# Patient Record
Sex: FEMALE | Race: BLACK OR AFRICAN AMERICAN | NOT HISPANIC OR LATINO | Employment: FULL TIME
[De-identification: names, ages, dates, MRNs, and addresses within clinical notes are randomized per-mention and may not be internally consistent; named-entity substitution may affect disease eponyms.]

---

## 2017-06-24 ENCOUNTER — CHARTING TRANS (OUTPATIENT)
Dept: OTHER | Age: 27
End: 2017-06-24

## 2017-06-24 ASSESSMENT — PAIN SCALES - GENERAL: PAINLEVEL_OUTOF10: 0

## 2017-07-11 ENCOUNTER — LAB SERVICES (OUTPATIENT)
Dept: OTHER | Age: 27
End: 2017-07-11

## 2017-07-11 ENCOUNTER — CHARTING TRANS (OUTPATIENT)
Dept: OTHER | Age: 27
End: 2017-07-11

## 2017-07-11 LAB
CLUE CELLS: NORMAL
M TB RRNA SPEC QL PROBE: NORMAL
TRICHOMONAS W: NORMAL
YEAST WM: NORMAL

## 2017-07-11 ASSESSMENT — PAIN SCALES - GENERAL: PAINLEVEL_OUTOF10: 0

## 2017-07-13 ENCOUNTER — CHARTING TRANS (OUTPATIENT)
Dept: OTHER | Age: 27
End: 2017-07-13

## 2017-07-13 LAB
HIV 1+2 AB+HIV1 P24 AG SERPL QL IA: NONREACTIVE
RPR SER QL: NONREACTIVE

## 2017-07-14 ENCOUNTER — CHARTING TRANS (OUTPATIENT)
Dept: OTHER | Age: 27
End: 2017-07-14

## 2017-07-14 LAB
C TRACH RRNA SPEC QL NAA+PROBE: NEGATIVE
N GONORRHOEA RRNA SPEC QL NAA+PROBE: NEGATIVE
SPECIMEN SOURCE: NORMAL

## 2017-07-19 ENCOUNTER — CHARTING TRANS (OUTPATIENT)
Dept: OTHER | Age: 27
End: 2017-07-19

## 2017-07-19 LAB — HPV I/H RISK 4 DNA CVX QL NAA+PROBE: NORMAL

## 2017-07-20 ENCOUNTER — CHARTING TRANS (OUTPATIENT)
Dept: OTHER | Age: 27
End: 2017-07-20

## 2017-10-29 ENCOUNTER — HOSPITAL ENCOUNTER (EMERGENCY)
Facility: OTHER | Age: 27
Discharge: HOME | End: 2017-10-29
Attending: EMERGENCY MEDICINE

## 2017-10-29 VITALS
DIASTOLIC BLOOD PRESSURE: 58 MMHG | SYSTOLIC BLOOD PRESSURE: 92 MMHG | WEIGHT: 108 LBS | OXYGEN SATURATION: 100 % | RESPIRATION RATE: 16 BRPM | BODY MASS INDEX: 19.14 KG/M2 | TEMPERATURE: 98 F | HEIGHT: 63 IN | HEART RATE: 106 BPM

## 2017-10-29 DIAGNOSIS — I95.1 ORTHOSTATIC HYPOTENSION: Primary | ICD-10-CM

## 2017-10-29 DIAGNOSIS — R11.2 NON-INTRACTABLE VOMITING WITH NAUSEA, UNSPECIFIED VOMITING TYPE: ICD-10-CM

## 2017-10-29 LAB
ALBUMIN SERPL-MCNC: 3 G/DL (ref 3.3–5.5)
ALBUMIN SERPL-MCNC: 3.2 G/DL (ref 3.3–5.5)
ALP SERPL-CCNC: 70 U/L (ref 42–141)
ALP SERPL-CCNC: 76 U/L (ref 42–141)
BILIRUB SERPL-MCNC: 0.3 MG/DL (ref 0.2–1.6)
BILIRUB SERPL-MCNC: 0.4 MG/DL (ref 0.2–1.6)
BUN SERPL-MCNC: 6 MG/DL (ref 7–22)
CALCIUM SERPL-MCNC: 9.2 MG/DL (ref 8–10.3)
CHLORIDE SERPL-SCNC: 100 MMOL/L (ref 98–108)
CREAT SERPL-MCNC: 0.7 MG/DL (ref 0.6–1.2)
CTP QC/QA: YES
FLUAV AG NPH QL: NEGATIVE
FLUBV AG NPH QL: NEGATIVE
GLUCOSE SERPL-MCNC: 78 MG/DL (ref 73–118)
POC ALT (SGPT): 12 U/L (ref 10–47)
POC ALT (SGPT): 9 U/L (ref 10–47)
POC AMYLASE: 62 U/L (ref 14–97)
POC AST (SGOT): 21 U/L (ref 11–38)
POC AST (SGOT): 22 U/L (ref 11–38)
POC BETA-HCG (QUANT): <5 IU/L
POC GGT: 7 U/L (ref 5–65)
POC TCO2: 27 MMOL/L (ref 18–33)
POTASSIUM BLD-SCNC: 3.7 MMOL/L (ref 3.6–5.1)
PROTEIN, POC: 8.5 G/DL (ref 6.4–8.1)
PROTEIN, POC: 8.6 G/DL (ref 6.4–8.1)
SAMPLE: NORMAL
SODIUM BLD-SCNC: 140 MMOL/L (ref 128–145)

## 2017-10-29 PROCEDURE — 85025 COMPLETE CBC W/AUTO DIFF WBC: CPT

## 2017-10-29 PROCEDURE — 96374 THER/PROPH/DIAG INJ IV PUSH: CPT

## 2017-10-29 PROCEDURE — 84702 CHORIONIC GONADOTROPIN TEST: CPT

## 2017-10-29 PROCEDURE — 99284 EMERGENCY DEPT VISIT MOD MDM: CPT | Mod: 25

## 2017-10-29 PROCEDURE — 63600175 PHARM REV CODE 636 W HCPCS: Performed by: EMERGENCY MEDICINE

## 2017-10-29 PROCEDURE — 87804 INFLUENZA ASSAY W/OPTIC: CPT

## 2017-10-29 PROCEDURE — 96361 HYDRATE IV INFUSION ADD-ON: CPT

## 2017-10-29 PROCEDURE — 83605 ASSAY OF LACTIC ACID: CPT

## 2017-10-29 PROCEDURE — 25000003 PHARM REV CODE 250: Performed by: EMERGENCY MEDICINE

## 2017-10-29 PROCEDURE — 80053 COMPREHEN METABOLIC PANEL: CPT

## 2017-10-29 RX ORDER — ONDANSETRON 4 MG/1
4 TABLET, FILM COATED ORAL EVERY 6 HOURS PRN
Qty: 12 TABLET | Refills: 0 | Status: SHIPPED | OUTPATIENT
Start: 2017-10-29 | End: 2019-04-04

## 2017-10-29 RX ORDER — ONDANSETRON 2 MG/ML
4 INJECTION INTRAMUSCULAR; INTRAVENOUS
Status: COMPLETED | OUTPATIENT
Start: 2017-10-29 | End: 2017-10-29

## 2017-10-29 RX ORDER — SODIUM CHLORIDE 9 MG/ML
1000 INJECTION, SOLUTION INTRAVENOUS
Status: COMPLETED | OUTPATIENT
Start: 2017-10-29 | End: 2017-10-29

## 2017-10-29 RX ADMIN — ONDANSETRON 4 MG: 2 INJECTION INTRAMUSCULAR; INTRAVENOUS at 04:10

## 2017-10-29 RX ADMIN — SODIUM CHLORIDE 1000 ML: 0.9 INJECTION, SOLUTION INTRAVENOUS at 04:10

## 2017-10-29 NOTE — ED TRIAGE NOTES
Patient states she has headache and abdominal pain with n/v.  She states she had 2 episodes of vomiting hours ago.      LOC: The patient is awake and alert; oriented x 3 and speaking appropriately.  APPEARANCE: Patient resting comfortably, patient is clean and well groomed  SKIN: warm and dry, normal skin turgor & moist mucus membranes, skin intact, no breakdown noted.  MUSCULOSKELETAL: Patient moving all extremities well, no obvious swelling or deformities noted  RESPIRATORY: Airway is open and patent, breath sounds clear throughout all lung fields; respirations are spontaneous, normal effort and rate  CARDIAC: Patient has a normal rate, no peripheral edema noted, capillary refill < 3 seconds; No complaints of chest pain   ABDOMEN: Soft and non tender to palpation, no distention noted. Bowel sounds present x 4

## 2017-10-29 NOTE — ED PROVIDER NOTES
Encounter Date: 10/29/2017       History     Chief Complaint   Patient presents with    Headache     Pt presents to ER with c/o headache and general body aches accompanied by nausea and vomitng. Pt also running fever at home last took tylenol about 3 hours ago.      27 y.o. Female with PMH asthma, tobacco/marijuana abuse, IBS, anemia presents to ED c/o acute NV x 2 and subjective fever since yesterday. She states emesis was food contents and denies hematemesis.  She further reports chronic intermittent, generalized abdominal pain that has been present for weeks since she stopped taking nexium.     + sick contacts - exposed to someone with flu-like symptoms and vomiting this week.      The history is provided by the patient.     Review of patient's allergies indicates:   Allergen Reactions    Shellfish containing products Anaphylaxis     Allergic to all seafood.    Horse/equine containing products Itching     Past Medical History:   Diagnosis Date    Allergy     Anemia     Asthma     Depression      History reviewed. No pertinent surgical history.  History reviewed. No pertinent family history.  Social History   Substance Use Topics    Smoking status: Current Every Day Smoker    Smokeless tobacco: Never Used    Alcohol use 0.0 oz/week      Comment: Socially     Review of Systems   Constitutional: Positive for chills, fatigue and fever (subjective).   HENT: Negative.    Eyes: Negative.    Respiratory: Negative for cough and shortness of breath.    Cardiovascular: Negative for palpitations and leg swelling.   Gastrointestinal: Positive for abdominal pain (chronic intermittent pain that is usually alleviated by nexium which she is no longer taking), nausea and vomiting.   Genitourinary: Negative for dysuria and frequency.   Musculoskeletal: Negative for arthralgias and myalgias.   Skin: Positive for rash.   Psychiatric/Behavioral:        Stressed out over her dying grandfather's condition.   All other systems  reviewed and are negative.      Physical Exam     Initial Vitals [10/29/17 0315]   BP Pulse Resp Temp SpO2   112/71 (!) 117 18 98.1 °F (36.7 °C) 100 %      MAP       84.67         Physical Exam    Nursing note and vitals reviewed.  Constitutional: She appears well-developed and well-nourished. She is not diaphoretic. No distress.   HENT:   Head: Normocephalic and atraumatic.   Mouth/Throat: Uvula is midline. Mucous membranes are dry.   Eyes: Conjunctivae are normal. Pupils are equal, round, and reactive to light.   Neck: Normal range of motion. Neck supple.   Cardiovascular: Normal rate and intact distal pulses.   Pulmonary/Chest: No accessory muscle usage. No tachypnea. No respiratory distress.   Abdominal: She exhibits no distension. There is no tenderness.   Musculoskeletal: Normal range of motion. She exhibits no tenderness.   Neurological: She is alert and oriented to person, place, and time.   Skin: Skin is warm. Capillary refill takes less than 2 seconds.   Psychiatric: She has a normal mood and affect.         ED Course   Procedures  Labs Reviewed   POCT CMP - Abnormal; Notable for the following:        Result Value    Albumin, POC 3.0 (*)     POC BUN 6 (*)     Protein 8.5 (*)     All other components within normal limits   POCT LIVER PANEL - Abnormal; Notable for the following:     Albumin, POC 3.2 (*)     ALT (SGPT), POC 9 (*)     Protein 8.6 (*)     All other components within normal limits   POCT INFLUENZA A/B   POCT CBC   POCT URINE PREGNANCY   POCT CMP   POCT LIVER PANEL   POCT B-HCG (QUANT)   POCT B-HCG (QUANT)     EKG Readings: (Independently Interpreted)   Initial Reading: No STEMI. Rhythm: Sinus Tachycardia. Heart Rate: 105. Ectopy: No Ectopy. Conduction: Normal. ST Segments: Normal ST Segments. Axis: Normal. Q Waves: V1 and V2.          Medical Decision Making:   ED Management:  Patient's HR and BP consistent with previous outpatient vitals.  Discharged after IVF's.                    ED Course as   Oct 29 0638   Sun Oct 29, 2017   0550 No emesis during ED course thus far. Patient states she feels much better.   [DL]      ED Course User Index  [DL] Stefany Song MD     Labs Reviewed  Admission on 10/29/2017, Discharged on 10/29/2017   Component Date Value Ref Range Status    Rapid Influenza A Ag 10/29/2017 Negative  Negative Final    Rapid Influenza B Ag 10/29/2017 Negative  Negative Final     Acceptable 10/29/2017 Yes   Final    Albumin, POC 10/29/2017 3.0* 3.3 - 5.5 g/dL Final    Alkaline Phosphatase, POC 10/29/2017 76  42 - 141 U/L Final    ALT (SGPT), POC 10/29/2017 12  10 - 47 U/L Final    AST (SGOT), POC 10/29/2017 22  11 - 38 U/L Final    POC BUN 10/29/2017 6* 7 - 22 mg/dL Final    Calcium, POC 10/29/2017 9.2  8.0 - 10.3 mg/dL Final    POC Chloride 10/29/2017 100  98 - 108 mmol/L Final    POC Creatinine 10/29/2017 0.7  0.6 - 1.2 mg/dL Final    POC Glucose 10/29/2017 78  73 - 118 mg/dL Final    POC Potassium 10/29/2017 3.7  3.6 - 5.1 mmol/L Final    POC Sodium 10/29/2017 140  128 - 145 mmol/L Final    Bilirubin 10/29/2017 0.3  0.2 - 1.6 mg/dL Final    POC TCO2 10/29/2017 27  18 - 33 mmol/L Final    Protein 10/29/2017 8.5* 6.4 - 8.1 g/dL Final    Albumin, POC 10/29/2017 3.2* 3.3 - 5.5 g/dL Final    Alkaline Phosphatase, POC 10/29/2017 70  42 - 141 U/L Final    ALT (SGPT), POC 10/29/2017 9* 10 - 47 U/L Final    Amylase, POC 10/29/2017 62  14 - 97 U/L Final    AST (SGOT), POC 10/29/2017 21  11 - 38 U/L Final    POC GGT 10/29/2017 7  5 - 65 U/L Final    Bilirubin 10/29/2017 0.4  0.2 - 1.6 mg/dL Final    Protein 10/29/2017 8.6* 6.4 - 8.1 g/dL Final    POC Beta-HCG (Quant) 10/29/2017 <5.0  IU/L Final    Sample 10/29/2017 BRENDA   Final           Medications given in ED    Medications   0.9%  NaCl infusion (0 mLs Intravenous Stopped 10/29/17 0500)   ondansetron injection 4 mg (4 mg Intravenous Given 10/29/17 9063)       Discharge Medications     Discharge Medication  List as of 10/29/2017  5:55 AM      START taking these medications    Details   ondansetron (ZOFRAN) 4 MG tablet Take 1 tablet (4 mg total) by mouth every 6 (six) hours as needed for Nausea., Starting Sun 10/29/2017, Print                   Patient discharged to home in stable condition with instructions to:   1. Please take all meds as prescribed.  2. Follow-up with your primary care doctor   3. Return precautions discussed and patient and/or family/caretaker understands to return to the emergency room for any concerns including worsening of your current symptoms, fever, chills, night sweats, worsening pain, chest pain, shortness of breath, nausea, vomiting, diarrhea, bleeding, headache, difficulty talking, visual disturbances, weakness, numbness or any other acute concerns    Note was created using voice recognition software. Note may have occasional typographical errors that may not have been identified and edited despite good fantasma initial review prior to signing.    Clinical Impression:   The primary encounter diagnosis was Orthostatic hypotension. A diagnosis of Non-intractable vomiting with nausea, unspecified vomiting type was also pertinent to this visit.                           Stefany Song MD  10/29/17 0645

## 2018-03-04 ENCOUNTER — HOSPITAL ENCOUNTER (EMERGENCY)
Facility: OTHER | Age: 28
Discharge: HOME | End: 2018-03-04
Attending: INTERNAL MEDICINE

## 2018-03-04 VITALS
DIASTOLIC BLOOD PRESSURE: 64 MMHG | OXYGEN SATURATION: 100 % | HEIGHT: 63 IN | RESPIRATION RATE: 18 BRPM | TEMPERATURE: 99 F | WEIGHT: 115 LBS | HEART RATE: 100 BPM | SYSTOLIC BLOOD PRESSURE: 107 MMHG | BODY MASS INDEX: 20.38 KG/M2

## 2018-03-04 DIAGNOSIS — L03.315 CELLULITIS OF PERINEUM: Primary | ICD-10-CM

## 2018-03-04 LAB
B-HCG UR QL: NEGATIVE
BILIRUBIN, POC UA: NEGATIVE
BLOOD, POC UA: NEGATIVE
CLARITY, POC UA: CLEAR
COLOR, POC UA: YELLOW
CTP QC/QA: YES
GLUCOSE, POC UA: NEGATIVE
KETONES, POC UA: NEGATIVE
LEUKOCYTE EST, POC UA: NEGATIVE
NITRITE, POC UA: NEGATIVE
PH UR STRIP: 8 [PH]
PROTEIN, POC UA: ABNORMAL
SPECIFIC GRAVITY, POC UA: 1.02
UROBILINOGEN, POC UA: 1 E.U./DL

## 2018-03-04 PROCEDURE — 99283 EMERGENCY DEPT VISIT LOW MDM: CPT

## 2018-03-04 PROCEDURE — 81025 URINE PREGNANCY TEST: CPT | Performed by: EMERGENCY MEDICINE

## 2018-03-04 PROCEDURE — 81003 URINALYSIS AUTO W/O SCOPE: CPT

## 2018-03-04 RX ORDER — DOXYCYCLINE 100 MG/1
100 CAPSULE ORAL 2 TIMES DAILY
Qty: 20 CAPSULE | Refills: 0 | Status: SHIPPED | OUTPATIENT
Start: 2018-03-04 | End: 2018-03-14

## 2018-03-04 RX ORDER — DOXYCYCLINE 100 MG/1
100 CAPSULE ORAL 2 TIMES DAILY
Qty: 20 CAPSULE | Refills: 0 | Status: SHIPPED | OUTPATIENT
Start: 2018-03-04 | End: 2018-03-04

## 2018-03-05 NOTE — ED NOTES
"Pt states her main complaint is a "boil" between her vagina and rectum that began weeks ago but became painful over the last few days. Pt denies fever, chills other symptoms at this time. Denies drainage from the area, +redness and swelling noted upon exam.  "

## 2018-03-05 NOTE — ED PROVIDER NOTES
"Encounter Date: 3/4/2018       History     Chief Complaint   Patient presents with    Abdominal Pain    Fatigue     generalized and arms    Acne    Recurrent Skin Infections     "near vagina and butt"     27-year-old female presents to the emergency department complaining of infection to her right perineal region ×2 days.  Patient states area has increased in size and tenderness.  She also had complaints of abdominal pain and fatigue that have been intermittent over the past several weeks, but denies abdominal pain and fatigue at this time.      The history is provided by the patient. No  was used.   Illness    The current episode started yesterday. The problem occurs continuously. The problem has been gradually worsening. The pain is at a severity of 3/10. Nothing relieves the symptoms. Nothing aggravates the symptoms. Associated symptoms include rash. Pertinent negatives include no fever.     Review of patient's allergies indicates:   Allergen Reactions    Shellfish containing products Anaphylaxis     Allergic to all seafood.    Horse/equine containing products Itching     Past Medical History:   Diagnosis Date    Allergy     Anemia     Asthma     Depression      History reviewed. No pertinent surgical history.  History reviewed. No pertinent family history.  Social History   Substance Use Topics    Smoking status: Current Every Day Smoker    Smokeless tobacco: Never Used    Alcohol use 0.0 oz/week      Comment: Socially     Review of Systems   Constitutional: Negative for fever.   Skin: Positive for color change and rash.   All other systems reviewed and are negative.      Physical Exam     Initial Vitals [03/04/18 1719]   BP Pulse Resp Temp SpO2   91/74 101 18 98.5 °F (36.9 °C) 100 %      MAP       79.67         Physical Exam    Nursing note and vitals reviewed.  Constitutional: She appears well-developed and well-nourished. No distress.   HENT:   Head: Normocephalic and " atraumatic.   Eyes: EOM are normal.   Neck: Normal range of motion.   Cardiovascular: Normal rate and regular rhythm.   Pulmonary/Chest: Breath sounds normal. No respiratory distress.   Abdominal: Soft. She exhibits no distension.   Musculoskeletal: Normal range of motion.   Neurological: She is alert.   Skin: Skin is warm and dry.   Erythema to right perineal region with slight induration and no fluctuance   Psychiatric: She has a normal mood and affect.         ED Course   Procedures  Labs Reviewed   POCT URINALYSIS W/O SCOPE - Abnormal; Notable for the following:        Result Value    Glucose, UA Negative (*)     Bilirubin, UA Negative (*)     Ketones, UA Negative (*)     Blood, UA Negative (*)     Protein, UA 2+ (*)     Nitrite, UA Negative (*)     Leukocytes, UA Negative (*)     All other components within normal limits   POCT URINE PREGNANCY   POCT URINALYSIS W/O SCOPE             Medical Decision Making:   Initial Assessment:   27-year-old female presents to the emergency department complaining of infection to her right perineal region ×2 days.  Patient states area has increased in size and tenderness.    ED Management:  Patient was given instructions for cellulitis and a prescription for doxycycline.  She was advised to follow-up with her primary care physician within the next 2 days for reevaluation.                      Clinical Impression:   The encounter diagnosis was Cellulitis of perineum.    Disposition:   Disposition: Discharged  Condition: Stable                        Mckay Small MD  03/14/18 0553

## 2018-09-28 ENCOUNTER — CHARTING TRANS (OUTPATIENT)
Dept: OTHER | Age: 28
End: 2018-09-28

## 2018-10-21 ENCOUNTER — HOSPITAL ENCOUNTER (EMERGENCY)
Facility: HOSPITAL | Age: 28
Discharge: HOME | End: 2018-10-21
Attending: EMERGENCY MEDICINE
Payer: COMMERCIAL

## 2018-10-21 VITALS
HEART RATE: 95 BPM | RESPIRATION RATE: 16 BRPM | OXYGEN SATURATION: 98 % | DIASTOLIC BLOOD PRESSURE: 77 MMHG | SYSTOLIC BLOOD PRESSURE: 119 MMHG | WEIGHT: 100 LBS | TEMPERATURE: 98 F | BODY MASS INDEX: 17.71 KG/M2

## 2018-10-21 DIAGNOSIS — L40.9 PSORIASIS OF SCALP: Primary | ICD-10-CM

## 2018-10-21 DIAGNOSIS — N39.0 ACUTE UTI: ICD-10-CM

## 2018-10-21 DIAGNOSIS — L03.811 CELLULITIS OF SCALP: ICD-10-CM

## 2018-10-21 DIAGNOSIS — L40.1 PUSTULAR PSORIASIS: ICD-10-CM

## 2018-10-21 LAB
B-HCG UR QL: NEGATIVE
BILIRUBIN, POC UA: NEGATIVE
BLOOD, POC UA: ABNORMAL
CLARITY, POC UA: ABNORMAL
COLOR, POC UA: YELLOW
CTP QC/QA: YES
GLUCOSE, POC UA: NEGATIVE
KETONES, POC UA: NEGATIVE
LEUKOCYTE EST, POC UA: NEGATIVE
NITRITE, POC UA: POSITIVE
PH UR STRIP: 5.5 [PH]
PROTEIN, POC UA: ABNORMAL
SPECIFIC GRAVITY, POC UA: >=1.03
UROBILINOGEN, POC UA: 0.2 E.U./DL

## 2018-10-21 PROCEDURE — 81025 URINE PREGNANCY TEST: CPT | Performed by: EMERGENCY MEDICINE

## 2018-10-21 PROCEDURE — 87077 CULTURE AEROBIC IDENTIFY: CPT

## 2018-10-21 PROCEDURE — 81003 URINALYSIS AUTO W/O SCOPE: CPT

## 2018-10-21 PROCEDURE — 99284 EMERGENCY DEPT VISIT MOD MDM: CPT

## 2018-10-21 PROCEDURE — 87086 URINE CULTURE/COLONY COUNT: CPT

## 2018-10-21 PROCEDURE — 87088 URINE BACTERIA CULTURE: CPT

## 2018-10-21 PROCEDURE — 87186 SC STD MICRODIL/AGAR DIL: CPT

## 2018-10-21 RX ORDER — FLUOCINONIDE 0.5 MG/G
OINTMENT TOPICAL 2 TIMES DAILY
Qty: 60 G | Refills: 0 | Status: SHIPPED | OUTPATIENT
Start: 2018-10-21 | End: 2019-04-04

## 2018-10-21 RX ORDER — LORATADINE 10 MG/1
10 TABLET ORAL DAILY
Qty: 60 TABLET | Refills: 0 | Status: SHIPPED | OUTPATIENT
Start: 2018-10-21 | End: 2019-04-04

## 2018-10-21 RX ORDER — AMOXICILLIN AND CLAVULANATE POTASSIUM 875; 125 MG/1; MG/1
1 TABLET, FILM COATED ORAL 2 TIMES DAILY
Qty: 20 TABLET | Refills: 0 | Status: SHIPPED | OUTPATIENT
Start: 2018-10-21 | End: 2018-10-31

## 2018-10-21 RX ORDER — FLUOCINOLONE ACETONIDE 0.11 MG/ML
OIL TOPICAL NIGHTLY
Qty: 118.28 ML | Refills: 0 | Status: SHIPPED | OUTPATIENT
Start: 2018-10-21 | End: 2019-04-04

## 2018-10-21 RX ORDER — DIPHENHYDRAMINE HCL 25 MG
25 CAPSULE ORAL EVERY 6 HOURS PRN
Qty: 20 CAPSULE | Refills: 0 | Status: SHIPPED | OUTPATIENT
Start: 2018-10-21 | End: 2019-04-04

## 2018-10-21 RX ORDER — CLOBETASOL PROPIONATE 0.46 MG/ML
SOLUTION TOPICAL 2 TIMES DAILY
Qty: 50 ML | Refills: 0 | Status: SHIPPED | OUTPATIENT
Start: 2018-10-21 | End: 2019-04-04

## 2018-10-21 NOTE — ED PROVIDER NOTES
"Encounter Date: 10/21/2018    SCRIBE #1 NOTE: I, Ama Monsalve, am scribing for, and in the presence of,  Dr. West. I have scribed the following portions of the note - Other sections scribed: HPI, ROS, PE.       History     Chief Complaint   Patient presents with    Rash     Pt states," I have a rash on my head for months and sometimes it smells."     This is a 28 year old female who presents to the ED complaining of "skin issues" to her entire body for years and her scalp for the past few months. She states she has had these issues all her life. She does not see a dermatologist for these issues because she cannot afford it without insurance. She states the rash to her scalp has been draining yellow pus recently. She notes the rash is worse to the back of her scalp. Family member at bedside states the rash to her scalp has a malodorous odor. Patient reports she gets chills. Denies fever. Patient does not wear weave, and states her hair is natural. Patient states she used to see a PCP, Dr. Jay, at [facility], but has not seen him in a long time. She denies using any medications for her skin.      The history is provided by the patient and a relative.     Review of patient's allergies indicates:   Allergen Reactions    Shellfish containing products Anaphylaxis     Allergic to all seafood.    Horse/equine containing products Itching     Past Medical History:   Diagnosis Date    Allergy     Anemia     Asthma     Depression      History reviewed. No pertinent surgical history.  History reviewed. No pertinent family history.  Social History     Tobacco Use    Smoking status: Current Every Day Smoker    Smokeless tobacco: Never Used   Substance Use Topics    Alcohol use: Yes     Alcohol/week: 0.0 oz     Comment: Socially    Drug use: Yes     Frequency: 3.0 times per week     Types: Marijuana     Review of Systems   Constitutional: Negative for chills and fever.   HENT: Negative for sore throat.  "   Respiratory: Negative for shortness of breath.    Cardiovascular: Negative for chest pain.   Gastrointestinal: Negative for nausea.   Genitourinary: Negative for dysuria.   Musculoskeletal: Negative for back pain.   Skin: Positive for rash.   Neurological: Negative for weakness.   Hematological: Does not bruise/bleed easily.   All other systems reviewed and are negative.      Physical Exam     Initial Vitals [10/21/18 1257]   BP Pulse Resp Temp SpO2   119/77 95 16 98 °F (36.7 °C) 98 %      MAP       --           Patient gave consent to have physical exam performed.    Physical Exam    Nursing note and vitals reviewed.  Constitutional: She appears well-developed and well-nourished. She appears cachectic.   HENT:   Head: Normocephalic and atraumatic.   Right Ear: External ear normal.   Left Ear: External ear normal.   Nose: Nose normal.   Eyes: Conjunctivae are normal.   Neck: Normal range of motion. Neck supple.   Cardiovascular: Normal rate, regular rhythm, normal heart sounds and intact distal pulses. Exam reveals no gallop and no friction rub.    No murmur heard.  Pulmonary/Chest: Breath sounds normal. No stridor. No respiratory distress. She has no wheezes. She has no rhonchi. She has no rales.   Abdominal: Soft. Bowel sounds are normal. She exhibits no distension and no mass. There is no tenderness. There is no rigidity, no rebound and no guarding.   Musculoskeletal: Normal range of motion.   Neurological: She is alert and oriented to person, place, and time.   Skin: Skin is warm and dry. Rash noted.   Dry patches of silver plaque to bilateral extensor elbows and diffusely to lower extremities. Silver scales appreciated to arms, scalp, and lower extremities. Thick plaque to scalp, yellow in nature, that encompasses entire scalp wit diffusely matted hair. Yellow discharge coming form right posterior scalp.   Psychiatric: She has a normal mood and affect. Her behavior is normal.         ED Course    Procedures  Labs Reviewed   POCT URINALYSIS W/O SCOPE - Abnormal; Notable for the following components:       Result Value    Glucose, UA Negative (*)     Bilirubin, UA Negative (*)     Ketones, UA Negative (*)     Spec Grav UA >=1.030 (*)     Blood, UA Trace-lysed (*)     Protein, UA 1+ (*)     Nitrite, UA Positive (*)     Leukocytes, UA Negative (*)     All other components within normal limits   CULTURE, URINE   POCT URINE PREGNANCY   POCT URINALYSIS, DIPSTICK OR TABLET REAGENT, AUTOMATED, WITH MICROSCOP               Medical Decision Making:   Clinical Tests:   Lab Tests: Ordered and Reviewed       <> Summary of Lab: UPT is negative.  Fill and take prescriptions as directed.  Return to the ED if symptoms worsen or do not resolve.   Answered questions and discussed discharge plan.    Patient feels much better and is ready for discharge.  Follow up with PCP in 1 days.            Scribe Attestation:   Scribe #1: I performed the above scribed service and the documentation accurately describes the services I performed. I attest to the accuracy of the note.     I, Dr. Rebekah West, personally performed the services described in this documentation. This document was produced by a scribe under my direction and in my presence. All medical record entries made by the scribe were at my direction and in my presence.  I have reviewed the chart and agree that the record reflects my personal performance and is accurate and complete. Rebekah West DO.     10/21/2018 3:35 PM             Clinical Impression:     1. Psoriasis of scalp    2. Cellulitis of scalp    3. Pustular psoriasis    4. Acute UTI                                   Rebekah West DO  10/21/18 1537

## 2018-10-22 ENCOUNTER — LAB SERVICES (OUTPATIENT)
Dept: OTHER | Age: 28
End: 2018-10-22

## 2018-10-22 ENCOUNTER — CHARTING TRANS (OUTPATIENT)
Dept: OTHER | Age: 28
End: 2018-10-22

## 2018-10-22 LAB
CLUE CELLS: NORMAL
M TB RRNA SPEC QL PROBE: NORMAL
TRICHOMONAS W: NORMAL
YEAST WM: NORMAL

## 2018-10-22 ASSESSMENT — PAIN SCALES - GENERAL: PAINLEVEL_OUTOF10: 0

## 2018-10-23 LAB — BACTERIA UR CULT: NORMAL

## 2018-10-24 ENCOUNTER — CHARTING TRANS (OUTPATIENT)
Dept: OTHER | Age: 28
End: 2018-10-24

## 2018-10-24 LAB
C TRACH RRNA SPEC QL NAA+PROBE: NEGATIVE
CANDIDA RRNA VAG QL PROBE: NEGATIVE
G VAGINALIS RRNA GENITAL QL PROBE: NEGATIVE
N GONORRHOEA RRNA SPEC QL NAA+PROBE: NEGATIVE
SPECIMEN SOURCE: NORMAL
T VAGINALIS RRNA GENITAL QL PROBE: NEGATIVE

## 2018-11-03 VITALS
DIASTOLIC BLOOD PRESSURE: 90 MMHG | BODY MASS INDEX: 44.41 KG/M2 | SYSTOLIC BLOOD PRESSURE: 140 MMHG | HEIGHT: 68 IN | HEART RATE: 92 BPM | TEMPERATURE: 97.8 F | WEIGHT: 293 LBS | RESPIRATION RATE: 20 BRPM | OXYGEN SATURATION: 98 %

## 2018-11-03 VITALS
OXYGEN SATURATION: 98 % | BODY MASS INDEX: 44.41 KG/M2 | WEIGHT: 293 LBS | HEART RATE: 106 BPM | RESPIRATION RATE: 20 BRPM | TEMPERATURE: 99 F | HEIGHT: 68 IN

## 2018-11-03 VITALS
DIASTOLIC BLOOD PRESSURE: 106 MMHG | HEART RATE: 84 BPM | WEIGHT: 293 LBS | HEIGHT: 68 IN | BODY MASS INDEX: 44.41 KG/M2 | RESPIRATION RATE: 18 BRPM | SYSTOLIC BLOOD PRESSURE: 155 MMHG

## 2018-11-27 VITALS
TEMPERATURE: 98.4 F | WEIGHT: 293 LBS | HEIGHT: 68 IN | DIASTOLIC BLOOD PRESSURE: 104 MMHG | OXYGEN SATURATION: 96 % | SYSTOLIC BLOOD PRESSURE: 146 MMHG | BODY MASS INDEX: 44.41 KG/M2 | HEART RATE: 91 BPM | RESPIRATION RATE: 20 BRPM

## 2019-01-04 ENCOUNTER — TELEPHONE (OUTPATIENT)
Dept: SCHEDULING | Age: 29
End: 2019-01-04

## 2019-01-04 ENCOUNTER — OFFICE VISIT (OUTPATIENT)
Dept: FAMILY MEDICINE | Age: 29
End: 2019-01-04

## 2019-01-04 VITALS
HEART RATE: 88 BPM | TEMPERATURE: 98.2 F | WEIGHT: 293 LBS | HEIGHT: 69 IN | BODY MASS INDEX: 43.4 KG/M2 | DIASTOLIC BLOOD PRESSURE: 98 MMHG | RESPIRATION RATE: 18 BRPM | OXYGEN SATURATION: 97 % | SYSTOLIC BLOOD PRESSURE: 153 MMHG

## 2019-01-04 DIAGNOSIS — H60.391 INFECTION OF SKIN OF RIGHT EAR LOBE: ICD-10-CM

## 2019-01-04 DIAGNOSIS — Z41.3 ENCOUNTER FOR PIERCING OF EXTERNAL EAR: Primary | ICD-10-CM

## 2019-01-04 PROBLEM — H60.399 INFECTION OF SKIN OF EAR LOBE: Status: ACTIVE | Noted: 2019-01-04

## 2019-01-04 PROCEDURE — 99202 OFFICE O/P NEW SF 15 MIN: CPT | Performed by: FAMILY MEDICINE

## 2019-01-04 RX ORDER — AMOXICILLIN AND CLAVULANATE POTASSIUM 875; 125 MG/1; MG/1
1 TABLET, FILM COATED ORAL EVERY 12 HOURS
Qty: 20 TABLET | Refills: 0 | Status: SHIPPED | OUTPATIENT
Start: 2019-01-04 | End: 2019-04-01 | Stop reason: ALTCHOICE

## 2019-01-04 ASSESSMENT — ENCOUNTER SYMPTOMS
DIARRHEA: 0
LIGHT-HEADEDNESS: 0
SHORTNESS OF BREATH: 0
VOMITING: 0
AGITATION: 0
ABDOMINAL PAIN: 0
ADENOPATHY: 0
WHEEZING: 0
CONSTIPATION: 0
ACTIVITY CHANGE: 0
UNEXPECTED WEIGHT CHANGE: 0
HEADACHES: 0
COUGH: 0
SLEEP DISTURBANCE: 0
EYE DISCHARGE: 0
POLYDIPSIA: 0
COLOR CHANGE: 0
NERVOUS/ANXIOUS: 0
APPETITE CHANGE: 0
NAUSEA: 0
RHINORRHEA: 0
DIZZINESS: 0

## 2019-01-08 ENCOUNTER — TELEPHONE (OUTPATIENT)
Dept: SCHEDULING | Age: 29
End: 2019-01-08

## 2019-01-09 ENCOUNTER — OFFICE VISIT (OUTPATIENT)
Dept: OBGYN | Age: 29
End: 2019-01-09

## 2019-01-09 VITALS
SYSTOLIC BLOOD PRESSURE: 141 MMHG | WEIGHT: 293 LBS | HEIGHT: 69 IN | HEART RATE: 84 BPM | TEMPERATURE: 98 F | DIASTOLIC BLOOD PRESSURE: 96 MMHG | BODY MASS INDEX: 43.4 KG/M2 | RESPIRATION RATE: 17 BRPM

## 2019-01-09 DIAGNOSIS — L02.224 FURUNCLE OF GROIN: Primary | ICD-10-CM

## 2019-01-09 PROBLEM — A60.04 HERPES, VULVOVAGINITIS: Status: ACTIVE | Noted: 2018-10-22

## 2019-01-09 PROBLEM — L68.0 FEMALE HIRSUTISM: Status: ACTIVE | Noted: 2017-07-11

## 2019-01-09 PROBLEM — H92.09 EARACHE: Status: ACTIVE | Noted: 2017-07-20

## 2019-01-09 PROBLEM — I10 HYPERTENSION, UNCONTROLLED: Status: ACTIVE | Noted: 2017-07-11

## 2019-01-09 PROCEDURE — 87070 CULTURE OTHR SPECIMN AEROBIC: CPT | Performed by: NURSE PRACTITIONER

## 2019-01-09 PROCEDURE — 87205 SMEAR GRAM STAIN: CPT | Performed by: NURSE PRACTITIONER

## 2019-01-09 PROCEDURE — 3080F DIAST BP >= 90 MM HG: CPT | Performed by: NURSE PRACTITIONER

## 2019-01-09 PROCEDURE — 99214 OFFICE O/P EST MOD 30 MIN: CPT | Performed by: NURSE PRACTITIONER

## 2019-01-09 PROCEDURE — 3077F SYST BP >= 140 MM HG: CPT | Performed by: NURSE PRACTITIONER

## 2019-01-09 RX ORDER — CEPHALEXIN 500 MG/1
500 CAPSULE ORAL 4 TIMES DAILY
Qty: 28 CAPSULE | Refills: 0 | Status: SHIPPED | OUTPATIENT
Start: 2019-01-09 | End: 2019-01-16

## 2019-01-09 RX ORDER — FLUCONAZOLE 150 MG/1
150 TABLET ORAL ONCE
Qty: 1 TABLET | Refills: 1 | Status: SHIPPED | OUTPATIENT
Start: 2019-01-09 | End: 2020-07-06 | Stop reason: ALTCHOICE

## 2019-01-09 RX ORDER — ACYCLOVIR 800 MG/1
TABLET ORAL
COMMUNITY
Start: 2018-10-25 | End: 2020-07-06 | Stop reason: ALTCHOICE

## 2019-01-09 RX ORDER — AMLODIPINE BESYLATE 5 MG/1
TABLET ORAL
COMMUNITY
Start: 2018-09-28 | End: 2020-07-06 | Stop reason: SDUPTHER

## 2019-01-09 RX ORDER — LEVOTHYROXINE SODIUM 0.1 MG/1
TABLET ORAL
COMMUNITY
Start: 2018-09-28 | End: 2020-07-06 | Stop reason: SDUPTHER

## 2019-01-09 ASSESSMENT — ENCOUNTER SYMPTOMS
RESPIRATORY NEGATIVE: 1
NEUROLOGICAL NEGATIVE: 1
ALLERGIC/IMMUNOLOGIC NEGATIVE: 1
HEMATOLOGIC/LYMPHATIC NEGATIVE: 1
WOUND: 1
CONSTITUTIONAL NEGATIVE: 1
PSYCHIATRIC NEGATIVE: 1
GASTROINTESTINAL NEGATIVE: 1
EYES NEGATIVE: 1
ENDOCRINE NEGATIVE: 1

## 2019-01-10 ENCOUNTER — TELEPHONE (OUTPATIENT)
Dept: SCHEDULING | Age: 29
End: 2019-01-10

## 2019-01-11 LAB
BACTERIA SPEC AEROBE CULT: ABNORMAL
BACTERIA SPEC AEROBE CULT: ABNORMAL
GRAM STN SPEC: ABNORMAL
REPORT STATUS (RPT): ABNORMAL
SPECIMEN SOURCE: ABNORMAL

## 2019-02-18 ENCOUNTER — HOSPITAL ENCOUNTER (INPATIENT)
Facility: HOSPITAL | Age: 29
LOS: 8 days | Discharge: HOME | DRG: 386 | End: 2019-02-26
Attending: EMERGENCY MEDICINE | Admitting: COLON & RECTAL SURGERY
Payer: COMMERCIAL

## 2019-02-18 ENCOUNTER — ANESTHESIA EVENT (OUTPATIENT)
Dept: ENDOSCOPY | Facility: HOSPITAL | Age: 29
DRG: 386 | End: 2019-02-18
Payer: COMMERCIAL

## 2019-02-18 DIAGNOSIS — K52.9 ENTEROCOLITIS: ICD-10-CM

## 2019-02-18 DIAGNOSIS — N30.90 CYSTITIS: ICD-10-CM

## 2019-02-18 DIAGNOSIS — K56.600 PARTIAL SMALL BOWEL OBSTRUCTION: ICD-10-CM

## 2019-02-18 DIAGNOSIS — K50.813 FISTULA OF INTESTINE DUE TO CROHN'S DISEASE OF BOTH SMALL AND LARGE INTESTINES: ICD-10-CM

## 2019-02-18 DIAGNOSIS — I48.91 ATRIAL FIBRILLATION: ICD-10-CM

## 2019-02-18 DIAGNOSIS — I49.9 ARRHYTHMIA: ICD-10-CM

## 2019-02-18 DIAGNOSIS — E44.0 MALNUTRITION OF MODERATE DEGREE: ICD-10-CM

## 2019-02-18 DIAGNOSIS — K50.00 CROHN'S DISEASE INVOLVING TERMINAL ILEUM: Primary | ICD-10-CM

## 2019-02-18 DIAGNOSIS — R10.31 RIGHT LOWER QUADRANT ABDOMINAL PAIN: ICD-10-CM

## 2019-02-18 PROBLEM — K63.2 COLITIS WITH FISTULA: Status: ACTIVE | Noted: 2019-02-18

## 2019-02-18 LAB
ALBUMIN SERPL BCP-MCNC: 1.9 G/DL
ALBUMIN SERPL-MCNC: 2.6 G/DL (ref 3.3–5.5)
ALBUMIN SERPL-MCNC: 2.6 G/DL (ref 3.3–5.5)
ALP SERPL-CCNC: 67 U/L
ALP SERPL-CCNC: 69 U/L (ref 42–141)
ALP SERPL-CCNC: 71 U/L (ref 42–141)
ALT SERPL W/O P-5'-P-CCNC: 10 U/L
AMPHET+METHAMPHET UR QL: NEGATIVE
ANION GAP SERPL CALC-SCNC: 6 MMOL/L
AST SERPL-CCNC: 11 U/L
B-HCG UR QL: NEGATIVE
BARBITURATES UR QL SCN>200 NG/ML: NEGATIVE
BASOPHILS # BLD AUTO: 0.01 K/UL
BASOPHILS NFR BLD: 0.2 %
BENZODIAZ UR QL SCN>200 NG/ML: NEGATIVE
BILIRUB SERPL-MCNC: 0.2 MG/DL
BILIRUB SERPL-MCNC: 0.5 MG/DL (ref 0.2–1.6)
BILIRUB SERPL-MCNC: 0.6 MG/DL (ref 0.2–1.6)
BILIRUBIN, POC UA: ABNORMAL
BLOOD, POC UA: ABNORMAL
BUN SERPL-MCNC: 6 MG/DL
BUN SERPL-MCNC: 9 MG/DL (ref 7–22)
BZE UR QL SCN: NEGATIVE
CALCIUM SERPL-MCNC: 8.1 MG/DL
CALCIUM SERPL-MCNC: 8.6 MG/DL (ref 8–10.3)
CANNABINOIDS UR QL SCN: NORMAL
CHLORIDE SERPL-SCNC: 108 MMOL/L
CHLORIDE SERPL-SCNC: 99 MMOL/L (ref 98–108)
CLARITY, POC UA: ABNORMAL
CO2 SERPL-SCNC: 22 MMOL/L
COLOR, POC UA: ABNORMAL
CREAT SERPL-MCNC: 0.5 MG/DL
CREAT SERPL-MCNC: 0.6 MG/DL
CREAT SERPL-MCNC: 0.6 MG/DL (ref 0.6–1.2)
CREAT UR-MCNC: 213.4 MG/DL
CRP SERPL-MCNC: 113.8 MG/L
CTP QC/QA: YES
DIFFERENTIAL METHOD: ABNORMAL
EOSINOPHIL # BLD AUTO: 0.1 K/UL
EOSINOPHIL NFR BLD: 1.2 %
ERYTHROCYTE [DISTWIDTH] IN BLOOD BY AUTOMATED COUNT: 20 %
EST. GFR  (AFRICAN AMERICAN): >60 ML/MIN/1.73 M^2
EST. GFR  (AFRICAN AMERICAN): >60 ML/MIN/1.73 M^2
EST. GFR  (NON AFRICAN AMERICAN): >60 ML/MIN/1.73 M^2
EST. GFR  (NON AFRICAN AMERICAN): >60 ML/MIN/1.73 M^2
GLUCOSE SERPL-MCNC: 73 MG/DL
GLUCOSE SERPL-MCNC: 84 MG/DL (ref 73–118)
GLUCOSE, POC UA: NEGATIVE
HCO3 UR-SCNC: 24.7 MMOL/L (ref 24–28)
HCT VFR BLD AUTO: 24.3 %
HGB BLD-MCNC: 6.8 G/DL
IMM GRANULOCYTES # BLD AUTO: 0.01 K/UL
IMM GRANULOCYTES NFR BLD AUTO: 0.2 %
KETONES, POC UA: ABNORMAL
LDH SERPL L TO P-CCNC: 1.25 MMOL/L (ref 0.5–2.2)
LEUKOCYTE EST, POC UA: NEGATIVE
LYMPHOCYTES # BLD AUTO: 1 K/UL
LYMPHOCYTES NFR BLD: 18.9 %
MCH RBC QN AUTO: 20.4 PG
MCHC RBC AUTO-ENTMCNC: 28 G/DL
MCV RBC AUTO: 73 FL
METHADONE UR QL SCN>300 NG/ML: NEGATIVE
MONOCYTES # BLD AUTO: 0.4 K/UL
MONOCYTES NFR BLD: 6.8 %
NEUTROPHILS # BLD AUTO: 3.7 K/UL
NEUTROPHILS NFR BLD: 72.7 %
NITRITE, POC UA: POSITIVE
NRBC BLD-RTO: 0 /100 WBC
OPIATES UR QL SCN: NEGATIVE
PCO2 BLDA: 37.9 MMHG (ref 35–45)
PCP UR QL SCN>25 NG/ML: NEGATIVE
PH SMN: 7.42 [PH] (ref 7.35–7.45)
PH UR STRIP: 6 [PH]
PLATELET # BLD AUTO: 370 K/UL
PMV BLD AUTO: 9 FL
PO2 BLDA: 26 MMHG (ref 40–60)
POC ALT (SGPT): 18 U/L (ref 10–47)
POC ALT (SGPT): 19 U/L (ref 10–47)
POC AMYLASE: 41 U/L (ref 14–97)
POC AST (SGOT): 21 U/L (ref 11–38)
POC AST (SGOT): 21 U/L (ref 11–38)
POC BE: 0 MMOL/L
POC GGT: 16 U/L (ref 5–65)
POC SATURATED O2: 50 % (ref 95–100)
POC TCO2: 26 MMOL/L (ref 18–33)
POC TCO2: 26 MMOL/L (ref 24–29)
POTASSIUM BLD-SCNC: 3.2 MMOL/L (ref 3.6–5.1)
POTASSIUM SERPL-SCNC: 3.5 MMOL/L
PREALB SERPL-MCNC: 6 MG/DL
PROT SERPL-MCNC: 6.2 G/DL
PROTEIN, POC UA: ABNORMAL
PROTEIN, POC: 7.2 G/DL (ref 6.4–8.1)
PROTEIN, POC: 7.4 G/DL (ref 6.4–8.1)
RBC # BLD AUTO: 3.33 M/UL
SAMPLE: ABNORMAL
SODIUM BLD-SCNC: 141 MMOL/L (ref 128–145)
SODIUM SERPL-SCNC: 136 MMOL/L
SPECIFIC GRAVITY, POC UA: >=1.03
TOXICOLOGY INFORMATION: NORMAL
UROBILINOGEN, POC UA: 1 E.U./DL
WBC # BLD AUTO: 5.13 K/UL

## 2019-02-18 PROCEDURE — 11000001 HC ACUTE MED/SURG PRIVATE ROOM

## 2019-02-18 PROCEDURE — 86140 C-REACTIVE PROTEIN: CPT

## 2019-02-18 PROCEDURE — 87077 CULTURE AEROBIC IDENTIFY: CPT

## 2019-02-18 PROCEDURE — 82803 BLOOD GASES ANY COMBINATION: CPT | Mod: ER

## 2019-02-18 PROCEDURE — 99285 EMERGENCY DEPT VISIT HI MDM: CPT | Mod: 25,ER

## 2019-02-18 PROCEDURE — 81003 URINALYSIS AUTO W/O SCOPE: CPT | Mod: ER

## 2019-02-18 PROCEDURE — 25000003 PHARM REV CODE 250: Performed by: NURSE PRACTITIONER

## 2019-02-18 PROCEDURE — 63600175 PHARM REV CODE 636 W HCPCS: Mod: ER | Performed by: EMERGENCY MEDICINE

## 2019-02-18 PROCEDURE — 96361 HYDRATE IV INFUSION ADD-ON: CPT | Mod: ER

## 2019-02-18 PROCEDURE — 81025 URINE PREGNANCY TEST: CPT | Mod: ER | Performed by: EMERGENCY MEDICINE

## 2019-02-18 PROCEDURE — 99253 IP/OBS CNSLTJ NEW/EST LOW 45: CPT | Mod: ,,, | Performed by: INTERNAL MEDICINE

## 2019-02-18 PROCEDURE — 25000003 PHARM REV CODE 250: Mod: ER | Performed by: EMERGENCY MEDICINE

## 2019-02-18 PROCEDURE — 87186 SC STD MICRODIL/AGAR DIL: CPT

## 2019-02-18 PROCEDURE — 80053 COMPREHEN METABOLIC PANEL: CPT

## 2019-02-18 PROCEDURE — 80053 COMPREHEN METABOLIC PANEL: CPT | Mod: ER

## 2019-02-18 PROCEDURE — 87086 URINE CULTURE/COLONY COUNT: CPT

## 2019-02-18 PROCEDURE — 99222 PR INITIAL HOSPITAL CARE,LEVL II: ICD-10-PCS | Mod: ,,, | Performed by: NURSE PRACTITIONER

## 2019-02-18 PROCEDURE — 80307 DRUG TEST PRSMV CHEM ANLYZR: CPT

## 2019-02-18 PROCEDURE — 99253 PR INITIAL INPATIENT CONSULT,LEVL III: ICD-10-PCS | Mod: ,,, | Performed by: INTERNAL MEDICINE

## 2019-02-18 PROCEDURE — 84134 ASSAY OF PREALBUMIN: CPT

## 2019-02-18 PROCEDURE — 82565 ASSAY OF CREATININE: CPT

## 2019-02-18 PROCEDURE — 25500020 PHARM REV CODE 255: Mod: ER

## 2019-02-18 PROCEDURE — 99222 1ST HOSP IP/OBS MODERATE 55: CPT | Mod: ,,, | Performed by: NURSE PRACTITIONER

## 2019-02-18 PROCEDURE — 36415 COLL VENOUS BLD VENIPUNCTURE: CPT

## 2019-02-18 PROCEDURE — 87040 BLOOD CULTURE FOR BACTERIA: CPT

## 2019-02-18 PROCEDURE — 63600175 PHARM REV CODE 636 W HCPCS: Performed by: NURSE PRACTITIONER

## 2019-02-18 PROCEDURE — 96374 THER/PROPH/DIAG INJ IV PUSH: CPT | Mod: ER

## 2019-02-18 PROCEDURE — 85025 COMPLETE CBC W/AUTO DIFF WBC: CPT

## 2019-02-18 PROCEDURE — 85025 COMPLETE CBC W/AUTO DIFF WBC: CPT | Mod: ER

## 2019-02-18 PROCEDURE — 87088 URINE BACTERIA CULTURE: CPT

## 2019-02-18 RX ORDER — SODIUM CHLORIDE 9 MG/ML
1000 INJECTION, SOLUTION INTRAVENOUS
Status: COMPLETED | OUTPATIENT
Start: 2019-02-18 | End: 2019-02-18

## 2019-02-18 RX ORDER — CEFTRIAXONE 1 G/1
1 INJECTION, POWDER, FOR SOLUTION INTRAMUSCULAR; INTRAVENOUS
Status: COMPLETED | OUTPATIENT
Start: 2019-02-18 | End: 2019-02-18

## 2019-02-18 RX ORDER — POLYETHYLENE GLYCOL 3350, SODIUM SULFATE ANHYDROUS, SODIUM BICARBONATE, SODIUM CHLORIDE, POTASSIUM CHLORIDE 236; 22.74; 6.74; 5.86; 2.97 G/4L; G/4L; G/4L; G/4L; G/4L
2000 POWDER, FOR SOLUTION ORAL ONCE
Status: DISCONTINUED | OUTPATIENT
Start: 2019-02-19 | End: 2019-02-18

## 2019-02-18 RX ORDER — POLYETHYLENE GLYCOL 3350, SODIUM SULFATE ANHYDROUS, SODIUM BICARBONATE, SODIUM CHLORIDE, POTASSIUM CHLORIDE 236; 22.74; 6.74; 5.86; 2.97 G/4L; G/4L; G/4L; G/4L; G/4L
2000 POWDER, FOR SOLUTION ORAL ONCE
Status: COMPLETED | OUTPATIENT
Start: 2019-02-20 | End: 2019-02-20

## 2019-02-18 RX ORDER — POLYETHYLENE GLYCOL 3350, SODIUM SULFATE ANHYDROUS, SODIUM BICARBONATE, SODIUM CHLORIDE, POTASSIUM CHLORIDE 236; 22.74; 6.74; 5.86; 2.97 G/4L; G/4L; G/4L; G/4L; G/4L
2000 POWDER, FOR SOLUTION ORAL ONCE
Status: COMPLETED | OUTPATIENT
Start: 2019-02-19 | End: 2019-02-19

## 2019-02-18 RX ORDER — SODIUM CHLORIDE 9 MG/ML
INJECTION, SOLUTION INTRAVENOUS CONTINUOUS
Status: DISCONTINUED | OUTPATIENT
Start: 2019-02-18 | End: 2019-02-19

## 2019-02-18 RX ORDER — ENOXAPARIN SODIUM 100 MG/ML
40 INJECTION SUBCUTANEOUS EVERY 24 HOURS
Status: DISCONTINUED | OUTPATIENT
Start: 2019-02-18 | End: 2019-02-21

## 2019-02-18 RX ADMIN — PIPERACILLIN AND TAZOBACTAM 4.5 G: 4; .5 INJECTION, POWDER, LYOPHILIZED, FOR SOLUTION INTRAVENOUS; PARENTERAL at 05:02

## 2019-02-18 RX ADMIN — SODIUM CHLORIDE: 0.9 INJECTION, SOLUTION INTRAVENOUS at 09:02

## 2019-02-18 RX ADMIN — PIPERACILLIN AND TAZOBACTAM 4.5 G: 4; .5 INJECTION, POWDER, LYOPHILIZED, FOR SOLUTION INTRAVENOUS; PARENTERAL at 10:02

## 2019-02-18 RX ADMIN — IOHEXOL 75 ML: 350 INJECTION, SOLUTION INTRAVENOUS at 03:02

## 2019-02-18 RX ADMIN — SODIUM CHLORIDE 1000 ML: 0.9 INJECTION, SOLUTION INTRAVENOUS at 10:02

## 2019-02-18 RX ADMIN — CEFTRIAXONE SODIUM 1 G: 1 INJECTION, POWDER, FOR SOLUTION INTRAMUSCULAR; INTRAVENOUS at 05:02

## 2019-02-18 RX ADMIN — ENOXAPARIN SODIUM 40 MG: 100 INJECTION SUBCUTANEOUS at 05:02

## 2019-02-18 RX ADMIN — SODIUM CHLORIDE 1000 ML: 0.9 INJECTION, SOLUTION INTRAVENOUS at 04:02

## 2019-02-18 RX ADMIN — SODIUM CHLORIDE 1000 ML: 0.9 INJECTION, SOLUTION INTRAVENOUS at 03:02

## 2019-02-18 NOTE — ASSESSMENT & PLAN NOTE
28 year old female with colitis of the right and terminal ileum with associated parital small bowel obs and fistuilization accepted as transfer for dhruv level of care    -begin zosyn  -IVF  -monitor labs  -monitor VS  -keep NPO for now  -consult GI

## 2019-02-18 NOTE — H&P
[facility]  Colorectal Surgery  History & Physical    Patient Name: Lora Scott  MRN: [id number]  Admission Date: 2/18/2019  Attending Physician: Dr. Ruiz  Primary Care Provider: Margarita Jay MD    Subjective:     Chief Complaint/Reason for Admission: RUQ abd pain    History of Present Illness:  Pt accepted as transfer from [facility], 28 year old black female with IBS, anemia, asthma and GERDS presented to [facility] with complaints of worsening  Of chronic  right upper abd pain, no fever, no n/v, denies diarrhea at this time.   Denies dysuria, vaginal discharge, hematuria, flank pain or food intolerance, pain increases with bending over and lying on right side.  Pt complained of wanting to sleep during interview     Denies ever being treated for abd pain before, denies being told she has IBD, has been told in the past she had IBS with stools alternating between diarrhea and hard stools    Never had colonoscopy     No hx of IBD in family    Denies any abd surgery    Lives with father and works as  at [employer]    While in ER CT without oral contrast done:       Abnormal wall thickening involving the ascending colon, cecum and terminal ileum with intense mucosal enhancement and luminal narrowing resulting in distention of proximal small bowel.  There is ilealileal and ileocolic fistulization, fat hypertrophy and increased mesenteric vascularity.  Reactive mesenteric adenitis in the right lower quadrant.  No abscess collection.  The appendix is not seen.    Transferred to [facility] for higher level of care for poss Crohns dx    PTA Medications   Medication Sig    clobetasol (TEMOVATE) 0.05 % external solution Apply topically 2 (two) times daily. for 10 days    diphenhydrAMINE (BENADRYL) 25 mg capsule Take 1 each (25 mg total) by mouth every 6 (six) hours as needed for Itching.    fluocinolone (DERMA-SMOOTHE) 0.01 % external oil Apply topically every evening. Apply  well to scalp at bedtime cover with a shower cap for 10 days    fluocinonide (LIDEX) 0.05 % ointment Apply topically 2 (two) times daily. for 10 days    loratadine (CLARITIN) 10 mg tablet Take 1 tablet (10 mg total) by mouth once daily.    ondansetron (ZOFRAN) 4 MG tablet Take 1 tablet (4 mg total) by mouth every 6 (six) hours as needed for Nausea.       Review of patient's allergies indicates:   Allergen Reactions    Shellfish containing products Anaphylaxis     Allergic to all seafood.    Horse/equine containing products Itching       Past Medical History:   Diagnosis Date    Allergy     Anemia     Asthma     Depression     IBS (irritable bowel syndrome)      History reviewed. No pertinent surgical history.  Family History     None        Tobacco Use    Smoking status: Current Every Day Smoker     Types: Cigarettes    Smokeless tobacco: Never Used    Tobacco comment: 2 CIGS PER DAY   Substance and Sexual Activity    Alcohol use: Yes     Alcohol/week: 0.0 oz     Comment: Socially    Drug use: Yes     Frequency: 3.0 times per week     Types: Marijuana    Sexual activity: Not Currently     Partners: Male     Birth control/protection: Injection     Review of Systems   Constitutional: Positive for appetite change. Negative for chills, fatigue, fever and unexpected weight change.        20-30 pound weight loss over last 6 months   Respiratory: Negative for cough, chest tightness, shortness of breath and wheezing.    Cardiovascular: Negative for chest pain and leg swelling.   Gastrointestinal: Positive for abdominal distention and abdominal pain. Negative for anal bleeding, blood in stool, constipation, diarrhea, nausea, rectal pain and vomiting.   Genitourinary: Negative for difficulty urinating, dysuria, flank pain, frequency and hematuria.   Musculoskeletal: Negative for back pain, gait problem and joint swelling.   Neurological: Negative for syncope, weakness and numbness.   Hematological: Does not  bruise/bleed easily.   Psychiatric/Behavioral: Negative for agitation, confusion, decreased concentration and hallucinations. The patient is not nervous/anxious and is not hyperactive.      Objective:     Vital Signs (Most Recent):  Temp: 98.5 °F (36.9 °C) (02/18/19 0857)  Pulse: 92 (02/18/19 0857)  Resp: 17 (02/18/19 0857)  BP: (!) 82/50 (02/18/19 0857)  SpO2: 100 % (02/18/19 0857) Vital Signs (24h Range):  Temp:  [98.5 °F (36.9 °C)] 98.5 °F (36.9 °C)  Pulse:  [] 92  Resp:  [17-18] 17  SpO2:  [100 %] 100 %  BP: ()/(50-65) 82/50     Weight: 47.6 kg (105 lb)  Body mass index is 19.2 kg/m².    Physical Exam   Constitutional: She is oriented to person, place, and time. She appears well-developed and well-nourished.   HENT:   Head: Normocephalic.   Eyes: Pupils are equal, round, and reactive to light.   Cardiovascular: Normal rate, regular rhythm and normal heart sounds.   Pulmonary/Chest: Effort normal and breath sounds normal. No respiratory distress. She has no wheezes. She has no rales.   Abdominal: Soft. She exhibits distension. She exhibits no mass. There is no rebound and no guarding.   Tender RUQ with palpation    Neurological: She is alert and oriented to person, place, and time.   Skin: Skin is warm and dry.   Psychiatric: She has a normal mood and affect. Her behavior is normal. Judgment and thought content normal.   Nursing note and vitals reviewed.        Significant Labs:  BMP (Last 3 Results):   Recent Labs   Lab 02/18/19 0912 02/18/19 0941   GLU 73  --      --    K 3.5  --      --    CO2 22*  --    BUN 6  --    CREATININE 0.6 0.5   CALCIUM 8.1*  --      CBC (Last 3 Results):   Recent Labs   Lab 02/18/19 0912   WBC 5.13   RBC 3.33*   HGB 6.8*   HCT 24.3*   *   MCV 73*   MCH 20.4*   MCHC 28.0*       Significant Diagnostics:  CT: I have reviewed all pertinent results/findings within the past 24 hours:  CT reviewed by Dr. Mckenna    Assessment/Plan:     * Crohn's disease  involving terminal ileum    28 year old female with colitis of the right and terminal ileum with associated parital small bowel obs and fistuilization accepted as transfer for dhruv level of care    -begin zosyn  -IVF  -monitor labs  -monitor VS  -keep NPO for now  -consult GI             Letty Stringer, KHARI  Colorectal Surgery  [facility]

## 2019-02-18 NOTE — ED NOTES
[facility] called to transport pt to [facility] Rm 529, within the hour, will notifiy RRC and primary nurse

## 2019-02-18 NOTE — HPI
"This is a 29 yo F with hx of GERD, anemia, asthma, and reported diagnosis of IBS who was transferred from [facility] for evaluation for Crohn's disease. Patient reports for the past six months she has been suffering from abdominal pain. Pain is worse in the RLQ and is severe. Pain is constant and dull in nature, worse w/ bending and lying on R side, and not alleviated by anything. Pain was never this bad before. Pt endorses of history of BM that oscillates between diarrhea that is sometimes bloody-magaly blood-and constipation. Pt was told she has IBS because of BM pattern and abdominal pain improving with BM. Pt denies nausea, vomiting, or hematemesis. No melena or bloody stools with this episode. Pt started taking Nexium 1-2 months ago hoping it would help with her symptoms, but stopped a few days ago since she didn't get any relief. Pt denies any NSAID or blood thinner use. No previous scopes and no family history of note. Pt is a smoker- 2 cigarettes per day, drinks 4-5 drinks per week, and uses marijuana 3 times per week. She works as a . Patient does not have any surgical history. In the ED, she underwent CT AP which showed "abnormal wall thickening involving the ascending colon, cecum and terminal ileum with intense mucosal enhancement and luminal narrowing resulting in distention of proximal small bowel.There is ilealileal and ileocolic fistulization, fat hypertrophy and increased mesenteric vascularity. Reactive mesenteric adenitis in the right lower quadrant. No abscess collection. The appendix is not seen."  "

## 2019-02-18 NOTE — CONSULTS
"[facility]  Gastroenterology  Consult Note    Patient Name: Lora Scott  MRN: [id number]  Admission Date: 2/18/2019  Hospital Length of Stay: 0 days  Code Status: No Order   Attending Provider: Srinivas Ruiz MD   Consulting Provider: Tomas Magallon MD  Primary Care Physician: Margarita Jay MD  Principal Problem:Crohn's disease involving terminal ileum    Inpatient consult to Gastroenterology  Consult performed by: Tomas Magallon MD  Consult ordered by: Letty Stringer NP        Subjective:     HPI:  This is a 29 yo F with hx of GERD, anemia, asthma, and reported diagnosis of IBS who was transferred from [facility] for evaluation for Crohn's disease. Patient reports for the past six months she has been suffering from abdominal pain. Pain is worse in the RLQ and is severe. Pain is constant and dull in nature, worse w/ bending and lying on R side, and not alleviated by anything. Pain was never this bad before. Pt endorses of history of BM that oscillates between diarrhea that is sometimes bloody-magaly blood-and constipation. Pt was told she has IBS because of BM pattern and abdominal pain improving with BM. Pt denies nausea, vomiting, or hematemesis. No melena or bloody stools with this episode. Pt started taking Nexium 1-2 months ago hoping it would help with her symptoms, but stopped a few days ago since she didn't get any relief. Pt denies any NSAID or blood thinner use. No previous scopes and no family history of note. Pt is a smoker- 2 cigarettes per day, drinks 4-5 drinks per week, and uses marijuana 3 times per week. She works as a . Patient does not have any surgical history. In the ED, she underwent CT AP which showed "abnormal wall thickening involving the ascending colon, cecum and terminal ileum with intense mucosal enhancement and luminal narrowing resulting in distention of proximal small bowel.There is ilealileal and ileocolic fistulization, fat hypertrophy and " "increased mesenteric vascularity. Reactive mesenteric adenitis in the right lower quadrant. No abscess collection. The appendix is not seen."    Past Medical History:   Diagnosis Date    Allergy     Anemia     Asthma     Depression     IBS (irritable bowel syndrome)        History reviewed. No pertinent surgical history.    Review of patient's allergies indicates:   Allergen Reactions    Shellfish containing products Anaphylaxis     Allergic to all seafood.    Horse/equine containing products Itching     Family History     None        Tobacco Use    Smoking status: Current Every Day Smoker     Types: Cigarettes    Smokeless tobacco: Never Used    Tobacco comment: 2 CIGS PER DAY   Substance and Sexual Activity    Alcohol use: Yes     Alcohol/week: 0.0 oz     Comment: Socially    Drug use: Yes     Frequency: 3.0 times per week     Types: Marijuana    Sexual activity: Not Currently     Partners: Male     Birth control/protection: Injection     Review of Systems   Constitutional: Positive for activity change and unexpected weight change. Negative for chills and fever.   HENT: Negative for sore throat and trouble swallowing.    Respiratory: Negative for cough and shortness of breath.    Cardiovascular: Negative for chest pain and leg swelling.   Gastrointestinal: Positive for abdominal pain, blood in stool, constipation and diarrhea. Negative for abdominal distention, nausea and vomiting.   Genitourinary: Negative for decreased urine volume and difficulty urinating.   Musculoskeletal: Negative for arthralgias and back pain.   Skin: Positive for rash. Negative for color change.   Neurological: Negative for dizziness and light-headedness.   Psychiatric/Behavioral: Negative for agitation and confusion.     Objective:     Vital Signs (Most Recent):  Temp: 97.9 °F (36.6 °C) (02/18/19 1257)  Pulse: 84 (02/18/19 1526)  Resp: 18 (02/18/19 1257)  BP: 93/60 (02/18/19 1257)  SpO2: (!) 93 % (02/18/19 1257) Vital Signs " (24h Range):  Temp:  [97.9 °F (36.6 °C)-98.5 °F (36.9 °C)] 97.9 °F (36.6 °C)  Pulse:  [] 84  Resp:  [17-18] 18  SpO2:  [93 %-100 %] 93 %  BP: ()/(50-65) 93/60     Weight: 47.6 kg (105 lb) (02/18/19 0232)  Body mass index is 19.2 kg/m².    No intake or output data in the 24 hours ending 02/18/19 1550    Lines/Drains/Airways     Peripheral Intravenous Line                 Peripheral IV - Single Lumen 02/18/19 0300 Left Antecubital less than 1 day                Physical Exam   Constitutional: She is oriented to person, place, and time. No distress.   Thin female   HENT:   Mouth/Throat: Oropharynx is clear and moist.   Eyes: No scleral icterus.   Cardiovascular: Normal rate and regular rhythm.   Pulmonary/Chest: Effort normal and breath sounds normal.   Abdominal: Soft. Bowel sounds are normal. She exhibits no distension. There is guarding.   +RLQ tenderness  +LLQ tenderness   Musculoskeletal: She exhibits no edema.   Lymphadenopathy:     She has no cervical adenopathy.   Neurological: She is alert and oriented to person, place, and time.   Skin: There is pallor.   Psychiatric: She has a normal mood and affect.   Vitals reviewed.      Significant Labs:  CBC:   Recent Labs   Lab 02/18/19  0912   WBC 5.13   HGB 6.8*   HCT 24.3*   *     CMP:   Recent Labs   Lab 02/18/19  0912 02/18/19  0941   GLU 73  --    CALCIUM 8.1*  --    ALBUMIN 1.9*  --    PROT 6.2  --      --    K 3.5  --    CO2 22*  --      --    BUN 6  --    CREATININE 0.6 0.5   ALKPHOS 67  --    ALT 10  --    AST 11  --    BILITOT 0.2  --      Coagulation: No results for input(s): PT, INR, APTT in the last 48 hours.        Assessment/Plan:     Colitis with fistula    27 yo F with asthma, IBS, and GERD transferred from [facility] for abdominal pain found to have CT findings consistent with colitis and fistulizing disease. Concerned for Crohn's disease. We will need to evaluate with an EGD and colonoscopy, however given her nausea now we  will plan for procedures on Wedsnesday.    Recommendations:  - Check labs tomorrow morning in anticipation for future biologic (hepatitis panel, HIV, quant gold, TPMT)  - Full liquid diet today  - Clear liquid diet tomorrow  - Bowel prep tomorrow evening  - Plan for EGD and colonoscopy on Weds, 2/20 with biopsies         Thank you for your consult. I will follow-up with patient. Please contact us if you have any additional questions.    Tomas Magallon MD  Gastroenterology  [facility]    I was present with the fellow during the above evaluation, including history and exam.  I discussed the case with the fellow and agree with the findings and plan as documented in the fellow's note.

## 2019-02-18 NOTE — SUBJECTIVE & OBJECTIVE
Past Medical History:   Diagnosis Date    Allergy     Anemia     Asthma     Depression     IBS (irritable bowel syndrome)        History reviewed. No pertinent surgical history.    Review of patient's allergies indicates:   Allergen Reactions    Shellfish containing products Anaphylaxis     Allergic to all seafood.    Horse/equine containing products Itching     Family History     None        Tobacco Use    Smoking status: Current Every Day Smoker     Types: Cigarettes    Smokeless tobacco: Never Used    Tobacco comment: 2 CIGS PER DAY   Substance and Sexual Activity    Alcohol use: Yes     Alcohol/week: 0.0 oz     Comment: Socially    Drug use: Yes     Frequency: 3.0 times per week     Types: Marijuana    Sexual activity: Not Currently     Partners: Male     Birth control/protection: Injection     Review of Systems   Constitutional: Positive for activity change and unexpected weight change. Negative for chills and fever.   HENT: Negative for sore throat and trouble swallowing.    Respiratory: Negative for cough and shortness of breath.    Cardiovascular: Negative for chest pain and leg swelling.   Gastrointestinal: Positive for abdominal pain, blood in stool, constipation and diarrhea. Negative for abdominal distention, nausea and vomiting.   Genitourinary: Negative for decreased urine volume and difficulty urinating.   Musculoskeletal: Negative for arthralgias and back pain.   Skin: Positive for rash. Negative for color change.   Neurological: Negative for dizziness and light-headedness.   Psychiatric/Behavioral: Negative for agitation and confusion.     Objective:     Vital Signs (Most Recent):  Temp: 97.9 °F (36.6 °C) (02/18/19 1257)  Pulse: 84 (02/18/19 1526)  Resp: 18 (02/18/19 1257)  BP: 93/60 (02/18/19 1257)  SpO2: (!) 93 % (02/18/19 1257) Vital Signs (24h Range):  Temp:  [97.9 °F (36.6 °C)-98.5 °F (36.9 °C)] 97.9 °F (36.6 °C)  Pulse:  [] 84  Resp:  [17-18] 18  SpO2:  [93 %-100 %] 93  %  BP: ()/(50-65) 93/60     Weight: 47.6 kg (105 lb) (02/18/19 0232)  Body mass index is 19.2 kg/m².    No intake or output data in the 24 hours ending 02/18/19 1550    Lines/Drains/Airways     Peripheral Intravenous Line                 Peripheral IV - Single Lumen 02/18/19 0300 Left Antecubital less than 1 day                Physical Exam   Constitutional: She is oriented to person, place, and time. No distress.   Thin female   HENT:   Mouth/Throat: Oropharynx is clear and moist.   Eyes: No scleral icterus.   Cardiovascular: Normal rate and regular rhythm.   Pulmonary/Chest: Effort normal and breath sounds normal.   Abdominal: Soft. Bowel sounds are normal. She exhibits no distension. There is guarding.   +RLQ tenderness  +LLQ tenderness   Musculoskeletal: She exhibits no edema.   Lymphadenopathy:     She has no cervical adenopathy.   Neurological: She is alert and oriented to person, place, and time.   Skin: There is pallor.   Psychiatric: She has a normal mood and affect.   Vitals reviewed.      Significant Labs:  CBC:   Recent Labs   Lab 02/18/19 0912   WBC 5.13   HGB 6.8*   HCT 24.3*   *     CMP:   Recent Labs   Lab 02/18/19 0912 02/18/19  0941   GLU 73  --    CALCIUM 8.1*  --    ALBUMIN 1.9*  --    PROT 6.2  --      --    K 3.5  --    CO2 22*  --      --    BUN 6  --    CREATININE 0.6 0.5   ALKPHOS 67  --    ALT 10  --    AST 11  --    BILITOT 0.2  --      Coagulation: No results for input(s): PT, INR, APTT in the last 48 hours.

## 2019-02-18 NOTE — ED PROVIDER NOTES
Encounter Date: 2/18/2019       History     Chief Complaint   Patient presents with    Abdominal Pain     pt c/o intermitt abd pain to rlq x1 month and worsened last couple hours, roverto n/v, reports x1 episode of diarrhea, reports hx of IBS and no relieve with nexium, pain 10 of 10     28 y.o. Female with IBS, anemia, asthma GERD and others presents to the emergency department complaining of acute on chronic right lower quadrant abdominal pain that has been present for over 6 months.  Patient states the pain is constant but worse with bending and lying on the right side and is alleviated by nothing.  She states the pain worsened in the last few hours.  She denies nausea, vomiting, dysuria, vaginal discharge, hematuria, flank pain, fever or food intolerance.  She denies being evaluated for this problem in the past.  She reports starting Nexium 3 months ago which she presumed would alleviate th problem without improvement.    She reports chronic intermittent constipation alternating with diarrhea consistent with her diagnosis of IBS.          Review of patient's allergies indicates:   Allergen Reactions    Shellfish containing products Anaphylaxis     Allergic to all seafood.    Horse/equine containing products Itching     Past Medical History:   Diagnosis Date    Allergy     Anemia     Asthma     Depression     IBS (irritable bowel syndrome)      History reviewed. No pertinent surgical history.  History reviewed. No pertinent family history.  Social History     Tobacco Use    Smoking status: Current Every Day Smoker     Types: Cigarettes    Smokeless tobacco: Never Used    Tobacco comment: 2 CIGS PER DAY   Substance Use Topics    Alcohol use: Yes     Alcohol/week: 0.0 oz     Comment: Socially    Drug use: Yes     Frequency: 3.0 times per week     Types: Marijuana     Review of Systems   Constitutional: Positive for unexpected weight change (10 lb weight loss in 8 months). Negative for appetite change and  fever.   Respiratory: Negative for shortness of breath.    Cardiovascular: Negative for chest pain.   Gastrointestinal: Positive for abdominal pain, blood in stool (intermittent), constipation and diarrhea. Negative for nausea and vomiting.   Genitourinary: Positive for frequency. Negative for dysuria, hematuria, pelvic pain and vaginal discharge.   Neurological: Negative for weakness and light-headedness.   All other systems reviewed and are negative.      Physical Exam     Initial Vitals [02/18/19 0232]   BP Pulse Resp Temp SpO2   105/63 (!) 112 18 98.5 °F (36.9 °C) 100 %      MAP       --         Physical Exam    Nursing note and vitals reviewed.  Constitutional: She is not diaphoretic.  Non-toxic appearance. She has a sickly appearance (appears chroniccaly ill). She does not appear ill. No distress.   HENT:   Head: Normocephalic and atraumatic.   Eyes: Conjunctivae are normal. Pupils are equal, round, and reactive to light.   Neck: Normal range of motion. Neck supple.   Cardiovascular: Normal rate and intact distal pulses.   Pulmonary/Chest: No accessory muscle usage or stridor. No tachypnea. No respiratory distress.   Abdominal: She exhibits distension. There is tenderness in the right lower quadrant. There is rigidity (RLQ), guarding (RLQ) and tenderness at McBurney's point. There is no rebound, no CVA tenderness and negative Antonio's sign.   Musculoskeletal: Normal range of motion. She exhibits no tenderness.   Neurological: She is alert and oriented to person, place, and time. She has normal strength. Gait normal. GCS eye subscore is 4. GCS verbal subscore is 5. GCS motor subscore is 6.   Skin: Skin is warm. Capillary refill takes less than 2 seconds.   Psychiatric: She has a normal mood and affect.         ED Course   Procedures  Labs Reviewed   POCT URINALYSIS W/O SCOPE - Abnormal; Notable for the following components:       Result Value    Glucose, UA Negative (*)     Bilirubin, UA 1+ (*)     Ketones, UA  Trace (*)     Spec Grav UA >=1.030 (*)     Blood, UA Trace-intact (*)     Protein, UA 2+ (*)     Nitrite, UA Positive (*)     Leukocytes, UA Negative (*)     All other components within normal limits   ISTAT PROCEDURE - Abnormal; Notable for the following components:    POC PO2 26 (*)     POC SATURATED O2 50 (*)     All other components within normal limits   POCT LIVER PANEL - Abnormal; Notable for the following components:    Albumin, POC 2.6 (*)     All other components within normal limits   POCT CMP - Abnormal; Notable for the following components:    Albumin, POC 2.6 (*)     POC Creatinine 0.6 (*)     POC Potassium 3.2 (*)     All other components within normal limits   CULTURE, URINE   DRUG SCREEN PANEL, URINE EMERGENCY   POCT CBC   POCT URINE PREGNANCY   POCT URINALYSIS DIPSTICK (CULTURE IF INDICATED)   POCT CMP   POCT LIVER PANEL          Imaging Results          CT Abdomen Pelvis With Contrast (Final result)     Abnormal  Result time 02/18/19 04:01:27    Final result by Dong Lara MD (02/18/19 04:01:27)                 Impression:      Abnormal findings, as above, strongly suggestive of Crohn's enterocolitis with active involvement of the right colon and terminal ileum.  There is associated partial small bowel obstruction and fistulization.  No abscess formation identified.  Recommend gastroenterology consultation.    This report was flagged in Epic as abnormal.      Electronically signed by: Dong Lara MD  Date:    02/18/2019  Time:    04:01             Narrative:    EXAMINATION:  CT ABDOMEN PELVIS WITH CONTRAST    CLINICAL HISTORY:  RLQ pain, appendicitis suspected;    TECHNIQUE:  Low dose axial images, sagittal and coronal reformations were obtained from the lung bases to the pubic symphysis following the IV administration of 75 mL of Omnipaque 350 .  Oral contrast was not administered.    COMPARISON:  None.    FINDINGS:  Abdomen:    - Lower thorax:Unremarkable.    - Lung bases: No  "infiltrates and no nodules.    - Liver: No focal mass.    - Gallbladder: Contracted.    - Bile Ducts: No evidence of intra or extra hepatic biliary ductal dilation.    - Spleen: Negative.    - Kidneys: No mass or hydronephrosis.    - Adrenals: Unremarkable.    - Pancreas: No mass or peripancreatic fat stranding.    - Retroperitoneum:  No significant adenopathy.    - Vascular: No abdominal aortic aneurysm.    - Abdominal wall:  Unremarkable.    Pelvis:    No pelvic mass, adenopathy, or free fluid.    Bowel/Mesentery:    Abnormal wall thickening involving the ascending colon, cecum and terminal ileum with intense mucosal enhancement and luminal narrowing resulting in distention of proximal small bowel.  There is ilealileal and ileocolic fistulization, fat hypertrophy and increased mesenteric vascularity.  Reactive mesenteric adenitis in the right lower quadrant.  No abscess collection.  The appendix is not seen.    Bones:  No acute osseous abnormality and no suspicious lytic or blastic lesion.                                 Medical Decision Making:   Clinical Tests:   Lab Tests: Ordered and Reviewed       <> Summary of Lab: White count 6.3 H&H 8 and 25.1 platelets 452 MCV 65.8 RDW 17 1  Radiological Study: Ordered and Reviewed               Results for DAVID FARIAS (MRN [id number]) as of 2/18/2019 03:13   Ref. Range 8/19/2015 11:15   Hemoglobin Latest Ref Range: 12.0 - 16.0 g/dL 9.0 (L)   Hematocrit Latest Ref Range: 37.0 - 48.5 % 30.8 (L)     Vitals:    02/18/19 0232 02/18/19 0454 02/18/19 0501   BP: 105/63 (!) 89/55 100/60   BP Location: Left arm     Patient Position: Sitting     Pulse: (!) 112 98 93   Resp: 18     Temp: 98.5 °F (36.9 °C)     TempSrc: Oral     SpO2: 100% 100% 100%   Weight: 47.6 kg (105 lb)     Height: 5' 2" (1.575 m)       Labs Reviewed  Admission on 02/18/2019   Component Date Value Ref Range Status    POC Preg Test, Ur 02/18/2019 Negative  Negative Final     Acceptable 02/18/2019 " Yes   Final    Glucose, UA 02/18/2019 Negative*  Final    Bilirubin, UA 02/18/2019 1+*  Final    Ketones, UA 02/18/2019 Trace*  Final    Spec Grav UA 02/18/2019 >=1.030*  Final    Blood, UA 02/18/2019 Trace-intact*  Final    PH, UA 02/18/2019 6.0   Final    Protein, UA 02/18/2019 2+*  Final    Urobilinogen, UA 02/18/2019 1.0  E.U./dL Final    Nitrite, UA 02/18/2019 Positive*  Final    Leukocytes, UA 02/18/2019 Negative*  Final    Color, UA 02/18/2019 Janette   Final    Clarity, UA 02/18/2019 Slightly Cloudy   Final    POC PH 02/18/2019 7.423  7.35 - 7.45 Final    POC PCO2 02/18/2019 37.9  35 - 45 mmHg Final    POC PO2 02/18/2019 26* 40 - 60 mmHg Final    POC HCO3 02/18/2019 24.7  24 - 28 mmol/L Final    POC BE 02/18/2019 0  -2 to 2 mmol/L Final    POC SATURATED O2 02/18/2019 50* 95 - 100 % Final    POC Lactate 02/18/2019 1.25  0.5 - 2.2 mmol/L Final    POC TCO2 02/18/2019 26  24 - 29 mmol/L Final    Sample 02/18/2019 VENOUS   Final    Albumin, POC 02/18/2019 2.6* 3.3 - 5.5 g/dL Final    Alkaline Phosphatase, POC 02/18/2019 71  42 - 141 U/L Final    ALT (SGPT), POC 02/18/2019 18  10 - 47 U/L Final    Amylase, POC 02/18/2019 41  14 - 97 U/L Final    AST (SGOT), POC 02/18/2019 21  11 - 38 U/L Final    POC GGT 02/18/2019 16  5 - 65 U/L Final    Bilirubin 02/18/2019 0.5  0.2 - 1.6 mg/dL Final    Protein 02/18/2019 7.2  6.4 - 8.1 g/dL Final    Albumin, POC 02/18/2019 2.6* 3.3 - 5.5 g/dL Final    Alkaline Phosphatase, POC 02/18/2019 69  42 - 141 U/L Final    ALT (SGPT), POC 02/18/2019 19  10 - 47 U/L Final    AST (SGOT), POC 02/18/2019 21  11 - 38 U/L Final    POC BUN 02/18/2019 9  7 - 22 mg/dL Final    Calcium, POC 02/18/2019 8.6  8.0 - 10.3 mg/dL Final    POC Chloride 02/18/2019 99  98 - 108 mmol/L Final    POC Creatinine 02/18/2019 0.6* 0.6 - 1.2 mg/dL Final    POC Glucose 02/18/2019 84  73 - 118 mg/dL Final    POC Potassium 02/18/2019 3.2* 3.6 - 5.1 mmol/L Final    POC Sodium  02/18/2019 141  128 - 145 mmol/L Final    Bilirubin 02/18/2019 0.6  0.2 - 1.6 mg/dL Final    POC TCO2 02/18/2019 26  18 - 33 mmol/L Final    Protein 02/18/2019 7.4  6.4 - 8.1 g/dL Final        Imaging Reviewed    Imaging Results          CT Abdomen Pelvis With Contrast (Final result)     Abnormal  Result time 02/18/19 04:01:27    Final result by Dong Lara MD (02/18/19 04:01:27)                 Impression:      Abnormal findings, as above, strongly suggestive of Crohn's enterocolitis with active involvement of the right colon and terminal ileum.  There is associated partial small bowel obstruction and fistulization.  No abscess formation identified.  Recommend gastroenterology consultation.    This report was flagged in Epic as abnormal.      Electronically signed by: Dong Lara MD  Date:    02/18/2019  Time:    04:01             Narrative:    EXAMINATION:  CT ABDOMEN PELVIS WITH CONTRAST    CLINICAL HISTORY:  RLQ pain, appendicitis suspected;    TECHNIQUE:  Low dose axial images, sagittal and coronal reformations were obtained from the lung bases to the pubic symphysis following the IV administration of 75 mL of Omnipaque 350 .  Oral contrast was not administered.    COMPARISON:  None.    FINDINGS:  Abdomen:    - Lower thorax:Unremarkable.    - Lung bases: No infiltrates and no nodules.    - Liver: No focal mass.    - Gallbladder: Contracted.    - Bile Ducts: No evidence of intra or extra hepatic biliary ductal dilation.    - Spleen: Negative.    - Kidneys: No mass or hydronephrosis.    - Adrenals: Unremarkable.    - Pancreas: No mass or peripancreatic fat stranding.    - Retroperitoneum:  No significant adenopathy.    - Vascular: No abdominal aortic aneurysm.    - Abdominal wall:  Unremarkable.    Pelvis:    No pelvic mass, adenopathy, or free fluid.    Bowel/Mesentery:    Abnormal wall thickening involving the ascending colon, cecum and terminal ileum with intense mucosal enhancement and luminal  narrowing resulting in distention of proximal small bowel.  There is ilealileal and ileocolic fistulization, fat hypertrophy and increased mesenteric vascularity.  Reactive mesenteric adenitis in the right lower quadrant.  No abscess collection.  The appendix is not seen.    Bones:  No acute osseous abnormality and no suspicious lytic or blastic lesion.                                Medications given in ED    Medications   sodium chloride 0.9% bolus 1,000 mL (0 mLs Intravenous Stopped 2/18/19 0402)   omnipaque 350 iohexol 350 mg iodine/mL (75 mLs Intravenous Given 2/18/19 0330)   cefTRIAXone injection 1 g (1 g Intravenous Given 2/18/19 0509)   0.9%  NaCl infusion (1,000 mLs Intravenous New Bag 2/18/19 8569)       Note was created using voice recognition software. Note may have occasional typographical errors that may not have been identified and edited despite good fantasma initial review prior to signing.            Clinical Impression:   The primary encounter diagnosis was Right lower quadrant abdominal pain. A diagnosis of Cystitis was also pertinent to this visit.      Disposition:   Disposition: Admitted  Condition: Stable  Reason for referral: Crohn's enterocolitis with fistulization  [facility] - accepted by Colorectal Surgery - Dr. Srinivas Song MD  02/18/19 0523

## 2019-02-18 NOTE — ASSESSMENT & PLAN NOTE
27 yo F with asthma, IBS, and GERD transferred from OSH for abdominal pain found to have CT findings consistent with colitis and fistulizing disease. Concerned for Crohn's disease. We will need to evaluate with an EGD and colonoscopy, however given her nausea now we will plan for procedures on Wedsnesday.    Recommendations:  - Check labs tomorrow morning in anticipation for future biologic (hepatitis panel, HIV, quant gold, TPMT)  - Full liquid diet today  - Clear liquid diet tomorrow  - Bowel prep tomorrow evening  - Plan for EGD and colonoscopy on Weds, 2/20 with biopsies

## 2019-02-18 NOTE — PLAN OF CARE
Problem: Adult Inpatient Plan of Care  Goal: Plan of Care Review  Outcome: Ongoing (interventions implemented as appropriate)  Patient resting in bed comfortably. IV intact and antbx infusing free of infection and irration,  fall precautions maintained no falls noted. Call light in reach bed locked and in lowest position. Non skid socks on while out of bed. Patient instructed to call for assistance. Skin integrity maintained as patient is independent with frequent positioning, no C/o pain, No other complaints or concerns.Will continue to monitor and follow careplan of care.

## 2019-02-18 NOTE — NURSING
Pt on floor via stretcher, aaox4, pt states she is in pain and really tired, assist x1 to bathroom, NS infusing,pt is hypotensive reports no s/s paged on call to Dr. Ruiz [phone] to inform about pt pain and hypotensive state

## 2019-02-18 NOTE — ANESTHESIA PREPROCEDURE EVALUATION
[facility]  Anesthesia Pre-Operative Evaluation         Patient Name: Lora Scott  YOB: 1990  MRN: [id number]    SUBJECTIVE:     Pre-operative evaluation for Procedure(s) (LRB):  COLONOSCOPY (N/A)  EGD (ESOPHAGOGASTRODUODENOSCOPY) (N/A)     02/18/2019    Lora Scott is a 28 y.o. female w/ a significant PMHx of IBS, anemia, asthma, and GERD presented to [facility] with complaints of worsening abdominal pain. Transferred to [facility] for further evaluation of possible IBD.    Patient now presents for the above procedure(s).      LDA:       20G Peripheral IV - Single Lumen 02/18/19 0300 Left Antecubital (Active)   Number of days: 0       Prev airway: None documented.    Drips:   sodium chloride 0.9% 100 mL/hr at 02/18/19 0945       Patient Active Problem List   Diagnosis    Iron deficiency anemia due to chronic blood loss    Cellulitis of perineum    Crohn's disease involving terminal ileum       Review of patient's allergies indicates:   Allergen Reactions    Shellfish containing products Anaphylaxis     Allergic to all seafood.    Horse/equine containing products Itching       Current Inpatient Medications:   piperacillin-tazobactam (ZOSYN) IVPB  4.5 g Intravenous Q8H    [START ON 2/19/2019] polyethylene glycol  2,000 mL Oral Once    [START ON 2/20/2019] polyethylene glycol  2,000 mL Oral Once    sodium chloride 0.9%  1,000 mL Intravenous ED 1 Time       No current facility-administered medications on file prior to encounter.      Current Outpatient Medications on File Prior to Encounter   Medication Sig Dispense Refill    clobetasol (TEMOVATE) 0.05 % external solution Apply topically 2 (two) times daily. for 10 days 50 mL 0    diphenhydrAMINE (BENADRYL) 25 mg capsule Take 1 each (25 mg total) by mouth every 6 (six) hours as needed for Itching. 20 capsule 0    fluocinolone (DERMA-SMOOTHE) 0.01 % external oil Apply topically every evening. Apply well to scalp at bedtime cover  with a shower cap for 10 days 118.28 mL 0    fluocinonide (LIDEX) 0.05 % ointment Apply topically 2 (two) times daily. for 10 days 60 g 0    loratadine (CLARITIN) 10 mg tablet Take 1 tablet (10 mg total) by mouth once daily. 60 tablet 0    ondansetron (ZOFRAN) 4 MG tablet Take 1 tablet (4 mg total) by mouth every 6 (six) hours as needed for Nausea. 12 tablet 0       History reviewed. No pertinent surgical history.    Social History     Socioeconomic History    Marital status: Single     Spouse name: Not on file    Number of children: Not on file    Years of education: Not on file    Highest education level: Not on file   Social Needs    Financial resource strain: Not on file    Food insecurity - worry: Not on file    Food insecurity - inability: Not on file    Transportation needs - medical: Not on file    Transportation needs - non-medical: Not on file   Occupational History    Not on file   Tobacco Use    Smoking status: Current Every Day Smoker     Types: Cigarettes    Smokeless tobacco: Never Used    Tobacco comment: 2 CIGS PER DAY   Substance and Sexual Activity    Alcohol use: Yes     Alcohol/week: 0.0 oz     Comment: Socially    Drug use: Yes     Frequency: 3.0 times per week     Types: Marijuana    Sexual activity: Not Currently     Partners: Male     Birth control/protection: Injection   Other Topics Concern    Not on file   Social History Narrative    Not on file       OBJECTIVE:     Vital Signs Range (Last 24H):  Temp:  [36.6 °C (97.9 °F)-36.9 °C (98.5 °F)]   Pulse:  []   Resp:  [17-18]   BP: ()/(50-65)   SpO2:  [93 %-100 %]       Significant Labs:  Lab Results   Component Value Date    WBC 5.13 02/18/2019    HGB 6.8 (L) 02/18/2019    HCT 24.3 (L) 02/18/2019     (H) 02/18/2019    ALT 10 02/18/2019    AST 11 02/18/2019     02/18/2019    K 3.5 02/18/2019     02/18/2019    CREATININE 0.5 02/18/2019    BUN 6 02/18/2019    CO2 22 (L) 02/18/2019    TSH 0.713  08/19/2015       Diagnostic Studies: No relevant studies.    EKG: No recent studies available.    2D ECHO:  No results found for this or any previous visit.      ASSESSMENT/PLAN:         Anesthesia Evaluation    I have reviewed the Patient Summary Reports.    I have reviewed the Nursing Notes.   I have reviewed the Medications.     Review of Systems  Anesthesia Hx:  No problems with previous Anesthesia  Neg history of prior surgery.  Denies Personal Hx of Anesthesia complications.   Social:  Smoker    Hematology/Oncology:         -- Anemia:   Cardiovascular:   Denies Hypertension.  Denies CAD.       Pulmonary:   Denies COPD. Asthma  Denies Recent URI.    Renal/:   Denies Chronic Renal Disease.     Hepatic/GI:   Denies GERD.    Neurological:   Denies TIA. Denies CVA.    Endocrine:   Denies Diabetes.        Physical Exam  General:  Well nourished    Airway/Jaw/Neck:  Airway Findings: Mouth Opening: Normal Tongue: Normal  General Airway Assessment: Adult  Mallampati: I  Improves to I with phonation.  TM Distance: Normal, at least 6 cm  Jaw/Neck Findings:  Neck ROM: Normal ROM      Dental:  Dental Findings: In tact   Chest/Lungs:  Chest/Lungs Findings: Clear to auscultation, Normal Respiratory Rate     Heart/Vascular:  Heart Findings: Rate: Normal  Rhythm: Regular Rhythm  Sounds: Normal     Abdomen:  Abdomen Findings: Normal    Musculoskeletal:  Musculoskeletal Findings: Normal   Skin:  Skin Findings: Normal    Mental Status:  Mental Status Findings:  Cooperative, Anxious         Anesthesia Plan  Type of Anesthesia, risks & benefits discussed:  Anesthesia Type:  MAC, general  Patient's Preference:   Intra-op Monitoring Plan: standard ASA monitors  Intra-op Monitoring Plan Comments:   Post Op Pain Control Plan: per primary service following discharge from PACU, IV/PO Opioids PRN and multimodal analgesia  Post Op Pain Control Plan Comments:   Induction:   IV  Beta Blocker:  Patient is not currently on a Beta-Blocker (No  further documentation required).       Informed Consent: Patient understands risks and agrees with Anesthesia plan.  Questions answered. Anesthesia consent signed with patient.  ASA Score: 2     Day of Surgery Review of History & Physical:    H&P update referred to the provider.         Ready For Surgery From Anesthesia Perspective.

## 2019-02-18 NOTE — HPI
Pt accepted as transfer from [facility], 28 year old black female with IBS, anemia, asthma and GERDS [place] [facility] [place] of [place]  Of chronic  right upper abd pain, no fever, no n/v, denies diarrhea at this time.   Denies dysuria, vaginal discharge, hematuria, flank pain or food intolerance, pain increases with bending over and lying on right side.  Pt complained of wanting to sleep during interview     Denies ever being treated for abd pain before, denies being told she has IBD, has been told in the past she had IBS with stools alternating between diarrhea and hard stools    Never had colonoscopy     No hx of IBD in family    Denies any abd surgery    Lives with father and works as  at the [employer]    While in ER CT without oral contrast done:       Abnormal wall thickening involving the ascending colon, cecum and terminal ileum with intense mucosal enhancement and luminal narrowing resulting in distention of proximal small bowel.  There is ilealileal and ileocolic fistulization, fat hypertrophy and increased mesenteric vascularity.  Reactive mesenteric adenitis in the right lower quadrant.  No abscess collection.  The appendix is not seen.    Transferred to [facility] for higher level of care for poss Crohns dx

## 2019-02-18 NOTE — PLAN OF CARE
SW following for d/c needs. Post acute needs to be determined.          Jemma Singh, MSW, \[facility]\""W  [facility]  [id number]

## 2019-02-18 NOTE — SUBJECTIVE & OBJECTIVE
PTA Medications   Medication Sig    clobetasol (TEMOVATE) 0.05 % external solution Apply topically 2 (two) times daily. for 10 days    diphenhydrAMINE (BENADRYL) 25 mg capsule Take 1 each (25 mg total) by mouth every 6 (six) hours as needed for Itching.    fluocinolone (DERMA-SMOOTHE) 0.01 % external oil Apply topically every evening. Apply well to scalp at bedtime cover with a shower cap for 10 days    fluocinonide (LIDEX) 0.05 % ointment Apply topically 2 (two) times daily. for 10 days    loratadine (CLARITIN) 10 mg tablet Take 1 tablet (10 mg total) by mouth once daily.    ondansetron (ZOFRAN) 4 MG tablet Take 1 tablet (4 mg total) by mouth every 6 (six) hours as needed for Nausea.       Review of patient's allergies indicates:   Allergen Reactions    Shellfish containing products Anaphylaxis     Allergic to all seafood.    Horse/equine containing products Itching       Past Medical History:   Diagnosis Date    Allergy     Anemia     Asthma     Depression     IBS (irritable bowel syndrome)      History reviewed. No pertinent surgical history.  Family History     None        Tobacco Use    Smoking status: Current Every Day Smoker     Types: Cigarettes    Smokeless tobacco: Never Used    Tobacco comment: 2 CIGS PER DAY   Substance and Sexual Activity    Alcohol use: Yes     Alcohol/week: 0.0 oz     Comment: Socially    Drug use: Yes     Frequency: 3.0 times per week     Types: Marijuana    Sexual activity: Not Currently     Partners: Male     Birth control/protection: Injection     Review of Systems   Constitutional: Positive for appetite change. Negative for chills, fatigue, fever and unexpected weight change.        20-30 pound weight loss over last 6 months   Respiratory: Negative for cough, chest tightness, shortness of breath and wheezing.    Cardiovascular: Negative for chest pain and leg swelling.   Gastrointestinal: Positive for abdominal distention and abdominal pain. Negative for anal  bleeding, blood in stool, constipation, diarrhea, nausea, rectal pain and vomiting.   Genitourinary: Negative for difficulty urinating, dysuria, flank pain, frequency and hematuria.   Musculoskeletal: Negative for back pain, gait problem and joint swelling.   Neurological: Negative for syncope, weakness and numbness.   Hematological: Does not bruise/bleed easily.   Psychiatric/Behavioral: Negative for agitation, confusion, decreased concentration and hallucinations. The patient is not nervous/anxious and is not hyperactive.      Objective:     Vital Signs (Most Recent):  Temp: 98.5 °F (36.9 °C) (02/18/19 0857)  Pulse: 92 (02/18/19 0857)  Resp: 17 (02/18/19 0857)  BP: (!) 82/50 (02/18/19 0857)  SpO2: 100 % (02/18/19 0857) Vital Signs (24h Range):  Temp:  [98.5 °F (36.9 °C)] 98.5 °F (36.9 °C)  Pulse:  [] 92  Resp:  [17-18] 17  SpO2:  [100 %] 100 %  BP: ()/(50-65) 82/50     Weight: 47.6 kg (105 lb)  Body mass index is 19.2 kg/m².    Physical Exam   Constitutional: She is oriented to person, place, and time. She appears well-developed and well-nourished.   HENT:   Head: Normocephalic.   Eyes: Pupils are equal, round, and reactive to light.   Cardiovascular: Normal rate, regular rhythm and normal heart sounds.   Pulmonary/Chest: Effort normal and breath sounds normal. No respiratory distress. She has no wheezes. She has no rales.   Abdominal: Soft. She exhibits distension. She exhibits no mass. There is no rebound and no guarding.   Tender RUQ with palpation    Neurological: She is alert and oriented to person, place, and time.   Skin: Skin is warm and dry.   Psychiatric: She has a normal mood and affect. Her behavior is normal. Judgment and thought content normal.   Nursing note and vitals reviewed.        Significant Labs:  [facility] (Last 3 Results):   Recent Labs   Lab 02/18/19  0912 02/18/19  0941   GLU 73  --      --    K 3.5  --      --    CO2 22*  --    BUN 6  --    CREATININE 0.6 0.5   CALCIUM  8.1*  --      CBC (Last 3 Results):   Recent Labs   Lab 02/18/19  0912   WBC 5.13   RBC 3.33*   HGB 6.8*   HCT 24.3*   *   MCV 73*   MCH 20.4*   MCHC 28.0*       Significant Diagnostics:  CT: I have reviewed all pertinent results/findings within the past 24 hours:  CT reviewed by Dr. Mckenna

## 2019-02-19 ENCOUNTER — ANESTHESIA (OUTPATIENT)
Dept: ENDOSCOPY | Facility: HOSPITAL | Age: 29
DRG: 386 | End: 2019-02-19
Payer: COMMERCIAL

## 2019-02-19 PROBLEM — E44.0 MALNUTRITION OF MODERATE DEGREE: Status: ACTIVE | Noted: 2019-02-19

## 2019-02-19 LAB
ALBUMIN SERPL BCP-MCNC: 1.8 G/DL
ALP SERPL-CCNC: 66 U/L
ALT SERPL W/O P-5'-P-CCNC: 10 U/L
ANION GAP SERPL CALC-SCNC: 6 MMOL/L
AST SERPL-CCNC: 12 U/L
BASOPHILS # BLD AUTO: 0 K/UL
BASOPHILS NFR BLD: 0 %
BILIRUB SERPL-MCNC: 0.4 MG/DL
BUN SERPL-MCNC: 5 MG/DL
CALCIUM SERPL-MCNC: 8 MG/DL
CHLORIDE SERPL-SCNC: 109 MMOL/L
CO2 SERPL-SCNC: 23 MMOL/L
CREAT SERPL-MCNC: 0.6 MG/DL
DIFFERENTIAL METHOD: ABNORMAL
EOSINOPHIL # BLD AUTO: 0.1 K/UL
EOSINOPHIL NFR BLD: 1.5 %
ERYTHROCYTE [DISTWIDTH] IN BLOOD BY AUTOMATED COUNT: 19.8 %
EST. GFR  (AFRICAN AMERICAN): >60 ML/MIN/1.73 M^2
EST. GFR  (NON AFRICAN AMERICAN): >60 ML/MIN/1.73 M^2
GLUCOSE SERPL-MCNC: 68 MG/DL
HBV CORE AB SERPL QL IA: NEGATIVE
HBV SURFACE AB SER-ACNC: ABNORMAL M[IU]/ML
HBV SURFACE AG SERPL QL IA: NEGATIVE
HCT VFR BLD AUTO: 23.7 %
HCV AB SERPL QL IA: NEGATIVE
HEPATITIS A ANTIBODY, IGG: POSITIVE
HGB BLD-MCNC: 6.4 G/DL
HIV 1+2 AB+HIV1 P24 AG SERPL QL IA: NEGATIVE
IMM GRANULOCYTES # BLD AUTO: 0.02 K/UL
IMM GRANULOCYTES NFR BLD AUTO: 0.5 %
LYMPHOCYTES # BLD AUTO: 0.8 K/UL
LYMPHOCYTES NFR BLD: 19 %
MAGNESIUM SERPL-MCNC: 1.4 MG/DL
MCH RBC QN AUTO: 20.2 PG
MCHC RBC AUTO-ENTMCNC: 27 G/DL
MCV RBC AUTO: 75 FL
MONOCYTES # BLD AUTO: 0.2 K/UL
MONOCYTES NFR BLD: 5.8 %
NEUTROPHILS # BLD AUTO: 2.9 K/UL
NEUTROPHILS NFR BLD: 73.2 %
NRBC BLD-RTO: 0 /100 WBC
PHOSPHATE SERPL-MCNC: 3.2 MG/DL
PLATELET # BLD AUTO: 331 K/UL
PMV BLD AUTO: 9.1 FL
POTASSIUM SERPL-SCNC: 3.2 MMOL/L
PROT SERPL-MCNC: 5.8 G/DL
RBC # BLD AUTO: 3.17 M/UL
SODIUM SERPL-SCNC: 138 MMOL/L
WBC # BLD AUTO: 3.94 K/UL

## 2019-02-19 PROCEDURE — 83735 ASSAY OF MAGNESIUM: CPT

## 2019-02-19 PROCEDURE — 25000003 PHARM REV CODE 250: Performed by: STUDENT IN AN ORGANIZED HEALTH CARE EDUCATION/TRAINING PROGRAM

## 2019-02-19 PROCEDURE — 87340 HEPATITIS B SURFACE AG IA: CPT

## 2019-02-19 PROCEDURE — 36415 COLL VENOUS BLD VENIPUNCTURE: CPT

## 2019-02-19 PROCEDURE — 20600001 HC STEP DOWN PRIVATE ROOM

## 2019-02-19 PROCEDURE — 86803 HEPATITIS C AB TEST: CPT

## 2019-02-19 PROCEDURE — 86704 HEP B CORE ANTIBODY TOTAL: CPT

## 2019-02-19 PROCEDURE — 82657 ENZYME CELL ACTIVITY: CPT

## 2019-02-19 PROCEDURE — 84100 ASSAY OF PHOSPHORUS: CPT

## 2019-02-19 PROCEDURE — 86703 HIV-1/HIV-2 1 RESULT ANTBDY: CPT

## 2019-02-19 PROCEDURE — 86790 VIRUS ANTIBODY NOS: CPT

## 2019-02-19 PROCEDURE — 80053 COMPREHEN METABOLIC PANEL: CPT

## 2019-02-19 PROCEDURE — 99232 SBSQ HOSP IP/OBS MODERATE 35: CPT | Mod: ,,, | Performed by: NURSE PRACTITIONER

## 2019-02-19 PROCEDURE — 63600175 PHARM REV CODE 636 W HCPCS: Performed by: NURSE PRACTITIONER

## 2019-02-19 PROCEDURE — 86480 TB TEST CELL IMMUN MEASURE: CPT

## 2019-02-19 PROCEDURE — 86706 HEP B SURFACE ANTIBODY: CPT

## 2019-02-19 PROCEDURE — 25000003 PHARM REV CODE 250: Performed by: NURSE PRACTITIONER

## 2019-02-19 PROCEDURE — 85025 COMPLETE CBC W/AUTO DIFF WBC: CPT

## 2019-02-19 PROCEDURE — 99232 PR SUBSEQUENT HOSPITAL CARE,LEVL II: ICD-10-PCS | Mod: ,,, | Performed by: NURSE PRACTITIONER

## 2019-02-19 RX ORDER — INSULIN ASPART 100 [IU]/ML
1-10 INJECTION, SOLUTION INTRAVENOUS; SUBCUTANEOUS EVERY 4 HOURS PRN
Status: DISCONTINUED | OUTPATIENT
Start: 2019-02-19 | End: 2019-02-19

## 2019-02-19 RX ORDER — GLUCAGON 1 MG
1 KIT INJECTION
Status: DISCONTINUED | OUTPATIENT
Start: 2019-02-19 | End: 2019-02-19

## 2019-02-19 RX ORDER — ONDANSETRON 2 MG/ML
4 INJECTION INTRAMUSCULAR; INTRAVENOUS EVERY 4 HOURS PRN
Status: ACTIVE | OUTPATIENT
Start: 2019-02-19 | End: 2019-02-20

## 2019-02-19 RX ORDER — DEXTROSE MONOHYDRATE, SODIUM CHLORIDE, AND POTASSIUM CHLORIDE 50; 1.49; 4.5 G/1000ML; G/1000ML; G/1000ML
INJECTION, SOLUTION INTRAVENOUS CONTINUOUS
Status: DISPENSED | OUTPATIENT
Start: 2019-02-19 | End: 2019-02-21

## 2019-02-19 RX ADMIN — POTASSIUM CHLORIDE, DEXTROSE MONOHYDRATE AND SODIUM CHLORIDE: 150; 5; 450 INJECTION, SOLUTION INTRAVENOUS at 01:02

## 2019-02-19 RX ADMIN — SODIUM CHLORIDE 125 MG: 9 INJECTION, SOLUTION INTRAVENOUS at 05:02

## 2019-02-19 RX ADMIN — PIPERACILLIN AND TAZOBACTAM 4.5 G: 4; .5 INJECTION, POWDER, LYOPHILIZED, FOR SOLUTION INTRAVENOUS; PARENTERAL at 05:02

## 2019-02-19 RX ADMIN — SODIUM CHLORIDE: 0.9 INJECTION, SOLUTION INTRAVENOUS at 12:02

## 2019-02-19 RX ADMIN — PIPERACILLIN AND TAZOBACTAM 4.5 G: 4; .5 INJECTION, POWDER, LYOPHILIZED, FOR SOLUTION INTRAVENOUS; PARENTERAL at 02:02

## 2019-02-19 RX ADMIN — PIPERACILLIN AND TAZOBACTAM 4.5 G: 4; .5 INJECTION, POWDER, LYOPHILIZED, FOR SOLUTION INTRAVENOUS; PARENTERAL at 10:02

## 2019-02-19 RX ADMIN — POLYETHYLENE GLYCOL 3350, SODIUM SULFATE ANHYDROUS, SODIUM BICARBONATE, SODIUM CHLORIDE, POTASSIUM CHLORIDE 2000 ML: 236; 22.74; 6.74; 5.86; 2.97 POWDER, FOR SOLUTION ORAL at 05:02

## 2019-02-19 RX ADMIN — ENOXAPARIN SODIUM 40 MG: 100 INJECTION SUBCUTANEOUS at 05:02

## 2019-02-19 NOTE — PROGRESS NOTES
[facility]  Colorectal Surgery  Progress Note    Patient Name: Lora Scott  MRN: [id number]  Admission Date: 2/18/2019  Hospital Length of Stay: 1 days  Attending Physician: Srinivas Ruiz MD    Subjective:     Interval History: no acute events overnite, no n/v, abd pain improved    Post-Op Info:  Procedure(s) (LRB):  COLONOSCOPY (N/A)  EGD (ESOPHAGOGASTRODUODENOSCOPY) (N/A)          Medications:  Continuous Infusions:   sodium chloride 0.9% 125 mL/hr at 02/19/19 0025     Scheduled Meds:   enoxaparin  40 mg Subcutaneous Daily    ferric gluconate (FERRLECIT) IVPB  125 mg Intravenous Daily    piperacillin-tazobactam (ZOSYN) IVPB  4.5 g Intravenous Q8H    polyethylene glycol  2,000 mL Oral Once    [START ON 2/20/2019] polyethylene glycol  2,000 mL Oral Once     PRN Meds:     Objective:     Vital Signs (Most Recent):  Temp: 98 °F (36.7 °C) (02/19/19 0830)  Pulse: 67 (02/19/19 1100)  Resp: 18 (02/19/19 0830)  BP: (!) 86/60 (02/19/19 0830)  SpO2: 98 % (02/19/19 0830) Vital Signs (24h Range):  Temp:  [96.7 °F (35.9 °C)-98.8 °F (37.1 °C)] 98 °F (36.7 °C)  Pulse:  [67-90] 67  Resp:  [16-18] 18  SpO2:  [93 %-100 %] 98 %  BP: (84-93)/(54-60) 86/60     Intake/Output - Last 3 Shifts       02/17 0700 - 02/18 0659 02/18 0700 - 02/19 0659 02/19 0700 - 02/20 0659    P.O.  1000     I.V. (mL/kg)  1100 (23.1)     IV Piggyback  200     Total Intake(mL/kg)  2300 (48.3)     Net  +2300            Urine Occurrence  6 x     Stool Occurrence  0 x     Emesis Occurrence  0 x           Physical Exam   Constitutional: She is oriented to person, place, and time. She appears well-developed.   HENT:   Head: Normocephalic.   Eyes: Pupils are equal, round, and reactive to light.   Cardiovascular: Normal rate, regular rhythm and normal heart sounds.   Pulmonary/Chest: Effort normal and breath sounds normal. No respiratory distress. She has no wheezes. She has no rales.   Abdominal: Soft. She exhibits distension. She  exhibits no mass. There is tenderness. There is no rebound and no guarding.   Neurological: She is alert and oriented to person, place, and time.   Skin: Skin is warm and dry.   Psychiatric: She has a normal mood and affect. Her behavior is normal. Judgment and thought content normal.         Significant Labs:  BMP (Last 3 Results):   Recent Labs   Lab 02/18/19  0912 02/18/19  0941 02/19/19  0653   GLU 73  --  68*     --  138   K 3.5  --  3.2*     --  109   CO2 22*  --  23   BUN 6  --  5*   CREATININE 0.6 0.5 0.6   CALCIUM 8.1*  --  8.0*   MG  --   --  1.4*     CBC (Last 3 Results):   Recent Labs   Lab 02/18/19  0912 02/19/19  0653   WBC 5.13 3.94   RBC 3.33* 3.17*   HGB 6.8* 6.4*   HCT 24.3* 23.7*   * 331   MCV 73* 75*   MCH 20.4* 20.2*   MCHC 28.0* 27.0*       Significant Diagnostics:  None    Assessment/Plan:     * Crohn's disease involving terminal ileum    28 year old female with colitis of the right and terminal ileum with associated parital small bowel obs and fistuilization accepted as transfer for dhruv level of care    -begin zosyn  -IVF  -monitor labs  -monitor VS  -keep cld  -appreciate  GI  Assistance  -to have EGD and colonoscopy Wed per GI     Malnutrition of moderate degree    PICC line for TPN  Monitor nutritional labs  Consult dietary  Weekly weights           Letty Stringer NP  Colorectal Surgery  [facility]

## 2019-02-19 NOTE — ASSESSMENT & PLAN NOTE
28 year old female with colitis of the right and terminal ileum with associated parital small bowel obs and fistuilization accepted as transfer for dhruv level of care    -begin zosyn  -IVF  -monitor labs  -monitor VS  -keep cld  -appreciate  GI  Assistance  -to have EGD and colonoscopy Wed per GI

## 2019-02-19 NOTE — NURSING
Spoke with Dr. Solorio of surgery, notified her of pt's b/p.  Order to increase IVF and recheck b/p in 30 minutes received.  No s/s of distress, will continue to monitor.

## 2019-02-19 NOTE — NURSING
Dr. Solorio notified of pt's b/p again.  Order to do manual b/p's only received.  Also, Dr. Solorio placed new lab orders.  No s/s of distress at this time, will continue to monitor.  Pt stable to transfer to room 1059.

## 2019-02-19 NOTE — PLAN OF CARE
Problem: Adult Inpatient Plan of Care  Goal: Plan of Care Review  Outcome: Ongoing (interventions implemented as appropriate)  VS stable. Patient was transferred from 5th floor at approx 0200. Telemetry= NSR 70-80s. NS at 125ml/hr. C/o abdominal pain, refuses pain meds. Will continue to monitor.

## 2019-02-19 NOTE — SUBJECTIVE & OBJECTIVE
Subjective:     Interval History: no acute events overnite, no n/v, abd pain improved    Post-Op Info:  Procedure(s) (LRB):  COLONOSCOPY (N/A)  EGD (ESOPHAGOGASTRODUODENOSCOPY) (N/A)          Medications:  Continuous Infusions:   sodium chloride 0.9% 125 mL/hr at 02/19/19 0025     Scheduled Meds:   enoxaparin  40 mg Subcutaneous Daily    ferric gluconate (FERRLECIT) IVPB  125 mg Intravenous Daily    piperacillin-tazobactam (ZOSYN) IVPB  4.5 g Intravenous Q8H    polyethylene glycol  2,000 mL Oral Once    [START ON 2/20/2019] polyethylene glycol  2,000 mL Oral Once     PRN Meds:     Objective:     Vital Signs (Most Recent):  Temp: 98 °F (36.7 °C) (02/19/19 0830)  Pulse: 67 (02/19/19 1100)  Resp: 18 (02/19/19 0830)  BP: (!) 86/60 (02/19/19 0830)  SpO2: 98 % (02/19/19 0830) Vital Signs (24h Range):  Temp:  [96.7 °F (35.9 °C)-98.8 °F (37.1 °C)] 98 °F (36.7 °C)  Pulse:  [67-90] 67  Resp:  [16-18] 18  SpO2:  [93 %-100 %] 98 %  BP: (84-93)/(54-60) 86/60     Intake/Output - Last 3 Shifts       02/17 0700 - 02/18 0659 02/18 0700 - 02/19 0659 02/19 0700 - 02/20 0659    P.O.  1000     I.V. (mL/kg)  1100 (23.1)     IV Piggyback  200     Total Intake(mL/kg)  2300 (48.3)     Net  +2300            Urine Occurrence  6 x     Stool Occurrence  0 x     Emesis Occurrence  0 x           Physical Exam   Constitutional: She is oriented to person, place, and time. She appears well-developed.   HENT:   Head: Normocephalic.   Eyes: Pupils are equal, round, and reactive to light.   Cardiovascular: Normal rate, regular rhythm and normal heart sounds.   Pulmonary/Chest: Effort normal and breath sounds normal. No respiratory distress. She has no wheezes. She has no rales.   Abdominal: Soft. She exhibits distension. She exhibits no mass. There is tenderness. There is no rebound and no guarding.   Neurological: She is alert and oriented to person, place, and time.   Skin: Skin is warm and dry.   Psychiatric: She has a normal mood and  affect. Her behavior is normal. Judgment and thought content normal.         Significant Labs:  BMP (Last 3 Results):   Recent Labs   Lab 02/18/19  0912 02/18/19  0941 02/19/19  0653   GLU 73  --  68*     --  138   K 3.5  --  3.2*     --  109   CO2 22*  --  23   BUN 6  --  5*   CREATININE 0.6 0.5 0.6   CALCIUM 8.1*  --  8.0*   MG  --   --  1.4*     CBC (Last 3 Results):   Recent Labs   Lab 02/18/19  0912 02/19/19  0653   WBC 5.13 3.94   RBC 3.33* 3.17*   HGB 6.8* 6.4*   HCT 24.3* 23.7*   * 331   MCV 73* 75*   MCH 20.4* 20.2*   MCHC 28.0* 27.0*       Significant Diagnostics:  None

## 2019-02-20 LAB
BACTERIA UR CULT: NORMAL
BASOPHILS # BLD AUTO: 0.01 K/UL
BASOPHILS NFR BLD: 0.3 %
DIFFERENTIAL METHOD: ABNORMAL
EOSINOPHIL # BLD AUTO: 0.1 K/UL
EOSINOPHIL NFR BLD: 2.7 %
ERYTHROCYTE [DISTWIDTH] IN BLOOD BY AUTOMATED COUNT: 19.9 %
HCT VFR BLD AUTO: 21.4 %
HGB BLD-MCNC: 6.1 G/DL
IMM GRANULOCYTES # BLD AUTO: 0.01 K/UL
IMM GRANULOCYTES NFR BLD AUTO: 0.3 %
LYMPHOCYTES # BLD AUTO: 0.8 K/UL
LYMPHOCYTES NFR BLD: 27.2 %
M TB IFN-G CD4+ BCKGRND COR BLD-ACNC: 0.01 IU/ML
MCH RBC QN AUTO: 20.5 PG
MCHC RBC AUTO-ENTMCNC: 28.5 G/DL
MCV RBC AUTO: 72 FL
MITOGEN IGNF BCKGRD COR BLD-ACNC: 0.71 IU/ML
MITOGEN IGNF BCKGRD COR BLD-ACNC: NEGATIVE [IU]/ML
MONOCYTES # BLD AUTO: 0.3 K/UL
MONOCYTES NFR BLD: 11 %
NEUTROPHILS # BLD AUTO: 1.8 K/UL
NEUTROPHILS NFR BLD: 58.5 %
NIL: 0.04 IU/ML
NRBC BLD-RTO: 0 /100 WBC
PLATELET # BLD AUTO: 328 K/UL
PMV BLD AUTO: 9.4 FL
RBC # BLD AUTO: 2.98 M/UL
TB2 - NIL: 0 IU/ML
WBC # BLD AUTO: 3.01 K/UL

## 2019-02-20 PROCEDURE — 85025 COMPLETE CBC W/AUTO DIFF WBC: CPT

## 2019-02-20 PROCEDURE — 76937 US GUIDE VASCULAR ACCESS: CPT

## 2019-02-20 PROCEDURE — 25000003 PHARM REV CODE 250: Performed by: NURSE ANESTHETIST, CERTIFIED REGISTERED

## 2019-02-20 PROCEDURE — 36415 COLL VENOUS BLD VENIPUNCTURE: CPT

## 2019-02-20 PROCEDURE — 88305 TISSUE EXAM BY PATHOLOGIST: CPT | Mod: 26,,, | Performed by: PATHOLOGY

## 2019-02-20 PROCEDURE — 63600175 PHARM REV CODE 636 W HCPCS: Performed by: NURSE ANESTHETIST, CERTIFIED REGISTERED

## 2019-02-20 PROCEDURE — D9220A PRA ANESTHESIA: Mod: ANES,,, | Performed by: ANESTHESIOLOGY

## 2019-02-20 PROCEDURE — 88342 TISSUE SPECIMEN TO PATHOLOGY - SURGERY: ICD-10-PCS | Mod: 26,,, | Performed by: PATHOLOGY

## 2019-02-20 PROCEDURE — 25000003 PHARM REV CODE 250: Performed by: STUDENT IN AN ORGANIZED HEALTH CARE EDUCATION/TRAINING PROGRAM

## 2019-02-20 PROCEDURE — 88341 IMHCHEM/IMCYTCHM EA ADD ANTB: CPT | Mod: 26,,, | Performed by: PATHOLOGY

## 2019-02-20 PROCEDURE — 63600175 PHARM REV CODE 636 W HCPCS: Performed by: COLON & RECTAL SURGERY

## 2019-02-20 PROCEDURE — 87556 M.TUBERCULO DNA AMP PROBE: CPT

## 2019-02-20 PROCEDURE — 93010 ELECTROCARDIOGRAM REPORT: CPT | Mod: ,,, | Performed by: INTERNAL MEDICINE

## 2019-02-20 PROCEDURE — 88342 IMHCHEM/IMCYTCHM 1ST ANTB: CPT | Performed by: PATHOLOGY

## 2019-02-20 PROCEDURE — 20600001 HC STEP DOWN PRIVATE ROOM

## 2019-02-20 PROCEDURE — D9220A PRA ANESTHESIA: Mod: CRNA,,, | Performed by: NURSE ANESTHETIST, CERTIFIED REGISTERED

## 2019-02-20 PROCEDURE — 88341 PR IHC OR ICC EACH ADD'L SINGLE ANTIBODY  STAINPR: ICD-10-PCS | Mod: 26,,, | Performed by: PATHOLOGY

## 2019-02-20 PROCEDURE — 45380 COLONOSCOPY AND BIOPSY: CPT | Mod: ,,, | Performed by: INTERNAL MEDICINE

## 2019-02-20 PROCEDURE — 43239 EGD BIOPSY SINGLE/MULTIPLE: CPT | Mod: 51,,, | Performed by: INTERNAL MEDICINE

## 2019-02-20 PROCEDURE — 45380 COLONOSCOPY AND BIOPSY: CPT | Performed by: INTERNAL MEDICINE

## 2019-02-20 PROCEDURE — 27201012 HC FORCEPS, HOT/COLD, DISP: Performed by: INTERNAL MEDICINE

## 2019-02-20 PROCEDURE — 88342 IMHCHEM/IMCYTCHM 1ST ANTB: CPT | Mod: 26,,, | Performed by: PATHOLOGY

## 2019-02-20 PROCEDURE — 45385 COLONOSCOPY W/LESION REMOVAL: CPT | Performed by: INTERNAL MEDICINE

## 2019-02-20 PROCEDURE — 63600175 PHARM REV CODE 636 W HCPCS: Performed by: NURSE PRACTITIONER

## 2019-02-20 PROCEDURE — 88305 TISSUE EXAM BY PATHOLOGIST: CPT | Performed by: PATHOLOGY

## 2019-02-20 PROCEDURE — D9220A PRA ANESTHESIA: ICD-10-PCS | Mod: CRNA,,, | Performed by: NURSE ANESTHETIST, CERTIFIED REGISTERED

## 2019-02-20 PROCEDURE — 45380 PR COLONOSCOPY,BIOPSY: ICD-10-PCS | Mod: ,,, | Performed by: INTERNAL MEDICINE

## 2019-02-20 PROCEDURE — 37000009 HC ANESTHESIA EA ADD 15 MINS: Performed by: INTERNAL MEDICINE

## 2019-02-20 PROCEDURE — 36569 INSJ PICC 5 YR+ W/O IMAGING: CPT

## 2019-02-20 PROCEDURE — 27201089 HC SNARE, DISP (ANY): Performed by: INTERNAL MEDICINE

## 2019-02-20 PROCEDURE — 88312 SPECIAL STAINS GROUP 1: CPT | Mod: 26,,, | Performed by: PATHOLOGY

## 2019-02-20 PROCEDURE — D9220A PRA ANESTHESIA: ICD-10-PCS | Mod: ANES,,, | Performed by: ANESTHESIOLOGY

## 2019-02-20 PROCEDURE — 37000008 HC ANESTHESIA 1ST 15 MINUTES: Performed by: INTERNAL MEDICINE

## 2019-02-20 PROCEDURE — 43239 PR EGD, FLEX, W/BIOPSY, SGL/MULTI: ICD-10-PCS | Mod: 51,,, | Performed by: INTERNAL MEDICINE

## 2019-02-20 PROCEDURE — 93005 ELECTROCARDIOGRAM TRACING: CPT

## 2019-02-20 PROCEDURE — 25000003 PHARM REV CODE 250: Performed by: NURSE PRACTITIONER

## 2019-02-20 PROCEDURE — 88305 TISSUE SPECIMEN TO PATHOLOGY - SURGERY: ICD-10-PCS | Mod: 26,,, | Performed by: PATHOLOGY

## 2019-02-20 PROCEDURE — 93010 EKG 12-LEAD: ICD-10-PCS | Mod: ,,, | Performed by: INTERNAL MEDICINE

## 2019-02-20 PROCEDURE — C1751 CATH, INF, PER/CENT/MIDLINE: HCPCS

## 2019-02-20 PROCEDURE — 88312 TISSUE SPECIMEN TO PATHOLOGY - SURGERY: ICD-10-PCS | Mod: 26,,, | Performed by: PATHOLOGY

## 2019-02-20 RX ORDER — PROPOFOL 10 MG/ML
VIAL (ML) INTRAVENOUS CONTINUOUS PRN
Status: DISCONTINUED | OUTPATIENT
Start: 2019-02-20 | End: 2019-02-20

## 2019-02-20 RX ORDER — POTASSIUM CHLORIDE 7.45 MG/ML
10 INJECTION INTRAVENOUS
Status: DISPENSED | OUTPATIENT
Start: 2019-02-20 | End: 2019-02-20

## 2019-02-20 RX ORDER — GLYCOPYRROLATE 0.2 MG/ML
INJECTION INTRAMUSCULAR; INTRAVENOUS
Status: DISCONTINUED | OUTPATIENT
Start: 2019-02-20 | End: 2019-02-20

## 2019-02-20 RX ORDER — LIDOCAINE HCL/PF 100 MG/5ML
SYRINGE (ML) INTRAVENOUS
Status: DISCONTINUED | OUTPATIENT
Start: 2019-02-20 | End: 2019-02-20

## 2019-02-20 RX ORDER — SODIUM CHLORIDE 9 MG/ML
INJECTION, SOLUTION INTRAVENOUS CONTINUOUS PRN
Status: DISCONTINUED | OUTPATIENT
Start: 2019-02-20 | End: 2019-02-20

## 2019-02-20 RX ORDER — POTASSIUM CHLORIDE 7.46 G/1000ML
10 INJECTION, SOLUTION INTRAVENOUS
Status: DISCONTINUED | OUTPATIENT
Start: 2019-02-20 | End: 2019-02-20 | Stop reason: CLARIF

## 2019-02-20 RX ORDER — POTASSIUM CHLORIDE 20 MEQ/1
20 TABLET, EXTENDED RELEASE ORAL ONCE
Status: COMPLETED | OUTPATIENT
Start: 2019-02-20 | End: 2019-02-20

## 2019-02-20 RX ORDER — PROPOFOL 10 MG/ML
VIAL (ML) INTRAVENOUS
Status: DISCONTINUED | OUTPATIENT
Start: 2019-02-20 | End: 2019-02-20

## 2019-02-20 RX ORDER — KETAMINE HCL IN 0.9 % NACL 50 MG/5 ML
SYRINGE (ML) INTRAVENOUS
Status: DISCONTINUED | OUTPATIENT
Start: 2019-02-20 | End: 2019-02-20

## 2019-02-20 RX ORDER — FENTANYL CITRATE 50 UG/ML
25 INJECTION, SOLUTION INTRAMUSCULAR; INTRAVENOUS EVERY 5 MIN PRN
Status: DISCONTINUED | OUTPATIENT
Start: 2019-02-20 | End: 2019-02-20 | Stop reason: HOSPADM

## 2019-02-20 RX ORDER — OXYCODONE HYDROCHLORIDE 5 MG/1
5 TABLET ORAL
Status: DISCONTINUED | OUTPATIENT
Start: 2019-02-20 | End: 2019-02-20 | Stop reason: HOSPADM

## 2019-02-20 RX ORDER — PHENYLEPHRINE HYDROCHLORIDE 10 MG/ML
INJECTION INTRAVENOUS
Status: DISCONTINUED | OUTPATIENT
Start: 2019-02-20 | End: 2019-02-20

## 2019-02-20 RX ADMIN — LIDOCAINE HYDROCHLORIDE 50 MG: 20 INJECTION, SOLUTION INTRAVENOUS at 10:02

## 2019-02-20 RX ADMIN — Medication 20 MG: at 10:02

## 2019-02-20 RX ADMIN — POTASSIUM CHLORIDE 20 MEQ: 1500 TABLET, EXTENDED RELEASE ORAL at 08:02

## 2019-02-20 RX ADMIN — GLYCOPYRROLATE 0.2 MG: 0.2 INJECTION, SOLUTION INTRAMUSCULAR; INTRAVENOUS at 10:02

## 2019-02-20 RX ADMIN — SODIUM CHLORIDE 125 MG: 9 INJECTION, SOLUTION INTRAVENOUS at 04:02

## 2019-02-20 RX ADMIN — PHENYLEPHRINE HYDROCHLORIDE 100 MCG: 10 INJECTION INTRAVENOUS at 11:02

## 2019-02-20 RX ADMIN — PROPOFOL 60 MG: 10 INJECTION, EMULSION INTRAVENOUS at 10:02

## 2019-02-20 RX ADMIN — Medication 10 MG: at 11:02

## 2019-02-20 RX ADMIN — POLYETHYLENE GLYCOL 3350, SODIUM SULFATE ANHYDROUS, SODIUM BICARBONATE, SODIUM CHLORIDE, POTASSIUM CHLORIDE 2000 ML: 236; 22.74; 6.74; 5.86; 2.97 POWDER, FOR SOLUTION ORAL at 04:02

## 2019-02-20 RX ADMIN — SODIUM CHLORIDE: 9 INJECTION, SOLUTION INTRAVENOUS at 10:02

## 2019-02-20 RX ADMIN — PROPOFOL 200 MCG/KG/MIN: 10 INJECTION, EMULSION INTRAVENOUS at 10:02

## 2019-02-20 RX ADMIN — PIPERACILLIN AND TAZOBACTAM 4.5 G: 4; .5 INJECTION, POWDER, LYOPHILIZED, FOR SOLUTION INTRAVENOUS; PARENTERAL at 06:02

## 2019-02-20 RX ADMIN — POTASSIUM CHLORIDE 10 MEQ: 7.46 INJECTION, SOLUTION INTRAVENOUS at 09:02

## 2019-02-20 RX ADMIN — POTASSIUM CHLORIDE, DEXTROSE MONOHYDRATE AND SODIUM CHLORIDE: 150; 5; 450 INJECTION, SOLUTION INTRAVENOUS at 08:02

## 2019-02-20 RX ADMIN — ENOXAPARIN SODIUM 40 MG: 100 INJECTION SUBCUTANEOUS at 06:02

## 2019-02-20 RX ADMIN — PIPERACILLIN AND TAZOBACTAM 4.5 G: 4; .5 INJECTION, POWDER, LYOPHILIZED, FOR SOLUTION INTRAVENOUS; PARENTERAL at 02:02

## 2019-02-20 NOTE — NURSING TRANSFER
Nursing Transfer Note      2/20/2019     Transfer To: [facility]    Transfer via stretcher    Transfer with cardiac monitoring    Transported by PCT    Chart send with patient: Yes

## 2019-02-20 NOTE — PROGRESS NOTES
During morning assessment, noted patients HR rhythm on tele was reading controlled a fib (HR 60's). Called tele asking them if this was new, tele stated no and patient had been in a fib. I got report from night shift nurse, pt was NSR. After doing further research on tele, patient looked like she converted to afib around 0700. Called Letty Stringer NP and got a stat EKG ordered. EKG read NSR with PAC. Dr. Ruiz aware on EKG and BP and rhythm change. Called Endo and spoke to nurse and reported this rhythm change to her. Letty Stringer NP aware of BP readings. Joseph and Letty Stringer NP aware of lab results prior to going down to endo (H/H and potassium levels). Both MDs stated to continue with procedure. Endo nurse aware of all the above and is expecting patient.    Patient transported via stretcher with transporter and mother.

## 2019-02-20 NOTE — PLAN OF CARE
Plan of care reviewed with pt: AAOx4, on RA, VS stable with systolic BP ran between 80-90. No complained of pain or nausea. Golytely given to the pt to prepare for the EGD tomorrow. After drinking 1000cc of Golytely, pt threw up and stated that the Golytely made her feel nauseated. No complains of pain. Voided with no difficulty but had no bowel movement. Ambulated independently. Call light in reach. WCTM.

## 2019-02-20 NOTE — H&P
Short Stay Endoscopy History and Physical    PCP - Margarita Jay MD    Procedure - EGD/Colonoscopy  ASA - per anesthesia  Mallampati - per anesthesia  History of Anesthesia problems - no  Family history Anesthesia problems -  no   Plan of anesthesia - MAC    HPI:  28 year old female with a history of Asthma, IBS, and GERD who presents for EGD/colon for further evaluation of colitis with fistulating disease.    ROS:  Constitutional: No fevers, chills, No weight loss  CV: No chest pain  Pulm: No cough, No shortness of breath  Ophtho: No vision changes  GI: see HPI  Derm: No rash    Medical History:  has a past medical history of Allergy, Anemia, Asthma, Depression, and IBS (irritable bowel syndrome).    Surgical History:  has no past surgical history on file.    Family History: family history is not on file.. Otherwise no colon cancer, inflammatory bowel disease, or GI malignancies.    Social History:  reports that she has been smoking cigarettes.  she has never used smokeless tobacco. She reports that she drinks alcohol. She reports that she uses drugs. Drug: Marijuana. Frequency: 3.00 times per week.    Review of patient's allergies indicates:   Allergen Reactions    Shellfish containing products Anaphylaxis     Allergic to all seafood.    Horse/equine containing products Itching       Medications:   Medications Prior to Admission   Medication Sig Dispense Refill Last Dose    clobetasol (TEMOVATE) 0.05 % external solution Apply topically 2 (two) times daily. for 10 days 50 mL 0     diphenhydrAMINE (BENADRYL) 25 mg capsule Take 1 each (25 mg total) by mouth every 6 (six) hours as needed for Itching. 20 capsule 0     fluocinolone (DERMA-SMOOTHE) 0.01 % external oil Apply topically every evening. Apply well to scalp at bedtime cover with a shower cap for 10 days 118.28 mL 0     fluocinonide (LIDEX) 0.05 % ointment Apply topically 2 (two) times daily. for 10 days 60 g 0     loratadine (CLARITIN) 10 mg tablet  Take 1 tablet (10 mg total) by mouth once daily. 60 tablet 0     ondansetron (ZOFRAN) 4 MG tablet Take 1 tablet (4 mg total) by mouth every 6 (six) hours as needed for Nausea. 12 tablet 0 More than a month       Physical Exam:    Vital Signs:   Vitals:    02/20/19 0938   BP: (!) 90/59   Pulse: 67   Resp:    Temp:        General Appearance: Well appearing in no acute distress  Eyes:    No scleral icterus  ENT: Neck supple, Lips, mucosa, and tongue normal; teeth and gums normal  Lungs: CTA anteriorly  Heart:  Regular rate, S1, S2 normal, no murmurs heard.  Abdomen: Soft, non tender, non distended with normal bowel sounds. No hepatosplenomegaly, ascites, or mass.  Extremities: No edema  Skin: No rash    Labs:  Lab Results   Component Value Date    WBC 3.01 (L) 02/20/2019    HGB 6.1 (L) 02/20/2019    HCT 21.4 (L) 02/20/2019     02/20/2019    ALT 10 02/19/2019    AST 12 02/19/2019     02/19/2019    K 3.2 (L) 02/19/2019     02/19/2019    CREATININE 0.6 02/19/2019    BUN 5 (L) 02/19/2019    CO2 23 02/19/2019    TSH 0.713 08/19/2015       I have explained the risks and benefits of endoscopy procedures to the patient including but not limited to bleeding, perforation, infection, and death.      Arabella Gonzalez M.D.  Gastroenterology Fellow, PGY-V  Pager: [phone]  [facility]

## 2019-02-20 NOTE — [employer] PATIENT INSTRUCTIONS
Discharge Summary/Instructions after an Endoscopic Procedure  Patient Name: Lora Scott  Patient MRN: [id number]  Patient YOB: 1990  Wednesday, February 20, 2019  Fawad Perla MD  RESTRICTIONS:  During your procedure today, you received medications for sedation.  These   medications may affect your judgment, balance and coordination.  Therefore,   for 24 hours, you have the following restrictions:   - DO NOT drive a car, operate machinery, make legal/financial decisions,   sign important papers or drink alcohol.    ACTIVITY:  Today: no heavy lifting, straining or running due to procedural   sedation/anesthesia.  The following day: return to full activity including work.  DIET:  Eat and drink normally unless instructed otherwise.     TREATMENT FOR COMMON SIDE EFFECTS:  - Mild abdominal pain, nausea, belching, bloating or excessive gas:  rest,   eat lightly and use a heating pad.  - Sore Throat: treat with throat lozenges and/or gargle with warm salt   water.  - Because air was used during the procedure, expelling large amounts of air   from your rectum or belching is normal.  - If a bowel prep was taken, you may not have a bowel movement for 1-3 days.    This is normal.  SYMPTOMS TO WATCH FOR AND REPORT TO YOUR PHYSICIAN:  1. Abdominal pain or bloating, other than gas cramps.  2. Chest pain.  3. Back pain.  4. Signs of infection such as: chills or fever occurring within 24 hours   after the procedure.  5. Rectal bleeding, which would show as bright red, maroon, or black stools.   (A tablespoon of blood from the rectum is not serious, especially if   hemorrhoids are present.)  6. Vomiting.  7. Weakness or dizziness.  GO DIRECTLY TO THE NEAREST EMERGENCY ROOM IF YOU HAVE ANY OF THE FOLLOWING:      Difficulty breathing              Chills and/or fever over 101 F   Persistent vomiting and/or vomiting blood   Severe abdominal pain   Severe chest pain   Black, tarry stools   Bleeding- more than one  tablespoon   Any other symptom or condition that you feel may need urgent attention  Your doctor recommends these additional instructions:  If any biopsies were taken, your doctors clinic will contact you in 1 to 2   weeks with any results.  - Return patient to hospital [facility] for ongoing care.   - Await pathology results.   - Telephone endoscopist for pathology results in 2 weeks.   - Perform a colonoscopy today.   - The findings and recommendations were discussed with the patient.   - The findings and recommendations were discussed with the referring   physician.  For questions, problems or results please call your physician - Fawad Perla MD at Work:  [phone].  [facility], EMERGENCY ROOM PHONE NUMBER: [phone]  IF A COMPLICATION OR EMERGENCY SITUATION ARISES AND YOU ARE UNABLE TO REACH   YOUR PHYSICIAN - GO DIRECTLY TO THE EMERGENCY ROOM.  Fawad Perla MD  2/20/2019 11:10:03 AM  This report has been verified and signed electronically.  [employer]

## 2019-02-20 NOTE — CONSULTS
NIAS at bedside to attempt flushing picc to central location.  CXR ordered.  If picc tip not central then please leave picc alone and do not use.  Consult IR to see if can get tip central under fluoroscopy.  PICC line cleaned and redressed after repositioning.

## 2019-02-20 NOTE — ANESTHESIA POSTPROCEDURE EVALUATION
"Anesthesia Post Evaluation    Patient: Lora Scott    Procedure(s) Performed: Procedure(s) (LRB):  COLONOSCOPY (N/A)  EGD (ESOPHAGOGASTRODUODENOSCOPY) (N/A)    Final Anesthesia Type: general  Patient location during evaluation: PACU  Patient participation: Yes- Able to Participate  Level of consciousness: awake and alert  Post-procedure vital signs: reviewed and stable  Pain management: adequate  Airway patency: patent  PONV status at discharge: No PONV  Anesthetic complications: no      Cardiovascular status: blood pressure returned to baseline  Respiratory status: unassisted  Hydration status: euvolemic  Follow-up not needed.        Visit Vitals  BP (!) 92/59 (BP Location: Left arm, Patient Position: Lying)   Pulse 87   Temp 36.6 °C (97.9 °F) (Temporal)   Resp 17   Ht 5' 2" (1.575 m)   Wt 47.6 kg (105 lb)   SpO2 100%   Breastfeeding? No   BMI 19.20 kg/m²       Pain/Oral Score: No Data Recorded      "

## 2019-02-20 NOTE — CONSULTS
Double lumen PICC placed to (L) BRACHIALvein.   34 cm in length, 0 cm exposed, and  21 cm arm circumference.  Lot # [id number]

## 2019-02-20 NOTE — PROCEDURES
"Lora Scott is a 28 y.o. female patient.    Temp: 97.9 °F (36.6 °C) (02/20/19 1350)  Pulse: 75 (02/20/19 1400)  Resp: 18 (02/20/19 1350)  BP: (!) 90/58 (02/20/19 1350)  SpO2: 99 % (02/20/19 1350)  Weight: 47.6 kg (105 lb) (02/18/19 0232)  Height: 5' 2" (157.5 cm) (02/18/19 0232)    PICC  Date/Time: 2/20/2019 3:43 PM  Performed by: Abida Kurtz RN  Consent Done: Yes  Time out: Immediately prior to procedure a time out was called to verify the correct patient, procedure, equipment, support staff and site/side marked as required  Indications: med administration and vascular access  Anesthesia: local infiltration  Local anesthetic: lidocaine 1% without epinephrine  Anesthetic Total (mL): 2  Preparation: skin prepped with ChloraPrep  Skin prep agent dried: skin prep agent completely dried prior to procedure  Sterile barriers: all five maximum sterile barriers used - cap, mask, sterile gown, sterile gloves, and large sterile sheet  Hand hygiene: hand hygiene performed prior to central venous catheter insertion  Location details: left brachial  Catheter type: double lumen  Catheter size: 5 Fr  Catheter Length: 34cm    Ultrasound guidance: yes  Vessel Caliber: medium and patent, compressibility normal  Vascular Doppler: not done  Needle advanced into vessel with real time Ultrasound guidance.  Guidewire confirmed in vessel.  Image recorded and saved.  Sterile sheath used.  no esophageal manometryNumber of attempts: 1  Post-procedure: blood return through all ports, chlorhexidine patch and sterile dressing applied  Specimens: No  Implants: No  Assessment: placement verified by x-ray  Complications: none          Velma Cardenas  2/20/2019  "

## 2019-02-20 NOTE — TRANSFER OF CARE
"Anesthesia Transfer of Care Note    Patient: Lora Scott    Procedure(s) Performed: Procedure(s) (LRB):  COLONOSCOPY (N/A)  EGD (ESOPHAGOGASTRODUODENOSCOPY) (N/A)    Patient location: PACU    Anesthesia Type: general    Transport from OR: Transported from OR on room air with adequate spontaneous ventilation. Transported from OR on 6-10 L/min O2 by face mask with adequate spontaneous ventilation    Post pain: adequate analgesia    Post assessment: no apparent anesthetic complications and tolerated procedure well    Post vital signs: stable    Level of consciousness: awake and alert    Nausea/Vomiting: no nausea/vomiting    Complications: none    Transfer of care protocol was followed      Last vitals:   Visit Vitals  BP (!) 92/59 (BP Location: Left arm, Patient Position: Lying)   Pulse 66   Temp 36.6 °C (97.9 °F) (Temporal)   Resp 17   Ht 5' 2" (1.575 m)   Wt 47.6 kg (105 lb)   SpO2 100%   Breastfeeding? No   BMI 19.20 kg/m²     "

## 2019-02-20 NOTE — PROVATION PATIENT INSTRUCTIONS
Discharge Summary/Instructions after an Endoscopic Procedure  Patient Name: Lora Scott  Patient MRN: [id number]  Patient YOB: 1990  Wednesday, February 20, 2019  Fawad Perla MD  RESTRICTIONS:  During your procedure today, you received medications for sedation.  These   medications may affect your judgment, balance and coordination.  Therefore,   for 24 hours, you have the following restrictions:   - DO NOT drive a car, operate machinery, make legal/financial decisions,   sign important papers or drink alcohol.    ACTIVITY:  Today: no heavy lifting, straining or running due to procedural   sedation/anesthesia.  The following day: return to full activity including work.  DIET:  Eat and drink normally unless instructed otherwise.     TREATMENT FOR COMMON SIDE EFFECTS:  - Mild abdominal pain, nausea, belching, bloating or excessive gas:  rest,   eat lightly and use a heating pad.  - Sore Throat: treat with throat lozenges and/or gargle with warm salt   water.  - Because air was used during the procedure, expelling large amounts of air   from your rectum or belching is normal.  - If a bowel prep was taken, you may not have a bowel movement for 1-3 days.    This is normal.  SYMPTOMS TO WATCH FOR AND REPORT TO YOUR PHYSICIAN:  1. Abdominal pain or bloating, other than gas cramps.  2. Chest pain.  3. Back pain.  4. Signs of infection such as: chills or fever occurring within 24 hours   after the procedure.  5. Rectal bleeding, which would show as bright red, maroon, or black stools.   (A tablespoon of blood from the rectum is not serious, especially if   hemorrhoids are present.)  6. Vomiting.  7. Weakness or dizziness.  GO DIRECTLY TO THE NEAREST EMERGENCY ROOM IF YOU HAVE ANY OF THE FOLLOWING:      Difficulty breathing              Chills and/or fever over 101 F   Persistent vomiting and/or vomiting blood   Severe abdominal pain   Severe chest pain   Black, tarry stools   Bleeding- more than one  tablespoon   Any other symptom or condition that you feel may need urgent attention  Your doctor recommends these additional instructions:  If any biopsies were taken, your doctors clinic will contact you in 1 to 2   weeks with any results.  - Return patient to [facility] for ongoing care.   - Await pathology results.   - Telephone endoscopist for pathology results in 2 weeks.   - Telephone referring physician for study results in 1 week.   - The findings and recommendations were discussed with the patient.   - The findings and recommendations were discussed with the referring   physician.   - Consider avoiding all non-steroidal anti-inflammatory drugs (aspirin,   ibuprofen, naproxen, etc.), unless needed for cardiovascular protection.    Recommend you discuss with your prescribing doctor (of your aspirin) to see   if cardiovascular benefits of your aspirin outweigh the risks of GI   bleeding.  - Repeat colonoscopy (date not yet determined).  For questions, problems or results please call your physician - Fawad Perla MD at Work:  [phone].  [facility], EMERGENCY ROOM PHONE NUMBER: [phone]  IF A COMPLICATION OR EMERGENCY SITUATION ARISES AND YOU ARE UNABLE TO REACH   YOUR PHYSICIAN - GO DIRECTLY TO THE EMERGENCY ROOM.  Fawad Perla MD  2/20/2019 11:51:00 AM  This report has been verified and signed electronically.  PROVATION

## 2019-02-20 NOTE — PLAN OF CARE
Problem: Adult Inpatient Plan of Care  Goal: Plan of Care Review  Outcome: Ongoing (interventions implemented as appropriate)  POC reviewed and understood by patient. AAOx4. IV intact and patent. Patient ambulates to the bathRM throughout shift. NPO at 0000 with GoLYTELY. NSR on tele. No acute events at this time. Call bell within reach. Bed in lowest position. WCTM.

## 2019-02-21 LAB
ANION GAP SERPL CALC-SCNC: 6 MMOL/L
ANION GAP SERPL CALC-SCNC: 8 MMOL/L
BASOPHILS # BLD AUTO: 0.01 K/UL
BASOPHILS NFR BLD: 0.2 %
BUN SERPL-MCNC: 2 MG/DL
BUN SERPL-MCNC: 2 MG/DL
CALCIUM SERPL-MCNC: 7.8 MG/DL
CALCIUM SERPL-MCNC: 8 MG/DL
CHLORIDE SERPL-SCNC: 110 MMOL/L
CHLORIDE SERPL-SCNC: 110 MMOL/L
CO2 SERPL-SCNC: 22 MMOL/L
CO2 SERPL-SCNC: 24 MMOL/L
CREAT SERPL-MCNC: 0.6 MG/DL
CREAT SERPL-MCNC: 0.6 MG/DL
CRP SERPL-MCNC: 62.5 MG/L
DIFFERENTIAL METHOD: ABNORMAL
EOSINOPHIL # BLD AUTO: 0.1 K/UL
EOSINOPHIL NFR BLD: 2 %
ERYTHROCYTE [DISTWIDTH] IN BLOOD BY AUTOMATED COUNT: 20 %
EST. GFR  (AFRICAN AMERICAN): >60 ML/MIN/1.73 M^2
EST. GFR  (AFRICAN AMERICAN): >60 ML/MIN/1.73 M^2
EST. GFR  (NON AFRICAN AMERICAN): >60 ML/MIN/1.73 M^2
EST. GFR  (NON AFRICAN AMERICAN): >60 ML/MIN/1.73 M^2
GLUCOSE SERPL-MCNC: 69 MG/DL
GLUCOSE SERPL-MCNC: 89 MG/DL
HCT VFR BLD AUTO: 25.1 %
HGB BLD-MCNC: 6.8 G/DL
IMM GRANULOCYTES # BLD AUTO: 0.06 K/UL
IMM GRANULOCYTES NFR BLD AUTO: 1.3 %
LYMPHOCYTES # BLD AUTO: 0.8 K/UL
LYMPHOCYTES NFR BLD: 16.6 %
MAGNESIUM SERPL-MCNC: 1.4 MG/DL
MAGNESIUM SERPL-MCNC: 1.4 MG/DL
MCH RBC QN AUTO: 20.2 PG
MCHC RBC AUTO-ENTMCNC: 27.1 G/DL
MCV RBC AUTO: 75 FL
MONOCYTES # BLD AUTO: 0.3 K/UL
MONOCYTES NFR BLD: 6 %
NEUTROPHILS # BLD AUTO: 3.3 K/UL
NEUTROPHILS NFR BLD: 73.9 %
NRBC BLD-RTO: 0 /100 WBC
PHOSPHATE SERPL-MCNC: 2.7 MG/DL
PHOSPHATE SERPL-MCNC: 3 MG/DL
PLATELET # BLD AUTO: 377 K/UL
PMV BLD AUTO: 9.3 FL
POTASSIUM SERPL-SCNC: 3.5 MMOL/L
POTASSIUM SERPL-SCNC: 3.6 MMOL/L
RBC # BLD AUTO: 3.37 M/UL
SODIUM SERPL-SCNC: 140 MMOL/L
SODIUM SERPL-SCNC: 140 MMOL/L
TRIGL SERPL-MCNC: 55 MG/DL
WBC # BLD AUTO: 4.51 K/UL

## 2019-02-21 PROCEDURE — 83735 ASSAY OF MAGNESIUM: CPT | Mod: 91

## 2019-02-21 PROCEDURE — 84100 ASSAY OF PHOSPHORUS: CPT | Mod: 91

## 2019-02-21 PROCEDURE — 25000003 PHARM REV CODE 250: Performed by: STUDENT IN AN ORGANIZED HEALTH CARE EDUCATION/TRAINING PROGRAM

## 2019-02-21 PROCEDURE — 85025 COMPLETE CBC W/AUTO DIFF WBC: CPT

## 2019-02-21 PROCEDURE — 80048 BASIC METABOLIC PNL TOTAL CA: CPT

## 2019-02-21 PROCEDURE — 84100 ASSAY OF PHOSPHORUS: CPT

## 2019-02-21 PROCEDURE — 84478 ASSAY OF TRIGLYCERIDES: CPT

## 2019-02-21 PROCEDURE — 20600001 HC STEP DOWN PRIVATE ROOM

## 2019-02-21 PROCEDURE — 86140 C-REACTIVE PROTEIN: CPT

## 2019-02-21 PROCEDURE — 25000003 PHARM REV CODE 250: Performed by: NURSE PRACTITIONER

## 2019-02-21 PROCEDURE — B4185 PARENTERAL SOL 10 GM LIPIDS: HCPCS | Performed by: NURSE PRACTITIONER

## 2019-02-21 PROCEDURE — 93010 EKG 12-LEAD: ICD-10-PCS | Mod: ,,, | Performed by: INTERNAL MEDICINE

## 2019-02-21 PROCEDURE — A4216 STERILE WATER/SALINE, 10 ML: HCPCS | Performed by: NURSE PRACTITIONER

## 2019-02-21 PROCEDURE — 93010 ELECTROCARDIOGRAM REPORT: CPT | Mod: ,,, | Performed by: INTERNAL MEDICINE

## 2019-02-21 PROCEDURE — 80048 BASIC METABOLIC PNL TOTAL CA: CPT | Mod: 91

## 2019-02-21 PROCEDURE — 63600175 PHARM REV CODE 636 W HCPCS: Performed by: NURSE PRACTITIONER

## 2019-02-21 PROCEDURE — 36415 COLL VENOUS BLD VENIPUNCTURE: CPT

## 2019-02-21 PROCEDURE — 93005 ELECTROCARDIOGRAM TRACING: CPT

## 2019-02-21 PROCEDURE — 83735 ASSAY OF MAGNESIUM: CPT

## 2019-02-21 RX ORDER — LANOLIN ALCOHOL/MO/W.PET/CERES
400 CREAM (GRAM) TOPICAL ONCE
Status: COMPLETED | OUTPATIENT
Start: 2019-02-21 | End: 2019-02-21

## 2019-02-21 RX ORDER — FLUOCINONIDE 0.5 MG/G
OINTMENT TOPICAL 2 TIMES DAILY
Status: DISCONTINUED | OUTPATIENT
Start: 2019-02-21 | End: 2019-02-21 | Stop reason: ALTCHOICE

## 2019-02-21 RX ORDER — CLOBETASOL PROPIONATE 0.5 MG/G
OINTMENT TOPICAL 2 TIMES DAILY
Status: DISCONTINUED | OUTPATIENT
Start: 2019-02-21 | End: 2019-02-26 | Stop reason: HOSPADM

## 2019-02-21 RX ORDER — ENOXAPARIN SODIUM 100 MG/ML
40 INJECTION SUBCUTANEOUS EVERY 24 HOURS
Status: DISCONTINUED | OUTPATIENT
Start: 2019-02-22 | End: 2019-02-26 | Stop reason: HOSPADM

## 2019-02-21 RX ORDER — CLOBETASOL PROPIONATE 0.46 MG/ML
SOLUTION TOPICAL 2 TIMES DAILY
Status: DISCONTINUED | OUTPATIENT
Start: 2019-02-21 | End: 2019-02-21 | Stop reason: RX

## 2019-02-21 RX ORDER — FLUOCINONIDE 0.5 MG/G
CREAM TOPICAL 2 TIMES DAILY
Status: DISCONTINUED | OUTPATIENT
Start: 2019-02-21 | End: 2019-02-26 | Stop reason: HOSPADM

## 2019-02-21 RX ORDER — POTASSIUM CHLORIDE 20 MEQ/15ML
40 SOLUTION ORAL ONCE
Status: COMPLETED | OUTPATIENT
Start: 2019-02-21 | End: 2019-02-21

## 2019-02-21 RX ADMIN — PIPERACILLIN AND TAZOBACTAM 4.5 G: 4; .5 INJECTION, POWDER, LYOPHILIZED, FOR SOLUTION INTRAVENOUS; PARENTERAL at 07:02

## 2019-02-21 RX ADMIN — CLOBETASOL PROPIONATE: 0.5 OINTMENT TOPICAL at 09:02

## 2019-02-21 RX ADMIN — SODIUM CHLORIDE 125 MG: 9 INJECTION, SOLUTION INTRAVENOUS at 12:02

## 2019-02-21 RX ADMIN — FLUOCINONIDE: 0.5 CREAM TOPICAL at 12:02

## 2019-02-21 RX ADMIN — SOYBEAN OIL 250 ML: 20 INJECTION, SOLUTION INTRAVENOUS at 09:02

## 2019-02-21 RX ADMIN — POTASSIUM CHLORIDE: 2 INJECTION, SOLUTION, CONCENTRATE INTRAVENOUS at 09:02

## 2019-02-21 RX ADMIN — MAGNESIUM OXIDE TAB 400 MG (241.3 MG ELEMENTAL MG) 400 MG: 400 (241.3 MG) TAB at 07:02

## 2019-02-21 RX ADMIN — PIPERACILLIN AND TAZOBACTAM 4.5 G: 4; .5 INJECTION, POWDER, LYOPHILIZED, FOR SOLUTION INTRAVENOUS; PARENTERAL at 02:02

## 2019-02-21 RX ADMIN — FLUOCINONIDE: 0.5 CREAM TOPICAL at 09:02

## 2019-02-21 RX ADMIN — POTASSIUM CHLORIDE, DEXTROSE MONOHYDRATE AND SODIUM CHLORIDE: 150; 5; 450 INJECTION, SOLUTION INTRAVENOUS at 09:02

## 2019-02-21 RX ADMIN — CLOBETASOL PROPIONATE: 0.5 OINTMENT TOPICAL at 12:02

## 2019-02-21 RX ADMIN — PIPERACILLIN AND TAZOBACTAM 4.5 G: 4; .5 INJECTION, POWDER, LYOPHILIZED, FOR SOLUTION INTRAVENOUS; PARENTERAL at 12:02

## 2019-02-21 RX ADMIN — POTASSIUM CHLORIDE 40 MEQ: 20 SOLUTION ORAL at 07:02

## 2019-02-21 NOTE — PROGRESS NOTES
"[facility]  Adult Nutrition  Progress Note    SUMMARY       Recommendations    Recommendation/Intervention:     Pt with moderate malnutrition.     1. Continue Low Fiber/Residue diet + Boost Plus. Encourage po intake.     2. Recommend Custom TPN 45 gm AA / 270 gm Dex to provide 1098 kcal (66% EEN) and 45 gm protein (73% EPN).    - No IV lipids 2/2 po intake.   - Wean/dicontinue TPN as po intake increases.     3. RD following.     Goals: meet % EEN/EPN  Nutrition Goal Status: new  Communication of RD Recs: (POC)    Reason for Assessment    Reason For Assessment: new TPN  Diagnosis: gastrointestinal disease(Crohn's disease involving terminal ileum )  Relevant Medical History: depression, IBS, anemia  Interdisciplinary Rounds: attended  General Information Comments: POD1 EGD, colonoscopy. PICC placed in IR today. Diet advanced to Low Fiber/Residue. Pt reports good appetite at this time. States okay appetite PTA, but gets full quickly and mostly picks at food all day. UBW 100lbs the past year. C/o diarrhea 2/2 meds for procedure. Denies N/V/C. Dicussed and provided handout "IBD: Crohn's and UC". NFPE complete. Pt meets moderate malnutrition.   Nutrition Discharge Planning: adequate po intake     Nutrition Risk Screen    Nutrition Risk Screen: no indicators present    Nutrition/Diet History    Spiritual, Cultural Beliefs, Bahai Practices, Values that Affect Care: yes  Factors Affecting Nutritional Intake: early satiety, altered gastrointestinal function    Anthropometrics    Temp: 96.5 °F (35.8 °C)  Height Method: Stated  Height: 5' 2" (157.5 cm)  Height (inches): 62 in  Weight Method: Stated  Weight: 47.6 kg (105 lb)  Weight (lb): 105 lb  Ideal Body Weight (IBW), Female: 110 lb  % Ideal Body Weight, Female (lb): 95.45 lb  BMI (Calculated): 19.2  BMI Grade: 18.5-24.9 - normal    Lab/Procedures/Meds    Pertinent Labs Reviewed: reviewed  Pertinent Labs Comments: K 3.2, BUN 5, glucose 68, Mg " 1.4  Pertinent Medications Reviewed: reviewed  Pertinent Medications Comments: IVF, K Cl    Estimated/Assessed Needs    Weight Used For Calorie Calculations: 47.6 kg (104 lb 15 oz)  Energy Calorie Requirements (kcal): 1836-8925 kcal/day  Energy Need Method: Kcal/kg(30-35)  Protein Requirements: 48-62 gm/day(1.0-1.3 gm/kg)  Weight Used For Protein Calculations: 47.6 kg (104 lb 15 oz)  Fluid Requirements (mL): 1 mL/kcal or per Md     RDA Method (mL): 1428     Nutrition Prescription Ordered    Current Diet Order: Low Fiber/Residue   Current Nutrition Support Formula Ordered: (Custom TPN 90 gm AA / 270 gm Dex + IV Lipids ordered)  Current Nutrition Support Rate Ordered: 75 (ml)  Current Nutrition Support Frequency Ordered: mL/hr  Oral Nutrition Supplement: Boost Plus    Evaluation of Received Nutrient/Fluid Intake    Parenteral Calories (kcal): 1278  Parenteral Protein (gm): 90  Parenteral Fluid (mL): 1800  Lipid Calories (kcals): 500 kcals  GIR (Glucose Infusion Rate) (mg/kg/min): 3.94 mg/kg/min  Total Calories (kcal): 1778  % Kcal Needs: 106  % Protein Needs: 145  I/O: +2.7L x 24 hrs, +5L since admit  Energy Calories Required: meeting needs  Protein Required: exceeds needs  Fluid Required: other (see comments)(per MD)  Comments: LBM 2/20  Tolerance: tolerating  % Intake of Estimated Energy Needs: Other: 106% EEN, 145% EPN  % Meal Intake: 75% of lunch per chart review     Nutrition Risk    Level of Risk/Frequency of Follow-up: (f/u 2 x wk)     Assessment and Plan    Malnutrition of moderate degree    Malnutrition in the context of Chronic Illness/Injury    Related to (etiology):  Crohn's disease     Signs and Symptoms (as evidenced by):  Energy Intake: <75% of estimated energy requirement for > 3 months  Body Fat Depletion: moderate depletion of triceps   Muscle Mass Depletion: moderate depletion of temples, clavicle region and lower extremities   Weight Loss: no wt loss within 1 year per pt       Interventions/Recommendations (treatment strategy):  Collaboration and Referral of Nutrition Care    Nutrition Diagnosis Status:  New            Monitor and Evaluation    Food and Nutrient Intake: energy intake, food and beverage intake, parenteral nutrition intake  Food and Nutrient Adminstration: diet order, enteral and parenteral nutrition administration  Physical Activity and Function: nutrition-related ADLs and IADLs  Anthropometric Measurements: weight, weight change, body mass index  Biochemical Data, Medical Tests and Procedures: electrolyte and renal panel, gastrointestinal profile, lipid profile, inflammatory profile, glucose/endocrine profile  Nutrition-Focused Physical Findings: overall appearance     Malnutrition Assessment  Malnutrition Type: chronic illness          Weight Loss (Malnutrition): (no wt loss within 1 year per pt )  Energy Intake (Malnutrition): less than 75% for greater than or equal to 3 months   Upper Arm Region (Subcutaneous Fat Loss): moderate depletion   Scientology Region (Muscle Loss): moderate depletion  Clavicle Bone Region (Muscle Loss): moderate depletion  Posterior Calf Region (Muscle Loss): moderate depletion                 Nutrition Follow-Up    RD Follow-up?: Yes

## 2019-02-21 NOTE — PLAN OF CARE
Problem: Adult Inpatient Plan of Care  Goal: Plan of Care Review  Outcome: Revised  POC reviewed with patient who verbalized understanding. VSS on room air with low BP (MDs aware). Per nursing communication, call MD if SBP < 84. AAOX4. Remains free of falls and injury. Patient up independently to toilet. Voiding adequately.      LT UA PICC intact and patent - placed in IR today (see previous note). Eczema noted - home meds ordered.       IVF and abx infusing per MAR. Tolerating low fiber diet, denies nausea, good appetite. Denies pain. Telemetry being monitored NSR. Patient denies chest pain & SOB. TEDS and SCDS in place. No acute events. No distress noted. Bed in lowest position, call light within reach, frequent rounds made for safety.     Order for IVF decreased to 25 ml/hr when TPN started - will report to night shift nurse.     WCTM.

## 2019-02-21 NOTE — PLAN OF CARE
Problem: Adult Inpatient Plan of Care  Goal: Plan of Care Review  Outcome: Revised  POC reviewed with patient who verbalized understanding. VSS on room air with low BP (MDs aware). AAOX4. Remains free of falls and injury. Patient up independently to toilet.     Patient went down for EGD and colonoscopy today - tolerated well. Patient has eczema on legs. Patient had BM X 2.    Patient got PICC line placed today. First CXR showed PICC was not correctly placed. Called PICC team and a member attempted again. Second CXR done - told Dr. Garcia. PICC team stated if second CXR is not in place, to leave PICC line in and NOT to flush or use. PICC team stated patient will have to go to IR tomorrow to get PICC correctly placed. Reported this to night shift nurse. Patient complaining of potassium burning too bad via peripheral - Dr Garcia said he would look at the CXR and would let me know so I can infuse potassium via PICC line. Still awaiting for OK to use PICC-  Reported this to night shift nurse. Potassium bags still need to be infused due to lack of IV access.     IVF infusing per MAR. Tolerating low fiber diet, denies nausea. Denies pain. Telemetry being monitored - patient goes from NSR to occasional controlled afib (MDs aware, see previous note). Patient denies chest pain & SOB. TEDS and SCDS in place. No acute events. No distress noted. Bed in lowest position, call light within reach, frequent rounds made for safety.

## 2019-02-21 NOTE — H&P
Inpatient Radiology Pre-procedure Note    History of Present Illness:  Lora Scott is a 28 y.o. female who presents to IR to reposition PICC line versus exchange it.    Admission H&P reviewed.    Past Medical History:   Diagnosis Date    Allergy     Anemia     Asthma     Depression     IBS (irritable bowel syndrome)      History reviewed. No pertinent surgical history.    Review of Systems:   As documented in primary team H&P    Home Meds:   Prior to Admission medications    Medication Sig Start Date End Date Taking? Authorizing Provider   clobetasol (TEMOVATE) 0.05 % external solution Apply topically 2 (two) times daily. for 10 days 10/21/18 10/31/18  Rebekah West, DO   diphenhydrAMINE (BENADRYL) 25 mg capsule Take 1 each (25 mg total) by mouth every 6 (six) hours as needed for Itching. 10/21/18   Rebekah West, DO   fluocinolone (DERMA-SMOOTHE) 0.01 % external oil Apply topically every evening. Apply well to scalp at bedtime cover with a shower cap for 10 days 10/21/18 10/31/18  Rebekah West, DO   fluocinonide (LIDEX) 0.05 % ointment Apply topically 2 (two) times daily. for 10 days 10/21/18 10/31/18  Rebekah West, DO   loratadine (CLARITIN) 10 mg tablet Take 1 tablet (10 mg total) by mouth once daily. 10/21/18 10/21/19  Rebekah West, DO   ondansetron (ZOFRAN) 4 MG tablet Take 1 tablet (4 mg total) by mouth every 6 (six) hours as needed for Nausea. 10/29/17   Stefany Song MD     Scheduled Meds:    clobetasol 0.05%   Topical (Top) BID    fat emulsion 20%  250 mL Intravenous Daily    ferric gluconate (FERRLECIT) IVPB  125 mg Intravenous Daily    fluocinonide 0.05%   Topical (Top) BID    piperacillin-tazobactam (ZOSYN) IVPB  4.5 g Intravenous Q8H     Continuous Infusions:    dextrose 5 % and 0.45 % NaCl with KCl 20 mEq 75 mL/hr at 02/21/19 0923    Standard Custom Day One ADULT TPN for patient with NO electrolyte abnormality and NO renal dysfunction (CENTRAL)       PRN Meds:  Anticoagulants/Antiplatelets: no  anticoagulation    Allergies:   Review of patient's allergies indicates:   Allergen Reactions    Shellfish containing products Anaphylaxis     Allergic to all seafood.    Horse/equine containing products Itching     Sedation Hx: have not been any systemic reactions    Labs:  No results for input(s): INR in the last 168 hours.    Invalid input(s):  PT,  PTT    Recent Labs   Lab 02/20/19  0755   WBC 3.01*   HGB 6.1*   HCT 21.4*   MCV 72*         Recent Labs   Lab 02/19/19  0653   GLU 68*      K 3.2*      CO2 23   BUN 5*   CREATININE 0.6   CALCIUM 8.0*   MG 1.4*   ALT 10   AST 12   ALBUMIN 1.8*   BILITOT 0.4         Vitals:  Temp: 97.9 °F (36.6 °C) (02/21/19 0742)  Pulse: 86 (02/21/19 0742)  Resp: 18 (02/21/19 0742)  BP: 97/67 (02/21/19 0742)  SpO2: 100 % (02/21/19 0742)     Physical Exam:    General: no acute distress  Mental Status: alert and oriented to person, place and time  HEENT: normocephalic, atraumatic  Chest: unlabored breathing  Heart: regular heart rate  Abdomen: nondistended  Extremity: moves all extremities      Plan:    Patient will undergo PICC exchange.      Lesly Villanueva MD   Department of Radiology  PGY II Resident  Pager: [phone]

## 2019-02-21 NOTE — PLAN OF CARE
SW following for d/c needs. Pt may d/c with home TPN.  SW will continue to follow        Jemma Singh, MSW, LCSW  [facility]  [id number]

## 2019-02-21 NOTE — ASSESSMENT & PLAN NOTE
28 year old female with colitis of the right and terminal ileum with associated parital small bowel obs and fistuilization accepted as transfer for higher level of care    -begin zosyn  -IVF  -TPN  -monitor labs  -monitor VS  -lfd  -appreciate  GI  Assistance  -EGD normal, colonoscopy with ileal stricuture, bx pending

## 2019-02-21 NOTE — PROGRESS NOTES
Picc line exchange complete. Left arm site CDI.. Picc line patent and can be used per MD. VSS.  Pt transferred to [facility].Report called to KATARZYNA Canales 7210.Transport requested.

## 2019-02-21 NOTE — PLAN OF CARE
PCP: Margarita Jay MD    Pharmacy:   [facility] [address] [place] [address] EXPY AT [facility] [address] [address]  Phone: [phone] Fax: [phone]    Payor: GENERIC COMMERCIAL / Plan: GENERIC COMMERCIAL / Product Type: Commercial /     CM met with patient to complete discharge assessment and planning. Patient currently lives with her father. Patient voice concern regarding ability to care for self with PICC line and TPN. CM assured patient the interdisciplinary team will provide education prior to ensure she is comfortable with discharge plan. Patient does not have a [facility]/ IV infusion provider preference. CM and VICTORIA will continue to follow for discharge needs.      02/21/19 4185   Discharge Assessment   Assessment Type Discharge Planning Assessment   Confirmed/corrected address and phone number on facesheet? Yes   Assessment information obtained from? Patient   Communicated expected length of stay with patient/caregiver yes   Prior to hospitilization cognitive status: Alert/Oriented   Prior to hospitalization functional status: Independent   Current cognitive status: Alert/Oriented   Current Functional Status: Independent   Lives With parent(s)   Able to Return to Prior Arrangements yes   Is patient able to care for self after discharge? Yes   Readmission Within the Last 30 Days no previous admission in last 30 days   Patient currently being followed by outpatient case management? No   Patient currently receives any other outside agency services? No   Equipment Currently Used at Home none   Do you have any problems affording any of your prescribed medications? TBD   Does the patient have transportation home? Yes   Transportation Anticipated family or friend will provide   Dialysis Name and Scheduled days n/a   Does the patient receive services at the [facility]mad[facility] Clinic? No   Discharge Plan A Home;Home with family;Home Health  (IV infusion)    Discharge Plan B Long-term acute care facility (LTAC)   DME Needed Upon Discharge  none   Patient/Family in Agreement with Plan yes

## 2019-02-21 NOTE — ASSESSMENT & PLAN NOTE
PICC line for TPN  IR to place picc today  Monitor nutritional labs  Consult dietary  Weekly weights

## 2019-02-21 NOTE — PLAN OF CARE
Problem: Adult Inpatient Plan of Care  Goal: Plan of Care Review      Recommendations    Recommendation/Intervention:     Pt with moderate malnutrition.     1. Continue Low Fiber/Residue diet + Boost Plus. Encourage po intake.     2. Recommend Custom TPN 45 gm AA / 270 gm Dex to provide 1098 kcal (66% EEN) and 45 gm protein (73% EPN).    - No IV lipids 2/2 po intake.   - Wean/dicontinue TPN as po intake increases.     3. RD following.     Goals: meet % EEN/EPN  Nutrition Goal Status: new

## 2019-02-21 NOTE — PLAN OF CARE
Problem: Adult Inpatient Plan of Care  Goal: Plan of Care Review  Outcome: Outcome(s) achieved Date Met: 02/21/19  POC reviewed and understood by patient. AAOx4. IV intact and patent. PICC line placed yesterday 2/20- not accessed per MD Garcia's order. MD stated the PICC is not in the correct location. Patient ambulates to the bathRM independently. NSR on tele. No acute events at this time. Call bell within reach. Bed in lowest position. WCTM.

## 2019-02-21 NOTE — SUBJECTIVE & OBJECTIVE
Subjective:     Interval History: no acute events overnite, picc team unable to advance picc line last night, han lfd      Post-Op Info:  Procedure(s) (LRB):  COLONOSCOPY (N/A)  EGD (ESOPHAGOGASTRODUODENOSCOPY) (N/A)   1 Day Post-Op      Medications:  Continuous Infusions:   dextrose 5 % and 0.45 % NaCl with KCl 20 mEq 75 mL/hr at 02/21/19 0923    Standard Custom Day One ADULT TPN for patient with NO electrolyte abnormality and NO renal dysfunction (CENTRAL)       Scheduled Meds:   clobetasol 0.05%   Topical (Top) BID    [START ON 2/22/2019] enoxaparin  40 mg Subcutaneous Daily    fat emulsion 20%  250 mL Intravenous Daily    ferric gluconate (FERRLECIT) IVPB  125 mg Intravenous Daily    fluocinonide 0.05%   Topical (Top) BID    piperacillin-tazobactam (ZOSYN) IVPB  4.5 g Intravenous Q8H     PRN Meds:     Objective:     Vital Signs (Most Recent):  Temp: 96.7 °F (35.9 °C) (02/21/19 1205)  Pulse: 73 (02/21/19 1205)  Resp: 16 (02/21/19 1205)  BP: 91/63 (02/21/19 1205)  SpO2: 100 % (02/21/19 1205) Vital Signs (24h Range):  Temp:  [96.7 °F (35.9 °C)-98.4 °F (36.9 °C)] 96.7 °F (35.9 °C)  Pulse:  [59-87] 73  Resp:  [16-18] 16  SpO2:  [98 %-100 %] 100 %  BP: ()/(44-82) 91/63     Intake/Output - Last 3 Shifts       02/19 0700 - 02/20 0659 02/20 0700 - 02/21 0659 02/21 0700 - 02/22 0659    P.O.  150     I.V. (mL/kg)  2415 (50.7)     IV Piggyback  200     Total Intake(mL/kg)  2765 (58.1)     Urine (mL/kg/hr)   900 (3.6)    Stool   0    Total Output   900    Net  +2765 -900           Urine Occurrence 3 x 2 x     Stool Occurrence 0 x 2 x 0 x          Physical Exam   Constitutional: She is oriented to person, place, and time. She appears well-developed.   HENT:   Head: Normocephalic.   Eyes: Pupils are equal, round, and reactive to light.   Cardiovascular: Normal rate, regular rhythm and normal heart sounds.   Pulmonary/Chest: Effort normal and breath sounds normal. No respiratory distress. She has no wheezes. She  has no rales.   Abdominal: Soft. She exhibits no mass. There is no rebound and no guarding.   Neurological: She is alert and oriented to person, place, and time.   Skin: Skin is warm and dry.   Psychiatric: She has a normal mood and affect. Her behavior is normal. Judgment and thought content normal.       Significant Labs:  BMP (Last 3 Results):   Recent Labs   Lab 02/18/19  0912 02/18/19  0941 02/19/19  0653 02/21/19  0910   GLU 73  --  68* 69*     --  138 140   K 3.5  --  3.2* 3.6     --  109 110   CO2 22*  --  23 22*   BUN 6  --  5* 2*   CREATININE 0.6 0.5 0.6 0.6   CALCIUM 8.1*  --  8.0* 8.0*   MG  --   --  1.4* 1.4*     CBC (Last 3 Results):   Recent Labs   Lab 02/19/19  0653 02/20/19  0755 02/21/19  0910   WBC 3.94 3.01* 4.51   RBC 3.17* 2.98* 3.37*   HGB 6.4* 6.1* 6.8*   HCT 23.7* 21.4* 25.1*    328 377*   MCV 75* 72* 75*   MCH 20.2* 20.5* 20.2*   MCHC 27.0* 28.5* 27.1*       Significant Diagnostics:  None

## 2019-02-21 NOTE — PROGRESS NOTES
Notified MD Garcia of patient's overdue potassium. MD stated the PICC is not in the correct place- the patient will go to IR tomorrow to fix it. MD stated to cancel TPN and lipids for tonight. MD stated he will order po potassium. WCTM.

## 2019-02-21 NOTE — PROCEDURES
[facility]  Interventional Radiology  Procedure - Inpatient    Date: 02/21/2019 Time: 11:10 AM    Pre-Op Diagnosis: Crohn's Disease, malpositioned PICC    Post-Op Diagnosis: Same    Procedure Performed by: Solange    Assistant:     Procedure: PICC replacement    Specimen/Tissue Removed: old malpositioned PICC    Estimated Blood Loss: minimal    Procedure Note/Findings: Initial PICC apparently could not be positioned in SVC.  Old PICC removed using guidewire/sheath combination.  New 5F DL PICC placed under fluoro guidance.  PICC tip at cavoatrial junction.  Ready to use.      Please refer to dictated report for additional details.

## 2019-02-21 NOTE — PROGRESS NOTES
Patient arrived from IR with new LT UA PICC in place. Confirmed placement with Randall Duke, RN and Letty Stringer NP about placement. Called IR one more time and another member stated okay to use PICC as well. Nursing communication written for me. Flushed both lumens, met strong resistance on both lumens. Called IR and they stated to lift arm and turn head to RT side and cough - less resistance noted but still moderate resistance. Charge nurse aware as well. IR nurse had another member check location and gave another confirmation about placement. Both lumens currently infusing, TPN and Lipids ordered for tonight.      WCEVELYN.

## 2019-02-21 NOTE — PROGRESS NOTES
[facility]  Colorectal Surgery  Progress Note    Patient Name: Lora Scott  MRN: [id number]  Admission Date: 2/18/2019  Hospital Length of Stay: 3 days  Attending Physician: Srinivas Ruiz MD    Subjective:     Interval History: no acute events overnite, picc team unable to advance picc line last night, han lfd      Post-Op Info:  Procedure(s) (LRB):  COLONOSCOPY (N/A)  EGD (ESOPHAGOGASTRODUODENOSCOPY) (N/A)   1 Day Post-Op      Medications:  Continuous Infusions:   dextrose 5 % and 0.45 % NaCl with KCl 20 mEq 75 mL/hr at 02/21/19 0923    Standard Custom Day One ADULT TPN for patient with NO electrolyte abnormality and NO renal dysfunction (CENTRAL)       Scheduled Meds:   clobetasol 0.05%   Topical (Top) BID    [START ON 2/22/2019] enoxaparin  40 mg Subcutaneous Daily    fat emulsion 20%  250 mL Intravenous Daily    ferric gluconate (FERRLECIT) IVPB  125 mg Intravenous Daily    fluocinonide 0.05%   Topical (Top) BID    piperacillin-tazobactam (ZOSYN) IVPB  4.5 g Intravenous Q8H     PRN Meds:     Objective:     Vital Signs (Most Recent):  Temp: 96.7 °F (35.9 °C) (02/21/19 1205)  Pulse: 73 (02/21/19 1205)  Resp: 16 (02/21/19 1205)  BP: 91/63 (02/21/19 1205)  SpO2: 100 % (02/21/19 1205) Vital Signs (24h Range):  Temp:  [96.7 °F (35.9 °C)-98.4 °F (36.9 °C)] 96.7 °F (35.9 °C)  Pulse:  [59-87] 73  Resp:  [16-18] 16  SpO2:  [98 %-100 %] 100 %  BP: ()/(44-82) 91/63     Intake/Output - Last 3 Shifts       02/19 0700 - 02/20 0659 02/20 0700 - 02/21 0659 02/21 0700 - 02/22 0659    P.O.  150     I.V. (mL/kg)  2415 (50.7)     IV Piggyback  200     Total Intake(mL/kg)  2765 (58.1)     Urine (mL/kg/hr)   900 (3.6)    Stool   0    Total Output   900    Net  +2765 -900           Urine Occurrence 3 x 2 x     Stool Occurrence 0 x 2 x 0 x          Physical Exam   Constitutional: She is oriented to person, place, and time. She appears well-developed.   HENT:   Head: Normocephalic.   Eyes: Pupils  are equal, round, and reactive to light.   Cardiovascular: Normal rate, regular rhythm and normal heart sounds.   Pulmonary/Chest: Effort normal and breath sounds normal. No respiratory distress. She has no wheezes. She has no rales.   Abdominal: Soft. She exhibits no mass. There is no rebound and no guarding.   Neurological: She is alert and oriented to person, place, and time.   Skin: Skin is warm and dry.   Psychiatric: She has a normal mood and affect. Her behavior is normal. Judgment and thought content normal.       Significant Labs:  BMP (Last 3 Results):   Recent Labs   Lab 02/18/19  0912 02/18/19  0941 02/19/19  0653 02/21/19  0910   GLU 73  --  68* 69*     --  138 140   K 3.5  --  3.2* 3.6     --  109 110   CO2 22*  --  23 22*   BUN 6  --  5* 2*   CREATININE 0.6 0.5 0.6 0.6   CALCIUM 8.1*  --  8.0* 8.0*   MG  --   --  1.4* 1.4*     CBC (Last 3 Results):   Recent Labs   Lab 02/19/19  0653 02/20/19  0755 02/21/19  0910   WBC 3.94 3.01* 4.51   RBC 3.17* 2.98* 3.37*   HGB 6.4* 6.1* 6.8*   HCT 23.7* 21.4* 25.1*    328 377*   MCV 75* 72* 75*   MCH 20.2* 20.5* 20.2*   MCHC 27.0* 28.5* 27.1*       Significant Diagnostics:  None    Assessment/Plan:     * Crohn's disease involving terminal ileum    28 year old female with colitis of the right and terminal ileum with associated parital small bowel obs and fistuilization accepted as transfer for higher level of care    -begin zosyn  -IVF  -TPN  -monitor labs  -monitor VS  -lfd  -appreciate  GI  Assistance  -EGD normal, colonoscopy with ileal stricuture, bx pending      Malnutrition of moderate degree    PICC line for TPN  IR to place picc today  Monitor nutritional labs  Consult dietary  Weekly weights           Letty Stringer NP  Colorectal Surgery  [facility]

## 2019-02-21 NOTE — ASSESSMENT & PLAN NOTE
Malnutrition in the context of Chronic Illness/Injury    Related to (etiology):  Crohn's disease     Signs and Symptoms (as evidenced by):  Energy Intake: <75% of estimated energy requirement for > 3 months  Body Fat Depletion: moderate depletion of triceps   Muscle Mass Depletion: moderate depletion of temples, clavicle region and lower extremities   Weight Loss: no wt loss within 1 year per pt      Interventions/Recommendations (treatment strategy):  Collaboration and Referral of Nutrition Care    Nutrition Diagnosis Status:  New

## 2019-02-22 ENCOUNTER — TELEPHONE (OUTPATIENT)
Dept: SURGERY | Facility: CLINIC | Age: 29
End: 2019-02-22

## 2019-02-22 LAB
6-METHYLMERCAPTOPURINE RIBOSIDE: 4.61 NMOL/ML/H (ref 5.04–9.57)
6-METHYLMERCAPTOPURINE: 2.73 NMOL/ML/H (ref 3–6.66)
6-METHYLTHIOGUANINE RIBOSIDE: 3.17 NMOL/ML/H (ref 2.7–5.84)
ANION GAP SERPL CALC-SCNC: 5 MMOL/L
BASOPHILS # BLD AUTO: 0.01 K/UL
BASOPHILS NFR BLD: 0.2 %
BUN SERPL-MCNC: 2 MG/DL
CALCIUM SERPL-MCNC: 8.2 MG/DL
CHLORIDE SERPL-SCNC: 108 MMOL/L
CO2 SERPL-SCNC: 24 MMOL/L
CREAT SERPL-MCNC: 0.6 MG/DL
CRP SERPL-MCNC: 44.7 MG/L
DIFFERENTIAL METHOD: ABNORMAL
EOSINOPHIL # BLD AUTO: 0.1 K/UL
EOSINOPHIL NFR BLD: 1.7 %
ERYTHROCYTE [DISTWIDTH] IN BLOOD BY AUTOMATED COUNT: 20.2 %
EST. GFR  (AFRICAN AMERICAN): >60 ML/MIN/1.73 M^2
EST. GFR  (NON AFRICAN AMERICAN): >60 ML/MIN/1.73 M^2
GLUCOSE SERPL-MCNC: 94 MG/DL
HCT VFR BLD AUTO: 22.2 %
HGB BLD-MCNC: 6.2 G/DL
IMM GRANULOCYTES # BLD AUTO: 0.05 K/UL
IMM GRANULOCYTES NFR BLD AUTO: 0.9 %
LYMPHOCYTES # BLD AUTO: 0.8 K/UL
LYMPHOCYTES NFR BLD: 15.6 %
MAGNESIUM SERPL-MCNC: 1.8 MG/DL
MCH RBC QN AUTO: 20.9 PG
MCHC RBC AUTO-ENTMCNC: 27.9 G/DL
MCV RBC AUTO: 75 FL
MONOCYTES # BLD AUTO: 0.4 K/UL
MONOCYTES NFR BLD: 6.7 %
NEUTROPHILS # BLD AUTO: 4 K/UL
NEUTROPHILS NFR BLD: 74.9 %
NRBC BLD-RTO: 0 /100 WBC
PHOSPHATE SERPL-MCNC: 3.1 MG/DL
PLATELET # BLD AUTO: 340 K/UL
PMV BLD AUTO: 9.4 FL
POTASSIUM SERPL-SCNC: 4.3 MMOL/L
RBC # BLD AUTO: 2.97 M/UL
SODIUM SERPL-SCNC: 137 MMOL/L
TPMT INTERPRETATION: ABNORMAL
TPMT REVIEWED BY: ABNORMAL
WBC # BLD AUTO: 5.38 K/UL

## 2019-02-22 PROCEDURE — 80048 BASIC METABOLIC PNL TOTAL CA: CPT

## 2019-02-22 PROCEDURE — 83735 ASSAY OF MAGNESIUM: CPT

## 2019-02-22 PROCEDURE — 63600175 PHARM REV CODE 636 W HCPCS: Performed by: NURSE PRACTITIONER

## 2019-02-22 PROCEDURE — 84100 ASSAY OF PHOSPHORUS: CPT

## 2019-02-22 PROCEDURE — 25000003 PHARM REV CODE 250: Performed by: NURSE PRACTITIONER

## 2019-02-22 PROCEDURE — 86140 C-REACTIVE PROTEIN: CPT

## 2019-02-22 PROCEDURE — A4217 STERILE WATER/SALINE, 500 ML: HCPCS | Performed by: NURSE PRACTITIONER

## 2019-02-22 PROCEDURE — 85025 COMPLETE CBC W/AUTO DIFF WBC: CPT

## 2019-02-22 PROCEDURE — 20600001 HC STEP DOWN PRIVATE ROOM

## 2019-02-22 RX ADMIN — FLUOCINONIDE: 0.5 CREAM TOPICAL at 10:02

## 2019-02-22 RX ADMIN — SODIUM CHLORIDE, SODIUM LACTATE, POTASSIUM CHLORIDE, AND CALCIUM CHLORIDE 500 ML: .6; .31; .03; .02 INJECTION, SOLUTION INTRAVENOUS at 09:02

## 2019-02-22 RX ADMIN — POTASSIUM CHLORIDE: 2 INJECTION, SOLUTION, CONCENTRATE INTRAVENOUS at 10:02

## 2019-02-22 RX ADMIN — CLOBETASOL PROPIONATE: 0.5 OINTMENT TOPICAL at 09:02

## 2019-02-22 RX ADMIN — PIPERACILLIN AND TAZOBACTAM 4.5 G: 4; .5 INJECTION, POWDER, LYOPHILIZED, FOR SOLUTION INTRAVENOUS; PARENTERAL at 03:02

## 2019-02-22 RX ADMIN — ENOXAPARIN SODIUM 40 MG: 100 INJECTION SUBCUTANEOUS at 05:02

## 2019-02-22 RX ADMIN — CLOBETASOL PROPIONATE: 0.5 OINTMENT TOPICAL at 10:02

## 2019-02-22 RX ADMIN — SODIUM CHLORIDE 125 MG: 9 INJECTION, SOLUTION INTRAVENOUS at 09:02

## 2019-02-22 RX ADMIN — FLUOCINONIDE: 0.5 CREAM TOPICAL at 09:02

## 2019-02-22 NOTE — PROGRESS NOTES
[facility]  Colorectal Surgery  Progress Note    Patient Name: Lora Scott  MRN: [id number]  Admission Date: 2/18/2019  Hospital Length of Stay: 4 days  Attending Physician: Srinivas Ruiz MD    Subjective:     Interval History: PICC placed in IR yesterday. Asymptomatic hypotension and anemia continue      Post-Op Info:  Procedure(s) (LRB):  COLONOSCOPY (N/A)  EGD (ESOPHAGOGASTRODUODENOSCOPY) (N/A)   2 Days Post-Op      Medications:  Continuous Infusions:   Standard Custom Day One ADULT TPN for patient with NO electrolyte abnormality and NO renal dysfunction (CENTRAL) 75 mL/hr at 02/21/19 2101     Scheduled Meds:   clobetasol 0.05%   Topical (Top) BID    enoxaparin  40 mg Subcutaneous Daily    ferric gluconate (FERRLECIT) IVPB  125 mg Intravenous Daily    fluocinonide 0.05%   Topical (Top) BID     PRN Meds:     Objective:     Vital Signs (Most Recent):  Temp: 97.8 °F (36.6 °C) (02/22/19 0747)  Pulse: 77 (02/22/19 0747)  Resp: 18 (02/22/19 0747)  BP: (!) 82/56 (02/22/19 0749)  SpO2: 100 % (02/22/19 0747) Vital Signs (24h Range):  Temp:  [96.5 °F (35.8 °C)-98.6 °F (37 °C)] 97.8 °F (36.6 °C)  Pulse:  [66-91] 77  Resp:  [14-18] 18  SpO2:  [99 %-100 %] 100 %  BP: ()/(51-63) 82/56     Intake/Output - Last 3 Shifts       02/20 0700 - 02/21 0659 02/21 0700 - 02/22 0659 02/22 0700 - 02/23 0659    P.O. 150 250     I.V. (mL/kg) 2415 (50.7) 1656.3 (34.8)     IV Piggyback 200 500     TPN  669.4     Total Intake(mL/kg) 2765 (58.1) 3075.6 (64.6)     Urine (mL/kg/hr)  2400 (2.1) 1400 (9.2)    Stool  0     Total Output  2400 1400    Net +2765 +675.6 -1400           Urine Occurrence 2 x      Stool Occurrence 2 x 1 x           Physical Exam   Constitutional: She is oriented to person, place, and time. She appears well-developed.   HENT:   Head: Normocephalic.   Eyes: Pupils are equal, round, and reactive to light.   Cardiovascular: Normal rate, regular rhythm and normal heart sounds.    Pulmonary/Chest: Effort normal and breath sounds normal. No respiratory distress. She has no wheezes. She has no rales.   Abdominal: Soft. She exhibits no mass. There is no rebound and no guarding.   Neurological: She is alert and oriented to person, place, and time.   Skin: Skin is warm and dry.   Psychiatric: She has a normal mood and affect. Her behavior is normal. Judgment and thought content normal.       Significant Labs:  BMP (Last 3 Results):   Recent Labs   Lab 02/21/19  0910 02/21/19  1835 02/22/19  0430   GLU 69* 89 94    140 137   K 3.6 3.5 4.3    110 108   CO2 22* 24 24   BUN 2* 2* 2*   CREATININE 0.6 0.6 0.6   CALCIUM 8.0* 7.8* 8.2*   MG 1.4* 1.4* 1.8     CBC (Last 3 Results):   Recent Labs   Lab 02/20/19  0755 02/21/19  0910 02/22/19  0430   WBC 3.01* 4.51 5.38   RBC 2.98* 3.37* 2.97*   HGB 6.1* 6.8* 6.2*   HCT 21.4* 25.1* 22.2*    377* 340   MCV 72* 75* 75*   MCH 20.5* 20.2* 20.9*   MCHC 28.5* 27.1* 27.9*       Significant Diagnostics:  None    Assessment/Plan:     * Crohn's disease involving terminal ileum    28 year old female with colitis of the right and terminal ileum with associated parital small bowel obs and fistuilization accepted as transfer for higher level of care    -IVF  -TPN  -monitor labs  -monitor VS  -lfd  -appreciate  GI  Assistance  -EGD normal, colonoscopy with ileal stricuture  - Dispo: Home with TPN, likely Monday      Malnutrition of moderate degree    Continue TPN  11p- 11a           Christel Jones NP  Colorectal Surgery  [facility]

## 2019-02-22 NOTE — PROGRESS NOTES
Noticed patients tele rhythm had 2 R on T wave. Called Tele to have them monitor rhythm more closely and call nurse if they continue.    Dr. Scott aware. Awaiting further instruction

## 2019-02-22 NOTE — SUBJECTIVE & OBJECTIVE
Subjective:     Interval History: PICC placed in IR yesterday. Asymptomatic hypotension and anemia continue      Post-Op Info:  Procedure(s) (LRB):  COLONOSCOPY (N/A)  EGD (ESOPHAGOGASTRODUODENOSCOPY) (N/A)   2 Days Post-Op      Medications:  Continuous Infusions:   Standard Custom Day One ADULT TPN for patient with NO electrolyte abnormality and NO renal dysfunction (CENTRAL) 75 mL/hr at 02/21/19 2101     Scheduled Meds:   clobetasol 0.05%   Topical (Top) BID    enoxaparin  40 mg Subcutaneous Daily    ferric gluconate (FERRLECIT) IVPB  125 mg Intravenous Daily    fluocinonide 0.05%   Topical (Top) BID     PRN Meds:     Objective:     Vital Signs (Most Recent):  Temp: 97.8 °F (36.6 °C) (02/22/19 0747)  Pulse: 77 (02/22/19 0747)  Resp: 18 (02/22/19 0747)  BP: (!) 82/56 (02/22/19 0749)  SpO2: 100 % (02/22/19 0747) Vital Signs (24h Range):  Temp:  [96.5 °F (35.8 °C)-98.6 °F (37 °C)] 97.8 °F (36.6 °C)  Pulse:  [66-91] 77  Resp:  [14-18] 18  SpO2:  [99 %-100 %] 100 %  BP: ()/(51-63) 82/56     Intake/Output - Last 3 Shifts       02/20 0700 - 02/21 0659 02/21 0700 - 02/22 0659 02/22 0700 - 02/23 0659    P.O. 150 250     I.V. (mL/kg) 2415 (50.7) 1656.3 (34.8)     IV Piggyback 200 500     TPN  669.4     Total Intake(mL/kg) 2765 (58.1) 3075.6 (64.6)     Urine (mL/kg/hr)  2400 (2.1) 1400 (9.2)    Stool  0     Total Output  2400 1400    Net +2765 +675.6 -1400           Urine Occurrence 2 x      Stool Occurrence 2 x 1 x           Physical Exam   Constitutional: She is oriented to person, place, and time. She appears well-developed.   HENT:   Head: Normocephalic.   Eyes: Pupils are equal, round, and reactive to light.   Cardiovascular: Normal rate, regular rhythm and normal heart sounds.   Pulmonary/Chest: Effort normal and breath sounds normal. No respiratory distress. She has no wheezes. She has no rales.   Abdominal: Soft. She exhibits no mass. There is no rebound and no guarding.   Neurological: She is alert and  oriented to person, place, and time.   Skin: Skin is warm and dry.   Psychiatric: She has a normal mood and affect. Her behavior is normal. Judgment and thought content normal.       Significant Labs:  BMP (Last 3 Results):   Recent Labs   Lab 02/21/19  0910 02/21/19  1835 02/22/19  0430   GLU 69* 89 94    140 137   K 3.6 3.5 4.3    110 108   CO2 22* 24 24   BUN 2* 2* 2*   CREATININE 0.6 0.6 0.6   CALCIUM 8.0* 7.8* 8.2*   MG 1.4* 1.4* 1.8     CBC (Last 3 Results):   Recent Labs   Lab 02/20/19  0755 02/21/19  0910 02/22/19  0430   WBC 3.01* 4.51 5.38   RBC 2.98* 3.37* 2.97*   HGB 6.1* 6.8* 6.2*   HCT 21.4* 25.1* 22.2*    377* 340   MCV 72* 75* 75*   MCH 20.5* 20.2* 20.9*   MCHC 28.5* 27.1* 27.9*       Significant Diagnostics:  None

## 2019-02-22 NOTE — ASSESSMENT & PLAN NOTE
28 year old female with colitis of the right and terminal ileum with associated parital small bowel obs and fistuilization accepted as transfer for higher level of care    -IVF  -TPN  -monitor labs  -monitor VS  -lfd  -appreciate  GI  Assistance  -EGD normal, colonoscopy with ileal stricuture  - Dispo: Home with TPN, likely Monday

## 2019-02-22 NOTE — TELEPHONE ENCOUNTER
----- Message from Evan Dubois sent at 2/22/2019  9:36 AM CST -----  Contact: Jordon with Case Management   .Patient Requesting Sooner Appointment.     Reason for sooner appt.:2 wk post op   When is the first available appointment?03/18/19  Communication Preference:Call Pt:[phone]  Additional Information:

## 2019-02-22 NOTE — PLAN OF CARE
[facility]    HOME HEALTH ORDERS  FACE TO FACE ENCOUNTER    Patient Name: Lora Scott  YOB: 1990    PCP: Margarita Jay MD   PCP Address: [address] / [place]  PCP Phone Number: [phone]  PCP Fax: [phone]    Encounter Date: 02/22/2019    Admit to Home Health    Diagnoses:  Active Hospital Problems    Diagnosis  POA    *Crohn's disease involving terminal ileum [K50.00]  Yes    Malnutrition of moderate degree [E44.0]  Yes    Colitis with fistula [K52.9, K63.2]  Yes      Resolved Hospital Problems   No resolved problems to display.       No future appointments.        I have seen and examined this patient face to face today. My clinical findings that support the need for the home health skilled services and home bound status are the following:  Weakness/numbness causing balance and gait disturbance due to Weakness/Debility making it taxing to leave home.    Allergies:  Review of patient's allergies indicates:   Allergen Reactions    Shellfish containing products Anaphylaxis     Allergic to all seafood.    Horse/equine containing products Itching       Diet: regular diet    Activities: activity as tolerated    Nursing:   SN to complete comprehensive assessment including routine vital signs. Instruct on disease process and s/s of complications to report to MD. Review/verify medication list sent home with the patient at time of discharge  and instruct patient/caregiver as needed. Frequency may be adjusted depending on start of care date.    Notify MD if SBP > 160 or < 90; DBP > 90 or < 50; HR > 120 or < 50; Temp > 101; Other:         MISCELLANEOUS CARE:  Home Infusion Therapy:   SN to perform Infusion Therapy/Central Line Care.  Review Central Line Care & Central Line Flush with patient.    Administer (drug and dose): TPN    Last dose given: 2/ 25/19                        Home dose due: 2/26/19  Amino Acids   Components     Component Order Dose Admin Dose    parenteral amino acid 10% No.2 10 % Solp 5 % 90 g   dextrose 70% Solp 15 % 269.997 g   sterile water Solp 429.29 mLs 429.29 mL   sodium acetate 2 mEq/mL Soln 60 mEq 60 mEq   sodium phosphate 3 mmol/mL Soln 15 mmol 15 mmol   potassium chloride 2 mEq/mL Soln 50 mEq 50 mEq   magnesium sulfate 4 mEq/mL (50 %) Soln 2 g 2 g   calcium gluconate 100 mg/mL (10%) Soln 1 g 1 g   mvi, adult no.1 3,300 unit- 150 mcg/10 mL Soln 10 mLs 10 mL   trace elements [id number] -1000 mcg/mL Soln 1 mL 1 mL     75cc/hr X 12 hours (11p-11a)    Scrub the Hub: Prior to accessing the line, always perform a 30 second alcohol scrub  Each lumen of the central line is to be flushed at least daily with 10 mL Normal Saline and 3 mL Heparin flush (10 units/mL)  Skilled Nurse (SN) may draw blood from IV access  Blood Draw Procedure:   - Aspirate at least 5 mL of blood   - Discard   - Obtain specimen   - Change injection cap   - Flush with 20 mL Normal Saline followed by a                 3-5 mL Heparin flush (10 units/mL)  Central :   - Sterile dressing changes are done weekly and as needed.   - Use chlor-hexadine scrub to cleanse site, apply Biopatch to insertion site,       apply securement device dressing   - Injection caps are changed weekly and after EVERY lab draw.   - If sterile gauze is under dressing to control oozing,                 dressing change must be performed every 24 hours until gauze is not needed.    WOUND CARE ORDERS  n/a      Medications: Review discharge medications with patient and family and provide education.      Current Discharge Medication List      CONTINUE these medications which have NOT CHANGED    Details   clobetasol (TEMOVATE) 0.05 % external solution Apply topically 2 (two) times daily. for 10 days  Qty: 50 mL, Refills: 0      diphenhydrAMINE (BENADRYL) 25 mg capsule Take 1 each (25 mg total) by mouth every 6 (six) hours as needed for Itching.  Qty: 20 capsule, Refills: 0       fluocinolone (DERMA-SMOOTHE) 0.01 % external oil Apply topically every evening. Apply well to scalp at bedtime cover with a shower cap for 10 days  Qty: 118.28 mL, Refills: 0      fluocinonide (LIDEX) 0.05 % ointment Apply topically 2 (two) times daily. for 10 days  Qty: 60 g, Refills: 0      loratadine (CLARITIN) 10 mg tablet Take 1 tablet (10 mg total) by mouth once daily.  Qty: 60 tablet, Refills: 0      ondansetron (ZOFRAN) 4 MG tablet Take 1 tablet (4 mg total) by mouth every 6 (six) hours as needed for Nausea.  Qty: 12 tablet, Refills: 0             I certify that this patient is confined to her home and needs intermittent skilled nursing care.

## 2019-02-23 LAB
ANION GAP SERPL CALC-SCNC: 6 MMOL/L
BACTERIA BLD CULT: NORMAL
BACTERIA BLD CULT: NORMAL
BASOPHILS # BLD AUTO: 0.01 K/UL
BASOPHILS NFR BLD: 0.2 %
BUN SERPL-MCNC: 8 MG/DL
CALCIUM SERPL-MCNC: 8.6 MG/DL
CHLORIDE SERPL-SCNC: 106 MMOL/L
CO2 SERPL-SCNC: 28 MMOL/L
CREAT SERPL-MCNC: 0.5 MG/DL
CRP SERPL-MCNC: 30 MG/L
DIFFERENTIAL METHOD: ABNORMAL
EOSINOPHIL # BLD AUTO: 0.1 K/UL
EOSINOPHIL NFR BLD: 2 %
ERYTHROCYTE [DISTWIDTH] IN BLOOD BY AUTOMATED COUNT: 21.4 %
EST. GFR  (AFRICAN AMERICAN): >60 ML/MIN/1.73 M^2
EST. GFR  (NON AFRICAN AMERICAN): >60 ML/MIN/1.73 M^2
GLUCOSE SERPL-MCNC: 82 MG/DL
HCT VFR BLD AUTO: 24 %
HGB BLD-MCNC: 6.7 G/DL
IMM GRANULOCYTES # BLD AUTO: 0.03 K/UL
IMM GRANULOCYTES NFR BLD AUTO: 0.7 %
LYMPHOCYTES # BLD AUTO: 1 K/UL
LYMPHOCYTES NFR BLD: 22.2 %
M TB CMPLX DNA SPEC QL NAA+PROBE: NEGATIVE
MAGNESIUM SERPL-MCNC: 1.8 MG/DL
MCH RBC QN AUTO: 20.5 PG
MCHC RBC AUTO-ENTMCNC: 27.9 G/DL
MCV RBC AUTO: 73 FL
MONOCYTES # BLD AUTO: 0.4 K/UL
MONOCYTES NFR BLD: 9.2 %
NEUTROPHILS # BLD AUTO: 2.9 K/UL
NEUTROPHILS NFR BLD: 65.7 %
NRBC BLD-RTO: 0 /100 WBC
PHOSPHATE SERPL-MCNC: 4.6 MG/DL
PLATELET # BLD AUTO: 343 K/UL
PMV BLD AUTO: 9 FL
POTASSIUM SERPL-SCNC: 4.5 MMOL/L
RBC # BLD AUTO: 3.27 M/UL
SODIUM SERPL-SCNC: 140 MMOL/L
SPECIMEN SOURCE: NORMAL
WBC # BLD AUTO: 4.45 K/UL

## 2019-02-23 PROCEDURE — 25000003 PHARM REV CODE 250: Performed by: STUDENT IN AN ORGANIZED HEALTH CARE EDUCATION/TRAINING PROGRAM

## 2019-02-23 PROCEDURE — 25000003 PHARM REV CODE 250: Performed by: NURSE PRACTITIONER

## 2019-02-23 PROCEDURE — B4185 PARENTERAL SOL 10 GM LIPIDS: HCPCS | Performed by: STUDENT IN AN ORGANIZED HEALTH CARE EDUCATION/TRAINING PROGRAM

## 2019-02-23 PROCEDURE — 84100 ASSAY OF PHOSPHORUS: CPT

## 2019-02-23 PROCEDURE — 63600175 PHARM REV CODE 636 W HCPCS: Performed by: NURSE PRACTITIONER

## 2019-02-23 PROCEDURE — 85025 COMPLETE CBC W/AUTO DIFF WBC: CPT

## 2019-02-23 PROCEDURE — 63600175 PHARM REV CODE 636 W HCPCS: Performed by: STUDENT IN AN ORGANIZED HEALTH CARE EDUCATION/TRAINING PROGRAM

## 2019-02-23 PROCEDURE — 83735 ASSAY OF MAGNESIUM: CPT

## 2019-02-23 PROCEDURE — A4217 STERILE WATER/SALINE, 500 ML: HCPCS | Performed by: STUDENT IN AN ORGANIZED HEALTH CARE EDUCATION/TRAINING PROGRAM

## 2019-02-23 PROCEDURE — 20600001 HC STEP DOWN PRIVATE ROOM

## 2019-02-23 PROCEDURE — 80048 BASIC METABOLIC PNL TOTAL CA: CPT

## 2019-02-23 PROCEDURE — 86140 C-REACTIVE PROTEIN: CPT

## 2019-02-23 RX ADMIN — POTASSIUM CHLORIDE: 2 INJECTION, SOLUTION, CONCENTRATE INTRAVENOUS at 10:02

## 2019-02-23 RX ADMIN — SOYBEAN OIL 250 ML: 20 INJECTION, SOLUTION INTRAVENOUS at 10:02

## 2019-02-23 RX ADMIN — FLUOCINONIDE: 0.5 CREAM TOPICAL at 10:02

## 2019-02-23 RX ADMIN — CLOBETASOL PROPIONATE: 0.5 OINTMENT TOPICAL at 09:02

## 2019-02-23 RX ADMIN — CLOBETASOL PROPIONATE: 0.5 OINTMENT TOPICAL at 10:02

## 2019-02-23 RX ADMIN — ENOXAPARIN SODIUM 40 MG: 100 INJECTION SUBCUTANEOUS at 06:02

## 2019-02-23 RX ADMIN — SODIUM CHLORIDE 125 MG: 9 INJECTION, SOLUTION INTRAVENOUS at 09:02

## 2019-02-23 RX ADMIN — FLUOCINONIDE: 0.5 CREAM TOPICAL at 09:02

## 2019-02-23 NOTE — PLAN OF CARE
Plan of care reviewed with pt: AAOx4, on RA, VS stable. No complains of pain or nausea. Tolerated her low fiber diet. Ambulated in room independently. TPN at 75ml/hr. Call light in reach. WCTM.

## 2019-02-23 NOTE — PLAN OF CARE
Plan of care reviewed with pt: AAOx4, on RA, VS stable. At the  beginning of shift, pt BP was 82/60,  sys BP then went up to 90s after 500cc of LR given per order. TPN at 75ml/hr. No complains of pain or nausea. Severe eczema noticed all over her body especially on legs and back. Put out large amount of clear yellow urine during shift. One small bowel movement this morning. Call light in reach. WCTM

## 2019-02-23 NOTE — PLAN OF CARE
Problem: Adult Inpatient Plan of Care  Goal: Plan of Care Review  Outcome: Ongoing (interventions implemented as appropriate)  VS stable. No c/o pain.Telemetry= NSR 80-90s. TPN at 75ml/hr. ZW=162-23i/50s, patient asymptomatic. No distress noted, will continue to monitor.

## 2019-02-23 NOTE — ASSESSMENT & PLAN NOTE
28 year old female with colitis of the right and terminal ileum with associated parital small bowel obs and fistuilization accepted as transfer for higher level of care.  Setting up with TPN to go home at the moment.    -IVF  -TPN  -monitor labs  -monitor VS  -lfd  -appreciate  GI  Assistance  -EGD normal, colonoscopy with ileal stricuture    - Dispo: Home with TPN, likely Monday

## 2019-02-23 NOTE — PROGRESS NOTES
[facility]  Colorectal Surgery  Progress Note    Patient Name: Lora Scott  MRN: [id number]  Admission Date: 2/18/2019  Hospital Length of Stay: 5 days  Attending Physician: Srinivas Ruiz MD    Subjective:     Interval History: Pt did well overnight.  Still with abdominal pain.  No clinical change.    Post-Op Info:  Procedure(s) (LRB):  COLONOSCOPY (N/A)  EGD (ESOPHAGOGASTRODUODENOSCOPY) (N/A)   3 Days Post-Op      Medications:  Continuous Infusions:   TPN ADULT CENTRAL LINE CUSTOM 75 mL/hr at 02/22/19 2219     Scheduled Meds:   clobetasol 0.05%   Topical (Top) BID    enoxaparin  40 mg Subcutaneous Daily    ferric gluconate (FERRLECIT) IVPB  125 mg Intravenous Daily    fluocinonide 0.05%   Topical (Top) BID     PRN Meds:     Objective:     Vital Signs (Most Recent):  Temp: 97.8 °F (36.6 °C) (02/23/19 0744)  Pulse: 80 (02/23/19 0744)  Resp: 18 (02/23/19 0744)  BP: 92/63 (02/23/19 0744)  SpO2: 100 % (02/23/19 0744) Vital Signs (24h Range):  Temp:  [96.8 °F (36 °C)-98.5 °F (36.9 °C)] 97.8 °F (36.6 °C)  Pulse:  [61-95] 80  Resp:  [16-18] 18  SpO2:  [98 %-100 %] 100 %  BP: ()/(55-67) 92/63     Intake/Output - Last 3 Shifts       02/21 0700 - 02/22 0659 02/22 0700 - 02/23 0659 02/23 0700 - 02/24 0659    P.O. 250 600     I.V. (mL/kg) 1656.3 (34.8)      IV Piggyback 500 100     .4 1226.3     Total Intake(mL/kg) 3075.6 (64.6) 1926.3 (40.5)     Urine (mL/kg/hr) 2400 (2.1) 5700 (5)     Stool 0 0     Total Output 2400 5700     Net +675.6 -3773.8            Stool Occurrence 1 x 2 x            Physical Exam   Constitutional: She is oriented to person, place, and time. She appears well-developed.   HENT:   Head: Normocephalic.   Eyes: Pupils are equal, round, and reactive to light.   Cardiovascular: Normal rate, regular rhythm and normal heart sounds.   Pulmonary/Chest: Effort normal and breath sounds normal. No respiratory distress. She has no wheezes. She has no rales.   Abdominal:  Soft. She exhibits no mass. There is no rebound and no guarding.   Neurological: She is alert and oriented to person, place, and time.   Skin: Skin is warm and dry.   Psychiatric: She has a normal mood and affect. Her behavior is normal. Judgment and thought content normal.         Significant Labs:  BMP (Last 3 Results):   Recent Labs   Lab 02/21/19  1835 02/22/19  0430 02/23/19  0430   GLU 89 94 82    137 140   K 3.5 4.3 4.5    108 106   CO2 24 24 28   BUN 2* 2* 8   CREATININE 0.6 0.6 0.5   CALCIUM 7.8* 8.2* 8.6*   MG 1.4* 1.8 1.8     CBC (Last 3 Results):   Recent Labs   Lab 02/21/19  0910 02/22/19  0430 02/23/19 0430   WBC 4.51 5.38 4.45   RBC 3.37* 2.97* 3.27*   HGB 6.8* 6.2* 6.7*   HCT 25.1* 22.2* 24.0*   * 340 343   MCV 75* 75* 73*   MCH 20.2* 20.9* 20.5*   MCHC 27.1* 27.9* 27.9*       Significant Diagnostics:  I have reviewed all pertinent imaging results/findings within the past 24 hours.    Assessment/Plan:     * Crohn's disease involving terminal ileum    28 year old female with colitis of the right and terminal ileum with associated parital small bowel obs and fistuilization accepted as transfer for higher level of care.  Setting up with TPN to go home at the moment.    -IVF  -TPN  -monitor labs  -monitor VS  -lfd  -appreciate  GI  Assistance  -EGD normal, colonoscopy with ileal stricuture    - Dispo: Home with TPN, likely Monday      Malnutrition of moderate degree    Continue TPN  11p- 11a           Mitchell Ramirez MD  Colorectal Surgery  [facility]

## 2019-02-23 NOTE — SUBJECTIVE & OBJECTIVE
Subjective:     Interval History: Pt did well overnight.  Still with abdominal pain.  No clinical change.    Post-Op Info:  Procedure(s) (LRB):  COLONOSCOPY (N/A)  EGD (ESOPHAGOGASTRODUODENOSCOPY) (N/A)   3 Days Post-Op      Medications:  Continuous Infusions:   TPN ADULT CENTRAL LINE CUSTOM 75 mL/hr at 02/22/19 2219     Scheduled Meds:   clobetasol 0.05%   Topical (Top) BID    enoxaparin  40 mg Subcutaneous Daily    ferric gluconate (FERRLECIT) IVPB  125 mg Intravenous Daily    fluocinonide 0.05%   Topical (Top) BID     PRN Meds:     Objective:     Vital Signs (Most Recent):  Temp: 97.8 °F (36.6 °C) (02/23/19 0744)  Pulse: 80 (02/23/19 0744)  Resp: 18 (02/23/19 0744)  BP: 92/63 (02/23/19 0744)  SpO2: 100 % (02/23/19 0744) Vital Signs (24h Range):  Temp:  [96.8 °F (36 °C)-98.5 °F (36.9 °C)] 97.8 °F (36.6 °C)  Pulse:  [61-95] 80  Resp:  [16-18] 18  SpO2:  [98 %-100 %] 100 %  BP: ()/(55-67) 92/63     Intake/Output - Last 3 Shifts       02/21 0700 - 02/22 0659 02/22 0700 - 02/23 0659 02/23 0700 - 02/24 0659    P.O. 250 600     I.V. (mL/kg) 1656.3 (34.8)      IV Piggyback 500 100     .4 1226.3     Total Intake(mL/kg) 3075.6 (64.6) 1926.3 (40.5)     Urine (mL/kg/hr) 2400 (2.1) 5700 (5)     Stool 0 0     Total Output 2400 5700     Net +675.6 -3773.8            Stool Occurrence 1 x 2 x            Physical Exam   Constitutional: She is oriented to person, place, and time. She appears well-developed.   HENT:   Head: Normocephalic.   Eyes: Pupils are equal, round, and reactive to light.   Cardiovascular: Normal rate, regular rhythm and normal heart sounds.   Pulmonary/Chest: Effort normal and breath sounds normal. No respiratory distress. She has no wheezes. She has no rales.   Abdominal: Soft. She exhibits no mass. There is no rebound and no guarding.   Neurological: She is alert and oriented to person, place, and time.   Skin: Skin is warm and dry.   Psychiatric: She has a normal mood and affect. Her  behavior is normal. Judgment and thought content normal.         Significant Labs:  BMP (Last 3 Results):   Recent Labs   Lab 02/21/19  1835 02/22/19  0430 02/23/19  0430   GLU 89 94 82    137 140   K 3.5 4.3 4.5    108 106   CO2 24 24 28   BUN 2* 2* 8   CREATININE 0.6 0.6 0.5   CALCIUM 7.8* 8.2* 8.6*   MG 1.4* 1.8 1.8     CBC (Last 3 Results):   Recent Labs   Lab 02/21/19  0910 02/22/19  0430 02/23/19  0430   WBC 4.51 5.38 4.45   RBC 3.37* 2.97* 3.27*   HGB 6.8* 6.2* 6.7*   HCT 25.1* 22.2* 24.0*   * 340 343   MCV 75* 75* 73*   MCH 20.2* 20.9* 20.5*   MCHC 27.1* 27.9* 27.9*       Significant Diagnostics:  I have reviewed all pertinent imaging results/findings within the past 24 hours.

## 2019-02-24 LAB
ANION GAP SERPL CALC-SCNC: 7 MMOL/L
BASOPHILS # BLD AUTO: 0.02 K/UL
BASOPHILS NFR BLD: 0.4 %
BUN SERPL-MCNC: 13 MG/DL
CALCIUM SERPL-MCNC: 8.7 MG/DL
CHLORIDE SERPL-SCNC: 107 MMOL/L
CO2 SERPL-SCNC: 25 MMOL/L
CREAT SERPL-MCNC: 0.6 MG/DL
CRP SERPL-MCNC: 16.9 MG/L
DIFFERENTIAL METHOD: ABNORMAL
EOSINOPHIL # BLD AUTO: 0.1 K/UL
EOSINOPHIL NFR BLD: 1.7 %
ERYTHROCYTE [DISTWIDTH] IN BLOOD BY AUTOMATED COUNT: 22.9 %
EST. GFR  (AFRICAN AMERICAN): >60 ML/MIN/1.73 M^2
EST. GFR  (NON AFRICAN AMERICAN): >60 ML/MIN/1.73 M^2
GLUCOSE SERPL-MCNC: 86 MG/DL
HCT VFR BLD AUTO: 25 %
HGB BLD-MCNC: 6.9 G/DL
IMM GRANULOCYTES # BLD AUTO: 0.05 K/UL
IMM GRANULOCYTES NFR BLD AUTO: 0.9 %
LYMPHOCYTES # BLD AUTO: 1.2 K/UL
LYMPHOCYTES NFR BLD: 22.6 %
MAGNESIUM SERPL-MCNC: 1.9 MG/DL
MCH RBC QN AUTO: 20.8 PG
MCHC RBC AUTO-ENTMCNC: 27.6 G/DL
MCV RBC AUTO: 76 FL
MONOCYTES # BLD AUTO: 0.5 K/UL
MONOCYTES NFR BLD: 8.7 %
NEUTROPHILS # BLD AUTO: 3.5 K/UL
NEUTROPHILS NFR BLD: 65.7 %
NRBC BLD-RTO: 0 /100 WBC
PHOSPHATE SERPL-MCNC: 4.3 MG/DL
PLATELET # BLD AUTO: 337 K/UL
PMV BLD AUTO: 9 FL
POTASSIUM SERPL-SCNC: 4.3 MMOL/L
RBC # BLD AUTO: 3.31 M/UL
SODIUM SERPL-SCNC: 139 MMOL/L
WBC # BLD AUTO: 5.3 K/UL

## 2019-02-24 PROCEDURE — 83735 ASSAY OF MAGNESIUM: CPT

## 2019-02-24 PROCEDURE — 25000003 PHARM REV CODE 250: Performed by: COLON & RECTAL SURGERY

## 2019-02-24 PROCEDURE — 25000003 PHARM REV CODE 250: Performed by: NURSE PRACTITIONER

## 2019-02-24 PROCEDURE — 63600175 PHARM REV CODE 636 W HCPCS: Performed by: COLON & RECTAL SURGERY

## 2019-02-24 PROCEDURE — 85025 COMPLETE CBC W/AUTO DIFF WBC: CPT

## 2019-02-24 PROCEDURE — A4217 STERILE WATER/SALINE, 500 ML: HCPCS | Performed by: COLON & RECTAL SURGERY

## 2019-02-24 PROCEDURE — 80048 BASIC METABOLIC PNL TOTAL CA: CPT

## 2019-02-24 PROCEDURE — 20600001 HC STEP DOWN PRIVATE ROOM

## 2019-02-24 PROCEDURE — 84100 ASSAY OF PHOSPHORUS: CPT

## 2019-02-24 PROCEDURE — 63600175 PHARM REV CODE 636 W HCPCS: Performed by: NURSE PRACTITIONER

## 2019-02-24 PROCEDURE — 25000003 PHARM REV CODE 250: Performed by: STUDENT IN AN ORGANIZED HEALTH CARE EDUCATION/TRAINING PROGRAM

## 2019-02-24 PROCEDURE — B4185 PARENTERAL SOL 10 GM LIPIDS: HCPCS | Performed by: STUDENT IN AN ORGANIZED HEALTH CARE EDUCATION/TRAINING PROGRAM

## 2019-02-24 PROCEDURE — 86140 C-REACTIVE PROTEIN: CPT

## 2019-02-24 RX ADMIN — POTASSIUM CHLORIDE: 2 INJECTION, SOLUTION, CONCENTRATE INTRAVENOUS at 09:02

## 2019-02-24 RX ADMIN — SOYBEAN OIL 250 ML: 20 INJECTION, SOLUTION INTRAVENOUS at 09:02

## 2019-02-24 RX ADMIN — SODIUM CHLORIDE 125 MG: 9 INJECTION, SOLUTION INTRAVENOUS at 08:02

## 2019-02-24 RX ADMIN — ENOXAPARIN SODIUM 40 MG: 100 INJECTION SUBCUTANEOUS at 06:02

## 2019-02-24 RX ADMIN — FLUOCINONIDE: 0.5 CREAM TOPICAL at 09:02

## 2019-02-24 RX ADMIN — CLOBETASOL PROPIONATE: 0.5 OINTMENT TOPICAL at 09:02

## 2019-02-24 NOTE — PLAN OF CARE
Plan of care reviewed with pt: AAOx4, on RA, VS stable. TPN at 75ml/hr. Had 1 bowel movement during shift with loose brown stool. Tolerated her diet with no complains of nausea. Voided with no difficulty. Call light in reach. WCTM.

## 2019-02-24 NOTE — PROGRESS NOTES
[facility]  Colorectal Surgery  Progress Note    Patient Name: Lora Scott  MRN: [id number]  Admission Date: 2/18/2019  Hospital Length of Stay: 6 days  Attending Physician: Srinivas Ruiz MD    Subjective:     Interval History: Pt doing well this AM.  States that she had two loose bowel movements yesterday.    Post-Op Info:  Procedure(s) (LRB):  COLONOSCOPY (N/A)  EGD (ESOPHAGOGASTRODUODENOSCOPY) (N/A)   4 Days Post-Op      Medications:  Continuous Infusions:   TPN ADULT CENTRAL LINE CUSTOM 75 mL/hr at 02/23/19 2225     Scheduled Meds:   clobetasol 0.05%   Topical (Top) BID    enoxaparin  40 mg Subcutaneous Daily    fat emulsion 20%  250 mL Intravenous Daily    ferric gluconate (FERRLECIT) IVPB  125 mg Intravenous Daily    fluocinonide 0.05%   Topical (Top) BID     PRN Meds:     Objective:     Vital Signs (Most Recent):  Temp: 98.1 °F (36.7 °C) (02/24/19 0811)  Pulse: 84 (02/24/19 0811)  Resp: 18 (02/24/19 0811)  BP: (!) 96/59 (02/24/19 0811)  SpO2: 100 % (02/24/19 0811) Vital Signs (24h Range):  Temp:  [96.9 °F (36.1 °C)-98.4 °F (36.9 °C)] 98.1 °F (36.7 °C)  Pulse:  [66-96] 84  Resp:  [18] 18  SpO2:  [99 %-100 %] 100 %  BP: ()/(55-70) 96/59     Intake/Output - Last 3 Shifts       02/22 0700 - 02/23 0659 02/23 0700 - 02/24 0659 02/24 0700 - 02/25 0659    P.O. 600 700     I.V. (mL/kg)       IV Piggyback 100 100     TPN 1226.3 1876.5     Total Intake(mL/kg) 1926.3 (40.5) 2676.5 (56.2)     Urine (mL/kg/hr) 5700 (5) 3000 (2.6)     Stool 0 0     Total Output 5700 3000     Net -3773.8 -323.5            Stool Occurrence 2 x 1 x           Physical Exam   Constitutional: She is oriented to person, place, and time. She appears well-developed.   HENT:   Head: Normocephalic.   Eyes: Pupils are equal, round, and reactive to light.   Cardiovascular: Normal rate, regular rhythm and normal heart sounds.   Pulmonary/Chest: Effort normal and breath sounds normal. No respiratory distress. She has  no wheezes. She has no rales.   Abdominal: Soft. She exhibits no mass. There is no rebound and no guarding.   Neurological: She is alert and oriented to person, place, and time.   Skin: Skin is warm and dry.   Psychiatric: She has a normal mood and affect. Her behavior is normal. Judgment and thought content normal.       Significant Labs:  BMP (Last 3 Results):   Recent Labs   Lab 02/22/19 0430 02/23/19 0430 02/24/19  0428   GLU 94 82 86    140 139   K 4.3 4.5 4.3    106 107   CO2 24 28 25   BUN 2* 8 13   CREATININE 0.6 0.5 0.6   CALCIUM 8.2* 8.6* 8.7   MG 1.8 1.8 1.9     CBC (Last 3 Results):   Recent Labs   Lab 02/22/19 0430 02/23/19 0430 02/24/19  0428   WBC 5.38 4.45 5.30   RBC 2.97* 3.27* 3.31*   HGB 6.2* 6.7* 6.9*   HCT 22.2* 24.0* 25.0*    343 337   MCV 75* 73* 76*   MCH 20.9* 20.5* 20.8*   MCHC 27.9* 27.9* 27.6*       Significant Diagnostics:  I have reviewed all pertinent imaging results/findings within the past 24 hours.    Assessment/Plan:     * Crohn's disease involving terminal ileum    28 year old female with colitis of the right and terminal ileum with associated parital small bowel obs and fistuilization accepted as transfer for higher level of care.  Setting up with TPN to go home at the moment.    -IVF  -TPN  -will stop drawing labs  -monitor VS  -lfd  -appreciate  GI  Assistance  -EGD normal, colonoscopy with ileal stricuture    - Dispo: Home with TPN, likely Monday.  Will talk to social work today to make sure everything is set up.  Home health orders already in.     Malnutrition of moderate degree    Continue TPN  11p- 11a           Mitchell Ramirez MD  Colorectal Surgery  [facility]

## 2019-02-24 NOTE — PLAN OF CARE
CM put in call to [facility], to confirm with them that patient will be going home on TPN, not IV antibiotics.  CARA spoke with Noreen, and she confirmed this.  Pt to discharge home tomorrow, and they awaiting authorization for the TPN.

## 2019-02-24 NOTE — SUBJECTIVE & OBJECTIVE
Subjective:     Interval History: Pt doing well this AM.  States that she had two loose bowel movements yesterday.    Post-Op Info:  Procedure(s) (LRB):  COLONOSCOPY (N/A)  EGD (ESOPHAGOGASTRODUODENOSCOPY) (N/A)   4 Days Post-Op      Medications:  Continuous Infusions:   TPN ADULT CENTRAL LINE CUSTOM 75 mL/hr at 02/23/19 2225     Scheduled Meds:   clobetasol 0.05%   Topical (Top) BID    enoxaparin  40 mg Subcutaneous Daily    fat emulsion 20%  250 mL Intravenous Daily    ferric gluconate (FERRLECIT) IVPB  125 mg Intravenous Daily    fluocinonide 0.05%   Topical (Top) BID     PRN Meds:     Objective:     Vital Signs (Most Recent):  Temp: 98.1 °F (36.7 °C) (02/24/19 0811)  Pulse: 84 (02/24/19 0811)  Resp: 18 (02/24/19 0811)  BP: (!) 96/59 (02/24/19 0811)  SpO2: 100 % (02/24/19 0811) Vital Signs (24h Range):  Temp:  [96.9 °F (36.1 °C)-98.4 °F (36.9 °C)] 98.1 °F (36.7 °C)  Pulse:  [66-96] 84  Resp:  [18] 18  SpO2:  [99 %-100 %] 100 %  BP: ()/(55-70) 96/59     Intake/Output - Last 3 Shifts       02/22 0700 - 02/23 0659 02/23 0700 - 02/24 0659 02/24 0700 - 02/25 0659    P.O. 600 700     I.V. (mL/kg)       IV Piggyback 100 100     TPN 1226.3 1876.5     Total Intake(mL/kg) 1926.3 (40.5) 2676.5 (56.2)     Urine (mL/kg/hr) 5700 (5) 3000 (2.6)     Stool 0 0     Total Output 5700 3000     Net -3773.8 -323.5            Stool Occurrence 2 x 1 x           Physical Exam   Constitutional: She is oriented to person, place, and time. She appears well-developed.   HENT:   Head: Normocephalic.   Eyes: Pupils are equal, round, and reactive to light.   Cardiovascular: Normal rate, regular rhythm and normal heart sounds.   Pulmonary/Chest: Effort normal and breath sounds normal. No respiratory distress. She has no wheezes. She has no rales.   Abdominal: Soft. She exhibits no mass. There is no rebound and no guarding.   Neurological: She is alert and oriented to person, place, and time.   Skin: Skin is warm and dry.    Psychiatric: She has a normal mood and affect. Her behavior is normal. Judgment and thought content normal.       Significant Labs:  BMP (Last 3 Results):   Recent Labs   Lab 02/22/19 0430 02/23/19 0430 02/24/19  0428   GLU 94 82 86    140 139   K 4.3 4.5 4.3    106 107   CO2 24 28 25   BUN 2* 8 13   CREATININE 0.6 0.5 0.6   CALCIUM 8.2* 8.6* 8.7   MG 1.8 1.8 1.9     CBC (Last 3 Results):   Recent Labs   Lab 02/22/19 0430 02/23/19 0430 02/24/19  0428   WBC 5.38 4.45 5.30   RBC 2.97* 3.27* 3.31*   HGB 6.2* 6.7* 6.9*   HCT 22.2* 24.0* 25.0*    343 337   MCV 75* 73* 76*   MCH 20.9* 20.5* 20.8*   MCHC 27.9* 27.9* 27.6*       Significant Diagnostics:  I have reviewed all pertinent imaging results/findings within the past 24 hours.

## 2019-02-24 NOTE — PLAN OF CARE
Problem: Adult Inpatient Plan of Care  Goal: Plan of Care Review  Outcome: Ongoing (interventions implemented as appropriate)  VS stable. No c/o pain. Telemetry= NSR 90s. Patient voiding independently and adequately. TPN at 75ml/hr with Lipids overnight. No distress noted. Applied scheduled ointments to bilateral arms and legs for rash/psoriasis. Will continue to monitor.

## 2019-02-24 NOTE — ASSESSMENT & PLAN NOTE
28 year old female with colitis of the right and terminal ileum with associated parital small bowel obs and fistuilization accepted as transfer for higher level of care.  Setting up with TPN to go home at the moment.    -IVF  -TPN  -will stop drawing labs  -monitor VS  -lfd  -appreciate  GI  Assistance  -EGD normal, colonoscopy with ileal stricuture    - Dispo: Home with TPN, likely Monday.  Will talk to social work today to make sure everything is set up.  Home health orders already in.

## 2019-02-25 ENCOUNTER — TELEPHONE (OUTPATIENT)
Dept: GASTROENTEROLOGY | Facility: CLINIC | Age: 29
End: 2019-02-25

## 2019-02-25 ENCOUNTER — TELEPHONE (OUTPATIENT)
Dept: SURGERY | Facility: CLINIC | Age: 29
End: 2019-02-25

## 2019-02-25 DIAGNOSIS — K56.699 COLON STRICTURE: Primary | ICD-10-CM

## 2019-02-25 LAB
POCT GLUCOSE: 70 MG/DL (ref 70–110)
POCT GLUCOSE: 95 MG/DL (ref 70–110)

## 2019-02-25 PROCEDURE — 63600175 PHARM REV CODE 636 W HCPCS: Performed by: NURSE PRACTITIONER

## 2019-02-25 PROCEDURE — 25000003 PHARM REV CODE 250: Performed by: NURSE PRACTITIONER

## 2019-02-25 PROCEDURE — 20600001 HC STEP DOWN PRIVATE ROOM

## 2019-02-25 PROCEDURE — B4185 PARENTERAL SOL 10 GM LIPIDS: HCPCS | Performed by: NURSE PRACTITIONER

## 2019-02-25 PROCEDURE — A4217 STERILE WATER/SALINE, 500 ML: HCPCS | Performed by: NURSE PRACTITIONER

## 2019-02-25 RX ADMIN — CLOBETASOL PROPIONATE: 0.5 OINTMENT TOPICAL at 05:02

## 2019-02-25 RX ADMIN — POTASSIUM CHLORIDE: 2 INJECTION, SOLUTION, CONCENTRATE INTRAVENOUS at 09:02

## 2019-02-25 RX ADMIN — FLUOCINONIDE: 0.5 CREAM TOPICAL at 05:02

## 2019-02-25 RX ADMIN — SOYBEAN OIL 250 ML: 20 INJECTION, SOLUTION INTRAVENOUS at 09:02

## 2019-02-25 RX ADMIN — ENOXAPARIN SODIUM 40 MG: 100 INJECTION SUBCUTANEOUS at 05:02

## 2019-02-25 RX ADMIN — FLUOCINONIDE: 0.5 CREAM TOPICAL at 09:02

## 2019-02-25 RX ADMIN — CLOBETASOL PROPIONATE: 0.5 OINTMENT TOPICAL at 09:02

## 2019-02-25 NOTE — PLAN OF CARE
[facility]    HOME HEALTH ORDERS  FACE TO FACE ENCOUNTER    Patient Name: Lora Scott  YOB: 1990    PCP: Margarita Jay MD   PCP Address: [address] / [place]  PCP Phone Number: [phone]  PCP Fax: [phone]    Encounter Date: 02/25/2019    Admit to Home Health    Diagnoses:  Active Hospital Problems    Diagnosis  POA    *Crohn's disease involving terminal ileum [K50.00]  Yes    Malnutrition of moderate degree [E44.0]  Yes    Colitis with fistula [K52.9, K63.2]  Yes      Resolved Hospital Problems   No resolved problems to display.       No future appointments.        I have seen and examined this patient face to face today. My clinical findings that support the need for the home health skilled services and home bound status are the following:  Weakness/numbness causing balance and gait disturbance due to Dehydration making it taxing to leave home.    Allergies:  Review of patient's allergies indicates:   Allergen Reactions    Shellfish containing products Anaphylaxis     Allergic to all seafood.    Horse/equine containing products Itching       Diet: regular diet    Activities: activity as tolerated    Nursing:   SN to complete comprehensive assessment including routine vital signs. Instruct on disease process and s/s of complications to report to MD. Review/verify medication list sent home with the patient at time of discharge  and instruct patient/caregiver as needed. Frequency may be adjusted depending on start of care date.    Notify MD if SBP > 160 or < 90; DBP > 90 or < 50; HR > 120 or < 50; Temp > 101; Other:         CONSULTS:     to evaluate for community resources/long-range planning.    MISCELLANEOUS CARE:  Home Infusion Therapy:   SN to perform Infusion Therapy/Central Line Care.  Review Central Line Care & Central Line Flush with patient.    parenteral amino acid 10% No.2 10 % Solp 5 % 90 g   dextrose 70% Solp 15 % 269.997 g    sterile water Solp 429.29 mLs 429.29 mL   sodium acetate 2 mEq/mL Soln 60 mEq 60 mEq   sodium phosphate 3 mmol/mL Soln 15 mmol 15 mmol   potassium chloride 2 mEq/mL Soln 50 mEq 50 mEq   magnesium sulfate 4 mEq/mL (50 %) Soln 2 g 2 g   calcium gluconate 100 mg/mL (10%) Soln 1 g 1 g   mvi, adult no.1 3,300 unit- 150 mcg/10 mL Soln 10 mLs 10 mL   trace elements [id number] -1000 mcg/mL Soln 1 mL 1 mL      125 cc/hr X 12 hours (11p-11a)              Scrub the Hub: Prior to accessing the line, always perform a 30 second alcohol scrub  Each lumen of the central line is to be flushed at least daily with 10 mL Normal Saline and 3 mL Heparin flush (10 units/mL)  Skilled Nurse (SN) may draw blood from IV access  Blood Draw Procedure:   - Aspirate at least 5 mL of blood   - Discard   - Obtain specimen   - Change injection cap   - Flush with 20 mL Normal Saline followed by a                 3-5 mL Heparin flush (10 units/mL)  Central :   - Sterile dressing changes are done weekly and as needed.   - Use chlor-hexadine scrub to cleanse site, apply Biopatch to insertion site,       apply securement device dressing   - Injection caps are changed weekly and after EVERY lab draw.   - If sterile gauze is under dressing to control oozing,                 dressing change must be performed every 24 hours until gauze is not needed.    Labs:  Cbc with diff, cmp,  Prealb, trig, crp weekly, please fax results to Dr. Ruiz [phone]      Medications: Review discharge medications with patient and family and provide education.      Current Discharge Medication List      CONTINUE these medications which have NOT CHANGED    Details   clobetasol (TEMOVATE) 0.05 % external solution Apply topically 2 (two) times daily. for 10 days  Qty: 50 mL, Refills: 0      diphenhydrAMINE (BENADRYL) 25 mg capsule Take 1 each (25 mg total) by mouth every 6 (six) hours as needed for Itching.  Qty: 20 capsule, Refills: 0       fluocinolone (DERMA-SMOOTHE) 0.01 % external oil Apply topically every evening. Apply well to scalp at bedtime cover with a shower cap for 10 days  Qty: 118.28 mL, Refills: 0      fluocinonide (LIDEX) 0.05 % ointment Apply topically 2 (two) times daily. for 10 days  Qty: 60 g, Refills: 0      loratadine (CLARITIN) 10 mg tablet Take 1 tablet (10 mg total) by mouth once daily.  Qty: 60 tablet, Refills: 0      ondansetron (ZOFRAN) 4 MG tablet Take 1 tablet (4 mg total) by mouth every 6 (six) hours as needed for Nausea.  Qty: 12 tablet, Refills: 0             I certify that this patient is confined to her home and needs intermittent skilled nursing care.

## 2019-02-25 NOTE — TELEPHONE ENCOUNTER
----- Message from Letty Stringer NP sent at 2/25/2019  8:50 AM CST -----  Discharged today 2/24, needs 2 week fu with sanjeev and GI, also needs to have MRE done prior to appts, orders in epic

## 2019-02-25 NOTE — ASSESSMENT & PLAN NOTE
28 year old female with colitis of the right and terminal ileum with associated parital small bowel obs and fistuilization accepted as transfer for higher level of care.  Setting up with TPN to go home at the moment.    -Cycled PM TPN  -monitor VS  -lfd  -appreciate  GI  Assistance  -EGD normal, colonoscopy with ileal stricuture    - Dispo: Home today as long as TPN can be delivered

## 2019-02-25 NOTE — PLAN OF CARE
CM placed call to [facility] to schedule pt's 2 week f/u appt with Dr Ruiz.  Future Appointments   Date Time Provider Department Center   3/14/2019  9:15 AM Srinivas Ruiz MD [facility]

## 2019-02-25 NOTE — SUBJECTIVE & OBJECTIVE
Subjective:     Interval History: no acute events. Pain improved. Having bowel movements. Awaiting home care.    Post-Op Info:  Procedure(s) (LRB):  COLONOSCOPY (N/A)  EGD (ESOPHAGOGASTRODUODENOSCOPY) (N/A)   5 Days Post-Op      Medications:  Continuous Infusions:   TPN ADULT CENTRAL LINE CUSTOM 75 mL/hr at 02/24/19 2159    TPN ADULT CENTRAL LINE CUSTOM       Scheduled Meds:   clobetasol 0.05%   Topical (Top) BID    enoxaparin  40 mg Subcutaneous Daily    fat emulsion 20%  250 mL Intravenous Daily    fluocinonide 0.05%   Topical (Top) BID     PRN Meds:     Objective:     Vital Signs (Most Recent):  Temp: 98.2 °F (36.8 °C) (02/25/19 0821)  Pulse: 93 (02/25/19 1125)  Resp: 12 (02/25/19 0821)  BP: (!) 90/56 (02/25/19 0821)  SpO2: (!) 93 % (02/25/19 0821) Vital Signs (24h Range):  Temp:  [98 °F (36.7 °C)-98.9 °F (37.2 °C)] 98.2 °F (36.8 °C)  Pulse:  [] 93  Resp:  [12-18] 12  SpO2:  [93 %-100 %] 93 %  BP: ()/(55-84) 90/56     Intake/Output - Last 3 Shifts       02/23 0700 - 02/24 0659 02/24 0700 - 02/25 0659 02/25 0700 - 02/26 0659    P.O. 700 370     IV Piggyback 100      TPN 1876.5 1967.1     Total Intake(mL/kg) 2676.5 (56.2) 2337.1 (49.1)     Urine (mL/kg/hr) 3000 (2.6) 2850 (2.5)     Stool 0 0     Total Output 3000 2850     Net -323.5 -512.9            Stool Occurrence 1 x 1 x 0 x          Physical Exam   Constitutional: She is oriented to person, place, and time. She appears well-developed.   HENT:   Head: Normocephalic.   Eyes: Pupils are equal, round, and reactive to light.   Cardiovascular: Normal rate, regular rhythm and normal heart sounds.   Pulmonary/Chest: Effort normal and breath sounds normal. No respiratory distress. She has no wheezes. She has no rales.   Abdominal: Soft. She exhibits no mass. There is no rebound and no guarding.   Neurological: She is alert and oriented to person, place, and time.   Skin: Skin is warm and dry.   Psychiatric: She has a normal mood and affect. Her  behavior is normal. Judgment and thought content normal.     Significant Labs:  BMP (Last 3 Results):   Recent Labs   Lab 02/22/19  0430 02/23/19 0430 02/24/19  0428   GLU 94 82 86    140 139   K 4.3 4.5 4.3    106 107   CO2 24 28 25   BUN 2* 8 13   CREATININE 0.6 0.5 0.6   CALCIUM 8.2* 8.6* 8.7   MG 1.8 1.8 1.9     CBC (Last 3 Results):   Recent Labs   Lab 02/22/19 0430 02/23/19 0430 02/24/19  0428   WBC 5.38 4.45 5.30   RBC 2.97* 3.27* 3.31*   HGB 6.2* 6.7* 6.9*   HCT 22.2* 24.0* 25.0*    343 337   MCV 75* 73* 76*   MCH 20.9* 20.5* 20.8*   MCHC 27.9* 27.9* 27.6*       Significant Diagnostics:  I have reviewed all pertinent imaging results/findings within the past 24 hours.

## 2019-02-25 NOTE — PLAN OF CARE
Problem: Adult Inpatient Plan of Care  Goal: Plan of Care Review  Outcome: Ongoing (interventions implemented as appropriate)  VS stable. No complaints overnight. Telemetry=NSR 90s. TPN at 75ml/hr, tolerating diet. Will continue to monitor.

## 2019-02-25 NOTE — PLAN OF CARE
SW following for d/c needs. Pt to d/c home with infusion services.      SW spoke with pt about additional post acute needs.  Pt informed SW that she plans to return to work and would cycle TPN overnight.  Pt informed SW that she spoke with CM about short term disability.  Pt stated that she would wait until her surgery to request short term disability services.     SW informed CM about this.      SW contacted [facility] to advise of pt d/c.  [facility] will provide infusion education and services to the pt.    VICTORIA spoke with Uday with [facility] about pt TPN auth.  [facility] is still awaiting auth for pt TPN.         Jemma Singh, MSW, LCSW  [facility]  [id number]

## 2019-02-25 NOTE — PLAN OF CARE
CM met with patient to discuss today's discharge to home with IV infusion provider. Patient in agreement with discharge plan. Patient's family will provide transportation home. CM informed patient of follow up appt with Dr. uRiz, pt verbalized understanding.  Future Appointments   Date Time Provider Department Center   3/13/2019  7:15 AM [facility]   3/14/2019  9:15 AM Srinivas Ruiz MD [facility] COLON [place]   3/18/2019  2:00 PM Abraham Mesa MD [facility] GASTRO [place]        02/25/19 1040   Final Note   Assessment Type Final Discharge Note   Anticipated Discharge Disposition IV Therapy   What phone number can be called within the next 1-3 days to see how you are doing after discharge? [phone]   Hospital Follow Up  Appt(s) scheduled? Yes   Discharge plans and expectations educations in teach back method with documentation complete? Yes

## 2019-02-25 NOTE — TELEPHONE ENCOUNTER
----- Message from Kim Cadet RN sent at 2/25/2019 12:09 PM CST -----  Good Afternoon,    Please reach out to patient and schedule appointment with a GI provider.  Thank you.  Kim Dooley

## 2019-02-25 NOTE — PROGRESS NOTES
[facility]  Colorectal Surgery  Progress Note    Patient Name: Lora Scott  MRN: [id number]  Admission Date: 2/18/2019  Hospital Length of Stay: 7 days  Attending Physician: Srinivas Ruiz MD    Subjective:     Interval History: no acute events. Pain improved. Having bowel movements. Awaiting home care.    Post-Op Info:  Procedure(s) (LRB):  COLONOSCOPY (N/A)  EGD (ESOPHAGOGASTRODUODENOSCOPY) (N/A)   5 Days Post-Op      Medications:  Continuous Infusions:   TPN ADULT CENTRAL LINE CUSTOM 75 mL/hr at 02/24/19 2159    TPN ADULT CENTRAL LINE CUSTOM       Scheduled Meds:   clobetasol 0.05%   Topical (Top) BID    enoxaparin  40 mg Subcutaneous Daily    fat emulsion 20%  250 mL Intravenous Daily    fluocinonide 0.05%   Topical (Top) BID     PRN Meds:     Objective:     Vital Signs (Most Recent):  Temp: 98.2 °F (36.8 °C) (02/25/19 0821)  Pulse: 93 (02/25/19 1125)  Resp: 12 (02/25/19 0821)  BP: (!) 90/56 (02/25/19 0821)  SpO2: (!) 93 % (02/25/19 0821) Vital Signs (24h Range):  Temp:  [98 °F (36.7 °C)-98.9 °F (37.2 °C)] 98.2 °F (36.8 °C)  Pulse:  [] 93  Resp:  [12-18] 12  SpO2:  [93 %-100 %] 93 %  BP: ()/(55-84) 90/56     Intake/Output - Last 3 Shifts       02/23 0700 - 02/24 0659 02/24 0700 - 02/25 0659 02/25 0700 - 02/26 0659    P.O. 700 370     IV Piggyback 100      TPN 1876.5 1967.1     Total Intake(mL/kg) 2676.5 (56.2) 2337.1 (49.1)     Urine (mL/kg/hr) 3000 (2.6) 2850 (2.5)     Stool 0 0     Total Output 3000 2850     Net -323.5 -512.9            Stool Occurrence 1 x 1 x 0 x          Physical Exam   Constitutional: She is oriented to person, place, and time. She appears well-developed.   HENT:   Head: Normocephalic.   Eyes: Pupils are equal, round, and reactive to light.   Cardiovascular: Normal rate, regular rhythm and normal heart sounds.   Pulmonary/Chest: Effort normal and breath sounds normal. No respiratory distress. She has no wheezes. She has no rales.   Abdominal: Soft.  She exhibits no mass. There is no rebound and no guarding.   Neurological: She is alert and oriented to person, place, and time.   Skin: Skin is warm and dry.   Psychiatric: She has a normal mood and affect. Her behavior is normal. Judgment and thought content normal.     Significant Labs:  BMP (Last 3 Results):   Recent Labs   Lab 02/22/19 0430 02/23/19 0430 02/24/19  0428   GLU 94 82 86    140 139   K 4.3 4.5 4.3    106 107   CO2 24 28 25   BUN 2* 8 13   CREATININE 0.6 0.5 0.6   CALCIUM 8.2* 8.6* 8.7   MG 1.8 1.8 1.9     CBC (Last 3 Results):   Recent Labs   Lab 02/22/19 0430 02/23/19 0430 02/24/19  0428   WBC 5.38 4.45 5.30   RBC 2.97* 3.27* 3.31*   HGB 6.2* 6.7* 6.9*   HCT 22.2* 24.0* 25.0*    343 337   MCV 75* 73* 76*   MCH 20.9* 20.5* 20.8*   MCHC 27.9* 27.9* 27.6*       Significant Diagnostics:  I have reviewed all pertinent imaging results/findings within the past 24 hours.    Assessment/Plan:     * Crohn's disease involving terminal ileum    28 year old female with colitis of the right and terminal ileum with associated parital small bowel obs and fistuilization accepted as transfer for higher level of care.  Setting up with TPN to go home at the moment.    -Cycled PM TPN  -monitor VS  -lfd  -appreciate  GI  Assistance  -EGD normal, colonoscopy with ileal stricuture    - Dispo: Home today as long as TPN can be delivered     Malnutrition of moderate degree    Continue TPN  11p- 11a           Harry Mckenna MD  Colorectal Surgery  [facility]

## 2019-02-25 NOTE — TELEPHONE ENCOUNTER
Spoke with patient and scheduled MRE on 3/13.  Message sent to GI for appointment to be scheduled by them.

## 2019-02-26 VITALS
SYSTOLIC BLOOD PRESSURE: 97 MMHG | RESPIRATION RATE: 18 BRPM | HEART RATE: 101 BPM | OXYGEN SATURATION: 99 % | BODY MASS INDEX: 19.32 KG/M2 | TEMPERATURE: 98 F | DIASTOLIC BLOOD PRESSURE: 62 MMHG | HEIGHT: 62 IN | WEIGHT: 105 LBS

## 2019-02-26 PROCEDURE — 99238 PR HOSPITAL DISCHARGE DAY,<30 MIN: ICD-10-PCS | Mod: ,,, | Performed by: NURSE PRACTITIONER

## 2019-02-26 PROCEDURE — 63600175 PHARM REV CODE 636 W HCPCS: Performed by: NURSE PRACTITIONER

## 2019-02-26 PROCEDURE — 99238 HOSP IP/OBS DSCHRG MGMT 30/<: CPT | Mod: ,,, | Performed by: NURSE PRACTITIONER

## 2019-02-26 RX ADMIN — ENOXAPARIN SODIUM 40 MG: 100 INJECTION SUBCUTANEOUS at 05:02

## 2019-02-26 NOTE — PROGRESS NOTES
"[facility]  Adult Nutrition  Progress Note    SUMMARY       Recommendations    Recommendation/Intervention:     1. Continue Regular diet + Boost Plus. Pt reports eating % of all meals.     2. Recommend Custom TPN 30 gm AA / 200 gm Dex to provide 800 kcal (48% EEN), 30 gm protein (48% EPN), and GIR of 2.9.    - No lipids 2/2 po intake.     3. RD following.     Goals: meet % EEN/EPN  Nutrition Goal Status: goal met  Communication of RD Recs: (POC)    Reason for Assessment    Reason For Assessment: RD follow-up  Diagnosis: gastrointestinal disease(Crohn's disease involving terminal ileum )  Relevant Medical History: depression, IBS, anemia  Interdisciplinary Rounds: attended  General Information Comments: S/p EGD, colonoscopy. TPN + IV lipids infusing. Pt reports appetite is alright and eats % of all meals. Has not tried Boost yet 2/2 not being cold. Denies N/V/C. C/o loose stools. NFPE completed 2/21. Pt remains with muslce/fat wasting. Pt with moderate malnutrition. Waiting for TPN to be approved for D/C. Noted home TPN to be 11p-11a.   Nutrition Discharge Planning: adequate po intake + TPN    Nutrition Risk Screen    Nutrition Risk Screen: no indicators present    Nutrition/Diet History    Spiritual, Cultural Beliefs, [employer]ism Practices, Values that Affect Care: yes  Factors Affecting Nutritional Intake: altered gastrointestinal function    Anthropometrics    Temp: 98.3 °F (36.8 °C)  Height Method: Stated  Height: 5' 2" (157.5 cm)  Height (inches): 62 in  Weight Method: Stated  Weight: 47.6 kg (105 lb)  Weight (lb): 105 lb  Ideal Body Weight (IBW), Female: 110 lb  % Ideal Body Weight, Female (lb): 95.45 lb  BMI (Calculated): 19.2  BMI Grade: 18.5-24.9 - normal     Lab/Procedures/Meds    Pertinent Labs Reviewed: reviewed  Pertinent Labs Comments: noted  Pertinent Medications Reviewed: reviewed  Pertinent Medications Comments: noted    Estimated/Assessed Needs    Weight Used For " Calorie Calculations: 47.6 kg (104 lb 15 oz)  Energy Calorie Requirements (kcal): 0169-5861 kcal/day  Energy Need Method: Kcal/kg(30-35)  Protein Requirements: 48-62 gm/day(1.0-1.3 gm/kg)  Weight Used For Protein Calculations: 47.6 kg (104 lb 15 oz)  Fluid Requirements (mL): 1 mL/kcal or per Md     RDA Method (mL): 1428     Nutrition Prescription Ordered    Current Diet Order: Low Fiber/Residue   Current Nutrition Support Formula Ordered: (Custom TPN 90 gm AA / 270 gm Dex + IV Lipids ordered)  Current Nutrition Support Rate Ordered: 75 (ml)  Current Nutrition Support Frequency Ordered: mL/hr  Oral Nutrition Supplement: Boost Plus    Evaluation of Received Nutrient/Fluid Intake    Parenteral Calories (kcal): 1278  Parenteral Protein (gm): 90  Parenteral Fluid (mL): 1800  Lipid Calories (kcals): 500 kcals  GIR (Glucose Infusion Rate) (mg/kg/min): 3.94 mg/kg/min  Total Calories (kcal): 1778  % Kcal Needs: 106  % Protein Needs: 145  Energy Calories Required: meeting needs  Protein Required: exceeds needs  Fluid Required: other (see comments)(per MD)  Comments: LBM 2/24  Tolerance: tolerating  % Intake of Estimated Energy Needs: 75 - 100 %  % Meal Intake: 75 - 100 %    Nutrition Risk    Level of Risk/Frequency of Follow-up: (f/u 2 x wk)     Assessment and Plan    Malnutrition of moderate degree    Malnutrition in the context of Chronic Illness/Injury    Related to (etiology):  Crohn's disease     Signs and Symptoms (as evidenced by):  Energy Intake: <75% of estimated energy requirement for > 3 months  Body Fat Depletion: moderate depletion of triceps   Muscle Mass Depletion: moderate depletion of temples, clavicle region and lower extremities   Weight Loss: no wt loss within 1 year per pt      Interventions/Recommendations (treatment strategy):  Collaboration and Referral of Nutrition Care    Nutrition Diagnosis Status:  Continues            Monitor and Evaluation    Food and Nutrient Intake: energy intake, food and  beverage intake, parenteral nutrition intake  Food and Nutrient Adminstration: diet order, enteral and parenteral nutrition administration  Physical Activity and Function: nutrition-related ADLs and IADLs  Anthropometric Measurements: weight, weight change, body mass index  Biochemical Data, Medical Tests and Procedures: electrolyte and renal panel, gastrointestinal profile, lipid profile, inflammatory profile, glucose/endocrine profile  Nutrition-Focused Physical Findings: overall appearance     Malnutrition Assessment  Malnutrition Type: chronic illness          Weight Loss (Malnutrition): (no wt loss within 1 year per pt )  Energy Intake (Malnutrition): less than 75% for greater than or equal to 3 months   Upper Arm Region (Subcutaneous Fat Loss): moderate depletion   Advent Region (Muscle Loss): moderate depletion  Clavicle Bone Region (Muscle Loss): moderate depletion  Posterior Calf Region (Muscle Loss): moderate depletion                 Nutrition Follow-Up    RD Follow-up?: Yes

## 2019-02-26 NOTE — PROGRESS NOTES
[facility]  Colorectal Surgery  Progress Note    Patient Name: Lora Scott  MRN: [id number]  Admission Date: 2/18/2019  Hospital Length of Stay: 8 days  Attending Physician: Srinivas Ruiz MD    Subjective:     Interval History: no acute events overnight. Was supposed to be DC'd but apparent issues with TPN.    Post-Op Info:  Procedure(s) (LRB):  COLONOSCOPY (N/A)  EGD (ESOPHAGOGASTRODUODENOSCOPY) (N/A)   6 Days Post-Op      Medications:  Continuous Infusions:   TPN ADULT CENTRAL LINE CUSTOM 75 mL/hr at 02/25/19 2154     Scheduled Meds:   clobetasol 0.05%   Topical (Top) BID    enoxaparin  40 mg Subcutaneous Daily    fluocinonide 0.05%   Topical (Top) BID     PRN Meds:     Objective:     Vital Signs (Most Recent):  Temp: 98.3 °F (36.8 °C) (02/26/19 0503)  Pulse: 87 (02/26/19 0720)  Resp: 18 (02/26/19 0503)  BP: 97/62 (02/26/19 0503)  SpO2: 99 % (02/26/19 0503) Vital Signs (24h Range):  Temp:  [97.6 °F (36.4 °C)-98.8 °F (37.1 °C)] 98.3 °F (36.8 °C)  Pulse:  [] 87  Resp:  [12-18] 18  SpO2:  [99 %-100 %] 99 %  BP: ()/(55-70) 97/62     Intake/Output - Last 3 Shifts       02/24 0700 - 02/25 0659 02/25 0700 - 02/26 0659 02/26 0700 - 02/27 0659    P.O. 370      IV Piggyback       TPN 1967.1      Total Intake(mL/kg) 2337.1 (49.1)      Urine (mL/kg/hr) 2850 (2.5) 2000 (1.8)     Stool 0 0     Total Output 2850 2000     Net -512.9 -2000            Stool Occurrence 1 x 0 x           Physical Exam   Constitutional: She is oriented to person, place, and time. She appears well-developed.   HENT:   Head: Normocephalic.   Eyes: Pupils are equal, round, and reactive to light.   Cardiovascular: Normal rate, regular rhythm and normal heart sounds.   Pulmonary/Chest: Effort normal and breath sounds normal. No respiratory distress. She has no wheezes. She has no rales.   Abdominal: Soft. She exhibits no mass. There is no rebound and no guarding.   Neurological: She is alert and oriented to person,  place, and time.   Skin: Skin is warm and dry.   Psychiatric: She has a normal mood and affect. Her behavior is normal. Judgment and thought content normal.     Significant Labs:  None    Significant Diagnostics:  None    Assessment/Plan:     * Crohn's disease involving terminal ileum    28 year old female with colitis of the right and terminal ileum with associated parital small bowel obs and fistuilization accepted as transfer for higher level of care.  Setting up with TPN to go home at the moment.    -Cycled PM TPN  -monitor VS  -lfd  -appreciate  GI  Assistance  -EGD normal, colonoscopy with ileal stricuture    - Dispo: Home today as long as TPN can be delivered     Malnutrition of moderate degree    Continue TPN  11p- 11a           Harry Mckenna MD  Colorectal Surgery  [facility]

## 2019-02-26 NOTE — NURSING
To whom it may concern:    Please excuse from work from 2/18/19 with return to work 3/6/19 due to illness

## 2019-02-26 NOTE — PLAN OF CARE
CARA placed call to Donnell with [employer] to inquire of TPN authorization from insurance. Cameron reports  he will call M back at 12 Noon to inform of authorization status.

## 2019-02-26 NOTE — PLAN OF CARE
CM met with patient to review discharge plan to home with [facility] infusion provider for TPN. Pt in agreement with discharge plan. Ptt's TPN supplies delivered to bedside by [facility]. CM informed pt of follow up appt with Dr Ruiz, pt verbalized understanding. Pt's father will provide transportation home. CM informed KATARZYNA Mcgill staff nurse of pt's discharge plan.     Future Appointments   Date Time Provider Department Center   3/13/2019  7:15 AM [facility]   3/14/2019  9:15 AM Srinivas Ruiz MD [facility] COLON [facility]   3/18/2019  2:00 PM Abraham Mesa MD [facility] GASTRO [facility]        02/26/19 7598   Final Note   Assessment Type Final Discharge Note   Anticipated Discharge Disposition IV Therapy  ([facility])   What phone number can be called within the next 1-3 days to see how you are doing after discharge? [phone]   Hospital Follow Up  Appt(s) scheduled? Yes   Discharge plans and expectations educations in teach back method with documentation complete? Yes

## 2019-02-26 NOTE — PLAN OF CARE
Problem: Adult Inpatient Plan of Care  Goal: Plan of Care Review      Recommendations    Recommendation/Intervention:     1. Continue Regular diet + Boost Plus. Pt reports eating % of all meals.     2. Recommend Custom TPN 30 gm AA / 200 gm Dex to provide 800 kcal (48% EEN), 30 gm protein (48% EPN), and GIR of 2.9.    - No lipids 2/2 po intake.     3. RD following.     Goals: meet % EEN/EPN  Nutrition Goal Status: goal met

## 2019-02-26 NOTE — PLAN OF CARE
POC reviewed w/ pt, verbalized understanding. Pt AAOx4. VSS on RA. Patient denies pain overnight. Patient on telemetry, NSR. Pt ambulates to bathroom independently, with adequate UOP overnight. Pt tolerating low fiber/residue diet with no c/o nausea. Pt slept between care. Frequent rounds made for pt safety. Call light in reach and bed in lowest position. WCTM

## 2019-02-26 NOTE — SUBJECTIVE & OBJECTIVE
Subjective:     Interval History: no acute events overnight. Was supposed to be DC'd but apparent issues with TPN.    Post-Op Info:  Procedure(s) (LRB):  COLONOSCOPY (N/A)  EGD (ESOPHAGOGASTRODUODENOSCOPY) (N/A)   6 Days Post-Op      Medications:  Continuous Infusions:   TPN ADULT CENTRAL LINE CUSTOM 75 mL/hr at 02/25/19 2154     Scheduled Meds:   clobetasol 0.05%   Topical (Top) BID    enoxaparin  40 mg Subcutaneous Daily    fluocinonide 0.05%   Topical (Top) BID     PRN Meds:     Objective:     Vital Signs (Most Recent):  Temp: 98.3 °F (36.8 °C) (02/26/19 0503)  Pulse: 87 (02/26/19 0720)  Resp: 18 (02/26/19 0503)  BP: 97/62 (02/26/19 0503)  SpO2: 99 % (02/26/19 0503) Vital Signs (24h Range):  Temp:  [97.6 °F (36.4 °C)-98.8 °F (37.1 °C)] 98.3 °F (36.8 °C)  Pulse:  [] 87  Resp:  [12-18] 18  SpO2:  [99 %-100 %] 99 %  BP: ()/(55-70) 97/62     Intake/Output - Last 3 Shifts       02/24 0700 - 02/25 0659 02/25 0700 - 02/26 0659 02/26 0700 - 02/27 0659    P.O. 370      IV Piggyback       TPN 1967.1      Total Intake(mL/kg) 2337.1 (49.1)      Urine (mL/kg/hr) 2850 (2.5) 2000 (1.8)     Stool 0 0     Total Output 2850 2000     Net -512.9 -2000            Stool Occurrence 1 x 0 x           Physical Exam   Constitutional: She is oriented to person, place, and time. She appears well-developed.   HENT:   Head: Normocephalic.   Eyes: Pupils are equal, round, and reactive to light.   Cardiovascular: Normal rate, regular rhythm and normal heart sounds.   Pulmonary/Chest: Effort normal and breath sounds normal. No respiratory distress. She has no wheezes. She has no rales.   Abdominal: Soft. She exhibits no mass. There is no rebound and no guarding.   Neurological: She is alert and oriented to person, place, and time.   Skin: Skin is warm and dry.   Psychiatric: She has a normal mood and affect. Her behavior is normal. Judgment and thought content normal.     Significant Labs:  None    Significant  Diagnostics:  None

## 2019-02-26 NOTE — ASSESSMENT & PLAN NOTE
Malnutrition in the context of Chronic Illness/Injury    Related to (etiology):  Crohn's disease     Signs and Symptoms (as evidenced by):  Energy Intake: <75% of estimated energy requirement for > 3 months  Body Fat Depletion: moderate depletion of triceps   Muscle Mass Depletion: moderate depletion of temples, clavicle region and lower extremities   Weight Loss: no wt loss within 1 year per pt      Interventions/Recommendations (treatment strategy):  Collaboration and Referral of Nutrition Care    Nutrition Diagnosis Status:  Continues

## 2019-02-26 NOTE — NURSING
Discharge instructions and follow-up appointments complete with no further questions at this time. Printed work/school excuse handed to patient. No acute distress or needs at this time. Pt request for wheelchair and cart for personal items. Pt discharge to home with supplies at bedside.

## 2019-02-26 NOTE — PLAN OF CARE
CM at bedside with patient and [facility] provider to discuss pt's discharge to home. Patient in agreement with discharge to home with TPN. Cameron provided TPN and PICC line education, pt verbalized and performed return demonstration. Patient request to have a return to work form to provide to employer.    Cameron request to have labs added to Home Health orders; CBC with diff., CMP and triglycerides.     CM placed call to NP to inform of the above request.

## 2019-02-27 ENCOUNTER — TELEPHONE (OUTPATIENT)
Dept: GASTROENTEROLOGY | Facility: CLINIC | Age: 29
End: 2019-02-27

## 2019-02-27 NOTE — TELEPHONE ENCOUNTER
----- Message from Suzi Bacon MD sent at 2/27/2019  1:01 PM CST -----  Please call and ask when path will be available.  We had called yesterday and they had said yesterday but still not back    Thanks  SS

## 2019-02-27 NOTE — TELEPHONE ENCOUNTER
Spoke to Nika, She will inform Dr. Mark that Dr. Bacon's office is calling for path results. No ETA on path results available at this time.     Nika did mention that additional tests had been added onto path order.

## 2019-02-28 ENCOUNTER — PATIENT OUTREACH (OUTPATIENT)
Dept: ADMINISTRATIVE | Facility: CLINIC | Age: 29
End: 2019-02-28

## 2019-02-28 NOTE — HOSPITAL COURSE
Pt was admtted, keep NPO, consult GI who p erformed EGD and colonoscopy 2/20,:  EGD normal scope:   A diffuse area of the terminal ileum was congested and ICV. Biopsies        were taken with a cold forceps for histology. Estimated blood loss        was minimal.       A diffuse area of mucosa in the terminal ileum was granular.        Biopsies were taken with a cold forceps for histology.       The ileum, 5 cm from the ileocecal valve contained a severe stenosis        measuring 5 mm (inner diameter) that was non-traversed. Biopsies        were taken with a cold forceps for histology. Estimated blood loss        was minimal.       The retroflexed view of the distal rectum and anal verge was normal        and showed no anal or rectal abnormalities.       Non-bleeding internal hemorrhoids were found during retroflexion.   PICC line placed and begun due to moderate malnutrtion, prealb 6 as pt will need surgical intervention once nutrition improved.  Social service began process for obtaining TPN at home on 2.21.19 but TPN did not approved until 2/26.  Pt will be discharged home with cyclic tpn and to return to see GI and Dr. Albert in 2-3 weeks, pt aware that she would need surgery in near future.

## 2019-02-28 NOTE — PHYSICIAN QUERY
"PT Name: Lora Scott  MR #: [id number]    Physician Query Form - Consultant Diagnosis Clarification     Kim Stubbs RN, CCDS  Desk # [phone]; vance # [phone] [email or web address]    This form is a permanent document in the medical record.     Query Date: February 28, 2019    By submitting this query, we are merely seeking further clarification of documentation.  Please utilize your independent clinical judgment when addressing the question(s) below.    The Medical record contains the following:   Diagnosis Supporting Clinical Information Location in Medical Record   Iron Deficiency Anemia due to chronic blood loss    Inpt meds:   ferric gluconate (FERRLECIT) 125 mg in sodium  chloride 0.9% 100 mL IVPB  ended: 02/24/19    ---Per anesthesia chart     2/18/2019 09:12 2/19/2019 06:53 2/20/2019 07:55 2/21/2019 09:10 2/22/2019 04:30 2/23/2019 04:30 2/24/2019 04:28   Hgb 6.8 (L) 6.4 (L) 6.1 (L) 6.8 (L) 6.2 (L) 6.7 (L) 6.9 (L)   Hct 24.3 (L) 23.7 (L) 21.4 (L) 25.1 (L) 22.2 (L) 24.0 (L) 25.0 (L)     Asymptomatic hypotension and anemia continue  Monitor labs    Crohn's disease involving terminal ileum  Colitis with fistula      Labs              Crs pn 2/22      DC Summ     Do you agree with the Consultants diagnosis of "Iron Deficiency Anemia due to chronic blood loss".     [x  ] Yes   [  ] Yes, but it resolved prior to my assessment of the patient   [  ] No   [  ] Other/Clarification of findings: ________________   [  ] Clinically undetermined                      "

## 2019-02-28 NOTE — PATIENT INSTRUCTIONS
Discharge Instructions for Crohns Disease  You have been diagnosed with Crohns disease. Your digestive tract is swollen and inflamed. All layers of your digestive tract may be affected. Although there is no cure for Crohns disease, you can receive treatment for the symptoms. Help manage your symptoms by following your healthcare providers advice and watching what you eat.  Home care  Recommendations for taking care of yourself at home include the following:  · Work closely with your healthcare provider to determine the types of treatment that are best for you.  · Take your medicines exactly as directed.  ¨ Let your healthcare provider know if you are having uncomfortable side effects.  ¨ Dont stop taking your medicines without talking with your healthcare provider first.  · It may be helpful to avoid certain foods for a little while. Depending on your condition, these may include caffeine (coffee, tea, and cola), spicy foods, milk products, and raw fruits and vegetables. For certain people, these can be hard to digest and can worsen symptoms in a flare-up. Your healthcare provider may have you work with a nutritionist to come up with the best food choices for you.  · Try eating several small meals a day instead of 3 large ones.  · Don't smoke. Tobacco smoking makes the disease worse.   · Keep appointments for regular checkups even if you are not having symptoms.  · Talk to your healthcare provider about surgery for Crohns disease. Surgery wont cure Crohns disease, but it may help control the symptoms. Only you and your healthcare provider can decide if this choice is right for you.  · Learn more about your condition. Contact the Crohns and Col[employer] toll-free at [phone] or www.ccfa.org  Managing nutrition  You may be able to eat most foods until you have a flare-up. But like anyone else, you need to make healthy eating choices. Some of the healthiest foods can make symptoms worse, though.  "Keeping track of your "problem foods" may be helpful. Ask your healthcare provider any questions you have about healthy eating.     When to call your healthcare provider  Call your healthcare provider right away if you have any of the following:  · Severe pain or bloating in your belly after meals  · Sores in your mouth  · Sores in your anal area (around your rectum)  · Fever of 100.4°F (38°C) or higher, or as directed by your healthcare provider  · Poor appetite or weight loss  · Bloody diarrhea  · Nausea or vomiting  · Skin rashes or skin that weeps (or drains)  · Changes in your vision   Date Last Reviewed: 8/1/2016  © 7973-4114 [employer]. [address] [place] [address], [place] [address]. All rights reserved. This information is not intended as a substitute for professional medical care. Always follow your healthcare professional's instructions.        "

## 2019-02-28 NOTE — DISCHARGE SUMMARY
[facility]  Colorectal Surgery  Discharge Summary      Patient Name: Lora Scott  MRN: [id number]  Admission Date: 2/18/2019  Hospital Length of Stay: 8 days  Discharge Date and Time: 2/26/2019  5:26 PM  Attending Physician: No att. providers found   Discharging Provider: Letty Stringer NP  Primary Care Provider: Margarita Jay MD     HPI:  Pt accepted as transfer from [facility], 28 year old black female with IBS, anemia, asthma and GERDS presented to [facility] with complaints of worsening  Of chronic  right upper abd pain, no fever, no n/v, denies diarrhea at this time.   Denies dysuria, vaginal discharge, hematuria, flank pain or food intolerance, pain increases with bending over and lying on right side.  Pt complained of wanting to sleep during interview     Denies ever being treated for abd pain before, denies being told she has IBD, has been told in the past she had IBS with stools alternating between diarrhea and hard stools    Never had colonoscopy     No hx of IBD in family    Denies any abd surgery    Lives with father and works as  at the [email or web address]    While in ER CT without oral contrast done:       Abnormal wall thickening involving the ascending colon, cecum and terminal ileum with intense mucosal enhancement and luminal narrowing resulting in distention of proximal small bowel.  There is ilealileal and ileocolic fistulization, fat hypertrophy and increased mesenteric vascularity.  Reactive mesenteric adenitis in the right lower quadrant.  No abscess collection.  The appendix is not seen.    Transferred to [facility] for higher level of care for poss Crohns dx    Procedure(s) (LRB):  COLONOSCOPY (N/A)  EGD (ESOPHAGOGASTRODUODENOSCOPY) (N/A)     Hospital Course:  Pt was admtted, keep NPO, consult GI who p erformed EGD and colonoscopy 2/20,:  EGD normal scope:   A diffuse area of the terminal ileum was congested and ICV. Biopsies        were taken with a cold  forceps for histology. Estimated blood loss        was minimal.       A diffuse area of mucosa in the terminal ileum was granular.        Biopsies were taken with a cold forceps for histology.       The ileum, 5 cm from the ileocecal valve contained a severe stenosis        measuring 5 mm (inner diameter) that was non-traversed. Biopsies        were taken with a cold forceps for histology. Estimated blood loss        was minimal.       The retroflexed view of the distal rectum and anal verge was normal        and showed no anal or rectal abnormalities.       Non-bleeding internal hemorrhoids were found during retroflexion.   PICC line placed and begun due to moderate malnutrtion, prealb 6 as pt will need surgical intervention once nutrition improved.  Social service began process for obtaining TPN at home on 2.21.19 but TPN did not approved until 2/26.  Pt will be discharged home with cyclic tpn and to return to see GI and Dr. Albert in 2-3 weeks, pt aware that she would need surgery in near future.        Consults (From admission, onward)        Status Ordering Provider     Inpatient consult to Gastroenterology  Once     Provider:  (Not yet assigned)    Completed SOBEIDA ABREU     Inpatient consult to PICC team ([facility])  Once     Provider:  (Not yet assigned)    Completed SOBEIDA ABREU          Significant Diagnostic Studies: Labs: BMP: No results for input(s): GLU, NA, K, CL, CO2, BUN, CREATININE, CALCIUM, MG in the last 48 hours. and CBC No results for input(s): WBC, HGB, HCT, PLT in the last 48 hours.    Pending Diagnostic Studies:     None        Final Active Diagnoses:    Diagnosis Date Noted POA    PRINCIPAL PROBLEM:  Crohn's disease involving terminal ileum [K50.00] 02/18/2019 Yes    Malnutrition of moderate degree [E44.0] 02/19/2019 Yes    Colitis with fistula [K52.9, K63.2] 02/18/2019 Yes      Problems Resolved During this Admission:      Discharged Condition: good    Disposition: Home or Self  Care    Follow Up:  Follow-up Information     Srinivas Ruiz MD In 2 weeks.    Specialty:  Colon and Rectal Surgery  Contact information:  [address]  [facility] [id number]  [phone]                 Patient Instructions:      Diet Adult Regular   Scheduling Instructions: Low fiber, no fresh fruits or vegetables, no nuts, grapes, popcorn or raisins     Notify your health care provider if you experience any of the following:  temperature >100.4     Notify your health care provider if you experience any of the following:  persistent nausea and vomiting or diarrhea     Notify your health care provider if you experience any of the following:  severe uncontrolled pain     Notify your health care provider if you experience any of the following:  redness, tenderness, or signs of infection (pain, swelling, redness, odor or green/yellow discharge around incision site)     Notify your health care provider if you experience any of the following:  difficulty breathing or increased cough     Notify your health care provider if you experience any of the following:  severe persistent headache     Notify your health care provider if you experience any of the following:  worsening rash     Notify your health care provider if you experience any of the following:  persistent dizziness, light-headedness, or visual disturbances     Notify your health care provider if you experience any of the following:  increased confusion or weakness     Lifting restrictions   Order Comments: No lifting anything greater than 5 pounds     Call MD for:  persistent nausea and vomiting or diarrhea     Call MD for:  severe uncontrolled pain     Call MD for:  redness, tenderness, or signs of infection (pain, swelling, redness, odor or green/yellow discharge around incision site)     Call MD for:  difficulty breathing or increased cough     Call MD for:  severe persistent headache     Call MD for:  worsening rash     Call MD for:  persistent  dizziness, light-headedness, or visual disturbances     Call MD for:  increased confusion or weakness     Call MD for:   Order Comments: Call for temp above 101     Activity as tolerated     Medications:  Reconciled Home Medications:      Medication List      CONTINUE taking these medications    clobetasol 0.05 % external solution  Commonly known as:  TEMOVATE  Apply topically 2 (two) times daily. for 10 days     diphenhydrAMINE 25 mg capsule  Commonly known as:  BENADRYL  Take 1 each (25 mg total) by mouth every 6 (six) hours as needed for Itching.     fluocinolone 0.01 % external oil  Commonly known as:  DERMA-SMOOTHE  Apply topically every evening. Apply well to scalp at bedtime cover with a shower cap for 10 days     fluocinonide 0.05 % ointment  Commonly known as:  LIDEX  Apply topically 2 (two) times daily. for 10 days     loratadine 10 mg tablet  Commonly known as:  CLARITIN  Take 1 tablet (10 mg total) by mouth once daily.     ondansetron 4 MG tablet  Commonly known as:  ZOFRAN  Take 1 tablet (4 mg total) by mouth every 6 (six) hours as needed for Nausea.            Letty Stringer NP  Colorectal Surgery  [facility]

## 2019-03-01 ENCOUNTER — TELEPHONE (OUTPATIENT)
Dept: GASTROENTEROLOGY | Facility: CLINIC | Age: 29
End: 2019-03-01

## 2019-03-01 DIAGNOSIS — R85.7 ABNORMAL SMALL BOWEL BIOPSY: Primary | ICD-10-CM

## 2019-03-01 NOTE — TELEPHONE ENCOUNTER
----- Message from Fawad Perla MD sent at 2/28/2019  7:00 AM CST -----  Maude - please tell Lora that Her EGD and colonoscopy pathology was benign and no dysplasia but some very non-specific findings in her duodenum (proximal small bowel - found on the upper endoscopy) and pathology recommend celiac sprue serology to be order (blood work) - orders placed.    Lora you colonoscopy pathology was benign and no dysplasia but Terminal ileum has moderate chronic ileitis    SPECIMEN  1) Small bowel; rule out IBD, rule out celiac.  2) Stomach; rule out IBD, rule out H. pylori.  3) Terminal ileum; rule out IBD, rule out dysplasia, rule out TB, rule out CMV, rule out HSV.  4) Right colon; rule out IBD, rule out dysplasia, rule out CMV, rule out HSV.  5) Left colon (distal transverse, descending, sigmoid); rule out IBD, rule out dysplasia, rule out CMV,  rule out HSV.  6) Rectum; rule out IBD, rule out dysplasia, rule out CMV, rule out HSV.  FINAL PATHOLOGIC DIAGNOSIS  1. SMALL BOWEL (BIOPSY):  Mild villous blunting and mild increased intraepithelial lymphocytes  No granulomas, no dysplasia  Celiac serology may be helpful if clinically indicated  2. STOMACH (BIOPSY):  Oxyntic mucosa with minimal chronic inflammation, nonspecific  No active inflammation  No Helicobacter organisms  No granulomas, no dysplasia  3. TERMINAL ILEUM (BIOPSY):  Chronic ileitis with moderate activity  No viral inclusions (CMV, HSV 1 & 2 immunostains negative)  No acid fast organisms (AFB stain)  No granulomas, no dysplasia  4. COLON, RIGHT (BIOPSY):  Focal moderate active colitis, one fragment  ASR,ASR,ASR  Report continued on next page Page 1 of 3  Patient Name LORA FARIAS Accession # [id number]  (28Y F)  Location  Date of Birth  [id number]  1990  Billing #  Collection Date  Received  Reported  Medical Record #  02/20/2019 02/20/2019 02/27/2019  [facility] 2nd Floor  [id number]  Other fragments with no significant histopathologic  changes  No definite evidence of chronic injury  No viral inclusions (CMV and HSV 1 & 2 immunostains negative)  No granulomas, no dysplasia  5. COLON, LEFT (BIOPSY):  Colonic mucosa with no significant histopathologic changes  6. RECTUM (BIOPSY):  Rectal mucosa with no significant histopathologic changes  Diagnosed by: Lady Mark M.D.

## 2019-03-04 ENCOUNTER — LAB VISIT (OUTPATIENT)
Dept: LAB | Facility: HOSPITAL | Age: 29
End: 2019-03-04
Attending: COLON & RECTAL SURGERY
Payer: COMMERCIAL

## 2019-03-04 ENCOUNTER — TELEPHONE (OUTPATIENT)
Dept: GASTROENTEROLOGY | Facility: CLINIC | Age: 29
End: 2019-03-04

## 2019-03-04 DIAGNOSIS — K50.00 REGIONAL ENTERITIS OF SMALL INTESTINE: Primary | ICD-10-CM

## 2019-03-04 LAB
ALBUMIN SERPL BCP-MCNC: 2.4 G/DL
ALP SERPL-CCNC: 199 U/L
ALT SERPL W/O P-5'-P-CCNC: 339 U/L
ANION GAP SERPL CALC-SCNC: 6 MMOL/L
AST SERPL-CCNC: 92 U/L
BASOPHILS # BLD AUTO: 0.01 K/UL
BASOPHILS NFR BLD: 0.2 %
BILIRUB SERPL-MCNC: 0.3 MG/DL
BUN SERPL-MCNC: 18 MG/DL
CALCIUM SERPL-MCNC: 8.6 MG/DL
CHLORIDE SERPL-SCNC: 103 MMOL/L
CO2 SERPL-SCNC: 27 MMOL/L
CREAT SERPL-MCNC: 0.5 MG/DL
DIFFERENTIAL METHOD: ABNORMAL
EOSINOPHIL # BLD AUTO: 0 K/UL
EOSINOPHIL NFR BLD: 0.6 %
ERYTHROCYTE [DISTWIDTH] IN BLOOD BY AUTOMATED COUNT: 27.7 %
EST. GFR  (AFRICAN AMERICAN): >60 ML/MIN/1.73 M^2
EST. GFR  (NON AFRICAN AMERICAN): >60 ML/MIN/1.73 M^2
GLUCOSE SERPL-MCNC: 75 MG/DL
HCT VFR BLD AUTO: 26.8 %
HGB BLD-MCNC: 7.4 G/DL
IMM GRANULOCYTES # BLD AUTO: 0.02 K/UL
IMM GRANULOCYTES NFR BLD AUTO: 0.3 %
LYMPHOCYTES # BLD AUTO: 0.8 K/UL
LYMPHOCYTES NFR BLD: 12.6 %
MAGNESIUM SERPL-MCNC: 2.2 MG/DL
MCH RBC QN AUTO: 22.8 PG
MCHC RBC AUTO-ENTMCNC: 27.6 G/DL
MCV RBC AUTO: 83 FL
MONOCYTES # BLD AUTO: 0.6 K/UL
MONOCYTES NFR BLD: 9.7 %
NEUTROPHILS # BLD AUTO: 4.7 K/UL
NEUTROPHILS NFR BLD: 76.6 %
NRBC BLD-RTO: 0 /100 WBC
PHOSPHATE SERPL-MCNC: 4.2 MG/DL
PLATELET # BLD AUTO: 402 K/UL
PMV BLD AUTO: 10.3 FL
POTASSIUM SERPL-SCNC: 5.1 MMOL/L
PREALB SERPL-MCNC: 12 MG/DL
PROT SERPL-MCNC: 7.1 G/DL
RBC # BLD AUTO: 3.24 M/UL
SODIUM SERPL-SCNC: 136 MMOL/L
TRIGL SERPL-MCNC: 33 MG/DL
WBC # BLD AUTO: 6.18 K/UL

## 2019-03-04 PROCEDURE — 84478 ASSAY OF TRIGLYCERIDES: CPT

## 2019-03-04 PROCEDURE — 84100 ASSAY OF PHOSPHORUS: CPT

## 2019-03-04 PROCEDURE — 85025 COMPLETE CBC W/AUTO DIFF WBC: CPT

## 2019-03-04 PROCEDURE — 83735 ASSAY OF MAGNESIUM: CPT

## 2019-03-04 PROCEDURE — 80053 COMPREHEN METABOLIC PANEL: CPT

## 2019-03-04 PROCEDURE — 84134 ASSAY OF PREALBUMIN: CPT

## 2019-03-04 NOTE — TELEPHONE ENCOUNTER
Pt scheduled for IBD consultation w/ Dr. Bacon on 3/11/2019.    E-mail sent to pt w/ appt reminder, new patient welcome letter as well as a campus map.

## 2019-03-06 ENCOUNTER — PATIENT MESSAGE (OUTPATIENT)
Dept: ADMINISTRATIVE | Facility: OTHER | Age: 29
End: 2019-03-06

## 2019-03-07 NOTE — PROGRESS NOTES
"                    [facility]          Inflammatory Bowel Disease New Patient Consultation Note         TODAY'S VISIT DATE:  3/7/2019    Reason for Consult:    Crohn's disease    PCP: Margarita Jay      Referring MD:   Dr. Srinivas Ruiz    History of Present Illness:    Dear. Dr. Srinivas Ruiz    Thank you for requesting a consultation on your patient Lora Scott who is a 29 y.o. female seen today at the [facility] on 03/07/2019 for inflammatory bowel disease- Crohn's disease.  Pertinent past medical history includes GERD, anemia, asthma, smoker.     As you know, Lora Scott was doing well until August 2018 when she began with abdominal pain and then progressed to severe RLQ constant, dull achy pain. She also reported alternating diarrhea and occasional magaly blood or even constipation (has been going on for a few years). She was told she had IBS as a teenager and nexium helped. Also during that time she had weight loss that improved with nexium so asked MD in 12/2018 to prescribe nexium again due to her symptoms of abdominal pain, weight loss and alternating bowel movements. She went to the ER on 2/18/19 CT abd/pelvis showed "abnormal wall thickening involving the ascending colon, cecum and terminal ileum with intense mucosal enhancement and luminal narrowing resulting in distention of proximal small bowel. There is ileal-ileal and ileocolic fistulization, fat hypertrophy and increased mesenteric vascularity. Reactive mesenteric adenitis in the right lower quadrant. No abscess collection. The appendix is not seen."  She was then transferred from OSH for these findings.  GI inpatient team (Dr. Perla and GI fellow Dr. Gonzalez) consulted on the patient on 2/18/19 with a plan for EGD, colonoscopy, labs.  Xray of the abdomen showed no dilation. PICC line was placed on 2/20/19. EGD  on 2/20/19 showed minimal chronic H pylori neg inflammation, normal " esophagus. Duodenum was endoscopically normal though biopsies showed mild villous blunting and mild increased IELs. Colonoscopy on 19 showed congested mucosa in the terminal ileum and ICV with granularity in the terminal ileum, TI stricture 5 cm from ICV (ileal biopsies- chronic ileitis), normal colon endoscopically with biopsies of the right colon showing focal moderate active colitis though left colon and rectum normal, sigmoid diverticulosis, internal hemorrhoids.     It was felt that patient would need surgical intervention after optimizing her nutrition and she was sent home with bowel rest, TPN (on it currently for 12 hours at night and PICC line in left arm) and plan for f/u with surgeon. Dr. Ruiz and CRS fellow had messaged me to see patient in our IBD clinic so we scheduled her for follow up to see me today.  She had MRE scheduled on 3/13/18 and follow up with Dr. Ruiz on 3/14/19. Currently patient is on no treatment for her new onset of IBD. She is having 2 soft BMs/day and reports 6 pound weight loss. She has pressure non-radiating, non-exertional chest pain off and on about twice a day but this resolves after a few minutes on its own. She reports RLQ abdominal pain with cramping. She has some intermittent joint pains.  She reports having a rash all over her body that is pruritic and was told in the past that the eczema has turned into psoriasis. She has joint pains and some back pain.     Prior Pertinent Surgeries:   None    Pertinent Endoscopy/Imagin2019 CT abd/pelvis: abnormal wall thickening involving the ascending colon, cecum, and terminal ileum with intense mucosal enhancement and luminal narrowing resulting in distention of proximal small bowel; ileal-ileal and ileocolic fistulization, fat hypertropy, and increased mesenteric vascularity; reactive mesenteric adenitis in the RLQ; no abscess; appendix not seen; associated partial SBO  2019 Xray abdomen: gas-filled large and  small bowel loops without significant dilation; stool projects over the rectum; no calcifications to suggest nephrolithiasis; lower lung zones are grossly clear; no acute osseous abnormality   2/20/2019 CXR: abnormal portion of left-sided PICC line with the tip coiled within the left brachiocephalic vein; repositioning suggested   2/20/2019 EGD: normal examined duodenum (path: mild villous blunting and mild increased intraepithelial lymphocytes); normal stomach (path: minimal chronic H. pylori negative inflammation); normal esophagus  2/20/2019 Colonoscopy: congested mucosa in the terminal ileum and ICV; granularity in the terminal ileum; stricture in the terminal ileum 5 cm from the ICV (path: chronic ileitis); entire examined colon (path-right colon: focal moderate active colitis) (path-left colon, rectum: normal); diverticulosis in the sigmoid colon; non-bleeding internal hemorrhoids     Pertinent Labs:  Lab Results   Component Value Date    CRP 16.9 (H) 02/24/2019     No results found for: TTGIGA, IGA  Lab Results   Component Value Date    TSH 0.713 08/19/2015     No results found for: BTRFZYWC25OL, CFRECDWT82  Lab Results   Component Value Date    HEPBSAG Negative 02/19/2019    HEPBCAB Negative 02/19/2019    HEPCAB Negative 02/19/2019     Lab Results   Component Value Date    WLW41XQWG Negative 02/19/2019     Lab Results   Component Value Date    NIL 0.040 02/19/2019    MITOGENNIL 0.710 02/19/2019     Lab Results   Component Value Date    TPTMINTERP SEE BELOW 02/19/2019     Lab Results   Component Value Date    STOOLCULTURE  08/19/2015     No Salmonella,Shigella,Vibrio,Campylobacter,Yersinia isolated.    DZLZWVXXAB8N Negative 08/19/2015    ISTHXYZJYO5G Negative 08/19/2015     No results found for: CALPROTECTIN    Therapeutic Drug Monitoring Labs:  NA    Prior IBD Therapies:  None    Current IBD Therapies:  None    Vaccinations:  Lab Results   Component Value Date    HEPBSAB Grayzone (A) 02/19/2019     Lab  Results   Component Value Date    HEPAIGG Positive (A) 02/19/2019     No results found for: VARICELLAZOS, VARICELLAINT    Influenza (inactive): recommended yearly  Pneumococcal PCV 13: recommended  Pneumococcal PCV 23: recommended 2-12 mos later   Tetanus (TdaP): 07/24/2006  HPV (males and females ages 19-27 yo): N/A  Meningococcal: 07/24/2006 (MCV4P)  Hepatitis B: 07/24/2006, grayzone  Hepatitis A: immune  MMR (live vaccine): 01/13/1994, 07/24/2006  Chickenpox status/Varicella (live vaccine): had chickenpox as a child   Zoster (live vaccine):  Contraindicated in anticipation of future immunosuppression  Shingrix: recommended once available    Review of Systems   Constitutional: Positive for weight loss. Negative for chills and fever.   HENT:        No oral ulcers, dysphagia, oral thrush   Eyes: Negative for blurred vision, pain and redness.   Respiratory: Negative for cough and shortness of breath.    Cardiovascular: Positive for chest pain.   Gastrointestinal: Positive for abdominal pain. Negative for heartburn, nausea and vomiting.   Genitourinary: Negative for dysuria and hematuria.   Musculoskeletal: Positive for back pain and joint pain.   Skin: Negative for rash.   Psychiatric/Behavioral: Negative for depression. The patient is not nervous/anxious and does not have insomnia.      Medical/Surgical History:    has a past medical history of Allergy, Anemia, Asthma, Depression, GERD (gastroesophageal reflux disease), IBS (irritable bowel syndrome), and Smoker.    Family History:   family history includes Hypertension in her mother; Irritable bowel syndrome in her paternal grandmother. otherwise no IBD (including Crohn's disease and ulcerative colitis), celiac disease, autoimmune diseases (lupus, psoriasis, multiple sclerosis), liver disease, gastrointestinal cancer, colon polyps.    Social History:    reports that she quit smoking about 3 weeks ago. Her smoking use included cigarettes. she has never used  "smokeless tobacco. She reports that she drinks alcohol. She reports that she does not use drugs.    Review of patient's allergies indicates:   Allergen Reactions    Shellfish containing products Anaphylaxis     Allergic to all seafood.    Horse/equine containing products Itching     Current Medications:   TPN only    Vital Signs:  /68 (BP Location: Right arm, Patient Position: Sitting)   Pulse 109 Comment: ox:99  Temp 98.4 °F (36.9 °C)   Resp 16   Ht 5' 2" (1.575 m)   Wt 45.1 kg (99 lb 6.8 oz)   BMI 18.19 kg/m²     Physical Exam   Constitutional: She is oriented to person, place, and time. She appears well-developed.   HENT:   Mouth/Throat: Oropharynx is clear and moist. No oral lesions.   No oral ulcers or thrush   Eyes: Conjunctivae are normal. Pupils are equal, round, and reactive to light.   Cardiovascular: Normal rate and regular rhythm.   Pulmonary/Chest: Effort normal and breath sounds normal.   Abdominal: Soft. There is tenderness (RLQ).   Musculoskeletal:        Right lower leg: She exhibits no swelling.        Left lower leg: She exhibits no swelling.   LUE with PICC line   Neurological: She is alert and oriented to person, place, and time.   Skin: Rash (large scaly plaques on her LE and neck and some dry hyperpigmented lesions on right ear and entire trunk) noted.   Psychiatric: She has a normal mood and affect.   Nursing note and vitals reviewed.    Labs: reviewed and pertinent noted above    Assessment/Plan:  Lora Scott is a 29 y.o. female with Crohn's disease (focal area of microscopic inflammation in ascending colon on biopsies, ileal with stricture).  She has a past medical history of GERD, anemia, asthma, smoker.  Patient reports being diagnosed with IBS with alternating diarrhea and constipation and weight loss as a teenager that was treated with Nexium which helped.  In 8/2018 she again had abd pain, weight loss, and alternating BMs so she contacted her PCP for Nexium which did " "not help.  By 2/2019, her symptoms progressed prompting an ER visit and on 2/18/2019 she had a CT abd/pelvis which showed an "abnormal wall thickening involving the ascending colon, cecum and terminal ileum with intense mucosal enhancement and luminal narrowing resulting in distention of proximal small bowel.  There was also an ileal-ileal and ileocolic fistulization, fat hypertrophy, increased mesenteric vascularitiy, and reactive mesenteric adenitis in the RLQ with no abscess. "  Patient had an inpatient EGD on 2/20/2019 which showed minimal chronic H. Pylori negative inflammation, normal esophagus, and duodenal biopsies showed mild villous blunting and mild increased IELs.  Colonoscopy on the same day showed congested mucosa in the TI and ICV with granularity in the TI, and TI stricture 5 cm from the ICV with ileal biopsies confirmed ileitis and biopsies of the right colon confirmed focal moderate active colitis with normal left colon and rectum, sigmoid diverticulitis, and internal hemorrhoids.  It was felt that patient would eventually need surgical intervention but needed nutritional optimization initially so she was discharged home with 12 hours of TPN daily via a PICC and bowel rest with plans for an MRE, surgical f/u, and a referral to the IBD clinic to discuss medical options.  Currently she is on no medications for new onset Crohn's disease and has an MRE scheduled for 3/13/2019 with a f/u with Dr. Ruiz on 3/14/2019 and contiues on TPN from 11 pm - 11 am daily.       Patient was recently hospitalized for severe RLQ abdominal pain and has had altered bowel habits for several years.  Her workup includes CT imaging, colonoscopy and EGD with ileal stricture with upstream dilation and chronic ileitis on biopsies, moderate active microscopic colitis in the ascending colon.  MTB PCR of tissue and TB quant if negative.  The findings are consistent with Crohn's disease. Patient has MRE scheduled 3/13 followed " by appt with Dr. Ruiz on 3/14.  I would like to get the MRE first and discuss her case with Dr. Cope to determine if we can consider medical therapy or if surgical therapy is best followed by meds to prevent disease recurrence. We spent a lot of time discussing the diagnosis today and she will continue on TPN due to malnutrition at this time. I have referred her to dermatology to evaluate her skin lesions which may be psoriasis.     # Crohn's disease (focal area of microscopic inflammation in ascending colon on biopsies, ileal with stricture)  - I had a long discussion with patient regarding epidemiology, potential etiologies, associations and triggers (avoiding NSAIDS, using antibiotics with caution,stress and smoking effects on disease state), diagnosis, management goals and treatment options   - MRE scheduled for 3/13/19  - proceed with appt with Dr. Ruiz on 3/14   - after MRE reviewed and care coordinated with Dr. Ruiz will decide if medical therapy is an option or if surgery followed by medical therapy will be best  - basic labs: CBC, CMP, ESR, CRP- ordered today, vitamin B12 (ordered today), HCV Ab (negative 2/2019), HIV (negative 2/2019), thyroid testing (ordered today)  - drug monitoring labs: TPMT, TB quantiferon (negative 2/2019), Hep B testing (HBsAg, HBtotalcoreAb negative 2/2019)    # Psoriasis:  - this is not a definite diagnosis but overall this seems likely diagnosis rather than eczema  - referral to dermatology to confirm or refute diagnosis to allow us to decide on CD treatment given not all treatments help with psoriasis    # Elevated LFTs  (3/4/19-, AST 92, )  - repeat LFTs today- workup depending on results  - pt on TPN  - PSC remains a possibility    # Malnutrition  - significant weight loss  - on TPN   - labs today including prealbumin    # Abnormal duodenal biopsies (2/2019-mild villous blunting and mild increased IELs)  - Check TTG IgA and total serum IgA today to  assess for celiac disease     # Ex-Smoker- quit 3 weeks ago  - discussed in detail that Crohn's disease can worsen with smoking and may make patient more resistant to treatment and that smokers are at higher risk of developing CD than nonsmokers    # IBD specific health maintenance:  Colorectal cancer risk: average   Risks factors: none  - Distribution of colonic disease:  >1/3 of colon involved (?ascending colon-will confirm later)  - Year of symptom onset: 2018  - colonoscopy: future surveillance to be determined after distribution of disease is confirmed    Pap smear recommended per age/guidelines-defer to PCP, if immunosuppression started which is anticipated then she needs pap smears yearly  Opthamologic exam recommended yearly   Dermatologic exam recommended yearly due to risk of skin cancer with IBD/meds    Bone health:  Risk factors for osteopenia/osteoporosis:  malnutrition  Vitamin D: vitamin D level ordered today    Vaccine counseling:  - No LIVE VACCINES   - labs today to check immunity to hepatitis A, B and varicella    - referral to Infectious Disease clinic to get up to date on vaccinations    Follow up: with Dr. Bacon to be determined after MRE and f/u with Dr. Ruiz    Thank you again for sending Lora Tyler to see Dr. Suzi Bacon today at the [facility]. Please don't hesitate to contact Dr. Bacon if there are any questions regarding this evaluation, or if you have any other patients with inflammatory bowel disease for whom you would like a consultation. You can reach Dr. Bacon at [phone] or by email at [email or web address]    History, physical exam, assessment and plan were performed by me personally. NP, Vanesa Jacob was present during entire visit     Suzi Bacon MD  Department of Gastroenterology  Medical Director, Inflammatory Bowel Disease

## 2019-03-11 ENCOUNTER — OFFICE VISIT (OUTPATIENT)
Dept: GASTROENTEROLOGY | Facility: CLINIC | Age: 29
End: 2019-03-11
Payer: COMMERCIAL

## 2019-03-11 ENCOUNTER — LAB VISIT (OUTPATIENT)
Dept: LAB | Facility: HOSPITAL | Age: 29
End: 2019-03-11
Attending: INTERNAL MEDICINE
Payer: COMMERCIAL

## 2019-03-11 ENCOUNTER — TELEPHONE (OUTPATIENT)
Dept: GASTROENTEROLOGY | Facility: CLINIC | Age: 29
End: 2019-03-11

## 2019-03-11 VITALS
HEIGHT: 62 IN | SYSTOLIC BLOOD PRESSURE: 101 MMHG | DIASTOLIC BLOOD PRESSURE: 68 MMHG | HEART RATE: 109 BPM | RESPIRATION RATE: 16 BRPM | TEMPERATURE: 98 F | BODY MASS INDEX: 18.3 KG/M2 | WEIGHT: 99.44 LBS

## 2019-03-11 DIAGNOSIS — R21 SKIN RASH: ICD-10-CM

## 2019-03-11 DIAGNOSIS — D50.0 IRON DEFICIENCY ANEMIA DUE TO CHRONIC BLOOD LOSS: ICD-10-CM

## 2019-03-11 DIAGNOSIS — K56.699 SMALL BOWEL STRICTURE: ICD-10-CM

## 2019-03-11 DIAGNOSIS — K50.00 CROHN'S DISEASE INVOLVING TERMINAL ILEUM: Primary | ICD-10-CM

## 2019-03-11 DIAGNOSIS — K50.00 CROHN'S DISEASE INVOLVING TERMINAL ILEUM: ICD-10-CM

## 2019-03-11 DIAGNOSIS — R21 RASH: ICD-10-CM

## 2019-03-11 DIAGNOSIS — R79.89 ELEVATED LIVER FUNCTION TESTS: ICD-10-CM

## 2019-03-11 DIAGNOSIS — K50.013 CROHN'S DISEASE OF ILEUM WITH FISTULA: ICD-10-CM

## 2019-03-11 DIAGNOSIS — E44.0 MALNUTRITION OF MODERATE DEGREE: ICD-10-CM

## 2019-03-11 LAB
25(OH)D3+25(OH)D2 SERPL-MCNC: 14 NG/ML
ALBUMIN SERPL BCP-MCNC: 2.5 G/DL
ALP SERPL-CCNC: 154 U/L
ALT SERPL W/O P-5'-P-CCNC: 82 U/L
ANION GAP SERPL CALC-SCNC: 9 MMOL/L
AST SERPL-CCNC: 39 U/L
BASOPHILS # BLD AUTO: 0.01 K/UL
BASOPHILS NFR BLD: 0.2 %
BILIRUB SERPL-MCNC: 0.4 MG/DL
BUN SERPL-MCNC: 22 MG/DL
CALCIUM SERPL-MCNC: 9.3 MG/DL
CHLORIDE SERPL-SCNC: 100 MMOL/L
CO2 SERPL-SCNC: 27 MMOL/L
CREAT SERPL-MCNC: 0.6 MG/DL
CRP SERPL-MCNC: 151.3 MG/L
DIFFERENTIAL METHOD: ABNORMAL
EOSINOPHIL # BLD AUTO: 0.1 K/UL
EOSINOPHIL NFR BLD: 1.4 %
ERYTHROCYTE [DISTWIDTH] IN BLOOD BY AUTOMATED COUNT: 26.2 %
ERYTHROCYTE [SEDIMENTATION RATE] IN BLOOD BY WESTERGREN METHOD: 60 MM/HR
EST. GFR  (AFRICAN AMERICAN): >60 ML/MIN/1.73 M^2
EST. GFR  (NON AFRICAN AMERICAN): >60 ML/MIN/1.73 M^2
FERRITIN SERPL-MCNC: 269 NG/ML
GLUCOSE SERPL-MCNC: 86 MG/DL
HCT VFR BLD AUTO: 32 %
HGB BLD-MCNC: 8.9 G/DL
IGA SERPL-MCNC: 670 MG/DL
IMM GRANULOCYTES # BLD AUTO: 0.02 K/UL
IMM GRANULOCYTES NFR BLD AUTO: 0.3 %
IRON SERPL-MCNC: 11 UG/DL
LYMPHOCYTES # BLD AUTO: 0.8 K/UL
LYMPHOCYTES NFR BLD: 12.7 %
MCH RBC QN AUTO: 22.3 PG
MCHC RBC AUTO-ENTMCNC: 27.8 G/DL
MCV RBC AUTO: 80 FL
MONOCYTES # BLD AUTO: 0.4 K/UL
MONOCYTES NFR BLD: 5.9 %
NEUTROPHILS # BLD AUTO: 5.3 K/UL
NEUTROPHILS NFR BLD: 79.5 %
NRBC BLD-RTO: 0 /100 WBC
PLATELET # BLD AUTO: 432 K/UL
PMV BLD AUTO: 9.8 FL
POTASSIUM SERPL-SCNC: 4.7 MMOL/L
PREALB SERPL-MCNC: 11 MG/DL
PROT SERPL-MCNC: 8.4 G/DL
RBC # BLD AUTO: 3.99 M/UL
SATURATED IRON: 4 %
SODIUM SERPL-SCNC: 136 MMOL/L
TOTAL IRON BINDING CAPACITY: 266 UG/DL
TRANSFERRIN SERPL-MCNC: 180 MG/DL
VIT B12 SERPL-MCNC: 545 PG/ML
WBC # BLD AUTO: 6.64 K/UL

## 2019-03-11 PROCEDURE — 99999 PR PBB SHADOW E&M-EST. PATIENT-LVL V: ICD-10-PCS | Mod: PBBFAC,,, | Performed by: INTERNAL MEDICINE

## 2019-03-11 PROCEDURE — 83516 IMMUNOASSAY NONANTIBODY: CPT

## 2019-03-11 PROCEDURE — 82306 VITAMIN D 25 HYDROXY: CPT

## 2019-03-11 PROCEDURE — 80053 COMPREHEN METABOLIC PANEL: CPT

## 2019-03-11 PROCEDURE — 83540 ASSAY OF IRON: CPT

## 2019-03-11 PROCEDURE — 86140 C-REACTIVE PROTEIN: CPT

## 2019-03-11 PROCEDURE — 36415 COLL VENOUS BLD VENIPUNCTURE: CPT

## 2019-03-11 PROCEDURE — 99215 PR OFFICE/OUTPT VISIT, EST, LEVL V, 40-54 MIN: ICD-10-PCS | Mod: S$GLB,,, | Performed by: INTERNAL MEDICINE

## 2019-03-11 PROCEDURE — 85025 COMPLETE CBC W/AUTO DIFF WBC: CPT

## 2019-03-11 PROCEDURE — 84134 ASSAY OF PREALBUMIN: CPT

## 2019-03-11 PROCEDURE — 82728 ASSAY OF FERRITIN: CPT

## 2019-03-11 PROCEDURE — 85652 RBC SED RATE AUTOMATED: CPT

## 2019-03-11 PROCEDURE — 82607 VITAMIN B-12: CPT

## 2019-03-11 PROCEDURE — 99215 OFFICE O/P EST HI 40 MIN: CPT | Mod: S$GLB,,, | Performed by: INTERNAL MEDICINE

## 2019-03-11 PROCEDURE — 86787 VARICELLA-ZOSTER ANTIBODY: CPT

## 2019-03-11 PROCEDURE — 99999 PR PBB SHADOW E&M-EST. PATIENT-LVL V: CPT | Mod: PBBFAC,,, | Performed by: INTERNAL MEDICINE

## 2019-03-11 PROCEDURE — 82784 ASSAY IGA/IGD/IGG/IGM EACH: CPT

## 2019-03-11 NOTE — PATIENT INSTRUCTIONS
Instructions:  - labs today  - proceed with MRE as scheduled  - continue to proceed with Dr. Ruiz's visit on 3/14  - referral to ID to get UTD with vaccines  - chew food well, follow a low fiber diet  - referral to Dermatology  - Avoid all NSAIDs (Advil, Ibuprofen, Motrin, Aspirin, Naprosyn, Aleve)  - Yearly Pap Smears recommended   - Yearly Eye exam  - Yearly Skin exam--wear sun block and hats  - Use antibiotics with caution  - For Dental Procedures, use antibiotics only if absolutely necessary  - If you have to take antibiotics for any infection, please take a 2 week course of OTC Florastor 1 capsule twice daily probiotic after completion of the antibiotic course  - Follow up with Dr. Bacon to be determined

## 2019-03-11 NOTE — LETTER
March 12, 2019      Srinivas Ruiz MD  [address] [facility] [address]           [facility]  [address]  [place] [address]  Phone: [phone]  Fax: [phone]          Patient: Lora Scott   MR Number: [id number]   YOB: 1990   Date of Visit: 3/11/2019       Dear Dr. Srinivas Ruiz:    Thank you for referring Lora Scott to me for evaluation. Attached you will find relevant portions of my assessment and plan of care.    If you have questions, please do not hesitate to call me. I look forward to following Lora Scott along with you.    Sincerely,    Suzi Bacon MD    Enclosure  CC:  No Recipients    If you would like to receive this communication electronically, please contact [email or web address] or [phone] to request more information on [employer] access.    For providers and/or their staff who would like to refer a patient to [facility], please contact us through our one-stop-shop provider referral line, [facility], at [phone].    If you feel you have received this communication in error or would no longer like to receive these types of communications, please e-mail [email or web address]

## 2019-03-11 NOTE — TELEPHONE ENCOUNTER
----- Message from Patricia Sumeet sent at 3/11/2019 11:53 AM CDT -----  Contact: self [phone]  Needs Advice    Reason for call: Pt called stating Avani sent her to the lab dept to get blood work done, and she has a picc line in her arm. She would like to know if the nurse in gastro can retrieve blood         Communication Preference: self [phone]    Additional Information:

## 2019-03-11 NOTE — TELEPHONE ENCOUNTER
Called and spoke with pt  I explained that we are unable to draw in our department   If there is no one in the lab that can do it than unfortunately it will have to be drawn regularly  She stated that for future reference from the lab if a patient asks about being draw from the picc line the answer is no

## 2019-03-12 PROBLEM — R21 SKIN RASH: Status: ACTIVE | Noted: 2019-03-12

## 2019-03-12 PROBLEM — K50.013 CROHN'S DISEASE OF ILEUM WITH FISTULA: Status: ACTIVE | Noted: 2019-02-18

## 2019-03-12 PROBLEM — R79.89 ELEVATED LIVER FUNCTION TESTS: Status: ACTIVE | Noted: 2019-03-12

## 2019-03-12 PROBLEM — K56.699 SMALL BOWEL STRICTURE: Status: ACTIVE | Noted: 2019-03-12

## 2019-03-12 PROBLEM — L03.315 CELLULITIS OF PERINEUM: Status: RESOLVED | Noted: 2018-03-04 | Resolved: 2019-03-12

## 2019-03-12 LAB — TTG IGA SER-ACNC: 7 UNITS

## 2019-03-13 ENCOUNTER — HOSPITAL ENCOUNTER (OUTPATIENT)
Dept: RADIOLOGY | Facility: HOSPITAL | Age: 29
Discharge: HOME | End: 2019-03-13
Attending: NURSE PRACTITIONER
Payer: COMMERCIAL

## 2019-03-13 DIAGNOSIS — K56.699 COLON STRICTURE: ICD-10-CM

## 2019-03-13 PROCEDURE — 74183 MRI ENTEROGRAPHY: ICD-10-PCS | Mod: 26,,, | Performed by: RADIOLOGY

## 2019-03-13 PROCEDURE — 72197 MRI PELVIS W/O & W/DYE: CPT | Mod: 26,,, | Performed by: RADIOLOGY

## 2019-03-13 PROCEDURE — A9585 GADOBUTROL INJECTION: HCPCS | Performed by: NURSE PRACTITIONER

## 2019-03-13 PROCEDURE — 74183 MRI ABD W/O CNTR FLWD CNTR: CPT | Mod: 26,,, | Performed by: RADIOLOGY

## 2019-03-13 PROCEDURE — 25500020 PHARM REV CODE 255: Performed by: NURSE PRACTITIONER

## 2019-03-13 PROCEDURE — 72197 MRI PELVIS W/O & W/DYE: CPT | Mod: TC

## 2019-03-13 PROCEDURE — 72197 MRI ENTEROGRAPHY: ICD-10-PCS | Mod: 26,,, | Performed by: RADIOLOGY

## 2019-03-13 RX ORDER — GADOBUTROL 604.72 MG/ML
10 INJECTION INTRAVENOUS
Status: COMPLETED | OUTPATIENT
Start: 2019-03-13 | End: 2019-03-13

## 2019-03-13 RX ADMIN — GADOBUTROL 10 ML: 604.72 INJECTION INTRAVENOUS at 08:03

## 2019-03-13 NOTE — NURSING
Pt stopped TPN infusion for MRI, cbg 69, spoke with Dr. Lujan regarding blood sugar and TPN pause for approx 30 min for MRI, instructed to proceed with MRI and speak intermittently with pt throughout in order to monitor for symptoms of hypoglycemia, pt verbalized understanding of symptoms of hypoglycemia of weakness, shakiness, or confusion; also verbalized understanding of use of call bell in MRI scanner to report any symptoms.

## 2019-03-14 ENCOUNTER — TELEPHONE (OUTPATIENT)
Dept: SURGERY | Facility: CLINIC | Age: 29
End: 2019-03-14

## 2019-03-14 LAB
VARICELLA INTERPRETATION: POSITIVE
VARICELLA ZOSTER IGG: 2.58 ISR

## 2019-03-14 NOTE — TELEPHONE ENCOUNTER
----- Message from Charlotte Vitale sent at 3/14/2019  9:27 AM CDT -----  Contact: self  Pt is calling in regards to rescheduling her post op appt for today. She would like a call back to do so.    She can be reached at [phone].    Thank you

## 2019-03-18 ENCOUNTER — LAB VISIT (OUTPATIENT)
Dept: LAB | Facility: HOSPITAL | Age: 29
End: 2019-03-18
Attending: COLON & RECTAL SURGERY
Payer: COMMERCIAL

## 2019-03-18 DIAGNOSIS — K50.00 REGIONAL ENTERITIS OF SMALL INTESTINE: Primary | ICD-10-CM

## 2019-03-18 LAB
ALBUMIN SERPL BCP-MCNC: 2.2 G/DL
ALP SERPL-CCNC: 112 U/L
ALT SERPL W/O P-5'-P-CCNC: 39 U/L
ANION GAP SERPL CALC-SCNC: 8 MMOL/L
AST SERPL-CCNC: 20 U/L
BASOPHILS # BLD AUTO: 0.01 K/UL
BASOPHILS NFR BLD: 0.2 %
BILIRUB SERPL-MCNC: 0.2 MG/DL
BUN SERPL-MCNC: 21 MG/DL
CALCIUM SERPL-MCNC: 8.3 MG/DL
CHLORIDE SERPL-SCNC: 101 MMOL/L
CO2 SERPL-SCNC: 27 MMOL/L
CREAT SERPL-MCNC: 0.5 MG/DL
DIFFERENTIAL METHOD: ABNORMAL
EOSINOPHIL # BLD AUTO: 0.1 K/UL
EOSINOPHIL NFR BLD: 1 %
ERYTHROCYTE [DISTWIDTH] IN BLOOD BY AUTOMATED COUNT: 24.9 %
EST. GFR  (AFRICAN AMERICAN): >60 ML/MIN/1.73 M^2
EST. GFR  (NON AFRICAN AMERICAN): >60 ML/MIN/1.73 M^2
GLUCOSE SERPL-MCNC: 86 MG/DL
HCT VFR BLD AUTO: 29.3 %
HGB BLD-MCNC: 8.1 G/DL
IMM GRANULOCYTES # BLD AUTO: 0.02 K/UL
IMM GRANULOCYTES NFR BLD AUTO: 0.3 %
LYMPHOCYTES # BLD AUTO: 0.8 K/UL
LYMPHOCYTES NFR BLD: 12.8 %
MAGNESIUM SERPL-MCNC: 2.2 MG/DL
MCH RBC QN AUTO: 22.3 PG
MCHC RBC AUTO-ENTMCNC: 27.6 G/DL
MCV RBC AUTO: 81 FL
MONOCYTES # BLD AUTO: 0.4 K/UL
MONOCYTES NFR BLD: 6.2 %
NEUTROPHILS # BLD AUTO: 4.9 K/UL
NEUTROPHILS NFR BLD: 79.5 %
NRBC BLD-RTO: 0 /100 WBC
PHOSPHATE SERPL-MCNC: 4.3 MG/DL
PLATELET # BLD AUTO: 443 K/UL
PMV BLD AUTO: 10 FL
POTASSIUM SERPL-SCNC: 4.7 MMOL/L
PREALB SERPL-MCNC: 9 MG/DL
PROT SERPL-MCNC: 7.2 G/DL
RBC # BLD AUTO: 3.64 M/UL
SODIUM SERPL-SCNC: 136 MMOL/L
TRIGL SERPL-MCNC: 43 MG/DL
WBC # BLD AUTO: 6.16 K/UL

## 2019-03-18 PROCEDURE — 84100 ASSAY OF PHOSPHORUS: CPT

## 2019-03-18 PROCEDURE — 36415 COLL VENOUS BLD VENIPUNCTURE: CPT

## 2019-03-18 PROCEDURE — 85025 COMPLETE CBC W/AUTO DIFF WBC: CPT

## 2019-03-18 PROCEDURE — 83735 ASSAY OF MAGNESIUM: CPT

## 2019-03-18 PROCEDURE — 80053 COMPREHEN METABOLIC PANEL: CPT

## 2019-03-18 PROCEDURE — 84134 ASSAY OF PREALBUMIN: CPT

## 2019-03-18 PROCEDURE — 84478 ASSAY OF TRIGLYCERIDES: CPT

## 2019-03-25 ENCOUNTER — LAB VISIT (OUTPATIENT)
Dept: LAB | Facility: HOSPITAL | Age: 29
End: 2019-03-25
Attending: COLON & RECTAL SURGERY
Payer: COMMERCIAL

## 2019-03-25 DIAGNOSIS — K50.00 REGIONAL ENTERITIS OF SMALL INTESTINE: Primary | ICD-10-CM

## 2019-03-25 LAB
ALBUMIN SERPL BCP-MCNC: 2.5 G/DL (ref 3.5–5.2)
ALP SERPL-CCNC: 116 U/L (ref 55–135)
ALT SERPL W/O P-5'-P-CCNC: 30 U/L (ref 10–44)
ANION GAP SERPL CALC-SCNC: 9 MMOL/L (ref 8–16)
AST SERPL-CCNC: 24 U/L (ref 10–40)
BASOPHILS # BLD AUTO: 0.01 K/UL (ref 0–0.2)
BASOPHILS NFR BLD: 0.2 % (ref 0–1.9)
BILIRUB SERPL-MCNC: 0.3 MG/DL (ref 0.1–1)
BUN SERPL-MCNC: 21 MG/DL (ref 6–20)
CALCIUM SERPL-MCNC: 8.8 MG/DL (ref 8.7–10.5)
CHLORIDE SERPL-SCNC: 102 MMOL/L (ref 95–110)
CO2 SERPL-SCNC: 28 MMOL/L (ref 23–29)
CREAT SERPL-MCNC: 0.6 MG/DL (ref 0.5–1.4)
DIFFERENTIAL METHOD: ABNORMAL
EOSINOPHIL # BLD AUTO: 0.1 K/UL (ref 0–0.5)
EOSINOPHIL NFR BLD: 2.4 % (ref 0–8)
ERYTHROCYTE [DISTWIDTH] IN BLOOD BY AUTOMATED COUNT: 24.7 % (ref 11.5–14.5)
EST. GFR  (AFRICAN AMERICAN): >60 ML/MIN/1.73 M^2
EST. GFR  (NON AFRICAN AMERICAN): >60 ML/MIN/1.73 M^2
GLUCOSE SERPL-MCNC: 96 MG/DL (ref 70–110)
HCT VFR BLD AUTO: 35.1 % (ref 37–48.5)
HGB BLD-MCNC: 9.7 G/DL (ref 12–16)
IMM GRANULOCYTES # BLD AUTO: 0.03 K/UL (ref 0–0.04)
IMM GRANULOCYTES NFR BLD AUTO: 0.6 % (ref 0–0.5)
LYMPHOCYTES # BLD AUTO: 0.8 K/UL (ref 1–4.8)
LYMPHOCYTES NFR BLD: 14.4 % (ref 18–48)
MAGNESIUM SERPL-MCNC: 2 MG/DL (ref 1.6–2.6)
MCH RBC QN AUTO: 22.1 PG (ref 27–31)
MCHC RBC AUTO-ENTMCNC: 27.6 G/DL (ref 32–36)
MCV RBC AUTO: 80 FL (ref 82–98)
MONOCYTES # BLD AUTO: 0.3 K/UL (ref 0.3–1)
MONOCYTES NFR BLD: 5.5 % (ref 4–15)
NEUTROPHILS # BLD AUTO: 4.2 K/UL (ref 1.8–7.7)
NEUTROPHILS NFR BLD: 76.9 % (ref 38–73)
NRBC BLD-RTO: 0 /100 WBC
PHOSPHATE SERPL-MCNC: 4.3 MG/DL (ref 2.7–4.5)
PLATELET # BLD AUTO: 435 K/UL (ref 150–350)
PMV BLD AUTO: 10.5 FL (ref 9.2–12.9)
POTASSIUM SERPL-SCNC: 4.4 MMOL/L (ref 3.5–5.1)
PREALB SERPL-MCNC: 10 MG/DL (ref 20–43)
PROT SERPL-MCNC: 8.5 G/DL (ref 6–8.4)
RBC # BLD AUTO: 4.39 M/UL (ref 4–5.4)
SODIUM SERPL-SCNC: 139 MMOL/L (ref 136–145)
TRIGL SERPL-MCNC: 44 MG/DL (ref 30–150)
WBC # BLD AUTO: 5.43 K/UL (ref 3.9–12.7)

## 2019-03-25 PROCEDURE — 84134 ASSAY OF PREALBUMIN: CPT

## 2019-03-25 PROCEDURE — 80053 COMPREHEN METABOLIC PANEL: CPT

## 2019-03-25 PROCEDURE — 84100 ASSAY OF PHOSPHORUS: CPT

## 2019-03-25 PROCEDURE — 85025 COMPLETE CBC W/AUTO DIFF WBC: CPT

## 2019-03-25 PROCEDURE — 83735 ASSAY OF MAGNESIUM: CPT

## 2019-03-25 PROCEDURE — 84478 ASSAY OF TRIGLYCERIDES: CPT

## 2019-03-25 PROCEDURE — 36415 COLL VENOUS BLD VENIPUNCTURE: CPT

## 2019-03-26 ENCOUNTER — TELEPHONE (OUTPATIENT)
Dept: DERMATOLOGY | Facility: CLINIC | Age: 29
End: 2019-03-26

## 2019-03-26 NOTE — TELEPHONE ENCOUNTER
"Scheduled pt an appointment to see  on 04/22/2019 @9am per ELMER       ----- Message from Nicolette Pena PA-C sent at 3/26/2019 11:02 AM CDT -----  Regarding: FW: inappropriate pt  Please try to schedule this pt with a physician. If their appts are too far out for pt, I can see her, but she really needs to be followed by one of them due to her complicated medical history. Please let me know. Thanks     ----- Message -----  From: Betty Wong RN  Sent: 3/26/2019  10:43 AM  To: Nicolette Pena PA-C  Subject: RE: inappropriate pt                             Whatever you think is best. I will contact the supervisor of GI clinic to notify her staff who scheduled the appointment. Please ask Linda or Abdiel to reschedule.     Thanks,   K  ----- Message -----  From: Nicolette Pena PA-C  Sent: 3/26/2019  10:07 AM  To: Betty Wong RN  Subject: inappropriate pt                                 I think this pt needs to be scheduled with a physician. Hx of Crohn's dz and a "rash all over her body" - they're thinking possible psoriasis. If she is put on a biologic, would need to be following with physician anyway. Thoughts?         "

## 2019-03-30 ENCOUNTER — TELEPHONE (OUTPATIENT)
Dept: SCHEDULING | Age: 29
End: 2019-03-30

## 2019-04-01 ENCOUNTER — TELEPHONE (OUTPATIENT)
Dept: SCHEDULING | Age: 29
End: 2019-04-01

## 2019-04-01 ENCOUNTER — OFFICE VISIT (OUTPATIENT)
Dept: OBGYN | Age: 29
End: 2019-04-01

## 2019-04-01 ENCOUNTER — LAB VISIT (OUTPATIENT)
Dept: LAB | Facility: HOSPITAL | Age: 29
End: 2019-04-01
Attending: COLON & RECTAL SURGERY
Payer: COMMERCIAL

## 2019-04-01 VITALS
HEART RATE: 82 BPM | RESPIRATION RATE: 17 BRPM | BODY MASS INDEX: 43.4 KG/M2 | TEMPERATURE: 98.3 F | SYSTOLIC BLOOD PRESSURE: 147 MMHG | WEIGHT: 293 LBS | HEIGHT: 69 IN | DIASTOLIC BLOOD PRESSURE: 88 MMHG

## 2019-04-01 DIAGNOSIS — K50.00 REGIONAL ENTERITIS OF SMALL INTESTINE: Primary | ICD-10-CM

## 2019-04-01 DIAGNOSIS — L73.9 FOLLICULITIS: Primary | ICD-10-CM

## 2019-04-01 DIAGNOSIS — Z11.3 SCREEN FOR STD (SEXUALLY TRANSMITTED DISEASE): ICD-10-CM

## 2019-04-01 DIAGNOSIS — Z31.69 PRE-CONCEPTION COUNSELING: ICD-10-CM

## 2019-04-01 LAB
ALBUMIN SERPL BCP-MCNC: 2.1 G/DL (ref 3.5–5.2)
ALP SERPL-CCNC: 98 U/L (ref 55–135)
ALT SERPL W/O P-5'-P-CCNC: 22 U/L (ref 10–44)
ANION GAP SERPL CALC-SCNC: 6 MMOL/L (ref 8–16)
AST SERPL-CCNC: 19 U/L (ref 10–40)
BASOPHILS # BLD AUTO: 0 K/UL (ref 0–0.2)
BASOPHILS NFR BLD: 0 % (ref 0–1.9)
BILIRUB SERPL-MCNC: 0.3 MG/DL (ref 0.1–1)
BUN SERPL-MCNC: 19 MG/DL (ref 6–20)
CALCIUM SERPL-MCNC: 8.2 MG/DL (ref 8.7–10.5)
CHLORIDE SERPL-SCNC: 102 MMOL/L (ref 95–110)
CO2 SERPL-SCNC: 30 MMOL/L (ref 23–29)
CREAT SERPL-MCNC: 0.6 MG/DL (ref 0.5–1.4)
DIFFERENTIAL METHOD: ABNORMAL
EOSINOPHIL # BLD AUTO: 0.1 K/UL (ref 0–0.5)
EOSINOPHIL NFR BLD: 2.3 % (ref 0–8)
ERYTHROCYTE [DISTWIDTH] IN BLOOD BY AUTOMATED COUNT: 23.4 % (ref 11.5–14.5)
EST. GFR  (AFRICAN AMERICAN): >60 ML/MIN/1.73 M^2
EST. GFR  (NON AFRICAN AMERICAN): >60 ML/MIN/1.73 M^2
GLUCOSE SERPL-MCNC: 86 MG/DL (ref 70–110)
HCT VFR BLD AUTO: 26 % (ref 37–48.5)
HGB BLD-MCNC: 7.4 G/DL (ref 12–16)
IMM GRANULOCYTES # BLD AUTO: 0.02 K/UL (ref 0–0.04)
IMM GRANULOCYTES NFR BLD AUTO: 0.4 % (ref 0–0.5)
LYMPHOCYTES # BLD AUTO: 0.7 K/UL (ref 1–4.8)
LYMPHOCYTES NFR BLD: 12.9 % (ref 18–48)
MAGNESIUM SERPL-MCNC: 1.8 MG/DL (ref 1.6–2.6)
MCH RBC QN AUTO: 22.4 PG (ref 27–31)
MCHC RBC AUTO-ENTMCNC: 28.5 G/DL (ref 32–36)
MCV RBC AUTO: 79 FL (ref 82–98)
MONOCYTES # BLD AUTO: 0.3 K/UL (ref 0.3–1)
MONOCYTES NFR BLD: 5.5 % (ref 4–15)
NEUTROPHILS # BLD AUTO: 4.5 K/UL (ref 1.8–7.7)
NEUTROPHILS NFR BLD: 78.9 % (ref 38–73)
NRBC BLD-RTO: 0 /100 WBC
PHOSPHATE SERPL-MCNC: 4.8 MG/DL (ref 2.7–4.5)
PLATELET # BLD AUTO: 367 K/UL (ref 150–350)
PMV BLD AUTO: 10.4 FL (ref 9.2–12.9)
POTASSIUM SERPL-SCNC: 4.4 MMOL/L (ref 3.5–5.1)
PREALB SERPL-MCNC: 7 MG/DL (ref 20–43)
PROT SERPL-MCNC: 7.2 G/DL (ref 6–8.4)
RBC # BLD AUTO: 3.31 M/UL (ref 4–5.4)
SODIUM SERPL-SCNC: 138 MMOL/L (ref 136–145)
TRIGL SERPL-MCNC: 39 MG/DL (ref 30–150)
WBC # BLD AUTO: 5.64 K/UL (ref 3.9–12.7)

## 2019-04-01 PROCEDURE — 87491 CHLMYD TRACH DNA AMP PROBE: CPT | Performed by: NURSE PRACTITIONER

## 2019-04-01 PROCEDURE — 84134 ASSAY OF PREALBUMIN: CPT

## 2019-04-01 PROCEDURE — 99214 OFFICE O/P EST MOD 30 MIN: CPT | Performed by: NURSE PRACTITIONER

## 2019-04-01 PROCEDURE — 85025 COMPLETE CBC W/AUTO DIFF WBC: CPT

## 2019-04-01 PROCEDURE — 87591 N.GONORRHOEAE DNA AMP PROB: CPT | Performed by: NURSE PRACTITIONER

## 2019-04-01 PROCEDURE — 80053 COMPREHEN METABOLIC PANEL: CPT

## 2019-04-01 PROCEDURE — 36415 COLL VENOUS BLD VENIPUNCTURE: CPT

## 2019-04-01 PROCEDURE — 84100 ASSAY OF PHOSPHORUS: CPT

## 2019-04-01 PROCEDURE — 84478 ASSAY OF TRIGLYCERIDES: CPT

## 2019-04-01 PROCEDURE — 83735 ASSAY OF MAGNESIUM: CPT

## 2019-04-01 RX ORDER — CEPHALEXIN 500 MG/1
500 CAPSULE ORAL 4 TIMES DAILY
Qty: 40 CAPSULE | Refills: 0 | Status: SHIPPED | OUTPATIENT
Start: 2019-04-01 | End: 2020-07-06 | Stop reason: ALTCHOICE

## 2019-04-01 RX ORDER — CEPHALEXIN 500 MG/1
500 CAPSULE ORAL 4 TIMES DAILY
Qty: 28 CAPSULE | Refills: 0 | Status: SHIPPED | OUTPATIENT
Start: 2019-04-01 | End: 2019-04-01 | Stop reason: CLARIF

## 2019-04-01 ASSESSMENT — ENCOUNTER SYMPTOMS
ALLERGIC/IMMUNOLOGIC NEGATIVE: 1
ENDOCRINE NEGATIVE: 1
GASTROINTESTINAL NEGATIVE: 1
HEMATOLOGIC/LYMPHATIC NEGATIVE: 1
EYES NEGATIVE: 1
NEUROLOGICAL NEGATIVE: 1
RESPIRATORY NEGATIVE: 1
PSYCHIATRIC NEGATIVE: 1
CONSTITUTIONAL NEGATIVE: 1

## 2019-04-04 ENCOUNTER — OFFICE VISIT (OUTPATIENT)
Dept: SURGERY | Facility: CLINIC | Age: 29
End: 2019-04-04
Payer: COMMERCIAL

## 2019-04-04 VITALS
HEART RATE: 100 BPM | HEIGHT: 62 IN | SYSTOLIC BLOOD PRESSURE: 87 MMHG | WEIGHT: 101.44 LBS | BODY MASS INDEX: 18.67 KG/M2 | DIASTOLIC BLOOD PRESSURE: 65 MMHG

## 2019-04-04 DIAGNOSIS — K56.699 COLON STRICTURE: Primary | ICD-10-CM

## 2019-04-04 DIAGNOSIS — K50.013 CROHN'S DISEASE OF ILEUM WITH FISTULA: ICD-10-CM

## 2019-04-04 PROCEDURE — 99999 PR PBB SHADOW E&M-EST. PATIENT-LVL III: ICD-10-PCS | Mod: PBBFAC,,, | Performed by: COLON & RECTAL SURGERY

## 2019-04-04 PROCEDURE — 99214 OFFICE O/P EST MOD 30 MIN: CPT | Mod: S$GLB,,, | Performed by: COLON & RECTAL SURGERY

## 2019-04-04 PROCEDURE — 99999 PR PBB SHADOW E&M-EST. PATIENT-LVL III: CPT | Mod: PBBFAC,,, | Performed by: COLON & RECTAL SURGERY

## 2019-04-04 PROCEDURE — 99214 PR OFFICE/OUTPT VISIT, EST, LEVL IV, 30-39 MIN: ICD-10-PCS | Mod: S$GLB,,, | Performed by: COLON & RECTAL SURGERY

## 2019-04-04 NOTE — PROGRESS NOTES
Subjective:       Patient ID: Lora Scott is a 29 y.o. female.    Chief Complaint: Post-op Evaluation    29yoF w/ h/o CDs s/p recent admission in Feb 2019 for Crohn's flare. Pt not on any maintenance medications. Saw Dr. Quintanilla in March with discussion of med vs surgical management. Pt has been on TPN and reg diet. Reports good appetite but once she starts eating gets RLQ cramping/abd pain. Reports daily bowel function but more firm stools. Denies any N/V. Pt would like to pursue surgical management at this time.     Last colonoscopy - 2/20/19  Denies  family hx of CRC or IBD.      Review of patient's allergies indicates:   Allergen Reactions    Shellfish containing products Anaphylaxis     Allergic to all seafood.    Horse/equine containing products Itching    Iodine      Swelling (throat)^       Past Medical History:   Diagnosis Date    Allergy     Anemia     Asthma     Crohn's disease 2/18/19    Crohn's disease involving terminal ileum     Depression     Elevated liver function tests     Ex-smoker for less than 1 year     Skin rash        Past Surgical History:   Procedure Laterality Date    COLONOSCOPY N/A 2/20/2019    Performed by [facility] at [facility] (2ND F[facility])    EGD (ESOPHAGOGASTRODUODENOSCOPY) N/A 2/20/2019    Performed by F[facility]la [facility] at [facility] (2ND FLR)       No current outpatient medications on file.     No current facility-administered medications for this visit.        Family History   Problem Relation Age of Onset    Hypertension Mother     Irritable bowel syndrome Paternal Grandmother     Celiac disease Neg Hx     Cirrhosis Neg Hx     Colon cancer Neg Hx     Colon polyps Neg Hx     Crohn's disease Neg Hx     Cystic fibrosis Neg Hx     Esophageal cancer Neg Hx     Hemochromatosis Neg Hx     Inflammatory bowel disease Neg Hx     Liver cancer Neg Hx     Liver disease Neg Hx     Rectal cancer Neg Hx     Stomach cancer Neg Hx     Ulcerative colitis Neg  Hx     Jerardo's disease Neg Hx     Lymphoma Neg Hx     Tuberculosis Neg Hx     Scleroderma Neg Hx     Rheum arthritis Neg Hx     Multiple sclerosis Neg Hx     Psoriasis Neg Hx     Lupus Neg Hx     Melanoma Neg Hx     Skin cancer Neg Hx        Social History     Socioeconomic History    Marital status: Single     Spouse name: Not on file    Number of children: Not on file    Years of education: Not on file    Highest education level: Not on file   Occupational History    Not on file   Social Needs    Financial resource strain: Not on file    Food insecurity:     Worry: Not on file     Inability: Not on file    Transportation needs:     Medical: Not on file     Non-medical: Not on file   Tobacco Use    Smoking status: Former Smoker     Types: Cigarettes     Last attempt to quit: 2019     Years since quittin.1    Smokeless tobacco: Never Used    Tobacco comment: 2 CIGS PER DAY   Substance and Sexual Activity    Alcohol use: Yes     Alcohol/week: 0.0 oz     Comment: Socially    Drug use: No     Frequency: 3.0 times per week    Sexual activity: Not Currently     Partners: Male     Birth control/protection: None   Lifestyle    Physical activity:     Days per week: Not on file     Minutes per session: Not on file    Stress: Not on file   Relationships    Social connections:     Talks on phone: Not on file     Gets together: Not on file     Attends [employer] service: Not on file     Active member of club or organization: Not on file     Attends meetings of clubs or organizations: Not on file     Relationship status: Not on file   Other Topics Concern    Not on file   Social History Narrative    Not on file       Review of Systems   Constitutional: Negative for fever.   HENT: Negative for congestion.    Eyes: Negative for pain.   Respiratory: Negative for cough and shortness of breath.    Cardiovascular: Negative for chest pain.   Gastrointestinal: Positive for abdominal pain. Negative  for abdominal distention, blood in stool, diarrhea and nausea.   Endocrine: Negative for polyuria.   Genitourinary: Negative for dysuria.   Musculoskeletal: Negative for back pain.   Skin: Negative for rash.   Neurological: Negative for seizures.   Hematological: Negative for adenopathy.   Psychiatric/Behavioral: Negative for agitation.       Objective:      Physical Exam   Constitutional: She is oriented to person, place, and time. No distress.   Eyes: EOM are normal.   Neck: Neck supple.   Cardiovascular: Normal rate and regular rhythm.   Pulmonary/Chest: Effort normal. No respiratory distress.   Abdominal: Soft. She exhibits no distension. There is no tenderness.   Musculoskeletal: Normal range of motion.   Neurological: She is alert and oriented to person, place, and time.   Skin: Skin is warm and dry.         Lab Results   Component Value Date    WBC 5.64 04/01/2019    HGB 7.4 (L) 04/01/2019    HCT 26.0 (L) 04/01/2019    MCV 79 (L) 04/01/2019     (H) 04/01/2019     BMP  Lab Results   Component Value Date     04/01/2019    K 4.4 04/01/2019     04/01/2019    CO2 30 (H) 04/01/2019    BUN 19 04/01/2019    CREATININE 0.6 04/01/2019    CALCIUM 8.2 (L) 04/01/2019    ANIONGAP 6 (L) 04/01/2019    ESTGFRAFRICA >60.0 04/01/2019    EGFRNONAA >60.0 04/01/2019     CMP  Sodium   Date Value Ref Range Status   04/01/2019 138 136 - 145 mmol/L Final     Potassium   Date Value Ref Range Status   04/01/2019 4.4 3.5 - 5.1 mmol/L Final     Chloride   Date Value Ref Range Status   04/01/2019 102 95 - 110 mmol/L Final     CO2   Date Value Ref Range Status   04/01/2019 30 (H) 23 - 29 mmol/L Final     Glucose   Date Value Ref Range Status   04/01/2019 86 70 - 110 mg/dL Final     BUN, Bld   Date Value Ref Range Status   04/01/2019 19 6 - 20 mg/dL Final     Creatinine   Date Value Ref Range Status   04/01/2019 0.6 0.5 - 1.4 mg/dL Final     Calcium   Date Value Ref Range Status   04/01/2019 8.2 (L) 8.7 - 10.5 mg/dL Final      Total Protein   Date Value Ref Range Status   04/01/2019 7.2 6.0 - 8.4 g/dL Final     Albumin   Date Value Ref Range Status   04/01/2019 2.1 (L) 3.5 - 5.2 g/dL Final     Total Bilirubin   Date Value Ref Range Status   04/01/2019 0.3 0.1 - 1.0 mg/dL Final     Comment:     For infants and newborns, interpretation of results should be based  on gestational age, weight and in agreement with clinical  observations.  Premature Infant recommended reference ranges:  Up to 24 hours.............<8.0 mg/dL  Up to 48 hours............<12.0 mg/dL  3-5 days..................<15.0 mg/dL  6-29 days.................<15.0 mg/dL       Alkaline Phosphatase   Date Value Ref Range Status   04/01/2019 98 55 - 135 U/L Final     AST   Date Value Ref Range Status   04/01/2019 19 10 - 40 U/L Final     ALT   Date Value Ref Range Status   04/01/2019 22 10 - 44 U/L Final     Anion Gap   Date Value Ref Range Status   04/01/2019 6 (L) 8 - 16 mmol/L Final     eGFR if    Date Value Ref Range Status   04/01/2019 >60.0 >60 mL/min/1.73 m^2 Final     eGFR if non    Date Value Ref Range Status   04/01/2019 >60.0 >60 mL/min/1.73 m^2 Final     Comment:     Calculation used to obtain the estimated glomerular filtration  rate (eGFR) is the CKD-EPI equation.        No results found for: CEA      Assessment:       1. Ileal crohn's disease    Plan:     Pt has active TI disease with short segment strictures. Saw patient with Dr. Bacon and plan going forward will be to schedule her for surgery for ileocecectomy with DLI. Also will discuss prehab with Ramon Fay. Will call with date for surgery and definitive plan going forward.     I have personally taken the history and examined this patient and agree with the resident's note as stated above.      .

## 2019-04-08 ENCOUNTER — LAB VISIT (OUTPATIENT)
Dept: LAB | Facility: HOSPITAL | Age: 29
End: 2019-04-08
Attending: COLON & RECTAL SURGERY
Payer: COMMERCIAL

## 2019-04-08 DIAGNOSIS — K50.00 REGIONAL ENTERITIS OF SMALL INTESTINE: Primary | ICD-10-CM

## 2019-04-08 LAB
ALBUMIN SERPL BCP-MCNC: 2.1 G/DL (ref 3.5–5.2)
ALP SERPL-CCNC: 90 U/L (ref 55–135)
ALT SERPL W/O P-5'-P-CCNC: 21 U/L (ref 10–44)
ANION GAP SERPL CALC-SCNC: 7 MMOL/L (ref 8–16)
AST SERPL-CCNC: 18 U/L (ref 10–40)
BASOPHILS # BLD AUTO: 0.01 K/UL (ref 0–0.2)
BASOPHILS NFR BLD: 0.2 % (ref 0–1.9)
BILIRUB SERPL-MCNC: 0.3 MG/DL (ref 0.1–1)
BUN SERPL-MCNC: 10 MG/DL (ref 6–20)
CALCIUM SERPL-MCNC: 8.6 MG/DL (ref 8.7–10.5)
CHLORIDE SERPL-SCNC: 105 MMOL/L (ref 95–110)
CO2 SERPL-SCNC: 26 MMOL/L (ref 23–29)
CREAT SERPL-MCNC: 0.7 MG/DL (ref 0.5–1.4)
DIFFERENTIAL METHOD: ABNORMAL
EOSINOPHIL # BLD AUTO: 0.1 K/UL (ref 0–0.5)
EOSINOPHIL NFR BLD: 1.7 % (ref 0–8)
ERYTHROCYTE [DISTWIDTH] IN BLOOD BY AUTOMATED COUNT: 23.3 % (ref 11.5–14.5)
EST. GFR  (AFRICAN AMERICAN): >60 ML/MIN/1.73 M^2
EST. GFR  (NON AFRICAN AMERICAN): >60 ML/MIN/1.73 M^2
GLUCOSE SERPL-MCNC: 98 MG/DL (ref 70–110)
HCT VFR BLD AUTO: 28.5 % (ref 37–48.5)
HGB BLD-MCNC: 7.9 G/DL (ref 12–16)
IMM GRANULOCYTES # BLD AUTO: 0.02 K/UL (ref 0–0.04)
IMM GRANULOCYTES NFR BLD AUTO: 0.3 % (ref 0–0.5)
LYMPHOCYTES # BLD AUTO: 0.8 K/UL (ref 1–4.8)
LYMPHOCYTES NFR BLD: 13.1 % (ref 18–48)
MAGNESIUM SERPL-MCNC: 1.7 MG/DL (ref 1.6–2.6)
MCH RBC QN AUTO: 21.8 PG (ref 27–31)
MCHC RBC AUTO-ENTMCNC: 27.7 G/DL (ref 32–36)
MCV RBC AUTO: 79 FL (ref 82–98)
MONOCYTES # BLD AUTO: 0.4 K/UL (ref 0.3–1)
MONOCYTES NFR BLD: 5.5 % (ref 4–15)
NEUTROPHILS # BLD AUTO: 5.1 K/UL (ref 1.8–7.7)
NEUTROPHILS NFR BLD: 79.2 % (ref 38–73)
NRBC BLD-RTO: 0 /100 WBC
PHOSPHATE SERPL-MCNC: 3.9 MG/DL (ref 2.7–4.5)
PLATELET # BLD AUTO: 458 K/UL (ref 150–350)
PMV BLD AUTO: 10.3 FL (ref 9.2–12.9)
POTASSIUM SERPL-SCNC: 4.1 MMOL/L (ref 3.5–5.1)
PREALB SERPL-MCNC: 10 MG/DL (ref 20–43)
PROT SERPL-MCNC: 7.4 G/DL (ref 6–8.4)
RBC # BLD AUTO: 3.62 M/UL (ref 4–5.4)
SODIUM SERPL-SCNC: 138 MMOL/L (ref 136–145)
TRIGL SERPL-MCNC: 66 MG/DL (ref 30–150)
WBC # BLD AUTO: 6.42 K/UL (ref 3.9–12.7)

## 2019-04-08 PROCEDURE — 84478 ASSAY OF TRIGLYCERIDES: CPT

## 2019-04-08 PROCEDURE — 85025 COMPLETE CBC W/AUTO DIFF WBC: CPT

## 2019-04-08 PROCEDURE — 80053 COMPREHEN METABOLIC PANEL: CPT

## 2019-04-08 PROCEDURE — 84134 ASSAY OF PREALBUMIN: CPT

## 2019-04-08 PROCEDURE — 84100 ASSAY OF PHOSPHORUS: CPT

## 2019-04-08 PROCEDURE — 83735 ASSAY OF MAGNESIUM: CPT

## 2019-04-15 ENCOUNTER — HOSPITAL ENCOUNTER (EMERGENCY)
Facility: HOSPITAL | Age: 29
Discharge: HOME | End: 2019-04-15
Attending: EMERGENCY MEDICINE
Payer: COMMERCIAL

## 2019-04-15 ENCOUNTER — LAB VISIT (OUTPATIENT)
Dept: LAB | Facility: HOSPITAL | Age: 29
End: 2019-04-15
Attending: COLON & RECTAL SURGERY
Payer: COMMERCIAL

## 2019-04-15 VITALS
RESPIRATION RATE: 17 BRPM | HEIGHT: 62 IN | HEART RATE: 69 BPM | TEMPERATURE: 98 F | SYSTOLIC BLOOD PRESSURE: 91 MMHG | DIASTOLIC BLOOD PRESSURE: 58 MMHG | WEIGHT: 102 LBS | BODY MASS INDEX: 18.77 KG/M2 | OXYGEN SATURATION: 98 %

## 2019-04-15 DIAGNOSIS — K50.00 REGIONAL ENTERITIS OF SMALL INTESTINE: Primary | ICD-10-CM

## 2019-04-15 DIAGNOSIS — K50.00 CROHN'S DISEASE INVOLVING TERMINAL ILEUM: ICD-10-CM

## 2019-04-15 DIAGNOSIS — R10.84 GENERALIZED ABDOMINAL PAIN: Primary | ICD-10-CM

## 2019-04-15 LAB
ALBUMIN SERPL BCP-MCNC: 2.3 G/DL (ref 3.5–5.2)
ALP SERPL-CCNC: 96 U/L (ref 55–135)
ALT SERPL W/O P-5'-P-CCNC: 18 U/L (ref 10–44)
AMORPH CRY UR QL COMP ASSIST: ABNORMAL
ANION GAP SERPL CALC-SCNC: 8 MMOL/L (ref 8–16)
AST SERPL-CCNC: 24 U/L (ref 10–40)
B-HCG UR QL: NEGATIVE
BACTERIA #/AREA URNS AUTO: ABNORMAL /HPF
BASOPHILS # BLD AUTO: 0.01 K/UL (ref 0–0.2)
BASOPHILS # BLD AUTO: 0.01 K/UL (ref 0–0.2)
BASOPHILS NFR BLD: 0.2 % (ref 0–1.9)
BASOPHILS NFR BLD: 0.2 % (ref 0–1.9)
BILIRUB SERPL-MCNC: 0.5 MG/DL (ref 0.1–1)
BILIRUB UR QL STRIP: NEGATIVE
BUN SERPL-MCNC: 18 MG/DL (ref 6–20)
CALCIUM SERPL-MCNC: 8.8 MG/DL (ref 8.7–10.5)
CHLORIDE SERPL-SCNC: 102 MMOL/L (ref 95–110)
CLARITY UR REFRACT.AUTO: ABNORMAL
CO2 SERPL-SCNC: 25 MMOL/L (ref 23–29)
COLOR UR AUTO: ABNORMAL
CREAT SERPL-MCNC: 0.6 MG/DL (ref 0.5–1.4)
CRP SERPL-MCNC: 174.3 MG/L (ref 0–8.2)
CTP QC/QA: YES
DIFFERENTIAL METHOD: ABNORMAL
DIFFERENTIAL METHOD: ABNORMAL
EOSINOPHIL # BLD AUTO: 0.1 K/UL (ref 0–0.5)
EOSINOPHIL # BLD AUTO: 0.1 K/UL (ref 0–0.5)
EOSINOPHIL NFR BLD: 1.2 % (ref 0–8)
EOSINOPHIL NFR BLD: 1.6 % (ref 0–8)
ERYTHROCYTE [DISTWIDTH] IN BLOOD BY AUTOMATED COUNT: 22.3 % (ref 11.5–14.5)
ERYTHROCYTE [DISTWIDTH] IN BLOOD BY AUTOMATED COUNT: 22.6 % (ref 11.5–14.5)
EST. GFR  (AFRICAN AMERICAN): >60 ML/MIN/1.73 M^2
EST. GFR  (NON AFRICAN AMERICAN): >60 ML/MIN/1.73 M^2
GLUCOSE SERPL-MCNC: 75 MG/DL (ref 70–110)
GLUCOSE UR QL STRIP: NEGATIVE
HCT VFR BLD AUTO: 31.2 % (ref 37–48.5)
HCT VFR BLD AUTO: 32.4 % (ref 37–48.5)
HGB BLD-MCNC: 9 G/DL (ref 12–16)
HGB BLD-MCNC: 9 G/DL (ref 12–16)
HGB UR QL STRIP: NEGATIVE
HYALINE CASTS UR QL AUTO: 0 /LPF
IMM GRANULOCYTES # BLD AUTO: 0.02 K/UL (ref 0–0.04)
IMM GRANULOCYTES # BLD AUTO: 0.02 K/UL (ref 0–0.04)
IMM GRANULOCYTES NFR BLD AUTO: 0.3 % (ref 0–0.5)
IMM GRANULOCYTES NFR BLD AUTO: 0.3 % (ref 0–0.5)
KETONES UR QL STRIP: NEGATIVE
LEUKOCYTE ESTERASE UR QL STRIP: NEGATIVE
LYMPHOCYTES # BLD AUTO: 0.8 K/UL (ref 1–4.8)
LYMPHOCYTES # BLD AUTO: 1.1 K/UL (ref 1–4.8)
LYMPHOCYTES NFR BLD: 12 % (ref 18–48)
LYMPHOCYTES NFR BLD: 18.4 % (ref 18–48)
MCH RBC QN AUTO: 21.8 PG (ref 27–31)
MCH RBC QN AUTO: 21.8 PG (ref 27–31)
MCHC RBC AUTO-ENTMCNC: 27.8 G/DL (ref 32–36)
MCHC RBC AUTO-ENTMCNC: 28.8 G/DL (ref 32–36)
MCV RBC AUTO: 76 FL (ref 82–98)
MCV RBC AUTO: 79 FL (ref 82–98)
MICROSCOPIC COMMENT: ABNORMAL
MONOCYTES # BLD AUTO: 0.5 K/UL (ref 0.3–1)
MONOCYTES # BLD AUTO: 0.5 K/UL (ref 0.3–1)
MONOCYTES NFR BLD: 7.6 % (ref 4–15)
MONOCYTES NFR BLD: 8.5 % (ref 4–15)
NEUTROPHILS # BLD AUTO: 4.4 K/UL (ref 1.8–7.7)
NEUTROPHILS # BLD AUTO: 5.2 K/UL (ref 1.8–7.7)
NEUTROPHILS NFR BLD: 71 % (ref 38–73)
NEUTROPHILS NFR BLD: 78.7 % (ref 38–73)
NITRITE UR QL STRIP: NEGATIVE
NRBC BLD-RTO: 0 /100 WBC
NRBC BLD-RTO: 0 /100 WBC
PH UR STRIP: 5 [PH] (ref 5–8)
PLATELET # BLD AUTO: 371 K/UL (ref 150–350)
PLATELET # BLD AUTO: 397 K/UL (ref 150–350)
PMV BLD AUTO: 10.3 FL (ref 9.2–12.9)
PMV BLD AUTO: 9.7 FL (ref 9.2–12.9)
POTASSIUM SERPL-SCNC: 3.6 MMOL/L (ref 3.5–5.1)
PROT SERPL-MCNC: 8.4 G/DL (ref 6–8.4)
PROT UR QL STRIP: ABNORMAL
RBC # BLD AUTO: 4.12 M/UL (ref 4–5.4)
RBC # BLD AUTO: 4.13 M/UL (ref 4–5.4)
RBC #/AREA URNS AUTO: 0 /HPF (ref 0–4)
SODIUM SERPL-SCNC: 135 MMOL/L (ref 136–145)
SP GR UR STRIP: 1.03 (ref 1–1.03)
SQUAMOUS #/AREA URNS AUTO: 43 /HPF
URN SPEC COLLECT METH UR: ABNORMAL
WBC # BLD AUTO: 6.14 K/UL (ref 3.9–12.7)
WBC # BLD AUTO: 6.61 K/UL (ref 3.9–12.7)
WBC #/AREA URNS AUTO: 0 /HPF (ref 0–5)

## 2019-04-15 PROCEDURE — 84100 ASSAY OF PHOSPHORUS: CPT

## 2019-04-15 PROCEDURE — 80053 COMPREHEN METABOLIC PANEL: CPT | Mod: 91

## 2019-04-15 PROCEDURE — 36415 COLL VENOUS BLD VENIPUNCTURE: CPT

## 2019-04-15 PROCEDURE — 81025 URINE PREGNANCY TEST: CPT | Performed by: EMERGENCY MEDICINE

## 2019-04-15 PROCEDURE — 99284 EMERGENCY DEPT VISIT MOD MDM: CPT | Mod: 25

## 2019-04-15 PROCEDURE — 85025 COMPLETE CBC W/AUTO DIFF WBC: CPT

## 2019-04-15 PROCEDURE — 84134 ASSAY OF PREALBUMIN: CPT

## 2019-04-15 PROCEDURE — 99284 PR EMERGENCY DEPT VISIT,LEVEL IV: ICD-10-PCS | Mod: ,,, | Performed by: EMERGENCY MEDICINE

## 2019-04-15 PROCEDURE — 80053 COMPREHEN METABOLIC PANEL: CPT

## 2019-04-15 PROCEDURE — 83735 ASSAY OF MAGNESIUM: CPT

## 2019-04-15 PROCEDURE — 96361 HYDRATE IV INFUSION ADD-ON: CPT

## 2019-04-15 PROCEDURE — 81001 URINALYSIS AUTO W/SCOPE: CPT

## 2019-04-15 PROCEDURE — 63600175 PHARM REV CODE 636 W HCPCS: Performed by: EMERGENCY MEDICINE

## 2019-04-15 PROCEDURE — 96375 TX/PRO/DX INJ NEW DRUG ADDON: CPT

## 2019-04-15 PROCEDURE — 99284 EMERGENCY DEPT VISIT MOD MDM: CPT | Mod: ,,, | Performed by: EMERGENCY MEDICINE

## 2019-04-15 PROCEDURE — 25000003 PHARM REV CODE 250: Performed by: EMERGENCY MEDICINE

## 2019-04-15 PROCEDURE — 84478 ASSAY OF TRIGLYCERIDES: CPT

## 2019-04-15 PROCEDURE — 86140 C-REACTIVE PROTEIN: CPT

## 2019-04-15 PROCEDURE — 85025 COMPLETE CBC W/AUTO DIFF WBC: CPT | Mod: 91

## 2019-04-15 PROCEDURE — 96365 THER/PROPH/DIAG IV INF INIT: CPT

## 2019-04-15 RX ORDER — KETOROLAC TROMETHAMINE 30 MG/ML
15 INJECTION, SOLUTION INTRAMUSCULAR; INTRAVENOUS
Status: COMPLETED | OUTPATIENT
Start: 2019-04-15 | End: 2019-04-15

## 2019-04-15 RX ORDER — HYDROCODONE BITARTRATE AND ACETAMINOPHEN 5; 325 MG/1; MG/1
1 TABLET ORAL EVERY 6 HOURS PRN
Qty: 17 TABLET | Refills: 0 | Status: SHIPPED | OUTPATIENT
Start: 2019-04-15 | End: 2019-05-06

## 2019-04-15 RX ADMIN — PROMETHAZINE HYDROCHLORIDE 12.5 MG: 25 INJECTION INTRAMUSCULAR; INTRAVENOUS at 06:04

## 2019-04-15 RX ADMIN — KETOROLAC TROMETHAMINE 15 MG: 30 INJECTION, SOLUTION INTRAMUSCULAR at 05:04

## 2019-04-15 RX ADMIN — SODIUM CHLORIDE 1000 ML: 0.9 INJECTION, SOLUTION INTRAVENOUS at 05:04

## 2019-04-15 NOTE — ED NOTES
LOC: The patient is awake and alert; oriented x 3 and speaking appropriately.  APPEARANCE: Patient restless w/ abd pain, patient is clean and well groomed  SKIN: warm and dry, normal skin turgor & moist mucus membranes, skin intact, no breakdown noted.  MUSCULOSKELETAL: Patient moving all extremities well, no obvious swelling or deformities noted  RESPIRATORY: Airway is open and patent,  respirations are spontaneous, normal effort and rate  CARDIAC: Patient has a normal rate, no peripheral edema noted, capillary refill < 3 seconds; No complaints of chest pain , PICC line in left brachial w/ TPN infusing 12 hrs daily  ABDOMEN: Soft and tender to palpation, c/o feeling distended , states stools are loose from TPN

## 2019-04-15 NOTE — ED PROVIDER NOTES
Encounter Date: 4/15/2019    SCRIBE #1 NOTE: I, Mell Mcintosh, am scribing for, and in the presence of,  Dr. Hinojosa. I have scribed the following portions of the note - Other sections scribed: HPI, ROS, PE .       History     Chief Complaint   Patient presents with    Abdominal Pain     hx crohn's     Ms. Scott is a 28 yo F with history of Crohn's disease presenting to the ED with chief complaint of abdominal pain.  Patient reports right sided abdominal pain since February 18th (x2 months) when she was admitted for Crohn's worsening x1 week and with movement.  She also states the pain radiates to the left sided abdomen when she stands up.  Patient states she has been on TPN supplements since February 19th with a line on her left arm.  She reports chronic decreased appetite and fluid intake with no changes recently.  Reports her last BM was this morning without blood.  Per medical records patient has seen Dr. Ruiz from colorectal surgery on April 4th for similar complaints and is scheduled for an ileocecectomy.  Patient denies nausea, vomiting, fevers, chills, blood in stool or vomit recently, and denies any previous surgeries on her abdomen.  Of note patient is a  and states the movement required during her job exacerbates the abdominal pain.        The history is provided by the patient and medical records.     Review of patient's allergies indicates:   Allergen Reactions    Shellfish containing products Anaphylaxis     Allergic to all seafood.    Horse/equine containing products Itching    Iodine      Swelling (throat)^     Past Medical History:   Diagnosis Date    Allergy     Anemia     Asthma     Crohn's disease 2/18/19    Crohn's disease involving terminal ileum     Depression     Eczema     Elevated liver function tests     Ex-smoker for less than 1 year     Skin rash      Past Surgical History:   Procedure Laterality Date    COLONOSCOPY N/A 2/20/2019    Performed by Fawad SANCHEZ  [facility] (2ND FLR)    EGD (ESOPHAGOGASTRODUODENOSCOPY) N/A 2019    Performed by Fawad Perla MD at [facility] (2ND FLR)     Family History   Problem Relation Age of Onset    Hypertension Mother     Irritable bowel syndrome Paternal Grandmother     Celiac disease Neg Hx     Cirrhosis Neg Hx     Colon cancer Neg Hx     Colon polyps Neg Hx     Crohn's disease Neg Hx     Cystic fibrosis Neg Hx     Esophageal cancer Neg Hx     Hemochromatosis Neg Hx     Inflammatory bowel disease Neg Hx     Liver cancer Neg Hx     Liver disease Neg Hx     Rectal cancer Neg Hx     Stomach cancer Neg Hx     Ulcerative colitis Neg Hx     Jerardo's disease Neg Hx     Lymphoma Neg Hx     Tuberculosis Neg Hx     Scleroderma Neg Hx     Rheum arthritis Neg Hx     Multiple sclerosis Neg Hx     Psoriasis Neg Hx     Lupus Neg Hx     Melanoma Neg Hx     Skin cancer Neg Hx      Social History     Tobacco Use    Smoking status: Former Smoker     Types: Cigarettes     Last attempt to quit: 2019     Years since quittin.1    Smokeless tobacco: Never Used    Tobacco comment: 2 CIGS PER DAY   Substance Use Topics    Alcohol use: Not Currently     Alcohol/week: 0.0 oz     Comment: Socially    Drug use: No     Frequency: 3.0 times per week     Review of Systems   Constitutional: Negative for chills and fever.   Eyes: Negative for visual disturbance.        Neg vision changes   Respiratory: Negative for cough and shortness of breath.    Cardiovascular: Negative for chest pain and leg swelling.   Gastrointestinal: Positive for abdominal pain. Negative for blood in stool, nausea and vomiting.        Neg changes in stool   Genitourinary:        Neg changes in urination   Musculoskeletal: Negative for arthralgias and joint swelling.   Skin: Negative for rash.   Allergic/Immunologic: Negative for immunocompromised state.   Neurological: Negative for headaches.   Hematological: Does not bruise/bleed  easily.   All other systems reviewed and are negative.      Physical Exam     Initial Vitals [04/15/19 1706]   BP Pulse Resp Temp SpO2   101/60 105 18 98.6 °F (37 °C) 100 %      MAP       --         Physical Exam    Constitutional: She appears well-developed and well-nourished. She is not diaphoretic. No distress.   HENT:   Head: Normocephalic and atraumatic.   Nose: Nose normal.   Eyes: EOM are normal. Pupils are equal, round, and reactive to light.   Neck: Normal range of motion. Neck supple.   Cardiovascular: Normal rate and regular rhythm.   Pulmonary/Chest: Breath sounds normal. No respiratory distress. She has no wheezes. She has no rhonchi. She has no rales. She exhibits no tenderness.   Abdominal: Soft. Bowel sounds are normal. She exhibits no distension. There is generalized tenderness (Diffuse tenderness worse in RLQ and around mcburney's point.  ) and tenderness in the right lower quadrant. There is guarding (Intermittent guarding) and tenderness at McBurney's point. There is no rebound.   Musculoskeletal: Normal range of motion.   Neurological: She is alert and oriented to person, place, and time. She has normal strength.   Skin: Skin is warm and dry. No rash noted.   Psychiatric: She has a normal mood and affect. Her behavior is normal. Thought content normal.         ED Course   Procedures  Labs Reviewed   CBC W/ AUTO DIFFERENTIAL - Abnormal; Notable for the following components:       Result Value    Hemoglobin 9.0 (*)     Hematocrit 31.2 (*)     MCV 76 (*)     MCH 21.8 (*)     MCHC 28.8 (*)     RDW 22.3 (*)     Platelets 371 (*)     All other components within normal limits   COMPREHENSIVE METABOLIC PANEL - Abnormal; Notable for the following components:    Sodium 135 (*)     Albumin 2.3 (*)     All other components within normal limits   URINALYSIS, REFLEX TO URINE CULTURE - Abnormal; Notable for the following components:    Appearance, UA Cloudy (*)     Protein, UA 1+ (*)     All other components  within normal limits    Narrative:     Preferred Collection Type->Urine, Clean Catch   C-REACTIVE PROTEIN - Abnormal; Notable for the following components:    .3 (*)     All other components within normal limits   URINALYSIS MICROSCOPIC - Abnormal; Notable for the following components:    Bacteria, UA Many (*)     Amorphous, UA Many (*)     All other components within normal limits    Narrative:     Preferred Collection Type->Urine, Clean Catch   POCT URINE PREGNANCY          Imaging Results    None          Medical Decision Making:   History:   Old Medical Records: I decided to obtain old medical records.  Old Records Summarized: records from previous admission(s) and records from clinic visits.       <> Summary of Records: In reviewing her records she was seen with Dr. Ruiz of colorectal surgery on the 4th of this month in which she had bloodwork done at that time.  Initial Assessment:   10 yo F presents with acute on chronic abdominal pain secondary to recent diagnosis of Crohn's.  Pain is always in the RLQ and ileocecal region which is where she is complaining of increased pain today.  Of note, she is also complaining that the paper work for her disability has not gone through yet, and she is worried about losing her job prior to that.  She states had she been able to sit in a truck and drive today at work and not have to get up and down she would not have come into the ER as the movement worsens the pain.  She has had significantly decreased appetite since her diagnosis for which she requires a TPN infusion and can only tolerate coca cola.  She had one episode of vomiting which was non bloody.  She denies any bloody stools.  She denies any fevers or chills.  Clinical Tests:   Lab Tests: Ordered and Reviewed  ED Management:   I will repeat her bloodwork to see if there is a significant change in white count or CRP to determine if imaging is needed today.  In the interim patient is to be given normal  saline 1 L IV bolus, 50 mg of Toradol IV and 12.5 mg of Phenergan IV.          Additional MDM:   Abdominal Pain: Medication(s) given for abdominal pain: Toradol. Medication(s) given for nausea: Phenergan.   Differential Diagnosis: The following diagnoses were considered: Ileus, Appendicitis, Crohn's Disease and Irritable Bowel Syndrome.   The lab was independently reviewed and is not diagnostic and is not normal. The abnormal lab results showed: CRP> previous, otherwise abnormal labs are improving.   Re-evaluation of symptoms: Improved. Re-evaluation of the physical exam: Improved. Disposition: Discharged. The patient's condition was felt to be stable. Patient has known Crohn's disease in the distal ileum, where she has most of her pain. Her white count has diminished but her CRP has slightly increased over previous.  I do think her Crohn's as worsening versus an acute appendicitis.  Patient has been advised of the differences of both, and will return if the pain worsens and/ or she develops fever with the pain. She has an appointment scheduled this week with her physician, and follow up with Dr. Ruiz for definitive surgical care.  She is given a prescription for hydrocodone which she has not at home to help with this pain. She is stable on discharge and is in agreement with the plan.         Scribe Attestation:   Scribe #1: I performed the above scribed service and the documentation accurately describes the services I performed. I attest to the accuracy of the note.            ED Course as of Apr 15 1929   Mon Apr 15, 2019   1915 CRP(!): 174.3 [GM]   1915 CRP(!): 174.3 [GM]   1915 CRP(!): 174.3 [GM]      ED Course User Index  [GM] Jose Hinojosa III, MD     Clinical Impression:       ICD-10-CM ICD-9-CM   1. Generalized abdominal pain R10.84 789.07   2. Crohn's disease involving terminal ileum K50.00 555.0         Disposition:   Disposition: Discharged  Condition: Stable                        Jose LINN  Micaela LOPEZ MD  04/15/19 1923

## 2019-04-15 NOTE — ED TRIAGE NOTES
Having abdominal pain - feels like her abdomen will explode and it feels tight . Onset  1 week.  Threw up once, having diarrhea. Is on TPN for Crohn's that infuses 12hrs/day ( at night). Onset TPN February 2019. Denies fever/chills/dysuria.

## 2019-04-16 LAB
ALBUMIN SERPL BCP-MCNC: 2.2 G/DL (ref 3.5–5.2)
ALP SERPL-CCNC: 96 U/L (ref 55–135)
ALT SERPL W/O P-5'-P-CCNC: 15 U/L (ref 10–44)
ANION GAP SERPL CALC-SCNC: 7 MMOL/L (ref 8–16)
AST SERPL-CCNC: 16 U/L (ref 10–40)
BILIRUB SERPL-MCNC: 0.4 MG/DL (ref 0.1–1)
BUN SERPL-MCNC: 20 MG/DL (ref 6–20)
CALCIUM SERPL-MCNC: 8.4 MG/DL (ref 8.7–10.5)
CHLORIDE SERPL-SCNC: 102 MMOL/L (ref 95–110)
CO2 SERPL-SCNC: 27 MMOL/L (ref 23–29)
CREAT SERPL-MCNC: 0.6 MG/DL (ref 0.5–1.4)
EST. GFR  (AFRICAN AMERICAN): >60 ML/MIN/1.73 M^2
EST. GFR  (NON AFRICAN AMERICAN): >60 ML/MIN/1.73 M^2
GLUCOSE SERPL-MCNC: 85 MG/DL (ref 70–110)
MAGNESIUM SERPL-MCNC: 2 MG/DL (ref 1.6–2.6)
PHOSPHATE SERPL-MCNC: 4.4 MG/DL (ref 2.7–4.5)
POTASSIUM SERPL-SCNC: 4.3 MMOL/L (ref 3.5–5.1)
PREALB SERPL-MCNC: 8 MG/DL (ref 20–43)
PROT SERPL-MCNC: 7.7 G/DL (ref 6–8.4)
SODIUM SERPL-SCNC: 136 MMOL/L (ref 136–145)
TRIGL SERPL-MCNC: 37 MG/DL (ref 30–150)

## 2019-04-16 NOTE — DISCHARGE INSTRUCTIONS
Please return here or go to the Emergency Department for any concerns or worsening of condition.  If you were prescribed antibiotics, please take them to completion.  If you were prescribed a narcotic medication, do not drive or operate heavy equipment or machinery while taking these medications.  Please follow up with your primary care doctor or specialist as needed.  If you  smoke, please stop smoking.

## 2019-04-17 ENCOUNTER — LAB VISIT (OUTPATIENT)
Dept: LAB | Facility: HOSPITAL | Age: 29
End: 2019-04-17
Payer: COMMERCIAL

## 2019-04-17 ENCOUNTER — OFFICE VISIT (OUTPATIENT)
Dept: INFECTIOUS DISEASES | Facility: CLINIC | Age: 29
End: 2019-04-17
Payer: COMMERCIAL

## 2019-04-17 ENCOUNTER — CLINICAL SUPPORT (OUTPATIENT)
Dept: INFECTIOUS DISEASES | Facility: CLINIC | Age: 29
End: 2019-04-17
Payer: COMMERCIAL

## 2019-04-17 VITALS
WEIGHT: 106.25 LBS | DIASTOLIC BLOOD PRESSURE: 70 MMHG | HEIGHT: 62 IN | SYSTOLIC BLOOD PRESSURE: 106 MMHG | BODY MASS INDEX: 19.55 KG/M2 | TEMPERATURE: 98 F | HEART RATE: 113 BPM

## 2019-04-17 DIAGNOSIS — L40.9 PSORIASIS: ICD-10-CM

## 2019-04-17 DIAGNOSIS — D84.9 IMMUNOCOMPROMISED, ACQUIRED: ICD-10-CM

## 2019-04-17 PROCEDURE — 90715 TDAP VACCINE GREATER THAN OR EQUAL TO 7YO IM: ICD-10-PCS | Mod: S$GLB,,, | Performed by: PHYSICIAN ASSISTANT

## 2019-04-17 PROCEDURE — 90471 TDAP VACCINE GREATER THAN OR EQUAL TO 7YO IM: ICD-10-PCS | Mod: S$GLB,,, | Performed by: PHYSICIAN ASSISTANT

## 2019-04-17 PROCEDURE — 90670 PCV13 VACCINE IM: CPT | Mod: S$GLB,,, | Performed by: PHYSICIAN ASSISTANT

## 2019-04-17 PROCEDURE — 90715 TDAP VACCINE 7 YRS/> IM: CPT | Mod: S$GLB,,, | Performed by: PHYSICIAN ASSISTANT

## 2019-04-17 PROCEDURE — 90739 HEPB VACC 2/4 DOSE ADULT IM: CPT | Mod: S$GLB,,, | Performed by: PHYSICIAN ASSISTANT

## 2019-04-17 PROCEDURE — 90472 HEPATITIS B (RECOMBINANT) ADJUVANTED, 2 DOSE: ICD-10-PCS | Mod: S$GLB,,, | Performed by: PHYSICIAN ASSISTANT

## 2019-04-17 PROCEDURE — 90670 PNEUMOCOCCAL CONJUGATE VACCINE 13-VALENT LESS THAN 5YO & GREATER THAN: ICD-10-PCS | Mod: S$GLB,,, | Performed by: PHYSICIAN ASSISTANT

## 2019-04-17 PROCEDURE — 99999 PR PBB SHADOW E&M-EST. PATIENT-LVL III: CPT | Mod: PBBFAC,,, | Performed by: PHYSICIAN ASSISTANT

## 2019-04-17 PROCEDURE — 90471 IMMUNIZATION ADMIN: CPT | Mod: S$GLB,,, | Performed by: PHYSICIAN ASSISTANT

## 2019-04-17 PROCEDURE — 99204 PR OFFICE/OUTPT VISIT, NEW, LEVL IV, 45-59 MIN: ICD-10-PCS | Mod: S$GLB,,, | Performed by: PHYSICIAN ASSISTANT

## 2019-04-17 PROCEDURE — 99999 PR PBB SHADOW E&M-EST. PATIENT-LVL III: ICD-10-PCS | Mod: PBBFAC,,, | Performed by: PHYSICIAN ASSISTANT

## 2019-04-17 PROCEDURE — 90739 HEPATITIS B (RECOMBINANT) ADJUVANTED, 2 DOSE: ICD-10-PCS | Mod: S$GLB,,, | Performed by: PHYSICIAN ASSISTANT

## 2019-04-17 PROCEDURE — 86682 HELMINTH ANTIBODY: CPT

## 2019-04-17 PROCEDURE — 90472 IMMUNIZATION ADMIN EACH ADD: CPT | Mod: S$GLB,,, | Performed by: PHYSICIAN ASSISTANT

## 2019-04-17 PROCEDURE — 86592 SYPHILIS TEST NON-TREP QUAL: CPT

## 2019-04-17 PROCEDURE — 99204 OFFICE O/P NEW MOD 45 MIN: CPT | Mod: S$GLB,,, | Performed by: PHYSICIAN ASSISTANT

## 2019-04-17 NOTE — PROGRESS NOTES
received Tdap, Prevnar 13 and Heplisav-B(first dose) vaccines. Tolerated well. Return appointment scheduled. Left unit in NAD.

## 2019-04-17 NOTE — PROGRESS NOTES
Pre Biologic Response Modifier Therapy Consult  BMR Recipient Evaluation    Requesting Physician: Silvana Bacon MD    Reason for Visit:    Chief Complaint   Patient presents with    Crohn's disease involving terminal ileum     History of Present Illness  Lora Scott is a 29 y.o. year old Black or  female with advanced Crohns Disease currently being evaluated for prior to starting biologic response modifer (BRM) therapy.  She is not on any immune suppressing meds currently.  Patient denies any recent fever, chills, or infective infective illnesses.      1) Do you have a history of:    YES NO   Diabetes                 []       [x]   Wound Foot Infection/Bone Infection   []  [x]   Surgical Removal of Spleen    []  [x]       2) Have you had recurrent infections involving:             YES NO  Sinus infections  []  [x]  Sore Throat   []  [x]                             Prostate Infections  []  []  .                         Bladder Infections  []  [x]                                 Kidney Infections  []  [x]        Intestinal Infections  []  [x]   Skin Infections   []  [x]                   Reproductive Infections             N  Periodontal Disease             N      3)Have you ever had: YES     NO UNKNOWN      Chicken Pox   [x]  []  []                 Shingles   []  [x]  []                Orolabial Herpes             []  [x]  []       Genital Herpes  [x]  []  []           Cytomegalovirus  []  [x]  []               Jayla-Barr Virus  []  [x]  []              Genital Warts   []  [x]  []                Hepatitis A   []  [x]  []             Hepatitis B   []  [x]  []                Hepatitis C   []  [x]  []                 Syphilis   []  [x]  []                Gonorrhea   [x]  []  []  Many yrs ago - tx no recurrence               Pelvic Inflammatory  []  [x]  []   Disease   []  []  []                    Chlamydia Infection  [x]  []  [] Many yrs ago - tx no recurrence                   Intestinal parasites         or worms   []  [x]  []                 Fungal Infections  []  [x]  []                Blood Infections  []  []  []     Comment:       4) Have you ever been exposed   YES NO  to someone with tuberculosis?  []  [x]   If yes, what treatment did you receive:     5) What states have you lived in? [place]    6) What countries have you visited for more than 2 weeks?     [place]                      YES NO  7) Did you have any associated infections?     []  [x]     8) Are you planning to travel outside the           [place] after starting BRMs?    [x]   []           Household                  YES NO  9 Do you have pets living in your house?   []   [x]             If yes, describe:     Do you spend time or live on a farm or                 have livestock or other farm animals?   []  [x]   If yes, which ones:    Do you have a fish tank?     []  [x]                       Do you have a litter box?     []  [x]                        Do you fish or hunt?      []  [x]                       Do you clean or skin fish or animals?   []  [x]                      Do you consume raw or undercooked                meat, fish, or shellfish?     []  [x]         10) What occupations have you had?             11) Patient reports hobby to be none                           12)Do you garden or otherwise  YES NO   work in the soil?    []  [x]   13)Do you hike, camp, or spend   time in wooded areas?   []  [x]                           14) The patient's immunization history was reviewed.   Have you ever received:  YES NO UNKNOWN DATES  Routine Childhood vaccines  [x]  []  []                Influenza vaccine   []  [x]  []         Pneumonia    []  [x]  []      Tetanus-diptheria   []  [x]  []   Hepatitis A vaccine series       []  [x]  []       Hepatitis B vaccine series       [x]  []  []        Meningitis vaccine   [x]  []  []     Varicella vaccine   []  [x]  []                 DTP 1/12/1994, 3/18/1992, 2/24/1992, 1991            Hepatitis B, Pediatric/Adolescent 7/24/2006          HIB 3/18/1992, 3/18/1992, 1991          IPV 7/24/2006          Meningococcal Conjugate (MCV4P) 7/24/2006          MMR 7/24/2006, 1/13/1994          OPV 1/12/1994, 2/24/1992, 1991          Td (ADULT) 7/24/2006          Td (Adult), Unspecified Formulation 7/24/2006             Based on the patients immunization history and serologies, immunizations were ordered:         Ordered  Not Ordered  Influenza Vaccine     []    [x]   Hepatitis A series at 0, 6 months   []    [x] Immune  Hepatitis B sries at 0, 1, and 6 months  []    []   Heplisav B series 0,1 months    [x]    []   Prevnar      [x]    []   Pneumovax      [x]    []   TDap       [x]    []   Zoster       []    [x]   Menactra      []    [x]   HiB       []    [x]               The patient was encouraged to contact us about any problems that may develop after immunization and possible side effects were reviewed.       Allergies: Shellfish containing products; Horse/equine containing products; and Iodine  Immunization History   Administered Date(s) Administered    DTP 1991, 02/24/1992, 03/18/1992, 01/12/1994    HIB 1991, 02/24/1992, 03/18/1992    Hepatitis B, Pediatric/Adolescent 07/24/2006    IPV 07/24/2006    MMR 01/13/1994, 07/24/2006    Meningococcal Conjugate (MCV4P) 07/24/2006    OPV 1991, 02/24/1992, 01/12/1994    Td (ADULT) 07/24/2006     Past Medical History:   Diagnosis Date    Allergy     Anemia     Asthma     Crohn's disease 2/18/19    Crohn's disease involving terminal ileum     Depression     Eczema     Elevated liver function tests     Ex-smoker for less than 1 year     Skin rash      Past Surgical History:   Procedure Laterality Date    COLONOSCOPY N/A 2/20/2019    Performed by [facility] at [facility] (2ND FLR)    EGD (ESOPHAGOGASTRODUODENOSCOPY) N/A 2/20/2019    Performed by [facility]la MD at [facility] ENDO (2ND FLR)      Social  History     Socioeconomic History    Marital status: Single     Spouse name: Not on file    Number of children: Not on file    Years of education: Not on file    Highest education level: Not on file   Occupational History    Not on file   Social Needs    Financial resource strain: Not on file    Food insecurity:     Worry: Not on file     Inability: Not on file    Transportation needs:     Medical: Not on file     Non-medical: Not on file   Tobacco Use    Smoking status: Former Smoker     Types: Cigarettes     Last attempt to quit: 2019     Years since quittin.1    Smokeless tobacco: Never Used    Tobacco comment: 2 CIGS PER DAY   Substance and Sexual Activity    Alcohol use: Not Currently     Alcohol/week: 0.0 oz     Comment: Socially    Drug use: No     Frequency: 3.0 times per week    Sexual activity: Not Currently     Partners: Male     Birth control/protection: None   Lifestyle    Physical activity:     Days per week: Not on file     Minutes per session: Not on file    Stress: Not on file   Relationships    Social connections:     Talks on phone: Not on file     Gets together: Not on file     Attends Baptism service: Not on file     Active member of club or organization: Not on file     Attends meetings of clubs or organizations: Not on file     Relationship status: Not on file   Other Topics Concern    Not on file   Social History Narrative    Not on file       Review of Systems   Constitution: Positive for decreased appetite and night sweats. Negative for chills, fever, malaise/fatigue, weight gain and weight loss.   HENT: Negative for congestion, ear pain, hearing loss, hoarse voice, sore throat and tinnitus.    Eyes: Negative for blurred vision, redness and visual disturbance.   Cardiovascular: Negative for chest pain, leg swelling and palpitations.   Respiratory: Negative for cough, hemoptysis, shortness of breath, sputum production and wheezing.    Endocrine: Negative for  cold intolerance and heat intolerance.   Hematologic/Lymphatic: Negative for adenopathy. Bruises/bleeds easily.   Skin: Positive for dry skin and itching. Negative for rash and suspicious lesions.   Musculoskeletal: Positive for joint pain. Negative for back pain, myalgias and neck pain.   Gastrointestinal: Positive for diarrhea and nausea. Negative for abdominal pain, constipation, heartburn and vomiting.   Genitourinary: Negative for dysuria, flank pain, frequency, hematuria, hesitancy and urgency.   Neurological: Positive for dizziness and numbness. Negative for headaches, paresthesias and weakness.   Psychiatric/Behavioral: Negative for depression and memory loss. The patient has insomnia and is nervous/anxious.    Allergic/Immunologic: Negative for environmental allergies, HIV exposure, hives and persistent infections.     Physical Exam   Constitutional: She is oriented to person, place, and time. She appears well-developed and well-nourished. No distress.       HENT:   Head: Normocephalic and atraumatic.   Mouth/Throat: She does not have dentures. No oral lesions. Abnormal dentition (multiple cavitary and eroded teeth to gumline). Dental caries present. No dental abscesses or lacerations.   Eyes: Pupils are equal, round, and reactive to light. EOM are normal.   Neck: Normal range of motion. Neck supple.   Cardiovascular: Regular rhythm and normal heart sounds. Exam reveals no gallop and no friction rub.   No murmur heard.  tachy   Pulmonary/Chest: Effort normal and breath sounds normal. No respiratory distress. She has no wheezes. She has no rales.   Abdominal: Soft. Bowel sounds are normal. She exhibits no distension and no mass. There is tenderness. There is no guarding.   Musculoskeletal: She exhibits no edema.   Neurological: She is alert and oriented to person, place, and time.   Skin: Skin is warm and dry. She is not diaphoretic.   Psychiatric: She has a normal mood and affect. Her behavior is normal.      Diagnostics:   RPR   Date Value Ref Range Status   09/14/2015 Non-reactive Non-reactive Final     No results found for: CMVANTIBODIE  No results found for: HIV1X2  No results found for: HTLVIIIANTIB  Hepatitis B Surface Ag   Date Value Ref Range Status   02/19/2019 Negative  Final     Hep B Core Total Ab   Date Value Ref Range Status   02/19/2019 Negative  Final     Hepatitis C Ab   Date Value Ref Range Status   02/19/2019 Negative  Final     No results found for: TOXOIGG  No components found for: TOXOIGGINTER  No results found for: COU8HDL  No results found for: QYZ1UEZ  Varicella IgG   Date Value Ref Range Status   03/11/2019 2.58 (H) 0.00 - 0.90 ISR Final     Varicella Interpretation   Date Value Ref Range Status   03/11/2019 Positive (A) Negative Final     Comment:     <or=0.90     Negative        No detectable IgG antibody to Varicella zoster  by the BURT test. Such individuals are presumed to be   uninfected with Varicella zoster and to be susceptible to   primary infection.  0.91-1.09    Equivocal  >or=1.10     Positive        Indicates presence of detectable IgG antibody to Varicella   zoster by the BURT test. Indicative of previous or current   infection.       No results found for: STRONGANTIGG  No results found for: EPSTEINBARRV  Hep B S Ab   Date Value Ref Range Status   02/19/2019 Grayzone (A)  Final     Comment:     The immune status of the individual should be further assessed  by considering other factors, such as clinical status,   follow-up testing, associated risk factors, and the use of   additional diagnostic information. If there is a clinical   suspicion of acute Hepatitis B infection, a HBV PCR(BRJ5449)  may also be considered.       No results found for: QUANTIFERON  Hep A IgM   Date Value Ref Range Status   09/14/2015 Negative  Final          Ref. Range 9/14/2015 12:06 2/19/2019 06:52 2/19/2019 06:53 2/20/2019 11:42 3/11/2019 12:10   Hep A IgM Unknown Negative       Hep B C IgM  Unknown Negative       Hep B Core Total Ab Unknown   Negative     Hep B S Ab Unknown   Grayzone (A)     Hepatitis B Surface Ag Unknown Negative  Negative     Hepatitis C Ab Unknown Negative  Negative     M. tuberculosis DNA by PCR Latest Ref Range: Not Applicable     Negative    Mitogen - Nil Latest Ref Range: See text IU/mL  0.710      MTB Specimen Source Unknown    TERMINAL ILEUM TI...    NIL Latest Ref Range: See text IU/mL  0.040      TB Gold Plus Unknown  Negative      TB1 - Nil Latest Ref Range: See text IU/mL  0.010      TB2 - Nil Latest Ref Range: See text IU/mL  0.000      HIV 1/2 Ag/Ab Latest Ref Range: Negative  Negative  Negative     RPR Latest Ref Range: Non-reactive  Non-reactive       Varicella IgG Latest Ref Range: 0.00 - 0.90 ISR     2.58 (H)   Varicella Interpretation Latest Ref Range: Negative      Positive (A)      Ref. Range 2/19/2019 06:53   Hepatitis A Antibody IgG Unknown Positive (A)       Contains abnormal data CT Abdomen Pelvis With Contrast   Order: [id number]   Status:  Final result   Visible to patient:  Yes (Patient Portal) Next appt:  04/22/2019 at 09:00 AM in Dermatology (Nano Martinez MD)   Details     Reading Physician Reading Date Result Priority   Dong Lara MD 2/18/2019       Narrative     EXAMINATION:  CT ABDOMEN PELVIS WITH CONTRAST    CLINICAL HISTORY:  RLQ pain, appendicitis suspected;    TECHNIQUE:  Low dose axial images, sagittal and coronal reformations were obtained from the lung bases to the pubic symphysis following the IV administration of 75 mL of Omnipaque 350 .  Oral contrast was not administered.    COMPARISON:  None.    FINDINGS:  Abdomen:    - Lower thorax:Unremarkable.    - Lung bases: No infiltrates and no nodules.    - Liver: No focal mass.    - Gallbladder: Contracted.    - Bile Ducts: No evidence of intra or extra hepatic biliary ductal dilation.    - Spleen: Negative.    - Kidneys: No mass or hydronephrosis.    - Adrenals: Unremarkable.    -  Pancreas: No mass or peripancreatic fat stranding.    - Retroperitoneum:  No significant adenopathy.    - Vascular: No abdominal aortic aneurysm.    - Abdominal wall:  Unremarkable.    Pelvis:    No pelvic mass, adenopathy, or free fluid.    Bowel/Mesentery:    Abnormal wall thickening involving the ascending colon, cecum and terminal ileum with intense mucosal enhancement and luminal narrowing resulting in distention of proximal small bowel.  There is ilealileal and ileocolic fistulization, fat hypertrophy and increased mesenteric vascularity.  Reactive mesenteric adenitis in the right lower quadrant.  No abscess collection.  The appendix is not seen.    Bones:  No acute osseous abnormality and no suspicious lytic or blastic lesion.      Impression       Abnormal findings, as above, strongly suggestive of Crohn's enterocolitis with active involvement of the right colon and terminal ileum.  There is associated partial small bowel obstruction and fistulization.  No abscess formation identified.  Recommend gastroenterology consultation.    This report was flagged in Epic as abnormal.      Electronically signed by: Dong Lara MD  Date: 02/18/2019  Time: 04:01               BRM - Candidacy    Biologic Response Modifier Candidacy: Based on available information, there are no identified significant barriers to BRMs from an infectious disease standpoint pending acceptable serologies, neagtive RPR and Strongylodies IgG.  It is recommended the patient have their teeth treated prior to starting BRMs if possible as is a potential nidus for infection.  Final determination of BRM candidacy will be made once evaluation is complete and reviewed.    ALEXANDRIA Reyes  Vaccine and Serology Needs:  1. Heplisav Series  2. Tdap  3. Prevnar today   4. RPR  5. Strongyloides IgG  6. Shingrix - would rec hold off on administration given age - could be administered if there is an overriding concern for shingle risk in future or on  schedule        Counseling:I discussed with Lora the risk for increased susceptibility to infections following BRM therapy including increased risk for infection.  The patients has been counseled on the importance of vaccinations including but not limited to a yearly flu vaccine.Specific guidance has been provided to the patient regarding the patients occupation, hobbies and activities to avoid future infectious complications including but not limited to avoiding undercooked meats and seafood, proper hygiene, and contact with animals.

## 2019-04-17 NOTE — LETTER
April 17, 2019      Suzi Bacon MD  [address]  [facility] [address]           [facility] - Infectious [facility]  [address]  [facility] [address]  Phone: [phone]  Fax: [phone]          Patient: Lora Scott   MR Number: [id number]   YOB: 1990   Date of Visit: 4/17/2019       Dear Dr. Suzi Bacon:    Thank you for referring Lora Scott to me for evaluation. Attached you will find relevant portions of my assessment and plan of care.    If you have questions, please do not hesitate to call me. I look forward to following Lora Scott along with you.    Sincerely,    Ean Noriega Jr., PA    Enclosure  CC:  No Recipients    If you would like to receive this communication electronically, please contact [email or web address] or [phone] to request more information on [employer] Link access.    For providers and/or their staff who would like to refer a patient to [facility], please contact us through our one-stop-shop provider referral line, [facility], at [phone].    If you feel you have received this communication in error or would no longer like to receive these types of communications, please e-mail [email or web address]

## 2019-04-18 LAB — RPR SER QL: NORMAL

## 2019-04-22 ENCOUNTER — TELEPHONE (OUTPATIENT)
Dept: WOUND CARE | Facility: CLINIC | Age: 29
End: 2019-04-22

## 2019-04-22 ENCOUNTER — INITIAL CONSULT (OUTPATIENT)
Dept: DERMATOLOGY | Facility: CLINIC | Age: 29
End: 2019-04-22
Payer: COMMERCIAL

## 2019-04-22 ENCOUNTER — LAB VISIT (OUTPATIENT)
Dept: LAB | Facility: HOSPITAL | Age: 29
End: 2019-04-22
Attending: DERMATOLOGY
Payer: COMMERCIAL

## 2019-04-22 DIAGNOSIS — R21 RASH AND NONSPECIFIC SKIN ERUPTION: Primary | ICD-10-CM

## 2019-04-22 DIAGNOSIS — R21 RASH AND NONSPECIFIC SKIN ERUPTION: ICD-10-CM

## 2019-04-22 DIAGNOSIS — L40.9 PSORIASIS: ICD-10-CM

## 2019-04-22 LAB — STRONGYLOIDES ANTIBODY IGG: NEGATIVE

## 2019-04-22 PROCEDURE — 84630 ASSAY OF ZINC: CPT

## 2019-04-22 PROCEDURE — 99202 PR OFFICE/OUTPT VISIT, NEW, LEVL II, 15-29 MIN: ICD-10-PCS | Mod: S$GLB,,, | Performed by: DERMATOLOGY

## 2019-04-22 PROCEDURE — 99999 PR PBB SHADOW E&M-EST. PATIENT-LVL II: CPT | Mod: PBBFAC,,, | Performed by: DERMATOLOGY

## 2019-04-22 PROCEDURE — 99202 OFFICE O/P NEW SF 15 MIN: CPT | Mod: S$GLB,,, | Performed by: DERMATOLOGY

## 2019-04-22 PROCEDURE — 99999 PR PBB SHADOW E&M-EST. PATIENT-LVL II: ICD-10-PCS | Mod: PBBFAC,,, | Performed by: DERMATOLOGY

## 2019-04-22 PROCEDURE — 36415 COLL VENOUS BLD VENIPUNCTURE: CPT

## 2019-04-22 RX ORDER — CALCIPOTRIENE 50 UG/G
CREAM TOPICAL 2 TIMES DAILY
Qty: 100 G | Refills: 3 | Status: SHIPPED | OUTPATIENT
Start: 2019-04-22 | End: 2019-05-06

## 2019-04-22 RX ORDER — TRIAMCINOLONE ACETONIDE 1 MG/G
CREAM TOPICAL
Qty: 454 G | Refills: 3 | Status: SHIPPED | OUTPATIENT
Start: 2019-04-22 | End: 2019-05-06

## 2019-04-22 NOTE — TELEPHONE ENCOUNTER
----- Message from Miranda East RN sent at 4/18/2019  1:02 PM CDT -----  Contact: Hina ferris/ [employer] [phone]      ----- Message -----  From: Patricia Fay  Sent: 4/18/2019  12:23 PM  To: Joseph RAMÍREZ Staff    Needs Advice    Reason for call: Hina called asking for the nurse to give her a call in regarding pts' TPN        Communication Preference: Hina ferris/ [employer] [phone]    Additional Information:

## 2019-04-22 NOTE — TELEPHONE ENCOUNTER
I spoke with RD and she wanted to see if possible for Lora to have a consult with a dietitian for diet counseling with crohns. I told her I was sure that was not a problem and we have a  RD here that can do that, I will forward this to Team to set up for her to have a consult with Tanisha Mcclure RD

## 2019-04-22 NOTE — PROGRESS NOTES
Subjective:       Patient ID:  Lora Scott is a 29 y.o. female who presents for   Chief Complaint   Patient presents with    Eczema     body     28 yo female with longstanding history of eczema, + family history in her sister as well, presents for evaluation of a body rash ongoing x several years.  Patient currently has rash on legs, trunk, arms, scalp, and ears. Spares genital area. No history of weepy or erosive plaques. It is itchy at times.  It is characterized by brown and white thick plaques.  She has been told in the past that it is psoriasis, and has been treated with NBUVB which was very effective. Aside from that she has used triamcinolone cream that helps and would like refill. She has not been on any systemic therapy for psoriasis to her knowledge.    She has Crohn's disease and is currently on TPN for this.  They are planning to use a systemic agent to treat.    Eczema  - Initial  Affected locations: body.  Duration: lifetime.  Signs / symptoms: itching, dryness and scaling  Relieving factors/Treatments tried: nothing        Review of Systems   Skin: Positive for itching, rash, dry skin and dry lips.   Hematologic/Lymphatic: Does not bruise/bleed easily.        Objective:    Physical Exam   Constitutional: She appears well-developed and well-nourished. No distress.   Neurological: She is alert and oriented to person, place, and time. She is not disoriented.   Psychiatric: She has a normal mood and affect.   Skin:   Areas Examined (abnormalities noted in diagram):   Scalp / Hair Palpated and Inspected  Head / Face Inspection Performed  Neck Inspection Performed  Chest / Axilla Inspection Performed  Abdomen Inspection Performed  Genitals / Buttocks / Groin Inspection Performed  Back Inspection Performed  RUE Inspected  LUE Inspection Performed  RLE Inspected  LLE Inspection Performed  Nails and Digits Inspection Performed                   Diagram Legend     Erythematous scaling macule/papule c/w  actinic keratosis       Vascular papule c/w angioma      Pigmented verrucoid papule/plaque c/w seborrheic keratosis      Yellow umbilicated papule c/w sebaceous hyperplasia      Irregularly shaped tan macule c/w lentigo     1-2 mm smooth white papules consistent with Milia      Movable subcutaneous cyst with punctum c/w epidermal inclusion cyst      Subcutaneous movable cyst c/w pilar cyst      Firm pink to brown papule c/w dermatofibroma      Pedunculated fleshy papule(s) c/w skin tag(s)      Evenly pigmented macule c/w junctional nevus     Mildly variegated pigmented, slightly irregular-bordered macule c/w mildly atypical nevus      Flesh colored to evenly pigmented papule c/w intradermal nevus       Pink pearly papule/plaque c/w basal cell carcinoma      Erythematous hyperkeratotic cursted plaque c/w SCC      Surgical scar with no sign of skin cancer recurrence      Open and closed comedones      Inflammatory papules and pustules      Verrucoid papule consistent consistent with wart     Erythematous eczematous patches and plaques     Dystrophic onycholytic nail with subungual debris c/w onychomycosis     Umbilicated papule    Erythematous-base heme-crusted tan verrucoid plaque consistent with inflamed seborrheic keratosis     Erythematous Silvery Scaling Plaque c/w Psoriasis     See annotation    Lab Results   Component Value Date    WBC 6.61 04/15/2019    HGB 9.0 (L) 04/15/2019    HCT 32.4 (L) 04/15/2019    MCV 79 (L) 04/15/2019     (H) 04/15/2019     CMP  Sodium   Date Value Ref Range Status   04/15/2019 136 136 - 145 mmol/L Final     Potassium   Date Value Ref Range Status   04/15/2019 4.3 3.5 - 5.1 mmol/L Final     Chloride   Date Value Ref Range Status   04/15/2019 102 95 - 110 mmol/L Final     CO2   Date Value Ref Range Status   04/15/2019 27 23 - 29 mmol/L Final     Glucose   Date Value Ref Range Status   04/15/2019 85 70 - 110 mg/dL Final     BUN, Bld   Date Value Ref Range Status   04/15/2019 20 6  - 20 mg/dL Final     Creatinine   Date Value Ref Range Status   04/15/2019 0.6 0.5 - 1.4 mg/dL Final     Calcium   Date Value Ref Range Status   04/15/2019 8.4 (L) 8.7 - 10.5 mg/dL Final     Total Protein   Date Value Ref Range Status   04/15/2019 7.7 6.0 - 8.4 g/dL Final     Albumin   Date Value Ref Range Status   04/15/2019 2.2 (L) 3.5 - 5.2 g/dL Final     Total Bilirubin   Date Value Ref Range Status   04/15/2019 0.4 0.1 - 1.0 mg/dL Final     Comment:     For infants and newborns, interpretation of results should be based  on gestational age, weight and in agreement with clinical  observations.  Premature Infant recommended reference ranges:  Up to 24 hours.............<8.0 mg/dL  Up to 48 hours............<12.0 mg/dL  3-5 days..................<15.0 mg/dL  6-29 days.................<15.0 mg/dL       Alkaline Phosphatase   Date Value Ref Range Status   04/15/2019 96 55 - 135 U/L Final     AST   Date Value Ref Range Status   04/15/2019 16 10 - 40 U/L Final     ALT   Date Value Ref Range Status   04/15/2019 15 10 - 44 U/L Final     Anion Gap   Date Value Ref Range Status   04/15/2019 7 (L) 8 - 16 mmol/L Final     eGFR if    Date Value Ref Range Status   04/15/2019 >60.0 >60 mL/min/1.73 m^2 Final     eGFR if non    Date Value Ref Range Status   04/15/2019 >60.0 >60 mL/min/1.73 m^2 Final     Comment:     Calculation used to obtain the estimated glomerular filtration  rate (eGFR) is the CKD-EPI equation.          Assessment / Plan:        Rash - clinically consistent with psoriasis.  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  -     ZINC; Future; Expected date: 04/22/2019    Will message her GI about this diagnosis.  Treatment options overlap for Crohn's disease and psoriasis; TNF blockers such as Humira or Remicade, or Stelara would likely all be efficacious.    In meantime will treat topically. Pt reports that  rash does improve when she consistently applies her medications.  NBUVB also a therapeutic option in the future.    -     triamcinolone acetonide 0.1% (KENALOG) 0.1 % cream; AAA bid  Dispense: 454 g; Refill: 3  -     calcipotriene (DOVONOX) 0.005 % cream; Apply topically 2 (two) times daily.  Dispense: 100 g; Refill: 3             Follow up in about 3 months (around 7/22/2019).

## 2019-04-22 NOTE — LETTER
April 22, 2019      Suzi Bacon MD  [address]  [place] [address]           [facility]  [address]  [place] [address]  Phone: [phone]  Fax: [phone]          Patient: Lora Scott   MR Number: [id number]   YOB: 1990   Date of Visit: 4/22/2019       Dear Dr. Suzi Bacon:    Thank you for referring Lora Scott to me for evaluation. Attached you will find relevant portions of my assessment and plan of care.    If you have questions, please do not hesitate to call me. I look forward to following Lora Scott along with you.    Sincerely,    Nano Martinez MD    Enclosure  CC:  No Recipients    If you would like to receive this communication electronically, please contact [email or web address] or [phone] to request more information on [facility] Link access.    For providers and/or their staff who would like to refer a patient to [facility], please contact us through our one-stop-shop provider referral line, [facility], at [phone].    If you feel you have received this communication in error or would no longer like to receive these types of communications, please e-mail [email or web address]

## 2019-04-22 NOTE — Clinical Note
FYI - her rash is consistent with psoriasis - would respond to TNF blocker or Stelara.  Will defer systemic therapy to you for her Crohn's. Let me know if you have any questions. Nano (derm)

## 2019-04-23 ENCOUNTER — TELEPHONE (OUTPATIENT)
Dept: SURGERY | Facility: HOSPITAL | Age: 29
End: 2019-04-23

## 2019-04-23 ENCOUNTER — PATIENT MESSAGE (OUTPATIENT)
Dept: DERMATOLOGY | Facility: CLINIC | Age: 29
End: 2019-04-23

## 2019-04-23 LAB — ZINC SERPL-MCNC: 54 UG/DL (ref 60–130)

## 2019-04-23 NOTE — TELEPHONE ENCOUNTER
----- Message from Kim Cadet RN sent at 4/23/2019  2:54 PM CDT -----  I made one for her on Monday 4/29 at 8:30am.  ----- Message -----  From: Letty Stringer NP  Sent: 4/23/2019   2:45 PM  To: Kim Cadet RN    How do we get an appt for this pt with crohns with the dietician Tanisha?

## 2019-04-23 NOTE — TELEPHONE ENCOUNTER
S/w Hina and pt is on TPN, newly diagnosed with crohns, needs ileocolic resection once nutrition improved, pt had questions about diet, will try to get appt with Tanisha

## 2019-04-23 NOTE — TELEPHONE ENCOUNTER
----- Message from Miranda East RN sent at 4/18/2019  1:02 PM CDT -----  Contact: Hina ferris/ [facility] [phone]      ----- Message -----  From: Patricia Fay  Sent: 4/18/2019  12:23 PM  To: Joseph RAMÍREZ Staff    Needs Advice    Reason for call: Hina called asking for the nurse to give her a call in regarding pts' TPN        Communication Preference: Hina ferris/ [facility] [phone]    Additional Information:

## 2019-04-23 NOTE — TELEPHONE ENCOUNTER
----- Message from Miranda East RN sent at 4/18/2019  1:02 PM CDT -----  Contact: Hina ferris/ [employer] [phone]      ----- Message -----  From: Patricia Fay  Sent: 4/18/2019  12:23 PM  To: Joseph RAMÍREZ Staff    Needs Advice    Reason for call: Hina called asking for the nurse to give her a call in regarding pts' TPN        Communication Preference: Hina ferris/ [employer] [phone]    Additional Information:

## 2019-04-25 ENCOUNTER — TELEPHONE (OUTPATIENT)
Dept: SURGERY | Facility: HOSPITAL | Age: 29
End: 2019-04-25

## 2019-04-25 NOTE — TELEPHONE ENCOUNTER
----- Message from Nano Martinez MD sent at 4/25/2019  8:11 AM CDT -----  Letty,  I am taking care of this patient for her skin rashes.  She has a low zinc level that may be responsible for some of her rashes (acrodermatitis enteropathica).  Do you have a way to supplement her TPN with zinc?    Many thanks for your help, Nano Martinez (derm)

## 2019-04-25 NOTE — TELEPHONE ENCOUNTER
S/w Hina at [facility] dietician adjusting her TPN, asked her to increase the zinc in her TPN and she will adjust formula, will recheck levels in several months, she should also take some oral supplements

## 2019-04-29 ENCOUNTER — PATIENT MESSAGE (OUTPATIENT)
Dept: SURGERY | Facility: CLINIC | Age: 29
End: 2019-04-29

## 2019-04-29 ENCOUNTER — LAB VISIT (OUTPATIENT)
Dept: LAB | Facility: HOSPITAL | Age: 29
End: 2019-04-29
Attending: COLON & RECTAL SURGERY
Payer: COMMERCIAL

## 2019-04-29 ENCOUNTER — NUTRITION (OUTPATIENT)
Dept: GASTROENTEROLOGY | Facility: CLINIC | Age: 29
End: 2019-04-29

## 2019-04-29 ENCOUNTER — TELEPHONE (OUTPATIENT)
Dept: SURGERY | Facility: CLINIC | Age: 29
End: 2019-04-29

## 2019-04-29 VITALS — HEIGHT: 62 IN | WEIGHT: 105.81 LBS | BODY MASS INDEX: 19.47 KG/M2

## 2019-04-29 DIAGNOSIS — L40.9 PSORIASIS: ICD-10-CM

## 2019-04-29 DIAGNOSIS — K50.00 CROHN'S DISEASE OF SMALL INTESTINE: Primary | ICD-10-CM

## 2019-04-29 DIAGNOSIS — E44.0 MALNUTRITION OF MODERATE DEGREE: ICD-10-CM

## 2019-04-29 DIAGNOSIS — D84.9 IMMUNOCOMPROMISED, ACQUIRED: ICD-10-CM

## 2019-04-29 DIAGNOSIS — K50.013 CROHN'S DISEASE OF ILEUM WITH FISTULA: Primary | ICD-10-CM

## 2019-04-29 LAB
ALBUMIN SERPL BCP-MCNC: 2.1 G/DL (ref 3.5–5.2)
ALP SERPL-CCNC: 95 U/L (ref 55–135)
ALT SERPL W/O P-5'-P-CCNC: 37 U/L (ref 10–44)
ANION GAP SERPL CALC-SCNC: 8 MMOL/L (ref 8–16)
AST SERPL-CCNC: 31 U/L (ref 10–40)
BASOPHILS # BLD AUTO: 0 K/UL (ref 0–0.2)
BASOPHILS NFR BLD: 0 % (ref 0–1.9)
BILIRUB SERPL-MCNC: 0.3 MG/DL (ref 0.1–1)
BUN SERPL-MCNC: 23 MG/DL (ref 6–20)
CALCIUM SERPL-MCNC: 8.5 MG/DL (ref 8.7–10.5)
CHLORIDE SERPL-SCNC: 103 MMOL/L (ref 95–110)
CO2 SERPL-SCNC: 25 MMOL/L (ref 23–29)
CREAT SERPL-MCNC: 0.6 MG/DL (ref 0.5–1.4)
DIFFERENTIAL METHOD: ABNORMAL
EOSINOPHIL # BLD AUTO: 0.1 K/UL (ref 0–0.5)
EOSINOPHIL NFR BLD: 1.5 % (ref 0–8)
ERYTHROCYTE [DISTWIDTH] IN BLOOD BY AUTOMATED COUNT: 21.2 % (ref 11.5–14.5)
EST. GFR  (AFRICAN AMERICAN): >60 ML/MIN/1.73 M^2
EST. GFR  (NON AFRICAN AMERICAN): >60 ML/MIN/1.73 M^2
GLUCOSE SERPL-MCNC: 87 MG/DL (ref 70–110)
HCT VFR BLD AUTO: 26.8 % (ref 37–48.5)
HGB BLD-MCNC: 7.6 G/DL (ref 12–16)
IMM GRANULOCYTES # BLD AUTO: 0.02 K/UL (ref 0–0.04)
IMM GRANULOCYTES NFR BLD AUTO: 0.3 % (ref 0–0.5)
LYMPHOCYTES # BLD AUTO: 0.9 K/UL (ref 1–4.8)
LYMPHOCYTES NFR BLD: 13.3 % (ref 18–48)
MAGNESIUM SERPL-MCNC: 1.8 MG/DL (ref 1.6–2.6)
MCH RBC QN AUTO: 21.8 PG (ref 27–31)
MCHC RBC AUTO-ENTMCNC: 28.4 G/DL (ref 32–36)
MCV RBC AUTO: 77 FL (ref 82–98)
MONOCYTES # BLD AUTO: 0.5 K/UL (ref 0.3–1)
MONOCYTES NFR BLD: 7.3 % (ref 4–15)
NEUTROPHILS # BLD AUTO: 5.2 K/UL (ref 1.8–7.7)
NEUTROPHILS NFR BLD: 77.6 % (ref 38–73)
NRBC BLD-RTO: 0 /100 WBC
PHOSPHATE SERPL-MCNC: 4.3 MG/DL (ref 2.7–4.5)
PLATELET # BLD AUTO: 393 K/UL (ref 150–350)
PMV BLD AUTO: 10.4 FL (ref 9.2–12.9)
POTASSIUM SERPL-SCNC: 4.4 MMOL/L (ref 3.5–5.1)
PREALB SERPL-MCNC: 9 MG/DL (ref 20–43)
PROT SERPL-MCNC: 7.6 G/DL (ref 6–8.4)
RBC # BLD AUTO: 3.48 M/UL (ref 4–5.4)
SODIUM SERPL-SCNC: 136 MMOL/L (ref 136–145)
TRIGL SERPL-MCNC: 36 MG/DL (ref 30–150)
WBC # BLD AUTO: 6.67 K/UL (ref 3.9–12.7)

## 2019-04-29 PROCEDURE — 84478 ASSAY OF TRIGLYCERIDES: CPT

## 2019-04-29 PROCEDURE — 36415 COLL VENOUS BLD VENIPUNCTURE: CPT

## 2019-04-29 PROCEDURE — 84134 ASSAY OF PREALBUMIN: CPT

## 2019-04-29 PROCEDURE — 84100 ASSAY OF PHOSPHORUS: CPT

## 2019-04-29 PROCEDURE — 99999 PR PBB SHADOW E&M-EST. PATIENT-LVL I: CPT | Mod: PBBFAC,,,

## 2019-04-29 PROCEDURE — 83735 ASSAY OF MAGNESIUM: CPT

## 2019-04-29 PROCEDURE — 80053 COMPREHEN METABOLIC PANEL: CPT

## 2019-04-29 PROCEDURE — 99999 PR PBB SHADOW E&M-EST. PATIENT-LVL I: ICD-10-PCS | Mod: PBBFAC,,,

## 2019-04-29 PROCEDURE — 85025 COMPLETE CBC W/AUTO DIFF WBC: CPT

## 2019-04-29 NOTE — PROGRESS NOTES
" NUTRITIONAL ASSESSMENT    Referring Provider: Ginger Mosher , CNS       ( AURA Felix with [facility] requested referral due to patient's low quality protein diet and limited understanding of her disease process)   Spoke with RD at [facility] ; current TPN provides 1700 calories ; 110-115 grams protein ; 30 grams Fat; 270 grams Dextrose ; Zinc to be increased this coming week .     Reason for Visit:  Crohns disease involving terminal ileum with TPN started 2/19 and oral diet continued but patient admits abd pain with eating has discouraged desire to eat , malnutrition - low prealbumin ; limited food choices depending upon what father cooks ( usually stews and high fat meats which trigger pain ); decreased sense of taste- reports " food tastes different"       Age: 29 y.o.  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   Patient Active Problem List   Diagnosis    Iron deficiency anemia due to chronic blood loss    Crohn's disease of ileum with fistula    Malnutrition of moderate degree    Small bowel stricture    Elevated liver function tests    Skin rash    Psoriasis    Immunocompromised, acquired     Past Medical History:   Diagnosis Date    Allergy     Anemia     Asthma     Crohn's disease 2/18/19    Crohn's disease involving terminal ileum     Depression     Eczema     Elevated liver function tests     Ex-smoker for less than 1 year     Joint pain     Skin rash      Lab Results   Component Value Date     04/22/2019    K 4.4 04/22/2019    PHOS 4.7 (H) 04/22/2019    MG 1.9 04/22/2019    TRIG 46 04/22/2019    ALBUMIN 2.1 (L) 04/22/2019    PREALBUMIN 10 (L) 04/22/2019    CALCIUM 8.5 (L) 04/22/2019     Other Pertinent Labs: 4/29/2019 11:12 PM - [facility]     Component Value Flag Ref Range " Units Status   Prealbumin 9  Low   20 - 43 mg/dL Final     4/23/2019 12:43 PM - [facility]     Component Value Flag Ref Range Units Status   Zinc 54  Low   60 - 130 ug/dL Final   Comment:   This test was developed and the performance   characteristics determined by [facility].   It has not been cleared or approved by the FDA.   The laboratory is regulated under CLIA as qualified to   perform high-complexity testing. This test is used for   patient testing purposes. It should not be regarded   as investigational or for research.   Test performed at [facility],   [address], [place]  [address]     [phone]   Randall Alston MD  - Medical Director      4/22/2019  2:25 PM - [facility]     Component Value Flag Ref Range Units Status   Sodium 138   136 - 145 mmol/L Final   Potassium 4.4   3.5 - 5.1 mmol/L Final   Chloride 104   95 - 110 mmol/L Final   CO2 26   23 - 29 mmol/L Final   Glucose 100   70 - 110 mg/dL Final   BUN, Bld 22  High   6 - 20 mg/dL Final   Creatinine 0.6   0.5 - 1.4 mg/dL Final   Calcium 8.5  Low   8.7 - 10.5 mg/dL Final   Total Protein 7.6   6.0 - 8.4 g/dL Final   Albumin 2.1  Low   3.5 - 5.2 g/dL Final   Total Bilirubin 0.2   0.1 - 1.0 mg/dL Final   Comment:   For infants and newborns, interpretation of results should be based   on gestational age, weight and in agreement with clinical   observations.   Premature Infant recommended reference ranges:   Up to 24 hours.............<8.0 mg/dL   Up to 48 hours............<12.0 mg/dL   3-5 days..................<15.0 mg/dL   6-29 days.................<15.0 mg/dL    Alkaline Phosphatase 92   55 - 135 U/L Final   AST 20   10 - 40 U/L Final   ALT 24   10 - 44 U/L Final   Anion Gap 8   8 - 16 mmol/L Final   eGFR if  >60.0   >60 mL/min/1.73 m^2 Final   eGFR if non African American >60.0   >60 mL/min/1.73 m^2 Final   Comment:   Calculation used to obtain the estimated glomerular  filtration   rate (eGFR) is the CKD-EPI equation.      4/29/2019 10:36 PM - [facility]     Component Value Flag Ref Range Units Status   WBC 6.67   3.90 - 12.70 K/uL Final   RBC 3.48  Low   4.00 - 5.40 M/uL Final   Hemoglobin 7.6  Low   12.0 - 16.0 g/dL Final   Hematocrit 26.8  Low   37.0 - 48.5 % Final   Mean Corpuscular Volume 77  Low   82 - 98 fL Final   Mean Corpuscular Hemoglobin 21.8  Low   27.0 - 31.0 pg Final   Mean Corpuscular Hemoglobin Conc 28.4  Low   32.0 - 36.0 g/dL Final   RDW 21.2  High   11.5 - 14.5 % Final   Platelets 393  High   150 - 350 K/uL Final   MPV 10.4   9.2 - 12.9 fL Final   Immature Granulocytes 0.3   0.0 - 0.5 % Final   Gran # (ANC) 5.2   1.8 - 7.7 K/uL Final   Immature Grans (Abs) 0.02   0.00 - 0.04 K/uL Final   Comment:   Mild elevation in immature granulocytes is non specific and   can be seen in a variety of conditions including stress response,   acute inflammation, trauma and pregnancy. Correlation with other   laboratory and clinical findings is essential.    Lymph # 0.9  Low   1.0 - 4.8 K/uL Final   Mono # 0.5   0.3 - 1.0 K/uL Final   Eos # 0.1   0.0 - 0.5 K/uL Final   Baso # 0.00   0.00 - 0.20 K/uL Final   nRBC 0   0 /100 WBC Final   Gran% 77.6  High   38.0 - 73.0 % Final   Lymph% 13.3  Low   18.0 - 48.0 % Final   Mono% 7.3   4.0 - 15.0 % Final   Eosinophil% 1.5   0.0 - 8.0 % Final   Basophil% 0.0   0.0 - 1.9 % Final   Differential Method Automated     Final          4/29/2019 10:21 PM - [facility]         Current Outpatient Medications   Medication Sig    calcipotriene (DOVONOX) 0.005 % cream Apply topically 2 (two) times daily.    HYDROcodone-acetaminophen (NORCO) 5-325 mg per tablet Take 1 tablet by mouth every 6 (six) hours as needed for Pain.    TPN ADULT Inject into the vein continuous.    triamcinolone acetonide 0.1% (KENALOG) 0.1 % cream AAA bid     No current facility-administered medications for this visit.      Allergies: Shellfish  "containing products; Horse/equine containing products; and Iodine    Ht Readings from Last 1 Encounters:   04/29/19 5' 2" (1.575 m)     Wt Readings from Last 1 Encounters:   04/29/19 48 kg (105 lb 13.1 oz)      BMI: Body mass index is 19.35 kg/m².    Usual Weight: 108#     Weight Change/Time: Dip in weight 2/20-3/11/19 from 105#-99#  But initiation of TPN has stabilized weight at admit weight 2/19 ; Spoke with RD at [facility] and Amino acids increased from 110grams to 115 grams per day this week for a total of 1700 calories from TPN   Vitals - 1 value per visit 3/4/2018 10/21/2018 2/18/2019 2/20/2019   SYSTOLIC 107 119     DIASTOLIC 64 77     PULSE 100 95     TEMPERATURE 98.5 98     RESPIRATIONS 18 16     SPO2 100 98     Weight (lb) 115 100 105    Weight (kg) 52.164 45.36 47.628    HEIGHT 5' 3"  5' 2"    BODY MASS INDEX 20.37 17.71 19.2    VISIT REPORT       Pain Score     0     Vitals - 1 value per visit 2/26/2019 3/11/2019 4/4/2019 4/15/2019 4/17/2019   SYSTOLIC 97 101 87 91 106   DIASTOLIC 62 68 65 58 70   PULSE 101 109 100 69 113   TEMPERATURE 98.3 98.4  98.2 98.2   RESPIRATIONS 18 16  17    SPO2 99   98    Weight (lb)  99.43 101.41 102 106.26   Weight (kg)  45.1 46 46.267 48.2   HEIGHT  5' 2" 5' 2" 5' 2" 5' 2"   BODY MASS INDEX  18.19 18.55 18.66 19.44   VISIT REPORT        Pain Score   8 0  9     Vitals - 1 value per visit 4/22/2019 4/29/2019   SYSTOLIC     DIASTOLIC     PULSE     TEMPERATURE     RESPIRATIONS     SPO2     Weight (lb)  105.82   Weight (kg)  48   HEIGHT  5' 2"   BODY MASS INDEX  19.35   VISIT REPORT     Pain Score  0      Current Diet: Hot dogs with chili ; beans rice/sausage ; chopped ham sandwiches   Appetite/Current Intake: poor due to abd pain after eating   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   Nutritional/Herbal Supplements: none ; Ensure recommended but has not used because has selective taste   Chewing/Swallowing Problems: none reported   Symptoms: " abd pain associated with consumption of high fat foods and high fiber foods ( beans with skin; nuts, etc)     Estimated Kcal Need: 0354-6908 calories (MSJX 1.5 factor )   Estimated Protein Need: 100 grams 2 gm/kg     Nutritional History:Consumes primarily chili dogs; hot sausage ; beans and rice ; fritos and cheese dip when she eats ; finances are a factor right now and dad willing to cook for her but has little insight as to what foods are less pain producing and patient the same .       Nutritional Diagnoses  Problem: altered GI function /malnutrition   Etiology: related to Crohns disease with ileum fistula with ileocecectomy planned when prealbumin increases to WNL   Symptoms: as evidenced by pain when consumes high fat foods     Educational Need? yes  Barriers: readiness to learn due to stress about job stability but understands the need to consume better foods to prepare for surgery .   Discussed with: patient  Interventions: Patient taught nutrition information regarding Nutrition for wound healing. And the need for lean protein -especially in liquid form and lactose free to minimize pain/flare  and to facilitate absorption within financial constraints . Eggs are plentiful as is rice at home; can tolerate yogurt and cheerios( plain) . We discussed use of Equate Plus ( [employer] generic for Ensure plus) 2 cans per day to assist with increasing protein absorption and minimizing flare from high residue foods ( vegetables, fruit )   Goals/Recommendations: increase protein intake and take childrens complete gummy vitamin (cost/taste) 2 per day   Actions Taken: instruct/provide written information and relayed to RD at [facility] actions above for consistency of nutritional care . I have given her my number for coordination of care .   Patient and/or family comprehend instructions: yes  Outcome: Verbalizes understanding and understands her role in improving possible surgery outcome with better food choices and sources  of protein to supplement TPN   Monitoring: prealbumin ; weight changes less consistent gauge of progress due to fluid/hydration shifts with hypoalbuminema     Counseling Time: 90 minutes

## 2019-04-29 NOTE — TELEPHONE ENCOUNTER
Spoke with patient.  She would like to know dates for surgery.  Informed her to hold off on her [facility] paperwork until she sees Dr. Cope Monday 5/6.

## 2019-05-06 ENCOUNTER — OFFICE VISIT (OUTPATIENT)
Dept: SURGERY | Facility: CLINIC | Age: 29
End: 2019-05-06
Payer: COMMERCIAL

## 2019-05-06 VITALS
HEART RATE: 121 BPM | DIASTOLIC BLOOD PRESSURE: 69 MMHG | WEIGHT: 106.25 LBS | BODY MASS INDEX: 19.55 KG/M2 | SYSTOLIC BLOOD PRESSURE: 108 MMHG | HEIGHT: 62 IN

## 2019-05-06 DIAGNOSIS — K50.812 CROHN'S DISEASE OF BOTH SMALL AND LARGE INTESTINE WITH INTESTINAL OBSTRUCTION: Primary | ICD-10-CM

## 2019-05-06 PROCEDURE — 99999 PR PBB SHADOW E&M-EST. PATIENT-LVL III: CPT | Mod: PBBFAC,,, | Performed by: COLON & RECTAL SURGERY

## 2019-05-06 PROCEDURE — 99213 OFFICE O/P EST LOW 20 MIN: CPT | Mod: S$GLB,,, | Performed by: COLON & RECTAL SURGERY

## 2019-05-06 PROCEDURE — 99999 PR PBB SHADOW E&M-EST. PATIENT-LVL III: ICD-10-PCS | Mod: PBBFAC,,, | Performed by: COLON & RECTAL SURGERY

## 2019-05-06 PROCEDURE — 99213 PR OFFICE/OUTPT VISIT, EST, LEVL III, 20-29 MIN: ICD-10-PCS | Mod: S$GLB,,, | Performed by: COLON & RECTAL SURGERY

## 2019-05-06 RX ORDER — METRONIDAZOLE 500 MG/1
500 TABLET ORAL SEE ADMIN INSTRUCTIONS
Qty: 3 TABLET | Refills: 0 | Status: ON HOLD | OUTPATIENT
Start: 2019-05-06 | End: 2019-05-10

## 2019-05-06 RX ORDER — NEOMYCIN SULFATE 500 MG/1
500 TABLET ORAL SEE ADMIN INSTRUCTIONS
Qty: 6 TABLET | Refills: 0 | Status: ON HOLD | OUTPATIENT
Start: 2019-05-06 | End: 2019-05-10

## 2019-05-06 NOTE — PROGRESS NOTES
Subjective:       Patient ID: Lora Scott is a 29 y.o. female.    Chief Complaint: Pre-op Exam    Ms Paulino is a 28 yo F recently diagnosed with Crohn's disease Feb 2019.     Since then patient has seen Dr. Bacon back in 3/2019 for initial evaluation but has not been started on any medication as still debating between medical vs surgical treatment of her Crohn's.     Since admission in February patient has been on nightly TPN and she is barely tolerating a regular diet. Her last Pre albumin was 9 and Albumin was 2.1. She is still having persistent RLQ abdominal pain and tenderness.     Has daily bowel function but more firm stools. Denies any N/V.     Last colonoscopy - 2/20/19 - Stricture found at the distal ileum about 5 cm from the ileocecal valve.   Denies  family hx of CRC or IBD.  Never had surgery in her abdomen before.       Review of patient's allergies indicates:   Allergen Reactions    Shellfish containing products Anaphylaxis     Allergic to all seafood.    Horse/equine containing products Itching    Iodine      Swelling (throat)^       Past Medical History:   Diagnosis Date    Allergy     Anemia     Asthma     Crohn's disease 2/18/19    Crohn's disease involving terminal ileum     Depression     Eczema     Elevated liver function tests     Ex-smoker for less than 1 year     Joint pain     Skin rash        Past Surgical History:   Procedure Laterality Date    COLONOSCOPY N/A 2/20/2019    Performed by Fawad Perla MD at [facility] (2ND FLR)    EGD (ESOPHAGOGASTRODUODENOSCOPY) N/A 2/20/2019    Performed by Fawad Perla MD at [facility] (2ND FLR)       No current outpatient medications on file.     No current facility-administered medications for this visit.        Family History   Problem Relation Age of Onset    Hypertension Mother     Irritable bowel syndrome Paternal Grandmother     Celiac disease Neg Hx     Cirrhosis Neg Hx     Colon cancer Neg Hx     Colon polyps Neg Hx      Crohn's disease Neg Hx     Cystic fibrosis Neg Hx     Esophageal cancer Neg Hx     Hemochromatosis Neg Hx     Inflammatory bowel disease Neg Hx     Liver cancer Neg Hx     Liver disease Neg Hx     Rectal cancer Neg Hx     Stomach cancer Neg Hx     Ulcerative colitis Neg Hx     Jerardo's disease Neg Hx     Lymphoma Neg Hx     Tuberculosis Neg Hx     Scleroderma Neg Hx     Rheum arthritis Neg Hx     Multiple sclerosis Neg Hx     Psoriasis Neg Hx     Lupus Neg Hx     Melanoma Neg Hx     Skin cancer Neg Hx        Social History     Socioeconomic History    Marital status: Single     Spouse name: Not on file    Number of children: Not on file    Years of education: Not on file    Highest education level: Not on file   Occupational History    Not on file   Social Needs    Financial resource strain: Not on file    Food insecurity:     Worry: Not on file     Inability: Not on file    Transportation needs:     Medical: Not on file     Non-medical: Not on file   Tobacco Use    Smoking status: Former Smoker     Types: Cigarettes     Last attempt to quit: 2019     Years since quittin.2    Smokeless tobacco: Never Used    Tobacco comment: 2 CIGS PER DAY   Substance and Sexual Activity    Alcohol use: Not Currently     Alcohol/week: 0.0 oz     Comment: Socially    Drug use: No     Frequency: 3.0 times per week    Sexual activity: Not Currently     Partners: Male     Birth control/protection: None   Lifestyle    Physical activity:     Days per week: Not on file     Minutes per session: Not on file    Stress: Not on file   Relationships    Social connections:     Talks on phone: Not on file     Gets together: Not on file     Attends Protestant service: Not on file     Active member of club or organization: Not on file     Attends meetings of clubs or organizations: Not on file     Relationship status: Not on file   Other Topics Concern    Are you pregnant or think you may be? Not  Asked    Breast-feeding Not Asked   Social History Narrative    Not on file       Review of Systems   Constitutional: Negative for fever.   HENT: Negative for congestion.    Eyes: Negative for pain.   Respiratory: Negative for cough and shortness of breath.    Cardiovascular: Negative for chest pain.   Gastrointestinal: Positive for abdominal pain. Negative for abdominal distention, blood in stool, diarrhea and nausea.   Endocrine: Negative for polyuria.   Genitourinary: Negative for dysuria.   Musculoskeletal: Negative for back pain.   Skin: Negative for rash.   Neurological: Negative for seizures.   Hematological: Negative for adenopathy.   Psychiatric/Behavioral: Negative for agitation.       Objective:      Physical Exam   Constitutional: She is oriented to person, place, and time. No distress.   Eyes: EOM are normal.   Neck: Neck supple.   Cardiovascular: Normal rate and regular rhythm.   Pulmonary/Chest: Effort normal. No respiratory distress.   Abdominal: Soft. She exhibits no distension. There is tenderness.   Patient has RLQ tenderness as well as some in the LLQ.    Musculoskeletal: Normal range of motion.   Neurological: She is alert and oriented to person, place, and time.   Skin: Skin is warm and dry.         Lab Results   Component Value Date    WBC 6.67 04/29/2019    HGB 7.6 (L) 04/29/2019    HCT 26.8 (L) 04/29/2019    MCV 77 (L) 04/29/2019     (H) 04/29/2019     BMP  Lab Results   Component Value Date     04/29/2019    K 4.4 04/29/2019     04/29/2019    CO2 25 04/29/2019    BUN 23 (H) 04/29/2019    CREATININE 0.6 04/29/2019    CALCIUM 8.5 (L) 04/29/2019    ANIONGAP 8 04/29/2019    ESTGFRAFRICA >60.0 04/29/2019    EGFRNONAA >60.0 04/29/2019     CMP  Sodium   Date Value Ref Range Status   04/29/2019 136 136 - 145 mmol/L Final     Potassium   Date Value Ref Range Status   04/29/2019 4.4 3.5 - 5.1 mmol/L Final     Chloride   Date Value Ref Range Status   04/29/2019 103 95 - 110 mmol/L  Final     CO2   Date Value Ref Range Status   04/29/2019 25 23 - 29 mmol/L Final     Glucose   Date Value Ref Range Status   04/29/2019 87 70 - 110 mg/dL Final     BUN, Bld   Date Value Ref Range Status   04/29/2019 23 (H) 6 - 20 mg/dL Final     Creatinine   Date Value Ref Range Status   04/29/2019 0.6 0.5 - 1.4 mg/dL Final     Calcium   Date Value Ref Range Status   04/29/2019 8.5 (L) 8.7 - 10.5 mg/dL Final     Total Protein   Date Value Ref Range Status   04/29/2019 7.6 6.0 - 8.4 g/dL Final     Albumin   Date Value Ref Range Status   04/29/2019 2.1 (L) 3.5 - 5.2 g/dL Final     Total Bilirubin   Date Value Ref Range Status   04/29/2019 0.3 0.1 - 1.0 mg/dL Final     Comment:     For infants and newborns, interpretation of results should be based  on gestational age, weight and in agreement with clinical  observations.  Premature Infant recommended reference ranges:  Up to 24 hours.............<8.0 mg/dL  Up to 48 hours............<12.0 mg/dL  3-5 days..................<15.0 mg/dL  6-29 days.................<15.0 mg/dL       Alkaline Phosphatase   Date Value Ref Range Status   04/29/2019 95 55 - 135 U/L Final     AST   Date Value Ref Range Status   04/29/2019 31 10 - 40 U/L Final     ALT   Date Value Ref Range Status   04/29/2019 37 10 - 44 U/L Final     Anion Gap   Date Value Ref Range Status   04/29/2019 8 8 - 16 mmol/L Final     eGFR if    Date Value Ref Range Status   04/29/2019 >60.0 >60 mL/min/1.73 m^2 Final     eGFR if non    Date Value Ref Range Status   04/29/2019 >60.0 >60 mL/min/1.73 m^2 Final     Comment:     Calculation used to obtain the estimated glomerular filtration  rate (eGFR) is the CKD-EPI equation.        No results found for: CEA     MRE:            Multifocal active inflammatory Crohn's disease involving the distal ileum with a mild fibrostenotic component and entero-enteric fistula, as above.  No drainable fluid collection identified.       Assessment/Plan:         Ms Paulino is a 30 yo F recently diagnosed with Crohn's disease Feb 2019 with Ileal crohn's disease (stricture and enteroenteric fistula).    - After discussion with patient and Dr. Bacon decision was made to proceed with surgery as it seems TPN is really not improving her nutritional status.   - Laparoscopic possible open ileocecectomy with ileostomy on 5/10 with Dr. Cope.  - Patient not to return to work until then.   - Continue with TPN.       Suzan Nunez MD  General Surgery PGY V  Beeper: [phone]    I have interviewed and examined the patient, reviewed the notes and assessments, and/or personally supervised the procedure(s) performed by Dr. Cony Manzo, and I concur with her/his documentation of Lora Scott.  See below addendum for my evaluation and additional findings.    30 yo F with severe terminal ileal Crohn's disease and partial obstructive symptoms.  She has been on home TPN for some time now with hopes of improving her nutritional status, but she remains markedly hypoalbuminemic and her abdominal pain is worsening.    Will proceed with ileocecal resection on 05/10/2019.  I will probably perform a resection with an end ileostomy and not attempt anastomosis given her marked hypoalbuminemia at this point.    I have discussed the procedure at length with Lora Scott.  We discussed the rationale, risks, benefits, and alternatives in depth.  We discussed the expected outcomes and potential complications including but not limited to stoma related problems (hernia, stenosis, retraction, fistula, poor appliance fit, peristomal skin irritation), bleeding, infection, recurrence, prolonged pain, need for further procedures, altered continence, incisional hernia, post-operative sexual dysfunction, post-operative bladder dysfunction and injury to adjacent organs.  She verbalized her understanding of the procedure and wishes to proceed.  Written consent was obtained.    Uday Cope MD, FACS,  FASCRS  Senior Staff Surgeon  Department of Colon & Rectal Surgery

## 2019-05-08 ENCOUNTER — OFFICE VISIT (OUTPATIENT)
Dept: WOUND CARE | Facility: CLINIC | Age: 29
End: 2019-05-08
Payer: COMMERCIAL

## 2019-05-08 DIAGNOSIS — Z43.2 ATTENTION TO ILEOSTOMY: Primary | ICD-10-CM

## 2019-05-08 DIAGNOSIS — Z71.89 ENCOUNTER FOR OSTOMY CARE EDUCATION: ICD-10-CM

## 2019-05-08 PROCEDURE — 99999 PR PBB SHADOW E&M-EST. PATIENT-LVL II: CPT | Mod: PBBFAC,,, | Performed by: CLINICAL NURSE SPECIALIST

## 2019-05-08 PROCEDURE — 99024 PR POST-OP FOLLOW-UP VISIT: ICD-10-PCS | Mod: S$GLB,,, | Performed by: CLINICAL NURSE SPECIALIST

## 2019-05-08 PROCEDURE — 99999 PR PBB SHADOW E&M-EST. PATIENT-LVL II: ICD-10-PCS | Mod: PBBFAC,,, | Performed by: CLINICAL NURSE SPECIALIST

## 2019-05-08 PROCEDURE — 99024 POSTOP FOLLOW-UP VISIT: CPT | Mod: S$GLB,,, | Performed by: CLINICAL NURSE SPECIALIST

## 2019-05-08 NOTE — PROGRESS NOTES
Pre op Ostomy marking:    This patient was seen today per request  in preparation for upcoming ostomy surgery on 5/10/19  Stoma marking/siting was performed per protocol and patient marked with a permanent marker and skin staining with silver nitrate.   She is presently on TPN, has gained few pounds, has little appetite,  She lives with her dad, and works for "[employer]  The proposed surgery was discussed and patient educated on expected postoperative course and expectations. The patient was allowed to ask questions and was also given pre-op information kit from  the [employer] for review at home prior to surgery.

## 2019-05-09 ENCOUNTER — TELEPHONE (OUTPATIENT)
Dept: SURGERY | Facility: CLINIC | Age: 29
End: 2019-05-09

## 2019-05-09 NOTE — TELEPHONE ENCOUNTER
----- Message from Mone Contreras sent at 5/9/2019  1:02 PM CDT -----  Contact: Self- [phone]  Prisca- pt has ileostomy surgery with Dr. Cope scheduled for tomorrow morning- states she vomited pills she needed to take- please contact pt at [phone]

## 2019-05-09 NOTE — TELEPHONE ENCOUNTER
Spoke with patient. Stated vomited after Neomycin at 1:00 pm. Recommended she eat jello prior to 2:00 pm antibiotics.

## 2019-05-10 ENCOUNTER — ANESTHESIA (OUTPATIENT)
Dept: SURGERY | Facility: HOSPITAL | Age: 29
DRG: 330 | End: 2019-05-10
Payer: COMMERCIAL

## 2019-05-10 ENCOUNTER — ANESTHESIA EVENT (OUTPATIENT)
Dept: SURGERY | Facility: HOSPITAL | Age: 29
DRG: 330 | End: 2019-05-10
Payer: COMMERCIAL

## 2019-05-10 ENCOUNTER — HOSPITAL ENCOUNTER (INPATIENT)
Facility: HOSPITAL | Age: 29
LOS: 5 days | Discharge: HOME | DRG: 330 | End: 2019-05-15
Attending: COLON & RECTAL SURGERY | Admitting: COLON & RECTAL SURGERY
Payer: COMMERCIAL

## 2019-05-10 DIAGNOSIS — K50.00 CROHN'S DISEASE INVOLVING TERMINAL ILEUM: Primary | ICD-10-CM

## 2019-05-10 DIAGNOSIS — K50.90 CROHN DISEASE: ICD-10-CM

## 2019-05-10 LAB
ABO + RH BLD: NORMAL
ALBUMIN SERPL BCP-MCNC: 1.6 G/DL (ref 3.5–5.2)
ALP SERPL-CCNC: 74 U/L (ref 55–135)
ALT SERPL W/O P-5'-P-CCNC: 40 U/L (ref 10–44)
ANION GAP SERPL CALC-SCNC: 6 MMOL/L (ref 8–16)
AST SERPL-CCNC: 40 U/L (ref 10–40)
B-HCG UR QL: NEGATIVE
BASOPHILS # BLD AUTO: 0.02 K/UL (ref 0–0.2)
BASOPHILS # BLD AUTO: 0.02 K/UL (ref 0–0.2)
BASOPHILS NFR BLD: 0.1 % (ref 0–1.9)
BASOPHILS NFR BLD: 0.2 % (ref 0–1.9)
BILIRUB SERPL-MCNC: 0.1 MG/DL (ref 0.1–1)
BLD GP AB SCN CELLS X3 SERPL QL: NORMAL
BUN SERPL-MCNC: 20 MG/DL (ref 6–20)
CA-I BLDV-SCNC: 0.99 MMOL/L (ref 1.06–1.42)
CALCIUM SERPL-MCNC: 6.8 MG/DL (ref 8.7–10.5)
CHLORIDE SERPL-SCNC: 107 MMOL/L (ref 95–110)
CO2 SERPL-SCNC: 25 MMOL/L (ref 23–29)
CREAT SERPL-MCNC: 0.6 MG/DL (ref 0.5–1.4)
CTP QC/QA: YES
DIFFERENTIAL METHOD: ABNORMAL
DIFFERENTIAL METHOD: ABNORMAL
EOSINOPHIL # BLD AUTO: 0 K/UL (ref 0–0.5)
EOSINOPHIL # BLD AUTO: 0 K/UL (ref 0–0.5)
EOSINOPHIL NFR BLD: 0 % (ref 0–8)
EOSINOPHIL NFR BLD: 0.2 % (ref 0–8)
ERYTHROCYTE [DISTWIDTH] IN BLOOD BY AUTOMATED COUNT: 20.5 % (ref 11.5–14.5)
ERYTHROCYTE [DISTWIDTH] IN BLOOD BY AUTOMATED COUNT: 20.8 % (ref 11.5–14.5)
EST. GFR  (AFRICAN AMERICAN): >60 ML/MIN/1.73 M^2
EST. GFR  (NON AFRICAN AMERICAN): >60 ML/MIN/1.73 M^2
GLUCOSE SERPL-MCNC: 104 MG/DL (ref 70–110)
HCT VFR BLD AUTO: 22.9 % (ref 37–48.5)
HCT VFR BLD AUTO: 27.6 % (ref 37–48.5)
HGB BLD-MCNC: 6.6 G/DL (ref 12–16)
HGB BLD-MCNC: 8.3 G/DL (ref 12–16)
IMM GRANULOCYTES # BLD AUTO: 0.05 K/UL (ref 0–0.04)
IMM GRANULOCYTES # BLD AUTO: 0.12 K/UL (ref 0–0.04)
IMM GRANULOCYTES NFR BLD AUTO: 0.5 % (ref 0–0.5)
IMM GRANULOCYTES NFR BLD AUTO: 0.8 % (ref 0–0.5)
LYMPHOCYTES # BLD AUTO: 0.6 K/UL (ref 1–4.8)
LYMPHOCYTES # BLD AUTO: 0.6 K/UL (ref 1–4.8)
LYMPHOCYTES NFR BLD: 4 % (ref 18–48)
LYMPHOCYTES NFR BLD: 5.9 % (ref 18–48)
MAGNESIUM SERPL-MCNC: 1.9 MG/DL (ref 1.6–2.6)
MCH RBC QN AUTO: 21.4 PG (ref 27–31)
MCH RBC QN AUTO: 23.2 PG (ref 27–31)
MCHC RBC AUTO-ENTMCNC: 28.8 G/DL (ref 32–36)
MCHC RBC AUTO-ENTMCNC: 30.1 G/DL (ref 32–36)
MCV RBC AUTO: 74 FL (ref 82–98)
MCV RBC AUTO: 77 FL (ref 82–98)
MONOCYTES # BLD AUTO: 0.1 K/UL (ref 0.3–1)
MONOCYTES # BLD AUTO: 0.3 K/UL (ref 0.3–1)
MONOCYTES NFR BLD: 1 % (ref 4–15)
MONOCYTES NFR BLD: 2.1 % (ref 4–15)
NEUTROPHILS # BLD AUTO: 13.9 K/UL (ref 1.8–7.7)
NEUTROPHILS # BLD AUTO: 9.1 K/UL (ref 1.8–7.7)
NEUTROPHILS NFR BLD: 92.2 % (ref 38–73)
NEUTROPHILS NFR BLD: 93 % (ref 38–73)
NRBC BLD-RTO: 0 /100 WBC
NRBC BLD-RTO: 0 /100 WBC
PHOSPHATE SERPL-MCNC: 3 MG/DL (ref 2.7–4.5)
PLATELET # BLD AUTO: 264 K/UL (ref 150–350)
PLATELET # BLD AUTO: 269 K/UL (ref 150–350)
PMV BLD AUTO: 11 FL (ref 9.2–12.9)
PMV BLD AUTO: 11 FL (ref 9.2–12.9)
POTASSIUM SERPL-SCNC: 4 MMOL/L (ref 3.5–5.1)
PROT SERPL-MCNC: 6.1 G/DL (ref 6–8.4)
RBC # BLD AUTO: 3.08 M/UL (ref 4–5.4)
RBC # BLD AUTO: 3.58 M/UL (ref 4–5.4)
SODIUM SERPL-SCNC: 138 MMOL/L (ref 136–145)
WBC # BLD AUTO: 14.93 K/UL (ref 3.9–12.7)
WBC # BLD AUTO: 9.85 K/UL (ref 3.9–12.7)

## 2019-05-10 PROCEDURE — 44144 PARTIAL REMOVAL OF COLON: CPT | Mod: ,,, | Performed by: COLON & RECTAL SURGERY

## 2019-05-10 PROCEDURE — 25000003 PHARM REV CODE 250: Performed by: NURSE ANESTHETIST, CERTIFIED REGISTERED

## 2019-05-10 PROCEDURE — 63600175 PHARM REV CODE 636 W HCPCS

## 2019-05-10 PROCEDURE — 37000009 HC ANESTHESIA EA ADD 15 MINS: Performed by: COLON & RECTAL SURGERY

## 2019-05-10 PROCEDURE — 63600175 PHARM REV CODE 636 W HCPCS: Performed by: COLON & RECTAL SURGERY

## 2019-05-10 PROCEDURE — 80053 COMPREHEN METABOLIC PANEL: CPT

## 2019-05-10 PROCEDURE — D9220A PRA ANESTHESIA: ICD-10-PCS | Mod: CRNA,,, | Performed by: NURSE ANESTHETIST, CERTIFIED REGISTERED

## 2019-05-10 PROCEDURE — 86920 COMPATIBILITY TEST SPIN: CPT

## 2019-05-10 PROCEDURE — D9220A PRA ANESTHESIA: ICD-10-PCS | Mod: ANES,,, | Performed by: ANESTHESIOLOGY

## 2019-05-10 PROCEDURE — 37000008 HC ANESTHESIA 1ST 15 MINUTES: Performed by: COLON & RECTAL SURGERY

## 2019-05-10 PROCEDURE — 36000708 HC OR TIME LEV III 1ST 15 MIN: Performed by: COLON & RECTAL SURGERY

## 2019-05-10 PROCEDURE — P9021 RED BLOOD CELLS UNIT: HCPCS

## 2019-05-10 PROCEDURE — 84100 ASSAY OF PHOSPHORUS: CPT

## 2019-05-10 PROCEDURE — 88307 TISSUE SPECIMEN TO PATHOLOGY - SURGERY: ICD-10-PCS | Mod: 26,,, | Performed by: PATHOLOGY

## 2019-05-10 PROCEDURE — S0030 INJECTION, METRONIDAZOLE: HCPCS | Performed by: NURSE PRACTITIONER

## 2019-05-10 PROCEDURE — 86850 RBC ANTIBODY SCREEN: CPT

## 2019-05-10 PROCEDURE — 88307 TISSUE EXAM BY PATHOLOGIST: CPT | Mod: 26,,, | Performed by: PATHOLOGY

## 2019-05-10 PROCEDURE — 63600175 PHARM REV CODE 636 W HCPCS: Performed by: SURGERY

## 2019-05-10 PROCEDURE — 63600175 PHARM REV CODE 636 W HCPCS: Performed by: NURSE PRACTITIONER

## 2019-05-10 PROCEDURE — C9290 INJ, BUPIVACAINE LIPOSOME: HCPCS | Performed by: COLON & RECTAL SURGERY

## 2019-05-10 PROCEDURE — 88307 TISSUE EXAM BY PATHOLOGIST: CPT | Performed by: PATHOLOGY

## 2019-05-10 PROCEDURE — 82330 ASSAY OF CALCIUM: CPT

## 2019-05-10 PROCEDURE — 83735 ASSAY OF MAGNESIUM: CPT

## 2019-05-10 PROCEDURE — C1765 ADHESION BARRIER: HCPCS | Performed by: COLON & RECTAL SURGERY

## 2019-05-10 PROCEDURE — 81025 URINE PREGNANCY TEST: CPT | Performed by: COLON & RECTAL SURGERY

## 2019-05-10 PROCEDURE — 63600175 PHARM REV CODE 636 W HCPCS: Performed by: NURSE ANESTHETIST, CERTIFIED REGISTERED

## 2019-05-10 PROCEDURE — 25000003 PHARM REV CODE 250: Performed by: NURSE PRACTITIONER

## 2019-05-10 PROCEDURE — 36415 COLL VENOUS BLD VENIPUNCTURE: CPT

## 2019-05-10 PROCEDURE — D9220A PRA ANESTHESIA: Mod: ANES,,, | Performed by: ANESTHESIOLOGY

## 2019-05-10 PROCEDURE — 44144 PR PART REMOVAL COLON W OSTOMY/MUCOFIST: ICD-10-PCS | Mod: ,,, | Performed by: COLON & RECTAL SURGERY

## 2019-05-10 PROCEDURE — 71000039 HC RECOVERY, EACH ADD'L HOUR: Performed by: COLON & RECTAL SURGERY

## 2019-05-10 PROCEDURE — 94761 N-INVAS EAR/PLS OXIMETRY MLT: CPT

## 2019-05-10 PROCEDURE — 27201423 OPTIME MED/SURG SUP & DEVICES STERILE SUPPLY: Performed by: COLON & RECTAL SURGERY

## 2019-05-10 PROCEDURE — 36430 TRANSFUSION BLD/BLD COMPNT: CPT

## 2019-05-10 PROCEDURE — 71000033 HC RECOVERY, INTIAL HOUR: Performed by: COLON & RECTAL SURGERY

## 2019-05-10 PROCEDURE — 85025 COMPLETE CBC W/AUTO DIFF WBC: CPT | Mod: 91

## 2019-05-10 PROCEDURE — 20600001 HC STEP DOWN PRIVATE ROOM

## 2019-05-10 PROCEDURE — 27000221 HC OXYGEN, UP TO 24 HOURS

## 2019-05-10 PROCEDURE — 36000709 HC OR TIME LEV III EA ADD 15 MIN: Performed by: COLON & RECTAL SURGERY

## 2019-05-10 PROCEDURE — 63600175 PHARM REV CODE 636 W HCPCS: Performed by: ANESTHESIOLOGY

## 2019-05-10 PROCEDURE — 25000003 PHARM REV CODE 250: Performed by: SURGERY

## 2019-05-10 PROCEDURE — S0030 INJECTION, METRONIDAZOLE: HCPCS | Performed by: SURGERY

## 2019-05-10 PROCEDURE — 94799 UNLISTED PULMONARY SVC/PX: CPT

## 2019-05-10 PROCEDURE — D9220A PRA ANESTHESIA: Mod: CRNA,,, | Performed by: NURSE ANESTHETIST, CERTIFIED REGISTERED

## 2019-05-10 DEVICE — MEMBRANE SEPRAFILM 5 X 6: Type: IMPLANTABLE DEVICE | Site: ABDOMEN | Status: FUNCTIONAL

## 2019-05-10 RX ORDER — METRONIDAZOLE 500 MG/100ML
500 INJECTION, SOLUTION INTRAVENOUS
Status: COMPLETED | OUTPATIENT
Start: 2019-05-10 | End: 2019-05-11

## 2019-05-10 RX ORDER — PANTOPRAZOLE SODIUM 40 MG/10ML
40 INJECTION, POWDER, LYOPHILIZED, FOR SOLUTION INTRAVENOUS DAILY
Status: DISCONTINUED | OUTPATIENT
Start: 2019-05-11 | End: 2019-05-15 | Stop reason: HOSPADM

## 2019-05-10 RX ORDER — PROPOFOL 10 MG/ML
VIAL (ML) INTRAVENOUS
Status: DISCONTINUED | OUTPATIENT
Start: 2019-05-10 | End: 2019-05-10

## 2019-05-10 RX ORDER — NALOXONE HCL 0.4 MG/ML
0.02 VIAL (ML) INJECTION
Status: DISCONTINUED | OUTPATIENT
Start: 2019-05-10 | End: 2019-05-13

## 2019-05-10 RX ORDER — ACETAMINOPHEN 10 MG/ML
INJECTION, SOLUTION INTRAVENOUS
Status: DISCONTINUED | OUTPATIENT
Start: 2019-05-10 | End: 2019-05-10

## 2019-05-10 RX ORDER — HYDROMORPHONE HCL IN 0.9% NACL 6 MG/30 ML
PATIENT CONTROLLED ANALGESIA SYRINGE INTRAVENOUS CONTINUOUS
Status: DISCONTINUED | OUTPATIENT
Start: 2019-05-10 | End: 2019-05-13

## 2019-05-10 RX ORDER — ROCURONIUM BROMIDE 10 MG/ML
INJECTION, SOLUTION INTRAVENOUS
Status: DISCONTINUED | OUTPATIENT
Start: 2019-05-10 | End: 2019-05-10

## 2019-05-10 RX ORDER — HYDROMORPHONE HYDROCHLORIDE 1 MG/ML
INJECTION, SOLUTION INTRAMUSCULAR; INTRAVENOUS; SUBCUTANEOUS
Status: COMPLETED
Start: 2019-05-10 | End: 2019-05-10

## 2019-05-10 RX ORDER — ONDANSETRON 2 MG/ML
4 INJECTION INTRAMUSCULAR; INTRAVENOUS EVERY 6 HOURS PRN
Status: DISCONTINUED | OUTPATIENT
Start: 2019-05-10 | End: 2019-05-15 | Stop reason: HOSPADM

## 2019-05-10 RX ORDER — HYDROCODONE BITARTRATE AND ACETAMINOPHEN 500; 5 MG/1; MG/1
TABLET ORAL
Status: DISCONTINUED | OUTPATIENT
Start: 2019-05-10 | End: 2019-05-15 | Stop reason: HOSPADM

## 2019-05-10 RX ORDER — KETAMINE HCL IN 0.9 % NACL 50 MG/5 ML
SYRINGE (ML) INTRAVENOUS
Status: DISCONTINUED | OUTPATIENT
Start: 2019-05-10 | End: 2019-05-10

## 2019-05-10 RX ORDER — DEXAMETHASONE SODIUM PHOSPHATE 4 MG/ML
INJECTION, SOLUTION INTRA-ARTICULAR; INTRALESIONAL; INTRAMUSCULAR; INTRAVENOUS; SOFT TISSUE
Status: DISCONTINUED | OUTPATIENT
Start: 2019-05-10 | End: 2019-05-10

## 2019-05-10 RX ORDER — FENTANYL CITRATE 50 UG/ML
INJECTION, SOLUTION INTRAMUSCULAR; INTRAVENOUS
Status: DISCONTINUED | OUTPATIENT
Start: 2019-05-10 | End: 2019-05-10

## 2019-05-10 RX ORDER — PHENYLEPHRINE HYDROCHLORIDE 10 MG/ML
INJECTION INTRAVENOUS
Status: DISCONTINUED | OUTPATIENT
Start: 2019-05-10 | End: 2019-05-10

## 2019-05-10 RX ORDER — HEPARIN SODIUM 5000 [USP'U]/ML
5000 INJECTION, SOLUTION INTRAVENOUS; SUBCUTANEOUS
Status: COMPLETED | OUTPATIENT
Start: 2019-05-10 | End: 2019-05-10

## 2019-05-10 RX ORDER — HYDROMORPHONE HYDROCHLORIDE 1 MG/ML
0.5 INJECTION, SOLUTION INTRAMUSCULAR; INTRAVENOUS; SUBCUTANEOUS EVERY 5 MIN PRN
Status: DISCONTINUED | OUTPATIENT
Start: 2019-05-10 | End: 2019-05-10 | Stop reason: HOSPADM

## 2019-05-10 RX ORDER — GABAPENTIN 300 MG/1
300 CAPSULE ORAL
Status: DISCONTINUED | OUTPATIENT
Start: 2019-05-10 | End: 2019-05-10

## 2019-05-10 RX ORDER — LIDOCAINE HYDROCHLORIDE ANHYDROUS AND DEXTROSE MONOHYDRATE .8; 5 G/100ML; G/100ML
INJECTION, SOLUTION INTRAVENOUS CONTINUOUS PRN
Status: DISCONTINUED | OUTPATIENT
Start: 2019-05-10 | End: 2019-05-10

## 2019-05-10 RX ORDER — ESMOLOL HYDROCHLORIDE 10 MG/ML
INJECTION INTRAVENOUS
Status: DISCONTINUED | OUTPATIENT
Start: 2019-05-10 | End: 2019-05-10

## 2019-05-10 RX ORDER — SODIUM CHLORIDE, SODIUM LACTATE, POTASSIUM CHLORIDE, CALCIUM CHLORIDE 600; 310; 30; 20 MG/100ML; MG/100ML; MG/100ML; MG/100ML
INJECTION, SOLUTION INTRAVENOUS CONTINUOUS
Status: ACTIVE | OUTPATIENT
Start: 2019-05-10 | End: 2019-05-11

## 2019-05-10 RX ORDER — METRONIDAZOLE 500 MG/100ML
500 INJECTION, SOLUTION INTRAVENOUS
Status: COMPLETED | OUTPATIENT
Start: 2019-05-10 | End: 2019-05-10

## 2019-05-10 RX ORDER — SODIUM CHLORIDE 0.9 % (FLUSH) 0.9 %
10 SYRINGE (ML) INJECTION
Status: DISCONTINUED | OUTPATIENT
Start: 2019-05-10 | End: 2019-05-10 | Stop reason: HOSPADM

## 2019-05-10 RX ORDER — ACETAMINOPHEN 500 MG
1000 TABLET ORAL
Status: DISCONTINUED | OUTPATIENT
Start: 2019-05-10 | End: 2019-05-10

## 2019-05-10 RX ORDER — METRONIDAZOLE 500 MG/100ML
500 INJECTION, SOLUTION INTRAVENOUS
Status: DISCONTINUED | OUTPATIENT
Start: 2019-05-10 | End: 2019-05-10

## 2019-05-10 RX ORDER — DIPHENHYDRAMINE HYDROCHLORIDE 50 MG/ML
12.5 INJECTION INTRAMUSCULAR; INTRAVENOUS EVERY 6 HOURS PRN
Status: DISCONTINUED | OUTPATIENT
Start: 2019-05-10 | End: 2019-05-13

## 2019-05-10 RX ORDER — ONDANSETRON 2 MG/ML
INJECTION INTRAMUSCULAR; INTRAVENOUS
Status: DISCONTINUED | OUTPATIENT
Start: 2019-05-10 | End: 2019-05-10

## 2019-05-10 RX ORDER — ACETAMINOPHEN 10 MG/ML
1000 INJECTION, SOLUTION INTRAVENOUS EVERY 8 HOURS
Status: COMPLETED | OUTPATIENT
Start: 2019-05-10 | End: 2019-05-11

## 2019-05-10 RX ORDER — LIDOCAINE HYDROCHLORIDE 10 MG/ML
1 INJECTION, SOLUTION EPIDURAL; INFILTRATION; INTRACAUDAL; PERINEURAL ONCE
Status: DISCONTINUED | OUTPATIENT
Start: 2019-05-10 | End: 2019-05-10

## 2019-05-10 RX ORDER — LIDOCAINE HCL/PF 100 MG/5ML
SYRINGE (ML) INTRAVENOUS
Status: DISCONTINUED | OUTPATIENT
Start: 2019-05-10 | End: 2019-05-10

## 2019-05-10 RX ORDER — SODIUM CHLORIDE 9 MG/ML
INJECTION, SOLUTION INTRAVENOUS CONTINUOUS
Status: DISCONTINUED | OUTPATIENT
Start: 2019-05-10 | End: 2019-05-10

## 2019-05-10 RX ORDER — MIDAZOLAM HYDROCHLORIDE 1 MG/ML
INJECTION, SOLUTION INTRAMUSCULAR; INTRAVENOUS
Status: DISCONTINUED | OUTPATIENT
Start: 2019-05-10 | End: 2019-05-10

## 2019-05-10 RX ORDER — MUPIROCIN 20 MG/G
OINTMENT TOPICAL
Status: DISCONTINUED | OUTPATIENT
Start: 2019-05-10 | End: 2019-05-10

## 2019-05-10 RX ORDER — DEXMEDETOMIDINE HYDROCHLORIDE 100 UG/ML
INJECTION, SOLUTION INTRAVENOUS
Status: DISCONTINUED | OUTPATIENT
Start: 2019-05-10 | End: 2019-05-10

## 2019-05-10 RX ADMIN — HYDROMORPHONE HYDROCHLORIDE 0.5 MG: 1 INJECTION, SOLUTION INTRAMUSCULAR; INTRAVENOUS; SUBCUTANEOUS at 03:05

## 2019-05-10 RX ADMIN — SODIUM CHLORIDE, SODIUM GLUCONATE, SODIUM ACETATE, POTASSIUM CHLORIDE, MAGNESIUM CHLORIDE, SODIUM PHOSPHATE, DIBASIC, AND POTASSIUM PHOSPHATE: .53; .5; .37; .037; .03; .012; .00082 INJECTION, SOLUTION INTRAVENOUS at 12:05

## 2019-05-10 RX ADMIN — SODIUM CHLORIDE, SODIUM LACTATE, POTASSIUM CHLORIDE, AND CALCIUM CHLORIDE: .6; .31; .03; .02 INJECTION, SOLUTION INTRAVENOUS at 04:05

## 2019-05-10 RX ADMIN — ONDANSETRON 4 MG: 2 INJECTION INTRAMUSCULAR; INTRAVENOUS at 02:05

## 2019-05-10 RX ADMIN — DEXAMETHASONE SODIUM PHOSPHATE 8 MG: 4 INJECTION, SOLUTION INTRAMUSCULAR; INTRAVENOUS at 12:05

## 2019-05-10 RX ADMIN — LIDOCAINE HYDROCHLORIDE 0.02 MG/KG/MIN: 8 INJECTION, SOLUTION INTRAVENOUS at 12:05

## 2019-05-10 RX ADMIN — FENTANYL CITRATE 25 MCG: 50 INJECTION, SOLUTION INTRAMUSCULAR; INTRAVENOUS at 02:05

## 2019-05-10 RX ADMIN — PHENYLEPHRINE HYDROCHLORIDE 200 MCG: 10 INJECTION INTRAVENOUS at 01:05

## 2019-05-10 RX ADMIN — SODIUM CHLORIDE: 0.9 INJECTION, SOLUTION INTRAVENOUS at 10:05

## 2019-05-10 RX ADMIN — PHENYLEPHRINE HYDROCHLORIDE 100 MCG: 10 INJECTION INTRAVENOUS at 02:05

## 2019-05-10 RX ADMIN — Medication 30 MG: at 12:05

## 2019-05-10 RX ADMIN — ACETAMINOPHEN 1000 MG: 10 INJECTION, SOLUTION INTRAVENOUS at 01:05

## 2019-05-10 RX ADMIN — FENTANYL CITRATE 50 MCG: 50 INJECTION, SOLUTION INTRAMUSCULAR; INTRAVENOUS at 01:05

## 2019-05-10 RX ADMIN — PROPOFOL 100 MG: 10 INJECTION, EMULSION INTRAVENOUS at 12:05

## 2019-05-10 RX ADMIN — CEFTRIAXONE SODIUM 2 G: 2 INJECTION, SOLUTION INTRAVENOUS at 12:05

## 2019-05-10 RX ADMIN — PHENYLEPHRINE HYDROCHLORIDE 200 MCG: 10 INJECTION INTRAVENOUS at 12:05

## 2019-05-10 RX ADMIN — PHENYLEPHRINE HYDROCHLORIDE 200 MCG: 10 INJECTION INTRAVENOUS at 02:05

## 2019-05-10 RX ADMIN — HEPARIN SODIUM 5000 UNITS: 5000 INJECTION, SOLUTION INTRAVENOUS; SUBCUTANEOUS at 10:05

## 2019-05-10 RX ADMIN — IBUPROFEN 800 MG: 800 INJECTION INTRAVENOUS at 07:05

## 2019-05-10 RX ADMIN — CALCIUM GLUCONATE 1000 MG: 94 INJECTION, SOLUTION INTRAVENOUS at 07:05

## 2019-05-10 RX ADMIN — SODIUM CHLORIDE, SODIUM GLUCONATE, SODIUM ACETATE, POTASSIUM CHLORIDE, MAGNESIUM CHLORIDE, SODIUM PHOSPHATE, DIBASIC, AND POTASSIUM PHOSPHATE: .53; .5; .37; .037; .03; .012; .00082 INJECTION, SOLUTION INTRAVENOUS at 01:05

## 2019-05-10 RX ADMIN — METRONIDAZOLE 500 MG: 500 INJECTION, SOLUTION INTRAVENOUS at 07:05

## 2019-05-10 RX ADMIN — CEFTRIAXONE 2 G: 2 INJECTION, SOLUTION INTRAVENOUS at 07:05

## 2019-05-10 RX ADMIN — IBUPROFEN 800 MG: 800 INJECTION INTRAVENOUS at 11:05

## 2019-05-10 RX ADMIN — ESMOLOL HYDROCHLORIDE 20 MG: 10 INJECTION INTRAVENOUS at 02:05

## 2019-05-10 RX ADMIN — ACETAMINOPHEN 1000 MG: 10 INJECTION, SOLUTION INTRAVENOUS at 10:05

## 2019-05-10 RX ADMIN — METRONIDAZOLE 500 MG: 500 SOLUTION INTRAVENOUS at 12:05

## 2019-05-10 RX ADMIN — DEXMEDETOMIDINE HYDROCHLORIDE 20 MCG: 100 INJECTION, SOLUTION, CONCENTRATE INTRAVENOUS at 01:05

## 2019-05-10 RX ADMIN — Medication: at 04:05

## 2019-05-10 RX ADMIN — ROCURONIUM BROMIDE 30 MG: 10 INJECTION, SOLUTION INTRAVENOUS at 12:05

## 2019-05-10 RX ADMIN — FENTANYL CITRATE 100 MCG: 50 INJECTION, SOLUTION INTRAMUSCULAR; INTRAVENOUS at 12:05

## 2019-05-10 RX ADMIN — MUPIROCIN: 20 OINTMENT TOPICAL at 10:05

## 2019-05-10 RX ADMIN — LIDOCAINE HYDROCHLORIDE 50 MG: 20 INJECTION, SOLUTION INTRAVENOUS at 12:05

## 2019-05-10 RX ADMIN — ROCURONIUM BROMIDE 20 MG: 10 INJECTION, SOLUTION INTRAVENOUS at 01:05

## 2019-05-10 RX ADMIN — MIDAZOLAM HYDROCHLORIDE 2 MG: 1 INJECTION, SOLUTION INTRAMUSCULAR; INTRAVENOUS at 12:05

## 2019-05-10 RX ADMIN — Medication 10 MG: at 02:05

## 2019-05-10 RX ADMIN — SUGAMMADEX 96 MG: 100 INJECTION, SOLUTION INTRAVENOUS at 03:05

## 2019-05-10 NOTE — TRANSFER OF CARE
"Anesthesia Transfer of Care Note    Patient: Lora Scott    Procedure(s) Performed: Procedure(s) (LRB):  ILEOCOLECTOMY (N/A)  CREATION, ILEOSTOMY with mucous fistula (N/A)    Patient location: PACU    Anesthesia Type: general    Transport from OR: Transported from OR on 6-10 L/min O2 by face mask with adequate spontaneous ventilation    Post pain: adequate analgesia    Post assessment: no apparent anesthetic complications and tolerated procedure well    Post vital signs: stable    Level of consciousness: awake and oriented    Nausea/Vomiting: no nausea/vomiting    Complications: none    Transfer of care protocol was followed      Last vitals:   Visit Vitals  BP 99/60 (BP Location: Right arm, Patient Position: Lying)   Pulse 102   Temp 36.7 °C (98.1 °F) (Temporal)   Resp 14   Ht 5' 2" (1.575 m)   Wt 48.1 kg (106 lb)   SpO2 100%   Breastfeeding? No   BMI 19.39 kg/m²     "

## 2019-05-10 NOTE — PROGRESS NOTES
Spoke with MD Balderrama in regards to patient b/p 75/50. Isolyte bolus given at this time. Patient placed in trandelenberg position. No complaints from patient at this time. No new orders noted from MD Balderrama. Will continue to monitor.

## 2019-05-10 NOTE — PROGRESS NOTES
"Notified surgery residen ashley ghotra in regards to patient Calcium 6.8. Per MD Ghotra " We replaced it". Missing dose sent to pharmacy. Awaiting Calcium infusion  "

## 2019-05-10 NOTE — PROGRESS NOTES
Spoke with MD Lockett surgical resident in regards to patient H&H 6.6, 22.9 and  MD Balderrama ordering to transfuse 1 unit of PRBC. Awaiting blood from blood bank.

## 2019-05-10 NOTE — PROGRESS NOTES
Spoke with lab in regards to patient critical values and possible blood hemolization. Labs redrawn and sent to lab for reevaluation. MD Balderrama made aware

## 2019-05-10 NOTE — BRIEF OP NOTE
[facility]  Brief Operative Note    SUMMARY     Surgery Date: 5/10/2019     Surgeon(s) and Role:     * Uday Cope MD - Primary     * Jyothi Meredith MD - Resident - Assisting     * Randall Song MD - Resident - Assisting        Pre-op Diagnosis:  Crohn's disease of both small and large intestine with intestinal obstruction [K50.812]    Post-op Diagnosis:  Post-Op Diagnosis Codes:     * Crohn's disease of both small and large intestine with intestinal obstruction [K50.812]    Procedures:  1.  Open ileocolic resection.   2 . End ileostomy.  3.  Mucous fistula.      Anesthesia: General    Description/Findings of the procedure:  No complications, good hemostasis.    Estimated Blood Loss: 200 mL         Specimens:   Specimen (12h ago, onward)    Start     Ordered    05/10/19 1454  Specimen to Pathology - Surgery  Once     Comments:  Pre-op Diagnosis: Crohn's disease of both small and large intestine with intestinal obstruction [K50.812]Procedure(s):Laparoscopic ileocolic resection Ileostomy Number of specimens: 1Name of specimens: 1. Terminal ileum and cecum - permanent     Start Status     05/10/19 1454 Collected (05/10/19 1455) Order ID: [id number]       05/10/19 1454

## 2019-05-10 NOTE — ANESTHESIA PREPROCEDURE EVALUATION
[facility]  Anesthesia Pre-Operative Evaluation         Patient Name: Lora Scott  YOB: 1990  MRN: [id number]    SUBJECTIVE:     Pre-operative evaluation for Procedure(s) (LRB):  COLONOSCOPY (N/A)  EGD (ESOPHAGOGASTRODUODENOSCOPY) (N/A)     05/10/2019    Lora Scott is a 29 y.o. female w/ a significant PMHx of IBS, anemia, asthma, and GERD presented to [facility] with complaints of worsening abdominal pain. Transferred to [facility] for further evaluation of possible IBD.    Patient now presents for the above procedure(s).      LDA:       20G Peripheral IV - Single Lumen 02/18/19 0300 Left Antecubital (Active)   Number of days: 0       Prev airway: None documented.    Drips:      Patient Active Problem List   Diagnosis    Iron deficiency anemia due to chronic blood loss    Crohn's disease of ileum with fistula    Malnutrition of moderate degree    Small bowel stricture    Elevated liver function tests    Skin rash    Psoriasis    Immunocompromised, acquired    Crohn disease       Review of patient's allergies indicates:   Allergen Reactions    Shellfish containing products Anaphylaxis     Allergic to all seafood.    Horse/equine containing products Itching       Current Inpatient Medications:      Current Facility-Administered Medications on File Prior to Visit   Medication Dose Route Frequency Provider Last Rate Last Dose    0.9%  NaCl infusion   Intravenous Continuous Letty Stringer NP 70 mL/hr at 05/10/19 1031      acetaminophen tablet 1,000 mg  1,000 mg Oral Once Pre-Op Letty Stringer NP        cefTRIAXone (ROCEPHIN) 2 g in dextrose 5 % 50 mL IVPB  2 g Intravenous On Call Procedure Letty Stringer NP        And    metronidazole IVPB 500 mg  500 mg Intravenous On Call Procedure Letty Stringer NP        gabapentin capsule 300 mg  300 mg Oral On Call Procedure Letty Stringer NP        ibuprofen (CALDOLOR) 800mg/250mL D5W (READY TO MIX SYSTEM)  800  mg Intravenous On Call Procedure Letty Stringer NP   800 mg at 05/10/19 1112    lidocaine (PF) 10 mg/ml (1%) injection 10 mg  1 mL Intradermal Once Letty Stringer NP        mupirocin 2 % ointment   Nasal On Call Procedure Letty Stringer NP         Current Outpatient Medications on File Prior to Visit   Medication Sig Dispense Refill    metroNIDAZOLE (FLAGYL) 500 MG tablet Take 1 tablet (500 mg total) by mouth As instructed. Take one pill at 1pm, one pill 2pm, one pill at 11pm 3 tablet 0    neomycin (MYCIFRADIN) 500 mg Tab Take 1 tablet (500 mg total) by mouth As instructed. Take 2 pills at 1pm, 2 pills at 2pm, and 2 pills at 11pm 6 tablet 0       Past Surgical History:   Procedure Laterality Date    COLONOSCOPY N/A 2019    Performed by [facility] at [facility] (2ND FLR)    EGD (ESOPHAGOGASTRODUODENOSCOPY) N/A 2019    Performed by Fawad Perla MD at [facility] (2ND FLR)       Social History     Socioeconomic History    Marital status: Single     Spouse name: Not on file    Number of children: Not on file    Years of education: Not on file    Highest education level: Not on file   Occupational History    Not on file   Social Needs    Financial resource strain: Not on file    Food insecurity:     Worry: Not on file     Inability: Not on file    Transportation needs:     Medical: Not on file     Non-medical: Not on file   Tobacco Use    Smoking status: Former Smoker     Types: Cigarettes     Last attempt to quit: 2019     Years since quittin.2    Smokeless tobacco: Never Used    Tobacco comment: 2 CIGS PER DAY   Substance and Sexual Activity    Alcohol use: Not Currently     Alcohol/week: 0.0 oz     Comment: Socially    Drug use: No     Frequency: 3.0 times per week    Sexual activity: Not Currently     Partners: Male     Birth control/protection: None   Lifestyle    Physical activity:     Days per week: Not on file     Minutes per session: Not on file     Stress: Not on file   Relationships    Social connections:     Talks on phone: Not on file     Gets together: Not on file     Attends Sabianism service: Not on file     Active member of club or organization: Not on file     Attends meetings of clubs or organizations: Not on file     Relationship status: Not on file   Other Topics Concern    Are you pregnant or think you may be? Not Asked    Breast-feeding Not Asked   Social History Narrative    Not on file       OBJECTIVE:     Vital Signs Range (Last 24H):  Temp:  [36.5 °C (97.7 °F)]   Pulse:  [94]   Resp:  [16]   BP: (90)/(62)   SpO2:  [100 %]       Significant Labs:  Lab Results   Component Value Date    WBC 8.53 05/06/2019    HGB 7.8 (L) 05/06/2019    HCT 28.3 (L) 05/06/2019     (H) 05/06/2019    TRIG 41 05/06/2019    ALT 23 05/06/2019    AST 23 05/06/2019     05/06/2019    K 4.2 05/06/2019     05/06/2019    CREATININE 0.6 05/06/2019    BUN 22 (H) 05/06/2019    CO2 23 05/06/2019    TSH 0.713 08/19/2015       Diagnostic Studies: No relevant studies.    EKG: No recent studies available.    2D ECHO:  No results found for this or any previous visit.      ASSESSMENT/PLAN:         Pre-op Assessment    I have reviewed the Patient Summary Reports.     I have reviewed the Nursing Notes.   I have reviewed the Medications.     Review of Systems  Anesthesia Hx:  No problems with previous Anesthesia  Neg history of prior surgery.  Denies Personal Hx of Anesthesia complications.   Social:  Smoker    Hematology/Oncology:         -- Anemia:   Cardiovascular:   Denies Hypertension.  Denies CAD.       Pulmonary:   Denies COPD. Asthma  Denies Recent URI.    Renal/:   Denies Chronic Renal Disease.     Hepatic/GI:   Denies GERD.    Neurological:   Denies TIA. Denies CVA.    Endocrine:   Denies Diabetes.        Physical Exam  General:  Well nourished    Airway/Jaw/Neck:  Airway Findings: Mouth Opening: Normal Tongue: Normal  General Airway Assessment:  Adult  Mallampati: I  Improves to I with phonation.  TM Distance: Normal, at least 6 cm  Jaw/Neck Findings:  Neck ROM: Normal ROM      Dental:  Dental Findings: In tact   Chest/Lungs:  Chest/Lungs Findings: Clear to auscultation, Normal Respiratory Rate     Heart/Vascular:  Heart Findings: Rate: Normal  Rhythm: Regular Rhythm  Sounds: Normal     Abdomen:  Abdomen Findings: Normal    Musculoskeletal:  Musculoskeletal Findings: Normal   Skin:  Skin Findings: Normal    Mental Status:  Mental Status Findings:  Cooperative, Anxious         Anesthesia Plan  Type of Anesthesia, risks & benefits discussed:  Anesthesia Type:  general  Patient's Preference:   Intra-op Monitoring Plan: standard ASA monitors  Intra-op Monitoring Plan Comments:   Post Op Pain Control Plan: per primary service following discharge from PACU, IV/PO Opioids PRN and multimodal analgesia  Post Op Pain Control Plan Comments:   Induction:   IV  Beta Blocker:  Patient is not currently on a Beta-Blocker (No further documentation required).       Informed Consent: Patient understands risks and agrees with Anesthesia plan.  Questions answered. Anesthesia consent signed with patient.  ASA Score: 2     Day of Surgery Review of History & Physical:    H&P update referred to the provider.         Ready For Surgery From Anesthesia Perspective.

## 2019-05-10 NOTE — H&P
Patient ID: Lora Scott is a 29 y.o. female.   Chief Complaint: Post-op Evaluation     29yoF w/ h/o CDs s/p recent admission in Feb 2019 for Crohn's flare. Pt not on any maintenance medications. Saw Dr. Quintanilla in March with discussion of med vs surgical management. Pt has been on TPN and reg diet. Reports good appetite but once she starts eating gets RLQ cramping/abd pain. Reports daily bowel function but more firm stools. Denies any N/V. Pt would like to pursue surgical management at this time.   Last colonoscopy - 2/20/19   Denies family hx of CRC or IBD.         Review of patient's allergies indicates:   Allergen Reactions    Shellfish containing products Anaphylaxis     Allergic to all seafood.    Horse/equine containing products Itching    Iodine      Swelling (throat)^          Past Medical History:   Diagnosis Date    Allergy     Anemia     Asthma     Crohn's disease 2/18/19    Crohn's disease involving terminal ileum     Depression     Elevated liver function tests     Ex-smoker for less than 1 year     Skin rash            Past Surgical History:   Procedure Laterality Date    COLONOSCOPY N/A 2/20/2019    Performed by [facility]la [facility] at [facility] ENDO (2ND FLR)    EGD (ESOPHAGOGASTRODUODENOSCOPY) N/A 2/20/2019    Performed by Fawad Perla MD at [facility] ENDO (2ND FLR)     Current Medications                 Family History   Problem Relation Age of Onset    Hypertension Mother     Irritable bowel syndrome Paternal Grandmother     Celiac disease Neg Hx     Cirrhosis Neg Hx     Colon cancer Neg Hx     Colon polyps Neg Hx     Crohn's disease Neg Hx     Cystic fibrosis Neg Hx     Esophageal cancer Neg Hx     Hemochromatosis Neg Hx     Inflammatory bowel disease Neg Hx     Liver cancer Neg Hx     Liver disease Neg Hx     Rectal cancer Neg Hx     Stomach cancer Neg Hx     Ulcerative colitis Neg Hx     Jerardo's disease Neg Hx     Lymphoma Neg Hx     Tuberculosis Neg Hx      Scleroderma Neg Hx     Rheum arthritis Neg Hx     Multiple sclerosis Neg Hx     Psoriasis Neg Hx     Lupus Neg Hx     Melanoma Neg Hx     Skin cancer Neg Hx      Social History           Socioeconomic History    Marital status: Single     Spouse name: Not on file    Number of children: Not on file    Years of education: Not on file    Highest education level: Not on file   Occupational History    Not on file   Social Needs    Financial resource strain: Not on file    Food insecurity:     Worry: Not on file     Inability: Not on file    Transportation needs:     Medical: Not on file     Non-medical: Not on file   Tobacco Use    Smoking status: Former Smoker     Types: Cigarettes     Last attempt to quit: 2019     Years since quittin.1    Smokeless tobacco: Never Used    Tobacco comment: 2 CIGS PER DAY   Substance and Sexual Activity    Alcohol use: Yes     Alcohol/week: 0.0 oz     Comment: Socially    Drug use: No     Frequency: 3.0 times per week    Sexual activity: Not Currently     Partners: Male     Birth control/protection: None   Lifestyle    Physical activity:     Days per week: Not on file     Minutes per session: Not on file    Stress: Not on file   Relationships    Social connections:     Talks on phone: Not on file     Gets together: Not on file     Attends Latter-day service: Not on file     Active member of club or organization: Not on file     Attends meetings of clubs or organizations: Not on file     Relationship status: Not on file   Other Topics Concern    Not on file   Social History Narrative    Not on file     Review of Systems   Constitutional: Negative for fever.   HENT: Negative for congestion.   Eyes: Negative for pain.   Respiratory: Negative for cough and shortness of breath.   Cardiovascular: Negative for chest pain.   Gastrointestinal: Positive for abdominal pain. Negative for abdominal distention, blood in stool, diarrhea and nausea.   Endocrine: Negative  for polyuria.   Genitourinary: Negative for dysuria.   Musculoskeletal: Negative for back pain.   Skin: Negative for rash.   Neurological: Negative for seizures.   Hematological: Negative for adenopathy.   Psychiatric/Behavioral: Negative for agitation.     Objective:     Physical Exam   Constitutional: She is oriented to person, place, and time. No distress.   Eyes: EOM are normal.   Neck: Neck supple.   Cardiovascular: Normal rate and regular rhythm.   Pulmonary/Chest: Effort normal. No respiratory distress.   Abdominal: Soft. She exhibits no distension. There is no tenderness.   Musculoskeletal: Normal range of motion.   Neurological: She is alert and oriented to person, place, and time.   Skin: Skin is warm and dry.           Lab Results   Component Value Date    WBC 5.64 04/01/2019    HGB 7.4 (L) 04/01/2019    HCT 26.0 (L) 04/01/2019    MCV 79 (L) 04/01/2019     (H) 04/01/2019     BMP         Lab Results   Component Value Date     04/01/2019    K 4.4 04/01/2019     04/01/2019    CO2 30 (H) 04/01/2019    BUN 19 04/01/2019    CREATININE 0.6 04/01/2019    CALCIUM 8.2 (L) 04/01/2019    ANIONGAP 6 (L) 04/01/2019    ESTGFRAFRICA >60.0 04/01/2019    EGFRNONAA >60.0 04/01/2019     CMP         Sodium   Date Value Ref Range Status   04/01/2019 138 136 - 145 mmol/L Final           Potassium   Date Value Ref Range Status   04/01/2019 4.4 3.5 - 5.1 mmol/L Final           Chloride   Date Value Ref Range Status   04/01/2019 102 95 - 110 mmol/L Final           CO2   Date Value Ref Range Status   04/01/2019 30 (H) 23 - 29 mmol/L Final           Glucose   Date Value Ref Range Status   04/01/2019 86 70 - 110 mg/dL Final           BUN, Bld   Date Value Ref Range Status   04/01/2019 19 6 - 20 mg/dL Final           Creatinine   Date Value Ref Range Status   04/01/2019 0.6 0.5 - 1.4 mg/dL Final           Calcium   Date Value Ref Range Status   04/01/2019 8.2 (L) 8.7 - 10.5 mg/dL Final           Total Protein   Date  Value Ref Range Status   04/01/2019 7.2 6.0 - 8.4 g/dL Final           Albumin   Date Value Ref Range Status   04/01/2019 2.1 (L) 3.5 - 5.2 g/dL Final             Total Bilirubin   Date Value Ref Range Status   04/01/2019 0.3 0.1 - 1.0 mg/dL Final     Comment:     For infants and newborns, interpretation of results should be based   on gestational age, weight and in agreement with clinical   observations.   Premature Infant recommended reference ranges:   Up to 24 hours.............<8.0 mg/dL   Up to 48 hours............<12.0 mg/dL   3-5 days..................<15.0 mg/dL   6-29 days.................<15.0 mg/dL            Alkaline Phosphatase   Date Value Ref Range Status   04/01/2019 98 55 - 135 U/L Final           AST   Date Value Ref Range Status   04/01/2019 19 10 - 40 U/L Final           ALT   Date Value Ref Range Status   04/01/2019 22 10 - 44 U/L Final           Anion GapPatient ID: Lora Scott is a 29 y.o. female.   Chief Complaint: Post-op Evaluation     29yoF w/ h/o CDs s/p recent admission in Feb 2019 for Crohn's flare. Pt not on any maintenance medications. Saw Dr. Quintanilla in March with discussion of med vs surgical management. Pt has been on TPN and reg diet. Reports good appetite but once she starts eating gets RLQ cramping/abd pain. Reports daily bowel function but more firm stools. Denies any N/V. Pt would like to pursue surgical management at this time.   Last colonoscopy - 2/20/19   Denies family hx of CRC or IBD.         Review of patient's allergies indicates:   Allergen Reactions    Shellfish containing products Anaphylaxis     Allergic to all seafood.    Horse/equine containing products Itching    Iodine      Swelling (throat)^          Past Medical History:   Diagnosis Date    Allergy     Anemia     Asthma     Crohn's disease 2/18/19    Crohn's disease involving terminal ileum     Depression     Elevated liver function tests     Ex-smoker for less than 1 year     Skin rash             Past Surgical History:   Procedure Laterality Date    COLONOSCOPY N/A 2019    Performed by [facility] at [facility] ENDO (2ND FLR)    EGD (ESOPHAGOGASTRODUODENOSCOPY) N/A 2019    Performed by Fawad Perla MD at [facility] ENDO (2ND FLR)     Current Medications                 Family History   Problem Relation Age of Onset    Hypertension Mother     Irritable bowel syndrome Paternal Grandmother     Celiac disease Neg Hx     Cirrhosis Neg Hx     Colon cancer Neg Hx     Colon polyps Neg Hx     Crohn's disease Neg Hx     Cystic fibrosis Neg Hx     Esophageal cancer Neg Hx     Hemochromatosis Neg Hx     Inflammatory bowel disease Neg Hx     Liver cancer Neg Hx     Liver disease Neg Hx     Rectal cancer Neg Hx     Stomach cancer Neg Hx     Ulcerative colitis Neg Hx     Jerardo's disease Neg Hx     Lymphoma Neg Hx     Tuberculosis Neg Hx     Scleroderma Neg Hx     Rheum arthritis Neg Hx     Multiple sclerosis Neg Hx     Psoriasis Neg Hx     Lupus Neg Hx     Melanoma Neg Hx     Skin cancer Neg Hx      Social History           Socioeconomic History    Marital status: Single     Spouse name: Not on file    Number of children: Not on file    Years of education: Not on file    Highest education level: Not on file   Occupational History    Not on file   Social Needs    Financial resource strain: Not on file    Food insecurity:     Worry: Not on file     Inability: Not on file    Transportation needs:     Medical: Not on file     Non-medical: Not on file   Tobacco Use    Smoking status: Former Smoker     Types: Cigarettes     Last attempt to quit: 2019     Years since quittin.1    Smokeless tobacco: Never Used    Tobacco comment: 2 CIGS PER DAY   Substance and Sexual Activity    Alcohol use: Yes     Alcohol/week: 0.0 oz     Comment: Socially    Drug use: No     Frequency: 3.0 times per week    Sexual activity: Not Currently     Partners: Male     Birth  control/protection: None   Lifestyle    Physical activity:     Days per week: Not on file     Minutes per session: Not on file    Stress: Not on file   Relationships    Social connections:     Talks on phone: Not on file     Gets together: Not on file     Attends Judaism service: Not on file     Active member of club or organization: Not on file     Attends meetings of clubs or organizations: Not on file     Relationship status: Not on file   Other Topics Concern    Not on file   Social History Narrative    Not on file     Review of Systems   Constitutional: Negative for fever.   HENT: Negative for congestion.   Eyes: Negative for pain.   Respiratory: Negative for cough and shortness of breath.   Cardiovascular: Negative for chest pain.   Gastrointestinal: Positive for abdominal pain. Negative for abdominal distention, blood in stool, diarrhea and nausea.   Endocrine: Negative for polyuria.   Genitourinary: Negative for dysuria.   Musculoskeletal: Negative for back pain.   Skin: Negative for rash.   Neurological: Negative for seizures.   Hematological: Negative for adenopathy.   Psychiatric/Behavioral: Negative for agitation.     Objective:     Physical Exam   Constitutional: She is oriented to person, place, and time. No distress.   Eyes: EOM are normal.   Neck: Neck supple.   Cardiovascular: Normal rate and regular rhythm.   Pulmonary/Chest: Effort normal. No respiratory distress.   Abdominal: Soft. She exhibits no distension. There is no tenderness.   Musculoskeletal: Normal range of motion.   Neurological: She is alert and oriented to person, place, and time.   Skin: Skin is warm and dry.           Lab Results   Component Value Date    WBC 5.64 04/01/2019    HGB 7.4 (L) 04/01/2019    HCT 26.0 (L) 04/01/2019    MCV 79 (L) 04/01/2019     (H) 04/01/2019     BMP         Lab Results   Component Value Date     04/01/2019    K 4.4 04/01/2019     04/01/2019    CO2 30 (H) 04/01/2019    BUN 19  04/01/2019    CREATININE 0.6 04/01/2019    CALCIUM 8.2 (L) 04/01/2019    ANIONGAP 6 (L) 04/01/2019    ESTGFRAFRICA >60.0 04/01/2019    EGFRNONAA >60.0 04/01/2019     CMP         Sodium   Date Value Ref Range Status   04/01/2019 138 136 - 145 mmol/L Final           Potassium   Date Value Ref Range Status   04/01/2019 4.4 3.5 - 5.1 mmol/L Final           Chloride   Date Value Ref Range Status   04/01/2019 102 95 - 110 mmol/L Final           CO2   Date Value Ref Range Status   04/01/2019 30 (H) 23 - 29 mmol/L Final           Glucose   Date Value Ref Range Status   04/01/2019 86 70 - 110 mg/dL Final           BUN, Bld   Date Value Ref Range Status   04/01/2019 19 6 - 20 mg/dL Final           Creatinine   Date Value Ref Range Status   04/01/2019 0.6 0.5 - 1.4 mg/dL Final           Calcium   Date Value Ref Range Status   04/01/2019 8.2 (L) 8.7 - 10.5 mg/dL Final           Total Protein   Date Value Ref Range Status   04/01/2019 7.2 6.0 - 8.4 g/dL Final           Albumin   Date Value Ref Range Status   04/01/2019 2.1 (L) 3.5 - 5.2 g/dL Final             Total Bilirubin   Date Value Ref Range Status   04/01/2019 0.3 0.1 - 1.0 mg/dL Final     Comment:     For infants and newborns, interpretation of results should be based   on gestational age, weight and in agreement with clinical   observations.   Premature Infant recommended reference ranges:   Up to 24 hours.............<8.0 mg/dL   Up to 48 hours............<12.0 mg/dL   3-5 days..................<15.0 mg/dL   6-29 days.................<15.0 mg/dL            Alkaline Phosphatase   Date Value Ref Range Status   04/01/2019 98 55 - 135 U/L Final           AST   Date Value Ref Range Status   04/01/2019 19 10 - 40 U/L Final           ALT   Date Value Ref Range Status   04/01/2019 22 10 - 44 U/L Final           Anion Gap   Date Value Ref Range Status   04/01/2019 6 (L) 8 - 16 mmol/L Final           eGFR if    Date Value Ref Range Status   04/01/2019 >60.0 >60  mL/min/1.73 m^2 Final             eGFR if non    Date Value Ref Range Status   04/01/2019 >60.0 >60 mL/min/1.73 m^2 Final     Comment:     Calculation used to obtain the estimated glomerular filtration   rate (eGFR) is the CKD-EPI equation.      No results found for: CEA   Assessment:     1. Ileal crohn's disease   Plan:   Pt has active TI disease with short segment strictures. Saw patient with Dr. Bacon and plan going forward will be to schedule her for surgery for ileocecectomy with DLI. Also will discuss prehab with Ramon Fay. Will call with date for surgery and definitive plan going forward.   I have personally taken the history and examined this patient and agree with the resident's note as stated above.      Date Value Ref Range Status   04/01/2019 6 (L) 8 - 16 mmol/L Final           eGFR if    Date Value Ref Range Status   04/01/2019 >60.0 >60 mL/min/1.73 m^2 Final             eGFR if non    Date Value Ref Range Status   04/01/2019 >60.0 >60 mL/min/1.73 m^2 Final     Comment:     Calculation used to obtain the estimated glomerular filtration   rate (eGFR) is the CKD-EPI equation.      No results found for: CEA   Assessment:     1. Ileal crohn's disease   Plan:     Pt has active TI disease with short segment strictures. Saw patient with Dr. Bacon.    Plan to proceed with surgery for ileocecectomy with DLI.   Consents previously signed.    Abdomen marked on right side for possible loop ileostomy.        Jyothi Meredith MD

## 2019-05-10 NOTE — PROGRESS NOTES
Patient unable to hold down P.O. Neomycin and flagyl schedule last night prior to surgery.  Dr. Cope service paged.  Waiting for call back.

## 2019-05-11 LAB
ALBUMIN SERPL BCP-MCNC: 1.7 G/DL (ref 3.5–5.2)
ALP SERPL-CCNC: 74 U/L (ref 55–135)
ALT SERPL W/O P-5'-P-CCNC: 36 U/L (ref 10–44)
ANION GAP SERPL CALC-SCNC: 6 MMOL/L (ref 8–16)
AST SERPL-CCNC: 31 U/L (ref 10–40)
BASOPHILS # BLD AUTO: 0.01 K/UL (ref 0–0.2)
BASOPHILS NFR BLD: 0.1 % (ref 0–1.9)
BILIRUB SERPL-MCNC: 0.2 MG/DL (ref 0.1–1)
BUN SERPL-MCNC: 13 MG/DL (ref 6–20)
CALCIUM SERPL-MCNC: 7.8 MG/DL (ref 8.7–10.5)
CHLORIDE SERPL-SCNC: 106 MMOL/L (ref 95–110)
CO2 SERPL-SCNC: 25 MMOL/L (ref 23–29)
CREAT SERPL-MCNC: 0.5 MG/DL (ref 0.5–1.4)
DIFFERENTIAL METHOD: ABNORMAL
EOSINOPHIL # BLD AUTO: 0 K/UL (ref 0–0.5)
EOSINOPHIL NFR BLD: 0 % (ref 0–8)
ERYTHROCYTE [DISTWIDTH] IN BLOOD BY AUTOMATED COUNT: 20.6 % (ref 11.5–14.5)
EST. GFR  (AFRICAN AMERICAN): >60 ML/MIN/1.73 M^2
EST. GFR  (NON AFRICAN AMERICAN): >60 ML/MIN/1.73 M^2
GLUCOSE SERPL-MCNC: 92 MG/DL (ref 70–110)
HCT VFR BLD AUTO: 27.4 % (ref 37–48.5)
HGB BLD-MCNC: 7.9 G/DL (ref 12–16)
IMM GRANULOCYTES # BLD AUTO: 0.1 K/UL (ref 0–0.04)
IMM GRANULOCYTES NFR BLD AUTO: 0.7 % (ref 0–0.5)
LYMPHOCYTES # BLD AUTO: 0.7 K/UL (ref 1–4.8)
LYMPHOCYTES NFR BLD: 5 % (ref 18–48)
MAGNESIUM SERPL-MCNC: 1.6 MG/DL (ref 1.6–2.6)
MCH RBC QN AUTO: 22.6 PG (ref 27–31)
MCHC RBC AUTO-ENTMCNC: 28.8 G/DL (ref 32–36)
MCV RBC AUTO: 78 FL (ref 82–98)
MONOCYTES # BLD AUTO: 0.3 K/UL (ref 0.3–1)
MONOCYTES NFR BLD: 2.4 % (ref 4–15)
NEUTROPHILS # BLD AUTO: 12.9 K/UL (ref 1.8–7.7)
NEUTROPHILS NFR BLD: 91.8 % (ref 38–73)
NRBC BLD-RTO: 0 /100 WBC
PHOSPHATE SERPL-MCNC: 3.7 MG/DL (ref 2.7–4.5)
PLATELET # BLD AUTO: 277 K/UL (ref 150–350)
PMV BLD AUTO: 11.3 FL (ref 9.2–12.9)
POTASSIUM SERPL-SCNC: 4.4 MMOL/L (ref 3.5–5.1)
PROT SERPL-MCNC: 6.2 G/DL (ref 6–8.4)
RBC # BLD AUTO: 3.5 M/UL (ref 4–5.4)
SODIUM SERPL-SCNC: 137 MMOL/L (ref 136–145)
WBC # BLD AUTO: 14.07 K/UL (ref 3.9–12.7)

## 2019-05-11 PROCEDURE — 83735 ASSAY OF MAGNESIUM: CPT

## 2019-05-11 PROCEDURE — B4185 PARENTERAL SOL 10 GM LIPIDS: HCPCS | Performed by: SURGERY

## 2019-05-11 PROCEDURE — 25000003 PHARM REV CODE 250: Performed by: SURGERY

## 2019-05-11 PROCEDURE — 20600001 HC STEP DOWN PRIVATE ROOM

## 2019-05-11 PROCEDURE — 80053 COMPREHEN METABOLIC PANEL: CPT

## 2019-05-11 PROCEDURE — 63600175 PHARM REV CODE 636 W HCPCS: Performed by: STUDENT IN AN ORGANIZED HEALTH CARE EDUCATION/TRAINING PROGRAM

## 2019-05-11 PROCEDURE — C9113 INJ PANTOPRAZOLE SODIUM, VIA: HCPCS | Performed by: SURGERY

## 2019-05-11 PROCEDURE — 84100 ASSAY OF PHOSPHORUS: CPT

## 2019-05-11 PROCEDURE — S0030 INJECTION, METRONIDAZOLE: HCPCS | Performed by: SURGERY

## 2019-05-11 PROCEDURE — 63600175 PHARM REV CODE 636 W HCPCS: Performed by: SURGERY

## 2019-05-11 PROCEDURE — 85025 COMPLETE CBC W/AUTO DIFF WBC: CPT

## 2019-05-11 RX ORDER — MAGNESIUM SULFATE HEPTAHYDRATE 40 MG/ML
2 INJECTION, SOLUTION INTRAVENOUS ONCE
Status: COMPLETED | OUTPATIENT
Start: 2019-05-11 | End: 2019-05-11

## 2019-05-11 RX ADMIN — ACETAMINOPHEN 1000 MG: 10 INJECTION, SOLUTION INTRAVENOUS at 06:05

## 2019-05-11 RX ADMIN — METRONIDAZOLE 500 MG: 500 INJECTION, SOLUTION INTRAVENOUS at 04:05

## 2019-05-11 RX ADMIN — ACETAMINOPHEN 1000 MG: 10 INJECTION, SOLUTION INTRAVENOUS at 01:05

## 2019-05-11 RX ADMIN — IBUPROFEN 800 MG: 800 INJECTION INTRAVENOUS at 02:05

## 2019-05-11 RX ADMIN — MAGNESIUM SULFATE IN WATER 2 G: 40 INJECTION, SOLUTION INTRAVENOUS at 12:05

## 2019-05-11 RX ADMIN — Medication: at 11:05

## 2019-05-11 RX ADMIN — SOYBEAN OIL 250 ML: 20 INJECTION, SOLUTION INTRAVENOUS at 10:05

## 2019-05-11 RX ADMIN — SODIUM CHLORIDE, SODIUM LACTATE, POTASSIUM CHLORIDE, AND CALCIUM CHLORIDE: .6; .31; .03; .02 INJECTION, SOLUTION INTRAVENOUS at 01:05

## 2019-05-11 RX ADMIN — IBUPROFEN 800 MG: 800 INJECTION INTRAVENOUS at 06:05

## 2019-05-11 RX ADMIN — PANTOPRAZOLE SODIUM 40 MG: 40 INJECTION, POWDER, LYOPHILIZED, FOR SOLUTION INTRAVENOUS at 08:05

## 2019-05-11 RX ADMIN — IBUPROFEN 800 MG: 800 INJECTION INTRAVENOUS at 10:05

## 2019-05-11 NOTE — SUBJECTIVE & OBJECTIVE
Interval History: NAEON. Got 1 unit PRBC in PACU with appropriate response. Soreness, pain overall controlled. No weakness or lightheadedness. Feels fatigued. No n/v.    Medications:  Continuous Infusions:   hydromorphone in 0.9 % NaCl 6 mg/30 ml      lactated ringers 50 mL/hr at 05/11/19 0856    TPN ADULT CENTRAL LINE CUSTOM       Scheduled Meds:   acetaminophen  1,000 mg Intravenous Q8H    fat emulsion 20%  250 mL Intravenous Daily    ibuprofen  800 mg Intravenous Q8H    pantoprazole  40 mg Intravenous Daily     PRN Meds:sodium chloride, diphenhydrAMINE, naloxone, ondansetron, promethazine (PHENERGAN) IVPB     Review of patient's allergies indicates:   Allergen Reactions    Shellfish containing products Anaphylaxis     Allergic to all seafood.    Horse/equine containing products Itching    Iodine      Swelling (throat)^     Objective:     Vital Signs (Most Recent):  Temp: 97.7 °F (36.5 °C) (05/11/19 0828)  Pulse: 67 (05/11/19 1057)  Resp: 14 (05/11/19 0828)  BP: 100/61 (05/11/19 1020)  SpO2: 98 % (05/11/19 0828) Vital Signs (24h Range):  Temp:  [97.2 °F (36.2 °C)-98.1 °F (36.7 °C)] 97.7 °F (36.5 °C)  Pulse:  [] 67  Resp:  [10-21] 14  SpO2:  [98 %-100 %] 98 %  BP: ()/(50-70) 100/61     Weight: 48.1 kg (106 lb)  Body mass index is 19.39 kg/m².    Intake/Output - Last 3 Shifts       05/09 0700 - 05/10 0659 05/10 0700 - 05/11 0659 05/11 0700 - 05/12 0659    P.O.  0     I.V. (mL/kg)  2641 (54.9)     Blood  308     IV Piggyback  500     Total Intake(mL/kg)  3449 (71.7)     Urine (mL/kg/hr)  1190 125 (0.6)    Stool  50 75    Blood  200     Total Output  1440 200    Net  +2009 -200           Stool Occurrence  0 x 1 x          Physical Exam   Constitutional: She is oriented to person, place, and time. She appears well-developed. No distress.   Eyes: Conjunctivae and EOM are normal.   Cardiovascular: Normal rate.   Pulmonary/Chest: Effort normal. No respiratory distress.   IS volume 500   Abdominal:  Soft. She exhibits no distension. There is tenderness (appropriate).   RLQ ostomy with bilious fluid, pink, viable  Incision c/d/i   Neurological: She is alert and oriented to person, place, and time.   Skin: Skin is warm and dry.   Vitals reviewed.      Significant Labs:  CBC:   Recent Labs   Lab 05/11/19  0400   WBC 14.07*   RBC 3.50*   HGB 7.9*   HCT 27.4*      MCV 78*   MCH 22.6*   MCHC 28.8*     CMP:   Recent Labs   Lab 05/11/19  0400   GLU 92   CALCIUM 7.8*   ALBUMIN 1.7*   PROT 6.2      K 4.4   CO2 25      BUN 13   CREATININE 0.5   ALKPHOS 74   ALT 36   AST 31   BILITOT 0.2       Significant Diagnostics:  I have reviewed all pertinent imaging results/findings within the past 24 hours.

## 2019-05-11 NOTE — PROGRESS NOTES
Patients Mother and sister at bedside, patient awakens to voice, Ox3, VSS on room air, controlling pain with PCA and scheduled medications. Reviewed plan of care with patient and family all verbalized understanding. Wctm

## 2019-05-11 NOTE — SUBJECTIVE & OBJECTIVE
Interval History: NAEON. Low BP no tachy, baseline low BP. Soreness, pain overall controlled. No weakness or lightheadedness. Feels fatigued. No n/v.    Medications:  Continuous Infusions:   hydromorphone in 0.9 % NaCl 6 mg/30 ml      lactated ringers 125 mL/hr at 05/11/19 0118    TPN ADULT CENTRAL LINE CUSTOM       Scheduled Meds:   acetaminophen  1,000 mg Intravenous Q8H    fat emulsion 20%  250 mL Intravenous Daily    ibuprofen  800 mg Intravenous Q8H    pantoprazole  40 mg Intravenous Daily     PRN Meds:sodium chloride, diphenhydrAMINE, naloxone, ondansetron, promethazine (PHENERGAN) IVPB     Review of patient's allergies indicates:   Allergen Reactions    Shellfish containing products Anaphylaxis     Allergic to all seafood.    Horse/equine containing products Itching    Iodine      Swelling (throat)^     Objective:     Vital Signs (Most Recent):  Temp: 97.5 °F (36.4 °C) (05/11/19 0300)  Pulse: 62 (05/11/19 0742)  Resp: 12 (05/11/19 0300)  BP: 103/60 (05/11/19 0300)  SpO2: 100 % (05/11/19 0300) Vital Signs (24h Range):  Temp:  [97.2 °F (36.2 °C)-98.1 °F (36.7 °C)] 97.5 °F (36.4 °C)  Pulse:  [] 62  Resp:  [10-21] 12  SpO2:  [98 %-100 %] 100 %  BP: ()/(50-70) 103/60     Weight: 48.1 kg (106 lb)  Body mass index is 19.39 kg/m².    Intake/Output - Last 3 Shifts       05/09 0700 - 05/10 0659 05/10 0700 - 05/11 0659 05/11 0700 - 05/12 0659    P.O.  0     I.V. (mL/kg)  2641 (54.9)     Blood  308     IV Piggyback  500     Total Intake(mL/kg)  3449 (71.7)     Urine (mL/kg/hr)  1190     Stool  50     Blood  200     Total Output  1440     Net  +2009            Stool Occurrence  0 x           Physical Exam   Constitutional: She is oriented to person, place, and time. She appears well-developed. No distress.   Eyes: Conjunctivae and EOM are normal.   Cardiovascular: Normal rate.   Pulmonary/Chest: Effort normal. No respiratory distress.   IS volume 500   Abdominal: Soft. She exhibits no distension.  There is tenderness (appropriate).   RLQ ostomy with bilious fluid, pink, viable  Incision c/d/i   Neurological: She is alert and oriented to person, place, and time.   Skin: Skin is warm and dry.   Vitals reviewed.      Significant Labs:  {Results:[id number]}    Significant Diagnostics:  {Imaging Review:[id number]}

## 2019-05-11 NOTE — PLAN OF CARE
Problem: Adult Inpatient Plan of Care  Goal: Plan of Care Review  Outcome: Ongoing (interventions implemented as appropriate)  POC discussed with pt. And verbalized understanding. AAOx4, VSS on RA. Abd midline with Dermabond ARSENIO. R ileostomy, producing scant amount of thin green liquid. Fletcher in place, draining dark yellow urine, UOP is not adequate and MD aware. L UA PICC both lumens flush, and blood return noted. NPO with ice chips. Remains free of falls and injury. Safety precautions maintained, call light with in reach, call light with in reach.

## 2019-05-11 NOTE — PLAN OF CARE
Problem: Adult Inpatient Plan of Care  Goal: Plan of Care Review  POC reviewed with patient and family. AAOX4. B/P low 80's/50's, asymptomatic notified team, encouragement from nurse to consume liquids. Tele monitor in place, NSR 60's-80's. ML ABD incision intact. RLQ ileostomy intact draining small amount of green liquid. L. Arm picc intact. IVF infusing. Pca pump for pain management. Pain remained at a 5 throughout shift. Fletcher removed, pt. Voided 200ml at 1500 post removal. Diet advanced to clear liquids, tolerating well with no complaints of nausea. Ambulated in room and to bathroom with stand by assist from nurse.

## 2019-05-11 NOTE — NURSING TRANSFER
Nursing Transfer Note      5/10/2019     Transfer To: [facility] from [facility]    Transfer via stretcher    Transfer with cardiac monitoring, Et moitor    Transported by Pct    Medicines sent: Yes IVF Antibiotics Dilaudid PCA    Chart send with patient: Yes    Notified: Mother and Sister    Patient reassessed at: 5/10/19 20:30

## 2019-05-11 NOTE — ANESTHESIA POSTPROCEDURE EVALUATION
Anesthesia Post Evaluation    Patient: Lora Scott    Procedure(s) Performed: Procedure(s) (LRB):  ILEOCOLECTOMY (N/A)  CREATION, ILEOSTOMY with mucous fistula (N/A)    Final Anesthesia Type: general  Patient location during evaluation: PACU  Patient participation: Yes- Able to Participate  Level of consciousness: awake and alert and oriented  Post-procedure vital signs: reviewed and stable  Pain management: adequate  Airway patency: patent  PONV status at discharge: No PONV  Anesthetic complications: no      Cardiovascular status: blood pressure returned to baseline and hemodynamically stable  Respiratory status: unassisted  Hydration status: euvolemic  Follow-up not needed.          Vitals Value Taken Time   BP 96/62 5/10/2019  7:30 PM   Temp 36.2 °C (97.2 °F) 5/10/2019  7:30 PM   Pulse 81 5/10/2019  7:33 PM   Resp 10 5/10/2019  7:33 PM   SpO2 100 % 5/10/2019  7:33 PM   Vitals shown include unvalidated device data.      No case tracking events are documented in the log.      Pain/Oral Score: Pain Rating Prior to Med Admin: 10 (5/10/2019  4:45 PM)  Oral Score: 9 (5/10/2019  7:00 PM)

## 2019-05-11 NOTE — PROGRESS NOTES
[facility]  Colorectal Surgery  Progress Note    Patient Name: Lora Scott  MRN: [id number]  Admission Date: 5/10/2019  Hospital Length of Stay: 1 days  Attending Physician: Uday Cope MD  Interval History: NAEON. Got 1 unit PRBC in PACU with appropriate response. Soreness, pain overall controlled. No weakness or lightheadedness. Feels fatigued. No n/v.    Medications:  Continuous Infusions:   hydromorphone in 0.9 % NaCl 6 mg/30 ml      lactated ringers 50 mL/hr at 05/11/19 0856    TPN ADULT CENTRAL LINE CUSTOM       Scheduled Meds:   acetaminophen  1,000 mg Intravenous Q8H    fat emulsion 20%  250 mL Intravenous Daily    ibuprofen  800 mg Intravenous Q8H    pantoprazole  40 mg Intravenous Daily     PRN Meds:sodium chloride, diphenhydrAMINE, naloxone, ondansetron, promethazine (PHENERGAN) IVPB     Review of patient's allergies indicates:   Allergen Reactions    Shellfish containing products Anaphylaxis     Allergic to all seafood.    Horse/equine containing products Itching    Iodine      Swelling (throat)^     Objective:     Vital Signs (Most Recent):  Temp: 97.7 °F (36.5 °C) (05/11/19 0828)  Pulse: 67 (05/11/19 1057)  Resp: 14 (05/11/19 0828)  BP: 100/61 (05/11/19 1020)  SpO2: 98 % (05/11/19 0828) Vital Signs (24h Range):  Temp:  [97.2 °F (36.2 °C)-98.1 °F (36.7 °C)] 97.7 °F (36.5 °C)  Pulse:  [] 67  Resp:  [10-21] 14  SpO2:  [98 %-100 %] 98 %  BP: ()/(50-70) 100/61     Weight: 48.1 kg (106 lb)  Body mass index is 19.39 kg/m².    Intake/Output - Last 3 Shifts       05/09 0700 - 05/10 0659 05/10 0700 - 05/11 0659 05/11 0700 - 05/12 0659    P.O.  0     I.V. (mL/kg)  2641 (54.9)     Blood  308     IV Piggyback  500     Total Intake(mL/kg)  3449 (71.7)     Urine (mL/kg/hr)  1190 125 (0.6)    Stool  50 75    Blood  200     Total Output  1440 200    Net  +2009 -200           Stool Occurrence  0 x 1 x          Physical Exam   Constitutional: She is oriented to person, place,  and time. She appears well-developed. No distress.   Eyes: Conjunctivae and EOM are normal.   Cardiovascular: Normal rate.   Pulmonary/Chest: Effort normal. No respiratory distress.   IS volume 500   Abdominal: Soft. She exhibits no distension. There is tenderness (appropriate).   RLQ ostomy with bilious fluid, pink, viable  Incision c/d/i   Neurological: She is alert and oriented to person, place, and time.   Skin: Skin is warm and dry.   Vitals reviewed.      Significant Labs:  CBC:   Recent Labs   Lab 05/11/19  0400   WBC 14.07*   RBC 3.50*   HGB 7.9*   HCT 27.4*      MCV 78*   MCH 22.6*   MCHC 28.8*     CMP:   Recent Labs   Lab 05/11/19  0400   GLU 92   CALCIUM 7.8*   ALBUMIN 1.7*   PROT 6.2      K 4.4   CO2 25      BUN 13   CREATININE 0.5   ALKPHOS 74   ALT 36   AST 31   BILITOT 0.2       Significant Diagnostics:  I have reviewed all pertinent imaging results/findings within the past 24 hours.    Assessment/Plan:     * Crohn disease  1 Day Post-Op ileocolectomy with end ileostomy    Advance diet as tolerated  Multimodal pain control as needed  OOB/ambulate  DC up  DVT ppx  Stoma teaching          Harry Mckenna MD  Colorectal Surgery  [facility]

## 2019-05-11 NOTE — ANESTHESIA RELEASE NOTE
"Anesthesia Release from PACU Note    Patient: Lora Scott    Procedure(s) Performed: Procedure(s) (LRB):  ILEOCOLECTOMY (N/A)  CREATION, ILEOSTOMY with mucous fistula (N/A)    Anesthesia type: general    Post pain: Adequate analgesia    Post assessment: no apparent anesthetic complications    Last Vitals:   Visit Vitals  BP 96/62   Pulse 84   Temp 36.2 °C (97.2 °F) (Temporal)   Resp 14   Ht 5' 2" (1.575 m)   Wt 48.1 kg (106 lb)   SpO2 99%   Breastfeeding? No   BMI 19.39 kg/m²       Post vital signs: stable    Level of consciousness: awake, alert  and oriented    Nausea/Vomiting: no nausea/no vomiting    Complications: none    Airway Patency: patent    Respiratory: unassisted    Cardiovascular: stable and blood pressure at baseline    Hydration: euvolemic  "

## 2019-05-11 NOTE — ASSESSMENT & PLAN NOTE
1 Day Post-Op ileocolectomy with end ileostomy    Advance diet as tolerated  Multimodal pain control as needed  OOB/ambulate  DC up  DVT ppx  Stoma teaching

## 2019-05-12 LAB
ALBUMIN SERPL BCP-MCNC: 1.7 G/DL (ref 3.5–5.2)
ALP SERPL-CCNC: 65 U/L (ref 55–135)
ALT SERPL W/O P-5'-P-CCNC: 30 U/L (ref 10–44)
ANION GAP SERPL CALC-SCNC: 7 MMOL/L (ref 8–16)
AST SERPL-CCNC: 30 U/L (ref 10–40)
BASOPHILS # BLD AUTO: 0.01 K/UL (ref 0–0.2)
BASOPHILS NFR BLD: 0.2 % (ref 0–1.9)
BILIRUB SERPL-MCNC: 0.1 MG/DL (ref 0.1–1)
BUN SERPL-MCNC: 10 MG/DL (ref 6–20)
CALCIUM SERPL-MCNC: 7.9 MG/DL (ref 8.7–10.5)
CHLORIDE SERPL-SCNC: 107 MMOL/L (ref 95–110)
CO2 SERPL-SCNC: 25 MMOL/L (ref 23–29)
CREAT SERPL-MCNC: 0.6 MG/DL (ref 0.5–1.4)
DIFFERENTIAL METHOD: ABNORMAL
EOSINOPHIL # BLD AUTO: 0.1 K/UL (ref 0–0.5)
EOSINOPHIL NFR BLD: 1.4 % (ref 0–8)
ERYTHROCYTE [DISTWIDTH] IN BLOOD BY AUTOMATED COUNT: 20.9 % (ref 11.5–14.5)
EST. GFR  (AFRICAN AMERICAN): >60 ML/MIN/1.73 M^2
EST. GFR  (NON AFRICAN AMERICAN): >60 ML/MIN/1.73 M^2
GLUCOSE SERPL-MCNC: 85 MG/DL (ref 70–110)
HCT VFR BLD AUTO: 26.5 % (ref 37–48.5)
HGB BLD-MCNC: 7.8 G/DL (ref 12–16)
IMM GRANULOCYTES # BLD AUTO: 0.04 K/UL (ref 0–0.04)
IMM GRANULOCYTES NFR BLD AUTO: 0.6 % (ref 0–0.5)
LYMPHOCYTES # BLD AUTO: 0.8 K/UL (ref 1–4.8)
LYMPHOCYTES NFR BLD: 12.6 % (ref 18–48)
MAGNESIUM SERPL-MCNC: 2 MG/DL (ref 1.6–2.6)
MCH RBC QN AUTO: 22.9 PG (ref 27–31)
MCHC RBC AUTO-ENTMCNC: 29.4 G/DL (ref 32–36)
MCV RBC AUTO: 78 FL (ref 82–98)
MONOCYTES # BLD AUTO: 0.3 K/UL (ref 0.3–1)
MONOCYTES NFR BLD: 5.1 % (ref 4–15)
NEUTROPHILS # BLD AUTO: 5.3 K/UL (ref 1.8–7.7)
NEUTROPHILS NFR BLD: 80.1 % (ref 38–73)
NRBC BLD-RTO: 0 /100 WBC
PHOSPHATE SERPL-MCNC: 2.9 MG/DL (ref 2.7–4.5)
PLATELET # BLD AUTO: 257 K/UL (ref 150–350)
PMV BLD AUTO: 10.7 FL (ref 9.2–12.9)
POTASSIUM SERPL-SCNC: 3.6 MMOL/L (ref 3.5–5.1)
PROT SERPL-MCNC: 6.2 G/DL (ref 6–8.4)
RBC # BLD AUTO: 3.4 M/UL (ref 4–5.4)
SODIUM SERPL-SCNC: 139 MMOL/L (ref 136–145)
WBC # BLD AUTO: 6.65 K/UL (ref 3.9–12.7)

## 2019-05-12 PROCEDURE — 84100 ASSAY OF PHOSPHORUS: CPT

## 2019-05-12 PROCEDURE — 25000003 PHARM REV CODE 250: Performed by: STUDENT IN AN ORGANIZED HEALTH CARE EDUCATION/TRAINING PROGRAM

## 2019-05-12 PROCEDURE — B4185 PARENTERAL SOL 10 GM LIPIDS: HCPCS | Performed by: STUDENT IN AN ORGANIZED HEALTH CARE EDUCATION/TRAINING PROGRAM

## 2019-05-12 PROCEDURE — C9113 INJ PANTOPRAZOLE SODIUM, VIA: HCPCS | Performed by: SURGERY

## 2019-05-12 PROCEDURE — 20600001 HC STEP DOWN PRIVATE ROOM

## 2019-05-12 PROCEDURE — 85025 COMPLETE CBC W/AUTO DIFF WBC: CPT

## 2019-05-12 PROCEDURE — 80053 COMPREHEN METABOLIC PANEL: CPT

## 2019-05-12 PROCEDURE — 63600175 PHARM REV CODE 636 W HCPCS: Performed by: SURGERY

## 2019-05-12 PROCEDURE — 25000003 PHARM REV CODE 250: Performed by: SURGERY

## 2019-05-12 PROCEDURE — 83735 ASSAY OF MAGNESIUM: CPT

## 2019-05-12 RX ORDER — ACETAMINOPHEN 500 MG
1000 TABLET ORAL EVERY 8 HOURS
Status: DISCONTINUED | OUTPATIENT
Start: 2019-05-12 | End: 2019-05-15 | Stop reason: HOSPADM

## 2019-05-12 RX ADMIN — IBUPROFEN 800 MG: 800 INJECTION INTRAVENOUS at 05:05

## 2019-05-12 RX ADMIN — PANTOPRAZOLE SODIUM 40 MG: 40 INJECTION, POWDER, LYOPHILIZED, FOR SOLUTION INTRAVENOUS at 09:05

## 2019-05-12 RX ADMIN — IBUPROFEN 800 MG: 800 INJECTION INTRAVENOUS at 01:05

## 2019-05-12 RX ADMIN — SOYBEAN OIL 250 ML: 20 INJECTION, SOLUTION INTRAVENOUS at 09:05

## 2019-05-12 RX ADMIN — DIPHENHYDRAMINE HYDROCHLORIDE 12.5 MG: 50 INJECTION, SOLUTION INTRAMUSCULAR; INTRAVENOUS at 09:05

## 2019-05-12 RX ADMIN — ONDANSETRON 4 MG: 2 INJECTION INTRAMUSCULAR; INTRAVENOUS at 09:05

## 2019-05-12 NOTE — PLAN OF CARE
Problem: Adult Inpatient Plan of Care  Goal: Plan of Care Review  Outcome: Ongoing (interventions implemented as appropriate)  POC discussed with pt. And verbalized understanding. AAOx4, VSS on RA. Abd midline with Dermabond ARSENIO. R ileostomy, producing scant amount of thin green liquid. L UA PICC both lumens flush, and blood return noted. Pain controled with PCA per current orders. Clear liquids and tolerating well, pt ambulates with stand by to no assist. Remains free of falls and injury. Safety precautions maintained, call light with in reach, call light with in reach.

## 2019-05-12 NOTE — PROGRESS NOTES
[facility]  Colorectal Surgery  Progress Note    Patient Name: Lora Scott  MRN: [id number]  Admission Date: 5/10/2019  Hospital Length of Stay: 2 days  Attending Physician: Uday Cope MD    Subjective:     Interval History: no acute events. Tolerating clears and hungry. Stoma functional. Pain well controlled, using PCA rarely.    Post-Op Info:  Procedure(s) (LRB):  ILEOCOLECTOMY (N/A)  CREATION, ILEOSTOMY with mucous fistula (N/A)   2 Days Post-Op      Medications:  Continuous Infusions:   hydromorphone in 0.9 % NaCl 6 mg/30 ml      TPN ADULT CENTRAL LINE CUSTOM 60 mL/hr at 05/11/19 2257    TPN ADULT CENTRAL LINE CUSTOM       Scheduled Meds:   fat emulsion 20%  250 mL Intravenous Daily    ibuprofen  800 mg Intravenous Q8H    pantoprazole  40 mg Intravenous Daily     PRN Meds:   sodium chloride    diphenhydrAMINE    naloxone    ondansetron    promethazine (PHENERGAN) IVPB        Objective:     Vital Signs (Most Recent):  Temp: 97.3 °F (36.3 °C) (05/12/19 0758)  Pulse: 69 (05/12/19 1113)  Resp: 20 (05/12/19 0758)  BP: (!) 93/54 (05/12/19 0758)  SpO2: 99 % (05/12/19 0758) Vital Signs (24h Range):  Temp:  [96.2 °F (35.7 °C)-97.5 °F (36.4 °C)] 97.3 °F (36.3 °C)  Pulse:  [60-99] 69  Resp:  [14-20] 20  SpO2:  [92 %-100 %] 99 %  BP: (84-93)/(51-62) 93/54     Intake/Output - Last 3 Shifts       05/10 0700 - 05/11 0659 05/11 0700 - 05/12 0659 05/12 0700 - 05/13 0659    P.O. 0 370     I.V. (mL/kg) 2641 (54.9) 50 (1)     Blood 308      IV Piggyback 500 800     Total Intake(mL/kg) 3449 (71.7) 1220 (25.4)     Urine (mL/kg/hr) 1190 1025 (0.9) 500 (2.4)    Stool 50 125 50    Blood 200      Total Output 1440 1150 550    Net +2009 +70 -550           Stool Occurrence 0 x 1 x           Physical Exam   Constitutional: She is oriented to person, place, and time. She appears well-developed. No distress.   Eyes: Conjunctivae and EOM are normal.   Cardiovascular: Normal rate.   Pulmonary/Chest: Effort  normal. No respiratory distress.   IS volume 500   Abdominal: Soft. She exhibits no distension. There is no tenderness (appropriate).   RLQ ostomy with stool, pink, viable  Incision c/d/i   Neurological: She is alert and oriented to person, place, and time.   Skin: Skin is warm and dry.   Vitals reviewed.        Significant Labs:  BMP (Last 3 Results):   Recent Labs   Lab 05/10/19  1718 05/11/19  0400 05/12/19  0400    92 85    137 139   K 4.0 4.4 3.6    106 107   CO2 25 25 25   BUN 20 13 10   CREATININE 0.6 0.5 0.6   CALCIUM 6.8* 7.8* 7.9*   MG 1.9 1.6 2.0     CBC (Last 3 Results):   Recent Labs   Lab 05/10/19  2250 05/11/19  0400 05/12/19  0400   WBC 14.93* 14.07* 6.65   RBC 3.58* 3.50* 3.40*   HGB 8.3* 7.9* 7.8*   HCT 27.6* 27.4* 26.5*    277 257   MCV 77* 78* 78*   MCH 23.2* 22.6* 22.9*   MCHC 30.1* 28.8* 29.4*       Significant Diagnostics:  I have reviewed all pertinent imaging results/findings within the past 24 hours.    Assessment/Plan:     * Crohn disease  2 Days Post-Op ileocolectomy with end ileostomy    Low res diet  Continue TPN  PCA for now, transition to PO tomorrow if taking adequate PO  OOB/ambulate  Incentive spirometry  Stoma teaching          Harry Mckenna MD  Colorectal Surgery  [facility]

## 2019-05-12 NOTE — OP NOTE
DATE OF PROCEDURE:  05/10/2019.    PREOPERATIVE DIAGNOSIS:  Refractory terminal ileal Crohn's disease.    POSTOPERATIVE DIAGNOSIS:  Refractory terminal ileal Crohn's disease.    PROCEDURE:  Laparoscopic converted to open ileocecal resection with creation of   an end ileostomy and mucous fistula.    SURGEON:  Uday Cope M.D.    ASSISTANTS:  Jyothi Meredith M.D. (RES) and Dr. Randall Song.    ANESTHESIA:  General endotracheal.    IV FLUIDS:  2 liters of crystalloid.    ESTIMATED BLOOD LOSS:  100 mL    URINE OUTPUT:  145 mL.    DRAINS:  Fletcher catheter.    SPECIMENS:  Terminal ileum and cecum to Pathology.    COMPLICATIONS:  None.    OPERATIVE FINDINGS:  Severe terminal ileal Crohn disease with large mesenteric   phlegmon densely adherent to the retroperitoneum, necessitating conversion from a   laparoscopic to an open procedure.    INDICATIONS FOR PROCEDURE:  The patient is a 29-year-old female with refractory   terminal ileal Crohn disease with partial obstructive symptoms.  She has been   markedly hypoalbuminemic and has been on TPN at home in hopes of improving her   nutritional status to undergo elective surgery.  However, despite aggressive   attempts at nutritional supplementation, she continues to be markedly   hypoalbuminemic.  She also continues to have severe pain and partial obstructive   symptoms.  Given her failure to improve, it was elected to bring her to the   Operating Room for resection of the diseased portion of bowel and creation of a   temporary end ileostomy to allow her nutritional status to improve prior to   performing an intestinal anastomosis.    DESCRIPTION OF PROCEDURE:  The patient was identified, brought to the Operating   Room and placed on the table in supine position after obtaining informed   consent.  Venous sequential stockings were placed.  She was given preoperative   intravenous antibiotics as well as subcutaneous heparin.  After induction of   general endotracheal anesthesia, a  Fletcher catheter was placed using sterile   technique and she was repositioned in a modified lithotomy position using   Yellofin stirrups.  The abdomen was prepped and draped in usual sterile fashion.    A 1 cm supraumbilical incision was made and carried through skin, subcutaneous   tissue and fascia to enter the abdominal cavity.  A 12 mm balloon tip trocar   was inserted and used to insufflate the abdomen with carbon dioxide.  Once   adequate pneumoperitoneum had been achieved, the laparoscope was inserted to   confirm intraperitoneal placement.  We placed a 5 mm trocar in the low midline   under direct visualization and performed a cursory exploration of the abdomen.    The diseased segment of small bowel in her right lower quadrant readily evident.    This was markedly inflamed and indurated.  Additionally, there were several   More proximal loops of small bowel matted to it and there was a large mesenteric   phlegmon associated with it.  This was densely adherent to the retroperitoneum   as well.  Given these findings, I elected to convert to an open procedure, given   how inflamed and thickened the mesentery appeared, and given the degree of   adhesions to other loops of bowel.  The laparoscopic trocars were removed and   the midline incision was made through skin, subcutaneous tissue and fascia.  An   Edi wound protector was placed.  Again, there was a dense phlegmon involving   the right lower quadrant.  There were several loops of more proximal small bowel   that were pulled into the phlegmon.  These were dissected free using   sharp dissection.  We mobilized the lateral attachments of the right colon at a   point where there was no inflammation near the hepatic flexure and carried our   mobilization proximally mobilizing the colon and cecum away from the   retroperitoneum posteriorly.  Very gentle finger fracture technique was used to   mobilize the small bowel adhesions away from the pelvic sidewall and  the   mesentery.  There was a large mesenteric phlegmon present involving the   ileocolic pedicle.  Ultimately, we were able to mobilize all of the diseased bowel   up out of the abdominal cavity.  We chose a point several centimeters proximal   to the proximal most extent of small bowel disease as the proximal point of   bowel transection.  The bowel was divided at this point with a JAZMIN stapler.  We   divided the mesentery of the small bowel up to the level of the ileocolic pedicle   using the EnSeal device for thinner areas of mesentery.  Thicker and more inflamed   mesenteric pedicles were divided between Carol clamps securing the pedicles with   0 Vicryl suture ligatures.  We then divided the mid ascending colon at a point   where the colon appeared to be grossly normal.  The mesentery of the proximal   colon was divided with the EnSeal device up to the level of the ileocolic   pedicle.  At this point, we skeletonized the ileocolic pedicle and divided it   between Carol clamps securing the pedicles with 0 Vicryl suture ligatures.    Resected specimen was then passed from the field.  The length of small bowel   removed measured approximately 35 cm.  We then ran the small bowel from the   ligament of Treitz to the divided distal end.  It measured approximately 325 cm   in length.  There were no other areas of obvious disease present.  We then   irrigated the abdomen and pelvis.  There was some diffuse oozing coming from the   retroperitoneum where the phlegmon had been densely adherent.  This was able to   be controlled easily with topical application of Hemoblast.    Given the patient's marked hypoalbuminemic state, I elected to perform an end   ileostomy and not attempt primary anastomosis.  A trephination was created in   the right lower quadrant at a point that had been premarked by the enterostomal   Therapist, utilizing a muscle splitting technique.  The distal end of the small   bowel was wrapped in  Seprafilm and delivered up through the trephination.  I   also wrapped the proximal end of the divided colon in Seprafilm and brought the   proximal corner of the staple line up through the same trephination to be   matured as a mucous fistula.    A TAP block were performed using a combination of Exparel and 0.25% Marcaine   diluted in injectable saline.  Additional Seprafilm was placed between the bowel   and the anterior abdominal wall.  The omentum was laid over the bowel.    We then changed gowns and gloves and brought the sterile closing tray to the   field.  The midline fascia was closed with a running looped #1 PDS suture.    Subcutaneous tissues were irrigated.  Hemostasis was achieved with   electrocautery.  Skin was closed with a running 4-0 Monocryl subcuticular   closure and Dermabond was placed as a dressing.    We then turned our attention towards maturing the ostomy and mucous fistula.    The distal staple line was excised from the small bowel using electrocautery and   the ostomy was matured placing 3-0 chromic Rheana sutures.  The corner of the   staple line of the colon brought out through the same trephination was excised   and the mucous fistula was matured through the same trephination using 3-0   chromic sutures as well.  An ostomy appliance was cut to the appropriate size   and placed over the stoma.    The patient tolerated the procedure well with no complications.  She was   extubated in the Operating Room and taken to Recovery in satisfactory condition.    All needle, instrument and sponge counts were correct at the end of the case.    I was present throughout the entire procedure.      CHRISSY  dd: 05/12/2019 06:49:03 (CDT)  td: 05/12/2019 18:39:43 (CDT)  Doc ID   #[id number]  Job ID #[id number]    CC:

## 2019-05-12 NOTE — ASSESSMENT & PLAN NOTE
2 Days Post-Op ileocolectomy with end ileostomy    Low res diet  Continue TPN  PCA for now, transition to PO tomorrow if taking adequate PO  OOB/ambulate  Incentive spirometry  Stoma teaching

## 2019-05-12 NOTE — SUBJECTIVE & OBJECTIVE
Subjective:     Interval History: no acute events. Tolerating clears and hungry. Stoma functional. Pain well controlled, using PCA rarely.    Post-Op Info:  Procedure(s) (LRB):  ILEOCOLECTOMY (N/A)  CREATION, ILEOSTOMY with mucous fistula (N/A)   2 Days Post-Op      Medications:  Continuous Infusions:   hydromorphone in 0.9 % NaCl 6 mg/30 ml      TPN ADULT CENTRAL LINE CUSTOM 60 mL/hr at 05/11/19 2257    TPN ADULT CENTRAL LINE CUSTOM       Scheduled Meds:   fat emulsion 20%  250 mL Intravenous Daily    ibuprofen  800 mg Intravenous Q8H    pantoprazole  40 mg Intravenous Daily     PRN Meds:   sodium chloride    diphenhydrAMINE    naloxone    ondansetron    promethazine (PHENERGAN) IVPB        Objective:     Vital Signs (Most Recent):  Temp: 97.3 °F (36.3 °C) (05/12/19 0758)  Pulse: 69 (05/12/19 1113)  Resp: 20 (05/12/19 0758)  BP: (!) 93/54 (05/12/19 0758)  SpO2: 99 % (05/12/19 0758) Vital Signs (24h Range):  Temp:  [96.2 °F (35.7 °C)-97.5 °F (36.4 °C)] 97.3 °F (36.3 °C)  Pulse:  [60-99] 69  Resp:  [14-20] 20  SpO2:  [92 %-100 %] 99 %  BP: (84-93)/(51-62) 93/54     Intake/Output - Last 3 Shifts       05/10 0700 - 05/11 0659 05/11 0700 - 05/12 0659 05/12 0700 - 05/13 0659    P.O. 0 370     I.V. (mL/kg) 2641 (54.9) 50 (1)     Blood 308      IV Piggyback 500 800     Total Intake(mL/kg) 3449 (71.7) 1220 (25.4)     Urine (mL/kg/hr) 1190 1025 (0.9) 500 (2.4)    Stool 50 125 50    Blood 200      Total Output 1440 1150 550    Net +2009 +70 -550           Stool Occurrence 0 x 1 x           Physical Exam   Constitutional: She is oriented to person, place, and time. She appears well-developed. No distress.   Eyes: Conjunctivae and EOM are normal.   Cardiovascular: Normal rate.   Pulmonary/Chest: Effort normal. No respiratory distress.   IS volume 500   Abdominal: Soft. She exhibits no distension. There is no tenderness (appropriate).   RLQ ostomy with stool, pink, viable  Incision c/d/i   Neurological: She is alert  and oriented to person, place, and time.   Skin: Skin is warm and dry.   Vitals reviewed.        Significant Labs:  BMP (Last 3 Results):   Recent Labs   Lab 05/10/19  1718 05/11/19  0400 05/12/19  0400    92 85    137 139   K 4.0 4.4 3.6    106 107   CO2 25 25 25   BUN 20 13 10   CREATININE 0.6 0.5 0.6   CALCIUM 6.8* 7.8* 7.9*   MG 1.9 1.6 2.0     CBC (Last 3 Results):   Recent Labs   Lab 05/10/19  2250 05/11/19  0400 05/12/19  0400   WBC 14.93* 14.07* 6.65   RBC 3.58* 3.50* 3.40*   HGB 8.3* 7.9* 7.8*   HCT 27.6* 27.4* 26.5*    277 257   MCV 77* 78* 78*   MCH 23.2* 22.6* 22.9*   MCHC 30.1* 28.8* 29.4*       Significant Diagnostics:  I have reviewed all pertinent imaging results/findings within the past 24 hours.

## 2019-05-13 LAB
ALBUMIN SERPL BCP-MCNC: 1.7 G/DL (ref 3.5–5.2)
ALP SERPL-CCNC: 70 U/L (ref 55–135)
ALT SERPL W/O P-5'-P-CCNC: 22 U/L (ref 10–44)
ANION GAP SERPL CALC-SCNC: 5 MMOL/L (ref 8–16)
AST SERPL-CCNC: 20 U/L (ref 10–40)
BASOPHILS # BLD AUTO: 0.02 K/UL (ref 0–0.2)
BASOPHILS NFR BLD: 0.4 % (ref 0–1.9)
BILIRUB SERPL-MCNC: 0.1 MG/DL (ref 0.1–1)
BUN SERPL-MCNC: 16 MG/DL (ref 6–20)
CALCIUM SERPL-MCNC: 7.8 MG/DL (ref 8.7–10.5)
CHLORIDE SERPL-SCNC: 107 MMOL/L (ref 95–110)
CO2 SERPL-SCNC: 24 MMOL/L (ref 23–29)
CREAT SERPL-MCNC: 0.6 MG/DL (ref 0.5–1.4)
DIFFERENTIAL METHOD: ABNORMAL
EOSINOPHIL # BLD AUTO: 0.1 K/UL (ref 0–0.5)
EOSINOPHIL NFR BLD: 2.5 % (ref 0–8)
ERYTHROCYTE [DISTWIDTH] IN BLOOD BY AUTOMATED COUNT: 21.3 % (ref 11.5–14.5)
EST. GFR  (AFRICAN AMERICAN): >60 ML/MIN/1.73 M^2
EST. GFR  (NON AFRICAN AMERICAN): >60 ML/MIN/1.73 M^2
GLUCOSE SERPL-MCNC: 132 MG/DL (ref 70–110)
HCT VFR BLD AUTO: 25.3 % (ref 37–48.5)
HGB BLD-MCNC: 7.8 G/DL (ref 12–16)
IMM GRANULOCYTES # BLD AUTO: 0.03 K/UL (ref 0–0.04)
IMM GRANULOCYTES NFR BLD AUTO: 0.6 % (ref 0–0.5)
LYMPHOCYTES # BLD AUTO: 0.8 K/UL (ref 1–4.8)
LYMPHOCYTES NFR BLD: 16.9 % (ref 18–48)
MAGNESIUM SERPL-MCNC: 2.1 MG/DL (ref 1.6–2.6)
MCH RBC QN AUTO: 24.5 PG (ref 27–31)
MCHC RBC AUTO-ENTMCNC: 30.8 G/DL (ref 32–36)
MCV RBC AUTO: 80 FL (ref 82–98)
MONOCYTES # BLD AUTO: 0.3 K/UL (ref 0.3–1)
MONOCYTES NFR BLD: 5.5 % (ref 4–15)
NEUTROPHILS # BLD AUTO: 3.5 K/UL (ref 1.8–7.7)
NEUTROPHILS NFR BLD: 74.1 % (ref 38–73)
NRBC BLD-RTO: 0 /100 WBC
PHOSPHATE SERPL-MCNC: 3.9 MG/DL (ref 2.7–4.5)
PLATELET # BLD AUTO: 291 K/UL (ref 150–350)
PMV BLD AUTO: 11 FL (ref 9.2–12.9)
POTASSIUM SERPL-SCNC: 4.2 MMOL/L (ref 3.5–5.1)
PROT SERPL-MCNC: 7 G/DL (ref 6–8.4)
RBC # BLD AUTO: 3.18 M/UL (ref 4–5.4)
SODIUM SERPL-SCNC: 136 MMOL/L (ref 136–145)
WBC # BLD AUTO: 4.72 K/UL (ref 3.9–12.7)

## 2019-05-13 PROCEDURE — 84100 ASSAY OF PHOSPHORUS: CPT

## 2019-05-13 PROCEDURE — 63600175 PHARM REV CODE 636 W HCPCS: Performed by: SURGERY

## 2019-05-13 PROCEDURE — 25000003 PHARM REV CODE 250: Performed by: STUDENT IN AN ORGANIZED HEALTH CARE EDUCATION/TRAINING PROGRAM

## 2019-05-13 PROCEDURE — 85025 COMPLETE CBC W/AUTO DIFF WBC: CPT

## 2019-05-13 PROCEDURE — 80053 COMPREHEN METABOLIC PANEL: CPT

## 2019-05-13 PROCEDURE — C9113 INJ PANTOPRAZOLE SODIUM, VIA: HCPCS | Performed by: SURGERY

## 2019-05-13 PROCEDURE — 83735 ASSAY OF MAGNESIUM: CPT

## 2019-05-13 PROCEDURE — 20600001 HC STEP DOWN PRIVATE ROOM

## 2019-05-13 RX ORDER — OXYCODONE HCL 5 MG/5 ML
10 SOLUTION, ORAL ORAL EVERY 4 HOURS PRN
Status: DISCONTINUED | OUTPATIENT
Start: 2019-05-13 | End: 2019-05-15 | Stop reason: HOSPADM

## 2019-05-13 RX ORDER — OXYCODONE HCL 5 MG/5 ML
5 SOLUTION, ORAL ORAL EVERY 4 HOURS PRN
Status: DISCONTINUED | OUTPATIENT
Start: 2019-05-13 | End: 2019-05-15 | Stop reason: HOSPADM

## 2019-05-13 RX ADMIN — Medication 800 MG: at 09:05

## 2019-05-13 RX ADMIN — Medication 800 MG: at 08:05

## 2019-05-13 RX ADMIN — PANTOPRAZOLE SODIUM 40 MG: 40 INJECTION, POWDER, LYOPHILIZED, FOR SOLUTION INTRAVENOUS at 08:05

## 2019-05-13 RX ADMIN — Medication 800 MG: at 03:05

## 2019-05-13 NOTE — PROGRESS NOTES
[facility]  Colorectal Surgery  Progress Note    Patient Name: Lora Scott  MRN: [id number]  Admission Date: 5/10/2019  Hospital Length of Stay: 3 days  Attending Physician: Uday Cope MD    Subjective:     Interval History: NAEON. AFVSS. Sore incisional pain. Up and walking only in room. Eating 70% of meals. Remains on PCA. Good ostomy output.     Post-Op Info:  Procedure(s) (LRB):  ILEOCOLECTOMY (N/A)  CREATION, ILEOSTOMY with mucous fistula (N/A)   3 Days Post-Op      Medications:  Continuous Infusions:   hydromorphone in 0.9 % NaCl 6 mg/30 ml      TPN ADULT CENTRAL LINE CUSTOM 60 mL/hr at 05/12/19 2128    TPN ADULT CENTRAL LINE CUSTOM       Scheduled Meds:   acetaminophen  1,000 mg Oral Q8H    fat emulsion 20%  250 mL Intravenous Daily    fat emulsion 20%  250 mL Intravenous Daily    pantoprazole  40 mg Intravenous Daily     PRN Meds:   sodium chloride    diphenhydrAMINE    naloxone    ondansetron    promethazine (PHENERGAN) IVPB        Objective:     Vital Signs (Most Recent):  Temp: 98.5 °F (36.9 °C) (05/13/19 0446)  Pulse: 97 (05/13/19 0712)  Resp: 20 (05/13/19 0446)  BP: (!) 86/55 (05/13/19 0446)  SpO2: 96 % (05/13/19 0446) Vital Signs (24h Range):  Temp:  [97 °F (36.1 °C)-98.5 °F (36.9 °C)] 98.5 °F (36.9 °C)  Pulse:  [] 97  Resp:  [20] 20  SpO2:  [96 %-100 %] 96 %  BP: (86-99)/(50-68) 86/55     Intake/Output - Last 3 Shifts       05/11 0700 - 05/12 0659 05/12 0700 - 05/13 0659 05/13 0700 - 05/14 0659    P.O. 370 1250     I.V. (mL/kg) 50 (1)      Blood       IV Piggyback 1300 250     TPN  865     Total Intake(mL/kg) 1720 (35.8) 2365 (49.2)     Urine (mL/kg/hr) 1025 (0.9) 1125 (1)     Stool 125 1525     Blood       Total Output 1150 2650     Net +570 -285            Urine Occurrence  3 x     Stool Occurrence 1 x 2 x           Physical Exam   Constitutional: She is oriented to person, place, and time. She appears well-developed. No distress.   Eyes: Conjunctivae and EOM  are normal.   Cardiovascular: Normal rate.   Pulmonary/Chest: Effort normal. No respiratory distress.   IS volume 500   Abdominal: Soft. She exhibits no distension. There is tenderness (appropriate. incisional).   RLQ ostomy with stool, pink, viable  Incision c/d/i   Neurological: She is alert and oriented to person, place, and time.   Skin: Skin is warm and dry.   Vitals reviewed.        Significant Labs:  BMP (Last 3 Results):   Recent Labs   Lab 05/11/19 0400 05/12/19 0400 05/13/19  0459   GLU 92 85 132*    139 136   K 4.4 3.6 4.2    107 107   CO2 25 25 24   BUN 13 10 16   CREATININE 0.5 0.6 0.6   CALCIUM 7.8* 7.9* 7.8*   MG 1.6 2.0 2.1     CBC (Last 3 Results):   Recent Labs   Lab 05/11/19 0400 05/12/19 0400 05/13/19 0459   WBC 14.07* 6.65 4.72   RBC 3.50* 3.40* 3.18*   HGB 7.9* 7.8* 7.8*   HCT 27.4* 26.5* 25.3*    257 291   MCV 78* 78* 80*   MCH 22.6* 22.9* 24.5*   MCHC 28.8* 29.4* 30.8*       Significant Diagnostics:  I have reviewed all pertinent imaging results/findings within the past 24 hours.    Assessment/Plan:     * Crohn disease  3 Days Post-Op ileocolectomy with end ileostomy    Low res diet  Run out TPN  D/c PCA, transition to oxycodone solution  OOB/ambulate  Incentive spirometry  Stoma teaching    Dispo: Must get up and walk today. Transitioning to PO pain meds.          Randall Song MD  Colorectal Surgery  [facility]

## 2019-05-13 NOTE — PLAN OF CARE
Problem: Adult Inpatient Plan of Care  Goal: Plan of Care Review  Outcome: Ongoing (interventions implemented as appropriate)  No changes overnight; pain controlled via PCA ; TPN @ 60 cc/hr, voids per restroom; ileostomy OP about 150 every 2-3 hours instructed on POC and progression toward goals ; continue to monitor.

## 2019-05-13 NOTE — ASSESSMENT & PLAN NOTE
3 Days Post-Op ileocolectomy with end ileostomy    Low res diet  Run out TPN  D/c PCA, transition to oxycodone solution  OOB/ambulate  Incentive spirometry  Stoma teaching    Dispo: Must get up and walk today. Transitioning to PO pain meds.

## 2019-05-13 NOTE — ASSESSMENT & PLAN NOTE
Malnutrition in the context of Chronic Illness/Injury    Related to (etiology):  Crohn's disease     Signs and Symptoms (as evidenced by):  Energy Intake: <75% of estimated energy requirement for > 3 months  Body Fat Depletion: moderate depletion of triceps   Muscle Mass Depletion: mild and moderate depletion of temples, clavicle region and lower extremities     Interventions:  Collaboration and Referral of Nutrition Care  Parenteral Nutrition  Commercial Beverage    Nutrition Diagnosis Status:  New

## 2019-05-13 NOTE — SUBJECTIVE & OBJECTIVE
Subjective:     Interval History: NAEON. AFVSS. Sore incisional pain. Up and walking only in room. Eating 70% of meals. Remains on PCA. Good ostomy output.     Post-Op Info:  Procedure(s) (LRB):  ILEOCOLECTOMY (N/A)  CREATION, ILEOSTOMY with mucous fistula (N/A)   3 Days Post-Op      Medications:  Continuous Infusions:   hydromorphone in 0.9 % NaCl 6 mg/30 ml      TPN ADULT CENTRAL LINE CUSTOM 60 mL/hr at 05/12/19 2128    TPN ADULT CENTRAL LINE CUSTOM       Scheduled Meds:   acetaminophen  1,000 mg Oral Q8H    fat emulsion 20%  250 mL Intravenous Daily    fat emulsion 20%  250 mL Intravenous Daily    pantoprazole  40 mg Intravenous Daily     PRN Meds:   sodium chloride    diphenhydrAMINE    naloxone    ondansetron    promethazine (PHENERGAN) IVPB        Objective:     Vital Signs (Most Recent):  Temp: 98.5 °F (36.9 °C) (05/13/19 0446)  Pulse: 97 (05/13/19 0712)  Resp: 20 (05/13/19 0446)  BP: (!) 86/55 (05/13/19 0446)  SpO2: 96 % (05/13/19 0446) Vital Signs (24h Range):  Temp:  [97 °F (36.1 °C)-98.5 °F (36.9 °C)] 98.5 °F (36.9 °C)  Pulse:  [] 97  Resp:  [20] 20  SpO2:  [96 %-100 %] 96 %  BP: (86-99)/(50-68) 86/55     Intake/Output - Last 3 Shifts       05/11 0700 - 05/12 0659 05/12 0700 - 05/13 0659 05/13 0700 - 05/14 0659    P.O. 370 1250     I.V. (mL/kg) 50 (1)      Blood       IV Piggyback 1300 250     TPN  865     Total Intake(mL/kg) 1720 (35.8) 2365 (49.2)     Urine (mL/kg/hr) 1025 (0.9) 1125 (1)     Stool 125 1525     Blood       Total Output 1150 2650     Net +570 -285            Urine Occurrence  3 x     Stool Occurrence 1 x 2 x           Physical Exam   Constitutional: She is oriented to person, place, and time. She appears well-developed. No distress.   Eyes: Conjunctivae and EOM are normal.   Cardiovascular: Normal rate.   Pulmonary/Chest: Effort normal. No respiratory distress.   IS volume 500   Abdominal: Soft. She exhibits no distension. There is tenderness (appropriate. incisional).    RLQ ostomy with stool, pink, viable  Incision c/d/i   Neurological: She is alert and oriented to person, place, and time.   Skin: Skin is warm and dry.   Vitals reviewed.        Significant Labs:  BMP (Last 3 Results):   Recent Labs   Lab 05/11/19 0400 05/12/19 0400 05/13/19 0459   GLU 92 85 132*    139 136   K 4.4 3.6 4.2    107 107   CO2 25 25 24   BUN 13 10 16   CREATININE 0.5 0.6 0.6   CALCIUM 7.8* 7.9* 7.8*   MG 1.6 2.0 2.1     CBC (Last 3 Results):   Recent Labs   Lab 05/11/19 0400 05/12/19 0400 05/13/19 0459   WBC 14.07* 6.65 4.72   RBC 3.50* 3.40* 3.18*   HGB 7.9* 7.8* 7.8*   HCT 27.4* 26.5* 25.3*    257 291   MCV 78* 78* 80*   MCH 22.6* 22.9* 24.5*   MCHC 28.8* 29.4* 30.8*       Significant Diagnostics:  I have reviewed all pertinent imaging results/findings within the past 24 hours.

## 2019-05-13 NOTE — PLAN OF CARE
Problem: Adult Inpatient Plan of Care  Goal: Plan of Care Review    Recommendations    Recommendation/Intervention:     Pt with moderate malnutrition.     1. Continue Low Fiber/Residue diet + Boost Plus as tolerated.   2. Encourage po intake.   3. RD following.     Goals: meet >85% EEN/EPN  Nutrition Goal Status: new

## 2019-05-13 NOTE — PLAN OF CARE
Problem: Adult Inpatient Plan of Care  Goal: Plan of Care Review  POC reviewed with patient. AAOX4. B/P low 80's/50's, asymptomatic notified team, encouragement from nurse to consume liquids. Tele monitor in place, NSR 60's-80's. ML ABD incision intact. RLQ ileostomy intact draining moderate amount of green liquid. L. Arm picc intact. TPN infusing at 60ml/hr. Pca pump for pain management. Pain remained at a 3 on a 1-10 scale throughout shift. Voids per toilet. Diet advanced to low fiber, tolerating well with no complaints of nausea. Ambulated in room and to bathroom with stand by assist from nurse. Call light within reach.

## 2019-05-13 NOTE — PROGRESS NOTES
"[facility]  Adult Nutrition  Progress Note    SUMMARY       Recommendations    Recommendation/Intervention:     Pt with moderate malnutrition.     1. Continue Low Fiber/Residue diet + Boost Plus as tolerated.   2. Encourage po intake.   3. RD following.     Goals: meet >85% EEN/EPN  Nutrition Goal Status: new  Communication of RD Recs: (POC)    Reason for Assessment    Reason For Assessment: new TPN  Diagnosis: gastrointestinal disease(Crohn's disease)  Relevant Medical History: depression, Crohn's disease, anemia  Interdisciplinary Rounds: attended  General Information Comments: POD3 ileocolectomy with end ileostomy. Low Fiber/Residue diet ordered, TPN to run out today. Pt reports appetite is so-so and eating most of all meals. Not drinking Boost at this time, but encouraged pt to try it. Pt reports up and down appetite PTA. UBW before dx 136lbs. Recent  lbs. Noted recent wt loss and then wt gain. NFPE completed. Pt mild-moderate fat/muscle wasting. Pt with moderate malnutrition.     Education  Dicussed and provided handout "IBD: Crohn's and UC". Pt reports seeing an outpatient RD before, but states that didn't help her. Continues to say that we are both telling her the same things, but pt voiced no questions or concerns at this time. Pt seemed emotional during education. Encouraged pt to continue seeing outpatient RD to help her keep on track. Also encouraged pt to go to a support group. Pt verbalized understanding.      Nutrition Discharge Planning: adequate po intake    Nutrition Risk Screen    Nutrition Risk Screen: no indicators present    Nutrition/Diet History    Spiritual, Cultural Beliefs, Zoroastrian Practices, Values that Affect Care: no  Factors Affecting Nutritional Intake: decreased appetite, altered gastrointestinal function(food and nutrition knowledge/skill)    Anthropometrics    Temp: 99 °F (37.2 °C)  Height Method: Stated  Height: 5' 2" (157.5 cm)  Height (inches): 62 " in  Weight Method: Bed Scale  Weight: 48.1 kg (106 lb)  Weight (lb): 106 lb  Ideal Body Weight (IBW), Female: 110 lb  % Ideal Body Weight, Female (lb): 96.36 lb  BMI (Calculated): 19.4  BMI Grade: 18.5-24.9 - normal     Lab/Procedures/Meds    Pertinent Labs Reviewed: reviewed  Pertinent Labs Comments: glucose 132  Pertinent Medications Reviewed: reviewed  Pertinent Medications Comments: acetaminophen    Estimated/Assessed Needs    Weight Used For Calorie Calculations: 48.1 kg (106 lb 0.7 oz)  Energy Calorie Requirements (kcal): 1683 kcal/day  Energy Need Method: Kcal/kg(35)  Protein Requirements: 48-62 kcal/day(1.0-1.3 gm/kg)  Weight Used For Protein Calculations: 48.1 kg (106 lb 0.7 oz)  Fluid Requirements (mL): 1 mL/kcal or per MD     RDA Method (mL): 1683     Nutrition Prescription Ordered    Current Diet Order: Low Fiber/Residue  Nutrition Order Comments: TPN to run out (TPN @ 60mL/hr + IV lipids)    Evaluation of Received Nutrient/Fluid Intake    Comments: LBM 5/12(ostomy)  Tolerance: tolerating  % Intake of Estimated Energy Needs: 75 - 100 %  % Meal Intake: 50 - 75 %    Nutrition Risk    Level of Risk/Frequency of Follow-up: low(f/u 1 x wk)     Assessment and Plan    Malnutrition of moderate degree  Malnutrition in the context of Chronic Illness/Injury    Related to (etiology):  Crohn's disease     Signs and Symptoms (as evidenced by):  Energy Intake: <75% of estimated energy requirement for > 3 months  Body Fat Depletion: moderate depletion of triceps   Muscle Mass Depletion: mild and moderate depletion of temples, clavicle region and lower extremities     Interventions:  Collaboration and Referral of Nutrition Care  Parenteral Nutrition  Commercial Beverage    Nutrition Diagnosis Status:  New             Monitor and Evaluation    Food and Nutrient Intake: energy intake, food and beverage intake  Food and Nutrient Adminstration: diet order  Physical Activity and Function: nutrition-related ADLs and  IADLs  Anthropometric Measurements: weight, weight change, body mass index  Biochemical Data, Medical Tests and Procedures: electrolyte and renal panel, gastrointestinal profile, glucose/endocrine profile, inflammatory profile, lipid profile  Nutrition-Focused Physical Findings: overall appearance     Malnutrition Assessment  Malnutrition Type: chronic illness          Energy Intake (Malnutrition): less than 75% for greater than or equal to 3 months   Upper Arm Region (Subcutaneous Fat Loss): moderate depletion   Restorationism Region (Muscle Loss): mild depletion  Clavicle Bone Region (Muscle Loss): moderate depletion  Clavicle and Acromion Bone Region (Muscle Loss): moderate depletion  Anterior Thigh Region (Muscle Loss): well nourished  Posterior Calf Region (Muscle Loss): moderate depletion                 Nutrition Follow-Up    RD Follow-up?: Yes

## 2019-05-14 LAB
ALBUMIN SERPL BCP-MCNC: 1.9 G/DL (ref 3.5–5.2)
ALP SERPL-CCNC: 100 U/L (ref 55–135)
ALT SERPL W/O P-5'-P-CCNC: 35 U/L (ref 10–44)
ANION GAP SERPL CALC-SCNC: 7 MMOL/L (ref 8–16)
AST SERPL-CCNC: 47 U/L (ref 10–40)
BASOPHILS # BLD AUTO: 0.01 K/UL (ref 0–0.2)
BASOPHILS NFR BLD: 0.2 % (ref 0–1.9)
BILIRUB SERPL-MCNC: 0.1 MG/DL (ref 0.1–1)
BLD PROD TYP BPU: NORMAL
BLD PROD TYP BPU: NORMAL
BLOOD UNIT EXPIRATION DATE: NORMAL
BLOOD UNIT EXPIRATION DATE: NORMAL
BLOOD UNIT TYPE CODE: 5100
BLOOD UNIT TYPE CODE: 5100
BLOOD UNIT TYPE: NORMAL
BLOOD UNIT TYPE: NORMAL
BUN SERPL-MCNC: 15 MG/DL (ref 6–20)
CALCIUM SERPL-MCNC: 8.4 MG/DL (ref 8.7–10.5)
CHLORIDE SERPL-SCNC: 109 MMOL/L (ref 95–110)
CO2 SERPL-SCNC: 24 MMOL/L (ref 23–29)
CODING SYSTEM: NORMAL
CODING SYSTEM: NORMAL
CREAT SERPL-MCNC: 0.6 MG/DL (ref 0.5–1.4)
DIFFERENTIAL METHOD: ABNORMAL
DISPENSE STATUS: NORMAL
DISPENSE STATUS: NORMAL
EOSINOPHIL # BLD AUTO: 0.2 K/UL (ref 0–0.5)
EOSINOPHIL NFR BLD: 4.8 % (ref 0–8)
ERYTHROCYTE [DISTWIDTH] IN BLOOD BY AUTOMATED COUNT: 21.4 % (ref 11.5–14.5)
EST. GFR  (AFRICAN AMERICAN): >60 ML/MIN/1.73 M^2
EST. GFR  (NON AFRICAN AMERICAN): >60 ML/MIN/1.73 M^2
GLUCOSE SERPL-MCNC: 85 MG/DL (ref 70–110)
HCT VFR BLD AUTO: 27.4 % (ref 37–48.5)
HGB BLD-MCNC: 7.8 G/DL (ref 12–16)
IMM GRANULOCYTES # BLD AUTO: 0.02 K/UL (ref 0–0.04)
IMM GRANULOCYTES NFR BLD AUTO: 0.5 % (ref 0–0.5)
LYMPHOCYTES # BLD AUTO: 0.9 K/UL (ref 1–4.8)
LYMPHOCYTES NFR BLD: 21.5 % (ref 18–48)
MAGNESIUM SERPL-MCNC: 1.6 MG/DL (ref 1.6–2.6)
MCH RBC QN AUTO: 22.7 PG (ref 27–31)
MCHC RBC AUTO-ENTMCNC: 28.5 G/DL (ref 32–36)
MCV RBC AUTO: 80 FL (ref 82–98)
MONOCYTES # BLD AUTO: 0.3 K/UL (ref 0.3–1)
MONOCYTES NFR BLD: 6.9 % (ref 4–15)
NEUTROPHILS # BLD AUTO: 2.8 K/UL (ref 1.8–7.7)
NEUTROPHILS NFR BLD: 66.1 % (ref 38–73)
NRBC BLD-RTO: 0 /100 WBC
PHOSPHATE SERPL-MCNC: 4.3 MG/DL (ref 2.7–4.5)
PLATELET # BLD AUTO: 314 K/UL (ref 150–350)
PMV BLD AUTO: 10.5 FL (ref 9.2–12.9)
POTASSIUM SERPL-SCNC: 4.2 MMOL/L (ref 3.5–5.1)
PROT SERPL-MCNC: 6.5 G/DL (ref 6–8.4)
RBC # BLD AUTO: 3.43 M/UL (ref 4–5.4)
SODIUM SERPL-SCNC: 140 MMOL/L (ref 136–145)
TRANS ERYTHROCYTES VOL PATIENT: NORMAL ML
TRANS ERYTHROCYTES VOL PATIENT: NORMAL ML
WBC # BLD AUTO: 4.19 K/UL (ref 3.9–12.7)

## 2019-05-14 PROCEDURE — 83735 ASSAY OF MAGNESIUM: CPT

## 2019-05-14 PROCEDURE — 84100 ASSAY OF PHOSPHORUS: CPT

## 2019-05-14 PROCEDURE — 20600001 HC STEP DOWN PRIVATE ROOM

## 2019-05-14 PROCEDURE — 85025 COMPLETE CBC W/AUTO DIFF WBC: CPT

## 2019-05-14 PROCEDURE — C9113 INJ PANTOPRAZOLE SODIUM, VIA: HCPCS | Performed by: SURGERY

## 2019-05-14 PROCEDURE — 80053 COMPREHEN METABOLIC PANEL: CPT

## 2019-05-14 PROCEDURE — 25000003 PHARM REV CODE 250: Performed by: STUDENT IN AN ORGANIZED HEALTH CARE EDUCATION/TRAINING PROGRAM

## 2019-05-14 PROCEDURE — 63600175 PHARM REV CODE 636 W HCPCS: Performed by: SURGERY

## 2019-05-14 RX ORDER — LANOLIN ALCOHOL/MO/W.PET/CERES
800 CREAM (GRAM) TOPICAL 2 TIMES DAILY
Status: COMPLETED | OUTPATIENT
Start: 2019-05-14 | End: 2019-05-14

## 2019-05-14 RX ADMIN — MAGNESIUM OXIDE TAB 400 MG (241.3 MG ELEMENTAL MG) 800 MG: 400 (241.3 MG) TAB at 10:05

## 2019-05-14 RX ADMIN — Medication 800 MG: at 03:05

## 2019-05-14 RX ADMIN — Medication 800 MG: at 10:05

## 2019-05-14 RX ADMIN — PANTOPRAZOLE SODIUM 40 MG: 40 INJECTION, POWDER, LYOPHILIZED, FOR SOLUTION INTRAVENOUS at 10:05

## 2019-05-14 NOTE — PLAN OF CARE
CM placed call to [employer] ( Medicaid) to inquire of Home Healthcare within insurance network plan. CARA spoke with Geoffrey at [facility] who provided the following list of HH providers within network: [facility], [facility], [facility], Sriram, and [facility]. CM will inform  to send referral and HH order to listed providers.

## 2019-05-14 NOTE — PHYSICIAN QUERY
PT Name: Lora Scott  MR #: [id number]    Physician Query Form - Nutrition Clarification     CDS/: Roselyn Cheek RN, CDS               Contact information: [email or web address]                                                                                                                        [phone]    This form is a permanent document in the medical record.     Query Date: May 14, 2019    By submitting this query, we are merely seeking further clarification of documentation.. Please utilize your independent clinical judgment when addressing the question(s) below.    The Medical record contains the following:   Indicators  Supporting Clinical Findings Location in Medical Record   x % of Estimated Energy Intake over a time frame from p.o., TF, or TPN <75% of estimated energy requirement for > 3 months    Pt reports up and down appetite PTA   RD PN 5/13      RD PN 5/13   x Weight Status over a time frame UBW before dx 136lbs. Recent  lbs. Noted recent wt loss and then wt gain   RD PN 5/13   x Subcutaneous Fat and/or Muscle Loss Upper Arm Region (Subcutaneous Fat Loss): moderate depletion   Confucianist Region (Muscle Loss): mild depletion  Clavicle Bone Region (Muscle Loss): moderate depletion  Clavicle and Acromion Bone Region (Muscle Loss): moderate depletion  Anterior Thigh Region (Muscle Loss): well nourished  Posterior Calf Region (Muscle Loss): moderate depletion    RD PN 5/13    RD PN 5/13    Fluid Accumulation or Edema      Reduced  Strength     x Wt / BMI / Usual Body Weight BMI (Calculated): 19.4   RD PN 5/13    Delayed Wound Healing / Failure to Thrive     x Acute or Chronic Illness Crohn's disease    RD PN 5/13    Medication     x Treatment Continue Low Fiber/Residue diet + Boost Plus as tolerated   RD Plan of Care 5/13   x Other Pt with moderate malnutrition    She has been markedly hypoalbuminemic and has been on TPN at home in hopes of improving her nutritional status to undergo  elective surgery. However, despite aggressive attempts at nutritional supplementation, she continues to be markedly hypoalbuminemic.   RD PN 5/13    Op Note 5/10         AND / ASPEN Clinical Characteristics (October 2011)  A minimum of two characteristics is recommended for diagnosing either moderate or severe malnutrition   Mild Malnutrition Moderate Malnutrition Severe Malnutrition   Energy Intake from p.o., TF or TPN. < 75% intake of estimated energy needs for less than 7 days < 75% intake of estimated energy needs for greater than 7 days < 50% intake of estimated energy needs for > 5 days   Weight Loss 1-2% in 1 month  5% in 3 months  7.5% in 6 months  10% in 1 year 1-2 % in 1 week  5% in 1 month  7.5% in 3 months  10% in 6 months  20% in 1 year > 2% in 1 week  > 5% in 1 month  > 7.5% in 3 months  > 10% in 6 months  > 20% in 1 year   Physical Findings     None *Mild subcutaneous fat and/or muscle loss  *Mild fluid accumulation  *Stage II decubitus  *Surgical wound or non-healing wound *Mod/severe subcutaneous fat and/or muscle loss  *Mod/severe fluid accumulation  *Stage III or IV decubitus  *Non-healing surgical wound     Provider, please specify diagnosis or diagnoses associated with above clinical findings.    [ x ] Moderate Protein-Calorie Malnutrition   [  ] Severe Protein-Calorie Malnutrition   [  ] Other Nutritional Diagnosis (please specify):    [  ] Other:    [  ] Clinically Undetermined       Please document in your progress notes daily for the duration of treatment until resolved and include in your discharge summary.

## 2019-05-14 NOTE — PHYSICIAN QUERY
"PT Name: Lora Scott  MR #: [id number]    Physician Query Form - Hematology Clarification      CDS/: Roselyn Cheek RN, CDS               Contact information: [email or web address]                                                                                                                         [phone]    This form is a permanent document in the medical record.      Query Date: May 14, 2019    By submitting this query, we are merely seeking further clarification of documentation. Please utilize your independent clinical judgment when addressing the question(s) below.    The Medical record contains the following:   Indicators  Supporting Clinical Findings Location in Medical Record   x "Anemia" documented Past Medical History: Anemia    Patient Active Problem List  Diagnosis  Iron deficiency anemia due to chronic blood loss   H&P 5/10    Anesthesia Note 5/10   x H & H = 6.6/22.9 --> 8.3/27.6 --> 7.8/26.5 --> 7.8/25.3 --> 7.8/27.4   Labs 5/10- 5/14   x BP =                     HR= Spoke with MD Balderrama in regards to patient b/p 75/50. Isolyte bolus given at this time. Patient placed in trandelenberg position    MD Balderrama at bedside to see patient. Patient B/P 81/55 at this time. Per MD Balderrama" I will write to prepare 1 unit PRBC".   RN Note 5/10        RN Note 5/10     "GI bleeding" documented     x Acute bleeding (Non GI site) Procedures:  1.  Open ileocolic resection.   2 . End ileostomy.  3.  Mucous fistula.    Estimated Blood Loss: 200 mL   Brief Op Note 5/10   x Transfusion(s) Got 1 unit PRBC in PACU with appropriate response   CRS PN 5/11   x Treatment: isolyte (mL)  Total volume: 1,650 mL   Anesthesia Intra-op Meds Given 5/10    Other:        Provider, please specify diagnosis or diagnoses associated with above clinical findings.    [ x ] Acute blood loss anemia expected post-operatively   [  ] Acute blood loss anemia   [  ] Iron deficiency anemia   [  ] Chronic blood loss anemia     [  ] Other " Hematological Diagnosis (please specify):     [  ] Clinically Undetermined       Please document in your progress notes daily for the duration of treatment, until resolved, and include in your discharge summary.

## 2019-05-14 NOTE — PROGRESS NOTES
First lesson with the patient for their ileostomy.     29yoF w/ h/o CDs s/p recent admission in Feb 2019 for Crohn's flare. Pt not on any maintenance medications. Saw Dr. Quintanilla in March with discussion of med vs surgical management. Pt has been on TPN and reg diet. Reports good appetite but once she starts eating gets RLQ cramping/abd pain. Had diverting ileostomy surgery on 5/10 and wound ostomy team received consult to instruct on 5/14/2019.    Pt very reluctant for any hands on with ileostomy care. Demonstrated how to remove the pouch with one hand and to push down the skin with the other hand to ease the pain of removal. Used sensicare adhesive remover to assist with removal of the appliance. Cleaned the peristomal skin with gauze and plain tap water because using any kind of soap with lotion in it will prevent the next pouch from sticking to the skin. Measured the stoma with the guide. Informed patient that we want to cover the majority of the skin to prevent skin irritation from caustic effluent, but to leave 1/8 of skin showing in case of swelling.  Patient assisted by cutting out the appliance. Her stoma currently measures 35mm.  I explained that her stoma will probably shrink over the next few weeks.  Used Jimy paste to seal around the pouch opening. New pouch applied to the ileostomy and taught patient to hold her hand over the appliance to assist with adherence.     Offered an additional lesson with patient removing and reapplying the pouch tomorrow. Lesson planned for 10am tomorrow.with mother in attendance.     05/14/19 1130        Ileostomy 05/10/19 1502 Standard (scarlet Kimball) RLQ   Placement Date/Time: 05/10/19 1502   Present Prior to Hospital Arrival?: No  Inserted by: MD  Ileostomy Type: Standard (scarlet Kimball)  Location: RLQ   Stoma Appearance round;rosebud appearance;red;protruding above skin level   Stoma Size (in) 35   Appliance 1-piece;no leakage;changed   Accessories/Skin Care cleansed w/  sterile normal saline;skin barrier paste   Treatment Bag change   Stoma Function flatus;stool;thin liquid   Peristomal Assessment Intact without breakdown   Tolerance assisted w/ appliance change      Allison Gaspar RN CWON  [id number]

## 2019-05-14 NOTE — PLAN OF CARE
Problem: Adult Inpatient Plan of Care  Goal: Plan of Care Review  Outcome: Ongoing (interventions implemented as appropriate)  POC reviewed with patient who verbalized understanding.  VSS on room air.  AAOX4, patient able to assist in her care.  Patient ambulated from bed to BR and in room.   Ileostomy emptied regularly with the patient's assistance.  Patient Tolerated low fiber and low residue diet, denies nausea.  Pain controlled with PCA pump and PRN medications per MAR. PCA pump DC'd at end of shift.  Telemetry being monitored running NSR.  No acute events. No distress noted. Bed in lowest position, call light within reach, frequent rounds made for safety. Remains free of falls and injury.  Will continue to monitor.

## 2019-05-14 NOTE — ASSESSMENT & PLAN NOTE
4 Days Post-Op ileocolectomy with end ileostomy    Low res diet  D/c'd TPN 05/13  PRN oxy   OOB/ambulate  Incentive spirometry  Stoma teaching 05/14    Dispo: Must get up and walk in the halls today. Ostomy teaching.

## 2019-05-14 NOTE — PLAN OF CARE
[facility]    HOME HEALTH ORDERS  FACE TO FACE ENCOUNTER    Patient Name: Lora Scott  YOB: 1990    PCP: Margarita Jay MD   PCP Address: [address] / [place]  PCP Phone Number: [phone]  PCP Fax: [phone]    Encounter Date: 05/15/2019    Admit to Home Health    Diagnoses:  Active Hospital Problems    Diagnosis  POA    *Crohn disease [K50.90]  Yes    Malnutrition of moderate degree [E44.0]  Yes      Resolved Hospital Problems   No resolved problems to display.       Future Appointments   Date Time Provider Department Center   5/20/2019  7:30 AM DIGESTIVE DISEASES DIETITIAN [facility] GASTRO [facility]   5/20/2019  9:45 AM INJECTION, INFECTIOUS DISEASES [facility] ID INJ [facility]   6/20/2019  9:45 AM INJECTION, INFECTIOUS DISEASES [facility] ID INJ [facility]   7/19/2019  2:30 PM Nano Martinez MD [facility] DERM [facility]           I have seen and examined this patient face to face today. My clinical findings that support the need for the home health skilled services and home bound status are the following:  Medical restrictions requiring assistance of another human to leave home due to  Post surgery monitoring and ostomy care needs.    Allergies:  Review of patient's allergies indicates:   Allergen Reactions    Shellfish containing products Anaphylaxis     Allergic to all seafood.    Horse/equine containing products Itching    Iodine      Swelling (throat)^       Diet: Low fiber low residue    Activities: activity as tolerated    Nursing:   SN to complete comprehensive assessment including routine vital signs. Instruct on disease process and s/s of complications to report to MD. Review/verify medication list sent home with the patient at time of discharge  and instruct patient/caregiver as needed. Frequency may be adjusted depending on start of care date.    Notify MD if SBP > 160 or < 90; DBP > 90 or < 50; HR > 120 or < 50; Temp > 101      CONSULTS:    Aide to provide assistance  with personal care involving ostomy.    MISCELLANEOUS CARE:  Colostomy Care:  Instruct patient/caregiver to empty bag when full and PRN., Change and clean site every 48 hours and Monitor skin integrity.    WOUND CARE ORDERS  Ostomy care as listed above      Medications: Review discharge medications with patient and family and provide education.      Current Discharge Medication List      START taking these medications    Details   oxyCODONE (ROXICODONE) 5 mg/5 mL Soln Take 5 mLs (5 mg total) by mouth every 6 (six) hours as needed (Do not drive while taking this medication.).  Qty: 80 mL, Refills: 0         STOP taking these medications       metroNIDAZOLE (FLAGYL) 500 MG tablet Comments:   Reason for Stopping:         neomycin (MYCIFRADIN) 500 mg Tab Comments:   Reason for Stopping:               I certify that this patient is confined to her home and needs intermittent skilled nursing care.

## 2019-05-14 NOTE — PLAN OF CARE
Problem: Pain (Ileostomy)  Goal: Acceptable Pain Control  Outcome: Ongoing (interventions implemented as appropriate)  Patient in bed, watching phone, patient denies any pain at ostomy site. Patient having soft yellow drainage from ileostomy. Patient states she requested teaching from wound care.  I informed patient they will get to her tomorrow, due to patient load. Instructed patient on emptying bag and cleaning prior to returning to pouch. Patient tolerated evening medication, but refused tylenol. Patients TPN completed per MD order. Patient very quiet and withdrawn at this time. Encouraged her that she will adjust, take it one day at a time. Call light in reach, bedside table in place, Continue Plan of Care.

## 2019-05-14 NOTE — PROGRESS NOTES
[facility]  Colorectal Surgery  Progress Note    Patient Name: Lora Scott  MRN: [id number]  Admission Date: 5/10/2019  Hospital Length of Stay: 4 days  Attending Physician: Uday Cope MD    Subjective:     Interval History: NAEON. AFVSS. Cont to have incisional pain. Has been up and walking around room. PCA off. Pain controlled. Eating more of her meals. Good ostomy function.    Post-Op Info:  Procedure(s) (LRB):  ILEOCOLECTOMY (N/A)  CREATION, ILEOSTOMY with mucous fistula (N/A)   4 Days Post-Op      Medications:  Continuous Infusions:    Scheduled Meds:   acetaminophen  1,000 mg Oral Q8H    ibuprofen  800 mg Intravenous Q8H    pantoprazole  40 mg Intravenous Daily     PRN Meds:   sodium chloride    ondansetron    oxyCODONE    oxyCODONE    promethazine (PHENERGAN) IVPB        Objective:     Vital Signs (Most Recent):  Temp: 97.9 °F (36.6 °C) (05/14/19 0348)  Pulse: 82 (05/14/19 0400)  Resp: 20 (05/14/19 0348)  BP: (!) 89/54 (05/14/19 0348)  SpO2: 98 % (05/14/19 0046) Vital Signs (24h Range):  Temp:  [96.7 °F (35.9 °C)-99 °F (37.2 °C)] 97.9 °F (36.6 °C)  Pulse:  [65-98] 82  Resp:  [12-20] 20  SpO2:  [98 %-100 %] 98 %  BP: (88-97)/(53-63) 89/54     Intake/Output - Last 3 Shifts       05/12 0700 - 05/13 0659 05/13 0700 - 05/14 0659 05/14 0700 - 05/15 0659    P.O. 1250      I.V. (mL/kg)       IV Piggyback 250            Total Intake(mL/kg) 2365 (49.2)      Urine (mL/kg/hr) 1125 (1) 0 (0)     Stool 1525 1400     Total Output 2650 1400     Net -285 -1400            Urine Occurrence 3 x 4 x     Stool Occurrence 2 x 3 x           Physical Exam   Constitutional: She is oriented to person, place, and time. She appears well-developed. No distress.   Eyes: Conjunctivae and EOM are normal.   Cardiovascular: Normal rate.   Pulmonary/Chest: Effort normal. No respiratory distress.   Abdominal: Soft. She exhibits no distension. There is tenderness (appropriate. incisional).   RLQ ostomy with  stool, pink, viable  Incision c/d/i   Neurological: She is alert and oriented to person, place, and time.   Skin: Skin is warm and dry.   Psychiatric: She has a normal mood and affect. Thought content normal.   Nursing note and vitals reviewed.        Significant Labs:  BMP (Last 3 Results):   Recent Labs   Lab 05/12/19 0400 05/13/19 0459 05/14/19  0346   GLU 85 132* 85    136 140   K 3.6 4.2 4.2    107 109   CO2 25 24 24   BUN 10 16 15   CREATININE 0.6 0.6 0.6   CALCIUM 7.9* 7.8* 8.4*   MG 2.0 2.1 1.6     CBC (Last 3 Results):   Recent Labs   Lab 05/12/19 0400 05/13/19 0459 05/14/19  0346   WBC 6.65 4.72 4.19   RBC 3.40* 3.18* 3.43*   HGB 7.8* 7.8* 7.8*   HCT 26.5* 25.3* 27.4*    291 314   MCV 78* 80* 80*   MCH 22.9* 24.5* 22.7*   MCHC 29.4* 30.8* 28.5*       Significant Diagnostics:  I have reviewed all pertinent imaging results/findings within the past 24 hours.    Assessment/Plan:     * Crohn disease  4 Days Post-Op ileocolectomy with end ileostomy    Low res diet  D/c'd TPN 05/13  PRN oxy   OOB/ambulate  Incentive spirometry  Stoma teaching 05/14    Dispo: Must get up and walk in the halls today. Ostomy teaching.          Randall Song MD  Colorectal Surgery  [facility]

## 2019-05-14 NOTE — SUBJECTIVE & OBJECTIVE
Subjective:     Interval History: NAEON. AFVSS. Cont to have incisional pain. Has been up and walking around room. PCA off. Pain controlled. Eating more of her meals. Good ostomy function.    Post-Op Info:  Procedure(s) (LRB):  ILEOCOLECTOMY (N/A)  CREATION, ILEOSTOMY with mucous fistula (N/A)   4 Days Post-Op      Medications:  Continuous Infusions:    Scheduled Meds:   acetaminophen  1,000 mg Oral Q8H    ibuprofen  800 mg Intravenous Q8H    pantoprazole  40 mg Intravenous Daily     PRN Meds:   sodium chloride    ondansetron    oxyCODONE    oxyCODONE    promethazine (PHENERGAN) IVPB        Objective:     Vital Signs (Most Recent):  Temp: 97.9 °F (36.6 °C) (05/14/19 0348)  Pulse: 82 (05/14/19 0400)  Resp: 20 (05/14/19 0348)  BP: (!) 89/54 (05/14/19 0348)  SpO2: 98 % (05/14/19 0046) Vital Signs (24h Range):  Temp:  [96.7 °F (35.9 °C)-99 °F (37.2 °C)] 97.9 °F (36.6 °C)  Pulse:  [65-98] 82  Resp:  [12-20] 20  SpO2:  [98 %-100 %] 98 %  BP: (88-97)/(53-63) 89/54     Intake/Output - Last 3 Shifts       05/12 0700 - 05/13 0659 05/13 0700 - 05/14 0659 05/14 0700 - 05/15 0659    P.O. 1250      I.V. (mL/kg)       IV Piggyback 250            Total Intake(mL/kg) 2365 (49.2)      Urine (mL/kg/hr) 1125 (1) 0 (0)     Stool 1525 1400     Total Output 2650 1400     Net -285 -1400            Urine Occurrence 3 x 4 x     Stool Occurrence 2 x 3 x           Physical Exam   Constitutional: She is oriented to person, place, and time. She appears well-developed. No distress.   Eyes: Conjunctivae and EOM are normal.   Cardiovascular: Normal rate.   Pulmonary/Chest: Effort normal. No respiratory distress.   Abdominal: Soft. She exhibits no distension. There is tenderness (appropriate. incisional).   RLQ ostomy with stool, pink, viable  Incision c/d/i   Neurological: She is alert and oriented to person, place, and time.   Skin: Skin is warm and dry.   Psychiatric: She has a normal mood and affect. Thought content normal.    Nursing note and vitals reviewed.        Significant Labs:  BMP (Last 3 Results):   Recent Labs   Lab 05/12/19  0400 05/13/19  0459 05/14/19  0346   GLU 85 132* 85    136 140   K 3.6 4.2 4.2    107 109   CO2 25 24 24   BUN 10 16 15   CREATININE 0.6 0.6 0.6   CALCIUM 7.9* 7.8* 8.4*   MG 2.0 2.1 1.6     CBC (Last 3 Results):   Recent Labs   Lab 05/12/19  0400 05/13/19 0459 05/14/19  0346   WBC 6.65 4.72 4.19   RBC 3.40* 3.18* 3.43*   HGB 7.8* 7.8* 7.8*   HCT 26.5* 25.3* 27.4*    291 314   MCV 78* 80* 80*   MCH 22.9* 24.5* 22.7*   MCHC 29.4* 30.8* 28.5*       Significant Diagnostics:  I have reviewed all pertinent imaging results/findings within the past 24 hours.

## 2019-05-14 NOTE — PHYSICIAN QUERY
PT Name: Lora Scott  MR #: [id number]     PHYSICIAN QUERY -  ELECTROLYTE CLARIFICATION      CDS/: Roselyn Cheek RN, CDS               Contact information: [email or web address]                                                                                                                        [phone]  This form is a permanent document in the medical record.     Query Date: May 14, 2019    By submitting this query, we are merely seeking further clarification of documentation to reflect the severity of illness of your patient. Please utilize your independent clinical judgment when addressing the question(s) below.    The Medical record reflects the following:     Indicators   Supporting Clinical Findings Location in Medical Record   x Lab Value(s) Calcium: 6.8 --> 8.4  Labs 5/10- 5/14   x Treatment                                 Medication calcium gluconate 1g in dextrose 5% 100mL (ready to mix system) MAR 5/10    Other       Provider, please specify the diagnosis or diagnoses that correspond(s) to the above indicators. Bruce all that apply:    [ x  ] Hypocalcemia   [   ] Other electrolyte disturbance (please specify): _______   [   ] Clinically Undetermined       Please document in your progress notes daily for the duration of treatment until resolved, and include in your discharge summary.

## 2019-05-14 NOTE — PLAN OF CARE
CM met with patient and pt's mother, Kalie ([phone]) to complete discharge assessment and planning. Patient has a new ostomy and awaiting teaching from the ostomy nurse to decide if need for assistance at home. CM informed patient of limited resources for Home Healthcare with current insurance benefits. CM will call insurance provider for in network HH providers. CM and VICTORIA and will send referrals to Home Healthcare providers. CM and SW will continue to follow patient for any additional discharge needs.    PCP: Margarita Jay MD    Pharmacy:   [facility] [address] [facility] ([place], [place] - [address]  [address]  [place] ([place] [address]  Phone: [phone] Fax: [phone]    Payor: GENERIC COMMERCIAL / Plan: GENERIC COMMERCIAL / Product Type: Commercial /        05/14/19 1245   Discharge Assessment   Assessment Type Discharge Planning Assessment   Confirmed/corrected address and phone number on facesheet? Yes   Assessment information obtained from? Patient   Communicated expected length of stay with patient/caregiver yes   Prior to hospitilization cognitive status: Alert/Oriented   Prior to hospitalization functional status: Independent   Current cognitive status: Alert/Oriented   Current Functional Status: Independent   Lives With parent(s)   Able to Return to Prior Arrangements yes   Is patient able to care for self after discharge? Yes   Patient's perception of discharge disposition home or selfcare;home health   Readmission Within the Last 30 Days no previous admission in last 30 days   Patient currently being followed by outpatient case management? No   Patient currently receives any other outside agency services? No   Equipment Currently Used at Home none   Do you have any problems affording any of your prescribed medications? TBD   Is the patient taking medications as prescribed? yes   Does the patient have transportation home? Yes   Transportation Anticipated family or friend will  provide   Dialysis Name and Scheduled days n/a   Does the patient receive services at the Coumadin Clinic? No   Discharge Plan A Home;Home with family;Home Health   Discharge Plan B Home;Home with family   DME Needed Upon Discharge  colostomy/ostomy supplies   Patient/Family in Agreement with Plan yes

## 2019-05-15 ENCOUNTER — TELEPHONE (OUTPATIENT)
Dept: SURGERY | Facility: CLINIC | Age: 29
End: 2019-05-15

## 2019-05-15 VITALS
WEIGHT: 106 LBS | HEART RATE: 98 BPM | BODY MASS INDEX: 19.51 KG/M2 | TEMPERATURE: 98 F | DIASTOLIC BLOOD PRESSURE: 61 MMHG | SYSTOLIC BLOOD PRESSURE: 97 MMHG | OXYGEN SATURATION: 100 % | HEIGHT: 62 IN | RESPIRATION RATE: 17 BRPM

## 2019-05-15 LAB
ALBUMIN SERPL BCP-MCNC: 2.2 G/DL (ref 3.5–5.2)
ALP SERPL-CCNC: 129 U/L (ref 55–135)
ALT SERPL W/O P-5'-P-CCNC: 78 U/L (ref 10–44)
ANION GAP SERPL CALC-SCNC: 7 MMOL/L (ref 8–16)
AST SERPL-CCNC: 81 U/L (ref 10–40)
BASOPHILS # BLD AUTO: 0.01 K/UL (ref 0–0.2)
BASOPHILS NFR BLD: 0.2 % (ref 0–1.9)
BILIRUB SERPL-MCNC: 0.2 MG/DL (ref 0.1–1)
BUN SERPL-MCNC: 12 MG/DL (ref 6–20)
CALCIUM SERPL-MCNC: 8.9 MG/DL (ref 8.7–10.5)
CHLORIDE SERPL-SCNC: 108 MMOL/L (ref 95–110)
CO2 SERPL-SCNC: 26 MMOL/L (ref 23–29)
CREAT SERPL-MCNC: 0.7 MG/DL (ref 0.5–1.4)
DIFFERENTIAL METHOD: ABNORMAL
EOSINOPHIL # BLD AUTO: 0.3 K/UL (ref 0–0.5)
EOSINOPHIL NFR BLD: 6.2 % (ref 0–8)
ERYTHROCYTE [DISTWIDTH] IN BLOOD BY AUTOMATED COUNT: 21.5 % (ref 11.5–14.5)
EST. GFR  (AFRICAN AMERICAN): >60 ML/MIN/1.73 M^2
EST. GFR  (NON AFRICAN AMERICAN): >60 ML/MIN/1.73 M^2
GLUCOSE SERPL-MCNC: 92 MG/DL (ref 70–110)
HCT VFR BLD AUTO: 29.1 % (ref 37–48.5)
HGB BLD-MCNC: 8.6 G/DL (ref 12–16)
IMM GRANULOCYTES # BLD AUTO: 0.02 K/UL (ref 0–0.04)
IMM GRANULOCYTES NFR BLD AUTO: 0.4 % (ref 0–0.5)
LYMPHOCYTES # BLD AUTO: 0.9 K/UL (ref 1–4.8)
LYMPHOCYTES NFR BLD: 20 % (ref 18–48)
MAGNESIUM SERPL-MCNC: 1.7 MG/DL (ref 1.6–2.6)
MCH RBC QN AUTO: 23.1 PG (ref 27–31)
MCHC RBC AUTO-ENTMCNC: 29.6 G/DL (ref 32–36)
MCV RBC AUTO: 78 FL (ref 82–98)
MONOCYTES # BLD AUTO: 0.3 K/UL (ref 0.3–1)
MONOCYTES NFR BLD: 6.8 % (ref 4–15)
NEUTROPHILS # BLD AUTO: 3.1 K/UL (ref 1.8–7.7)
NEUTROPHILS NFR BLD: 66.4 % (ref 38–73)
NRBC BLD-RTO: 0 /100 WBC
PHOSPHATE SERPL-MCNC: 3.7 MG/DL (ref 2.7–4.5)
PLATELET # BLD AUTO: 386 K/UL (ref 150–350)
PMV BLD AUTO: 10.6 FL (ref 9.2–12.9)
POTASSIUM SERPL-SCNC: 3.9 MMOL/L (ref 3.5–5.1)
PROT SERPL-MCNC: 7.5 G/DL (ref 6–8.4)
RBC # BLD AUTO: 3.72 M/UL (ref 4–5.4)
SODIUM SERPL-SCNC: 141 MMOL/L (ref 136–145)
WBC # BLD AUTO: 4.69 K/UL (ref 3.9–12.7)

## 2019-05-15 PROCEDURE — 85025 COMPLETE CBC W/AUTO DIFF WBC: CPT

## 2019-05-15 PROCEDURE — 63600175 PHARM REV CODE 636 W HCPCS: Performed by: SURGERY

## 2019-05-15 PROCEDURE — 83735 ASSAY OF MAGNESIUM: CPT

## 2019-05-15 PROCEDURE — 25000003 PHARM REV CODE 250: Performed by: STUDENT IN AN ORGANIZED HEALTH CARE EDUCATION/TRAINING PROGRAM

## 2019-05-15 PROCEDURE — 80053 COMPREHEN METABOLIC PANEL: CPT

## 2019-05-15 PROCEDURE — C9113 INJ PANTOPRAZOLE SODIUM, VIA: HCPCS | Performed by: SURGERY

## 2019-05-15 PROCEDURE — 84100 ASSAY OF PHOSPHORUS: CPT

## 2019-05-15 RX ORDER — LANOLIN ALCOHOL/MO/W.PET/CERES
800 CREAM (GRAM) TOPICAL 2 TIMES DAILY
Status: DISCONTINUED | OUTPATIENT
Start: 2019-05-15 | End: 2019-05-15 | Stop reason: HOSPADM

## 2019-05-15 RX ORDER — OXYCODONE HCL 5 MG/5 ML
5 SOLUTION, ORAL ORAL EVERY 6 HOURS PRN
Qty: 80 ML | Refills: 0 | Status: SHIPPED | OUTPATIENT
Start: 2019-05-15 | End: 2019-07-22

## 2019-05-15 RX ADMIN — MAGNESIUM OXIDE TAB 400 MG (241.3 MG ELEMENTAL MG) 800 MG: 400 (241.3 MG) TAB at 09:05

## 2019-05-15 RX ADMIN — PANTOPRAZOLE SODIUM 40 MG: 40 INJECTION, POWDER, LYOPHILIZED, FOR SOLUTION INTRAVENOUS at 09:05

## 2019-05-15 NOTE — SUBJECTIVE & OBJECTIVE
Subjective:     Interval History: NAEON. AFVSS. Bag leakage. Ostomy teaching yesterday and again today. Not in pain just abdominal soreness. Eating well. Good ostomy output. Ambulating.    Post-Op Info:  Procedure(s) (LRB):  ILEOCOLECTOMY (N/A)  CREATION, ILEOSTOMY with mucous fistula (N/A)   5 Days Post-Op      Medications:  Continuous Infusions:    Scheduled Meds:   acetaminophen  1,000 mg Oral Q8H    pantoprazole  40 mg Intravenous Daily     PRN Meds:   sodium chloride    ondansetron    oxyCODONE    oxyCODONE    promethazine (PHENERGAN) IVPB        Objective:     Vital Signs (Most Recent):  Temp: 98 °F (36.7 °C) (05/15/19 0414)  Pulse: 82 (05/15/19 0414)  Resp: 18 (05/15/19 0414)  BP: (!) 108/56 (05/15/19 0414)  SpO2: 100 % (05/15/19 0414) Vital Signs (24h Range):  Temp:  [97.4 °F (36.3 °C)-98.3 °F (36.8 °C)] 98 °F (36.7 °C)  Pulse:  [] 82  Resp:  [16-18] 18  SpO2:  [98 %-100 %] 100 %  BP: ()/(52-56) 108/56     Intake/Output - Last 3 Shifts       05/13 0700 - 05/14 0659 05/14 0700 - 05/15 0659    P.O.  480    Total Intake(mL/kg)  480 (10)    Urine (mL/kg/hr) 0 (0) 0 (0)    Stool 1400 1375    Total Output 1400 1375    Net -1400 -895          Urine Occurrence 4 x 2 x    Stool Occurrence 3 x 2 x          Physical Exam   Constitutional: She is oriented to person, place, and time. She appears well-developed. No distress.   Eyes: Conjunctivae and EOM are normal.   Cardiovascular: Normal rate.   Pulmonary/Chest: Effort normal. No respiratory distress.   Abdominal: Soft. She exhibits no distension. There is no tenderness.   RLQ ostomy with light brown liquid, pink, viable  Incision c/d/i   Neurological: She is alert and oriented to person, place, and time.   Skin: Skin is warm and dry.   Psychiatric: She has a normal mood and affect. Thought content normal.   Nursing note and vitals reviewed.        Significant Labs:  BMP (Last 3 Results):   Recent Labs   Lab 05/13/19  1469 05/14/19  7252  05/15/19  0400   * 85 92    140 141   K 4.2 4.2 3.9    109 108   CO2 24 24 26   BUN 16 15 12   CREATININE 0.6 0.6 0.7   CALCIUM 7.8* 8.4* 8.9   MG 2.1 1.6 1.7     CBC (Last 3 Results):   Recent Labs   Lab 05/13/19  0459 05/14/19  0346 05/15/19  0400   WBC 4.72 4.19 4.69   RBC 3.18* 3.43* 3.72*   HGB 7.8* 7.8* 8.6*   HCT 25.3* 27.4* 29.1*    314 386*   MCV 80* 80* 78*   MCH 24.5* 22.7* 23.1*   MCHC 30.8* 28.5* 29.6*       Significant Diagnostics:  I have reviewed all pertinent imaging results/findings within the past 24 hours.

## 2019-05-15 NOTE — PLAN OF CARE
CARA spoke to Dorcas at [facility]'s  ([phone]) to inquire of availability to provide HH with ostomy care to patient. Dorcas reports she will verify with [facility]'s billing dept. to verify insurance authorization, awaiting return.

## 2019-05-15 NOTE — ASSESSMENT & PLAN NOTE
5 Days Post-Op ileocolectomy with end ileostomy    Low res diet  D/c'd TPN 05/13  PRN oxy   OOB/ambulate  Incentive spirometry  Stoma teaching 05/14, again 05/15    Dispo: Ostomy teaching. Likely discharge this afternoon.

## 2019-05-15 NOTE — PLAN OF CARE
SW assigned to case today 5/15/2019. SW will assist team with DC needs. SW in communication with CM.    Patient has been denied by Sunil , [facility], [facility].     Joyce Eid, ERASMO  [facility]  [id number]

## 2019-05-15 NOTE — NURSING
Patient is discharging home. All discharge instructions have been discussed with patient. PICC line has been discontinued. No questions or concerns noted. Awaiting for wheelchair transportation.

## 2019-05-15 NOTE — TELEPHONE ENCOUNTER
----- Message from Antoine Omer sent at 5/15/2019  2:53 PM CDT -----  Contact: Jordon (case management):   Needs Advice    Reason for call: Jordon called to schedule the pt a 2 wk f/u appt with Dr. Cope & Ginger, no availability until 6/10        Communication Preference: Pt: [phone]

## 2019-05-15 NOTE — PLAN OF CARE
Problem: Postoperative Stoma Care (Ileostomy)  Goal: Optimal Stoma Healing    Intervention: Provide Ostomy and Peristomal Skin Care  Patient  Presently having a hard time adjusting to ostomy care. Patient does not get the relationship between eating and the bag filling up. Instructed her on the mechanism  Of the GI trach and the ostomy bag. Patient soiled her clothing, Patient needs thorough instruction of bag use.Call Light in reach, Will Contine to Plan care and give Support as needed.

## 2019-05-15 NOTE — DISCHARGE SUMMARY
[facility]  Colorectal Surgery  Discharge Summary      Patient Name: Lora Scott  MRN: [id number]  Admission Date: 5/10/2019  Hospital Length of Stay: 5 days  Discharge Date and Time:  05/15/2019 1:33 PM  Attending Physician: Uday Cope MD   Discharging Provider: Randall Song MD  Primary Care Provider: Margarita Jay MD     HPI:     29yoF w/ h/o CDs s/p recent admission in Feb 2019 for Crohn's flare. Pt not on any maintenance medications. Saw Dr. Quintanilla in March with discussion of med vs surgical management. Pt has been on TPN and reg diet. Reports good appetite but once she starts eating gets RLQ cramping/abd pain. Reports daily bowel function but more firm stools. Denies any N/V. Pt would like to pursue surgical management at this time.   Last colonoscopy - 2/20/19   Denies family hx of CRC or IBD.       Procedure(s) (LRB):  ILEOCOLECTOMY (N/A)  CREATION, ILEOSTOMY with mucous fistula (N/A)     Hospital Course: Patient was admitted for laparoscopic resection of terminal ileum with ileostomy creation. Procedure was converted to open. She tolerated the procedure well but notedly had low blood pressure post op. This did not stray from her known baseline low blood pressure significantly. She met all milestones POD5. She received ostomy teaching and was able to place the bag herself. She was discharged POD5.        Significant Diagnostic Studies:   Specimen (12h ago, onward)    None          Pending Diagnostic Studies:     None        Final Active Diagnoses:    Diagnosis Date Noted POA    PRINCIPAL PROBLEM:  Crohn disease [K50.90] 05/10/2019 Yes    Malnutrition of moderate degree [E44.0] 02/19/2019 Yes      Problems Resolved During this Admission:      Discharged Condition: good    Disposition: Home or Self Care    Follow Up:  Follow-up Information     Uday Cope MD In 2 weeks.    Specialty:  Colon and Rectal Surgery  Why:  post op  Contact information:  [address]  [place]  [address]  [phone]                 Patient Instructions:      Lifting restrictions   Order Comments: May not lift more than 10 lbs for 4 weeks from day of surgery.  No pushing/pulling such as vacuuming or raking.  OK to take imodium if ostomy output is high. May take up to 4 times a day.  No driving while on narcotics and until you can react quickly without pain.     Notify your health care provider if you experience any of the following:  temperature >100.4     Notify your health care provider if you experience any of the following:  persistent nausea and vomiting or diarrhea     Notify your health care provider if you experience any of the following:  severe uncontrolled pain     Notify your health care provider if you experience any of the following:  redness, tenderness, or signs of infection (pain, swelling, redness, odor or green/yellow discharge around incision site)     Notify your health care provider if you experience any of the following:  difficulty breathing or increased cough     Notify your health care provider if you experience any of the following:  severe persistent headache     Notify your health care provider if you experience any of the following:  worsening rash     Notify your health care provider if you experience any of the following:  persistent dizziness, light-headedness, or visual disturbances     Notify your health care provider if you experience any of the following:  increased confusion or weakness     No dressing needed     Change dressing (specify)   Order Comments: Empty ostomy bag as needed per ostomy care instructions. Change ostomy bag as needed. Call Ginger (ostomy clinic nurse) listed in the follow up with any questions regarding ostomy care.     Activity as tolerated   Order Comments: OK to shower. Do not swim in a pool or soak in a tub until cleared in clinic.     Medications:  Reconciled Home Medications:      Medication List      START taking these medications     oxyCODONE 5 mg/5 mL Soln  Commonly known as:  ROXICODONE  Take 5 mLs (5 mg total) by mouth every 6 (six) hours as needed (Do not drive while taking this medication.).        STOP taking these medications    metroNIDAZOLE 500 MG tablet  Commonly known as:  FLAGYL     neomycin 500 mg Tab  Commonly known as:  MYCIFRADIN            Randall Song MD  Colorectal Surgery  [facility]

## 2019-05-15 NOTE — PROGRESS NOTES
[facility]  Colorectal Surgery  Progress Note    Patient Name: Lora Scott  MRN: [id number]  Admission Date: 5/10/2019  Hospital Length of Stay: 5 days  Attending Physician: Uday Cope MD    Subjective:     Interval History: NAEON. AFVSS. Bag leakage. Ostomy teaching yesterday and again today. Not in pain just abdominal soreness. Eating well. Good ostomy output. Ambulating.    Post-Op Info:  Procedure(s) (LRB):  ILEOCOLECTOMY (N/A)  CREATION, ILEOSTOMY with mucous fistula (N/A)   5 Days Post-Op      Medications:  Continuous Infusions:    Scheduled Meds:   acetaminophen  1,000 mg Oral Q8H    pantoprazole  40 mg Intravenous Daily     PRN Meds:   sodium chloride    ondansetron    oxyCODONE    oxyCODONE    promethazine (PHENERGAN) IVPB        Objective:     Vital Signs (Most Recent):  Temp: 98 °F (36.7 °C) (05/15/19 0414)  Pulse: 82 (05/15/19 0414)  Resp: 18 (05/15/19 0414)  BP: (!) 108/56 (05/15/19 0414)  SpO2: 100 % (05/15/19 0414) Vital Signs (24h Range):  Temp:  [97.4 °F (36.3 °C)-98.3 °F (36.8 °C)] 98 °F (36.7 °C)  Pulse:  [] 82  Resp:  [16-18] 18  SpO2:  [98 %-100 %] 100 %  BP: ()/(52-56) 108/56     Intake/Output - Last 3 Shifts       05/13 0700 - 05/14 0659 05/14 0700 - 05/15 0659    P.O.  480    Total Intake(mL/kg)  480 (10)    Urine (mL/kg/hr) 0 (0) 0 (0)    Stool 1400 1375    Total Output 1400 1375    Net -1400 -895          Urine Occurrence 4 x 2 x    Stool Occurrence 3 x 2 x          Physical Exam   Constitutional: She is oriented to person, place, and time. She appears well-developed. No distress.   Eyes: Conjunctivae and EOM are normal.   Cardiovascular: Normal rate.   Pulmonary/Chest: Effort normal. No respiratory distress.   Abdominal: Soft. She exhibits no distension. There is no tenderness.   RLQ ostomy with light brown liquid, pink, viable  Incision c/d/i   Neurological: She is alert and oriented to person, place, and time.   Skin: Skin is warm and dry.    Psychiatric: She has a normal mood and affect. Thought content normal.   Nursing note and vitals reviewed.        Significant Labs:  BMP (Last 3 Results):   Recent Labs   Lab 05/13/19  0459 05/14/19  0346 05/15/19  0400   * 85 92    140 141   K 4.2 4.2 3.9    109 108   CO2 24 24 26   BUN 16 15 12   CREATININE 0.6 0.6 0.7   CALCIUM 7.8* 8.4* 8.9   MG 2.1 1.6 1.7     CBC (Last 3 Results):   Recent Labs   Lab 05/13/19  0459 05/14/19  0346 05/15/19  0400   WBC 4.72 4.19 4.69   RBC 3.18* 3.43* 3.72*   HGB 7.8* 7.8* 8.6*   HCT 25.3* 27.4* 29.1*    314 386*   MCV 80* 80* 78*   MCH 24.5* 22.7* 23.1*   MCHC 30.8* 28.5* 29.6*       Significant Diagnostics:  I have reviewed all pertinent imaging results/findings within the past 24 hours.    Assessment/Plan:     * Crohn disease  5 Days Post-Op ileocolectomy with end ileostomy    Low res diet  D/c'd TPN 05/13  PRN oxy   OOB/ambulate  Incentive spirometry  Stoma teaching 05/14, again 05/15    Dispo: Ostomy teaching. Likely discharge this afternoon.          Randall Song MD  Colorectal Surgery  [facility]

## 2019-05-15 NOTE — PLAN OF CARE
CM placed call to [facility] clinic nurse to schedule pt's f/u appt with Dr Cope and GONZÁLEZ Miles.    Future Appointments   Date Time Provider Department Center   5/20/2019  7:30 AM DIGESTIVE DISEASES DIETITIAN [facility] GASTRO [address]   5/20/2019  9:45 AM INJECTION, INFECTIOUS DISEASES [facility] ID INJ [address]   5/27/2019  1:30 PM Uday Cope MD [facility] COLON [address]   6/20/2019  9:45 AM INJECTION, INFECTIOUS DISEASES [facility] ID INJ [address]   7/19/2019  2:30 PM Nano Martinez MD [facility] DERM [address]

## 2019-05-15 NOTE — PLAN OF CARE
CARA placed call to [facility] to schedule pt's post op  appt. with Dr Cope and MARIE Miles,  reports clinic nurse will call patient with follow up appt date and time.

## 2019-05-16 NOTE — PLAN OF CARE
CM met with patient to discuss discharge plan to home. Patient in agreement with discharge plan. Patient reports she feels confident in ability to care for ostomy independently. CM informed patient of follow up appt with GONZÁLEZ Miles and Dr Cope, pt verbalized understanding. Pt has ostomy supplies at bedside. Patient has the phone number for [facility] ostomy nurse for any questions are concerns related to ostomy post hospital discharge. Patient's mother will provide transportation home.    Future Appointments   Date Time Provider Department Center   5/20/2019  7:30 AM DIGESTIVE DISEASES DIETITIAN [facility] GASTRO [facility]   5/20/2019  9:45 AM INJECTION, INFECTIOUS DISEASES [facility] ID INJ [facility]   5/27/2019  1:30 PM Uday Cope MD [facility] COLON [facility]   5/27/2019  4:45 PM RONI Chopra [facility] ENTERO [facility]   6/20/2019  9:45 AM INJECTION, INFECTIOUS DISEASES [facility] ID INJ [facility]   7/19/2019  2:30 PM Nano Martinez MD [facility] DERM [facility]        05/15/19 0565   Final Note   Assessment Type Final Discharge Note   Anticipated Discharge Disposition Home   What phone number can be called within the next 1-3 days to see how you are doing after discharge? [phone]   Hospital Follow Up  Appt(s) scheduled? Yes   Discharge plans and expectations educations in teach back method with documentation complete? Yes

## 2019-05-20 ENCOUNTER — PATIENT MESSAGE (OUTPATIENT)
Dept: WOUND CARE | Facility: CLINIC | Age: 29
End: 2019-05-20

## 2019-05-20 ENCOUNTER — NUTRITION (OUTPATIENT)
Dept: GASTROENTEROLOGY | Facility: CLINIC | Age: 29
End: 2019-05-20

## 2019-05-20 ENCOUNTER — CLINICAL SUPPORT (OUTPATIENT)
Dept: INFECTIOUS DISEASES | Facility: CLINIC | Age: 29
End: 2019-05-20
Payer: COMMERCIAL

## 2019-05-20 VITALS — HEIGHT: 62 IN | WEIGHT: 99.88 LBS | BODY MASS INDEX: 18.38 KG/M2

## 2019-05-20 DIAGNOSIS — Z93.2 ILEOSTOMY IN PLACE: ICD-10-CM

## 2019-05-20 DIAGNOSIS — E44.0 MALNUTRITION OF MODERATE DEGREE: ICD-10-CM

## 2019-05-20 DIAGNOSIS — D84.9 IMMUNOCOMPROMISED, ACQUIRED: ICD-10-CM

## 2019-05-20 DIAGNOSIS — K50.013 CROHN'S DISEASE OF ILEUM WITH FISTULA: Primary | ICD-10-CM

## 2019-05-20 PROCEDURE — 99999 PR PBB SHADOW E&M-EST. PATIENT-LVL II: ICD-10-PCS | Mod: PBBFAC,,,

## 2019-05-20 PROCEDURE — 90471 IMMUNIZATION ADMIN: CPT | Mod: S$GLB,,, | Performed by: PHYSICIAN ASSISTANT

## 2019-05-20 PROCEDURE — 90739 HEPATITIS B (RECOMBINANT) ADJUVANTED, 2 DOSE: ICD-10-PCS | Mod: S$GLB,,, | Performed by: PHYSICIAN ASSISTANT

## 2019-05-20 PROCEDURE — 90739 HEPB VACC 2/4 DOSE ADULT IM: CPT | Mod: S$GLB,,, | Performed by: PHYSICIAN ASSISTANT

## 2019-05-20 PROCEDURE — 90471 HEPATITIS B (RECOMBINANT) ADJUVANTED, 2 DOSE: ICD-10-PCS | Mod: S$GLB,,, | Performed by: PHYSICIAN ASSISTANT

## 2019-05-20 PROCEDURE — 99999 PR PBB SHADOW E&M-EST. PATIENT-LVL II: CPT | Mod: PBBFAC,,,

## 2019-05-20 NOTE — PROGRESS NOTES
"Nutrition Assessment for Medical Nutrition Therapy    Referring professional: Gingre Mosher ,CNS    Patient disappointed that weight has declined since surgery 45.3kg(100#) today ; 106# 5/10/19  Vitals - 1 value per visit 4/29/2019 5/6/2019 5/10/2019 5/11/2019 5/15/2019   SYSTOLIC  108   97   DIASTOLIC  69   61   PULSE  121   98   TEMPERATURE     98   RESPIRATIONS     17   SPO2     100   Weight (lb) 105.82 106.26  106    Weight (kg) 48 48.2  48.081    HEIGHT 5' 2" 5' 2"  5' 2"    BODY MASS INDEX 19.35 19.44 19.39     VISIT REPORT        Pain Score   6        Vitals - 1 value per visit 5/20/2019   SYSTOLIC    DIASTOLIC    PULSE    TEMPERATURE    RESPIRATIONS    SPO2    Weight (lb) 99.87   Weight (kg) 45.3   HEIGHT 5' 2"   BODY MASS INDEX 18.27   VISIT REPORT    Pain Score         Reason for MNT visit: Pt in for education and nutrition counseling regarding:  S/P ileostomy  5/10/19 (resolution of stomach pain associated with eating ); hypoalbuminemia (5/10    2.1 ); and Nutrition knowledge deficit (poor understanding of protein sources ( eating chips and frito dip; potatoes and plain pasta )    Pertinent Social History: Dad has been cooking for patient  ( fried fish, mashed potatoes, macaroni and cheese ) and patient consuming Pizza for meals on her own and  ruffles potato chips and frito dip between meals ; has not been consuming equate drinks as she thought she was getting enough protein .     Medical History:     Past Medical History:   Diagnosis Date    Allergy     Anemia     Asthma     Crohn's disease 2/18/19    Crohn's disease involving terminal ileum     Depression     Eczema     Elevated liver function tests     Ex-smoker for less than 1 year     Joint pain     Skin rash        Past Surgical History:   Procedure Laterality Date    COLONOSCOPY N/A 2/20/2019    [facility] ([facility])    CREATION, ILEOSTOMY with mucous fistula N/A 5/10/2019    [facility]Z" [facility]    EGD (ESOPHAGOGASTRODUODENOSCOPY) N/A 2/20/2019    Performed by [facility]la MD at [facility] ENDO (2ND FLR)    ILEOCOLECTOMY N/A 5/10/2019    Performed by Uday Cope MD at [facility] OR 2ND FLR          Pertinent Medications:   Current Outpatient Medications on File Prior to Visit   Medication Sig Dispense Refill    oxyCODONE (ROXICODONE) 5 mg/5 mL Soln Take 5 mLs (5 mg total) by mouth every 6 (six) hours as needed (Do not drive while taking this medication.). 80 mL 0     No current facility-administered medications on file prior to visit.        Vitamins/Supplements/Herbs: none .. Again recommended multivitamin or chewable /gummy children's vitamin X 2 if cost is a factor.     Food intolerances or allergies:   Review of patient's allergies indicates:   Allergen Reactions    Shellfish containing products Anaphylaxis     Allergic to all seafood.    Horse/equine containing products Itching    Iodine      Swelling (throat)^       Labs:      Chemistry        Component Value Date/Time     05/15/2019 0400    K 3.9 05/15/2019 0400     05/15/2019 0400    CO2 26 05/15/2019 0400    BUN 12 05/15/2019 0400    CREATININE 0.7 05/15/2019 0400    GLU 92 05/15/2019 0400        Component Value Date/Time    CALCIUM 8.9 05/15/2019 0400    ALKPHOS 129 05/15/2019 0400    AST 81 (H) 05/15/2019 0400    ALT 78 (H) 05/15/2019 0400    BILITOT 0.2 05/15/2019 0400    ESTGFRAFRICA >60.0 05/15/2019 0400    EGFRNONAA >60.0 05/15/2019 0400          Results History   Comprehensive metabolic panel (Order [id number])   5/12/2019  5:07 AM - [facility], Soft [facility] Interface     Component Value Flag Ref Range Units Status   Sodium 139   136 - 145 mmol/L Final   Potassium 3.6   3.5 - 5.1 mmol/L Final   Chloride 107   95 - 110 mmol/L Final   CO2 25   23 - 29 mmol/L Final   Glucose 85   70 - 110 mg/dL Final   BUN, Bld 10   6 - 20 mg/dL Final   Creatinine 0.6   0.5 - 1.4 mg/dL Final   Calcium 7.9  Low   8.7 - 10.5 mg/dL  "Final   Total Protein 6.2   6.0 - 8.4 g/dL Final   Albumin 1.7  Low   3.5 - 5.2 g/dL Final   Total Bilirubin 0.1   0.1 - 1.0 mg/dL Final   Comment:   For infants and newborns, interpretation of results should be based   on gestational age, weight and in agreement with clinical   observations.   Premature Infant recommended reference ranges:   Up to 24 hours.............<8.0 mg/dL   Up to 48 hours............<12.0 mg/dL   3-5 days..................<15.0 mg/dL   6-29 days.................<15.0 mg/dL    Alkaline Phosphatase 65   55 - 135 U/L Final   AST 30   10 - 40 U/L Final   ALT 30   10 - 44 U/L Final   Anion Gap 7  Low   8 - 16 mmol/L Final   eGFR if  >60.0   >60 mL/min/1.73 m^2 Final   eGFR if non African American >60.0   >60 mL/min/1.73 m^2 Final   Comment:   Calculation used to obtain the estimated glomerular filtration   rate (eGFR) is the CKD-EPI equation.                   Ht: Height: 5' 2" (157.5 cm)  Wt: Weight: 45.3 kg (99 lb 13.9 oz)  Last 3 Weights:   Wt Readings from Last 3 Encounters:   05/20/19 45.3 kg (99 lb 13.9 oz)   05/11/19 48.1 kg (106 lb)   05/06/19 48.2 kg (106 lb 4.2 oz)     BMI: Body mass index is 18.27 kg/m².    Weight status: consistently decreasing  Calorie need: 1600 calories ( MSJ X 1.4 factor )  Protein need: 70-80 grams ( 150-160 : 1 calorie  Calorie: nitrogen ratio ) for anabolism   Eating Behaviors:  Consuming white cranberry juice and cocoa cola as primary beverages  Consuming three meals per day but small portions  Midmorning: pizza 1 slice or leftover fried fish , mashed potatoes   Midafternoon :chips and cheese dip   Supper: Mashed potatoes(1/4c)  ,fried fish( 1/2 piece) , macaroni and cheese ( 1/4c)       Nutrition Diagnosis:   Inadequate oral intake ( calories, protein,fluid)     Related to:   Early satiety or underestimation of amount of calories/food required to compensate for TPN discontinuation and post surgical needs     As evidenced by:   Weight loss of " 5# vs inadequate fluid intake vs diet recall        Motivation to change: moderate    Patient Goal:  Weight gain   Albumin /prelbumin wnl   Healing of surgical site     MNT Recommendations/Interventions/Goals:  Reduced fiber diet  Protein sources( 2 Equate plus -700 calories /26  grams protein ) ; 2 eggs( 14 grams protein/140 cals  ), 2 slices cheese (14 grams protein/300 cals  ) ; peanut butter or ham or turkey - 2 oz= 14 grams protein /100-200 calories ; white bread, grits, rice ,mashed potatoes - 2 slices/2 cups =200 calories   Separate liquids from solids at meals   Use between meals for liquid consumption and equate consumption   Use of potatoes, rice, pasta,peanut butter,white bread to firm /solidify ostomy output   Consume chips and dip only after protein foods have been consumed as above   Multivitamin daily   Discussion with patient included/Handouts provided :   [facility] Nutritional Guide for ileostomy provided and discussed at length the importance of adequate protein and calories for healing   List of protein foods   Sample snacks from Short bowel syndrome  Guide ( low fiber cho sources and protein sources )    Options for additional protein as protein powder added to shakes  Comprehension: good   Patient demonstrated understanding: able to verbalize foods with protein     Nutrition follow-up:  will follow up as needed    Counseling time: 1 Hour

## 2019-05-21 ENCOUNTER — OFFICE VISIT (OUTPATIENT)
Dept: WOUND CARE | Facility: CLINIC | Age: 29
End: 2019-05-21
Payer: COMMERCIAL

## 2019-05-21 ENCOUNTER — TELEPHONE (OUTPATIENT)
Dept: WOUND CARE | Facility: CLINIC | Age: 29
End: 2019-05-21

## 2019-05-21 DIAGNOSIS — Z71.89 ENCOUNTER FOR OSTOMY CARE EDUCATION: ICD-10-CM

## 2019-05-21 DIAGNOSIS — Z43.2 ATTENTION TO ILEOSTOMY: Primary | ICD-10-CM

## 2019-05-21 PROCEDURE — 99999 PR PBB SHADOW E&M-EST. PATIENT-LVL II: ICD-10-PCS | Mod: PBBFAC,,, | Performed by: CLINICAL NURSE SPECIALIST

## 2019-05-21 PROCEDURE — 99024 POSTOP FOLLOW-UP VISIT: CPT | Mod: S$GLB,,, | Performed by: CLINICAL NURSE SPECIALIST

## 2019-05-21 PROCEDURE — 99024 PR POST-OP FOLLOW-UP VISIT: ICD-10-PCS | Mod: S$GLB,,, | Performed by: CLINICAL NURSE SPECIALIST

## 2019-05-21 PROCEDURE — 99999 PR PBB SHADOW E&M-EST. PATIENT-LVL II: CPT | Mod: PBBFAC,,, | Performed by: CLINICAL NURSE SPECIALIST

## 2019-05-21 NOTE — PHYSICIAN QUERY
PT Name: Lora Scott  MR #: [id number]     Physician Query Form - Diagnosis Clarification      CDS/: Roselyn Cheek RN, CDS               Contact information: [email or web address]                                                                                                                        [phone]    This form is a permanent document in the medical record.     Query Date: May 21, 2019    By submitting this query, we are merely seeking further clarification of documentation.  Please utilize your independent clinical judgment when addressing the question(s) below.     The medical record contains the following:      Findings Supporting Clinical Information Location in Medical Record    29yoF w/ h/o CDs s/p recent admission in Feb 2019 for Crohn's flare...Pt has been on TPN and reg diet. Reports good appetite but once she starts eating gets RLQ cramping/abd pain. Reports daily bowel function but more firm stools. Denies any N/V...Pt would like to pursue surgical management at this time.     Pt has active TI disease with short segment strictures. Saw patient with Dr. Bacon and plan going forward will be to schedule her for surgery for ileocecectomy with DLI    INDICATIONS FOR PROCEDURE:...She also continues to have severe pain and partial obstructive  Symptoms    POSTOPERATIVE DIAGNOSIS:  Refractory terminal ileal Crohn's disease.    Post-op Diagnosis:  Post-Op Diagnosis Codes:  Crohn's disease of both small and large intestine with intestinal obstruction    PRINCIPAL PROBLEM:  Crohn disease      H&P 5/10              H&P 5/10        Op Note 5/10        Op Note 5/10      Brief Op Note 5/10        DC Summary 5/15     Please clarify if the ____with intestinal obstruction_____ diagnosis has been:    [  ] Ruled In   [  ] Ruled Out   [ x ] Other/Clarification of findings (please specify):Partial obstruction present     [  ] Clinically undetermined     Please document in your progress notes daily for the  duration of treatment, until resolved, and include in your discharge summary.

## 2019-05-21 NOTE — TELEPHONE ENCOUNTER
Pt sent message about her peristomal skin irritation, I called her and offered an appt this afternoon, she says she is going out of town to her aunts in [place], and will wait til next weeks appt.  I encouraged her to let me see her this afternoon before she leaves but she says its not that bad,  I stressed she must keep drinking and eating adequately or she will end up in hospital with dehydration. She said again she can wait.

## 2019-05-21 NOTE — PROGRESS NOTES
Lora came today as she says her skin around stoma looks funny and she is not sure what to do, this was after we spoke on phone and she said she would wait til next week, she changed her mind and came anyway,  Her stoma is nice budded end, 30 mm      She is using paste and we talked about using a ring instead. Wearing flat honorio with 3 day wear, gave her convex to try and she can let me know next week if she likes one type more than other.   Skin is clear and she has a few firm bumps at base but will monitor them for now,

## 2019-05-27 ENCOUNTER — OFFICE VISIT (OUTPATIENT)
Dept: SURGERY | Facility: CLINIC | Age: 29
End: 2019-05-27
Payer: COMMERCIAL

## 2019-05-27 ENCOUNTER — OFFICE VISIT (OUTPATIENT)
Dept: WOUND CARE | Facility: CLINIC | Age: 29
End: 2019-05-27
Payer: COMMERCIAL

## 2019-05-27 VITALS
HEART RATE: 78 BPM | SYSTOLIC BLOOD PRESSURE: 112 MMHG | WEIGHT: 105.38 LBS | DIASTOLIC BLOOD PRESSURE: 74 MMHG | HEIGHT: 62 IN | BODY MASS INDEX: 19.39 KG/M2

## 2019-05-27 DIAGNOSIS — K56.699 COLON STRICTURE: ICD-10-CM

## 2019-05-27 DIAGNOSIS — K50.812 CROHN'S DISEASE OF BOTH SMALL AND LARGE INTESTINE WITH INTESTINAL OBSTRUCTION: Primary | ICD-10-CM

## 2019-05-27 DIAGNOSIS — Z71.89 ENCOUNTER FOR OSTOMY CARE EDUCATION: ICD-10-CM

## 2019-05-27 DIAGNOSIS — Z93.2 ILEOSTOMY PRESENT: ICD-10-CM

## 2019-05-27 DIAGNOSIS — Z43.2 ATTENTION TO ILEOSTOMY: Primary | ICD-10-CM

## 2019-05-27 PROCEDURE — 99999 PR PBB SHADOW E&M-EST. PATIENT-LVL III: ICD-10-PCS | Mod: PBBFAC,,, | Performed by: COLON & RECTAL SURGERY

## 2019-05-27 PROCEDURE — 99024 PR POST-OP FOLLOW-UP VISIT: ICD-10-PCS | Mod: S$GLB,,, | Performed by: CLINICAL NURSE SPECIALIST

## 2019-05-27 PROCEDURE — 99024 PR POST-OP FOLLOW-UP VISIT: ICD-10-PCS | Mod: S$GLB,,, | Performed by: COLON & RECTAL SURGERY

## 2019-05-27 PROCEDURE — 99024 POSTOP FOLLOW-UP VISIT: CPT | Mod: S$GLB,,, | Performed by: CLINICAL NURSE SPECIALIST

## 2019-05-27 PROCEDURE — 99999 PR PBB SHADOW E&M-EST. PATIENT-LVL III: CPT | Mod: PBBFAC,,, | Performed by: COLON & RECTAL SURGERY

## 2019-05-27 PROCEDURE — 99999 PR PBB SHADOW E&M-EST. PATIENT-LVL II: CPT | Mod: PBBFAC,,, | Performed by: CLINICAL NURSE SPECIALIST

## 2019-05-27 PROCEDURE — 99024 POSTOP FOLLOW-UP VISIT: CPT | Mod: S$GLB,,, | Performed by: COLON & RECTAL SURGERY

## 2019-05-27 PROCEDURE — 99999 PR PBB SHADOW E&M-EST. PATIENT-LVL II: ICD-10-PCS | Mod: PBBFAC,,, | Performed by: CLINICAL NURSE SPECIALIST

## 2019-05-27 NOTE — PROGRESS NOTES
Subjective:       Patient ID: Lora Scott is a 29 y.o. female.    Chief Complaint: No chief complaint on file.    Ms. Paulino is a 28 yo F recently diagnosed with Crohn's disease Feb 2019.     Since then patient has seen Dr. Bacon back in 3/2019 for initial evaluation but has not been started on any medication as still debating between medical vs surgical treatment of her Crohn's.     Since then patient was on TPN and barely tolerating a regular diet, we saw her in clinic after and decision was made to proceed with surgery.     Last colonoscopy - 2/20/19 - Stricture found at the distal ileum about 5 cm from the ileocecal valve.   Denies  family hx of CRC or IBD.    Surgery: Underwent an Open ileocolic resection and end ileostomy with mucous fistula on 5/10/19    Interval history:     Patient comes today for post op visit. She is doing very well, states she has just mild abdominal pain at surgical scar and around ostomy but otherwise her post prandial abdominal pain is completely gone. She is eating all her meals with no nausea or emesis. She still has not seen Dr. Bacon.     Review of patient's allergies indicates:   Allergen Reactions    Shellfish containing products Anaphylaxis     Allergic to all seafood.    Horse/equine containing products Itching    Iodine      Swelling (throat)^       Past Medical History:   Diagnosis Date    Allergy     Anemia     Asthma     Crohn's disease 2/18/19    Crohn's disease involving terminal ileum     Depression     Eczema     Elevated liver function tests     Ex-smoker for less than 1 year     Joint pain     Skin rash        Past Surgical History:   Procedure Laterality Date    COLONOSCOPY N/A 2/20/2019    Per[facility] by [facility] at [facility] ENDO (2ND FLR)    CREATION, ILEOSTOMY with mucous fistula N/A 5/10/2019    Performed by Uday Cope MD at [facility] OR 2ND FLR    EGD (ESOPHAGOGASTRODUODENOSCOPY) N/A 2/20/2019    Performed by Fawad Perla MD at [facility]  ENDO (2ND FLR)    ILEOCOLECTOMY N/A 5/10/2019    Performed by [facility]day [facility]ope [facility] at [facility] OR 2ND FLR       Current Outpatient Medications   Medication Sig Dispense Refill    oxyCODONE (ROXICODONE) 5 mg/5 mL Soln Take 5 mLs (5 mg total) by mouth every 6 (six) hours as needed (Do not drive while taking this medication.). 80 mL 0     No current facility-administered medications for this visit.        Family History   Problem Relation Age of Onset    Hypertension Mother     Irritable bowel syndrome Paternal Grandmother     Celiac disease Neg Hx     Cirrhosis Neg Hx     Colon cancer Neg Hx     Colon polyps Neg Hx     Crohn's disease Neg Hx     Cystic fibrosis Neg Hx     Esophageal cancer Neg Hx     Hemochromatosis Neg Hx     Inflammatory bowel disease Neg Hx     Liver cancer Neg Hx     Liver disease Neg Hx     Rectal cancer Neg Hx     Stomach cancer Neg Hx     Ulcerative colitis Neg Hx     Jerardo's disease Neg Hx     Lymphoma Neg Hx     Tuberculosis Neg Hx     Scleroderma Neg Hx     Rheum arthritis Neg Hx     Multiple sclerosis Neg Hx     Psoriasis Neg Hx     Lupus Neg Hx     Melanoma Neg Hx     Skin cancer Neg Hx        Social History     Socioeconomic History    Marital status: Single     Spouse name: Not on file    Number of children: Not on file    Years of education: Not on file    Highest education level: Not on file   Occupational History    Not on file   Social Needs    Financial resource strain: Not on file    Food insecurity:     Worry: Not on file     Inability: Not on file    Transportation needs:     Medical: Not on file     Non-medical: Not on file   Tobacco Use    Smoking status: Former Smoker     Types: Cigarettes     Last attempt to quit: 2019     Years since quittin.2    Smokeless tobacco: Never Used    Tobacco comment: 2 CIGS PER DAY   Substance and Sexual Activity    Alcohol use: Not Currently     Alcohol/week: 0.0 oz     Comment: Socially    Drug use:  No     Frequency: 3.0 times per week    Sexual activity: Not Currently     Partners: Male     Birth control/protection: None   Lifestyle    Physical activity:     Days per week: Not on file     Minutes per session: Not on file    Stress: Not on file   Relationships    Social connections:     Talks on phone: Not on file     Gets together: Not on file     Attends Nondenominational service: Not on file     Active member of club or organization: Not on file     Attends meetings of clubs or organizations: Not on file     Relationship status: Not on file   Other Topics Concern    Are you pregnant or think you may be? Not Asked    Breast-feeding Not Asked   Social History Narrative    Not on file       Review of Systems   Constitutional: Negative for fever.   HENT: Negative for congestion.    Eyes: Negative for pain.   Respiratory: Negative for cough and shortness of breath.    Cardiovascular: Negative for chest pain.   Gastrointestinal: Positive for abdominal pain. Negative for abdominal distention, blood in stool, diarrhea and nausea.   Endocrine: Negative for polyuria.   Genitourinary: Negative for dysuria.   Musculoskeletal: Negative for back pain.   Skin: Negative for rash.   Neurological: Negative for seizures.   Hematological: Negative for adenopathy.   Psychiatric/Behavioral: Negative for agitation.       Objective:      Physical Exam   Constitutional: She is oriented to person, place, and time. No distress.   Eyes: EOM are normal.   Neck: Neck supple.   Cardiovascular: Normal rate and regular rhythm.   Pulmonary/Chest: Effort normal. No respiratory distress.   Abdominal: Soft. She exhibits no distension. There is tenderness.   RLQ ilieostomy site with stoma healthy and pink. Midline surgical incision well healed.    Musculoskeletal: Normal range of motion.   Neurological: She is alert and oriented to person, place, and time.   Skin: Skin is warm and dry.         Lab Results   Component Value Date    WBC 4.69  05/15/2019    HGB 8.6 (L) 05/15/2019    HCT 29.1 (L) 05/15/2019    MCV 78 (L) 05/15/2019     (H) 05/15/2019     BMP  Lab Results   Component Value Date     05/15/2019    K 3.9 05/15/2019     05/15/2019    CO2 26 05/15/2019    BUN 12 05/15/2019    CREATININE 0.7 05/15/2019    CALCIUM 8.9 05/15/2019    ANIONGAP 7 (L) 05/15/2019    ESTGFRAFRICA >60.0 05/15/2019    EGFRNONAA >60.0 05/15/2019     CMP  Sodium   Date Value Ref Range Status   05/15/2019 141 136 - 145 mmol/L Final     Potassium   Date Value Ref Range Status   05/15/2019 3.9 3.5 - 5.1 mmol/L Final     Chloride   Date Value Ref Range Status   05/15/2019 108 95 - 110 mmol/L Final     CO2   Date Value Ref Range Status   05/15/2019 26 23 - 29 mmol/L Final     Glucose   Date Value Ref Range Status   05/15/2019 92 70 - 110 mg/dL Final     BUN, Bld   Date Value Ref Range Status   05/15/2019 12 6 - 20 mg/dL Final     Creatinine   Date Value Ref Range Status   05/15/2019 0.7 0.5 - 1.4 mg/dL Final     Calcium   Date Value Ref Range Status   05/15/2019 8.9 8.7 - 10.5 mg/dL Final     Total Protein   Date Value Ref Range Status   05/15/2019 7.5 6.0 - 8.4 g/dL Final     Albumin   Date Value Ref Range Status   05/15/2019 2.2 (L) 3.5 - 5.2 g/dL Final     Total Bilirubin   Date Value Ref Range Status   05/15/2019 0.2 0.1 - 1.0 mg/dL Final     Comment:     For infants and newborns, interpretation of results should be based  on gestational age, weight and in agreement with clinical  observations.  Premature Infant recommended reference ranges:  Up to 24 hours.............<8.0 mg/dL  Up to 48 hours............<12.0 mg/dL  3-5 days..................<15.0 mg/dL  6-29 days.................<15.0 mg/dL       Alkaline Phosphatase   Date Value Ref Range Status   05/15/2019 129 55 - 135 U/L Final     AST   Date Value Ref Range Status   05/15/2019 81 (H) 10 - 40 U/L Final     ALT   Date Value Ref Range Status   05/15/2019 78 (H) 10 - 44 U/L Final     Anion Gap   Date  Value Ref Range Status   05/15/2019 7 (L) 8 - 16 mmol/L Final     eGFR if    Date Value Ref Range Status   05/15/2019 >60.0 >60 mL/min/1.73 m^2 Final     eGFR if non    Date Value Ref Range Status   05/15/2019 >60.0 >60 mL/min/1.73 m^2 Final     Comment:     Calculation used to obtain the estimated glomerular filtration  rate (eGFR) is the CKD-EPI equation.        No results found for: CEA     MRE:            Multifocal active inflammatory Crohn's disease involving the distal ileum with a mild fibrostenotic component and entero-enteric fistula, as above.  No drainable fluid collection identified.       Assessment/Plan:        Ms Paulino is a 28 yo F recently diagnosed with Crohn's disease Feb 2019 with Ileal crohn's disease (stricture and enteroenteric fistula) now s/p Open ileocolic resection and end ileostomy with mucous fistula on 5/10/19 who presents for post op appointment.     - Patient recovering well from surgery and having good appetite.   - She needs to see Dr. Bacon to follow up for treatment of Crohn's.   - Follow up in 8 weeks, we won't schedule reversal until her Crohn's disease is under control.     Suzan Nunez MD  General Surgery PGY V  Beeper: [phone]      I have interviewed and examined the patient, reviewed the notes and assessments, and/or personally supervised the procedure(s) performed by Dr. Cony Manzo, and I concur with her/his documentation of Lora Scott.  See below addendum for my evaluation and additional findings.    28 yo F with severe terminal ileal Crohn's disease and partial obstructive symptoms.  She was on home TPN for some time now with hopes of improving her nutritional status, but she remained markedly hypoalbuminemic with progressive abdominal pain, so decision was made to proceed with surgery.    She underwent ileocecal resection with end ileostomy/mucous fistula 5/10/19.  Length of resected specimen 42 cm per path report.  Small bowel  measured intra-op - 325 cm remain.    PATHOLOGY:  Terminal ileum and cecum (resection):  Chronic active ileitis with stricture and features of fistulization  Transmural inflammation  Negative for dysplasia  Benign appendix  Reactive lymph nodes    Doing well post-op, eating with no N/V, no pain.  Not recording volume of  ostomy output. Has not seen GI post-op.    IMPRESSION:  Crohn's disease, ileocolic distribution, s/p ileocolic resection with end ileostomy/mucous fistula    RECOMMENDATIONS:  Diet as tolerated  Gradually increase activity  Follow-up with Dr Bacon to discuss medical management  RTO 8 weeks    Uday Cope MD, FACS, FASCRS  Senior Staff Surgeon  Department of Colon & Rectal Surgery

## 2019-05-27 NOTE — PROGRESS NOTES
This patient is known to me and is here in clinic today for first post op evaluation related to ileostomy. Surgery done  by Dr. Cope. The patient reports no  problems with  new ostomy since she came to see me last week . The patient is not receiving home health care .   Pain level today is reported as  0.    The ileostomy is 28mm kenan high budded stoma.  The patient is currently  wearing a 1piece pouching system by  [employer].   Average wear time is 4 days.   Peristomal skin is clear today    There is no  mucocutaneous separation.  Pt is coping well with the new ostomy.  SUPPLIES/DME: sent to EP today  Patient enrolled in [employer] [facility] Program. Pt gives verbal permission and understanding a representative will call them regarding samples sent and follow up needs.  Samples requested today based on needs or patient requests today.    Pouching concerns include:      Placed CECELIA flat with thin 2 m ring , pt can manage herself, she will place order soon, gave her few samples  SPECIAL NEEDS:  Pt counseled on skin care, nutrition, hydration as well as  how to order ostomy supplies.  I spent greater than 50% of this 45 minute visit in face to face counseling.

## 2019-05-27 NOTE — LETTER
May 28, 2019      Suzi Bacon MD  [address]  [facility] [address]           [facility]  [address]  [facility] [address]  Phone: [phone]          Patient: Lora Scott   MR Number: [id number]   YOB: 1990   Date of Visit: 5/27/2019       Dear Dr. Bacon:    Thank you for referring Lora Scott to me for evaluation. Attached you will find relevant portions of my assessment and plan of care.    If you have questions, please do not hesitate to call me. I look forward to following Lora Scott along with you.    Sincerely,    Uday Cope MD    Enclosure  CC:  Margarita Jay MD    If you would like to receive this communication electronically, please contact [email or web address] or [phone] to request more information on [employer] Link access.    For providers and/or their staff who would like to refer a patient to [facility], please contact us through our one-stop-shop provider referral line, [facility], at [phone].    If you feel you have received this communication in error or would no longer like to receive these types of communications, please e-mail [email or web address]

## 2019-06-03 ENCOUNTER — PATIENT MESSAGE (OUTPATIENT)
Dept: WOUND CARE | Facility: CLINIC | Age: 29
End: 2019-06-03

## 2019-06-03 ENCOUNTER — PATIENT MESSAGE (OUTPATIENT)
Dept: SURGERY | Facility: CLINIC | Age: 29
End: 2019-06-03

## 2019-06-07 ENCOUNTER — PATIENT MESSAGE (OUTPATIENT)
Dept: WOUND CARE | Facility: CLINIC | Age: 29
End: 2019-06-07

## 2019-06-10 ENCOUNTER — PATIENT MESSAGE (OUTPATIENT)
Dept: WOUND CARE | Facility: CLINIC | Age: 29
End: 2019-06-10

## 2019-06-11 ENCOUNTER — PATIENT MESSAGE (OUTPATIENT)
Dept: WOUND CARE | Facility: CLINIC | Age: 29
End: 2019-06-11

## 2019-06-12 ENCOUNTER — OFFICE VISIT (OUTPATIENT)
Dept: WOUND CARE | Facility: CLINIC | Age: 29
End: 2019-06-12
Payer: COMMERCIAL

## 2019-06-12 ENCOUNTER — PATIENT MESSAGE (OUTPATIENT)
Dept: WOUND CARE | Facility: CLINIC | Age: 29
End: 2019-06-12

## 2019-06-12 DIAGNOSIS — K94.19 IRRITANT CONTACT DERMATITIS DUE TO ILEOSTOMY: ICD-10-CM

## 2019-06-12 DIAGNOSIS — L24.9 IRRITANT CONTACT DERMATITIS DUE TO ILEOSTOMY: ICD-10-CM

## 2019-06-12 DIAGNOSIS — Z43.2 ATTENTION TO ILEOSTOMY: Primary | ICD-10-CM

## 2019-06-12 DIAGNOSIS — Z93.2 ILEOSTOMY STATUS: ICD-10-CM

## 2019-06-12 PROCEDURE — 99999 PR PBB SHADOW E&M-EST. PATIENT-LVL II: ICD-10-PCS | Mod: PBBFAC,,, | Performed by: CLINICAL NURSE SPECIALIST

## 2019-06-12 PROCEDURE — 99024 PR POST-OP FOLLOW-UP VISIT: ICD-10-PCS | Mod: S$GLB,,, | Performed by: CLINICAL NURSE SPECIALIST

## 2019-06-12 PROCEDURE — 99999 PR PBB SHADOW E&M-EST. PATIENT-LVL II: CPT | Mod: PBBFAC,,, | Performed by: CLINICAL NURSE SPECIALIST

## 2019-06-12 PROCEDURE — 99024 POSTOP FOLLOW-UP VISIT: CPT | Mod: S$GLB,,, | Performed by: CLINICAL NURSE SPECIALIST

## 2019-06-12 NOTE — PROGRESS NOTES
This patient is known to me and is here in clinic today for a thrid  post op evaluation related to ileostomy. Surgery done  by Dr. Cope. The patient reports no  problems with  new ostomy since she came to see me last week . The patient is not receiving home health care .   Pain level today is reported as  0.  She has gained a few pounds  The ileostomy is 32 mm kenan high budded stoma.  The patient is currently  wearing a 1piece pouching system by  [employer]. Only lasts 2 days    Peristomal skin is not totally clear today    There is no  mucocutaneous separation.  Pt is coping well with the new ostomy.  SUPPLIES/DME: [employer] is only DME to take her insurand  Patient enrolled in [employer] Program. Pt gives verbal permission and understanding a representative will call them regarding samples sent and follow up needs.  Samples requested today based on needs or patient requests today.    Pouching concerns include:    Last visit      today  [place] convex soft cut to fit 32 mm with paste to caulk  SPECIAL NEEDS:  Pt counseled on skin care, nutrition, hydration   I spent greater than 50% of this 45 minute visit in face to face counseling.

## 2019-06-20 ENCOUNTER — CLINICAL SUPPORT (OUTPATIENT)
Dept: INFECTIOUS DISEASES | Facility: CLINIC | Age: 29
End: 2019-06-20
Payer: MEDICAID

## 2019-06-20 DIAGNOSIS — D84.9 IMMUNOCOMPROMISED, ACQUIRED: ICD-10-CM

## 2019-06-20 PROCEDURE — 90471 IMMUNIZATION ADMIN: CPT | Mod: PBBFAC

## 2019-06-24 ENCOUNTER — PATIENT MESSAGE (OUTPATIENT)
Dept: SURGERY | Facility: CLINIC | Age: 29
End: 2019-06-24

## 2019-06-27 ENCOUNTER — PATIENT MESSAGE (OUTPATIENT)
Dept: WOUND CARE | Facility: CLINIC | Age: 29
End: 2019-06-27

## 2019-06-27 ENCOUNTER — PATIENT MESSAGE (OUTPATIENT)
Dept: SURGERY | Facility: CLINIC | Age: 29
End: 2019-06-27

## 2019-07-19 ENCOUNTER — OFFICE VISIT (OUTPATIENT)
Dept: DERMATOLOGY | Facility: CLINIC | Age: 29
End: 2019-07-19
Payer: COMMERCIAL

## 2019-07-19 DIAGNOSIS — L40.9 PSORIASIS: ICD-10-CM

## 2019-07-19 DIAGNOSIS — R21 RASH AND NONSPECIFIC SKIN ERUPTION: Primary | ICD-10-CM

## 2019-07-19 PROCEDURE — 99999 PR PBB SHADOW E&M-EST. PATIENT-LVL II: ICD-10-PCS | Mod: PBBFAC,,, | Performed by: DERMATOLOGY

## 2019-07-19 PROCEDURE — 99999 PR PBB SHADOW E&M-EST. PATIENT-LVL II: CPT | Mod: PBBFAC,,, | Performed by: DERMATOLOGY

## 2019-07-19 PROCEDURE — 99213 OFFICE O/P EST LOW 20 MIN: CPT | Mod: S$GLB,,, | Performed by: DERMATOLOGY

## 2019-07-19 PROCEDURE — 99213 PR OFFICE/OUTPT VISIT, EST, LEVL III, 20-29 MIN: ICD-10-PCS | Mod: S$GLB,,, | Performed by: DERMATOLOGY

## 2019-07-19 RX ORDER — CLOBETASOL PROPIONATE 0.46 MG/ML
SOLUTION TOPICAL 2 TIMES DAILY
Qty: 50 ML | Refills: 2 | Status: SHIPPED | OUTPATIENT
Start: 2019-07-19 | End: 2019-07-22

## 2019-07-19 RX ORDER — HYDROXYZINE HYDROCHLORIDE 25 MG/1
25 TABLET, FILM COATED ORAL NIGHTLY PRN
Qty: 30 TABLET | Refills: 2 | Status: SHIPPED | OUTPATIENT
Start: 2019-07-19 | End: 2019-07-22

## 2019-07-19 RX ORDER — TRIAMCINOLONE ACETONIDE 1 MG/G
CREAM TOPICAL
Qty: 454 G | Refills: 3 | Status: SHIPPED | OUTPATIENT
Start: 2019-07-19 | End: 2019-07-22

## 2019-07-19 NOTE — PROGRESS NOTES
Subjective:       Patient ID:  Lora Scott is a 29 y.o. female who presents for   Chief Complaint   Patient presents with    Psoriasis     HPI  30 yo female with hx of psoriasis and Crohns, s/p recent surgery, here for skin f/u. She is using old TAC cream. Not taking any systemic medications for Crohns or psoriasis at this time.  C/o itching on elbows, knees, scalp.  Had zinc level checked at last visit and was low - was supplemented with TPN but has not been rechecked.    Review of Systems   Skin: Positive for itching and rash.   Hematologic/Lymphatic: Bruises/bleeds easily.        Bruises        Objective:    Physical Exam   Constitutional: She appears well-developed and well-nourished. No distress.   Neurological: She is alert and oriented to person, place, and time. She is not disoriented.   Psychiatric: She has a normal mood and affect.   Skin:   Areas Examined (abnormalities noted in diagram):   Scalp / Hair Palpated and Inspected  Head / Face Inspection Performed  Neck Inspection Performed  Chest / Axilla Inspection Performed  Abdomen Inspection Performed  Back Inspection Performed  RUE Inspected  LUE Inspection Performed  RLE Inspected  LLE Inspection Performed  Nails and Digits Inspection Performed              Diagram Legend     Erythematous scaling macule/papule c/w actinic keratosis       Vascular papule c/w angioma      Pigmented verrucoid papule/plaque c/w seborrheic keratosis      Yellow umbilicated papule c/w sebaceous hyperplasia      Irregularly shaped tan macule c/w lentigo     1-2 mm smooth white papules consistent with Milia      Movable subcutaneous cyst with punctum c/w epidermal inclusion cyst      Subcutaneous movable cyst c/w pilar cyst      Firm pink to brown papule c/w dermatofibroma      Pedunculated fleshy papule(s) c/w skin tag(s)      Evenly pigmented macule c/w junctional nevus     Mildly variegated pigmented, slightly irregular-bordered macule c/w mildly atypical nevus       Flesh colored to evenly pigmented papule c/w intradermal nevus       Pink pearly papule/plaque c/w basal cell carcinoma      Erythematous hyperkeratotic cursted plaque c/w SCC      Surgical scar with no sign of skin cancer recurrence      Open and closed comedones      Inflammatory papules and pustules      Verrucoid papule consistent consistent with wart     Erythematous eczematous patches and plaques     Dystrophic onycholytic nail with subungual debris c/w onychomycosis     Umbilicated papule    Erythematous-base heme-crusted tan verrucoid plaque consistent with inflamed seborrheic keratosis     Erythematous Silvery Scaling Plaque c/w Psoriasis     See annotation      Assessment / Plan:        Recheck zinc level w next lab draw -   -     ZINC; Future; Expected date: 07/19/2019    Psoriasis - diffuse involvement -   Would advise a TNF blocker or Stelara - pt to see her Crohns doctor to determine her systemic therapy for this which should also benefit her psoriasis -   In meantime will start NBUVB and topicls -     -     triamcinolone acetonide 0.1% (KENALOG) 0.1 % cream; AAA bid  Dispense: 454 g; Refill: 3  -     hydrOXYzine HCl (ATARAX) 25 MG tablet; Take 1 tablet (25 mg total) by mouth nightly as needed for Itching. Prn pruritus.  Dispense: 30 tablet; Refill: 2  -     NB-UVB Light Therapy; Standing  -     clobetasol (TEMOVATE) 0.05 % external solution; Apply topically 2 (two) times daily. For scalp itching PRN  Dispense: 50 mL; Refill: 2         Follow up in about 3 months (around 10/19/2019).

## 2019-07-22 ENCOUNTER — OFFICE VISIT (OUTPATIENT)
Dept: SURGERY | Facility: CLINIC | Age: 29
End: 2019-07-22
Payer: COMMERCIAL

## 2019-07-22 VITALS
DIASTOLIC BLOOD PRESSURE: 71 MMHG | SYSTOLIC BLOOD PRESSURE: 118 MMHG | BODY MASS INDEX: 23.9 KG/M2 | HEART RATE: 103 BPM | HEIGHT: 62 IN | WEIGHT: 129.88 LBS

## 2019-07-22 DIAGNOSIS — K50.812 CROHN'S DISEASE OF BOTH SMALL AND LARGE INTESTINE WITH INTESTINAL OBSTRUCTION: Primary | ICD-10-CM

## 2019-07-22 DIAGNOSIS — Z93.2 ILEOSTOMY PRESENT: ICD-10-CM

## 2019-07-22 PROCEDURE — 99999 PR PBB SHADOW E&M-EST. PATIENT-LVL III: CPT | Mod: PBBFAC,,, | Performed by: COLON & RECTAL SURGERY

## 2019-07-22 PROCEDURE — 99024 PR POST-OP FOLLOW-UP VISIT: ICD-10-PCS | Mod: S$GLB,,, | Performed by: COLON & RECTAL SURGERY

## 2019-07-22 PROCEDURE — 99024 POSTOP FOLLOW-UP VISIT: CPT | Mod: S$GLB,,, | Performed by: COLON & RECTAL SURGERY

## 2019-07-22 PROCEDURE — 99999 PR PBB SHADOW E&M-EST. PATIENT-LVL III: ICD-10-PCS | Mod: PBBFAC,,, | Performed by: COLON & RECTAL SURGERY

## 2019-07-22 NOTE — LETTER
July 22, 2019      [facility]  [address]  [facility] [address]  Phone: [phone]       Patient: Lora Scott   YOB: 1990  Date of Visit: 07/22/2019    To Whom It May Concern:    Ziyad Scott  was at [facility] on 07/22/2019. She may return to work/school on 7/23/2019 with no restrictions. If you have any questions or concerns, or if I can be of further assistance, please do not hesitate to contact me.    Sincerely,    Abida Price MA

## 2019-07-22 NOTE — LETTER
July 31, 2019      Suzi Bacon MD  [address]  [facility] [address]           [facility]  [address]  [facility] [address]  Phone: [phone]          Patient: Lora Scott   MR Number: [id number]   YOB: 1990   Date of Visit: 7/22/2019       Dear Dr. Bacon:    Thank you for referring Lora Scott to me for evaluation. Attached you will find relevant portions of my assessment and plan of care.    If you have questions, please do not hesitate to call me. I look forward to following Lora Scott along with you.    Sincerely,    Uday Cope MD    Enclosure  CC:  Margarita Jay MD    If you would like to receive this communication electronically, please contact [email or web address] or [phone] to request more information on [employer] [employer] access.    For providers and/or their staff who would like to refer a patient to [facility], please contact us through our one-stop-shop provider referral line, [facility], at [phone].    If you feel you have received this communication in error or would no longer like to receive these types of communications, please e-mail [email or web address]

## 2019-07-22 NOTE — PROGRESS NOTES
Subjective:       Patient ID: Lora Scott is a 29 y.o. female.    Chief Complaint: Post-op Evaluation    Ms. Paulino is a 30 yo F recently diagnosed with Crohn's disease Feb 2019.     Since then patient has seen Dr. Bacon back in 3/2019 for initial evaluation but has not been started on any medication as still debating between medical vs surgical treatment of her Crohn's.     Since then patient was on TPN and barely tolerating a regular diet, we saw her in clinic after and decision was made to proceed with surgery.     Last colonoscopy - 2/20/19 - Stricture found at the distal ileum about 5 cm from the ileocecal valve.   Denies  family hx of CRC or IBD.    Surgery: Underwent an Open ileocolic resection and end ileostomy with mucous fistula on 5/10/19    Interval history:     Patient is doing well. She has gained weight. She is eating well. Denies abdominal pain. Has passed mucous from below a few times, no pus/blood. Has been emptying her stoma 4-6 times a day but notes difficulty getting supplies due to insurance issues. She has not seen Dr. Bacon yet but has an appointment in November. She is not currently on medications for Crohns.    Review of patient's allergies indicates:   Allergen Reactions    Shellfish containing products Anaphylaxis     Allergic to all seafood.    Horse/equine containing products Itching    Iodine      Swelling (throat)^       Past Medical History:   Diagnosis Date    Allergy     Anemia     Asthma     Crohn's disease 2/18/19    Crohn's disease involving terminal ileum     Depression     Eczema     Elevated liver function tests     Ex-smoker for less than 1 year     Joint pain     Skin rash        Past Surgical History:   Procedure Laterality Date    COLONOSCOPY N/A 2/20/2019    [facility] (2ND F[facility])    CREATION, ILEOSTOMY with mucous fistula N/A 5/10/2019    [facility] at [facility] 2ND FLR    EGD  (ESOPHAGOGASTRODUODENOSCOPY) N/A 2019    Performed by [facility] at [facility] ENDO (2ND FLR)    ILEOCOLECTOMY N/A 5/10/2019    Performed by Uday [facility]ope MD at [facility] OR 2ND FLR       No current outpatient medications on file.     No current facility-administered medications for this visit.        Family History   Problem Relation Age of Onset    Hypertension Mother     Irritable bowel syndrome Paternal Grandmother     Celiac disease Neg Hx     Cirrhosis Neg Hx     Colon cancer Neg Hx     Colon polyps Neg Hx     Crohn's disease Neg Hx     Cystic fibrosis Neg Hx     Esophageal cancer Neg Hx     Hemochromatosis Neg Hx     Inflammatory bowel disease Neg Hx     Liver cancer Neg Hx     Liver disease Neg Hx     Rectal cancer Neg Hx     Stomach cancer Neg Hx     Ulcerative colitis Neg Hx     Jerardo's disease Neg Hx     Lymphoma Neg Hx     Tuberculosis Neg Hx     Scleroderma Neg Hx     Rheum arthritis Neg Hx     Multiple sclerosis Neg Hx     Psoriasis Neg Hx     Lupus Neg Hx     Melanoma Neg Hx     Skin cancer Neg Hx        Social History     Socioeconomic History    Marital status: Single     Spouse name: Not on file    Number of children: Not on file    Years of education: Not on file    Highest education level: Not on file   Occupational History    Not on file   Social Needs    Financial resource strain: Not on file    Food insecurity:     Worry: Not on file     Inability: Not on file    Transportation needs:     Medical: Not on file     Non-medical: Not on file   Tobacco Use    Smoking status: Former Smoker     Types: Cigarettes     Last attempt to quit: 2019     Years since quittin.4    Smokeless tobacco: Never Used    Tobacco comment: 2 CIGS PER DAY   Substance and Sexual Activity    Alcohol use: Not Currently     Alcohol/week: 0.0 oz     Comment: Socially    Drug use: No     Frequency: 3.0 times per week    Sexual activity: Not Currently     Partners: Male      Birth control/protection: None   Lifestyle    Physical activity:     Days per week: Not on file     Minutes per session: Not on file    Stress: Not on file   Relationships    Social connections:     Talks on phone: Not on file     Gets together: Not on file     Attends Muslim service: Not on file     Active member of club or organization: Not on file     Attends meetings of clubs or organizations: Not on file     Relationship status: Not on file   Other Topics Concern    Are you pregnant or think you may be? Not Asked    Breast-feeding Not Asked   Social History Narrative    Not on file       Review of Systems   Constitutional: Negative for fever.   HENT: Negative for congestion.    Eyes: Negative for pain.   Respiratory: Negative for cough and shortness of breath.    Cardiovascular: Negative for chest pain.   Gastrointestinal: Negative for abdominal distention, abdominal pain, blood in stool, diarrhea and nausea.   Endocrine: Negative for polyuria.   Genitourinary: Negative for dysuria.   Musculoskeletal: Negative for back pain.   Skin: Negative for rash.   Neurological: Negative for seizures.   Hematological: Negative for adenopathy.   Psychiatric/Behavioral: Negative for agitation.       Objective:      Physical Exam   Constitutional: She is oriented to person, place, and time. No distress.   Eyes: EOM are normal.   Neck: Neck supple.   Cardiovascular: Normal rate and regular rhythm.   Pulmonary/Chest: Effort normal. No respiratory distress.   Abdominal: Soft. She exhibits no distension. There is no tenderness.   RLQ ilieostomy site with stoma healthy and pink. Midline surgical incision well healed.    Musculoskeletal: Normal range of motion.   Neurological: She is alert and oriented to person, place, and time.   Skin: Skin is warm and dry.         Lab Results   Component Value Date    WBC 4.69 05/15/2019    HGB 8.6 (L) 05/15/2019    HCT 29.1 (L) 05/15/2019    MCV 78 (L) 05/15/2019     (H)  05/15/2019     [facility]  Lab Results   Component Value Date     05/15/2019    K 3.9 05/15/2019     05/15/2019    CO2 26 05/15/2019    BUN 12 05/15/2019    CREATININE 0.7 05/15/2019    CALCIUM 8.9 05/15/2019    ANIONGAP 7 (L) 05/15/2019    ESTGFRAFRICA >60.0 05/15/2019    EGFRNONAA >60.0 05/15/2019     CMP  Sodium   Date Value Ref Range Status   05/15/2019 141 136 - 145 mmol/L Final     Potassium   Date Value Ref Range Status   05/15/2019 3.9 3.5 - 5.1 mmol/L Final     Chloride   Date Value Ref Range Status   05/15/2019 108 95 - 110 mmol/L Final     CO2   Date Value Ref Range Status   05/15/2019 26 23 - 29 mmol/L Final     Glucose   Date Value Ref Range Status   05/15/2019 92 70 - 110 mg/dL Final     BUN, Bld   Date Value Ref Range Status   05/15/2019 12 6 - 20 mg/dL Final     Creatinine   Date Value Ref Range Status   05/15/2019 0.7 0.5 - 1.4 mg/dL Final     Calcium   Date Value Ref Range Status   05/15/2019 8.9 8.7 - 10.5 mg/dL Final     Total Protein   Date Value Ref Range Status   05/15/2019 7.5 6.0 - 8.4 g/dL Final     Albumin   Date Value Ref Range Status   05/15/2019 2.2 (L) 3.5 - 5.2 g/dL Final     Total Bilirubin   Date Value Ref Range Status   05/15/2019 0.2 0.1 - 1.0 mg/dL Final     Comment:     For infants and newborns, interpretation of results should be based  on gestational age, weight and in agreement with clinical  observations.  Premature Infant recommended reference ranges:  Up to 24 hours.............<8.0 mg/dL  Up to 48 hours............<12.0 mg/dL  3-5 days..................<15.0 mg/dL  6-29 days.................<15.0 mg/dL       Alkaline Phosphatase   Date Value Ref Range Status   05/15/2019 129 55 - 135 U/L Final     AST   Date Value Ref Range Status   05/15/2019 81 (H) 10 - 40 U/L Final     ALT   Date Value Ref Range Status   05/15/2019 78 (H) 10 - 44 U/L Final     Anion Gap   Date Value Ref Range Status   05/15/2019 7 (L) 8 - 16 mmol/L Final     eGFR if    Date Value  Ref Range Status   05/15/2019 >60.0 >60 mL/min/1.73 m^2 Final     eGFR if non    Date Value Ref Range Status   05/15/2019 >60.0 >60 mL/min/1.73 m^2 Final     Comment:     Calculation used to obtain the estimated glomerular filtration  rate (eGFR) is the CKD-EPI equation.        No results found for: CEA     MRE:            Multifocal active inflammatory Crohn's disease involving the distal ileum with a mild fibrostenotic component and entero-enteric fistula, as above.  No drainable fluid collection identified.       Assessment/Plan:        Ms Pualino is a 30 yo F recently diagnosed with Crohn's disease Feb 2019 with Ileal crohn's disease (stricture and enteroenteric fistula) now s/p Open ileocolic resection and end ileostomy with mucous fistula on 5/10/19 who presents for post op appointment.     Diet as tolerated  Stoma care, will discuss with stoma nurse  Follow-up with Dr Bacon to discuss medical management  Can discuss reversal surgery once assessed by GI    Harry Mckenna     I have interviewed and examined the patient, reviewed the notes and assessments, and/or personally supervised the procedure(s) performed by Dr Mceknna, and I concur with her/his documentation of Lora Scott.  See below addendum for my evaluation and additional findings.    Doing well postop, eating well and has gained significant weight back.  Managing her ostomy well. No significant pain. She still has not been back to see Gastroenterology to discuss maintenance therapy for her Crohn's disease.  She has an appointment with Dr. Bacon in November.  I will see her back after she has seen GI and has demonstrated compliance with maintenance therapy for her Crohn's disease to discuss proceeding with ileostomy closure at that time.      Uday Cope MD, FACS, FASCRS  Senior Staff Surgeon  Department of Colon & Rectal Surgery

## 2019-07-25 ENCOUNTER — TELEPHONE (OUTPATIENT)
Dept: GASTROENTEROLOGY | Facility: CLINIC | Age: 29
End: 2019-07-25

## 2019-07-25 NOTE — TELEPHONE ENCOUNTER
----- Message from Suzi Bacon MD sent at 7/25/2019  1:55 PM CDT -----  What happened? I thought she came over to get her appt scheduled but I still see that it is scheduled in 11/2019    SS

## 2019-07-26 ENCOUNTER — CLINICAL SUPPORT (OUTPATIENT)
Dept: DERMATOLOGY | Facility: CLINIC | Age: 29
End: 2019-07-26
Payer: COMMERCIAL

## 2019-07-26 DIAGNOSIS — L40.9 PSORIASIS: ICD-10-CM

## 2019-07-26 PROCEDURE — 96900 PR ULTRAVIOLET LIGHT THERAPY: ICD-10-PCS | Mod: S$GLB,,, | Performed by: DERMATOLOGY

## 2019-07-26 PROCEDURE — 96900 ACTINOTHERAPY UV LIGHT: CPT | Mod: S$GLB,,, | Performed by: DERMATOLOGY

## 2019-07-26 NOTE — PROGRESS NOTES
Light tx 1 for psoriasis. Goggles issued and unit safety discussed. NB-UVB to body at 200 mj with eyes shielded.

## 2019-07-27 ENCOUNTER — PATIENT MESSAGE (OUTPATIENT)
Dept: WOUND CARE | Facility: CLINIC | Age: 29
End: 2019-07-27

## 2019-07-29 ENCOUNTER — CLINICAL SUPPORT (OUTPATIENT)
Dept: DERMATOLOGY | Facility: CLINIC | Age: 29
End: 2019-07-29
Payer: COMMERCIAL

## 2019-07-29 DIAGNOSIS — L40.9 PSORIASIS: ICD-10-CM

## 2019-07-29 PROCEDURE — 96900 PR ULTRAVIOLET LIGHT THERAPY: ICD-10-PCS | Mod: S$GLB,,, | Performed by: DERMATOLOGY

## 2019-07-29 PROCEDURE — 96900 ACTINOTHERAPY UV LIGHT: CPT | Mod: S$GLB,,, | Performed by: DERMATOLOGY

## 2019-08-01 ENCOUNTER — PATIENT MESSAGE (OUTPATIENT)
Dept: WOUND CARE | Facility: CLINIC | Age: 29
End: 2019-08-01

## 2019-08-01 ENCOUNTER — CLINICAL SUPPORT (OUTPATIENT)
Dept: DERMATOLOGY | Facility: CLINIC | Age: 29
End: 2019-08-01
Payer: COMMERCIAL

## 2019-08-01 ENCOUNTER — PATIENT MESSAGE (OUTPATIENT)
Dept: SURGERY | Facility: CLINIC | Age: 29
End: 2019-08-01

## 2019-08-01 ENCOUNTER — PATIENT MESSAGE (OUTPATIENT)
Dept: GASTROENTEROLOGY | Facility: CLINIC | Age: 29
End: 2019-08-01

## 2019-08-01 VITALS — BODY MASS INDEX: 24.8 KG/M2 | WEIGHT: 135.56 LBS

## 2019-08-01 DIAGNOSIS — L40.9 PSORIASIS: ICD-10-CM

## 2019-08-01 PROCEDURE — 96900 PR ULTRAVIOLET LIGHT THERAPY: ICD-10-PCS | Mod: S$GLB,,, | Performed by: DERMATOLOGY

## 2019-08-01 PROCEDURE — 96900 ACTINOTHERAPY UV LIGHT: CPT | Mod: S$GLB,,, | Performed by: DERMATOLOGY

## 2019-08-01 NOTE — TELEPHONE ENCOUNTER
"Called and spoke with pt  Pt has a Ileostomy   She stated that today she actually had a BM from her rectum It was formed and light brown.  She stated she saw a "piece" of blood in the water floating on top and also a spot on the toilet paper.   Feels great otherwise.    She also wanted to let us know she is now 135 lbs (was 98 lbs)  "

## 2019-08-01 NOTE — TELEPHONE ENCOUNTER
Went and spoke with Kim in [facility]  She stated what pt experienced is normal and was not an actual BM.  Most likely mucous and blood build up     I called pt and explained message above  She then stated she just changed her bag and noticed the Stoma is bleeding.  She said when she wipes it blood is on the tissue and also comes right back to the surface.  It is not a lot of blood and she thinks it may be from her work belt rubbing the area.    I explained I will get this message over to Dr Griffith office and they will be in touch tomorrow

## 2019-08-05 ENCOUNTER — PATIENT MESSAGE (OUTPATIENT)
Dept: DERMATOLOGY | Facility: CLINIC | Age: 29
End: 2019-08-05

## 2019-08-12 ENCOUNTER — PATIENT MESSAGE (OUTPATIENT)
Dept: SURGERY | Facility: CLINIC | Age: 29
End: 2019-08-12

## 2019-08-12 ENCOUNTER — CLINICAL SUPPORT (OUTPATIENT)
Dept: DERMATOLOGY | Facility: CLINIC | Age: 29
End: 2019-08-12
Payer: COMMERCIAL

## 2019-08-12 ENCOUNTER — PATIENT MESSAGE (OUTPATIENT)
Dept: WOUND CARE | Facility: CLINIC | Age: 29
End: 2019-08-12

## 2019-08-12 DIAGNOSIS — L40.9 PSORIASIS: ICD-10-CM

## 2019-08-12 PROCEDURE — 96900 PR ULTRAVIOLET LIGHT THERAPY: ICD-10-PCS | Mod: S$GLB,,, | Performed by: DERMATOLOGY

## 2019-08-12 PROCEDURE — 96900 ACTINOTHERAPY UV LIGHT: CPT | Mod: S$GLB,,, | Performed by: DERMATOLOGY

## 2019-08-19 ENCOUNTER — PATIENT MESSAGE (OUTPATIENT)
Dept: WOUND CARE | Facility: CLINIC | Age: 29
End: 2019-08-19

## 2019-08-19 ENCOUNTER — PATIENT MESSAGE (OUTPATIENT)
Dept: SURGERY | Facility: CLINIC | Age: 29
End: 2019-08-19

## 2019-08-20 ENCOUNTER — OFFICE VISIT (OUTPATIENT)
Dept: WOUND CARE | Facility: CLINIC | Age: 29
End: 2019-08-20
Payer: COMMERCIAL

## 2019-08-20 DIAGNOSIS — Z43.2 ATTENTION TO ILEOSTOMY: Primary | ICD-10-CM

## 2019-08-20 DIAGNOSIS — K94.19 INTESTINAL STOMA PROLAPSE: ICD-10-CM

## 2019-08-20 PROCEDURE — 99024 POSTOP FOLLOW-UP VISIT: CPT | Mod: S$GLB,,, | Performed by: CLINICAL NURSE SPECIALIST

## 2019-08-20 PROCEDURE — 99024 PR POST-OP FOLLOW-UP VISIT: ICD-10-PCS | Mod: S$GLB,,, | Performed by: CLINICAL NURSE SPECIALIST

## 2019-08-20 PROCEDURE — 99999 PR PBB SHADOW E&M-EST. PATIENT-LVL II: CPT | Mod: PBBFAC,,, | Performed by: CLINICAL NURSE SPECIALIST

## 2019-08-20 PROCEDURE — 99999 PR PBB SHADOW E&M-EST. PATIENT-LVL II: ICD-10-PCS | Mod: PBBFAC,,, | Performed by: CLINICAL NURSE SPECIALIST

## 2019-08-20 NOTE — PROGRESS NOTES
Pt came for eval of stoma as it has prolapsed, I did not try to reduce but told her if it gets any bigger and if color should change to purple white or grey to let us know

## 2019-08-21 ENCOUNTER — NURSE TRIAGE (OUTPATIENT)
Dept: ADMINISTRATIVE | Facility: CLINIC | Age: 29
End: 2019-08-21

## 2019-08-21 ENCOUNTER — PATIENT MESSAGE (OUTPATIENT)
Dept: WOUND CARE | Facility: CLINIC | Age: 29
End: 2019-08-21

## 2019-08-21 ENCOUNTER — PATIENT MESSAGE (OUTPATIENT)
Dept: SURGERY | Facility: CLINIC | Age: 29
End: 2019-08-21

## 2019-08-21 PROCEDURE — 99282 EMERGENCY DEPT VISIT SF MDM: CPT

## 2019-08-21 PROCEDURE — 99284 PR EMERGENCY DEPT VISIT,LEVEL IV: ICD-10-PCS | Mod: ,,, | Performed by: PHYSICIAN ASSISTANT

## 2019-08-21 PROCEDURE — 99284 EMERGENCY DEPT VISIT MOD MDM: CPT | Mod: ,,, | Performed by: PHYSICIAN ASSISTANT

## 2019-08-22 ENCOUNTER — HOSPITAL ENCOUNTER (EMERGENCY)
Facility: HOSPITAL | Age: 29
Discharge: HOME | End: 2019-08-22
Attending: EMERGENCY MEDICINE
Payer: COMMERCIAL

## 2019-08-22 VITALS
RESPIRATION RATE: 16 BRPM | WEIGHT: 139 LBS | HEIGHT: 62 IN | SYSTOLIC BLOOD PRESSURE: 130 MMHG | HEART RATE: 75 BPM | DIASTOLIC BLOOD PRESSURE: 64 MMHG | BODY MASS INDEX: 25.58 KG/M2 | TEMPERATURE: 98 F | OXYGEN SATURATION: 100 %

## 2019-08-22 DIAGNOSIS — K92.9 DISORDER OF STOMA: Primary | ICD-10-CM

## 2019-08-22 NOTE — DISCHARGE INSTRUCTIONS
Follow-up with Ginger tomorrow morning in [facility] office  Return to the ER for any new symptoms    Our goal in the emergency department is to always give you outstanding care and exceptional service. You may receive a survey by mail or e-mail in the next week regarding your experience in our ED. We would greatly appreciate your completing and returning the survey. Your feedback provides us with a way to recognize our staff who give very good care and it helps us learn how to improve when your experience was below our aspiration of excellence.

## 2019-08-22 NOTE — ED PROVIDER NOTES
Encounter Date: 8/21/2019       History     Chief Complaint   Patient presents with    Stoma Problem     Patient reports a white spot on stoma and reports slight pain     29-year-old female to the ER for evaluation of an abnormal finding on her stoma.  Patient has Crohn's disease and has an ileostomy.  She reports outpouching of her stoma yesterday and was seen in clinic by the nurse.  The nurse did not attempt to reduce the stoma but did advised the patient to return if the stoma outpouching becomes worse or if she develops any white or gray spots.  This evening at work the patient noticed a white spot in the center for stoma.  No pain or irritation.  She denies any fevers chills nausea or vomiting.         Review of patient's allergies indicates:   Allergen Reactions    Shellfish containing products Anaphylaxis     Allergic to all seafood.    Horse/equine containing products Itching    Iodine      Swelling (throat)^     Past Medical History:   Diagnosis Date    Allergy     Anemia     Asthma     Crohn's disease 2/18/19    Crohn's disease involving terminal ileum     Depression     Eczema     Elevated liver function tests     Ex-smoker for less than 1 year     Joint pain     Skin rash      Past Surgical History:   Procedure Laterality Date    COLONOSCOPY N/A 2/20/2019    Performed by [facility] at [facility] ENDO (2ND FLR)    CREATION, ILEOSTOMY with mucous fistula N/A 5/10/2019    Performed by [facility] at [facility] OR [facility] FLR    EGD (ESOPHAGOGASTRODUODENOSCOPY) N/A 2/20/2019    Performed by F[facility]ad [facility]la [facility] at [facility] ENDO (2ND FLR)    ILEOCOLECTOMY N/A 5/10/2019    Performed by Uday Cope MD at [facility] OR [facility] FLR     Family History   Problem Relation Age of Onset    Hypertension Mother     Irritable bowel syndrome Paternal Grandmother     Celiac disease Neg Hx     Cirrhosis Neg Hx     Colon cancer Neg Hx     Colon polyps Neg Hx     Crohn's disease Neg Hx     Cystic fibrosis Neg  Hx     Esophageal cancer Neg Hx     Hemochromatosis Neg Hx     Inflammatory bowel disease Neg Hx     Liver cancer Neg Hx     Liver disease Neg Hx     Rectal cancer Neg Hx     Stomach cancer Neg Hx     Ulcerative colitis Neg Hx     Jerardo's disease Neg Hx     Lymphoma Neg Hx     Tuberculosis Neg Hx     Scleroderma Neg Hx     Rheum arthritis Neg Hx     Multiple sclerosis Neg Hx     Psoriasis Neg Hx     Lupus Neg Hx     Melanoma Neg Hx     Skin cancer Neg Hx      Social History     Tobacco Use    Smoking status: Former Smoker     Types: Cigarettes     Last attempt to quit: 2019     Years since quittin.5    Smokeless tobacco: Never Used    Tobacco comment: 2 CIGS PER DAY   Substance Use Topics    Alcohol use: Not Currently     Alcohol/week: 0.0 oz     Comment: Socially    Drug use: No     Frequency: 3.0 times per week     Review of Systems   Constitutional: Negative for fever.   HENT: Negative for sore throat.    Respiratory: Negative for shortness of breath.    Cardiovascular: Negative for chest pain.   Gastrointestinal: Negative for nausea.        Ileostomy,    Genitourinary: Negative for dysuria.   Musculoskeletal: Negative for back pain.   Skin: Negative for rash.   Neurological: Negative for weakness.   Hematological: Does not bruise/bleed easily.       Physical Exam     Initial Vitals [19 2131]   BP Pulse Resp Temp SpO2   (!) 141/74 102 18 98 °F (36.7 °C) 100 %      MAP       --         Physical Exam    Constitutional: Vital signs are normal. She appears well-developed and well-nourished.   HENT:   Head: Normocephalic and atraumatic.   Eyes: Conjunctivae are normal.   Cardiovascular: Normal rate and regular rhythm.   Abdominal: Soft. Normal appearance and bowel sounds are normal.   Ileostomy with normal stool production  White spot in the center of the stoma approximately 1 cm in diameter   Musculoskeletal: Normal range of motion.   Neurological: She is alert and oriented to  person, place, and time.   Skin: Skin is warm and intact.   Psychiatric: She has a normal mood and affect. Her speech is normal and behavior is normal. Cognition and memory are normal.             ED Course   Procedures  Labs Reviewed - No data to display       Imaging Results    None          Medical Decision Making:   History:   Old Medical Records: I decided to obtain old medical records.  Old Records Summarized: other records.  Initial Assessment:   29-year-old female with Crohn's disease with an ileostomy presenting to the ER for evaluation of a possible abnormality with her stoma  Differential Diagnosis:   Stoma irritation, ischemia, prolapse  ED Management:  Case discussed with [facility] resident, picture of the stoma was taken and up loaded into the computer system,   CRS resident was able to use the picture.  They are not concern for bowel ischemia.  There is normal output from the stoma.   The patient will be discharged home to follow up in the morning in [facility] Clinic  Other:   I discussed test(s) with the performing physician.                      Clinical Impression:       ICD-10-CM ICD-9-CM   1. Disorder of stoma K92.9 997.49         Disposition:   Disposition: Discharged  Condition: Stable                        Jere Fajardo PA-C  08/22/19 0127

## 2019-08-22 NOTE — TELEPHONE ENCOUNTER
Reason for Disposition   Patient already left for the hospital/clinic.    Additional Information   Negative: Caller is angry or rude (e.g., hangs up, verbally abusive, yelling)   Negative: Caller hangs up   Negative: Caller has already spoken with the PCP and has no further questions.   Negative: Caller has already spoken with another triager and has no further questions.   Negative: Caller has already spoken with another triager or PCP AND has further questions AND triager able to answer questions.   Negative: Busy signal.  First attempt to contact caller.  Follow-up call scheduled within 15 minutes.   Negative: No answer.  First attempt to contact caller.  Follow-up call scheduled within 15 minutes.   Negative: Message left on identified voice mail   Negative: Message left on unidentified voice mail.  Phone number verified.   Negative: Message left with person in household.   Negative: Wrong number reached.  Phone number verified.   Negative: Second attempt to contact family AND no contact made.  Phone number verified.   Negative: Cell phone out of range.  Phone number verified.   Negative: Pager number given.  Answering service notified.    Protocols used: NO CONTACT OR DUPLICATE CONTACT CALL-A-

## 2019-09-03 ENCOUNTER — PATIENT MESSAGE (OUTPATIENT)
Dept: WOUND CARE | Facility: CLINIC | Age: 29
End: 2019-09-03

## 2019-09-04 ENCOUNTER — LAB VISIT (OUTPATIENT)
Dept: LAB | Facility: HOSPITAL | Age: 29
End: 2019-09-04
Attending: INTERNAL MEDICINE
Payer: COMMERCIAL

## 2019-09-04 ENCOUNTER — PATIENT MESSAGE (OUTPATIENT)
Dept: GASTROENTEROLOGY | Facility: CLINIC | Age: 29
End: 2019-09-04

## 2019-09-04 ENCOUNTER — OFFICE VISIT (OUTPATIENT)
Dept: GASTROENTEROLOGY | Facility: CLINIC | Age: 29
End: 2019-09-04
Payer: COMMERCIAL

## 2019-09-04 ENCOUNTER — TELEPHONE (OUTPATIENT)
Dept: GASTROENTEROLOGY | Facility: CLINIC | Age: 29
End: 2019-09-04

## 2019-09-04 ENCOUNTER — TELEPHONE (OUTPATIENT)
Dept: ENDOSCOPY | Facility: HOSPITAL | Age: 29
End: 2019-09-04

## 2019-09-04 ENCOUNTER — TELEPHONE (OUTPATIENT)
Dept: PHARMACY | Facility: CLINIC | Age: 29
End: 2019-09-04

## 2019-09-04 VITALS
BODY MASS INDEX: 25.15 KG/M2 | SYSTOLIC BLOOD PRESSURE: 106 MMHG | WEIGHT: 136.69 LBS | DIASTOLIC BLOOD PRESSURE: 73 MMHG | OXYGEN SATURATION: 100 % | RESPIRATION RATE: 16 BRPM | HEIGHT: 62 IN | TEMPERATURE: 99 F | HEART RATE: 92 BPM

## 2019-09-04 DIAGNOSIS — E55.9 VITAMIN D DEFICIENCY: Primary | ICD-10-CM

## 2019-09-04 DIAGNOSIS — K50.013 CROHN'S DISEASE OF ILEUM WITH FISTULA: Primary | ICD-10-CM

## 2019-09-04 DIAGNOSIS — E55.9 VITAMIN D DEFICIENCY: ICD-10-CM

## 2019-09-04 DIAGNOSIS — K50.00 CROHN'S DISEASE OF SMALL INTESTINE WITHOUT COMPLICATION: ICD-10-CM

## 2019-09-04 LAB
25(OH)D3+25(OH)D2 SERPL-MCNC: 12 NG/ML (ref 30–96)
ALBUMIN SERPL BCP-MCNC: 3.7 G/DL (ref 3.5–5.2)
ALP SERPL-CCNC: 108 U/L (ref 55–135)
ALT SERPL W/O P-5'-P-CCNC: 16 U/L (ref 10–44)
ANION GAP SERPL CALC-SCNC: 8 MMOL/L (ref 8–16)
AST SERPL-CCNC: 20 U/L (ref 10–40)
BASOPHILS # BLD AUTO: 0.02 K/UL (ref 0–0.2)
BASOPHILS NFR BLD: 0.5 % (ref 0–1.9)
BILIRUB SERPL-MCNC: 0.4 MG/DL (ref 0.1–1)
BUN SERPL-MCNC: 6 MG/DL (ref 6–20)
CALCIUM SERPL-MCNC: 9.3 MG/DL (ref 8.7–10.5)
CHLORIDE SERPL-SCNC: 104 MMOL/L (ref 95–110)
CO2 SERPL-SCNC: 24 MMOL/L (ref 23–29)
CREAT SERPL-MCNC: 0.8 MG/DL (ref 0.5–1.4)
DIFFERENTIAL METHOD: ABNORMAL
EOSINOPHIL # BLD AUTO: 0.4 K/UL (ref 0–0.5)
EOSINOPHIL NFR BLD: 9.1 % (ref 0–8)
ERYTHROCYTE [DISTWIDTH] IN BLOOD BY AUTOMATED COUNT: 14.9 % (ref 11.5–14.5)
EST. GFR  (AFRICAN AMERICAN): >60 ML/MIN/1.73 M^2
EST. GFR  (NON AFRICAN AMERICAN): >60 ML/MIN/1.73 M^2
GLUCOSE SERPL-MCNC: 101 MG/DL (ref 70–110)
HBV CORE AB SERPL QL IA: NEGATIVE
HBV SURFACE AG SERPL QL IA: NEGATIVE
HCT VFR BLD AUTO: 38.2 % (ref 37–48.5)
HGB BLD-MCNC: 12.1 G/DL (ref 12–16)
IMM GRANULOCYTES # BLD AUTO: 0 K/UL (ref 0–0.04)
IMM GRANULOCYTES NFR BLD AUTO: 0 % (ref 0–0.5)
LYMPHOCYTES # BLD AUTO: 1.1 K/UL (ref 1–4.8)
LYMPHOCYTES NFR BLD: 29.3 % (ref 18–48)
MCH RBC QN AUTO: 29.5 PG (ref 27–31)
MCHC RBC AUTO-ENTMCNC: 31.7 G/DL (ref 32–36)
MCV RBC AUTO: 93 FL (ref 82–98)
MONOCYTES # BLD AUTO: 0.2 K/UL (ref 0.3–1)
MONOCYTES NFR BLD: 6 % (ref 4–15)
NEUTROPHILS # BLD AUTO: 2.1 K/UL (ref 1.8–7.7)
NEUTROPHILS NFR BLD: 55.1 % (ref 38–73)
NRBC BLD-RTO: 0 /100 WBC
PLATELET # BLD AUTO: 294 K/UL (ref 150–350)
PMV BLD AUTO: 10.7 FL (ref 9.2–12.9)
POTASSIUM SERPL-SCNC: 4 MMOL/L (ref 3.5–5.1)
PROT SERPL-MCNC: 8.2 G/DL (ref 6–8.4)
RBC # BLD AUTO: 4.1 M/UL (ref 4–5.4)
SODIUM SERPL-SCNC: 136 MMOL/L (ref 136–145)
VIT B12 SERPL-MCNC: 216 PG/ML (ref 210–950)
WBC # BLD AUTO: 3.86 K/UL (ref 3.9–12.7)

## 2019-09-04 PROCEDURE — 82306 VITAMIN D 25 HYDROXY: CPT

## 2019-09-04 PROCEDURE — 85025 COMPLETE CBC W/AUTO DIFF WBC: CPT

## 2019-09-04 PROCEDURE — 99999 PR PBB SHADOW E&M-EST. PATIENT-LVL IV: CPT | Mod: PBBFAC,,, | Performed by: INTERNAL MEDICINE

## 2019-09-04 PROCEDURE — 99215 PR OFFICE/OUTPT VISIT, EST, LEVL V, 40-54 MIN: ICD-10-PCS | Mod: S$GLB,,, | Performed by: INTERNAL MEDICINE

## 2019-09-04 PROCEDURE — 99999 PR PBB SHADOW E&M-EST. PATIENT-LVL IV: ICD-10-PCS | Mod: PBBFAC,,, | Performed by: INTERNAL MEDICINE

## 2019-09-04 PROCEDURE — 80053 COMPREHEN METABOLIC PANEL: CPT

## 2019-09-04 PROCEDURE — 86704 HEP B CORE ANTIBODY TOTAL: CPT

## 2019-09-04 PROCEDURE — 82607 VITAMIN B-12: CPT

## 2019-09-04 PROCEDURE — 99215 OFFICE O/P EST HI 40 MIN: CPT | Mod: S$GLB,,, | Performed by: INTERNAL MEDICINE

## 2019-09-04 PROCEDURE — 87340 HEPATITIS B SURFACE AG IA: CPT

## 2019-09-04 RX ORDER — FOLIC ACID 1 MG/1
1 TABLET ORAL DAILY
Qty: 30 TABLET | Refills: 11 | Status: SHIPPED | OUTPATIENT
Start: 2019-09-04 | End: 2020-03-12

## 2019-09-04 RX ORDER — ADALIMUMAB 40MG/0.8ML
KIT SUBCUTANEOUS
Qty: 6 PEN | Refills: 0 | Status: ON HOLD | OUTPATIENT
Start: 2019-09-04 | End: 2020-02-14

## 2019-09-04 RX ORDER — METHOTREXATE 2.5 MG/1
12.5 TABLET ORAL
Qty: 20 TABLET | Refills: 0 | Status: SHIPPED | OUTPATIENT
Start: 2019-09-04 | End: 2020-03-12

## 2019-09-04 RX ORDER — ADALIMUMAB 40MG/0.8ML
40 KIT SUBCUTANEOUS
Qty: 2 PEN | Refills: 5 | Status: SHIPPED | OUTPATIENT
Start: 2019-09-04 | End: 2020-02-27

## 2019-09-04 RX ORDER — ERGOCALCIFEROL 1.25 MG/1
50000 CAPSULE ORAL
Qty: 4 CAPSULE | Refills: 5 | Status: SHIPPED | OUTPATIENT
Start: 2019-09-04 | End: 2019-11-21

## 2019-09-04 NOTE — PROGRESS NOTES
"     [facility]             Inflammatory Bowel Disease Follow-up  Note              TODAY'S VISIT DATE:  9/4/2019    Chief Complaint:   Crohn's disease    PCP: Margarita Jay    Previous History:  Lora Scott is a 29 y.o. female with Crohn's disease (focal area of microscopic inflammation in ascending colon on biopsies, ileal with stricture).  She has a past medical history of GERD, anemia, asthma, smoker.  Patient reports being diagnosed with IBS with alternating diarrhea and constipation and weight loss as a teenager that was treated with Nexium which helped.  In 8/2018 she again had abd pain, weight loss, and alternating BMs so she contacted her PCP for Nexium which did not help.  By 2/2019, her symptoms progressed prompting an ER visit and on 2/18/2019 she had a CT abd/pelvis which showed an "abnormal wall thickening involving the ascending colon, cecum and terminal ileum with intense mucosal enhancement and luminal narrowing resulting in distention of proximal small bowel.  There was also an ileal-ileal and ileocolic fistulization, fat hypertrophy, increased mesenteric vascularitiy, and reactive mesenteric adenitis in the RLQ with no abscess. "  Patient had an inpatient EGD on 2/20/2019 which showed minimal chronic H. Pylori negative inflammation, normal esophagus, and duodenal biopsies showed mild villous blunting and mild increased IELs.  Colonoscopy on the same day showed congested mucosa in the TI and ICV with granularity in the TI, and TI stricture 5 cm from the ICV with ileal biopsies confirmed ileitis and biopsies of the right colon confirmed focal moderate active colitis with normal left colon and rectum, sigmoid diverticulitis, and internal hemorrhoids.  It was felt that patient would eventually need surgical intervention but needed nutritional optimization initially so she was discharged home with 12 hours of TPN daily via a PICC and bowel rest with plans for an MRE, surgical " f/u, and a referral to the IBD clinic to discuss medical options.  She was last seen by me 3/2019 for initial IBD visit at which time she was on no meds and there was plan for MRE 3/13/19 with f/u with Dr. Ruiz. MRE on 3/13/19 showed multifocal active inflammation of the distal ileum with mild fibrostenotic component and entero-enteric fistula.  She then was transferred to Dr. Cope due to availability and he initially saw her in clinic and a decision to proceed with surgery was made.  On 5/10/19 she underwent an open ileocolonic resection (10 cm of cecum, 42 cm of ileum removed) with end ileostomy with mucous fistula. Surgical pathology showed TI/cecum with chronic active ileitis with stricture and features of fistulization, transmural inflammation with reactive LNs. Postoperatively she did well and gained weight and would pass mucous from below a few times only. She emptied her stoma about 4-6 times/d and was having issues getting supplies due to insurance issues.      Interval History:  - current IBD meds: none  - ileostomy output:  2 full bags of brown soft ostomy output with no blood  - multiple issues with stoma appliance/bag- bag does not stick, in ER for white lesions on stoma frequently, can't afford supplies and not covered by insurance, prolapsed stoma- Ginger giving samples  - continues to smoke cigarettes- 3 cigs/day, quit for a few mos during hospitalizations between Feb and May, restarted smoking in June 2019  - eye pain/redness due to contacts, sees eye doctor  - Rash around stoma- sees ostomy nurse  - psoriasis- elbows, lower extremities, trunk/back  - anxious  - joint pain-elbows, knees/back pain- lower back  - NSAID use: No  - Narcotic use: No  - Alternative/complementary meds for IBD: No    Prior Pertinent Surgeries:   5/10/19 open ileocolonic resection (10 cm of cecum, 42 cm of ileum removed) with end ileostomy with mucous fistula. Surgical pathology showed TI/cecum with chronic active ileitis  with stricture and features of fistulization, transmural inflammation with reactive LNs    Pertinent Endoscopy/Imagin2019 CT abd/pelvis: abnormal wall thickening involving the ascending colon, cecum, and terminal ileum with intense mucosal enhancement and luminal narrowing resulting in distention of proximal small bowel; ileal-ileal and ileocolic fistulization, fat hypertropy, and increased mesenteric vascularity; reactive mesenteric adenitis in the RLQ; no abscess; appendix not seen; associated partial SBO  2019 Xray abdomen: gas-filled large and small bowel loops without significant dilation; stool projects over the rectum; no calcifications to suggest nephrolithiasis; lower lung zones are grossly clear; no acute osseous abnormality   2019 CXR: abnormal portion of left-sided PICC line with the tip coiled within the left brachiocephalic vein; repositioning suggested   2019 EGD: normal examined duodenum (path: mild villous blunting and mild increased intraepithelial lymphocytes); normal stomach (path: minimal chronic H. pylori negative inflammation); normal esophagus  2019 Colonoscopy: congested mucosa in the terminal ileum and ICV; granularity in the terminal ileum; stricture in the terminal ileum 5 cm from the ICV (path: chronic ileitis); entire examined colon (path-right colon: focal moderate active colitis) (path-left colon, rectum: normal); diverticulosis in the sigmoid colon; non-bleeding internal hemorrhoids   3/13/19 MRE:  multifocal active inflammation of the distal ileum with mild fibrostenotic component and entero-enteric fistula    Pertinent Labs:  Lab Results   Component Value Date    SEDRATE 60 (H) 2019    .7 (H) 2019     Lab Results   Component Value Date    TTGIGA 7 2019     (H) 2019     Lab Results   Component Value Date    TSH 0.713 2015     Lab Results   Component Value Date    NGCCYRFY81KP 14 (L) 2019    MPQCCRKV33 545  03/11/2019     Lab Results   Component Value Date    HEPBSAG Negative 02/19/2019    HEPBCAB Negative 02/19/2019    HEPCAB Negative 02/19/2019     Lab Results   Component Value Date    WLA13IQAT Negative 02/19/2019     Lab Results   Component Value Date    NIL 0.040 02/19/2019    MITOGENNIL 0.710 02/19/2019     Lab Results   Component Value Date    TPTMINTERP SEE BELOW 02/19/2019     Lab Results   Component Value Date    STOOLCULTURE  08/19/2015     No Salmonella,Shigella,Vibrio,Campylobacter,Yersinia isolated.    XUTLFHGZBA4R Negative 08/19/2015    TNEOQEFWCH9Z Negative 08/19/2015     No results found for: CALPROTECTIN    Therapeutic Drug Monitoring Labs:  NA    Prior IBD Therapies:  None    Vaccinations:  Lab Results   Component Value Date    HEPBSAB Grayzone (A) 02/19/2019     Lab Results   Component Value Date    HEPAIGG Positive (A) 02/19/2019     Lab Results   Component Value Date    VARICELLAZOS 2.58 (H) 03/11/2019    VARICELLAINT Positive (A) 03/11/2019     Immunization History   Administered Date(s) Administered    DTP 1991, 03/18/1992, 03/18/1992, 01/12/1994    HIB 1991, 02/24/1992, 03/18/1992    Hepatitis B (recombinant) Adjuvanted, 2 dose 04/17/2019, 05/20/2019    Hepatitis B, Pediatric/Adolescent 07/24/2006    IPV 07/24/2006    MMR 01/13/1994, 07/24/2006    Meningococcal Conjugate (MCV4P) 07/24/2006    OPV 1991, 02/24/1992, 01/12/1994    Pneumococcal Conjugate - 13 Valent 04/17/2019    Pneumococcal Polysaccharide - 23 Valent 06/20/2019    Td (ADULT) 07/24/2006    Td (Adult), Unspecified Formulation 07/24/2006    Tdap 04/17/2019     Influenza (inactive):    Pneumococcal PCV 13: 4/17/19  Pneumococcal PCV 23: 6/20/19  Tetanus (TdaP): 7/24/06  HPV (males and females ages 19-25 yo):      Meningococcal: 7/24/06  Hepatitis B:  7/24/06, 4/17/19, 5/20/19  Hepatitis A:    MMR (live vaccine):  1/13/94, 7/24/06      Chickenpox status/Varicella (live vaccine): immune  Shingrix:  "recommended after age 49 yo    Review of Systems   Constitutional: Negative for chills, fever and weight loss.   HENT:        No oral ulcers, dysphagia, oral thrush   Eyes: Positive for pain and redness. Negative for blurred vision.   Respiratory: Negative for cough and shortness of breath.    Cardiovascular: Negative for chest pain.   Gastrointestinal: Negative for abdominal pain, heartburn, nausea and vomiting.   Genitourinary: Negative for dysuria and hematuria.   Musculoskeletal: Positive for back pain and joint pain.   Skin: Positive for rash.   Psychiatric/Behavioral: Negative for depression. The patient is nervous/anxious. The patient does not have insomnia.      All Medical History/Surgical History/Family History/Social History/Allergies have been reviewed and updated in EMR    Review of patient's allergies indicates:   Allergen Reactions    Shellfish containing products Anaphylaxis     Allergic to all seafood.    Horse/equine containing products Itching    Iodine      Swelling (throat)^     No outpatient medications have been marked as taking for the 9/4/19 encounter (Office Visit) with Suzi Bacon MD.       Vital Signs:  /73 (BP Location: Left arm, Patient Position: Sitting)   Pulse 92   Temp 98.5 °F (36.9 °C)   Resp 16   Ht 5' 2" (1.575 m)   Wt 62 kg (136 lb 11 oz)   LMP 09/04/2019   SpO2 100%   BMI 25.00 kg/m²     Physical Exam   Constitutional: She is oriented to person, place, and time. She appears well-developed.   HENT:   Mouth/Throat: Oropharynx is clear and moist. No oral lesions.   No oral ulcers or thrush   Eyes: Pupils are equal, round, and reactive to light. Conjunctivae are normal.   Cardiovascular: Normal rate and regular rhythm.   Pulmonary/Chest: Effort normal and breath sounds normal.   Abdominal: Soft. There is no tenderness.   Musculoskeletal:        Right lower leg: She exhibits no swelling.        Left lower leg: She exhibits no swelling.   Neurological: She is " alert and oriented to person, place, and time.   Skin: No rash noted.   psoriasis   Psychiatric: She has a normal mood and affect.   Nursing note and vitals reviewed.    Labs:   Lab Results   Component Value Date    WBC 4.69 05/15/2019    HGB 8.6 (L) 05/15/2019    HCT 29.1 (L) 05/15/2019    MCV 78 (L) 05/15/2019     (H) 05/15/2019     Lab Results   Component Value Date    CREATININE 0.7 05/15/2019    ALBUMIN 2.2 (L) 05/15/2019    BILITOT 0.2 05/15/2019    ALKPHOS 129 05/15/2019    AST 81 (H) 05/15/2019    ALT 78 (H) 05/15/2019     Lab Results   Component Value Date    NIL 0.040 02/19/2019    MITOGENNIL 0.710 02/19/2019       Assessment/Plan:  Lora Scott is a 29 y.o. female with ileal Crohn's disease (S/P ileocecal resection-42 cm of ileum/10.5 cm cecum removed with surgical path c/w stricture and fistula), psoriasia (failed light treatment, followed by Dr. Martinez) who is naive to all medical treatments.  She has ileostomy and mucus fistula with plans to do ileostomy takedown.  Pt has multiple issues with her stoma including inability to afford stoma supplies, bag not sticking, prolapsed stoma.  She needs ileostomy takedown though she continues to smoke cigarettes and I want to ensure compliance with f/u and meds before we do ileostomy takedown preferably.  Given she has significant psoriasis and Crohn's disease we need to find medication that will help both conditions. She has high risk of recurrence if she continues to smoke cigarettes and has been told to stop smoking before next visit.  We discussed in detail to start humira and MTX 12.5 mg po weekly /folic acid (not going to get pregnant and if sexually active will comply with birth control, confirmed today).  We will start insurance authorization for meds. She will start methotrexate/folic acid and get labs every 2 weeks with f/u in 2 mos with me. Plans for ileoscopy through stoma 6 mos after surgery in 11/2019 and hopeful if pt compliant and scope  normal to get ileostomy takedown by end 11/2019/early 12/2019.    # Ileal Crohn's disease with end ileostomy and mucous fistula (S/P ileocecal resection-42 cm of ileum/10.5 cm cecum removed with surgical path c/w stricture and fistula)   - discussed MOA, risks, route/dose of humira  - discussed MOA, risks, route/dose of methotrexate  - pt signed paperwork for humira and script sent to [facility] for approval  - start MTX 12.5 mg po weekly with folic acid 1 mg daily  - ileoscopy through stoma 11/2019  - probable ileostomy takedown end 11/2019/early 12/2019 if ileoscopy normal and pt compliant with our recommendations  - ostomy issues: may prompt us to do ileostomy takedown sooner  - smoker:  Discussed in detail that she must stop smoking given this increases risk of recurrent Crohn's disease and also makes her more likely not to respond to treatment  - psoriasis:  Significant and active; light therapy not effective, followed by Dr. Martinez, getting approval for humira and hopeful this will help prevent recurrent Crohn's disease and treat psoriasis  - Basic labs: CBC/CMP, HCV, HIV  - Drug  Monitoring labs:  TPMT (heterozygote 2/2019), TB quantiferon (negative 2/2019), Hep B testing (HBsAg, HBcAb negative 2/2019)    # IBD specific health maintenance:  Eye exam yearly- next due now  Skin exam yearly - pt will get this with her dermatologist   Pap smear yearly once on chronic immunosuppression  Vitamin D deficiency (3/2019 vit D 14)- not supplemented and will repeat today and then consider starting high dose vit D3  Vaccines: no live vaccines, seen by ID in past, UTD but will need flu shot now and reminded to get this done    Follow up: 2 months    Total visit time was 45 minutes, more than 50% of which was spent in face-to-face counseling with patient regarding evaluation and management goals and treatment options for Crohn's disease     Suzi Bacon MD  Department of Gastroenterology  Medical Director,  Inflammatory Bowel Disease

## 2019-09-04 NOTE — PATIENT INSTRUCTIONS
Instructions:  - labs today  - sign humira paperwork  - humira sent to specialty pharmacy- if you don't hear from them in 1-2 weeks then let us know  - ileoscopy through stoma in early 2019  - start methotrexate 12.5 mg (5 pills) once a week, folic acid 1 mg daily  - do not get pregnant, practice birth control and if plans on getting pregnant we have to stop methotrexate 3 mos prior  - standing labs CBC/CMP every 2 weeks for 1 month- schedule at [facility]  - stop smoking immediately due to risk with Crohn's disease  - always let Dr. Bacon know if you have any infection or are given antibiotics  - avoid NSAIDs (ibuprofen, aleve, motrin, advil, naproxen, etc), tylenol is okay  - make sure you get full body skin exam with Dr. Martinez yearly, wear sunblock, hats, etc  - eye exam every year  - flu shot due now  - follow up in 2 mos    Adalimumab (Humira): Biologics - Anti-TNF Agent  - Mechanism of action:  humira blocks TNF alpha which plays a role in the inflammatory process for inflammatory bowel disease    Humira Dosage:  - Loading Dose: 160 mg Subcutaneous on week 0 (4 pens or pre-filled syringes), 80 mg SC on week 2 (2 pens or prefilled syringes),   - Maintenance Dosin mg SC every other week (1 pen or prefilled syringe)    Risks of Humira:  Stop therapy due to adverse event= 10% (10/100)    Please call us immediately if you develop any of the below problems    Allergic reactions  - <2% (2/100) develop injection site reactions  - RARE- hives (rash), difficulty breathing, chest pain/tightness, high or low blood pressure, swelling of the face and hands, fever or chills    Serious Infections= 3% (3/100)  fever, tiredness, flu, open sores, warm red painful skin    Blood Disorders  fever that doesn't go away, bruising, bleeding, severe paleness    Non-Hodgkins Lymphoma=0.06% (6/10,000)    Drug related lupus-like reaction= 1% (1/100)  chest discomfort or pain that does not go away, shortness of breath, joint  pain, rash on the cheeks or arms that gets worse in the sun    Psoriasis  new or worsening red scaly patches or raised bumps on the skin that are filled with pus     Case Reports Only: Multiple Sclerosis and Guillain [place] Syndrome  Numbness/weakness/tingling of your hands and feet, changes in your vision or seizures    Case Reports Only: New or worsening Congestive Heart Failure  shortness of breath, swelling in your ankles or feet, sudden weight gain    Case Reports Only: Serious Liver Injury  jaundice (yellow skin or eyes), dark brown urine, right-sided abdominal pain, fever, severe itchiness    - All Immunizations ideally should be up to date prior to starting therapy--NO LIVE vaccines during therapy or 8 weeks prior to therapy  - Live Vaccines Include:   --Intranasal Influenza A/B  --Measles, Mumps, Rubella (MMR)  --Rotavirus  --Typhoid (oral)  --Vaccina (Smallpox)  --Varicella (Chicken Pox)  --Yellow Fever  --Zoster    Methotrexate:  - we are starting this to prevent antibodies from forming to the humira with highest risk being in first 6-12 months of taking humira  - take methotrexate 12.5 mg orally once weekly at bedtime to minimize any side effects  - methotrexate can decrease your folic acid--take folic acid 1 mg daily    Common SE:   ?Gastrointestinal problems, such as nausea, stomach upset, and loose stools  ?Stomatitis or soreness of the mouth  ?Abnormal liver chemistries   ?Kidney problems  ?Rash- photosensivity- wear sunblock  ?Headache, fatigue, or impaired ability to concentrate  ?Hair loss- rare  ?Fever, which is drug-related  ?Hematologic abnormalities- abnormal blood counts

## 2019-09-04 NOTE — TELEPHONE ENCOUNTER
----- Message from Szui Bacon MD sent at 9/4/2019 12:43 PM CDT -----  Please call pt and let her know that her vit D level is very low and I have sent prescription for vit D high dose 50,000 IU once a week. Make sure she understands that she is to take this once a week and can do it the same day as her MTX if she prefers.     SS

## 2019-09-04 NOTE — TELEPHONE ENCOUNTER
Called patient and discussed low vitamin-D and vitamin B12 levels.  She asked that I sent to patient portal message with the same information so I will do so.  She is to start high-dose vitamin-D once weeks for the next couple months and refills have been given.  She will start over-the-counter vitamin B12 a 1000 mcg sublingual daily.

## 2019-09-12 NOTE — TELEPHONE ENCOUNTER
Contacted patient to discuss financial assistance options. She is unsure if she has/will have coverage through her new job. Advised that we can help her apply to the  assistance program but if she has coverage that will be active soon it would be more appropriate to wait. She reports she will check with her job to see if she can get some info. Will continue to follow up.

## 2019-09-13 ENCOUNTER — PATIENT MESSAGE (OUTPATIENT)
Dept: GASTROENTEROLOGY | Facility: CLINIC | Age: 29
End: 2019-09-13

## 2019-09-13 NOTE — TELEPHONE ENCOUNTER
Called & spoke to pt. Informed me that she picked up her Vit D RX & will begin taking on Tues w/ her MTX. Pt asked what the other med was the Dr Bacon wants her to start & to also send her a portal message w/ the info. Informed her that Dr Bacon wants her to take sublingual Vit B-12 1000 mcg QD. Pt expressed understanding.

## 2019-09-19 NOTE — TELEPHONE ENCOUNTER
Contacted patient to see if she had any new info about her insurance. She said she has to go in to file paperwork to get the coverage and it may take a while. We will proceed with [employer] application in the meantime. Will email the jocelyne and she will return with 2018 for 1040. Will continue to follow up.

## 2019-09-24 ENCOUNTER — TELEPHONE (OUTPATIENT)
Dept: GASTROENTEROLOGY | Facility: CLINIC | Age: 29
End: 2019-09-24

## 2019-09-24 NOTE — TELEPHONE ENCOUNTER
Called and spoke with Madie   She stated pt had lost her insurance and the forms need to be filled back out.    Will await fax and once received I will have Dr Bacon sign

## 2019-09-24 NOTE — TELEPHONE ENCOUNTER
----- Message from Madie Hawkins sent at 9/24/2019 11:38 AM CDT -----  Kyree Bacon and Staff,    I faxed over the provider portion of the patient's Humira assistance application to [phone] on 9/20. Do you know if you received this? We have not received it back.    Thank you,  Madie Hawkins  Patient Care Advocate  [facility]  Ph: [phone]

## 2019-09-27 NOTE — TELEPHONE ENCOUNTER
Contacted patient to check status of Humira assistance application. She reports she has been unable to get to a computer to print the application. Advised she could go to a local library of [employer] and print the documents from her email there. Will continue to follow up.

## 2019-09-29 ENCOUNTER — PATIENT MESSAGE (OUTPATIENT)
Dept: ENDOSCOPY | Facility: HOSPITAL | Age: 29
End: 2019-09-29

## 2019-09-30 NOTE — TELEPHONE ENCOUNTER
Called and spoke with pt  I stated I was calling in reference to her portal  She wanted to know if the surgery that was scheduled on 11/15 was her reversal.   I explained No we are scoping her that day through her Stoma and the appoint on 11/21 is her follow up from the scope   She expressed understanding and appreciated the call

## 2019-10-04 ENCOUNTER — LAB VISIT (OUTPATIENT)
Dept: LAB | Facility: HOSPITAL | Age: 29
End: 2019-10-04
Attending: INTERNAL MEDICINE
Payer: COMMERCIAL

## 2019-10-04 DIAGNOSIS — K50.00 CROHN'S DISEASE OF SMALL INTESTINE WITHOUT COMPLICATION: ICD-10-CM

## 2019-10-04 LAB
ALBUMIN SERPL BCP-MCNC: 4.1 G/DL (ref 3.5–5.2)
ALP SERPL-CCNC: 141 U/L (ref 55–135)
ALT SERPL W/O P-5'-P-CCNC: 85 U/L (ref 10–44)
ANION GAP SERPL CALC-SCNC: 8 MMOL/L (ref 8–16)
AST SERPL-CCNC: 39 U/L (ref 10–40)
BASOPHILS # BLD AUTO: 0.02 K/UL (ref 0–0.2)
BASOPHILS NFR BLD: 0.5 % (ref 0–1.9)
BILIRUB SERPL-MCNC: 0.4 MG/DL (ref 0.1–1)
BUN SERPL-MCNC: 24 MG/DL (ref 6–20)
CALCIUM SERPL-MCNC: 9.8 MG/DL (ref 8.7–10.5)
CHLORIDE SERPL-SCNC: 107 MMOL/L (ref 95–110)
CO2 SERPL-SCNC: 18 MMOL/L (ref 23–29)
CREAT SERPL-MCNC: 1.1 MG/DL (ref 0.5–1.4)
DIFFERENTIAL METHOD: ABNORMAL
EOSINOPHIL # BLD AUTO: 0.2 K/UL (ref 0–0.5)
EOSINOPHIL NFR BLD: 4.7 % (ref 0–8)
ERYTHROCYTE [DISTWIDTH] IN BLOOD BY AUTOMATED COUNT: 15 % (ref 11.5–14.5)
EST. GFR  (AFRICAN AMERICAN): >60 ML/MIN/1.73 M^2
EST. GFR  (NON AFRICAN AMERICAN): >60 ML/MIN/1.73 M^2
GLUCOSE SERPL-MCNC: 73 MG/DL (ref 70–110)
HCT VFR BLD AUTO: 42.1 % (ref 37–48.5)
HGB BLD-MCNC: 13 G/DL (ref 12–16)
IMM GRANULOCYTES # BLD AUTO: 0 K/UL (ref 0–0.04)
IMM GRANULOCYTES NFR BLD AUTO: 0 % (ref 0–0.5)
LYMPHOCYTES # BLD AUTO: 1.3 K/UL (ref 1–4.8)
LYMPHOCYTES NFR BLD: 31.5 % (ref 18–48)
MCH RBC QN AUTO: 29.7 PG (ref 27–31)
MCHC RBC AUTO-ENTMCNC: 30.9 G/DL (ref 32–36)
MCV RBC AUTO: 96 FL (ref 82–98)
MONOCYTES # BLD AUTO: 0.3 K/UL (ref 0.3–1)
MONOCYTES NFR BLD: 7.3 % (ref 4–15)
NEUTROPHILS # BLD AUTO: 2.4 K/UL (ref 1.8–7.7)
NEUTROPHILS NFR BLD: 56 % (ref 38–73)
NRBC BLD-RTO: 0 /100 WBC
PLATELET # BLD AUTO: 317 K/UL (ref 150–350)
PMV BLD AUTO: 10.6 FL (ref 9.2–12.9)
POTASSIUM SERPL-SCNC: 5 MMOL/L (ref 3.5–5.1)
PROT SERPL-MCNC: 9.3 G/DL (ref 6–8.4)
RBC # BLD AUTO: 4.38 M/UL (ref 4–5.4)
SODIUM SERPL-SCNC: 133 MMOL/L (ref 136–145)
WBC # BLD AUTO: 4.22 K/UL (ref 3.9–12.7)

## 2019-10-04 PROCEDURE — 80053 COMPREHEN METABOLIC PANEL: CPT

## 2019-10-04 PROCEDURE — 36415 COLL VENOUS BLD VENIPUNCTURE: CPT | Mod: PO

## 2019-10-04 PROCEDURE — 85025 COMPLETE CBC W/AUTO DIFF WBC: CPT

## 2019-10-07 ENCOUNTER — TELEPHONE (OUTPATIENT)
Dept: GASTROENTEROLOGY | Facility: CLINIC | Age: 29
End: 2019-10-07

## 2019-10-07 DIAGNOSIS — R79.89 ELEVATED LIVER FUNCTION TESTS: ICD-10-CM

## 2019-10-07 DIAGNOSIS — E87.1 HYPONATREMIA: Primary | ICD-10-CM

## 2019-10-07 NOTE — TELEPHONE ENCOUNTER
Called and spoke with pt  She stated she started the MTX about a wk after her appoint with us but is not sure the day  She knows she missed a set of labs but she stated there was an issue with the orders  I explained her Liver functions were elevated and her sodium was low so we want her to stop the MTX and she will need to repeat labs in 2 wks (scheduled on 10/21 on the [facility])   She will come tomorrow to fill out the paperwork for the assistance program and at that time I will get her follow up scheduled   She states she empties her bag about 5 times a day at half full so a total of 2.5 bags

## 2019-10-07 NOTE — TELEPHONE ENCOUNTER
Contacted patient to follow up regarding Humira assistance application. She was going to wait until her next appt with MD to complete but reminded her that it is scheduled for 11/15. She now states she will make trip to [facility] to complete jocelyne and bring proof of income. Will continue to follow up.

## 2019-10-07 NOTE — TELEPHONE ENCOUNTER
----- Message from Suzi Bacon MD sent at 10/7/2019 12:01 PM CDT -----  Please call patient:  - she was supposed to start methotrexate after our last appt on 9/4 and get labs every 2 weeks for 1 month then 1 month later and then every 3 mos but I don't see first set of labs and only have 1 month labs; confirm date of start of methotrexate; let her know that liver function tests are high and her sodium is low; ask her ostomy output amount, repeat CMP in 2 weeks, stop methotrexate  - also humira not covered by insurance and she was supposed to fill out pt assistance and Madie Hawikns has been trying to reach her- read messages from Madie and Avani before calling pt and find out status  - move up her f/u to end oct or early nov and cancel late nov appt    Suzi

## 2019-10-08 ENCOUNTER — PATIENT MESSAGE (OUTPATIENT)
Dept: WOUND CARE | Facility: CLINIC | Age: 29
End: 2019-10-08

## 2019-10-09 NOTE — TELEPHONE ENCOUNTER
FOR DOCUMENTATION ONLY:  Financial Assistance for Humira approved from 10/9/19 to 10/9/20  Source: [employer]  Phone: [phone]  Fax: [phone]

## 2019-10-09 NOTE — TELEPHONE ENCOUNTER
LVM for patient to notify her of Humira assistance approval. She will need to call to schedule delivery and injection training if desired, and notify MD of start date so follow up labs may be scheduled. Will make patient aware to call [facility] or MD if renewal assistance is needed.

## 2019-10-10 ENCOUNTER — TELEPHONE (OUTPATIENT)
Dept: GASTROENTEROLOGY | Facility: CLINIC | Age: 29
End: 2019-10-10

## 2019-10-10 NOTE — TELEPHONE ENCOUNTER
Sent pt portal letting her know she was approved for the assistance program and to call Madie back to get everything set up and I gave her the number

## 2019-10-10 NOTE — TELEPHONE ENCOUNTER
----- Message from Suzi Bacon MD sent at 10/10/2019 11:29 AM CDT -----  Thanks Madie  IBD team- please reach out to pt and make sure she coordinates with Madie and make sure she has appt with me soon after starting meds  See my last note for plan  SS  ----- Message -----  From: Madie Hawkins  Sent: 10/9/2019   4:38 PM CDT  To: Suzi Bacon MD, Arleen Archer Staff    FYI: Financial Assistance for Humira approved from 10/9/19 to 10/9/20 through [employer].  I have left a message for the patient to notify her of the approval, and have her schedule a delivery of the medication.     Thank you,  Madie Hawkins  Patient Care Advocate  [facility]  Ph: [phone]

## 2019-10-18 ENCOUNTER — TELEPHONE (OUTPATIENT)
Dept: GASTROENTEROLOGY | Facility: CLINIC | Age: 29
End: 2019-10-18

## 2019-10-18 ENCOUNTER — OFFICE VISIT (OUTPATIENT)
Dept: DERMATOLOGY | Facility: CLINIC | Age: 29
End: 2019-10-18
Payer: COMMERCIAL

## 2019-10-18 DIAGNOSIS — L40.9 PSORIASIS: Primary | ICD-10-CM

## 2019-10-18 PROCEDURE — 99213 OFFICE O/P EST LOW 20 MIN: CPT | Mod: S$GLB,,, | Performed by: DERMATOLOGY

## 2019-10-18 PROCEDURE — 99213 PR OFFICE/OUTPT VISIT, EST, LEVL III, 20-29 MIN: ICD-10-PCS | Mod: S$GLB,,, | Performed by: DERMATOLOGY

## 2019-10-18 PROCEDURE — 99999 PR PBB SHADOW E&M-EST. PATIENT-LVL II: ICD-10-PCS | Mod: PBBFAC,,, | Performed by: DERMATOLOGY

## 2019-10-18 PROCEDURE — 99999 PR PBB SHADOW E&M-EST. PATIENT-LVL II: CPT | Mod: PBBFAC,,, | Performed by: DERMATOLOGY

## 2019-10-18 RX ORDER — TRIAMCINOLONE ACETONIDE 1 MG/G
CREAM TOPICAL 2 TIMES DAILY
Qty: 454 G | Refills: 3 | Status: SHIPPED | OUTPATIENT
Start: 2019-10-18 | End: 2019-11-11 | Stop reason: SDUPTHER

## 2019-10-18 RX ORDER — HYDROXYZINE HYDROCHLORIDE 25 MG/1
25 TABLET, FILM COATED ORAL 3 TIMES DAILY
Qty: 90 TABLET | Refills: 1 | Status: SHIPPED | OUTPATIENT
Start: 2019-10-18 | End: 2020-03-12

## 2019-10-18 NOTE — TELEPHONE ENCOUNTER
Called and spoke with Fawad in our pharmacy   I asked if pt has contacted them in reference to her Humira   He stated she was approved through the companies program  She did give them a call and Fawad actually spoke with her and gave her the assistance program's number   Seems like everything is in place

## 2019-10-18 NOTE — TELEPHONE ENCOUNTER
----- Message from Avani Cheek MA sent at 10/10/2019  3:10 PM CDT -----  Has pt reached out to specialty pharm

## 2019-10-18 NOTE — PROGRESS NOTES
"  Subjective:       Patient ID:  Lora Scott is a 29 y.o. female who presents for   Chief Complaint   Patient presents with    Psoriasis     Patient is a 28 yo woman with IBD here for rash followup.     At last visit in July 2019 advised NBUVB trial. Pt attended 5 sessions that did help but was unable to continue due to life obligations.    She did not receive the topical medications I sent at last visit "they said they didn't have them".      Her rash is active on the back, legs.  Face and arms are nearly clear.    No joint aches/pains.    Planning to start Humira any day now for her Crohn's; awaiting the medication in the mail.  She was on Methotrexate briefly <1 mo but was told to stop due to blood problems.        Psoriasis  - Follow-up  Affected locations: chest        Review of Systems   Constitutional: Negative for fever, chills, weight loss, weight gain, fatigue, night sweats and malaise.   Skin: Positive for itching, rash and dry skin. Negative for daily sunscreen use and recent sunburn.   Hematologic/Lymphatic: Does not bruise/bleed easily.        Objective:    Physical Exam   Constitutional: She appears well-developed and well-nourished. No distress.   Neurological: She is alert and oriented to person, place, and time. She is not disoriented.   Psychiatric: She has a normal mood and affect.   Skin:   Areas Examined (abnormalities noted in diagram):   Scalp / Hair Palpated and Inspected  Head / Face Inspection Performed  Neck Inspection Performed  Chest / Axilla Inspection Performed  Abdomen Inspection Performed  Genitals / Buttocks / Groin Inspection Performed  Back Inspection Performed  RUE Inspected  LUE Inspection Performed  RLE Inspected  LLE Inspection Performed  Nails and Digits Inspection Performed              Diagram Legend     Erythematous scaling macule/papule c/w actinic keratosis       Vascular papule c/w angioma      Pigmented verrucoid papule/plaque c/w seborrheic keratosis      Yellow " umbilicated papule c/w sebaceous hyperplasia      Irregularly shaped tan macule c/w lentigo     1-2 mm smooth white papules consistent with Milia      Movable subcutaneous cyst with punctum c/w epidermal inclusion cyst      Subcutaneous movable cyst c/w pilar cyst      Firm pink to brown papule c/w dermatofibroma      Pedunculated fleshy papule(s) c/w skin tag(s)      Evenly pigmented macule c/w junctional nevus     Mildly variegated pigmented, slightly irregular-bordered macule c/w mildly atypical nevus      Flesh colored to evenly pigmented papule c/w intradermal nevus       Pink pearly papule/plaque c/w basal cell carcinoma      Erythematous hyperkeratotic cursted plaque c/w SCC      Surgical scar with no sign of skin cancer recurrence      Open and closed comedones      Inflammatory papules and pustules      Verrucoid papule consistent consistent with wart     Erythematous eczematous patches and plaques     Dystrophic onycholytic nail with subungual debris c/w onychomycosis     Umbilicated papule    Erythematous-base heme-crusted tan verrucoid plaque consistent with inflamed seborrheic keratosis     Erythematous Silvery Scaling Plaque c/w Psoriasis     See annotation      Assessment / Plan:        Psoriasis - classic clinical appearance left shin today -  Back partially treated with more eczematous appearance  NBUVB would treat both conditions but pt unable to attend  In general skin is improved from past but not clear     -restart topical steroids and antihistamines  -agree with Humira therapy    -     triamcinolone acetonide 0.1% (KENALOG) 0.1 % cream; Apply topically 2 (two) times daily. To rashes  Dispense: 454 g; Refill: 3  -     hydrOXYzine HCl (ATARAX) 25 MG tablet; Take 1 tablet (25 mg total) by mouth 3 (three) times daily.  Dispense: 90 tablet; Refill: 1    If not improving with Humira advise skin biopsy at next visit    Zinc level has not been rechecked             Follow up in about 3 months (around  1/18/2020).

## 2019-10-21 ENCOUNTER — LAB VISIT (OUTPATIENT)
Dept: LAB | Facility: HOSPITAL | Age: 29
End: 2019-10-21
Attending: INTERNAL MEDICINE
Payer: COMMERCIAL

## 2019-10-21 DIAGNOSIS — R79.89 ELEVATED LIVER FUNCTION TESTS: ICD-10-CM

## 2019-10-21 DIAGNOSIS — E87.1 HYPONATREMIA: ICD-10-CM

## 2019-10-21 LAB
ALBUMIN SERPL BCP-MCNC: 3.5 G/DL (ref 3.5–5.2)
ALP SERPL-CCNC: 108 U/L (ref 55–135)
ALT SERPL W/O P-5'-P-CCNC: 11 U/L (ref 10–44)
ANION GAP SERPL CALC-SCNC: 9 MMOL/L (ref 8–16)
AST SERPL-CCNC: 15 U/L (ref 10–40)
BILIRUB SERPL-MCNC: 0.3 MG/DL (ref 0.1–1)
BUN SERPL-MCNC: 7 MG/DL (ref 6–20)
CALCIUM SERPL-MCNC: 9.1 MG/DL (ref 8.7–10.5)
CHLORIDE SERPL-SCNC: 109 MMOL/L (ref 95–110)
CO2 SERPL-SCNC: 23 MMOL/L (ref 23–29)
CREAT SERPL-MCNC: 0.8 MG/DL (ref 0.5–1.4)
EST. GFR  (AFRICAN AMERICAN): >60 ML/MIN/1.73 M^2
EST. GFR  (NON AFRICAN AMERICAN): >60 ML/MIN/1.73 M^2
GLUCOSE SERPL-MCNC: 78 MG/DL (ref 70–110)
POTASSIUM SERPL-SCNC: 4 MMOL/L (ref 3.5–5.1)
PROT SERPL-MCNC: 7.7 G/DL (ref 6–8.4)
SODIUM SERPL-SCNC: 141 MMOL/L (ref 136–145)

## 2019-10-21 PROCEDURE — 80053 COMPREHEN METABOLIC PANEL: CPT

## 2019-10-21 PROCEDURE — 36415 COLL VENOUS BLD VENIPUNCTURE: CPT | Mod: PO

## 2019-10-23 ENCOUNTER — TELEPHONE (OUTPATIENT)
Dept: GASTROENTEROLOGY | Facility: CLINIC | Age: 29
End: 2019-10-23

## 2019-10-23 NOTE — TELEPHONE ENCOUNTER
Called & spoke to pt. Informed her that her most recent labs are normal. Pt states she received Humira shipment, but she doesn't know what to do w/ it now other than keep it in the fridge. Confirmed that she is not taking MTX. Pt has c/o her stoma hurting & the skin around her stoma being red. Scheduled pt for appt tomorrow 10/24 at 8:20 am w/ 8:05 am arrival. Pt expressed understanding.

## 2019-10-23 NOTE — TELEPHONE ENCOUNTER
----- Message from Avani Cheek MA sent at 10/22/2019  3:46 PM CDT -----       - let patient know repeat labs are normal   - see if she has arranged for humira shipment and received it yet?   - confirm she is no longer taking methotrexate   - I will need to speak with her regarding alternative to methotrexate to prevent ab formation to the humira   - she has appt 11/21 but need to see her within next 1-2 weeks preferably     SS

## 2019-10-24 ENCOUNTER — DOCUMENTATION ONLY (OUTPATIENT)
Dept: GASTROENTEROLOGY | Facility: CLINIC | Age: 29
End: 2019-10-24

## 2019-10-24 ENCOUNTER — OFFICE VISIT (OUTPATIENT)
Dept: GASTROENTEROLOGY | Facility: CLINIC | Age: 29
End: 2019-10-24
Payer: COMMERCIAL

## 2019-10-24 ENCOUNTER — OFFICE VISIT (OUTPATIENT)
Dept: WOUND CARE | Facility: CLINIC | Age: 29
End: 2019-10-24
Payer: COMMERCIAL

## 2019-10-24 VITALS
BODY MASS INDEX: 26.41 KG/M2 | TEMPERATURE: 98 F | OXYGEN SATURATION: 100 % | HEART RATE: 72 BPM | RESPIRATION RATE: 16 BRPM | HEIGHT: 62 IN | WEIGHT: 143.5 LBS

## 2019-10-24 DIAGNOSIS — H57.89 EYE IRRITATION: ICD-10-CM

## 2019-10-24 DIAGNOSIS — D84.9 IMMUNOSUPPRESSED STATUS: ICD-10-CM

## 2019-10-24 DIAGNOSIS — K94.19 INTESTINAL STOMA PROLAPSE: ICD-10-CM

## 2019-10-24 DIAGNOSIS — K50.013 CROHN'S DISEASE OF ILEUM WITH FISTULA: Primary | ICD-10-CM

## 2019-10-24 DIAGNOSIS — K50.80 CROHN'S DISEASE OF BOTH SMALL AND LARGE INTESTINE WITHOUT COMPLICATION: ICD-10-CM

## 2019-10-24 DIAGNOSIS — Z43.2 ATTENTION TO ILEOSTOMY: Primary | ICD-10-CM

## 2019-10-24 PROBLEM — E44.0 MALNUTRITION OF MODERATE DEGREE: Status: RESOLVED | Noted: 2019-02-19 | Resolved: 2019-10-24

## 2019-10-24 PROBLEM — K56.699 SMALL BOWEL STRICTURE: Status: RESOLVED | Noted: 2019-03-12 | Resolved: 2019-10-24

## 2019-10-24 PROBLEM — R79.89 ELEVATED LIVER FUNCTION TESTS: Status: RESOLVED | Noted: 2019-03-12 | Resolved: 2019-10-24

## 2019-10-24 PROCEDURE — 99215 OFFICE O/P EST HI 40 MIN: CPT | Mod: S$GLB,,, | Performed by: INTERNAL MEDICINE

## 2019-10-24 PROCEDURE — 99999 PR PBB SHADOW E&M-EST. PATIENT-LVL II: CPT | Mod: PBBFAC,,, | Performed by: CLINICAL NURSE SPECIALIST

## 2019-10-24 PROCEDURE — 99999 PR PBB SHADOW E&M-EST. PATIENT-LVL II: ICD-10-PCS | Mod: PBBFAC,,, | Performed by: CLINICAL NURSE SPECIALIST

## 2019-10-24 PROCEDURE — 99024 PR POST-OP FOLLOW-UP VISIT: ICD-10-PCS | Mod: S$GLB,,, | Performed by: CLINICAL NURSE SPECIALIST

## 2019-10-24 PROCEDURE — 99024 POSTOP FOLLOW-UP VISIT: CPT | Mod: S$GLB,,, | Performed by: CLINICAL NURSE SPECIALIST

## 2019-10-24 PROCEDURE — 99999 PR PBB SHADOW E&M-EST. PATIENT-LVL IV: CPT | Mod: PBBFAC,,, | Performed by: INTERNAL MEDICINE

## 2019-10-24 PROCEDURE — 99215 PR OFFICE/OUTPT VISIT, EST, LEVL V, 40-54 MIN: ICD-10-PCS | Mod: S$GLB,,, | Performed by: INTERNAL MEDICINE

## 2019-10-24 PROCEDURE — 99999 PR PBB SHADOW E&M-EST. PATIENT-LVL IV: ICD-10-PCS | Mod: PBBFAC,,, | Performed by: INTERNAL MEDICINE

## 2019-10-24 NOTE — PROGRESS NOTES
"     [facility]             Inflammatory Bowel Disease Follow-up  Note              TODAY'S VISIT DATE:  10/24/2019    Chief Complaint:   Crohn's disease    PCP: Margarita Jay    Previous History:  Lora Scott is a 29 y.o. female with Crohn's disease (focal area of microscopic inflammation in ascending colon on biopsies, ileal with stricture).  She has a past medical history of GERD, anemia, asthma, ex-smoker (quit 9/2019).  Patient reports being diagnosed with IBS with alternating diarrhea and constipation and weight loss as a teenager that was treated with Nexium which helped.  In 8/2018 she again had abd pain, weight loss, and alternating BMs so she contacted her PCP for Nexium which did not help.  By 2/2019, her symptoms progressed prompting an ER visit and on 2/18/2019 she had a CT abd/pelvis which showed an "abnormal wall thickening involving the ascending colon, cecum and terminal ileum with intense mucosal enhancement and luminal narrowing resulting in distention of proximal small bowel.  There was also an ileal-ileal and ileocolic fistulization, fat hypertrophy, increased mesenteric vascularitiy, and reactive mesenteric adenitis in the RLQ with no abscess. "  Patient had an inpatient EGD on 2/20/2019 which showed minimal chronic H. Pylori negative inflammation, normal esophagus, and duodenal biopsies showed mild villous blunting and mild increased IELs.  Colonoscopy on the same day showed congested mucosa in the TI and ICV with granularity in the TI, and TI stricture 5 cm from the ICV with ileal biopsies confirmed ileitis and biopsies of the right colon confirmed focal moderate active colitis with normal left colon and rectum, sigmoid diverticulitis, and internal hemorrhoids.  It was felt that patient would eventually need surgical intervention but needed nutritional optimization initially so she was discharged home with 12 hours of TPN daily via a PICC and bowel rest with plans for " "an MRE, surgical f/u, and a referral to the IBD clinic to discuss medical options.  She was last seen by me 3/2019 for initial IBD visit at which time she was on no meds and there was plan for MRE 3/13/19 with f/u with Dr. Ruiz. MRE on 3/13/19 showed multifocal active inflammation of the distal ileum with mild fibrostenotic component and entero-enteric fistula.  She then was transferred to Dr. Cope due to availability and he initially saw her in clinic and a decision to proceed with surgery was made.  On 5/10/19 she underwent an open ileocolonic resection (10 cm of cecum, 42 cm of ileum removed) with end ileostomy with mucous fistula. Surgical pathology showed TI/cecum with chronic active ileitis with stricture and features of fistulization, transmural inflammation with reactive LNs. Postoperatively she did well and gained weight and would pass mucous from below a few times only. She emptied her stoma about 4-6 times/d and was having issues getting supplies due to insurance issues.  Patient has had multiple issues with stoma appliance/bag and due to inability to afford stoma supplies and its not covered by insurance, she has been getting samples from our wound/ostomy nurse.  At her last visit on 9/4/19 she continued to smoke cigarettes and we reiterated as part of her treatment plan she must stop smoking.  We started authorization for humira and started her on methotrexate 12.5 mg po weekly/folic acid 1 mg daily    Interval History:  - current IBD meds: none  - MTX 12.5 mg po weekly on 9/10/19 with last dose 10/1/19, 10/7 we called her and told her to discontinue MTX due to elevated LFTs (10/4/19 AST 39/ALT 85)  - has received humira injections  - quit smoking mid 9/2019  - eye pain/redness- saw eye doctor on   - ileostomy output:  1.5 full bags of brown loose to soft ostomy output with no blood  - pain around the stoma "feel like it is heartbeat"  - anxiety/depression- related stoma  - psoriasis- elbows, lower " extremities, trunk/back- last saw Dr Martinez 10/18/19, skin exam with no signs of cancer  - joint pain- improved and not bothersome now  - NSAID use: No  - Narcotic use: No  - Alternative/complementary meds for IBD: No    Prior Pertinent Surgeries:   5/10/19 open ileocolonic resection (10 cm of cecum, 42 cm of ileum removed) with end ileostomy with mucous fistula. Surgical pathology showed TI/cecum with chronic active ileitis with stricture and features of fistulization, transmural inflammation with reactive LNs    Pertinent Endoscopy/Imagin2019 CT abd/pelvis: abnormal wall thickening involving the ascending colon, cecum, and terminal ileum with intense mucosal enhancement and luminal narrowing resulting in distention of proximal small bowel; ileal-ileal and ileocolic fistulization, fat hypertropy, and increased mesenteric vascularity; reactive mesenteric adenitis in the RLQ; no abscess; appendix not seen; associated partial SBO  2019 Xray abdomen: gas-filled large and small bowel loops without significant dilation; stool projects over the rectum; no calcifications to suggest nephrolithiasis; lower lung zones are grossly clear; no acute osseous abnormality   2019 CXR: abnormal portion of left-sided PICC line with the tip coiled within the left brachiocephalic vein; repositioning suggested   2019 EGD: normal examined duodenum (path: mild villous blunting and mild increased intraepithelial lymphocytes); normal stomach (path: minimal chronic H. pylori negative inflammation); normal esophagus  2019 Colonoscopy: congested mucosa in the terminal ileum and ICV; granularity in the terminal ileum; stricture in the terminal ileum 5 cm from the ICV (path: chronic ileitis); entire examined colon (path-right colon: focal moderate active colitis) (path-left colon, rectum: normal); diverticulosis in the sigmoid colon; non-bleeding internal hemorrhoids   3/13/19 MRE:  multifocal active inflammation of the  distal ileum with mild fibrostenotic component and entero-enteric fistula    Pertinent Labs:  Lab Results   Component Value Date    SEDRATE 60 (H) 03/11/2019    .7 (H) 05/06/2019     Lab Results   Component Value Date    TTGIGA 7 03/11/2019     (H) 03/11/2019     Lab Results   Component Value Date    TSH 0.713 08/19/2015     Lab Results   Component Value Date    ESIEWBCB10NC 12 (L) 09/04/2019    OAPBSQJA52 216 09/04/2019     Lab Results   Component Value Date    HEPBSAG Negative 09/04/2019    HEPBCAB Negative 09/04/2019    HEPCAB Negative 02/19/2019     Lab Results   Component Value Date    HKN17KNVJ Negative 02/19/2019     Lab Results   Component Value Date    NIL 0.020 09/04/2019    MITOGENNIL >10.000 09/04/2019     Lab Results   Component Value Date    TPTMINTERP SEE BELOW 02/19/2019     Lab Results   Component Value Date    STOOLCULTURE  08/19/2015     No Salmonella,Shigella,Vibrio,Campylobacter,Yersinia isolated.    JFRSOTHHFG3H Negative 08/19/2015    ILGEVOUKFC7M Negative 08/19/2015     No results found for: CALPROTECTIN    Therapeutic Drug Monitoring Labs:  NA    Prior IBD Therapies:  MTX 12.5 mg po weekly- stopped after a few weeks due to elevated LFTs 10/2019    Vaccinations:  Lab Results   Component Value Date    HEPBSAB Grayzone (A) 02/19/2019     Lab Results   Component Value Date    HEPAIGG Positive (A) 02/19/2019     Lab Results   Component Value Date    VARICELLAZOS 2.58 (H) 03/11/2019    VARICELLAINT Positive (A) 03/11/2019     Immunization History   Administered Date(s) Administered    DTP 1991, 03/18/1992, 03/18/1992, 01/12/1994    HIB 1991, 02/24/1992, 03/18/1992    Hepatitis B (recombinant) Adjuvanted, 2 dose 04/17/2019, 05/20/2019    Hepatitis B, Pediatric/Adolescent 07/24/2006    IPV 07/24/2006    MMR 01/13/1994, 07/24/2006    Meningococcal Conjugate (MCV4P) 07/24/2006    OPV 1991, 02/24/1992, 01/12/1994    Pneumococcal Conjugate - 13 Valent 04/17/2019     Pneumococcal Polysaccharide - 23 Valent 06/20/2019    Td (ADULT) 07/24/2006    Td (Adult), Unspecified Formulation 07/24/2006    Tdap 04/17/2019     Influenza (inactive):  Yearly- will get today  Pneumococcal PCV 13: 4/17/19  Pneumococcal PCV 23: 6/20/19  Tetanus (TdaP): 7/24/06  HPV (males and females ages 19-27 yo):    Not sure  Meningococcal: 7/24/06  Hepatitis B:  7/24/06, 4/17/19, 5/20/19, grayzone with immunity  Hepatitis A:  immune  MMR (live vaccine):  1/13/94, 7/24/06      Chickenpox status/Varicella (live vaccine): immune  Shingrix: recommended after age 49 yo    Review of Systems   Constitutional: Negative for chills, fever and weight loss.   HENT:        No oral ulcers, dysphagia, oral thrush   Eyes: Negative for blurred vision, pain and redness.   Respiratory: Negative for cough and shortness of breath.    Cardiovascular: Negative for chest pain.   Gastrointestinal: Negative for abdominal pain, heartburn, nausea and vomiting.   Genitourinary: Negative for dysuria and hematuria.   Musculoskeletal: Negative for back pain and joint pain.   Skin: Negative for rash.   Psychiatric/Behavioral: Negative for depression. The patient is not nervous/anxious and does not have insomnia.      All Medical History/Surgical History/Family History/Social History/Allergies have been reviewed and updated in EMR    Review of patient's allergies indicates:   Allergen Reactions    Shellfish containing products Anaphylaxis     Allergic to all seafood.    Horse/equine containing products Itching    Iodine      Swelling (throat)^     Outpatient Medications Marked as Taking for the 10/24/19 encounter (Office Visit) with Suzi Bacon MD   Medication Sig Dispense Refill    adalimumab (HUMIRA PEN [facility] START) 40 mg/0.8 mL PnKt Inject 160 mg (4 pens) into the skin at week 0, then inject 80 mg (2 pens) into the skin at week 2. 6 pen 0    adalimumab (HUMIRA PEN) 40 mg/0.8 mL PnKt Inject 1 pen (40 mg total) into  "the skin every 14 (fourteen) days. 2 pen 5       Vital Signs:  Pulse 72   Temp 98.1 °F (36.7 °C)   Resp 16   Ht 5' 2" (1.575 m)   Wt 65.1 kg (143 lb 8.3 oz)   LMP 10/23/2019   SpO2 100%   BMI 26.25 kg/m²     Physical Exam   Constitutional: She is oriented to person, place, and time. She appears well-developed.   HENT:   Mouth/Throat: Oropharynx is clear and moist. No oral lesions.   No oral ulcers or thrush   Eyes: Pupils are equal, round, and reactive to light. Conjunctivae are normal.   Cardiovascular: Normal rate and regular rhythm.   Pulmonary/Chest: Effort normal and breath sounds normal.   Abdominal: Soft. There is no tenderness.   Musculoskeletal:        Right lower leg: She exhibits no swelling.        Left lower leg: She exhibits no swelling.   Neurological: She is alert and oriented to person, place, and time.   Skin: No rash noted.   psoriasis   Psychiatric: She has a normal mood and affect.   Nursing note and vitals reviewed.    Labs:   Lab Results   Component Value Date    WBC 4.22 10/04/2019    HGB 13.0 10/04/2019    HCT 42.1 10/04/2019    MCV 96 10/04/2019     10/04/2019     Lab Results   Component Value Date    CREATININE 0.8 10/21/2019    ALBUMIN 3.5 10/21/2019    BILITOT 0.3 10/21/2019    ALKPHOS 108 10/21/2019    AST 15 10/21/2019    ALT 11 10/21/2019     Lab Results   Component Value Date    NIL 0.020 09/04/2019    MITOGENNIL >10.000 09/04/2019       Assessment/Plan:  Lora Scott is a 29 y.o. female with ileal Crohn's disease (S/P ileocecal resection-42 cm of ileum/10.5 cm cecum removed with surgical path c/w stricture and fistula, has ileostomy with mucus fistula with plans for ileostomy takedown), psoriasias (failed light treatment, followed by Dr. Martinez), ex-smoker (quit 9/2019).  Pt has multiple issues with her stoma including inability to afford stoma supplies, bag not sticking, prolapsed stoma.  We decided to proceed with treatment before ileostomy takedown to make sure she " has best outcomes. She stopped smoking after her last visit in 9/2019 which will help prevent ileal Crohn's disease recurrence and allow her better outcomes. She continues with stoma issues and there is a plan for ileostomy takedown in the near future.  She failed oral MTX which caused elevated LFTs so this was discontinued in 10/2019.  Due to having limited options and risk of ab formation to humira I am going to start her on imuran 50 mg daily today.  Also she is taking her first dose of Humira today but only nena 80 mg so RN did injection training for her today and she will take additional 80 mg tonight.  Her humira was delayed due to needing patient assistance program approval.  I am going to postpone her ileoscopy through stoma to 12/2019 around 8 weeks after start of humira with plan to do early humira levels/abs around 12 weeks of humira. Likely will plan on ileostomy takedown first week of Feb 2020.  I am hopeful that the humira will also help her psoriasis.     # Ileal Crohn's disease (S/P ileocecal resection-42 cm of ileum/10.5 cm cecum removed with surgical path c/w stricture and fistula, has ileostomy with mucus fistula with plans for ileostomy takedown),  - discussed MOA, risks, route/dose of imuran  - start imuran 50 mg po daily  - ileoscopy through stoma end of 12/2019  - likely planned ileostomy takedown around first week of Feb 2020  - ostomy issues: may prompt us to do ileostomy takedown sooner- pt seeing Ginger today for continued issues  - ex-smoker: pt quit smoking 9/2019, previously had discussed   - smoker:  Discussed in detail that she must stop smoking given this increases risk of recurrent Crohn's disease and also makes her more likely not to respond to treatment  - psoriasis:  Significant and active; light therapy not effective, followed by Dr. Martinez, getting approval for humira and hopeful this will help prevent recurrent Crohn's disease and treat psoriasis  - Basic labs: CBC/CMP, HCV,  HIV  - Drug  Monitoring labs:  TPMT (heterozygote 2/2019), TB quantiferon (negative 2/2019), Hep B testing (HBsAg, HBcAb negative 2/2019)    # IBD specific health maintenance:  Eye exam yearly  Skin exam yearly - next due 10/2020   Pap smear yearly once on chronic immunosuppression- last one 8/2015  Vitamin D deficiency (3/2019 vit D 14)- not supplemented, high dose vit D3 prescribed but pt not taking, will start pt at next appt  Vaccines: no live vaccines, will get flu shot today    Follow up: end of 12/2019    Total visit time was 40 minutes, more than 50% of which was spent in face-to-face counseling with patient regarding evaluation and management goals and treatment options for Crohn's disease     Suzi Bacon MD  Department of Gastroenterology  Medical Director, Inflammatory Bowel Disease

## 2019-10-24 NOTE — PROGRESS NOTES
This patient is known to me and is here in clinic today for another evaluation related to ileostomy. Surgery done 5 months ago by Dr. Cope. The patient was here seeing Dr Erickson today to start Humira and needed me to eval the stoma that has prolapsed and is getting bigger. She is back to work and comes in uniform today  The ileostomy is prolapsed and now 50 mm loop stoma  The patient is currently  wearing a 1piece pouching system by  [employer].   Average wear time is 2 days.   Peristomal skin is fairly clear today    There is no  mucocutaneous separation.  .  SUPPLIES/DME:  EP     Pouching concerns include:    Placed honorio flat cut to 50 mm    O gave her a box of pouches  Pt counseled on skin care, nutrition, hydration as well as  how to order ostomy supplies.  I spent greater than 50% of this 25 minute visit in face to face counseling.

## 2019-10-24 NOTE — PATIENT INSTRUCTIONS
Instructions:  - get flu shot at [facility] downstairs  - start imuran 50 mg daily- start tomorrow, Friday 10/25/19  - labs 11/8/19, 11/22/19, 12/18/19- get this scheduled at [facility]   - IBD team reminders to make sure she had labs and for the first few weeks of humira doses  - good job on smoking cessation- keep it up!  - cancel ileoscopy through stoma in Nov and reschedule for 12/20  - schedule optometry visit at [facility] for pt  - follow up week of 12/23    Azathioprine (Imuran) is an immunomodulator. This medication can weaken or modulate the activity of the immune system which decreases the gut inflammation.    Risks of azathioprine:  Stop therapy due to adverse event = 11% (11/100)  Allergic reaction (drug fever, arthritis, rash) = 2% (2/100)  Pancreatitis (typical symptoms- abdominal pain, nausea/vomiting) = 3% (3/100)  Nausea= 2% (2/100)  Hepatitis- liver inflammation with abnormal liver tests = 2% (2/100)  Serious infections (primary infections caused by viruses) = 5% (5/100)  Bone Marrow Suppression  Non-Hodgkins Lymphoma= 0.04% (4/10,000)  Non-melanoma skin cancer (basal cell and squamous cell cancer) = 0.16% (1.6/100) wear sunblock, hats, and get your skin checked once a year

## 2019-10-24 NOTE — PROGRESS NOTES
Saw pt in clinic today for Humira injection training. Discussed how to select an injection spot, how to angle the pen, how to properly clean injection site & maintain good hand hygeine. Used demo pen on self w/ pt properly return demo. Performed hand hygeine & injected Humira 2 inches away from pt's umbilicus LUQ. Pt return demonstrated injecting her own Humira to LLQ 2 inches away from umbilicus. I am confident that pt will be able to properly inject self w/ remaining 2 Humira pens this evening for full induction dose. Pt denies having any remaining questions regarding injection process. Staff to check in on patient tomorrow to see how injection process went at home.

## 2019-10-25 ENCOUNTER — TELEPHONE (OUTPATIENT)
Dept: GASTROENTEROLOGY | Facility: CLINIC | Age: 29
End: 2019-10-25

## 2019-10-25 NOTE — TELEPHONE ENCOUNTER
Called and spoke with pt  She stated she did inject the other 2 pens today.  She did not go home after work and did not get to the meds until today  I explained that is fine as long as she took them  I asked if it went ok and she stated no troubles

## 2019-10-25 NOTE — TELEPHONE ENCOUNTER
----- Message from Sujatha Miranda RN sent at 10/24/2019 10:37 AM CDT -----  Did pt inject other 2 pens?

## 2019-10-31 ENCOUNTER — TELEPHONE (OUTPATIENT)
Dept: GASTROENTEROLOGY | Facility: CLINIC | Age: 29
End: 2019-10-31

## 2019-10-31 NOTE — TELEPHONE ENCOUNTER
Called & spoke to pt. Reminded her to take 2 Humira injections next week. Pt expressed understanding.

## 2019-10-31 NOTE — TELEPHONE ENCOUNTER
----- Message from Sujatha Miranda RN sent at 10/24/2019 10:38 AM CDT -----  Remind pt to inject 2 pens of Humira

## 2019-11-08 ENCOUNTER — PATIENT MESSAGE (OUTPATIENT)
Dept: WOUND CARE | Facility: CLINIC | Age: 29
End: 2019-11-08

## 2019-11-08 ENCOUNTER — PATIENT MESSAGE (OUTPATIENT)
Dept: GASTROENTEROLOGY | Facility: CLINIC | Age: 29
End: 2019-11-08

## 2019-11-08 ENCOUNTER — LAB VISIT (OUTPATIENT)
Dept: LAB | Facility: HOSPITAL | Age: 29
End: 2019-11-08
Attending: INTERNAL MEDICINE
Payer: COMMERCIAL

## 2019-11-08 DIAGNOSIS — K50.00 CROHN'S DISEASE OF SMALL INTESTINE WITHOUT COMPLICATION: ICD-10-CM

## 2019-11-08 LAB
ALBUMIN SERPL BCP-MCNC: 3.3 G/DL (ref 3.5–5.2)
ALP SERPL-CCNC: 113 U/L (ref 55–135)
ALT SERPL W/O P-5'-P-CCNC: 13 U/L (ref 10–44)
ANION GAP SERPL CALC-SCNC: 6 MMOL/L (ref 8–16)
AST SERPL-CCNC: 18 U/L (ref 10–40)
BASOPHILS # BLD AUTO: 0.01 K/UL (ref 0–0.2)
BASOPHILS NFR BLD: 0.2 % (ref 0–1.9)
BILIRUB SERPL-MCNC: 0.6 MG/DL (ref 0.1–1)
BUN SERPL-MCNC: 8 MG/DL (ref 6–20)
CALCIUM SERPL-MCNC: 9 MG/DL (ref 8.7–10.5)
CHLORIDE SERPL-SCNC: 111 MMOL/L (ref 95–110)
CO2 SERPL-SCNC: 23 MMOL/L (ref 23–29)
CREAT SERPL-MCNC: 0.8 MG/DL (ref 0.5–1.4)
DIFFERENTIAL METHOD: ABNORMAL
EOSINOPHIL # BLD AUTO: 0.5 K/UL (ref 0–0.5)
EOSINOPHIL NFR BLD: 7.5 % (ref 0–8)
ERYTHROCYTE [DISTWIDTH] IN BLOOD BY AUTOMATED COUNT: 16.2 % (ref 11.5–14.5)
EST. GFR  (AFRICAN AMERICAN): >60 ML/MIN/1.73 M^2
EST. GFR  (NON AFRICAN AMERICAN): >60 ML/MIN/1.73 M^2
GLUCOSE SERPL-MCNC: 84 MG/DL (ref 70–110)
HCT VFR BLD AUTO: 37.3 % (ref 37–48.5)
HGB BLD-MCNC: 11.3 G/DL (ref 12–16)
IMM GRANULOCYTES # BLD AUTO: 0.02 K/UL (ref 0–0.04)
IMM GRANULOCYTES NFR BLD AUTO: 0.3 % (ref 0–0.5)
LYMPHOCYTES # BLD AUTO: 1.3 K/UL (ref 1–4.8)
LYMPHOCYTES NFR BLD: 21.6 % (ref 18–48)
MCH RBC QN AUTO: 30.4 PG (ref 27–31)
MCHC RBC AUTO-ENTMCNC: 30.3 G/DL (ref 32–36)
MCV RBC AUTO: 100 FL (ref 82–98)
MONOCYTES # BLD AUTO: 0.4 K/UL (ref 0.3–1)
MONOCYTES NFR BLD: 7.1 % (ref 4–15)
NEUTROPHILS # BLD AUTO: 3.9 K/UL (ref 1.8–7.7)
NEUTROPHILS NFR BLD: 63.3 % (ref 38–73)
NRBC BLD-RTO: 0 /100 WBC
PLATELET # BLD AUTO: 251 K/UL (ref 150–350)
PMV BLD AUTO: 11.2 FL (ref 9.2–12.9)
POTASSIUM SERPL-SCNC: 3.9 MMOL/L (ref 3.5–5.1)
PROT SERPL-MCNC: 7.3 G/DL (ref 6–8.4)
RBC # BLD AUTO: 3.72 M/UL (ref 4–5.4)
SODIUM SERPL-SCNC: 140 MMOL/L (ref 136–145)
WBC # BLD AUTO: 6.16 K/UL (ref 3.9–12.7)

## 2019-11-08 PROCEDURE — 80053 COMPREHEN METABOLIC PANEL: CPT

## 2019-11-08 PROCEDURE — 36415 COLL VENOUS BLD VENIPUNCTURE: CPT | Mod: PO

## 2019-11-08 PROCEDURE — 85025 COMPLETE CBC W/AUTO DIFF WBC: CPT

## 2019-11-11 ENCOUNTER — PATIENT MESSAGE (OUTPATIENT)
Dept: ENDOSCOPY | Facility: HOSPITAL | Age: 29
End: 2019-11-11

## 2019-11-11 ENCOUNTER — TELEPHONE (OUTPATIENT)
Dept: GASTROENTEROLOGY | Facility: CLINIC | Age: 29
End: 2019-11-11

## 2019-11-11 ENCOUNTER — PATIENT MESSAGE (OUTPATIENT)
Dept: DERMATOLOGY | Facility: CLINIC | Age: 29
End: 2019-11-11

## 2019-11-11 DIAGNOSIS — L40.9 PSORIASIS: ICD-10-CM

## 2019-11-11 RX ORDER — TRIAMCINOLONE ACETONIDE 1 MG/G
CREAM TOPICAL 2 TIMES DAILY
Qty: 454 G | Refills: 3 | Status: SHIPPED | OUTPATIENT
Start: 2019-11-11 | End: 2020-12-09

## 2019-11-11 NOTE — TELEPHONE ENCOUNTER
----- Message from Suzi Bacon MD sent at 11/11/2019  9:07 AM CST -----  Call patient and let her know her labs are stable. Her blood count is a little bit lower than before but we will be monitoring this.  Please review her patient instructions with her from her last visit and make sure she is scheduled for appropriate lab work, ileoscopy through stoma and follow-up appointment. Confirm all this with her so there are no issues please.     Thanks  SS

## 2019-11-11 NOTE — TELEPHONE ENCOUNTER
Called & spoke to pt. Informed her that overall labs stable but blood count lower & we will continue to monitor. Reviewed upcoming appts w/ pt for eye, labs, and f/u w/ us. Pt expressed understanding.

## 2019-11-12 ENCOUNTER — OFFICE VISIT (OUTPATIENT)
Dept: OPTOMETRY | Facility: CLINIC | Age: 29
End: 2019-11-12
Payer: COMMERCIAL

## 2019-11-12 ENCOUNTER — TELEPHONE (OUTPATIENT)
Dept: GASTROENTEROLOGY | Facility: CLINIC | Age: 29
End: 2019-11-12

## 2019-11-12 DIAGNOSIS — H44.23 DEGENERATIVE MYOPIA OF BOTH EYES, UNSPECIFIED WHETHER COMPLICATION PRESENT: Primary | ICD-10-CM

## 2019-11-12 DIAGNOSIS — H52.13 HIGH MYOPIA, BILATERAL: ICD-10-CM

## 2019-11-12 DIAGNOSIS — Z46.0 ENCOUNTER FOR FITTING OR ADJUSTMENT OF SPECTACLES OR CONTACT LENSES: Primary | ICD-10-CM

## 2019-11-12 PROCEDURE — 92004 COMPRE OPH EXAM NEW PT 1/>: CPT | Mod: S$GLB,,, | Performed by: OPTOMETRIST

## 2019-11-12 PROCEDURE — 99999 PR PBB SHADOW E&M-EST. PATIENT-LVL I: CPT | Mod: PBBFAC,,, | Performed by: OPTOMETRIST

## 2019-11-12 PROCEDURE — 92134 CPTRZ OPH DX IMG PST SGM RTA: CPT | Mod: S$GLB,,, | Performed by: OPTOMETRIST

## 2019-11-12 PROCEDURE — 99499 UNLISTED E&M SERVICE: CPT | Mod: S$GLB,,, | Performed by: OPTOMETRIST

## 2019-11-12 PROCEDURE — 99499 NO LOS: ICD-10-PCS | Mod: S$GLB,,, | Performed by: OPTOMETRIST

## 2019-11-12 PROCEDURE — 92134 POSTERIOR SEGMENT OCT RETINA (OCULAR COHERENCE TOMOGRAPHY)-BOTH EYES: ICD-10-PCS | Mod: S$GLB,,, | Performed by: OPTOMETRIST

## 2019-11-12 PROCEDURE — 92015 DETERMINE REFRACTIVE STATE: CPT | Mod: S$GLB,,, | Performed by: OPTOMETRIST

## 2019-11-12 PROCEDURE — 99999 PR PBB SHADOW E&M-EST. PATIENT-LVL I: ICD-10-PCS | Mod: PBBFAC,,, | Performed by: OPTOMETRIST

## 2019-11-12 PROCEDURE — 92310 PR CONTACT LENS FITTING (NO CHANGE): ICD-10-PCS | Mod: CSM,,, | Performed by: OPTOMETRIST

## 2019-11-12 PROCEDURE — 92004 PR EYE EXAM, NEW PATIENT,COMPREHESV: ICD-10-PCS | Mod: S$GLB,,, | Performed by: OPTOMETRIST

## 2019-11-12 PROCEDURE — 92310 CONTACT LENS FITTING OU: CPT | Mod: CSM,,, | Performed by: OPTOMETRIST

## 2019-11-12 PROCEDURE — 92015 PR REFRACTION: ICD-10-PCS | Mod: S$GLB,,, | Performed by: OPTOMETRIST

## 2019-11-12 NOTE — LETTER
November 12, 2019      Suzi Bacon MD  [address]  [place] [address]           [facility]  [address]  [place] [address]  Phone: [phone]  Fax: [phone]          Patient: Lora Scott   MR Number: [id number]   YOB: 1990   Date of Visit: 11/12/2019       Dear Dr. Suzi Bacon:    Thank you for referring Lora Scott to me for evaluation. Attached you will find relevant portions of my assessment and plan of care.    If you have questions, please do not hesitate to call me. I look forward to following Lora Scott along with you.    Sincerely,    Esther Holliday, OD    Enclosure  CC:  No Recipients    If you would like to receive this communication electronically, please contact [email or web address] or [phone] to request more information on [employer] Link access.    For providers and/or their staff who would like to refer a patient to [facility], please contact us through our one-stop-shop provider referral line, [facility], at [phone].    If you feel you have received this communication in error or would no longer like to receive these types of communications, please e-mail [email or web address]

## 2019-11-12 NOTE — PROGRESS NOTES
Subjective:       Patient ID: Lora Scott is a 29 y.o. female      Chief Complaint   Patient presents with    Concerns About Ocular Health    Contact Lens Follow Up     History of Present Illness  Dls 7/19/16 Dr. Darling     30 y/o female presents today with c/o blurry vision at distance ou.   Pt wears scls and single vision glasses.     No tearing  + itching  No burning  No pain  + ha's  + floaters  No flashes    Eye meds  None    Pt wearing unknown CL Rx. States was seen by outside provider earlier this year. Pt sleeps in lenses. Replaces lenses infrequently.      Assessment/Plan:     1. Degenerative myopia of both eyes, unspecified whether complication present  Reduced VA OS compared to last visit. OCT consistent with degenerative myopia. No CNVM.    - Posterior Segment OCT Retina-Both eyes    2. High myopia, bilateral  Educated patient on refractive error and discussed lens options. Dispensed updated spectacle Rx. Educated about adaptation period to new specs.    Eyeglass Final Rx     Eyeglass Final Rx       Sphere Cylinder Axis    Right -15.75 +0.50 055    Left -16.75 +0.50 030    Type:  SVL    Expiration Date:  11/12/2020                Contact lens Rx released to pt. Daily wear only advised, replacement monthly with education to risks of extended wear.  Discussed lens care, compliance and solutions. Given trial of revitalens solution. RTC yearly contact lens follow up.     Contact Lens Final Rx     Final Contact Lens Rx       Brand Base Curve Diameter Sphere Cylinder    Right Biofinity XR 8.6 14.0 -13.00 Sphere    Left Biofinity XR 8.6 14.0 -13.50 Sphere     Expiration Date:  11/12/2020    Replacement:  Monthly    Solutions:  OptiFree PureMoist    Wearing Schedule:  Daily wear                  Follow up in about 1 year (around 11/12/2020).

## 2019-11-12 NOTE — LETTER
November 12, 2019      Suzi Bacon MD  [address]  [place] [address]           [facility]  [address]  [place] [address]  Phone: [phone]  Fax: [phone]          Patient: Lora Scott   MR Number: [id number]   YOB: 1990   Date of Visit: 11/12/2019       Dear Dr. Suzi Bacon:    Thank you for referring Lora Scott to me for evaluation. Attached you will find relevant portions of my assessment and plan of care.    If you have questions, please do not hesitate to call me. I look forward to following Lora Scott along with you.    Sincerely,    Esther Holliday, OD    Enclosure  CC:  No Recipients    If you would like to receive this communication electronically, please contact [email or web address] or [phone] to request more information on [employer] Link access.    For providers and/or their staff who would like to refer a patient to [facility], please contact us through our one-stop-shop provider referral line, [facility], at [phone].    If you feel you have received this communication in error or would no longer like to receive these types of communications, please e-mail [email or web address]

## 2019-11-12 NOTE — TELEPHONE ENCOUNTER
Called & spoke to pt. She states she injected two pens yesterday. Will discuss w/ Dr Bacon to determine next dose

## 2019-11-14 ENCOUNTER — TELEPHONE (OUTPATIENT)
Dept: DERMATOLOGY | Facility: CLINIC | Age: 29
End: 2019-11-14

## 2019-11-14 NOTE — TELEPHONE ENCOUNTER
I have attempted without success to contact this patient by phone to make follow up with dr hawkins for January 2020

## 2019-11-19 ENCOUNTER — TELEPHONE (OUTPATIENT)
Dept: GASTROENTEROLOGY | Facility: CLINIC | Age: 29
End: 2019-11-19

## 2019-11-19 NOTE — TELEPHONE ENCOUNTER
----- Message from Nika Olsen MA sent at 11/19/2019  1:58 PM CST -----  Contact: [phone]   Asking to speak with Avani about her Humira injection

## 2019-11-19 NOTE — TELEPHONE ENCOUNTER
Called and spoke with pt  After some discussion I explained that we will call her when her next dose is due   Also she asked if she should call and order a shipment and I explained No we sent the script to our pharmacy and when she is due they will be in touch to schedule delivery  Pt expressed understanding and appreciated the call

## 2019-11-21 ENCOUNTER — TELEPHONE (OUTPATIENT)
Dept: GASTROENTEROLOGY | Facility: CLINIC | Age: 29
End: 2019-11-21

## 2019-11-22 ENCOUNTER — TELEPHONE (OUTPATIENT)
Dept: GASTROENTEROLOGY | Facility: CLINIC | Age: 29
End: 2019-11-22

## 2019-11-22 NOTE — TELEPHONE ENCOUNTER
"Called & spoke to pt. Asked her if she was able to inject her Humira yesterday. Pt states no she will do it today after work. Informed the pt that this is very important & for her to remember to inject today. Pt states "ok"  "

## 2019-11-29 ENCOUNTER — TELEPHONE (OUTPATIENT)
Dept: GASTROENTEROLOGY | Facility: CLINIC | Age: 29
End: 2019-11-29

## 2019-11-29 NOTE — TELEPHONE ENCOUNTER
Called & spoke to pt. Reminded her to inject 1 pen next Thurs 12/5. Pt expressed understanding & states she will freddie it on her calendar.

## 2019-12-10 ENCOUNTER — PATIENT MESSAGE (OUTPATIENT)
Dept: WOUND CARE | Facility: CLINIC | Age: 29
End: 2019-12-10

## 2019-12-11 ENCOUNTER — PATIENT MESSAGE (OUTPATIENT)
Dept: WOUND CARE | Facility: CLINIC | Age: 29
End: 2019-12-11

## 2019-12-12 ENCOUNTER — TELEPHONE (OUTPATIENT)
Dept: GASTROENTEROLOGY | Facility: CLINIC | Age: 29
End: 2019-12-12

## 2019-12-12 ENCOUNTER — PATIENT MESSAGE (OUTPATIENT)
Dept: WOUND CARE | Facility: CLINIC | Age: 29
End: 2019-12-12

## 2019-12-12 NOTE — TELEPHONE ENCOUNTER
----- Message from Suzi Bacon MD sent at 12/12/2019  4:13 PM CST -----  Call patient and see if she injected humira 1 pen on 12/5?    Remind her next dose due 12/19. Make sure she marks her calendar    Please put reminder in box to remind her on 12/18 to take her dose on 12/19    Thanks    SS

## 2019-12-12 NOTE — TELEPHONE ENCOUNTER
Called patient she stated yes she injected humira on 12/5.     Reminded patient next injection was due 12/19.    Reminder was set to call patient on 12/18

## 2019-12-18 ENCOUNTER — TELEPHONE (OUTPATIENT)
Dept: GASTROENTEROLOGY | Facility: CLINIC | Age: 29
End: 2019-12-18

## 2019-12-18 NOTE — TELEPHONE ENCOUNTER
----- Message from Boo Amado MA sent at 12/12/2019  4:20 PM CST -----  remind patient on take her next humira dose on 12/19

## 2019-12-20 ENCOUNTER — ANESTHESIA (OUTPATIENT)
Dept: ENDOSCOPY | Facility: HOSPITAL | Age: 29
End: 2019-12-20
Payer: COMMERCIAL

## 2019-12-20 ENCOUNTER — ANESTHESIA EVENT (OUTPATIENT)
Dept: ENDOSCOPY | Facility: HOSPITAL | Age: 29
End: 2019-12-20
Payer: COMMERCIAL

## 2019-12-20 ENCOUNTER — HOSPITAL ENCOUNTER (OUTPATIENT)
Facility: HOSPITAL | Age: 29
Discharge: HOME | End: 2019-12-20
Attending: INTERNAL MEDICINE | Admitting: INTERNAL MEDICINE
Payer: COMMERCIAL

## 2019-12-20 VITALS
SYSTOLIC BLOOD PRESSURE: 119 MMHG | HEART RATE: 86 BPM | RESPIRATION RATE: 18 BRPM | BODY MASS INDEX: 26.31 KG/M2 | HEIGHT: 62 IN | TEMPERATURE: 98 F | WEIGHT: 143 LBS | OXYGEN SATURATION: 99 % | DIASTOLIC BLOOD PRESSURE: 72 MMHG

## 2019-12-20 DIAGNOSIS — K50.00 CROHN'S DISEASE OF SMALL INTESTINE: ICD-10-CM

## 2019-12-20 DIAGNOSIS — K50.80 CROHN'S DISEASE OF BOTH SMALL AND LARGE INTESTINE WITHOUT COMPLICATION: Primary | ICD-10-CM

## 2019-12-20 LAB
B-HCG UR QL: NEGATIVE
CTP QC/QA: YES

## 2019-12-20 PROCEDURE — 37000009 HC ANESTHESIA EA ADD 15 MINS: Performed by: INTERNAL MEDICINE

## 2019-12-20 PROCEDURE — 44382 SMALL BOWEL ENDOSCOPY: CPT | Performed by: INTERNAL MEDICINE

## 2019-12-20 PROCEDURE — E9220 PRA ENDO ANESTHESIA: ICD-10-PCS | Mod: ,,, | Performed by: NURSE ANESTHETIST, CERTIFIED REGISTERED

## 2019-12-20 PROCEDURE — 63600175 PHARM REV CODE 636 W HCPCS: Performed by: INTERNAL MEDICINE

## 2019-12-20 PROCEDURE — 88305 TISSUE EXAM BY PATHOLOGIST: ICD-10-PCS | Mod: 26,,, | Performed by: PATHOLOGY

## 2019-12-20 PROCEDURE — E9220 PRA ENDO ANESTHESIA: HCPCS | Mod: ,,, | Performed by: NURSE ANESTHETIST, CERTIFIED REGISTERED

## 2019-12-20 PROCEDURE — 88305 TISSUE EXAM BY PATHOLOGIST: CPT | Performed by: PATHOLOGY

## 2019-12-20 PROCEDURE — 37000008 HC ANESTHESIA 1ST 15 MINUTES: Performed by: INTERNAL MEDICINE

## 2019-12-20 PROCEDURE — 88305 TISSUE EXAM BY PATHOLOGIST: CPT | Mod: 26,,, | Performed by: PATHOLOGY

## 2019-12-20 PROCEDURE — 63600175 PHARM REV CODE 636 W HCPCS: Performed by: NURSE ANESTHETIST, CERTIFIED REGISTERED

## 2019-12-20 PROCEDURE — 27201012 HC FORCEPS, HOT/COLD, DISP: Performed by: INTERNAL MEDICINE

## 2019-12-20 PROCEDURE — 44382 PR ILEOSCOPY THRU STOMA,BIOPSY: ICD-10-PCS | Mod: ,,, | Performed by: INTERNAL MEDICINE

## 2019-12-20 PROCEDURE — 81025 URINE PREGNANCY TEST: CPT | Performed by: INTERNAL MEDICINE

## 2019-12-20 PROCEDURE — 44382 SMALL BOWEL ENDOSCOPY: CPT | Mod: ,,, | Performed by: INTERNAL MEDICINE

## 2019-12-20 RX ORDER — PROPOFOL 10 MG/ML
VIAL (ML) INTRAVENOUS CONTINUOUS PRN
Status: DISCONTINUED | OUTPATIENT
Start: 2019-12-20 | End: 2019-12-20

## 2019-12-20 RX ORDER — LIDOCAINE HCL/PF 100 MG/5ML
SYRINGE (ML) INTRAVENOUS
Status: DISCONTINUED | OUTPATIENT
Start: 2019-12-20 | End: 2019-12-20

## 2019-12-20 RX ORDER — SODIUM CHLORIDE 9 MG/ML
INJECTION, SOLUTION INTRAVENOUS CONTINUOUS
Status: DISCONTINUED | OUTPATIENT
Start: 2019-12-20 | End: 2019-12-20 | Stop reason: HOSPADM

## 2019-12-20 RX ORDER — PROPOFOL 10 MG/ML
VIAL (ML) INTRAVENOUS
Status: DISCONTINUED | OUTPATIENT
Start: 2019-12-20 | End: 2019-12-20

## 2019-12-20 RX ADMIN — PROPOFOL 150 MCG/KG/MIN: 10 INJECTION, EMULSION INTRAVENOUS at 09:12

## 2019-12-20 RX ADMIN — PROPOFOL 50 MG: 10 INJECTION, EMULSION INTRAVENOUS at 09:12

## 2019-12-20 RX ADMIN — SODIUM CHLORIDE: 0.9 INJECTION, SOLUTION INTRAVENOUS at 08:12

## 2019-12-20 RX ADMIN — LIDOCAINE HYDROCHLORIDE 50 MG: 20 INJECTION, SOLUTION INTRAVENOUS at 09:12

## 2019-12-20 NOTE — H&P
Short Stay Endoscopy History and Physical    PCP - Margarita Jay MD  Referring Physician - Suzi Bacon MD  [address] [place] [address]    Procedure - Colonoscopy  ASA - per anesthesia  Mallampati - per anesthesia  History of Anesthesia problems - no  Family history Anesthesia problems -  no   Plan of anesthesia - General    HPI  29 y.o. female  Reason for procedure: Crohn's f/u      ROS:  Constitutional: No fevers, chills, No weight loss  CV: No chest pain  Pulm: No cough, No shortness of breath  GI: see HPI    Medical History:  has a past medical history of Allergy, Asthma, Crohn's disease (2/18/19), Crohn's disease involving terminal ileum, Depression, Eczema, Ex-smoker for less than 1 year, Immunosuppressed status (10/24/2019), and Joint pain.    Surgical History:  has a past surgical history that includes Colonoscopy (N/A, 2/20/2019); Esophagogastroduodenoscopy (N/A, 2/20/2019); Ileocolectomy (N/A, 5/10/2019); and Ileostomy (N/A, 5/10/2019).    Family History: family history includes Hypertension in her mother; Irritable bowel syndrome in her paternal grandmother; No Known Problems in her brother, father, maternal grandfather, maternal grandmother, maternal uncle, maternal uncle, maternal uncle, paternal aunt, paternal aunt, paternal grandfather, paternal uncle, paternal uncle, paternal uncle, paternal uncle, sister, sister, sister, and sister.. Otherwise no colon cancer, inflammatory bowel disease, or GI malignancies.    Social History:  reports that she quit smoking about 3 months ago. Her smoking use included cigarettes. She has never used smokeless tobacco. She reports that she drank alcohol. She reports that she does not use drugs.    Review of patient's allergies indicates:   Allergen Reactions    Shellfish containing products Anaphylaxis     Allergic to all seafood.    Horse/equine containing products Itching    Iodine      Swelling (throat)^       Medications:   Medications  Prior to Admission   Medication Sig Dispense Refill Last Dose    adalimumab (HUMIRA PEN CROHNS-[facility] START) 40 mg/0.8 mL PnKt Inject 160 mg (4 pens) into the skin at week 0, then inject 80 mg (2 pens) into the skin at week 2. 6 pen 0 12/20/2019 at Unknown time    triamcinolone acetonide 0.1% (KENALOG) 0.1 % cream Apply topically 2 (two) times daily. To rashes 454 g 3 Past Week at Unknown time    adalimumab (HUMIRA PEN) 40 mg/0.8 mL PnKt Inject 1 pen (40 mg total) into the skin every 14 (fourteen) days. 2 pen 5 Taking    folic acid (FOLVITE) 1 MG tablet Take 1 tablet (1 mg total) by mouth once daily. 30 tablet 11 More than a month at Unknown time    hydrOXYzine HCl (ATARAX) 25 MG tablet Take 1 tablet (25 mg total) by mouth 3 (three) times daily. 90 tablet 1 More than a month at Unknown time    methotrexate 2.5 MG Tab Take 5 tablets (12.5 mg total) by mouth every 7 days. 20 tablet 0 Taking       Physical Exam:    Vital Signs:   Vitals:    12/20/19 0843   BP: 107/66   Pulse: 60   Resp: 14   Temp: 98.1 °F (36.7 °C)       General Appearance: Well appearing in no acute distress  Abdomen: Soft, non tender, non distended with normal bowel sounds, no masses    Labs:  Lab Results   Component Value Date    WBC 6.16 11/08/2019    HGB 11.3 (L) 11/08/2019    HCT 37.3 11/08/2019     11/08/2019    TRIG 41 05/06/2019    ALT 13 11/08/2019    AST 18 11/08/2019     11/08/2019    K 3.9 11/08/2019     (H) 11/08/2019    CREATININE 0.8 11/08/2019    BUN 8 11/08/2019    CO2 23 11/08/2019    TSH 0.713 08/19/2015       I have explained the risks and benefits of this endoscopic procedure to the patient including but not limited to bleeding, inflammation, infection, perforation, and death.      Suzi Bacon MD

## 2019-12-20 NOTE — [employer] PATIENT INSTRUCTIONS
Discharge Summary/Instructions after an Endoscopic Procedure  Patient Name: Lora Scott  Patient MRN: [id number]  Patient YOB: 1990  Friday, December 20, 2019  Suzi Bacon MD  RESTRICTIONS:  During your procedure today, you received medications for sedation.  These   medications may affect your judgment, balance and coordination.  Therefore,   for 24 hours, you have the following restrictions:   - DO NOT drive a car, operate machinery, make legal/financial decisions,   sign important papers or drink alcohol.    ACTIVITY:  Today: no heavy lifting, straining or running due to procedural   sedation/anesthesia.  The following day: return to full activity including work.  DIET:  Eat and drink normally unless instructed otherwise.     TREATMENT FOR COMMON SIDE EFFECTS:  - Mild abdominal pain, nausea, belching, bloating or excessive gas:  rest,   eat lightly and use a heating pad.  - Sore Throat: treat with throat lozenges and/or gargle with warm salt   water.  - Because air was used during the procedure, expelling large amounts of air   from your rectum or belching is normal.  - If a bowel prep was taken, you may not have a bowel movement for 1-3 days.    This is normal.  SYMPTOMS TO WATCH FOR AND REPORT TO YOUR PHYSICIAN:  1. Abdominal pain or bloating, other than gas cramps.  2. Chest pain.  3. Back pain.  4. Signs of infection such as: chills or fever occurring within 24 hours   after the procedure.  5. Rectal bleeding, which would show as bright red, maroon, or black stools.   (A tablespoon of blood from the rectum is not serious, especially if   hemorrhoids are present.)  6. Vomiting.  7. Weakness or dizziness.  GO DIRECTLY TO THE NEAREST EMERGENCY ROOM IF YOU HAVE ANY OF THE FOLLOWING:      Difficulty breathing              Chills and/or fever over 101 F   Persistent vomiting and/or vomiting blood   Severe abdominal pain   Severe chest pain   Black, tarry stools   Bleeding- more than one  tablespoon   Any other symptom or condition that you feel may need urgent attention  Your doctor recommends these additional instructions:  If any biopsies were taken, your doctors clinic will contact you in 1 to 2   weeks with any results.  - Discharge patient to home.   - Patient has a contact number available for emergencies.  The signs and   symptoms of potential delayed complications were discussed with the   patient.  Return to normal activities tomorrow.  Written discharge   instructions were provided to the patient.   - Resume previous diet.   - Await pathology results.   - follow up with Dr. Cope to be considered for ileostomy reversal  - continue Humira 40 mg SC every 2 weeks and plans to see me for f/u.   - pt to meet with team to get reminders set up on smart phone for her humira   given we are currently calling her every 2 weeks to remind her to take her   injections  For questions, problems or results please call your physician - Suzi Bacon MD at Work:  [phone].  [facility], EMERGENCY ROOM PHONE NUMBER: [phone]  IF A COMPLICATION OR EMERGENCY SITUATION ARISES AND YOU ARE UNABLE TO REACH   YOUR PHYSICIAN - GO DIRECTLY TO THE EMERGENCY ROOM.  Suzi Bacon MD  12/20/2019 9:35:15 AM  This report has been verified and signed electronically.  [employer]

## 2019-12-20 NOTE — ANESTHESIA PREPROCEDURE EVALUATION
12/20/2019  Lora Scott is a 29 y.o., female.    Past Medical History:   Diagnosis Date    Allergy     Asthma     Crohn's disease 2/18/19    Crohn's disease involving terminal ileum     Depression     Eczema     Ex-smoker for less than 1 year     Immunosuppressed status 10/24/2019    Joint pain      Past Surgical History:   Procedure Laterality Date    COLONOSCOPY N/A 2/20/2019    Procedure: COLONOSCOPY;  Surgeon: Fawad Perla MD;  [facility] [facility]);  Service: Endoscopy;  Laterality: N/A;    ESOPHAGOGASTRODUODENOSCOPY N/A 2/20/2019    Procedure: EGD (ESOPHAGOGASTRODUODENOSCOPY);  Surgeon: Fawad Perla MD;  Location: [facility] (2ND FLR);  Service: Endoscopy;  Laterality: N/A;    ILEOCOLECTOMY N/A 5/10/2019    Procedure: ILEOCOLECTOMY;  Surgeon: Uday Cope MD;  Location[facility] 2ND FLR;  Service: Colon and Rectal;  Laterality: N/A;  ileocecectomy    ILEOSTOMY N/A 5/10/2019    Procedure: CREATION, ILEOSTOMY with mucous fistula;  Surgeon: Uday Cope MD;  Location[facility] 2ND FLR;  Service: Colon and Rectal;  Laterality: N/A;         Anesthesia Evaluation    I have reviewed the Patient Summary Reports.     I have reviewed the Medications.     Review of Systems  Anesthesia Hx:  Denies Hx of Anesthetic complications  Neg history of prior surgery. Denies Family Hx of Anesthesia complications.   Denies Personal Hx of Anesthesia complications.   Cardiovascular:   Exercise tolerance: good        Physical Exam  General:  Well nourished    Airway/Jaw/Neck:  Airway Findings: Mouth Opening: Normal Tongue: Normal  General Airway Assessment: Adult  Mallampati: II  TM Distance: Normal, at least 6 cm         Dental:  DENTAL FINDINGS: Normal   Chest/Lungs:  Chest/Lungs Clear    Heart/Vascular:  Heart Findings: Normal       Mental Status:  Mental Status Findings:  Alert and Oriented          Anesthesia Plan  Type of Anesthesia, risks & benefits discussed:  Anesthesia Type:  general  Patient's Preference:   Intra-op Monitoring Plan: standard ASA monitors  Intra-op Monitoring Plan Comments:   Post Op Pain Control Plan:   Post Op Pain Control Plan Comments:   Induction:   IV  Beta Blocker:  Patient is not currently on a Beta-Blocker (No further documentation required).       Informed Consent: Patient understands risks and agrees with Anesthesia plan.  Questions answered. Anesthesia consent signed with patient.  ASA Score: 2     Day of Surgery Review of History & Physical:    H&P update referred to the provider.         Ready For Surgery From Anesthesia Perspective.

## 2019-12-20 NOTE — TRANSFER OF CARE
"Anesthesia Transfer of Care Note    Patient: Lora Scott    Procedure(s) Performed: Procedure(s) (LRB):  ILEOSCOPY through stoma (N/A)    Patient location: GI    Anesthesia Type: general    Transport from OR: Transported from OR on room air with adequate spontaneous ventilation    Post pain: adequate analgesia    Post assessment: no apparent anesthetic complications    Post vital signs: stable    Level of consciousness: responds to stimulation    Nausea/Vomiting: no nausea/vomiting    Complications: none    Transfer of care protocol was followed      Last vitals:   Visit Vitals  /66   Pulse 60   Temp 36.7 °C (98.1 °F)   Resp 14   Ht 5' 2" (1.575 m)   Wt 64.9 kg (143 lb)   LMP 12/14/2019 (Approximate)   SpO2 100%   Breastfeeding? No   BMI 26.16 kg/m²     "

## 2019-12-23 NOTE — ANESTHESIA POSTPROCEDURE EVALUATION
Anesthesia Post Evaluation    Patient: Lora Scott    Procedure(s) Performed: Procedure(s) (LRB):  ILEOSCOPY through stoma (N/A)    Final Anesthesia Type: general    Patient location during evaluation: PACU  Patient participation: Yes- Able to Participate  Level of consciousness: awake and alert and oriented  Pain management: adequate  Airway patency: patent    PONV status at discharge: No PONV  Anesthetic complications: no      Cardiovascular status: blood pressure returned to baseline and hemodynamically stable  Respiratory status: unassisted  Hydration status: euvolemic  Follow-up not needed.          Vitals Value Taken Time   /72 12/20/2019 10:10 AM   Temp 36.6 °C (97.8 °F) 12/20/2019  9:34 AM   Pulse 86 12/20/2019 10:10 AM   Resp 18 12/20/2019 10:10 AM   SpO2 99 % 12/20/2019 10:10 AM         Event Time     Out of Recovery 10:21:15          Pain/Oral Score: No data recorded

## 2019-12-29 ENCOUNTER — HOSPITAL ENCOUNTER (EMERGENCY)
Facility: HOSPITAL | Age: 29
Discharge: HOME | End: 2019-12-29
Attending: EMERGENCY MEDICINE
Payer: COMMERCIAL

## 2019-12-29 VITALS
DIASTOLIC BLOOD PRESSURE: 74 MMHG | OXYGEN SATURATION: 100 % | HEIGHT: 62 IN | BODY MASS INDEX: 27.05 KG/M2 | HEART RATE: 104 BPM | WEIGHT: 147 LBS | SYSTOLIC BLOOD PRESSURE: 138 MMHG | TEMPERATURE: 98 F | RESPIRATION RATE: 20 BRPM

## 2019-12-29 DIAGNOSIS — K04.7 DENTAL ABSCESS: Primary | ICD-10-CM

## 2019-12-29 LAB
B-HCG UR QL: NEGATIVE
CTP QC/QA: YES

## 2019-12-29 PROCEDURE — 25000003 PHARM REV CODE 250: Mod: ER | Performed by: EMERGENCY MEDICINE

## 2019-12-29 PROCEDURE — 99284 EMERGENCY DEPT VISIT MOD MDM: CPT | Mod: 25,ER

## 2019-12-29 PROCEDURE — 81025 URINE PREGNANCY TEST: CPT | Mod: ER | Performed by: EMERGENCY MEDICINE

## 2019-12-29 PROCEDURE — 63600175 PHARM REV CODE 636 W HCPCS: Mod: ER | Performed by: EMERGENCY MEDICINE

## 2019-12-29 PROCEDURE — 96372 THER/PROPH/DIAG INJ SC/IM: CPT | Mod: ER

## 2019-12-29 RX ORDER — HYDROCODONE BITARTRATE AND ACETAMINOPHEN 5; 325 MG/1; MG/1
1 TABLET ORAL EVERY 4 HOURS PRN
Qty: 10 TABLET | Refills: 0 | Status: SHIPPED | OUTPATIENT
Start: 2019-12-29 | End: 2020-01-08

## 2019-12-29 RX ORDER — PENICILLIN V POTASSIUM 250 MG/1
250 TABLET, FILM COATED ORAL 4 TIMES DAILY
Qty: 28 TABLET | Refills: 0 | Status: SHIPPED | OUTPATIENT
Start: 2019-12-29 | End: 2020-01-05

## 2019-12-29 RX ORDER — IBUPROFEN 600 MG/1
600 TABLET ORAL EVERY 6 HOURS PRN
Qty: 20 TABLET | Refills: 0 | Status: ON HOLD | OUTPATIENT
Start: 2019-12-29 | End: 2020-02-14 | Stop reason: HOSPADM

## 2019-12-29 RX ORDER — HYDROCODONE BITARTRATE AND ACETAMINOPHEN 5; 325 MG/1; MG/1
2 TABLET ORAL
Status: COMPLETED | OUTPATIENT
Start: 2019-12-29 | End: 2019-12-29

## 2019-12-29 RX ORDER — DEXAMETHASONE SODIUM PHOSPHATE 4 MG/ML
4 INJECTION, SOLUTION INTRA-ARTICULAR; INTRALESIONAL; INTRAMUSCULAR; INTRAVENOUS; SOFT TISSUE
Status: COMPLETED | OUTPATIENT
Start: 2019-12-29 | End: 2019-12-29

## 2019-12-29 RX ADMIN — DEXAMETHASONE SODIUM PHOSPHATE 4 MG: 4 INJECTION, SOLUTION INTRA-ARTICULAR; INTRALESIONAL; INTRAMUSCULAR; INTRAVENOUS; SOFT TISSUE at 09:12

## 2019-12-29 RX ADMIN — PENICILLIN G BENZATHINE 1.2 MILLION UNITS: 1200000 INJECTION, SUSPENSION INTRAMUSCULAR at 09:12

## 2019-12-29 RX ADMIN — HYDROCODONE BITARTRATE AND ACETAMINOPHEN 2 TABLET: 5; 325 TABLET ORAL at 09:12

## 2019-12-29 NOTE — ED PROVIDER NOTES
Encounter Date: 12/29/2019    SCRIBE #1 NOTE: I, Deanne Cuello , am scribing for, and in the presence of,  Dr. Lane . I have scribed the following portions of the note - Other sections scribed: HPI, ROS, PE.       History     Chief Complaint   Patient presents with    Facial Swelling     pt states that she started last night with left sided tooth pain and woke up this morning with left sided facial swelling.      CC: Facial Swelling; Dental pain   This is a 29 y.o female with a hx of asthma, allergies, and Crohn's disease presents to the ED complaining of left sided dental pain since x 2 days and left sided facial swelling since waking up this morning. She was told after she was diagnosed with Crohn's in February that her teeth were decaying due to malnourishment. She has been using oragel and taking ibuprofen. She denies fever, trouble swallowing, or neck pain.    The history is provided by the patient. No  was used.     Review of patient's allergies indicates:   Allergen Reactions    Shellfish containing products Anaphylaxis     Allergic to all seafood.    Horse/equine containing products Itching    Iodine      Swelling (throat)^     Past Medical History:   Diagnosis Date    Allergy     Asthma     Crohn's disease 2/18/19    Crohn's disease involving terminal ileum     Depression     Eczema     Ex-smoker for less than 1 year     Immunosuppressed status 10/24/2019    Joint pain      Past Surgical History:   Procedure Laterality Date    COLONOSCOPY N/A 2/20/2019    Procedure: COLONOSCOPY;  Surgeon: Fawad Perla MD;  Location: [address]);  Service: Endoscopy;  Laterality: N/A;    ESOPHAGOGASTRODUODENOSCOPY N/A 2/20/2019    Procedure: EGD (ESOPHAGOGASTRODUODENOSCOPY);  Surgeon: Fawad Perla MD;  Location: [address];  Service: Endoscopy;  Laterality: N/A;    ILEOCOLECTOMY N/A 5/10/2019    Procedure: ILEOCOLECTOMY;  Surgeon: Uday Cope MD;   Location: [facility] OR 2ND FLR;  Service: Colon and Rectal;  Laterality: N/A;  ileocecectomy    ILEOSCOPY N/A 12/20/2019    Procedure: ILEOSCOPY through stoma;  Surgeon: Suzi Bacon MD;  Location: [facility] ENDO (4TH FLR);  Service: Endoscopy;  Laterality: N/A;  Schedule as 30 minute case  Please let patient know to bring extra stoma bag/wafer, etc- entire bag and appliance will be removed at time of procedure to visualize the area well   first week 11/2019     per note-R/S to 12/20/19-GT    ILEOSTOMY N/A 5/10/2019    Procedure: CREATION, ILEOSTOMY with mucous fistula;  Surgeon: Uday Cope MD;  Location: [facility] OR 2ND FLR;  Service: Colon and Rectal;  Laterality: N/A;     Family History   Problem Relation Age of Onset    Hypertension Mother     No Known Problems Father     No Known Problems Sister     No Known Problems Brother     No Known Problems Sister     No Known Problems Sister     No Known Problems Sister     No Known Problems Maternal Uncle     No Known Problems Paternal Aunt     No Known Problems Paternal Uncle     No Known Problems Maternal Grandmother     No Known Problems Maternal Grandfather     Irritable bowel syndrome Paternal Grandmother     No Known Problems Paternal Grandfather     No Known Problems Paternal Aunt     No Known Problems Paternal Uncle     No Known Problems Paternal Uncle     No Known Problems Paternal Uncle     No Known Problems Maternal Uncle     No Known Problems Maternal Uncle     Celiac disease Neg Hx     Cirrhosis Neg Hx     Colon cancer Neg Hx     Colon polyps Neg Hx     Crohn's disease Neg Hx     Cystic fibrosis Neg Hx     Esophageal cancer Neg Hx     Hemochromatosis Neg Hx     Inflammatory bowel disease Neg Hx     Liver cancer Neg Hx     Liver disease Neg Hx     Rectal cancer Neg Hx     Stomach cancer Neg Hx     Ulcerative colitis Neg Hx     Jerardo's disease Neg Hx     Lymphoma Neg Hx     Tuberculosis Neg Hx     Scleroderma Neg Hx      Rheum arthritis Neg Hx     Multiple sclerosis Neg Hx     Psoriasis Neg Hx     Lupus Neg Hx     Melanoma Neg Hx     Skin cancer Neg Hx     Amblyopia Neg Hx     Blindness Neg Hx     Cancer Neg Hx     Cataracts Neg Hx     Diabetes Neg Hx     Glaucoma Neg Hx     Macular degeneration Neg Hx     Retinal detachment Neg Hx     Strabismus Neg Hx     Stroke Neg Hx     Thyroid disease Neg Hx      Social History     Tobacco Use    Smoking status: Former Smoker     Types: Cigarettes     Last attempt to quit: 2019     Years since quittin.2    Smokeless tobacco: Never Used   Substance Use Topics    Alcohol use: Not Currently     Alcohol/week: 0.0 standard drinks     Comment: Socially    Drug use: No     Frequency: 3.0 times per week     Review of Systems   Constitutional: Negative for fever.   HENT: Positive for dental problem and facial swelling. Negative for trouble swallowing.    Musculoskeletal: Negative for neck pain.   Neurological: Positive for headaches.       Physical Exam     Initial Vitals [19 0819]   BP Pulse Resp Temp SpO2   (!) 154/89 (!) 120 18 98.5 °F (36.9 °C) --      MAP       --         Physical Exam    Nursing note and vitals reviewed.  Constitutional: She appears well-developed and well-nourished. She is not diaphoretic. No distress.   HENT:   Head: Normocephalic and atraumatic.   Mouth/Throat: Uvula is midline, oropharynx is clear and moist and mucous membranes are normal. No trismus in the jaw. Dental caries (severe ) present. No dental abscesses or uvula swelling.   Left submandibular swelling and swelling under the left eye. See image   Eyes: EOM are normal.   Neck: Normal range of motion. Neck supple. No neck rigidity.   Pulmonary/Chest: No respiratory distress.   Musculoskeletal: Normal range of motion.   Lymphadenopathy:        Head (right side): Submandibular adenopathy present.        Head (left side): Submandibular adenopathy present.   Neurological: She is alert  and oriented to person, place, and time.   Skin: Skin is warm and dry. Capillary refill takes less than 2 seconds.   Psychiatric: She has a normal mood and affect. Her behavior is normal.             ED Course   Procedures  Labs Reviewed   POCT URINE PREGNANCY          Imaging Results    None          Medical Decision Making:   Initial Assessment:   This is a 29 y.o female with Crohn's disease presents to the ED with left sided dental pain and left sided facial swelling x 2 days. Physical exam findings are significant for left submandibular swelling and swelling under the left eye. Severe dental caries noted. No trismus or uvula swelling. Neck supple. No respiratory distress. Neurovascularly intact. Sensation intact.     Clinical Tests:   Lab Tests: Ordered and Reviewed  ED Management:  I will order a UPT.  UPT is negative.  Patient has severe dental caries.  She does have an abscess to the left side of her gums (maxillary).  She has edema under her eye which appears to be depended edema which occurred overnight.  Patient was given Bicillin LA here as well as Decadron and will be discharged on hydrocodone, ibuprofen and penicillin.  She will be referred to the dental clinic.  She has been seen at [facility] and is supposed to have all of her teeth removed but has not made the appointment as of yet.            Scribe Attestation:   Scribe #1: I performed the above scribed service and the documentation accurately describes the services I performed. I attest to the accuracy of the note.                 I, Dr. Miranda Lane, personally performed the services described in this documentation. All medical record entries made by the scribe were at my direction and in my presence.  I have reviewed the chart and agree that the record reflects my personal performance and is accurate and complete. Miranda Lane MD.  9:19 AM 12/29/2019         Clinical Impression:     1. Dental abscess                                Miranda MCCARTY  MD Ariana  12/29/19 0925

## 2019-12-29 NOTE — DISCHARGE INSTRUCTIONS
Soft diet.  You may take ibuprofen as needed for pain.  Follow up with the dentist soon as possible.  Return here as needed

## 2019-12-30 ENCOUNTER — TELEPHONE (OUTPATIENT)
Dept: ENDOSCOPY | Facility: HOSPITAL | Age: 29
End: 2019-12-30

## 2019-12-30 ENCOUNTER — OFFICE VISIT (OUTPATIENT)
Dept: SURGERY | Facility: CLINIC | Age: 29
End: 2019-12-30
Payer: COMMERCIAL

## 2019-12-30 VITALS
DIASTOLIC BLOOD PRESSURE: 69 MMHG | HEART RATE: 85 BPM | SYSTOLIC BLOOD PRESSURE: 106 MMHG | BODY MASS INDEX: 27.43 KG/M2 | HEIGHT: 62 IN | WEIGHT: 149.06 LBS

## 2019-12-30 DIAGNOSIS — K50.812 CROHN'S DISEASE OF BOTH SMALL AND LARGE INTESTINE WITH INTESTINAL OBSTRUCTION: Primary | ICD-10-CM

## 2019-12-30 DIAGNOSIS — Z93.2 ILEOSTOMY PRESENT: ICD-10-CM

## 2019-12-30 PROCEDURE — 99213 PR OFFICE/OUTPT VISIT, EST, LEVL III, 20-29 MIN: ICD-10-PCS | Mod: S$GLB,,, | Performed by: COLON & RECTAL SURGERY

## 2019-12-30 PROCEDURE — 99213 OFFICE O/P EST LOW 20 MIN: CPT | Mod: S$GLB,,, | Performed by: COLON & RECTAL SURGERY

## 2019-12-30 PROCEDURE — 99999 PR PBB SHADOW E&M-EST. PATIENT-LVL III: CPT | Mod: PBBFAC,,, | Performed by: COLON & RECTAL SURGERY

## 2019-12-30 PROCEDURE — 99999 PR PBB SHADOW E&M-EST. PATIENT-LVL III: ICD-10-PCS | Mod: PBBFAC,,, | Performed by: COLON & RECTAL SURGERY

## 2020-01-03 ENCOUNTER — TELEPHONE (OUTPATIENT)
Dept: GASTROENTEROLOGY | Facility: CLINIC | Age: 30
End: 2020-01-03

## 2020-01-03 NOTE — TELEPHONE ENCOUNTER
----- Message from Sujatha Miranda RN sent at 1/2/2020 10:36 AM CST -----  Pt surgery scheduled 2/12. Needs f/u w/ SS 4 wks after

## 2020-01-03 NOTE — TELEPHONE ENCOUNTER
Called and spoke with pt  Follow up scheduled for 03/12 @ 10:20    Reminded pt about her labs on 01/16   She had just taken her shot (was supposed to be yesterday) for the most part she has stayed on track  She stated she does need a refill   I asked if she reached out to specialty and she stated no she does not have the number  I told her I would send it via portal

## 2020-01-07 LAB
FINAL PATHOLOGIC DIAGNOSIS: NORMAL
GROSS: NORMAL

## 2020-01-08 ENCOUNTER — TELEPHONE (OUTPATIENT)
Dept: GASTROENTEROLOGY | Facility: CLINIC | Age: 30
End: 2020-01-08

## 2020-01-08 ENCOUNTER — TELEPHONE (OUTPATIENT)
Dept: PREADMISSION TESTING | Facility: HOSPITAL | Age: 30
End: 2020-01-08

## 2020-01-08 DIAGNOSIS — Z01.818 PREOPERATIVE TESTING: Primary | ICD-10-CM

## 2020-01-08 NOTE — TELEPHONE ENCOUNTER
Called & spoke to pt  -Reminded her Humira due 1/17/2020 then 1/31/2020  -Instructed her to hold 2/14/2020 dose d/t surgery & to resume 2/28/2020 once cleared (remider set)  -Also sent instructions in portal  -Transferred pt to [facility] [phone] for Humira refills

## 2020-01-08 NOTE — ANESTHESIA PAT ROS NOTE
01/08/2020  Lora Scott is a 29 y.o., female.      Pre-op Assessment         Review of Systems  Anesthesia Hx:  No problems with previous Anesthesia  Denies Family Hx of Anesthesia complications.   Denies Personal Hx of Anesthesia complications.   Social:  Former Smoker, Social Alcohol Use    Hematology/Oncology:     Oncology Normal    -- Anemia (H/O ANEMIA):   EENT/Dental:EENT/Dental Normal   Cardiovascular:  Functional Capacity 4 METS, ABLE TO CLIMB 2 FLIGHTS OF STAIRS    Pulmonary:  Asthma:  last episode was Hx of Childhood Asthma.    Renal/:  Renal/ Normal     Hepatic/GI:  Bowel Conditions:  Inflammatory Bowel Disease, Crohns (OF SMALL AND LARGE INTESTINES, IMMUNOSUPPRESSED)    Musculoskeletal:  Musculoskeletal General/Symptoms: Functional capacity is ambulatory without assistance.    Neurological:  Neurology Normal    Endocrine:  Endocrine Normal    Dermatological:   PSORIASIS   Psych:  Depression and Past Hx of Depression.             Anesthesia Assessment: Preoperative EQUATION    Planned Procedure: Procedure(s) (LRB):  CLOSURE,ILEOSTOMY TAKEDOWN END ILEOSTOMY/MUCOUS FISTULA POSSIBLE LAPAROTOMY ERAS LOW (N/A)  Requested Anesthesia Type:General  Surgeon: Uday Cope MD  Service: Colon and Rectal  Known or anticipated Date of Surgery:2/12/2020    Surgeon notes: reviewed    Electronic QUestionnaire Assessment completed via nurse interview with patient.        Triage considerations:     The patient has no apparent active cardiac condition (No unstable coronary Syndrome such as severe unstable angina or recent [<1 month] myocardial infarction, decompensated CHF, severe valvular   disease or significant arrhythmia)    Previous anesthesia records:MAC and No problems  12/20/2019 ILEOSCOPY through stoma (N/A Abdomen)   Airway/Jaw/Neck:  Airway Findings: Mouth Opening: Normal Tongue: Normal  General  Airway Assessment: Adult  Mallampati: II  TM Distance: Normal, at least 6 cm           Last PCP note: > 1 year ago , within [facility]   Subspecialty notes: Dermatology, Gastroenterology, Infectious Disease, CRS, OPTOMETRY, & WOUND CARE    Other important co-morbidities: ANEMIA, CHROHN'S DISEASE,& IMMUNOSUPPRESSED.      Tests already available:  No recent tests.             Instructions given. (See in Nurse's note)    Optimization:  Anesthesia Preop Clinic Assessment  Indicated- not indicated for this surgery        Plan:    Testing:  BMP and Hematology Profile        Patient  has previously scheduled Medical Appointment:1/16 lab, 2/3 Dr. Cope    Navigation: Tests Scheduled. TBD                          Results will be tracked by Preop Clinic.   1/20 Labs resulted and noted.  Lesley Parks RN BSN

## 2020-01-08 NOTE — TELEPHONE ENCOUNTER
----- Message from Suzi Bacon MD sent at 1/8/2020 11:46 AM CST -----  IBD team:  Patient needs to get additional refills from [facility] for her Humira. Refills are on the prescription. Please have her go  her meds from our pharmacy asap. Next dose is due 1/17/20 then 1/31/20. Please let her know not to take her dose on 2/14/20 due to surgery and then we will resume after surgery if no issues (Dr. Cope to clear her) to resume on 2/28/20.      Lesley- see above recommendations    SS  ----- Message -----  From: Lesley Parks RN  Sent: 1/8/2020  11:30 AM CST  To: Lesley Parks RN, Suzi Bacon MD, #    Patient is scheduled for closure, ileostomy takedown end ileostomy/mucosa fistula possible laparotomy on 2/12 with Dr. Cope( approximately 160 minutes of general anesthesia). I will need Humira instructions. Patient told me she is currently out of this medication. Her last dose was 1/3 pre patient. Please advise.  Thanks!

## 2020-01-08 NOTE — PRE-PROCEDURE INSTRUCTIONS
Patient called to  prescription refill. She said she was already called and prescription and instructions.I reinstructed her to take humira 1/17 and 1/31. Hold humira on 2/14 and after surgery Dr. Cope will clear to restart Humira. Verbalizes understanding.  
Patient stated has not had any problem with anesthesia in the past. Will need labs for this surgery. Our  will call to set up this appt. Preop instructions given. Hold asa, asa containing products, nsaids, vitamins and supplements one week prior to surgery. Medication instructions given. I will contact Dr. Suzi Bacon for Humira instructions.  Shower the night before surgery and the morning of surgery with an antibacterial soap( hibiclens or dial antibacterial soap).  Nothing on the skin once shower. Do not apply any deodorant, lotion, powder, perfume,or aftershave. No makeup or moisturizer. No fingernail polish or jewelry going to surgery. May have solid foods, gum, and hard candy until 8 hours before surgery/procedure time.  May have clear liquids( water, gatorade, powerade or apple juice) until 2 hours prior to surgery/procedure time.  No red drinks. If in doubt , drink water. Nothing to drink 2 hours before arrival time for surgery/procedure. If you are told to take medication in the morning of surgery, it may be taken with a sip of water. If your doctor tells you something different pertaining to when to stop eating or drinking, follow your doctor's instructions. Stated knows where the surgery center is located. Call for changes in status or questions.  Verbalizes understanding. Mailed preop and med instructions.   
DASH/TLC

## 2020-01-14 ENCOUNTER — TELEPHONE (OUTPATIENT)
Dept: PREADMISSION TESTING | Facility: HOSPITAL | Age: 30
End: 2020-01-14

## 2020-01-14 NOTE — TELEPHONE ENCOUNTER
----- Message from Lesley Parks RN sent at 1/8/2020 11:28 AM CST -----  Surgery 2/12  Please schedule labs.  Thanks!

## 2020-01-15 ENCOUNTER — LAB VISIT (OUTPATIENT)
Dept: LAB | Facility: HOSPITAL | Age: 30
End: 2020-01-15
Attending: INTERNAL MEDICINE

## 2020-01-15 DIAGNOSIS — Z01.818 PREOPERATIVE TESTING: ICD-10-CM

## 2020-01-15 DIAGNOSIS — K50.80 CROHN'S DISEASE OF BOTH SMALL AND LARGE INTESTINE WITHOUT COMPLICATION: ICD-10-CM

## 2020-01-15 LAB
ANION GAP SERPL CALC-SCNC: 11 MMOL/L (ref 8–16)
BUN SERPL-MCNC: 5 MG/DL (ref 6–20)
CALCIUM SERPL-MCNC: 8.8 MG/DL (ref 8.7–10.5)
CHLORIDE SERPL-SCNC: 109 MMOL/L (ref 95–110)
CO2 SERPL-SCNC: 20 MMOL/L (ref 23–29)
CREAT SERPL-MCNC: 0.7 MG/DL (ref 0.5–1.4)
ERYTHROCYTE [DISTWIDTH] IN BLOOD BY AUTOMATED COUNT: 14.4 % (ref 11.5–14.5)
EST. GFR  (AFRICAN AMERICAN): >60 ML/MIN/1.73 M^2
EST. GFR  (NON AFRICAN AMERICAN): >60 ML/MIN/1.73 M^2
GLUCOSE SERPL-MCNC: 83 MG/DL (ref 70–110)
HCT VFR BLD AUTO: 36.6 % (ref 37–48.5)
HGB BLD-MCNC: 11.3 G/DL (ref 12–16)
MCH RBC QN AUTO: 29.3 PG (ref 27–31)
MCHC RBC AUTO-ENTMCNC: 30.9 G/DL (ref 32–36)
MCV RBC AUTO: 95 FL (ref 82–98)
PLATELET # BLD AUTO: 234 K/UL (ref 150–350)
PMV BLD AUTO: 10.7 FL (ref 9.2–12.9)
POTASSIUM SERPL-SCNC: 3.9 MMOL/L (ref 3.5–5.1)
RBC # BLD AUTO: 3.86 M/UL (ref 4–5.4)
SODIUM SERPL-SCNC: 140 MMOL/L (ref 136–145)
WBC # BLD AUTO: 4.46 K/UL (ref 3.9–12.7)

## 2020-01-15 PROCEDURE — 36415 COLL VENOUS BLD VENIPUNCTURE: CPT

## 2020-01-15 PROCEDURE — 80145 DRUG ASSAY ADALIMUMAB: CPT

## 2020-01-15 PROCEDURE — 85027 COMPLETE CBC AUTOMATED: CPT

## 2020-01-15 PROCEDURE — 80048 BASIC METABOLIC PNL TOTAL CA: CPT

## 2020-01-19 NOTE — PROGRESS NOTES
Subjective:       Patient ID: Lora Scott is a 29 y.o. female.    Chief Complaint: ILEOSTOMY REVERSAL    HPI  30 yo F with Crohn's disease who underwent an ileocecal resection with end ileostomy and colonic mucous fistula in May 2019.  At the time of surgery she was unable to eat, on home TPN, and markedly hypoalbuminemic.  Since her surgery she has recovered well.  She has gone from a weight of 105 lb preoperatively to 149 lb today.  She is being medically treated with Humira, dosed every 2 weeks, next day scheduled for 01/02/2020.  Ileoscopy done by Dr. Bacon on 12/20/2019 showed a normal neoterminal ileum.  She presents today for discussion regarding ileostomy closure.  He is eating well, no nausea or vomiting, no fevers or chills, no abdominal pain.  She is managing her ostomy well.  She has stopped smoking.    Review of patient's allergies indicates:   Allergen Reactions    Shellfish containing products Anaphylaxis     Allergic to all seafood.    Horse/equine containing products Itching    Iodine      Swelling (throat)^       Past Medical History:   Diagnosis Date    Allergy     Asthma     Crohn's disease 2/18/19    Crohn's disease involving terminal ileum     Depression     Eczema     Ex-smoker for less than 1 year     Immunosuppressed status 10/24/2019    Joint pain        Past Surgical History:   Procedure Laterality Date    COLONOSCOPY N/A 2/20/2019    Procedure: COLONOSCOPY;  Surgeon: Fawad Perla MD;  Location: [facility] ([address] [place] [address]);  Service: Endoscopy;  Laterality: N/A;    ESOPHAGOGASTRODUODENOSCOPY N/A 2/20/2019    Procedure: EGD (ESOPHAGOGASTRODUODENOSCOPY);  Surgeon: Fawad Perla MD;  Location: [facility] ([address] [place] [address]);  Service: Endoscopy;  Laterality: N/A;    ILEOCOLECTOMY N/A 5/10/2019    Procedure: ILEOCOLECTOMY;  Surgeon: Uday Cope MD;  Location: [facility] OR [address] [place] [address];  Service: Colon and Rectal;  Laterality: N/A;  ileocecectomy    ILEOSCOPY N/A 12/20/2019    Procedure:  ILEOSCOPY through stoma;  Surgeon: Suzi Bacon MD;  Location: [facility] ENDO (4TH FLR);  Service: Endoscopy;  Laterality: N/A;  Schedule as 30 minute case  Please let patient know to bring extra stoma bag/wafer, etc- entire bag and appliance will be removed at time of procedure to visualize the area well   first week 11/2019     per note-R/S to 12/20/19-GT    ILEOSTOMY N/A 5/10/2019    Procedure: CREATION, ILEOSTOMY with mucous fistula;  Surgeon: Uday Cope MD;  Location: [facility] OR 2ND FLR;  Service: Colon and Rectal;  Laterality: N/A;       Current Outpatient Medications   Medication Sig Dispense Refill    adalimumab (HUMIRA PEN CROHNS-UC-HS START) 40 mg/0.8 mL PnKt Inject 160 mg (4 pens) into the skin at week 0, then inject 80 mg (2 pens) into the skin at week 2. 6 pen 0    adalimumab (HUMIRA PEN) 40 mg/0.8 mL PnKt Inject 1 pen (40 mg total) into the skin every 14 (fourteen) days. 2 pen 5    folic acid (FOLVITE) 1 MG tablet Take 1 tablet (1 mg total) by mouth once daily. 30 tablet 11    hydrOXYzine HCl (ATARAX) 25 MG tablet Take 1 tablet (25 mg total) by mouth 3 (three) times daily. 90 tablet 1    ibuprofen (ADVIL,MOTRIN) 600 MG tablet Take 1 tablet (600 mg total) by mouth every 6 (six) hours as needed for Pain. 20 tablet 0    triamcinolone acetonide 0.1% (KENALOG) 0.1 % cream Apply topically 2 (two) times daily. To rashes 454 g 3    methotrexate 2.5 MG Tab Take 5 tablets (12.5 mg total) by mouth every 7 days. 20 tablet 0     No current facility-administered medications for this visit.        Family History   Problem Relation Age of Onset    Hypertension Mother     No Known Problems Father     No Known Problems Sister     No Known Problems Brother     No Known Problems Sister     No Known Problems Sister     No Known Problems Sister     No Known Problems Maternal Uncle     No Known Problems Paternal Aunt     No Known Problems Paternal Uncle     No Known Problems Maternal Grandmother     No  Known Problems Maternal Grandfather     Irritable bowel syndrome Paternal Grandmother     No Known Problems Paternal Grandfather     No Known Problems Paternal Aunt     No Known Problems Paternal Uncle     No Known Problems Paternal Uncle     No Known Problems Paternal Uncle     No Known Problems Maternal Uncle     No Known Problems Maternal Uncle     Celiac disease Neg Hx     Cirrhosis Neg Hx     Colon cancer Neg Hx     Colon polyps Neg Hx     Crohn's disease Neg Hx     Cystic fibrosis Neg Hx     Esophageal cancer Neg Hx     Hemochromatosis Neg Hx     Inflammatory bowel disease Neg Hx     Liver cancer Neg Hx     Liver disease Neg Hx     Rectal cancer Neg Hx     Stomach cancer Neg Hx     Ulcerative colitis Neg Hx     Jerardo's disease Neg Hx     Lymphoma Neg Hx     Tuberculosis Neg Hx     Scleroderma Neg Hx     Rheum arthritis Neg Hx     Multiple sclerosis Neg Hx     Psoriasis Neg Hx     Lupus Neg Hx     Melanoma Neg Hx     Skin cancer Neg Hx     Amblyopia Neg Hx     Blindness Neg Hx     Cancer Neg Hx     Cataracts Neg Hx     Diabetes Neg Hx     Glaucoma Neg Hx     Macular degeneration Neg Hx     Retinal detachment Neg Hx     Strabismus Neg Hx     Stroke Neg Hx     Thyroid disease Neg Hx        Social History     Socioeconomic History    Marital status: Single     Spouse name: Not on file    Number of children: Not on file    Years of education: Not on file    Highest education level: Not on file   Occupational History    Not on file   Social Needs    Financial resource strain: Not on file    Food insecurity:     Worry: Not on file     Inability: Not on file    Transportation needs:     Medical: Not on file     Non-medical: Not on file   Tobacco Use    Smoking status: Former Smoker     Types: Cigarettes     Last attempt to quit: 2019     Years since quittin.3    Smokeless tobacco: Never Used   Substance and Sexual Activity    Alcohol use: Not Currently      Alcohol/week: 0.0 standard drinks     Comment: Socially    Drug use: No     Frequency: 3.0 times per week    Sexual activity: Not Currently     Partners: Male     Birth control/protection: None   Lifestyle    Physical activity:     Days per week: Not on file     Minutes per session: Not on file    Stress: Not on file   Relationships    Social connections:     Talks on phone: Not on file     Gets together: Not on file     Attends Confucianist service: Not on file     Active member of club or organization: Not on file     Attends meetings of clubs or organizations: Not on file     Relationship status: Not on file   Other Topics Concern    Are you pregnant or think you may be? Not Asked    Breast-feeding Not Asked   Social History Narrative    Not on file       Review of Systems   Constitutional: Negative for chills and fever.   HENT: Negative for congestion and sore throat.    Eyes: Negative for visual disturbance.   Respiratory: Negative for cough and shortness of breath.    Cardiovascular: Negative for chest pain and palpitations.   Gastrointestinal: Negative for abdominal distention, abdominal pain, anal bleeding, blood in stool, constipation, diarrhea, nausea, rectal pain and vomiting.   Endocrine: Negative for cold intolerance and heat intolerance.   Genitourinary: Negative for dysuria and frequency.   Musculoskeletal: Negative for arthralgias, back pain and neck pain.   Skin: Negative for rash.   Allergic/Immunologic: Negative for immunocompromised state.   Neurological: Negative for dizziness, light-headedness and headaches.   Hematological: Does not bruise/bleed easily.   Psychiatric/Behavioral: Negative for confusion. The patient is not nervous/anxious.        Objective:      Physical Exam   Constitutional: She is oriented to person, place, and time. She appears well-developed and well-nourished.   HENT:   Head: Normocephalic.   Pulmonary/Chest: Effort normal. No respiratory distress.   Abdominal: Soft.  Bowel sounds are normal. She exhibits no distension and no mass. There is no tenderness. There is no rebound and no guarding. No hernia.   Midline scar, right lower quadrant ileostomy   Musculoskeletal: Normal range of motion.   Neurological: She is alert and oriented to person, place, and time.   Skin: Skin is warm and dry.   Psychiatric: She has a normal mood and affect.         Lab Results   Component Value Date    WBC 4.46 01/15/2020    HGB 11.3 (L) 01/15/2020    HCT 36.6 (L) 01/15/2020    MCV 95 01/15/2020     01/15/2020     BMP  Lab Results   Component Value Date     01/15/2020    K 3.9 01/15/2020     01/15/2020    CO2 20 (L) 01/15/2020    BUN 5 (L) 01/15/2020    CREATININE 0.7 01/15/2020    CALCIUM 8.8 01/15/2020    ANIONGAP 11 01/15/2020    ESTGFRAFRICA >60.0 01/15/2020    EGFRNONAA >60.0 01/15/2020     CMP  Sodium   Date Value Ref Range Status   01/15/2020 140 136 - 145 mmol/L Final     Potassium   Date Value Ref Range Status   01/15/2020 3.9 3.5 - 5.1 mmol/L Final     Chloride   Date Value Ref Range Status   01/15/2020 109 95 - 110 mmol/L Final     CO2   Date Value Ref Range Status   01/15/2020 20 (L) 23 - 29 mmol/L Final     Glucose   Date Value Ref Range Status   01/15/2020 83 70 - 110 mg/dL Final     BUN, Bld   Date Value Ref Range Status   01/15/2020 5 (L) 6 - 20 mg/dL Final     Creatinine   Date Value Ref Range Status   01/15/2020 0.7 0.5 - 1.4 mg/dL Final     Calcium   Date Value Ref Range Status   01/15/2020 8.8 8.7 - 10.5 mg/dL Final     Total Protein   Date Value Ref Range Status   11/08/2019 7.3 6.0 - 8.4 g/dL Final     Albumin   Date Value Ref Range Status   11/08/2019 3.3 (L) 3.5 - 5.2 g/dL Final     Total Bilirubin   Date Value Ref Range Status   11/08/2019 0.6 0.1 - 1.0 mg/dL Final     Comment:     For infants and newborns, interpretation of results should be based  on gestational age, weight and in agreement with clinical  observations.  Premature Infant recommended  reference ranges:  Up to 24 hours.............<8.0 mg/dL  Up to 48 hours............<12.0 mg/dL  3-5 days..................<15.0 mg/dL  6-29 days.................<15.0 mg/dL       Alkaline Phosphatase   Date Value Ref Range Status   11/08/2019 113 55 - 135 U/L Final     AST   Date Value Ref Range Status   11/08/2019 18 10 - 40 U/L Final     ALT   Date Value Ref Range Status   11/08/2019 13 10 - 44 U/L Final     Anion Gap   Date Value Ref Range Status   01/15/2020 11 8 - 16 mmol/L Final     eGFR if    Date Value Ref Range Status   01/15/2020 >60.0 >60 mL/min/1.73 m^2 Final     eGFR if non    Date Value Ref Range Status   01/15/2020 >60.0 >60 mL/min/1.73 m^2 Final     Comment:     Calculation used to obtain the estimated glomerular filtration  rate (eGFR) is the CKD-EPI equation.        No results found for: CEA        Assessment:       1. Crohn's disease of both small and large intestine with intestinal obstruction    2. Ileostomy present        Plan:   She is scheduled for an ileostomy takedown on 02/12/2020.  I will see her back on 02/03/2020 for preop visit.    Uday Cope MD, FACS, FASCRS  Senior Staff Surgeon  Department of Colon & Rectal Surgery     This note was created using voice recognition software, and may contain some unrecognized transcriptional errors.

## 2020-01-23 LAB — ADALIMUMAB CONCENTRATION & ANTI-ADALIMUMAB ANTIBODY: NORMAL

## 2020-01-24 ENCOUNTER — TELEPHONE (OUTPATIENT)
Dept: SURGERY | Facility: CLINIC | Age: 30
End: 2020-01-24

## 2020-01-24 NOTE — TELEPHONE ENCOUNTER
----- Message from Sandra Jin sent at 1/24/2020 12:55 PM CST -----  Contact: Lora Alberts called to f/u on confirmation on surgery and confirming financial coverage with [facility] . [phone]

## 2020-01-27 ENCOUNTER — PATIENT MESSAGE (OUTPATIENT)
Dept: WOUND CARE | Facility: CLINIC | Age: 30
End: 2020-01-27

## 2020-02-03 ENCOUNTER — OFFICE VISIT (OUTPATIENT)
Dept: SURGERY | Facility: CLINIC | Age: 30
End: 2020-02-03
Payer: COMMERCIAL

## 2020-02-03 VITALS
WEIGHT: 154.13 LBS | HEART RATE: 98 BPM | DIASTOLIC BLOOD PRESSURE: 70 MMHG | BODY MASS INDEX: 28.36 KG/M2 | HEIGHT: 62 IN | SYSTOLIC BLOOD PRESSURE: 133 MMHG

## 2020-02-03 DIAGNOSIS — Z93.2 ILEOSTOMY PRESENT: ICD-10-CM

## 2020-02-03 DIAGNOSIS — K50.812 CROHN'S DISEASE OF BOTH SMALL AND LARGE INTESTINE WITH INTESTINAL OBSTRUCTION: Primary | ICD-10-CM

## 2020-02-03 PROCEDURE — 99213 PR OFFICE/OUTPT VISIT, EST, LEVL III, 20-29 MIN: ICD-10-PCS | Mod: S$GLB,,, | Performed by: COLON & RECTAL SURGERY

## 2020-02-03 PROCEDURE — 99999 PR PBB SHADOW E&M-EST. PATIENT-LVL III: CPT | Mod: PBBFAC,,, | Performed by: COLON & RECTAL SURGERY

## 2020-02-03 PROCEDURE — 99213 OFFICE O/P EST LOW 20 MIN: CPT | Mod: PBBFAC | Performed by: COLON & RECTAL SURGERY

## 2020-02-03 PROCEDURE — 99213 OFFICE O/P EST LOW 20 MIN: CPT | Mod: S$GLB,,, | Performed by: COLON & RECTAL SURGERY

## 2020-02-03 PROCEDURE — 99999 PR PBB SHADOW E&M-EST. PATIENT-LVL III: ICD-10-PCS | Mod: PBBFAC,,, | Performed by: COLON & RECTAL SURGERY

## 2020-02-03 NOTE — LETTER
February 3, 2020      Suzi Bacon MD  [address]  [facility] [address]           [facility]  [address]  [facility] [address]  Phone: [phone]          Patient: Lora Scott   MR Number: [id number]   YOB: 1990   Date of Visit: 2/3/2020       Dear Dr Bacon:    Thank you for referring Lora Scott to me for evaluation. Attached you will find relevant portions of my assessment and plan of care.    If you have questions, please do not hesitate to call me. I look forward to following Lora Scott along with you.    Sincerely,    Uday Cope MD    Enclosure  CC:  Margarita Jay MD    If you would like to receive this communication electronically, please contact [email or web address] or [phone] to request more information on [employer] Link access.    For providers and/or their staff who would like to refer a patient to [facility], please contact us through our one-stop-shop provider referral line, [facility] , at [phone].    If you feel you have received this communication in error or would no longer like to receive these types of communications, please e-mail [email or web address]

## 2020-02-03 NOTE — PROGRESS NOTES
Subjective:       Patient ID: Lora Scott is a 29 y.o. female.    Chief Complaint: Crohn's Disease    HPI  28 yo F with Crohn's disease who underwent an ileocecal resection with end ileostomy and colonic mucous fistula in May 2019.  At the time of surgery she was unable to eat, on home TPN, and markedly hypoalbuminemic.  She was last seen by me in July of 2019, but then returned 12/30/2019 to discuss ostomy closure.  Since her surgery she has recovered well.  She had gone from a weight of 105 lb preoperatively to 149 lb at that point.  She is being medically treated with Humira, dosed every 2 weeks, next dose was scheduled for 1/2/20.  Ileoscopy done by Dr. Bacon on 12/20/2019 showed a normal neoterminal ileum.  At that time we tentatively scheduled her for surgery on 02/12/2020 to coincide with the end of a Humira cycle.    She returns today for preop visit.  She reports no significant changes since I last saw her.  She cannot remember exactly when her last Humira dose was but she believes it was in the middle of January.  No abdominal pain. No nausea or vomiting.  She is anxious to proceed with ileostomy closure.    Review of patient's allergies indicates:   Allergen Reactions    Shellfish containing products Anaphylaxis     Allergic to all seafood.    Horse/equine containing products Itching    Iodine      Swelling (throat)^       Past Medical History:   Diagnosis Date    Allergy     Asthma     Crohn's disease 2/18/19    Crohn's disease involving terminal ileum     Depression     Eczema     Ex-smoker for less than 1 year     Immunosuppressed status 10/24/2019    Joint pain        Past Surgical History:   Procedure Laterality Date    COLONOSCOPY N/A 2/20/2019    Procedure: COLONOSCOPY;  Surgeon: Fawad Perla MD;  Location: [address];  Service: Endoscopy;  Laterality: N/A;    ESOPHAGOGASTRODUODENOSCOPY N/A 2/20/2019    Procedure: EGD (ESOPHAGOGASTRODUODENOSCOPY);  Surgeon: Fawad SANCHEZ  MD Pan;  Location: [facility] ENDO (2ND FLR);  Service: Endoscopy;  Laterality: N/A;    ILEOCOLECTOMY N/A 5/10/2019    Procedure: ILEOCOLECTOMY;  Surgeon: Uday Cope MD;  Location: [facility] OR 2ND FLR;  Service: Colon and Rectal;  Laterality: N/A;  ileocecectomy    ILEOSCOPY N/A 12/20/2019    Procedure: ILEOSCOPY through stoma;  Surgeon: Suzi Bacon MD;  Location: [facility] ENDO (4TH FLR);  Service: Endoscopy;  Laterality: N/A;  Schedule as 30 minute case  Please let patient know to bring extra stoma bag/wafer, etc- entire bag and appliance will be removed at time of procedure to visualize the area well   first week 11/2019     per note-R/S to 12/20/19-GT    ILEOSTOMY N/A 5/10/2019    Procedure: CREATION, ILEOSTOMY with mucous fistula;  Surgeon: Uday Cope MD;  Location: [facility] OR 2ND FLR;  Service: Colon and Rectal;  Laterality: N/A;       Current Outpatient Medications   Medication Sig Dispense Refill    adalimumab (HUMIRA PEN CROHNS-UC-HS START) 40 mg/0.8 mL PnKt Inject 160 mg (4 pens) into the skin at week 0, then inject 80 mg (2 pens) into the skin at week 2. 6 pen 0    adalimumab (HUMIRA PEN) 40 mg/0.8 mL PnKt Inject 1 pen (40 mg total) into the skin every 14 (fourteen) days. 2 pen 5    triamcinolone acetonide 0.1% (KENALOG) 0.1 % cream Apply topically 2 (two) times daily. To rashes 454 g 3    folic acid (FOLVITE) 1 MG tablet Take 1 tablet (1 mg total) by mouth once daily. (Patient not taking: Reported on 2/3/2020) 30 tablet 11    hydrOXYzine HCl (ATARAX) 25 MG tablet Take 1 tablet (25 mg total) by mouth 3 (three) times daily. (Patient not taking: Reported on 2/3/2020) 90 tablet 1    ibuprofen (ADVIL,MOTRIN) 600 MG tablet Take 1 tablet (600 mg total) by mouth every 6 (six) hours as needed for Pain. (Patient not taking: Reported on 2/3/2020) 20 tablet 0    methotrexate 2.5 MG Tab Take 5 tablets (12.5 mg total) by mouth every 7 days. 20 tablet 0     No current facility-administered medications for  this visit.        Family History   Problem Relation Age of Onset    Hypertension Mother     No Known Problems Father     No Known Problems Sister     No Known Problems Brother     No Known Problems Sister     No Known Problems Sister     No Known Problems Sister     No Known Problems Maternal Uncle     No Known Problems Paternal Aunt     No Known Problems Paternal Uncle     No Known Problems Maternal Grandmother     No Known Problems Maternal Grandfather     Irritable bowel syndrome Paternal Grandmother     No Known Problems Paternal Grandfather     No Known Problems Paternal Aunt     No Known Problems Paternal Uncle     No Known Problems Paternal Uncle     No Known Problems Paternal Uncle     No Known Problems Maternal Uncle     No Known Problems Maternal Uncle     Celiac disease Neg Hx     Cirrhosis Neg Hx     Colon cancer Neg Hx     Colon polyps Neg Hx     Crohn's disease Neg Hx     Cystic fibrosis Neg Hx     Esophageal cancer Neg Hx     Hemochromatosis Neg Hx     Inflammatory bowel disease Neg Hx     Liver cancer Neg Hx     Liver disease Neg Hx     Rectal cancer Neg Hx     Stomach cancer Neg Hx     Ulcerative colitis Neg Hx     Jerardo's disease Neg Hx     Lymphoma Neg Hx     Tuberculosis Neg Hx     Scleroderma Neg Hx     Rheum arthritis Neg Hx     Multiple sclerosis Neg Hx     Psoriasis Neg Hx     Lupus Neg Hx     Melanoma Neg Hx     Skin cancer Neg Hx     Amblyopia Neg Hx     Blindness Neg Hx     Cancer Neg Hx     Cataracts Neg Hx     Diabetes Neg Hx     Glaucoma Neg Hx     Macular degeneration Neg Hx     Retinal detachment Neg Hx     Strabismus Neg Hx     Stroke Neg Hx     Thyroid disease Neg Hx        Social History     Socioeconomic History    Marital status: Single     Spouse name: Not on file    Number of children: Not on file    Years of education: Not on file    Highest education level: Not on file   Occupational History    Not on file   Social  Needs    Financial resource strain: Not on file    Food insecurity:     Worry: Not on file     Inability: Not on file    Transportation needs:     Medical: Not on file     Non-medical: Not on file   Tobacco Use    Smoking status: Former Smoker     Types: Cigarettes     Last attempt to quit: 2019     Years since quittin.3    Smokeless tobacco: Never Used   Substance and Sexual Activity    Alcohol use: Not Currently     Alcohol/week: 0.0 standard drinks     Comment: Socially    Drug use: No     Frequency: 3.0 times per week    Sexual activity: Not Currently     Partners: Male     Birth control/protection: None   Lifestyle    Physical activity:     Days per week: Not on file     Minutes per session: Not on file    Stress: Not on file   Relationships    Social connections:     Talks on phone: Not on file     Gets together: Not on file     Attends [employer] service: Not on file     Active member of club or organization: Not on file     Attends meetings of clubs or organizations: Not on file     Relationship status: Not on file   Other Topics Concern    Are you pregnant or think you may be? Not Asked    Breast-feeding Not Asked   Social History Narrative    Not on file       Review of Systems   Constitutional: Negative for chills and fever.   HENT: Negative for congestion and sore throat.    Eyes: Negative for visual disturbance.   Respiratory: Negative for cough and shortness of breath.    Cardiovascular: Negative for chest pain and palpitations.   Gastrointestinal: Negative for abdominal distention, abdominal pain, anal bleeding, blood in stool, constipation, diarrhea, nausea, rectal pain and vomiting.   Endocrine: Negative for cold intolerance and heat intolerance.   Genitourinary: Negative for dysuria and frequency.   Musculoskeletal: Negative for arthralgias, back pain and neck pain.   Skin: Negative for rash.   Allergic/Immunologic: Negative for immunocompromised state.   Neurological:  Negative for dizziness, light-headedness and headaches.   Hematological: Does not bruise/bleed easily.   Psychiatric/Behavioral: Negative for confusion. The patient is not nervous/anxious.        Objective:      Physical Exam   Constitutional: She is oriented to person, place, and time. She appears well-developed and well-nourished.   HENT:   Head: Normocephalic.   Pulmonary/Chest: Effort normal. No respiratory distress.   Abdominal: Soft. Bowel sounds are normal. She exhibits no distension and no mass. There is no tenderness. There is no rebound and no guarding. No hernia.   Midline scar, right lower quadrant ileostomy   Musculoskeletal: Normal range of motion.   Neurological: She is alert and oriented to person, place, and time.   Skin: Skin is warm and dry.   Psychiatric: She has a normal mood and affect.         Lab Results   Component Value Date    WBC 5.13 02/03/2020    HGB 11.0 (L) 02/03/2020    HCT 36.0 (L) 02/03/2020    MCV 94 02/03/2020     02/03/2020     BMP  Lab Results   Component Value Date     02/03/2020    K 4.0 02/03/2020     02/03/2020    CO2 20 (L) 02/03/2020    BUN 7 02/03/2020    CREATININE 0.8 02/03/2020    CALCIUM 9.1 02/03/2020    ANIONGAP 9 02/03/2020    ESTGFRAFRICA >60.0 02/03/2020    EGFRNONAA >60.0 02/03/2020     CMP  Sodium   Date Value Ref Range Status   02/03/2020 139 136 - 145 mmol/L Final     Potassium   Date Value Ref Range Status   02/03/2020 4.0 3.5 - 5.1 mmol/L Final     Chloride   Date Value Ref Range Status   02/03/2020 110 95 - 110 mmol/L Final     CO2   Date Value Ref Range Status   02/03/2020 20 (L) 23 - 29 mmol/L Final     Glucose   Date Value Ref Range Status   02/03/2020 103 70 - 110 mg/dL Final     BUN, Bld   Date Value Ref Range Status   02/03/2020 7 6 - 20 mg/dL Final     Creatinine   Date Value Ref Range Status   02/03/2020 0.8 0.5 - 1.4 mg/dL Final     Calcium   Date Value Ref Range Status   02/03/2020 9.1 8.7 - 10.5 mg/dL Final     Total Protein    Date Value Ref Range Status   02/03/2020 7.6 6.0 - 8.4 g/dL Final     Albumin   Date Value Ref Range Status   02/03/2020 3.4 (L) 3.5 - 5.2 g/dL Final     Total Bilirubin   Date Value Ref Range Status   02/03/2020 0.4 0.1 - 1.0 mg/dL Final     Comment:     For infants and newborns, interpretation of results should be based  on gestational age, weight and in agreement with clinical  observations.  Premature Infant recommended reference ranges:  Up to 24 hours.............<8.0 mg/dL  Up to 48 hours............<12.0 mg/dL  3-5 days..................<15.0 mg/dL  6-29 days.................<15.0 mg/dL       Alkaline Phosphatase   Date Value Ref Range Status   02/03/2020 128 55 - 135 U/L Final     AST   Date Value Ref Range Status   02/03/2020 14 10 - 40 U/L Final     ALT   Date Value Ref Range Status   02/03/2020 13 10 - 44 U/L Final     Anion Gap   Date Value Ref Range Status   02/03/2020 9 8 - 16 mmol/L Final     eGFR if    Date Value Ref Range Status   02/03/2020 >60.0 >60 mL/min/1.73 m^2 Final     eGFR if non    Date Value Ref Range Status   02/03/2020 >60.0 >60 mL/min/1.73 m^2 Final     Comment:     Calculation used to obtain the estimated glomerular filtration  rate (eGFR) is the CKD-EPI equation.        No results found for: CEA        Assessment:       1. Crohn's disease of both small and large intestine with intestinal obstruction    2. Ileostomy present        Plan:   Proceed with ileostomy takedown and ileocolic anastomosis on 02/12/2020 as scheduled.    I have discussed the procedure at length with Lora Scott.  We discussed the rationale, risks, benefits, and alternatives in depth.  We discussed the expected outcomes and potential complications including but not limited to bleeding, infection, anastomotic leak, recurrence, prolonged pain, need for further procedures, altered continence, incisional hernia and injury to adjacent organs.  She verbalized her understanding of the  procedure and wishes to proceed.  Written consent was obtained.    We also discussed the importance of continuing to be compliant with medical management of her Crohn's disease as well as continued tobacco cessation in order to minimize the risk of postoperative recurrence.    Uday Cope MD, FACS, FASCRS  Senior Staff Surgeon  Department of Colon & Rectal Surgery     This note was created using voice recognition software, and may contain some unrecognized transcriptional errors.

## 2020-02-03 NOTE — H&P (VIEW-ONLY)
Subjective:       Patient ID: Lora Scott is a 29 y.o. female.    Chief Complaint: Crohn's Disease    HPI  28 yo F with Crohn's disease who underwent an ileocecal resection with end ileostomy and colonic mucous fistula in May 2019.  At the time of surgery she was unable to eat, on home TPN, and markedly hypoalbuminemic.  She was last seen by me in July of 2019, but then returned 12/30/2019 to discuss ostomy closure.  Since her surgery she has recovered well.  She had gone from a weight of 105 lb preoperatively to 149 lb at that point.  She is being medically treated with Humira, dosed every 2 weeks, next dose was scheduled for 1/2/20.  Ileoscopy done by Dr. Bacon on 12/20/2019 showed a normal neoterminal ileum.  At that time we tentatively scheduled her for surgery on 02/12/2020 to coincide with the end of a Humira cycle.    She returns today for preop visit.  She reports no significant changes since I last saw her.  She cannot remember exactly when her last Humira dose was but she believes it was in the middle of January.  No abdominal pain. No nausea or vomiting.  She is anxious to proceed with ileostomy closure.    Review of patient's allergies indicates:   Allergen Reactions    Shellfish containing products Anaphylaxis     Allergic to all seafood.    Horse/equine containing products Itching    Iodine      Swelling (throat)^       Past Medical History:   Diagnosis Date    Allergy     Asthma     Crohn's disease 2/18/19    Crohn's disease involving terminal ileum     Depression     Eczema     Ex-smoker for less than 1 year     Immunosuppressed status 10/24/2019    Joint pain        Past Surgical History:   Procedure Laterality Date    COLONOSCOPY N/A 2/20/2019    Procedure: COLONOSCOPY;  Surgeon: Fawad Perla MD;  Location: [address];  Service: Endoscopy;  Laterality: N/A;    ESOPHAGOGASTRODUODENOSCOPY N/A 2/20/2019    Procedure: EGD (ESOPHAGOGASTRODUODENOSCOPY);  Surgeon: Fawad SANCHEZ  MD Pan;  Location: [facility] (2ND FLR);  Service: Endoscopy;  Laterality: N/A;    ILEOCOLECTOMY N/A 5/10/2019    Procedure: ILEOCOLECTOMY;  Surgeon: Uday Cope MD;  Location: [facility] OR 2ND FLR;  Service: Colon and Rectal;  Laterality: N/A;  ileocecectomy    ILEOSCOPY N/A 12/20/2019    Procedure: ILEOSCOPY through stoma;  Surgeon: Suzi Bacon MD;  Location: [facility] (4TH FLR);  Service: Endoscopy;  Laterality: N/A;  Schedule as 30 minute case  Please let patient know to bring extra stoma bag/wafer, etc- entire bag and appliance will be removed at time of procedure to visualize the area well   first week 11/2019     per note-R/S to 12/20/19-GT    ILEOSTOMY N/A 5/10/2019    Procedure: CREATION, ILEOSTOMY with mucous fistula;  Surgeon: Uday Cope MD;  Location: [facility] OR 2ND FLR;  Service: Colon and Rectal;  Laterality: N/A;       Current Outpatient Medications   Medication Sig Dispense Refill    adalimumab (HUMIRA PEN CROHNS-UC-HS START) 40 mg/0.8 mL PnKt Inject 160 mg (4 pens) into the skin at week 0, then inject 80 mg (2 pens) into the skin at week 2. 6 pen 0    adalimumab (HUMIRA PEN) 40 mg/0.8 mL PnKt Inject 1 pen (40 mg total) into the skin every 14 (fourteen) days. 2 pen 5    triamcinolone acetonide 0.1% (KENALOG) 0.1 % cream Apply topically 2 (two) times daily. To rashes 454 g 3    folic acid (FOLVITE) 1 MG tablet Take 1 tablet (1 mg total) by mouth once daily. (Patient not taking: Reported on 2/3/2020) 30 tablet 11    hydrOXYzine HCl (ATARAX) 25 MG tablet Take 1 tablet (25 mg total) by mouth 3 (three) times daily. (Patient not taking: Reported on 2/3/2020) 90 tablet 1    ibuprofen (ADVIL,MOTRIN) 600 MG tablet Take 1 tablet (600 mg total) by mouth every 6 (six) hours as needed for Pain. (Patient not taking: Reported on 2/3/2020) 20 tablet 0    methotrexate 2.5 MG Tab Take 5 tablets (12.5 mg total) by mouth every 7 days. 20 tablet 0     No current facility-administered medications for  this visit.        Family History   Problem Relation Age of Onset    Hypertension Mother     No Known Problems Father     No Known Problems Sister     No Known Problems Brother     No Known Problems Sister     No Known Problems Sister     No Known Problems Sister     No Known Problems Maternal Uncle     No Known Problems Paternal Aunt     No Known Problems Paternal Uncle     No Known Problems Maternal Grandmother     No Known Problems Maternal Grandfather     Irritable bowel syndrome Paternal Grandmother     No Known Problems Paternal Grandfather     No Known Problems Paternal Aunt     No Known Problems Paternal Uncle     No Known Problems Paternal Uncle     No Known Problems Paternal Uncle     No Known Problems Maternal Uncle     No Known Problems Maternal Uncle     Celiac disease Neg Hx     Cirrhosis Neg Hx     Colon cancer Neg Hx     Colon polyps Neg Hx     Crohn's disease Neg Hx     Cystic fibrosis Neg Hx     Esophageal cancer Neg Hx     Hemochromatosis Neg Hx     Inflammatory bowel disease Neg Hx     Liver cancer Neg Hx     Liver disease Neg Hx     Rectal cancer Neg Hx     Stomach cancer Neg Hx     Ulcerative colitis Neg Hx     Jerardo's disease Neg Hx     Lymphoma Neg Hx     Tuberculosis Neg Hx     Scleroderma Neg Hx     Rheum arthritis Neg Hx     Multiple sclerosis Neg Hx     Psoriasis Neg Hx     Lupus Neg Hx     Melanoma Neg Hx     Skin cancer Neg Hx     Amblyopia Neg Hx     Blindness Neg Hx     Cancer Neg Hx     Cataracts Neg Hx     Diabetes Neg Hx     Glaucoma Neg Hx     Macular degeneration Neg Hx     Retinal detachment Neg Hx     Strabismus Neg Hx     Stroke Neg Hx     Thyroid disease Neg Hx        Social History     Socioeconomic History    Marital status: Single     Spouse name: Not on file    Number of children: Not on file    Years of education: Not on file    Highest education level: Not on file   Occupational History    Not on file   Social  Needs    Financial resource strain: Not on file    Food insecurity:     Worry: Not on file     Inability: Not on file    Transportation needs:     Medical: Not on file     Non-medical: Not on file   Tobacco Use    Smoking status: Former Smoker     Types: Cigarettes     Last attempt to quit: 2019     Years since quittin.3    Smokeless tobacco: Never Used   Substance and Sexual Activity    Alcohol use: Not Currently     Alcohol/week: 0.0 standard drinks     Comment: Socially    Drug use: No     Frequency: 3.0 times per week    Sexual activity: Not Currently     Partners: Male     Birth control/protection: None   Lifestyle    Physical activity:     Days per week: Not on file     Minutes per session: Not on file    Stress: Not on file   Relationships    Social connections:     Talks on phone: Not on file     Gets together: Not on file     Attends Yazidi service: Not on file     Active member of club or organization: Not on file     Attends meetings of clubs or organizations: Not on file     Relationship status: Not on file   Other Topics Concern    Are you pregnant or think you may be? Not Asked    Breast-feeding Not Asked   Social History Narrative    Not on file       Review of Systems   Constitutional: Negative for chills and fever.   HENT: Negative for congestion and sore throat.    Eyes: Negative for visual disturbance.   Respiratory: Negative for cough and shortness of breath.    Cardiovascular: Negative for chest pain and palpitations.   Gastrointestinal: Negative for abdominal distention, abdominal pain, anal bleeding, blood in stool, constipation, diarrhea, nausea, rectal pain and vomiting.   Endocrine: Negative for cold intolerance and heat intolerance.   Genitourinary: Negative for dysuria and frequency.   Musculoskeletal: Negative for arthralgias, back pain and neck pain.   Skin: Negative for rash.   Allergic/Immunologic: Negative for immunocompromised state.   Neurological:  Negative for dizziness, light-headedness and headaches.   Hematological: Does not bruise/bleed easily.   Psychiatric/Behavioral: Negative for confusion. The patient is not nervous/anxious.        Objective:      Physical Exam   Constitutional: She is oriented to person, place, and time. She appears well-developed and well-nourished.   HENT:   Head: Normocephalic.   Pulmonary/Chest: Effort normal. No respiratory distress.   Abdominal: Soft. Bowel sounds are normal. She exhibits no distension and no mass. There is no tenderness. There is no rebound and no guarding. No hernia.   Midline scar, right lower quadrant ileostomy   Musculoskeletal: Normal range of motion.   Neurological: She is alert and oriented to person, place, and time.   Skin: Skin is warm and dry.   Psychiatric: She has a normal mood and affect.         Lab Results   Component Value Date    WBC 5.13 02/03/2020    HGB 11.0 (L) 02/03/2020    HCT 36.0 (L) 02/03/2020    MCV 94 02/03/2020     02/03/2020     BMP  Lab Results   Component Value Date     02/03/2020    K 4.0 02/03/2020     02/03/2020    CO2 20 (L) 02/03/2020    BUN 7 02/03/2020    CREATININE 0.8 02/03/2020    CALCIUM 9.1 02/03/2020    ANIONGAP 9 02/03/2020    ESTGFRAFRICA >60.0 02/03/2020    EGFRNONAA >60.0 02/03/2020     CMP  Sodium   Date Value Ref Range Status   02/03/2020 139 136 - 145 mmol/L Final     Potassium   Date Value Ref Range Status   02/03/2020 4.0 3.5 - 5.1 mmol/L Final     Chloride   Date Value Ref Range Status   02/03/2020 110 95 - 110 mmol/L Final     CO2   Date Value Ref Range Status   02/03/2020 20 (L) 23 - 29 mmol/L Final     Glucose   Date Value Ref Range Status   02/03/2020 103 70 - 110 mg/dL Final     BUN, Bld   Date Value Ref Range Status   02/03/2020 7 6 - 20 mg/dL Final     Creatinine   Date Value Ref Range Status   02/03/2020 0.8 0.5 - 1.4 mg/dL Final     Calcium   Date Value Ref Range Status   02/03/2020 9.1 8.7 - 10.5 mg/dL Final     Total Protein    Date Value Ref Range Status   02/03/2020 7.6 6.0 - 8.4 g/dL Final     Albumin   Date Value Ref Range Status   02/03/2020 3.4 (L) 3.5 - 5.2 g/dL Final     Total Bilirubin   Date Value Ref Range Status   02/03/2020 0.4 0.1 - 1.0 mg/dL Final     Comment:     For infants and newborns, interpretation of results should be based  on gestational age, weight and in agreement with clinical  observations.  Premature Infant recommended reference ranges:  Up to 24 hours.............<8.0 mg/dL  Up to 48 hours............<12.0 mg/dL  3-5 days..................<15.0 mg/dL  6-29 days.................<15.0 mg/dL       Alkaline Phosphatase   Date Value Ref Range Status   02/03/2020 128 55 - 135 U/L Final     AST   Date Value Ref Range Status   02/03/2020 14 10 - 40 U/L Final     ALT   Date Value Ref Range Status   02/03/2020 13 10 - 44 U/L Final     Anion Gap   Date Value Ref Range Status   02/03/2020 9 8 - 16 mmol/L Final     eGFR if    Date Value Ref Range Status   02/03/2020 >60.0 >60 mL/min/1.73 m^2 Final     eGFR if non    Date Value Ref Range Status   02/03/2020 >60.0 >60 mL/min/1.73 m^2 Final     Comment:     Calculation used to obtain the estimated glomerular filtration  rate (eGFR) is the CKD-EPI equation.        No results found for: CEA        Assessment:       1. Crohn's disease of both small and large intestine with intestinal obstruction    2. Ileostomy present        Plan:   Proceed with ileostomy takedown and ileocolic anastomosis on 02/12/2020 as scheduled.    I have discussed the procedure at length with Lora Scott.  We discussed the rationale, risks, benefits, and alternatives in depth.  We discussed the expected outcomes and potential complications including but not limited to bleeding, infection, anastomotic leak, recurrence, prolonged pain, need for further procedures, altered continence, incisional hernia and injury to adjacent organs.  She verbalized her understanding of the  procedure and wishes to proceed.  Written consent was obtained.    We also discussed the importance of continuing to be compliant with medical management of her Crohn's disease as well as continued tobacco cessation in order to minimize the risk of postoperative recurrence.    Uday Cope MD, FACS, FASCRS  Senior Staff Surgeon  Department of Colon & Rectal Surgery     This note was created using voice recognition software, and may contain some unrecognized transcriptional errors.

## 2020-02-10 ENCOUNTER — ANESTHESIA EVENT (OUTPATIENT)
Dept: SURGERY | Facility: HOSPITAL | Age: 30
DRG: 330 | End: 2020-02-10
Payer: MEDICAID

## 2020-02-11 ENCOUNTER — TELEPHONE (OUTPATIENT)
Dept: SURGERY | Facility: CLINIC | Age: 30
End: 2020-02-11

## 2020-02-11 NOTE — ANESTHESIA PREPROCEDURE EVALUATION
[facility]  Anesthesia Pre-Operative Evaluation         Patient Name: Lora Scott  YOB: 1990  MRN: [id number]    SUBJECTIVE:     02/11/2020    Pre-operative evaluation for Procedure(s) (LRB):  CLOSURE,ILEOSTOMY TAKEDOWN END ILEOSTOMY/MUCOUS FISTULA POSSIBLE LAPAROTOMY ERAS LOW (N/A)    Lora Scott is a 29 y.o. female with depression and Crohn's disease (s/p ileocecal resection with end ileostomy and colonic mucous fistula in May 2019).    Patient now presents for the above procedure(s).      Previous airway:   Placement Date: 05/10/19; Placement Time: 1239; Method of Intubation: Direct laryngoscopy; Inserted by: CRNA; Airway Device: Endotracheal Tube; Mask Ventilation: Easy; Intubated: Postinduction; Blade: Cummings #2; Airway Device Size: 7.0; Style: Cuffed; Cuff Inflation: Minimal occlusive pressure; Placement Verified By: Auscultation, Capnometry, ETT Condensation; Grade: Grade I; Complicating Factors: None; Intubation Findings: Positive EtCO2, Bilateral breath sounds, Atraumatic/Condition of teeth unchanged; Complications: None; Breath Sounds: Equal Bilateral; Insertion Attempts: 1; Removal Date: 05/10/19;  Removal Time: 1514      Patient Active Problem List   Diagnosis    Psoriasis    Crohn's disease of both small and large intestine without complication    Immunosuppressed status    High myopia, bilateral       Review of patient's allergies indicates:   Allergen Reactions    Shellfish containing products Anaphylaxis     Allergic to all seafood.    Horse/equine containing products Itching    Iodine      Swelling (throat)^       No current facility-administered medications on file prior to encounter.      Current Outpatient Medications on File Prior to Encounter   Medication Sig Dispense Refill    adalimumab (HUMIRA PEN CROHNS-UC-HS START) 40 mg/0.8 mL PnKt Inject 160 mg (4 pens) into the skin at week 0, then inject 80 mg (2 pens) into the skin at week 2. 6 pen 0     adalimumab (HUMIRA PEN) 40 mg/0.8 mL PnKt Inject 1 pen (40 mg total) into the skin every 14 (fourteen) days. 2 pen 5    folic acid (FOLVITE) 1 MG tablet Take 1 tablet (1 mg total) by mouth once daily. (Patient not taking: Reported on 2/3/2020) 30 tablet 11    hydrOXYzine HCl (ATARAX) 25 MG tablet Take 1 tablet (25 mg total) by mouth 3 (three) times daily. (Patient not taking: Reported on 2/3/2020) 90 tablet 1    ibuprofen (ADVIL,MOTRIN) 600 MG tablet Take 1 tablet (600 mg total) by mouth every 6 (six) hours as needed for Pain. (Patient not taking: Reported on 2/3/2020) 20 tablet 0    methotrexate 2.5 MG Tab Take 5 tablets (12.5 mg total) by mouth every 7 days. 20 tablet 0    triamcinolone acetonide 0.1% (KENALOG) 0.1 % cream Apply topically 2 (two) times daily. To rashes 454 g 3       Past Surgical History:   Procedure Laterality Date    COLONOSCOPY N/A 2/20/2019    Procedure: COLONOSCOPY;  Surgeon: Fawad Perla MD;  Location: [facility] (2ND FLR);  Service: Endoscopy;  Laterality: N/A;    ESOPHAGOGASTRODUODENOSCOPY N/A 2/20/2019    Procedure: EGD (ESOPHAGOGASTRODUODENOSCOPY);  Surgeon: Fawad Perla MD;  Location: [facility] (2ND FLR);  Service: Endoscopy;  Laterality: N/A;    ILEOCOLECTOMY N/A 5/10/2019    Procedure: ILEOCOLECTOMY;  Surgeon: Uday Cope MD;  Location: [facility] OR 2ND FLR;  Service: Colon and Rectal;  Laterality: N/A;  ileocecectomy    ILEOSCOPY N/A 12/20/2019    Procedure: ILEOSCOPY through stoma;  Surgeon: Suzi Bacon MD;  Location: [facility] (4TH FLR);  Service: Endoscopy;  Laterality: N/A;  Schedule as 30 minute case  Please let patient know to bring extra stoma bag/wafer, etc- entire bag and appliance will be removed at time of procedure to visualize the area well   first week 11/2019     per note-R/S to 12/20/19-GT    ILEOSTOMY N/A 5/10/2019    Procedure: CREATION, ILEOSTOMY with mucous fistula;  Surgeon: Uday Cope MD;  [facility];  Service: Colon and  Rectal;  Laterality: N/A;       Social History     Socioeconomic History    Marital status: Single     Spouse name: Not on file    Number of children: Not on file    Years of education: Not on file    Highest education level: Not on file   Occupational History    Not on file   Social Needs    Financial resource strain: Not on file    Food insecurity:     Worry: Not on file     Inability: Not on file    Transportation needs:     Medical: Not on file     Non-medical: Not on file   Tobacco Use    Smoking status: Former Smoker     Types: Cigarettes     Last attempt to quit: 2019     Years since quittin.4    Smokeless tobacco: Never Used   Substance and Sexual Activity    Alcohol use: Not Currently     Alcohol/week: 0.0 standard drinks     Comment: Socially    Drug use: No     Frequency: 3.0 times per week    Sexual activity: Not Currently     Partners: Male     Birth control/protection: None   Lifestyle    Physical activity:     Days per week: Not on file     Minutes per session: Not on file    Stress: Not on file   Relationships    Social connections:     Talks on phone: Not on file     Gets together: Not on file     Attends Denominational service: Not on file     Active member of club or organization: Not on file     Attends meetings of clubs or organizations: Not on file     Relationship status: Not on file   Other Topics Concern    Are you pregnant or think you may be? Not Asked    Breast-feeding Not Asked   Social History Narrative    Not on file       OBJECTIVE:     Significant Labs:  Lab Results   Component Value Date    WBC 5.13 2020    HGB 11.0 (L) 2020    HCT 36.0 (L) 2020     2020    TRIG 41 2019    ALT 13 2020    AST 14 2020     2020    K 4.0 2020     2020    CREATININE 0.8 2020    BUN 7 2020    CO2 20 (L) 2020    TSH 0.713 2015         Diagnostic Studies:  No relevant  studies.      Cardiac Studies:  EKG (2/21/2019):   Vent. Rate : 079 BPM     Atrial Rate : 079 BPM     P-R Int : 120 ms          QRS Dur : 062 ms      QT Int : 374 ms       P-R-T Axes : 015 041 043 degrees     QTc Int : 428 ms  Normal sinus rhythm  Low voltage QRS  Abnormal ECG  When compared with ECG of 20-FEB-2019 09:31,  Premature atrial complexes are no longer Present  Confirmed by CLIFTON ROSENBERG MD ([id number]) on 2/22/2019 11:51:31 PM    2D Echo:  No results found for this or any previous visit.      ASSESSMENT/PLAN:     Anesthesia Evaluation    I have reviewed the Patient Summary Reports.    I have reviewed the Nursing Notes.   I have reviewed the Medications.     Review of Systems  Anesthesia Hx:  No problems with previous Anesthesia  Denies Family Hx of Anesthesia complications.   Denies Personal Hx of Anesthesia complications.   Social:  Former Smoker, Social Alcohol Use    Hematology/Oncology:     Oncology Normal    -- Anemia (H/O ANEMIA):   EENT/Dental:EENT/Dental Normal   Cardiovascular:  Functional Capacity 4 METS, ABLE TO CLIMB 2 FLIGHTS OF STAIRS    Pulmonary:  Asthma:  last episode was Hx of Childhood Asthma.    Renal/:  Renal/ Normal     Hepatic/GI:  Bowel Conditions:  Inflammatory Bowel Disease, Crohns (OF SMALL AND LARGE INTESTINES, IMMUNOSUPPRESSED)    Musculoskeletal:  Musculoskeletal General/Symptoms: Functional capacity is ambulatory without assistance.    Neurological:  Neurology Normal    Endocrine:  Endocrine Normal    Dermatological:   PSORIASIS   Psych:  Depression and Past Hx of Depression.          Physical Exam  General:  Well nourished    Airway/Jaw/Neck:  Airway Findings: Mouth Opening: Normal Tongue: Normal  General Airway Assessment: Adult  Mallampati: II  Improves to I with phonation.  TM Distance: Normal, at least 6 cm  Jaw/Neck Findings:      Dental:  Dental Findings: Periodontal disease, Severe    Chest/Lungs:  Chest/Lungs Findings: Clear to auscultation, Normal Respiratory Rate      Heart/Vascular:  Heart Findings: Rate: Normal  Rhythm: Regular Rhythm  Sounds: Normal        Mental Status:  Mental Status Findings:  Cooperative, Alert and Oriented         Anesthesia Plan  Type of Anesthesia, risks & benefits discussed:  Anesthesia Type:  general  Patient's Preference:   Intra-op Monitoring Plan: standard ASA monitors  Intra-op Monitoring Plan Comments:   Post Op Pain Control Plan: per primary service following discharge from PACU, IV/PO Opioids PRN and multimodal analgesia  Post Op Pain Control Plan Comments:   Induction:   IV  Beta Blocker:  Patient is not currently on a Beta-Blocker (No further documentation required).       Informed Consent: Patient understands risks and agrees with Anesthesia plan.  Questions answered. Anesthesia consent signed with patient.  ASA Score: 3     Day of Surgery Review of History & Physical: I have interviewed and examined the patient. I have reviewed the patient's H&P dated:            Ready For Surgery From Anesthesia Perspective.

## 2020-02-12 ENCOUNTER — HOSPITAL ENCOUNTER (INPATIENT)
Facility: HOSPITAL | Age: 30
LOS: 3 days | Discharge: HOME HEALTH | DRG: 330 | End: 2020-02-15
Attending: COLON & RECTAL SURGERY | Admitting: COLON & RECTAL SURGERY
Payer: MEDICAID

## 2020-02-12 ENCOUNTER — ANESTHESIA (OUTPATIENT)
Dept: SURGERY | Facility: HOSPITAL | Age: 30
DRG: 330 | End: 2020-02-12
Payer: MEDICAID

## 2020-02-12 DIAGNOSIS — K50.90 CROHN'S DISEASE: ICD-10-CM

## 2020-02-12 DIAGNOSIS — K50.80 CROHN'S DISEASE OF BOTH SMALL AND LARGE INTESTINE WITHOUT COMPLICATION: Primary | ICD-10-CM

## 2020-02-12 LAB
ABO + RH BLD: NORMAL
B-HCG UR QL: NEGATIVE
BLD GP AB SCN CELLS X3 SERPL QL: NORMAL
CTP QC/QA: YES

## 2020-02-12 PROCEDURE — S0030 INJECTION, METRONIDAZOLE: HCPCS | Performed by: NURSE PRACTITIONER

## 2020-02-12 PROCEDURE — 27201423 OPTIME MED/SURG SUP & DEVICES STERILE SUPPLY: Performed by: COLON & RECTAL SURGERY

## 2020-02-12 PROCEDURE — 25000003 PHARM REV CODE 250: Performed by: STUDENT IN AN ORGANIZED HEALTH CARE EDUCATION/TRAINING PROGRAM

## 2020-02-12 PROCEDURE — 63600175 PHARM REV CODE 636 W HCPCS: Performed by: STUDENT IN AN ORGANIZED HEALTH CARE EDUCATION/TRAINING PROGRAM

## 2020-02-12 PROCEDURE — 36000707: Performed by: COLON & RECTAL SURGERY

## 2020-02-12 PROCEDURE — 36000706: Performed by: COLON & RECTAL SURGERY

## 2020-02-12 PROCEDURE — 88305 TISSUE EXAM BY PATHOLOGIST: CPT | Mod: 26,,, | Performed by: PATHOLOGY

## 2020-02-12 PROCEDURE — 63600175 PHARM REV CODE 636 W HCPCS: Performed by: COLON & RECTAL SURGERY

## 2020-02-12 PROCEDURE — D9220A PRA ANESTHESIA: ICD-10-PCS | Mod: ,,, | Performed by: ANESTHESIOLOGY

## 2020-02-12 PROCEDURE — C9290 INJ, BUPIVACAINE LIPOSOME: HCPCS | Performed by: COLON & RECTAL SURGERY

## 2020-02-12 PROCEDURE — 88307 PR  SURG PATH,LEVEL V: ICD-10-PCS | Mod: 26,,, | Performed by: PATHOLOGY

## 2020-02-12 PROCEDURE — 37000008 HC ANESTHESIA 1ST 15 MINUTES: Performed by: COLON & RECTAL SURGERY

## 2020-02-12 PROCEDURE — 25000003 PHARM REV CODE 250: Performed by: NURSE PRACTITIONER

## 2020-02-12 PROCEDURE — 94761 N-INVAS EAR/PLS OXIMETRY MLT: CPT

## 2020-02-12 PROCEDURE — 71000016 HC POSTOP RECOV ADDL HR: Performed by: COLON & RECTAL SURGERY

## 2020-02-12 PROCEDURE — 99900035 HC TECH TIME PER 15 MIN (STAT)

## 2020-02-12 PROCEDURE — 88307 TISSUE EXAM BY PATHOLOGIST: CPT | Mod: 59 | Performed by: PATHOLOGY

## 2020-02-12 PROCEDURE — 71000033 HC RECOVERY, INTIAL HOUR: Performed by: COLON & RECTAL SURGERY

## 2020-02-12 PROCEDURE — 37000009 HC ANESTHESIA EA ADD 15 MINS: Performed by: COLON & RECTAL SURGERY

## 2020-02-12 PROCEDURE — 71000039 HC RECOVERY, EACH ADD'L HOUR: Performed by: COLON & RECTAL SURGERY

## 2020-02-12 PROCEDURE — 94799 UNLISTED PULMONARY SVC/PX: CPT

## 2020-02-12 PROCEDURE — 63600175 PHARM REV CODE 636 W HCPCS

## 2020-02-12 PROCEDURE — 86850 RBC ANTIBODY SCREEN: CPT

## 2020-02-12 PROCEDURE — D9220A PRA ANESTHESIA: Mod: ,,, | Performed by: ANESTHESIOLOGY

## 2020-02-12 PROCEDURE — 88305 TISSUE EXAM BY PATHOLOGIST: ICD-10-PCS | Mod: 26,,, | Performed by: PATHOLOGY

## 2020-02-12 PROCEDURE — 63600175 PHARM REV CODE 636 W HCPCS: Performed by: NURSE PRACTITIONER

## 2020-02-12 PROCEDURE — 81025 URINE PREGNANCY TEST: CPT | Performed by: COLON & RECTAL SURGERY

## 2020-02-12 PROCEDURE — 44625 REPAIR BOWEL OPENING: CPT | Mod: ,,, | Performed by: COLON & RECTAL SURGERY

## 2020-02-12 PROCEDURE — 44625 PR CLOSE ENTEROSTOMY,RESEC+ANAST: ICD-10-PCS | Mod: ,,, | Performed by: COLON & RECTAL SURGERY

## 2020-02-12 PROCEDURE — 88307 TISSUE EXAM BY PATHOLOGIST: CPT | Mod: 26,,, | Performed by: PATHOLOGY

## 2020-02-12 PROCEDURE — 88305 TISSUE EXAM BY PATHOLOGIST: CPT | Performed by: PATHOLOGY

## 2020-02-12 PROCEDURE — 71000015 HC POSTOP RECOV 1ST HR: Performed by: COLON & RECTAL SURGERY

## 2020-02-12 PROCEDURE — 20600001 HC STEP DOWN PRIVATE ROOM

## 2020-02-12 RX ORDER — LIDOCAINE HCL/PF 100 MG/5ML
SYRINGE (ML) INTRAVENOUS
Status: DISCONTINUED | OUTPATIENT
Start: 2020-02-12 | End: 2020-02-12

## 2020-02-12 RX ORDER — HYDROXYZINE HYDROCHLORIDE 25 MG/1
25 TABLET, FILM COATED ORAL 2 TIMES DAILY
Status: DISCONTINUED | OUTPATIENT
Start: 2020-02-12 | End: 2020-02-12

## 2020-02-12 RX ORDER — MUPIROCIN 20 MG/G
1 OINTMENT TOPICAL
Status: COMPLETED | OUTPATIENT
Start: 2020-02-12 | End: 2020-02-12

## 2020-02-12 RX ORDER — ACETAMINOPHEN 650 MG/20.3ML
975 LIQUID ORAL
Status: COMPLETED | OUTPATIENT
Start: 2020-02-12 | End: 2020-02-12

## 2020-02-12 RX ORDER — IBUPROFEN 400 MG/1
800 TABLET ORAL EVERY 8 HOURS
Status: DISCONTINUED | OUTPATIENT
Start: 2020-02-13 | End: 2020-02-14

## 2020-02-12 RX ORDER — ONDANSETRON 2 MG/ML
INJECTION INTRAMUSCULAR; INTRAVENOUS
Status: DISCONTINUED | OUTPATIENT
Start: 2020-02-12 | End: 2020-02-12

## 2020-02-12 RX ORDER — SODIUM CHLORIDE 0.9 % (FLUSH) 0.9 %
10 SYRINGE (ML) INJECTION
Status: DISCONTINUED | OUTPATIENT
Start: 2020-02-12 | End: 2020-02-15 | Stop reason: HOSPADM

## 2020-02-12 RX ORDER — PHENYLEPHRINE HYDROCHLORIDE 10 MG/ML
INJECTION INTRAVENOUS
Status: DISCONTINUED | OUTPATIENT
Start: 2020-02-12 | End: 2020-02-12

## 2020-02-12 RX ORDER — GABAPENTIN 300 MG/1
300 CAPSULE ORAL 3 TIMES DAILY
Status: DISCONTINUED | OUTPATIENT
Start: 2020-02-12 | End: 2020-02-15 | Stop reason: HOSPADM

## 2020-02-12 RX ORDER — GABAPENTIN 300 MG/1
300 CAPSULE ORAL
Status: COMPLETED | OUTPATIENT
Start: 2020-02-12 | End: 2020-02-12

## 2020-02-12 RX ORDER — FENTANYL CITRATE 50 UG/ML
INJECTION, SOLUTION INTRAMUSCULAR; INTRAVENOUS
Status: DISCONTINUED | OUTPATIENT
Start: 2020-02-12 | End: 2020-02-12

## 2020-02-12 RX ORDER — TRIPROLIDINE/PSEUDOEPHEDRINE 2.5MG-60MG
600 TABLET ORAL
Status: COMPLETED | OUTPATIENT
Start: 2020-02-12 | End: 2020-02-12

## 2020-02-12 RX ORDER — KETAMINE HCL IN 0.9 % NACL 50 MG/5 ML
SYRINGE (ML) INTRAVENOUS
Status: DISCONTINUED | OUTPATIENT
Start: 2020-02-12 | End: 2020-02-12

## 2020-02-12 RX ORDER — OXYCODONE HYDROCHLORIDE 5 MG/1
5 TABLET ORAL EVERY 4 HOURS PRN
Status: DISCONTINUED | OUTPATIENT
Start: 2020-02-12 | End: 2020-02-15 | Stop reason: HOSPADM

## 2020-02-12 RX ORDER — PROPOFOL 10 MG/ML
VIAL (ML) INTRAVENOUS
Status: DISCONTINUED | OUTPATIENT
Start: 2020-02-12 | End: 2020-02-12

## 2020-02-12 RX ORDER — MIDAZOLAM HYDROCHLORIDE 1 MG/ML
INJECTION, SOLUTION INTRAMUSCULAR; INTRAVENOUS
Status: DISCONTINUED | OUTPATIENT
Start: 2020-02-12 | End: 2020-02-12

## 2020-02-12 RX ORDER — FENTANYL CITRATE 50 UG/ML
INJECTION, SOLUTION INTRAMUSCULAR; INTRAVENOUS
Status: COMPLETED
Start: 2020-02-12 | End: 2020-02-12

## 2020-02-12 RX ORDER — DEXAMETHASONE SODIUM PHOSPHATE 4 MG/ML
INJECTION, SOLUTION INTRA-ARTICULAR; INTRALESIONAL; INTRAMUSCULAR; INTRAVENOUS; SOFT TISSUE
Status: DISCONTINUED | OUTPATIENT
Start: 2020-02-12 | End: 2020-02-12

## 2020-02-12 RX ORDER — FENTANYL CITRATE 50 UG/ML
25 INJECTION, SOLUTION INTRAMUSCULAR; INTRAVENOUS EVERY 5 MIN PRN
Status: COMPLETED | OUTPATIENT
Start: 2020-02-12 | End: 2020-02-12

## 2020-02-12 RX ORDER — OXYCODONE HYDROCHLORIDE 10 MG/1
10 TABLET ORAL EVERY 4 HOURS PRN
Status: DISCONTINUED | OUTPATIENT
Start: 2020-02-12 | End: 2020-02-15 | Stop reason: HOSPADM

## 2020-02-12 RX ORDER — SODIUM CHLORIDE 9 MG/ML
INJECTION, SOLUTION INTRAVENOUS CONTINUOUS
Status: DISCONTINUED | OUTPATIENT
Start: 2020-02-12 | End: 2020-02-12

## 2020-02-12 RX ORDER — SODIUM CHLORIDE 9 MG/ML
INJECTION, SOLUTION INTRAVENOUS CONTINUOUS PRN
Status: DISCONTINUED | OUTPATIENT
Start: 2020-02-12 | End: 2020-02-12

## 2020-02-12 RX ORDER — LIDOCAINE HYDROCHLORIDE 10 MG/ML
1 INJECTION, SOLUTION EPIDURAL; INFILTRATION; INTRACAUDAL; PERINEURAL
Status: DISCONTINUED | OUTPATIENT
Start: 2020-02-12 | End: 2020-02-12 | Stop reason: HOSPADM

## 2020-02-12 RX ORDER — ACETAMINOPHEN 10 MG/ML
1000 INJECTION, SOLUTION INTRAVENOUS EVERY 8 HOURS
Status: COMPLETED | OUTPATIENT
Start: 2020-02-12 | End: 2020-02-13

## 2020-02-12 RX ORDER — GABAPENTIN 300 MG/1
300 CAPSULE ORAL 3 TIMES DAILY
Status: DISCONTINUED | OUTPATIENT
Start: 2020-02-12 | End: 2020-02-12

## 2020-02-12 RX ORDER — TRAMADOL HYDROCHLORIDE 50 MG/1
50 TABLET ORAL EVERY 6 HOURS PRN
Status: DISCONTINUED | OUTPATIENT
Start: 2020-02-12 | End: 2020-02-15 | Stop reason: HOSPADM

## 2020-02-12 RX ORDER — HEPARIN SODIUM 5000 [USP'U]/ML
5000 INJECTION, SOLUTION INTRAVENOUS; SUBCUTANEOUS EVERY 8 HOURS
Status: COMPLETED | OUTPATIENT
Start: 2020-02-12 | End: 2020-02-12

## 2020-02-12 RX ORDER — PROCHLORPERAZINE EDISYLATE 5 MG/ML
5 INJECTION INTRAMUSCULAR; INTRAVENOUS ONCE AS NEEDED
Status: DISCONTINUED | OUTPATIENT
Start: 2020-02-12 | End: 2020-02-12 | Stop reason: HOSPADM

## 2020-02-12 RX ORDER — HYDROXYZINE HYDROCHLORIDE 25 MG/1
25 TABLET, FILM COATED ORAL 3 TIMES DAILY PRN
Status: DISCONTINUED | OUTPATIENT
Start: 2020-02-12 | End: 2020-02-15 | Stop reason: HOSPADM

## 2020-02-12 RX ORDER — ENOXAPARIN SODIUM 100 MG/ML
40 INJECTION SUBCUTANEOUS EVERY 24 HOURS
Status: DISCONTINUED | OUTPATIENT
Start: 2020-02-13 | End: 2020-02-15 | Stop reason: HOSPADM

## 2020-02-12 RX ORDER — SODIUM CHLORIDE 9 MG/ML
INJECTION, SOLUTION INTRAVENOUS CONTINUOUS
Status: DISCONTINUED | OUTPATIENT
Start: 2020-02-12 | End: 2020-02-13

## 2020-02-12 RX ORDER — ROCURONIUM BROMIDE 10 MG/ML
INJECTION, SOLUTION INTRAVENOUS
Status: DISCONTINUED | OUTPATIENT
Start: 2020-02-12 | End: 2020-02-12

## 2020-02-12 RX ORDER — LIDOCAINE HYDROCHLORIDE ANHYDROUS AND DEXTROSE MONOHYDRATE .8; 5 G/100ML; G/100ML
INJECTION, SOLUTION INTRAVENOUS CONTINUOUS PRN
Status: DISCONTINUED | OUTPATIENT
Start: 2020-02-12 | End: 2020-02-12

## 2020-02-12 RX ORDER — METRONIDAZOLE 500 MG/100ML
500 INJECTION, SOLUTION INTRAVENOUS
Status: COMPLETED | OUTPATIENT
Start: 2020-02-12 | End: 2020-02-12

## 2020-02-12 RX ORDER — ONDANSETRON 2 MG/ML
4 INJECTION INTRAMUSCULAR; INTRAVENOUS DAILY PRN
Status: DISCONTINUED | OUTPATIENT
Start: 2020-02-12 | End: 2020-02-12 | Stop reason: HOSPADM

## 2020-02-12 RX ORDER — ONDANSETRON 2 MG/ML
4 INJECTION INTRAMUSCULAR; INTRAVENOUS EVERY 6 HOURS PRN
Status: DISCONTINUED | OUTPATIENT
Start: 2020-02-12 | End: 2020-02-15 | Stop reason: HOSPADM

## 2020-02-12 RX ORDER — ACETAMINOPHEN 500 MG
1000 TABLET ORAL EVERY 8 HOURS
Status: DISCONTINUED | OUTPATIENT
Start: 2020-02-13 | End: 2020-02-15 | Stop reason: HOSPADM

## 2020-02-12 RX ORDER — SODIUM CHLORIDE 0.9 % (FLUSH) 0.9 %
3 SYRINGE (ML) INJECTION
Status: DISCONTINUED | OUTPATIENT
Start: 2020-02-12 | End: 2020-02-12 | Stop reason: HOSPADM

## 2020-02-12 RX ORDER — SODIUM CHLORIDE 9 MG/ML
INJECTION, SOLUTION INTRAVENOUS
Status: COMPLETED | OUTPATIENT
Start: 2020-02-12 | End: 2020-02-12

## 2020-02-12 RX ADMIN — FENTANYL CITRATE 25 MCG: 50 INJECTION INTRAMUSCULAR; INTRAVENOUS at 10:02

## 2020-02-12 RX ADMIN — PROPOFOL 50 MG: 10 INJECTION, EMULSION INTRAVENOUS at 09:02

## 2020-02-12 RX ADMIN — MUPIROCIN 1 G: 20 OINTMENT TOPICAL at 06:02

## 2020-02-12 RX ADMIN — LIDOCAINE HYDROCHLORIDE 80 MG: 20 INJECTION, SOLUTION INTRAVENOUS at 08:02

## 2020-02-12 RX ADMIN — PHENYLEPHRINE HYDROCHLORIDE 100 MCG: 10 INJECTION INTRAVENOUS at 08:02

## 2020-02-12 RX ADMIN — SODIUM CHLORIDE: 0.9 INJECTION, SOLUTION INTRAVENOUS at 10:02

## 2020-02-12 RX ADMIN — ACETAMINOPHEN 1000 MG: 10 INJECTION, SOLUTION INTRAVENOUS at 01:02

## 2020-02-12 RX ADMIN — ACETAMINOPHEN 1000 MG: 10 INJECTION, SOLUTION INTRAVENOUS at 10:02

## 2020-02-12 RX ADMIN — SODIUM CHLORIDE, SODIUM GLUCONATE, SODIUM ACETATE, POTASSIUM CHLORIDE, MAGNESIUM CHLORIDE, SODIUM PHOSPHATE, DIBASIC, AND POTASSIUM PHOSPHATE: .53; .5; .37; .037; .03; .012; .00082 INJECTION, SOLUTION INTRAVENOUS at 08:02

## 2020-02-12 RX ADMIN — ACETAMINOPHEN 976.6 MG: 160 SOLUTION ORAL at 06:02

## 2020-02-12 RX ADMIN — FENTANYL CITRATE 25 MCG: 50 INJECTION, SOLUTION INTRAMUSCULAR; INTRAVENOUS at 08:02

## 2020-02-12 RX ADMIN — GABAPENTIN 300 MG: 300 CAPSULE ORAL at 02:02

## 2020-02-12 RX ADMIN — LIDOCAINE HYDROCHLORIDE 0.02 MG/KG/MIN: 8 INJECTION, SOLUTION INTRAVENOUS at 08:02

## 2020-02-12 RX ADMIN — SODIUM CHLORIDE: 0.9 INJECTION, SOLUTION INTRAVENOUS at 06:02

## 2020-02-12 RX ADMIN — FENTANYL CITRATE 25 MCG: 50 INJECTION, SOLUTION INTRAMUSCULAR; INTRAVENOUS at 10:02

## 2020-02-12 RX ADMIN — Medication 20 MG: at 08:02

## 2020-02-12 RX ADMIN — OXYCODONE HYDROCHLORIDE 10 MG: 5 TABLET ORAL at 06:02

## 2020-02-12 RX ADMIN — IBUPROFEN 600 MG: 100 SUSPENSION ORAL at 06:02

## 2020-02-12 RX ADMIN — OXYCODONE HYDROCHLORIDE 10 MG: 5 TABLET ORAL at 10:02

## 2020-02-12 RX ADMIN — CEFTRIAXONE SODIUM 2 G: 2 INJECTION, SOLUTION INTRAVENOUS at 08:02

## 2020-02-12 RX ADMIN — ROCURONIUM BROMIDE 80 MG: 10 INJECTION, SOLUTION INTRAVENOUS at 08:02

## 2020-02-12 RX ADMIN — PROPOFOL 40 MG: 10 INJECTION, EMULSION INTRAVENOUS at 08:02

## 2020-02-12 RX ADMIN — PROPOFOL 160 MG: 10 INJECTION, EMULSION INTRAVENOUS at 08:02

## 2020-02-12 RX ADMIN — SODIUM CHLORIDE: 0.9 INJECTION, SOLUTION INTRAVENOUS at 07:02

## 2020-02-12 RX ADMIN — METRONIDAZOLE 500 MG: 500 SOLUTION INTRAVENOUS at 08:02

## 2020-02-12 RX ADMIN — DEXAMETHASONE SODIUM PHOSPHATE 8 MG: 4 INJECTION, SOLUTION INTRAMUSCULAR; INTRAVENOUS at 08:02

## 2020-02-12 RX ADMIN — IBUPROFEN 800 MG: 800 INJECTION INTRAVENOUS at 10:02

## 2020-02-12 RX ADMIN — GABAPENTIN 300 MG: 300 CAPSULE ORAL at 08:02

## 2020-02-12 RX ADMIN — ONDANSETRON 4 MG: 2 INJECTION INTRAMUSCULAR; INTRAVENOUS at 09:02

## 2020-02-12 RX ADMIN — PROPOFOL 100 MG: 10 INJECTION, EMULSION INTRAVENOUS at 09:02

## 2020-02-12 RX ADMIN — GABAPENTIN 300 MG: 300 CAPSULE ORAL at 06:02

## 2020-02-12 RX ADMIN — SUGAMMADEX 200 MG: 100 INJECTION, SOLUTION INTRAVENOUS at 10:02

## 2020-02-12 RX ADMIN — IBUPROFEN 800 MG: 800 INJECTION INTRAVENOUS at 02:02

## 2020-02-12 RX ADMIN — OXYCODONE HYDROCHLORIDE 10 MG: 5 TABLET ORAL at 02:02

## 2020-02-12 RX ADMIN — HEPARIN SODIUM 5000 UNITS: 5000 INJECTION, SOLUTION INTRAVENOUS; SUBCUTANEOUS at 06:02

## 2020-02-12 RX ADMIN — FENTANYL CITRATE 50 MCG: 50 INJECTION, SOLUTION INTRAMUSCULAR; INTRAVENOUS at 08:02

## 2020-02-12 RX ADMIN — MIDAZOLAM HYDROCHLORIDE 2 MG: 1 INJECTION, SOLUTION INTRAMUSCULAR; INTRAVENOUS at 07:02

## 2020-02-12 NOTE — OP NOTE
DATE OF PROCEDURE:   02/12/2020     PREOPERATIVE DIAGNOSES: History of Crohn's disease, s/p ileocecal resection with an end ileostomy and mucous fistula     POSTOPERATIVE DIAGNOSES:  History of Crohn's disease, s/p ileocecal resection with an end ileostomy and mucous fistula     PROCEDURES PERFORMED:   End ileostomy/mucous fistula takedown with ileocolic anastomosis     SURGEON:  Uday Cope M.D.     ASSISTANT:  Suzan Manzo M.D. [RES]; Jyothi Couch M.D [RES]     ANESTHESIA:   General endotracheal      IV FLUIDS:  1500 mL     ESTIMATED BLOOD LOSS:  20  mL     DRAINS:  None      SPECIMENS:   1.  Peristomal granulation tissue  2.  Ascending colon  3.  Terminal ileum     COMPLICATIONS:  None.     OPERATIVE FINDINGS:   Successful end ileostomy/mucous fistula takedown with side-to-side ileocolic anastomosis.     INDICATIONS:  The patient is a 29-year-old female with a history of Crohn's disease who had previously undergone an ileocecal resection for refractory disease.  At the time of that surgery she was markedly hypoalbuminemic and on corticosteroids, so it was elected not to perform an anastomosis at that time.  Instead she had an end ileostomy created and the proximal end of the ascending colon was brought up through the same trephination as a mucous fistula.  She presents today for elective takedown of her ileostomy and mucous fistula.     DESCRIPTION OF PROCEDURE:  The patient was identified, brought to the Operating Room, placed on the table in a supine position after obtaining informed consent. Venous sequential stockings were placed.  She was given preoperative intravenous antibiotics as well as subcutaneous heparin.  After induction of general endotracheal anesthesia, the abdomen was prepped and draped in the usual sterile fashion.      A circumferential incision was then made at the level of the mucocutaneous junction of the right lower quadrant loop ileostomy and mucous fistula.  The limbs of bowel  were  from the surrounding subcutaneous abdominal wall fat down to the level of the fascia. There was a fair amount of peristomal granulation tissue along the inferior hemicircumference of the trephination, and this was excised using electrocautery. The attachments at the level of the fascia were freed up using a combination of sharp dissection and electrocautery.  Ultimately we were able to enter the peritoneal cavity and free up the attachments of bowel to the undersurface of the abdominal wall. The 2 limbs of bowel were able to be mobilized sufficiently so that they wwere able to be delivered up through the trephination for a sufficient distance to perform our anastomosis.  We then  the limb of small bowel from the limb of colon.  We divided the small bowel just proximal to the ileostomy with a JAZMIN stapler.   Intervening mesentery was divided between hemostats securing the proximal pedicles with 3-0 Vicryl ties.  We divided the ascending colon just upstream from the end that had been brought out as the mucous fistula with a JAZMIN stapler. The resected short segments of terminal ileum and ascending colon were passed from the field and sent to pathology.  Maintaining proper mesenteric orientation, we then performed a side-to-side functional end-to-end ileocolic anastomosis,  using a 75 mm JAZMIN stapler to approximate the 2 limbs of bowel and a TA 60 stapler to close the common enterotomy.  The TA staple line was imbricated with 3-0 Vicryl seromuscular sutures, and a 3-0 Vicryl seromuscular suture was placed at the crotch of the anastomosis to relieve tension. The lumen of the anastomosis was palpated and noted to be widely patent.  The bowel was then returned to the abdominal cavity. The fascia was closed vertically with interrupted #1 PDS figure-of-eight sutures. The fascia and the skin were infiltrated with Exparel for postoperative analgesia.  Subcutaneous tissues were irrigated with sterile  saline.  Hemostasis was noted to be adequate.  The skin incision at the site of the trephination was partially closed with several interrupted 4-0 Monocryl subdermal U-type sutures, leaving the central portion of the wound open.  This was packed with sterile gauze and covered with the Bioclusive dressing.       The patient tolerated the procedure well with no complications.  She was extubated in the Operating Room and taken to Recovery in satisfactory condition.  All needle, instrument and sponge counts were correct at the end of the case.   I was present throughout the entire procedure.      Uday Cope MD, FACS, FASCRS  Senior Staff Surgeon  Department of Colon & Rectal Surgery     This note was created using voice recognition software, and may contain some unrecognized transcriptional errors.

## 2020-02-12 NOTE — PROGRESS NOTES
Awakened to assess, complains of 8/10 pain. Belly soft round. Repositioned per self. Contacts right and left in containers of clear fluid on bedside table. Pt made aware.

## 2020-02-12 NOTE — INTERVAL H&P NOTE
The patient has been examined and the H&P has been reviewed:    I concur with the findings and no changes have occurred since H&P was written.    Anesthesia/Surgery risks, benefits and alternative options discussed and understood by patient/family.          Active Hospital Problems    Diagnosis  POA    Crohn's disease [K50.90]  Yes      Resolved Hospital Problems   No resolved problems to display.

## 2020-02-12 NOTE — BRIEF OP NOTE
[facility]  Brief Operative Note    SUMMARY     Surgery Date: 2/12/2020     Surgeon(s) and Role:     * Uday Cope MD - Primary     * Suzan Manzo MD - Resident - Assisting     * Jyothi Couch MD - Resident - Assisting      Pre-op Diagnosis:  Crohn's disease of both small and large intestine with intestinal obstruction [K50.812]  Ileostomy present [Z93.2]    Post-op Diagnosis:  Post-Op Diagnosis Codes:     * Crohn's disease of both small and large intestine with intestinal obstruction [K50.812]     * Ileostomy present [Z93.2]    Procedure(s) (LRB):  CLOSURE,ILEOSTOMY TAKEDOWN END ILEOSTOMY/MUCOUS FISTULA POSSIBLE LAPAROTOMY ERAS LOW (N/A)    Anesthesia: General    Description of Procedure: Takedown of end ileostomy and mucous fistula with side to side anastomosis.     Description of the findings of the procedure: Terminal ileum and ascending colon mucous fistula completely freed from abdominal wall. Mesentery had no twists. Anastomosis patent and healthy.    Estimated Blood Loss: 10 cc         Specimens:   All sent for permanent  1. Peristoma Granulation Tissue  2. Ascending colon  3. Terminal ileum    Patient will be admitted to the Colorectal Service under Dr. Prisca Couch MD  General Surgery, PGY-1  [phone]

## 2020-02-12 NOTE — TRANSFER OF CARE
"Anesthesia Transfer of Care Note    Patient: Lora Scott    Procedure(s) Performed: Procedure(s) (LRB):  CLOSURE,ILEOSTOMY TAKEDOWN END ILEOSTOMY/MUCOUS FISTULA POSSIBLE LAPAROTOMY ERAS LOW (N/A)    Patient location: PACU    Anesthesia Type: general    Transport from OR: Transported from OR on 6-10 L/min O2 by face mask with adequate spontaneous ventilation    Post pain: adequate analgesia    Post assessment: no apparent anesthetic complications and tolerated procedure well    Post vital signs: stable    Level of consciousness: awake and responds to stimulation    Nausea/Vomiting: no nausea/vomiting    Complications: none    Transfer of care protocol was followed      Last vitals:   Visit Vitals  /69 (BP Location: Right arm, Patient Position: Lying)   Pulse 87   Temp 36.6 °C (97.9 °F) (Temporal)   Resp 20   Ht 5' 2" (1.575 m)   Wt 68.9 kg (152 lb)   LMP 01/20/2020 (Approximate)   SpO2 100%   Breastfeeding? No   BMI 27.80 kg/m²     "

## 2020-02-13 ENCOUNTER — TELEPHONE (OUTPATIENT)
Dept: GASTROENTEROLOGY | Facility: CLINIC | Age: 30
End: 2020-02-13

## 2020-02-13 PROBLEM — T81.89XA DELAYED SURGICAL WOUND HEALING: Status: ACTIVE | Noted: 2020-02-13

## 2020-02-13 LAB
ALBUMIN SERPL BCP-MCNC: 2.9 G/DL (ref 3.5–5.2)
ALP SERPL-CCNC: 103 U/L (ref 55–135)
ALT SERPL W/O P-5'-P-CCNC: 35 U/L (ref 10–44)
ANION GAP SERPL CALC-SCNC: 7 MMOL/L (ref 8–16)
AST SERPL-CCNC: 34 U/L (ref 10–40)
BASOPHILS # BLD AUTO: 0.01 K/UL (ref 0–0.2)
BASOPHILS NFR BLD: 0.1 % (ref 0–1.9)
BILIRUB SERPL-MCNC: 0.7 MG/DL (ref 0.1–1)
BUN SERPL-MCNC: 6 MG/DL (ref 6–20)
CALCIUM SERPL-MCNC: 8.2 MG/DL (ref 8.7–10.5)
CHLORIDE SERPL-SCNC: 110 MMOL/L (ref 95–110)
CO2 SERPL-SCNC: 20 MMOL/L (ref 23–29)
CREAT SERPL-MCNC: 0.8 MG/DL (ref 0.5–1.4)
DIFFERENTIAL METHOD: ABNORMAL
EOSINOPHIL # BLD AUTO: 0 K/UL (ref 0–0.5)
EOSINOPHIL NFR BLD: 0 % (ref 0–8)
ERYTHROCYTE [DISTWIDTH] IN BLOOD BY AUTOMATED COUNT: 14.9 % (ref 11.5–14.5)
EST. GFR  (AFRICAN AMERICAN): >60 ML/MIN/1.73 M^2
EST. GFR  (NON AFRICAN AMERICAN): >60 ML/MIN/1.73 M^2
GLUCOSE SERPL-MCNC: 77 MG/DL (ref 70–110)
HCT VFR BLD AUTO: 31.7 % (ref 37–48.5)
HGB BLD-MCNC: 9.8 G/DL (ref 12–16)
IMM GRANULOCYTES # BLD AUTO: 0.04 K/UL (ref 0–0.04)
IMM GRANULOCYTES NFR BLD AUTO: 0.4 % (ref 0–0.5)
LYMPHOCYTES # BLD AUTO: 1.1 K/UL (ref 1–4.8)
LYMPHOCYTES NFR BLD: 10.5 % (ref 18–48)
MAGNESIUM SERPL-MCNC: 1.9 MG/DL (ref 1.6–2.6)
MCH RBC QN AUTO: 28.8 PG (ref 27–31)
MCHC RBC AUTO-ENTMCNC: 30.9 G/DL (ref 32–36)
MCV RBC AUTO: 93 FL (ref 82–98)
MONOCYTES # BLD AUTO: 0.6 K/UL (ref 0.3–1)
MONOCYTES NFR BLD: 5.1 % (ref 4–15)
NEUTROPHILS # BLD AUTO: 9.1 K/UL (ref 1.8–7.7)
NEUTROPHILS NFR BLD: 83.9 % (ref 38–73)
NRBC BLD-RTO: 0 /100 WBC
PHOSPHATE SERPL-MCNC: 3.2 MG/DL (ref 2.7–4.5)
PLATELET # BLD AUTO: 183 K/UL (ref 150–350)
PMV BLD AUTO: 10.7 FL (ref 9.2–12.9)
POTASSIUM SERPL-SCNC: 3.9 MMOL/L (ref 3.5–5.1)
PROT SERPL-MCNC: 6.2 G/DL (ref 6–8.4)
RBC # BLD AUTO: 3.4 M/UL (ref 4–5.4)
SODIUM SERPL-SCNC: 137 MMOL/L (ref 136–145)
WBC # BLD AUTO: 10.86 K/UL (ref 3.9–12.7)

## 2020-02-13 PROCEDURE — 25000003 PHARM REV CODE 250: Performed by: STUDENT IN AN ORGANIZED HEALTH CARE EDUCATION/TRAINING PROGRAM

## 2020-02-13 PROCEDURE — 85025 COMPLETE CBC W/AUTO DIFF WBC: CPT

## 2020-02-13 PROCEDURE — 63600175 PHARM REV CODE 636 W HCPCS: Performed by: STUDENT IN AN ORGANIZED HEALTH CARE EDUCATION/TRAINING PROGRAM

## 2020-02-13 PROCEDURE — 80053 COMPREHEN METABOLIC PANEL: CPT

## 2020-02-13 PROCEDURE — 36415 COLL VENOUS BLD VENIPUNCTURE: CPT

## 2020-02-13 PROCEDURE — 97161 PT EVAL LOW COMPLEX 20 MIN: CPT

## 2020-02-13 PROCEDURE — 83735 ASSAY OF MAGNESIUM: CPT

## 2020-02-13 PROCEDURE — 20600001 HC STEP DOWN PRIVATE ROOM

## 2020-02-13 PROCEDURE — 97165 OT EVAL LOW COMPLEX 30 MIN: CPT

## 2020-02-13 PROCEDURE — 84100 ASSAY OF PHOSPHORUS: CPT

## 2020-02-13 RX ADMIN — ACETAMINOPHEN 1000 MG: 10 INJECTION, SOLUTION INTRAVENOUS at 05:02

## 2020-02-13 RX ADMIN — IBUPROFEN 800 MG: 400 TABLET, FILM COATED ORAL at 03:02

## 2020-02-13 RX ADMIN — ENOXAPARIN SODIUM 40 MG: 100 INJECTION SUBCUTANEOUS at 07:02

## 2020-02-13 RX ADMIN — IBUPROFEN 800 MG: 800 INJECTION INTRAVENOUS at 06:02

## 2020-02-13 NOTE — TELEPHONE ENCOUNTER
----- Message from Suzi Bacon MD sent at 2/13/2020  1:04 PM CST -----  Call patient and see before and after surgery whether she has been on time with her humira injections and find out last 1 month of dates if she is able to give them to you.     Trying to figure out if her humira levels just drawn are accurate    SS  ----- Message -----  From: Wesley, FID3 Lab Interface  Sent: 1/23/2020   8:29 AM CST  To: Suzi Bacon MD

## 2020-02-13 NOTE — PT/OT/SLP EVAL
Physical Therapy Evaluation and Discharge Note    Patient Name:  Lora Scott   MRN:  [id number]    Recommendations:     Discharge Recommendations:  home   Discharge Equipment Recommendations: none   Barriers to discharge: None    Assessment:     Lora Scott is a 29 y.o. female admitted with a medical diagnosis of ileostomy takedown. Patient is pleasant, cooperative, and agreeable to therapy. Patient is independent with all ADLs and functional mobility at this time. Patient demonstrates good safety with bed mobility, transfers, and ambulation. Patient with good activity tolerance. Patient ambulated ~100 ft with independence and no AD; no increase in pain noted with mobility. Patient completed lower body dressing independently. As a result, patient is without further acute care PT needs at this time.    Recent Surgery: Procedure(s) (LRB):  CLOSURE,ILEOSTOMY TAKEDOWN END ILEOSTOMY/MUCOUS FISTULA POSSIBLE LAPAROTOMY ERAS LOW (N/A) 1 Day Post-Op    Plan:     During this hospitalization, patient does not require further acute PT services.  Please re-consult if situation changes.      Subjective     Chief Complaint: Pain over abdominal incision  Patient/Family Comments/goals: To go home.  Pain/Comfort:  · Pain Rating 1: (Did not rate but some discomfort noted over incision)  · Location - Side 1: Right  · Location - Orientation 1: generalized  · Location 1: abdomen  · Pain Addressed 1: Pre-medicate for activity, Distraction  · Pain Rating Post-Intervention 1: (Did not rate)    Patients cultural, spiritual, Moravian conflicts given the current situation: no    Living Environment:  Patient lives with her dad in a single story house with no steps to enter. Prior to admission, patient was independent with all functional mobility and ADLs. Patient works in security and drives. Equipment available at home: none.   Upon discharge, patient will have assistance from dad.    Objective:     Communicated with RN prior to session.   Patient found supine with peripheral IV, SCD  upon PT entry to room.    General Precautions: Standard  Orthopedic Precautions:  N/A  Braces:   N/A    Exams:  · RLE ROM: WFL  · RLE Strength: WFL  · LLE ROM: WFL  · LLE Strength: WFL      Functional Mobility:  · Bed Mobility:     · Supine to Sit: independence  · Transfers:     · Sit to Stand:  independence with no AD  · Gait: Patient ambulated ~100 ft with no AD and independence.    Therapeutic Activities and Exercises:  Patient with assistance as noted above. Patient with good functional mobility and activity tolerance.     Patient Education:    Patient educated on risks of prolonged bed rest by explanation.  Patient was receptive to education and verbalizes understanding.     AM-PAC 6 CLICK MOBILITY  Total Score:24     Patient left sitting EOB with call button in reach and wound care present.    GOALS:   Multidisciplinary Problems     Physical Therapy Goals     Not on file          Multidisciplinary Problems (Resolved)        Problem: Physical Therapy Goal    Goal Priority Disciplines Outcome Goal Variances Interventions   Physical Therapy Goal   (Resolved)     PT, PT/OT Met     Description:  Goals to be met by: 2020    Patient will increase functional independence with mobility by performin. Supine to sit with [place] - 2020  2. Sit to supine with [place] - Met 2020  3. Sit to stand transfer with [place] - Met 2020  4. Gait  x ~100 feet with [place] - Met 2020                           History:     Past Medical History:   Diagnosis Date    Allergy     Asthma     Crohn's disease 19    Crohn's disease involving terminal ileum     Depression     Eczema     Ex-smoker for less than 1 year     Immunosuppressed status 10/24/2019    Joint pain        Past Surgical History:   Procedure Laterality Date    COLONOSCOPY N/A 2019    Procedure: COLONOSCOPY;  Surgeon: Fawad Perla MD;  Location: [facility]  (2ND FLR);  Service: Endoscopy;  Laterality: N/A;    ESOPHAGOGASTRODUODENOSCOPY N/A 2/20/2019    Procedure: EGD (ESOPHAGOGASTRODUODENOSCOPY);  Surgeon: Fawad Perla MD;  Location: [facility] ENDO (2ND FLR);  Service: Endoscopy;  Laterality: N/A;    ILEOCOLECTOMY N/A 5/10/2019    Procedure: ILEOCOLECTOMY;  Surgeon: Uday Cope MD;  Location: [facility] OR 2ND FLR;  Service: Colon and Rectal;  Laterality: N/A;  ileocecectomy    ILEOSCOPY N/A 12/20/2019    Procedure: ILEOSCOPY through stoma;  Surgeon: Suzi Bacon MD;  Location: [facility] ENDO (4TH FLR);  Service: Endoscopy;  Laterality: N/A;  Schedule as 30 minute case  Please let patient know to bring extra stoma bag/wafer, etc- entire bag and appliance will be removed at time of procedure to visualize the area well   first week 11/2019     per note-R/S to 12/20/19-GT    ILEOSTOMY N/A 5/10/2019    Procedure: CREATION, ILEOSTOMY with mucous fistula;  Surgeon: Uday Cope MD;  Location: [facility] OR 2ND FLR;  Service: Colon and Rectal;  Laterality: N/A;    ILEOSTOMY CLOSURE N/A 2/12/2020    Procedure: CLOSURE,ILEOSTOMY TAKEDOWN END ILEOSTOMY/MUCOUS FISTULA POSSIBLE LAPAROTOMY ERAS LOW;  Surgeon: Uday Cope MD;  Location: [facility] OR 2ND FLR;  Service: Colon and Rectal;  Laterality: N/A;       Time Tracking:     PT Received On: 02/13/20  PT Start Time: 1128     PT Stop Time: 1145  PT Total Time (min): 17 min     Billable Minutes: Evaluation 17 min       GEORGINA Grajeda  02/13/2020

## 2020-02-13 NOTE — PLAN OF CARE
POC reviewed, pt verbalized understanding. VSS on RA. Pain managed with PRN pain meds. Voids per toilet, adequate UOP overnight. Pt on clear liquid diet w/ no complaints of N/V. Pt slept between care. Frequent rounds made for safety, call light in reach. WCTM

## 2020-02-13 NOTE — PROGRESS NOTES
Asked to see by therapy for leaking ostomy takedown site   Wound with leaking saturated dressing after ambulation with therapy.  Wound cleaned and redressed. Recommend use of Aquacel hydrofiber as it is more absorbant than gauze and will contain the exudate better. Discussed with Letty Stringer NP and will change wound care orders to utilize aquacel and aquacel foam every other day.     02/13/20 1600        Incision/Site 02/12/20 0817 Abdomen   Date First Assessed/Time First Assessed: 02/12/20 0817   Location: Abdomen   Wound Image    Incision WDL ex   Dressing Appearance Moist drainage   Drainage Amount Small   Drainage Characteristics/Odor Serosanguineous   Appearance Pink;Red;Yellow;Moist   Periwound Area Intact   Wound Edges Open   Wound Length (cm) 0.6 cm   Wound Width (cm) 1.5 cm   Wound Depth (cm) 1.5 cm   Wound Volume (cm^3) 1.35 cm^3   Wound Surface Area (cm^2) 0.9 cm^2   Care Cleansed with:;Sterile normal saline   Dressing Applied;Hydrofiber;Silver;Foam     Allison Gaspar RN CWON  [id number]

## 2020-02-13 NOTE — SUBJECTIVE & OBJECTIVE
Subjective:     Interval History:     No acute events overnight, doing well after surgery. Pain well controlled. No nausea or emesis. Voiding. Ambulated.     Post-Op Info:  Procedure(s) (LRB):  CLOSURE,ILEOSTOMY TAKEDOWN END ILEOSTOMY/MUCOUS FISTULA POSSIBLE LAPAROTOMY ERAS LOW (N/A)   1 Day Post-Op      Medications:  Continuous Infusions:   sodium chloride 0.9% Stopped (02/12/20 1700)    sodium chloride 0.9% 50 mL/hr at 02/12/20 1846     Scheduled Meds:   acetaminophen  1,000 mg Oral Q8H    enoxaparin  40 mg Subcutaneous Daily    gabapentin  300 mg Oral TID    ibuprofen  800 mg Intravenous Q8H    Followed by    ibuprofen  800 mg Oral Q8H     PRN Meds:   hydrOXYzine    ondansetron    oxyCODONE    oxyCODONE    sodium chloride 0.9%    traMADol        Objective:     Vital Signs (Most Recent):  Temp: 98.6 °F (37 °C) (02/13/20 0425)  Pulse: 68 (02/13/20 0425)  Resp: 18 (02/13/20 0425)  BP: (!) 83/51 (02/13/20 0425)  SpO2: 96 % (02/13/20 0425) Vital Signs (24h Range):  Temp:  [97.5 °F (36.4 °C)-98.7 °F (37.1 °C)] 98.6 °F (37 °C)  Pulse:  [67-95] 68  Resp:  [12-22] 18  SpO2:  [95 %-100 %] 96 %  BP: ()/(50-73) 83/51     Intake/Output - Last 3 Shifts       02/11 0700 - 02/12 0659 02/12 0700 - 02/13 0659 02/13 0700 - 02/14 0659    I.V. (mL/kg)  2087.7 (30.3)     IV Piggyback  350     Total Intake(mL/kg)  2437.7 (35.4)     Urine (mL/kg/hr)  1350 (0.8)     Stool  0     Total Output  1350     Net  +1087.7            Stool Occurrence  0 x           Physical Exam     General: In no acute distress, well appearance.   CV: RRR  Pulm: non labored breathing  Abd: soft, non distended, tenderness around incision. Dressing in place with SS drainage.   : No up in place.   Ext: wwp    Significant Labs:    BMP (Last 3 Results):   Recent Labs   Lab 02/13/20  0429   GLU 77      K 3.9      CO2 20*   BUN 6   CREATININE 0.8   CALCIUM 8.2*   MG 1.9     CBC (Last 3 Results):   Recent Labs   Lab 02/13/20  6610    WBC 10.86   RBC 3.40*   HGB 9.8*   HCT 31.7*      MCV 93   MCH 28.8   MCHC 30.9*

## 2020-02-13 NOTE — ASSESSMENT & PLAN NOTE
POD#1 from Ileostomy takedown.     - Can advance to a LRD.   - Stop IVF.   - Oxy PRN for pain.   - Ambulate  - JACINTA Espinal, SCD.   - Waiting return of bowel function.

## 2020-02-13 NOTE — PT/OT/SLP EVAL
"Occupational Therapy   Evaluation and Discharge Note    Name: Lora Scott  MRN: [id number]  Admitting Diagnosis:  <principal problem not specified> 1 Day Post-Op    Recommendations:     Discharge Recommendations: home  Discharge Equipment Recommendations:  none  Barriers to discharge:  None    Assessment:     Lora Scott is a 29 y.o. female with a medical diagnosis of <principal problem not specified>. At this time, patient is functioning at their prior level of function and does not require further acute OT services.    Recent Surgery: Procedure(s) (LRB):  CLOSURE,ILEOSTOMY TAKEDOWN END ILEOSTOMY/MUCOUS FISTULA POSSIBLE LAPAROTOMY ERAS LOW (N/A) 1 Day Post-Op    Plan:     During this hospitalization, patient does not require further acute OT services.  Please re-consult if situation changes.    · Plan of Care Reviewed with: patient    Subjective     Chief Complaint: "I just had a bowel movement make sure the nurse knows!"  Patient/Family Comments/goals: to get better and go home    Occupational Profile:  Living Environment: Pt lives with her father in a [facility] with 0 CASSIE. Pt has a tub/shower combo  Previous level of function: PTA, pt was independent in self-care and functional mobility  Roles and Routines: Pt works full time as a  at the [employer]  Equipment Used at home:   none  Assistance upon Discharge: Upon discharge, pt will have assistance from her father    Pain/Comfort:  · Pain Rating 1: (did not rate, reports mild discomfort in abdomen)  · Location 1: abdomen    Patients cultural, spiritual, Episcopal conflicts given the current situation: no    Objective:     Communicated with: RN prior to session.  Patient found HOB elevated with peripheral IV upon OT entry to room.    General Precautions: Standard, (none)   Orthopedic Precautions:N/A   Braces: N/A     Occupational Performance:    Bed Mobility:    · Patient completed Supine to Sit with independence  · Patient completed Sit to " Supine with independence    Functional Mobility/Transfers:  · Patient completed Sit <> Stand Transfer with independence  with  no assistive device   · Patient completed Toilet Transfer Step Transfer technique with independence with  no AD  · Functional Mobility: pt ambulated ~150 ft with no AD and independence    Activities of Daily Living:  · Pt reports independence with all ADLs    Cognitive/Visual Perceptual:  Cognitive/Psychosocial Skills:     -       Oriented to: Person, Place, Time and Situation   -       Follows Commands/attention:Follows multistep  commands  -       Communication: clear/fluent  -       Memory: No Deficits noted  -       Safety awareness/insight to disability: intact   -       Mood/Affect/Coping skills/emotional control: Appropriate to situation    Physical Exam:  Upper Extremity Range of Motion:     -       Right Upper Extremity: WFL  -       Left Upper Extremity: WFL  Upper Extremity Strength:    -       Right Upper Extremity: WFL  -       Left Upper Extremity: WFL   Strength:    -       Right Upper Extremity: WFL  -       Left Upper Extremity: WFL  Fine Motor Coordination:    -       Intact    AMPAC 6 Click ADL:  AMPAC Total Score: 24    Treatment & Education:  -Therapist provided facilitation and instruction of proper body mechanics, energy conservation, and fall prevention strategies during tasks listed above.  -Pt educated on role of OT, POC and discharge from acute OT  -Pt educated on importance of OOB activities with staff member assistance and sitting OOB majority of the day.   -Pt verbalized understanding. Pt expressed no further concerns/questions  -Whiteboard updated    Education:    Patient left HOB elevated with all lines intact and call button in reach    GOALS:   Multidisciplinary Problems     Occupational Therapy Goals        Problem: Occupational Therapy Goal    Goal Priority Disciplines Outcome Interventions   Occupational Therapy Goal     OT, PT/OT     Description:  Pt  is currently performing ADLs, functional mobility and transfers at baseline. OT services are not recommended at this time and pt is safe to discharge home.                      History:     Past Medical History:   Diagnosis Date    Allergy     Asthma     Crohn's disease 2/18/19    Crohn's disease involving terminal ileum     Depression     Eczema     Ex-smoker for less than 1 year     Immunosuppressed status 10/24/2019    Joint pain        Past Surgical History:   Procedure Laterality Date    COLONOSCOPY N/A 2/20/2019    Procedure: COLONOSCOPY;  Surgeon: Fawad Perla MD;  Location: [facility] ENDO (2ND FLR);  Service: Endoscopy;  Laterality: N/A;    ESOPHAGOGASTRODUODENOSCOPY N/A 2/20/2019    Procedure: EGD (ESOPHAGOGASTRODUODENOSCOPY);  Surgeon: Fawad Perla MD;  Location: [facility] ENDO (2ND FLR);  Service: Endoscopy;  Laterality: N/A;    ILEOCOLECTOMY N/A 5/10/2019    Procedure: ILEOCOLECTOMY;  Surgeon: Uday Cope MD;  Location: [facility] OR 2ND FLR;  Service: Colon and Rectal;  Laterality: N/A;  ileocecectomy    ILEOSCOPY N/A 12/20/2019    Procedure: ILEOSCOPY through stoma;  Surgeon: Suzi Bacon MD;  Location: [facility] ENDO (4TH FLR);  Service: Endoscopy;  Laterality: N/A;  Schedule as 30 minute case  Please let patient know to bring extra stoma bag/wafer, etc- entire bag and appliance will be removed at time of procedure to visualize the area well   first week 11/2019     per note-R/S to 12/20/19-GT    ILEOSTOMY N/A 5/10/2019    Procedure: CREATION, ILEOSTOMY with mucous fistula;  Surgeon: Uday Cope MD;  [facility];  Service: Colon and Rectal;  Laterality: N/A;    ILEOSTOMY CLOSURE N/A 2/12/2020    Procedure: CLOSURE,ILEOSTOMY TAKEDOWN END ILEOSTOMY/MUCOUS FISTULA POSSIBLE LAPAROTOMY ERAS LOW;  Surgeon: Uday Cope MD;  Location: [facility] OR 2ND FLR;  Service: Colon and Rectal;  Laterality: N/A;       Time Tracking:     OT Date of Treatment: 02/13/20  OT Start Time: 1536  OT  Stop Time: 1546  OT Total Time (min): 10 min    Billable Minutes:Evaluation 10    Brittny Arellano OT  2/13/2020

## 2020-02-13 NOTE — PLAN OF CARE
Problem: Physical Therapy Goal  Goal: Physical Therapy Goal  Description  Goals to be met by: 2020    Patient will increase functional independence with mobility by performin. Supine to sit with Letcher - 2020  2. Sit to supine with Letcher - Met 2020  3. Sit to stand transfer with Letcher - Met 2020  4. Gait  x ~100 feet with Letcher - Met 2020      Outcome: Met   Patient is pleasant, cooperative, and agreeable to therapy. Patient is independent with all ADLs and functional mobility at this time.  Patient demonstrates good safety with bed mobility, transfers and ambulation.  Patient with good activity tolerance. Patient ambulated ~100 ft with independence and no AD. Plan of care complete. Patient without further skilled physical therapy needs while in acute care setting and is appropriate for discharge to home.

## 2020-02-13 NOTE — PLAN OF CARE
Patient is a 64 year old female admitted from home 2/16/2020 and underwent End Ileostomy/Mucous Fistula Takedown with Ileocolic Anastomosis.  Patient is expected to discharge home with no needs +/- 2/16/2020.    Patient lives in a one story home with her father.  Patient's family will drive her home and obtain anything she needs after discharge.  [facility] Discharge Booklet given to patient with understanding verbalized.  CM name and number written on white board in patient's room with request to call for any questions and concerns. Assigned CM will continue to follow for needs.  Will continue to follow for needs.    PCP  Margarita Jay MD  [address] / [place] [id number] [phone]      [facility] - [place], [place] - [address]  [address]  [place] [address]  Phone: [phone] Fax: [phone]    [facility]  [address] [facility] [address]  Phone: [phone] Fax: [phone]      Extended Emergency Contact Information  Primary Emergency Contact: Kalie Prince   [place]  Mobile Phone: [phone]  Relation: Mother  Secondary Emergency Contact: Lexx Scott  [place] Phone: [phone]  Relation: Father  Preferred language: English   needed? No       02/13/20 1611   Discharge Assessment   Assessment Type Discharge Planning Assessment   Confirmed/corrected address and phone number on facesheet? Yes   Assessment information obtained from? Patient   Expected Length of Stay (days) 4   Communicated expected length of stay with patient/caregiver yes   Prior to hospitilization cognitive status: Alert/Oriented   Prior to hospitalization functional status: Independent   Current cognitive status: Alert/Oriented   Current Functional Status: Independent   Lives With parent(s)   Able to Return to Prior Arrangements yes   Is patient able to care for self after discharge? Yes   Who are your caregiver(s) and  their phone number(s)? family   Patient's perception of discharge disposition home or selfcare   Equipment Currently Used at Home colostomy/ostomy supplies   Do you have any problems affording any of your prescribed medications? No   Is the patient taking medications as prescribed? yes   Does the patient have transportation home? Yes   Transportation Anticipated family or friend will provide   Discharge Plan A Home with family   DME Needed Upon Discharge  none   Patient/Family in Agreement with Plan yes

## 2020-02-13 NOTE — PROGRESS NOTES
[facility]  Colorectal Surgery  Progress Note    Patient Name: Lora Scott  MRN: [id number]  Admission Date: 2/12/2020  Hospital Length of Stay: 1 days  Attending Physician: Uday Cope MD    Subjective:     Interval History:     No acute events overnight, doing well after surgery. Pain well controlled. No nausea or emesis. Voiding. Ambulated.     Post-Op Info:  Procedure(s) (LRB):  CLOSURE,ILEOSTOMY TAKEDOWN END ILEOSTOMY/MUCOUS FISTULA POSSIBLE LAPAROTOMY ERAS LOW (N/A)   1 Day Post-Op      Medications:  Continuous Infusions:   sodium chloride 0.9% Stopped (02/12/20 1700)    sodium chloride 0.9% 50 mL/hr at 02/12/20 1846     Scheduled Meds:   acetaminophen  1,000 mg Oral Q8H    enoxaparin  40 mg Subcutaneous Daily    gabapentin  300 mg Oral TID    ibuprofen  800 mg Intravenous Q8H    Followed by    ibuprofen  800 mg Oral Q8H     PRN Meds:   hydrOXYzine    ondansetron    oxyCODONE    oxyCODONE    sodium chloride 0.9%    traMADol        Objective:     Vital Signs (Most Recent):  Temp: 98.6 °F (37 °C) (02/13/20 0425)  Pulse: 68 (02/13/20 0425)  Resp: 18 (02/13/20 0425)  BP: (!) 83/51 (02/13/20 0425)  SpO2: 96 % (02/13/20 0425) Vital Signs (24h Range):  Temp:  [97.5 °F (36.4 °C)-98.7 °F (37.1 °C)] 98.6 °F (37 °C)  Pulse:  [67-95] 68  Resp:  [12-22] 18  SpO2:  [95 %-100 %] 96 %  BP: ()/(50-73) 83/51     Intake/Output - Last 3 Shifts       02/11 0700 - 02/12 0659 02/12 0700 - 02/13 0659 02/13 0700 - 02/14 0659    I.V. (mL/kg)  2087.7 (30.3)     IV Piggyback  350     Total Intake(mL/kg)  2437.7 (35.4)     Urine (mL/kg/hr)  1350 (0.8)     Stool  0     Total Output  1350     Net  +1087.7            Stool Occurrence  0 x           Physical Exam     General: In no acute distress, well appearance.   CV: RRR  Pulm: non labored breathing  Abd: soft, non distended, tenderness around incision. Dressing in place with SS drainage.   : No up in place.   Ext: wwp    Significant  Labs:    BMP (Last 3 Results):   Recent Labs   Lab 02/13/20  0429   GLU 77      K 3.9      CO2 20*   BUN 6   CREATININE 0.8   CALCIUM 8.2*   MG 1.9     CBC (Last 3 Results):   Recent Labs   Lab 02/13/20  0430   WBC 10.86   RBC 3.40*   HGB 9.8*   HCT 31.7*      MCV 93   MCH 28.8   MCHC 30.9*           Assessment/Plan:     Crohn's disease  POD#1 from Ileostomy takedown.     - Can advance to a LRD.   - Stop IVF.   - Oxy PRN for pain.   - Ambulate  - Teddy IS, SCD.   - Waiting return of bowel function.       Suzan Nunez MD  Colorectal Research Fellow PGY VI  Beeper: [phone]

## 2020-02-13 NOTE — TELEPHONE ENCOUNTER
Call and spoke to patient she was told to stop Humira in January before surgery   She takes injection every 2 wks  Last injection 1/3/2020 then 1/17/2020

## 2020-02-13 NOTE — NURSING TRANSFER
Nursing Transfer Note      2/12/2020     Transfer To: 1052    Transfer via bed    Transfer with cardiac monitoring    Transported by pct    Medicines sent: none    Chart send with patient: Yes    Notified: mother    Personal belongings/bag at the bedside w/ patient.

## 2020-02-14 ENCOUNTER — TELEPHONE (OUTPATIENT)
Dept: GASTROENTEROLOGY | Facility: CLINIC | Age: 30
End: 2020-02-14

## 2020-02-14 DIAGNOSIS — K50.80 CROHN'S DISEASE OF BOTH SMALL AND LARGE INTESTINE WITHOUT COMPLICATION: Primary | ICD-10-CM

## 2020-02-14 LAB
ALBUMIN SERPL BCP-MCNC: 2.7 G/DL (ref 3.5–5.2)
ALP SERPL-CCNC: 87 U/L (ref 55–135)
ALT SERPL W/O P-5'-P-CCNC: 26 U/L (ref 10–44)
ANION GAP SERPL CALC-SCNC: 6 MMOL/L (ref 8–16)
AST SERPL-CCNC: 21 U/L (ref 10–40)
BASOPHILS # BLD AUTO: 0.01 K/UL (ref 0–0.2)
BASOPHILS NFR BLD: 0.2 % (ref 0–1.9)
BILIRUB SERPL-MCNC: 0.4 MG/DL (ref 0.1–1)
BUN SERPL-MCNC: 8 MG/DL (ref 6–20)
CALCIUM SERPL-MCNC: 7.9 MG/DL (ref 8.7–10.5)
CHLORIDE SERPL-SCNC: 112 MMOL/L (ref 95–110)
CO2 SERPL-SCNC: 23 MMOL/L (ref 23–29)
CREAT SERPL-MCNC: 0.7 MG/DL (ref 0.5–1.4)
DIFFERENTIAL METHOD: ABNORMAL
EOSINOPHIL # BLD AUTO: 0.1 K/UL (ref 0–0.5)
EOSINOPHIL NFR BLD: 1 % (ref 0–8)
ERYTHROCYTE [DISTWIDTH] IN BLOOD BY AUTOMATED COUNT: 15.6 % (ref 11.5–14.5)
EST. GFR  (AFRICAN AMERICAN): >60 ML/MIN/1.73 M^2
EST. GFR  (NON AFRICAN AMERICAN): >60 ML/MIN/1.73 M^2
GLUCOSE SERPL-MCNC: 73 MG/DL (ref 70–110)
HCT VFR BLD AUTO: 30.8 % (ref 37–48.5)
HGB BLD-MCNC: 9.4 G/DL (ref 12–16)
IMM GRANULOCYTES # BLD AUTO: 0.02 K/UL (ref 0–0.04)
IMM GRANULOCYTES NFR BLD AUTO: 0.3 % (ref 0–0.5)
LYMPHOCYTES # BLD AUTO: 1.9 K/UL (ref 1–4.8)
LYMPHOCYTES NFR BLD: 32.5 % (ref 18–48)
MAGNESIUM SERPL-MCNC: 1.7 MG/DL (ref 1.6–2.6)
MCH RBC QN AUTO: 28.8 PG (ref 27–31)
MCHC RBC AUTO-ENTMCNC: 30.5 G/DL (ref 32–36)
MCV RBC AUTO: 95 FL (ref 82–98)
MONOCYTES # BLD AUTO: 0.4 K/UL (ref 0.3–1)
MONOCYTES NFR BLD: 6.3 % (ref 4–15)
NEUTROPHILS # BLD AUTO: 3.5 K/UL (ref 1.8–7.7)
NEUTROPHILS NFR BLD: 59.7 % (ref 38–73)
NRBC BLD-RTO: 0 /100 WBC
PHOSPHATE SERPL-MCNC: 2.9 MG/DL (ref 2.7–4.5)
PLATELET # BLD AUTO: 169 K/UL (ref 150–350)
PMV BLD AUTO: 10.9 FL (ref 9.2–12.9)
POTASSIUM SERPL-SCNC: 3.7 MMOL/L (ref 3.5–5.1)
PROT SERPL-MCNC: 6 G/DL (ref 6–8.4)
RBC # BLD AUTO: 3.26 M/UL (ref 4–5.4)
SODIUM SERPL-SCNC: 141 MMOL/L (ref 136–145)
WBC # BLD AUTO: 5.88 K/UL (ref 3.9–12.7)

## 2020-02-14 PROCEDURE — 84100 ASSAY OF PHOSPHORUS: CPT

## 2020-02-14 PROCEDURE — 63600175 PHARM REV CODE 636 W HCPCS: Performed by: STUDENT IN AN ORGANIZED HEALTH CARE EDUCATION/TRAINING PROGRAM

## 2020-02-14 PROCEDURE — 80053 COMPREHEN METABOLIC PANEL: CPT

## 2020-02-14 PROCEDURE — 83735 ASSAY OF MAGNESIUM: CPT

## 2020-02-14 PROCEDURE — 25000003 PHARM REV CODE 250: Performed by: STUDENT IN AN ORGANIZED HEALTH CARE EDUCATION/TRAINING PROGRAM

## 2020-02-14 PROCEDURE — 36415 COLL VENOUS BLD VENIPUNCTURE: CPT

## 2020-02-14 PROCEDURE — 20600001 HC STEP DOWN PRIVATE ROOM

## 2020-02-14 PROCEDURE — 85025 COMPLETE CBC W/AUTO DIFF WBC: CPT

## 2020-02-14 RX ORDER — IBUPROFEN 800 MG/1
800 TABLET ORAL EVERY 8 HOURS
Qty: 21 TABLET | Refills: 0 | Status: SHIPPED | OUTPATIENT
Start: 2020-02-14 | End: 2020-02-22

## 2020-02-14 RX ORDER — SODIUM CHLORIDE 9 MG/ML
INJECTION, SOLUTION INTRAVENOUS CONTINUOUS
Status: DISCONTINUED | OUTPATIENT
Start: 2020-02-14 | End: 2020-02-14

## 2020-02-14 RX ORDER — ADALIMUMAB 40MG/0.8ML
40 KIT SUBCUTANEOUS
Qty: 4 PEN | Refills: 5 | Status: SHIPPED | OUTPATIENT
Start: 2020-02-14 | End: 2020-02-27

## 2020-02-14 RX ORDER — ACETAMINOPHEN 500 MG
1000 TABLET ORAL EVERY 8 HOURS
Qty: 42 TABLET | Refills: 0 | Status: SHIPPED | OUTPATIENT
Start: 2020-02-14 | End: 2020-02-22

## 2020-02-14 RX ORDER — IBUPROFEN 600 MG/1
600 TABLET ORAL 3 TIMES DAILY
Status: DISCONTINUED | OUTPATIENT
Start: 2020-02-14 | End: 2020-02-15 | Stop reason: HOSPADM

## 2020-02-14 RX ORDER — OXYCODONE HYDROCHLORIDE 5 MG/1
5 TABLET ORAL EVERY 6 HOURS PRN
Qty: 20 TABLET | Refills: 0 | Status: SHIPPED | OUTPATIENT
Start: 2020-02-14 | End: 2020-03-12

## 2020-02-14 RX ADMIN — SODIUM CHLORIDE 1000 ML: 0.9 INJECTION, SOLUTION INTRAVENOUS at 09:02

## 2020-02-14 RX ADMIN — ACETAMINOPHEN 1000 MG: 500 TABLET ORAL at 02:02

## 2020-02-14 RX ADMIN — ACETAMINOPHEN 1000 MG: 500 TABLET ORAL at 05:02

## 2020-02-14 RX ADMIN — IBUPROFEN 800 MG: 400 TABLET, FILM COATED ORAL at 05:02

## 2020-02-14 RX ADMIN — SODIUM CHLORIDE: 0.9 INJECTION, SOLUTION INTRAVENOUS at 09:02

## 2020-02-14 RX ADMIN — GABAPENTIN 300 MG: 300 CAPSULE ORAL at 02:02

## 2020-02-14 RX ADMIN — ENOXAPARIN SODIUM 40 MG: 100 INJECTION SUBCUTANEOUS at 04:02

## 2020-02-14 NOTE — SUBJECTIVE & OBJECTIVE
Subjective:     Interval History:     No acute events overnight, doing well after surgery. Pain well controlled. No nausea or emesis. Voiding. Ambulated. Having bowel function. Tolerating a diet. Not sure if she wants to go home or not.     Post-Op Info:  Procedure(s) (LRB):  CLOSURE,ILEOSTOMY TAKEDOWN END ILEOSTOMY/MUCOUS FISTULA POSSIBLE LAPAROTOMY ERAS LOW (N/A)   2 Days Post-Op      Medications:  Continuous Infusions:    Scheduled Meds:   acetaminophen  1,000 mg Oral Q8H    enoxaparin  40 mg Subcutaneous Daily    gabapentin  300 mg Oral TID    ibuprofen  800 mg Oral Q8H     PRN Meds:   hydrOXYzine    ondansetron    oxyCODONE    oxyCODONE    sodium chloride 0.9%    traMADol        Objective:     Vital Signs (Most Recent):  Temp: 98.3 °F (36.8 °C) (02/14/20 0543)  Pulse: 89 (02/14/20 0543)  Resp: 18 (02/14/20 0543)  BP: 115/67 (02/14/20 0543)  SpO2: 98 % (02/14/20 0543) Vital Signs (24h Range):  Temp:  [98 °F (36.7 °C)-98.7 °F (37.1 °C)] 98.3 °F (36.8 °C)  Pulse:  [74-98] 89  Resp:  [16-18] 18  SpO2:  [98 %-100 %] 98 %  BP: (101-119)/(56-71) 115/67     Intake/Output - Last 3 Shifts       02/12 0700 - 02/13 0659 02/13 0700 - 02/14 0659    P.O.  1615    I.V. (mL/kg) 2087.7 (30.3)     IV Piggyback 350     Total Intake(mL/kg) 2437.7 (35.4) 1615 (23.4)    Urine (mL/kg/hr) 1350 (0.8) 3 (0)    Emesis/NG output  0    Stool 0 0    Total Output 1350 3    Net +1087.7 +1612          Urine Occurrence  3 x    Stool Occurrence 0 x 4 x        Physical Exam   Constitutional: She is oriented to person, place, and time. She appears well-developed and well-nourished. No distress.   HENT:   Head: Normocephalic and atraumatic.   Cardiovascular: Normal rate.   Pulmonary/Chest: Effort normal. No respiratory distress.   Abdominal: Soft. She exhibits no distension. There is no tenderness (attp).   Neurological: She is alert and oriented to person, place, and time. No cranial nerve deficit.   Psychiatric: She has a normal mood  and affect. Her behavior is normal.        Significant Labs:    BMP (Last 3 Results):   Recent Labs   Lab 02/13/20  0429 02/14/20  0349   GLU 77 73    141   K 3.9 3.7    112*   CO2 20* 23   BUN 6 8   CREATININE 0.8 0.7   CALCIUM 8.2* 7.9*   MG 1.9 1.7     CBC (Last 3 Results):   Recent Labs   Lab 02/13/20  0430 02/14/20  0349   WBC 10.86 5.88   RBC 3.40* 3.26*   HGB 9.8* 9.4*   HCT 31.7* 30.8*    169   MCV 93 95   MCH 28.8 28.8   MCHC 30.9* 30.5*

## 2020-02-14 NOTE — ASSESSMENT & PLAN NOTE
Lora Scott is a 29 y.o. female s/p ileostomy/mucus fistula takedown on 2/12/2020    Low residue diet with protein supplementation  Prn pain medications with scheduled multimodal medications  DVT ppx  Ambulate  Possible discharge today, if not today home tomorrow

## 2020-02-14 NOTE — HPI
28 yo F with Crohn's disease who underwent an ileocecal resection with end ileostomy and colonic mucous fistula in May 2019.  At the time of surgery she was unable to eat, on home TPN, and markedly hypoalbuminemic.  She was last seen by me in July of 2019, but then returned 12/30/2019 to discuss ostomy closure.  Since her surgery she has recovered well.  She had gone from a weight of 105 lb preoperatively to 149 lb at that point.  She is being medically treated with Humira, dosed every 2 weeks, next dose was scheduled for 1/2/20.  Ileoscopy done by Dr. Bacon on 12/20/2019 showed a normal neoterminal ileum.  At that time we tentatively scheduled her for surgery on 02/12/2020 to coincide with the end of a Humira cycle.     She returns today for preop visit.  She reports no significant changes since I last saw her.  She cannot remember exactly when her last Humira dose was but she believes it was in the middle of January.  No abdominal pain. No nausea or vomiting.  She is anxious to proceed with ileostomy closure.    I have discussed the procedure at length with Lora Scott.  We discussed the rationale, risks, benefits, and alternatives in depth.  We discussed the expected outcomes and potential complications including but not limited to bleeding, infection, anastomotic leak, recurrence, prolonged pain, need for further procedures, altered continence, incisional hernia and injury to adjacent organs.  She verbalized her understanding of the procedure and wishes to proceed.  Written consent was obtained.    Proceed with ileostomy takedown and ileocolic anastomosis on 02/12/2020 as scheduled.

## 2020-02-14 NOTE — PROGRESS NOTES
[facility]  General Surgery  Progress Note    Subjective:     History of Present Illness:  No notes on file    Post-Op Info:  Procedure(s) (LRB):  CLOSURE,ILEOSTOMY TAKEDOWN END ILEOSTOMY/MUCOUS FISTULA POSSIBLE LAPAROTOMY ERAS LOW (N/A)   2 Days Post-Op       Subjective:     Interval History:     No acute events overnight, doing well after surgery. Pain well controlled. No nausea or emesis. Voiding. Ambulated. Having bowel function. Tolerating a diet. Not sure if she wants to go home or not.     Post-Op Info:  Procedure(s) (LRB):  CLOSURE,ILEOSTOMY TAKEDOWN END ILEOSTOMY/MUCOUS FISTULA POSSIBLE LAPAROTOMY ERAS LOW (N/A)   2 Days Post-Op      Medications:  Continuous Infusions:    Scheduled Meds:   acetaminophen  1,000 mg Oral Q8H    enoxaparin  40 mg Subcutaneous Daily    gabapentin  300 mg Oral TID    ibuprofen  800 mg Oral Q8H     PRN Meds:   hydrOXYzine    ondansetron    oxyCODONE    oxyCODONE    sodium chloride 0.9%    traMADol        Objective:     Vital Signs (Most Recent):  Temp: 98.3 °F (36.8 °C) (02/14/20 0543)  Pulse: 89 (02/14/20 0543)  Resp: 18 (02/14/20 0543)  BP: 115/67 (02/14/20 0543)  SpO2: 98 % (02/14/20 0543) Vital Signs (24h Range):  Temp:  [98 °F (36.7 °C)-98.7 °F (37.1 °C)] 98.3 °F (36.8 °C)  Pulse:  [74-98] 89  Resp:  [16-18] 18  SpO2:  [98 %-100 %] 98 %  BP: (101-119)/(56-71) 115/67     Intake/Output - Last 3 Shifts       02/12 0700 - 02/13 0659 02/13 0700 - 02/14 0659    P.O.  1615    I.V. (mL/kg) 2087.7 (30.3)     IV Piggyback 350     Total Intake(mL/kg) 2437.7 (35.4) 1615 (23.4)    Urine (mL/kg/hr) 1350 (0.8) 3 (0)    Emesis/NG output  0    Stool 0 0    Total Output 1350 3    Net +1087.7 +1612          Urine Occurrence  3 x    Stool Occurrence 0 x 4 x        Physical Exam   Constitutional: She is oriented to person, place, and time. She appears well-developed and well-nourished. No distress.   HENT:   Head: Normocephalic and atraumatic.   Cardiovascular:  Normal rate.   Pulmonary/Chest: Effort normal. No respiratory distress.   Abdominal: Soft. She exhibits no distension. There is no tenderness (attp).   Neurological: She is alert and oriented to person, place, and time. No cranial nerve deficit.   Psychiatric: She has a normal mood and affect. Her behavior is normal.        Significant Labs:    BMP (Last 3 Results):   Recent Labs   Lab 02/13/20  0429 02/14/20  0349   GLU 77 73    141   K 3.9 3.7    112*   CO2 20* 23   BUN 6 8   CREATININE 0.8 0.7   CALCIUM 8.2* 7.9*   MG 1.9 1.7     CBC (Last 3 Results):   Recent Labs   Lab 02/13/20  0430 02/14/20  0349   WBC 10.86 5.88   RBC 3.40* 3.26*   HGB 9.8* 9.4*   HCT 31.7* 30.8*    169   MCV 93 95   MCH 28.8 28.8   MCHC 30.9* 30.5*           Assessment/Plan:     Crohn's disease  Lora Scott is a 29 y.o. female s/p ileostomy/mucus fistula takedown on 2/12/2020    Low residue diet with protein supplementation  Prn pain medications with scheduled multimodal medications  DVT ppx  Ambulate  Possible discharge today, if not today home tomorrow        Raheel Sarabia MD  General Surgery  [facility]

## 2020-02-14 NOTE — PLAN OF CARE
Pt AAOx4, VSS, in bed with upper siderails raised x2, bed in lowest/locked position, call light/personal belongings within reach. Pt instructed to call for assistance. Pt verbalizes understanding.  Pt afebrile at this time.  Proper hand hygiene performed before and after pt care.      RLQ incision with foam dressing, minimal moist serosang drainage noted on dressing, drainage marked at 0530. Orders placed by wound care, changed 2/13 by WC.  +bm since surgery, pt reports 4 bm's 2/13.  Voiding adequately.   Ambulating independently.  Appetite good, on LRD. No n/v reported.   Pt reports minimal pain, controlled with scheduled Ibuprofen and Tylenol. Refusing PRN pain meds.  Pt on telemetry monitoring, SR-ST.  Pt resting well at the moment.    Will continue to monitor patient.

## 2020-02-14 NOTE — ANESTHESIA POSTPROCEDURE EVALUATION
Anesthesia Post Evaluation    Patient: Lora Scott    Procedure(s) Performed: Procedure(s) (LRB):  CLOSURE,ILEOSTOMY TAKEDOWN END ILEOSTOMY/MUCOUS FISTULA POSSIBLE LAPAROTOMY ERAS LOW (N/A)    Final Anesthesia Type: general    Patient location during evaluation: PACU  Patient participation: Yes- Able to Participate  Level of consciousness: awake and alert and oriented  Post-procedure vital signs: reviewed and stable  Pain management: adequate  Airway patency: patent    PONV status at discharge: No PONV  Anesthetic complications: no      Cardiovascular status: blood pressure returned to baseline and hemodynamically stable  Respiratory status: unassisted, spontaneous ventilation and room air  Hydration status: euvolemic  Follow-up not needed.          Vitals Value Taken Time   /67 2/14/2020  5:43 AM   Temp 36.8 °C (98.3 °F) 2/14/2020  5:43 AM   Pulse 89 2/14/2020  5:43 AM   Resp 18 2/14/2020  5:43 AM   SpO2 98 % 2/14/2020  5:43 AM         Event Time     Out of Recovery 11:30:00          Pain/Orla Score: Pain Rating Prior to Med Admin: 6 (2/14/2020  5:53 AM)

## 2020-02-14 NOTE — HOSPITAL COURSE
Lora Scott is a 29 y.o. female who presented for the above procedure. They tolerated this well and were transferred to PACU for recovery from anesthesia before being moved to the floor for further observation. Their post op course was uncomplicated and they were discharged on POD#3. At that time, they were tolerating a low residue diet, ambulating, voiding spontaneously, having BMs, and had adequate pain control. Discharge instructions were provided along with a recommended time for follow up. .

## 2020-02-14 NOTE — TELEPHONE ENCOUNTER
----- Message from Suzi Bacon MD sent at 2/14/2020 11:39 AM CST -----  Call patient and let her know if she is having no infection issues (fevers, wound infections) after surgery she can restart her humira on 2/26/20.  Her humira levels are low and let her know we are going to try to get weekly humira approved for her in the interim. Remind her of our next appt    SS

## 2020-02-15 VITALS
OXYGEN SATURATION: 99 % | RESPIRATION RATE: 16 BRPM | BODY MASS INDEX: 30.44 KG/M2 | TEMPERATURE: 97 F | DIASTOLIC BLOOD PRESSURE: 76 MMHG | SYSTOLIC BLOOD PRESSURE: 110 MMHG | WEIGHT: 165.38 LBS | HEART RATE: 92 BPM | HEIGHT: 62 IN

## 2020-02-15 LAB — CRP SERPL-MCNC: 29.6 MG/L (ref 0–8.2)

## 2020-02-15 PROCEDURE — 36415 COLL VENOUS BLD VENIPUNCTURE: CPT

## 2020-02-15 PROCEDURE — 86140 C-REACTIVE PROTEIN: CPT

## 2020-02-15 NOTE — PLAN OF CARE
[facility]    HOME HEALTH ORDERS  FACE TO FACE ENCOUNTER    Patient Name: Lora Scott  YOB: 1990    PCP: Margarita Jay MD   PCP Address: [address] / [place]  PCP Phone Number: [phone]  PCP Fax: [phone]    Encounter Date: 02/15/2020    Admit to Home Health    Diagnoses:  Active Hospital Problems    Diagnosis  POA    *Crohn's disease [K50.90]  Yes    Delayed surgical wound healing [T81.89XA]  Yes      Resolved Hospital Problems   No resolved problems to display.       Future Appointments   Date Time Provider Department Center   3/2/2020  3:20 PM Uday Cope MD [facility] COLON [facility]   3/12/2020 10:20 AM Suzi Bacon MD [facility] GASTRO [facility]     Follow-up Information     Srinivas Ruiz MD In 2 weeks.    Specialty:  Colon and Rectal Surgery  Why:  For post operative check up  Contact information:  [address]  [facility] [id number] [phone]                     I have seen and examined this patient face to face today. My clinical findings that support the need for the home health skilled services and home bound status are the following:  Weakness/numbness causing balance and gait disturbance due to Surgery making it taxing to leave home.  Requiring assistive device to leave home due to unsteady gait caused by Surgery.    Allergies:  Review of patient's allergies indicates:   Allergen Reactions    Shellfish containing products Anaphylaxis     Allergic to all seafood.    Horse/equine containing products Itching    Iodine      Swelling (throat)^       Diet: regular diet    Activities: activity as tolerated, no driving while on analgesics and no heavy lifting over 10 lbs. for 6 weeks    Nursing:   SN to complete comprehensive assessment including routine vital signs. Instruct on disease process and s/s of complications to report to MD. Review/verify medication list sent home with the patient at time of discharge  and instruct  left vm re missed appt/ no show letter sent 2019 patient/caregiver as needed. Frequency may be adjusted depending on start of care date. If patient has enteral feeding tube (NG, PEG, J-tube, G-tube), flush tube before and after feeding and/or medication administration with 20-30 mL of water.    Notify MD if SBP > 160 or < 90; DBP > 90 or < 50; HR > 120 or < 50; Temp > 101; Other:              MISCELLANEOUS CARE:  N/A    WOUND CARE ORDERS  yes:  If drain exists assess drain site for signs and symptoms of infection. Empty drain and record output daily and PRN. Monitor for signs and symptoms of infection. Report as necessary. For peripheral wounds, wound spray or saline for wound(s) cleaning with all dressing changes. SN to assess wound(s) with each visit.  Instruct/teach patient/caregiver on wound care protocol.   Surgical Wound:  Location: Left abdomen    Consult ET nurse        Apply the following to wound:   Wet to Damp dressing: daily (frequency) until ileostomy site closes.       Medications: Review discharge medications with patient and family and provide education.      Current Discharge Medication List      START taking these medications    Details   acetaminophen (TYLENOL) 500 MG tablet Take 2 tablets (1,000 mg total) by mouth every 8 (eight) hours. for 7 days  Qty: 42 tablet, Refills: 0      oxyCODONE (ROXICODONE) 5 MG immediate release tablet Take 1 tablet (5 mg total) by mouth every 6 (six) hours as needed for Pain.  Qty: 20 tablet, Refills: 0    Comments: Quantity prescribed more than 7 day supply? No         CONTINUE these medications which have CHANGED    Details   !! adalimumab (HUMIRA PEN) 40 mg/0.8 mL PnKt Inject 1 pen (40 mg total) into the skin every 7 days.  Qty: 4 pen, Refills: 5    Associated Diagnoses: Crohn's disease of both small and large intestine without complication      ibuprofen (ADVIL,MOTRIN) 800 MG tablet Take 1 tablet (800 mg total) by mouth every 8 (eight) hours. for 7 days  Qty: 21 tablet, Refills: 0       !! - Potential  duplicate medications found. Please discuss with provider.      CONTINUE these medications which have NOT CHANGED    Details   !! adalimumab (HUMIRA PEN) 40 mg/0.8 mL PnKt Inject 1 pen (40 mg total) into the skin every 14 (fourteen) days.  Qty: 2 pen, Refills: 5    Associated Diagnoses: Crohn's disease of small intestine without complication      folic acid (FOLVITE) 1 MG tablet Take 1 tablet (1 mg total) by mouth once daily.  Qty: 30 tablet, Refills: 11    Associated Diagnoses: Crohn's disease of small intestine without complication      hydrOXYzine HCl (ATARAX) 25 MG tablet Take 1 tablet (25 mg total) by mouth 3 (three) times daily.  Qty: 90 tablet, Refills: 1    Associated Diagnoses: Psoriasis      methotrexate 2.5 MG Tab Take 5 tablets (12.5 mg total) by mouth every 7 days.  Qty: 20 tablet, Refills: 0    Associated Diagnoses: Crohn's disease of small intestine without complication      triamcinolone acetonide 0.1% (KENALOG) 0.1 % cream Apply topically 2 (two) times daily. To rashes  Qty: 454 g, Refills: 3    Associated Diagnoses: Psoriasis       !! - Potential duplicate medications found. Please discuss with provider.          I certify that this patient is confined to her home and needs intermittent skilled nursing care.

## 2020-02-15 NOTE — ASSESSMENT & PLAN NOTE
POD#3 from Ileostomy takedown.     - LRD   - Stop IVF.   - Oxy PRN for pain.   - Ambulate  - Teddy IS, SCD.   - Home health  - Planing dispo

## 2020-02-15 NOTE — PLAN OF CARE
Plan of care reviewed with pt: AAOx4, on RA, VS stable. Ileostomy closure site dressing clean and intact, will need to be changed tmr (2/15). No complains of pain or nausea. No emesis. Consumed about 50% of her meal. 1000cc NS bolus given per order. Had at least 3 bowel movements with loose liquid stool. Ambulated in room independently. Call light in reach. WCTM.

## 2020-02-15 NOTE — NURSING
Pt due to be discharged. Mom in room as patient's ride home. Mom went to  Prescriptions. Pt teaching complete. Peripheral IV removed. Supplies for home use given. VSS. No signs of distress noted. Pt awaits for transport.

## 2020-02-15 NOTE — SUBJECTIVE & OBJECTIVE
Subjective:     Interval History:   No acute events overnight, doing well after surgery. Pain well controlled. No nausea or emesis. Voiding. Ambulated. Having bowel function. Tolerating a diet.     Post-Op Info:  Procedure(s) (LRB):  CLOSURE,ILEOSTOMY TAKEDOWN END ILEOSTOMY/MUCOUS FISTULA POSSIBLE LAPAROTOMY ERAS LOW (N/A)   3 Days Post-Op      Medications:  Continuous Infusions:    Scheduled Meds:   acetaminophen  1,000 mg Oral Q8H    enoxaparin  40 mg Subcutaneous Daily    gabapentin  300 mg Oral TID    ibuprofen  600 mg Oral TID     PRN Meds:   hydrOXYzine    ondansetron    oxyCODONE    oxyCODONE    sodium chloride 0.9%    traMADol        Objective:     Vital Signs (Most Recent):  Temp: 97.7 °F (36.5 °C) (02/15/20 0452)  Pulse: 106 (02/15/20 0750)  Resp: 16 (02/15/20 0452)  BP: 113/69 (02/15/20 0452)  SpO2: 96 % (02/15/20 0452) Vital Signs (24h Range):  Temp:  [97.7 °F (36.5 °C)-98.4 °F (36.9 °C)] 97.7 °F (36.5 °C)  Pulse:  [] 106  Resp:  [16-18] 16  SpO2:  [95 %-100 %] 96 %  BP: ()/(55-74) 113/69     Intake/Output - Last 3 Shifts       02/13 0700 - 02/14 0659 02/14 0700 - 02/15 0659 02/15 0700 - 02/16 0659    P.O. 1615 1075     I.V. (mL/kg)  858.3 (11.4)     IV Piggyback  1000     Total Intake(mL/kg) 1615 (23.4) 2933.3 (39.1)     Urine (mL/kg/hr) 3 (0)      Emesis/NG output 0      Stool 0      Total Output 3      Net +1612 +2933.3            Urine Occurrence 3 x 6 x     Stool Occurrence 4 x 4 x     Emesis Occurrence  0 x         Physical Exam   Constitutional: She is oriented to person, place, and time. She appears well-developed and well-nourished. No distress.   HENT:   Head: Normocephalic and atraumatic.   Cardiovascular: Normal rate.   Pulmonary/Chest: Effort normal. No respiratory distress.   Abdominal: Soft. She exhibits no distension. There is no tenderness (attp).   Neurological: She is alert and oriented to person, place, and time. No cranial nerve deficit.   Psychiatric: She has  a normal mood and affect. Her behavior is normal.        Significant Labs:    BMP (Last 3 Results):   Recent Labs   Lab 02/13/20  0429 02/14/20  0349   GLU 77 73    141   K 3.9 3.7    112*   CO2 20* 23   BUN 6 8   CREATININE 0.8 0.7   CALCIUM 8.2* 7.9*   MG 1.9 1.7     CBC (Last 3 Results):   Recent Labs   Lab 02/13/20  0430 02/14/20  0349   WBC 10.86 5.88   RBC 3.40* 3.26*   HGB 9.8* 9.4*   HCT 31.7* 30.8*    169   MCV 93 95   MCH 28.8 28.8   MCHC 30.9* 30.5*

## 2020-02-15 NOTE — DISCHARGE SUMMARY
[facility]  Colorectal Surgery  Discharge Summary      Patient Name: Lora Scott  MRN: [id number]  Admission Date: 2/12/2020  Hospital Length of Stay: 3 days  Discharge Date and Time:  02/15/2020 9:07 AM  Attending Physician: Uday Cope MD   Discharging Provider: Mika Mendoza MD  Primary Care Provider: Margarita Jay MD     HPI:  28 yo F with Crohn's disease who underwent an ileocecal resection with end ileostomy and colonic mucous fistula in May 2019.  At the time of surgery she was unable to eat, on home TPN, and markedly hypoalbuminemic.  She was last seen by me in July of 2019, but then returned 12/30/2019 to discuss ostomy closure.  Since her surgery she has recovered well.  She had gone from a weight of 105 lb preoperatively to 149 lb at that point.  She is being medically treated with Humira, dosed every 2 weeks, next dose was scheduled for 1/2/20.  Ileoscopy done by Dr. Bacon on 12/20/2019 showed a normal neoterminal ileum.  At that time we tentatively scheduled her for surgery on 02/12/2020 to coincide with the end of a Humira cycle.     She returns today for preop visit.  She reports no significant changes since I last saw her.  She cannot remember exactly when her last Humira dose was but she believes it was in the middle of January.  No abdominal pain. No nausea or vomiting.  She is anxious to proceed with ileostomy closure.    I have discussed the procedure at length with Lora Scott.  We discussed the rationale, risks, benefits, and alternatives in depth.  We discussed the expected outcomes and potential complications including but not limited to bleeding, infection, anastomotic leak, recurrence, prolonged pain, need for further procedures, altered continence, incisional hernia and injury to adjacent organs.  She verbalized her understanding of the procedure and wishes to proceed.  Written consent was obtained.    Proceed with ileostomy takedown and ileocolic  anastomosis on 02/12/2020 as scheduled.       Procedure(s) (LRB):  CLOSURE,ILEOSTOMY TAKEDOWN END ILEOSTOMY/MUCOUS FISTULA POSSIBLE LAPAROTOMY ERAS LOW (N/A)     Hospital Course:  Lora Scott is a 29 y.o. female who presented for the above procedure. They tolerated this well and were transferred to PACU for recovery from anesthesia before being moved to the floor for further observation. Their post op course was uncomplicated and they were discharged on POD#3. At that time, they were tolerating a low residue diet, ambulating, voiding spontaneously, having BMs, and had adequate pain control. Discharge instructions were provided along with a recommended time for follow up. .        Consults (From admission, onward)        Status Ordering Provider     Inpatient consult to Social Work/Case Management  Once     Provider:  (Not yet assigned)    Acknowledged LONDON OCONNOR          Significant Diagnostic Studies: Labs:   BMP:   Recent Labs   Lab 02/14/20  0349   GLU 73      K 3.7   *   CO2 23   BUN 8   CREATININE 0.7   CALCIUM 7.9*   MG 1.7       Pending Diagnostic Studies:     Procedure Component Value Units Date/Time    Specimen to Pathology, Surgery Other [[id number]] Collected:  02/12/20 0951    Order Status:  Sent Lab Status:  In process Updated:  02/12/20 1428        Final Active Diagnoses:    Diagnosis Date Noted POA    PRINCIPAL PROBLEM:  Crohn's disease [K50.90] 02/12/2020 Yes    Delayed surgical wound healing [T81.89XA] 02/13/2020 Yes      Problems Resolved During this Admission:      Discharged Condition: good    Disposition: Home or Self Care    Follow Up:  Follow-up Information     Srinivas Ruiz MD In 2 weeks.    Specialty:  Colon and Rectal Surgery  Why:  For post operative check up  Contact information:  [address] [facility] [address]  [phone]                 Patient Instructions:      Lifting restrictions   Order Comments: Do not lift anything >10 lbs (about a gallon of  milk) for 6 weeks     Other restrictions (specify):   Order Comments: Okay to shower. Please do not soak in water such as a bath, hot tub, or pool for 2 weeks     No driving until:   Order Comments: No longer taking narcotic pain meds     Notify your health care provider if you experience any of the following:  temperature >100.4     Notify your health care provider if you experience any of the following:  persistent nausea and vomiting or diarrhea     Notify your health care provider if you experience any of the following:  severe uncontrolled pain     Notify your health care provider if you experience any of the following:  redness, tenderness, or signs of infection (pain, swelling, redness, odor or green/yellow discharge around incision site)     Notify your health care provider if you experience any of the following:  difficulty breathing or increased cough     Notify your health care provider if you experience any of the following:  severe persistent headache     Notify your health care provider if you experience any of the following:  worsening rash     Notify your health care provider if you experience any of the following:  persistent dizziness, light-headedness, or visual disturbances     Notify your health care provider if you experience any of the following:  increased confusion or weakness     No dressing needed   Order Comments: May use gauze with tape over old stoma site as needed     Medications:  Reconciled Home Medications:      Medication List      START taking these medications    acetaminophen 500 MG tablet  Commonly known as:  TYLENOL  Take 2 tablets (1,000 mg total) by mouth every 8 (eight) hours. for 7 days     oxyCODONE 5 MG immediate release tablet  Commonly known as:  ROXICODONE  Take 1 tablet (5 mg total) by mouth every 6 (six) hours as needed for Pain.        CHANGE how you take these medications    ibuprofen 800 MG tablet  Commonly known as:  ADVIL,MOTRIN  Take 1 tablet (800 mg total) by  mouth every 8 (eight) hours. for 7 days  What changed:    · medication strength  · how much to take  · when to take this  · reasons to take this        CONTINUE taking these medications    folic acid 1 MG tablet  Commonly known as:  FOLVITE  Take 1 tablet (1 mg total) by mouth once daily.     hydrOXYzine HCl 25 MG tablet  Commonly known as:  ATARAX  Take 1 tablet (25 mg total) by mouth 3 (three) times daily.     methotrexate 2.5 MG Tab  Take 5 tablets (12.5 mg total) by mouth every 7 days.     triamcinolone acetonide 0.1% 0.1 % cream  Commonly known as:  KENALOG  Apply topically 2 (two) times daily. To rashes        ASK your doctor about these medications    * Humira Pen 40 mg/0.8 mL Pnkt  Inject 1 pen (40 mg total) into the skin every 14 (fourteen) days.  Ask about: Which instructions should I use?     * Humira Pen 40 mg/0.8 mL Pnkt  Inject 1 pen (40 mg total) into the skin every 7 days.  Ask about: Which instructions should I use?         * This list has 2 medication(s) that are the same as other medications prescribed for you. Read the directions carefully, and ask your doctor or other care provider to review them with you.                Mika Mendoza MD  Colorectal Surgery  [facility]

## 2020-02-15 NOTE — PLAN OF CARE
* AAO x 4  * Abdomen rounded  * Low fiber/low residue diet patient tolerating well. See chart for % eaten.   * Dressing noted to the left lower abdomen. Dressing marked when dry drainage noted, no leaking past markings. No signs or symptoms of infection noted. Per wound care dressing to be changed every other day  Aquacel & a foam applied next change due to 2/15/20  * No edema noted.  * Bed at lowest position and locked, call light within reach, non-slip socks on, and side rails up x 2.  Encouraged patient to call for assistance when needed patient stated understanding.  * Midline abdominal incision with dermabond site clean dry and intact no signs or symptoms of infection noted.   * Right hand PIV 18 G (2/12/20) patent, dressing clean dry and intact no signs or symptoms of infection noted.  * Oxygen saturation 100 % on room air.  Respirations even and unlabored no signs or symptoms of distress noted.  * Patient has complaints of generalized discomfort patient refusing pain medications at this time.   * Standard precautions maintained.  * Patient able to turn self no assistance needed.  * Patient voiding unmeasured urine. See flow sheet for occurences.

## 2020-02-15 NOTE — PROGRESS NOTES
[facility]  Colorectal Surgery  Progress Note    Patient Name: Lora Scott  MRN: [id number]  Admission Date: 2/12/2020  Hospital Length of Stay: 3 days  Attending Physician: Uday Cope MD    Subjective:     Interval History:   No acute events overnight, doing well after surgery. Pain well controlled. No nausea or emesis. Voiding. Ambulated. Having bowel function. Tolerating a diet.     Post-Op Info:  Procedure(s) (LRB):  CLOSURE,ILEOSTOMY TAKEDOWN END ILEOSTOMY/MUCOUS FISTULA POSSIBLE LAPAROTOMY ERAS LOW (N/A)   3 Days Post-Op      Medications:  Continuous Infusions:    Scheduled Meds:   acetaminophen  1,000 mg Oral Q8H    enoxaparin  40 mg Subcutaneous Daily    gabapentin  300 mg Oral TID    ibuprofen  600 mg Oral TID     PRN Meds:   hydrOXYzine    ondansetron    oxyCODONE    oxyCODONE    sodium chloride 0.9%    traMADol        Objective:     Vital Signs (Most Recent):  Temp: 97.7 °F (36.5 °C) (02/15/20 0452)  Pulse: 106 (02/15/20 0750)  Resp: 16 (02/15/20 0452)  BP: 113/69 (02/15/20 0452)  SpO2: 96 % (02/15/20 0452) Vital Signs (24h Range):  Temp:  [97.7 °F (36.5 °C)-98.4 °F (36.9 °C)] 97.7 °F (36.5 °C)  Pulse:  [] 106  Resp:  [16-18] 16  SpO2:  [95 %-100 %] 96 %  BP: ()/(55-74) 113/69     Intake/Output - Last 3 Shifts       02/13 0700 - 02/14 0659 02/14 0700 - 02/15 0659 02/15 0700 - 02/16 0659    P.O. 1615 1075     I.V. (mL/kg)  858.3 (11.4)     IV Piggyback  1000     Total Intake(mL/kg) 1615 (23.4) 2933.3 (39.1)     Urine (mL/kg/hr) 3 (0)      Emesis/NG output 0      Stool 0      Total Output 3      Net +1612 +2933.3            Urine Occurrence 3 x 6 x     Stool Occurrence 4 x 4 x     Emesis Occurrence  0 x         Physical Exam   Constitutional: She is oriented to person, place, and time. She appears well-developed and well-nourished. No distress.   HENT:   Head: Normocephalic and atraumatic.   Cardiovascular: Normal rate.   Pulmonary/Chest: Effort normal. No  respiratory distress.   Abdominal: Soft. She exhibits no distension. There is no tenderness (attp).   Neurological: She is alert and oriented to person, place, and time. No cranial nerve deficit.   Psychiatric: She has a normal mood and affect. Her behavior is normal.        Significant Labs:    BMP (Last 3 Results):   Recent Labs   Lab 02/13/20  0429 02/14/20  0349   GLU 77 73    141   K 3.9 3.7    112*   CO2 20* 23   BUN 6 8   CREATININE 0.8 0.7   CALCIUM 8.2* 7.9*   MG 1.9 1.7     CBC (Last 3 Results):   Recent Labs   Lab 02/13/20  0430 02/14/20  0349   WBC 10.86 5.88   RBC 3.40* 3.26*   HGB 9.8* 9.4*   HCT 31.7* 30.8*    169   MCV 93 95   MCH 28.8 28.8   MCHC 30.9* 30.5*           Assessment/Plan:     * Crohn's disease  POD#3 from Ileostomy takedown.     - LRD   - Stop IVF.   - Oxy PRN for pain.   - Ambulate  - Teddy IS, SCD.   - Home health  - Planing dispo          Mika Mendoza MD  Colorectal Surgery  [facility]

## 2020-02-18 ENCOUNTER — TELEPHONE (OUTPATIENT)
Dept: PHARMACY | Facility: CLINIC | Age: 30
End: 2020-02-18

## 2020-02-18 NOTE — TELEPHONE ENCOUNTER
WILLM to notify the patient we received the prescription for Humira, and to see if she has any active prescription insurance. Sent staff message to send to [employer] program since patient has been approved.

## 2020-02-23 ENCOUNTER — HOSPITAL (OUTPATIENT)
Dept: OTHER | Age: 30
End: 2020-02-23

## 2020-02-23 LAB — HCG POINT OF CARE (5HGRST): NEGATIVE

## 2020-02-23 PROCEDURE — 99284 EMERGENCY DEPT VISIT MOD MDM: CPT | Performed by: EMERGENCY MEDICINE

## 2020-02-24 ENCOUNTER — PATIENT MESSAGE (OUTPATIENT)
Dept: GASTROENTEROLOGY | Facility: CLINIC | Age: 30
End: 2020-02-24

## 2020-02-25 ENCOUNTER — PATIENT MESSAGE (OUTPATIENT)
Dept: SURGERY | Facility: CLINIC | Age: 30
End: 2020-02-25

## 2020-02-26 ENCOUNTER — TELEPHONE (OUTPATIENT)
Dept: SURGERY | Facility: CLINIC | Age: 30
End: 2020-02-26

## 2020-02-26 ENCOUNTER — OFFICE VISIT (OUTPATIENT)
Dept: WOUND CARE | Facility: CLINIC | Age: 30
End: 2020-02-26
Payer: MEDICAID

## 2020-02-26 DIAGNOSIS — T81.89XA NON-HEALING SURGICAL WOUND, INITIAL ENCOUNTER: Primary | ICD-10-CM

## 2020-02-26 PROCEDURE — 99024 PR POST-OP FOLLOW-UP VISIT: ICD-10-PCS | Mod: ,,, | Performed by: CLINICAL NURSE SPECIALIST

## 2020-02-26 PROCEDURE — 99999 PR PBB SHADOW E&M-EST. PATIENT-LVL I: ICD-10-PCS | Mod: PBBFAC,,, | Performed by: CLINICAL NURSE SPECIALIST

## 2020-02-26 PROCEDURE — 99024 POSTOP FOLLOW-UP VISIT: CPT | Mod: ,,, | Performed by: CLINICAL NURSE SPECIALIST

## 2020-02-26 PROCEDURE — 99211 OFF/OP EST MAY X REQ PHY/QHP: CPT | Mod: PBBFAC | Performed by: CLINICAL NURSE SPECIALIST

## 2020-02-26 PROCEDURE — 99999 PR PBB SHADOW E&M-EST. PATIENT-LVL I: CPT | Mod: PBBFAC,,, | Performed by: CLINICAL NURSE SPECIALIST

## 2020-02-26 NOTE — PROGRESS NOTES
Lora came today for me to look at her wound from previous ileostomy site , she noted drainage had a smell but I also suspect she wore dressing bit long and was afraid to get it wet ,     There is some yellow slough in base but no drainage of suspect,  I cleansed well and applied  IODOFLEX on wound and covered with guaze  Pt has verbalized no allergy to iodine or seafood even though she was when younger, she can and has eaten seafood, shrimp, shellfish in past few years with no issue.  I told her if she has any itching or throat discomfort to remove the dressing asap , she says she understands.

## 2020-02-26 NOTE — TELEPHONE ENCOUNTER
----- Message from Mey Mcmillan sent at 2/26/2020 10:38 AM CST -----  Christina--- can you give her a call back.  She sorry she didn't hear her phone.  Call back # [phone]

## 2020-02-27 DIAGNOSIS — K50.80 CROHN'S DISEASE OF BOTH SMALL AND LARGE INTESTINE WITHOUT COMPLICATION: Primary | ICD-10-CM

## 2020-02-27 LAB — FINAL PATHOLOGIC DIAGNOSIS: NORMAL

## 2020-02-27 RX ORDER — ADALIMUMAB 40MG/0.8ML
40 KIT SUBCUTANEOUS
Qty: 4 PEN | Refills: 11 | Status: SHIPPED | OUTPATIENT
Start: 2020-02-27 | End: 2020-12-04 | Stop reason: SDUPTHER

## 2020-02-27 NOTE — TELEPHONE ENCOUNTER
Called and pspoke to [facility] rx was cancel   [facility]   new prescription should be sent to [facility]

## 2020-02-27 NOTE — TELEPHONE ENCOUNTER
----- Message from Avani Cheek MA sent at 2/24/2020  1:48 PM CST -----  Check with [facility] on reza Scott

## 2020-03-02 ENCOUNTER — OFFICE VISIT (OUTPATIENT)
Dept: SURGERY | Facility: CLINIC | Age: 30
End: 2020-03-02
Payer: MEDICAID

## 2020-03-02 VITALS
BODY MASS INDEX: 27.87 KG/M2 | DIASTOLIC BLOOD PRESSURE: 71 MMHG | SYSTOLIC BLOOD PRESSURE: 118 MMHG | WEIGHT: 151.44 LBS | HEART RATE: 98 BPM | HEIGHT: 62 IN

## 2020-03-02 DIAGNOSIS — K50.812 CROHN'S DISEASE OF BOTH SMALL AND LARGE INTESTINE WITH INTESTINAL OBSTRUCTION: Primary | ICD-10-CM

## 2020-03-02 PROCEDURE — 99024 PR POST-OP FOLLOW-UP VISIT: ICD-10-PCS | Mod: ,,, | Performed by: COLON & RECTAL SURGERY

## 2020-03-02 PROCEDURE — 99024 POSTOP FOLLOW-UP VISIT: CPT | Mod: ,,, | Performed by: COLON & RECTAL SURGERY

## 2020-03-02 PROCEDURE — 99213 OFFICE O/P EST LOW 20 MIN: CPT | Mod: PBBFAC | Performed by: COLON & RECTAL SURGERY

## 2020-03-02 PROCEDURE — 99999 PR PBB SHADOW E&M-EST. PATIENT-LVL III: CPT | Mod: PBBFAC,,, | Performed by: COLON & RECTAL SURGERY

## 2020-03-02 PROCEDURE — 99999 PR PBB SHADOW E&M-EST. PATIENT-LVL III: ICD-10-PCS | Mod: PBBFAC,,, | Performed by: COLON & RECTAL SURGERY

## 2020-03-02 NOTE — PROGRESS NOTES
CRS Post-operative visit    Visit Info:     Procedure:   End ileostomy/mucous fistula takedown with ileocolic anastomosis    Date of Procedure: February 12, 2020    Indication:  29-year-old female with a history of Crohn's disease who had previously undergone an ileocecal resection for refractory disease.  At the time of that surgery she was markedly hypoalbuminemic and on corticosteroids, so it was elected not to perform an anastomosis at that time.  Instead she had an end ileostomy created and the proximal end of the ascending colon was brought up through the same trephination as a mucous fistula.  She presented for elective takedown of her ileostomy and mucous fistula.    Date of Discharge: February 15, 2020    Current Status:  Overall doing well postop.  She is having approximately 4 loose bowel movements today, particularly after she eats.  No nausea or vomiting.  No abdominal pain.  No fevers or chills.  No drainage from her incision.    Pathology:   A.  PERISTOMA GRANULATION TISSUE, REMOVAL:    - Benign skin with acute and chronic inflammation, acanthosis and cicatrix,   consistent with peristomal granulation tissue.     B.  ASCENDING COLON, SEGMENTAL RESECTION:    - Benign colonic mucosa with lymphoid aggregates and a lamina propria   expanded by acute and chronic inflammatory infiltrate with superficial   erosion and focal active colitis, consistent with clinical history of Crohn's   disease.    -Negative for dysplasia.     C.  TERMINAL ILEUM, SEGMENTAL RESECTION:    - Benign terminal ileal parenchyma with focal granulomatous inflammation in   a background of lymphoid aggregates and superficial erosion, consistent with   clinical history of Crohn's disease.     Physical Exam:  General: Black or  female in NAD   Neuro: aaox4   Respiratory: resps even unlabored  Abdomen: soft, NT/NT, RLQ ileostomy wound healing well.  Extremities: Warm dry and intact    Assessment:  Crohn's disease, s/p prior  ileocecal resection with diverting loop ileostomy  Now s/p diverting loop ileostomy closure, doing well    Plan:  Diet as tolerated, increase fiber intake.  Gradually increase activity as tolerated, OK to RTW 4 weeks postop (3/16/20)  Continue Humira per GI - she resumed this on 02/28.    Uday Cope MD, FACS, FASCRS  Senior Staff Surgeon  Department of Colon & Rectal Surgery     This note was created using voice recognition software, and may contain some unrecognized transcriptional errors.

## 2020-03-02 NOTE — LETTER
March 2, 2020      [facility]  [address]  [facility] [address]  Phone: [phone]       Patient: oLra Scott   YOB: 1990  Date of Visit: 03/02/2020    To Whom It May Concern:    Ziyad Scott  was at [facility] on 03/02/2020. She may return to work on March 16, 2020 with no restrictions. If you have any questions or concerns, or if I can be of further assistance, please do not hesitate to contact me.    Sincerely,    Uday Cope MD

## 2020-03-05 NOTE — ANESTHESIA PROCEDURE NOTES
Intubation  Performed by: Antoine Carrillo MD  Authorized by: Rizwan Romeo MD     Intubation:     Induction:  Intravenous    Intubated:  Postinduction    Mask Ventilation:  Easy mask    Attempts:  1    Attempted By:  Resident anesthesiologist    Blade:  Cummings 2    Laryngeal View Grade: Grade I - full view of chords      Difficult Airway Encountered?: No      Complications:  None    Airway Device:  Oral endotracheal tube    Airway Device Size:  7.0    Style/Cuff Inflation:  Cuffed (inflated to minimal occlusive pressure)    Inflation Amount (mL):  7    Tube secured:  21    Secured at:  The lips    Placement Verified By:  Capnometry and Revisualization with laryngoscopy    Complicating Factors:  None    Findings Post-Intubation:  BS equal bilateral and atraumatic/condition of teeth unchanged        
<<----- Click to add NO significant Past Surgical History

## 2020-03-12 ENCOUNTER — TELEPHONE (OUTPATIENT)
Dept: GASTROENTEROLOGY | Facility: CLINIC | Age: 30
End: 2020-03-12

## 2020-03-12 ENCOUNTER — OFFICE VISIT (OUTPATIENT)
Dept: GASTROENTEROLOGY | Facility: CLINIC | Age: 30
End: 2020-03-12
Payer: MEDICAID

## 2020-03-12 ENCOUNTER — LAB VISIT (OUTPATIENT)
Dept: LAB | Facility: HOSPITAL | Age: 30
End: 2020-03-12
Attending: INTERNAL MEDICINE
Payer: MEDICAID

## 2020-03-12 VITALS
BODY MASS INDEX: 28.16 KG/M2 | HEIGHT: 62 IN | DIASTOLIC BLOOD PRESSURE: 79 MMHG | WEIGHT: 153 LBS | SYSTOLIC BLOOD PRESSURE: 113 MMHG | OXYGEN SATURATION: 100 % | TEMPERATURE: 98 F | HEART RATE: 80 BPM

## 2020-03-12 DIAGNOSIS — D84.9 IMMUNOSUPPRESSED STATUS: ICD-10-CM

## 2020-03-12 DIAGNOSIS — K50.80 CROHN'S DISEASE OF BOTH SMALL AND LARGE INTESTINE WITHOUT COMPLICATION: Primary | ICD-10-CM

## 2020-03-12 DIAGNOSIS — E53.8 VITAMIN B12 DEFICIENCY: ICD-10-CM

## 2020-03-12 DIAGNOSIS — L40.9 PSORIASIS: ICD-10-CM

## 2020-03-12 DIAGNOSIS — E55.9 VITAMIN D DEFICIENCY: ICD-10-CM

## 2020-03-12 DIAGNOSIS — K50.80 CROHN'S DISEASE OF BOTH SMALL AND LARGE INTESTINE WITHOUT COMPLICATION: ICD-10-CM

## 2020-03-12 LAB
ALBUMIN SERPL BCP-MCNC: 3.8 G/DL (ref 3.5–5.2)
ALP SERPL-CCNC: 125 U/L (ref 55–135)
ALT SERPL W/O P-5'-P-CCNC: 20 U/L (ref 10–44)
ANION GAP SERPL CALC-SCNC: 7 MMOL/L (ref 8–16)
AST SERPL-CCNC: 18 U/L (ref 10–40)
BASOPHILS # BLD AUTO: 0.01 K/UL (ref 0–0.2)
BASOPHILS NFR BLD: 0.2 % (ref 0–1.9)
BILIRUB SERPL-MCNC: 0.7 MG/DL (ref 0.1–1)
BUN SERPL-MCNC: 7 MG/DL (ref 6–20)
CALCIUM SERPL-MCNC: 9.1 MG/DL (ref 8.7–10.5)
CHLORIDE SERPL-SCNC: 108 MMOL/L (ref 95–110)
CO2 SERPL-SCNC: 24 MMOL/L (ref 23–29)
CREAT SERPL-MCNC: 0.7 MG/DL (ref 0.5–1.4)
CRP SERPL-MCNC: 1.2 MG/L (ref 0–8.2)
DIFFERENTIAL METHOD: ABNORMAL
EOSINOPHIL # BLD AUTO: 0.1 K/UL (ref 0–0.5)
EOSINOPHIL NFR BLD: 1.4 % (ref 0–8)
ERYTHROCYTE [DISTWIDTH] IN BLOOD BY AUTOMATED COUNT: 15.4 % (ref 11.5–14.5)
EST. GFR  (AFRICAN AMERICAN): >60 ML/MIN/1.73 M^2
EST. GFR  (NON AFRICAN AMERICAN): >60 ML/MIN/1.73 M^2
GLUCOSE SERPL-MCNC: 74 MG/DL (ref 70–110)
HCT VFR BLD AUTO: 39.3 % (ref 37–48.5)
HGB BLD-MCNC: 11.7 G/DL (ref 12–16)
IMM GRANULOCYTES # BLD AUTO: 0.01 K/UL (ref 0–0.04)
IMM GRANULOCYTES NFR BLD AUTO: 0.2 % (ref 0–0.5)
LYMPHOCYTES # BLD AUTO: 1.4 K/UL (ref 1–4.8)
LYMPHOCYTES NFR BLD: 21.9 % (ref 18–48)
MCH RBC QN AUTO: 28.5 PG (ref 27–31)
MCHC RBC AUTO-ENTMCNC: 29.8 G/DL (ref 32–36)
MCV RBC AUTO: 96 FL (ref 82–98)
MONOCYTES # BLD AUTO: 0.3 K/UL (ref 0.3–1)
MONOCYTES NFR BLD: 5 % (ref 4–15)
NEUTROPHILS # BLD AUTO: 4.4 K/UL (ref 1.8–7.7)
NEUTROPHILS NFR BLD: 71.3 % (ref 38–73)
NRBC BLD-RTO: 0 /100 WBC
PLATELET # BLD AUTO: 208 K/UL (ref 150–350)
PMV BLD AUTO: 10.6 FL (ref 9.2–12.9)
POTASSIUM SERPL-SCNC: 3.9 MMOL/L (ref 3.5–5.1)
PROT SERPL-MCNC: 8.1 G/DL (ref 6–8.4)
RBC # BLD AUTO: 4.1 M/UL (ref 4–5.4)
SODIUM SERPL-SCNC: 139 MMOL/L (ref 136–145)
WBC # BLD AUTO: 6.21 K/UL (ref 3.9–12.7)

## 2020-03-12 PROCEDURE — 85025 COMPLETE CBC W/AUTO DIFF WBC: CPT

## 2020-03-12 PROCEDURE — 36415 COLL VENOUS BLD VENIPUNCTURE: CPT

## 2020-03-12 PROCEDURE — 99215 PR OFFICE/OUTPT VISIT, EST, LEVL V, 40-54 MIN: ICD-10-PCS | Mod: S$PBB,,, | Performed by: INTERNAL MEDICINE

## 2020-03-12 PROCEDURE — 80053 COMPREHEN METABOLIC PANEL: CPT

## 2020-03-12 PROCEDURE — 99999 PR PBB SHADOW E&M-EST. PATIENT-LVL IV: CPT | Mod: PBBFAC,,, | Performed by: INTERNAL MEDICINE

## 2020-03-12 PROCEDURE — 86140 C-REACTIVE PROTEIN: CPT

## 2020-03-12 PROCEDURE — 99214 OFFICE O/P EST MOD 30 MIN: CPT | Mod: PBBFAC | Performed by: INTERNAL MEDICINE

## 2020-03-12 PROCEDURE — 99999 PR PBB SHADOW E&M-EST. PATIENT-LVL IV: ICD-10-PCS | Mod: PBBFAC,,, | Performed by: INTERNAL MEDICINE

## 2020-03-12 PROCEDURE — 99215 OFFICE O/P EST HI 40 MIN: CPT | Mod: S$PBB,,, | Performed by: INTERNAL MEDICINE

## 2020-03-12 RX ORDER — CYANOCOBALAMIN 1000 UG/ML
1000 INJECTION, SOLUTION INTRAMUSCULAR; SUBCUTANEOUS
Qty: 3 ML | Refills: 3 | Status: SHIPPED | OUTPATIENT
Start: 2020-03-12 | End: 2020-06-10

## 2020-03-12 RX ORDER — ERGOCALCIFEROL 1.25 MG/1
50000 CAPSULE ORAL
Qty: 12 CAPSULE | Refills: 1 | Status: SHIPPED | OUTPATIENT
Start: 2020-03-12 | End: 2020-05-29

## 2020-03-12 NOTE — TELEPHONE ENCOUNTER
Patient was informed and verbalize understanding  [facility] pharmacy is  Out of regula flu shot  Pt will try to get flu shot at [facility]

## 2020-03-12 NOTE — TELEPHONE ENCOUNTER
----- Message from Suzi Bacon MD sent at 3/12/2020  1:18 PM CDT -----  Call and let her know labs are normal.  Ask her if she got her flu shot at the pharmacy downstairs that we recommended    SS  ----- Message -----  From: [facility]  Sent: 3/12/2020  12:02 PM CDT  To: Suzi Bacon MD

## 2020-03-12 NOTE — PROGRESS NOTES
"     [facility]             Inflammatory Bowel Disease Follow-up  Note              TODAY'S VISIT DATE:  3/12/2020    Chief Complaint:   Chief Complaint   Patient presents with    Crohn's Disease     PCP: Margarita Jay    Previous History:  Lora Scott is a 30 y.o. female with Crohn's disease (focal area of microscopic inflammation in ascending colon on biopsies, ileal with stricture).  She has a past medical history of GERD, anemia, asthma, ex-smoker (quit 9/2019).  Patient reports being diagnosed with IBS with alternating diarrhea and constipation and weight loss as a teenager that was treated with Nexium which helped.  In 8/2018 she again had abd pain, weight loss, and alternating BMs so she contacted her PCP for Nexium which did not help.  By 2/2019, her symptoms progressed prompting an ER visit and on 2/18/2019 she had a CT abd/pelvis which showed an "abnormal wall thickening involving the ascending colon, cecum and terminal ileum with intense mucosal enhancement and luminal narrowing resulting in distention of proximal small bowel.  There was also an ileal-ileal and ileocolic fistulization, fat hypertrophy, increased mesenteric vascularitiy, and reactive mesenteric adenitis in the RLQ with no abscess. "  Patient had an inpatient EGD on 2/20/2019 which showed minimal chronic H. Pylori negative inflammation, normal esophagus, and duodenal biopsies showed mild villous blunting and mild increased IELs.  Colonoscopy on the same day showed congested mucosa in the TI and ICV with granularity in the TI, and TI stricture 5 cm from the ICV with ileal biopsies confirmed ileitis and biopsies of the right colon confirmed focal moderate active colitis with normal left colon and rectum, sigmoid diverticulitis, and internal hemorrhoids.  It was felt that patient would eventually need surgical intervention but needed nutritional optimization initially so she was discharged home with 12 hours of TPN " daily via a PICC and bowel rest with plans for an MRE, surgical f/u, and a referral to the IBD clinic to discuss medical options.  She was last seen by me 3/2019 for initial IBD visit at which time she was on no meds and there was plan for MRE 3/13/19 with f/u with Dr. Ruiz. MRE on 3/13/19 showed multifocal active inflammation of the distal ileum with mild fibrostenotic component and entero-enteric fistula.  She then was transferred to Dr. Cope due to availability and he initially saw her in clinic and a decision to proceed with surgery was made.  On 5/10/19 she underwent an open ileocolonic resection (10 cm of cecum, 42 cm of ileum removed) with end ileostomy with mucous fistula. Surgical pathology showed TI/cecum with chronic active ileitis with stricture and features of fistulization, transmural inflammation with reactive LNs. Postoperatively she did well and gained weight and would pass mucous from below a few times only. She emptied her stoma about 4-6 times/d and was having issues getting supplies due to insurance issues.  Patient has had multiple issues with stoma appliance/bag and due to inability to afford stoma supplies and its not covered by insurance, she has been getting samples from our wound/ostomy nurse.  Patient stopped smoking in mid September 2019.  She started on methotrexate 12.5 mg p.o. weekly though due to elevated liver function test discontinued on 10/4/2019.  She started Humira on 10/24/2019 and Imuran 50 mg daily on 10/25/2019.     Interval History:  - current IBD meds: Humira 40 mg SC every 2 weeks (started 10/24/20, held starting 1/31/20, resumed after surgery on 2/28/20 which was last dose, due 3/13/20)  - 10/25/20 was supposed to start imuran but never started  - pt had elective takedown of her ileostomy and mucous fistula on 2/15/20 (surgical path- peristoma granulation tissue with acute and chronic inflammation, ascending colon with acute and chronic inflammatory infiltrate with  superficial erosion and focal active colitis, TI with focal granulomatous inflammation and superficial erosion); postop did well  - 4 soft BMs/day  - smoking:  Not restarted   - 19- ileoscopy through stoma: The 30 cm of examined through the mucous fistula is normal. The peristomal area has granular friable mucosa and the lorena-terminal ileum appeared normal. Biopsies of colon through mucus fistula- normal colon, biopsies of neoterminal ileum with minimal active inflammation  - 1/15/20 humira levels 3.5/neg Abs, pt approved for weekly humira but not started yet  - started approval process for weekly humira and has been approved through [employer] pt assistance program  - psoriasis- almost completely resolved, elbows, lower extremities, trunk/back  - joint pain-inactive  - NSAID use: No  - Narcotic use: No  - Alternative/complementary meds for IBD: No    Prior Pertinent Surgeries:   5/10/19 open ileocolonic resection (10 cm of cecum, 42 cm of ileum removed) with end ileostomy with mucous fistula. Surgical pathology showed TI/cecum with chronic active ileitis with stricture and features of fistulization, transmural inflammation with reactive LNs  2/15/20 elective takedown of her ileostomy and mucous fistula;  (surgical path- peristoma granulation tissue with acute and chronic inflammation, ascending colon with acute and chronic inflammatory infiltrate with superficial erosion and focal active colitis, TI with focal granulomatous inflammation and superficial erosion)    Pertinent Endoscopy/Imagin2019 CT abd/pelvis: abnormal wall thickening involving the ascending colon, cecum, and terminal ileum with intense mucosal enhancement and luminal narrowing resulting in distention of proximal small bowel; ileal-ileal and ileocolic fistulization, fat hypertropy, and increased mesenteric vascularity; reactive mesenteric adenitis in the RLQ; no abscess; appendix not seen; associated partial SBO  2019 Xray abdomen:  gas-filled large and small bowel loops without significant dilation; stool projects over the rectum; no calcifications to suggest nephrolithiasis; lower lung zones are grossly clear; no acute osseous abnormality   2/20/2019 CXR: abnormal portion of left-sided PICC line with the tip coiled within the left brachiocephalic vein; repositioning suggested   2/20/2019 EGD: normal examined duodenum (path: mild villous blunting and mild increased intraepithelial lymphocytes); normal stomach (path: minimal chronic H. pylori negative inflammation); normal esophagus  2/20/2019 Colonoscopy: congested mucosa in the terminal ileum and ICV; granularity in the terminal ileum; stricture in the terminal ileum 5 cm from the ICV (path: chronic ileitis); entire examined colon (path-right colon: focal moderate active colitis) (path-left colon, rectum: normal); diverticulosis in the sigmoid colon; non-bleeding internal hemorrhoids   3/13/19 MRE:  multifocal active inflammation of the distal ileum with mild fibrostenotic component and entero-enteric fistula  12/20/19- ileoscopy through stoma: The 30 cm of examined through the mucous fistula is normal. The peristomal area has granular friable mucosa and the lorena-terminal ileum appeared normal. Biopsies of colon through mucus fistula- normal colon, biopsies of neoterminal ileum with minimal active inflammation    Pertinent Labs:  Lab Results   Component Value Date    SEDRATE 60 (H) 03/11/2019    CRP 29.6 (H) 02/15/2020     Lab Results   Component Value Date    TTGIGA 7 03/11/2019     (H) 03/11/2019     Lab Results   Component Value Date    TSH 0.713 08/19/2015     Lab Results   Component Value Date    BVJOJRFY92KL 12 (L) 09/04/2019    XUAOJBDU13 216 09/04/2019     Lab Results   Component Value Date    HEPBSAG Negative 09/04/2019    HEPBCAB Negative 09/04/2019    HEPCAB Negative 02/19/2019     Lab Results   Component Value Date    YCZ51KVID Negative 02/19/2019     Lab Results   Component  Value Date    NIL 0.020 09/04/2019    MITOGENNIL >10.000 09/04/2019     Lab Results   Component Value Date    TPTMINTERP SEE BELOW 02/19/2019     Lab Results   Component Value Date    STOOLCULTURE  08/19/2015     No Salmonella,Shigella,Vibrio,Campylobacter,Yersinia isolated.    UTRGCTBCIH4K Negative 08/19/2015    HIUWSMJWAT8M Negative 08/19/2015     No results found for: CALPROTECTIN    Therapeutic Drug Monitoring Labs:  1/15/20 humira levels 3.5/neg Abs on humira 40 mg SC every 2 weeks    Prior IBD Therapies:  MTX 12.5 mg po weekly- stopped after a few weeks due to elevated LFTs 10/2019    Vaccinations:  Lab Results   Component Value Date    HEPBSAB Grayzone (A) 02/19/2019     Lab Results   Component Value Date    HEPAIGG Positive (A) 02/19/2019     Lab Results   Component Value Date    VARICELLAZOS 2.58 (H) 03/11/2019    VARICELLAINT Positive (A) 03/11/2019     Immunization History   Administered Date(s) Administered    DTP 1991, 03/18/1992, 03/18/1992, 01/12/1994    HIB 1991, 02/24/1992, 03/18/1992    Hepatitis B (recombinant) Adjuvanted, 2 dose 04/17/2019, 05/20/2019    Hepatitis B, Pediatric/Adolescent 07/24/2006    IPV 07/24/2006    MMR 01/13/1994, 07/24/2006    Meningococcal Conjugate (MCV4P) 07/24/2006    OPV 1991, 02/24/1992, 01/12/1994    Pneumococcal Conjugate - 13 Valent 04/17/2019    Pneumococcal Polysaccharide - 23 Valent 06/20/2019    Td (ADULT) 07/24/2006    Td (Adult), Unspecified Formulation 07/24/2006    Tdap 04/17/2019     Influenza (inactive):  Yearly- will get today  Pneumococcal PCV 13: 4/17/19  Pneumococcal PCV 23: 6/20/19  Tetanus (TdaP): 7/24/06  HPV (males and females ages 19-25 yo):    Not sure  Meningococcal: 7/24/06  Hepatitis B:  7/24/06, 4/17/19, 5/20/19, grayzone with immunity  Hepatitis A:  immune  MMR (live vaccine):  1/13/94, 7/24/06      Chickenpox status/Varicella (live vaccine): immune  Shingrix: recommended, defer due to cost    Review of Systems  "  Constitutional: Negative for chills, fever and weight loss.   HENT:        No oral ulcers, dysphagia, oral thrush   Eyes: Negative for blurred vision, pain and redness.   Respiratory: Negative for cough and shortness of breath.    Cardiovascular: Negative for chest pain.   Gastrointestinal: Negative for abdominal pain, heartburn, nausea and vomiting.   Genitourinary: Negative for dysuria and hematuria.   Musculoskeletal: Negative for back pain and joint pain.   Skin: Negative for rash.   Psychiatric/Behavioral: Negative for depression. The patient is not nervous/anxious and does not have insomnia.      All Medical History/Surgical History/Family History/Social History/Allergies have been reviewed and updated in EMR    Review of patient's allergies indicates:   Allergen Reactions    Horse/equine containing products Itching     Outpatient Medications Marked as Taking for the 3/12/20 encounter (Office Visit) with Suzi Bacon MD   Medication Sig Dispense Refill    adalimumab (HUMIRA PEN) 40 mg/0.8 mL PnKt Inject 1 pen (40 mg total) into the skin every 7 days. (Patient taking differently: Inject 40 mg into the skin every 14 (fourteen) days. ) 4 pen 11    triamcinolone acetonide 0.1% (KENALOG) 0.1 % cream Apply topically 2 (two) times daily. To rashes 454 g 3     Vital Signs:  /79 (BP Location: Left arm, Patient Position: Sitting)   Pulse 80   Temp 97.7 °F (36.5 °C)   Ht 5' 2" (1.575 m)   Wt 69.4 kg (153 lb)   LMP 03/09/2020   SpO2 100%   BMI 27.98 kg/m²     Physical Exam   Constitutional: She is oriented to person, place, and time. She appears well-developed.   HENT:   Mouth/Throat: Oropharynx is clear and moist. No oral lesions.   No oral ulcers or thrush   Eyes: Pupils are equal, round, and reactive to light. Conjunctivae are normal.   Cardiovascular: Normal rate and regular rhythm.   Pulmonary/Chest: Effort normal and breath sounds normal.   Abdominal: Soft. There is no tenderness. "   Musculoskeletal:        Right lower leg: She exhibits no swelling.        Left lower leg: She exhibits no swelling.   Neurological: She is alert and oriented to person, place, and time.   Skin: Rash noted.   Dark hyperpigmented plaques on legs, abdomen- improved   Psychiatric: She has a normal mood and affect.   Nursing note and vitals reviewed.       Labs:   Lab Results   Component Value Date    WBC 5.88 02/14/2020    HGB 9.4 (L) 02/14/2020    HCT 30.8 (L) 02/14/2020    MCV 95 02/14/2020     02/14/2020     Lab Results   Component Value Date    CREATININE 0.7 02/14/2020    ALBUMIN 2.7 (L) 02/14/2020    BILITOT 0.4 02/14/2020    ALKPHOS 87 02/14/2020    AST 21 02/14/2020    ALT 26 02/14/2020     Lab Results   Component Value Date    NIL 0.020 09/04/2019    MITOGENNIL >10.000 09/04/2019       Assessment/Plan:  Lora Scott is a 30 y.o. female with Crohn's disease (ileum, S/P ileocecal resection-42 cm of ileum/10.5 cm cecum removed with surgical path c/w stricture and fistula, 2/15/20 ileostomy takedown and mucus fistula takedown), psoriasias (failed light treatment, followed by Dr. Martinez), ex-smoker (quit 9/2019) who since last visit started Humira and was supposed to start Imuran with this though never started.  She had Humira drug levels done which were low at 3.5 and has been approved for weekly Humira and therefore I will proceed with this instead of adding Imuran also due to patient's noncompliance.  Her psoriasis has improved.  Starting on 3/13/2020 she will start weekly Humira.    # Crohn's disease (ileum, S/P ileocecal resection-42 cm of ileum/10.5 cm cecum removed with surgical path c/w stricture and fistula, 2/15/20 ileostomy takedown and mucus fistula takedown)  - increase humira 40 mg SC weekly- pt set reminders on phone, will take every Friday  - colonoscopy 8/2020  - vitamin B12 deficiency (9/2019 vit B12 216)- start vit B12 shots once a month- pt set reminders  - ex-smoker: quit smoking 9/2019,  pt is getting second hand smoke and told to advise significant other to not smoke around her  - psoriasis:  Improved though still with some lesions, has not had f/u with Dr. 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 Martinez  Pap smear yearly once on chronic immunosuppression- last one 8/2015  Vitamin D deficiency (3/2019 vit D 14)- high dose vit D3 prescribed but pt not taking, new script sent and told pt to start  Vaccines: no live vaccines, will get flu shot today    Had a long discussion with patient again regarding compliance and keeping track of her medical care to allow her the best outcomes for her Crohn's disease.     Follow up: 3 months    Total visit time was 40 minutes, more than 50% of which was spent in face-to-face counseling with patient regarding evaluation and management goals and treatment options for Crohn's disease     Suzi Bacon MD  Department of Gastroenterology  Medical Director, Inflammatory Bowel Disease

## 2020-03-12 NOTE — PATIENT INSTRUCTIONS
Instructions:  - labs today  - help pt reset phone to do humira injections every Friday  - VITAMIN B12- start B12 injections from pharmacy and she needs to take once a month- set her phone for reminder  - call [phone] and get them to send you humira 4 pens for a month's worth, we are increasing your humira to 40 mg every week due to low levels  - VITAMIN D:  start vit D high dose 1 pill once a week- prescription sent to your pharmacy  - pap smear- referral to GYN- please schedule pt for appt  - go downstairs to the pharmacy and get your flu shot please  - have her f/u with Dr. Nano Martinez dermatology- she saw her before  - give pt # for primary care to establish  - follow up in 3 mos

## 2020-03-19 NOTE — TELEPHONE ENCOUNTER
----- Message from Suzi Bacon MD sent at 1/8/2020 11:46 AM CST -----  IBD team:  Patient needs to get additional refills from [facility] for her Humira. Refills are on the prescription. Please have her go  her meds from our pharmacy asap. Next dose is due 1/17/20 then 1/31/20. Please let her know not to take her dose on 2/14/20 due to surgery and then we will resume after surgery if no issues (Dr. Cope to clear her) to resume on 2/28/20.      Lesley- see above recommendations    SS  ----- Message -----  From: Lesley Parks RN  Sent: 1/8/2020  11:30 AM CST  To: Lesley Parks RN, Suzi Bacon MD, #    Patient is scheduled for closure, ileostomy takedown end ileostomy/mucosa fistula possible laparotomy on 2/12 with Dr. Cope( approximately 160 minutes of general anesthesia). I will need Humira instructions. Patient told me she is currently out of this medication. Her last dose was 1/3 pre patient. Please advise.  Thanks!       Called [phone], spoke with Conrado Oppenheim, daughter, he has one. They bought it already. Patient states no future questions or concerns at this time.

## 2020-03-25 ENCOUNTER — TELEPHONE (OUTPATIENT)
Dept: OBSTETRICS AND GYNECOLOGY | Facility: CLINIC | Age: 30
End: 2020-03-25

## 2020-03-31 ENCOUNTER — PATIENT MESSAGE (OUTPATIENT)
Dept: GASTROENTEROLOGY | Facility: CLINIC | Age: 30
End: 2020-03-31

## 2020-05-05 ENCOUNTER — PATIENT MESSAGE (OUTPATIENT)
Dept: GASTROENTEROLOGY | Facility: CLINIC | Age: 30
End: 2020-05-05

## 2020-05-05 NOTE — TELEPHONE ENCOUNTER
Called pt, no answer, lm explaining I was calling in reference to her portal.  I called twice and it has gone straight to voicemail.  Left the main line with my temp ext [phone]    Portal message sent as well

## 2020-05-05 NOTE — TELEPHONE ENCOUNTER
Pt returned call and was transferred   She stated due to her Crohn's and surgeries she was not having menstrual cycles.    Had a cycle in Feb and her last on was 03/16/2020 and has not had one since  This concerns her because the last time this happened she got real sick   I asked if she was sexually active and if pregnancy could be a possibility and she stated yes but she took a preg test very end of April and it was Negative     She has been on Humira weekly and has not been delayed/missed any doses  Her dose is due Friday 05/08 but [facility] stated they can not get it to her any faster than Tues 05/12.    It has been shipped and she would like to know how to proceed with her Humira dosing since she will have a 4 day delay    She stated she was unable to start the high dose Vit D and the Vit B12 because she was unable to afford it.  She now has the money and would like for us to sed in a script.  Explained she has an active script at [facility] so all she has to do is call them and they should be able to fill it      Stated I will discuss with Dr Bacon and I will be in touch tomorrow if she does not hear back from me today

## 2020-05-08 NOTE — TELEPHONE ENCOUNTER
Called and spoke with pt  Explained Dr Bacon does not feel the cycle issues are related to her IBD so she needs to reach out to her GYN for this     Asked pt how many pens she will be receiving on Tues and she said 4  I told her take 1 Tuesday when they come in and then another one on Friday to keep her on track  Pt expressed understanding and thanked me for calling

## 2020-06-15 ENCOUNTER — OFFICE VISIT (OUTPATIENT)
Dept: GASTROENTEROLOGY | Facility: CLINIC | Age: 30
End: 2020-06-15
Payer: MEDICAID

## 2020-06-15 DIAGNOSIS — D84.9 IMMUNOSUPPRESSED STATUS: ICD-10-CM

## 2020-06-15 DIAGNOSIS — K50.80 CROHN'S DISEASE OF BOTH SMALL AND LARGE INTESTINE WITHOUT COMPLICATION: Primary | ICD-10-CM

## 2020-06-15 PROCEDURE — 99214 PR OFFICE/OUTPT VISIT, EST, LEVL IV, 30-39 MIN: ICD-10-PCS | Mod: 95,,, | Performed by: INTERNAL MEDICINE

## 2020-06-15 PROCEDURE — 99214 OFFICE O/P EST MOD 30 MIN: CPT | Mod: 95,,, | Performed by: INTERNAL MEDICINE

## 2020-06-15 RX ORDER — ERGOCALCIFEROL 1.25 MG/1
50000 CAPSULE ORAL
COMMUNITY
End: 2020-11-02

## 2020-06-15 NOTE — PATIENT INSTRUCTIONS
Instructions:  - colonoscopy 8/2020  - labs 9/2020- needs to be on a Thursday so she can get her humira levels/abs done- try to have this done 2 weeks before her f/u visit with me  - establish -[id number]  - schedule pt for gyn visit for pap smear  - f/u first week of 10/2020- in person

## 2020-06-15 NOTE — PROGRESS NOTES
"     [facility]             Inflammatory Bowel Disease Telemedicine Follow-up  Note              TODAY'S VISIT DATE:  6/15/2020    Chief Complaint:   Chief Complaint   Patient presents with    Crohn's Disease     PCP: Margarita Jay    Previous History:  Lora Scott is a 30 y.o. female with Crohn's disease (focal area of microscopic inflammation in ascending colon on biopsies, ileal with stricture).  She has a past medical history of GERD, anemia, asthma, ex-smoker (quit 9/2019).  Patient reports being diagnosed with IBS with alternating diarrhea and constipation and weight loss as a teenager that was treated with Nexium which helped.  In 8/2018 she again had abd pain, weight loss, and alternating BMs so she contacted her PCP for Nexium which did not help.  By 2/2019, her symptoms progressed prompting an ER visit and on 2/18/2019 she had a CT abd/pelvis which showed an "abnormal wall thickening involving the ascending colon, cecum and terminal ileum with intense mucosal enhancement and luminal narrowing resulting in distention of proximal small bowel.  There was also an ileal-ileal and ileocolic fistulization, fat hypertrophy, increased mesenteric vascularitiy, and reactive mesenteric adenitis in the RLQ with no abscess. "  Patient had an inpatient EGD on 2/20/2019 which showed minimal chronic H. Pylori negative inflammation, normal esophagus, and duodenal biopsies showed mild villous blunting and mild increased IELs.  Colonoscopy on the same day showed congested mucosa in the TI and ICV with granularity in the TI, and TI stricture 5 cm from the ICV with ileal biopsies confirmed ileitis and biopsies of the right colon confirmed focal moderate active colitis with normal left colon and rectum, sigmoid diverticulitis, and internal hemorrhoids.  It was felt that patient would eventually need surgical intervention but needed nutritional optimization initially so she was discharged home with 12 " hours of TPN daily via a PICC and bowel rest with plans for an MRE, surgical f/u, and a referral to the IBD clinic to discuss medical options.  She was last seen by me 3/2019 for initial IBD visit at which time she was on no meds and there was plan for MRE 3/13/19 with f/u with Dr. Ruiz. MRE on 3/13/19 showed multifocal active inflammation of the distal ileum with mild fibrostenotic component and entero-enteric fistula.  She then was transferred to Dr. Cope due to availability and he initially saw her in clinic and a decision to proceed with surgery was made.  On 5/10/19 she underwent an open ileocolonic resection (10 cm of cecum, 42 cm of ileum removed) with end ileostomy with mucous fistula. Surgical pathology showed TI/cecum with chronic active ileitis with stricture and features of fistulization, transmural inflammation with reactive LNs. Postoperatively she did well and gained weight and would pass mucous from below a few times only. She emptied her stoma about 4-6 times/d and was having issues getting supplies due to insurance issues.  Patient has had multiple issues with stoma appliance/bag and due to inability to afford stoma supplies and its not covered by insurance, she has been getting samples from our wound/ostomy nurse.  Patient stopped smoking in mid September 2019.  She started on methotrexate 12.5 mg p.o. weekly though due to elevated liver function test discontinued on 10/4/2019.  She started Humira on 10/24/2019 and Imuran 50 mg daily on 10/25/2019 though never started Imuran as recommended and we decided not to proceed and instead optimize her Humira.  Ileoscopy through stoma was normal on 12/20/2019.  On 1/15/2020 patient had Humira levels 3.5 and her Humira was increased to weekly starting March 2020.  On 2/15/2020 she had elective takedown of her ileostomy and mucous fistula.    Interval History:  - current IBD meds: Humira 40 mg SC weekly (started 10/24/20, increased to weekly 3/13/2020,  last dose 20, next dose 20)  - 2 soft BMs/day, no blood or nocturnal bowel movements  - joint pain-inactive  - psoriasis- almost completely resolved, elbows, lower extremities, trunk/back  - NSAID use: No  - Narcotic use: No  - Alternative/complementary meds for IBD: No    Prior Pertinent Surgeries:   5/10/19 open ileocolonic resection (10 cm of cecum, 42 cm of ileum removed) with end ileostomy with mucous fistula. Surgical pathology showed TI/cecum with chronic active ileitis with stricture and features of fistulization, transmural inflammation with reactive LNs  2/15/20 elective takedown of her ileostomy and mucous fistula;  (surgical path- peristoma granulation tissue with acute and chronic inflammation, ascending colon with acute and chronic inflammatory infiltrate with superficial erosion and focal active colitis, TI with focal granulomatous inflammation and superficial erosion)    Pertinent Endoscopy/Imagin2019 CT abd/pelvis: abnormal wall thickening involving the ascending colon, cecum, and terminal ileum with intense mucosal enhancement and luminal narrowing resulting in distention of proximal small bowel; ileal-ileal and ileocolic fistulization, fat hypertropy, and increased mesenteric vascularity; reactive mesenteric adenitis in the RLQ; no abscess; appendix not seen; associated partial SBO  2019 Xray abdomen: gas-filled large and small bowel loops without significant dilation; stool projects over the rectum; no calcifications to suggest nephrolithiasis; lower lung zones are grossly clear; no acute osseous abnormality   2019 CXR: abnormal portion of left-sided PICC line with the tip coiled within the left brachiocephalic vein; repositioning suggested   2019 EGD: normal examined duodenum (path: mild villous blunting and mild increased intraepithelial lymphocytes); normal stomach (path: minimal chronic H. pylori negative inflammation); normal esophagus  2019  Colonoscopy: congested mucosa in the terminal ileum and ICV; granularity in the terminal ileum; stricture in the terminal ileum 5 cm from the ICV (path: chronic ileitis); entire examined colon (path-right colon: focal moderate active colitis) (path-left colon, rectum: normal); diverticulosis in the sigmoid colon; non-bleeding internal hemorrhoids   3/13/19 MRE:  multifocal active inflammation of the distal ileum with mild fibrostenotic component and entero-enteric fistula  12/20/19- ileoscopy through stoma: The 30 cm of examined through the mucous fistula is normal. The peristomal area has granular friable mucosa and the lorena-terminal ileum appeared normal. Biopsies of colon through mucus fistula- normal colon, biopsies of neoterminal ileum with minimal active inflammation    Pertinent Labs:  Lab Results   Component Value Date    SEDRATE 60 (H) 03/11/2019    CRP 1.2 03/12/2020     Lab Results   Component Value Date    TTGIGA 7 03/11/2019     (H) 03/11/2019     Lab Results   Component Value Date    TSH 0.713 08/19/2015     Lab Results   Component Value Date    KZTXPEGE02LO 12 (L) 09/04/2019    OULBREAA72 216 09/04/2019     Lab Results   Component Value Date    HEPBSAG Negative 09/04/2019    HEPBCAB Negative 09/04/2019    HEPCAB Negative 02/19/2019     Lab Results   Component Value Date    JRK20MCZO Negative 02/19/2019     Lab Results   Component Value Date    NIL 0.020 09/04/2019    MITOGENNIL >10.000 09/04/2019     Lab Results   Component Value Date    TPTMINTERP SEE BELOW 02/19/2019     Lab Results   Component Value Date    STOOLCULTURE  08/19/2015     No Salmonella,Shigella,Vibrio,Campylobacter,Yersinia isolated.    OJIWQMOISW2Z Negative 08/19/2015    EECHCOSXED8B Negative 08/19/2015     No results found for: CALPROTECTIN    Therapeutic Drug Monitoring Labs:  1/15/20 humira levels 3.5/neg Abs on humira 40 mg SC every 2 weeks    Prior IBD Therapies:  MTX 12.5 mg po weekly- stopped after a few weeks due to  elevated LFTs 10/2019    Vaccinations:  Lab Results   Component Value Date    HEPBSAB Grayzone (A) 02/19/2019     Lab Results   Component Value Date    HEPAIGG Positive (A) 02/19/2019     Lab Results   Component Value Date    VARICELLAZOS 2.58 (H) 03/11/2019    VARICELLAINT Positive (A) 03/11/2019     Immunization History   Administered Date(s) Administered    DTP 1991, 03/18/1992, 03/18/1992, 01/12/1994    HIB 1991, 02/24/1992, 03/18/1992    Hepatitis B (recombinant) Adjuvanted, 2 dose 04/17/2019, 05/20/2019    Hepatitis B, Pediatric/Adolescent 07/24/2006    IPV 07/24/2006    MMR 01/13/1994, 07/24/2006    Meningococcal Conjugate (MCV4P) 07/24/2006    OPV 1991, 02/24/1992, 01/12/1994    Pneumococcal Conjugate - 13 Valent 04/17/2019    Pneumococcal Polysaccharide - 23 Valent 06/20/2019    Td (ADULT) 07/24/2006    Td (Adult), Unspecified Formulation 07/24/2006    Tdap 04/17/2019     Influenza (inactive):  Recommend yearly  Pneumococcal PCV 13: 4/17/19  Pneumococcal PCV 23: 6/20/19  Tetanus (TdaP): 7/24/06  HPV (males and females ages 19-27 yo):    Not sure  Meningococcal: 7/24/06  Hepatitis B:  7/24/06, 4/17/19, 5/20/19, grayzone with immunity  Hepatitis A:  immune  MMR (live vaccine):  1/13/94, 7/24/06      Chickenpox status/Varicella (live vaccine): immune  Shingrix: recommended, defer due to cost    Review of Systems   Constitutional: Negative for chills, fever and weight loss.   Eyes: Negative for pain and redness.   Respiratory: Negative for cough and shortness of breath.    Cardiovascular: Negative for chest pain.   Gastrointestinal: Negative for abdominal pain, heartburn, nausea and vomiting.   Genitourinary: Negative for dysuria and hematuria.   Musculoskeletal: Negative for back pain.   Skin: Negative for rash.   Psychiatric/Behavioral: Negative for depression. The patient is nervous/anxious.      All Medical History/Surgical History/Family History/Social History/Allergies have  been reviewed and updated in EMR    Review of patient's allergies indicates:   Allergen Reactions    Horse/equine containing products Itching     Outpatient Medications Marked as Taking for the 6/15/20 encounter (Office Visit) with Suzi Bacon MD   Medication Sig Dispense Refill    adalimumab (HUMIRA PEN) 40 mg/0.8 mL PnKt Inject 1 pen (40 mg total) into the skin every 7 days. (Patient taking differently: Inject 40 mg into the skin every 14 (fourteen) days. ) 4 pen 11    triamcinolone acetonide 0.1% (KENALOG) 0.1 % cream Apply topically 2 (two) times daily. To rashes 454 g 3       Labs:   Lab Results   Component Value Date    WBC 6.21 03/12/2020    HGB 11.7 (L) 03/12/2020    HCT 39.3 03/12/2020    MCV 96 03/12/2020     03/12/2020     Lab Results   Component Value Date    CREATININE 0.7 03/12/2020    ALBUMIN 3.8 03/12/2020    BILITOT 0.7 03/12/2020    ALKPHOS 125 03/12/2020    AST 18 03/12/2020    ALT 20 03/12/2020     Lab Results   Component Value Date    NIL 0.020 09/04/2019    MITOGENNIL >10.000 09/04/2019     Assessment/Plan:  Lora Scott is a 30 y.o. female with Crohn's disease (ileum, S/P ileocecal resection-42 cm of ileum/10.5 cm cecum removed with surgical path c/w stricture and fistula, 2/15/20 ileostomy takedown and mucus fistula takedown), psoriasias (failed light treatment, followed by Dr. Martinez), ex-smoker (quit 9/2019) who is doing well since ileostomy takedown since last visit and doing well well on weekly Humira since 3/2020. Her psoriasis has resolved and she has been compliant with all of her meds. She is due for 6 mo colonoscopy after surgery 8/2020 and labs including humira levels/abs 9/2020.      # Crohn's disease (ileum, S/P ileocecal resection-42 cm of ileum/10.5 cm cecum removed with surgical path c/w stricture and fistula, 2/15/20 ileostomy takedown and mucus fistula takedown)  - continue humira 40 mg SC weekly- compliant  - colonoscopy 8/2020  - vitamin B12 deficiency (9/2019  vit B12 216)- continue vitamin B12 shots once a month, repeat vit B12 levels 9/2020  - ex-smoker: quit smoking 9/2019, some 2nd hand smoke  - psoriasis:  Improved, last visit with Dr. Martinez 10/2019  - Drug  Monitoring labs:  CBC/CMP every 6 mos (due 9/2020), TPMT (heterozygote 2/2019), TB quantiferon (negative 9/2019, repeat 9/2020), Hep B testing (HBsAg, HBcAb negative 9/2019, repeat 9/2020)  - TDM:  Trough humira levels/abs (9/2020)    # IBD specific health maintenance:  PCP- current PCP is not accepting medicaid- will refer patient to [facility] for PCP  Skin exam yearly - due  10/2020, pt followed by Dr. Martinez  Pap smear yearly: due now- referred to gyn, last one normal 8/2015  Vitamin D deficiency (3/2019 vit D 14)- continue vit D 50,000 IU/week  Vaccines: no live vaccines, flu shot yearly    Had a long discussion with patient again regarding compliance and keeping track of her medical care to allow her the best outcomes for her Crohn's disease.     Follow up: 9/2020- in person visit     The patient location is: [place]  The chief complaint leading to consultation is: Crohn's disease    Visit type: audiovisual    Face to Face time with patient: 25 min  30 minutes of total time spent on the encounter, which includes face to face time and non-face to face time preparing to see the patient (eg, review of tests), Obtaining and/or reviewing separately obtained history, Documenting clinical information in the electronic or other health record, Independently interpreting results (not separately reported) and communicating results to the patient/family/caregiver, or Care coordination (not separately reported).     Each patient to whom he or she provides medical services by telemedicine is:  (1) informed of the relationship between the physician and patient and the respective role of any other health care provider with respect to management of the patient; and (2) notified that he or she may decline to receive medical services  by telemedicine and may withdraw from such care at any time.      Suzi Bacon MD  Department of Gastroenterology  Medical Director, Inflammatory Bowel Disease

## 2020-06-28 ENCOUNTER — TELEPHONE (OUTPATIENT)
Dept: SCHEDULING | Age: 30
End: 2020-06-28

## 2020-06-29 ENCOUNTER — TELEPHONE (OUTPATIENT)
Dept: SCHEDULING | Age: 30
End: 2020-06-29

## 2020-07-02 ENCOUNTER — HOSPITAL ENCOUNTER (EMERGENCY)
Facility: HOSPITAL | Age: 30
Discharge: HOME | End: 2020-07-02
Attending: EMERGENCY MEDICINE
Payer: MEDICAID

## 2020-07-02 ENCOUNTER — APPOINTMENT (OUTPATIENT)
Dept: OBGYN | Age: 30
End: 2020-07-02

## 2020-07-02 VITALS
DIASTOLIC BLOOD PRESSURE: 62 MMHG | HEIGHT: 63 IN | BODY MASS INDEX: 26.58 KG/M2 | OXYGEN SATURATION: 96 % | TEMPERATURE: 99 F | SYSTOLIC BLOOD PRESSURE: 103 MMHG | WEIGHT: 150 LBS | HEART RATE: 79 BPM | RESPIRATION RATE: 16 BRPM

## 2020-07-02 DIAGNOSIS — N30.01 ACUTE CYSTITIS WITH HEMATURIA: Primary | ICD-10-CM

## 2020-07-02 LAB
ALBUMIN SERPL BCP-MCNC: 3.7 G/DL (ref 3.5–5.2)
ALP SERPL-CCNC: 106 U/L (ref 55–135)
ALT SERPL W/O P-5'-P-CCNC: 15 U/L (ref 10–44)
ANION GAP SERPL CALC-SCNC: 7 MMOL/L (ref 8–16)
AST SERPL-CCNC: 20 U/L (ref 10–40)
B-HCG UR QL: NEGATIVE
BACTERIA #/AREA URNS AUTO: ABNORMAL /HPF
BACTERIA GENITAL QL WET PREP: ABNORMAL
BASOPHILS # BLD AUTO: 0.02 K/UL (ref 0–0.2)
BASOPHILS NFR BLD: 0.4 % (ref 0–1.9)
BILIRUB SERPL-MCNC: 0.7 MG/DL (ref 0.1–1)
BILIRUB UR QL STRIP: NEGATIVE
BUN SERPL-MCNC: 4 MG/DL (ref 6–30)
BUN SERPL-MCNC: 5 MG/DL (ref 6–20)
CALCIUM SERPL-MCNC: 8.7 MG/DL (ref 8.7–10.5)
CHLORIDE SERPL-SCNC: 105 MMOL/L (ref 95–110)
CHLORIDE SERPL-SCNC: 108 MMOL/L (ref 95–110)
CLARITY UR REFRACT.AUTO: ABNORMAL
CLUE CELLS VAG QL WET PREP: ABNORMAL
CO2 SERPL-SCNC: 23 MMOL/L (ref 23–29)
COLOR UR AUTO: ABNORMAL
CREAT SERPL-MCNC: 0.7 MG/DL (ref 0.5–1.4)
CREAT SERPL-MCNC: 0.8 MG/DL (ref 0.5–1.4)
CRP SERPL-MCNC: 0.5 MG/L (ref 0–8.2)
CTP QC/QA: YES
DIFFERENTIAL METHOD: ABNORMAL
EOSINOPHIL # BLD AUTO: 0.2 K/UL (ref 0–0.5)
EOSINOPHIL NFR BLD: 4 % (ref 0–8)
ERYTHROCYTE [DISTWIDTH] IN BLOOD BY AUTOMATED COUNT: 14.7 % (ref 11.5–14.5)
ERYTHROCYTE [SEDIMENTATION RATE] IN BLOOD BY WESTERGREN METHOD: 23 MM/HR (ref 0–36)
EST. GFR  (AFRICAN AMERICAN): >60 ML/MIN/1.73 M^2
EST. GFR  (NON AFRICAN AMERICAN): >60 ML/MIN/1.73 M^2
FILAMENT FUNGI VAG WET PREP-#/AREA: ABNORMAL
GLUCOSE SERPL-MCNC: 79 MG/DL (ref 70–110)
GLUCOSE SERPL-MCNC: 81 MG/DL (ref 70–110)
GLUCOSE UR QL STRIP: NEGATIVE
HCT VFR BLD AUTO: 37.8 % (ref 37–48.5)
HCT VFR BLD CALC: 39 %PCV (ref 36–54)
HGB BLD-MCNC: 11.7 G/DL (ref 12–16)
HGB UR QL STRIP: ABNORMAL
HYALINE CASTS UR QL AUTO: 0 /LPF
IMM GRANULOCYTES # BLD AUTO: 0.01 K/UL (ref 0–0.04)
IMM GRANULOCYTES NFR BLD AUTO: 0.2 % (ref 0–0.5)
KETONES UR QL STRIP: NEGATIVE
LEUKOCYTE ESTERASE UR QL STRIP: ABNORMAL
LYMPHOCYTES # BLD AUTO: 2.4 K/UL (ref 1–4.8)
LYMPHOCYTES NFR BLD: 42.1 % (ref 18–48)
MCH RBC QN AUTO: 29.1 PG (ref 27–31)
MCHC RBC AUTO-ENTMCNC: 31 G/DL (ref 32–36)
MCV RBC AUTO: 94 FL (ref 82–98)
MICROSCOPIC COMMENT: ABNORMAL
MONOCYTES # BLD AUTO: 0.4 K/UL (ref 0.3–1)
MONOCYTES NFR BLD: 7 % (ref 4–15)
NEUTROPHILS # BLD AUTO: 2.6 K/UL (ref 1.8–7.7)
NEUTROPHILS NFR BLD: 46.3 % (ref 38–73)
NITRITE UR QL STRIP: POSITIVE
NRBC BLD-RTO: 0 /100 WBC
PH UR STRIP: 5 [PH] (ref 5–8)
PLATELET # BLD AUTO: 228 K/UL (ref 150–350)
PMV BLD AUTO: 11.4 FL (ref 9.2–12.9)
POC IONIZED CALCIUM: 1.16 MMOL/L (ref 1.06–1.42)
POC TCO2 (MEASURED): 21 MMOL/L (ref 23–29)
POTASSIUM BLD-SCNC: 3.8 MMOL/L (ref 3.5–5.1)
POTASSIUM SERPL-SCNC: 3.6 MMOL/L (ref 3.5–5.1)
PROT SERPL-MCNC: 7.7 G/DL (ref 6–8.4)
PROT UR QL STRIP: ABNORMAL
RBC # BLD AUTO: 4.02 M/UL (ref 4–5.4)
RBC #/AREA URNS AUTO: 7 /HPF (ref 0–4)
SAMPLE: ABNORMAL
SODIUM BLD-SCNC: 140 MMOL/L (ref 136–145)
SODIUM SERPL-SCNC: 138 MMOL/L (ref 136–145)
SP GR UR STRIP: 1.03 (ref 1–1.03)
SPECIMEN SOURCE: ABNORMAL
SQUAMOUS #/AREA URNS AUTO: 13 /HPF
T VAGINALIS GENITAL QL WET PREP: ABNORMAL
URN SPEC COLLECT METH UR: ABNORMAL
WBC # BLD AUTO: 5.7 K/UL (ref 3.9–12.7)
WBC #/AREA URNS AUTO: 26 /HPF (ref 0–5)
WBC #/AREA VAG WET PREP: ABNORMAL
YEAST GENITAL QL WET PREP: ABNORMAL

## 2020-07-02 PROCEDURE — 80053 COMPREHEN METABOLIC PANEL: CPT

## 2020-07-02 PROCEDURE — 87186 SC STD MICRODIL/AGAR DIL: CPT | Mod: 59

## 2020-07-02 PROCEDURE — 87086 URINE CULTURE/COLONY COUNT: CPT

## 2020-07-02 PROCEDURE — 99284 EMERGENCY DEPT VISIT MOD MDM: CPT | Mod: ,,, | Performed by: PHYSICIAN ASSISTANT

## 2020-07-02 PROCEDURE — 85025 COMPLETE CBC W/AUTO DIFF WBC: CPT

## 2020-07-02 PROCEDURE — 81025 URINE PREGNANCY TEST: CPT | Performed by: PHYSICIAN ASSISTANT

## 2020-07-02 PROCEDURE — 80047 BASIC METABLC PNL IONIZED CA: CPT | Mod: 59

## 2020-07-02 PROCEDURE — 85652 RBC SED RATE AUTOMATED: CPT

## 2020-07-02 PROCEDURE — 87491 CHLMYD TRACH DNA AMP PROBE: CPT

## 2020-07-02 PROCEDURE — 87210 SMEAR WET MOUNT SALINE/INK: CPT

## 2020-07-02 PROCEDURE — 25000003 PHARM REV CODE 250: Performed by: PHYSICIAN ASSISTANT

## 2020-07-02 PROCEDURE — 99284 EMERGENCY DEPT VISIT MOD MDM: CPT | Mod: 25

## 2020-07-02 PROCEDURE — 86140 C-REACTIVE PROTEIN: CPT

## 2020-07-02 PROCEDURE — 87077 CULTURE AEROBIC IDENTIFY: CPT | Mod: 59

## 2020-07-02 PROCEDURE — 63600175 PHARM REV CODE 636 W HCPCS: Performed by: PHYSICIAN ASSISTANT

## 2020-07-02 PROCEDURE — 99284 PR EMERGENCY DEPT VISIT,LEVEL IV: ICD-10-PCS | Mod: ,,, | Performed by: PHYSICIAN ASSISTANT

## 2020-07-02 PROCEDURE — 96374 THER/PROPH/DIAG INJ IV PUSH: CPT

## 2020-07-02 PROCEDURE — 87088 URINE BACTERIA CULTURE: CPT

## 2020-07-02 PROCEDURE — 81001 URINALYSIS AUTO W/SCOPE: CPT

## 2020-07-02 RX ORDER — PHENAZOPYRIDINE HYDROCHLORIDE 200 MG/1
200 TABLET, FILM COATED ORAL 3 TIMES DAILY
Qty: 21 TABLET | Refills: 0 | Status: SHIPPED | OUTPATIENT
Start: 2020-07-02 | End: 2020-07-09

## 2020-07-02 RX ORDER — CEFTRIAXONE 1 G/1
1 INJECTION, POWDER, FOR SOLUTION INTRAMUSCULAR; INTRAVENOUS
Status: COMPLETED | OUTPATIENT
Start: 2020-07-02 | End: 2020-07-02

## 2020-07-02 RX ORDER — ACETAMINOPHEN 500 MG
1000 TABLET ORAL
Status: COMPLETED | OUTPATIENT
Start: 2020-07-02 | End: 2020-07-02

## 2020-07-02 RX ORDER — CEPHALEXIN 500 MG/1
500 CAPSULE ORAL EVERY 12 HOURS
Qty: 14 CAPSULE | Refills: 0 | Status: SHIPPED | OUTPATIENT
Start: 2020-07-02 | End: 2020-07-09

## 2020-07-02 RX ADMIN — CEFTRIAXONE SODIUM 1 G: 1 INJECTION, POWDER, FOR SOLUTION INTRAMUSCULAR; INTRAVENOUS at 05:07

## 2020-07-02 RX ADMIN — ACETAMINOPHEN 1000 MG: 500 TABLET ORAL at 04:07

## 2020-07-02 NOTE — DISCHARGE INSTRUCTIONS
Taking medications as directed.  You should complete all of the antibiotics (cephalexin).      Follow-up with your primary care provider in the next 3-4 days if your symptoms continue.    Return to the ER for any new or significantly worsening symptoms such as fever greater than 100.4°, uncontrolled vomiting, worsening abdominal pain, or any other worrisome symptoms.    Our goal in the emergency department is to always give you outstanding care and exceptional service. You may receive a survey by mail or e-mail in the next week regarding your experience in our ED. We would greatly appreciate your completing and returning the survey. Your feedback provides us with a way to recognize our staff who give very good care and it helps us learn how to improve when your experience was below our aspiration of excellence.

## 2020-07-02 NOTE — ED PROVIDER NOTES
Encounter Date: 7/2/2020       History     Chief Complaint   Patient presents with    Abdominal Pain     Abdominal pain x1 week worsening today. Pt has history of Crohn's diaease.      30-year-old female with a history of Crohn's disease on Humira presents to the ED with a chief complaint of abdominal pain.  Patient reports a suprapubic cramping, twisting abdominal pain that has been intermittent over the past week.  Patient felt that these were menstrual cramps, but has not started her period. LMP 6/4.  She had an episode of vomiting today which she felt was secondary to sitting in the sun.  She currently denies nausea.  She has had intermittent loose and soft stool since her ileocolectomy last year.  She denies any change in stool color.  She does report a whitish grave vaginal discharge for the past 2 months.  She has had a new sexual partner, unprotected for the past 4 months.  She denies fever, chills, dysuria.  She has not attempted treatment for her pain.        Review of patient's allergies indicates:   Allergen Reactions    Horse/equine containing products Itching     Past Medical History:   Diagnosis Date    Allergy     Asthma     Crohn's disease 2/18/19    Depression     Eczema     Ex-smoker for less than 1 year     Immunosuppressed status 10/24/2019    Joint pain      Past Surgical History:   Procedure Laterality Date    COLONOSCOPY N/A 2/20/2019    Procedure: COLONOSCOPY;  Surgeon: Fawad Perla MD;  Location: [facility] ([address] [place] [address]);  Service: Endoscopy;  Laterality: N/A;    ESOPHAGOGASTRODUODENOSCOPY N/A 2/20/2019    Procedure: EGD (ESOPHAGOGASTRODUODENOSCOPY);  Surgeon: Fawad Perla MD;  Location: [facility] ([address] [place] [address]);  Service: Endoscopy;  Laterality: N/A;    ILEOCOLECTOMY N/A 5/10/2019    Procedure: ILEOCOLECTOMY;  Surgeon: Uday Cope MD;  Location: [address];  Service: Colon and Rectal;  Laterality: N/A;  ileocecectomy    ILEOSCOPY N/A 12/20/2019    Procedure: ILEOSCOPY  through stoma;  Surgeon: Suzi Bacon MD;  Location: [facility] ENDO (4TH FLR);  Service: Endoscopy;  Laterality: N/A;  Schedule as 30 minute case  Please let patient know to bring extra stoma bag/wafer, etc- entire bag and appliance will be removed at time of procedure to visualize the area well   first week 11/2019     per note-R/S to 12/20/19-GT    ILEOSTOMY N/A 5/10/2019    Procedure: CREATION, ILEOSTOMY with mucous fistula;  Surgeon: Uday Cope MD;  Location: [facility] OR 2ND FLR;  Service: Colon and Rectal;  Laterality: N/A;    ILEOSTOMY CLOSURE N/A 2/12/2020    Procedure: CLOSURE,ILEOSTOMY TAKEDOWN END ILEOSTOMY/MUCOUS FISTULA POSSIBLE LAPAROTOMY ERAS LOW;  Surgeon: Uday Cope MD;  Location: [facility] OR 2ND FLR;  Service: Colon and Rectal;  Laterality: N/A;     Family History   Problem Relation Age of Onset    Hypertension Mother     No Known Problems Father     No Known Problems Sister     No Known Problems Brother     No Known Problems Sister     No Known Problems Sister     No Known Problems Sister     No Known Problems Maternal Uncle     No Known Problems Paternal Aunt     No Known Problems Paternal Uncle     No Known Problems Maternal Grandmother     No Known Problems Maternal Grandfather     Irritable bowel syndrome Paternal Grandmother     No Known Problems Paternal Grandfather     No Known Problems Paternal Aunt     No Known Problems Paternal Uncle     No Known Problems Paternal Uncle     No Known Problems Paternal Uncle     No Known Problems Maternal Uncle     No Known Problems Maternal Uncle     Celiac disease Neg Hx     Cirrhosis Neg Hx     Colon cancer Neg Hx     Colon polyps Neg Hx     Crohn's disease Neg Hx     Cystic fibrosis Neg Hx     Esophageal cancer Neg Hx     Hemochromatosis Neg Hx     Inflammatory bowel disease Neg Hx     Liver cancer Neg Hx     Liver disease Neg Hx     Rectal cancer Neg Hx     Stomach cancer Neg Hx     Ulcerative colitis Neg Hx      Jerardo's disease Neg Hx     Lymphoma Neg Hx     Tuberculosis Neg Hx     Scleroderma Neg Hx     Rheum arthritis Neg Hx     Multiple sclerosis Neg Hx     Psoriasis Neg Hx     Lupus Neg Hx     Melanoma Neg Hx     Skin cancer Neg Hx     Amblyopia Neg Hx     Blindness Neg Hx     Cancer Neg Hx     Cataracts Neg Hx     Diabetes Neg Hx     Glaucoma Neg Hx     Macular degeneration Neg Hx     Retinal detachment Neg Hx     Strabismus Neg Hx     Stroke Neg Hx     Thyroid disease Neg Hx      Social History     Tobacco Use    Smoking status: Current Every Day Smoker     Packs/day: 0.25     Types: Cigarettes     Last attempt to quit: 2019     Years since quittin.8    Smokeless tobacco: Never Used   Substance Use Topics    Alcohol use: Not Currently     Alcohol/week: 0.0 standard drinks     Comment: Socially    Drug use: No     Frequency: 3.0 times per week     Review of Systems   Constitutional: Negative for chills and fever.   HENT: Negative for sore throat.    Respiratory: Negative for shortness of breath.    Cardiovascular: Negative for chest pain.   Gastrointestinal: Positive for abdominal pain and vomiting. Negative for nausea.   Genitourinary: Positive for pelvic pain and vaginal discharge. Negative for dysuria.   Musculoskeletal: Negative for back pain.   Skin: Negative for rash.   Neurological: Negative for weakness.   Hematological: Does not bruise/bleed easily.       Physical Exam     Initial Vitals [20 1502]   BP Pulse Resp Temp SpO2   124/63 86 16 98.4 °F (36.9 °C) 98 %      MAP       --         Physical Exam    Nursing note and vitals reviewed.  Constitutional: She appears well-developed and well-nourished. She is not diaphoretic.  Non-toxic appearance. She does not appear ill. No distress.   HENT:   Head: Normocephalic and atraumatic.   Neck: Neck supple.   Cardiovascular: Normal rate and regular rhythm. Exam reveals no gallop and no friction rub.    No murmur  heard.  Pulmonary/Chest: Effort normal and breath sounds normal. No accessory muscle usage. No tachypnea. No respiratory distress. She has no decreased breath sounds. She has no wheezes. She has no rhonchi. She has no rales.   Abdominal: Soft. She exhibits no distension and no mass. There is abdominal tenderness in the suprapubic area. There is no guarding.   Genitourinary:    Uterus normal.   Cervix exhibits no motion tenderness, no discharge and no friability. Right adnexum displays no mass and no tenderness. Left adnexum displays no mass and no tenderness.    Vaginal discharge present.      No vaginal tenderness or bleeding.   No tenderness or bleeding in the vagina.    Genitourinary Comments: Scant thin white discharge in vaginal vault.    Urine sample at bedside appears Cloudy, medium brown in color.      Neurological: She is alert.   Skin: No rash noted.   Psychiatric: She has a normal mood and affect. Her behavior is normal.         ED Course   Procedures  Labs Reviewed   CBC W/ AUTO DIFFERENTIAL - Abnormal; Notable for the following components:       Result Value    Hemoglobin 11.7 (*)     Mean Corpuscular Hemoglobin Conc 31.0 (*)     RDW 14.7 (*)     All other components within normal limits   COMPREHENSIVE METABOLIC PANEL - Abnormal; Notable for the following components:    BUN, Bld 5 (*)     Anion Gap 7 (*)     All other components within normal limits   URINALYSIS, REFLEX TO URINE CULTURE - Abnormal; Notable for the following components:    Appearance, UA Cloudy (*)     Protein, UA 2+ (*)     Occult Blood UA 2+ (*)     Nitrite, UA Positive (*)     Leukocytes, UA 1+ (*)     All other components within normal limits    Narrative:     Specimen Source->Urine   VAGINAL SCREEN - Abnormal; Notable for the following components:    WBC - Vaginal Screen Rare (*)     Bacteria - Vaginal Screen Occasional (*)     All other components within normal limits   URINALYSIS MICROSCOPIC - Abnormal; Notable for the following  components:    RBC, UA 7 (*)     WBC, UA 26 (*)     Bacteria Many (*)     All other components within normal limits    Narrative:     Specimen Source->Urine   ISTAT PROCEDURE - Abnormal; Notable for the following components:    POC BUN 4 (*)     POC TCO2 (MEASURED) 21 (*)     All other components within normal limits   C. TRACHOMATIS/N. GONORRHOEAE BY AMP DNA   CULTURE, URINE   SEDIMENTATION RATE   C-REACTIVE PROTEIN   POCT URINE PREGNANCY          Imaging Results    None          Medical Decision Making:   History:   Old Medical Records: I decided to obtain old medical records.  Differential Diagnosis:   My differential diagnosis includes but is not limited to:  Cystitis, cervicitis, PID, menstrual cramps, pregnancy  Clinical Tests:   Lab Tests: Ordered       APC / Resident Notes:   30-year-old female with Crohn's disease on Humira presents to the ED with lower abdominal pain for 1 week.  Vitals are within normal limits.  Patient afebrile, nontoxic appearing and in no acute distress.  Exam is notable for mild suprapubic tenderness.  Unremarkable  exam.  GC/C cultures are pending.    CBC without leukocytosis.  Vaginal screen is unremarkable.  Inflammatory markers are within normal limits.  UA is nitrite positive with evidence of infection.  Will give a dose of ceftriaxone in the ED and discharged with Keflex and Pyridium.  I do not think this is a Crohn's flare.  I feel the patient is stable for discharge.  Advised patient follow up with her primary care provider in 3-4 days if her symptoms do not continue.  Strict ED return precautions given.    I have reviewed the patient's records and discussed this case with my supervising physician. Please be advised that this text was dictated with [employer] software and may contain dictation errors.                                   Clinical Impression:       ICD-10-CM ICD-9-CM   1. Acute cystitis with hematuria  N30.01 595.0         Disposition:   Disposition:  Discharged  Condition: Stable     ED Disposition Condition    Discharge Stable        ED Prescriptions     Medication Sig Dispense Start Date End Date Auth. Provider    cephALEXin (KEFLEX) 500 MG capsule Take 1 capsule (500 mg total) by mouth every 12 (twelve) hours. for 7 days 14 capsule 7/2/2020 7/9/2020 Lelo Cramer PA-C    phenazopyridine (PYRIDIUM) 200 MG tablet Take 1 tablet (200 mg total) by mouth 3 (three) times daily. for 7 days 21 tablet 7/2/2020 7/9/2020 Lelo Cramer PA-C        Follow-up Information     Follow up With Specialties Details Why Contact Info    Margarita Jay MD Internal Medicine In 3 days If symptoms do not improve [address] [facility]  [place] [address]  [phone]                                       Lelo Cramer PA-C  07/02/20 2246

## 2020-07-02 NOTE — ED TRIAGE NOTES
"Lora Scott, a 30 y.o. female presents to the ED via personal transportation with CC intermittent abdominal pain x1 week, mid lower, "cramping", states "I thought it was my period coming but it hasn't come." Pt with dark urine. Denies pain or burning with urination. C/o intermittent nausea onset today, x1 episode of emesis today. Denies fever chills CP SOB or cough.    Patient identifiers verified verbally with patient and correct for Lora Scott.    LOC/ APPEARANCE: The patient is AAOx4. Pt is speaking appropriately, no slurred speech. Pt changed into hospital gown. Pt is clean and well groomed. No JVD visible. Pt reports pain level of 5/10 at this time. Pt updated on POC. Bed low and locked with side rails up x2, call bell in pt reach.  SKIN: Skin is warm dry and intact, and color is consistent with ethnicity. Capillary refill <3 seconds. No breakdown or brusing visible. Mucus membranes moist, acyanotic.  RESPIRATORY: Airway is open and patent. Respirations-spontaneous, unlabored, regular rate, equal bilaterally on inspiration and expiration. No accessory muscle use noted. Lungs clear to auscultation in all fields bilaterally anterior and posterior.   CARDIAC: Patient has regular heart rate.  No peripheral edema noted, and patient has no c/o chest pain. Peripheral pulses present equal and strong throughout.  ABDOMEN: Soft and non-tender to palpation with no distention noted. Normoactive bowel sounds x4 quadrants. Pt has no complaints of abnormal bowel movements, denies blood in stool. Pt reports normal appetite.   NEUROLOGIC: Eyes open spontaneously and facial expression symmetrical. Pt behavior appropriate to situation, and pt follows commands. Pt reports sensation present in all extremities when touched with a finger, denies any numbness or tingling. PERRLA  MUSCULOSKELETAL: Spontaneous movement noted to all extremities. Hand  equal and leg strength strong +5 bilaterally.   : Pt with dark urine. No " complaints of frequency, burning, urgency or blood in the urine. No complaints of incontinence. Reports vaginal discharge x2 months, white.

## 2020-07-03 LAB
C TRACH DNA SPEC QL NAA+PROBE: NOT DETECTED
N GONORRHOEA DNA SPEC QL NAA+PROBE: NOT DETECTED

## 2020-07-06 ENCOUNTER — TELEPHONE (OUTPATIENT)
Dept: SCHEDULING | Age: 30
End: 2020-07-06

## 2020-07-06 ENCOUNTER — V-VISIT (OUTPATIENT)
Dept: INTERNAL MEDICINE | Age: 30
End: 2020-07-06

## 2020-07-06 DIAGNOSIS — E78.00 HYPERCHOLESTEREMIA: ICD-10-CM

## 2020-07-06 DIAGNOSIS — E03.9 HYPOTHYROIDISM, UNSPECIFIED TYPE: ICD-10-CM

## 2020-07-06 DIAGNOSIS — Z00.00 ANNUAL PHYSICAL EXAM: Primary | ICD-10-CM

## 2020-07-06 DIAGNOSIS — E66.01 MORBID OBESITY WITH BMI OF 50.0-59.9, ADULT (CMD): ICD-10-CM

## 2020-07-06 DIAGNOSIS — Z71.89 ADVICE GIVEN ABOUT 2019 NOVEL CORONAVIRUS BY TELEPHONE: ICD-10-CM

## 2020-07-06 DIAGNOSIS — I10 HYPERTENSION, UNCONTROLLED: ICD-10-CM

## 2020-07-06 PROBLEM — Z41.3 ENCOUNTER FOR PIERCING OF EXTERNAL EAR: Status: RESOLVED | Noted: 2019-01-04 | Resolved: 2020-07-06

## 2020-07-06 LAB
BACTERIA UR CULT: ABNORMAL
BACTERIA UR CULT: ABNORMAL

## 2020-07-06 PROCEDURE — 99395 PREV VISIT EST AGE 18-39: CPT | Performed by: INTERNAL MEDICINE

## 2020-07-06 RX ORDER — LEVOTHYROXINE SODIUM 0.1 MG/1
100 TABLET ORAL DAILY
Qty: 30 TABLET | Refills: 3 | Status: SHIPPED | OUTPATIENT
Start: 2020-07-06 | End: 2020-07-10 | Stop reason: SDUPTHER

## 2020-07-06 RX ORDER — AMLODIPINE BESYLATE 5 MG/1
5 TABLET ORAL DAILY
Qty: 30 TABLET | Refills: 3 | Status: SHIPPED | OUTPATIENT
Start: 2020-07-06 | End: 2020-07-10 | Stop reason: SDUPTHER

## 2020-07-06 ASSESSMENT — PAIN SCALES - GENERAL: PAINLEVEL: 0

## 2020-07-09 ENCOUNTER — TELEPHONE (OUTPATIENT)
Dept: SCHEDULING | Age: 30
End: 2020-07-09

## 2020-07-10 ENCOUNTER — E-ADVICE (OUTPATIENT)
Dept: INTERNAL MEDICINE | Age: 30
End: 2020-07-10

## 2020-07-10 ENCOUNTER — TELEPHONE (OUTPATIENT)
Dept: SCHEDULING | Age: 30
End: 2020-07-10

## 2020-07-10 ENCOUNTER — LAB SERVICES (OUTPATIENT)
Dept: LAB | Age: 30
End: 2020-07-10

## 2020-07-10 DIAGNOSIS — I10 HYPERTENSION, UNCONTROLLED: ICD-10-CM

## 2020-07-10 DIAGNOSIS — E03.9 HYPOTHYROIDISM, UNSPECIFIED TYPE: ICD-10-CM

## 2020-07-10 DIAGNOSIS — Z00.00 ANNUAL PHYSICAL EXAM: ICD-10-CM

## 2020-07-10 PROCEDURE — 87389 HIV-1 AG W/HIV-1&-2 AB AG IA: CPT | Performed by: INTERNAL MEDICINE

## 2020-07-10 PROCEDURE — 86762 RUBELLA ANTIBODY: CPT | Performed by: INTERNAL MEDICINE

## 2020-07-10 PROCEDURE — 83036 HEMOGLOBIN GLYCOSYLATED A1C: CPT | Performed by: INTERNAL MEDICINE

## 2020-07-10 PROCEDURE — 82306 VITAMIN D 25 HYDROXY: CPT | Performed by: INTERNAL MEDICINE

## 2020-07-10 PROCEDURE — 80053 COMPREHEN METABOLIC PANEL: CPT | Performed by: INTERNAL MEDICINE

## 2020-07-10 PROCEDURE — 84443 ASSAY THYROID STIM HORMONE: CPT | Performed by: INTERNAL MEDICINE

## 2020-07-10 PROCEDURE — 86706 HEP B SURFACE ANTIBODY: CPT | Performed by: INTERNAL MEDICINE

## 2020-07-10 PROCEDURE — 86735 MUMPS ANTIBODY: CPT | Performed by: INTERNAL MEDICINE

## 2020-07-10 PROCEDURE — 36415 COLL VENOUS BLD VENIPUNCTURE: CPT

## 2020-07-10 PROCEDURE — 80061 LIPID PANEL: CPT | Performed by: INTERNAL MEDICINE

## 2020-07-10 PROCEDURE — 86592 SYPHILIS TEST NON-TREP QUAL: CPT | Performed by: INTERNAL MEDICINE

## 2020-07-10 PROCEDURE — 86765 RUBEOLA ANTIBODY: CPT | Performed by: INTERNAL MEDICINE

## 2020-07-10 RX ORDER — AMLODIPINE BESYLATE 5 MG/1
5 TABLET ORAL DAILY
Qty: 30 TABLET | Refills: 3 | Status: SHIPPED | OUTPATIENT
Start: 2020-07-10 | End: 2020-09-01 | Stop reason: SDUPTHER

## 2020-07-10 RX ORDER — LEVOTHYROXINE SODIUM 0.1 MG/1
100 TABLET ORAL DAILY
Qty: 30 TABLET | Refills: 3 | Status: SHIPPED | OUTPATIENT
Start: 2020-07-10 | End: 2020-09-01 | Stop reason: SDUPTHER

## 2020-07-11 LAB
25(OH)D3+25(OH)D2 SERPL-MCNC: 11.6 NG/ML (ref 30–100)
ALBUMIN SERPL-MCNC: 3.6 G/DL (ref 3.6–5.1)
ALBUMIN/GLOB SERPL: 0.9 {RATIO} (ref 1–2.4)
ALP SERPL-CCNC: 57 UNITS/L (ref 45–117)
ALT SERPL-CCNC: 23 UNITS/L
ANION GAP SERPL CALC-SCNC: 10 MMOL/L (ref 10–20)
AST SERPL-CCNC: 14 UNITS/L
BILIRUB SERPL-MCNC: 0.2 MG/DL (ref 0.2–1)
BUN SERPL-MCNC: 10 MG/DL (ref 6–20)
BUN/CREAT SERPL: 13 (ref 7–25)
CALCIUM SERPL-MCNC: 9 MG/DL (ref 8.4–10.2)
CHLORIDE SERPL-SCNC: 107 MMOL/L (ref 98–107)
CHOLEST SERPL-MCNC: 235 MG/DL
CHOLEST/HDLC SERPL: 5.6 {RATIO}
CO2 SERPL-SCNC: 26 MMOL/L (ref 21–32)
CREAT SERPL-MCNC: 0.79 MG/DL (ref 0.51–0.95)
GLOBULIN SER-MCNC: 4.1 G/DL (ref 2–4)
GLUCOSE SERPL-MCNC: 99 MG/DL (ref 65–99)
HBV SURFACE AB SER QL: NEGATIVE
HDLC SERPL-MCNC: 42 MG/DL
HIV 1+2 AB+HIV1 P24 AG SERPL QL IA: NONREACTIVE
LDLC SERPL CALC-MCNC: 157 MG/DL
LENGTH OF FAST TIME PATIENT: ABNORMAL HRS
LENGTH OF FAST TIME PATIENT: ABNORMAL HRS
NONHDLC SERPL-MCNC: 193 MG/DL
POTASSIUM SERPL-SCNC: 4.2 MMOL/L (ref 3.4–5.1)
PROT SERPL-MCNC: 7.7 G/DL (ref 6.4–8.2)
RPR SER QL: NONREACTIVE
RUBV IGG SERPL IA-ACNC: 6 UNITS/ML
SODIUM SERPL-SCNC: 139 MMOL/L (ref 135–145)
TRIGL SERPL-MCNC: 180 MG/DL
TSH SERPL-ACNC: 95.75 MCUNITS/ML (ref 0.35–5)

## 2020-07-13 DIAGNOSIS — E03.9 HYPOTHYROIDISM, UNSPECIFIED TYPE: Primary | ICD-10-CM

## 2020-07-13 DIAGNOSIS — E55.9 VITAMIN D DEFICIENCY: Primary | ICD-10-CM

## 2020-07-13 DIAGNOSIS — Z23 NEED FOR MMR VACCINE: Primary | ICD-10-CM

## 2020-07-13 LAB
HBA1C MFR BLD: 6.2 % (ref 4.5–5.6)
MEV IGG SER QL IA: NORMAL
MUV IGG SER IA-ACNC: 0.97 OD RATIO

## 2020-07-13 RX ORDER — CHOLECALCIFEROL (VITAMIN D3) 125 MCG
5000 CAPSULE ORAL DAILY
Qty: 90 CAPSULE | Refills: 11 | Status: SHIPPED | OUTPATIENT
Start: 2020-07-13 | End: 2020-09-01 | Stop reason: SDUPTHER

## 2020-07-14 ENCOUNTER — E-ADVICE (OUTPATIENT)
Dept: INTERNAL MEDICINE | Age: 30
End: 2020-07-14

## 2020-07-15 ENCOUNTER — TELEPHONE (OUTPATIENT)
Dept: OBSTETRICS AND GYNECOLOGY | Facility: CLINIC | Age: 30
End: 2020-07-15

## 2020-07-15 ENCOUNTER — TELEPHONE (OUTPATIENT)
Dept: GASTROENTEROLOGY | Facility: CLINIC | Age: 30
End: 2020-07-15

## 2020-07-15 ENCOUNTER — TELEPHONE (OUTPATIENT)
Dept: SCHEDULING | Age: 30
End: 2020-07-15

## 2020-07-15 ENCOUNTER — TELEPHONE (OUTPATIENT)
Dept: INTERNAL MEDICINE | Age: 30
End: 2020-07-15

## 2020-07-15 ENCOUNTER — LAB SERVICES (OUTPATIENT)
Dept: LAB | Age: 30
End: 2020-07-15

## 2020-07-15 ENCOUNTER — NURSE ONLY (OUTPATIENT)
Dept: INTERNAL MEDICINE | Age: 30
End: 2020-07-15

## 2020-07-15 DIAGNOSIS — Z23 NEED FOR MMR VACCINE: Primary | ICD-10-CM

## 2020-07-15 DIAGNOSIS — E03.9 HYPOTHYROIDISM, UNSPECIFIED TYPE: ICD-10-CM

## 2020-07-15 DIAGNOSIS — E55.9 VITAMIN D DEFICIENCY: ICD-10-CM

## 2020-07-15 PROCEDURE — 87591 N.GONORRHOEAE DNA AMP PROB: CPT | Performed by: INTERNAL MEDICINE

## 2020-07-15 PROCEDURE — 3080F DIAST BP >= 90 MM HG: CPT | Performed by: INTERNAL MEDICINE

## 2020-07-15 PROCEDURE — 3075F SYST BP GE 130 - 139MM HG: CPT | Performed by: INTERNAL MEDICINE

## 2020-07-15 PROCEDURE — 90707 MMR VACCINE SC: CPT

## 2020-07-15 PROCEDURE — 90471 IMMUNIZATION ADMIN: CPT

## 2020-07-15 PROCEDURE — 87491 CHLMYD TRACH DNA AMP PROBE: CPT | Performed by: INTERNAL MEDICINE

## 2020-07-15 PROCEDURE — 82306 VITAMIN D 25 HYDROXY: CPT | Performed by: INTERNAL MEDICINE

## 2020-07-15 PROCEDURE — 84443 ASSAY THYROID STIM HORMONE: CPT | Performed by: INTERNAL MEDICINE

## 2020-07-15 ASSESSMENT — PATIENT HEALTH QUESTIONNAIRE - PHQ9
2. FEELING DOWN, DEPRESSED OR HOPELESS: NOT AT ALL
1. LITTLE INTEREST OR PLEASURE IN DOING THINGS: NOT AT ALL
CLINICAL INTERPRETATION OF PHQ9 SCORE: NO FURTHER SCREENING NEEDED
SUM OF ALL RESPONSES TO PHQ9 QUESTIONS 1 AND 2: 0
SUM OF ALL RESPONSES TO PHQ9 QUESTIONS 1 AND 2: 0
CLINICAL INTERPRETATION OF PHQ2 SCORE: NO FURTHER SCREENING NEEDED

## 2020-07-15 ASSESSMENT — PAIN SCALES - GENERAL: PAINLEVEL: 0

## 2020-07-15 NOTE — TELEPHONE ENCOUNTER
Called and spoke with pt  OB/GYN will be calling her to try and get her scheduled same day as labs 09/17  Explained to answer all [phone] numbers  Pt expressed understanding

## 2020-07-15 NOTE — TELEPHONE ENCOUNTER
----- Message from Tori Appiah sent at 7/15/2020  9:19 AM CDT -----  Pt is looking to est care for an annual.    Pt can be reached at- [phone]

## 2020-07-16 LAB
25(OH)D3+25(OH)D2 SERPL-MCNC: 11.9 NG/ML (ref 30–100)
C TRACH RRNA SPEC QL NAA+PROBE: NEGATIVE
N GONORRHOEA RRNA SPEC QL NAA+PROBE: NEGATIVE
SPECIMEN SOURCE: NORMAL
TSH SERPL-ACNC: 82.56 MCUNITS/ML (ref 0.35–5)

## 2020-07-21 ENCOUNTER — TELEPHONE (OUTPATIENT)
Dept: ENDOSCOPY | Facility: HOSPITAL | Age: 30
End: 2020-07-21

## 2020-07-21 DIAGNOSIS — Z12.11 SPECIAL SCREENING FOR MALIGNANT NEOPLASMS, COLON: Primary | ICD-10-CM

## 2020-07-21 DIAGNOSIS — Z01.812 PRE-PROCEDURE LAB EXAM: Primary | ICD-10-CM

## 2020-07-21 RX ORDER — POLYETHYLENE GLYCOL 3350, SODIUM SULFATE ANHYDROUS, SODIUM BICARBONATE, SODIUM CHLORIDE, POTASSIUM CHLORIDE 236; 22.74; 6.74; 5.86; 2.97 G/4L; G/4L; G/4L; G/4L; G/4L
4 POWDER, FOR SOLUTION ORAL ONCE
Qty: 4000 ML | Refills: 0 | Status: SHIPPED | OUTPATIENT
Start: 2020-07-21 | End: 2020-07-21

## 2020-07-21 RX ORDER — ITRACONAZOLE 100 MG/1
100 CAPSULE ORAL DAILY
COMMUNITY
End: 2020-11-02

## 2020-07-27 ENCOUNTER — OFFICE VISIT (OUTPATIENT)
Dept: OBGYN | Age: 30
End: 2020-07-27

## 2020-07-27 VITALS
SYSTOLIC BLOOD PRESSURE: 148 MMHG | TEMPERATURE: 98.4 F | HEIGHT: 69 IN | BODY MASS INDEX: 43.4 KG/M2 | DIASTOLIC BLOOD PRESSURE: 105 MMHG | RESPIRATION RATE: 18 BRPM | HEART RATE: 93 BPM | WEIGHT: 293 LBS

## 2020-07-27 DIAGNOSIS — I10 HYPERTENSION, UNCONTROLLED: ICD-10-CM

## 2020-07-27 DIAGNOSIS — N89.8 VAGINAL DISCHARGE: ICD-10-CM

## 2020-07-27 DIAGNOSIS — Z01.419 ENCOUNTER FOR CERVICAL PAP SMEAR WITH PELVIC EXAM: Primary | ICD-10-CM

## 2020-07-27 DIAGNOSIS — E66.01 MORBID OBESITY WITH BMI OF 50.0-59.9, ADULT (CMD): ICD-10-CM

## 2020-07-27 PROCEDURE — 99395 PREV VISIT EST AGE 18-39: CPT | Performed by: NURSE PRACTITIONER

## 2020-07-27 PROCEDURE — 87660 TRICHOMONAS VAGIN DIR PROBE: CPT | Performed by: NURSE PRACTITIONER

## 2020-07-27 PROCEDURE — 87510 GARDNER VAG DNA DIR PROBE: CPT | Performed by: NURSE PRACTITIONER

## 2020-07-27 PROCEDURE — 87480 CANDIDA DNA DIR PROBE: CPT | Performed by: NURSE PRACTITIONER

## 2020-07-27 SDOH — HEALTH STABILITY: MENTAL HEALTH: HOW OFTEN DO YOU HAVE A DRINK CONTAINING ALCOHOL?: 2-4 TIMES A MONTH

## 2020-07-27 ASSESSMENT — ENCOUNTER SYMPTOMS
EYES NEGATIVE: 1
CONSTITUTIONAL NEGATIVE: 1
ALLERGIC/IMMUNOLOGIC NEGATIVE: 1
HEMATOLOGIC/LYMPHATIC NEGATIVE: 1
GASTROINTESTINAL NEGATIVE: 1
NEUROLOGICAL NEGATIVE: 1
ENDOCRINE NEGATIVE: 1
PSYCHIATRIC NEGATIVE: 1
RESPIRATORY NEGATIVE: 1

## 2020-07-29 ENCOUNTER — TELEPHONE (OUTPATIENT)
Dept: SCHEDULING | Age: 30
End: 2020-07-29

## 2020-07-29 LAB
CANDIDA RRNA VAG QL PROBE: POSITIVE
G VAGINALIS RRNA GENITAL QL PROBE: POSITIVE
T VAGINALIS RRNA GENITAL QL PROBE: NEGATIVE

## 2020-07-31 ENCOUNTER — TELEPHONE (OUTPATIENT)
Dept: OBGYN | Age: 30
End: 2020-07-31

## 2020-07-31 DIAGNOSIS — B96.89 BACTERIAL VAGINOSIS: Primary | ICD-10-CM

## 2020-07-31 DIAGNOSIS — N76.0 BACTERIAL VAGINOSIS: Primary | ICD-10-CM

## 2020-07-31 DIAGNOSIS — B37.31 CANDIDA VAGINITIS: ICD-10-CM

## 2020-07-31 RX ORDER — FLUCONAZOLE 150 MG/1
150 TABLET ORAL ONCE
Qty: 1 TABLET | Refills: 1 | Status: SHIPPED | OUTPATIENT
Start: 2020-07-31 | End: 2020-07-31

## 2020-07-31 RX ORDER — METRONIDAZOLE 500 MG/1
500 TABLET ORAL 2 TIMES DAILY WITH MEALS
Qty: 14 TABLET | Refills: 0 | Status: SHIPPED | OUTPATIENT
Start: 2020-07-31 | End: 2020-08-07

## 2020-08-05 LAB — CYTOLOGY CVX/VAG DOC THIN PREP: NORMAL

## 2020-08-18 ENCOUNTER — LAB VISIT (OUTPATIENT)
Dept: SURGERY | Facility: CLINIC | Age: 30
End: 2020-08-18
Payer: MEDICAID

## 2020-08-18 DIAGNOSIS — Z01.812 PRE-PROCEDURE LAB EXAM: ICD-10-CM

## 2020-08-18 PROCEDURE — U0003 INFECTIOUS AGENT DETECTION BY NUCLEIC ACID (DNA OR RNA); SEVERE ACUTE RESPIRATORY SYNDROME CORONAVIRUS 2 (SARS-COV-2) (CORONAVIRUS DISEASE [COVID-19]), AMPLIFIED PROBE TECHNIQUE, MAKING USE OF HIGH THROUGHPUT TECHNOLOGIES AS DESCRIBED BY CMS-2020-01-R: HCPCS

## 2020-08-19 LAB — SARS-COV-2 RNA RESP QL NAA+PROBE: NOT DETECTED

## 2020-08-21 DIAGNOSIS — Z12.11 SPECIAL SCREENING FOR MALIGNANT NEOPLASMS, COLON: Primary | ICD-10-CM

## 2020-08-21 DIAGNOSIS — Z01.818 PRE-OP TESTING: ICD-10-CM

## 2020-08-21 RX ORDER — POLYETHYLENE GLYCOL 3350, SODIUM SULFATE ANHYDROUS, SODIUM BICARBONATE, SODIUM CHLORIDE, POTASSIUM CHLORIDE 236; 22.74; 6.74; 5.86; 2.97 G/4L; G/4L; G/4L; G/4L; G/4L
4 POWDER, FOR SOLUTION ORAL ONCE
Qty: 4000 ML | Refills: 0 | Status: SHIPPED | OUTPATIENT
Start: 2020-08-21 | End: 2020-08-21

## 2020-09-01 ENCOUNTER — LAB VISIT (OUTPATIENT)
Dept: SURGERY | Facility: CLINIC | Age: 30
End: 2020-09-01
Payer: MEDICAID

## 2020-09-01 ENCOUNTER — V-VISIT (OUTPATIENT)
Dept: INTERNAL MEDICINE | Age: 30
End: 2020-09-01

## 2020-09-01 DIAGNOSIS — Z01.818 PRE-OP TESTING: ICD-10-CM

## 2020-09-01 DIAGNOSIS — E78.00 HYPERCHOLESTEREMIA: ICD-10-CM

## 2020-09-01 DIAGNOSIS — Z71.89 ADVICE GIVEN ABOUT 2019 NOVEL CORONAVIRUS BY TELEPHONE: ICD-10-CM

## 2020-09-01 DIAGNOSIS — I10 HYPERTENSION, UNCONTROLLED: Primary | ICD-10-CM

## 2020-09-01 DIAGNOSIS — R73.09 ELEVATED HEMOGLOBIN A1C: ICD-10-CM

## 2020-09-01 DIAGNOSIS — E03.9 HYPOTHYROIDISM, UNSPECIFIED TYPE: ICD-10-CM

## 2020-09-01 DIAGNOSIS — E66.01 MORBID OBESITY WITH BMI OF 50.0-59.9, ADULT (CMD): ICD-10-CM

## 2020-09-01 DIAGNOSIS — E55.9 VITAMIN D DEFICIENCY: ICD-10-CM

## 2020-09-01 PROBLEM — N89.8 VAGINAL DISCHARGE: Status: RESOLVED | Noted: 2020-07-27 | Resolved: 2020-09-01

## 2020-09-01 PROBLEM — B37.31 CANDIDA VAGINITIS: Status: RESOLVED | Noted: 2020-07-31 | Resolved: 2020-09-01

## 2020-09-01 LAB — SARS-COV-2 RNA RESP QL NAA+PROBE: NOT DETECTED

## 2020-09-01 PROCEDURE — U0003 INFECTIOUS AGENT DETECTION BY NUCLEIC ACID (DNA OR RNA); SEVERE ACUTE RESPIRATORY SYNDROME CORONAVIRUS 2 (SARS-COV-2) (CORONAVIRUS DISEASE [COVID-19]), AMPLIFIED PROBE TECHNIQUE, MAKING USE OF HIGH THROUGHPUT TECHNOLOGIES AS DESCRIBED BY CMS-2020-01-R: HCPCS

## 2020-09-01 PROCEDURE — 99214 OFFICE O/P EST MOD 30 MIN: CPT | Performed by: INTERNAL MEDICINE

## 2020-09-01 RX ORDER — CHOLECALCIFEROL (VITAMIN D3) 125 MCG
10000 CAPSULE ORAL DAILY
Qty: 180 CAPSULE | Refills: 1 | Status: ON HOLD | OUTPATIENT
Start: 2020-09-01 | End: 2022-06-21 | Stop reason: HOSPADM

## 2020-09-01 RX ORDER — LEVOTHYROXINE SODIUM 0.12 MG/1
125 TABLET ORAL DAILY
Qty: 30 TABLET | Refills: 3 | Status: SHIPPED | OUTPATIENT
Start: 2020-09-01 | End: 2020-10-14 | Stop reason: SDUPTHER

## 2020-09-01 RX ORDER — AMLODIPINE BESYLATE 5 MG/1
5 TABLET ORAL DAILY
Qty: 30 TABLET | Refills: 3 | Status: SHIPPED | OUTPATIENT
Start: 2020-09-01 | End: 2020-10-14 | Stop reason: SDUPTHER

## 2020-09-01 SDOH — HEALTH STABILITY: MENTAL HEALTH: HOW OFTEN DO YOU HAVE A DRINK CONTAINING ALCOHOL?: 2-4 TIMES A MONTH

## 2020-09-01 SDOH — HEALTH STABILITY: MENTAL HEALTH
STRESS IS WHEN SOMEONE FEELS TENSE, NERVOUS, ANXIOUS, OR CAN'T SLEEP AT NIGHT BECAUSE THEIR MIND IS TROUBLED. HOW STRESSED ARE YOU?: NOT AT ALL

## 2020-09-01 SDOH — HEALTH STABILITY: PHYSICAL HEALTH: ON AVERAGE, HOW MANY DAYS PER WEEK DO YOU ENGAGE IN MODERATE TO STRENUOUS EXERCISE (LIKE A BRISK WALK)?: 0 DAYS

## 2020-09-01 SDOH — HEALTH STABILITY: PHYSICAL HEALTH: ON AVERAGE, HOW MANY MINUTES DO YOU ENGAGE IN EXERCISE AT THIS LEVEL?: 0 MIN

## 2020-09-01 ASSESSMENT — PATIENT HEALTH QUESTIONNAIRE - PHQ9
2. FEELING DOWN, DEPRESSED OR HOPELESS: NOT AT ALL
SUM OF ALL RESPONSES TO PHQ9 QUESTIONS 1 AND 2: 0
CLINICAL INTERPRETATION OF PHQ9 SCORE: NO FURTHER SCREENING NEEDED
CLINICAL INTERPRETATION OF PHQ2 SCORE: NO FURTHER SCREENING NEEDED
1. LITTLE INTEREST OR PLEASURE IN DOING THINGS: NOT AT ALL
SUM OF ALL RESPONSES TO PHQ9 QUESTIONS 1 AND 2: 0

## 2020-09-03 DIAGNOSIS — Z12.11 SPECIAL SCREENING FOR MALIGNANT NEOPLASMS, COLON: Primary | ICD-10-CM

## 2020-09-03 RX ORDER — POLYETHYLENE GLYCOL 3350, SODIUM SULFATE ANHYDROUS, SODIUM BICARBONATE, SODIUM CHLORIDE, POTASSIUM CHLORIDE 236; 22.74; 6.74; 5.86; 2.97 G/4L; G/4L; G/4L; G/4L; G/4L
4 POWDER, FOR SOLUTION ORAL ONCE
Qty: 4000 ML | Refills: 0 | Status: SHIPPED | OUTPATIENT
Start: 2020-09-03 | End: 2020-09-03

## 2020-09-04 ENCOUNTER — HOSPITAL ENCOUNTER (OUTPATIENT)
Facility: HOSPITAL | Age: 30
Discharge: HOME | End: 2020-09-04
Attending: INTERNAL MEDICINE | Admitting: INTERNAL MEDICINE
Payer: MEDICAID

## 2020-09-04 ENCOUNTER — TELEPHONE (OUTPATIENT)
Dept: ENDOSCOPY | Facility: HOSPITAL | Age: 30
End: 2020-09-04

## 2020-09-04 ENCOUNTER — PATIENT MESSAGE (OUTPATIENT)
Dept: ENDOSCOPY | Facility: HOSPITAL | Age: 30
End: 2020-09-04

## 2020-09-04 DIAGNOSIS — K50.80 CROHN'S DISEASE OF BOTH SMALL AND LARGE INTESTINE WITHOUT COMPLICATION: Primary | ICD-10-CM

## 2020-09-04 DIAGNOSIS — Z01.812 PRE-PROCEDURE LAB EXAM: Primary | ICD-10-CM

## 2020-09-04 DIAGNOSIS — D84.9 IMMUNOSUPPRESSED STATUS: ICD-10-CM

## 2020-09-04 DIAGNOSIS — Z12.11 SPECIAL SCREENING FOR MALIGNANT NEOPLASMS, COLON: Primary | ICD-10-CM

## 2020-09-04 DIAGNOSIS — R11.0 NAUSEA: ICD-10-CM

## 2020-09-04 LAB
B-HCG UR QL: NEGATIVE
CTP QC/QA: YES

## 2020-09-04 PROCEDURE — 81025 URINE PREGNANCY TEST: CPT | Performed by: INTERNAL MEDICINE

## 2020-09-04 RX ORDER — POLYETHYLENE GLYCOL 3350, SODIUM SULFATE ANHYDROUS, SODIUM BICARBONATE, SODIUM CHLORIDE, POTASSIUM CHLORIDE 236; 22.74; 6.74; 5.86; 2.97 G/4L; G/4L; G/4L; G/4L; G/4L
4 POWDER, FOR SOLUTION ORAL ONCE
Qty: 4000 ML | Refills: 0 | Status: SHIPPED | OUTPATIENT
Start: 2020-09-04 | End: 2020-09-05

## 2020-09-04 RX ORDER — METOCLOPRAMIDE 10 MG/1
10 TABLET ORAL SEE ADMIN INSTRUCTIONS
Qty: 2 TABLET | Refills: 0 | Status: SHIPPED | OUTPATIENT
Start: 2020-09-04 | End: 2020-10-02 | Stop reason: SDUPTHER

## 2020-09-04 RX ORDER — SODIUM CHLORIDE 9 MG/ML
INJECTION, SOLUTION INTRAVENOUS CONTINUOUS
Status: DISCONTINUED | OUTPATIENT
Start: 2020-09-04 | End: 2020-09-04 | Stop reason: HOSPADM

## 2020-09-04 NOTE — TELEPHONE ENCOUNTER
Let pt know to take reglan 10 mg 30 min before each 1/2 of bowel prep.  Prescription sent to [facility] on [address]

## 2020-09-04 NOTE — TELEPHONE ENCOUNTER
Patient rescheduled on 9/10/20 with Dr. SKY Bacon on 4th floor Endoscopy Unit for Colonoscopy procedure.    Patient requested to have medication called into pharmacy to help with nausea when taking bowel prep.  Please contact patient to discuss.    Thanks,  Brittany

## 2020-09-04 NOTE — H&P
Short Stay Endoscopy History and Physical    PCP - Margarita Jay MD  Referring Physician - Suzi Bacon MD  [address] [place] [address]    Procedure - Colonoscopy  ASA - per anesthesia  Mallampati - per anesthesia  History of Anesthesia problems - no  Family history Anesthesia problems -  no   Plan of anesthesia - General    HPI  30 y.o. female  Reason for procedure: crohn's follow up    ROS:  Constitutional: No fevers, chills, No weight loss  CV: No chest pain  Pulm: No cough, No shortness of breath  GI: see HPI    Medical History:  has a past medical history of Allergy, Asthma, Crohn's disease (2/18/19), Depression, Eczema, Ex-smoker for less than 1 year, Immunosuppressed status (10/24/2019), and Joint pain.    Surgical History:  has a past surgical history that includes Colonoscopy (N/A, 2/20/2019); Esophagogastroduodenoscopy (N/A, 2/20/2019); Ileocolectomy (N/A, 5/10/2019); Ileostomy (N/A, 5/10/2019); Ileoscopy (N/A, 12/20/2019); and Ileostomy closure (N/A, 2/12/2020).    Family History: family history includes Hypertension in her mother; Irritable bowel syndrome in her paternal grandmother; No Known Problems in her brother, father, maternal grandfather, maternal grandmother, maternal uncle, maternal uncle, maternal uncle, paternal aunt, paternal aunt, paternal grandfather, paternal uncle, paternal uncle, paternal uncle, paternal uncle, sister, sister, sister, and sister..    Social History:  reports that she has been smoking cigarettes. She has been smoking about 0.25 packs per day. She has never used smokeless tobacco. She reports previous alcohol use. She reports that she does not use drugs.    Review of patient's allergies indicates:   Allergen Reactions    Horse/equine containing products Itching       Medications:   No medications prior to admission.       Physical Exam:    Vital Signs: There were no vitals filed for this visit.    General Appearance: Well appearing in no acute  distress  Abdomen: Soft, non tender, non distended with normal bowel sounds, no masses    Labs:  Lab Results   Component Value Date    WBC 5.70 07/02/2020    HGB 11.7 (L) 07/02/2020    HCT 39 07/02/2020     07/02/2020    TRIG 41 05/06/2019    ALT 15 07/02/2020    AST 20 07/02/2020     07/02/2020    K 3.6 07/02/2020     07/02/2020    CREATININE 0.8 07/02/2020    BUN 5 (L) 07/02/2020    CO2 23 07/02/2020    TSH 0.713 08/19/2015       I have explained the risks and benefits of this endoscopic procedure to the patient including but not limited to bleeding, inflammation, infection, perforation, and death.      Suzi Bacon MD    Procedure cancelled- pt only took laxatives and no bowel prep because she didn't pick it up.  Pt to be rescheduled. Spoke with endo schedulers, Brittany Mckeon

## 2020-09-07 ENCOUNTER — OFFICE VISIT (OUTPATIENT)
Dept: URGENT CARE | Facility: CLINIC | Age: 30
End: 2020-09-07
Payer: MEDICAID

## 2020-09-07 VITALS
TEMPERATURE: 98 F | RESPIRATION RATE: 20 BRPM | DIASTOLIC BLOOD PRESSURE: 78 MMHG | SYSTOLIC BLOOD PRESSURE: 127 MMHG | OXYGEN SATURATION: 98 % | HEART RATE: 105 BPM

## 2020-09-07 DIAGNOSIS — Z01.812 PRE-PROCEDURE LAB EXAM: ICD-10-CM

## 2020-09-07 DIAGNOSIS — L30.9 ECZEMA, UNSPECIFIED TYPE: ICD-10-CM

## 2020-09-07 DIAGNOSIS — N73.9 ABSCESS OF FEMALE GENITALIA: ICD-10-CM

## 2020-09-07 DIAGNOSIS — Z11.3 SCREEN FOR STD (SEXUALLY TRANSMITTED DISEASE): ICD-10-CM

## 2020-09-07 DIAGNOSIS — L40.9 PSORIASIS: ICD-10-CM

## 2020-09-07 DIAGNOSIS — N76.0 ACUTE VAGINITIS: Primary | ICD-10-CM

## 2020-09-07 LAB
B-HCG UR QL: NEGATIVE
BILIRUB UR QL STRIP: POSITIVE
CTP QC/QA: YES
GLUCOSE UR QL STRIP: NEGATIVE
KETONES UR QL STRIP: NEGATIVE
LEUKOCYTE ESTERASE UR QL STRIP: NEGATIVE
PH, POC UA: 5.5 (ref 5–8)
POC BLOOD, URINE: POSITIVE
POC NITRATES, URINE: POSITIVE
PROT UR QL STRIP: POSITIVE
SP GR UR STRIP: 1.02 (ref 1–1.03)
UROBILINOGEN UR STRIP-ACNC: ABNORMAL (ref 0.1–1.1)

## 2020-09-07 PROCEDURE — 99204 OFFICE O/P NEW MOD 45 MIN: CPT | Mod: 25,S$GLB,, | Performed by: PHYSICIAN ASSISTANT

## 2020-09-07 PROCEDURE — 81003 URINALYSIS AUTO W/O SCOPE: CPT | Mod: QW,S$GLB,, | Performed by: PHYSICIAN ASSISTANT

## 2020-09-07 PROCEDURE — 99204 PR OFFICE/OUTPT VISIT, NEW, LEVL IV, 45-59 MIN: ICD-10-PCS | Mod: 25,S$GLB,, | Performed by: PHYSICIAN ASSISTANT

## 2020-09-07 PROCEDURE — U0003 INFECTIOUS AGENT DETECTION BY NUCLEIC ACID (DNA OR RNA); SEVERE ACUTE RESPIRATORY SYNDROME CORONAVIRUS 2 (SARS-COV-2) (CORONAVIRUS DISEASE [COVID-19]), AMPLIFIED PROBE TECHNIQUE, MAKING USE OF HIGH THROUGHPUT TECHNOLOGIES AS DESCRIBED BY CMS-2020-01-R: HCPCS

## 2020-09-07 PROCEDURE — 81003 POCT URINALYSIS, DIPSTICK, AUTOMATED, W/O SCOPE: ICD-10-PCS | Mod: QW,S$GLB,, | Performed by: PHYSICIAN ASSISTANT

## 2020-09-07 RX ORDER — CEFTRIAXONE 250 MG/1
250 INJECTION, POWDER, FOR SOLUTION INTRAMUSCULAR; INTRAVENOUS
Status: COMPLETED | OUTPATIENT
Start: 2020-09-07 | End: 2020-09-07

## 2020-09-07 RX ORDER — MUPIROCIN 20 MG/G
OINTMENT TOPICAL 3 TIMES DAILY
Qty: 22 G | Refills: 0 | Status: SHIPPED | OUTPATIENT
Start: 2020-09-07 | End: 2020-11-02

## 2020-09-07 RX ORDER — DOXYCYCLINE 100 MG/1
100 CAPSULE ORAL EVERY 12 HOURS
Qty: 20 CAPSULE | Refills: 0 | Status: SHIPPED | OUTPATIENT
Start: 2020-09-07 | End: 2020-09-17

## 2020-09-07 RX ORDER — TRIAMCINOLONE ACETONIDE 1 MG/G
CREAM TOPICAL 2 TIMES DAILY
Qty: 80 G | Refills: 1 | Status: SHIPPED | OUTPATIENT
Start: 2020-09-07 | End: 2020-10-02 | Stop reason: SDUPTHER

## 2020-09-07 RX ORDER — HYDROXYZINE HYDROCHLORIDE 25 MG/1
25 TABLET, FILM COATED ORAL EVERY 4 HOURS PRN
Qty: 30 TABLET | Refills: 0 | Status: SHIPPED | OUTPATIENT
Start: 2020-09-07 | End: 2020-11-02

## 2020-09-07 RX ADMIN — CEFTRIAXONE 250 MG: 250 INJECTION, POWDER, FOR SOLUTION INTRAMUSCULAR; INTRAVENOUS at 12:09

## 2020-09-07 NOTE — PATIENT INSTRUCTIONS
General Discharge Instructions   If you were prescribed a narcotic or controlled medication, do not drive or operate heavy equipment or machinery while taking these medications.  If you were prescribed antibiotics, please take them to completion.  You must understand that you've received an Urgent Care treatment only and that you may be released before all your medical problems are known or treated. You, the patient, will arrange for follow up care as instructed.  Follow up with your PCP or specialty clinic as directed in the next 1-2 weeks if not improved or as needed.  You can call [phone] to schedule an appointment with the appropriate provider.  If your condition worsens we recommend that you receive another evaluation at the emergency room immediately or contact your primary medical clinics after hours call service to discuss your concerns.  Please return here or go to the Emergency Department for any concerns or worsening of condition.      Psoriasis  Psoriasis is an inflammatory condition that affects the skin and nails. You may have patches of thick, red skin (plaques) covered with silvery scales. These often appear on the elbows, knees, legs, lower back, and scalp.  The plaques itch and can be painful. People with this condition are more likely to have emotional stress and depression.  Psoriasis is not contagious. It cant spread to someone else who touches it. But it can be inherited. It is an autoimmune skin disease. This means that the immune system has an abnormal reaction. It treats healthy skin like it is a foreign substance. This causes skin cells to grow faster than normal and to stack up in raised red patches. Psoriasis is a long-term (chronic) disease. You will have flare-ups that come and go over time.  Smoking, sun exposure, and alcohol use may affect how often the psoriasis occurs and how long the flare-ups last.  There is no cure, but treatments can offer relief. Treatment can include  topical creams, light therapy (phototherapy), and oral or injectable medicines.  Home care  · No specific diet is needed. Eat a healthy, well-balanced diet that includes fresh fruits and vegetables, whole grains, and lean meats. Psoriasis can increase your risk for diabetes and heart disease.  · Increasing omega-3 fatty acids in your diet can help improve dry skin. The best dietary sources are fatty fish (salmon, mackerel, lake trout, albacore tuna) or fish oil (such as cod liver oil). A great way to take fish oil is to add it to a juice, shake, or smoothie. Flaxseeds and flaxseed oil, canola oil, walnuts, soybean, and tofu are converted to omega-3 fatty acid in the body.  · Stay at a healthy weight. Overlapping skin folds can be a site for psoriasis plaques. If you are overweight, talk to your healthcare provider about a weight-loss program.  · Bathing daily can help remove scales and calm inflamed skin. Use lukewarm water and mild soaps that have added oils, fats, and moisturizers. Avoid deodorants, antiperspirants, and antibacterial soaps. These have a drying effect. Many people find it helpful to soak in a tub with added bath oils, oatmeal, apple cider vinegar, or Epsom salts.  · After bathing, put on skin cream (or a skin oil for a stronger effect).  · Some exposure to UV rays from the sun can improve psoriasis. But too much sun can trigger an outbreak. It also raises your risk for skin cancer. Limit sun exposure and use sunscreen on healthy skin (at least 15 SPF).  · If you are prescribed medicine, take it as directed.  · Unless another steroid cream was prescribed, you may use over-the-counter hydrocortisone cream for a few weeks during symptom flare-ups.  · Stop smoking. If you are a long-time smoker, this can be hard. Think about joining a stop-smoking program.  · Tell your provider if your joints start to ache or get stiff.  · Tell your provider if you notice changes in your fingernails.  · Depression is  more common among psoriasis patients. Get help if you notice changes in your mood.  Follow-up care  Follow up with your healthcare provider, or as advised.  When to seek medical advice  Call your healthcare provider right away if any of these occur:  · Skin pain gets worse  · Bleeding from the skin plaques that is hard to control  · Signs of skin infection (redness, increasing pain, swelling, pus)  · Fever of 1 degree, or higher, above your normal temperature, or as directed by your provider  Date Last Reviewed: 8/1/2016 © 2000-2017 [employer]. [address] [place] [id number]. All rights reserved. This information is not intended as a substitute for professional medical care. Always follow your healthcare professional's instructions.      Atopic Dermatitis (Adult)  Atopic dermatitis is a dry, itchy, red rash. Its also called eczema. The rash is chronic, or ongoing. It can come and go over time. The disease is often passed down in families. It causes a problem with the skin barrier that makes the skin more sensitive to the environment and other factors. The increased skin sensitivity causes an itch, which causes scratching. Scratching can worsen the itching or also break the skin. This can put the skin at risk of infection.  The condition is most common in people with asthma, hay fever, hives, or dry or sensitive skin. The rash may be caused by extreme heat or heavy sweating. Skin irritants can cause the rash to flare up. These can include wool or silk clothing, grease, oils, some medicines, and harsh soaps and detergents. Emotional stress can also be a trigger.  Treatment is done to relieve the itching and inflammation of the skin.  Home care  Follow these tips to care for your condition:  · Keep the areas of rash clean by bathing at least every other day. Use lukewarm water to bathe. Dont use hot water, which can dry out the skin.  · Dont use soaps with strong detergents. Use mild soaps  made for sensitive skin.  · Apply a cream or ointment to damp skin right after bathing.  · Avoid things that irritate your skin. Wear absorbent, soft fabrics next to the skin rather than rough or scratchy materials.  · Use mild laundry soap free of scents and perfumes. Make sure to rinse all the soap out of your clothes.  · Treat any skin infection as directed.  · Use oral diphenhydramine to help reduce itching. This is an antihistamine you can buy at drug and grocery stores. It can make you sleepy, so use lower doses during the daytime. Or you can use loratadine. This is an antihistamine that will not make you sleepy. Do not use diphenhydramine if you have glaucoma or have trouble urinating due to an enlarged prostate.  Follow-up care  See your healthcare provider, or as advised. If your symptoms dont get better or if they get worse in the next 7 days, make an appointment with your healthcare provider.  When to seek medical advice  Call your healthcare provider right away  if any of these occur:  · Increasing area of redness or pain in the skin  · Yellow crusts or wet drainage from the rash  · Fever of 100.4°F (38°C) or higher, or as directed by your healthcare provider  Date Last Reviewed: 9/1/2016 © 2000-2017 [employer]. [address] [place] [address], [place] [id number]. All rights reserved. This information is not intended as a substitute for professional medical care. Always follow your healthcare professional's instructions.      Abscess (Antibiotic Treatment Only)  An abscess (sometimes called a boil) happens when bacteria get trapped under the skin and start to grow. Pus forms inside the abscess as the body responds to the bacteria. An abscess can happen with an insect bite, ingrown hair, blocked oil gland, pimple, cyst, or puncture wound.  In the early stages, your wound may be red and tender. For this stage, you may get antibiotics. If the abscess does not get better with antibiotics, it will  need to be drained with a small cut.  Home care  These tips will help you care for your abscess at home:  · Soak the wound in hot water or apply hot packs (small towel soaked in hot water) to the area for 20 minutes at a time. Do this 3 to 4 times a day.  · Do not cut, squeeze, or pop the boil yourself.  · Apply antibiotic cream or ointment to the skin 3 to 4 times a day, unless something else was prescribed. Some ointments include an antibiotic plus a pain reliever.  · If your doctor prescribed antibiotics, do not stop taking them until you have finished the medicine or the doctor tells you to stop.  · You may use an over-the-counter pain medicine to control pain, unless another pain medicine was prescribed. If you have chronic liver or kidney disease or ever had a stomach ulcer or gastrointestinal bleeding, talk with your doctor before using these any of these.  Follow-up care  Follow up with your healthcare provider, or as advised. Check your wound each day for the signs of worsening infection listed below.  When to seek medical advice  Get prompt medical attention if any of these occur:  · An increase in redness or swelling  · Red streaks in the skin leading away from the abscess  · An increase in local pain or swelling  · Fever of 100.4ºF (38ºC) or higher, or as directed by your healthcare provider  · Pus or fluid coming from the abscess  · Boil returns after getting better  Date Last Reviewed: 9/1/2016  © 5211-7156 [employer], [employer]. [address] [place] [address], [place] [address]. All rights reserved. This information is not intended as a substitute for professional medical care. Always follow your healthcare professional's instructions.      Vaginal Infection: Understanding the Vaginal Environment  The vagina is a canal. It connects the uterus (womb) to the outside of the body. It is home to many types of bacteria and other tiny organisms. These different bacteria most often stay balanced in number. This  keeps the vagina healthy. If the balance changes, it can cause infection.   A healthy environment  Many types of bacteria are present in a healthy vagina. When balanced, they dont cause problems. Small amounts of yeast may also be present without causing problems. The most common type of bacteria in the vagina is lactobacillus. It helps keep the vagina at a low pH. A low pH keeps bad bacteria from taking over.  Normal vaginal discharge  The vagina makes fluid. It is sent out as discharge. This also keeps the vagina healthy. Normal discharge can be clear, white, or yellowish. Most women find that normal discharge varies in amount and color through the month.  An unhealthy environment  The vaginal environment may get out of balance. This may result in a vaginal infection. There are a few reasons this can happen. The pH may have changed. The amount of one organism, such as yeast, may increase. Or an outside organism may get into the vagina and throw off the balance:  · Bacterial vaginosis (BV). BV is due to an imbalance in the normal bacteria in the vagina. Lactobacillus bacteria decrease. As a result, the numbers of bad bacteria increase.  · Candidiasis (yeast infection). Yeast is a type of fungus. A yeast infection occurs when yeast cells in the vagina increase. They then attack vaginal tissues. A type of yeast called Candida albicans is often involved.  · Trichomoniasis (trich). Trich is a parasite. It is passed from one person to another during sex. Men with trich often dont have any symptoms. In women, it can take weeks or months before symptoms appear.  Date Last Reviewed: 3/1/2017  © 3677-7597 [employer]. [address] [place] [address], [place] [address]. All rights reserved. This information is not intended as a substitute for professional medical care. Always follow your healthcare professional's instructions.      What Are Sexually Transmitted Diseases (STDs)?  A sexually transmitted disease (STD)  is a disease that is spread during sex. (An STD can also be called STI for sexually transmitted infection.) You can become infected with an STD if you have sex with someone who has an STD. Any sex that involves the penis, vagina, anus, or mouth can spread disease. Some STDs spread through body fluids such as semen, vaginal fluid, or blood. Others spread through contact with affected skin.  Who is at risk?     Places on or in the body where STDs cause damage include reproductive organs, the rectum, and the mouth.   It doesnt matter if youre straight or yu, male or female, young or old. Any person who has sex can get an STD. Your risk increases if:  · You have more than one partner. The more partners you have, the greater your risk.  · Your partner has other partners. If your partner is exposed to an STD, you could be, too.  · You or your partner have had sex with other people in the past. Either of you might be carrying an STD from an earlier partner.  · You have an STD. The STD may cause sores or other health problems that increase your risk of new infections. Your risk will stay high unless you change the behaviors that put you at risk of the current infection.  Prevent future problems  Left untreated, certain STDs can lead to cancer or even death. Some can harm unborn babies whose mothers are infected. Others can cause you to not be able to have children (sterility) or can affect changes in behavior or your ability to think. You can prevent these problems with safer sex, regular checkups, and early treatment. Always use a latex condom when you have sex. Get tested if youre at risk. And get treated early if you have an STD.  Getting checked  The only sure way to know if you have an STD is to get checked by a healthcare provider. If you notice a change in how your body looks or feels, have it checked out. But keep in mind, STDs dont always show symptoms. So if youre at risk of STDs, get checked regularly. If  you find you have an STD, be sure your partner gets treatment, too. If not, his or her health is at risk. And left untreated, your partner could pass the STD back to you, or on to others.  Common symptoms  Be alert to any changes in your body and your partners body. Symptoms may appear in or near the vagina, penis, rectum, mouth, or throat. They include:  · Unusual discharge  · Lumps, bumps, or rashes  · Sores that may be painful, itchy, or painless  · Itchy skin  · Burning with urination  · Pain in the pelvis, belly (abdomen), or rectum  Even if you dont have symptoms  You may have an STD, even if you dont have symptoms. If you think you are at risk, get checked. Go to a clinic or to your healthcare provider. If your partner has an STD, you need to be tested too, even if you feel fine.  Vaccines to prevent disease  Vaccines (also called immunizations) are available to prevent hepatitis A and hepatitis B. These are two kinds of STDs. There is also a vaccine to prevent HPV. This is a virus that can be passed from person to person through sexual contact. Ask your healthcare provider whether any of these vaccines is right for you.   Date Last Reviewed: 11/1/2016  © 7191-5968 [employer]. [address] [place] [address], [place] [address]. All rights reserved. This information is not intended as a substitute for professional medical care. Always follow your healthcare professional's instructions.

## 2020-09-07 NOTE — PROGRESS NOTES
Subjective:       Patient ID: Lora Scott is a 30 y.o. female.    Vitals:  temperature is 98.1 °F (36.7 °C). Her blood pressure is 127/78 and her pulse is 105. Her respiration is 20 and oxygen saturation is 98%.     Chief Complaint: Abscess and Rash    Patient presenting with diffuse rash (h/o psoriasis and eczema) requesting refill on topical cream, vaginal discharge, and possible vaginal abscess x 3 weeks. States that she's been having abnormal vaginal discharge since. Reports that it's been green, black, and very malodorous. Reports that she just broke up with her boyfriend last week. There is concern for STDs.    Patient also scheduled for a colonoscopy in 3 days and requires COVID screening.    Rash  This is a chronic problem. The current episode started 1 to 4 weeks ago. The problem has been gradually worsening since onset. The rash is diffuse. The rash is characterized by redness, swelling, itchiness, draining and burning. She was exposed to nothing. Pertinent negatives include no congestion, cough, fatigue, fever, shortness of breath, sore throat or vomiting. Past treatments include nothing. The treatment provided no relief. Her past medical history is significant for asthma and eczema.       Constitution: Negative for chills, sweating, fatigue and fever.   HENT: Negative for ear pain, facial swelling, congestion, sinus pain, sinus pressure, sore throat and voice change.    Neck: Negative for painful lymph nodes.   Eyes: Negative for eye itching, eye redness and eyelid swelling.   Respiratory: Positive for asthma. Negative for chest tightness, cough, sputum production, bloody sputum, COPD, shortness of breath, stridor and wheezing.    Gastrointestinal: Negative for nausea and vomiting.   Genitourinary: Positive for dysuria, flank pain, heavy menstrual bleeding, vaginal pain, vaginal discharge, painful intercourse and painful ejaculation. Negative for frequency, urgency, urine decreased, painful  menstruation, irregular menstruation, missed menses, ovarian cysts, genital trauma, vaginal bleeding, vaginal odor, genital sore and pelvic pain.   Musculoskeletal: Negative for joint pain, joint swelling and muscle ache.   Skin: Positive for rash, wound, lesion, skin thickening/induration, erythema and abscess (vaginal). Negative for color change, pale, abrasion, laceration, puncture wound, bruising, avulsion and hives.   Allergic/Immunologic: Positive for asthma and itching. Negative for environmental allergies, seasonal allergies, immunocompromised state and hives.   Hematologic/Lymphatic: Negative for swollen lymph nodes.       Objective:      Physical Exam   Constitutional: She is oriented to person, place, and time. She appears well-developed.   HENT:   Head: Normocephalic and atraumatic. Head is without abrasion, without contusion and without laceration.   Ears:   Right Ear: External ear normal.   Left Ear: External ear normal.   Nose: Nose normal.   Mouth/Throat: Oropharynx is clear and moist and mucous membranes are normal.   Eyes: Pupils are equal, round, and reactive to light. Conjunctivae, EOM and lids are normal.   Neck: Trachea normal, full passive range of motion without pain and phonation normal. Neck supple.   Cardiovascular: Normal rate, regular rhythm and normal heart sounds.   Pulmonary/Chest: Effort normal and breath sounds normal. No stridor. No respiratory distress.   Genitourinary:       There is tenderness on the right labia.   Musculoskeletal: Normal range of motion.   Neurological: She is alert and oriented to person, place, and time.   Skin: Skin is warm, dry, intact, rash, papular, maculopapular and abscessed. Capillary refill takes less than 2 seconds. Lesions:  erythema and lesionabrasion, burn, bruising and ecchymosis     Psychiatric: Her speech is normal and behavior is normal. Judgment and thought content normal.   Nursing note and vitals reviewed.        Recent Results (from the  past 48 hour(s))   POCT Urinalysis, Dipstick, Automated, W/O Scope    Collection Time: 09/07/20 12:15 PM   Result Value Ref Range    POC Blood, Urine Positive (A) Negative    POC Bilirubin, Urine Positive (A) Negative    POC Urobilinogen, Urine norm 0.1 - 1.1    POC Ketones, Urine Negative Negative    POC Protein, Urine Positive (A) Negative    POC Nitrates, Urine Positive (A) Negative    POC Glucose, Urine Negative Negative    pH, UA 5.5 5 - 8    POC Specific Gravity, Urine 1.025 1.003 - 1.029    POC Leukocytes, Urine Negative Negative   POCT urine pregnancy    Collection Time: 09/07/20 12:17 PM   Result Value Ref Range    POC Preg Test, Ur Negative Negative     Acceptable Yes        Assessment:       1. Acute vaginitis    2. Pre-procedure lab exam    3. Abscess of female genitalia    4. Screen for STD (sexually transmitted disease)    5. Psoriasis    6. Eczema, unspecified type        Plan:         Acute vaginitis  -     POCT Urinalysis, Dipstick, Automated, W/O Scope  -     POCT urine pregnancy  -     cefTRIAXone injection 250 mg  -     doxycycline (VIBRAMYCIN) 100 MG Cap; Take 1 capsule (100 mg total) by mouth every 12 (twelve) hours. for 10 days  Dispense: 20 capsule; Refill: 0  -     Culture, Urine  -     NuSwab Vaginitis Plus (VG+)    Pre-procedure lab exam  -     COVID-19 Routine Screening    Abscess of female genitalia  -     Cancel: Aerobic culture  -     cefTRIAXone injection 250 mg  -     doxycycline (VIBRAMYCIN) 100 MG Cap; Take 1 capsule (100 mg total) by mouth every 12 (twelve) hours. for 10 days  Dispense: 20 capsule; Refill: 0    Screen for STD (sexually transmitted disease)  -     cefTRIAXone injection 250 mg  -     doxycycline (VIBRAMYCIN) 100 MG Cap; Take 1 capsule (100 mg total) by mouth every 12 (twelve) hours. for 10 days  Dispense: 20 capsule; Refill: 0    Psoriasis  -     mupirocin (BACTROBAN) 2 % ointment; Apply topically 3 (three) times daily.  Dispense: 22 g; Refill: 0  -      triamcinolone acetonide 0.1% (KENALOG) 0.1 % cream; Apply topically 2 (two) times daily.  Dispense: 80 g; Refill: 1  -     hydrOXYzine HCL (ATARAX) 25 MG tablet; Take 1 tablet (25 mg total) by mouth every 4 (four) hours as needed for Itching.  Dispense: 30 tablet; Refill: 0    Eczema, unspecified type  -     triamcinolone acetonide 0.1% (KENALOG) 0.1 % cream; Apply topically 2 (two) times daily.  Dispense: 80 g; Refill: 1  -     hydrOXYzine HCL (ATARAX) 25 MG tablet; Take 1 tablet (25 mg total) by mouth every 4 (four) hours as needed for Itching.  Dispense: 30 tablet; Refill: 0          Patient Instructions     General Discharge Instructions   If you were prescribed a narcotic or controlled medication, do not drive or operate heavy equipment or machinery while taking these medications.  If you were prescribed antibiotics, please take them to completion.  You must understand that you've received an Urgent Care treatment only and that you may be released before all your medical problems are known or treated. You, the patient, will arrange for follow up care as instructed.  Follow up with your PCP or specialty clinic as directed in the next 1-2 weeks if not improved or as needed.  You can call [phone] to schedule an appointment with the appropriate provider.  If your condition worsens we recommend that you receive another evaluation at the emergency room immediately or contact your primary medical clinics after hours call service to discuss your concerns.  Please return here or go to the Emergency Department for any concerns or worsening of condition.      Psoriasis  Psoriasis is an inflammatory condition that affects the skin and nails. You may have patches of thick, red skin (plaques) covered with silvery scales. These often appear on the elbows, knees, legs, lower back, and scalp.  The plaques itch and can be painful. People with this condition are more likely to have emotional stress and  depression.  Psoriasis is not contagious. It cant spread to someone else who touches it. But it can be inherited. It is an autoimmune skin disease. This means that the immune system has an abnormal reaction. It treats healthy skin like it is a foreign substance. This causes skin cells to grow faster than normal and to stack up in raised red patches. Psoriasis is a long-term (chronic) disease. You will have flare-ups that come and go over time.  Smoking, sun exposure, and alcohol use may affect how often the psoriasis occurs and how long the flare-ups last.  There is no cure, but treatments can offer relief. Treatment can include topical creams, light therapy (phototherapy), and oral or injectable medicines.  Home care  · No specific diet is needed. Eat a healthy, well-balanced diet that includes fresh fruits and vegetables, whole grains, and lean meats. Psoriasis can increase your risk for diabetes and heart disease.  · Increasing omega-3 fatty acids in your diet can help improve dry skin. The best dietary sources are fatty fish (salmon, mackerel, lake trout, albacore tuna) or fish oil (such as cod liver oil). A great way to take fish oil is to add it to a juice, shake, or smoothie. Flaxseeds and flaxseed oil, canola oil, walnuts, soybean, and tofu are converted to omega-3 fatty acid in the body.  · Stay at a healthy weight. Overlapping skin folds can be a site for psoriasis plaques. If you are overweight, talk to your healthcare provider about a weight-loss program.  · Bathing daily can help remove scales and calm inflamed skin. Use lukewarm water and mild soaps that have added oils, fats, and moisturizers. Avoid deodorants, antiperspirants, and antibacterial soaps. These have a drying effect. Many people find it helpful to soak in a tub with added bath oils, oatmeal, apple cider vinegar, or Epsom salts.  · After bathing, put on skin cream (or a skin oil for a stronger effect).  · Some exposure to UV rays from the  sun can improve psoriasis. But too much sun can trigger an outbreak. It also raises your risk for skin cancer. Limit sun exposure and use sunscreen on healthy skin (at least 15 SPF).  · If you are prescribed medicine, take it as directed.  · Unless another steroid cream was prescribed, you may use over-the-counter hydrocortisone cream for a few weeks during symptom flare-ups.  · Stop smoking. If you are a long-time smoker, this can be hard. Think about joining a stop-smoking program.  · Tell your provider if your joints start to ache or get stiff.  · Tell your provider if you notice changes in your fingernails.  · Depression is more common among psoriasis patients. Get help if you notice changes in your mood.  Follow-up care  Follow up with your healthcare provider, or as advised.  When to seek medical advice  Call your healthcare provider right away if any of these occur:  · Skin pain gets worse  · Bleeding from the skin plaques that is hard to control  · Signs of skin infection (redness, increasing pain, swelling, pus)  · Fever of 1 degree, or higher, above your normal temperature, or as directed by your provider  Date Last Reviewed: 8/1/2016  © 4463-4309 [email or web address]. [address] [place] [address]. All rights reserved. This information is not intended as a substitute for professional medical care. Always follow your healthcare professional's instructions.      Atopic Dermatitis (Adult)  Atopic dermatitis is a dry, itchy, red rash. Its also called eczema. The rash is chronic, or ongoing. It can come and go over time. The disease is often passed down in families. It causes a problem with the skin barrier that makes the skin more sensitive to the environment and other factors. The increased skin sensitivity causes an itch, which causes scratching. Scratching can worsen the itching or also break the skin. This can put the skin at risk of infection.  The condition is most common in people  with asthma, hay fever, hives, or dry or sensitive skin. The rash may be caused by extreme heat or heavy sweating. Skin irritants can cause the rash to flare up. These can include wool or silk clothing, grease, oils, some medicines, and harsh soaps and detergents. Emotional stress can also be a trigger.  Treatment is done to relieve the itching and inflammation of the skin.  Home care  Follow these tips to care for your condition:  · Keep the areas of rash clean by bathing at least every other day. Use lukewarm water to bathe. Dont use hot water, which can dry out the skin.  · Dont use soaps with strong detergents. Use mild soaps made for sensitive skin.  · Apply a cream or ointment to damp skin right after bathing.  · Avoid things that irritate your skin. Wear absorbent, soft fabrics next to the skin rather than rough or scratchy materials.  · Use mild laundry soap free of scents and perfumes. Make sure to rinse all the soap out of your clothes.  · Treat any skin infection as directed.  · Use oral diphenhydramine to help reduce itching. This is an antihistamine you can buy at drug and grocery stores. It can make you sleepy, so use lower doses during the daytime. Or you can use loratadine. This is an antihistamine that will not make you sleepy. Do not use diphenhydramine if you have glaucoma or have trouble urinating due to an enlarged prostate.  Follow-up care  See your healthcare provider, or as advised. If your symptoms dont get better or if they get worse in the next 7 days, make an appointment with your healthcare provider.  When to seek medical advice  Call your healthcare provider right away  if any of these occur:  · Increasing area of redness or pain in the skin  · Yellow crusts or wet drainage from the rash  · Fever of 100.4°F (38°C) or higher, or as directed by your healthcare provider  Date Last Reviewed: 9/1/2016  © 6967-9034 [employer]. [address] [place] [address], [place] [address]. All  rights reserved. This information is not intended as a substitute for professional medical care. Always follow your healthcare professional's instructions.      Abscess (Antibiotic Treatment Only)  An abscess (sometimes called a boil) happens when bacteria get trapped under the skin and start to grow. Pus forms inside the abscess as the body responds to the bacteria. An abscess can happen with an insect bite, ingrown hair, blocked oil gland, pimple, cyst, or puncture wound.  In the early stages, your wound may be red and tender. For this stage, you may get antibiotics. If the abscess does not get better with antibiotics, it will need to be drained with a small cut.  Home care  These tips will help you care for your abscess at home:  · Soak the wound in hot water or apply hot packs (small towel soaked in hot water) to the area for 20 minutes at a time. Do this 3 to 4 times a day.  · Do not cut, squeeze, or pop the boil yourself.  · Apply antibiotic cream or ointment to the skin 3 to 4 times a day, unless something else was prescribed. Some ointments include an antibiotic plus a pain reliever.  · If your doctor prescribed antibiotics, do not stop taking them until you have finished the medicine or the doctor tells you to stop.  · You may use an over-the-counter pain medicine to control pain, unless another pain medicine was prescribed. If you have chronic liver or kidney disease or ever had a stomach ulcer or gastrointestinal bleeding, talk with your doctor before using these any of these.  Follow-up care  Follow up with your healthcare provider, or as advised. Check your wound each day for the signs of worsening infection listed below.  When to seek medical advice  Get prompt medical attention if any of these occur:  · An increase in redness or swelling  · Red streaks in the skin leading away from the abscess  · An increase in local pain or swelling  · Fever of 100.4ºF (38ºC) or higher, or as directed by your  healthcare provider  · Pus or fluid coming from the abscess  · Boil returns after getting better  Date Last Reviewed: 9/1/2016  © 1191-7827 [email or web address]. [address] [place] [address], [place] [address]. All rights reserved. This information is not intended as a substitute for professional medical care. Always follow your healthcare professional's instructions.      Vaginal Infection: Understanding the Vaginal Environment  The vagina is a canal. It connects the uterus (womb) to the outside of the body. It is home to many types of bacteria and other tiny organisms. These different bacteria most often stay balanced in number. This keeps the vagina healthy. If the balance changes, it can cause infection.   A healthy environment  Many types of bacteria are present in a healthy vagina. When balanced, they dont cause problems. Small amounts of yeast may also be present without causing problems. The most common type of bacteria in the vagina is lactobacillus. It helps keep the vagina at a low pH. A low pH keeps bad bacteria from taking over.  Normal vaginal discharge  The vagina makes fluid. It is sent out as discharge. This also keeps the vagina healthy. Normal discharge can be clear, white, or yellowish. Most women find that normal discharge varies in amount and color through the month.  An unhealthy environment  The vaginal environment may get out of balance. This may result in a vaginal infection. There are a few reasons this can happen. The pH may have changed. The amount of one organism, such as yeast, may increase. Or an outside organism may get into the vagina and throw off the balance:  · Bacterial vaginosis (BV). BV is due to an imbalance in the normal bacteria in the vagina. Lactobacillus bacteria decrease. As a result, the numbers of bad bacteria increase.  · Candidiasis (yeast infection). Yeast is a type of fungus. A yeast infection occurs when yeast cells in the vagina increase. They then attack  vaginal tissues. A type of yeast called Candida albicans is often involved.  · Trichomoniasis (trich). Trich is a parasite. It is passed from one person to another during sex. Men with trich often dont have any symptoms. In women, it can take weeks or months before symptoms appear.  Date Last Reviewed: 3/1/2017  © 3514-7008 [employer]. [address] [place] [address]. All rights reserved. This information is not intended as a substitute for professional medical care. Always follow your healthcare professional's instructions.      What Are Sexually Transmitted Diseases (STDs)?  A sexually transmitted disease (STD) is a disease that is spread during sex. (An STD can also be called STI for sexually transmitted infection.) You can become infected with an STD if you have sex with someone who has an STD. Any sex that involves the penis, vagina, anus, or mouth can spread disease. Some STDs spread through body fluids such as semen, vaginal fluid, or blood. Others spread through contact with affected skin.  Who is at risk?     Places on or in the body where STDs cause damage include reproductive organs, the rectum, and the mouth.   It doesnt matter if youre straight or yu, male or female, young or old. Any person who has sex can get an STD. Your risk increases if:  · You have more than one partner. The more partners you have, the greater your risk.  · Your partner has other partners. If your partner is exposed to an STD, you could be, too.  · You or your partner have had sex with other people in the past. Either of you might be carrying an STD from an earlier partner.  · You have an STD. The STD may cause sores or other health problems that increase your risk of new infections. Your risk will stay high unless you change the behaviors that put you at risk of the current infection.  Prevent future problems  Left untreated, certain STDs can lead to cancer or even death. Some can harm unborn babies  whose mothers are infected. Others can cause you to not be able to have children (sterility) or can affect changes in behavior or your ability to think. You can prevent these problems with safer sex, regular checkups, and early treatment. Always use a latex condom when you have sex. Get tested if youre at risk. And get treated early if you have an STD.  Getting checked  The only sure way to know if you have an STD is to get checked by a healthcare provider. If you notice a change in how your body looks or feels, have it checked out. But keep in mind, STDs dont always show symptoms. So if youre at risk of STDs, get checked regularly. If you find you have an STD, be sure your partner gets treatment, too. If not, his or her health is at risk. And left untreated, your partner could pass the STD back to you, or on to others.  Common symptoms  Be alert to any changes in your body and your partners body. Symptoms may appear in or near the vagina, penis, rectum, mouth, or throat. They include:  · Unusual discharge  · Lumps, bumps, or rashes  · Sores that may be painful, itchy, or painless  · Itchy skin  · Burning with urination  · Pain in the pelvis, belly (abdomen), or rectum  Even if you dont have symptoms  You may have an STD, even if you dont have symptoms. If you think you are at risk, get checked. Go to a clinic or to your healthcare provider. If your partner has an STD, you need to be tested too, even if you feel fine.  Vaccines to prevent disease  Vaccines (also called immunizations) are available to prevent hepatitis A and hepatitis B. These are two kinds of STDs. There is also a vaccine to prevent HPV. This is a virus that can be passed from person to person through sexual contact. Ask your healthcare provider whether any of these vaccines is right for you.   Date Last Reviewed: 11/1/2016  © 0461-4255 [employer]. [address] [place] [address], [place] [address]. All rights reserved. This information  is not intended as a substitute for professional medical care. Always follow your healthcare professional's instructions.

## 2020-09-08 ENCOUNTER — OFFICE VISIT (OUTPATIENT)
Dept: OBSTETRICS AND GYNECOLOGY | Facility: CLINIC | Age: 30
End: 2020-09-08
Payer: MEDICAID

## 2020-09-08 ENCOUNTER — TELEPHONE (OUTPATIENT)
Dept: GASTROENTEROLOGY | Facility: HOSPITAL | Age: 30
End: 2020-09-08

## 2020-09-08 VITALS
BODY MASS INDEX: 28.04 KG/M2 | SYSTOLIC BLOOD PRESSURE: 119 MMHG | WEIGHT: 158.31 LBS | DIASTOLIC BLOOD PRESSURE: 77 MMHG

## 2020-09-08 DIAGNOSIS — Z01.419 ENCOUNTER FOR ANNUAL ROUTINE GYNECOLOGICAL EXAMINATION: Primary | ICD-10-CM

## 2020-09-08 DIAGNOSIS — K50.80 CROHN'S DISEASE OF BOTH SMALL AND LARGE INTESTINE WITHOUT COMPLICATION: ICD-10-CM

## 2020-09-08 DIAGNOSIS — D84.9 IMMUNOSUPPRESSED STATUS: ICD-10-CM

## 2020-09-08 LAB — SARS-COV-2 RNA RESP QL NAA+PROBE: NOT DETECTED

## 2020-09-08 PROCEDURE — 99213 OFFICE O/P EST LOW 20 MIN: CPT | Mod: PBBFAC | Performed by: OBSTETRICS & GYNECOLOGY

## 2020-09-08 PROCEDURE — 87510 GARDNER VAG DNA DIR PROBE: CPT

## 2020-09-08 PROCEDURE — 99999 PR PBB SHADOW E&M-EST. PATIENT-LVL III: ICD-10-PCS | Mod: PBBFAC,,, | Performed by: OBSTETRICS & GYNECOLOGY

## 2020-09-08 PROCEDURE — 87491 CHLMYD TRACH DNA AMP PROBE: CPT

## 2020-09-08 PROCEDURE — 99385 PREV VISIT NEW AGE 18-39: CPT | Mod: S$PBB,,, | Performed by: OBSTETRICS & GYNECOLOGY

## 2020-09-08 PROCEDURE — 99385 PR PREVENTIVE VISIT,NEW,18-39: ICD-10-PCS | Mod: S$PBB,,, | Performed by: OBSTETRICS & GYNECOLOGY

## 2020-09-08 PROCEDURE — 87480 CANDIDA DNA DIR PROBE: CPT

## 2020-09-08 PROCEDURE — 87624 HPV HI-RISK TYP POOLED RSLT: CPT

## 2020-09-08 PROCEDURE — 99999 PR PBB SHADOW E&M-EST. PATIENT-LVL III: CPT | Mod: PBBFAC,,, | Performed by: OBSTETRICS & GYNECOLOGY

## 2020-09-08 PROCEDURE — 88175 CYTOPATH C/V AUTO FLUID REDO: CPT

## 2020-09-08 RX ORDER — CLOTRIMAZOLE 1 %
CREAM (GRAM) TOPICAL
COMMUNITY
Start: 2020-07-13 | End: 2020-11-02

## 2020-09-08 RX ORDER — HYDROXYZINE PAMOATE 25 MG/1
CAPSULE ORAL
COMMUNITY
Start: 2020-07-17 | End: 2020-11-02

## 2020-09-08 RX ORDER — CLOTRIMAZOLE 1 %
CREAM (GRAM) TOPICAL 2 TIMES DAILY
COMMUNITY
Start: 2020-07-17 | End: 2020-11-02

## 2020-09-08 NOTE — TELEPHONE ENCOUNTER
----- Message from Antoine Omer sent at 9/8/2020 10:47 AM CDT -----  Contact: pt  Pt calling to receive arrival time for 9/10 procedure       Please contact pt [phone]

## 2020-09-10 ENCOUNTER — TELEPHONE (OUTPATIENT)
Dept: ENDOSCOPY | Facility: HOSPITAL | Age: 30
End: 2020-09-10

## 2020-09-10 NOTE — TELEPHONE ENCOUNTER
Pt was scheduled for a colonoscopy on today 9/10 with you and was unable to tolerate prep.  Procedure cancelled.  Pt is asking for a different prep.  She states that everytime she took a sip it came back up.  Please advise so we can get her rescheduled.

## 2020-09-11 DIAGNOSIS — Z01.818 PRE-OP TESTING: ICD-10-CM

## 2020-09-11 LAB
CANDIDA RRNA VAG QL PROBE: NOT DETECTED
G VAGINALIS RRNA GENITAL QL PROBE: DETECTED
T VAGINALIS RRNA GENITAL QL PROBE: NOT DETECTED

## 2020-09-11 NOTE — TELEPHONE ENCOUNTER
She is willing to try Golytely again.  She has another bottle at home already.  I provided her with a helpful tip sheet that may help her and instructed her to try crystal light to mix it with.  She is rescheduled for 10/2.  Thanks

## 2020-09-11 NOTE — TELEPHONE ENCOUNTER
Pt scheduled scope for 10/2 and has appt 10/1. Please reschedule appt to late October or early November please; in person visit preferred

## 2020-09-11 NOTE — TELEPHONE ENCOUNTER
She said she did take Reglan with prep.  Suprep is not covered by Medicaid but I will ask pt if she is willing to pay for it.

## 2020-09-13 ENCOUNTER — TELEPHONE (OUTPATIENT)
Dept: OBSTETRICS AND GYNECOLOGY | Facility: HOSPITAL | Age: 30
End: 2020-09-13

## 2020-09-13 DIAGNOSIS — N89.8 VAGINAL DISCHARGE: Primary | ICD-10-CM

## 2020-09-13 LAB
BACTERIA UR CULT: ABNORMAL
BACTERIA UR CULT: ABNORMAL
OTHER ANTIBIOTIC SUSC ISLT: ABNORMAL

## 2020-09-13 RX ORDER — METRONIDAZOLE 7.5 MG/G
1 GEL VAGINAL DAILY
Qty: 1 G | Refills: 0 | Status: SHIPPED | OUTPATIENT
Start: 2020-09-13 | End: 2020-09-14

## 2020-09-13 NOTE — PROGRESS NOTES
HISTORY OF PRESENT ILLNESS:    Lora Scott is a 30 y.o. female, , Patient's last menstrual period was 2020 (exact date).,  presents for a routine exam and has no complaints.    Past Medical History:   Diagnosis Date    Allergy     Asthma     Crohn's disease 19    Depression     Eczema     Ex-smoker for less than 1 year     Immunosuppressed status 10/24/2019    Joint pain        Past Surgical History:   Procedure Laterality Date    COLONOSCOPY N/A 2019    Procedure: COLONOSCOPY;  Surgeon: Fawad Perla MD;  Location: [facility] (2ND FLR);  Service: Endoscopy;  Laterality: N/A;    ESOPHAGOGASTRODUODENOSCOPY N/A 2019    Procedure: EGD (ESOPHAGOGASTRODUODENOSCOPY);  Surgeon: Fawad Perla MD;  Location: [facility] ENDO (2ND FLR);  Service: Endoscopy;  Laterality: N/A;    ILEOCOLECTOMY N/A 5/10/2019    Procedure: ILEOCOLECTOMY;  Surgeon: Uday Cope MD;  Location: [facility] OR 2ND FLR;  Service: Colon and Rectal;  Laterality: N/A;  ileocecectomy    ILEOSCOPY N/A 2019    Procedure: ILEOSCOPY through stoma;  Surgeon: Suzi Bacon MD;  Location: [facility] (4TH FLR);  Service: Endoscopy;  Laterality: N/A;  Schedule as 30 minute case  Please let patient know to bring extra stoma bag/wafer, etc- entire bag and appliance will be removed at time of procedure to visualize the area well   first week 2019     per note-R/S to 19-GT    ILEOSTOMY N/A 5/10/2019    Procedure: CREATION, ILEOSTOMY with mucous fistula;  Surgeon: Uday Cope MD;  Location: [facility] OR 2ND FLR;  Service: Colon and Rectal;  Laterality: N/A;    ILEOSTOMY CLOSURE N/A 2020    Procedure: CLOSURE,ILEOSTOMY TAKEDOWN END ILEOSTOMY/MUCOUS FISTULA POSSIBLE LAPAROTOMY MARÍA ELENAS LOW;  Surgeon: Uday Cope MD;  Location: [facility] OR 2ND FLR;  Service: Colon and Rectal;  Laterality: N/A;       MEDICATIONS AND ALLERGIES:      Current Outpatient Medications:     doxycycline (VIBRAMYCIN) 100 MG Cap, Take 1 capsule  (100 mg total) by mouth every 12 (twelve) hours. for 10 days, Disp: 20 capsule, Rfl: 0    hydrOXYzine HCL (ATARAX) 25 MG tablet, Take 1 tablet (25 mg total) by mouth every 4 (four) hours as needed for Itching., Disp: 30 tablet, Rfl: 0    mupirocin (BACTROBAN) 2 % ointment, Apply topically 3 (three) times daily., Disp: 22 g, Rfl: 0    triamcinolone acetonide 0.1% (KENALOG) 0.1 % cream, Apply topically 2 (two) times daily. To rashes, Disp: 454 g, Rfl: 3    triamcinolone acetonide 0.1% (KENALOG) 0.1 % cream, Apply topically 2 (two) times daily., Disp: 80 g, Rfl: 1    adalimumab (HUMIRA PEN) 40 mg/0.8 mL PnKt, Inject 1 pen (40 mg total) into the skin every 7 days. (Patient not taking: Reported on 9/8/2020), Disp: 4 pen, Rfl: 11    clotrimazole (LOTRIMIN) 1 % cream, 2 (two) times a day. to affected area, Disp: , Rfl:     clotrimazole (LOTRIMIN) 1 % cream, Apply topically., Disp: , Rfl:     ergocalciferol (VITAMIN D2) 50,000 unit Cap, Take 50,000 Units by mouth every 7 days., Disp: , Rfl:     hydrOXYzine pamoate (VISTARIL) 25 MG Cap, TAKE 1 CAPSULE BY MOUTH EVERY 6 HOURS AS NEEDED FOR ITCHING FOR UP TO 10 DAYS, Disp: , Rfl:     itraconazole (SPORANOX) 100 mg Cap, Take 100 mg by mouth once daily., Disp: , Rfl:     metoclopramide HCl (REGLAN) 10 MG tablet, Take 1 tablet (10 mg total) by mouth As instructed (take 10 mg 30 min prior to first 1/2 of bowel prep and 2nd dose 30 min prior to 2nd dose of bowel prep). (Patient not taking: Reported on 9/8/2020), Disp: 2 tablet, Rfl: 0    metroNIDAZOLE (METROGEL VAGINAL) 0.75 % vaginal gel, Place 1 applicator vaginally once daily. Use at bedtime for 7 days, Disp: 1 g, Rfl: 0    Review of patient's allergies indicates:   Allergen Reactions    Horse/equine containing products Itching       Family History   Problem Relation Age of Onset    Hypertension Mother     No Known Problems Father     No Known Problems Sister     No Known Problems Brother     No Known Problems  Sister     No Known Problems Sister     No Known Problems Sister     No Known Problems Maternal Uncle     No Known Problems Paternal Aunt     No Known Problems Paternal Uncle     No Known Problems Maternal Grandmother     No Known Problems Maternal Grandfather     Irritable bowel syndrome Paternal Grandmother     No Known Problems Paternal Grandfather     No Known Problems Paternal Aunt     No Known Problems Paternal Uncle     No Known Problems Paternal Uncle     No Known Problems Paternal Uncle     No Known Problems Maternal Uncle     No Known Problems Maternal Uncle     Celiac disease Neg Hx     Cirrhosis Neg Hx     Colon cancer Neg Hx     Colon polyps Neg Hx     Crohn's disease Neg Hx     Cystic fibrosis Neg Hx     Esophageal cancer Neg Hx     Hemochromatosis Neg Hx     Inflammatory bowel disease Neg Hx     Liver cancer Neg Hx     Liver disease Neg Hx     Rectal cancer Neg Hx     Stomach cancer Neg Hx     Ulcerative colitis Neg Hx     Jerardo's disease Neg Hx     Lymphoma Neg Hx     Tuberculosis Neg Hx     Scleroderma Neg Hx     Rheum arthritis Neg Hx     Multiple sclerosis Neg Hx     Psoriasis Neg Hx     Lupus Neg Hx     Melanoma Neg Hx     Skin cancer Neg Hx     Amblyopia Neg Hx     Blindness Neg Hx     Cancer Neg Hx     Cataracts Neg Hx     Diabetes Neg Hx     Glaucoma Neg Hx     Macular degeneration Neg Hx     Retinal detachment Neg Hx     Strabismus Neg Hx     Stroke Neg Hx     Thyroid disease Neg Hx        Social History     Socioeconomic History    Marital status: Single     Spouse name: Not on file    Number of children: Not on file    Years of education: Not on file    Highest education level: Not on file   Occupational History    Not on file   Social Needs    Financial resource strain: Not on file    Food insecurity     Worry: Not on file     Inability: Not on file    Transportation needs     Medical: Not on file     Non-medical: Not on file    Tobacco Use    Smoking status: Current Every Day Smoker     Packs/day: 0.25     Types: Cigarettes     Last attempt to quit: 2019     Years since quittin.0    Smokeless tobacco: Never Used   Substance and Sexual Activity    Alcohol use: Not Currently     Alcohol/week: 0.0 standard drinks     Comment: Socially    Drug use: No     Frequency: 3.0 times per week    Sexual activity: Not Currently     Partners: Male     Birth control/protection: None   Lifestyle    Physical activity     Days per week: Not on file     Minutes per session: Not on file    Stress: Not on file   Relationships    Social connections     Talks on phone: Not on file     Gets together: Not on file     Attends Restorationist service: Not on file     Active member of club or organization: Not on file     Attends meetings of clubs or organizations: Not on file     Relationship status: Not on file   Other Topics Concern    Are you pregnant or think you may be? Not Asked    Breast-feeding Not Asked   Social History Narrative    Not on file       COMPREHENSIVE GYN HISTORY:  PAP History: Denies abnormal Paps.  Infection History: Denies STDs. Denies PID.  Benign History: Denies uterine fibroids. Denies ovarian cysts. Denies endometriosis. Denies other conditions.  Cancer History: Denies cervical cancer. Denies uterine cancer or hyperplasia. Denies ovarian cancer. Denies vulvar cancer or pre-cancer. Denies vaginal cancer or pre-cancer. Denies breast cancer. Denies colon cancer.  Sexual Activity History: Reports currently being sexually active  Menstrual History: Monthly. Mod then light flow.   Dysmenorrhea History: Reports mild dysmenorrhea.       ROS:  GENERAL: No weight changes. No swelling. No fatigue. No fever.  CARDIOVASCULAR: No chest pain. No shortness of breath. No leg cramps.   NEUROLOGICAL: No headaches. No vision changes.  BREASTS: No pain. No lumps. No discharge.  ABDOMEN: No pain. No nausea. No vomiting. No diarrhea. No  constipation.  REPRODUCTIVE: No abnormal bleeding.   VULVA: No pain. No lesions. No itching.  VAGINA: No relaxation. No itching. No odor. No discharge. No lesions.  URINARY: No incontinence. No nocturia. No frequency. No dysuria.    /77   Wt 71.8 kg (158 lb 4.6 oz)   LMP 09/01/2020 (Exact Date)   BMI 28.04 kg/m²     PE:  APPEARANCE: Well nourished, well developed, in no acute distress.  AFFECT: WNL, alert and oriented x 3.  SKIN: No acne or hirsutism.  NECK: Neck symmetric, without masses or thyromegaly.  NODES: No inguinal, cervical, axillary or femoral lymph node enlargement.  CHEST: Good respiratory effort.   ABDOMEN: Soft. No tenderness or masses. No hepatosplenomegaly. No hernias.  BREASTS: Symmetrical, no skin changes, visible lesions, palpable masses or nipple discharge bilaterally.  PELVIC: External female genitalia without lesions.  Female hair distribution. Adequate perineal body, Normal urethral meatus. Vagina moist and well rugated without lesions or discharge.  No significant cystocele or rectocele present. Cervix pink without lesions, discharge or tenderness. Uterus is 6 week size, regular, mobile and nontender. Adnexa without masses or tenderness.  EXTREMITIES: No edema    DIAGNOSIS:  1. Encounter for annual routine gynecological examination    2. Immunosuppressed status    3. Crohn's disease of both small and large intestine without complication        PLAN:    Orders Placed This Encounter    HPV High Risk Genotypes, PCR    C. trachomatis/N. gonorrhoeae by AMP DNA    Bacterial Vaginosis Panel    Liquid-Based Pap Smear, Screening       COUNSELING:  The patient was counseled today on:  -A.C.S. Pap and pelvic exam guidelines (pap every 3 years), recomendations for yearly mammogram;  -to follow up with her PCP for other health maintenance.    FOLLOW-UP with me annually.

## 2020-09-14 ENCOUNTER — TELEPHONE (OUTPATIENT)
Dept: URGENT CARE | Facility: CLINIC | Age: 30
End: 2020-09-14

## 2020-09-14 DIAGNOSIS — N76.0 BACTERIAL VAGINOSIS: Primary | ICD-10-CM

## 2020-09-14 DIAGNOSIS — B96.89 BACTERIAL VAGINOSIS: Primary | ICD-10-CM

## 2020-09-14 LAB
A VAGINAE DNA VAG QL NAA+PROBE: ABNORMAL SCORE
BVAB2 DNA VAG QL NAA+PROBE: ABNORMAL SCORE
C ALBICANS DNA VAG QL NAA+PROBE: NEGATIVE
C GLABRATA DNA VAG QL NAA+PROBE: NEGATIVE
C TRACH DNA VAG QL NAA+PROBE: NEGATIVE
MEGA1 DNA VAG QL NAA+PROBE: ABNORMAL SCORE
N GONORRHOEA DNA VAG QL NAA+PROBE: NEGATIVE
T VAGINALIS DNA VAG QL NAA+PROBE: NEGATIVE

## 2020-09-14 RX ORDER — METRONIDAZOLE 500 MG/1
500 TABLET ORAL EVERY 12 HOURS
Qty: 14 TABLET | Refills: 0 | Status: SHIPPED | OUTPATIENT
Start: 2020-09-14 | End: 2020-09-21

## 2020-09-14 NOTE — TELEPHONE ENCOUNTER
Spoke with patient regarding positive BV culture results. Pt was not originally treated so I will prescribe flagyl BID x 7 days. Instructed not to drink alcohol while taking this medication. Pt asked if she needed to take doxycycline because she hasn't started taking that one yet. GC Chlamydia negative so she may discontinue doxy. Answered all patients questions and concerns.

## 2020-09-15 LAB
HPV HR 12 DNA SPEC QL NAA+PROBE: POSITIVE
HPV16 AG SPEC QL: NEGATIVE
HPV18 DNA SPEC QL NAA+PROBE: NEGATIVE

## 2020-09-17 ENCOUNTER — LAB VISIT (OUTPATIENT)
Dept: LAB | Facility: HOSPITAL | Age: 30
End: 2020-09-17
Attending: INTERNAL MEDICINE
Payer: MEDICAID

## 2020-09-17 DIAGNOSIS — K50.80 CROHN'S DISEASE OF BOTH SMALL AND LARGE INTESTINE WITHOUT COMPLICATION: ICD-10-CM

## 2020-09-17 DIAGNOSIS — D84.9 IMMUNOSUPPRESSED STATUS: ICD-10-CM

## 2020-09-17 LAB
25(OH)D3+25(OH)D2 SERPL-MCNC: 10 NG/ML (ref 30–96)
ALBUMIN SERPL BCP-MCNC: 3.5 G/DL (ref 3.5–5.2)
ALP SERPL-CCNC: 91 U/L (ref 55–135)
ALT SERPL W/O P-5'-P-CCNC: 14 U/L (ref 10–44)
ANION GAP SERPL CALC-SCNC: 13 MMOL/L (ref 8–16)
AST SERPL-CCNC: 17 U/L (ref 10–40)
BASOPHILS # BLD AUTO: 0.02 K/UL (ref 0–0.2)
BASOPHILS NFR BLD: 0.3 % (ref 0–1.9)
BILIRUB SERPL-MCNC: 0.5 MG/DL (ref 0.1–1)
BUN SERPL-MCNC: 5 MG/DL (ref 6–20)
CALCIUM SERPL-MCNC: 8.9 MG/DL (ref 8.7–10.5)
CHLORIDE SERPL-SCNC: 110 MMOL/L (ref 95–110)
CO2 SERPL-SCNC: 18 MMOL/L (ref 23–29)
CREAT SERPL-MCNC: 0.8 MG/DL (ref 0.5–1.4)
DIFFERENTIAL METHOD: ABNORMAL
EOSINOPHIL # BLD AUTO: 0.1 K/UL (ref 0–0.5)
EOSINOPHIL NFR BLD: 2 % (ref 0–8)
ERYTHROCYTE [DISTWIDTH] IN BLOOD BY AUTOMATED COUNT: 14.3 % (ref 11.5–14.5)
EST. GFR  (AFRICAN AMERICAN): >60 ML/MIN/1.73 M^2
EST. GFR  (NON AFRICAN AMERICAN): >60 ML/MIN/1.73 M^2
GLUCOSE SERPL-MCNC: 104 MG/DL (ref 70–110)
HBV CORE AB SERPL QL IA: NEGATIVE
HBV SURFACE AG SERPL QL IA: NEGATIVE
HCT VFR BLD AUTO: 39.2 % (ref 37–48.5)
HGB BLD-MCNC: 12 G/DL (ref 12–16)
IMM GRANULOCYTES # BLD AUTO: 0.02 K/UL (ref 0–0.04)
IMM GRANULOCYTES NFR BLD AUTO: 0.3 % (ref 0–0.5)
LYMPHOCYTES # BLD AUTO: 2.5 K/UL (ref 1–4.8)
LYMPHOCYTES NFR BLD: 38 % (ref 18–48)
MCH RBC QN AUTO: 29.9 PG (ref 27–31)
MCHC RBC AUTO-ENTMCNC: 30.6 G/DL (ref 32–36)
MCV RBC AUTO: 98 FL (ref 82–98)
MONOCYTES # BLD AUTO: 0.4 K/UL (ref 0.3–1)
MONOCYTES NFR BLD: 5.5 % (ref 4–15)
NEUTROPHILS # BLD AUTO: 3.5 K/UL (ref 1.8–7.7)
NEUTROPHILS NFR BLD: 53.9 % (ref 38–73)
NRBC BLD-RTO: 0 /100 WBC
PLATELET # BLD AUTO: 248 K/UL (ref 150–350)
PMV BLD AUTO: 10.8 FL (ref 9.2–12.9)
POTASSIUM SERPL-SCNC: 3.7 MMOL/L (ref 3.5–5.1)
PROT SERPL-MCNC: 7 G/DL (ref 6–8.4)
RBC # BLD AUTO: 4.02 M/UL (ref 4–5.4)
SODIUM SERPL-SCNC: 141 MMOL/L (ref 136–145)
VIT B12 SERPL-MCNC: 166 PG/ML (ref 210–950)
WBC # BLD AUTO: 6.5 K/UL (ref 3.9–12.7)

## 2020-09-17 PROCEDURE — 85025 COMPLETE CBC W/AUTO DIFF WBC: CPT

## 2020-09-17 PROCEDURE — 87340 HEPATITIS B SURFACE AG IA: CPT

## 2020-09-17 PROCEDURE — 86704 HEP B CORE ANTIBODY TOTAL: CPT

## 2020-09-17 PROCEDURE — 82306 VITAMIN D 25 HYDROXY: CPT

## 2020-09-17 PROCEDURE — 80145 DRUG ASSAY ADALIMUMAB: CPT

## 2020-09-17 PROCEDURE — 82607 VITAMIN B-12: CPT

## 2020-09-17 PROCEDURE — 80053 COMPREHEN METABOLIC PANEL: CPT

## 2020-09-23 LAB
FINAL PATHOLOGIC DIAGNOSIS: NORMAL
Lab: NORMAL

## 2020-09-25 ENCOUNTER — PATIENT MESSAGE (OUTPATIENT)
Dept: ENDOSCOPY | Facility: HOSPITAL | Age: 30
End: 2020-09-25

## 2020-09-25 ENCOUNTER — PATIENT MESSAGE (OUTPATIENT)
Dept: GASTROENTEROLOGY | Facility: HOSPITAL | Age: 30
End: 2020-09-25

## 2020-09-25 LAB — ADALIMUMAB CONCENTRATION & ANTI-ADALIMUMAB ANTIBODY: NORMAL

## 2020-09-26 RX ORDER — METRONIDAZOLE 7.5 MG/G
1 GEL VAGINAL DAILY
Qty: 1 G | Refills: 0 | Status: SHIPPED | OUTPATIENT
Start: 2020-09-26 | End: 2020-10-03

## 2020-09-29 ENCOUNTER — TELEPHONE (OUTPATIENT)
Dept: OBSTETRICS AND GYNECOLOGY | Facility: CLINIC | Age: 30
End: 2020-09-29

## 2020-09-29 ENCOUNTER — LAB VISIT (OUTPATIENT)
Dept: URGENT CARE | Facility: CLINIC | Age: 30
End: 2020-09-29
Payer: MEDICAID

## 2020-09-29 DIAGNOSIS — Z01.818 PRE-OP TESTING: ICD-10-CM

## 2020-09-29 PROCEDURE — U0003 INFECTIOUS AGENT DETECTION BY NUCLEIC ACID (DNA OR RNA); SEVERE ACUTE RESPIRATORY SYNDROME CORONAVIRUS 2 (SARS-COV-2) (CORONAVIRUS DISEASE [COVID-19]), AMPLIFIED PROBE TECHNIQUE, MAKING USE OF HIGH THROUGHPUT TECHNOLOGIES AS DESCRIBED BY CMS-2020-01-R: HCPCS

## 2020-09-29 PROCEDURE — 99211 OFF/OP EST MAY X REQ PHY/QHP: CPT | Mod: S$GLB,,, | Performed by: INTERNAL MEDICINE

## 2020-09-29 PROCEDURE — 99211 PR OFFICE/OUTPT VISIT, EST, LEVL I: ICD-10-PCS | Mod: S$GLB,,, | Performed by: INTERNAL MEDICINE

## 2020-09-29 NOTE — TELEPHONE ENCOUNTER
----- Message from Stephani Petersen MD sent at 9/26/2020  9:02 AM CDT -----  Patient has Bacterial vaginosis.Plan:Metrogel Disp one tube, one applicatorful qhs for 5 nights sent to the pharmacy. Please  patient on vaginitis prevention including :a. avoiding feminine products such as deoderant soaps, body wash, bubble bath, douches, scented toilet paper, deoderant tampons or pads, feminine wipes, chronic pad use, etc.b. avoiding other vulvovaginal irritants such as long hot baths, humidity, tight, synthetic clothing, chlorine and sitting around in wet bathing suitsc. wearing cotton underwear, avoiding thong underwear and no underwear to bedd. taking showers instead of baths and use a hair dryer on cool setting afterwards to drye. wearing cotton to exercise and shower immediately after exercise and change clothesf. using polyurethane condoms without spermicide if sexually active and symptoms are triggered by intercourse

## 2020-10-01 ENCOUNTER — PATIENT MESSAGE (OUTPATIENT)
Dept: ENDOSCOPY | Facility: HOSPITAL | Age: 30
End: 2020-10-01

## 2020-10-01 DIAGNOSIS — K50.80 CROHN'S DISEASE OF BOTH SMALL AND LARGE INTESTINE WITHOUT COMPLICATION: ICD-10-CM

## 2020-10-01 DIAGNOSIS — R11.0 NAUSEA: ICD-10-CM

## 2020-10-01 LAB — SARS-COV-2 RNA RESP QL NAA+PROBE: NOT DETECTED

## 2020-10-02 ENCOUNTER — PATIENT MESSAGE (OUTPATIENT)
Dept: ENDOSCOPY | Facility: HOSPITAL | Age: 30
End: 2020-10-02

## 2020-10-02 ENCOUNTER — ANESTHESIA (OUTPATIENT)
Dept: ENDOSCOPY | Facility: HOSPITAL | Age: 30
End: 2020-10-02
Payer: MEDICAID

## 2020-10-02 ENCOUNTER — ANESTHESIA EVENT (OUTPATIENT)
Dept: ENDOSCOPY | Facility: HOSPITAL | Age: 30
End: 2020-10-02
Payer: MEDICAID

## 2020-10-02 ENCOUNTER — OFFICE VISIT (OUTPATIENT)
Dept: OBSTETRICS AND GYNECOLOGY | Facility: CLINIC | Age: 30
End: 2020-10-02
Payer: MEDICAID

## 2020-10-02 VITALS — HEIGHT: 63 IN | BODY MASS INDEX: 26.95 KG/M2 | WEIGHT: 152.13 LBS

## 2020-10-02 DIAGNOSIS — L40.9 PSORIASIS: ICD-10-CM

## 2020-10-02 DIAGNOSIS — Z01.818 PRE-OP TESTING: Primary | ICD-10-CM

## 2020-10-02 DIAGNOSIS — L30.9 ECZEMA, UNSPECIFIED TYPE: ICD-10-CM

## 2020-10-02 PROCEDURE — 99999 PR PBB SHADOW E&M-EST. PATIENT-LVL III: ICD-10-PCS | Mod: PBBFAC,,, | Performed by: OBSTETRICS & GYNECOLOGY

## 2020-10-02 PROCEDURE — 99213 OFFICE O/P EST LOW 20 MIN: CPT | Mod: S$PBB,,, | Performed by: OBSTETRICS & GYNECOLOGY

## 2020-10-02 PROCEDURE — 99213 PR OFFICE/OUTPT VISIT, EST, LEVL III, 20-29 MIN: ICD-10-PCS | Mod: S$PBB,,, | Performed by: OBSTETRICS & GYNECOLOGY

## 2020-10-02 PROCEDURE — 99999 PR PBB SHADOW E&M-EST. PATIENT-LVL III: CPT | Mod: PBBFAC,,, | Performed by: OBSTETRICS & GYNECOLOGY

## 2020-10-02 PROCEDURE — 99213 OFFICE O/P EST LOW 20 MIN: CPT | Mod: PBBFAC | Performed by: OBSTETRICS & GYNECOLOGY

## 2020-10-02 RX ORDER — METOCLOPRAMIDE 10 MG/1
10 TABLET ORAL SEE ADMIN INSTRUCTIONS
Qty: 2 TABLET | Refills: 0 | Status: SHIPPED | OUTPATIENT
Start: 2020-10-02 | End: 2020-11-02

## 2020-10-02 NOTE — ANESTHESIA PREPROCEDURE EVALUATION
Lora Scott is a 30 y.o., female.    Past Medical History:   Diagnosis Date    Allergy     Asthma     Crohn's disease 2/18/19    Depression     Eczema     Ex-smoker for less than 1 year     Immunosuppressed status 10/24/2019    Joint pain        Past Surgical History:   Procedure Laterality Date    COLONOSCOPY N/A 2/20/2019    Procedure: COLONOSCOPY;  Surgeon: Fawad Perla MD;  Location: [facility] ENDO (2ND FLR);  Service: Endoscopy;  Laterality: N/A;    ESOPHAGOGASTRODUODENOSCOPY N/A 2/20/2019    Procedure: EGD (ESOPHAGOGASTRODUODENOSCOPY);  Surgeon: Fawad Perla MD;  Location: [facility] END[facility] (2ND FLR);  Service: Endoscopy;  Laterality: N/A;    ILEOCOLECTOMY N/A 5/10/2019    Procedure: ILEOCOLECTOMY;  Surgeon: Uday Cope MD;  Location: [facility] OR 2ND FLR;  Service: Colon and Rectal;  Laterality: N/A;  ileocecectomy    ILEOSCOPY N/A 12/20/2019    Procedure: ILEOSCOPY through stoma;  Surgeon: Suzi Bacon MD;  Location: [facility] END[facility] (4TH FLR);  Service: Endoscopy;  Laterality: N/A;  Schedule as 30 minute case  Please let patient know to bring extra stoma bag/wafer, etc- entire bag and appliance will be removed at time of procedure to visualize the area well   first week 11/2019     per note-R/S to 12/20/19-GT    ILEOSTOMY N/A 5/10/2019    Procedure: CREATION, ILEOSTOMY with mucous fistula;  Surgeon: Uday Cope MD;  Location: [facility] OR 2ND FLR;  Service: Colon and Rectal;  Laterality: N/A;    ILEOSTOMY CLOSURE N/A 2/12/2020    Procedure: CLOSURE,ILEOSTOMY TAKEDOWN END ILEOSTOMY/MUCOUS FISTULA POSSIBLE LAPAROTOMY ERAS LOW;  Surgeon: Uday Cope MD;  Location: [facility] OR 2ND FLR;  Service: Colon and Rectal;  Laterality: N/A;     Patient Active Problem List   Diagnosis    Psoriasis    Crohn's disease of both small and large intestine without complication    Immunosuppressed status    High  myopia, bilateral    Delayed surgical wound healing       Anesthesia Evaluation    I have reviewed the Patient Summary Reports.    I have reviewed the Nursing Notes. I have reviewed the NPO Status.   I have reviewed the Medications.     Review of Systems  Anesthesia Hx:  No problems with previous Anesthesia   Denies Personal Hx of Anesthesia complications.   Social:  No Alcohol Use, Smoker    Cardiovascular:  Cardiovascular Normal Exercise tolerance: good     Pulmonary:   Asthma    Hepatic/GI:   Bowel Prep.    Psych:   Psychiatric History          Physical Exam  General:  Well nourished    Airway/Jaw/Neck:  Airway Findings: Mouth Opening: < 3 cm Tongue: Normal  Mallampati: II  TM Distance: Normal, at least 6 cm  Jaw/Neck Findings:  Neck ROM: Normal ROM      Dental:  Dental Findings:    Chest/Lungs:  Chest/Lungs Findings: Clear to auscultation     Heart/Vascular:  Heart Findings: Rate: Normal  Rhythm: Regular Rhythm     Abdomen:  Abdomen Findings:  Normal            Anesthesia Plan  Type of Anesthesia, risks & benefits discussed:  Anesthesia Type:  general  Patient's Preference:   Intra-op Monitoring Plan: standard ASA monitors  Intra-op Monitoring Plan Comments:   Post Op Pain Control Plan: per primary service following discharge from PACU  Post Op Pain Control Plan Comments:   Induction:   IV  Beta Blocker:  Patient is not currently on a Beta-Blocker (No further documentation required).       Informed Consent: Patient understands risks and agrees with Anesthesia plan.  Questions answered. Anesthesia consent signed with patient.  ASA Score: 2     Day of Surgery Review of History & Physical:  There are no significant changes.          Ready For Surgery From Anesthesia Perspective.

## 2020-10-02 NOTE — TELEPHONE ENCOUNTER
Patient is rescheduled to 10/16/20. She had the instructions but didn't fully read them so didn't take Dulcolax before and got nauseous with the Golytely prep and threw up.  Do you want her to try a different prep? Please advise.

## 2020-10-02 NOTE — TELEPHONE ENCOUNTER
Reviewed Mirilax prep instructions with her and where to buy and how to mix and take.  Patient needs a new prescription for Reglan bc she said she doesn't have.  Explained how to take with prep.

## 2020-10-03 LAB
C TRACH DNA SPEC QL NAA+PROBE: NOT DETECTED
N GONORRHOEA DNA SPEC QL NAA+PROBE: NOT DETECTED

## 2020-10-04 RX ORDER — TRIAMCINOLONE ACETONIDE 1 MG/G
CREAM TOPICAL 2 TIMES DAILY
Qty: 80 G | Refills: 1 | Status: SHIPPED | OUTPATIENT
Start: 2020-10-04 | End: 2020-11-02

## 2020-10-05 NOTE — PROGRESS NOTES
HISTORY OF PRESENT ILLNESS:    Lora Scott is a 30 y.o. female  No LMP recorded. presents today for follow up.   Long discussion with patient today concerning her HPV positive results for Pap.  Patient here with partner today.  Etiology discussed in detail.    Past Medical History:   Diagnosis Date    Allergy     Asthma     Crohn's disease 19    Depression     Eczema     Ex-smoker for less than 1 year     Immunosuppressed status 10/24/2019    Joint pain        Past Surgical History:   Procedure Laterality Date    COLONOSCOPY N/A 2019    Procedure: COLONOSCOPY;  Surgeon: Fawad Perla MD;  Location: [facility] (2ND FLR);  Service: Endoscopy;  Laterality: N/A;    ESOPHAGOGASTRODUODENOSCOPY N/A 2019    Procedure: EGD (ESOPHAGOGASTRODUODENOSCOPY);  Surgeon: Fawad Perla MD;  Location: [facility] (2ND FLR);  Service: Endoscopy;  Laterality: N/A;    ILEOCOLECTOMY N/A 5/10/2019    Procedure: ILEOCOLECTOMY;  Surgeon: Uday Cope MD;  [facility];  Service: Colon and Rectal;  Laterality: N/A;  ileocecectomy    ILEOSCOPY N/A 2019    Procedure: ILEOSCOPY through stoma;  Surgeon: Suzi Bacon MD;  Location: [facility] (4TH FLR);  Service: Endoscopy;  Laterality: N/A;  Schedule as 30 minute case  Please let patient know to bring extra stoma bag/wafer, etc- entire bag and appliance will be removed at time of procedure to visualize the area well   first week 2019     per note-R/S to 19-GT    ILEOSTOMY N/A 5/10/2019    Procedure: CREATION, ILEOSTOMY with mucous fistula;  Surgeon: Uady Cope MD;  [facility];  Service: Colon and Rectal;  Laterality: N/A;    ILEOSTOMY CLOSURE N/A 2020    Procedure: CLOSURE,ILEOSTOMY TAKEDOWN END ILEOSTOMY/MUCOUS FISTULA POSSIBLE LAPAROTOMY ERAS LOW;  Surgeon: Uday Cope MD;  Location: [facility] OR [address] [place] [address];  Service: Colon and Rectal;  Laterality: N/A;       MEDICATIONS AND ALLERGIES:      Current  Outpatient Medications:     adalimumab (HUMIRA PEN) 40 mg/0.8 mL PnKt, Inject 1 pen (40 mg total) into the skin every 7 days., Disp: 4 pen, Rfl: 11    clotrimazole (LOTRIMIN) 1 % cream, 2 (two) times a day. to affected area, Disp: , Rfl:     clotrimazole (LOTRIMIN) 1 % cream, Apply topically., Disp: , Rfl:     ergocalciferol (VITAMIN D2) 50,000 unit Cap, Take 50,000 Units by mouth every 7 days., Disp: , Rfl:     hydrOXYzine HCL (ATARAX) 25 MG tablet, Take 1 tablet (25 mg total) by mouth every 4 (four) hours as needed for Itching. (Patient not taking: Reported on 10/2/2020), Disp: 30 tablet, Rfl: 0    hydrOXYzine pamoate (VISTARIL) 25 MG Cap, TAKE 1 CAPSULE BY MOUTH EVERY 6 HOURS AS NEEDED FOR ITCHING FOR UP TO 10 DAYS, Disp: , Rfl:     itraconazole (SPORANOX) 100 mg Cap, Take 100 mg by mouth once daily., Disp: , Rfl:     metoclopramide HCl (REGLAN) 10 MG tablet, Take 1 tablet (10 mg total) by mouth As instructed (take 10 mg 30 min prior to first 1/2 of bowel prep and 2nd dose 30 min prior to 2nd dose of bowel prep)., Disp: 2 tablet, Rfl: 0    mupirocin (BACTROBAN) 2 % ointment, Apply topically 3 (three) times daily. (Patient not taking: Reported on 10/2/2020), Disp: 22 g, Rfl: 0    triamcinolone acetonide 0.1% (KENALOG) 0.1 % cream, Apply topically 2 (two) times daily. To rashes (Patient not taking: Reported on 10/2/2020), Disp: 454 g, Rfl: 3    triamcinolone acetonide 0.1% (KENALOG) 0.1 % cream, Apply topically 2 (two) times daily., Disp: 80 g, Rfl: 1    Review of patient's allergies indicates:   Allergen Reactions    Horse/equine containing products Itching       COMPREHENSIVE GYN HISTORY:  PAP History: Denies abnormal Paps.  Infection History: Denies STDs. Denies PID.  Benign History: Denies uterine fibroids. Denies ovarian cysts. Denies endometriosis. Denies other conditions.  Cancer History: Denies cervical cancer. Denies uterine cancer or hyperplasia. Denies ovarian cancer. Denies vulvar cancer or  pre-cancer. Denies vaginal cancer or pre-cancer. Denies breast cancer. Denies colon cancer.  Sexual Activity History: Reports currently being sexually active  Menstrual History: Every 28 days, flows for 4 days. Light flow.  Dysmenorrhea History: Denies dysmenorrhea.    ROS:  GENERAL: No fever or chills.  BREASTS: No pain. No lumps. No discharge.  ABDOMEN: No pain. No nausea. No vomiting. No diarrhea. No constipation.  REPRODUCTIVE: No abnormal bleeding.   VULVA: No pain. No lesions. No itching.  VAGINA: No relaxation. No itching. No odor. No discharge. No lesions.  URINARY: No incontinence. No nocturia. No frequency. No dysuria.    PE:  APPEARANCE: Well nourished, well developed, in no acute distress.  AFFECT: WNL, alert and oriented x 3.  Deferred     1. Psoriasis    2. Eczema, unspecified type        PLAN:    Orders Placed This Encounter    triamcinolone acetonide 0.1% (KENALOG) 0.1 % cream     FOLLOW-UP with me for colposcopy.

## 2020-10-10 ENCOUNTER — TELEPHONE (OUTPATIENT)
Dept: SCHEDULING | Age: 30
End: 2020-10-10

## 2020-10-13 ENCOUNTER — LAB VISIT (OUTPATIENT)
Dept: URGENT CARE | Facility: CLINIC | Age: 30
End: 2020-10-13
Payer: MEDICAID

## 2020-10-13 DIAGNOSIS — Z01.818 PRE-OP TESTING: ICD-10-CM

## 2020-10-13 DIAGNOSIS — U07.1 COVID-19: Primary | ICD-10-CM

## 2020-10-13 PROCEDURE — U0003 INFECTIOUS AGENT DETECTION BY NUCLEIC ACID (DNA OR RNA); SEVERE ACUTE RESPIRATORY SYNDROME CORONAVIRUS 2 (SARS-COV-2) (CORONAVIRUS DISEASE [COVID-19]), AMPLIFIED PROBE TECHNIQUE, MAKING USE OF HIGH THROUGHPUT TECHNOLOGIES AS DESCRIBED BY CMS-2020-01-R: HCPCS

## 2020-10-13 PROCEDURE — 99211 PR OFFICE/OUTPT VISIT, EST, LEVL I: ICD-10-PCS | Mod: S$GLB,,, | Performed by: PHYSICIAN ASSISTANT

## 2020-10-13 PROCEDURE — 99211 OFF/OP EST MAY X REQ PHY/QHP: CPT | Mod: S$GLB,,, | Performed by: PHYSICIAN ASSISTANT

## 2020-10-14 ENCOUNTER — OFFICE VISIT (OUTPATIENT)
Dept: URGENT CARE | Age: 30
End: 2020-10-14

## 2020-10-14 ENCOUNTER — TELEPHONE (OUTPATIENT)
Dept: ENDOSCOPY | Facility: HOSPITAL | Age: 30
End: 2020-10-14

## 2020-10-14 ENCOUNTER — TELEPHONE (OUTPATIENT)
Dept: GASTROENTEROLOGY | Facility: CLINIC | Age: 30
End: 2020-10-14

## 2020-10-14 VITALS
TEMPERATURE: 98.6 F | OXYGEN SATURATION: 100 % | RESPIRATION RATE: 16 BRPM | BODY MASS INDEX: 43.4 KG/M2 | HEIGHT: 69 IN | WEIGHT: 293 LBS | DIASTOLIC BLOOD PRESSURE: 104 MMHG | SYSTOLIC BLOOD PRESSURE: 155 MMHG | HEART RATE: 87 BPM

## 2020-10-14 DIAGNOSIS — E03.9 HYPOTHYROIDISM, UNSPECIFIED TYPE: ICD-10-CM

## 2020-10-14 DIAGNOSIS — M94.8X9 CHONDRITIS: ICD-10-CM

## 2020-10-14 DIAGNOSIS — Z29.9 PREVENTIVE MEASURE: ICD-10-CM

## 2020-10-14 DIAGNOSIS — I10 HYPERTENSION, UNCONTROLLED: ICD-10-CM

## 2020-10-14 DIAGNOSIS — H60.503 ACUTE OTITIS EXTERNA OF BOTH EARS, UNSPECIFIED TYPE: Primary | ICD-10-CM

## 2020-10-14 DIAGNOSIS — H92.03 OTALGIA OF BOTH EARS: ICD-10-CM

## 2020-10-14 LAB
B-HCG UR QL: NEGATIVE
INTERNAL PROCEDURAL CONTROLS ACCEPTABLE: YES

## 2020-10-14 PROCEDURE — 3077F SYST BP >= 140 MM HG: CPT | Performed by: INTERNAL MEDICINE

## 2020-10-14 PROCEDURE — 3080F DIAST BP >= 90 MM HG: CPT | Performed by: INTERNAL MEDICINE

## 2020-10-14 PROCEDURE — 81025 URINE PREGNANCY TEST: CPT | Performed by: INTERNAL MEDICINE

## 2020-10-14 PROCEDURE — 99214 OFFICE O/P EST MOD 30 MIN: CPT | Performed by: INTERNAL MEDICINE

## 2020-10-14 RX ORDER — AMOXICILLIN 500 MG/1
500 CAPSULE ORAL 3 TIMES DAILY
Qty: 21 CAPSULE | Refills: 0 | Status: SHIPPED | OUTPATIENT
Start: 2020-10-14 | End: 2020-10-21

## 2020-10-14 RX ORDER — FLUCONAZOLE 150 MG/1
150 TABLET ORAL ONCE
Qty: 1 TABLET | Refills: 0 | Status: SHIPPED | OUTPATIENT
Start: 2020-10-14 | End: 2020-10-14

## 2020-10-14 RX ORDER — AMLODIPINE BESYLATE 5 MG/1
5 TABLET ORAL DAILY
Qty: 30 TABLET | Refills: 3 | Status: SHIPPED | OUTPATIENT
Start: 2020-10-14 | End: 2020-12-08 | Stop reason: DRUGHIGH

## 2020-10-14 RX ORDER — LEVOTHYROXINE SODIUM 0.12 MG/1
125 TABLET ORAL DAILY
Qty: 30 TABLET | Refills: 3 | Status: SHIPPED | OUTPATIENT
Start: 2020-10-14 | End: 2020-12-13 | Stop reason: DRUGHIGH

## 2020-10-14 ASSESSMENT — ENCOUNTER SYMPTOMS
GASTROINTESTINAL NEGATIVE: 1
NEUROLOGICAL NEGATIVE: 1
PSYCHIATRIC NEGATIVE: 1
RESPIRATORY NEGATIVE: 1
ENDOCRINE NEGATIVE: 1
EYES NEGATIVE: 1
CONSTITUTIONAL NEGATIVE: 1

## 2020-10-14 NOTE — TELEPHONE ENCOUNTER
Pt called my direct line   She wanted to make sure we had the correct contact info for her   Asked if she understood prep - no she does not   Explained how to take the Reglan.    Gave her number to the 's and transferred her to get details on prep to make sure she understands     She will laurence with additional questions

## 2020-10-14 NOTE — TELEPHONE ENCOUNTER
----- Message from Suzi Bacon MD sent at 10/2/2020  4:00 PM CDT -----  Does pt have questions about bowel prep, scope 10/16. Call pt and make sure she understands about miralax gatorade bowel prep and that reglan 10 mg 30 min prior to each 1/2    SS

## 2020-10-15 LAB — SARS-COV-2 RNA RESP QL NAA+PROBE: NOT DETECTED

## 2020-10-16 ENCOUNTER — PATIENT MESSAGE (OUTPATIENT)
Dept: ENDOSCOPY | Facility: HOSPITAL | Age: 30
End: 2020-10-16

## 2020-10-16 DIAGNOSIS — Z01.818 PRE-OP TESTING: ICD-10-CM

## 2020-10-22 ENCOUNTER — TELEPHONE (OUTPATIENT)
Dept: SCHEDULING | Age: 30
End: 2020-10-22

## 2020-10-23 ENCOUNTER — OFFICE VISIT (OUTPATIENT)
Dept: INTERNAL MEDICINE | Age: 30
End: 2020-10-23

## 2020-10-23 DIAGNOSIS — H66.90 ACUTE OTITIS MEDIA, UNSPECIFIED OTITIS MEDIA TYPE: ICD-10-CM

## 2020-10-23 DIAGNOSIS — I10 ESSENTIAL HYPERTENSION: ICD-10-CM

## 2020-10-23 DIAGNOSIS — H61.23 BILATERAL IMPACTED CERUMEN: Primary | ICD-10-CM

## 2020-10-23 DIAGNOSIS — M26.622 ARTHRALGIA OF LEFT TEMPOROMANDIBULAR JOINT: ICD-10-CM

## 2020-10-23 PROCEDURE — 99214 OFFICE O/P EST MOD 30 MIN: CPT | Performed by: INTERNAL MEDICINE

## 2020-10-23 PROCEDURE — 3079F DIAST BP 80-89 MM HG: CPT | Performed by: INTERNAL MEDICINE

## 2020-10-23 PROCEDURE — 3075F SYST BP GE 130 - 139MM HG: CPT | Performed by: INTERNAL MEDICINE

## 2020-10-23 ASSESSMENT — PATIENT HEALTH QUESTIONNAIRE - PHQ9
CLINICAL INTERPRETATION OF PHQ9 SCORE: NO FURTHER SCREENING NEEDED
1. LITTLE INTEREST OR PLEASURE IN DOING THINGS: NOT AT ALL
CLINICAL INTERPRETATION OF PHQ2 SCORE: NO FURTHER SCREENING NEEDED
SUM OF ALL RESPONSES TO PHQ9 QUESTIONS 1 AND 2: 0
CLINICAL INTERPRETATION OF PHQ2 SCORE: NO FURTHER SCREENING NEEDED
2. FEELING DOWN, DEPRESSED OR HOPELESS: NOT AT ALL
SUM OF ALL RESPONSES TO PHQ9 QUESTIONS 1 AND 2: 0
SUM OF ALL RESPONSES TO PHQ9 QUESTIONS 1 AND 2: 0
2. FEELING DOWN, DEPRESSED OR HOPELESS: NOT AT ALL
1. LITTLE INTEREST OR PLEASURE IN DOING THINGS: NOT AT ALL

## 2020-10-23 ASSESSMENT — PAIN SCALES - GENERAL: PAINLEVEL: 0

## 2020-10-26 ENCOUNTER — PATIENT MESSAGE (OUTPATIENT)
Dept: DERMATOLOGY | Facility: CLINIC | Age: 30
End: 2020-10-26

## 2020-10-27 ENCOUNTER — TELEPHONE (OUTPATIENT)
Dept: SCHEDULING | Age: 30
End: 2020-10-27

## 2020-10-27 ENCOUNTER — LAB VISIT (OUTPATIENT)
Dept: URGENT CARE | Facility: CLINIC | Age: 30
End: 2020-10-27
Payer: MEDICAID

## 2020-10-27 DIAGNOSIS — Z01.818 PRE-OP TESTING: ICD-10-CM

## 2020-10-27 PROCEDURE — 99211 OFF/OP EST MAY X REQ PHY/QHP: CPT | Mod: S$GLB,,, | Performed by: NURSE PRACTITIONER

## 2020-10-27 PROCEDURE — U0003 INFECTIOUS AGENT DETECTION BY NUCLEIC ACID (DNA OR RNA); SEVERE ACUTE RESPIRATORY SYNDROME CORONAVIRUS 2 (SARS-COV-2) (CORONAVIRUS DISEASE [COVID-19]), AMPLIFIED PROBE TECHNIQUE, MAKING USE OF HIGH THROUGHPUT TECHNOLOGIES AS DESCRIBED BY CMS-2020-01-R: HCPCS

## 2020-10-27 PROCEDURE — 99211 PR OFFICE/OUTPT VISIT, EST, LEVL I: ICD-10-PCS | Mod: S$GLB,,, | Performed by: NURSE PRACTITIONER

## 2020-10-28 ENCOUNTER — TELEPHONE (OUTPATIENT)
Dept: INTERNAL MEDICINE | Age: 30
End: 2020-10-28

## 2020-10-28 LAB — SARS-COV-2 RNA RESP QL NAA+PROBE: NOT DETECTED

## 2020-10-29 ENCOUNTER — PATIENT MESSAGE (OUTPATIENT)
Dept: ENDOSCOPY | Facility: HOSPITAL | Age: 30
End: 2020-10-29

## 2020-10-30 ENCOUNTER — HOSPITAL ENCOUNTER (OUTPATIENT)
Facility: HOSPITAL | Age: 30
Discharge: HOME | End: 2020-10-30
Attending: INTERNAL MEDICINE | Admitting: INTERNAL MEDICINE
Payer: MEDICAID

## 2020-10-30 VITALS
BODY MASS INDEX: 26.93 KG/M2 | HEART RATE: 67 BPM | OXYGEN SATURATION: 95 % | WEIGHT: 152 LBS | DIASTOLIC BLOOD PRESSURE: 60 MMHG | RESPIRATION RATE: 15 BRPM | SYSTOLIC BLOOD PRESSURE: 96 MMHG | HEIGHT: 63 IN | TEMPERATURE: 98 F

## 2020-10-30 DIAGNOSIS — K50.80 CROHN'S DISEASE OF BOTH SMALL AND LARGE INTESTINE WITHOUT COMPLICATION: ICD-10-CM

## 2020-10-30 PROCEDURE — 27201012 HC FORCEPS, HOT/COLD, DISP: Performed by: INTERNAL MEDICINE

## 2020-10-30 PROCEDURE — 25000003 PHARM REV CODE 250: Performed by: INTERNAL MEDICINE

## 2020-10-30 PROCEDURE — 63600175 PHARM REV CODE 636 W HCPCS: Performed by: NURSE ANESTHETIST, CERTIFIED REGISTERED

## 2020-10-30 PROCEDURE — 00811 ANES LWR INTST NDSC NOS: CPT | Performed by: INTERNAL MEDICINE

## 2020-10-30 PROCEDURE — 37000009 HC ANESTHESIA EA ADD 15 MINS: Performed by: INTERNAL MEDICINE

## 2020-10-30 PROCEDURE — E9220 PRA ENDO ANESTHESIA: HCPCS | Mod: ,,, | Performed by: NURSE ANESTHETIST, CERTIFIED REGISTERED

## 2020-10-30 PROCEDURE — 88305 TISSUE EXAM BY PATHOLOGIST: CPT | Performed by: PATHOLOGY

## 2020-10-30 PROCEDURE — 88305 TISSUE EXAM BY PATHOLOGIST: ICD-10-PCS | Mod: 26,,, | Performed by: PATHOLOGY

## 2020-10-30 PROCEDURE — 45380 COLONOSCOPY AND BIOPSY: CPT | Performed by: INTERNAL MEDICINE

## 2020-10-30 PROCEDURE — 88305 TISSUE EXAM BY PATHOLOGIST: CPT | Mod: 26,,, | Performed by: PATHOLOGY

## 2020-10-30 PROCEDURE — E9220 PRA ENDO ANESTHESIA: ICD-10-PCS | Mod: ,,, | Performed by: NURSE ANESTHETIST, CERTIFIED REGISTERED

## 2020-10-30 PROCEDURE — 37000008 HC ANESTHESIA 1ST 15 MINUTES: Performed by: INTERNAL MEDICINE

## 2020-10-30 RX ORDER — PROPOFOL 10 MG/ML
VIAL (ML) INTRAVENOUS
Status: DISCONTINUED | OUTPATIENT
Start: 2020-10-30 | End: 2020-10-30

## 2020-10-30 RX ORDER — SODIUM CHLORIDE 9 MG/ML
INJECTION, SOLUTION INTRAVENOUS CONTINUOUS
Status: DISCONTINUED | OUTPATIENT
Start: 2020-10-30 | End: 2020-10-30 | Stop reason: HOSPADM

## 2020-10-30 RX ORDER — PROPOFOL 10 MG/ML
VIAL (ML) INTRAVENOUS CONTINUOUS PRN
Status: DISCONTINUED | OUTPATIENT
Start: 2020-10-30 | End: 2020-10-30

## 2020-10-30 RX ORDER — LIDOCAINE HCL/PF 100 MG/5ML
SYRINGE (ML) INTRAVENOUS
Status: DISCONTINUED | OUTPATIENT
Start: 2020-10-30 | End: 2020-10-30

## 2020-10-30 RX ADMIN — PROPOFOL 120 MG: 10 INJECTION, EMULSION INTRAVENOUS at 10:10

## 2020-10-30 RX ADMIN — PROPOFOL 200 MCG/KG/MIN: 10 INJECTION, EMULSION INTRAVENOUS at 10:10

## 2020-10-30 RX ADMIN — Medication 100 MG: at 10:10

## 2020-10-30 RX ADMIN — SODIUM CHLORIDE: 0.9 INJECTION, SOLUTION INTRAVENOUS at 10:10

## 2020-10-30 NOTE — TRANSFER OF CARE
"Anesthesia Transfer of Care Note    Patient: Lora Scott    Procedure(s) Performed: Procedure(s) (LRB):  COLONOSCOPY (N/A)    Patient location: GI    Anesthesia Type: general    Transport from OR: Transported from OR on room air with adequate spontaneous ventilation    Post pain: adequate analgesia    Post assessment: no apparent anesthetic complications and tolerated procedure well    Post vital signs: stable    Level of consciousness: sedated    Nausea/Vomiting: no nausea/vomiting    Complications: none    Transfer of care protocol was followed      Last vitals:   Visit Vitals  /68 (BP Location: Left arm, Patient Position: Lying)   Pulse 72   Temp 36.6 °C (97.9 °F) (Temporal)   Resp 18   Ht 5' 3" (1.6 m)   Wt 68.9 kg (152 lb)   LMP 10/19/2020   SpO2 97%   Breastfeeding No   BMI 26.93 kg/m²     "

## 2020-10-30 NOTE — ANESTHESIA POSTPROCEDURE EVALUATION
Anesthesia Post Evaluation    Patient: Lora Scott    Procedure(s) Performed: Procedure(s) (LRB):  COLONOSCOPY (N/A)    Final Anesthesia Type: general    Patient location during evaluation: GI PACU  Patient participation: Yes- Able to Participate  Level of consciousness: awake and alert  Post-procedure vital signs: reviewed and stable  Pain management: adequate  Airway patency: patent    PONV status at discharge: No PONV  Anesthetic complications: no      Cardiovascular status: blood pressure returned to baseline  Respiratory status: unassisted  Hydration status: euvolemic  Follow-up not needed.          Vitals Value Taken Time   BP 96/60 10/30/20 1108   Temp 36.6 °C (97.9 °F) 10/30/20 1048   Pulse 67 10/30/20 1108   Resp 15 10/30/20 1108   SpO2 95 % 10/30/20 1108         Event Time   Out of Recovery 11:13:22         Pain/Oral Score: Oral Score: 10 (10/30/2020 11:08 AM)

## 2020-10-30 NOTE — [employer] PATIENT INSTRUCTIONS
Discharge Summary/Instructions after an Endoscopic Procedure  Patient Name: Lora Scott  Patient MRN: [id number]  Patient YOB: 1990  Friday, October 30, 2020  Suzi Bacon MD  RESTRICTIONS:  During your procedure today, you received medications for sedation.  These   medications may affect your judgment, balance and coordination.  Therefore,   for 24 hours, you have the following restrictions:   - DO NOT drive a car, operate machinery, make legal/financial decisions,   sign important papers or drink alcohol.    ACTIVITY:  Today: no heavy lifting, straining or running due to procedural   sedation/anesthesia.  The following day: return to full activity including work.  DIET:  Eat and drink normally unless instructed otherwise.     TREATMENT FOR COMMON SIDE EFFECTS:  - Mild abdominal pain, nausea, belching, bloating or excessive gas:  rest,   eat lightly and use a heating pad.  - Sore Throat: treat with throat lozenges and/or gargle with warm salt   water.  - Because air was used during the procedure, expelling large amounts of air   from your rectum or belching is normal.  - If a bowel prep was taken, you may not have a bowel movement for 1-3 days.    This is normal.  SYMPTOMS TO WATCH FOR AND REPORT TO YOUR PHYSICIAN:  1. Abdominal pain or bloating, other than gas cramps.  2. Chest pain.  3. Back pain.  4. Signs of infection such as: chills or fever occurring within 24 hours   after the procedure.  5. Rectal bleeding, which would show as bright red, maroon, or black stools.   (A tablespoon of blood from the rectum is not serious, especially if   hemorrhoids are present.)  6. Vomiting.  7. Weakness or dizziness.  GO DIRECTLY TO THE NEAREST EMERGENCY ROOM IF YOU HAVE ANY OF THE FOLLOWING:      Difficulty breathing              Chills and/or fever over 101 F   Persistent vomiting and/or vomiting blood   Severe abdominal pain   Severe chest pain   Black, tarry stools   Bleeding- more than one  tablespoon   Any other symptom or condition that you feel may need urgent attention  Your doctor recommends these additional instructions:  If any biopsies were taken, your doctors clinic will contact you in 1 to 2   weeks with any results.  - Discharge patient to home.   - Patient has a contact number available for emergencies.  The signs and   symptoms of potential delayed complications were discussed with the   patient.  Return to normal activities tomorrow.  Written discharge   instructions were provided to the patient.   - Resume previous diet.   - Continue present medications.   - Await pathology results.   - Repeat colonoscopy to assess disease activity in 1 year.  For questions, problems or results please call your physician - Suzi Bacon MD at Work:  [phone].  [facility], EMERGENCY ROOM PHONE NUMBER: [phone]  IF A COMPLICATION OR EMERGENCY SITUATION ARISES AND YOU ARE UNABLE TO REACH   YOUR PHYSICIAN - GO DIRECTLY TO THE EMERGENCY ROOM.  Suzi Bacon MD  10/30/2020 10:46:44 AM  This report has been verified and signed electronically.  [employer]

## 2020-10-30 NOTE — H&P
Short Stay Endoscopy History and Physical    PCP - Margarita Jay MD  Referring Physician - Suzi Bacon MD  [address] [place] [address]    Procedure - Colonoscopy  ASA - per anesthesia  Mallampati - per anesthesia  History of Anesthesia problems - no  Family history Anesthesia problems -  no   Plan of anesthesia - General    HPI  30 y.o. female  Reason for procedure: Crohn's ileum f/u after surgery      ROS:  Constitutional: No fevers, chills, No weight loss  CV: No chest pain  Pulm: No cough, No shortness of breath  GI: see HPI    Medical History:  has a past medical history of Allergy, Asthma, Crohn's disease (2/18/19), Crohn's disease (ileum) (5/10/2019), Depression, Eczema, Ex-smoker for less than 1 year, Immunosuppressed status (10/24/2019), and Joint pain.    Surgical History:  has a past surgical history that includes Colonoscopy (N/A, 2/20/2019); Esophagogastroduodenoscopy (N/A, 2/20/2019); Ileocolectomy (N/A, 5/10/2019); Ileostomy (N/A, 5/10/2019); Ileoscopy (N/A, 12/20/2019); and Ileostomy closure (N/A, 2/12/2020).    Family History: family history includes Hypertension in her mother; Irritable bowel syndrome in her paternal grandmother; No Known Problems in her brother, father, maternal grandfather, maternal grandmother, maternal uncle, maternal uncle, maternal uncle, paternal aunt, paternal aunt, paternal grandfather, paternal uncle, paternal uncle, paternal uncle, paternal uncle, sister, sister, sister, and sister..    Social History:  reports that she has been smoking cigarettes. She has been smoking about 0.25 packs per day. She has never used smokeless tobacco. She reports previous alcohol use. She reports that she does not use drugs.    Review of patient's allergies indicates:   Allergen Reactions    Horse/equine containing products Itching       Medications:   Medications Prior to Admission   Medication Sig Dispense Refill Last Dose    adalimumab (HUMIRA PEN) 40 mg/0.8 mL  PnKt Inject 1 pen (40 mg total) into the skin every 7 days. 4 pen 11 Past Week at Unknown time    triamcinolone acetonide 0.1% (KENALOG) 0.1 % cream Apply topically 2 (two) times daily. 80 g 1 10/29/2020 at Unknown time    clotrimazole (LOTRIMIN) 1 % cream 2 (two) times a day. to affected area       clotrimazole (LOTRIMIN) 1 % cream Apply topically.       ergocalciferol (VITAMIN D2) 50,000 unit Cap Take 50,000 Units by mouth every 7 days.       hydrOXYzine HCL (ATARAX) 25 MG tablet Take 1 tablet (25 mg total) by mouth every 4 (four) hours as needed for Itching. (Patient not taking: Reported on 10/2/2020) 30 tablet 0     hydrOXYzine pamoate (VISTARIL) 25 MG Cap TAKE 1 CAPSULE BY MOUTH EVERY 6 HOURS AS NEEDED FOR ITCHING FOR UP TO 10 DAYS       itraconazole (SPORANOX) 100 mg Cap Take 100 mg by mouth once daily.       metoclopramide HCl (REGLAN) 10 MG tablet Take 1 tablet (10 mg total) by mouth As instructed (take 10 mg 30 min prior to first 1/2 of bowel prep and 2nd dose 30 min prior to 2nd dose of bowel prep). 2 tablet 0     mupirocin (BACTROBAN) 2 % ointment Apply topically 3 (three) times daily. (Patient not taking: Reported on 10/2/2020) 22 g 0     triamcinolone acetonide 0.1% (KENALOG) 0.1 % cream Apply topically 2 (two) times daily. To rashes 454 g 3        Physical Exam:    Vital Signs: There were no vitals filed for this visit.    General Appearance: Well appearing in no acute distress  Abdomen: Soft, non tender, non distended with normal bowel sounds, no masses    Labs:  Lab Results   Component Value Date    WBC 6.50 09/17/2020    HGB 12.0 09/17/2020    HCT 39.2 09/17/2020     09/17/2020    TRIG 41 05/06/2019    ALT 14 09/17/2020    AST 17 09/17/2020     09/17/2020    K 3.7 09/17/2020     09/17/2020    CREATININE 0.8 09/17/2020    BUN 5 (L) 09/17/2020    CO2 18 (L) 09/17/2020    TSH 0.713 08/19/2015       I have explained the risks and benefits of this endoscopic procedure to the  patient including but not limited to bleeding, inflammation, infection, perforation, and death.      Suzi Bacon MD

## 2020-10-30 NOTE — DISCHARGE INSTRUCTIONS
Colonoscopy     A camera attached to a flexible tube with a viewing lens is used to take video pictures.     Colonoscopy is a test to view the inside of your lower digestive tract (colon and rectum). Sometimes it can show the last part of the small intestine (ileum). During the test, small pieces of tissue may be removed for testing. This is called a biopsy. Small growths, such as polyps, may also be removed.   Why is colonoscopy done?  The test is done to help look for colon cancer. And it can help find the source of abdominal pain, bleeding, and changes in bowel habits. It may be needed once a year, depending on factors such as your:  · Age  · Health history  · Family health history  · Symptoms  · Results from any prior colonoscopy  Risks and possible complications  These include:  · Bleeding               · A puncture or tear in the colon   · Risks of anesthesia  · A cancer lesion not being seen  Getting ready   To prepare for the test:  · Talk with your healthcare provider about the risks of the test (see below). Also ask your healthcare provider about alternatives to the test.  · Tell your healthcare provider about any medicines you take. Also tell him or her about any health conditions you may have.  · Make sure your rectum and colon are empty for the test. Follow the diet and bowel prep instructions exactly. If you dont, the test may need to be rescheduled.  · Plan for a friend or family member to drive you home after the test.     Colonoscopy provides an inside view of the entire colon.     You may discuss the results with your doctor right away or at a future visit.  During the test   The test is usually done in the hospital on an outpatient basis. This means you go home the same day. The procedure takes about 30 minutes. During that time:  · You are given relaxing (sedating) medicine through an IV line. You may be drowsy, or fully asleep.  · The healthcare provider will first give you a physical exam to  check for anal and rectal problems.  · Then the anus is lubricated and the scope inserted.  · If you are awake, you may have a feeling similar to needing to have a bowel movement. You may also feel pressure as air is pumped into the colon. Its OK to pass gas during the procedure.  · Biopsy, polyp removal, or other treatments may be done during the test.  After the test   You may have gas right after the test. It can help to try to pass it to help prevent later bloating. Your healthcare provider may discuss the results with you right away. Or you may need to schedule a follow-up visit to talk about the results. After the test, you can go back to your normal eating and other activities. You may be tired from the sedation and need to rest for a few hours.  Date Last Reviewed: 11/1/2016 © 2000-2017 [employer] [address] [place] [address], [place] [address]. All rights reserved. This information is not intended as a substitute for professional medical care. Always follow your healthcare professional's instructions.

## 2020-11-02 ENCOUNTER — OFFICE VISIT (OUTPATIENT)
Dept: GASTROENTEROLOGY | Facility: CLINIC | Age: 30
End: 2020-11-02
Attending: INTERNAL MEDICINE
Payer: MEDICAID

## 2020-11-02 VITALS
SYSTOLIC BLOOD PRESSURE: 104 MMHG | DIASTOLIC BLOOD PRESSURE: 71 MMHG | TEMPERATURE: 98 F | WEIGHT: 156.5 LBS | OXYGEN SATURATION: 100 % | HEIGHT: 63 IN | BODY MASS INDEX: 27.73 KG/M2 | HEART RATE: 74 BPM | RESPIRATION RATE: 14 BRPM

## 2020-11-02 DIAGNOSIS — K50.80 CROHN'S DISEASE OF BOTH SMALL AND LARGE INTESTINE WITHOUT COMPLICATION: Primary | ICD-10-CM

## 2020-11-02 DIAGNOSIS — E53.8 VITAMIN B12 DEFICIENCY: ICD-10-CM

## 2020-11-02 DIAGNOSIS — E55.9 VITAMIN D DEFICIENCY: ICD-10-CM

## 2020-11-02 PROBLEM — T81.89XA DELAYED SURGICAL WOUND HEALING: Status: RESOLVED | Noted: 2020-02-13 | Resolved: 2020-11-02

## 2020-11-02 PROCEDURE — 99215 PR OFFICE/OUTPT VISIT, EST, LEVL V, 40-54 MIN: ICD-10-PCS | Mod: S$GLB,,, | Performed by: INTERNAL MEDICINE

## 2020-11-02 PROCEDURE — 99215 OFFICE O/P EST HI 40 MIN: CPT | Mod: S$GLB,,, | Performed by: INTERNAL MEDICINE

## 2020-11-02 RX ORDER — ERGOCALCIFEROL 1.25 MG/1
50000 CAPSULE ORAL
Qty: 12 CAPSULE | Refills: 1 | Status: SHIPPED | OUTPATIENT
Start: 2020-11-02 | End: 2021-01-19

## 2020-11-02 RX ORDER — CYANOCOBALAMIN 1000 UG/ML
1000 INJECTION, SOLUTION INTRAMUSCULAR; SUBCUTANEOUS
Qty: 4 ML | Refills: 0 | Status: SHIPPED | OUTPATIENT
Start: 2020-11-02 | End: 2021-01-31

## 2020-11-02 RX ORDER — CYANOCOBALAMIN 1000 UG/ML
1000 INJECTION, SOLUTION INTRAMUSCULAR; SUBCUTANEOUS
Qty: 3 ML | Refills: 3 | Status: SHIPPED | OUTPATIENT
Start: 2020-11-02 | End: 2021-01-31

## 2020-11-02 NOTE — PATIENT INSTRUCTIONS
Flu shot today  Start B12 injections weekly for 4 weeks then monthly  Start Vit D high dose weekly for 6 months.   Schedule appt with dermatology at [facility], number to call [phone]  Please help patient with Humira application (Patient last dose today and does not have more Humira left)  Set up calendar reminder for B12 and vit D   Labs 2/2021

## 2020-11-02 NOTE — PROGRESS NOTES
"     [facility]             Inflammatory Bowel Disease Follow-up  Note              TODAY'S VISIT DATE:  11/2/2020    Chief Complaint:   Chief Complaint   Patient presents with    Crohn's Disease     PCP: Margarita Jay    Previous History:  Lora Scott is a 30 y.o. female with Crohn's disease (ileum, S/P ileocecal resection-42 cm of ileum/10.5 cm cecum removed with surgical path c/w stricture and fistula, 2/15/20 ileostomy takedown and mucus fistula takedown), GERD, anemia, asthma, ex-smoker (quit 9/2019).  Patient reports being diagnosed with IBS with alternating diarrhea and constipation and weight loss as a teenager that was treated with Nexium which helped.  In 8/2018 she again had abd pain, weight loss, and alternating BMs so she contacted her PCP for Nexium which did not help.  By 2/2019, her symptoms progressed prompting an ER visit and on 2/18/2019 she had a CT abd/pelvis which showed an "abnormal wall thickening involving the ascending colon, cecum and terminal ileum with intense mucosal enhancement and luminal narrowing resulting in distention of proximal small bowel.  There was also an ileal-ileal and ileocolic fistulization, fat hypertrophy, increased mesenteric vascularitiy, and reactive mesenteric adenitis in the RLQ with no abscess. "  Patient had an inpatient EGD on 2/20/2019 which showed minimal chronic H. Pylori negative inflammation, normal esophagus, and duodenal biopsies showed mild villous blunting and mild increased IELs.  Colonoscopy on the same day showed congested mucosa in the TI and ICV with granularity in the TI, and TI stricture 5 cm from the ICV with ileal biopsies confirmed ileitis and biopsies of the right colon confirmed focal moderate active colitis with normal left colon and rectum, sigmoid diverticulitis, and internal hemorrhoids.  It was felt that patient would eventually need surgical intervention but needed nutritional optimization initially so she was " discharged home with 12 hours of TPN daily via a PICC and bowel rest with plans for an MRE, surgical f/u, and a referral to the IBD clinic to discuss medical options.  She was last seen by me 3/2019 for initial IBD visit at which time she was on no meds and there was plan for MRE 3/13/19 with f/u with Dr. Ruiz. MRE on 3/13/19 showed multifocal active inflammation of the distal ileum with mild fibrostenotic component and entero-enteric fistula.  She then was transferred to Dr. Cope due to availability and he initially saw her in clinic and a decision to proceed with surgery was made.  On 5/10/19 she underwent an open ileocolonic resection (10 cm of cecum, 42 cm of ileum removed) with end ileostomy with mucous fistula. Surgical pathology showed TI/cecum with chronic active ileitis with stricture and features of fistulization, transmural inflammation with reactive LNs. Postoperatively she did well and gained weight and would pass mucous from below a few times only. She emptied her stoma about 4-6 times/d and was having issues getting supplies due to insurance issues.  Patient has had multiple issues with stoma appliance/bag and due to inability to afford stoma supplies and its not covered by insurance, she has been getting samples from our wound/ostomy nurse.  Patient stopped smoking in mid September 2019.  She started on methotrexate 12.5 mg p.o. weekly though due to elevated liver function test discontinued on 10/4/2019.  She started Humira on 10/24/2019 and Imuran 50 mg daily on 10/25/2019 though never started Imuran as recommended and we decided not to proceed and instead optimize her Humira.  Ileoscopy through stoma was normal on 12/20/2019.  On 1/15/2020 patient had Humira levels 3.5 and her Humira was increased to weekly starting March 2020.  On 2/15/2020 she had elective takedown of her ileostomy and mucous fistula.    Interval History:  - current IBD meds: Humira 40 mg SC weekly (started 10/24/20, increased  to weekly 3/13/2020, last dose 10/30/20, next dose 20)  - Reports compliance to Humira, however sometimes remembers the dose a day late. She is not taking her vit D and B12 as prescribed.   - 2 soft to formed BMs/day, no blood or nocturnal bowel movements  - psoriasis- Had multiple skin issues since last visit. Patient was seen in the ED initially on  at [facility], diagnosed with tinea pedis, prescribed itraconazole. She was also seen by Ob/Gyn for PAP smear and was found to have bacterial vaginosis, treated with topical and oral flagyl for seven day mid September. PAP smear positive for HPV, plan for colposcopy.   - Colonoscopy was re-scheduled twice due to being unable to tolerate Golytely, eventually scheduled with Miralax on 10/30.  - 10/30/20 colonoscopy: Colon normal, single linear ulcer at the colon anastomosis. Ileum was normal as well. Path pending.  - 20 Trough Humira levels and abs: 12.0/undetectable abs  - joint pain-inactive  - NSAID use: No  - Narcotic use: No  - Alternative/complementary meds for IBD: No    Prior Pertinent Surgeries:   5/10/19 open ileocolonic resection (10 cm of cecum, 42 cm of ileum removed) with end ileostomy with mucous fistula. Surgical pathology showed TI/cecum with chronic active ileitis with stricture and features of fistulization, transmural inflammation with reactive LNs  2/15/20 elective takedown of her ileostomy and mucous fistula;  (surgical path- peristoma granulation tissue with acute and chronic inflammation, ascending colon with acute and chronic inflammatory infiltrate with superficial erosion and focal active colitis, TI with focal granulomatous inflammation and superficial erosion)    Pertinent Endoscopy/Imagin2019 CT abd/pelvis: abnormal wall thickening involving the ascending colon, cecum, and terminal ileum with intense mucosal enhancement and luminal narrowing resulting in distention of proximal small bowel; ileal-ileal and ileocolic  fistulization, fat hypertropy, and increased mesenteric vascularity; reactive mesenteric adenitis in the RLQ; no abscess; appendix not seen; associated partial SBO  2/19/2019 Xray abdomen: gas-filled large and small bowel loops without significant dilation; stool projects over the rectum; no calcifications to suggest nephrolithiasis; lower lung zones are grossly clear; no acute osseous abnormality   2/20/2019 CXR: abnormal portion of left-sided PICC line with the tip coiled within the left brachiocephalic vein; repositioning suggested   2/20/2019 EGD: normal examined duodenum (path: mild villous blunting and mild increased intraepithelial lymphocytes); normal stomach (path: minimal chronic H. pylori negative inflammation); normal esophagus  2/20/2019 Colonoscopy: congested mucosa in the terminal ileum and ICV; granularity in the terminal ileum; stricture in the terminal ileum 5 cm from the ICV (path: chronic ileitis); entire examined colon (path-right colon: focal moderate active colitis) (path-left colon, rectum: normal); diverticulosis in the sigmoid colon; non-bleeding internal hemorrhoids   3/13/19 MRE:  multifocal active inflammation of the distal ileum with mild fibrostenotic component and entero-enteric fistula  12/20/19- ileoscopy through stoma: The 30 cm of examined through the mucous fistula is normal. The peristomal area has granular friable mucosa and the lorena-terminal ileum appeared normal. Biopsies of colon through mucus fistula- normal colon, biopsies of neoterminal ileum with minimal active inflammation  10/30/20 colonoscopy: Colon normal, single linear ulcer at the colon anastomosis. Ileum was normal as well. Path pending.    Pertinent Labs:  Lab Results   Component Value Date    SEDRATE 23 07/02/2020    CRP 0.5 07/02/2020     Lab Results   Component Value Date    TTGIGA 7 03/11/2019     (H) 03/11/2019     Lab Results   Component Value Date    TSH 0.713 08/19/2015     Lab Results   Component  Value Date    GJWWVCGT87WI 10 (L) 09/17/2020    QPDSTYTP78 166 (L) 09/17/2020     Lab Results   Component Value Date    HEPBSAG Negative 09/17/2020    HEPBCAB Negative 09/17/2020    HEPCAB Negative 02/19/2019     Lab Results   Component Value Date    MKL84DBUW Negative 02/19/2019     Lab Results   Component Value Date    NIL 0.020 09/17/2020    MITOGENNIL 7.860 09/17/2020     Lab Results   Component Value Date    TPTMINTERP SEE BELOW 02/19/2019     Lab Results   Component Value Date    STOOLCULTURE  08/19/2015     No Salmonella,Shigella,Vibrio,Campylobacter,Yersinia isolated.    LXZOVJGRBH4Y Negative 08/19/2015    ICRSYYGNFX1W Negative 08/19/2015     No results found for: CALPROTECTIN    Therapeutic Drug Monitoring Labs:  1/15/20 humira levels 3.5/neg Abs on humira 40 mg SC every 2 weeks  9/17/20 Trough Humira levels 12.0 and neg abs on Humira  40 mg SC every 2 weeks    Prior IBD Therapies:  MTX 12.5 mg po weekly- stopped after a few weeks due to elevated LFTs 10/2019    Vaccinations:  Lab Results   Component Value Date    HEPBSAB Grayzone (A) 02/19/2019     Lab Results   Component Value Date    HEPAIGG Positive (A) 02/19/2019     Lab Results   Component Value Date    VARICELLAZOS 2.58 (H) 03/11/2019    VARICELLAINT Positive (A) 03/11/2019     Immunization History   Administered Date(s) Administered    DTP 1991, 03/18/1992, 03/18/1992, 01/12/1994    HIB 1991, 02/24/1992, 03/18/1992    Hepatitis B (recombinant) Adjuvanted, 2 dose 04/17/2019, 05/20/2019    Hepatitis B, Pediatric/Adolescent 07/24/2006    IPV 07/24/2006    MMR 01/13/1994, 07/24/2006    Meningococcal Conjugate (MCV4P) 07/24/2006    OPV 1991, 02/24/1992, 01/12/1994    Pneumococcal Conjugate - 13 Valent 04/17/2019    Pneumococcal Polysaccharide - 23 Valent 06/20/2019    Td (ADULT) 07/24/2006    Td (Adult), Unspecified Formulation 07/24/2006    Tdap 04/17/2019     Influenza (inactive):  Recommend yearly, due  Pneumococcal PCV  13: 4/17/19  Pneumococcal PCV 23: 6/20/19  Tetanus (TdaP): 7/24/06  HPV (males and females ages 19-25 yo):    Not sure  Meningococcal: 7/24/06  Hepatitis B:  7/24/06, 4/17/19, 5/20/19, grayzone with immunity  Hepatitis A:  immune  MMR (live vaccine):  1/13/94, 7/24/06      Chickenpox status/Varicella (live vaccine): immune  Shingrix: recommended, defer due to cost    Review of Systems   Constitutional: Negative for chills, fever and weight loss.   Eyes: Negative for pain and redness.   Respiratory: Negative for cough and shortness of breath.    Cardiovascular: Negative for chest pain.   Gastrointestinal: Negative for abdominal pain, heartburn, nausea and vomiting.   Genitourinary: Negative for dysuria and hematuria.   Musculoskeletal: Negative for back pain.   Skin: Positive for rash (psoriasis related).   Psychiatric/Behavioral: Negative for depression. The patient is nervous/anxious.      All Medical History/Surgical History/Family History/Social History/Allergies have been reviewed and updated in EMR    Review of patient's allergies indicates:   Allergen Reactions    Horse/equine containing products Itching     Outpatient Medications Marked as Taking for the 11/2/20 encounter (Office Visit) with Suzi Bacon MD   Medication Sig Dispense Refill    adalimumab (HUMIRA PEN) 40 mg/0.8 mL PnKt Inject 1 pen (40 mg total) into the skin every 7 days. 4 pen 11    triamcinolone acetonide 0.1% (KENALOG) 0.1 % cream Apply topically 2 (two) times daily. To rashes 454 g 3     Physical Exam  Constitutional:       General: She is not in acute distress.     Appearance: She is well-developed.   HENT:      Head: Normocephalic.   Eyes:      General: No scleral icterus.     Conjunctiva/sclera: Conjunctivae normal.   Neck:      Musculoskeletal: Normal range of motion and neck supple.   Cardiovascular:      Rate and Rhythm: Normal rate and regular rhythm.   Pulmonary:      Effort: Pulmonary effort is normal.      Breath sounds:  Normal breath sounds.   Abdominal:      General: Bowel sounds are normal. There is no distension.      Palpations: Abdomen is soft. There is no mass.      Tenderness: There is no abdominal tenderness. There is no guarding or rebound.      Comments: Midline abdominal incision from previous small bowel surgery.    Musculoskeletal: Normal range of motion.   Skin:     General: Skin is warm and dry.      Comments: Multiple skin plaques with scales on both upper extremities and lower extremities consistent with psoriasis   Neurological:      Mental Status: She is alert and oriented to person, place, and time.       Labs:  Lab Results   Component Value Date    CRP 0.5 07/02/2020     Lab Results   Component Value Date    HEPBSAG Negative 09/17/2020    HEPBCAB Negative 09/17/2020     Lab Results   Component Value Date    TBGOLDPLUS Negative 09/17/2020     Lab Results   Component Value Date    [id number] 10 (L) 09/17/2020    BDZQVNNU02 166 (L) 09/17/2020     Lab Results   Component Value Date    WBC 6.50 09/17/2020    HGB 12.0 09/17/2020    HCT 39.2 09/17/2020    MCV 98 09/17/2020     09/17/2020     Lab Results   Component Value Date    CREATININE 0.8 09/17/2020    ALBUMIN 3.5 09/17/2020    BILITOT 0.5 09/17/2020    ALKPHOS 91 09/17/2020    AST 17 09/17/2020    ALT 14 09/17/2020     Assessment/Plan:  Lora Scott is a 30 y.o. female with Crohn's disease (ileum, S/P ileocecal resection-42 cm of ileum/10.5 cm cecum removed with surgical path c/w stricture and fistula, 2/15/20 ileostomy takedown and mucus fistula takedown), psoriasias (failed light treatment), ex-smoker (quit 9/2019) who is doing well with endoscopic remission on weekly Humira. Humira level 12.0 with neg abs. Concern for recurrent skin issues, and needs to establish with dermatologist at [facility]. We extensively explained importance of taking medications to prevent complications related to Crohn's disease.    # Crohn's disease (ileum, S/P ileocecal  resection-42 cm of ileum/10.5 cm cecum removed with surgical path c/w stricture and fistula, 2/15/20 ileostomy takedown and mucus fistula takedown)  - continue humira 40 mg SC weekly- compliant  - colonoscopy 10/2021  - vitamin B12 deficiency (9/2020 vit B12 166)- Has not been compliant, educated on importance of taking the medication, will prescribe injections.   - ex-smoker: quit smoking 9/2019, some 2nd hand smoke  - psoriasis:  Recurrent issues, no skin lesions currently, will need to establish care at [facility]. Number provided  - Drug  Monitoring labs:  CBC/CMP every 6 mos (due 2/2021), TPMT (heterozygote 2/2019), TB quantiferon (negative 9/2020, repeat 9/2021), Hep B testing (HBsAg, HBcAb negative 9/2020, repeat 9/2021)  - TDM:  Trough humira levels/abs (tentatively plan 3/2021)    # IBD specific health maintenance:  Skin exam yearly - Current dermatologist does not accept Medicaid, she will establish at [facility]. Number provided  Pap smear yearly: Neg 9/2020, but positive HPV, plan for colposcopy.   Vitamin D deficiency (9/2020 vit D 10)- Has not been compliant, educated on importance of taking the medication. Will prescribe high dose vitamin D for 6 months  Vaccines: no live vaccines, flu shot today    Follow up: 2/15/2021 in person visit     Pete Blackwell, PGY-VI  Gastroenterology Fellow   [phone]    I have reviewed and concur with the fellow's history, physical, assessment, and plan.  I have personally interviewed and examined the patient. The above note has been edited to reflect my recommendations.     Suzi Bacon MD   Department of Gastroenterology  Medical Director, Inflammatory Bowel Disease

## 2020-11-03 ENCOUNTER — PATIENT MESSAGE (OUTPATIENT)
Dept: OBSTETRICS AND GYNECOLOGY | Facility: CLINIC | Age: 30
End: 2020-11-03

## 2020-11-04 ENCOUNTER — PATIENT MESSAGE (OUTPATIENT)
Dept: OBSTETRICS AND GYNECOLOGY | Facility: CLINIC | Age: 30
End: 2020-11-04

## 2020-11-04 LAB
FINAL PATHOLOGIC DIAGNOSIS: NORMAL
GROSS: NORMAL
Lab: NORMAL

## 2020-11-05 ENCOUNTER — PATIENT MESSAGE (OUTPATIENT)
Dept: GASTROENTEROLOGY | Facility: CLINIC | Age: 30
End: 2020-11-05

## 2020-11-05 ENCOUNTER — PATIENT MESSAGE (OUTPATIENT)
Dept: OBSTETRICS AND GYNECOLOGY | Facility: CLINIC | Age: 30
End: 2020-11-05

## 2020-11-05 ENCOUNTER — TELEPHONE (OUTPATIENT)
Dept: GASTROENTEROLOGY | Facility: CLINIC | Age: 30
End: 2020-11-05

## 2020-11-05 NOTE — TELEPHONE ENCOUNTER
----- Message from Nilo Simpson sent at 11/5/2020 12:00 PM CST -----  Patient called to speak w/ someone regarding the status of her humira application, requesting callback [phone]

## 2020-11-05 NOTE — TELEPHONE ENCOUNTER
Called and spoke with pt  She has completed her portion of the assistance paperwork and she got her last 2 check stubs.  She has screen shot them and asked me how to get them to us     I told her to attach the pictures to a portal message and we will print and fax the form.  Explained to make sure she has everything she needs before sending it over.   She asked if one of her parents can  her Humira samples  Stated yes but I need to know name of whomever is picking them up and  and I will need to verify ID before gicing the samples     She expressed understanding and appreciated the call

## 2020-11-05 NOTE — TELEPHONE ENCOUNTER
Called and spoke with pt  She had a few questions regarding the forms   Answered questions and pt will send us her completed portion

## 2020-11-09 ENCOUNTER — V-VISIT (OUTPATIENT)
Dept: INTERNAL MEDICINE | Age: 30
End: 2020-11-09

## 2020-11-09 DIAGNOSIS — I10 HYPERTENSION, UNCONTROLLED: ICD-10-CM

## 2020-11-09 DIAGNOSIS — E66.01 MORBID OBESITY WITH BMI OF 50.0-59.9, ADULT (CMD): Primary | ICD-10-CM

## 2020-11-09 DIAGNOSIS — E03.9 HYPOTHYROIDISM, UNSPECIFIED TYPE: ICD-10-CM

## 2020-11-09 DIAGNOSIS — Z71.89 ADVICE GIVEN ABOUT 2019 NOVEL CORONAVIRUS BY TELEPHONE: ICD-10-CM

## 2020-11-09 PROCEDURE — 99214 OFFICE O/P EST MOD 30 MIN: CPT | Performed by: INTERNAL MEDICINE

## 2020-11-09 SDOH — HEALTH STABILITY: PHYSICAL HEALTH: ON AVERAGE, HOW MANY MINUTES DO YOU ENGAGE IN EXERCISE AT THIS LEVEL?: 60 MIN

## 2020-11-09 SDOH — HEALTH STABILITY: MENTAL HEALTH: HOW OFTEN DO YOU HAVE A DRINK CONTAINING ALCOHOL?: 2-4 TIMES A MONTH

## 2020-11-09 SDOH — HEALTH STABILITY: PHYSICAL HEALTH: ON AVERAGE, HOW MANY DAYS PER WEEK DO YOU ENGAGE IN MODERATE TO STRENUOUS EXERCISE (LIKE A BRISK WALK)?: 2 DAYS

## 2020-11-09 ASSESSMENT — PATIENT HEALTH QUESTIONNAIRE - PHQ9
CLINICAL INTERPRETATION OF PHQ9 SCORE: NO FURTHER SCREENING NEEDED
CLINICAL INTERPRETATION OF PHQ2 SCORE: NO FURTHER SCREENING NEEDED
2. FEELING DOWN, DEPRESSED OR HOPELESS: NOT AT ALL
SUM OF ALL RESPONSES TO PHQ9 QUESTIONS 1 AND 2: 0
SUM OF ALL RESPONSES TO PHQ9 QUESTIONS 1 AND 2: 0
1. LITTLE INTEREST OR PLEASURE IN DOING THINGS: NOT AT ALL

## 2020-11-10 ENCOUNTER — PATIENT MESSAGE (OUTPATIENT)
Dept: GASTROENTEROLOGY | Facility: CLINIC | Age: 30
End: 2020-11-10

## 2020-11-10 NOTE — TELEPHONE ENCOUNTER
Called and spoke with pt  Stated she heard from Humira assistance and they need a copy of her insurance card front and back (which she sent already) and they need us to re fax the patient information section of the forms because it was cut off     Explained to pt I will take care of it

## 2020-11-13 ENCOUNTER — TELEPHONE (OUTPATIENT)
Dept: SCHEDULING | Age: 30
End: 2020-11-13

## 2020-11-13 ENCOUNTER — PATIENT MESSAGE (OUTPATIENT)
Dept: GASTROENTEROLOGY | Facility: CLINIC | Age: 30
End: 2020-11-13

## 2020-11-26 ENCOUNTER — TELEPHONE (OUTPATIENT)
Dept: SCHEDULING | Age: 30
End: 2020-11-26

## 2020-11-30 ENCOUNTER — OFFICE VISIT (OUTPATIENT)
Dept: URGENT CARE | Age: 30
End: 2020-11-30

## 2020-11-30 VITALS
WEIGHT: 293 LBS | DIASTOLIC BLOOD PRESSURE: 91 MMHG | HEIGHT: 69 IN | RESPIRATION RATE: 18 BRPM | TEMPERATURE: 98.1 F | OXYGEN SATURATION: 98 % | HEART RATE: 99 BPM | BODY MASS INDEX: 43.4 KG/M2 | SYSTOLIC BLOOD PRESSURE: 141 MMHG

## 2020-11-30 DIAGNOSIS — L73.1 INGROWN HAIR: ICD-10-CM

## 2020-11-30 DIAGNOSIS — L02.91 ABSCESS: Primary | ICD-10-CM

## 2020-11-30 LAB
APPEARANCE, POC: CLEAR
BILIRUBIN, POC: NEGATIVE
COLOR, POC: YELLOW
GLUCOSE UR-MCNC: NEGATIVE MG/DL
KETONES, POC: NEGATIVE
NITRITE, POC: NEGATIVE
OCCULT BLOOD, POC: NEGATIVE
PH UR: 6 [PH] (ref 5–9)
PROT UR-MCNC: NEGATIVE G/DL
SP GR UR: 1.03 (ref 1–1.03)
UROBILINOGEN UR-MCNC: 0.2 MG/DL (ref 0–1)
WBC (LEUKOCYTE) ESTERASE, POC: NEGATIVE

## 2020-11-30 PROCEDURE — 3080F DIAST BP >= 90 MM HG: CPT | Performed by: FAMILY MEDICINE

## 2020-11-30 PROCEDURE — 99214 OFFICE O/P EST MOD 30 MIN: CPT | Performed by: FAMILY MEDICINE

## 2020-11-30 PROCEDURE — 81002 URINALYSIS NONAUTO W/O SCOPE: CPT | Performed by: FAMILY MEDICINE

## 2020-11-30 PROCEDURE — 96372 THER/PROPH/DIAG INJ SC/IM: CPT | Performed by: PEDIATRICS

## 2020-11-30 PROCEDURE — 3077F SYST BP >= 140 MM HG: CPT | Performed by: FAMILY MEDICINE

## 2020-11-30 RX ORDER — CLINDAMYCIN HYDROCHLORIDE 300 MG/1
300 CAPSULE ORAL 4 TIMES DAILY
Qty: 28 CAPSULE | Refills: 0 | Status: SHIPPED | OUTPATIENT
Start: 2020-11-30 | End: 2020-12-07

## 2020-12-01 ENCOUNTER — PATIENT MESSAGE (OUTPATIENT)
Dept: GASTROENTEROLOGY | Facility: CLINIC | Age: 30
End: 2020-12-01

## 2020-12-01 ENCOUNTER — HOSPITAL ENCOUNTER (EMERGENCY)
Facility: HOSPITAL | Age: 30
Discharge: HOME | End: 2020-12-01
Attending: EMERGENCY MEDICINE
Payer: MEDICAID

## 2020-12-01 VITALS
OXYGEN SATURATION: 100 % | TEMPERATURE: 99 F | HEIGHT: 63 IN | DIASTOLIC BLOOD PRESSURE: 75 MMHG | SYSTOLIC BLOOD PRESSURE: 114 MMHG | WEIGHT: 152 LBS | HEART RATE: 98 BPM | RESPIRATION RATE: 18 BRPM | BODY MASS INDEX: 26.93 KG/M2

## 2020-12-01 VITALS
DIASTOLIC BLOOD PRESSURE: 73 MMHG | TEMPERATURE: 99 F | RESPIRATION RATE: 16 BRPM | WEIGHT: 152 LBS | HEIGHT: 63 IN | OXYGEN SATURATION: 99 % | BODY MASS INDEX: 26.93 KG/M2 | HEART RATE: 126 BPM | SYSTOLIC BLOOD PRESSURE: 121 MMHG

## 2020-12-01 DIAGNOSIS — L02.214 ABSCESS OF GROIN, RIGHT: Primary | ICD-10-CM

## 2020-12-01 DIAGNOSIS — N76.4 ABSCESS OF RIGHT GENITAL LABIA: Primary | ICD-10-CM

## 2020-12-01 LAB
B-HCG UR QL: NEGATIVE
CTP QC/QA: YES

## 2020-12-01 PROCEDURE — 81025 URINE PREGNANCY TEST: CPT | Mod: ER | Performed by: NURSE PRACTITIONER

## 2020-12-01 PROCEDURE — 56420 I&D BARTHOLINS GLAND ABSCESS: CPT | Mod: ER

## 2020-12-01 PROCEDURE — 56405 I&D VULVA/PERINEAL ABSCESS: CPT | Mod: ER

## 2020-12-01 PROCEDURE — 25000003 PHARM REV CODE 250: Mod: ER | Performed by: NURSE PRACTITIONER

## 2020-12-01 PROCEDURE — 87070 CULTURE OTHR SPECIMN AEROBIC: CPT

## 2020-12-01 PROCEDURE — 87186 SC STD MICRODIL/AGAR DIL: CPT

## 2020-12-01 PROCEDURE — 99283 EMERGENCY DEPT VISIT LOW MDM: CPT | Mod: ER

## 2020-12-01 PROCEDURE — 87077 CULTURE AEROBIC IDENTIFY: CPT

## 2020-12-01 PROCEDURE — 99284 EMERGENCY DEPT VISIT MOD MDM: CPT | Mod: 27,ER,25

## 2020-12-01 RX ORDER — KETOROLAC TROMETHAMINE 10 MG/1
10 TABLET, FILM COATED ORAL
Status: COMPLETED | OUTPATIENT
Start: 2020-12-01 | End: 2020-12-01

## 2020-12-01 RX ORDER — IBUPROFEN 600 MG/1
600 TABLET ORAL EVERY 6 HOURS PRN
Qty: 18 TABLET | Refills: 0 | Status: SHIPPED | OUTPATIENT
Start: 2020-12-01 | End: 2021-03-30

## 2020-12-01 RX ORDER — TRAMADOL HYDROCHLORIDE 50 MG/1
50 TABLET ORAL EVERY 6 HOURS PRN
Qty: 12 TABLET | Refills: 0 | Status: SHIPPED | OUTPATIENT
Start: 2020-12-01 | End: 2021-03-30

## 2020-12-01 RX ORDER — LIDOCAINE HYDROCHLORIDE AND EPINEPHRINE 10; 10 MG/ML; UG/ML
10 INJECTION, SOLUTION INFILTRATION; PERINEURAL
Status: COMPLETED | OUTPATIENT
Start: 2020-12-01 | End: 2020-12-01

## 2020-12-01 RX ORDER — CLINDAMYCIN HYDROCHLORIDE 150 MG/1
300 CAPSULE ORAL
Status: COMPLETED | OUTPATIENT
Start: 2020-12-01 | End: 2020-12-01

## 2020-12-01 RX ORDER — CLINDAMYCIN HYDROCHLORIDE 150 MG/1
300 CAPSULE ORAL 4 TIMES DAILY
Qty: 56 CAPSULE | Refills: 0 | Status: SHIPPED | OUTPATIENT
Start: 2020-12-01 | End: 2020-12-08

## 2020-12-01 RX ADMIN — LIDOCAINE-EPINEPHRINE-TETRACAINE GEL 4-0.05-0.5%: 4-0.05-0.5 GEL at 06:12

## 2020-12-01 RX ADMIN — LIDOCAINE HYDROCHLORIDE AND EPINEPHRINE 10 ML: 10; 10 INJECTION, SOLUTION INFILTRATION; PERINEURAL at 06:12

## 2020-12-01 RX ADMIN — CLINDAMYCIN HYDROCHLORIDE 300 MG: 150 CAPSULE ORAL at 07:12

## 2020-12-01 RX ADMIN — KETOROLAC TROMETHAMINE 10 MG: 10 TABLET, FILM COATED ORAL at 07:12

## 2020-12-01 NOTE — ED NOTES
abscess to labia.    Pt states she HAS to go to dermatology appointment at 330. Pt understood and provider notified.

## 2020-12-01 NOTE — FIRST PROVIDER EVALUATION
Emergency Department TeleTriage Encounter Note      CHIEF COMPLAINT    Chief Complaint   Patient presents with    Abscess     to labia. Was here earlier but left for an appointment and is back.        VITAL SIGNS   Initial Vitals [12/01/20 1653]   BP Pulse Resp Temp SpO2   106/78 (!) 117 16 99.1 °F (37.3 °C) 99 %      MAP       --            ALLERGIES    Review of patient's allergies indicates:   Allergen Reactions    Horse/equine containing products Itching       PROVIDER TRIAGE NOTE  This is a teletriage evaluation of a 30 y.o. female presenting to the ED with c/o pain and swelling to groin region concerning for abscess.  This is patient's 2nd ED visit today, she cut her previous visit short in order to make a dermatology appointment.  She has returned for possible I&D. Initial orders will be placed and care will be transferred to an alternate provider when patient is roomed for a full evaluation. Any additional orders and the final disposition will be determined by that provider.         ORDERS  Labs Reviewed - No data to display    ED Orders (720h ago, onward)    None            Virtual Visit Note: The provider triage portion of this emergency department evaluation and documentation was performed via [employer], a HIPAA-compliant telemedicine application, in concert with a tele-presenter in the room. A face to face patient evaluation with one of my colleagues will occur once the patient is placed in an emergency department room.      DISCLAIMER: This note was prepared with [employer] voice recognition transcription software. Garbled syntax, mangled pronouns, and other bizarre constructions may be attributed to that software system.

## 2020-12-01 NOTE — Clinical Note
"Lora"Ladarius Scott was seen and treated in our emergency department on 12/1/2020.  She may return to work on 12/04/2020.       If you have any questions or concerns, please don't hesitate to call.      Natalie Vasquez RN    "

## 2020-12-01 NOTE — ED PROVIDER NOTES
Encounter Date: 12/1/2020    SCRIBE #1 NOTE: I, Kim Easley, am scribing for, and in the presence of,  Tiny Adamns, FNP. I have scribed the following portions of the note - Other sections scribed: HPI, ROS, PE.       History     Chief Complaint   Patient presents with    Abscess     reports big bumps in her groin area for one week. crying in triage      This is a nontoxic appearing 30 y.o. female with eczema who presents to the ED for evaluation of worsening abscess to the groin and lesions to the RLE over the past week. States the lesions are painful and she has not been able to lay down or walk properly. Denies treatment with warm compress.  Patient denies fever or chills.  Patient has a history of eczema and Crohn's    The history is provided by the patient. No  was used.   Abscess   This is a new problem. The current episode started several days ago. The problem has been gradually worsening. The abscess is present on the genitalia (Right groin). The pain is at a severity of 10/10. The abscess is characterized by painfulness.     Review of patient's allergies indicates:   Allergen Reactions    Horse/equine containing products Itching     Past Medical History:   Diagnosis Date    Allergy     Anxiety     Asthma     Crohn's disease 2/18/19    Crohn's disease (ileum) 5/10/2019    S/P ileocecal resection-42 cm of ileum/10.5 cm cecum removed with surgical path c/w stricture and fistula 2/15/20 ileostomy takedown and mucus fistula takedown    Crohn's disease (ileum) 5/10/2019    S/P ileocecal resection-42 cm of ileum/10.5 cm cecum removed with surgical path c/w stricture and fistula 2/15/20 ileostomy takedown and mucus fistula takedown    Depression     Eczema     Ex-smoker for less than 1 year     Immunosuppressed status 10/24/2019    Joint pain      Past Surgical History:   Procedure Laterality Date    COLONOSCOPY N/A 2/20/2019    Procedure: COLONOSCOPY;  Surgeon: Fawad SANCHEZ  MD Pan;  Location: [facility] ENDO (2ND FLR);  Service: Endoscopy;  Laterality: N/A;    COLONOSCOPY N/A 10/30/2020    Procedure: COLONOSCOPY;  Surgeon: Suzi Bacon MD;  Location: [facility] ENDO (4TH FLR);  Service: Endoscopy;  Laterality: N/A;  covid test [facility] urgent care 10/27  pt to try Mirilax prep with Reglan before per Dr. Bacon - nimisha  10/14-new instructions e-mailed-tb  10/16--new instructions e-mailed  10/29/20- Pt confirmed earlier arrival time, prep ins. reveiwed, Pt verbalized understanding- [id number]    ESOPHAGOGASTRODUODENOSCOPY N/A 2/20/2019    Procedure: EGD (ESOPHAGOGASTRODUODENOSCOPY);  Surgeon: Fawad Perla MD;  Location: [facility] ENDO (2ND FLR);  Service: Endoscopy;  Laterality: N/A;    ILEOCOLECTOMY N/A 5/10/2019    Procedure: ILEOCOLECTOMY;  Surgeon: Uday Cope MD;  [facility];  Service: Colon and Rectal;  Laterality: N/A;  ileocecectomy    ILEOSCOPY N/A 12/20/2019    Procedure: ILEOSCOPY through stoma;  Surgeon: Suzi Bacon MD;  Location: [facility] (4TH FLR);  Service: Endoscopy;  Laterality: N/A;  Schedule as 30 minute case  Please let patient know to bring extra stoma bag/wafer, etc- entire bag and appliance will be removed at time of procedure to visualize the area well   first week 11/2019     per note-R/S to 12/20/19-GT    ILEOSTOMY N/A 5/10/2019    Procedure: CREATION, ILEOSTOMY with mucous fistula;  Surgeon: Uday Cope MD;  Location: [facility] OR 2ND FLR;  Service: Colon and Rectal;  Laterality: N/A;    ILEOSTOMY CLOSURE N/A 2/12/2020    Procedure: CLOSURE,ILEOSTOMY TAKEDOWN END ILEOSTOMY/MUCOUS FISTULA POSSIBLE LAPAROTOMY ERAS LOW;  Surgeon: Uday Cope MD;  Location: [facility] OR 2ND FLR;  Service: Colon and Rectal;  Laterality: N/A;     Family History   Problem Relation Age of Onset    Hypertension Mother     No Known Problems Father     No Known Problems Sister     No Known Problems Brother     No Known Problems Sister     No Known Problems Sister      No Known Problems Sister     No Known Problems Maternal Uncle     No Known Problems Paternal Aunt     No Known Problems Paternal Uncle     No Known Problems Maternal Grandmother     No Known Problems Maternal Grandfather     Irritable bowel syndrome Paternal Grandmother     No Known Problems Paternal Grandfather     No Known Problems Paternal Aunt     No Known Problems Paternal Uncle     No Known Problems Paternal Uncle     No Known Problems Paternal Uncle     No Known Problems Maternal Uncle     No Known Problems Maternal Uncle     Celiac disease Neg Hx     Cirrhosis Neg Hx     Colon cancer Neg Hx     Colon polyps Neg Hx     Crohn's disease Neg Hx     Cystic fibrosis Neg Hx     Esophageal cancer Neg Hx     Hemochromatosis Neg Hx     Inflammatory bowel disease Neg Hx     Liver cancer Neg Hx     Liver disease Neg Hx     Rectal cancer Neg Hx     Stomach cancer Neg Hx     Ulcerative colitis Neg Hx     Jerardo's disease Neg Hx     Lymphoma Neg Hx     Tuberculosis Neg Hx     Scleroderma Neg Hx     Rheum arthritis Neg Hx     Multiple sclerosis Neg Hx     Psoriasis Neg Hx     Lupus Neg Hx     Melanoma Neg Hx     Skin cancer Neg Hx     Amblyopia Neg Hx     Blindness Neg Hx     Cancer Neg Hx     Cataracts Neg Hx     Diabetes Neg Hx     Glaucoma Neg Hx     Macular degeneration Neg Hx     Retinal detachment Neg Hx     Strabismus Neg Hx     Stroke Neg Hx     Thyroid disease Neg Hx      Social History     Tobacco Use    Smoking status: Former Smoker     Packs/day: 0.25     Types: Cigarettes     Quit date: 2020     Years since quittin.9    Smokeless tobacco: Never Used   Substance Use Topics    Alcohol use: Not Currently     Alcohol/week: 0.0 standard drinks     Comment: Socially    Drug use: No     Frequency: 3.0 times per week     Review of Systems   Constitutional: Negative.    HENT: Negative.    Eyes: Negative.    Respiratory: Negative.  Negative for shortness of  breath.    Cardiovascular: Negative.  Negative for chest pain.   Gastrointestinal: Negative.    Endocrine: Negative.    Genitourinary: Negative.    Musculoskeletal: Negative.    Skin: Positive for wound (lesions and abscess).   Allergic/Immunologic: Negative.    Neurological: Negative.    Hematological: Negative.    Psychiatric/Behavioral: Negative.    All other systems reviewed and are negative.      Physical Exam     Initial Vitals [12/01/20 1415]   BP Pulse Resp Temp SpO2   121/73 (!) 126 16 99.3 °F (37.4 °C) 99 %      MAP       --         Physical Exam    Nursing note and vitals reviewed.  Constitutional: She appears well-developed.   HENT:   Head: Normocephalic.   Nose: Nose normal.   Mouth/Throat: Oropharynx is clear and moist.   Eyes: Conjunctivae are normal.   Neck: Normal range of motion. Neck supple.   Cardiovascular: Normal rate, regular rhythm, S1 normal, S2 normal and normal heart sounds. Exam reveals no gallop and no friction rub.    No murmur heard.  Pulmonary/Chest: Breath sounds normal. No respiratory distress. She has no wheezes. She has no rhonchi. She has no rales.   Abdominal: Soft. There is no abdominal tenderness.   Genitourinary:    Genitourinary Comments: 2 cm x 2 cm left labia abscess. Indurated and Fluctuant.     Musculoskeletal: Normal range of motion.   Neurological: She is alert and oriented to person, place, and time.   Skin: Skin is warm and dry. Capillary refill takes less than 2 seconds. Abscess noted.   Multiple eczema noted to the lower extremities and arms.   Psychiatric: She has a normal mood and affect. Her behavior is normal.         ED Course   Procedures  Labs Reviewed - No data to display       Imaging Results    None          Medical Decision Making:   History:   Old Medical Records: I decided to obtain old medical records.  Initial Assessment:   This is a nontoxic appearing 30 y.o. female with eczema who presents to the ED for evaluation of worsening abscess to the groin  and lesions to the RLE over the past week. States the lesions are painful and she has not been able to lay down or walk properly. Denies treatment with warm compress.  Differential Diagnosis:   Abscess, Cellulitis, Dermatitis.  Clinical Tests:   Lab Tests: Ordered and Reviewed  ED Management:  Patient has a dermatologist appointment at 3:30 pm. Patient has to leave to make her appointment but states she will come back to the ED after her appt.               Scribe Attestation:   Scribe #1: I performed the above scribed service and the documentation accurately describes the services I performed. I attest to the accuracy of the note.    This document was produced by a scribe under my direction and in my presence. I agree with the content of the note and have made any necessary edits.     FRANKIE Machado    12/01/2020 7:56 PM                  Clinical Impression:     ICD-10-CM ICD-9-CM   1. Abscess of right genital labia  N76.4 616.4                   1. Abscess of right genital labia            ED Disposition Condition    Discharge Stable        ED Prescriptions     None        Follow-up Information     Follow up With Specialties Details Why Contact Info        Follow-up with dermatologist today as schedule                                       FRANKIE Okeefe  12/01/20 1957

## 2020-12-01 NOTE — Clinical Note
"Lora"Ladarius Scott was seen and treated in our emergency department on 12/1/2020.  She may return to work on 12/03/2020.       If you have any questions or concerns, please don't hesitate to call.      Natalie Vasquez RN    "

## 2020-12-02 ENCOUNTER — PATIENT MESSAGE (OUTPATIENT)
Dept: GASTROENTEROLOGY | Facility: CLINIC | Age: 30
End: 2020-12-02

## 2020-12-02 NOTE — TELEPHONE ENCOUNTER
Called and spoke with pt  She spoke with the program  She needs to call Medicaid and see what specilty pharm she is allowed to use and we need to send script there   Pt will be able to get her meds for $36 a month for the year with the program    Pt will send us a message as soon as she speaks with Medicaid

## 2020-12-02 NOTE — ED PROVIDER NOTES
Encounter Date: 12/1/2020    SCRIBE #1 NOTE: I, Kim Fonseca Kaushal, am scribing for, and in the presence of,  FRANKIE Machado. I have scribed the following portions of the note - Other sections scribed: HPI, ROS, PE.       History     Chief Complaint   Patient presents with    Abscess     to labia. Was here earlier but left for an appointment and is back.      This is a nontoxic appearing 30 y.o. female with eczema who presents to the ED for evaluation of worsening abscess to the labia and eczema to the RLE over the past week. States the abscess is painful and she has not been able to lay down or walk properly. Denies treatment with warm compress. Patient was seen in this ED earlier today but the visit was cut short because she left for a dermatology appointment. States she has returned for a possible I&D procedure.      The history is provided by the patient. No  was used.     Review of patient's allergies indicates:   Allergen Reactions    Horse/equine containing products Itching     Past Medical History:   Diagnosis Date    Allergy     Anxiety     Asthma     Crohn's disease 2/18/19    Crohn's disease (ileum) 5/10/2019    S/P ileocecal resection-42 cm of ileum/10.5 cm cecum removed with surgical path c/w stricture and fistula 2/15/20 ileostomy takedown and mucus fistula takedown    Crohn's disease (ileum) 5/10/2019    S/P ileocecal resection-42 cm of ileum/10.5 cm cecum removed with surgical path c/w stricture and fistula 2/15/20 ileostomy takedown and mucus fistula takedown    Depression     Eczema     Ex-smoker for less than 1 year     Immunosuppressed status 10/24/2019    Joint pain      Past Surgical History:   Procedure Laterality Date    COLONOSCOPY N/A 2/20/2019    Procedure: COLONOSCOPY;  Surgeon: Fawad Perla MD;  Location: [address]);  Service: Endoscopy;  Laterality: N/A;    COLONOSCOPY N/A 10/30/2020    Procedure: COLONOSCOPY;  Surgeon: Suzi MCCARTY  MD Arleen;  Location: [facility] ENDO (4TH FLR);  Service: Endoscopy;  Laterality: N/A;  covid test [facility] urgent care 10/27  pt to try Mirilax prep with Reglan before per Dr. Bacon - nimisha  10/14-new instructions e-mailed-tb  10/16--new instructions e-mailed  10/29/20- Pt confirmed earlier arrival time, prep ins. reveiwed, Pt verbalized understanding- [email or web address]    ESOPHAGOGASTRODUODENOSCOPY N/A 2/20/2019    Procedure: EGD (ESOPHAGOGASTRODUODENOSCOPY);  Surgeon: Fawad Perla MD;  Location: [facility] ENDO (2ND FLR);  Service: Endoscopy;  Laterality: N/A;    ILEOCOLECTOMY N/A 5/10/2019    Procedure: ILEOCOLECTOMY;  Surgeon: Uday Cope MD;  [facility];  Service: Colon and Rectal;  Laterality: N/A;  ileocecectomy    ILEOSCOPY N/A 12/20/2019    Procedure: ILEOSCOPY through stoma;  Surgeon: Suzi Bacon MD;  Location: [facility] ENDO (4TH FLR);  Service: Endoscopy;  Laterality: N/A;  Schedule as 30 minute case  Please let patient know to bring extra stoma bag/wafer, etc- entire bag and appliance will be removed at time of procedure to visualize the area well   first week 11/2019     per note-R/S to 12/20/19-GT    ILEOSTOMY N/A 5/10/2019    Procedure: CREATION, ILEOSTOMY with mucous fistula;  Surgeon: Uday Cope MD;  [facility];  Service: Colon and Rectal;  Laterality: N/A;    ILEOSTOMY CLOSURE N/A 2/12/2020    Procedure: CLOSURE,ILEOSTOMY TAKEDOWN END ILEOSTOMY/MUCOUS FISTULA POSSIBLE LAPAROTOMY ERAS LOW;  Surgeon: Uday Cope MD;  [facility];  Service: Colon and Rectal;  Laterality: N/A;     Family History   Problem Relation Age of Onset    Hypertension Mother     No Known Problems Father     No Known Problems Sister     No Known Problems Brother     No Known Problems Sister     No Known Problems Sister     No Known Problems Sister     No Known Problems Maternal Uncle     No Known Problems Paternal Aunt     No Known Problems Paternal Uncle     No Known Problems  Maternal Grandmother     No Known Problems Maternal Grandfather     Irritable bowel syndrome Paternal Grandmother     No Known Problems Paternal Grandfather     No Known Problems Paternal Aunt     No Known Problems Paternal Uncle     No Known Problems Paternal Uncle     No Known Problems Paternal Uncle     No Known Problems Maternal Uncle     No Known Problems Maternal Uncle     Celiac disease Neg Hx     Cirrhosis Neg Hx     Colon cancer Neg Hx     Colon polyps Neg Hx     Crohn's disease Neg Hx     Cystic fibrosis Neg Hx     Esophageal cancer Neg Hx     Hemochromatosis Neg Hx     Inflammatory bowel disease Neg Hx     Liver cancer Neg Hx     Liver disease Neg Hx     Rectal cancer Neg Hx     Stomach cancer Neg Hx     Ulcerative colitis Neg Hx     Jerardo's disease Neg Hx     Lymphoma Neg Hx     Tuberculosis Neg Hx     Scleroderma Neg Hx     Rheum arthritis Neg Hx     Multiple sclerosis Neg Hx     Psoriasis Neg Hx     Lupus Neg Hx     Melanoma Neg Hx     Skin cancer Neg Hx     Amblyopia Neg Hx     Blindness Neg Hx     Cancer Neg Hx     Cataracts Neg Hx     Diabetes Neg Hx     Glaucoma Neg Hx     Macular degeneration Neg Hx     Retinal detachment Neg Hx     Strabismus Neg Hx     Stroke Neg Hx     Thyroid disease Neg Hx      Social History     Tobacco Use    Smoking status: Former Smoker     Packs/day: 0.25     Types: Cigarettes     Quit date: 2020     Years since quittin.9    Smokeless tobacco: Never Used   Substance Use Topics    Alcohol use: Not Currently     Alcohol/week: 0.0 standard drinks     Comment: Socially    Drug use: No     Frequency: 3.0 times per week     Review of Systems   Constitutional: Negative.    HENT: Negative.    Eyes: Negative.    Respiratory: Negative.  Negative for shortness of breath.    Cardiovascular: Negative.  Negative for chest pain.   Gastrointestinal: Negative.    Endocrine: Negative.    Genitourinary: Negative.    Musculoskeletal:  Negative.    Skin: Positive for rash (eczema) and wound (abscess).   Allergic/Immunologic: Negative.    Neurological: Negative.    Hematological: Negative.    Psychiatric/Behavioral: Negative.    All other systems reviewed and are negative.      Physical Exam     Initial Vitals [12/01/20 1653]   BP Pulse Resp Temp SpO2   106/78 (!) 117 16 99.1 °F (37.3 °C) 99 %      MAP       --         Physical Exam  Nursing note and vitals reviewed.  Constitutional: She appears well-developed.   HENT:   Head: Normocephalic.   Nose: Nose normal.   Mouth/Throat: Oropharynx is clear and moist.   Eyes: Conjunctivae are normal.   Neck: Normal range of motion. Neck supple.   Cardiovascular: Normal rate, regular rhythm, S1 normal, S2 normal and normal heart sounds. Exam reveals no gallop and no friction rub.    No murmur heard.  Pulmonary/Chest: Breath sounds normal. No respiratory distress. She has no wheezes. She has no rhonchi. She has no rales.   Abdominal: Soft. There is no abdominal tenderness.   Genitourinary:    Genitourinary Comments: 2 cm x 2 cm left labia abscess. Indurated and Fluctuant.   Musculoskeletal: Normal range of motion.   Neurological: She is alert and oriented to person, place, and time.   Skin: Skin is warm and dry. Capillary refill takes less than 2 seconds. Abscess noted.   Multiple eczema noted to the lower extremities and arms.   Psychiatric: She has a normal mood and affect. Her behavior is normal.     ED Course   Procedures  Labs Reviewed - No data to display       Imaging Results    None          Medical Decision Making:   History:   Old Medical Records: I decided to obtain old medical records.  Initial Assessment:   This is a nontoxic appearing 30 y.o. female with eczema who presents to the ED for evaluation of worsening abscess to the labia and eczema to the RLE over the past week. States the abscess is painful and she has not been able to lay down or walk properly. Denies treatment with warm compress.  Patient was seen in this ED earlier today but the visit was cut short because she left for a dermatology appointment. States she has returned for a possible I&D procedure.  Differential Diagnosis:   Abscess, Cellulitis, Dermatitis.            Scribe Attestation:   Scribe #1: I performed the above scribed service and the documentation accurately describes the services I performed. I attest to the accuracy of the note.    ***                  Clinical Impression:      No diagnosis found.

## 2020-12-02 NOTE — ED PROVIDER NOTES
Encounter Date: 12/1/2020       History     Chief Complaint   Patient presents with    Abscess     to labia. Was here earlier but left for an appointment and is back.      A nontoxic 30-year-old female who presents to the ED with complaints of abscess.  Patient was seen earlier today and left to attend her dermatology appointment patient states she is back to have her abscess lanced.    The history is provided by the patient.   Abscess   This is a new problem. The current episode started several days ago. The problem has been gradually worsening. Affected Location: left groin  Pertinent negatives include no fever, no diarrhea and no vomiting.     Review of patient's allergies indicates:   Allergen Reactions    Horse/equine containing products Itching     Past Medical History:   Diagnosis Date    Allergy     Anxiety     Asthma     Crohn's disease 2/18/19    Crohn's disease (ileum) 5/10/2019    S/P ileocecal resection-42 cm of ileum/10.5 cm cecum removed with surgical path c/w stricture and fistula 2/15/20 ileostomy takedown and mucus fistula takedown    Crohn's disease (ileum) 5/10/2019    S/P ileocecal resection-42 cm of ileum/10.5 cm cecum removed with surgical path c/w stricture and fistula 2/15/20 ileostomy takedown and mucus fistula takedown    Depression     Eczema     Ex-smoker for less than 1 year     Immunosuppressed status 10/24/2019    Joint pain      Past Surgical History:   Procedure Laterality Date    COLONOSCOPY N/A 2/20/2019    Procedure: COLONOSCOPY;  Surgeon: Fawad Perla MD;  Location: [address]);  Service: Endoscopy;  Laterality: N/A;    COLONOSCOPY N/A 10/30/2020    Procedure: COLONOSCOPY;  Surgeon: Suzi Bacon MD;  Location: [facility] (4TH FLR);  Service: Endoscopy;  Laterality: N/A;  covid test [facility] [place] [facility] 10/27  pt to try Mirilax prep with Reglan before per Dr. Bacon - nimisha  10/14-new instructions e-mailed-tb  10/16--new instructions e-mailed  10/29/20-  Pt confirmed earlier arrival time, prep ins. reveiwed, Pt verbalized understanding- [email or web address]    ESOPHAGOGASTRODUODENOSCOPY N/A 2/20/2019    Procedure: EGD (ESOPHAGOGASTRODUODENOSCOPY);  Surgeon: Fawad Perla MD;  Location: [facility] ENDO (2ND FLR);  Service: Endoscopy;  Laterality: N/A;    ILEOCOLECTOMY N/A 5/10/2019    Procedure: ILEOCOLECTOMY;  Surgeon: Uday Cope MD;  Location: [facility] OR 2ND FLR;  Service: Colon and Rectal;  Laterality: N/A;  ileocecectomy    ILEOSCOPY N/A 12/20/2019    Procedure: ILEOSCOPY through stoma;  Surgeon: Suzi Bacon MD;  Location: [facility] ENDO (4TH FLR);  Service: Endoscopy;  Laterality: N/A;  Schedule as 30 minute case  Please let patient know to bring extra stoma bag/wafer, etc- entire bag and appliance will be removed at time of procedure to visualize the area well   first week 11/2019     per note-R/S to 12/20/19-GT    ILEOSTOMY N/A 5/10/2019    Procedure: CREATION, ILEOSTOMY with mucous fistula;  Surgeon: Uday Cope MD;  Location: [facility] OR 2ND FLR;  Service: Colon and Rectal;  Laterality: N/A;    ILEOSTOMY CLOSURE N/A 2/12/2020    Procedure: CLOSURE,ILEOSTOMY TAKEDOWN END ILEOSTOMY/MUCOUS FISTULA POSSIBLE LAPAROTOMY ERAS LOW;  Surgeon: Uday Cope MD;  Location: [facility] OR 2ND FLR;  Service: Colon and Rectal;  Laterality: N/A;     Family History   Problem Relation Age of Onset    Hypertension Mother     No Known Problems Father     No Known Problems Sister     No Known Problems Brother     No Known Problems Sister     No Known Problems Sister     No Known Problems Sister     No Known Problems Maternal Uncle     No Known Problems Paternal Aunt     No Known Problems Paternal Uncle     No Known Problems Maternal Grandmother     No Known Problems Maternal Grandfather     Irritable bowel syndrome Paternal Grandmother     No Known Problems Paternal Grandfather     No Known Problems Paternal Aunt     No Known Problems Paternal Uncle     No Known Problems  Paternal Uncle     No Known Problems Paternal Uncle     No Known Problems Maternal Uncle     No Known Problems Maternal Uncle     Celiac disease Neg Hx     Cirrhosis Neg Hx     Colon cancer Neg Hx     Colon polyps Neg Hx     Crohn's disease Neg Hx     Cystic fibrosis Neg Hx     Esophageal cancer Neg Hx     Hemochromatosis Neg Hx     Inflammatory bowel disease Neg Hx     Liver cancer Neg Hx     Liver disease Neg Hx     Rectal cancer Neg Hx     Stomach cancer Neg Hx     Ulcerative colitis Neg Hx     Jerardo's disease Neg Hx     Lymphoma Neg Hx     Tuberculosis Neg Hx     Scleroderma Neg Hx     Rheum arthritis Neg Hx     Multiple sclerosis Neg Hx     Psoriasis Neg Hx     Lupus Neg Hx     Melanoma Neg Hx     Skin cancer Neg Hx     Amblyopia Neg Hx     Blindness Neg Hx     Cancer Neg Hx     Cataracts Neg Hx     Diabetes Neg Hx     Glaucoma Neg Hx     Macular degeneration Neg Hx     Retinal detachment Neg Hx     Strabismus Neg Hx     Stroke Neg Hx     Thyroid disease Neg Hx      Social History     Tobacco Use    Smoking status: Former Smoker     Packs/day: 0.25     Types: Cigarettes     Quit date: 2020     Years since quittin.9    Smokeless tobacco: Never Used   Substance Use Topics    Alcohol use: Not Currently     Alcohol/week: 0.0 standard drinks     Comment: Socially    Drug use: No     Frequency: 3.0 times per week     Review of Systems   Constitutional: Negative.  Negative for fever.   HENT: Negative.    Eyes: Negative.    Respiratory: Negative.  Negative for shortness of breath.    Cardiovascular: Negative.  Negative for chest pain.   Gastrointestinal: Negative.  Negative for diarrhea and vomiting.   Endocrine: Negative.    Musculoskeletal: Negative.    Skin:        Right groin abscess   Allergic/Immunologic: Negative.    Neurological: Negative.    Hematological: Negative.    All other systems reviewed and are negative.      Physical Exam     Initial Vitals [20  1653]   BP Pulse Resp Temp SpO2   106/78 (!) 117 16 99.1 °F (37.3 °C) 99 %      MAP       --         Physical Exam    Nursing note and vitals reviewed.  Constitutional: Vital signs are normal. She appears well-developed. She is cooperative. She does not appear ill.   HENT:   Head: Normocephalic.   Right Ear: External ear normal.   Left Ear: External ear and ear canal normal.   Nose: Nose normal.   Mouth/Throat: Uvula is midline, oropharynx is clear and moist and mucous membranes are normal.   Eyes: Conjunctivae and lids are normal.   Neck: Normal range of motion. Neck supple.   Cardiovascular: Normal rate, regular rhythm, S1 normal, S2 normal and normal heart sounds.   No murmur heard.  Pulmonary/Chest: Effort normal and breath sounds normal. No respiratory distress.   Abdominal: Soft. Normal appearance. There is no abdominal tenderness.   Genitourinary:    Genitourinary Comments: 2 cm x 2 cm area left labia abscess     Musculoskeletal: Normal range of motion.   Neurological: She is alert and oriented to person, place, and time.   Skin: Skin is warm, dry and intact.   Psychiatric: She has a normal mood and affect. Her speech is normal. Thought content normal.         ED Course   I & D - Incision and Drainage    Date/Time: 12/1/2020 7:05 PM  Location procedure was performed: [facility] EMERGENCY DEPARTMENT  Performed by: FRANKIE Okeefe  Authorized by: Angel Maguire MD   Type: abscess    Anesthesia:  Local Anesthetic: lidocaine 1% with epinephrine  Patient sedated: no  Scalpel size: 11  Incision type: single straight  Complexity: simple  Drainage: purulent  Drainage amount: copious  Packing material: 1/2 in iodoform gauze  Complications: No  Specimens: No  Implants: No  Patient tolerance: Patient tolerated the procedure well with no immediate complications        Labs Reviewed   CULTURE, AEROBIC  (SPECIFY SOURCE)   POCT URINE PREGNANCY          Imaging Results    None          Medical Decision Making:   Initial  Assessment:   A nontoxic 30-year-old female who presents to the ED with complaints of abscess.  Patient was seen earlier today and left to attend her dermatology appointment patient states she is back to have her abscess lanced.    Differential Diagnosis:   Labia abscess, cellulitis, Bartholin's abscess.  ED Management:  Physical exam performed.  I&D performed.  Wound culture pending.   Discharge with clindamycin, tylenol with codeine, and motrin.   Follow-up with PCP in 2 days for packing removal and abscess recheck.  Return back to emergency room for worsening of symptoms.                               Clinical Impression:     ICD-10-CM ICD-9-CM   1. Abscess of groin, right  L02.214 682.2                          ED Disposition Condition    Discharge Stable        ED Prescriptions     Medication Sig Dispense Start Date End Date Auth. Provider    clindamycin (CLEOCIN) 150 MG capsule Take 2 capsules (300 mg total) by mouth 4 (four) times daily. for 7 days 56 capsule 12/1/2020 12/8/2020 FRANKIE Okeefe    traMADoL (ULTRAM) 50 mg tablet Take 1 tablet (50 mg total) by mouth every 6 (six) hours as needed for Pain. 12 tablet 12/1/2020  FRANKIE Okeefe    ibuprofen (ADVIL,MOTRIN) 600 MG tablet Take 1 tablet (600 mg total) by mouth every 6 (six) hours as needed for Pain. 18 tablet 12/1/2020  FRANKIE Okeefe        Follow-up Information     Follow up With Specialties Details Why Contact Info    Margarita Jay MD Internal Medicine In 2 days  [address] [facility] [facility]  [place] [address]  [phone]                                         FRANKIE Okeefe  12/01/20 1955

## 2020-12-03 ENCOUNTER — HOSPITAL ENCOUNTER (EMERGENCY)
Facility: HOSPITAL | Age: 30
Discharge: HOME | End: 2020-12-03
Attending: EMERGENCY MEDICINE
Payer: MEDICAID

## 2020-12-03 VITALS
DIASTOLIC BLOOD PRESSURE: 72 MMHG | WEIGHT: 152 LBS | SYSTOLIC BLOOD PRESSURE: 102 MMHG | HEART RATE: 98 BPM | RESPIRATION RATE: 18 BRPM | TEMPERATURE: 98 F | BODY MASS INDEX: 26.93 KG/M2 | OXYGEN SATURATION: 100 % | HEIGHT: 63 IN

## 2020-12-03 DIAGNOSIS — Z51.89 VISIT FOR WOUND CHECK: ICD-10-CM

## 2020-12-03 DIAGNOSIS — Z51.89 WOUND CHECK, ABSCESS: Primary | ICD-10-CM

## 2020-12-03 PROCEDURE — 25000003 PHARM REV CODE 250: Mod: ER | Performed by: PHYSICIAN ASSISTANT

## 2020-12-03 PROCEDURE — 99283 EMERGENCY DEPT VISIT LOW MDM: CPT | Mod: ER

## 2020-12-03 RX ORDER — CLINDAMYCIN HYDROCHLORIDE 150 MG/1
300 CAPSULE ORAL
Status: COMPLETED | OUTPATIENT
Start: 2020-12-03 | End: 2020-12-03

## 2020-12-03 RX ADMIN — CLINDAMYCIN HYDROCHLORIDE 300 MG: 150 CAPSULE ORAL at 03:12

## 2020-12-03 NOTE — ED PROVIDER NOTES
Encounter Date: 12/3/2020    SCRIBE #1 NOTE: I, Tanisha Black, am scribing for, and in the presence of,  ALEXANDRIA Newman. I have scribed the following portions of the note - Other sections scribed: HPI, ROS, PE.       History     Chief Complaint   Patient presents with    Wound Check     Here for packing removal to R inner leg/groin area, states was seen 2 days ago for abscess     Lora Scott is a 30 y.o. female who presents to the ED for evaluation of wound check and packing removal to the right groin area. Patient reports she was evaluated at this facility on 12/1/20 for an abscess to the right groin. She states she picked up the prescriptions today, and at this time has not taken any medications for the pain.    The history is provided by the patient. No  was used.     Review of patient's allergies indicates:   Allergen Reactions    Horse/equine containing products Itching     Past Medical History:   Diagnosis Date    Allergy     Anxiety     Asthma     Crohn's disease 2/18/19    Crohn's disease (ileum) 5/10/2019    S/P ileocecal resection-42 cm of ileum/10.5 cm cecum removed with surgical path c/w stricture and fistula 2/15/20 ileostomy takedown and mucus fistula takedown    Crohn's disease (ileum) 5/10/2019    S/P ileocecal resection-42 cm of ileum/10.5 cm cecum removed with surgical path c/w stricture and fistula 2/15/20 ileostomy takedown and mucus fistula takedown    Depression     Eczema     Ex-smoker for less than 1 year     Immunosuppressed status 10/24/2019    Joint pain      Past Surgical History:   Procedure Laterality Date    COLONOSCOPY N/A 2/20/2019    Procedure: COLONOSCOPY;  Surgeon: Fawad Perla MD;  Location: [facility] (2ND FLR);  Service: Endoscopy;  Laterality: N/A;    COLONOSCOPY N/A 10/30/2020    Procedure: COLONOSCOPY;  Surgeon: Suzi Bacon MD;  Location: [facility] ENDO (4TH FLR);  Service: Endoscopy;  Laterality: N/A;  covid test [facility] [place] [facility] 10/27  pt to try Mirilax prep with Reglan before per Dr. Bacon -   10/14-new instructions e-mailed-tb  10/16--new instructions e-mailed  10/29/20- Pt confirmed earlier arrival time, prep ins. reveiwed, Pt verbalized understanding- [email or web address]    ESOPHAGOGASTRODUODENOSCOPY N/A 2/20/2019    Procedure: EGD (ESOPHAGOGASTRODUODENOSCOPY);  Surgeon: Fawad Perla MD;  Location: [facility] ENDO (2ND FLR);  Service: Endoscopy;  Laterality: N/A;    ILEOCOLECTOMY N/A 5/10/2019    Procedure: ILEOCOLECTOMY;  Surgeon: Uday Cope MD;  Location: [facility] OR 2ND FLR;  Service: Colon and Rectal;  Laterality: N/A;  ileocecectomy    ILEOSCOPY N/A 12/20/2019    Procedure: ILEOSCOPY through stoma;  Surgeon: Suzi Bacon MD;  Location: [facility] ENDO (4TH FLR);  Service: Endoscopy;  Laterality: N/A;  Schedule as 30 minute case  Please let patient know to bring extra stoma bag/wafer, etc- entire bag and appliance will be removed at time of procedure to visualize the area well   first week 11/2019     per note-R/S to 12/20/19-GT    ILEOSTOMY N/A 5/10/2019    Procedure: CREATION, ILEOSTOMY with mucous fistula;  Surgeon: Uday Cope MD;  Location: [facility] OR 2ND FLR;  Service: Colon and Rectal;  Laterality: N/A;    ILEOSTOMY CLOSURE N/A 2/12/2020    Procedure: CLOSURE,ILEOSTOMY TAKEDOWN END ILEOSTOMY/MUCOUS FISTULA POSSIBLE LAPAROTOMY ERAS LOW;  Surgeon: Uday Cope MD;  Location: [facility] OR 2ND FLR;  Service: Colon and Rectal;  Laterality: N/A;     Family History   Problem Relation Age of Onset    Hypertension Mother     No Known Problems Father     No Known Problems Sister     No Known Problems Brother     No Known Problems Sister     No Known Problems Sister     No Known Problems Sister     No Known Problems Maternal Uncle     No Known Problems Paternal Aunt     No Known Problems Paternal Uncle     No Known Problems Maternal Grandmother     No Known Problems Maternal Grandfather     Irritable bowel syndrome Paternal  Grandmother     No Known Problems Paternal Grandfather     No Known Problems Paternal Aunt     No Known Problems Paternal Uncle     No Known Problems Paternal Uncle     No Known Problems Paternal Uncle     No Known Problems Maternal Uncle     No Known Problems Maternal Uncle     Celiac disease Neg Hx     Cirrhosis Neg Hx     Colon cancer Neg Hx     Colon polyps Neg Hx     Crohn's disease Neg Hx     Cystic fibrosis Neg Hx     Esophageal cancer Neg Hx     Hemochromatosis Neg Hx     Inflammatory bowel disease Neg Hx     Liver cancer Neg Hx     Liver disease Neg Hx     Rectal cancer Neg Hx     Stomach cancer Neg Hx     Ulcerative colitis Neg Hx     Jerardo's disease Neg Hx     Lymphoma Neg Hx     Tuberculosis Neg Hx     Scleroderma Neg Hx     Rheum arthritis Neg Hx     Multiple sclerosis Neg Hx     Psoriasis Neg Hx     Lupus Neg Hx     Melanoma Neg Hx     Skin cancer Neg Hx     Amblyopia Neg Hx     Blindness Neg Hx     Cancer Neg Hx     Cataracts Neg Hx     Diabetes Neg Hx     Glaucoma Neg Hx     Macular degeneration Neg Hx     Retinal detachment Neg Hx     Strabismus Neg Hx     Stroke Neg Hx     Thyroid disease Neg Hx      Social History     Tobacco Use    Smoking status: Former Smoker     Packs/day: 0.25     Types: Cigarettes     Quit date: 2020     Years since quittin.9    Smokeless tobacco: Never Used   Substance Use Topics    Alcohol use: Not Currently     Alcohol/week: 0.0 standard drinks     Comment: Socially    Drug use: No     Frequency: 3.0 times per week     Review of Systems   Constitutional: Negative for chills and fever.   Respiratory: Negative for shortness of breath.    Musculoskeletal: Negative for joint swelling and myalgias.   Skin: Positive for wound (packing removal). Negative for rash.   Neurological: Negative for weakness and numbness.   All other systems reviewed and are negative.      Physical Exam     Initial Vitals [20 1419]   BP Pulse  Resp Temp SpO2   108/74 107 17 98.1 °F (36.7 °C) 98 %      MAP       --         Physical Exam    Nursing note and vitals reviewed.  Constitutional: She appears well-developed.   HENT:   Head: Normocephalic.   Right Ear: External ear normal.   Left Ear: External ear normal.   Nose: Nose normal.   Mouth/Throat: Oropharynx is clear and moist.   Eyes: Conjunctivae and EOM are normal. Pupils are equal, round, and reactive to light.   Neck: Normal range of motion. Neck supple.   Cardiovascular: Normal rate, regular rhythm, S1 normal, S2 normal and normal heart sounds. Exam reveals no gallop and no friction rub.    No murmur heard.  Pulmonary/Chest: Breath sounds normal. No respiratory distress. She has no wheezes. She has no rhonchi. She has no rales.   Abdominal: Soft. There is no abdominal tenderness.   Musculoskeletal: Normal range of motion.   Neurological: She is alert and oriented to person, place, and time.   Skin: Skin is warm and dry. Capillary refill takes less than 2 seconds.   Psychiatric: She has a normal mood and affect. Her behavior is normal.         ED Course   Procedures  Labs Reviewed   POCT URINE PREGNANCY          Imaging Results    None          Medical Decision Making:   History:   Old Medical Records: I decided to obtain old medical records.  Clinical Tests:   Lab Tests: Ordered and Reviewed  The following lab test(s) were unremarkable: UPT       APC / Resident Notes:   This is an urgent evaluation of a 30-year-old female who presents to the emergency department for wound evaluation.  She had a vulvar abscess incised and drained 2 days ago with packing placed.  She was prescribed clindamycin but is not yet started taking it.  She claims that she recently picked up the medication will begin taking it today.    The patient is currently afebrile and nontoxic in appearance.  Vital signs are stable.  Physical exam is remarkable for healing abscess noted with packing and tacked to the right vulvar  region.  There is no surrounding erythema or induration noted.  The remaining physical exam was unremarkable.  The packing was removed.  I will give her a clindamycin dose here today in the emergency department.  Signs and symptoms of worsening were thoroughly reviewed with her and she verbalized understanding and agreement.  She is currently safe and stable for discharge at this time.       Scribe Attestation:   Scribe #1: I performed the above scribed service and the documentation accurately describes the services I performed. I attest to the accuracy of the note.    Attending Attestation:           Physician Attestation for Scribe:  Physician Attestation Statement for Scribe #1: I, Claribel Zuleta PA-C, reviewed documentation, as scribed by Tanisha Black in my presence, and it is both accurate and complete.                           Clinical Impression:     ICD-10-CM ICD-9-CM   1. Wound check, abscess  Z51.89 V58.89   2. Visit for wound check  Z51.89 V58.89                      Disposition:   Disposition: Discharged  Condition: Stable     ED Disposition Condition    Discharge Stable        ED Prescriptions     None        Follow-up Information     Follow up With Specialties Details Why Contact Info    Margarita Jay MD Internal Medicine   [address] [facility]  [place] [address]  [phone]                                         Claribel Zuleta PA-C  12/03/20 [id number]

## 2020-12-03 NOTE — DISCHARGE INSTRUCTIONS
Keep the area clean and dry.  You may place warm compresses on the area.  You may wash with warm soapy water.  Continue to take the antibiotics as prescribed until they are completed.  You should return to the emergency department if you see increase in size, redness or pain.  Please follow-up with your doctor within the next 2-3 days.

## 2020-12-03 NOTE — FIRST PROVIDER EVALUATION
Emergency Department TeleTriage Encounter Note      CHIEF COMPLAINT    Chief Complaint   Patient presents with    Wound Check     Here for packing removal to R inner leg/groin area, states was seen 2 days ago for abscess       VITAL SIGNS   Initial Vitals [12/03/20 1419]   BP Pulse Resp Temp SpO2   108/74 107 17 98.1 °F (36.7 °C) 98 %      MAP       --            ALLERGIES    Review of patient's allergies indicates:   Allergen Reactions    Horse/equine containing products Itching       PROVIDER TRIAGE NOTE  This is a teletriage evaluation of a 30 y.o. female presenting to the ED with c/o abscess wound check. Initial orders will be placed and care will be transferred to an alternate provider when patient is roomed for a full evaluation. Any additional orders and the final disposition will be determined by that provider.         ORDERS  Labs Reviewed   POCT URINE PREGNANCY       ED Orders (720h ago, onward)    Start Ordered     Status Ordering Provider    12/03/20 1421 12/03/20 1420  POCT urine pregnancy  Once      Ordered FRANCISCO RAMOS            Virtual Visit Note: The provider triage portion of this emergency department evaluation and documentation was performed via [employer], a HIPAA-compliant telemedicine application, in concert with a tele-presenter in the room. A face to face patient evaluation with one of my colleagues will occur once the patient is placed in an emergency department room.      DISCLAIMER: This note was prepared with [employer] voice recognition transcription software. Garbled syntax, mangled pronouns, and other bizarre constructions may be attributed to that software system.

## 2020-12-04 ENCOUNTER — PATIENT MESSAGE (OUTPATIENT)
Dept: GASTROENTEROLOGY | Facility: CLINIC | Age: 30
End: 2020-12-04

## 2020-12-04 DIAGNOSIS — K50.80 CROHN'S DISEASE OF BOTH SMALL AND LARGE INTESTINE WITHOUT COMPLICATION: Primary | ICD-10-CM

## 2020-12-04 NOTE — TELEPHONE ENCOUNTER
Called [facility] at [phone] and spoke with Laura  They accept E-scripts and I confirmed the information from the system and we will E-scribe the pts Humira       Allergies reviewed   Labs scheduled 02/2021  Script pended for approval

## 2020-12-05 ENCOUNTER — TELEPHONE (OUTPATIENT)
Dept: EMERGENCY MEDICINE | Facility: HOSPITAL | Age: 30
End: 2020-12-05

## 2020-12-05 LAB — BACTERIA SPEC AEROBE CULT: ABNORMAL

## 2020-12-05 RX ORDER — SULFAMETHOXAZOLE AND TRIMETHOPRIM 800; 160 MG/1; MG/1
1 TABLET ORAL 2 TIMES DAILY
Qty: 20 TABLET | Refills: 0 | Status: SHIPPED | OUTPATIENT
Start: 2020-12-05 | End: 2020-12-15

## 2020-12-07 RX ORDER — ADALIMUMAB 40MG/0.8ML
40 KIT SUBCUTANEOUS
Qty: 4 PEN | Refills: 11 | Status: SHIPPED | OUTPATIENT
Start: 2020-12-07 | End: 2021-11-29

## 2020-12-08 ENCOUNTER — PATIENT MESSAGE (OUTPATIENT)
Dept: GASTROENTEROLOGY | Facility: CLINIC | Age: 30
End: 2020-12-08

## 2020-12-08 ENCOUNTER — OFFICE VISIT (OUTPATIENT)
Dept: INTERNAL MEDICINE | Age: 30
End: 2020-12-08

## 2020-12-08 DIAGNOSIS — E66.01 MORBID OBESITY WITH BMI OF 50.0-59.9, ADULT (CMD): Primary | ICD-10-CM

## 2020-12-08 DIAGNOSIS — E03.9 HYPOTHYROIDISM, UNSPECIFIED TYPE: ICD-10-CM

## 2020-12-08 DIAGNOSIS — I10 HYPERTENSION, UNCONTROLLED: ICD-10-CM

## 2020-12-08 PROCEDURE — 3080F DIAST BP >= 90 MM HG: CPT | Performed by: INTERNAL MEDICINE

## 2020-12-08 PROCEDURE — 3077F SYST BP >= 140 MM HG: CPT | Performed by: INTERNAL MEDICINE

## 2020-12-08 PROCEDURE — 99214 OFFICE O/P EST MOD 30 MIN: CPT | Performed by: INTERNAL MEDICINE

## 2020-12-08 RX ORDER — AMLODIPINE BESYLATE 10 MG/1
10 TABLET ORAL DAILY
Qty: 90 TABLET | Refills: 3 | Status: SHIPPED | OUTPATIENT
Start: 2020-12-08 | End: 2021-05-08 | Stop reason: SDUPTHER

## 2020-12-08 SDOH — HEALTH STABILITY: MENTAL HEALTH: HOW OFTEN DO YOU HAVE A DRINK CONTAINING ALCOHOL?: 2-4 TIMES A MONTH

## 2020-12-08 ASSESSMENT — ENCOUNTER SYMPTOMS
FATIGUE: 0
NAUSEA: 1
SHORTNESS OF BREATH: 0
COUGH: 0
ABDOMINAL PAIN: 0
FEVER: 0
APPETITE CHANGE: 0
CHILLS: 0
TROUBLE SWALLOWING: 0
SORE THROAT: 0
DIZZINESS: 0
UNEXPECTED WEIGHT CHANGE: 0
DIARRHEA: 0

## 2020-12-08 ASSESSMENT — PATIENT HEALTH QUESTIONNAIRE - PHQ9
CLINICAL INTERPRETATION OF PHQ2 SCORE: NO FURTHER SCREENING NEEDED
1. LITTLE INTEREST OR PLEASURE IN DOING THINGS: NOT AT ALL
SUM OF ALL RESPONSES TO PHQ9 QUESTIONS 1 AND 2: 0
CLINICAL INTERPRETATION OF PHQ9 SCORE: NO FURTHER SCREENING NEEDED
SUM OF ALL RESPONSES TO PHQ9 QUESTIONS 1 AND 2: 0
2. FEELING DOWN, DEPRESSED OR HOPELESS: NOT AT ALL

## 2020-12-08 ASSESSMENT — PAIN SCALES - GENERAL: PAINLEVEL: 0

## 2020-12-09 ENCOUNTER — LAB SERVICES (OUTPATIENT)
Dept: LAB | Age: 30
End: 2020-12-09

## 2020-12-09 ENCOUNTER — HOSPITAL ENCOUNTER (EMERGENCY)
Facility: HOSPITAL | Age: 30
Discharge: HOME | End: 2020-12-09
Attending: EMERGENCY MEDICINE
Payer: MEDICAID

## 2020-12-09 VITALS
OXYGEN SATURATION: 99 % | DIASTOLIC BLOOD PRESSURE: 77 MMHG | RESPIRATION RATE: 16 BRPM | TEMPERATURE: 98 F | HEART RATE: 99 BPM | SYSTOLIC BLOOD PRESSURE: 135 MMHG

## 2020-12-09 DIAGNOSIS — E03.9 HYPOTHYROIDISM, UNSPECIFIED TYPE: ICD-10-CM

## 2020-12-09 DIAGNOSIS — N76.4 ABSCESS OF RIGHT GENITAL LABIA: Primary | ICD-10-CM

## 2020-12-09 DIAGNOSIS — I10 HYPERTENSION, UNCONTROLLED: ICD-10-CM

## 2020-12-09 DIAGNOSIS — E66.01 MORBID OBESITY WITH BMI OF 50.0-59.9, ADULT (CMD): ICD-10-CM

## 2020-12-09 LAB
ALBUMIN SERPL-MCNC: 3.4 G/DL (ref 3.6–5.1)
ALBUMIN/GLOB SERPL: 0.8 {RATIO} (ref 1–2.4)
ALP SERPL-CCNC: 62 UNITS/L (ref 45–117)
ALT SERPL-CCNC: 33 UNITS/L
ANION GAP SERPL CALC-SCNC: 10 MMOL/L (ref 10–20)
AST SERPL-CCNC: 15 UNITS/L
BASOPHILS # BLD: 0 K/MCL (ref 0–0.3)
BASOPHILS NFR BLD: 0 %
BILIRUB SERPL-MCNC: 0.2 MG/DL (ref 0.2–1)
BUN SERPL-MCNC: 11 MG/DL (ref 6–20)
BUN/CREAT SERPL: 12 (ref 7–25)
CALCIUM SERPL-MCNC: 8.9 MG/DL (ref 8.4–10.2)
CHLORIDE SERPL-SCNC: 106 MMOL/L (ref 98–107)
CHOLEST SERPL-MCNC: 255 MG/DL
CHOLEST/HDLC SERPL: 5.9 {RATIO}
CO2 SERPL-SCNC: 26 MMOL/L (ref 21–32)
CREAT SERPL-MCNC: 0.94 MG/DL (ref 0.51–0.95)
DEPRECATED RDW RBC: 50.2 FL (ref 39–50)
EOSINOPHIL # BLD: 0.2 K/MCL (ref 0–0.5)
EOSINOPHIL NFR BLD: 2 %
ERYTHROCYTE [DISTWIDTH] IN BLOOD: 16.2 % (ref 11–15)
FASTING DURATION TIME PATIENT: ABNORMAL H
FASTING DURATION TIME PATIENT: ABNORMAL H
GFR SERPLBLD BASED ON 1.73 SQ M-ARVRAT: >90 ML/MIN/1.73M2
GLOBULIN SER-MCNC: 4.2 G/DL (ref 2–4)
GLUCOSE SERPL-MCNC: 119 MG/DL (ref 65–99)
HCT VFR BLD CALC: 39.1 % (ref 36–46.5)
HDLC SERPL-MCNC: 43 MG/DL
HGB BLD-MCNC: 11.4 G/DL (ref 12–15.5)
IMM GRANULOCYTES # BLD AUTO: 0 K/MCL (ref 0–0.2)
IMM GRANULOCYTES # BLD: 0 %
LDLC SERPL CALC-MCNC: 189 MG/DL
LYMPHOCYTES # BLD: 2.8 K/MCL (ref 1–4.8)
LYMPHOCYTES NFR BLD: 27 %
MCH RBC QN AUTO: 24.5 PG (ref 26–34)
MCHC RBC AUTO-ENTMCNC: 29.2 G/DL (ref 32–36.5)
MCV RBC AUTO: 83.9 FL (ref 78–100)
MONOCYTES # BLD: 0.3 K/MCL (ref 0.3–0.9)
MONOCYTES NFR BLD: 3 %
NEUTROPHILS # BLD: 7.2 K/MCL (ref 1.8–7.7)
NEUTROPHILS NFR BLD: 68 %
NONHDLC SERPL-MCNC: 212 MG/DL
NRBC BLD MANUAL-RTO: 0 /100 WBC
PLATELET # BLD AUTO: 422 K/MCL (ref 140–450)
POTASSIUM SERPL-SCNC: 4 MMOL/L (ref 3.4–5.1)
PROT SERPL-MCNC: 7.6 G/DL (ref 6.4–8.2)
RBC # BLD: 4.66 MIL/MCL (ref 4–5.2)
SODIUM SERPL-SCNC: 138 MMOL/L (ref 135–145)
T4 FREE SERPL-MCNC: 1 NG/DL (ref 0.8–1.5)
TRIGL SERPL-MCNC: 115 MG/DL
TSH SERPL-ACNC: 13.64 MCUNITS/ML (ref 0.35–5)
WBC # BLD: 10.5 K/MCL (ref 4.2–11)

## 2020-12-09 PROCEDURE — 84443 ASSAY THYROID STIM HORMONE: CPT | Performed by: INTERNAL MEDICINE

## 2020-12-09 PROCEDURE — 80061 LIPID PANEL: CPT | Performed by: INTERNAL MEDICINE

## 2020-12-09 PROCEDURE — 84439 ASSAY OF FREE THYROXINE: CPT | Performed by: INTERNAL MEDICINE

## 2020-12-09 PROCEDURE — 85025 COMPLETE CBC W/AUTO DIFF WBC: CPT | Performed by: INTERNAL MEDICINE

## 2020-12-09 PROCEDURE — 25000003 PHARM REV CODE 250: Mod: ER | Performed by: EMERGENCY MEDICINE

## 2020-12-09 PROCEDURE — 80053 COMPREHEN METABOLIC PANEL: CPT | Performed by: INTERNAL MEDICINE

## 2020-12-09 PROCEDURE — 56405 I&D VULVA/PERINEAL ABSCESS: CPT | Mod: ER

## 2020-12-09 PROCEDURE — 36415 COLL VENOUS BLD VENIPUNCTURE: CPT

## 2020-12-09 PROCEDURE — 99284 EMERGENCY DEPT VISIT MOD MDM: CPT | Mod: 25,ER

## 2020-12-09 RX ORDER — HYDROCODONE BITARTRATE AND ACETAMINOPHEN 5; 325 MG/1; MG/1
1 TABLET ORAL EVERY 4 HOURS PRN
Qty: 15 TABLET | Refills: 0 | Status: SHIPPED | OUTPATIENT
Start: 2020-12-09 | End: 2020-12-19

## 2020-12-09 RX ORDER — TRIAMCINOLONE ACETONIDE 1 MG/G
OINTMENT TOPICAL 2 TIMES DAILY
Qty: 80 G | Refills: 2 | Status: SHIPPED | OUTPATIENT
Start: 2020-12-09 | End: 2021-03-30

## 2020-12-09 RX ORDER — LIDOCAINE HYDROCHLORIDE 10 MG/ML
10 INJECTION INFILTRATION; PERINEURAL
Status: COMPLETED | OUTPATIENT
Start: 2020-12-09 | End: 2020-12-09

## 2020-12-09 RX ADMIN — LIDOCAINE HYDROCHLORIDE 10 ML: 10 INJECTION, SOLUTION INFILTRATION; PERINEURAL at 06:12

## 2020-12-10 NOTE — ED PROVIDER NOTES
"Encounter Date: 12/9/2020    SCRIBE #1 NOTE: I, Amanda Mandel, am scribing for, and in the presence of,  Dr. Garcia. I have scribed the following portions of the note - Other sections scribed: HPI, ROS.       History     Chief Complaint   Patient presents with    Vaginal Pain     PT STATES "I  HAVE ANOTHER ABSCESS ON MY VAGINA:      Lora Scott is a 30 y.o. female who presents to the ED complaining of abscess inside her vagina x 1 day. Reports it got much bigger very quickly. Reports she had one last week that was cut and drained. Also reports a rash to bilateral hands x a few weeks. Endorses a frontal headache. Patient has Hx of crohns disease and psoriasis. Denies fever, chills, abdominal pain, vomiting, diarrhea, eye pain, sore throat, cough, SOB, CP, and dysuria.    The history is provided by the patient. No  was used.     Review of patient's allergies indicates:   Allergen Reactions    Horse/equine containing products Itching     Past Medical History:   Diagnosis Date    Allergy     Anxiety     Asthma     Crohn's disease 2/18/19    Crohn's disease (ileum) 5/10/2019    S/P ileocecal resection-42 cm of ileum/10.5 cm cecum removed with surgical path c/w stricture and fistula 2/15/20 ileostomy takedown and mucus fistula takedown    Crohn's disease (ileum) 5/10/2019    S/P ileocecal resection-42 cm of ileum/10.5 cm cecum removed with surgical path c/w stricture and fistula 2/15/20 ileostomy takedown and mucus fistula takedown    Depression     Eczema     Ex-smoker for less than 1 year     Immunosuppressed status 10/24/2019    Joint pain      Past Surgical History:   Procedure Laterality Date    COLONOSCOPY N/A 2/20/2019    Procedure: COLONOSCOPY;  Surgeon: Fawad Perla MD;  Location: [facility] (2ND FLR);  Service: Endoscopy;  Laterality: N/A;    COLONOSCOPY N/A 10/30/2020    Procedure: COLONOSCOPY;  Surgeon: Suzi Bacon MD;  Location: [facility] (4TH FLR);  Service: " Endoscopy;  Laterality: N/A;  covid test [facility] 10/27  pt to try Mirilax prep with Reglan before per Dr. Bacon - nimisha  10/14-new instructions e-mailed-tb  10/16--new instructions e-mailed  10/29/20- Pt confirmed earlier arrival time, prep ins. reveiwed, Pt verbalized understanding- [id number]    ESOPHAGOGASTRODUODENOSCOPY N/A 2/20/2019    Procedure: EGD (ESOPHAGOGASTRODUODENOSCOPY);  Surgeon: Fawad Perla MD;  Location: [facility] ENDO (2ND FLR);  Service: Endoscopy;  Laterality: N/A;    ILEOCOLECTOMY N/A 5/10/2019    Procedure: ILEOCOLECTOMY;  Surgeon: Uday Cope MD;  Location: [facility] OR 2ND FLR;  Service: Colon and Rectal;  Laterality: N/A;  ileocecectomy    ILEOSCOPY N/A 12/20/2019    Procedure: ILEOSCOPY through stoma;  Surgeon: Suzi Bacon MD;  Location: [facility] ENDO (4TH FLR);  Service: Endoscopy;  Laterality: N/A;  Schedule as 30 minute case  Please let patient know to bring extra stoma bag/wafer, etc- entire bag and appliance will be removed at time of procedure to visualize the area well   first week 11/2019     per note-R/S to 12/20/19-GT    ILEOSTOMY N/A 5/10/2019    Procedure: CREATION, ILEOSTOMY with mucous fistula;  Surgeon: Uday Cope MD;  [facility];  Service: Colon and Rectal;  Laterality: N/A;    ILEOSTOMY CLOSURE N/A 2/12/2020    Procedure: CLOSURE,ILEOSTOMY TAKEDOWN END ILEOSTOMY/MUCOUS FISTULA POSSIBLE LAPAROTOMY ERAS LOW;  Surgeon: Uday Cope MD;  Location: [facility] OR 2ND FLR;  Service: Colon and Rectal;  Laterality: N/A;     Family History   Problem Relation Age of Onset    Hypertension Mother     No Known Problems Father     No Known Problems Sister     No Known Problems Brother     No Known Problems Sister     No Known Problems Sister     No Known Problems Sister     No Known Problems Maternal Uncle     No Known Problems Paternal Aunt     No Known Problems Paternal Uncle     No Known Problems Maternal Grandmother     No Known Problems Maternal  Grandfather     Irritable bowel syndrome Paternal Grandmother     No Known Problems Paternal Grandfather     No Known Problems Paternal Aunt     No Known Problems Paternal Uncle     No Known Problems Paternal Uncle     No Known Problems Paternal Uncle     No Known Problems Maternal Uncle     No Known Problems Maternal Uncle     Celiac disease Neg Hx     Cirrhosis Neg Hx     Colon cancer Neg Hx     Colon polyps Neg Hx     Crohn's disease Neg Hx     Cystic fibrosis Neg Hx     Esophageal cancer Neg Hx     Hemochromatosis Neg Hx     Inflammatory bowel disease Neg Hx     Liver cancer Neg Hx     Liver disease Neg Hx     Rectal cancer Neg Hx     Stomach cancer Neg Hx     Ulcerative colitis Neg Hx     Jerardo's disease Neg Hx     Lymphoma Neg Hx     Tuberculosis Neg Hx     Scleroderma Neg Hx     Rheum arthritis Neg Hx     Multiple sclerosis Neg Hx     Psoriasis Neg Hx     Lupus Neg Hx     Melanoma Neg Hx     Skin cancer Neg Hx     Amblyopia Neg Hx     Blindness Neg Hx     Cancer Neg Hx     Cataracts Neg Hx     Diabetes Neg Hx     Glaucoma Neg Hx     Macular degeneration Neg Hx     Retinal detachment Neg Hx     Strabismus Neg Hx     Stroke Neg Hx     Thyroid disease Neg Hx      Social History     Tobacco Use    Smoking status: Current Every Day Smoker     Packs/day: 0.25     Types: Cigarettes     Last attempt to quit: 2020     Years since quittin.9    Smokeless tobacco: Never Used   Substance Use Topics    Alcohol use: Not Currently     Alcohol/week: 0.0 standard drinks     Comment: Socially    Drug use: No     Frequency: 3.0 times per week     Review of Systems   Constitutional: Negative for chills, diaphoresis and fever.   HENT: Negative for ear pain and sore throat.    Eyes: Negative for pain.   Respiratory: Negative for cough and shortness of breath.    Cardiovascular: Negative for chest pain.   Gastrointestinal: Negative for abdominal pain, diarrhea and vomiting.    Genitourinary: Negative for dysuria.   Musculoskeletal: Negative for arthralgias.   Skin: Positive for rash and wound (Abscess).   Neurological: Positive for headaches.       Physical Exam     Initial Vitals [12/09/20 1753]   BP Pulse Resp Temp SpO2   126/79 104 18 98.1 °F (36.7 °C) 99 %      MAP       --         Physical Exam  The patient was examined specifically for the following:   General:No significant distress, Good color, Warm and dry. Head and neck:Scalp atraumatic, Neck supple. Neurological:Appropriate conversation, Gross motor deficits. Eyes:Conjugate gaze, Clear corneas. ENT: No epistaxis. Cardiac: Regular rate and rhythm, Grossly normal heart tones. Pulmonary: Wheezing, Rales. Gastrointestinal: Abdominal tenderness, Abdominal distention. Musculoskeletal: Extremity deformity, Apparent pain with range of motion of the joints. Skin: Rash.   The findings on examination were normal except for the following:  The patient has an abscess just posterior to the labia minora on the right side.  The patient is crying and in discomfort.  The lungs are clear.  The heart tones are normal.  The abdomen is soft.  The patient has a hyper pigmented thick rash on the legs.  There is also a patchy thick rash on the hands.  ED Course   I & D - Incision and Drainage    Date/Time: 12/9/2020 6:46 PM  Location procedure was performed: [facility] EMERGENCY DEPARTMENT  Performed by: Regan Garcia MD  Authorized by: Regan Garcia MD   Consent Done: Yes  Consent given by: patient  Patient understanding: patient states understanding of the procedure being performed  Type: abscess  Body area: anogenital  Location details: vulva    Anesthesia:  Local Anesthetic: lidocaine 1% without epinephrine  Description of findings: Abscess right labia majora, midbody   Scalpel size: 11  Incision type: single straight  Complexity: complex  Drainage: pus  Drainage amount: copious  Wound treatment: incision  Packing material: 1/4 in  gauze  Complications: No        Labs Reviewed   POCT URINE PREGNANCY          Imaging Results    None       Medical decision making:  Given the above this patient presents to the emergency with an abscess of the right labia majora.  It was 2 anterior to be a Bartholin's cyst abscess.  An incision and drainage was performed with a 11.  Blade.  A quarter-inch packing was placed.  Copious pus was released.  I will discharge this patient to follow up with OBGYN.                                 Clinical Impression:     ICD-10-CM ICD-9-CM   1. Abscess of right genital labia  N76.4 616.4                     I personally performed the services described in this documentation.  All medical record  entries made by the scribe are at my direction and in my presence.  Signed, Dr. Garcia      ED Disposition Condition    Discharge Stable        ED Prescriptions     Medication Sig Dispense Start Date End Date Auth. Provider    HYDROcodone-acetaminophen (NORCO) 5-325 mg per tablet Take 1 tablet by mouth every 4 (four) hours as needed. 15 tablet 12/9/2020 12/19/2020 Regan Garcia MD    triamcinolone acetonide 0.1% (KENALOG) 0.1 % ointment Apply topically 2 (two) times daily. 80 g 12/9/2020  Regan Garcia MD        Follow-up Information     Follow up With Specialties Details Why Contact Info    Geo Espitia MD Obstetrics and Gynecology In 3 days  [address]  [address]  [facility] [address] [phone]                                         Regan Garcia MD  12/09/20 1405

## 2020-12-10 NOTE — DISCHARGE INSTRUCTIONS
Wound Care After Packing Removal or Replacement  Packing is a special type of dressing placed inside a wound to help it heal. Once the packing is removed, you need to care for your wound. Good wound care helps prevent infection. Be sure to keep all follow-up appointments with your healthcare provider. Follow these instructions to take care of the wound once youre at home.  Home care  Your healthcare provider may prescribe medicines for pain. Or he or she may suggest an over-the-counter (OTC) pain reliever, such as ibuprofen or acetaminophen. Talk with your healthcare provider before taking any OTC medicines if you have chronic liver or kidney disease, a stomach ulcer, or gastrointestinal bleeding. In certain cases, you may also need to take antibiotics to help prevent infection. If so, take them exactly as directed for as long as directed. Dont stop taking your antibiotics until they are all gone, even if you feel better.  Here are some general care guidelines for your wound:  · Follow your healthcare providers instructions on how to care for your wound. Always wash your hands with soap and warm water before and after tending to your wound.  · If a bandage was put on, remove and change it once a day or as directed. If the bandage gets wet or dirty, replace it as soon as possible with a new bandage. Use a clean cloth to gently pat the wound dry.  · If your packing was replaced, a small piece of gauze may hang from the wound. It allows fluid to continue draining from the wound. You may need to use an ointment or cream to keep the packing from sticking to the bandage.  · Don't bathe in a tub or soak your wound until your healthcare provider says its OK. Take showers or sponge baths instead. Avoid swimming.  · Dont scratch, rub, scrub, or pick your wound.  · Check your wound daily for the signs of infection listed below.  If your wound was closed, it was likely with one of four types of closures. These include  stitches (sutures), strips of surgical tape, skin glue, and staples. Your healthcare provider will decide on the best closure based on the size and location of your wound. Each type of closure needs specific care.  · Sutures. You may want to clean the wound daily after the first 2 to 3 days. To do this, remove the bandage and gently wash the area with soap and warm water. After cleaning, apply a thin layer of antibiotic ointment if recommended. Then put on a new bandage. Sutures on the outside of the skin usually need to be removed by your healthcare provider.  · Surgical tape. Keep the area dry. If it gets wet, blot it dry with a towel. Surgical tape closures usually fall off within 7 to 10 days. If they have not fallen off after 10 days, you can remove them yourself. To remove the tape, use mineral oil or petroleum jelly on a cotton ball to gently rub the adhesive.  · Skin glue. You may shower or bathe as usual. But do not use soaps, lotions, or ointments on the wound area. Do not scrub the wound. After bathing, pat the wound dry with a soft towel. Do not apply liquids like peroxide, ointments, or creams to the wound while the strips or film is in place. Don't scratch, rub, or pick at the strips or film. Don't put tape directly over the strips or film. Skin adhesive film will fall off naturally in 5 to 10 days. If it does not peel off in 10 days, gently rub petroleum jelly or an ointment onto the film.  · Staples. Take showers or sponge baths. Don't take tub baths. Don't use lotions on the wound area. The area may be cleaned with soap and water 2 to 3 days after the wound was stapled. Don't scrub the wound. Pat it dry with a clean soft cloth or towel. You can use antibiotic ointment if your healthcare provider tells you to. Staples will need to be removed in 10 to 14 days.  Follow-up care  Follow up with your healthcare provider, or as directed. If your packing was replaced, you may need another visit within 1 to  3 days to remove or replace it. If you have sutures or staples, return for their removal as directed.  When to seek medical advice  Call your health care provider right away if you have signs of infection:  · Fever of 1 degree or more above your normal temperature, or as directed by your healthcare provider  · Increasing pain in the wound or pain that doesnt get better even with pain medicine  · Increasing redness or swelling  · Pus or bad-smelling drainage from the wound  Also call your healthcare provider right away if any of these occur:  · Your wound bleeds more than a small amount or wont stop bleeding.  · The edges of your wound come apart.  · You have numbness or weakness in the wound area that doesnt go away.  Date Last Reviewed: 10/2/2015  © 0082-7295 [employer]. [address] [place] [address], [place] [address]. All rights reserved. This information is not intended as a substitute for professional medical care. Always follow your healthcare professional's instructions.      Please remove your packing in 24 hr.  Please follow-up with the OBGYN doctor above next week.  Hot baths, 20 min, 3 times a day for 2 days.  Return immediately if you get worse or if new problems develop.  Tylenol with hydrocodone for pain.  You may also use ibuprofen.  Triamcinolone for your rash.  Follow-up with your dermatologist as scheduled in January.

## 2020-12-10 NOTE — ED TRIAGE NOTES
Pt to er c/o abscess to R side of vagina--denies drainage and bleeding--extremely tender to palp--swelling present

## 2020-12-11 ENCOUNTER — TELEPHONE (OUTPATIENT)
Dept: GASTROENTEROLOGY | Facility: CLINIC | Age: 30
End: 2020-12-11

## 2020-12-11 NOTE — TELEPHONE ENCOUNTER
Called [facility] and spoke with Freddy  Gave DX for pts Humira and stated this is a continuation of therapy   They will reach out to pt and she will have her meds if not by tomorrow then by early next week.

## 2020-12-11 NOTE — TELEPHONE ENCOUNTER
----- Message from Deana Hutton sent at 12/11/2020  9:01 AM CST -----  Aleena [facility][phone]    She's calling for diagnosis code for Humira Rx. Please call

## 2020-12-13 DIAGNOSIS — D64.9 ANEMIA, UNSPECIFIED TYPE: Primary | ICD-10-CM

## 2020-12-13 DIAGNOSIS — E03.9 HYPOTHYROIDISM, UNSPECIFIED TYPE: ICD-10-CM

## 2020-12-13 DIAGNOSIS — R73.09 ELEVATED HEMOGLOBIN A1C: ICD-10-CM

## 2020-12-13 RX ORDER — LEVOTHYROXINE SODIUM 0.15 MG/1
150 TABLET ORAL DAILY
Qty: 90 TABLET | Refills: 3 | Status: SHIPPED | OUTPATIENT
Start: 2020-12-13 | End: 2021-01-25 | Stop reason: DRUGHIGH

## 2020-12-17 ENCOUNTER — TELEPHONE (OUTPATIENT)
Dept: GASTROENTEROLOGY | Facility: CLINIC | Age: 30
End: 2020-12-17

## 2020-12-17 NOTE — TELEPHONE ENCOUNTER
----- Message from Deana Hutton sent at 12/17/2020  1:28 PM CST -----  Merle with [facility] #[phone]     She's calling regarding PA for humira

## 2020-12-17 NOTE — TELEPHONE ENCOUNTER
Call and informed Merle IBRAHIM information was received on yesterday   Will fax information to insurance today

## 2020-12-19 ENCOUNTER — TELEPHONE (OUTPATIENT)
Dept: SCHEDULING | Age: 30
End: 2020-12-19

## 2020-12-21 ENCOUNTER — PATIENT MESSAGE (OUTPATIENT)
Dept: GASTROENTEROLOGY | Facility: CLINIC | Age: 30
End: 2020-12-21

## 2020-12-21 NOTE — TELEPHONE ENCOUNTER
Call and spoke to pt   -stating it's been 3 wks since her last Humira injection   -requesting update on Humira prior authorization  -informed pt I spoke to insurance last wk to clarify    information    -I would follow up with pharmacy to get PA status  -pt expressed understanding

## 2020-12-21 NOTE — TELEPHONE ENCOUNTER
Received call pt stating   -called her insurance PA was approved  -informed pt to contact her pharmacy to have rx shipped   Call back if she has any problems   Pt expressed understanding

## 2020-12-22 ENCOUNTER — PATIENT MESSAGE (OUTPATIENT)
Dept: GASTROENTEROLOGY | Facility: CLINIC | Age: 30
End: 2020-12-22

## 2020-12-31 ENCOUNTER — TELEPHONE (OUTPATIENT)
Dept: SCHEDULING | Age: 30
End: 2020-12-31

## 2021-01-01 ENCOUNTER — EXTERNAL RECORD (OUTPATIENT)
Dept: HEALTH INFORMATION MANAGEMENT | Facility: OTHER | Age: 31
End: 2021-01-01

## 2021-01-02 ENCOUNTER — OFFICE VISIT (OUTPATIENT)
Dept: URGENT CARE | Age: 31
End: 2021-01-02

## 2021-01-02 VITALS
DIASTOLIC BLOOD PRESSURE: 94 MMHG | SYSTOLIC BLOOD PRESSURE: 137 MMHG | RESPIRATION RATE: 16 BRPM | BODY MASS INDEX: 43.4 KG/M2 | OXYGEN SATURATION: 97 % | HEIGHT: 69 IN | WEIGHT: 293 LBS | TEMPERATURE: 96.9 F | HEART RATE: 90 BPM

## 2021-01-02 DIAGNOSIS — L02.31 ABSCESS OF BUTTOCK, LEFT: Primary | ICD-10-CM

## 2021-01-02 LAB
B-HCG UR QL: NEGATIVE
INTERNAL PROCEDURAL CONTROLS ACCEPTABLE: YES

## 2021-01-02 PROCEDURE — 81025 URINE PREGNANCY TEST: CPT | Performed by: FAMILY MEDICINE

## 2021-01-02 PROCEDURE — 99213 OFFICE O/P EST LOW 20 MIN: CPT | Performed by: FAMILY MEDICINE

## 2021-01-02 RX ORDER — CEPHALEXIN 500 MG/1
500 CAPSULE ORAL 2 TIMES DAILY
Qty: 20 CAPSULE | Refills: 0 | Status: SHIPPED | OUTPATIENT
Start: 2021-01-02 | End: 2021-01-12

## 2021-01-02 ASSESSMENT — ENCOUNTER SYMPTOMS: WOUND: 1

## 2021-01-08 ENCOUNTER — TELEPHONE (OUTPATIENT)
Dept: GASTROENTEROLOGY | Facility: CLINIC | Age: 31
End: 2021-01-08

## 2021-01-19 ENCOUNTER — OFFICE VISIT (OUTPATIENT)
Dept: INTERNAL MEDICINE | Age: 31
End: 2021-01-19

## 2021-01-19 ENCOUNTER — LAB SERVICES (OUTPATIENT)
Dept: LAB | Age: 31
End: 2021-01-19

## 2021-01-19 DIAGNOSIS — E66.01 MORBID OBESITY WITH BMI OF 50.0-59.9, ADULT (CMD): ICD-10-CM

## 2021-01-19 DIAGNOSIS — R73.09 ELEVATED HEMOGLOBIN A1C: ICD-10-CM

## 2021-01-19 DIAGNOSIS — I10 HYPERTENSION, UNCONTROLLED: ICD-10-CM

## 2021-01-19 DIAGNOSIS — E03.9 HYPOTHYROIDISM, UNSPECIFIED TYPE: ICD-10-CM

## 2021-01-19 DIAGNOSIS — D64.9 ANEMIA, UNSPECIFIED TYPE: ICD-10-CM

## 2021-01-19 DIAGNOSIS — E03.9 HYPOTHYROIDISM, UNSPECIFIED TYPE: Primary | ICD-10-CM

## 2021-01-19 PROCEDURE — 83036 HEMOGLOBIN GLYCOSYLATED A1C: CPT | Performed by: INTERNAL MEDICINE

## 2021-01-19 PROCEDURE — 3075F SYST BP GE 130 - 139MM HG: CPT | Performed by: INTERNAL MEDICINE

## 2021-01-19 PROCEDURE — 36415 COLL VENOUS BLD VENIPUNCTURE: CPT

## 2021-01-19 PROCEDURE — 83550 IRON BINDING TEST: CPT | Performed by: INTERNAL MEDICINE

## 2021-01-19 PROCEDURE — 84443 ASSAY THYROID STIM HORMONE: CPT | Performed by: INTERNAL MEDICINE

## 2021-01-19 PROCEDURE — 99214 OFFICE O/P EST MOD 30 MIN: CPT | Performed by: INTERNAL MEDICINE

## 2021-01-19 PROCEDURE — 83540 ASSAY OF IRON: CPT | Performed by: INTERNAL MEDICINE

## 2021-01-19 PROCEDURE — 3079F DIAST BP 80-89 MM HG: CPT | Performed by: INTERNAL MEDICINE

## 2021-01-19 PROCEDURE — 82728 ASSAY OF FERRITIN: CPT | Performed by: INTERNAL MEDICINE

## 2021-01-19 PROCEDURE — 84439 ASSAY OF FREE THYROXINE: CPT | Performed by: INTERNAL MEDICINE

## 2021-01-19 ASSESSMENT — ENCOUNTER SYMPTOMS
TROUBLE SWALLOWING: 0
DIZZINESS: 0
APPETITE CHANGE: 0
COUGH: 0
SORE THROAT: 0
DIARRHEA: 0
NAUSEA: 1
SHORTNESS OF BREATH: 0
FATIGUE: 0
ABDOMINAL PAIN: 0
WHEEZING: 0
CHILLS: 0
UNEXPECTED WEIGHT CHANGE: 0
FEVER: 0

## 2021-01-19 ASSESSMENT — PAIN SCALES - GENERAL: PAINLEVEL: 0

## 2021-01-20 LAB
FERRITIN SERPL-MCNC: 23 NG/ML (ref 8–252)
HBA1C MFR BLD: 6.5 % (ref 4.5–5.6)
IRON SATN MFR SERPL: 10 % (ref 15–45)
IRON SERPL-MCNC: 36 MCG/DL (ref 50–170)
T4 FREE SERPL-MCNC: 1.2 NG/DL (ref 0.8–1.5)
TIBC SERPL-MCNC: 346 MCG/DL (ref 250–450)
TSH SERPL-ACNC: 7.25 MCUNITS/ML (ref 0.35–5)

## 2021-01-22 ENCOUNTER — PATIENT MESSAGE (OUTPATIENT)
Dept: GASTROENTEROLOGY | Facility: CLINIC | Age: 31
End: 2021-01-22

## 2021-01-25 DIAGNOSIS — E66.01 MORBID OBESITY WITH BMI OF 50.0-59.9, ADULT (CMD): Primary | ICD-10-CM

## 2021-01-25 DIAGNOSIS — E03.9 HYPOTHYROIDISM, UNSPECIFIED TYPE: ICD-10-CM

## 2021-01-25 PROBLEM — E11.9 DIABETES MELLITUS (CMD): Status: ACTIVE | Noted: 2021-01-25

## 2021-01-25 PROBLEM — D50.9 IRON DEFICIENCY ANEMIA: Status: ACTIVE | Noted: 2021-01-25

## 2021-01-25 RX ORDER — LEVOTHYROXINE SODIUM 175 UG/1
175 TABLET ORAL DAILY
Qty: 90 TABLET | Refills: 3 | Status: SHIPPED | OUTPATIENT
Start: 2021-01-25 | End: 2021-05-08 | Stop reason: SDUPTHER

## 2021-01-25 RX ORDER — PNV NO.95/FERROUS FUM/FOLIC AC 28MG-0.8MG
TABLET ORAL
Qty: 90 TABLET | Refills: 3 | Status: SHIPPED | OUTPATIENT
Start: 2021-01-25 | End: 2021-05-08 | Stop reason: SDUPTHER

## 2021-01-28 ENCOUNTER — TELEPHONE (OUTPATIENT)
Dept: SCHEDULING | Age: 31
End: 2021-01-28

## 2021-02-01 ENCOUNTER — TELEPHONE (OUTPATIENT)
Dept: SCHEDULING | Age: 31
End: 2021-02-01

## 2021-02-04 ENCOUNTER — OFFICE VISIT (OUTPATIENT)
Dept: INTERNAL MEDICINE | Age: 31
End: 2021-02-04

## 2021-02-04 DIAGNOSIS — I10 HYPERTENSION, UNCONTROLLED: ICD-10-CM

## 2021-02-04 DIAGNOSIS — E11.9 TYPE 2 DIABETES MELLITUS WITHOUT COMPLICATION, WITHOUT LONG-TERM CURRENT USE OF INSULIN (CMD): Primary | ICD-10-CM

## 2021-02-04 DIAGNOSIS — E66.01 MORBID OBESITY WITH BMI OF 50.0-59.9, ADULT (CMD): ICD-10-CM

## 2021-02-04 DIAGNOSIS — E03.9 HYPOTHYROIDISM, UNSPECIFIED TYPE: ICD-10-CM

## 2021-02-04 DIAGNOSIS — D50.9 IRON DEFICIENCY ANEMIA, UNSPECIFIED IRON DEFICIENCY ANEMIA TYPE: ICD-10-CM

## 2021-02-04 PROCEDURE — 3075F SYST BP GE 130 - 139MM HG: CPT | Performed by: INTERNAL MEDICINE

## 2021-02-04 PROCEDURE — 3079F DIAST BP 80-89 MM HG: CPT | Performed by: INTERNAL MEDICINE

## 2021-02-04 PROCEDURE — 99214 OFFICE O/P EST MOD 30 MIN: CPT | Performed by: INTERNAL MEDICINE

## 2021-02-04 SDOH — HEALTH STABILITY: MENTAL HEALTH: HOW OFTEN DO YOU HAVE A DRINK CONTAINING ALCOHOL?: 2-4 TIMES A MONTH

## 2021-02-04 ASSESSMENT — ENCOUNTER SYMPTOMS
FATIGUE: 0
SORE THROAT: 0
DIZZINESS: 0
COUGH: 0
APPETITE CHANGE: 0
FEVER: 0
ABDOMINAL PAIN: 0
DIARRHEA: 0
CHILLS: 0
WHEEZING: 0
UNEXPECTED WEIGHT CHANGE: 0
TROUBLE SWALLOWING: 0
SHORTNESS OF BREATH: 0
NAUSEA: 0

## 2021-02-04 ASSESSMENT — PATIENT HEALTH QUESTIONNAIRE - PHQ9
CLINICAL INTERPRETATION OF PHQ2 SCORE: NO FURTHER SCREENING NEEDED
SUM OF ALL RESPONSES TO PHQ9 QUESTIONS 1 AND 2: 0
2. FEELING DOWN, DEPRESSED OR HOPELESS: NOT AT ALL
SUM OF ALL RESPONSES TO PHQ9 QUESTIONS 1 AND 2: 0
1. LITTLE INTEREST OR PLEASURE IN DOING THINGS: NOT AT ALL
CLINICAL INTERPRETATION OF PHQ9 SCORE: NO FURTHER SCREENING NEEDED

## 2021-02-04 ASSESSMENT — PAIN SCALES - GENERAL: PAINLEVEL: 0

## 2021-02-08 ENCOUNTER — TELEPHONE (OUTPATIENT)
Dept: GASTROENTEROLOGY | Facility: CLINIC | Age: 31
End: 2021-02-08

## 2021-02-08 DIAGNOSIS — K50.80 CROHN'S DISEASE OF BOTH SMALL AND LARGE INTESTINE WITHOUT COMPLICATION: Primary | ICD-10-CM

## 2021-02-11 ENCOUNTER — TELEPHONE (OUTPATIENT)
Dept: SCHEDULING | Age: 31
End: 2021-02-11

## 2021-02-12 ENCOUNTER — TELEPHONE (OUTPATIENT)
Dept: GASTROENTEROLOGY | Facility: CLINIC | Age: 31
End: 2021-02-12

## 2021-02-12 ENCOUNTER — LAB VISIT (OUTPATIENT)
Dept: LAB | Facility: HOSPITAL | Age: 31
End: 2021-02-12
Payer: MEDICAID

## 2021-02-12 DIAGNOSIS — E53.8 VITAMIN B12 DEFICIENCY: ICD-10-CM

## 2021-02-12 DIAGNOSIS — K50.80 CROHN'S DISEASE OF BOTH SMALL AND LARGE INTESTINE WITHOUT COMPLICATION: ICD-10-CM

## 2021-02-12 LAB
ALBUMIN SERPL BCP-MCNC: 4 G/DL (ref 3.5–5.2)
ALP SERPL-CCNC: 95 U/L (ref 55–135)
ALT SERPL W/O P-5'-P-CCNC: 19 U/L (ref 10–44)
ANION GAP SERPL CALC-SCNC: 11 MMOL/L (ref 8–16)
AST SERPL-CCNC: 20 U/L (ref 10–40)
BASOPHILS # BLD AUTO: 0.01 K/UL (ref 0–0.2)
BASOPHILS NFR BLD: 0.2 % (ref 0–1.9)
BILIRUB SERPL-MCNC: 0.7 MG/DL (ref 0.1–1)
BUN SERPL-MCNC: 6 MG/DL (ref 6–20)
CALCIUM SERPL-MCNC: 9.2 MG/DL (ref 8.7–10.5)
CHLORIDE SERPL-SCNC: 109 MMOL/L (ref 95–110)
CO2 SERPL-SCNC: 21 MMOL/L (ref 23–29)
CREAT SERPL-MCNC: 0.8 MG/DL (ref 0.5–1.4)
CRP SERPL-MCNC: 0.8 MG/L (ref 0–8.2)
DIFFERENTIAL METHOD: ABNORMAL
EOSINOPHIL # BLD AUTO: 0.1 K/UL (ref 0–0.5)
EOSINOPHIL NFR BLD: 2.9 % (ref 0–8)
ERYTHROCYTE [DISTWIDTH] IN BLOOD BY AUTOMATED COUNT: 13.8 % (ref 11.5–14.5)
EST. GFR  (AFRICAN AMERICAN): >60 ML/MIN/1.73 M^2
EST. GFR  (NON AFRICAN AMERICAN): >60 ML/MIN/1.73 M^2
GLUCOSE SERPL-MCNC: 62 MG/DL (ref 70–110)
HCT VFR BLD AUTO: 40.9 % (ref 37–48.5)
HGB BLD-MCNC: 12.6 G/DL (ref 12–16)
IMM GRANULOCYTES # BLD AUTO: 0.01 K/UL (ref 0–0.04)
IMM GRANULOCYTES NFR BLD AUTO: 0.2 % (ref 0–0.5)
LYMPHOCYTES # BLD AUTO: 1.8 K/UL (ref 1–4.8)
LYMPHOCYTES NFR BLD: 37.4 % (ref 18–48)
MCH RBC QN AUTO: 31.1 PG (ref 27–31)
MCHC RBC AUTO-ENTMCNC: 30.8 G/DL (ref 32–36)
MCV RBC AUTO: 101 FL (ref 82–98)
MONOCYTES # BLD AUTO: 0.2 K/UL (ref 0.3–1)
MONOCYTES NFR BLD: 4.6 % (ref 4–15)
NEUTROPHILS # BLD AUTO: 2.6 K/UL (ref 1.8–7.7)
NEUTROPHILS NFR BLD: 54.7 % (ref 38–73)
NRBC BLD-RTO: 0 /100 WBC
PLATELET # BLD AUTO: 227 K/UL (ref 150–350)
PMV BLD AUTO: 10.7 FL (ref 9.2–12.9)
POTASSIUM SERPL-SCNC: 3.3 MMOL/L (ref 3.5–5.1)
PROT SERPL-MCNC: 8.3 G/DL (ref 6–8.4)
RBC # BLD AUTO: 4.05 M/UL (ref 4–5.4)
SODIUM SERPL-SCNC: 141 MMOL/L (ref 136–145)
VIT B12 SERPL-MCNC: 269 PG/ML (ref 210–950)
WBC # BLD AUTO: 4.81 K/UL (ref 3.9–12.7)

## 2021-02-12 PROCEDURE — 80145 DRUG ASSAY ADALIMUMAB: CPT

## 2021-02-12 PROCEDURE — 80053 COMPREHEN METABOLIC PANEL: CPT

## 2021-02-12 PROCEDURE — 86140 C-REACTIVE PROTEIN: CPT

## 2021-02-12 PROCEDURE — 85025 COMPLETE CBC W/AUTO DIFF WBC: CPT

## 2021-02-12 PROCEDURE — 36415 COLL VENOUS BLD VENIPUNCTURE: CPT

## 2021-02-12 PROCEDURE — 82607 VITAMIN B-12: CPT

## 2021-02-22 ENCOUNTER — PATIENT MESSAGE (OUTPATIENT)
Dept: GASTROENTEROLOGY | Facility: CLINIC | Age: 31
End: 2021-02-22

## 2021-02-25 ENCOUNTER — LAB SERVICES (OUTPATIENT)
Dept: LAB | Age: 31
End: 2021-02-25

## 2021-02-25 DIAGNOSIS — E66.01 MORBID OBESITY WITH BMI OF 50.0-59.9, ADULT (CMD): ICD-10-CM

## 2021-02-25 DIAGNOSIS — R73.09 ELEVATED HEMOGLOBIN A1C: ICD-10-CM

## 2021-02-25 DIAGNOSIS — D50.9 IRON DEFICIENCY ANEMIA, UNSPECIFIED IRON DEFICIENCY ANEMIA TYPE: ICD-10-CM

## 2021-02-25 DIAGNOSIS — E03.9 HYPOTHYROIDISM, UNSPECIFIED TYPE: ICD-10-CM

## 2021-02-25 DIAGNOSIS — I10 HYPERTENSION, UNCONTROLLED: ICD-10-CM

## 2021-02-25 DIAGNOSIS — D64.9 ANEMIA, UNSPECIFIED TYPE: ICD-10-CM

## 2021-02-25 DIAGNOSIS — E11.9 TYPE 2 DIABETES MELLITUS WITHOUT COMPLICATION, WITHOUT LONG-TERM CURRENT USE OF INSULIN (CMD): ICD-10-CM

## 2021-02-25 LAB
FERRITIN SERPL-MCNC: 18 NG/ML (ref 8–252)
HBA1C MFR BLD: 6.2 % (ref 4.5–5.6)
IRON SATN MFR SERPL: 10 % (ref 15–45)
IRON SERPL-MCNC: 33 MCG/DL (ref 50–170)
TIBC SERPL-MCNC: 317 MCG/DL (ref 250–450)
TSH SERPL-ACNC: 2.21 MCUNITS/ML (ref 0.35–5)

## 2021-02-25 PROCEDURE — 36415 COLL VENOUS BLD VENIPUNCTURE: CPT

## 2021-02-25 PROCEDURE — 84703 CHORIONIC GONADOTROPIN ASSAY: CPT | Performed by: INTERNAL MEDICINE

## 2021-02-25 PROCEDURE — 83550 IRON BINDING TEST: CPT | Performed by: INTERNAL MEDICINE

## 2021-02-25 PROCEDURE — 83036 HEMOGLOBIN GLYCOSYLATED A1C: CPT | Performed by: INTERNAL MEDICINE

## 2021-02-25 PROCEDURE — 82728 ASSAY OF FERRITIN: CPT | Performed by: INTERNAL MEDICINE

## 2021-02-25 PROCEDURE — 84443 ASSAY THYROID STIM HORMONE: CPT | Performed by: INTERNAL MEDICINE

## 2021-02-25 PROCEDURE — 83540 ASSAY OF IRON: CPT | Performed by: INTERNAL MEDICINE

## 2021-02-26 LAB — HCG SERPL QL: NEGATIVE

## 2021-02-28 DIAGNOSIS — E03.9 HYPOTHYROIDISM, UNSPECIFIED TYPE: Primary | ICD-10-CM

## 2021-02-28 DIAGNOSIS — D50.9 IRON DEFICIENCY ANEMIA, UNSPECIFIED IRON DEFICIENCY ANEMIA TYPE: ICD-10-CM

## 2021-03-04 NOTE — PLAN OF CARE
OFFICE VISIT      Patient: Tg Charles Date of Service: 3/8/2021   : 1940 MRN: [id number]     SUBJECTIVE:     Chief Complaint   Patient presents with   â¢ New Patient   â¢ Thyroid Problem       Referring physician:   Bard Nicole DO    Accompanied by her granddaughter. HISTORY OF PRESENT ILLNESS:    79year old female, with a history of non-toxic multinodular goiter, presents for follow up. Patient was last seen in the hospital when she was admitted in [facility] 2020. She was on Amiodarone which sometimes precipitates hyperthyroid state. Her granddaughter mentions her high blood pressure was wrongly charted, and she was administered Amiodarone drip for tachycardia; however, she is not tachycardic. As per her granddaughter it was an error because she regularly monitors her blood pressure and it was not elevated before. She was released on Amiodarone from [facility] previously. . Her granddaughter mentions she gave her Amiodarone dose only for few days. Denies known family history of thyroid problem. Review of lab reports from 2020 when she was admitted to [facility], shows her thyroid hormone levels were normal.   Review of lab reports shows her thyroid stimulating immunoglobulin level was negative. Review of lab reports from 2021 shows thyroid hormone levels have improved. Lab Results   Component Value Date    TSH 1.716 2021    TSH 0.043 (L) 2020    TSH  2020     TSH with Reflex Testing: Abnormal TSH values are followed up with FT4 testing. When TSH values are below the normal range and FT4 values are within expected normal ranges, FT3 testing is performed. Lab Results   Component Value Date    T4FREE 1.8 (H) 2020    T4FREE  2020     Findings most consistent with hyperthyroidism. TSH levels usually less than 0.100 mcUnits/mL in hyperthyroidism.  Clinical correlation recommended for levels between 0.100 Pt aaox3, mom at bedside, d/c instructions reviewed, no distress noted.     "mcUnits/mL and the low limit of the normal range. T4FREE  09/04/2020     (Reflex TSH algorithm is not recommended in hospitalized patients. A variety of drugs, as well as serious acute and chronic illnesses may alter thyroid function tests. Commonly implicated drugs include glucocorticoids, dopamine, carbamazepine, iodine,    T4FREE  amiodarone, lithium and heparin.) 09/04/2020       No results found for: FREET3    She was prescribed Methimazole 10 mg; however, she does not take Methimazole. She has lost after hospitalization; however, gained back again recently. 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 Her blood pressure is well-controlled at 132/76 mmHg today. Patient Active Problem List   Diagnosis   [facility] discharge follow-up   â¢ Essential hypertension   â¢ Chronic diastolic heart failure (CMS/HCC)   â¢ On amiodarone therapy   â¢ Long term (current) use of anticoagulants   â¢ Hyperthyroidism   â¢ Non-toxic multinodular goiter       ALLERGIES:  ALLERGIES:   Allergen Reactions   â¢ Aspirin Other (See Comments)     unknown        PAST SURGICAL HISTORY:  History reviewed. No pertinent surgical history. FAMILY HISTORY:  Family History   Problem Relation Age of Onset   â¢ Kidney disease Son        SOCIAL HISTORY:  Social History     Tobacco Use   Smoking Status Former Smoker   Smokeless Tobacco Never Used     Social History     Substance and Sexual Activity   Alcohol Use Never   â¢ Frequency: Never       Review of Systems    Constitutional: Positive for weight gain. HENT: No trouble swallowing. Respiratory: No shortness of breath. Cardiovascular: No palpitations. Gastrointestinal: No constipation or diarrhea. Neurological: No tremors. Psychiatric/Behavioral: No anxiety.       OBJECTIVE:     Visit Vitals  /76   Pulse 68   Ht 5' 5"" (1.651 m)   Wt 74.4 kg (164 lb 0.4 oz)   SpO2 98%   BMI 27.29 kg/mÂ²       Physical Exam    Constitutional: Well developed, well nourished, no acute medical " distress. Eyes: Conjunctivae are normal. Sclerae anicteric. Neck: Thyroid is enlarged, more on the left side. Surgical scar noted. Respiratory: Clear, Bilaterally equal air entry without wheezes, rales or rhonchi, no accessory muscle use. Cardiovascular: Regular rate and rhythm, murmurs noted, rubs or gallops, no heave; no extremities edema, JVD noted. Skin: No visible rashes. Neurologic: Grossly normal coordination and gait. Psychiatric: Normal mood, affect, speech and cognition. DIAGNOSTIC STUDIES:   LAB RESULTS:    GFR Estimate, Non  (no units)   Date Value   09/06/2020 31   09/06/2020     eGFR 30-59 mL/min/1.73m2 = Moderate decrease in kidney function. Stage 3 CKD (chronic kidney disease) or moderate kidney disease. Creatinine (mg/dL)   Date Value   02/11/2021 1.22 (H)       CHOLESTEROL (mg/dl)   Date Value   10/15/2015 145       HDL (mg/dl)   Date Value   10/15/2015 50       CHOL/HDL ( )   Date Value   10/15/2015 2.9       TRIGLYCERIDE (mg/dl)   Date Value   10/15/2015 108       CALCULATED LDL (mg/dl)   Date Value   10/15/2015 73       Lab Results   Component Value Date    URMIC 2.77 10/15/2015       ASSESSMENT AND PLAN:   This is a 79year old year-old female who presents with     1. Non-toxic multinodular goiter    2. Amiodarone-induced hyperthyroidism        1. Non-toxic multinodular goiter  Reviewed and discussed previous reports, and recommended the below. Recommended and ordered TSH reflex. Explained significance to management, patient acknowledges the need to undergo the tests. Recommended and ordered thyroid ultrasound to check for any nodule or growth. Explained significance to management, patient acknowledges the need to undergo the tests. Discussed she can continue not taking Methimazole and Amiodarone. Informed on the need of anti-thyroid medication, if anyone prescribes Amiodarone again. Explained Amiodarone can cause inflammation of thyroid.    Explained Amiodarone sometimes takes one year to be out of her system. Orders Placed This Encounter   â¢ US THYROID   â¢ Thyroid Stimulating Hormone Reflex          Summary of your Discharge Medications          Accurate as of March 3, 2021 11:59 PM. Always use your most recent med list.            Take these Medications      Details   dilTIAZem 120 MG 24 hr capsule  Commonly known as: CARDIZEM CD   Take 1 capsule by mouth daily. donepezil 5 MG tablet  Commonly known as: ARICEPT   TAKE 1 TABLET BY MOUTH EVERY NIGHT  Comment: **Patient requests 90 days supply**     furosemide 40 MG tablet  Commonly known as: LASIX   Take 1 tablet by mouth daily. nitrofurantoin (macrocrystal-monohydrate) 100 MG capsule  Commonly known as: Macrobid   Take 1 capsule by mouth 2 times daily. potassium chloride 10 MEQ corrina ER tablet  Commonly known as: KLOR-CON M   Take 1 tablet by mouth daily. TYLENOL 500 MG tablet   Generic drug: acetaminophen  Take 1,000 mg by mouth every 6 hours as needed for Pain. There are no discontinued medications. Instructions provided as documented in the AVS.    Return in about 3 months (around 6/15/2021) for after  getting labs . Entered by Dr. Hamlet Munguia acting as scribe for MD Hamlet Dominguez.     Note  of the scribe was reviewed  and edited as needed by Audrey Dhillon MD    Electronically signed by:  Audrey Dhillon MD  [address] [place] [address] [address] [place] [id number]  Dept Phone: [phone]

## 2021-03-15 ENCOUNTER — PATIENT MESSAGE (OUTPATIENT)
Dept: GASTROENTEROLOGY | Facility: CLINIC | Age: 31
End: 2021-03-15

## 2021-03-15 LAB — ADALIMUMAB CONCENTRATION & ANTI-ADALIMUMAB ANTIBODY: NORMAL

## 2021-03-19 ENCOUNTER — TELEPHONE (OUTPATIENT)
Dept: GASTROENTEROLOGY | Facility: CLINIC | Age: 31
End: 2021-03-19

## 2021-03-19 ENCOUNTER — TELEPHONE (OUTPATIENT)
Dept: SCHEDULING | Age: 31
End: 2021-03-19

## 2021-03-21 ENCOUNTER — HOSPITAL ENCOUNTER (EMERGENCY)
Facility: HOSPITAL | Age: 31
Discharge: HOME | End: 2021-03-21
Attending: EMERGENCY MEDICINE
Payer: MEDICAID

## 2021-03-21 VITALS
TEMPERATURE: 98 F | SYSTOLIC BLOOD PRESSURE: 113 MMHG | HEART RATE: 85 BPM | OXYGEN SATURATION: 99 % | BODY MASS INDEX: 26.93 KG/M2 | DIASTOLIC BLOOD PRESSURE: 76 MMHG | WEIGHT: 152 LBS | RESPIRATION RATE: 18 BRPM

## 2021-03-21 DIAGNOSIS — B35.0 TINEA CAPITIS: Primary | ICD-10-CM

## 2021-03-21 LAB
B-HCG UR QL: NEGATIVE
CTP QC/QA: YES

## 2021-03-21 PROCEDURE — 99284 EMERGENCY DEPT VISIT MOD MDM: CPT | Mod: 25,ER

## 2021-03-21 PROCEDURE — 81025 URINE PREGNANCY TEST: CPT | Mod: ER | Performed by: EMERGENCY MEDICINE

## 2021-03-21 RX ORDER — GRISEOFULVIN 250 MG/1
500 TABLET ORAL DAILY
Qty: 30 TABLET | Refills: 0 | Status: SHIPPED | OUTPATIENT
Start: 2021-03-21 | End: 2021-04-20

## 2021-03-21 RX ORDER — CEPHALEXIN 500 MG/1
500 CAPSULE ORAL 4 TIMES DAILY
Qty: 40 CAPSULE | Refills: 0 | Status: SHIPPED | OUTPATIENT
Start: 2021-03-21 | End: 2021-03-30

## 2021-03-21 RX ORDER — SELENIUM SULFIDE 1 G/100ML
1 SHAMPOO TOPICAL 2 TIMES DAILY
Qty: 325 ML | Refills: 1 | Status: SHIPPED | OUTPATIENT
Start: 2021-03-21 | End: 2021-09-27

## 2021-03-22 ENCOUNTER — OFFICE VISIT (OUTPATIENT)
Dept: OBGYN | Age: 31
End: 2021-03-22

## 2021-03-22 ENCOUNTER — TELEPHONE (OUTPATIENT)
Dept: GASTROENTEROLOGY | Facility: CLINIC | Age: 31
End: 2021-03-22

## 2021-03-22 VITALS
WEIGHT: 293 LBS | RESPIRATION RATE: 18 BRPM | BODY MASS INDEX: 43.4 KG/M2 | TEMPERATURE: 98.4 F | HEART RATE: 94 BPM | DIASTOLIC BLOOD PRESSURE: 92 MMHG | SYSTOLIC BLOOD PRESSURE: 132 MMHG | HEIGHT: 69 IN

## 2021-03-22 DIAGNOSIS — N89.8 VAGINAL DISCHARGE: Primary | ICD-10-CM

## 2021-03-22 DIAGNOSIS — Z11.3 ROUTINE SCREENING FOR STI (SEXUALLY TRANSMITTED INFECTION): ICD-10-CM

## 2021-03-22 PROCEDURE — 87480 CANDIDA DNA DIR PROBE: CPT | Performed by: NURSE PRACTITIONER

## 2021-03-22 PROCEDURE — 87510 GARDNER VAG DNA DIR PROBE: CPT | Performed by: NURSE PRACTITIONER

## 2021-03-22 PROCEDURE — 87591 N.GONORRHOEAE DNA AMP PROB: CPT | Performed by: NURSE PRACTITIONER

## 2021-03-22 PROCEDURE — 87220 TISSUE EXAM FOR FUNGI: CPT | Performed by: NURSE PRACTITIONER

## 2021-03-22 PROCEDURE — 87210 SMEAR WET MOUNT SALINE/INK: CPT | Performed by: NURSE PRACTITIONER

## 2021-03-22 PROCEDURE — 87491 CHLMYD TRACH DNA AMP PROBE: CPT | Performed by: NURSE PRACTITIONER

## 2021-03-22 PROCEDURE — 99214 OFFICE O/P EST MOD 30 MIN: CPT | Performed by: NURSE PRACTITIONER

## 2021-03-22 PROCEDURE — 87660 TRICHOMONAS VAGIN DIR PROBE: CPT | Performed by: NURSE PRACTITIONER

## 2021-03-22 SDOH — HEALTH STABILITY: MENTAL HEALTH: HOW OFTEN DO YOU HAVE A DRINK CONTAINING ALCOHOL?: 2-4 TIMES A MONTH

## 2021-03-22 ASSESSMENT — ENCOUNTER SYMPTOMS
CONSTITUTIONAL NEGATIVE: 1
NEUROLOGICAL NEGATIVE: 1
GASTROINTESTINAL NEGATIVE: 1
PSYCHIATRIC NEGATIVE: 1
RESPIRATORY NEGATIVE: 1
EYES NEGATIVE: 1

## 2021-03-23 ENCOUNTER — LAB SERVICES (OUTPATIENT)
Dept: LAB | Age: 31
End: 2021-03-23

## 2021-03-23 DIAGNOSIS — Z11.3 ROUTINE SCREENING FOR STI (SEXUALLY TRANSMITTED INFECTION): ICD-10-CM

## 2021-03-23 LAB
C TRACH RRNA SPEC QL NAA+PROBE: NEGATIVE
CANDIDA RRNA VAG QL PROBE: NEGATIVE
G VAGINALIS RRNA GENITAL QL PROBE: POSITIVE
Lab: NORMAL
N GONORRHOEA RRNA SPEC QL NAA+PROBE: NEGATIVE
T VAGINALIS RRNA GENITAL QL PROBE: NEGATIVE

## 2021-03-23 PROCEDURE — 87389 HIV-1 AG W/HIV-1&-2 AB AG IA: CPT | Performed by: NURSE PRACTITIONER

## 2021-03-23 PROCEDURE — 86592 SYPHILIS TEST NON-TREP QUAL: CPT | Performed by: NURSE PRACTITIONER

## 2021-03-23 PROCEDURE — 36415 COLL VENOUS BLD VENIPUNCTURE: CPT

## 2021-03-23 RX ORDER — AZITHROMYCIN 250 MG/1
TABLET, FILM COATED ORAL
Qty: 4 TABLET | Refills: 0 | Status: SHIPPED | OUTPATIENT
Start: 2021-03-23 | End: 2021-03-23 | Stop reason: CLARIF

## 2021-03-23 RX ORDER — METRONIDAZOLE 500 MG/1
500 TABLET ORAL 2 TIMES DAILY WITH MEALS
Qty: 14 TABLET | Refills: 0 | Status: SHIPPED | OUTPATIENT
Start: 2021-03-23 | End: 2021-03-23 | Stop reason: CLARIF

## 2021-03-24 ENCOUNTER — TELEPHONE (OUTPATIENT)
Dept: GASTROENTEROLOGY | Facility: CLINIC | Age: 31
End: 2021-03-24

## 2021-03-24 DIAGNOSIS — B96.89 BACTERIAL VAGINOSIS: Primary | ICD-10-CM

## 2021-03-24 DIAGNOSIS — N76.0 BACTERIAL VAGINOSIS: Primary | ICD-10-CM

## 2021-03-24 LAB
HIV 1+2 AB+HIV1 P24 AG SERPL QL IA: NONREACTIVE
RPR SER QL: NONREACTIVE

## 2021-03-24 RX ORDER — METRONIDAZOLE 500 MG/1
500 TABLET ORAL 2 TIMES DAILY WITH MEALS
Qty: 14 TABLET | Refills: 0 | Status: SHIPPED | OUTPATIENT
Start: 2021-03-24 | End: 2021-03-31

## 2021-03-26 ENCOUNTER — TELEPHONE (OUTPATIENT)
Dept: GASTROENTEROLOGY | Facility: CLINIC | Age: 31
End: 2021-03-26

## 2021-03-29 ENCOUNTER — PATIENT MESSAGE (OUTPATIENT)
Dept: GASTROENTEROLOGY | Facility: CLINIC | Age: 31
End: 2021-03-29

## 2021-03-29 ENCOUNTER — TELEPHONE (OUTPATIENT)
Dept: SCHEDULING | Age: 31
End: 2021-03-29

## 2021-03-29 ENCOUNTER — TELEPHONE (OUTPATIENT)
Dept: GASTROENTEROLOGY | Facility: CLINIC | Age: 31
End: 2021-03-29

## 2021-03-30 ENCOUNTER — OFFICE VISIT (OUTPATIENT)
Dept: GASTROENTEROLOGY | Facility: CLINIC | Age: 31
End: 2021-03-30
Payer: MEDICAID

## 2021-03-30 VITALS
WEIGHT: 152.13 LBS | SYSTOLIC BLOOD PRESSURE: 114 MMHG | DIASTOLIC BLOOD PRESSURE: 81 MMHG | OXYGEN SATURATION: 100 % | HEIGHT: 63 IN | HEART RATE: 102 BPM | BODY MASS INDEX: 26.95 KG/M2 | TEMPERATURE: 98 F | RESPIRATION RATE: 16 BRPM

## 2021-03-30 DIAGNOSIS — D84.9 IMMUNOSUPPRESSED STATUS: ICD-10-CM

## 2021-03-30 DIAGNOSIS — K50.80 CROHN'S DISEASE OF BOTH SMALL AND LARGE INTESTINE WITHOUT COMPLICATION: Primary | ICD-10-CM

## 2021-03-30 PROCEDURE — 99215 OFFICE O/P EST HI 40 MIN: CPT | Mod: S$GLB,,, | Performed by: INTERNAL MEDICINE

## 2021-03-30 PROCEDURE — 99215 PR OFFICE/OUTPT VISIT, EST, LEVL V, 40-54 MIN: ICD-10-PCS | Mod: S$GLB,,, | Performed by: INTERNAL MEDICINE

## 2021-03-30 RX ORDER — CEPHALEXIN 500 MG/1
500 CAPSULE ORAL EVERY 6 HOURS
Qty: 28 CAPSULE | Refills: 0 | Status: SHIPPED | OUTPATIENT
Start: 2021-03-30 | End: 2021-04-06

## 2021-04-05 ENCOUNTER — TELEPHONE (OUTPATIENT)
Dept: GASTROENTEROLOGY | Facility: CLINIC | Age: 31
End: 2021-04-05

## 2021-04-06 ENCOUNTER — TELEPHONE (OUTPATIENT)
Dept: SCHEDULING | Age: 31
End: 2021-04-06

## 2021-04-07 ENCOUNTER — OFFICE VISIT (OUTPATIENT)
Dept: URGENT CARE | Age: 31
End: 2021-04-07

## 2021-04-07 ENCOUNTER — NURSE TRIAGE (OUTPATIENT)
Dept: TELEHEALTH | Age: 31
End: 2021-04-07

## 2021-04-07 VITALS
SYSTOLIC BLOOD PRESSURE: 127 MMHG | RESPIRATION RATE: 18 BRPM | HEIGHT: 69 IN | DIASTOLIC BLOOD PRESSURE: 89 MMHG | OXYGEN SATURATION: 99 % | BODY MASS INDEX: 43.4 KG/M2 | WEIGHT: 293 LBS | TEMPERATURE: 96.2 F | HEART RATE: 62 BPM

## 2021-04-07 DIAGNOSIS — J30.9 ALLERGIC RHINITIS, UNSPECIFIED SEASONALITY, UNSPECIFIED TRIGGER: ICD-10-CM

## 2021-04-07 DIAGNOSIS — L30.9 DERMATITIS: ICD-10-CM

## 2021-04-07 DIAGNOSIS — L50.9 HIVES OF UNKNOWN ORIGIN: Primary | ICD-10-CM

## 2021-04-07 LAB
B-HCG UR QL: NEGATIVE
INTERNAL PROCEDURAL CONTROLS ACCEPTABLE: YES

## 2021-04-07 PROCEDURE — 3079F DIAST BP 80-89 MM HG: CPT | Performed by: PEDIATRICS

## 2021-04-07 PROCEDURE — 99213 OFFICE O/P EST LOW 20 MIN: CPT | Performed by: PEDIATRICS

## 2021-04-07 PROCEDURE — 3074F SYST BP LT 130 MM HG: CPT | Performed by: PEDIATRICS

## 2021-04-07 PROCEDURE — 81025 URINE PREGNANCY TEST: CPT | Performed by: PEDIATRICS

## 2021-04-07 RX ORDER — CETIRIZINE HYDROCHLORIDE 10 MG/1
10 TABLET ORAL DAILY
Qty: 30 TABLET | Refills: 0 | Status: SHIPPED | OUTPATIENT
Start: 2021-04-07 | End: 2021-04-29 | Stop reason: SDUPTHER

## 2021-04-07 RX ORDER — PREDNISONE 10 MG/1
TABLET ORAL
Qty: 10 TABLET | Refills: 0 | Status: SHIPPED | OUTPATIENT
Start: 2021-04-07 | End: 2021-06-15 | Stop reason: ALTCHOICE

## 2021-04-07 ASSESSMENT — ENCOUNTER SYMPTOMS
PSYCHIATRIC NEGATIVE: 1
CONSTITUTIONAL NEGATIVE: 1
GASTROINTESTINAL NEGATIVE: 1
HEMATOLOGIC/LYMPHATIC NEGATIVE: 1
ROS SKIN COMMENTS: SEE HPI
ALLERGIC/IMMUNOLOGIC NEGATIVE: 1
FACIAL SWELLING: 1
RESPIRATORY NEGATIVE: 1
NEUROLOGICAL NEGATIVE: 1
EYES NEGATIVE: 1
RHINORRHEA: 0
ENDOCRINE NEGATIVE: 1

## 2021-04-09 ENCOUNTER — TELEPHONE (OUTPATIENT)
Dept: SCHEDULING | Age: 31
End: 2021-04-09

## 2021-04-10 ENCOUNTER — TELEPHONE (OUTPATIENT)
Dept: SCHEDULING | Age: 31
End: 2021-04-10

## 2021-04-10 DIAGNOSIS — L50.0 ALLERGIC URTICARIA: ICD-10-CM

## 2021-04-10 DIAGNOSIS — J30.9 ALLERGIC RHINITIS, UNSPECIFIED SEASONALITY, UNSPECIFIED TRIGGER: Primary | ICD-10-CM

## 2021-04-15 ENCOUNTER — PATIENT MESSAGE (OUTPATIENT)
Dept: RESEARCH | Facility: HOSPITAL | Age: 31
End: 2021-04-15

## 2021-04-29 ENCOUNTER — OFFICE VISIT (OUTPATIENT)
Dept: ALLERGY | Age: 31
End: 2021-04-29

## 2021-04-29 VITALS
OXYGEN SATURATION: 95 % | SYSTOLIC BLOOD PRESSURE: 140 MMHG | RESPIRATION RATE: 18 BRPM | HEIGHT: 69 IN | DIASTOLIC BLOOD PRESSURE: 93 MMHG | BODY MASS INDEX: 43.4 KG/M2 | TEMPERATURE: 97.7 F | WEIGHT: 293 LBS | HEART RATE: 82 BPM

## 2021-04-29 DIAGNOSIS — L50.9 URTICARIA, UNSPECIFIED: Primary | ICD-10-CM

## 2021-04-29 PROCEDURE — 3077F SYST BP >= 140 MM HG: CPT | Performed by: ALLERGY & IMMUNOLOGY

## 2021-04-29 PROCEDURE — 3080F DIAST BP >= 90 MM HG: CPT | Performed by: ALLERGY & IMMUNOLOGY

## 2021-04-29 PROCEDURE — 99243 OFF/OP CNSLTJ NEW/EST LOW 30: CPT | Performed by: ALLERGY & IMMUNOLOGY

## 2021-04-29 RX ORDER — FEXOFENADINE HCL 180 MG/1
TABLET ORAL
Qty: 60 TABLET | Refills: 4 | Status: ON HOLD | OUTPATIENT
Start: 2021-04-29 | End: 2022-06-21 | Stop reason: CLARIF

## 2021-04-29 ASSESSMENT — ENCOUNTER SYMPTOMS
ENDOCRINE COMMENTS: SEE PROBLEM LIST.
RESPIRATORY NEGATIVE: 1
EYES NEGATIVE: 1
ROS SKIN COMMENTS: SEE HPI.
CONSTITUTIONAL NEGATIVE: 1
GASTROINTESTINAL NEGATIVE: 1
NEUROLOGICAL NEGATIVE: 1
HEMATOLOGIC/LYMPHATIC COMMENTS: SEE PROBLEM LIST.

## 2021-05-08 RX ORDER — PNV NO.95/FERROUS FUM/FOLIC AC 28MG-0.8MG
TABLET ORAL
Qty: 90 TABLET | Refills: 3 | Status: SHIPPED | OUTPATIENT
Start: 2021-05-08 | End: 2021-06-02 | Stop reason: SDUPTHER

## 2021-05-08 RX ORDER — LEVOTHYROXINE SODIUM 175 UG/1
175 TABLET ORAL DAILY
Qty: 90 TABLET | Refills: 3 | Status: SHIPPED | OUTPATIENT
Start: 2021-05-08 | End: 2021-06-02 | Stop reason: SDUPTHER

## 2021-05-08 RX ORDER — AMLODIPINE BESYLATE 10 MG/1
10 TABLET ORAL DAILY
Qty: 90 TABLET | Refills: 3 | Status: SHIPPED | OUTPATIENT
Start: 2021-05-08 | End: 2021-06-02 | Stop reason: SDUPTHER

## 2021-05-25 VITALS
OXYGEN SATURATION: 99 % | WEIGHT: 293 LBS | DIASTOLIC BLOOD PRESSURE: 96 MMHG | BODY MASS INDEX: 43.4 KG/M2 | HEIGHT: 69 IN | HEART RATE: 93 BPM | HEIGHT: 69 IN | HEIGHT: 69 IN | SYSTOLIC BLOOD PRESSURE: 135 MMHG | SYSTOLIC BLOOD PRESSURE: 145 MMHG | DIASTOLIC BLOOD PRESSURE: 86 MMHG | DIASTOLIC BLOOD PRESSURE: 80 MMHG | RESPIRATION RATE: 18 BRPM | SYSTOLIC BLOOD PRESSURE: 136 MMHG | BODY MASS INDEX: 43.4 KG/M2 | DIASTOLIC BLOOD PRESSURE: 96 MMHG | BODY MASS INDEX: 43.4 KG/M2 | HEIGHT: 69 IN | TEMPERATURE: 98.5 F | DIASTOLIC BLOOD PRESSURE: 89 MMHG | WEIGHT: 293 LBS | TEMPERATURE: 98 F | WEIGHT: 293 LBS | TEMPERATURE: 98.9 F | OXYGEN SATURATION: 98 % | SYSTOLIC BLOOD PRESSURE: 138 MMHG | OXYGEN SATURATION: 100 % | BODY MASS INDEX: 43.4 KG/M2 | RESPIRATION RATE: 20 BRPM | HEART RATE: 99 BPM | RESPIRATION RATE: 18 BRPM | WEIGHT: 293 LBS | HEART RATE: 91 BPM | RESPIRATION RATE: 18 BRPM | HEIGHT: 69 IN | HEART RATE: 92 BPM | RESPIRATION RATE: 18 BRPM | SYSTOLIC BLOOD PRESSURE: 138 MMHG | WEIGHT: 293 LBS | BODY MASS INDEX: 43.4 KG/M2 | OXYGEN SATURATION: 100 %

## 2021-05-31 ENCOUNTER — HOSPITAL ENCOUNTER (EMERGENCY)
Facility: HOSPITAL | Age: 31
Discharge: HOME | End: 2021-06-01
Attending: EMERGENCY MEDICINE
Payer: MEDICAID

## 2021-05-31 DIAGNOSIS — N30.00 ACUTE CYSTITIS WITHOUT HEMATURIA: ICD-10-CM

## 2021-05-31 DIAGNOSIS — E86.0 DEHYDRATION: Primary | ICD-10-CM

## 2021-05-31 DIAGNOSIS — R55 SYNCOPE: ICD-10-CM

## 2021-05-31 LAB
ALBUMIN SERPL BCP-MCNC: 3.2 G/DL (ref 3.5–5.2)
ALP SERPL-CCNC: 78 U/L (ref 55–135)
ALT SERPL W/O P-5'-P-CCNC: 17 U/L (ref 10–44)
ANION GAP SERPL CALC-SCNC: 7 MMOL/L (ref 8–16)
AST SERPL-CCNC: 21 U/L (ref 10–40)
BASOPHILS # BLD AUTO: 0.01 K/UL (ref 0–0.2)
BASOPHILS NFR BLD: 0.1 % (ref 0–1.9)
BILIRUB SERPL-MCNC: 0.4 MG/DL (ref 0.1–1)
BUN SERPL-MCNC: 5 MG/DL (ref 6–20)
CALCIUM SERPL-MCNC: 8.5 MG/DL (ref 8.7–10.5)
CHLORIDE SERPL-SCNC: 107 MMOL/L (ref 95–110)
CK SERPL-CCNC: 70 U/L (ref 20–180)
CO2 SERPL-SCNC: 25 MMOL/L (ref 23–29)
CREAT SERPL-MCNC: 0.8 MG/DL (ref 0.5–1.4)
DIFFERENTIAL METHOD: ABNORMAL
EOSINOPHIL # BLD AUTO: 0.2 K/UL (ref 0–0.5)
EOSINOPHIL NFR BLD: 1.8 % (ref 0–8)
ERYTHROCYTE [DISTWIDTH] IN BLOOD BY AUTOMATED COUNT: 14.4 % (ref 11.5–14.5)
EST. GFR  (AFRICAN AMERICAN): >60 ML/MIN/1.73 M^2
EST. GFR  (NON AFRICAN AMERICAN): >60 ML/MIN/1.73 M^2
GLUCOSE SERPL-MCNC: 75 MG/DL (ref 70–110)
HCT VFR BLD AUTO: 36.6 % (ref 37–48.5)
HGB BLD-MCNC: 11.5 G/DL (ref 12–16)
IMM GRANULOCYTES # BLD AUTO: 0.02 K/UL (ref 0–0.04)
IMM GRANULOCYTES NFR BLD AUTO: 0.2 % (ref 0–0.5)
LYMPHOCYTES # BLD AUTO: 1.8 K/UL (ref 1–4.8)
LYMPHOCYTES NFR BLD: 21.1 % (ref 18–48)
MAGNESIUM SERPL-MCNC: 1.9 MG/DL (ref 1.6–2.6)
MCH RBC QN AUTO: 31.7 PG (ref 27–31)
MCHC RBC AUTO-ENTMCNC: 31.4 G/DL (ref 32–36)
MCV RBC AUTO: 101 FL (ref 82–98)
MONOCYTES # BLD AUTO: 0.5 K/UL (ref 0.3–1)
MONOCYTES NFR BLD: 6.3 % (ref 4–15)
NEUTROPHILS # BLD AUTO: 5.9 K/UL (ref 1.8–7.7)
NEUTROPHILS NFR BLD: 70.5 % (ref 38–73)
NRBC BLD-RTO: 0 /100 WBC
PLATELET # BLD AUTO: 201 K/UL (ref 150–450)
PMV BLD AUTO: 11.6 FL (ref 9.2–12.9)
POCT GLUCOSE: 87 MG/DL (ref 70–110)
POTASSIUM SERPL-SCNC: 3.6 MMOL/L (ref 3.5–5.1)
PROT SERPL-MCNC: 6.9 G/DL (ref 6–8.4)
RBC # BLD AUTO: 3.63 M/UL (ref 4–5.4)
SODIUM SERPL-SCNC: 139 MMOL/L (ref 136–145)
TROPONIN I SERPL DL<=0.01 NG/ML-MCNC: <0.006 NG/ML (ref 0–0.03)
WBC # BLD AUTO: 8.36 K/UL (ref 3.9–12.7)

## 2021-05-31 PROCEDURE — 25000003 PHARM REV CODE 250: Performed by: EMERGENCY MEDICINE

## 2021-05-31 PROCEDURE — 96361 HYDRATE IV INFUSION ADD-ON: CPT

## 2021-05-31 PROCEDURE — 83735 ASSAY OF MAGNESIUM: CPT | Performed by: EMERGENCY MEDICINE

## 2021-05-31 PROCEDURE — 82962 GLUCOSE BLOOD TEST: CPT

## 2021-05-31 PROCEDURE — 93005 ELECTROCARDIOGRAM TRACING: CPT

## 2021-05-31 PROCEDURE — 93010 EKG 12-LEAD: ICD-10-PCS | Mod: ,,, | Performed by: INTERNAL MEDICINE

## 2021-05-31 PROCEDURE — 93010 ELECTROCARDIOGRAM REPORT: CPT | Mod: ,,, | Performed by: INTERNAL MEDICINE

## 2021-05-31 PROCEDURE — 80053 COMPREHEN METABOLIC PANEL: CPT | Performed by: EMERGENCY MEDICINE

## 2021-05-31 PROCEDURE — 82550 ASSAY OF CK (CPK): CPT | Performed by: EMERGENCY MEDICINE

## 2021-05-31 PROCEDURE — 84484 ASSAY OF TROPONIN QUANT: CPT | Performed by: EMERGENCY MEDICINE

## 2021-05-31 PROCEDURE — 85025 COMPLETE CBC W/AUTO DIFF WBC: CPT | Performed by: EMERGENCY MEDICINE

## 2021-05-31 PROCEDURE — 63600175 PHARM REV CODE 636 W HCPCS: Performed by: EMERGENCY MEDICINE

## 2021-05-31 PROCEDURE — 99284 EMERGENCY DEPT VISIT MOD MDM: CPT | Mod: 25

## 2021-05-31 PROCEDURE — 96360 HYDRATION IV INFUSION INIT: CPT

## 2021-05-31 RX ADMIN — SODIUM CHLORIDE, SODIUM LACTATE, POTASSIUM CHLORIDE, AND CALCIUM CHLORIDE 1000 ML: .6; .31; .03; .02 INJECTION, SOLUTION INTRAVENOUS at 11:05

## 2021-05-31 RX ADMIN — SODIUM CHLORIDE 1000 ML: 0.9 INJECTION, SOLUTION INTRAVENOUS at 09:05

## 2021-06-01 VITALS
OXYGEN SATURATION: 99 % | RESPIRATION RATE: 18 BRPM | HEART RATE: 72 BPM | WEIGHT: 152 LBS | BODY MASS INDEX: 26.93 KG/M2 | HEIGHT: 63 IN | DIASTOLIC BLOOD PRESSURE: 63 MMHG | TEMPERATURE: 98 F | SYSTOLIC BLOOD PRESSURE: 109 MMHG

## 2021-06-01 LAB
AMPHET+METHAMPHET UR QL: NEGATIVE
BACTERIA #/AREA URNS HPF: ABNORMAL /HPF
BARBITURATES UR QL SCN>200 NG/ML: NEGATIVE
BENZODIAZ UR QL SCN>200 NG/ML: NEGATIVE
BILIRUB UR QL STRIP: NEGATIVE
BZE UR QL SCN: NEGATIVE
CANNABINOIDS UR QL SCN: NORMAL
CLARITY UR: CLEAR
COLOR UR: YELLOW
CREAT UR-MCNC: 217.3 MG/DL (ref 15–325)
GLUCOSE UR QL STRIP: NEGATIVE
HGB UR QL STRIP: ABNORMAL
HYALINE CASTS #/AREA URNS LPF: 2 /LPF
KETONES UR QL STRIP: NEGATIVE
LEUKOCYTE ESTERASE UR QL STRIP: ABNORMAL
METHADONE UR QL SCN>300 NG/ML: NEGATIVE
MICROSCOPIC COMMENT: ABNORMAL
NITRITE UR QL STRIP: NEGATIVE
OPIATES UR QL SCN: NEGATIVE
PCP UR QL SCN>25 NG/ML: NEGATIVE
PH UR STRIP: 7 [PH] (ref 5–8)
PROT UR QL STRIP: ABNORMAL
RBC #/AREA URNS HPF: 3 /HPF (ref 0–4)
SP GR UR STRIP: 1.02 (ref 1–1.03)
SQUAMOUS #/AREA URNS HPF: 6 /HPF
TOXICOLOGY INFORMATION: NORMAL
URN SPEC COLLECT METH UR: ABNORMAL
UROBILINOGEN UR STRIP-ACNC: NEGATIVE EU/DL
WBC #/AREA URNS HPF: 20 /HPF (ref 0–5)
YEAST URNS QL MICRO: ABNORMAL

## 2021-06-01 PROCEDURE — 81000 URINALYSIS NONAUTO W/SCOPE: CPT | Mod: 59 | Performed by: EMERGENCY MEDICINE

## 2021-06-01 PROCEDURE — 87088 URINE BACTERIA CULTURE: CPT | Performed by: EMERGENCY MEDICINE

## 2021-06-01 PROCEDURE — 80307 DRUG TEST PRSMV CHEM ANLYZR: CPT | Performed by: EMERGENCY MEDICINE

## 2021-06-01 PROCEDURE — 87086 URINE CULTURE/COLONY COUNT: CPT | Performed by: EMERGENCY MEDICINE

## 2021-06-01 PROCEDURE — 87077 CULTURE AEROBIC IDENTIFY: CPT | Performed by: EMERGENCY MEDICINE

## 2021-06-01 PROCEDURE — 87186 SC STD MICRODIL/AGAR DIL: CPT | Performed by: EMERGENCY MEDICINE

## 2021-06-01 RX ORDER — ONDANSETRON 4 MG/1
4 TABLET, ORALLY DISINTEGRATING ORAL EVERY 6 HOURS PRN
Qty: 10 TABLET | Refills: 0 | Status: SHIPPED | OUTPATIENT
Start: 2021-06-01 | End: 2021-09-27

## 2021-06-01 RX ORDER — CEPHALEXIN 500 MG/1
500 CAPSULE ORAL EVERY 12 HOURS
Qty: 14 CAPSULE | Refills: 0 | Status: SHIPPED | OUTPATIENT
Start: 2021-06-01 | End: 2021-06-08

## 2021-06-02 ENCOUNTER — OFFICE VISIT (OUTPATIENT)
Dept: INTERNAL MEDICINE | Age: 31
End: 2021-06-02

## 2021-06-02 ENCOUNTER — LAB SERVICES (OUTPATIENT)
Dept: LAB | Age: 31
End: 2021-06-02

## 2021-06-02 VITALS
BODY MASS INDEX: 43.4 KG/M2 | OXYGEN SATURATION: 98 % | RESPIRATION RATE: 18 BRPM | DIASTOLIC BLOOD PRESSURE: 89 MMHG | HEIGHT: 69 IN | SYSTOLIC BLOOD PRESSURE: 131 MMHG | TEMPERATURE: 98.6 F | HEART RATE: 85 BPM | WEIGHT: 293 LBS

## 2021-06-02 DIAGNOSIS — Z13.5 DIABETIC RETINOPATHY SCREENING: ICD-10-CM

## 2021-06-02 DIAGNOSIS — I10 HYPERTENSION, UNCONTROLLED: ICD-10-CM

## 2021-06-02 DIAGNOSIS — E03.9 HYPOTHYROIDISM, UNSPECIFIED TYPE: ICD-10-CM

## 2021-06-02 DIAGNOSIS — E11.9 TYPE 2 DIABETES MELLITUS WITHOUT COMPLICATION, WITHOUT LONG-TERM CURRENT USE OF INSULIN (CMD): ICD-10-CM

## 2021-06-02 DIAGNOSIS — E78.00 HYPERCHOLESTEREMIA: ICD-10-CM

## 2021-06-02 DIAGNOSIS — E66.01 MORBID OBESITY WITH BMI OF 50.0-59.9, ADULT (CMD): ICD-10-CM

## 2021-06-02 DIAGNOSIS — Z13.5 DIABETIC RETINOPATHY SCREENING: Primary | ICD-10-CM

## 2021-06-02 DIAGNOSIS — R77.9 ABNORMAL PLASMA PROTEIN TEST: ICD-10-CM

## 2021-06-02 LAB
BASOPHILS # BLD: 0 K/MCL (ref 0–0.3)
BASOPHILS NFR BLD: 0 %
DEPRECATED RDW RBC: 51.7 FL (ref 39–50)
EOSINOPHIL # BLD: 0.2 K/MCL (ref 0–0.5)
EOSINOPHIL NFR BLD: 2 %
ERYTHROCYTE [DISTWIDTH] IN BLOOD: 17.5 % (ref 11–15)
HCT VFR BLD CALC: 39.8 % (ref 36–46.5)
HGB BLD-MCNC: 11.8 G/DL (ref 12–15.5)
IMM GRANULOCYTES # BLD AUTO: 0 K/MCL (ref 0–0.2)
IMM GRANULOCYTES # BLD: 0 %
LYMPHOCYTES # BLD: 3.6 K/MCL (ref 1–4.8)
LYMPHOCYTES NFR BLD: 37 %
MCH RBC QN AUTO: 24.1 PG (ref 26–34)
MCHC RBC AUTO-ENTMCNC: 29.6 G/DL (ref 32–36.5)
MCV RBC AUTO: 81.4 FL (ref 78–100)
MONOCYTES # BLD: 0.5 K/MCL (ref 0.3–0.9)
MONOCYTES NFR BLD: 5 %
NEUTROPHILS # BLD: 5.4 K/MCL (ref 1.8–7.7)
NEUTROPHILS NFR BLD: 56 %
NRBC BLD MANUAL-RTO: 0 /100 WBC
PLATELET # BLD AUTO: 434 K/MCL (ref 140–450)
RBC # BLD: 4.89 MIL/MCL (ref 4–5.2)
WBC # BLD: 9.8 K/MCL (ref 4.2–11)

## 2021-06-02 PROCEDURE — 83540 ASSAY OF IRON: CPT | Performed by: INTERNAL MEDICINE

## 2021-06-02 PROCEDURE — 80053 COMPREHEN METABOLIC PANEL: CPT | Performed by: INTERNAL MEDICINE

## 2021-06-02 PROCEDURE — 83036 HEMOGLOBIN GLYCOSYLATED A1C: CPT | Performed by: INTERNAL MEDICINE

## 2021-06-02 PROCEDURE — 83550 IRON BINDING TEST: CPT | Performed by: INTERNAL MEDICINE

## 2021-06-02 PROCEDURE — 86038 ANTINUCLEAR ANTIBODIES: CPT | Performed by: INTERNAL MEDICINE

## 2021-06-02 PROCEDURE — 85025 COMPLETE CBC W/AUTO DIFF WBC: CPT | Performed by: INTERNAL MEDICINE

## 2021-06-02 PROCEDURE — 3079F DIAST BP 80-89 MM HG: CPT | Performed by: INTERNAL MEDICINE

## 2021-06-02 PROCEDURE — 3075F SYST BP GE 130 - 139MM HG: CPT | Performed by: INTERNAL MEDICINE

## 2021-06-02 PROCEDURE — 99214 OFFICE O/P EST MOD 30 MIN: CPT | Performed by: INTERNAL MEDICINE

## 2021-06-02 PROCEDURE — 82043 UR ALBUMIN QUANTITATIVE: CPT | Performed by: INTERNAL MEDICINE

## 2021-06-02 PROCEDURE — 82570 ASSAY OF URINE CREATININE: CPT | Performed by: INTERNAL MEDICINE

## 2021-06-02 PROCEDURE — 84165 PROTEIN E-PHORESIS SERUM: CPT | Performed by: INTERNAL MEDICINE

## 2021-06-02 RX ORDER — PNV NO.95/FERROUS FUM/FOLIC AC 28MG-0.8MG
TABLET ORAL
Qty: 90 TABLET | Refills: 3 | Status: SHIPPED | OUTPATIENT
Start: 2021-06-02 | End: 2022-06-21

## 2021-06-02 RX ORDER — LEVOTHYROXINE SODIUM 175 UG/1
175 TABLET ORAL DAILY
Qty: 90 TABLET | Refills: 3 | Status: SHIPPED | OUTPATIENT
Start: 2021-06-02 | End: 2021-08-24 | Stop reason: SDUPTHER

## 2021-06-02 RX ORDER — AMLODIPINE BESYLATE 10 MG/1
10 TABLET ORAL DAILY
Qty: 90 TABLET | Refills: 3 | Status: SHIPPED | OUTPATIENT
Start: 2021-06-02 | End: 2021-08-24 | Stop reason: SDUPTHER

## 2021-06-02 ASSESSMENT — ENCOUNTER SYMPTOMS
APPETITE CHANGE: 0
DIARRHEA: 0
NAUSEA: 0
COUGH: 0
FATIGUE: 0
SHORTNESS OF BREATH: 0
TROUBLE SWALLOWING: 0
CHILLS: 0
DIZZINESS: 0
ABDOMINAL PAIN: 0
UNEXPECTED WEIGHT CHANGE: 0
SORE THROAT: 0
FEVER: 0
WHEEZING: 0

## 2021-06-02 ASSESSMENT — PATIENT HEALTH QUESTIONNAIRE - PHQ9
2. FEELING DOWN, DEPRESSED OR HOPELESS: NOT AT ALL
CLINICAL INTERPRETATION OF PHQ2 SCORE: NO FURTHER SCREENING NEEDED
SUM OF ALL RESPONSES TO PHQ9 QUESTIONS 1 AND 2: 0
SUM OF ALL RESPONSES TO PHQ9 QUESTIONS 1 AND 2: 0
CLINICAL INTERPRETATION OF PHQ9 SCORE: NO FURTHER SCREENING NEEDED
1. LITTLE INTEREST OR PLEASURE IN DOING THINGS: NOT AT ALL

## 2021-06-02 ASSESSMENT — PAIN SCALES - GENERAL: PAINLEVEL: 0

## 2021-06-03 DIAGNOSIS — R77.9 ABNORMAL PLASMA PROTEIN TEST: Primary | ICD-10-CM

## 2021-06-03 LAB
ALBUMIN SERPL-MCNC: 3.6 G/DL (ref 3.6–5.1)
ALBUMIN/GLOB SERPL: 0.9 {RATIO} (ref 1–2.4)
ALP SERPL-CCNC: 56 UNITS/L (ref 45–117)
ALT SERPL-CCNC: 34 UNITS/L
ANION GAP SERPL CALC-SCNC: 17 MMOL/L (ref 10–20)
AST SERPL-CCNC: 17 UNITS/L
BACTERIA UR CULT: ABNORMAL
BILIRUB SERPL-MCNC: 0.2 MG/DL (ref 0.2–1)
BUN SERPL-MCNC: 9 MG/DL (ref 6–20)
BUN/CREAT SERPL: 9 (ref 7–25)
CALCIUM SERPL-MCNC: 8.6 MG/DL (ref 8.4–10.2)
CHLORIDE SERPL-SCNC: 104 MMOL/L (ref 98–107)
CO2 SERPL-SCNC: 24 MMOL/L (ref 21–32)
CREAT SERPL-MCNC: 1.04 MG/DL (ref 0.51–0.95)
CREAT UR-MCNC: 316 MG/DL
FASTING DURATION TIME PATIENT: ABNORMAL H
GFR SERPLBLD BASED ON 1.73 SQ M-ARVRAT: 83 ML/MIN/1.73M2
GLOBULIN SER-MCNC: 4.1 G/DL (ref 2–4)
GLUCOSE SERPL-MCNC: 84 MG/DL (ref 65–99)
HBA1C MFR BLD: 6.6 % (ref 4.5–5.6)
IRON SATN MFR SERPL: 40 % (ref 15–45)
IRON SERPL-MCNC: 124 MCG/DL (ref 50–170)
MICROALBUMIN UR-MCNC: 2.62 MG/DL
MICROALBUMIN/CREAT UR: 8.3 MG/G
POTASSIUM SERPL-SCNC: 4.6 MMOL/L (ref 3.4–5.1)
PROT SERPL-MCNC: 7.7 G/DL (ref 6.4–8.2)
SODIUM SERPL-SCNC: 140 MMOL/L (ref 135–145)
TIBC SERPL-MCNC: 309 MCG/DL (ref 250–450)

## 2021-06-04 LAB
ALBUMIN SERPL ELPH-MCNC: 4.3 G/DL (ref 3.5–4.9)
ALPHA 1: 0.4 G/DL (ref 0.2–0.4)
ALPHA2 GLOB SERPL ELPH-MCNC: 0.8 G/DL (ref 0.5–0.9)
ANA SER QL IA: NEGATIVE
B-GLOBULIN SERPL ELPH-MCNC: 1.1 G/DL (ref 0.7–1.2)
GAMMA GLOB SERPL ELPH-MCNC: 1.5 G/DL (ref 0.7–1.7)
GLOBULIN SER-MCNC: 3.7 G/DL (ref 2.1–4.2)
PATH INTERP SPEC-IMP: NORMAL
PROT SERPL-MCNC: 8 G/DL (ref 6.4–8.2)

## 2021-06-10 ENCOUNTER — HOSPITAL ENCOUNTER (EMERGENCY)
Facility: HOSPITAL | Age: 31
Discharge: HOME | End: 2021-06-10
Attending: EMERGENCY MEDICINE
Payer: MEDICAID

## 2021-06-10 VITALS
DIASTOLIC BLOOD PRESSURE: 64 MMHG | HEIGHT: 63 IN | OXYGEN SATURATION: 98 % | RESPIRATION RATE: 18 BRPM | BODY MASS INDEX: 26.05 KG/M2 | SYSTOLIC BLOOD PRESSURE: 106 MMHG | WEIGHT: 147 LBS | TEMPERATURE: 98 F | HEART RATE: 91 BPM

## 2021-06-10 DIAGNOSIS — B96.89 BV (BACTERIAL VAGINOSIS): ICD-10-CM

## 2021-06-10 DIAGNOSIS — N76.0 BV (BACTERIAL VAGINOSIS): ICD-10-CM

## 2021-06-10 DIAGNOSIS — N30.01 ACUTE CYSTITIS WITH HEMATURIA: Primary | ICD-10-CM

## 2021-06-10 LAB
ALBUMIN SERPL BCP-MCNC: 3.5 G/DL (ref 3.5–5.2)
ALP SERPL-CCNC: 88 U/L (ref 55–135)
ALT SERPL W/O P-5'-P-CCNC: 16 U/L (ref 10–44)
ANION GAP SERPL CALC-SCNC: 13 MMOL/L (ref 8–16)
ANION GAP SERPL CALC-SCNC: 8 MMOL/L (ref 8–16)
AST SERPL-CCNC: 17 U/L (ref 10–40)
B-HCG UR QL: NEGATIVE
BACTERIA #/AREA URNS HPF: ABNORMAL /HPF
BACTERIA GENITAL QL WET PREP: ABNORMAL
BASOPHILS # BLD AUTO: 0.02 K/UL (ref 0–0.2)
BASOPHILS NFR BLD: 0.3 % (ref 0–1.9)
BILIRUB SERPL-MCNC: 0.6 MG/DL (ref 0.1–1)
BILIRUB UR QL STRIP: NEGATIVE
BUN SERPL-MCNC: 4 MG/DL (ref 6–30)
BUN SERPL-MCNC: 5 MG/DL (ref 6–20)
CALCIUM SERPL-MCNC: 9.1 MG/DL (ref 8.7–10.5)
CHLORIDE SERPL-SCNC: 106 MMOL/L (ref 95–110)
CHLORIDE SERPL-SCNC: 108 MMOL/L (ref 95–110)
CLARITY UR: ABNORMAL
CLUE CELLS VAG QL WET PREP: ABNORMAL
CO2 SERPL-SCNC: 21 MMOL/L (ref 23–29)
COLOR UR: ABNORMAL
CREAT SERPL-MCNC: 0.6 MG/DL (ref 0.5–1.4)
CREAT SERPL-MCNC: 0.9 MG/DL (ref 0.5–1.4)
CTP QC/QA: YES
DIFFERENTIAL METHOD: ABNORMAL
EOSINOPHIL # BLD AUTO: 0.2 K/UL (ref 0–0.5)
EOSINOPHIL NFR BLD: 3.8 % (ref 0–8)
ERYTHROCYTE [DISTWIDTH] IN BLOOD BY AUTOMATED COUNT: 14.3 % (ref 11.5–14.5)
EST. GFR  (AFRICAN AMERICAN): >60 ML/MIN/1.73 M^2
EST. GFR  (NON AFRICAN AMERICAN): >60 ML/MIN/1.73 M^2
FILAMENT FUNGI VAG WET PREP-#/AREA: ABNORMAL
GLUCOSE SERPL-MCNC: 120 MG/DL (ref 70–110)
GLUCOSE SERPL-MCNC: 129 MG/DL (ref 70–110)
GLUCOSE UR QL STRIP: NEGATIVE
HCT VFR BLD AUTO: 40.7 % (ref 37–48.5)
HCT VFR BLD CALC: 39 %PCV (ref 36–54)
HGB BLD-MCNC: 13 G/DL (ref 12–16)
HGB UR QL STRIP: ABNORMAL
HYALINE CASTS #/AREA URNS LPF: 2 /LPF
IMM GRANULOCYTES # BLD AUTO: 0.02 K/UL (ref 0–0.04)
IMM GRANULOCYTES NFR BLD AUTO: 0.3 % (ref 0–0.5)
KETONES UR QL STRIP: NEGATIVE
LEUKOCYTE ESTERASE UR QL STRIP: ABNORMAL
LYMPHOCYTES # BLD AUTO: 2.1 K/UL (ref 1–4.8)
LYMPHOCYTES NFR BLD: 33.3 % (ref 18–48)
MCH RBC QN AUTO: 31.7 PG (ref 27–31)
MCHC RBC AUTO-ENTMCNC: 31.9 G/DL (ref 32–36)
MCV RBC AUTO: 99 FL (ref 82–98)
MICROSCOPIC COMMENT: ABNORMAL
MONOCYTES # BLD AUTO: 0.3 K/UL (ref 0.3–1)
MONOCYTES NFR BLD: 4 % (ref 4–15)
NEUTROPHILS # BLD AUTO: 3.6 K/UL (ref 1.8–7.7)
NEUTROPHILS NFR BLD: 58.3 % (ref 38–73)
NITRITE UR QL STRIP: NEGATIVE
NON-SQ EPI CELLS #/AREA URNS HPF: 2 /HPF
NRBC BLD-RTO: 0 /100 WBC
PH UR STRIP: 6 [PH] (ref 5–8)
PLATELET # BLD AUTO: 319 K/UL (ref 150–450)
PMV BLD AUTO: 10 FL (ref 9.2–12.9)
POC IONIZED CALCIUM: 1.2 MMOL/L (ref 1.06–1.42)
POC TCO2 (MEASURED): 25 MMOL/L (ref 23–29)
POTASSIUM BLD-SCNC: 4.9 MMOL/L (ref 3.5–5.1)
POTASSIUM SERPL-SCNC: 3.8 MMOL/L (ref 3.5–5.1)
PROT SERPL-MCNC: 7.9 G/DL (ref 6–8.4)
PROT UR QL STRIP: ABNORMAL
RBC # BLD AUTO: 4.1 M/UL (ref 4–5.4)
RBC #/AREA URNS HPF: >100 /HPF (ref 0–4)
SAMPLE: ABNORMAL
SODIUM BLD-SCNC: 139 MMOL/L (ref 136–145)
SODIUM SERPL-SCNC: 137 MMOL/L (ref 136–145)
SP GR UR STRIP: 1.02 (ref 1–1.03)
SPECIMEN SOURCE: ABNORMAL
SQUAMOUS #/AREA URNS HPF: 10 /HPF
T VAGINALIS GENITAL QL WET PREP: ABNORMAL
URN SPEC COLLECT METH UR: ABNORMAL
UROBILINOGEN UR STRIP-ACNC: NEGATIVE EU/DL
WBC # BLD AUTO: 6.24 K/UL (ref 3.9–12.7)
WBC #/AREA URNS HPF: 71 /HPF (ref 0–5)
WBC #/AREA VAG WET PREP: ABNORMAL
YEAST GENITAL QL WET PREP: ABNORMAL

## 2021-06-10 PROCEDURE — 25000003 PHARM REV CODE 250: Performed by: NURSE PRACTITIONER

## 2021-06-10 PROCEDURE — 87210 SMEAR WET MOUNT SALINE/INK: CPT | Performed by: NURSE PRACTITIONER

## 2021-06-10 PROCEDURE — 96365 THER/PROPH/DIAG IV INF INIT: CPT

## 2021-06-10 PROCEDURE — 85025 COMPLETE CBC W/AUTO DIFF WBC: CPT | Performed by: PHYSICIAN ASSISTANT

## 2021-06-10 PROCEDURE — 99284 EMERGENCY DEPT VISIT MOD MDM: CPT | Mod: 25

## 2021-06-10 PROCEDURE — 87591 N.GONORRHOEAE DNA AMP PROB: CPT | Performed by: NURSE PRACTITIONER

## 2021-06-10 PROCEDURE — 87491 CHLMYD TRACH DNA AMP PROBE: CPT | Performed by: NURSE PRACTITIONER

## 2021-06-10 PROCEDURE — 87086 URINE CULTURE/COLONY COUNT: CPT | Performed by: PHYSICIAN ASSISTANT

## 2021-06-10 PROCEDURE — 87077 CULTURE AEROBIC IDENTIFY: CPT | Performed by: PHYSICIAN ASSISTANT

## 2021-06-10 PROCEDURE — 80053 COMPREHEN METABOLIC PANEL: CPT | Performed by: PHYSICIAN ASSISTANT

## 2021-06-10 PROCEDURE — 87088 URINE BACTERIA CULTURE: CPT | Performed by: PHYSICIAN ASSISTANT

## 2021-06-10 PROCEDURE — 87147 CULTURE TYPE IMMUNOLOGIC: CPT | Performed by: PHYSICIAN ASSISTANT

## 2021-06-10 PROCEDURE — 81025 URINE PREGNANCY TEST: CPT | Performed by: PHYSICIAN ASSISTANT

## 2021-06-10 PROCEDURE — 81000 URINALYSIS NONAUTO W/SCOPE: CPT | Performed by: PHYSICIAN ASSISTANT

## 2021-06-10 PROCEDURE — 96361 HYDRATE IV INFUSION ADD-ON: CPT

## 2021-06-10 PROCEDURE — 87186 SC STD MICRODIL/AGAR DIL: CPT | Performed by: PHYSICIAN ASSISTANT

## 2021-06-10 RX ORDER — IBUPROFEN 600 MG/1
600 TABLET ORAL EVERY 6 HOURS PRN
Qty: 20 TABLET | Refills: 0 | Status: SHIPPED | OUTPATIENT
Start: 2021-06-10 | End: 2021-09-27

## 2021-06-10 RX ORDER — CEPHALEXIN 500 MG/1
500 CAPSULE ORAL 4 TIMES DAILY
Qty: 20 CAPSULE | Refills: 0 | Status: SHIPPED | OUTPATIENT
Start: 2021-06-10 | End: 2021-06-15

## 2021-06-10 RX ORDER — DICYCLOMINE HYDROCHLORIDE 10 MG/1
20 CAPSULE ORAL
Status: COMPLETED | OUTPATIENT
Start: 2021-06-10 | End: 2021-06-10

## 2021-06-10 RX ORDER — METRONIDAZOLE 500 MG/1
500 TABLET ORAL 2 TIMES DAILY
Qty: 14 TABLET | Refills: 0 | Status: SHIPPED | OUTPATIENT
Start: 2021-06-10 | End: 2021-06-17

## 2021-06-10 RX ORDER — DICYCLOMINE HYDROCHLORIDE 20 MG/1
20 TABLET ORAL 2 TIMES DAILY PRN
Qty: 20 TABLET | Refills: 0 | Status: SHIPPED | OUTPATIENT
Start: 2021-06-10 | End: 2021-07-10

## 2021-06-10 RX ADMIN — DICYCLOMINE HYDROCHLORIDE 20 MG: 10 CAPSULE ORAL at 02:06

## 2021-06-12 LAB
BACTERIA UR CULT: ABNORMAL
BACTERIA UR CULT: ABNORMAL
C TRACH DNA SPEC QL NAA+PROBE: NOT DETECTED
N GONORRHOEA DNA SPEC QL NAA+PROBE: NOT DETECTED

## 2021-06-15 ENCOUNTER — OFFICE VISIT (OUTPATIENT)
Dept: DERMATOLOGY | Age: 31
End: 2021-06-15

## 2021-06-15 VITALS
DIASTOLIC BLOOD PRESSURE: 88 MMHG | HEART RATE: 76 BPM | OXYGEN SATURATION: 98 % | BODY MASS INDEX: 56.32 KG/M2 | SYSTOLIC BLOOD PRESSURE: 138 MMHG | RESPIRATION RATE: 18 BRPM | HEIGHT: 69 IN

## 2021-06-15 DIAGNOSIS — L73.2 HIDRADENITIS SUPPURATIVA: Primary | ICD-10-CM

## 2021-06-15 PROCEDURE — 87205 SMEAR GRAM STAIN: CPT | Performed by: DERMATOLOGY

## 2021-06-15 PROCEDURE — 87186 SC STD MICRODIL/AGAR DIL: CPT | Performed by: DERMATOLOGY

## 2021-06-15 PROCEDURE — 3075F SYST BP GE 130 - 139MM HG: CPT | Performed by: DERMATOLOGY

## 2021-06-15 PROCEDURE — 87070 CULTURE OTHR SPECIMN AEROBIC: CPT | Performed by: DERMATOLOGY

## 2021-06-15 PROCEDURE — 99204 OFFICE O/P NEW MOD 45 MIN: CPT | Performed by: DERMATOLOGY

## 2021-06-15 PROCEDURE — 87077 CULTURE AEROBIC IDENTIFY: CPT | Performed by: DERMATOLOGY

## 2021-06-15 PROCEDURE — 3079F DIAST BP 80-89 MM HG: CPT | Performed by: DERMATOLOGY

## 2021-06-15 RX ORDER — CHLORHEXIDINE GLUCONATE 213 G/1000ML
SOLUTION TOPICAL
Qty: 236 ML | Refills: 0 | Status: ON HOLD | OUTPATIENT
Start: 2021-06-15 | End: 2022-06-21 | Stop reason: HOSPADM

## 2021-06-15 RX ORDER — MINOCYCLINE HYDROCHLORIDE 100 MG/1
CAPSULE ORAL
Qty: 30 CAPSULE | Refills: 2 | Status: SHIPPED | OUTPATIENT
Start: 2021-06-15 | End: 2021-08-10 | Stop reason: ALTCHOICE

## 2021-06-15 RX ORDER — CLINDAMYCIN PHOSPHATE 10 UG/ML
LOTION TOPICAL
Qty: 60 ML | Refills: 8 | Status: ON HOLD | OUTPATIENT
Start: 2021-06-15 | End: 2022-06-20

## 2021-06-15 RX ORDER — FLUCONAZOLE 150 MG/1
TABLET ORAL
Qty: 1 TABLET | Refills: 3 | Status: SHIPPED | OUTPATIENT
Start: 2021-06-15 | End: 2021-08-10 | Stop reason: CLARIF

## 2021-06-18 LAB
BACTERIA SPEC AEROBE CULT: ABNORMAL
GRAM STN SPEC: ABNORMAL

## 2021-07-19 ENCOUNTER — PATIENT MESSAGE (OUTPATIENT)
Dept: GASTROENTEROLOGY | Facility: CLINIC | Age: 31
End: 2021-07-19

## 2021-07-20 ENCOUNTER — PATIENT MESSAGE (OUTPATIENT)
Dept: GASTROENTEROLOGY | Facility: CLINIC | Age: 31
End: 2021-07-20

## 2021-07-22 ENCOUNTER — TELEPHONE (OUTPATIENT)
Dept: OPTOMETRY | Facility: CLINIC | Age: 31
End: 2021-07-22

## 2021-08-10 ENCOUNTER — TELEPHONE (OUTPATIENT)
Dept: SCHEDULING | Age: 31
End: 2021-08-10

## 2021-08-10 ENCOUNTER — V-VISIT (OUTPATIENT)
Dept: BARIATRICS/WEIGHT MGMT | Age: 31
End: 2021-08-10

## 2021-08-10 DIAGNOSIS — L50.9 URTICARIA, UNSPECIFIED: ICD-10-CM

## 2021-08-10 DIAGNOSIS — E66.01 MORBID OBESITY WITH BMI OF 50.0-59.9, ADULT (CMD): Primary | ICD-10-CM

## 2021-08-10 DIAGNOSIS — G89.29 CHRONIC LOW BACK PAIN, UNSPECIFIED BACK PAIN LATERALITY, UNSPECIFIED WHETHER SCIATICA PRESENT: Chronic | ICD-10-CM

## 2021-08-10 DIAGNOSIS — M54.50 CHRONIC LOW BACK PAIN, UNSPECIFIED BACK PAIN LATERALITY, UNSPECIFIED WHETHER SCIATICA PRESENT: Chronic | ICD-10-CM

## 2021-08-10 DIAGNOSIS — L68.0 FEMALE HIRSUTISM: ICD-10-CM

## 2021-08-10 DIAGNOSIS — E55.9 VITAMIN D DEFICIENCY: ICD-10-CM

## 2021-08-10 DIAGNOSIS — G47.9 SLEEP DISTURBANCE: Chronic | ICD-10-CM

## 2021-08-10 DIAGNOSIS — D50.0 IRON DEFICIENCY ANEMIA DUE TO CHRONIC BLOOD LOSS: ICD-10-CM

## 2021-08-10 DIAGNOSIS — E11.9 TYPE 2 DIABETES MELLITUS WITHOUT COMPLICATION, WITHOUT LONG-TERM CURRENT USE OF INSULIN (CMD): ICD-10-CM

## 2021-08-10 DIAGNOSIS — R63.5 WEIGHT GAIN, ABNORMAL: ICD-10-CM

## 2021-08-10 DIAGNOSIS — R73.09 ELEVATED HEMOGLOBIN A1C: ICD-10-CM

## 2021-08-10 DIAGNOSIS — E78.00 HYPERCHOLESTEREMIA: ICD-10-CM

## 2021-08-10 DIAGNOSIS — E89.0 POSTABLATIVE HYPOTHYROIDISM: ICD-10-CM

## 2021-08-10 DIAGNOSIS — I10 HYPERTENSION, UNCONTROLLED: ICD-10-CM

## 2021-08-10 DIAGNOSIS — K21.9 GASTROESOPHAGEAL REFLUX DISEASE, UNSPECIFIED WHETHER ESOPHAGITIS PRESENT: Primary | ICD-10-CM

## 2021-08-10 PROBLEM — R77.9 ABNORMAL PLASMA PROTEIN TEST: Status: RESOLVED | Noted: 2021-06-03 | Resolved: 2021-08-10

## 2021-08-10 PROCEDURE — 99245 OFF/OP CONSLTJ NEW/EST HI 55: CPT | Performed by: SURGERY

## 2021-08-10 ASSESSMENT — PATIENT HEALTH QUESTIONNAIRE - PHQ9
SUM OF ALL RESPONSES TO PHQ9 QUESTIONS 1 AND 2: 0
CLINICAL INTERPRETATION OF PHQ2 SCORE: NO FURTHER SCREENING NEEDED
SUM OF ALL RESPONSES TO PHQ9 QUESTIONS 1 AND 2: 0
1. LITTLE INTEREST OR PLEASURE IN DOING THINGS: NOT AT ALL
2. FEELING DOWN, DEPRESSED OR HOPELESS: NOT AT ALL
CLINICAL INTERPRETATION OF PHQ9 SCORE: NO FURTHER SCREENING NEEDED

## 2021-08-10 ASSESSMENT — ENCOUNTER SYMPTOMS
WHEEZING: 0
SHORTNESS OF BREATH: 1
CHILLS: 0
BACK PAIN: 1
AGITATION: 0
CHOKING: 0
GASTROINTESTINAL NEGATIVE: 1
NERVOUS/ANXIOUS: 1
NEUROLOGICAL NEGATIVE: 1
FEVER: 0
FATIGUE: 1
SLEEP DISTURBANCE: 1
COUGH: 0

## 2021-08-11 ENCOUNTER — HOSPITAL ENCOUNTER (OUTPATIENT)
Age: 31
End: 2021-08-11
Attending: SURGERY | Admitting: SURGERY

## 2021-08-11 ENCOUNTER — TELEPHONE (OUTPATIENT)
Dept: BARIATRICS/WEIGHT MGMT | Age: 31
End: 2021-08-11

## 2021-08-17 ENCOUNTER — LAB SERVICES (OUTPATIENT)
Dept: LAB | Age: 31
End: 2021-08-17

## 2021-08-17 ENCOUNTER — NURSE ONLY (OUTPATIENT)
Dept: INTERNAL MEDICINE | Age: 31
End: 2021-08-17

## 2021-08-17 ENCOUNTER — IMAGING SERVICES (OUTPATIENT)
Dept: GENERAL RADIOLOGY | Age: 31
End: 2021-08-17

## 2021-08-17 VITALS
OXYGEN SATURATION: 100 % | HEART RATE: 78 BPM | DIASTOLIC BLOOD PRESSURE: 86 MMHG | RESPIRATION RATE: 18 BRPM | TEMPERATURE: 97.1 F | SYSTOLIC BLOOD PRESSURE: 139 MMHG

## 2021-08-17 DIAGNOSIS — R73.09 ELEVATED HEMOGLOBIN A1C: ICD-10-CM

## 2021-08-17 DIAGNOSIS — Z01.810 PRE-OPERATIVE CARDIOVASCULAR EXAMINATION: Primary | ICD-10-CM

## 2021-08-17 DIAGNOSIS — L68.0 FEMALE HIRSUTISM: ICD-10-CM

## 2021-08-17 DIAGNOSIS — D50.0 IRON DEFICIENCY ANEMIA DUE TO CHRONIC BLOOD LOSS: ICD-10-CM

## 2021-08-17 DIAGNOSIS — E55.9 VITAMIN D DEFICIENCY: ICD-10-CM

## 2021-08-17 DIAGNOSIS — K21.9 GASTROESOPHAGEAL REFLUX DISEASE, UNSPECIFIED WHETHER ESOPHAGITIS PRESENT: ICD-10-CM

## 2021-08-17 DIAGNOSIS — E78.00 HYPERCHOLESTEREMIA: ICD-10-CM

## 2021-08-17 DIAGNOSIS — E11.9 TYPE 2 DIABETES MELLITUS WITHOUT COMPLICATION, WITHOUT LONG-TERM CURRENT USE OF INSULIN (CMD): ICD-10-CM

## 2021-08-17 DIAGNOSIS — L50.9 URTICARIA, UNSPECIFIED: ICD-10-CM

## 2021-08-17 DIAGNOSIS — I10 HYPERTENSION, UNCONTROLLED: ICD-10-CM

## 2021-08-17 DIAGNOSIS — E89.0 POSTABLATIVE HYPOTHYROIDISM: ICD-10-CM

## 2021-08-17 DIAGNOSIS — E66.01 MORBID OBESITY WITH BMI OF 50.0-59.9, ADULT (CMD): ICD-10-CM

## 2021-08-17 DIAGNOSIS — R63.5 WEIGHT GAIN, ABNORMAL: ICD-10-CM

## 2021-08-17 DIAGNOSIS — Z13.5 DIABETIC RETINOPATHY SCREENING: ICD-10-CM

## 2021-08-17 PROCEDURE — 85025 COMPLETE CBC W/AUTO DIFF WBC: CPT | Performed by: INTERNAL MEDICINE

## 2021-08-17 PROCEDURE — 80053 COMPREHEN METABOLIC PANEL: CPT | Performed by: INTERNAL MEDICINE

## 2021-08-17 PROCEDURE — 84100 ASSAY OF PHOSPHORUS: CPT | Performed by: SURGERY

## 2021-08-17 PROCEDURE — 80061 LIPID PANEL: CPT | Performed by: INTERNAL MEDICINE

## 2021-08-17 PROCEDURE — 93000 ELECTROCARDIOGRAM COMPLETE: CPT | Performed by: INTERNAL MEDICINE

## 2021-08-17 PROCEDURE — 84425 ASSAY OF VITAMIN B-1: CPT | Performed by: SURGERY

## 2021-08-17 PROCEDURE — 82607 VITAMIN B-12: CPT | Performed by: SURGERY

## 2021-08-17 PROCEDURE — 83970 ASSAY OF PARATHORMONE: CPT | Performed by: SURGERY

## 2021-08-17 PROCEDURE — 82306 VITAMIN D 25 HYDROXY: CPT | Performed by: SURGERY

## 2021-08-17 PROCEDURE — 84439 ASSAY OF FREE THYROXINE: CPT | Performed by: INTERNAL MEDICINE

## 2021-08-17 PROCEDURE — 82728 ASSAY OF FERRITIN: CPT | Performed by: SURGERY

## 2021-08-17 PROCEDURE — 83525 ASSAY OF INSULIN: CPT | Performed by: SURGERY

## 2021-08-17 PROCEDURE — 3079F DIAST BP 80-89 MM HG: CPT | Performed by: INTERNAL MEDICINE

## 2021-08-17 PROCEDURE — 3075F SYST BP GE 130 - 139MM HG: CPT | Performed by: INTERNAL MEDICINE

## 2021-08-17 PROCEDURE — 36415 COLL VENOUS BLD VENIPUNCTURE: CPT

## 2021-08-17 PROCEDURE — 71046 X-RAY EXAM CHEST 2 VIEWS: CPT | Performed by: SURGERY

## 2021-08-17 PROCEDURE — 84443 ASSAY THYROID STIM HORMONE: CPT | Performed by: INTERNAL MEDICINE

## 2021-08-17 ASSESSMENT — PAIN SCALES - GENERAL: PAINLEVEL: 0

## 2021-08-18 DIAGNOSIS — E78.00 HYPERCHOLESTEREMIA: ICD-10-CM

## 2021-08-18 DIAGNOSIS — R63.5 WEIGHT GAIN, ABNORMAL: ICD-10-CM

## 2021-08-18 DIAGNOSIS — E89.0 POSTABLATIVE HYPOTHYROIDISM: ICD-10-CM

## 2021-08-18 DIAGNOSIS — L50.9 URTICARIA, UNSPECIFIED: ICD-10-CM

## 2021-08-18 DIAGNOSIS — L68.0 FEMALE HIRSUTISM: ICD-10-CM

## 2021-08-18 DIAGNOSIS — D50.0 IRON DEFICIENCY ANEMIA DUE TO CHRONIC BLOOD LOSS: ICD-10-CM

## 2021-08-18 DIAGNOSIS — E55.9 VITAMIN D DEFICIENCY: ICD-10-CM

## 2021-08-18 DIAGNOSIS — E66.01 MORBID OBESITY WITH BMI OF 50.0-59.9, ADULT (CMD): ICD-10-CM

## 2021-08-18 DIAGNOSIS — R73.09 ELEVATED HEMOGLOBIN A1C: ICD-10-CM

## 2021-08-18 DIAGNOSIS — I10 HYPERTENSION, UNCONTROLLED: ICD-10-CM

## 2021-08-18 DIAGNOSIS — E11.9 TYPE 2 DIABETES MELLITUS WITHOUT COMPLICATION, WITHOUT LONG-TERM CURRENT USE OF INSULIN (CMD): ICD-10-CM

## 2021-08-18 LAB
25(OH)D3+25(OH)D2 SERPL-MCNC: 20 NG/ML (ref 30–100)
ALBUMIN SERPL-MCNC: 3.7 G/DL (ref 3.6–5.1)
ALBUMIN/GLOB SERPL: 0.9 {RATIO} (ref 1–2.4)
ALP SERPL-CCNC: 55 UNITS/L (ref 45–117)
ALT SERPL-CCNC: 30 UNITS/L
ANION GAP SERPL CALC-SCNC: 11 MMOL/L (ref 10–20)
AST SERPL-CCNC: 19 UNITS/L
BASOPHILS # BLD: 0 K/MCL (ref 0–0.3)
BASOPHILS NFR BLD: 0 %
BILIRUB SERPL-MCNC: 0.4 MG/DL (ref 0.2–1)
BUN SERPL-MCNC: 9 MG/DL (ref 6–20)
BUN/CREAT SERPL: 8 (ref 7–25)
CALCIUM SERPL-MCNC: 8.8 MG/DL (ref 8.4–10.2)
CHLORIDE SERPL-SCNC: 106 MMOL/L (ref 98–107)
CHOLEST SERPL-MCNC: 274 MG/DL
CHOLEST/HDLC SERPL: 6.4 {RATIO}
CO2 SERPL-SCNC: 27 MMOL/L (ref 21–32)
CREAT SERPL-MCNC: 1.19 MG/DL (ref 0.51–0.95)
DEPRECATED RDW RBC: 51.3 FL (ref 39–50)
EOSINOPHIL # BLD: 0.1 K/MCL (ref 0–0.5)
EOSINOPHIL NFR BLD: 1 %
ERYTHROCYTE [DISTWIDTH] IN BLOOD: 16.7 % (ref 11–15)
FASTING DURATION TIME PATIENT: ABNORMAL H
FASTING DURATION TIME PATIENT: ABNORMAL H
FASTING DURATION TIME PATIENT: NORMAL H
FERRITIN SERPL-MCNC: 36 NG/ML (ref 8–252)
GFR SERPLBLD BASED ON 1.73 SQ M-ARVRAT: 71 ML/MIN/1.73M2
GLOBULIN SER-MCNC: 4 G/DL (ref 2–4)
GLUCOSE SERPL-MCNC: 91 MG/DL (ref 65–99)
HCT VFR BLD CALC: 38.6 % (ref 36–46.5)
HDLC SERPL-MCNC: 43 MG/DL
HGB BLD-MCNC: 11.6 G/DL (ref 12–15.5)
IMM GRANULOCYTES # BLD AUTO: 0 K/MCL (ref 0–0.2)
IMM GRANULOCYTES # BLD: 0 %
INSULIN P FAST SERPL-ACNC: 19 MUNITS/L (ref 3–28)
LDLC SERPL CALC-MCNC: 204 MG/DL
LYMPHOCYTES # BLD: 2.9 K/MCL (ref 1–4.8)
LYMPHOCYTES NFR BLD: 32 %
MCH RBC QN AUTO: 25.3 PG (ref 26–34)
MCHC RBC AUTO-ENTMCNC: 30.1 G/DL (ref 32–36.5)
MCV RBC AUTO: 84.1 FL (ref 78–100)
MONOCYTES # BLD: 0.5 K/MCL (ref 0.3–0.9)
MONOCYTES NFR BLD: 5 %
NEUTROPHILS # BLD: 5.5 K/MCL (ref 1.8–7.7)
NEUTROPHILS NFR BLD: 62 %
NONHDLC SERPL-MCNC: 231 MG/DL
NRBC BLD MANUAL-RTO: 0 /100 WBC
PHOSPHATE SERPL-MCNC: 3.8 MG/DL (ref 2.4–4.7)
PLATELET # BLD AUTO: 400 K/MCL (ref 140–450)
POTASSIUM SERPL-SCNC: 4.7 MMOL/L (ref 3.4–5.1)
PROT SERPL-MCNC: 7.7 G/DL (ref 6.4–8.2)
PTH-INTACT SERPL-MCNC: 92 PG/ML (ref 19–88)
RBC # BLD: 4.59 MIL/MCL (ref 4–5.2)
SODIUM SERPL-SCNC: 139 MMOL/L (ref 135–145)
T4 FREE SERPL-MCNC: 0.7 NG/DL (ref 0.8–1.5)
TRIGL SERPL-MCNC: 133 MG/DL
TSH SERPL-ACNC: 47.37 MCUNITS/ML (ref 0.35–5)
VIT B12 SERPL-MCNC: 408 PG/ML (ref 211–911)
WBC # BLD: 8.9 K/MCL (ref 4.2–11)

## 2021-08-22 LAB — VIT B1 PYROPHOSHATE BLD-SCNC: 54 NMOL/L (ref 70–180)

## 2021-08-24 ENCOUNTER — TELEPHONE (OUTPATIENT)
Dept: INTERNAL MEDICINE | Age: 31
End: 2021-08-24

## 2021-08-24 DIAGNOSIS — E89.0 POSTABLATIVE HYPOTHYROIDISM: Primary | ICD-10-CM

## 2021-08-24 RX ORDER — LEVOTHYROXINE SODIUM 175 UG/1
175 TABLET ORAL DAILY
Qty: 90 TABLET | Refills: 3 | Status: SHIPPED | OUTPATIENT
Start: 2021-08-24 | End: 2021-12-09 | Stop reason: SDUPTHER

## 2021-08-24 RX ORDER — AMLODIPINE BESYLATE 10 MG/1
10 TABLET ORAL DAILY
Qty: 90 TABLET | Refills: 3 | Status: SHIPPED | OUTPATIENT
Start: 2021-08-24 | End: 2023-01-12 | Stop reason: DRUGHIGH

## 2021-08-25 ENCOUNTER — DOCUMENTATION (OUTPATIENT)
Dept: BARIATRICS/WEIGHT MGMT | Age: 31
End: 2021-08-25

## 2021-08-30 ENCOUNTER — APPOINTMENT (OUTPATIENT)
Dept: ULTRASOUND IMAGING | Age: 31
End: 2021-08-30
Attending: SURGERY

## 2021-09-01 ENCOUNTER — TELEPHONE (OUTPATIENT)
Dept: SLEEP MEDICINE | Age: 31
End: 2021-09-01

## 2021-09-01 ENCOUNTER — OFFICE VISIT (OUTPATIENT)
Dept: SLEEP MEDICINE | Age: 31
End: 2021-09-01

## 2021-09-01 VITALS
SYSTOLIC BLOOD PRESSURE: 122 MMHG | WEIGHT: 293 LBS | BODY MASS INDEX: 48.82 KG/M2 | DIASTOLIC BLOOD PRESSURE: 87 MMHG | HEART RATE: 99 BPM | OXYGEN SATURATION: 99 % | HEIGHT: 65 IN

## 2021-09-01 DIAGNOSIS — G47.20 DYSFUNCTIONS OF SLEEP STAGES OR AROUSAL FROM SLEEP: ICD-10-CM

## 2021-09-01 DIAGNOSIS — I10 HYPERTENSION, UNCONTROLLED: ICD-10-CM

## 2021-09-01 DIAGNOSIS — R06.83 SNORING: Primary | ICD-10-CM

## 2021-09-01 DIAGNOSIS — Z98.84 BARIATRIC SURGERY STATUS: ICD-10-CM

## 2021-09-01 DIAGNOSIS — E66.01 CLASS 3 SEVERE OBESITY WITH SERIOUS COMORBIDITY AND BODY MASS INDEX (BMI) OF 60.0 TO 69.9 IN ADULT, UNSPECIFIED OBESITY TYPE (CMD): ICD-10-CM

## 2021-09-01 DIAGNOSIS — G47.10 HYPERSOMNIA, UNSPECIFIED: ICD-10-CM

## 2021-09-01 DIAGNOSIS — E11.9 TYPE 2 DIABETES MELLITUS WITHOUT COMPLICATION, WITHOUT LONG-TERM CURRENT USE OF INSULIN (CMD): ICD-10-CM

## 2021-09-01 DIAGNOSIS — Z01.812 PRE-PROCEDURE LAB EXAM: Primary | ICD-10-CM

## 2021-09-01 PROCEDURE — 99244 OFF/OP CNSLTJ NEW/EST MOD 40: CPT | Performed by: NURSE PRACTITIONER

## 2021-09-01 PROCEDURE — 3074F SYST BP LT 130 MM HG: CPT | Performed by: NURSE PRACTITIONER

## 2021-09-01 PROCEDURE — 3079F DIAST BP 80-89 MM HG: CPT | Performed by: NURSE PRACTITIONER

## 2021-09-01 ASSESSMENT — ENCOUNTER SYMPTOMS
BLURRED VISION: 0
BACK PAIN: 0
HEADACHES: 1
EXCESSIVE DAYTIME SLEEPINESS: 0
WEIGHT LOSS: 0
FEVER: 0
SNORING: 1
NUMBNESS: 0
DIARRHEA: 0
PHOTOPHOBIA: 0
SORE THROAT: 0
SLEEP DISTURBANCES DUE TO BREATHING: 1
VOMITING: 0
CHILLS: 0
NAUSEA: 0
CONSTIPATION: 0
PARESTHESIAS: 0

## 2021-09-02 DIAGNOSIS — R63.5 ABNORMAL WEIGHT GAIN: Primary | ICD-10-CM

## 2021-09-02 DIAGNOSIS — L68.0 HIRSUTISM: ICD-10-CM

## 2021-09-02 DIAGNOSIS — I10 ESSENTIAL HYPERTENSION, BENIGN: ICD-10-CM

## 2021-09-02 DIAGNOSIS — D50.0 IRON DEFICIENCY ANEMIA SECONDARY TO BLOOD LOSS (CHRONIC): ICD-10-CM

## 2021-09-02 DIAGNOSIS — R73.09 IMPAIRED GLUCOSE TOLERANCE TEST: ICD-10-CM

## 2021-09-02 DIAGNOSIS — E55.9 VITAMIN D DEFICIENCY: ICD-10-CM

## 2021-09-02 PROBLEM — E21.1 SECONDARY HYPERPARATHYROIDISM, NON-RENAL (CMD): Chronic | Status: ACTIVE | Noted: 2021-09-02

## 2021-09-02 PROBLEM — E51.9 THIAMINE DEFICIENCY: Chronic | Status: ACTIVE | Noted: 2021-09-02

## 2021-09-02 RX ORDER — ERGOCALCIFEROL 1.25 MG/1
50000 CAPSULE ORAL
Qty: 12 CAPSULE | Refills: 0 | Status: SHIPPED | OUTPATIENT
Start: 2021-09-06 | End: 2021-09-16 | Stop reason: CLARIF

## 2021-09-15 PROBLEM — Z97.3 WEARS CONTACT LENSES: Status: ACTIVE | Noted: 2021-09-15

## 2021-09-16 ENCOUNTER — V-VISIT (OUTPATIENT)
Dept: BARIATRICS/WEIGHT MGMT | Age: 31
End: 2021-09-16

## 2021-09-16 DIAGNOSIS — M54.50 CHRONIC LOW BACK PAIN, UNSPECIFIED BACK PAIN LATERALITY, UNSPECIFIED WHETHER SCIATICA PRESENT: Chronic | ICD-10-CM

## 2021-09-16 DIAGNOSIS — D50.0 IRON DEFICIENCY ANEMIA DUE TO CHRONIC BLOOD LOSS: ICD-10-CM

## 2021-09-16 DIAGNOSIS — E11.9 TYPE 2 DIABETES MELLITUS WITHOUT COMPLICATION, WITHOUT LONG-TERM CURRENT USE OF INSULIN (CMD): ICD-10-CM

## 2021-09-16 DIAGNOSIS — E55.9 VITAMIN D DEFICIENCY: ICD-10-CM

## 2021-09-16 DIAGNOSIS — E66.01 MORBID OBESITY WITH BMI OF 50.0-59.9, ADULT (CMD): Primary | ICD-10-CM

## 2021-09-16 DIAGNOSIS — G89.29 CHRONIC LOW BACK PAIN, UNSPECIFIED BACK PAIN LATERALITY, UNSPECIFIED WHETHER SCIATICA PRESENT: Chronic | ICD-10-CM

## 2021-09-16 DIAGNOSIS — G47.9 SLEEP DISTURBANCE: Chronic | ICD-10-CM

## 2021-09-16 DIAGNOSIS — E51.9 THIAMINE DEFICIENCY: Chronic | ICD-10-CM

## 2021-09-16 DIAGNOSIS — E78.00 HYPERCHOLESTEREMIA: ICD-10-CM

## 2021-09-16 DIAGNOSIS — R73.09 ELEVATED HEMOGLOBIN A1C: ICD-10-CM

## 2021-09-16 DIAGNOSIS — E21.1 SECONDARY HYPERPARATHYROIDISM, NON-RENAL (CMD): Chronic | ICD-10-CM

## 2021-09-16 DIAGNOSIS — E89.0 POSTABLATIVE HYPOTHYROIDISM: ICD-10-CM

## 2021-09-16 PROCEDURE — 99214 OFFICE O/P EST MOD 30 MIN: CPT | Performed by: SURGERY

## 2021-09-16 RX ORDER — LANOLIN ALCOHOL/MO/W.PET/CERES
100 CREAM (GRAM) TOPICAL DAILY
Status: ON HOLD | COMMUNITY
End: 2022-06-21 | Stop reason: HOSPADM

## 2021-09-16 ASSESSMENT — PATIENT HEALTH QUESTIONNAIRE - PHQ9
CLINICAL INTERPRETATION OF PHQ9 SCORE: NO FURTHER SCREENING NEEDED
2. FEELING DOWN, DEPRESSED OR HOPELESS: NOT AT ALL
CLINICAL INTERPRETATION OF PHQ2 SCORE: NO FURTHER SCREENING NEEDED
SUM OF ALL RESPONSES TO PHQ9 QUESTIONS 1 AND 2: 0
SUM OF ALL RESPONSES TO PHQ9 QUESTIONS 1 AND 2: 0
1. LITTLE INTEREST OR PLEASURE IN DOING THINGS: NOT AT ALL

## 2021-09-16 ASSESSMENT — ENCOUNTER SYMPTOMS
PSYCHIATRIC NEGATIVE: 1
RESPIRATORY NEGATIVE: 1
CONSTITUTIONAL NEGATIVE: 1
NEUROLOGICAL NEGATIVE: 1
GASTROINTESTINAL NEGATIVE: 1

## 2021-09-20 ENCOUNTER — OFFICE VISIT (OUTPATIENT)
Dept: BARIATRICS/WEIGHT MGMT | Age: 31
End: 2021-09-20

## 2021-09-20 ENCOUNTER — TELEPHONE (OUTPATIENT)
Dept: SCHEDULING | Age: 31
End: 2021-09-20

## 2021-09-20 VITALS
DIASTOLIC BLOOD PRESSURE: 94 MMHG | SYSTOLIC BLOOD PRESSURE: 142 MMHG | BODY MASS INDEX: 43.4 KG/M2 | TEMPERATURE: 96.7 F | WEIGHT: 293 LBS | HEART RATE: 86 BPM | HEIGHT: 69 IN | OXYGEN SATURATION: 98 %

## 2021-09-20 DIAGNOSIS — R63.5 ABNORMAL WEIGHT GAIN: Primary | ICD-10-CM

## 2021-09-20 PROCEDURE — 94690 O2 UPTK REST INDIRECT: CPT

## 2021-09-20 ASSESSMENT — PATIENT HEALTH QUESTIONNAIRE - PHQ9
1. LITTLE INTEREST OR PLEASURE IN DOING THINGS: NOT AT ALL
CLINICAL INTERPRETATION OF PHQ2 SCORE: NO FURTHER SCREENING NEEDED
SUM OF ALL RESPONSES TO PHQ9 QUESTIONS 1 AND 2: 0
2. FEELING DOWN, DEPRESSED OR HOPELESS: NOT AT ALL
CLINICAL INTERPRETATION OF PHQ9 SCORE: NO FURTHER SCREENING NEEDED
SUM OF ALL RESPONSES TO PHQ9 QUESTIONS 1 AND 2: 0

## 2021-09-22 ENCOUNTER — TELEPHONE (OUTPATIENT)
Dept: SCHEDULING | Age: 31
End: 2021-09-22

## 2021-09-23 ENCOUNTER — APPOINTMENT (OUTPATIENT)
Dept: RESPIRATORY THERAPY | Age: 31
End: 2021-09-23
Attending: SURGERY

## 2021-09-25 ENCOUNTER — HOSPITAL ENCOUNTER (OUTPATIENT)
Dept: ULTRASOUND IMAGING | Age: 31
Discharge: HOME | End: 2021-09-25
Attending: SURGERY

## 2021-09-25 DIAGNOSIS — I10 HYPERTENSION, UNCONTROLLED: ICD-10-CM

## 2021-09-25 DIAGNOSIS — E66.01 MORBID OBESITY WITH BMI OF 50.0-59.9, ADULT (CMD): ICD-10-CM

## 2021-09-25 DIAGNOSIS — R73.09 ELEVATED HEMOGLOBIN A1C: ICD-10-CM

## 2021-09-25 DIAGNOSIS — E55.9 VITAMIN D DEFICIENCY: ICD-10-CM

## 2021-09-25 DIAGNOSIS — D50.0 IRON DEFICIENCY ANEMIA DUE TO CHRONIC BLOOD LOSS: ICD-10-CM

## 2021-09-25 DIAGNOSIS — R63.5 WEIGHT GAIN, ABNORMAL: ICD-10-CM

## 2021-09-25 DIAGNOSIS — E78.00 HYPERCHOLESTEREMIA: ICD-10-CM

## 2021-09-25 DIAGNOSIS — E89.0 POSTABLATIVE HYPOTHYROIDISM: ICD-10-CM

## 2021-09-25 DIAGNOSIS — E11.9 TYPE 2 DIABETES MELLITUS WITHOUT COMPLICATION, WITHOUT LONG-TERM CURRENT USE OF INSULIN (CMD): ICD-10-CM

## 2021-09-25 DIAGNOSIS — L68.0 FEMALE HIRSUTISM: ICD-10-CM

## 2021-09-25 DIAGNOSIS — L50.9 URTICARIA, UNSPECIFIED: ICD-10-CM

## 2021-09-25 PROCEDURE — 76705 ECHO EXAM OF ABDOMEN: CPT

## 2021-09-27 ENCOUNTER — SPECIALTY PHARMACY (OUTPATIENT)
Dept: PHARMACY | Facility: CLINIC | Age: 31
End: 2021-09-27

## 2021-09-27 ENCOUNTER — LAB VISIT (OUTPATIENT)
Dept: LAB | Facility: HOSPITAL | Age: 31
End: 2021-09-27
Attending: INTERNAL MEDICINE
Payer: MEDICAID

## 2021-09-27 ENCOUNTER — OFFICE VISIT (OUTPATIENT)
Dept: GASTROENTEROLOGY | Facility: CLINIC | Age: 31
End: 2021-09-27
Payer: MEDICAID

## 2021-09-27 VITALS
SYSTOLIC BLOOD PRESSURE: 113 MMHG | TEMPERATURE: 98 F | BODY MASS INDEX: 25.32 KG/M2 | HEART RATE: 92 BPM | OXYGEN SATURATION: 100 % | DIASTOLIC BLOOD PRESSURE: 81 MMHG | WEIGHT: 142.88 LBS | HEIGHT: 63 IN

## 2021-09-27 DIAGNOSIS — D84.9 IMMUNOSUPPRESSED STATUS: ICD-10-CM

## 2021-09-27 DIAGNOSIS — K50.80 CROHN'S DISEASE OF BOTH SMALL AND LARGE INTESTINE WITHOUT COMPLICATION: Primary | ICD-10-CM

## 2021-09-27 DIAGNOSIS — K50.80 CROHN'S DISEASE OF BOTH SMALL AND LARGE INTESTINE WITHOUT COMPLICATION: ICD-10-CM

## 2021-09-27 DIAGNOSIS — L02.215 PERINEAL ABSCESS: ICD-10-CM

## 2021-09-27 DIAGNOSIS — L40.9 PSORIASIS: ICD-10-CM

## 2021-09-27 PROBLEM — K76.0 FATTY METAMORPHOSIS OF LIVER: Chronic | Status: ACTIVE | Noted: 2021-09-27

## 2021-09-27 PROBLEM — R16.0 HEPATOMEGALY: Chronic | Status: ACTIVE | Noted: 2021-09-27

## 2021-09-27 LAB
25(OH)D3+25(OH)D2 SERPL-MCNC: 12 NG/ML (ref 30–96)
ALBUMIN SERPL BCP-MCNC: 3.6 G/DL (ref 3.5–5.2)
ALP SERPL-CCNC: 96 U/L (ref 55–135)
ALT SERPL W/O P-5'-P-CCNC: 14 U/L (ref 10–44)
ANION GAP SERPL CALC-SCNC: 13 MMOL/L (ref 8–16)
AST SERPL-CCNC: 16 U/L (ref 10–40)
BASOPHILS # BLD AUTO: 0.02 K/UL (ref 0–0.2)
BASOPHILS NFR BLD: 0.2 % (ref 0–1.9)
BILIRUB SERPL-MCNC: 0.7 MG/DL (ref 0.1–1)
BUN SERPL-MCNC: 5 MG/DL (ref 6–20)
CALCIUM SERPL-MCNC: 9.7 MG/DL (ref 8.7–10.5)
CHLORIDE SERPL-SCNC: 105 MMOL/L (ref 95–110)
CO2 SERPL-SCNC: 21 MMOL/L (ref 23–29)
CREAT SERPL-MCNC: 0.7 MG/DL (ref 0.5–1.4)
CRP SERPL-MCNC: 8.5 MG/L (ref 0–8.2)
DIFFERENTIAL METHOD: ABNORMAL
EOSINOPHIL # BLD AUTO: 0.3 K/UL (ref 0–0.5)
EOSINOPHIL NFR BLD: 3.6 % (ref 0–8)
ERYTHROCYTE [DISTWIDTH] IN BLOOD BY AUTOMATED COUNT: 13.2 % (ref 11.5–14.5)
EST. GFR  (AFRICAN AMERICAN): >60 ML/MIN/1.73 M^2
EST. GFR  (NON AFRICAN AMERICAN): >60 ML/MIN/1.73 M^2
GLUCOSE SERPL-MCNC: 69 MG/DL (ref 70–110)
HCT VFR BLD AUTO: 42.6 % (ref 37–48.5)
HGB BLD-MCNC: 13.7 G/DL (ref 12–16)
IMM GRANULOCYTES # BLD AUTO: 0.01 K/UL (ref 0–0.04)
IMM GRANULOCYTES NFR BLD AUTO: 0.1 % (ref 0–0.5)
LYMPHOCYTES # BLD AUTO: 2.1 K/UL (ref 1–4.8)
LYMPHOCYTES NFR BLD: 26 % (ref 18–48)
MCH RBC QN AUTO: 31.8 PG (ref 27–31)
MCHC RBC AUTO-ENTMCNC: 32.2 G/DL (ref 32–36)
MCV RBC AUTO: 99 FL (ref 82–98)
MONOCYTES # BLD AUTO: 0.5 K/UL (ref 0.3–1)
MONOCYTES NFR BLD: 5.7 % (ref 4–15)
NEUTROPHILS # BLD AUTO: 5.2 K/UL (ref 1.8–7.7)
NEUTROPHILS NFR BLD: 64.4 % (ref 38–73)
NRBC BLD-RTO: 0 /100 WBC
PLATELET # BLD AUTO: 343 K/UL (ref 150–450)
PMV BLD AUTO: 11 FL (ref 9.2–12.9)
POTASSIUM SERPL-SCNC: 3.7 MMOL/L (ref 3.5–5.1)
PROT SERPL-MCNC: 9 G/DL (ref 6–8.4)
RBC # BLD AUTO: 4.31 M/UL (ref 4–5.4)
SODIUM SERPL-SCNC: 139 MMOL/L (ref 136–145)
VIT B12 SERPL-MCNC: 323 PG/ML (ref 210–950)
WBC # BLD AUTO: 8.14 K/UL (ref 3.9–12.7)

## 2021-09-27 PROCEDURE — 87340 HEPATITIS B SURFACE AG IA: CPT | Performed by: INTERNAL MEDICINE

## 2021-09-27 PROCEDURE — 82306 VITAMIN D 25 HYDROXY: CPT | Performed by: INTERNAL MEDICINE

## 2021-09-27 PROCEDURE — 86140 C-REACTIVE PROTEIN: CPT

## 2021-09-27 PROCEDURE — 82607 VITAMIN B-12: CPT | Performed by: INTERNAL MEDICINE

## 2021-09-27 PROCEDURE — 99215 PR OFFICE/OUTPT VISIT, EST, LEVL V, 40-54 MIN: ICD-10-PCS | Mod: S$GLB,,, | Performed by: INTERNAL MEDICINE

## 2021-09-27 PROCEDURE — 86704 HEP B CORE ANTIBODY TOTAL: CPT | Performed by: INTERNAL MEDICINE

## 2021-09-27 PROCEDURE — 85025 COMPLETE CBC W/AUTO DIFF WBC: CPT | Performed by: INTERNAL MEDICINE

## 2021-09-27 PROCEDURE — 99215 OFFICE O/P EST HI 40 MIN: CPT | Mod: S$GLB,,, | Performed by: INTERNAL MEDICINE

## 2021-09-27 PROCEDURE — 80053 COMPREHEN METABOLIC PANEL: CPT | Performed by: INTERNAL MEDICINE

## 2021-09-27 RX ORDER — HYDROCODONE BITARTRATE AND ACETAMINOPHEN 5; 325 MG/1; MG/1
TABLET ORAL
COMMUNITY
Start: 2021-08-05 | End: 2021-09-27

## 2021-09-27 RX ORDER — KETOCONAZOLE 20 MG/ML
SHAMPOO, SUSPENSION TOPICAL
COMMUNITY
Start: 2021-07-24 | End: 2022-07-05

## 2021-09-27 RX ORDER — DOXYCYCLINE 100 MG/1
100 CAPSULE ORAL EVERY 12 HOURS
Qty: 28 CAPSULE | Refills: 0 | Status: SHIPPED | OUTPATIENT
Start: 2021-09-27 | End: 2021-11-15

## 2021-09-27 RX ORDER — TRIAMCINOLONE ACETONIDE 1 MG/G
CREAM TOPICAL
COMMUNITY
Start: 2021-09-15 | End: 2022-02-01 | Stop reason: SDUPTHER

## 2021-09-27 RX ORDER — DOXYCYCLINE 100 MG/1
100 CAPSULE ORAL 2 TIMES DAILY
COMMUNITY
Start: 2021-07-30 | End: 2021-09-27

## 2021-09-28 ENCOUNTER — LAB SERVICES (OUTPATIENT)
Dept: URGENT CARE | Age: 31
End: 2021-09-28

## 2021-09-28 DIAGNOSIS — Z11.52 ENCOUNTER FOR SCREENING LABORATORY TESTING FOR COVID-19 VIRUS IN ASYMPTOMATIC PATIENT: Primary | ICD-10-CM

## 2021-09-28 DIAGNOSIS — Z01.812 ENCOUNTER FOR SCREENING LABORATORY TESTING FOR COVID-19 VIRUS IN ASYMPTOMATIC PATIENT: Primary | ICD-10-CM

## 2021-09-28 PROBLEM — L02.215 PERINEAL ABSCESS: Status: ACTIVE | Noted: 2021-09-28

## 2021-09-28 LAB
HBV CORE AB SERPL QL IA: NEGATIVE
HBV SURFACE AG SERPL QL IA: NEGATIVE

## 2021-09-28 PROCEDURE — U0005 INFEC AGEN DETEC AMPLI PROBE: HCPCS | Performed by: INTERNAL MEDICINE

## 2021-09-28 PROCEDURE — U0003 INFECTIOUS AGENT DETECTION BY NUCLEIC ACID (DNA OR RNA); SEVERE ACUTE RESPIRATORY SYNDROME CORONAVIRUS 2 (SARS-COV-2) (CORONAVIRUS DISEASE [COVID-19]), AMPLIFIED PROBE TECHNIQUE, MAKING USE OF HIGH THROUGHPUT TECHNOLOGIES AS DESCRIBED BY CMS-2020-01-R: HCPCS | Performed by: INTERNAL MEDICINE

## 2021-09-29 LAB
SARS-COV-2 RNA RESP QL NAA+PROBE: NOT DETECTED
SERVICE CMNT-IMP: NORMAL
SERVICE CMNT-IMP: NORMAL

## 2021-09-30 ENCOUNTER — OFFICE VISIT (OUTPATIENT)
Dept: SLEEP MEDICINE | Age: 31
End: 2021-09-30

## 2021-09-30 ENCOUNTER — HOSPITAL ENCOUNTER (OUTPATIENT)
Dept: RESPIRATORY THERAPY | Age: 31
Discharge: HOME | End: 2021-09-30
Attending: SURGERY

## 2021-09-30 DIAGNOSIS — E78.00 HYPERCHOLESTEREMIA: ICD-10-CM

## 2021-09-30 DIAGNOSIS — G47.33 OSA (OBSTRUCTIVE SLEEP APNEA): ICD-10-CM

## 2021-09-30 DIAGNOSIS — R73.09 ELEVATED HEMOGLOBIN A1C: ICD-10-CM

## 2021-09-30 DIAGNOSIS — R63.5 WEIGHT GAIN, ABNORMAL: ICD-10-CM

## 2021-09-30 DIAGNOSIS — D50.0 IRON DEFICIENCY ANEMIA DUE TO CHRONIC BLOOD LOSS: ICD-10-CM

## 2021-09-30 DIAGNOSIS — E89.0 POSTABLATIVE HYPOTHYROIDISM: ICD-10-CM

## 2021-09-30 DIAGNOSIS — E11.9 TYPE 2 DIABETES MELLITUS WITHOUT COMPLICATION, WITHOUT LONG-TERM CURRENT USE OF INSULIN (CMD): ICD-10-CM

## 2021-09-30 DIAGNOSIS — I10 HYPERTENSION, UNCONTROLLED: ICD-10-CM

## 2021-09-30 DIAGNOSIS — E66.01 MORBID OBESITY WITH BMI OF 50.0-59.9, ADULT (CMD): ICD-10-CM

## 2021-09-30 DIAGNOSIS — L68.0 FEMALE HIRSUTISM: ICD-10-CM

## 2021-09-30 DIAGNOSIS — E55.9 VITAMIN D DEFICIENCY: ICD-10-CM

## 2021-09-30 DIAGNOSIS — L50.9 URTICARIA, UNSPECIFIED: ICD-10-CM

## 2021-09-30 PROCEDURE — 94729 DIFFUSING CAPACITY: CPT

## 2021-09-30 PROCEDURE — 94010 BREATHING CAPACITY TEST: CPT

## 2021-09-30 PROCEDURE — 94726 PLETHYSMOGRAPHY LUNG VOLUMES: CPT

## 2021-09-30 PROCEDURE — 94200 LUNG FUNCTION TEST (MBC/MVV): CPT

## 2021-09-30 PROCEDURE — 95811 POLYSOM 6/>YRS CPAP 4/> PARM: CPT | Performed by: SPECIALIST

## 2021-10-01 ENCOUNTER — OFFICE VISIT (OUTPATIENT)
Dept: OPTOMETRY | Facility: CLINIC | Age: 31
End: 2021-10-01
Payer: MEDICAID

## 2021-10-01 ENCOUNTER — EXTERNAL RECORD (OUTPATIENT)
Dept: HEALTH INFORMATION MANAGEMENT | Facility: OTHER | Age: 31
End: 2021-10-01

## 2021-10-01 DIAGNOSIS — H44.23 DEGENERATIVE MYOPIA OF BOTH EYES, UNSPECIFIED WHETHER COMPLICATION PRESENT: Primary | ICD-10-CM

## 2021-10-01 DIAGNOSIS — Z46.0 ENCOUNTER FOR FITTING OR ADJUSTMENT OF SPECTACLES OR CONTACT LENSES: Primary | ICD-10-CM

## 2021-10-01 DIAGNOSIS — H52.13 HIGH MYOPIA, BILATERAL: ICD-10-CM

## 2021-10-01 DIAGNOSIS — Z97.3 WEARS CONTACT LENSES: ICD-10-CM

## 2021-10-01 PROCEDURE — 92015 PR REFRACTION: ICD-10-PCS | Mod: ,,, | Performed by: OPTOMETRIST

## 2021-10-01 PROCEDURE — 99212 OFFICE O/P EST SF 10 MIN: CPT | Mod: PBBFAC,PO | Performed by: OPTOMETRIST

## 2021-10-01 PROCEDURE — 92014 COMPRE OPH EXAM EST PT 1/>: CPT | Mod: S$PBB,,, | Performed by: OPTOMETRIST

## 2021-10-01 PROCEDURE — 92015 DETERMINE REFRACTIVE STATE: CPT | Mod: ,,, | Performed by: OPTOMETRIST

## 2021-10-01 PROCEDURE — 99999 PR PBB SHADOW E&M-EST. PATIENT-LVL II: CPT | Mod: PBBFAC,,, | Performed by: OPTOMETRIST

## 2021-10-01 PROCEDURE — 99499 NO LOS: ICD-10-PCS | Mod: S$PBB,,, | Performed by: OPTOMETRIST

## 2021-10-01 PROCEDURE — 92310 PR CONTACT LENS FITTING (NO CHANGE): ICD-10-PCS | Mod: CSM,,, | Performed by: OPTOMETRIST

## 2021-10-01 PROCEDURE — 92310 CONTACT LENS FITTING OU: CPT | Mod: CSM,,, | Performed by: OPTOMETRIST

## 2021-10-01 PROCEDURE — 92014 PR EYE EXAM, EST PATIENT,COMPREHESV: ICD-10-PCS | Mod: S$PBB,,, | Performed by: OPTOMETRIST

## 2021-10-01 PROCEDURE — 99999 PR PBB SHADOW E&M-EST. PATIENT-LVL II: ICD-10-PCS | Mod: PBBFAC,,, | Performed by: OPTOMETRIST

## 2021-10-01 PROCEDURE — 99499 UNLISTED E&M SERVICE: CPT | Mod: S$PBB,,, | Performed by: OPTOMETRIST

## 2021-10-04 ENCOUNTER — TELEPHONE (OUTPATIENT)
Dept: SLEEP MEDICINE | Age: 31
End: 2021-10-04

## 2021-10-04 DIAGNOSIS — G47.33 OSA (OBSTRUCTIVE SLEEP APNEA): Primary | ICD-10-CM

## 2021-10-05 ENCOUNTER — APPOINTMENT (OUTPATIENT)
Dept: RESPIRATORY THERAPY | Age: 31
End: 2021-10-05
Attending: SURGERY

## 2021-10-06 ENCOUNTER — PATIENT MESSAGE (OUTPATIENT)
Dept: GASTROENTEROLOGY | Facility: CLINIC | Age: 31
End: 2021-10-06

## 2021-10-07 ENCOUNTER — PATIENT MESSAGE (OUTPATIENT)
Dept: GASTROENTEROLOGY | Facility: CLINIC | Age: 31
End: 2021-10-07

## 2021-10-07 ENCOUNTER — TELEPHONE (OUTPATIENT)
Dept: GASTROENTEROLOGY | Facility: CLINIC | Age: 31
End: 2021-10-07

## 2021-10-12 ENCOUNTER — PATIENT MESSAGE (OUTPATIENT)
Dept: GASTROENTEROLOGY | Facility: CLINIC | Age: 31
End: 2021-10-12

## 2021-10-13 ENCOUNTER — OFFICE VISIT (OUTPATIENT)
Dept: INTERNAL MEDICINE | Age: 31
End: 2021-10-13

## 2021-10-13 ENCOUNTER — TELEPHONE (OUTPATIENT)
Dept: BARIATRICS/WEIGHT MGMT | Age: 31
End: 2021-10-13

## 2021-10-13 ENCOUNTER — LAB SERVICES (OUTPATIENT)
Dept: LAB | Age: 31
End: 2021-10-13

## 2021-10-13 VITALS
HEIGHT: 65 IN | BODY MASS INDEX: 48.82 KG/M2 | HEART RATE: 98 BPM | DIASTOLIC BLOOD PRESSURE: 77 MMHG | OXYGEN SATURATION: 99 % | TEMPERATURE: 99.5 F | SYSTOLIC BLOOD PRESSURE: 122 MMHG | RESPIRATION RATE: 18 BRPM | WEIGHT: 293 LBS

## 2021-10-13 DIAGNOSIS — E11.9 TYPE 2 DIABETES MELLITUS WITHOUT COMPLICATION, WITHOUT LONG-TERM CURRENT USE OF INSULIN (CMD): Primary | ICD-10-CM

## 2021-10-13 DIAGNOSIS — E89.0 POSTABLATIVE HYPOTHYROIDISM: ICD-10-CM

## 2021-10-13 DIAGNOSIS — Z23 NEED FOR VACCINATION: ICD-10-CM

## 2021-10-13 DIAGNOSIS — Z13.5 DIABETIC RETINOPATHY SCREENING: ICD-10-CM

## 2021-10-13 DIAGNOSIS — E11.9 TYPE 2 DIABETES MELLITUS WITHOUT COMPLICATION, WITHOUT LONG-TERM CURRENT USE OF INSULIN (CMD): ICD-10-CM

## 2021-10-13 PROCEDURE — X1094 NO CHARGE VISIT: HCPCS | Performed by: INTERNAL MEDICINE

## 2021-10-13 PROCEDURE — 84443 ASSAY THYROID STIM HORMONE: CPT | Performed by: INTERNAL MEDICINE

## 2021-10-13 PROCEDURE — 84439 ASSAY OF FREE THYROXINE: CPT | Performed by: INTERNAL MEDICINE

## 2021-10-13 PROCEDURE — 83036 HEMOGLOBIN GLYCOSYLATED A1C: CPT | Performed by: INTERNAL MEDICINE

## 2021-10-13 PROCEDURE — 3074F SYST BP LT 130 MM HG: CPT | Performed by: INTERNAL MEDICINE

## 2021-10-13 PROCEDURE — 3078F DIAST BP <80 MM HG: CPT | Performed by: INTERNAL MEDICINE

## 2021-10-13 ASSESSMENT — PAIN SCALES - GENERAL: PAINLEVEL: 0

## 2021-10-13 ASSESSMENT — PATIENT HEALTH QUESTIONNAIRE - PHQ9
SUM OF ALL RESPONSES TO PHQ9 QUESTIONS 1 AND 2: 0
CLINICAL INTERPRETATION OF PHQ9 SCORE: NO FURTHER SCREENING NEEDED
CLINICAL INTERPRETATION OF PHQ2 SCORE: NO FURTHER SCREENING NEEDED
2. FEELING DOWN, DEPRESSED OR HOPELESS: NOT AT ALL
SUM OF ALL RESPONSES TO PHQ9 QUESTIONS 1 AND 2: 0
1. LITTLE INTEREST OR PLEASURE IN DOING THINGS: NOT AT ALL

## 2021-10-14 ENCOUNTER — TELEPHONE (OUTPATIENT)
Dept: SCHEDULING | Age: 31
End: 2021-10-14

## 2021-10-14 LAB
HBA1C MFR BLD: 6.1 % (ref 4.5–5.6)
T4 FREE SERPL-MCNC: 1.2 NG/DL (ref 0.8–1.5)
TSH SERPL-ACNC: 6.14 MCUNITS/ML (ref 0.35–5)

## 2021-10-15 DIAGNOSIS — K21.9 GASTROESOPHAGEAL REFLUX DISEASE, UNSPECIFIED WHETHER ESOPHAGITIS PRESENT: Primary | ICD-10-CM

## 2021-10-15 DIAGNOSIS — K76.0 FATTY METAMORPHOSIS OF LIVER: ICD-10-CM

## 2021-10-15 DIAGNOSIS — E11.9 TYPE 2 DIABETES MELLITUS WITHOUT COMPLICATION, WITHOUT LONG-TERM CURRENT USE OF INSULIN (CMD): ICD-10-CM

## 2021-10-15 DIAGNOSIS — E66.01 MORBID OBESITY WITH BMI OF 50.0-59.9, ADULT (CMD): ICD-10-CM

## 2021-10-15 DIAGNOSIS — I10 HYPERTENSION, UNCONTROLLED: ICD-10-CM

## 2021-10-17 DIAGNOSIS — E89.0 POSTABLATIVE HYPOTHYROIDISM: Primary | ICD-10-CM

## 2021-10-18 ENCOUNTER — APPOINTMENT (OUTPATIENT)
Dept: INTERNAL MEDICINE | Age: 31
End: 2021-10-18

## 2021-10-18 ENCOUNTER — OFFICE VISIT (OUTPATIENT)
Dept: INTERNAL MEDICINE | Age: 31
End: 2021-10-18

## 2021-10-18 VITALS
HEIGHT: 65 IN | TEMPERATURE: 97.8 F | RESPIRATION RATE: 18 BRPM | SYSTOLIC BLOOD PRESSURE: 134 MMHG | OXYGEN SATURATION: 100 % | DIASTOLIC BLOOD PRESSURE: 78 MMHG | WEIGHT: 293 LBS | BODY MASS INDEX: 48.82 KG/M2

## 2021-10-18 DIAGNOSIS — E11.9 TYPE 2 DIABETES MELLITUS WITHOUT COMPLICATION, WITHOUT LONG-TERM CURRENT USE OF INSULIN (CMD): Primary | ICD-10-CM

## 2021-10-18 DIAGNOSIS — G89.29 CHRONIC LOW BACK PAIN, UNSPECIFIED BACK PAIN LATERALITY, UNSPECIFIED WHETHER SCIATICA PRESENT: Chronic | ICD-10-CM

## 2021-10-18 DIAGNOSIS — E78.00 HYPERCHOLESTEREMIA: ICD-10-CM

## 2021-10-18 DIAGNOSIS — E89.0 POSTABLATIVE HYPOTHYROIDISM: ICD-10-CM

## 2021-10-18 DIAGNOSIS — D64.9 ANEMIA, UNSPECIFIED TYPE: ICD-10-CM

## 2021-10-18 DIAGNOSIS — E55.9 VITAMIN D DEFICIENCY: ICD-10-CM

## 2021-10-18 DIAGNOSIS — E66.01 MORBID OBESITY WITH BMI OF 50.0-59.9, ADULT (CMD): ICD-10-CM

## 2021-10-18 DIAGNOSIS — M54.50 CHRONIC LOW BACK PAIN, UNSPECIFIED BACK PAIN LATERALITY, UNSPECIFIED WHETHER SCIATICA PRESENT: Chronic | ICD-10-CM

## 2021-10-18 PROBLEM — Z01.419 ENCOUNTER FOR CERVICAL PAP SMEAR WITH PELVIC EXAM: Status: RESOLVED | Noted: 2020-07-06 | Resolved: 2021-10-18

## 2021-10-18 PROBLEM — N76.0 BACTERIAL VAGINOSIS: Status: RESOLVED | Noted: 2021-03-24 | Resolved: 2021-10-18

## 2021-10-18 PROBLEM — B96.89 BACTERIAL VAGINOSIS: Status: RESOLVED | Noted: 2021-03-24 | Resolved: 2021-10-18

## 2021-10-18 PROBLEM — L50.9 URTICARIA, UNSPECIFIED: Status: RESOLVED | Noted: 2021-04-29 | Resolved: 2021-10-18

## 2021-10-18 PROBLEM — R73.09 ELEVATED HEMOGLOBIN A1C: Status: RESOLVED | Noted: 2020-09-01 | Resolved: 2021-10-18

## 2021-10-18 PROCEDURE — 3075F SYST BP GE 130 - 139MM HG: CPT | Performed by: INTERNAL MEDICINE

## 2021-10-18 PROCEDURE — 3078F DIAST BP <80 MM HG: CPT | Performed by: INTERNAL MEDICINE

## 2021-10-18 PROCEDURE — 99214 OFFICE O/P EST MOD 30 MIN: CPT | Performed by: INTERNAL MEDICINE

## 2021-10-18 ASSESSMENT — PATIENT HEALTH QUESTIONNAIRE - PHQ9
CLINICAL INTERPRETATION OF PHQ2 SCORE: NO FURTHER SCREENING NEEDED
2. FEELING DOWN, DEPRESSED OR HOPELESS: NOT AT ALL
1. LITTLE INTEREST OR PLEASURE IN DOING THINGS: NOT AT ALL
CLINICAL INTERPRETATION OF PHQ9 SCORE: NO FURTHER SCREENING NEEDED
SUM OF ALL RESPONSES TO PHQ9 QUESTIONS 1 AND 2: 0
SUM OF ALL RESPONSES TO PHQ9 QUESTIONS 1 AND 2: 0

## 2021-10-18 ASSESSMENT — PAIN SCALES - GENERAL: PAINLEVEL: 0

## 2021-10-21 ENCOUNTER — TELEPHONE (OUTPATIENT)
Dept: GASTROENTEROLOGY | Facility: CLINIC | Age: 31
End: 2021-10-21

## 2021-10-21 ENCOUNTER — OFFICE VISIT (OUTPATIENT)
Dept: INTERNAL MEDICINE | Age: 31
End: 2021-10-21

## 2021-10-21 VITALS
WEIGHT: 293 LBS | DIASTOLIC BLOOD PRESSURE: 82 MMHG | HEIGHT: 65 IN | BODY MASS INDEX: 48.82 KG/M2 | HEART RATE: 87 BPM | SYSTOLIC BLOOD PRESSURE: 130 MMHG | TEMPERATURE: 98.6 F | OXYGEN SATURATION: 97 % | RESPIRATION RATE: 18 BRPM

## 2021-10-21 DIAGNOSIS — I10 BENIGN ESSENTIAL HTN: ICD-10-CM

## 2021-10-21 DIAGNOSIS — Z01.818 PRE-OP EVALUATION: Primary | ICD-10-CM

## 2021-10-21 DIAGNOSIS — E66.01 MORBID OBESITY WITH BMI OF 50.0-59.9, ADULT (CMD): ICD-10-CM

## 2021-10-21 PROCEDURE — 3079F DIAST BP 80-89 MM HG: CPT | Performed by: INTERNAL MEDICINE

## 2021-10-21 PROCEDURE — 3075F SYST BP GE 130 - 139MM HG: CPT | Performed by: INTERNAL MEDICINE

## 2021-10-21 PROCEDURE — 99214 OFFICE O/P EST MOD 30 MIN: CPT | Performed by: INTERNAL MEDICINE

## 2021-10-21 ASSESSMENT — PAIN SCALES - GENERAL: PAINLEVEL: 0

## 2021-10-23 ENCOUNTER — APPOINTMENT (OUTPATIENT)
Dept: INTERNAL MEDICINE | Age: 31
End: 2021-10-23

## 2021-10-26 ENCOUNTER — OFFICE VISIT (OUTPATIENT)
Dept: OBGYN | Age: 31
End: 2021-10-26

## 2021-10-26 ENCOUNTER — LAB SERVICES (OUTPATIENT)
Dept: LAB | Age: 31
End: 2021-10-26

## 2021-10-26 VITALS
RESPIRATION RATE: 17 BRPM | HEART RATE: 93 BPM | DIASTOLIC BLOOD PRESSURE: 86 MMHG | WEIGHT: 293 LBS | BODY MASS INDEX: 48.82 KG/M2 | TEMPERATURE: 98.2 F | SYSTOLIC BLOOD PRESSURE: 135 MMHG | HEIGHT: 65 IN

## 2021-10-26 DIAGNOSIS — Z11.3 ROUTINE SCREENING FOR STI (SEXUALLY TRANSMITTED INFECTION): ICD-10-CM

## 2021-10-26 DIAGNOSIS — Z11.3 ROUTINE SCREENING FOR STI (SEXUALLY TRANSMITTED INFECTION): Primary | ICD-10-CM

## 2021-10-26 PROCEDURE — 87481 CANDIDA DNA AMP PROBE: CPT | Performed by: PSYCHIATRY & NEUROLOGY

## 2021-10-26 PROCEDURE — 3079F DIAST BP 80-89 MM HG: CPT | Performed by: NURSE PRACTITIONER

## 2021-10-26 PROCEDURE — 87389 HIV-1 AG W/HIV-1&-2 AB AG IA: CPT | Performed by: PSYCHIATRY & NEUROLOGY

## 2021-10-26 PROCEDURE — 87491 CHLMYD TRACH DNA AMP PROBE: CPT | Performed by: PSYCHIATRY & NEUROLOGY

## 2021-10-26 PROCEDURE — 99212 OFFICE O/P EST SF 10 MIN: CPT | Performed by: NURSE PRACTITIONER

## 2021-10-26 PROCEDURE — 86592 SYPHILIS TEST NON-TREP QUAL: CPT | Performed by: PSYCHIATRY & NEUROLOGY

## 2021-10-26 PROCEDURE — 81513 NFCT DS BV RNA VAG FLU ALG: CPT | Performed by: PSYCHIATRY & NEUROLOGY

## 2021-10-26 PROCEDURE — 87563 M. GENITALIUM AMP PROBE: CPT | Performed by: PSYCHIATRY & NEUROLOGY

## 2021-10-26 PROCEDURE — 87661 TRICHOMONAS VAGINALIS AMPLIF: CPT | Performed by: PSYCHIATRY & NEUROLOGY

## 2021-10-26 PROCEDURE — 87591 N.GONORRHOEAE DNA AMP PROB: CPT | Performed by: PSYCHIATRY & NEUROLOGY

## 2021-10-26 PROCEDURE — 3075F SYST BP GE 130 - 139MM HG: CPT | Performed by: NURSE PRACTITIONER

## 2021-10-26 PROCEDURE — 36415 COLL VENOUS BLD VENIPUNCTURE: CPT

## 2021-10-26 ASSESSMENT — ENCOUNTER SYMPTOMS
EYES NEGATIVE: 1
NEUROLOGICAL NEGATIVE: 1
CONSTITUTIONAL NEGATIVE: 1
RESPIRATORY NEGATIVE: 1
PSYCHIATRIC NEGATIVE: 1
GASTROINTESTINAL NEGATIVE: 1

## 2021-10-27 LAB
C TRACH RRNA SPEC QL NAA+PROBE: NEGATIVE
HIV 1+2 AB+HIV1 P24 AG SERPL QL IA: NONREACTIVE
Lab: NORMAL
N GONORRHOEA RRNA SPEC QL NAA+PROBE: NEGATIVE
RPR SER QL: NONREACTIVE

## 2021-10-28 DIAGNOSIS — N76.0 BACTERIAL VAGINOSIS: Primary | ICD-10-CM

## 2021-10-28 DIAGNOSIS — B96.89 BACTERIAL VAGINOSIS: Primary | ICD-10-CM

## 2021-10-28 LAB
BACTERIAL VAGINOSIS VAG-IMP: POSITIVE
C ALBICANS DNA VAG QL NAA+PROBE: NOT DETECTED
C GLABRATA DNA VAG QL NAA+PROBE: NOT DETECTED
M GENITALIUM DNA SPEC QL NAA+PROBE: NOT DETECTED
SERVICE CMNT-IMP: ABNORMAL
T VAGINALIS DNA VAG QL NAA+PROBE: NOT DETECTED

## 2021-10-28 RX ORDER — METRONIDAZOLE 500 MG/1
500 TABLET ORAL 2 TIMES DAILY WITH MEALS
Qty: 14 TABLET | Refills: 0 | Status: SHIPPED | OUTPATIENT
Start: 2021-10-28 | End: 2021-11-04

## 2021-10-30 ENCOUNTER — LAB SERVICES (OUTPATIENT)
Dept: LAB | Age: 31
End: 2021-10-30

## 2021-10-30 DIAGNOSIS — Z01.812 PRE-PROCEDURE LAB EXAM: ICD-10-CM

## 2021-10-30 PROCEDURE — U0003 INFECTIOUS AGENT DETECTION BY NUCLEIC ACID (DNA OR RNA); SEVERE ACUTE RESPIRATORY SYNDROME CORONAVIRUS 2 (SARS-COV-2) (CORONAVIRUS DISEASE [COVID-19]), AMPLIFIED PROBE TECHNIQUE, MAKING USE OF HIGH THROUGHPUT TECHNOLOGIES AS DESCRIBED BY CMS-2020-01-R: HCPCS | Performed by: PSYCHIATRY & NEUROLOGY

## 2021-10-30 PROCEDURE — U0005 INFEC AGEN DETEC AMPLI PROBE: HCPCS | Performed by: PSYCHIATRY & NEUROLOGY

## 2021-10-31 LAB
SARS-COV-2 RNA RESP QL NAA+PROBE: NOT DETECTED
SERVICE CMNT-IMP: NORMAL
SERVICE CMNT-IMP: NORMAL

## 2021-11-01 ASSESSMENT — ACTIVITIES OF DAILY LIVING (ADL)
ADL_SCORE: 12
NEEDS_ASSIST: NO
HISTORY OF FALLING IN THE LAST YEAR (PRIOR TO ADMISSION): NO
ADL_BEFORE_ADMISSION: INDEPENDENT

## 2021-11-01 ASSESSMENT — COGNITIVE AND FUNCTIONAL STATUS - GENERAL
ARE YOU BLIND OR DO YOU HAVE SERIOUS DIFFICULTY SEEING, EVEN WHEN WEARING GLASSES: NO
ARE YOU DEAF OR DO YOU HAVE SERIOUS DIFFICULTY  HEARING: NO

## 2021-11-02 ENCOUNTER — ANESTHESIA EVENT (OUTPATIENT)
Dept: GASTROENTEROLOGY | Age: 31
End: 2021-11-02

## 2021-11-02 ENCOUNTER — HOSPITAL ENCOUNTER (OUTPATIENT)
Dept: GASTROENTEROLOGY | Age: 31
Discharge: HOME | End: 2021-11-02
Attending: SURGERY

## 2021-11-02 ENCOUNTER — ANESTHESIA (OUTPATIENT)
Dept: GASTROENTEROLOGY | Age: 31
End: 2021-11-02

## 2021-11-02 VITALS
TEMPERATURE: 97 F | SYSTOLIC BLOOD PRESSURE: 156 MMHG | WEIGHT: 293 LBS | DIASTOLIC BLOOD PRESSURE: 92 MMHG | HEIGHT: 69 IN | BODY MASS INDEX: 43.4 KG/M2 | OXYGEN SATURATION: 97 % | HEART RATE: 84 BPM | RESPIRATION RATE: 21 BRPM

## 2021-11-02 DIAGNOSIS — E11.9 TYPE 2 DIABETES MELLITUS WITHOUT COMPLICATION, WITHOUT LONG-TERM CURRENT USE OF INSULIN (CMD): ICD-10-CM

## 2021-11-02 DIAGNOSIS — K76.0 FATTY METAMORPHOSIS OF LIVER: ICD-10-CM

## 2021-11-02 DIAGNOSIS — E66.01 MORBID OBESITY WITH BMI OF 50.0-59.9, ADULT (CMD): ICD-10-CM

## 2021-11-02 DIAGNOSIS — K21.9 GASTROESOPHAGEAL REFLUX DISEASE, UNSPECIFIED WHETHER ESOPHAGITIS PRESENT: ICD-10-CM

## 2021-11-02 DIAGNOSIS — R63.5 ABNORMAL WEIGHT GAIN: ICD-10-CM

## 2021-11-02 DIAGNOSIS — I10 HYPERTENSION, UNCONTROLLED: ICD-10-CM

## 2021-11-02 DIAGNOSIS — K29.70 GASTRITIS, PRESENCE OF BLEEDING UNSPECIFIED, UNSPECIFIED CHRONICITY, UNSPECIFIED GASTRITIS TYPE: ICD-10-CM

## 2021-11-02 LAB
B-HCG UR QL: NEGATIVE
INTERNAL PROCEDURAL CONTROLS ACCEPTABLE: YES

## 2021-11-02 PROCEDURE — 43239 EGD BIOPSY SINGLE/MULTIPLE: CPT | Performed by: SURGERY

## 2021-11-02 PROCEDURE — 88305 TISSUE EXAM BY PATHOLOGIST: CPT | Performed by: SURGERY

## 2021-11-02 PROCEDURE — 10002807 HB RX 258: Performed by: SURGERY

## 2021-11-02 PROCEDURE — 13000024 HB GI COMPLEX CASE S/U + 1ST 15 MIN

## 2021-11-02 PROCEDURE — 13000001 HB PHASE II RECOVERY EA 30 MINUTES

## 2021-11-02 PROCEDURE — 13000008 HB ANESTHESIA MAC OUTSIDE OR

## 2021-11-02 PROCEDURE — 81025 URINE PREGNANCY TEST: CPT | Performed by: SURGERY

## 2021-11-02 PROCEDURE — 10002800 HB RX 250 W HCPCS: Performed by: ANESTHESIOLOGY

## 2021-11-02 PROCEDURE — 10004451 HB PACU RECOVERY 1ST 30 MINUTES

## 2021-11-02 RX ORDER — SODIUM CHLORIDE 9 MG/ML
INJECTION, SOLUTION INTRAVENOUS CONTINUOUS
Status: DISCONTINUED | OUTPATIENT
Start: 2021-11-02 | End: 2021-11-04 | Stop reason: HOSPADM

## 2021-11-02 RX ORDER — PROPOFOL 10 MG/ML
INJECTION, EMULSION INTRAVENOUS PRN
Status: DISCONTINUED | OUTPATIENT
Start: 2021-11-02 | End: 2021-11-02

## 2021-11-02 RX ADMIN — SODIUM CHLORIDE: 9 INJECTION, SOLUTION INTRAVENOUS at 10:20

## 2021-11-02 RX ADMIN — PROPOFOL 75 MCG/KG/MIN: 10 INJECTION, EMULSION INTRAVENOUS at 10:25

## 2021-11-02 RX ADMIN — PROPOFOL 100 MG: 10 INJECTION, EMULSION INTRAVENOUS at 10:26

## 2021-11-02 SDOH — SOCIAL STABILITY: SOCIAL INSECURITY: RISK FACTORS: BMI> 30 (OBESITY)

## 2021-11-02 SDOH — SOCIAL STABILITY: SOCIAL INSECURITY: RISK FACTORS: SLEEP APNEA

## 2021-11-02 ASSESSMENT — PAIN SCALES - GENERAL
PAINLEVEL_OUTOF10: 0

## 2021-11-02 ASSESSMENT — PAIN SCALES - WONG BAKER: WONGBAKER_NUMERICALRESPONSE: 0

## 2021-11-03 LAB
ASR DISCLAIMER: NORMAL
CASE RPRT: NORMAL
CLINICAL INFO: NORMAL
PATH REPORT.FINAL DX SPEC: NORMAL
PATH REPORT.GROSS SPEC: NORMAL

## 2021-11-04 ENCOUNTER — V-VISIT (OUTPATIENT)
Dept: BARIATRICS/WEIGHT MGMT | Age: 31
End: 2021-11-04

## 2021-11-04 DIAGNOSIS — E11.9 TYPE 2 DIABETES MELLITUS WITHOUT COMPLICATION, WITHOUT LONG-TERM CURRENT USE OF INSULIN (CMD): ICD-10-CM

## 2021-11-04 DIAGNOSIS — G47.9 SLEEP DISTURBANCE: Chronic | ICD-10-CM

## 2021-11-04 DIAGNOSIS — E55.9 VITAMIN D DEFICIENCY: ICD-10-CM

## 2021-11-04 DIAGNOSIS — R16.0 HEPATOMEGALY: Chronic | ICD-10-CM

## 2021-11-04 DIAGNOSIS — E51.9 THIAMINE DEFICIENCY: Chronic | ICD-10-CM

## 2021-11-04 DIAGNOSIS — K76.0 FATTY METAMORPHOSIS OF LIVER: Chronic | ICD-10-CM

## 2021-11-04 DIAGNOSIS — I10 BENIGN ESSENTIAL HTN: ICD-10-CM

## 2021-11-04 DIAGNOSIS — E66.01 MORBID OBESITY WITH BMI OF 50.0-59.9, ADULT (CMD): Primary | ICD-10-CM

## 2021-11-04 PROCEDURE — 99214 OFFICE O/P EST MOD 30 MIN: CPT | Performed by: NURSE PRACTITIONER

## 2021-11-04 ASSESSMENT — ENCOUNTER SYMPTOMS
BLOOD IN STOOL: 0
ABDOMINAL PAIN: 0
SHORTNESS OF BREATH: 0
DIARRHEA: 0
PSYCHIATRIC NEGATIVE: 1
VOMITING: 0
NEUROLOGICAL NEGATIVE: 1
NAUSEA: 0
ABDOMINAL DISTENTION: 0
COUGH: 0
ANAL BLEEDING: 0
EYES NEGATIVE: 1
CONSTITUTIONAL NEGATIVE: 1
APNEA: 1
CHEST TIGHTNESS: 0
CONSTIPATION: 0
RECTAL PAIN: 0

## 2021-11-04 ASSESSMENT — PATIENT HEALTH QUESTIONNAIRE - PHQ9
SUM OF ALL RESPONSES TO PHQ9 QUESTIONS 1 AND 2: 0
CLINICAL INTERPRETATION OF PHQ9 SCORE: NO FURTHER SCREENING NEEDED
CLINICAL INTERPRETATION OF PHQ2 SCORE: NO FURTHER SCREENING NEEDED
2. FEELING DOWN, DEPRESSED OR HOPELESS: NOT AT ALL
1. LITTLE INTEREST OR PLEASURE IN DOING THINGS: NOT AT ALL
SUM OF ALL RESPONSES TO PHQ9 QUESTIONS 1 AND 2: 0
SUM OF ALL RESPONSES TO PHQ9 QUESTIONS 1 AND 2: 0

## 2021-11-08 ENCOUNTER — HOSPITAL ENCOUNTER (OUTPATIENT)
Age: 31
Setting detail: SURGERY ADMIT
End: 2021-11-08
Attending: SURGERY | Admitting: SURGERY

## 2021-11-09 ENCOUNTER — TELEPHONE (OUTPATIENT)
Dept: SCHEDULING | Age: 31
End: 2021-11-09

## 2021-11-09 ENCOUNTER — OFFICE VISIT (OUTPATIENT)
Dept: URGENT CARE | Age: 31
End: 2021-11-09

## 2021-11-09 VITALS
OXYGEN SATURATION: 99 % | HEIGHT: 69 IN | BODY MASS INDEX: 43.4 KG/M2 | DIASTOLIC BLOOD PRESSURE: 86 MMHG | WEIGHT: 293 LBS | RESPIRATION RATE: 18 BRPM | HEART RATE: 97 BPM | TEMPERATURE: 97.9 F | SYSTOLIC BLOOD PRESSURE: 129 MMHG

## 2021-11-09 DIAGNOSIS — R60.9 SWELLING: ICD-10-CM

## 2021-11-09 DIAGNOSIS — Z88.1 ALLERGY TO ANTIBIOTIC: ICD-10-CM

## 2021-11-09 DIAGNOSIS — R21 RASH: ICD-10-CM

## 2021-11-09 DIAGNOSIS — L50.9 HIVES: Primary | ICD-10-CM

## 2021-11-09 DIAGNOSIS — L29.9 PRURITUS: ICD-10-CM

## 2021-11-09 LAB
B-HCG UR QL: NEGATIVE
INTERNAL PROCEDURAL CONTROLS ACCEPTABLE: YES

## 2021-11-09 PROCEDURE — 81025 URINE PREGNANCY TEST: CPT | Performed by: PEDIATRICS

## 2021-11-09 PROCEDURE — 96372 THER/PROPH/DIAG INJ SC/IM: CPT | Performed by: PEDIATRICS

## 2021-11-09 PROCEDURE — 3079F DIAST BP 80-89 MM HG: CPT | Performed by: PEDIATRICS

## 2021-11-09 PROCEDURE — 99214 OFFICE O/P EST MOD 30 MIN: CPT | Performed by: PEDIATRICS

## 2021-11-09 PROCEDURE — 3074F SYST BP LT 130 MM HG: CPT | Performed by: PEDIATRICS

## 2021-11-09 RX ORDER — PREDNISONE 10 MG/1
TABLET ORAL
Qty: 7 TABLET | Refills: 0 | Status: SHIPPED | OUTPATIENT
Start: 2021-11-09 | End: 2021-12-03 | Stop reason: ALTCHOICE

## 2021-11-09 RX ORDER — CETIRIZINE HYDROCHLORIDE 10 MG/1
10 TABLET ORAL DAILY
Qty: 30 TABLET | Refills: 0 | Status: ON HOLD | OUTPATIENT
Start: 2021-11-09 | End: 2022-06-21 | Stop reason: CLARIF

## 2021-11-09 RX ORDER — METHYLPREDNISOLONE SODIUM SUCCINATE 125 MG/2ML
125 INJECTION, POWDER, LYOPHILIZED, FOR SOLUTION INTRAMUSCULAR; INTRAVENOUS ONCE
Status: COMPLETED | OUTPATIENT
Start: 2021-11-09 | End: 2021-11-09

## 2021-11-09 RX ADMIN — METHYLPREDNISOLONE SODIUM SUCCINATE 125 MG: 125 INJECTION, POWDER, LYOPHILIZED, FOR SOLUTION INTRAMUSCULAR; INTRAVENOUS at 18:41

## 2021-11-09 ASSESSMENT — ENCOUNTER SYMPTOMS
NEUROLOGICAL NEGATIVE: 1
ROS SKIN COMMENTS: SEE HPI
FACIAL SWELLING: 1
ENDOCRINE NEGATIVE: 1
PSYCHIATRIC NEGATIVE: 1
GASTROINTESTINAL NEGATIVE: 1
EYES NEGATIVE: 1
RESPIRATORY NEGATIVE: 1
HEMATOLOGIC/LYMPHATIC NEGATIVE: 1
CONSTITUTIONAL NEGATIVE: 1
ALLERGIC/IMMUNOLOGIC NEGATIVE: 1

## 2021-11-12 ENCOUNTER — TELEPHONE (OUTPATIENT)
Dept: GASTROENTEROLOGY | Facility: CLINIC | Age: 31
End: 2021-11-12
Payer: MEDICAID

## 2021-11-15 ENCOUNTER — TELEPHONE (OUTPATIENT)
Dept: BARIATRICS/WEIGHT MGMT | Age: 31
End: 2021-11-15

## 2021-11-15 ENCOUNTER — OFFICE VISIT (OUTPATIENT)
Dept: GASTROENTEROLOGY | Facility: CLINIC | Age: 31
End: 2021-11-15
Payer: MEDICAID

## 2021-11-15 VITALS
TEMPERATURE: 98 F | SYSTOLIC BLOOD PRESSURE: 101 MMHG | HEART RATE: 105 BPM | BODY MASS INDEX: 24.38 KG/M2 | WEIGHT: 137.56 LBS | OXYGEN SATURATION: 100 % | HEIGHT: 63 IN | DIASTOLIC BLOOD PRESSURE: 72 MMHG

## 2021-11-15 DIAGNOSIS — K50.80 CROHN'S DISEASE OF BOTH SMALL AND LARGE INTESTINE WITHOUT COMPLICATION: Primary | ICD-10-CM

## 2021-11-15 PROCEDURE — 99215 OFFICE O/P EST HI 40 MIN: CPT | Mod: S$GLB,,, | Performed by: INTERNAL MEDICINE

## 2021-11-15 PROCEDURE — 99215 PR OFFICE/OUTPT VISIT, EST, LEVL V, 40-54 MIN: ICD-10-PCS | Mod: S$GLB,,, | Performed by: INTERNAL MEDICINE

## 2021-11-15 RX ORDER — ERGOCALCIFEROL 1.25 MG/1
50000 CAPSULE ORAL
COMMUNITY
Start: 2021-10-26 | End: 2022-07-05

## 2021-11-19 ENCOUNTER — TELEPHONE (OUTPATIENT)
Dept: GASTROENTEROLOGY | Facility: CLINIC | Age: 31
End: 2021-11-19
Payer: MEDICAID

## 2021-11-24 ENCOUNTER — PATIENT MESSAGE (OUTPATIENT)
Dept: ENDOSCOPY | Facility: HOSPITAL | Age: 31
End: 2021-11-24
Payer: MEDICAID

## 2021-11-24 DIAGNOSIS — Z01.818 PRE-OP TESTING: ICD-10-CM

## 2021-11-26 ENCOUNTER — TELEPHONE (OUTPATIENT)
Dept: SCHEDULING | Age: 31
End: 2021-11-26

## 2021-12-02 ENCOUNTER — TELEPHONE (OUTPATIENT)
Dept: BARIATRICS/WEIGHT MGMT | Age: 31
End: 2021-12-02

## 2021-12-03 ENCOUNTER — OFFICE VISIT (OUTPATIENT)
Dept: BARIATRICS/WEIGHT MGMT | Age: 31
End: 2021-12-03

## 2021-12-03 VITALS
HEART RATE: 94 BPM | OXYGEN SATURATION: 100 % | SYSTOLIC BLOOD PRESSURE: 143 MMHG | WEIGHT: 293 LBS | BODY MASS INDEX: 43.4 KG/M2 | DIASTOLIC BLOOD PRESSURE: 97 MMHG | HEIGHT: 69 IN | TEMPERATURE: 99.3 F

## 2021-12-03 DIAGNOSIS — K76.0 FATTY METAMORPHOSIS OF LIVER: Chronic | ICD-10-CM

## 2021-12-03 DIAGNOSIS — E51.9 THIAMINE DEFICIENCY: Chronic | ICD-10-CM

## 2021-12-03 DIAGNOSIS — I10 BENIGN ESSENTIAL HTN: ICD-10-CM

## 2021-12-03 DIAGNOSIS — E11.9 TYPE 2 DIABETES MELLITUS WITHOUT COMPLICATION, WITHOUT LONG-TERM CURRENT USE OF INSULIN (CMD): ICD-10-CM

## 2021-12-03 DIAGNOSIS — E55.9 VITAMIN D DEFICIENCY: ICD-10-CM

## 2021-12-03 DIAGNOSIS — R16.0 HEPATOMEGALY: Chronic | ICD-10-CM

## 2021-12-03 DIAGNOSIS — G89.29 CHRONIC LOW BACK PAIN, UNSPECIFIED BACK PAIN LATERALITY, UNSPECIFIED WHETHER SCIATICA PRESENT: Chronic | ICD-10-CM

## 2021-12-03 DIAGNOSIS — E89.0 POSTABLATIVE HYPOTHYROIDISM: ICD-10-CM

## 2021-12-03 DIAGNOSIS — R63.5 ABNORMAL WEIGHT GAIN: ICD-10-CM

## 2021-12-03 DIAGNOSIS — E78.00 HYPERCHOLESTEREMIA: ICD-10-CM

## 2021-12-03 DIAGNOSIS — G47.9 SLEEP DISTURBANCE: Primary | Chronic | ICD-10-CM

## 2021-12-03 DIAGNOSIS — M54.50 CHRONIC LOW BACK PAIN, UNSPECIFIED BACK PAIN LATERALITY, UNSPECIFIED WHETHER SCIATICA PRESENT: Chronic | ICD-10-CM

## 2021-12-03 DIAGNOSIS — L68.0 FEMALE HIRSUTISM: ICD-10-CM

## 2021-12-03 DIAGNOSIS — E66.01 MORBID OBESITY WITH BMI OF 50.0-59.9, ADULT (CMD): ICD-10-CM

## 2021-12-03 DIAGNOSIS — E21.1 SECONDARY HYPERPARATHYROIDISM, NON-RENAL (CMD): Chronic | ICD-10-CM

## 2021-12-03 PROCEDURE — 3077F SYST BP >= 140 MM HG: CPT | Performed by: SURGERY

## 2021-12-03 PROCEDURE — 3080F DIAST BP >= 90 MM HG: CPT | Performed by: SURGERY

## 2021-12-03 PROCEDURE — 99215 OFFICE O/P EST HI 40 MIN: CPT | Performed by: SURGERY

## 2021-12-03 RX ORDER — APREPITANT 40 MG/1
40 CAPSULE ORAL
Status: CANCELLED | OUTPATIENT
Start: 2021-12-03

## 2021-12-03 RX ORDER — ONDANSETRON 4 MG/1
4 TABLET, FILM COATED ORAL EVERY 8 HOURS PRN
Qty: 5 TABLET | Refills: 0 | Status: SHIPPED | OUTPATIENT
Start: 2021-12-03 | End: 2021-12-08

## 2021-12-03 RX ORDER — GABAPENTIN 100 MG/1
300 CAPSULE ORAL
Status: CANCELLED | OUTPATIENT
Start: 2021-12-03

## 2021-12-03 RX ORDER — GABAPENTIN 300 MG/1
300 CAPSULE ORAL
Qty: 1 CAPSULE | Refills: 0 | Status: SHIPPED | OUTPATIENT
Start: 2021-12-03 | End: 2022-04-29 | Stop reason: ALTCHOICE

## 2021-12-03 RX ORDER — ENOXAPARIN SODIUM 100 MG/ML
60 INJECTION SUBCUTANEOUS EVERY 12 HOURS
Qty: 16.8 ML | Refills: 0 | Status: SHIPPED | OUTPATIENT
Start: 2021-12-03 | End: 2021-12-17

## 2021-12-03 RX ORDER — HEPARIN SODIUM 5000 [USP'U]/ML
5000 INJECTION, SOLUTION INTRAVENOUS; SUBCUTANEOUS ONCE
Status: CANCELLED | OUTPATIENT
Start: 2021-12-03 | End: 2021-12-03

## 2021-12-03 RX ORDER — TIZANIDINE 4 MG/1
4 TABLET ORAL EVERY 6 HOURS PRN
Qty: 5 TABLET | Refills: 0 | Status: SHIPPED | OUTPATIENT
Start: 2021-12-03 | End: 2022-04-29 | Stop reason: ALTCHOICE

## 2021-12-03 RX ORDER — APREPITANT 40 MG/1
40 CAPSULE ORAL
Qty: 1 CAPSULE | Refills: 0 | Status: SHIPPED | OUTPATIENT
Start: 2021-12-03 | End: 2021-12-07 | Stop reason: CLARIF

## 2021-12-03 RX ORDER — TIZANIDINE 2 MG/1
4 TABLET ORAL ONCE
Status: CANCELLED | OUTPATIENT
Start: 2021-12-03 | End: 2021-12-03

## 2021-12-03 RX ORDER — SCOLOPAMINE TRANSDERMAL SYSTEM 1 MG/1
1 PATCH, EXTENDED RELEASE TRANSDERMAL ONCE
Status: CANCELLED | OUTPATIENT
Start: 2021-12-03 | End: 2021-12-03

## 2021-12-03 RX ORDER — CHLORHEXIDINE GLUCONATE ORAL RINSE 1.2 MG/ML
15 SOLUTION DENTAL
Status: CANCELLED | OUTPATIENT
Start: 2021-12-03

## 2021-12-03 RX ORDER — 0.9 % SODIUM CHLORIDE 0.9 %
2 VIAL (ML) INJECTION EVERY 12 HOURS SCHEDULED
Status: CANCELLED | OUTPATIENT
Start: 2021-12-03

## 2021-12-03 RX ORDER — SODIUM CHLORIDE, SODIUM LACTATE, POTASSIUM CHLORIDE, CALCIUM CHLORIDE 600; 310; 30; 20 MG/100ML; MG/100ML; MG/100ML; MG/100ML
INJECTION, SOLUTION INTRAVENOUS CONTINUOUS
Status: CANCELLED | OUTPATIENT
Start: 2021-12-03

## 2021-12-03 RX ORDER — ACETAMINOPHEN 500 MG
1000 TABLET ORAL
Status: CANCELLED | OUTPATIENT
Start: 2021-12-03

## 2021-12-03 ASSESSMENT — PATIENT HEALTH QUESTIONNAIRE - PHQ9
SUM OF ALL RESPONSES TO PHQ9 QUESTIONS 1 AND 2: 0
2. FEELING DOWN, DEPRESSED OR HOPELESS: NOT AT ALL
CLINICAL INTERPRETATION OF PHQ2 SCORE: NO FURTHER SCREENING NEEDED
SUM OF ALL RESPONSES TO PHQ9 QUESTIONS 1 AND 2: 0
1. LITTLE INTEREST OR PLEASURE IN DOING THINGS: NOT AT ALL

## 2021-12-03 ASSESSMENT — ENCOUNTER SYMPTOMS
EYES NEGATIVE: 1
RESPIRATORY NEGATIVE: 1
CONSTITUTIONAL NEGATIVE: 1
NEUROLOGICAL NEGATIVE: 1
GASTROINTESTINAL NEGATIVE: 1
PSYCHIATRIC NEGATIVE: 1

## 2021-12-05 ENCOUNTER — TELEPHONE (OUTPATIENT)
Dept: BARIATRICS/WEIGHT MGMT | Age: 31
End: 2021-12-05

## 2021-12-06 ENCOUNTER — TELEPHONE (OUTPATIENT)
Dept: INTERNAL MEDICINE | Age: 31
End: 2021-12-06

## 2021-12-06 ENCOUNTER — TELEPHONE (OUTPATIENT)
Dept: SCHEDULING | Age: 31
End: 2021-12-06

## 2021-12-06 VITALS — WEIGHT: 293 LBS | BODY MASS INDEX: 43.4 KG/M2 | HEIGHT: 69 IN

## 2021-12-06 ASSESSMENT — COGNITIVE AND FUNCTIONAL STATUS - GENERAL
ARE YOU DEAF OR DO YOU HAVE SERIOUS DIFFICULTY  HEARING: NO
ARE YOU BLIND OR DO YOU HAVE SERIOUS DIFFICULTY SEEING, EVEN WHEN WEARING GLASSES: NO

## 2021-12-06 ASSESSMENT — ACTIVITIES OF DAILY LIVING (ADL)
NEEDS_ASSIST: NO
ADL_SHORT_OF_BREATH: NO
ADL_SCORE: 12
ADL_BEFORE_ADMISSION: INDEPENDENT

## 2021-12-07 ENCOUNTER — OFFICE VISIT (OUTPATIENT)
Dept: INTERNAL MEDICINE | Age: 31
End: 2021-12-07

## 2021-12-07 ENCOUNTER — LAB SERVICES (OUTPATIENT)
Dept: LAB | Age: 31
End: 2021-12-07

## 2021-12-07 VITALS
RESPIRATION RATE: 18 BRPM | HEART RATE: 92 BPM | OXYGEN SATURATION: 100 % | DIASTOLIC BLOOD PRESSURE: 78 MMHG | SYSTOLIC BLOOD PRESSURE: 119 MMHG | HEIGHT: 69 IN | WEIGHT: 293 LBS | BODY MASS INDEX: 43.4 KG/M2 | TEMPERATURE: 98.5 F

## 2021-12-07 DIAGNOSIS — E89.0 POSTABLATIVE HYPOTHYROIDISM: ICD-10-CM

## 2021-12-07 DIAGNOSIS — E21.1 SECONDARY HYPERPARATHYROIDISM, NON-RENAL (CMD): ICD-10-CM

## 2021-12-07 DIAGNOSIS — R16.0 HEPATOMEGALY: ICD-10-CM

## 2021-12-07 DIAGNOSIS — M54.50 CHRONIC LOW BACK PAIN, UNSPECIFIED BACK PAIN LATERALITY, UNSPECIFIED WHETHER SCIATICA PRESENT: ICD-10-CM

## 2021-12-07 DIAGNOSIS — Z01.818 PRE-OP EVALUATION: ICD-10-CM

## 2021-12-07 DIAGNOSIS — E66.01 MORBID OBESITY WITH BMI OF 50.0-59.9, ADULT (CMD): ICD-10-CM

## 2021-12-07 DIAGNOSIS — E51.9 THIAMINE DEFICIENCY: ICD-10-CM

## 2021-12-07 DIAGNOSIS — G89.29 CHRONIC LOW BACK PAIN, UNSPECIFIED BACK PAIN LATERALITY, UNSPECIFIED WHETHER SCIATICA PRESENT: ICD-10-CM

## 2021-12-07 DIAGNOSIS — E78.00 HYPERCHOLESTEREMIA: ICD-10-CM

## 2021-12-07 DIAGNOSIS — E11.9 TYPE 2 DIABETES MELLITUS WITHOUT COMPLICATION, WITHOUT LONG-TERM CURRENT USE OF INSULIN (CMD): ICD-10-CM

## 2021-12-07 DIAGNOSIS — L68.0 FEMALE HIRSUTISM: ICD-10-CM

## 2021-12-07 DIAGNOSIS — I10 BENIGN ESSENTIAL HTN: ICD-10-CM

## 2021-12-07 DIAGNOSIS — R63.5 ABNORMAL WEIGHT GAIN: ICD-10-CM

## 2021-12-07 DIAGNOSIS — E55.9 VITAMIN D DEFICIENCY: ICD-10-CM

## 2021-12-07 DIAGNOSIS — E66.01 MORBID OBESITY WITH BMI OF 50.0-59.9, ADULT (CMD): Primary | ICD-10-CM

## 2021-12-07 DIAGNOSIS — K76.0 FATTY METAMORPHOSIS OF LIVER: ICD-10-CM

## 2021-12-07 DIAGNOSIS — G47.9 SLEEP DISTURBANCE: ICD-10-CM

## 2021-12-07 PROCEDURE — 3074F SYST BP LT 130 MM HG: CPT | Performed by: INTERNAL MEDICINE

## 2021-12-07 PROCEDURE — 85025 COMPLETE CBC W/AUTO DIFF WBC: CPT | Performed by: PSYCHIATRY & NEUROLOGY

## 2021-12-07 PROCEDURE — 36415 COLL VENOUS BLD VENIPUNCTURE: CPT | Performed by: INTERNAL MEDICINE

## 2021-12-07 PROCEDURE — 84443 ASSAY THYROID STIM HORMONE: CPT | Performed by: PSYCHIATRY & NEUROLOGY

## 2021-12-07 PROCEDURE — 84439 ASSAY OF FREE THYROXINE: CPT | Performed by: PSYCHIATRY & NEUROLOGY

## 2021-12-07 PROCEDURE — 3078F DIAST BP <80 MM HG: CPT | Performed by: INTERNAL MEDICINE

## 2021-12-07 PROCEDURE — 80053 COMPREHEN METABOLIC PANEL: CPT | Performed by: PSYCHIATRY & NEUROLOGY

## 2021-12-07 PROCEDURE — 99214 OFFICE O/P EST MOD 30 MIN: CPT | Performed by: INTERNAL MEDICINE

## 2021-12-07 PROCEDURE — 84703 CHORIONIC GONADOTROPIN ASSAY: CPT | Performed by: PSYCHIATRY & NEUROLOGY

## 2021-12-07 RX ORDER — APREPITANT 40 MG/1
40 CAPSULE ORAL
Qty: 1 CAPSULE | Refills: 0 | Status: SHIPPED | OUTPATIENT
Start: 2021-12-07 | End: 2022-04-29 | Stop reason: ALTCHOICE

## 2021-12-07 ASSESSMENT — PAIN SCALES - GENERAL: PAINLEVEL: 0

## 2021-12-08 LAB
ALBUMIN SERPL-MCNC: 3.4 G/DL (ref 3.6–5.1)
ALBUMIN/GLOB SERPL: 0.9 {RATIO} (ref 1–2.4)
ALP SERPL-CCNC: 54 UNITS/L (ref 45–117)
ALT SERPL-CCNC: 30 UNITS/L
ANION GAP SERPL CALC-SCNC: 12 MMOL/L (ref 10–20)
AST SERPL-CCNC: 17 UNITS/L
BASOPHILS # BLD: 0 K/MCL (ref 0–0.3)
BASOPHILS NFR BLD: 0 %
BILIRUB SERPL-MCNC: 0.2 MG/DL (ref 0.2–1)
BUN SERPL-MCNC: 12 MG/DL (ref 6–20)
BUN/CREAT SERPL: 14 (ref 7–25)
CALCIUM SERPL-MCNC: 9.5 MG/DL (ref 8.4–10.2)
CHLORIDE SERPL-SCNC: 104 MMOL/L (ref 98–107)
CO2 SERPL-SCNC: 27 MMOL/L (ref 21–32)
CREAT SERPL-MCNC: 0.87 MG/DL (ref 0.51–0.95)
DEPRECATED RDW RBC: 45.1 FL (ref 39–50)
EOSINOPHIL # BLD: 0.2 K/MCL (ref 0–0.5)
EOSINOPHIL NFR BLD: 2 %
ERYTHROCYTE [DISTWIDTH] IN BLOOD: 15.8 % (ref 11–15)
FASTING DURATION TIME PATIENT: ABNORMAL H
GFR SERPLBLD BASED ON 1.73 SQ M-ARVRAT: >90 ML/MIN
GLOBULIN SER-MCNC: 4 G/DL (ref 2–4)
GLUCOSE SERPL-MCNC: 89 MG/DL (ref 70–99)
HCG SERPL QL: NEGATIVE
HCT VFR BLD CALC: 36 % (ref 36–46.5)
HGB BLD-MCNC: 11.4 G/DL (ref 12–15.5)
IMM GRANULOCYTES # BLD AUTO: 0 K/MCL (ref 0–0.2)
IMM GRANULOCYTES # BLD: 0 %
LYMPHOCYTES # BLD: 3.4 K/MCL (ref 1–4.8)
LYMPHOCYTES NFR BLD: 33 %
MCH RBC QN AUTO: 25.3 PG (ref 26–34)
MCHC RBC AUTO-ENTMCNC: 31.7 G/DL (ref 32–36.5)
MCV RBC AUTO: 80 FL (ref 78–100)
MONOCYTES # BLD: 0.6 K/MCL (ref 0.3–0.9)
MONOCYTES NFR BLD: 6 %
NEUTROPHILS # BLD: 6 K/MCL (ref 1.8–7.7)
NEUTROPHILS NFR BLD: 59 %
NRBC BLD MANUAL-RTO: 0 /100 WBC
PLATELET # BLD AUTO: 398 K/MCL (ref 140–450)
POTASSIUM SERPL-SCNC: 4.4 MMOL/L (ref 3.4–5.1)
PROT SERPL-MCNC: 7.4 G/DL (ref 6.4–8.2)
RBC # BLD: 4.5 MIL/MCL (ref 4–5.2)
SODIUM SERPL-SCNC: 139 MMOL/L (ref 135–145)
T4 FREE SERPL-MCNC: 1.1 NG/DL (ref 0.8–1.5)
TSH SERPL-ACNC: 6.64 MCUNITS/ML (ref 0.35–5)
WBC # BLD: 10.1 K/MCL (ref 4.2–11)

## 2021-12-09 ENCOUNTER — TELEPHONE (OUTPATIENT)
Dept: BARIATRICS/WEIGHT MGMT | Age: 31
End: 2021-12-09

## 2021-12-09 ENCOUNTER — TELEPHONE (OUTPATIENT)
Dept: INTERNAL MEDICINE | Age: 31
End: 2021-12-09

## 2021-12-09 ENCOUNTER — TELEPHONE (OUTPATIENT)
Dept: SCHEDULING | Age: 31
End: 2021-12-09

## 2021-12-09 DIAGNOSIS — E89.0 POSTABLATIVE HYPOTHYROIDISM: Primary | ICD-10-CM

## 2021-12-09 RX ORDER — LEVOTHYROXINE SODIUM 175 UG/1
TABLET ORAL
Qty: 96 TABLET | Refills: 3 | OUTPATIENT
Start: 2021-12-09 | End: 2022-02-01 | Stop reason: ALTCHOICE

## 2021-12-17 ENCOUNTER — TELEPHONE (OUTPATIENT)
Dept: SLEEP MEDICINE | Age: 31
End: 2021-12-17

## 2021-12-20 ENCOUNTER — APPOINTMENT (OUTPATIENT)
Dept: SLEEP MEDICINE | Age: 31
End: 2021-12-20

## 2021-12-22 ENCOUNTER — APPOINTMENT (OUTPATIENT)
Dept: BARIATRICS/WEIGHT MGMT | Age: 31
End: 2021-12-22

## 2022-01-10 ENCOUNTER — APPOINTMENT (OUTPATIENT)
Dept: BARIATRICS/WEIGHT MGMT | Age: 32
End: 2022-01-10

## 2022-01-13 ENCOUNTER — TELEPHONE (OUTPATIENT)
Dept: OPTOMETRY | Facility: CLINIC | Age: 32
End: 2022-01-13
Payer: MEDICAID

## 2022-01-13 NOTE — TELEPHONE ENCOUNTER
----- Message from Todd Culver sent at 1/13/2022  2:36 PM CST -----  Regarding: Contact Lens  Contact: Lora Alberts is calling to speak with office to found out correct contact lens Rx.    [phone]

## 2022-01-21 ENCOUNTER — TELEPHONE (OUTPATIENT)
Dept: SCHEDULING | Age: 32
End: 2022-01-21

## 2022-01-24 ENCOUNTER — LAB VISIT (OUTPATIENT)
Dept: PRIMARY CARE CLINIC | Facility: CLINIC | Age: 32
End: 2022-01-24
Payer: MEDICAID

## 2022-01-24 DIAGNOSIS — Z01.818 PRE-OP TESTING: ICD-10-CM

## 2022-01-24 LAB — SARS-COV-2- CYCLE NUMBER: 21

## 2022-01-24 PROCEDURE — U0003 INFECTIOUS AGENT DETECTION BY NUCLEIC ACID (DNA OR RNA); SEVERE ACUTE RESPIRATORY SYNDROME CORONAVIRUS 2 (SARS-COV-2) (CORONAVIRUS DISEASE [COVID-19]), AMPLIFIED PROBE TECHNIQUE, MAKING USE OF HIGH THROUGHPUT TECHNOLOGIES AS DESCRIBED BY CMS-2020-01-R: HCPCS | Performed by: FAMILY MEDICINE

## 2022-01-24 PROCEDURE — U0005 INFEC AGEN DETEC AMPLI PROBE: HCPCS | Performed by: FAMILY MEDICINE

## 2022-01-25 ENCOUNTER — PATIENT MESSAGE (OUTPATIENT)
Dept: GASTROENTEROLOGY | Facility: HOSPITAL | Age: 32
End: 2022-01-25
Payer: MEDICAID

## 2022-01-25 ENCOUNTER — TELEPHONE (OUTPATIENT)
Dept: GASTROENTEROLOGY | Facility: CLINIC | Age: 32
End: 2022-01-25
Payer: MEDICAID

## 2022-01-25 ENCOUNTER — TELEPHONE (OUTPATIENT)
Dept: FAMILY MEDICINE | Facility: CLINIC | Age: 32
End: 2022-01-25
Payer: MEDICAID

## 2022-01-25 ENCOUNTER — V-VISIT (OUTPATIENT)
Dept: BARIATRICS/WEIGHT MGMT | Age: 32
End: 2022-01-25

## 2022-01-25 DIAGNOSIS — E66.01 MORBID OBESITY WITH BMI OF 50.0-59.9, ADULT (CMD): Primary | ICD-10-CM

## 2022-01-25 DIAGNOSIS — G89.29 CHRONIC LOW BACK PAIN, UNSPECIFIED BACK PAIN LATERALITY, UNSPECIFIED WHETHER SCIATICA PRESENT: Chronic | ICD-10-CM

## 2022-01-25 DIAGNOSIS — E51.9 THIAMINE DEFICIENCY: Chronic | ICD-10-CM

## 2022-01-25 DIAGNOSIS — K76.0 FATTY METAMORPHOSIS OF LIVER: Chronic | ICD-10-CM

## 2022-01-25 DIAGNOSIS — E55.9 VITAMIN D DEFICIENCY: ICD-10-CM

## 2022-01-25 DIAGNOSIS — E89.0 POSTABLATIVE HYPOTHYROIDISM: ICD-10-CM

## 2022-01-25 DIAGNOSIS — M54.50 CHRONIC LOW BACK PAIN, UNSPECIFIED BACK PAIN LATERALITY, UNSPECIFIED WHETHER SCIATICA PRESENT: Chronic | ICD-10-CM

## 2022-01-25 DIAGNOSIS — R16.0 HEPATOMEGALY: Chronic | ICD-10-CM

## 2022-01-25 DIAGNOSIS — I10 BENIGN ESSENTIAL HTN: ICD-10-CM

## 2022-01-25 DIAGNOSIS — G47.9 SLEEP DISTURBANCE: Chronic | ICD-10-CM

## 2022-01-25 DIAGNOSIS — E11.9 TYPE 2 DIABETES MELLITUS WITHOUT COMPLICATION, WITHOUT LONG-TERM CURRENT USE OF INSULIN (CMD): ICD-10-CM

## 2022-01-25 DIAGNOSIS — E21.1 SECONDARY HYPERPARATHYROIDISM, NON-RENAL (CMD): Chronic | ICD-10-CM

## 2022-01-25 LAB — SARS-COV-2 RNA RESP QL NAA+PROBE: DETECTED

## 2022-01-25 PROCEDURE — 99213 OFFICE O/P EST LOW 20 MIN: CPT | Performed by: NURSE PRACTITIONER

## 2022-01-25 ASSESSMENT — ENCOUNTER SYMPTOMS
CONSTIPATION: 0
CHILLS: 0
DIARRHEA: 0
WHEEZING: 0
HEMATOLOGIC/LYMPHATIC NEGATIVE: 1
VOMITING: 0
RECTAL PAIN: 0
SEIZURES: 0
HEADACHES: 0
LIGHT-HEADEDNESS: 0
FATIGUE: 0
BACK PAIN: 1
STRIDOR: 0
NAUSEA: 0
CHOKING: 0
APNEA: 1
ABDOMINAL DISTENTION: 0
ANAL BLEEDING: 0
COUGH: 0
NUMBNESS: 0
BLOOD IN STOOL: 0
PSYCHIATRIC NEGATIVE: 1
ABDOMINAL PAIN: 0
FEVER: 0
CHEST TIGHTNESS: 0
DIZZINESS: 0
SHORTNESS OF BREATH: 0
EYES NEGATIVE: 1
TREMORS: 0

## 2022-01-25 NOTE — TELEPHONE ENCOUNTER
Patient is COVID positive and we are unable to reach her via phone or emergency contacts    Also please remove thais pinzon from this patient's emergency contact as this is incorrect    We need to send certified letter, letter is in chart      Margarita Jay MD

## 2022-01-25 NOTE — TELEPHONE ENCOUNTER
Tried to reach patient by phone.  Phone not in service   emergency contact number has been change   [employer] message sent   hallway  know advise

## 2022-01-25 NOTE — LETTER
[address] [facility] ? [place], [address] ? Phone [phone] ? Fax [phone]           Return to Work/School    Patient: Lora Scott  YOB: 1990   Date: 01/25/2022      To Whom It May Concern:     You have been diagnosed with a positive COVID result. We are sending this communication because we have not been able to reach you by phone or electronic message. Please call us back at [phone] with question. You will need to quarantine for 5 days minimum or until your symptoms resolve.  COVID-19 is present in our communities across the state. Not all patients are eligible or appropriate to be tested. In this situation, your employee meets the following criteria:        If you have any questions or concerns, or if I can be of further assistance, please do not hesitate to contact me.     Sincerely,    Margarita Jay MD

## 2022-01-27 ENCOUNTER — TELEPHONE (OUTPATIENT)
Dept: GASTROENTEROLOGY | Facility: CLINIC | Age: 32
End: 2022-01-27
Payer: MEDICAID

## 2022-01-28 ENCOUNTER — PATIENT MESSAGE (OUTPATIENT)
Dept: GASTROENTEROLOGY | Facility: CLINIC | Age: 32
End: 2022-01-28
Payer: MEDICAID

## 2022-01-31 ENCOUNTER — OFFICE VISIT (OUTPATIENT)
Dept: URGENT CARE | Age: 32
End: 2022-01-31

## 2022-01-31 ENCOUNTER — LAB SERVICES (OUTPATIENT)
Dept: LAB | Age: 32
End: 2022-01-31

## 2022-01-31 VITALS
HEART RATE: 89 BPM | DIASTOLIC BLOOD PRESSURE: 94 MMHG | OXYGEN SATURATION: 98 % | RESPIRATION RATE: 18 BRPM | WEIGHT: 293 LBS | TEMPERATURE: 97.7 F | BODY MASS INDEX: 43.4 KG/M2 | HEIGHT: 69 IN | SYSTOLIC BLOOD PRESSURE: 144 MMHG

## 2022-01-31 DIAGNOSIS — H91.93 DECREASED HEARING OF BOTH EARS: ICD-10-CM

## 2022-01-31 DIAGNOSIS — H61.23 BILATERAL IMPACTED CERUMEN: ICD-10-CM

## 2022-01-31 DIAGNOSIS — H92.01 RIGHT EAR PAIN: Primary | ICD-10-CM

## 2022-01-31 DIAGNOSIS — H60.331 ACUTE SWIMMER'S EAR OF RIGHT SIDE: ICD-10-CM

## 2022-01-31 DIAGNOSIS — E89.0 POSTABLATIVE HYPOTHYROIDISM: ICD-10-CM

## 2022-01-31 PROCEDURE — 3077F SYST BP >= 140 MM HG: CPT | Performed by: PEDIATRICS

## 2022-01-31 PROCEDURE — 84439 ASSAY OF FREE THYROXINE: CPT | Performed by: PSYCHIATRY & NEUROLOGY

## 2022-01-31 PROCEDURE — 36415 COLL VENOUS BLD VENIPUNCTURE: CPT | Performed by: INTERNAL MEDICINE

## 2022-01-31 PROCEDURE — 3080F DIAST BP >= 90 MM HG: CPT | Performed by: PEDIATRICS

## 2022-01-31 PROCEDURE — 84443 ASSAY THYROID STIM HORMONE: CPT | Performed by: PSYCHIATRY & NEUROLOGY

## 2022-01-31 PROCEDURE — 99214 OFFICE O/P EST MOD 30 MIN: CPT | Performed by: PEDIATRICS

## 2022-01-31 RX ORDER — OFLOXACIN 3 MG/ML
5 SOLUTION AURICULAR (OTIC) 2 TIMES DAILY
Qty: 5 ML | Refills: 0 | Status: SHIPPED | OUTPATIENT
Start: 2022-01-31

## 2022-01-31 RX ORDER — IBUPROFEN 600 MG/1
600 TABLET ORAL EVERY 8 HOURS PRN
Qty: 30 TABLET | Refills: 0 | Status: ON HOLD | OUTPATIENT
Start: 2022-01-31 | End: 2022-06-21 | Stop reason: HOSPADM

## 2022-01-31 ASSESSMENT — ENCOUNTER SYMPTOMS
RESPIRATORY NEGATIVE: 1
NEUROLOGICAL NEGATIVE: 1
SORE THROAT: 0
GASTROINTESTINAL NEGATIVE: 1
PSYCHIATRIC NEGATIVE: 1
ENDOCRINE NEGATIVE: 1
HEMATOLOGIC/LYMPHATIC NEGATIVE: 1
CONSTITUTIONAL NEGATIVE: 1
EYES NEGATIVE: 1
ALLERGIC/IMMUNOLOGIC NEGATIVE: 1

## 2022-01-31 NOTE — TELEPHONE ENCOUNTER
"Called & spoke to pt  - Reports 2 "gummy- like" BMs/ QD w/ rare rectal burning (once yesterday) x 3 weeks; baseline is 1 soft BM/ QD  - 1/24/22 tested "+" for Covid  - Humira q 7 days (LD: 1/21/22; was due 1/28/22 though held dose)   - Will need to reschedule colonoscopy  - May need to r/s 2/14/22 f/u based on timing of this  - Will discuss w/ Dr. Bacon  "

## 2022-02-01 ENCOUNTER — TELEPHONE (OUTPATIENT)
Dept: SCHEDULING | Age: 32
End: 2022-02-01

## 2022-02-01 ENCOUNTER — HOSPITAL ENCOUNTER (EMERGENCY)
Facility: HOSPITAL | Age: 32
Discharge: HOME | End: 2022-02-01
Attending: EMERGENCY MEDICINE
Payer: MEDICAID

## 2022-02-01 ENCOUNTER — PATIENT MESSAGE (OUTPATIENT)
Dept: GASTROENTEROLOGY | Facility: CLINIC | Age: 32
End: 2022-02-01
Payer: MEDICAID

## 2022-02-01 ENCOUNTER — LAB SERVICES (OUTPATIENT)
Dept: URGENT CARE | Age: 32
End: 2022-02-01

## 2022-02-01 ENCOUNTER — V-VISIT (OUTPATIENT)
Dept: INTERNAL MEDICINE | Age: 32
End: 2022-02-01

## 2022-02-01 VITALS
HEIGHT: 63 IN | RESPIRATION RATE: 18 BRPM | BODY MASS INDEX: 23.92 KG/M2 | SYSTOLIC BLOOD PRESSURE: 108 MMHG | OXYGEN SATURATION: 100 % | TEMPERATURE: 98 F | WEIGHT: 135 LBS | HEART RATE: 106 BPM | DIASTOLIC BLOOD PRESSURE: 68 MMHG

## 2022-02-01 DIAGNOSIS — E55.9 VITAMIN D DEFICIENCY: ICD-10-CM

## 2022-02-01 DIAGNOSIS — Z01.812 ENCOUNTER FOR SCREENING LABORATORY TESTING FOR COVID-19 VIRUS IN ASYMPTOMATIC PATIENT: Primary | ICD-10-CM

## 2022-02-01 DIAGNOSIS — K76.0 FATTY METAMORPHOSIS OF LIVER: Chronic | ICD-10-CM

## 2022-02-01 DIAGNOSIS — E11.9 TYPE 2 DIABETES MELLITUS WITHOUT COMPLICATION, WITHOUT LONG-TERM CURRENT USE OF INSULIN (CMD): ICD-10-CM

## 2022-02-01 DIAGNOSIS — R16.0 HEPATOMEGALY: Chronic | ICD-10-CM

## 2022-02-01 DIAGNOSIS — E51.9 THIAMINE DEFICIENCY: Chronic | ICD-10-CM

## 2022-02-01 DIAGNOSIS — E66.01 MORBID OBESITY WITH BMI OF 50.0-59.9, ADULT (CMD): ICD-10-CM

## 2022-02-01 DIAGNOSIS — E89.0 POSTABLATIVE HYPOTHYROIDISM: Primary | ICD-10-CM

## 2022-02-01 DIAGNOSIS — G89.29 CHRONIC LOW BACK PAIN, UNSPECIFIED BACK PAIN LATERALITY, UNSPECIFIED WHETHER SCIATICA PRESENT: Chronic | ICD-10-CM

## 2022-02-01 DIAGNOSIS — M54.50 CHRONIC LOW BACK PAIN, UNSPECIFIED BACK PAIN LATERALITY, UNSPECIFIED WHETHER SCIATICA PRESENT: Chronic | ICD-10-CM

## 2022-02-01 DIAGNOSIS — G47.9 SLEEP DISTURBANCE: Chronic | ICD-10-CM

## 2022-02-01 DIAGNOSIS — E21.1 SECONDARY HYPERPARATHYROIDISM, NON-RENAL (CMD): Chronic | ICD-10-CM

## 2022-02-01 DIAGNOSIS — L68.0 FEMALE HIRSUTISM: ICD-10-CM

## 2022-02-01 DIAGNOSIS — E78.00 HYPERCHOLESTEREMIA: ICD-10-CM

## 2022-02-01 DIAGNOSIS — R63.5 ABNORMAL WEIGHT GAIN: ICD-10-CM

## 2022-02-01 DIAGNOSIS — Z11.52 ENCOUNTER FOR SCREENING LABORATORY TESTING FOR COVID-19 VIRUS IN ASYMPTOMATIC PATIENT: Primary | ICD-10-CM

## 2022-02-01 DIAGNOSIS — R00.0 TACHYCARDIA: ICD-10-CM

## 2022-02-01 DIAGNOSIS — E89.0 POSTABLATIVE HYPOTHYROIDISM: ICD-10-CM

## 2022-02-01 DIAGNOSIS — I10 BENIGN ESSENTIAL HTN: ICD-10-CM

## 2022-02-01 DIAGNOSIS — R21 RASH: Primary | ICD-10-CM

## 2022-02-01 LAB
B-HCG UR QL: NEGATIVE
CTP QC/QA: YES
T4 FREE SERPL-MCNC: 1.1 NG/DL (ref 0.8–1.5)
TSH SERPL-ACNC: 5.56 MCUNITS/ML (ref 0.35–5)

## 2022-02-01 PROCEDURE — U0005 INFEC AGEN DETEC AMPLI PROBE: HCPCS | Performed by: PSYCHIATRY & NEUROLOGY

## 2022-02-01 PROCEDURE — 93010 ELECTROCARDIOGRAM REPORT: CPT | Mod: ,,, | Performed by: INTERNAL MEDICINE

## 2022-02-01 PROCEDURE — 99284 EMERGENCY DEPT VISIT MOD MDM: CPT | Mod: 25,ER

## 2022-02-01 PROCEDURE — 93010 EKG 12-LEAD: ICD-10-PCS | Mod: ,,, | Performed by: INTERNAL MEDICINE

## 2022-02-01 PROCEDURE — 93005 ELECTROCARDIOGRAM TRACING: CPT | Mod: ER

## 2022-02-01 PROCEDURE — 81025 URINE PREGNANCY TEST: CPT | Mod: ER | Performed by: EMERGENCY MEDICINE

## 2022-02-01 PROCEDURE — U0003 INFECTIOUS AGENT DETECTION BY NUCLEIC ACID (DNA OR RNA); SEVERE ACUTE RESPIRATORY SYNDROME CORONAVIRUS 2 (SARS-COV-2) (CORONAVIRUS DISEASE [COVID-19]), AMPLIFIED PROBE TECHNIQUE, MAKING USE OF HIGH THROUGHPUT TECHNOLOGIES AS DESCRIBED BY CMS-2020-01-R: HCPCS | Performed by: PSYCHIATRY & NEUROLOGY

## 2022-02-01 PROCEDURE — 99214 OFFICE O/P EST MOD 30 MIN: CPT | Performed by: INTERNAL MEDICINE

## 2022-02-01 RX ORDER — LEVOTHYROXINE SODIUM 0.2 MG/1
TABLET ORAL
Qty: 90 TABLET | Refills: 3 | Status: ON HOLD | OUTPATIENT
Start: 2022-02-01 | End: 2022-06-21 | Stop reason: HOSPADM

## 2022-02-01 RX ORDER — TRIAMCINOLONE ACETONIDE 1 MG/G
CREAM TOPICAL 3 TIMES DAILY
Qty: 60 G | Refills: 0 | Status: SHIPPED | OUTPATIENT
Start: 2022-02-01 | End: 2022-07-05

## 2022-02-01 RX ORDER — PETROLATUM,WHITE
1 OINTMENT IN PACKET (GRAM) TOPICAL 2 TIMES DAILY
Qty: 200 G | Refills: 0 | Status: SHIPPED | OUTPATIENT
Start: 2022-02-01 | End: 2022-02-15

## 2022-02-01 RX ORDER — DIPHENHYDRAMINE HCL 25 MG
25 CAPSULE ORAL EVERY 6 HOURS PRN
Qty: 20 CAPSULE | Refills: 0 | Status: SHIPPED | OUTPATIENT
Start: 2022-02-01 | End: 2022-07-05

## 2022-02-01 ASSESSMENT — ENCOUNTER SYMPTOMS
TROUBLE SWALLOWING: 0
ABDOMINAL PAIN: 0
FEVER: 0
FATIGUE: 0
COUGH: 0
APPETITE CHANGE: 0
SHORTNESS OF BREATH: 0
DIARRHEA: 0
SORE THROAT: 0
NAUSEA: 0
DIZZINESS: 0
WHEEZING: 0
CHILLS: 0
UNEXPECTED WEIGHT CHANGE: 0

## 2022-02-01 ASSESSMENT — PATIENT HEALTH QUESTIONNAIRE - PHQ9
2. FEELING DOWN, DEPRESSED OR HOPELESS: NOT AT ALL
SUM OF ALL RESPONSES TO PHQ9 QUESTIONS 1 AND 2: 0
1. LITTLE INTEREST OR PLEASURE IN DOING THINGS: NOT AT ALL
SUM OF ALL RESPONSES TO PHQ9 QUESTIONS 1 AND 2: 0
CLINICAL INTERPRETATION OF PHQ2 SCORE: NO FURTHER SCREENING NEEDED

## 2022-02-01 NOTE — ED PROVIDER NOTES
Encounter Date: 2/1/2022    SCRIBE #1 NOTE: I, Amarilis Verde, am scribing for, and in the presence of,  Rebekah West DO. I have scribed the following portions of the note - Other sections scribed: HPI; ROS; PE.       History     Chief Complaint   Patient presents with    Rash     Pt has dry cracking skin around her mouth, to bilateral hands and to bilateral feet     The patient is a 31 y.o. female with a Hx of Asthma and Eczema who presents to the ED for Psoriasis. Patient reports that she is out of her medication, Triamcinolone. She states she has not attempted treatment at home with Benadryl or lotion. Patient has an appointment with the dermatologist in 2 weeks. She denies fever, chills, nausea, vomiting, or diarrhea. No further complaints.     The history is provided by the patient. No  was used.     Review of patient's allergies indicates:   Allergen Reactions    Horse/equine containing products Itching     Past Medical History:   Diagnosis Date    Allergy     Anxiety     Asthma     Crohn's disease 2/18/19    Crohn's disease (ileum) 5/10/2019    S/P ileocecal resection-42 cm of ileum/10.5 cm cecum removed with surgical path c/w stricture and fistula 2/15/20 ileostomy takedown and mucus fistula takedown    Crohn's disease (ileum) 5/10/2019    S/P ileocecal resection-42 cm of ileum/10.5 cm cecum removed with surgical path c/w stricture and fistula 2/15/20 ileostomy takedown and mucus fistula takedown    Depression     Eczema     Ex-smoker for less than 1 year     Immunosuppressed status 10/24/2019    Joint pain     Perineal abscess 9/28/2021     Past Surgical History:   Procedure Laterality Date    COLONOSCOPY N/A 2/20/2019    Procedure: COLONOSCOPY;  Surgeon: Fawad Perla MD;  Location: [facility] (2ND FLR);  Service: Endoscopy;  Laterality: N/A;    COLONOSCOPY N/A 10/30/2020    Procedure: COLONOSCOPY;  Surgeon: Suzi Bacon MD;  Location: [facility] (4TH FLR);   Service: Endoscopy;  Laterality: N/A;  covid test [facility] urgent care 10/27  pt to try Mirilax prep with Reglan before per Dr. Bacon - nimisha  10/14-new instructions e-mailed-tb  10/16--new instructions e-mailed  10/29/20- Pt confirmed earlier arrival time, prep ins. reveiwed, Pt verbalized understanding- [email or web address]    ESOPHAGOGASTRODUODENOSCOPY N/A 2/20/2019    Procedure: EGD (ESOPHAGOGASTRODUODENOSCOPY);  Surgeon: Fawad Perla MD;  Location: [facility] ENDO (2ND FLR);  Service: Endoscopy;  Laterality: N/A;    ILEOCOLECTOMY N/A 5/10/2019    Procedure: ILEOCOLECTOMY;  Surgeon: Uday Cope MD;  Location: [facility] OR 2ND FLR;  Service: Colon and Rectal;  Laterality: N/A;  ileocecectomy    ILEOSCOPY N/A 12/20/2019    Procedure: ILEOSCOPY through stoma;  Surgeon: Suzi Bacon MD;  Location: [facility] ENDO (4TH FLR);  Service: Endoscopy;  Laterality: N/A;  Schedule as 30 minute case  Please let patient know to bring extra stoma bag/wafer, etc- entire bag and appliance will be removed at time of procedure to visualize the area well   first week 11/2019     per note-R/S to 12/20/19-GT    ILEOSTOMY N/A 5/10/2019    Procedure: CREATION, ILEOSTOMY with mucous fistula;  Surgeon: Uday Cope MD;  Location: [facility] OR 2ND FLR;  Service: Colon and Rectal;  Laterality: N/A;    ILEOSTOMY CLOSURE N/A 2/12/2020    Procedure: CLOSURE,ILEOSTOMY TAKEDOWN END ILEOSTOMY/MUCOUS FISTULA POSSIBLE LAPAROTOMY ERAS LOW;  Surgeon: Uday Cope MD;  Location: [facility] OR 2ND FLR;  Service: Colon and Rectal;  Laterality: N/A;     Family History   Problem Relation Age of Onset    Hypertension Mother     No Known Problems Father     No Known Problems Sister     No Known Problems Brother     No Known Problems Sister     No Known Problems Sister     No Known Problems Sister     No Known Problems Maternal Uncle     No Known Problems Paternal Aunt     No Known Problems Paternal Uncle     No Known Problems Maternal Grandmother     Cataracts  Maternal Grandfather     Irritable bowel syndrome Paternal Grandmother     No Known Problems Paternal Grandfather     No Known Problems Paternal Aunt     No Known Problems Paternal Uncle     No Known Problems Paternal Uncle     No Known Problems Paternal Uncle     No Known Problems Maternal Uncle     No Known Problems Maternal Uncle     Celiac disease Neg Hx     Cirrhosis Neg Hx     Colon cancer Neg Hx     Colon polyps Neg Hx     Crohn's disease Neg Hx     Cystic fibrosis Neg Hx     Esophageal cancer Neg Hx     Hemochromatosis Neg Hx     Inflammatory bowel disease Neg Hx     Liver cancer Neg Hx     Liver disease Neg Hx     Rectal cancer Neg Hx     Stomach cancer Neg Hx     Ulcerative colitis Neg Hx     Jerardo's disease Neg Hx     Lymphoma Neg Hx     Tuberculosis Neg Hx     Scleroderma Neg Hx     Rheum arthritis Neg Hx     Multiple sclerosis Neg Hx     Psoriasis Neg Hx     Lupus Neg Hx     Melanoma Neg Hx     Skin cancer Neg Hx     Amblyopia Neg Hx     Blindness Neg Hx     Cancer Neg Hx     Diabetes Neg Hx     Glaucoma Neg Hx     Macular degeneration Neg Hx     Retinal detachment Neg Hx     Strabismus Neg Hx     Stroke Neg Hx     Thyroid disease Neg Hx      Social History     Tobacco Use    Smoking status: Current Every Day Smoker     Packs/day: 0.25     Types: Cigarettes     Last attempt to quit: 2020     Years since quittin.0    Smokeless tobacco: Never Used   Substance Use Topics    Alcohol use: Not Currently     Alcohol/week: 0.0 standard drinks     Comment: Socially    Drug use: No     Frequency: 3.0 times per week     Review of Systems   Constitutional: Negative for chills and fever.   HENT: Negative for congestion.    Eyes: Negative for visual disturbance.   Respiratory: Negative for shortness of breath.    Cardiovascular: Negative for chest pain.   Gastrointestinal: Negative for diarrhea, nausea and vomiting.   Genitourinary: Negative for dysuria.   Skin:  Positive for rash.   Neurological: Negative for headaches.   Psychiatric/Behavioral: Negative for confusion.   All other systems reviewed and are negative.      Physical Exam     Initial Vitals [02/01/22 1303]   BP Pulse Resp Temp SpO2   113/73 (!) 147 18 98 °F (36.7 °C) 100 %      MAP       --         Physical Exam    Nursing note and vitals reviewed.  Constitutional: She appears well-developed and well-nourished.   HENT:   Head: Normocephalic and atraumatic.   Right Ear: External ear normal.   Left Ear: External ear normal.   Eyes: Conjunctivae and EOM are normal. Pupils are equal, round, and reactive to light. Right eye exhibits no discharge. Left eye exhibits no discharge.   Neck: Neck supple.   Normal range of motion.  Cardiovascular: Regular rhythm, normal heart sounds and intact distal pulses. Tachycardia present.  Exam reveals no gallop and no friction rub.    No murmur heard.  Pulmonary/Chest: Breath sounds normal. No respiratory distress. She has no wheezes. She has no rhonchi. She has no rales. She exhibits no tenderness.   Abdominal: Abdomen is soft. Bowel sounds are normal. She exhibits no distension and no mass. There is no abdominal tenderness.   No CVA tenderness There is no rebound and no guarding.   Musculoskeletal:         General: No tenderness or edema. Normal range of motion.      Cervical back: Normal range of motion and neck supple.     Neurological: She is alert and oriented to person, place, and time.   Skin: Skin is warm and dry.   Psychiatric: Her mood appears anxious.         ED Course   Procedures  Labs Reviewed   POCT URINE PREGNANCY     EKG Readings: (Independently Interpreted)   No STEMI. Rate of 126. Normal Sinus Rhythm. Normal Axis. Normal EKG. QTc normal at 469. When compared to prior EKG dated 05/31/21 rate increased by 58 bpm.      ECG Results          EKG 12-lead (Final result)  Result time 02/01/22 18:03:03    Final result by Interface, Lab In [facility] (02/01/22 18:03:03)                  Narrative:    Test Reason : R00.0,    Vent. Rate : 126 BPM     Atrial Rate : 126 BPM     P-R Int : 134 ms          QRS Dur : 066 ms      QT Int : 324 ms       P-R-T Axes : 051 037 -06 degrees     QTc Int : 469 ms    Sinus tachycardia  Cannot rule out Anterior infarct ,age undetermined  Abnormal ECG  When compared with ECG of 31-MAY-2021 21:10,  Vent. rate has increased BY  58 BPM  Non-specific change in ST segment in Inferior leads  T wave inversion now evident in Inferior leads  Nonspecific T wave abnormality now evident in Anterior-lateral leads  Confirmed by Dave Kenyon MD ([id number]) on 2/1/2022 6:02:54 PM    Referred By:             Confirmed By:Dave Kenyon MD                             Medications - No data to display  Medical Decision Making:   History:   Old Medical Records: I decided to obtain old medical records.  Clinical Tests:   Lab Tests: Ordered and Reviewed  The following lab test(s) were unremarkable: UPT  Chief complaint: Psoriasis.  Differential diagnosis: Pregnancy. Contact Dermatitis. Psoriasis. Medication non Compliant.     Treatment in the ED: PE  Patient reports feeling better after treatment in the ER.      Discussed treatment, prescriptions, labs, and imaging results.    Discharge home with   white petrolatum (VASELINE WHITE PETROLEUM) OiPk     diphenhydrAMINE (BENADRYL) 25 mg capsule   triamcinolone acetonide 0.1% (KENALOG) 0.1 % cream     Fill and take prescriptions as directed.  Return to the ED if symptoms worsen or do not resolve.   Answered questions and discussed discharge plan.    Patient feels better and is ready for discharge.  Follow up with PCP/specialist in 1 day.        Scribe Attestation:   Scribe #1: I performed the above scribed service and the documentation accurately describes the services I performed. I attest to the accuracy of the note.                I, Dr. Rebekah West, personally performed the services described in this documentation. This  document was produced by a scribe under my direction and in my presence. All medical record entries made by the scribe were at my direction and in my presence.  I have reviewed the chart and agree that the record reflects my personal performance and is accurate and complete. Rebekah West DO.     02/04/2022 2:49 PM    Clinical Impression:   Final diagnoses:  [R00.0] Tachycardia  [R21] Rash (Primary)          ED Disposition Condition    Discharge Stable        ED Prescriptions     Medication Sig Dispense Start Date End Date Auth. Provider    triamcinolone acetonide 0.1% (KENALOG) 0.1 % cream Apply topically 3 (three) times daily. 60 g 2/1/2022  Rebekah West DO    diphenhydrAMINE (BENADRYL) 25 mg capsule Take 1 capsule (25 mg total) by mouth every 6 (six) hours as needed for Itching or Allergies. 20 capsule 2/1/2022  Rebekah West DO    white petrolatum (VASELINE WHITE PETROLEUM) OiPk Apply 1 g topically 2 (two) times daily. for 14 days 200 g 2/1/2022 2/15/2022 Rebekah West DO        Follow-up Information     Follow up With Specialties Details Why Contact Info    Margarita Jay MD Family Medicine Schedule an appointment as soon as possible for a visit in 1 day  [address] [facility]  [place] [id number] [phone]             Rebekah West DO  02/04/22 [id number]

## 2022-02-01 NOTE — Clinical Note
"Lora"Ladarius Scott was seen and treated in our emergency department on 2/1/2022.  She may return to work on 02/02/2022.       If you have any questions or concerns, please don't hesitate to call.      Rebekah West, DO"

## 2022-02-01 NOTE — FIRST PROVIDER EVALUATION
Emergency Department TeleTriage Encounter Note      CHIEF COMPLAINT    Chief Complaint   Patient presents with    Rash     Pt has dry cracking skin around her mouth, to bilateral hands and to bilateral feet       VITAL SIGNS   Initial Vitals [02/01/22 1303]   BP Pulse Resp Temp SpO2   113/73 (!) 147 18 98 °F (36.7 °C) 100 %      MAP       --            ALLERGIES    Review of patient's allergies indicates:   Allergen Reactions    Horse/equine containing products Itching       PROVIDER TRIAGE NOTE  This is a teletriage evaluation of a 31 y.o. female presenting to the ED with c/o cracking around mouth.  Pt states symptoms have been present for the past few month but pain started today.  Pt has history of Psoriasis.  Not using anything for symptoms.         All ED beds are full at present; patient notified of this status.  Patient seen and medically screened by Nurse Practitioner via teletriage. Orders initiated at triage to expedite care.  Patient is stable to return to the waiting room and will be placed in an ED bed when available.  Care will be transferred to an alternate provider when patient has been placed in an Exam Room from the [facility] for physical exam, additional orders, and disposition.          ORDERS  Labs Reviewed - No data to display    ED Orders (720h ago, onward)    Start Ordered     Status Ordering Provider    02/01/22 1315 02/01/22 1314  EKG 12-lead  Once         Completed by JOHN REESE on 2/1/2022 at  1:29 PM SY GARCIA            Virtual Visit Note: The provider triage portion of this emergency department evaluation and documentation was performed via [employer], a HIPAA-compliant telemedicine application, in concert with a tele-presenter in the room. A face to face patient evaluation with one of my colleagues will occur once the patient is placed in an emergency department room.      DISCLAIMER: This note was prepared with [employer] voice recognition transcription software. Musa  syntax, mangled pronouns, and other bizarre constructions may be attributed to that software system.

## 2022-02-02 ENCOUNTER — PATIENT MESSAGE (OUTPATIENT)
Dept: ENDOSCOPY | Facility: HOSPITAL | Age: 32
End: 2022-02-02
Payer: MEDICAID

## 2022-02-02 ENCOUNTER — TELEPHONE (OUTPATIENT)
Dept: INTERNAL MEDICINE | Age: 32
End: 2022-02-02

## 2022-02-02 DIAGNOSIS — Z01.818 PRE-OP TESTING: ICD-10-CM

## 2022-02-02 DIAGNOSIS — U07.1 LAB TEST POSITIVE FOR DETECTION OF COVID-19 VIRUS: Primary | ICD-10-CM

## 2022-02-02 LAB
SARS-COV-2 RNA RESP QL NAA+PROBE: DETECTED
SERVICE CMNT-IMP: ABNORMAL

## 2022-02-02 RX ORDER — FAMOTIDINE 10 MG/ML
20 INJECTION, SOLUTION INTRAVENOUS
OUTPATIENT
Start: 2022-02-02

## 2022-02-02 RX ORDER — 0.9 % SODIUM CHLORIDE 0.9 %
2 VIAL (ML) INJECTION EVERY 12 HOURS SCHEDULED
OUTPATIENT
Start: 2022-02-02

## 2022-02-02 RX ORDER — SODIUM CHLORIDE 9 MG/ML
INJECTION, SOLUTION INTRAVENOUS CONTINUOUS PRN
OUTPATIENT
Start: 2022-02-02

## 2022-02-02 RX ORDER — ONDANSETRON 2 MG/ML
4 INJECTION INTRAMUSCULAR; INTRAVENOUS
OUTPATIENT
Start: 2022-02-02

## 2022-02-02 RX ORDER — ALBUTEROL SULFATE 90 UG/1
2 AEROSOL, METERED RESPIRATORY (INHALATION)
OUTPATIENT
Start: 2022-02-02

## 2022-02-02 RX ORDER — METHYLPREDNISOLONE SODIUM SUCCINATE 125 MG/2ML
125 INJECTION, POWDER, LYOPHILIZED, FOR SOLUTION INTRAMUSCULAR; INTRAVENOUS
OUTPATIENT
Start: 2022-02-02

## 2022-02-02 RX ORDER — DIPHENHYDRAMINE HYDROCHLORIDE 50 MG/ML
50 INJECTION INTRAMUSCULAR; INTRAVENOUS
OUTPATIENT
Start: 2022-02-02

## 2022-02-02 NOTE — TELEPHONE ENCOUNTER
Called & spoke to pt  - Denies any Covid symptoms at this time  - Instructed to inject Humira 80 mg (2 pens) tomorrow then resume 40 mg/ weekly  - Confirmed no questions regarding plan of care  - transferred to endo schedulers [id number]  - Reminder set to make sure scope scheduled

## 2022-02-03 ENCOUNTER — DOCUMENTATION (OUTPATIENT)
Dept: INFUSION THERAPY | Age: 32
End: 2022-02-03

## 2022-02-03 ENCOUNTER — TELEPHONE (OUTPATIENT)
Dept: INTERNAL MEDICINE | Age: 32
End: 2022-02-03

## 2022-02-04 ENCOUNTER — TELEPHONE (OUTPATIENT)
Dept: INTERNAL MEDICINE | Age: 32
End: 2022-02-04

## 2022-02-07 ENCOUNTER — PATIENT MESSAGE (OUTPATIENT)
Dept: GASTROENTEROLOGY | Facility: CLINIC | Age: 32
End: 2022-02-07
Payer: MEDICAID

## 2022-02-11 ENCOUNTER — TELEPHONE (OUTPATIENT)
Dept: BARIATRICS/WEIGHT MGMT | Age: 32
End: 2022-02-11

## 2022-03-02 ENCOUNTER — PATIENT MESSAGE (OUTPATIENT)
Dept: GASTROENTEROLOGY | Facility: CLINIC | Age: 32
End: 2022-03-02
Payer: MEDICAID

## 2022-03-03 ENCOUNTER — TELEPHONE (OUTPATIENT)
Dept: GASTROENTEROLOGY | Facility: CLINIC | Age: 32
End: 2022-03-03
Payer: MEDICAID

## 2022-03-03 NOTE — TELEPHONE ENCOUNTER
----- Message from Alda Chery sent at 3/3/2022 12:18 PM CST -----  Contact: pt  Pt requesting call back .      Confirmed patient's contact info below:  Contact Name: Lora Scott  Phone Number: [phone]

## 2022-03-03 NOTE — TELEPHONE ENCOUNTER
Called and spoke to pt   -she is staring a new job and need to reschedule sppt   -unable to come next week for colonoscopy   -pt was transferred to endo scheduling team   -advise pt once scope is schedule we will change f/u appt with Dr Bacon   -pt expressed understanding   -all questions and concerns were answered

## 2022-03-04 ENCOUNTER — PATIENT MESSAGE (OUTPATIENT)
Dept: ENDOSCOPY | Facility: HOSPITAL | Age: 32
End: 2022-03-04
Payer: MEDICAID

## 2022-03-12 ENCOUNTER — TELEPHONE (OUTPATIENT)
Dept: SCHEDULING | Age: 32
End: 2022-03-12

## 2022-03-17 ENCOUNTER — APPOINTMENT (OUTPATIENT)
Dept: INTERNAL MEDICINE | Age: 32
End: 2022-03-17

## 2022-03-17 ENCOUNTER — TELEPHONE (OUTPATIENT)
Dept: SCHEDULING | Age: 32
End: 2022-03-17

## 2022-03-18 ENCOUNTER — TELEPHONE (OUTPATIENT)
Dept: GASTROENTEROLOGY | Facility: CLINIC | Age: 32
End: 2022-03-18
Payer: MEDICAID

## 2022-03-18 NOTE — TELEPHONE ENCOUNTER
Called & spoke to pt  - Informed her of plan to r/s f/u appt to after colonoscopy  - Pt requesting doctor's note for Monday since she already took off work  - Will discuss w/ Dr. Bacon

## 2022-03-24 ENCOUNTER — LAB SERVICES (OUTPATIENT)
Dept: LAB | Age: 32
End: 2022-03-24

## 2022-04-12 ENCOUNTER — LAB SERVICES (OUTPATIENT)
Dept: LAB | Age: 32
End: 2022-04-12

## 2022-04-12 ENCOUNTER — TELEPHONE (OUTPATIENT)
Dept: GASTROENTEROLOGY | Facility: CLINIC | Age: 32
End: 2022-04-12
Payer: MEDICAID

## 2022-04-12 DIAGNOSIS — E89.0 POSTABLATIVE HYPOTHYROIDISM: ICD-10-CM

## 2022-04-12 LAB — TSH SERPL-ACNC: 2.58 MCUNITS/ML (ref 0.35–5)

## 2022-04-12 PROCEDURE — 84443 ASSAY THYROID STIM HORMONE: CPT | Performed by: INTERNAL MEDICINE

## 2022-04-12 PROCEDURE — 36415 COLL VENOUS BLD VENIPUNCTURE: CPT | Performed by: INTERNAL MEDICINE

## 2022-04-12 NOTE — TELEPHONE ENCOUNTER
Called and spoke to Dr Singer   -would like to discuss psoriasis with Dr Bacon   -pt is having several flair with psoriasis feels like it is coming from humira    -would like to discuss treatment change   -advise Dr Enmanuel Bacon was the office until Monday   -Dr Singer not urgent if Dr Bacon could call her next week to discuss   -copy of office note was printed

## 2022-04-12 NOTE — TELEPHONE ENCOUNTER
----- Message from Bud Shipman sent at 4/12/2022  9:26 AM CDT -----  Contact: Dr Singer @ [phone] ( this is her cell )  3rd Request, Caller requesting a return phone call from Michelle, she didn't get into details, pls call

## 2022-04-13 ENCOUNTER — TELEPHONE (OUTPATIENT)
Dept: BARIATRICS/WEIGHT MGMT | Age: 32
End: 2022-04-13

## 2022-04-14 ENCOUNTER — TELEPHONE (OUTPATIENT)
Dept: BARIATRICS/WEIGHT MGMT | Age: 32
End: 2022-04-14

## 2022-04-14 ENCOUNTER — E-ADVICE (OUTPATIENT)
Dept: BARIATRICS/WEIGHT MGMT | Age: 32
End: 2022-04-14

## 2022-04-14 DIAGNOSIS — E66.01 MORBID OBESITY WITH BMI OF 50.0-59.9, ADULT (CMD): Primary | ICD-10-CM

## 2022-04-18 ENCOUNTER — TELEPHONE (OUTPATIENT)
Dept: SCHEDULING | Age: 32
End: 2022-04-18

## 2022-04-20 ENCOUNTER — TELEPHONE (OUTPATIENT)
Dept: INTERNAL MEDICINE | Age: 32
End: 2022-04-20

## 2022-04-20 DIAGNOSIS — Z01.818 PRE-OP TESTING: Primary | ICD-10-CM

## 2022-04-21 NOTE — TELEPHONE ENCOUNTER
S/w pt and she knows about appt on Monday, also told her surgery will be done when her albumin level is high enough, she understood that  Has paperwork, told her to drop it off downstairs with her name on the paper when she comes on Monday   02-Jun-2021

## 2022-04-22 ENCOUNTER — IMAGING SERVICES (OUTPATIENT)
Dept: GENERAL RADIOLOGY | Age: 32
End: 2022-04-22

## 2022-04-22 ENCOUNTER — NURSE ONLY (OUTPATIENT)
Dept: INTERNAL MEDICINE | Age: 32
End: 2022-04-22

## 2022-04-22 VITALS
SYSTOLIC BLOOD PRESSURE: 125 MMHG | HEART RATE: 95 BPM | TEMPERATURE: 98.1 F | DIASTOLIC BLOOD PRESSURE: 78 MMHG | OXYGEN SATURATION: 98 % | RESPIRATION RATE: 20 BRPM

## 2022-04-22 DIAGNOSIS — E66.01 MORBID OBESITY WITH BMI OF 50.0-59.9, ADULT (CMD): ICD-10-CM

## 2022-04-22 DIAGNOSIS — Z01.810 PRE-OPERATIVE CARDIOVASCULAR EXAMINATION: Primary | ICD-10-CM

## 2022-04-22 PROCEDURE — 3078F DIAST BP <80 MM HG: CPT | Performed by: INTERNAL MEDICINE

## 2022-04-22 PROCEDURE — 93000 ELECTROCARDIOGRAM COMPLETE: CPT | Performed by: SURGERY

## 2022-04-22 PROCEDURE — 93000 ELECTROCARDIOGRAM COMPLETE: CPT | Performed by: INTERNAL MEDICINE

## 2022-04-22 PROCEDURE — 71046 X-RAY EXAM CHEST 2 VIEWS: CPT | Performed by: SURGERY

## 2022-04-22 PROCEDURE — 3074F SYST BP LT 130 MM HG: CPT | Performed by: INTERNAL MEDICINE

## 2022-04-22 ASSESSMENT — PAIN SCALES - GENERAL: PAINLEVEL: 0

## 2022-04-29 ENCOUNTER — V-VISIT (OUTPATIENT)
Dept: BARIATRICS/WEIGHT MGMT | Age: 32
End: 2022-04-29

## 2022-04-29 DIAGNOSIS — R16.0 HEPATOMEGALY: Chronic | ICD-10-CM

## 2022-04-29 DIAGNOSIS — K76.0 FATTY METAMORPHOSIS OF LIVER: Chronic | ICD-10-CM

## 2022-04-29 DIAGNOSIS — M54.50 CHRONIC LOW BACK PAIN, UNSPECIFIED BACK PAIN LATERALITY, UNSPECIFIED WHETHER SCIATICA PRESENT: Chronic | ICD-10-CM

## 2022-04-29 DIAGNOSIS — E89.0 POSTABLATIVE HYPOTHYROIDISM: ICD-10-CM

## 2022-04-29 DIAGNOSIS — E11.9 TYPE 2 DIABETES MELLITUS WITHOUT COMPLICATION, WITHOUT LONG-TERM CURRENT USE OF INSULIN (CMD): ICD-10-CM

## 2022-04-29 DIAGNOSIS — G47.9 SLEEP DISTURBANCE: Chronic | ICD-10-CM

## 2022-04-29 DIAGNOSIS — E78.00 HYPERCHOLESTEREMIA: ICD-10-CM

## 2022-04-29 DIAGNOSIS — E66.01 MORBID OBESITY WITH BMI OF 50.0-59.9, ADULT (CMD): Primary | ICD-10-CM

## 2022-04-29 DIAGNOSIS — E51.9 THIAMINE DEFICIENCY: Chronic | ICD-10-CM

## 2022-04-29 DIAGNOSIS — G89.29 CHRONIC LOW BACK PAIN, UNSPECIFIED BACK PAIN LATERALITY, UNSPECIFIED WHETHER SCIATICA PRESENT: Chronic | ICD-10-CM

## 2022-04-29 DIAGNOSIS — I10 BENIGN ESSENTIAL HTN: ICD-10-CM

## 2022-04-29 DIAGNOSIS — D50.0 IRON DEFICIENCY ANEMIA DUE TO CHRONIC BLOOD LOSS: ICD-10-CM

## 2022-04-29 DIAGNOSIS — E55.9 VITAMIN D DEFICIENCY: ICD-10-CM

## 2022-04-29 PROCEDURE — 99205 OFFICE O/P NEW HI 60 MIN: CPT | Performed by: STUDENT IN AN ORGANIZED HEALTH CARE EDUCATION/TRAINING PROGRAM

## 2022-04-29 ASSESSMENT — ENCOUNTER SYMPTOMS
PSYCHIATRIC NEGATIVE: 1
SEIZURES: 0
CONSTITUTIONAL NEGATIVE: 1
DIZZINESS: 0
GASTROINTESTINAL NEGATIVE: 1
RESPIRATORY NEGATIVE: 1
LIGHT-HEADEDNESS: 0
HEADACHES: 1

## 2022-05-02 RX ORDER — MOMETASONE FUROATE 1 MG/ML
SOLUTION TOPICAL
COMMUNITY
Start: 2022-04-11 | End: 2022-07-05

## 2022-05-02 RX ORDER — PREDNISONE 20 MG/1
TABLET ORAL
COMMUNITY
Start: 2022-04-11 | End: 2022-05-09

## 2022-05-03 ENCOUNTER — LAB VISIT (OUTPATIENT)
Dept: SURGERY | Facility: CLINIC | Age: 32
End: 2022-05-03
Payer: MEDICAID

## 2022-05-03 ENCOUNTER — TELEPHONE (OUTPATIENT)
Dept: BARIATRICS/WEIGHT MGMT | Age: 32
End: 2022-05-03

## 2022-05-03 ENCOUNTER — TELEPHONE (OUTPATIENT)
Dept: SCHEDULING | Age: 32
End: 2022-05-03

## 2022-05-03 DIAGNOSIS — Z01.818 PRE-OP TESTING: ICD-10-CM

## 2022-05-03 LAB — SARS-COV-2 RNA RESP QL NAA+PROBE: NOT DETECTED

## 2022-05-03 PROCEDURE — U0005 INFEC AGEN DETEC AMPLI PROBE: HCPCS | Performed by: FAMILY MEDICINE

## 2022-05-03 PROCEDURE — U0003 INFECTIOUS AGENT DETECTION BY NUCLEIC ACID (DNA OR RNA); SEVERE ACUTE RESPIRATORY SYNDROME CORONAVIRUS 2 (SARS-COV-2) (CORONAVIRUS DISEASE [COVID-19]), AMPLIFIED PROBE TECHNIQUE, MAKING USE OF HIGH THROUGHPUT TECHNOLOGIES AS DESCRIBED BY CMS-2020-01-R: HCPCS | Performed by: FAMILY MEDICINE

## 2022-05-04 ENCOUNTER — TELEPHONE (OUTPATIENT)
Dept: INTERNAL MEDICINE | Age: 32
End: 2022-05-04

## 2022-05-06 ENCOUNTER — ANESTHESIA EVENT (OUTPATIENT)
Dept: ENDOSCOPY | Facility: HOSPITAL | Age: 32
End: 2022-05-06
Payer: MEDICAID

## 2022-05-06 ENCOUNTER — PATIENT MESSAGE (OUTPATIENT)
Dept: ENDOSCOPY | Facility: HOSPITAL | Age: 32
End: 2022-05-06
Payer: MEDICAID

## 2022-05-06 ENCOUNTER — TELEPHONE (OUTPATIENT)
Dept: GASTROENTEROLOGY | Facility: CLINIC | Age: 32
End: 2022-05-06
Payer: MEDICAID

## 2022-05-06 ENCOUNTER — ANESTHESIA (OUTPATIENT)
Dept: ENDOSCOPY | Facility: HOSPITAL | Age: 32
End: 2022-05-06
Payer: MEDICAID

## 2022-05-06 NOTE — TELEPHONE ENCOUNTER
Called and spoke to pt  -advise pt endo department called her   -she missed her colonoscopy appointment   -pt stated she though her appt was at 2:40  -advise pt I will have someone from endo contact her   -pt expressed understanding   -all questions and concerns were answered

## 2022-05-06 NOTE — TELEPHONE ENCOUNTER
----- Message from Srinivas Pang sent at 5/6/2022 10:10 AM CDT -----  Regarding: missed call about the procedure today      The Pt states that she missed your call this morning and would like a call back    Ph# [phone]

## 2022-05-06 NOTE — ANESTHESIA PREPROCEDURE EVALUATION
05/06/2022  Lora Scott is a 32 y.o., female.  Past Medical History:   Diagnosis Date    Allergy     Anxiety     Asthma     Crohn's disease 2/18/19    Crohn's disease (ileum) 5/10/2019    S/P ileocecal resection-42 cm of ileum/10.5 cm cecum removed with surgical path c/w stricture and fistula 2/15/20 ileostomy takedown and mucus fistula takedown    Crohn's disease (ileum) 5/10/2019    S/P ileocecal resection-42 cm of ileum/10.5 cm cecum removed with surgical path c/w stricture and fistula 2/15/20 ileostomy takedown and mucus fistula takedown    Depression     Eczema     Ex-smoker for less than 1 year     Immunosuppressed status 10/24/2019    Joint pain     Perineal abscess 9/28/2021       Past Surgical History:   Procedure Laterality Date    COLONOSCOPY N/A 2/20/2019    Procedure: COLONOSCOPY;  Surgeon: Fawad Perla MD;  Location: [facility] (2ND FLR);  Service: Endoscopy;  Laterality: N/A;    COLONOSCOPY N/A 10/30/2020    Procedure: COLONOSCOPY;  Surgeon: Suzi Bacon MD;  Location: [facility] (4TH FLR);  Service: Endoscopy;  Laterality: N/A;  [place] [facility] [place]  pt to try Mirilax prep with Reglan before per Dr. Bacon - nimisha  10/14-new instructions e-mailed-tb  10/16--new instructions e-mailed  10/29/20- Pt confirmed earlier arrival time, prep ins. reveiwed, Pt verbalized understanding- [id number]    ESOPHAGOGASTRODUODENOSCOPY N/A 2/20/2019    Procedure: EGD (ESOPHAGOGASTRODUODENOSCOPY);  Surgeon: Fawad Perla MD;  Location: [facility] (2ND FLR);  Service: Endoscopy;  Laterality: N/A;    ILEOCOLECTOMY N/A 5/10/2019    Procedure: ILEOCOLECTOMY;  Surgeon: Uday Cope MD;  Location: [facility] OR 2ND FLR;  Service: Colon and Rectal;  Laterality: N/A;  ileocecectomy    ILEOSCOPY N/A 12/20/2019    Procedure: ILEOSCOPY through stoma;  Surgeon: Suzi Bacon MD;   Location: [facility] ENDO (4TH FLR);  Service: Endoscopy;  Laterality: N/A;  Schedule as 30 minute case  Please let patient know to bring extra stoma bag/wafer, etc- entire bag and appliance will be removed at time of procedure to visualize the area well   first week 11/2019     per note-R/S to 12/20/19-GT    ILEOSTOMY N/A 5/10/2019    Procedure: CREATION, ILEOSTOMY with mucous fistula;  Surgeon: Uday Cope MD;  Location: [facility] OR 2ND FLR;  Service: Colon and Rectal;  Laterality: N/A;    ILEOSTOMY CLOSURE N/A 2/12/2020    Procedure: CLOSURE,ILEOSTOMY TAKEDOWN END ILEOSTOMY/MUCOUS FISTULA POSSIBLE LAPAROTOMY ERAS LOW;  Surgeon: Uday Cope MD;  Location: [facility] OR 2ND FLR;  Service: Colon and Rectal;  Laterality: N/A;       Family History   Problem Relation Age of Onset    Hypertension Mother     No Known Problems Father     No Known Problems Sister     No Known Problems Brother     No Known Problems Sister     No Known Problems Sister     No Known Problems Sister     No Known Problems Maternal Uncle     No Known Problems Paternal Aunt     No Known Problems Paternal Uncle     No Known Problems Maternal Grandmother     Cataracts Maternal Grandfather     Irritable bowel syndrome Paternal Grandmother     No Known Problems Paternal Grandfather     No Known Problems Paternal Aunt     No Known Problems Paternal Uncle     No Known Problems Paternal Uncle     No Known Problems Paternal Uncle     No Known Problems Maternal Uncle     No Known Problems Maternal Uncle     Celiac disease Neg Hx     Cirrhosis Neg Hx     Colon cancer Neg Hx     Colon polyps Neg Hx     Crohn's disease Neg Hx     Cystic fibrosis Neg Hx     Esophageal cancer Neg Hx     Hemochromatosis Neg Hx     Inflammatory bowel disease Neg Hx     Liver cancer Neg Hx     Liver disease Neg Hx     Rectal cancer Neg Hx     Stomach cancer Neg Hx     Ulcerative colitis Neg Hx     Jerardo's disease Neg Hx     Lymphoma Neg Hx      Tuberculosis Neg Hx     Scleroderma Neg Hx     Rheum arthritis Neg Hx     Multiple sclerosis Neg Hx     Psoriasis Neg Hx     Lupus Neg Hx     Melanoma Neg Hx     Skin cancer Neg Hx     Amblyopia Neg Hx     Blindness Neg Hx     Cancer Neg Hx     Diabetes Neg Hx     Glaucoma Neg Hx     Macular degeneration Neg Hx     Retinal detachment Neg Hx     Strabismus Neg Hx     Stroke Neg Hx     Thyroid disease Neg Hx        Social History     Socioeconomic History    Marital status: Single   Tobacco Use    Smoking status: Current Every Day Smoker     Packs/day: 0.25     Types: Cigarettes     Last attempt to quit: 2020     Years since quittin.3    Smokeless tobacco: Never Used   Substance and Sexual Activity    Alcohol use: Not Currently     Alcohol/week: 0.0 standard drinks     Comment: Socially    Drug use: No     Frequency: 3.0 times per week    Sexual activity: Not Currently     Partners: Male     Birth control/protection: None       No current facility-administered medications for this encounter.     Current Outpatient Medications   Medication Sig Dispense Refill    apremilast (OTEZLA) 30 mg Tab Take 30 mg by mouth.      diphenhydrAMINE (BENADRYL) 25 mg capsule Take 1 capsule (25 mg total) by mouth every 6 (six) hours as needed for Itching or Allergies. 20 capsule 0    ergocalciferol (ERGOCALCIFEROL) 50,000 unit Cap Take 50,000 Units by mouth every 7 days.      HUMIRA PEN PnKt injection Inject 1 pen (40 mg total) into the skin every 7 days. 4 pen 5    ketoconazole (NIZORAL) 2 % shampoo Apply topically.      mometasone (ELOCON) 0.1 % solution Apply topically.      predniSONE (DELTASONE) 20 MG tablet Take by mouth.      triamcinolone acetonide 0.1% (KENALOG) 0.1 % cream Apply topically 3 (three) times daily. 60 g 0       Review of patient's allergies indicates:   Allergen Reactions    Horse/equine containing products Itching         Pre-op Assessment    I have reviewed the Patient  Summary Reports.     I have reviewed the Nursing Notes. I have reviewed the NPO Status.   I have reviewed the Medications.   Prednisone    Review of Systems  Anesthesia Hx:  No problems with previous Anesthesia Denies Hx of Anesthetic complications  History of prior surgery of interest to airway management or planning: Previous anesthesia: General Colonosopy 10/30/20 with general anesthesia.  Procedure performed at an [facility]. Denies Family Hx of Anesthesia complications.   Denies Personal Hx of Anesthesia complications.   Social:  Smoker, No Alcohol Use    Hematology/Oncology:  Hematology Normal   Oncology Normal     EENT/Dental:EENT/Dental Normal   Cardiovascular:  Cardiovascular Normal Exercise tolerance: good  ECG has been reviewed.    Pulmonary:   Asthma    Renal/:  Renal/ Normal     Hepatic/GI:  Hepatic/GI Normal Bowel Prep. Crohn's disease   Musculoskeletal:  Musculoskeletal Normal    Neurological:  Neurology Normal    Endocrine:  Endocrine Normal    Dermatological:  Skin Normal    Psych:   depression          Physical Exam  General: Well nourished, Cooperative, Alert and Oriented    Airway:  Mallampati: II / II  Mouth Opening: Normal  TM Distance: Normal  Tongue: Normal  Neck ROM: Normal ROM    Dental:  Intact        Anesthesia Plan  Type of Anesthesia, risks & benefits discussed:    Anesthesia Type: Gen Natural Airway  Intra-op Monitoring Plan: Standard ASA Monitors  Post Op Pain Control Plan: multimodal analgesia and IV/PO Opioids PRN  Induction:  IV  Informed Consent: Informed consent signed with the Patient and all parties understand the risks and agree with anesthesia plan.  All questions answered.   ASA Score: 2  Day of Surgery Review of History & Physical: H&P Update referred to the surgeon/provider.    Ready For Surgery From Anesthesia Perspective.     .

## 2022-05-08 NOTE — PROGRESS NOTES
"     [facility]             Inflammatory Bowel Disease        Follow-up  Note              TODAY'S VISIT DATE:  5/9/2022    Chief Complaint:   Chief Complaint   Patient presents with    Crohn's Disease     PCP: Primary Doctor No    Previous History:  Lora Scott is a 32 y.o. female with Crohn's disease (ileum, S/P ileocecal resection-42 cm of ileum/10.5 cm cecum removed with surgical path c/w stricture and fistula, 2/15/20 ileostomy takedown and mucus fistula takedown), psoriasis, GERD, anemia, asthma, ex-smoker (quit 9/2019).  Patient reports being diagnosed with IBS with alternating diarrhea and constipation and weight loss as a teenager that was treated with Nexium which helped.  In 8/2018 she again had abd pain, weight loss, and alternating BMs so she contacted her PCP for Nexium which did not help.  By 2/2019, her symptoms progressed prompting an ER visit and on 2/18/2019 she had a CT abd/pelvis which showed an "abnormal wall thickening involving the ascending colon, cecum and terminal ileum with intense mucosal enhancement and luminal narrowing resulting in distention of proximal small bowel.  There was also an ileal-ileal and ileocolic fistulization, fat hypertrophy, increased mesenteric vascularitiy, and reactive mesenteric adenitis in the RLQ with no abscess. "  Patient had an inpatient EGD on 2/20/2019 which showed minimal chronic H. Pylori negative inflammation, normal esophagus, and duodenal biopsies showed mild villous blunting and mild increased IELs.  Colonoscopy on the same day showed congested mucosa in the TI and ICV with granularity in the TI, and TI stricture 5 cm from the ICV with ileal biopsies confirmed ileitis and biopsies of the right colon confirmed focal moderate active colitis with normal left colon and rectum, sigmoid diverticulitis, and internal hemorrhoids.  It was felt that patient would eventually need surgical intervention but needed nutritional optimization " initially so she was discharged home with 12 hours of TPN daily via a PICC and bowel rest with plans for an MRE, surgical f/u, and a referral to the IBD clinic to discuss medical options.  She was last seen by me 3/2019 for initial IBD visit at which time she was on no meds and there was plan for MRE 3/13/19 with f/u with Dr. Ruiz. MRE on 3/13/19 showed multifocal active inflammation of the distal ileum with mild fibrostenotic component and entero-enteric fistula.  She then was transferred to Dr. Cope due to availability and he initially saw her in clinic and a decision to proceed with surgery was made.  On 5/10/19 she underwent an open ileocolonic resection (10 cm of cecum, 42 cm of ileum removed) with end ileostomy with mucous fistula. Surgical pathology showed TI/cecum with chronic active ileitis with stricture and features of fistulization, transmural inflammation with reactive LNs. Postoperatively she did well and gained weight and would pass mucous from below a few times only. She emptied her stoma about 4-6 times/d and was having issues getting supplies due to insurance issues.  Patient has had multiple issues with stoma appliance/bag and due to inability to afford stoma supplies and its not covered by insurance, she has been getting samples from our wound/ostomy nurse.  Patient stopped smoking in mid September 2019.  She started on methotrexate 12.5 mg p.o. weekly though due to elevated liver function test discontinued on 10/4/2019.  She started Humira on 10/24/2019 and Imuran 50 mg daily on 10/25/2019 though never started Imuran as recommended and we decided not to proceed and instead optimize her Humira.  Ileoscopy through stoma was normal on 12/20/2019.  On 1/15/2020 patient had Humira levels 3.5 and her Humira was increased to weekly starting 3/13/20.  On 2/15/2020 she had elective takedown of her ileostomy and mucous fistula. Colonoscopy 10/2020 showed normal colon and single linear ulcer at  ileocolonic anastomosis and normal neoterminal ileum with biopsies of neoterminal ileum normal. On 9/17/20 trough Humira levels and abs adequate with [id number] with no Abs on Humira 40 mg SC weekly. Patient has ongoing psoriasis being followed by [facility] dermatology and was treated for tinea pedis 7/2020.  She was also treated for bacterial vaginosis by her GYN in 2020 and pap smear HPV positive with plans for colposcopy.  In March 2021 patient was diagnosed with recurrent fungal infections- tineap capitis treated with griseofulvin.  Patient saw Dermatology 5/7/2021 for spongiotic dermitits/Psoriasis, flaring with scalp involvement and derm felt this was drug induced psoriasiform dermatitis treated with TAC cream and doxycycline. In mid 9/2021 pt had abscess near vaginal area s/p I&D in [facility] in [place].  She saw Dermatology 4/11/22 and 4/25/22 for psoriasis which significantly improved with prednisone 60mg followed by taper over two weeks. Pending approval of Otezla.    Interval History:  - current IBD meds: Humira 40 mg SC weekly (FD 10/24/20, 40 mg SC weekly, LD 5/9, ND 5/13)   - 1 soft BMs/d, no blood, no nocturnal  - abdominal pain- started after starting dulcolax for bowel prep (colonoscopy tomorrow)  - weight stable  - psoriasis improved - took prednisone taper 60mg x5 days -> 40mg x5 days -> 20mg x5 days 4/11 - 4/25. Derm working on approval of Otezla.  - last week had fevers, cough, SOB and chest pain. COVID test negative. Symptoms resolved this week.  - joint pains - improved  - urination - pain with urination, blood in urine   - anxiety- ongoing   - NSAID use: No  - Narcotic use: No  - Alternative/complementary meds for IBD: No    Prior Pertinent Surgeries:   5/10/19 open ileocolonic resection (10 cm of cecum, 42 cm of ileum removed) with end ileostomy with mucous fistula. Surgical pathology showed TI/cecum with chronic active ileitis with stricture and features of fistulization, transmural  inflammation with reactive LNs  2/15/20 elective takedown of her ileostomy and mucous fistula;  (surgical path- peristoma granulation tissue with acute and chronic inflammation, ascending colon with acute and chronic inflammatory infiltrate with superficial erosion and focal active colitis, TI with focal granulomatous inflammation and superficial erosion)    Last pertinent Endoscopy/Imagin2019 EGD: normal examined duodenum (path: mild villous blunting and mild increased intraepithelial lymphocytes); normal stomach (path: minimal chronic H. pylori negative inflammation); normal esophagus  3/13/19 MRE:  multifocal active inflammation of the distal ileum with mild fibrostenotic component and entero-enteric fistula  19- ileoscopy through stoma: The 30 cm of examined through the mucous fistula is normal. The peristomal area has granular friable mucosa and the lorena-terminal ileum appeared normal. Biopsies of colon through mucus fistula- normal colon, biopsies of neoterminal ileum with minimal active inflammation  10/30/20 colonoscopy: Colon normal, single linear ulcer at the colon anastomosis. Ileum was normal as well including path.    Therapeutic Drug Monitoring Labs:  1/15/20 humira levels 3.5/neg Abs on humira 40 mg SC every 2 weeks  20 Trough Humira levels 12.0 and neg abs on Humira  40 mg SC every 2 weeks  21 Humira levels 7.5, no antibodies on Humira  40 mg SC every week.     Prior IBD Therapies:  MTX 12.5 mg po weekly- stopped after a few weeks due to elevated LFTs 10/2019    Vaccinations:  Lab Results   Component Value Date    HEPBSAB Grayzone (A) 2019     Lab Results   Component Value Date    HEPAIGG Positive (A) 2019     Lab Results   Component Value Date    VARICELLAZOS 2.58 (H) 2019    VARICELLAINT Positive (A) 2019     Immunization History   Administered Date(s) Administered    COVID-19, MRNA, LN-S, PF (Pfizer) (Purple Cap) 10/22/2021    DTP 1991,  02/24/1992, 03/18/1992, 03/18/1992, 01/12/1994    HIB 1991, 02/24/1992, 03/18/1992    Hepatitis B (recombinant) Adjuvanted, 2 dose 04/17/2019, 05/20/2019    Hepatitis B, Pediatric/Adolescent 07/24/2006    IPV 07/24/2006    MMR 01/13/1994, 07/24/2006    Meningococcal Conjugate (MCV4P) 07/24/2006    OPV 1991, 02/24/1992, 01/12/1994    Pneumococcal Conjugate - 13 Valent 04/17/2019    Pneumococcal Polysaccharide - 23 Valent 06/20/2019    Td (ADULT) 07/24/2006    Td (Adult), Unspecified Formulation 07/24/2006    Tdap 07/24/2006, 04/17/2019     HPV:    Not sure  Shingrix: recommended, defer due to cost    Review of Systems   Constitutional: Negative for chills, fever and weight loss.   HENT:        No oral ulcers, dysphagia, oral thrush   Eyes: Negative for blurred vision, pain and redness.   Respiratory: Negative for cough and shortness of breath.    Cardiovascular: Negative for chest pain.   Gastrointestinal: Positive for abdominal pain. Negative for heartburn, nausea and vomiting.   Genitourinary: Negative for dysuria and hematuria.   Musculoskeletal: Positive for joint pain. Negative for back pain.   Skin: Negative for rash.   Psychiatric/Behavioral: Negative for depression. The patient is not nervous/anxious and does not have insomnia.      All Medical History/Surgical History/Family History/Social History/Allergies have been reviewed and updated in EMR    Review of patient's allergies indicates:   Allergen Reactions    Horse/equine containing products Itching     Outpatient Medications Marked as Taking for the 5/9/22 encounter (Appointment) with Suzi Bacon MD   Medication Sig Dispense Refill    apremilast (OTEZLA) 30 mg Tab Take 30 mg by mouth.      mometasone (ELOCON) 0.1 % solution Apply topically.       Physical Exam  Vitals reviewed.   Constitutional:       General: She is not in acute distress.     Appearance: She is well-developed.   Eyes:      General: No scleral  icterus.  Cardiovascular:      Rate and Rhythm: Normal rate and regular rhythm.   Pulmonary:      Effort: Pulmonary effort is normal.      Breath sounds: Normal breath sounds.   Abdominal:      General: Bowel sounds are normal. There is no distension.      Palpations: Abdomen is soft. There is no mass.      Tenderness: There is no abdominal tenderness.      Comments: Midline abdominal incision from previous small bowel surgery.    Musculoskeletal:         General: Normal range of motion.      Cervical back: Normal range of motion and neck supple.   Skin:     General: Skin is warm and dry.      Comments: Multiple skin plaques with scales on both upper extremities and lower extremities, scalp, trunk, hands    Neurological:      Mental Status: She is alert and oriented to person, place, and time.   Psychiatric:         Mood and Affect: Mood normal.         Behavior: Behavior normal.       Labs:  Lab Results   Component Value Date    CRP 8.5 (H) 09/27/2021     Lab Results   Component Value Date    HEPBSAG Negative 09/27/2021    HEPBCAB Negative 09/27/2021     Lab Results   Component Value Date    TBGOLDPLUS Negative 09/27/2021     Lab Results   Component Value Date    ROXKOROY35MS 12 (L) 09/27/2021    ITJQCMXW06 323 09/27/2021     Lab Results   Component Value Date    WBC 8.14 09/27/2021    HGB 13.7 09/27/2021    HCT 42.6 09/27/2021    MCV 99 (H) 09/27/2021     09/27/2021     Lab Results   Component Value Date    CREATININE 0.7 09/27/2021    ALBUMIN 3.6 09/27/2021    BILITOT 0.7 09/27/2021    ALKPHOS 96 09/27/2021    AST 16 09/27/2021    ALT 14 09/27/2021     Assessment/Plan:  Lora Scott is a 32 y.o. female with Crohn's disease (ileum, S/P ileocecal resection-42 cm of ileum/10.5 cm cecum removed with surgical path c/w stricture and fistula, 2/15/20 ileostomy takedown and mucus fistula takedown), psoriasis (failed light treatment), ex-smoker (quit 9/2019) who is on Humira 40 mg SC weekly.  Her last dose (today)  was late so doing drug levels now won't be reflective of current dosing. She was supposed to get a Colonoscopy last week, but showed up late and was rescheduled to tomorrow. Her psoriasis has significantly improved with prednisone and Dermatology is working on approval of Otezla to be used in conjunction with Humira.      # Crohn's disease (ileum, S/P ileocecal resection-42 cm of ileum/10.5 cm cecum removed with surgical path c/w stricture and fistula, 2/15/20 ileostomy takedown and mucus fistula takedown)  - continue humira 40 mg SC weekly  - colonoscopy tomorrow  - vitamin B12 (9/2021 323)- will recheck with labs  - ex-smoker: quit smoking 9/2019, some 2nd hand smoke, marijuana use.    - drug monitoring labs:  CBC/CMP q 6 mos (today), TPMT (heterozygote 2/2019), TB quantiferon (9/2022), Hep B testing (9/2022)  - TDM: trough humira levels/abs 11/19/21    # Skin issues  - psoriasis - improved since last visit with prednisone - hands/torso/scalp  - recurrent Vulvar/groin abscess - incised and drained recently with area open with mild surrounding fluctuance - prescribed doxycycline 100 mg po BID for 2 weeks, pt has not seen gyn and I have recommended this again  - pt follows with [facility], pending approval of Otezla    # IBD specific health maintenance:  Skin exam yearly - has dermatologist at [facility], last exam 7/2021, not able to follow due to insurance  Pap smear yearly: 8/2021  Vitamin D deficiency (9/2021 vit D 12) - high dose vitamin D   Vaccines: no live vaccines    Follow up: 1 month    Total time: 40 min    Leander Ramirez MD   Department of Gastroenterology, PGY VI    I have reviewed and concur with the fellow's history, physical, assessment, and plan.  I have personally interviewed and examined the patient. The above note has been edited to reflect my recommendations.       Suzi Bacon MD   Department of Gastroenterology  Medical Director, Inflammatory Bowel Disease

## 2022-05-09 ENCOUNTER — LAB VISIT (OUTPATIENT)
Dept: LAB | Facility: HOSPITAL | Age: 32
End: 2022-05-09
Attending: INTERNAL MEDICINE
Payer: MEDICAID

## 2022-05-09 ENCOUNTER — OFFICE VISIT (OUTPATIENT)
Dept: GASTROENTEROLOGY | Facility: CLINIC | Age: 32
End: 2022-05-09
Payer: MEDICAID

## 2022-05-09 VITALS
BODY MASS INDEX: 23.77 KG/M2 | WEIGHT: 134.13 LBS | OXYGEN SATURATION: 100 % | SYSTOLIC BLOOD PRESSURE: 101 MMHG | HEIGHT: 63 IN | HEART RATE: 90 BPM | TEMPERATURE: 98 F | DIASTOLIC BLOOD PRESSURE: 66 MMHG

## 2022-05-09 DIAGNOSIS — K50.80 CROHN'S DISEASE OF BOTH SMALL AND LARGE INTESTINE WITHOUT COMPLICATION: Primary | ICD-10-CM

## 2022-05-09 DIAGNOSIS — K50.80 CROHN'S DISEASE OF BOTH SMALL AND LARGE INTESTINE WITHOUT COMPLICATION: ICD-10-CM

## 2022-05-09 LAB
25(OH)D3+25(OH)D2 SERPL-MCNC: 16 NG/ML (ref 30–96)
ALBUMIN SERPL BCP-MCNC: 3.6 G/DL (ref 3.5–5.2)
ALP SERPL-CCNC: 69 U/L (ref 55–135)
ALT SERPL W/O P-5'-P-CCNC: 18 U/L (ref 10–44)
ANION GAP SERPL CALC-SCNC: 9 MMOL/L (ref 8–16)
AST SERPL-CCNC: 16 U/L (ref 10–40)
BASOPHILS # BLD AUTO: 0.01 K/UL (ref 0–0.2)
BASOPHILS NFR BLD: 0.1 % (ref 0–1.9)
BILIRUB SERPL-MCNC: 0.5 MG/DL (ref 0.1–1)
BUN SERPL-MCNC: 7 MG/DL (ref 6–20)
CALCIUM SERPL-MCNC: 9.6 MG/DL (ref 8.7–10.5)
CHLORIDE SERPL-SCNC: 109 MMOL/L (ref 95–110)
CO2 SERPL-SCNC: 20 MMOL/L (ref 23–29)
CREAT SERPL-MCNC: 0.7 MG/DL (ref 0.5–1.4)
DIFFERENTIAL METHOD: ABNORMAL
EOSINOPHIL # BLD AUTO: 0.2 K/UL (ref 0–0.5)
EOSINOPHIL NFR BLD: 2.4 % (ref 0–8)
ERYTHROCYTE [DISTWIDTH] IN BLOOD BY AUTOMATED COUNT: 14.3 % (ref 11.5–14.5)
EST. GFR  (AFRICAN AMERICAN): >60 ML/MIN/1.73 M^2
EST. GFR  (NON AFRICAN AMERICAN): >60 ML/MIN/1.73 M^2
GLUCOSE SERPL-MCNC: 70 MG/DL (ref 70–110)
HCT VFR BLD AUTO: 40 % (ref 37–48.5)
HGB BLD-MCNC: 12.2 G/DL (ref 12–16)
IMM GRANULOCYTES # BLD AUTO: 0.01 K/UL (ref 0–0.04)
IMM GRANULOCYTES NFR BLD AUTO: 0.1 % (ref 0–0.5)
LYMPHOCYTES # BLD AUTO: 2.2 K/UL (ref 1–4.8)
LYMPHOCYTES NFR BLD: 32.2 % (ref 18–48)
MCH RBC QN AUTO: 30.1 PG (ref 27–31)
MCHC RBC AUTO-ENTMCNC: 30.5 G/DL (ref 32–36)
MCV RBC AUTO: 99 FL (ref 82–98)
MONOCYTES # BLD AUTO: 0.3 K/UL (ref 0.3–1)
MONOCYTES NFR BLD: 4.6 % (ref 4–15)
NEUTROPHILS # BLD AUTO: 4.1 K/UL (ref 1.8–7.7)
NEUTROPHILS NFR BLD: 60.6 % (ref 38–73)
NRBC BLD-RTO: 0 /100 WBC
PLATELET # BLD AUTO: 274 K/UL (ref 150–450)
PMV BLD AUTO: 10.8 FL (ref 9.2–12.9)
POTASSIUM SERPL-SCNC: 3.5 MMOL/L (ref 3.5–5.1)
PROT SERPL-MCNC: 8.3 G/DL (ref 6–8.4)
RBC # BLD AUTO: 4.05 M/UL (ref 4–5.4)
SODIUM SERPL-SCNC: 138 MMOL/L (ref 136–145)
VIT B12 SERPL-MCNC: 196 PG/ML (ref 210–950)
WBC # BLD AUTO: 6.76 K/UL (ref 3.9–12.7)

## 2022-05-09 PROCEDURE — 3008F PR BODY MASS INDEX (BMI) DOCUMENTED: ICD-10-PCS | Mod: CPTII,S$GLB,, | Performed by: INTERNAL MEDICINE

## 2022-05-09 PROCEDURE — 80053 COMPREHEN METABOLIC PANEL: CPT | Performed by: INTERNAL MEDICINE

## 2022-05-09 PROCEDURE — 99215 PR OFFICE/OUTPT VISIT, EST, LEVL V, 40-54 MIN: ICD-10-PCS | Mod: S$GLB,,, | Performed by: INTERNAL MEDICINE

## 2022-05-09 PROCEDURE — 1159F PR MEDICATION LIST DOCUMENTED IN MEDICAL RECORD: ICD-10-PCS | Mod: CPTII,S$GLB,, | Performed by: INTERNAL MEDICINE

## 2022-05-09 PROCEDURE — 3078F DIAST BP <80 MM HG: CPT | Mod: CPTII,S$GLB,, | Performed by: INTERNAL MEDICINE

## 2022-05-09 PROCEDURE — 3074F SYST BP LT 130 MM HG: CPT | Mod: CPTII,S$GLB,, | Performed by: INTERNAL MEDICINE

## 2022-05-09 PROCEDURE — 3074F PR MOST RECENT SYSTOLIC BLOOD PRESSURE < 130 MM HG: ICD-10-PCS | Mod: CPTII,S$GLB,, | Performed by: INTERNAL MEDICINE

## 2022-05-09 PROCEDURE — 3078F PR MOST RECENT DIASTOLIC BLOOD PRESSURE < 80 MM HG: ICD-10-PCS | Mod: CPTII,S$GLB,, | Performed by: INTERNAL MEDICINE

## 2022-05-09 PROCEDURE — 99215 OFFICE O/P EST HI 40 MIN: CPT | Mod: S$GLB,,, | Performed by: INTERNAL MEDICINE

## 2022-05-09 PROCEDURE — 82607 VITAMIN B-12: CPT | Performed by: INTERNAL MEDICINE

## 2022-05-09 PROCEDURE — 36415 COLL VENOUS BLD VENIPUNCTURE: CPT | Performed by: INTERNAL MEDICINE

## 2022-05-09 PROCEDURE — 85025 COMPLETE CBC W/AUTO DIFF WBC: CPT | Performed by: INTERNAL MEDICINE

## 2022-05-09 PROCEDURE — 82306 VITAMIN D 25 HYDROXY: CPT | Performed by: INTERNAL MEDICINE

## 2022-05-09 PROCEDURE — 3008F BODY MASS INDEX DOCD: CPT | Mod: CPTII,S$GLB,, | Performed by: INTERNAL MEDICINE

## 2022-05-09 PROCEDURE — 1159F MED LIST DOCD IN RCRD: CPT | Mod: CPTII,S$GLB,, | Performed by: INTERNAL MEDICINE

## 2022-05-09 NOTE — PATIENT INSTRUCTIONS
- proceed with colonoscopy tomorrow- YOU MUST COME IN NO LATER THAN 2:00 PM TOMORROW OR YOU PROCEDURE WILL BE CANCELED  - continue humira every week- take this Friday next dose  - labs today

## 2022-05-10 ENCOUNTER — TELEPHONE (OUTPATIENT)
Dept: GASTROENTEROLOGY | Facility: CLINIC | Age: 32
End: 2022-05-10
Payer: MEDICAID

## 2022-05-10 ENCOUNTER — HOSPITAL ENCOUNTER (OUTPATIENT)
Facility: HOSPITAL | Age: 32
Discharge: HOME | End: 2022-05-10
Attending: INTERNAL MEDICINE | Admitting: INTERNAL MEDICINE
Payer: MEDICAID

## 2022-05-10 VITALS
HEIGHT: 63 IN | HEART RATE: 78 BPM | SYSTOLIC BLOOD PRESSURE: 93 MMHG | RESPIRATION RATE: 16 BRPM | BODY MASS INDEX: 23.74 KG/M2 | TEMPERATURE: 98 F | WEIGHT: 134 LBS | DIASTOLIC BLOOD PRESSURE: 69 MMHG | OXYGEN SATURATION: 100 %

## 2022-05-10 DIAGNOSIS — K50.90 CROHN DISEASE: ICD-10-CM

## 2022-05-10 DIAGNOSIS — K50.80 CROHN'S DISEASE OF BOTH SMALL AND LARGE INTESTINE WITHOUT COMPLICATION: Primary | ICD-10-CM

## 2022-05-10 DIAGNOSIS — E55.9 VITAMIN D DEFICIENCY: ICD-10-CM

## 2022-05-10 DIAGNOSIS — E53.8 VITAMIN B12 DEFICIENCY: ICD-10-CM

## 2022-05-10 LAB
B-HCG UR QL: NEGATIVE
CTP QC/QA: YES
CTP QC/QA: YES
SARS-COV-2 AG RESP QL IA.RAPID: NEGATIVE

## 2022-05-10 PROCEDURE — 00811 ANES LWR INTST NDSC NOS: CPT | Performed by: INTERNAL MEDICINE

## 2022-05-10 PROCEDURE — E9220 PRA ENDO ANESTHESIA: ICD-10-PCS | Mod: ,,, | Performed by: NURSE ANESTHETIST, CERTIFIED REGISTERED

## 2022-05-10 PROCEDURE — 81025 URINE PREGNANCY TEST: CPT | Performed by: INTERNAL MEDICINE

## 2022-05-10 PROCEDURE — E9220 PRA ENDO ANESTHESIA: HCPCS | Mod: ,,, | Performed by: NURSE ANESTHETIST, CERTIFIED REGISTERED

## 2022-05-10 PROCEDURE — 45380 COLONOSCOPY AND BIOPSY: CPT | Performed by: INTERNAL MEDICINE

## 2022-05-10 PROCEDURE — 45380 COLONOSCOPY AND BIOPSY: CPT | Mod: ,,, | Performed by: INTERNAL MEDICINE

## 2022-05-10 PROCEDURE — 88305 TISSUE EXAM BY PATHOLOGIST: ICD-10-PCS | Mod: 26,,, | Performed by: PATHOLOGY

## 2022-05-10 PROCEDURE — 88305 TISSUE EXAM BY PATHOLOGIST: CPT | Mod: 26,,, | Performed by: PATHOLOGY

## 2022-05-10 PROCEDURE — 88305 TISSUE EXAM BY PATHOLOGIST: CPT | Performed by: PATHOLOGY

## 2022-05-10 PROCEDURE — 63600175 PHARM REV CODE 636 W HCPCS: Performed by: NURSE ANESTHETIST, CERTIFIED REGISTERED

## 2022-05-10 PROCEDURE — 37000008 HC ANESTHESIA 1ST 15 MINUTES: Performed by: INTERNAL MEDICINE

## 2022-05-10 PROCEDURE — 37000009 HC ANESTHESIA EA ADD 15 MINS: Performed by: INTERNAL MEDICINE

## 2022-05-10 PROCEDURE — 25000003 PHARM REV CODE 250: Performed by: INTERNAL MEDICINE

## 2022-05-10 PROCEDURE — 45380 PR COLONOSCOPY,BIOPSY: ICD-10-PCS | Mod: ,,, | Performed by: INTERNAL MEDICINE

## 2022-05-10 RX ORDER — PROPOFOL 10 MG/ML
VIAL (ML) INTRAVENOUS
Status: DISCONTINUED | OUTPATIENT
Start: 2022-05-10 | End: 2022-05-10

## 2022-05-10 RX ORDER — CYANOCOBALAMIN 1000 UG/ML
1000 INJECTION, SOLUTION INTRAMUSCULAR; SUBCUTANEOUS
Qty: 6 ML | Refills: 0 | Status: SHIPPED | OUTPATIENT
Start: 2022-05-10 | End: 2022-07-05

## 2022-05-10 RX ORDER — CYANOCOBALAMIN 1000 UG/ML
1000 INJECTION, SOLUTION INTRAMUSCULAR; SUBCUTANEOUS
Qty: 4 ML | Refills: 0 | Status: SHIPPED | OUTPATIENT
Start: 2022-05-10 | End: 2022-06-22 | Stop reason: SDUPTHER

## 2022-05-10 RX ORDER — ACETAMINOPHEN 500 MG
5000 TABLET ORAL DAILY
Qty: 90 TABLET | Refills: 3 | Status: SHIPPED | OUTPATIENT
Start: 2022-05-10 | End: 2022-07-05

## 2022-05-10 RX ORDER — PROPOFOL 10 MG/ML
VIAL (ML) INTRAVENOUS CONTINUOUS PRN
Status: DISCONTINUED | OUTPATIENT
Start: 2022-05-10 | End: 2022-05-10

## 2022-05-10 RX ORDER — SODIUM CHLORIDE 9 MG/ML
INJECTION, SOLUTION INTRAVENOUS CONTINUOUS
Status: DISCONTINUED | OUTPATIENT
Start: 2022-05-10 | End: 2022-05-10 | Stop reason: HOSPADM

## 2022-05-10 RX ORDER — LIDOCAINE HCL/PF 100 MG/5ML
SYRINGE (ML) INTRAVENOUS
Status: DISCONTINUED | OUTPATIENT
Start: 2022-05-10 | End: 2022-05-10

## 2022-05-10 RX ADMIN — PROPOFOL 70 MG: 10 INJECTION, EMULSION INTRAVENOUS at 03:05

## 2022-05-10 RX ADMIN — SODIUM CHLORIDE: 0.9 INJECTION, SOLUTION INTRAVENOUS at 03:05

## 2022-05-10 RX ADMIN — Medication 80 MG: at 03:05

## 2022-05-10 RX ADMIN — PROPOFOL 150 MCG/KG/MIN: 10 INJECTION, EMULSION INTRAVENOUS at 03:05

## 2022-05-10 NOTE — TRANSFER OF CARE
"Anesthesia Transfer of Care Note    Patient: Lora Scott    Procedure(s) Performed: Procedure(s) (LRB):  COLONOSCOPY (N/A)    Patient location: GI    Anesthesia Type: MAC    Transport from OR: Transported from OR on room air with adequate spontaneous ventilation    Post pain: adequate analgesia    Post assessment: no apparent anesthetic complications and tolerated procedure well    Post vital signs: stable    Level of consciousness: awake    Nausea/Vomiting: no nausea/vomiting    Complications: none    Transfer of care protocol was followed      Last vitals:   Visit Vitals  /66 (BP Location: Left arm, Patient Position: Lying)   Pulse 96   Temp 36.6 °C (97.9 °F) (Temporal)   Resp 16   Ht 5' 3" (1.6 m)   Wt 60.8 kg (134 lb)   SpO2 99%   Breastfeeding No   BMI 23.74 kg/m²     "

## 2022-05-10 NOTE — H&P
Short Stay Endoscopy History and Physical    PCP - Primary Doctor No  Referring Physician - Suzi Bacon MD  [address] [place] [address]    Procedure - Colonoscopy  ASA - per anesthesia  Mallampati - per anesthesia  History of Anesthesia problems - no  Family history Anesthesia problems -  no   Plan of anesthesia - General    HPI  32 y.o. female  Reason for procedure: f/u crohn's disease      ROS:  Constitutional: No fevers, chills, No weight loss  CV: No chest pain  Pulm: No cough, No shortness of breath  GI: see HPI    Medical History:  has a past medical history of Allergy, Anxiety, Asthma, Crohn's disease (2/18/19), Crohn's disease (ileum) (5/10/2019), Crohn's disease (ileum) (5/10/2019), Depression, Eczema, Ex-smoker for less than 1 year, Immunosuppressed status (10/24/2019), Joint pain, and Perineal abscess (9/28/2021).    Surgical History:  has a past surgical history that includes Colonoscopy (N/A, 2/20/2019); Esophagogastroduodenoscopy (N/A, 2/20/2019); Ileocolectomy (N/A, 5/10/2019); Ileostomy (N/A, 5/10/2019); Ileoscopy (N/A, 12/20/2019); Ileostomy closure (N/A, 2/12/2020); and Colonoscopy (N/A, 10/30/2020).    Family History: family history includes Cataracts in her maternal grandfather; Hypertension in her mother; Irritable bowel syndrome in her paternal grandmother; No Known Problems in her brother, father, maternal grandmother, maternal uncle, maternal uncle, maternal uncle, paternal aunt, paternal aunt, paternal grandfather, paternal uncle, paternal uncle, paternal uncle, paternal uncle, sister, sister, sister, and sister..    Social History:  reports that she has been smoking cigarettes. She has been smoking about 0.25 packs per day. She has never used smokeless tobacco. She reports previous alcohol use. She reports current drug use. Frequency: 3.00 times per week. Drug: Marijuana.    Review of patient's allergies indicates:   Allergen Reactions    Horse/equine containing  products Itching       Medications:   No medications prior to admission.       Physical Exam:    Vital Signs:   Vitals:    05/10/22 1558   BP: 93/69   Pulse: 78   Resp: 16   Temp:        General Appearance: Well appearing in no acute distress  Abdomen: Soft, non tender, non distended with normal bowel sounds, no masses    Labs:  Lab Results   Component Value Date    WBC 6.76 05/09/2022    HGB 12.2 05/09/2022    HCT 40.0 05/09/2022     05/09/2022    TRIG 41 05/06/2019    ALT 18 05/09/2022    AST 16 05/09/2022     05/09/2022    K 3.5 05/09/2022     05/09/2022    CREATININE 0.7 05/09/2022    BUN 7 05/09/2022    CO2 20 (L) 05/09/2022    TSH 0.713 08/19/2015       I have explained the risks and benefits of this endoscopic procedure to the patient including but not limited to bleeding, inflammation, infection, perforation, and death.      Suzi Bacon MD

## 2022-05-10 NOTE — TELEPHONE ENCOUNTER
----- Message from Suzi Bacon MD sent at 5/10/2022  8:10 AM CDT -----  Review labs with patient and review vitamin supplementation  Vit D 16- needs vit D3 5000 IU/day  Vit B12 196- needs vit B12 IM injections weekly for 4 weeks then 1000 mcg IM every 2 weeks   Rest of labs look okay

## 2022-05-10 NOTE — PROVATION PATIENT INSTRUCTIONS
Discharge Summary/Instructions after an Endoscopic Procedure  Patient Name: Lora Scott  Patient MRN: [id number]  Patient YOB: 1990  Tuesday, May 10, 2022  Suzi Bacon MD  Dear patient,  As a result of recent federal legislation (The Federal Cures Act), you may   receive lab or pathology results from your procedure in your [facility]   account before your physician is able to contact you. Your physician or   their representative will relay the results to you with their   recommendations at their soonest availability.  Thank you,  RESTRICTIONS:  During your procedure today, you received medications for sedation.  These   medications may affect your judgment, balance and coordination.  Therefore,   for 24 hours, you have the following restrictions:   - DO NOT drive a car, operate machinery, make legal/financial decisions,   sign important papers or drink alcohol.    ACTIVITY:  Today: no heavy lifting, straining or running due to procedural   sedation/anesthesia.  The following day: return to full activity including work.  DIET:  Eat and drink normally unless instructed otherwise.     TREATMENT FOR COMMON SIDE EFFECTS:  - Mild abdominal pain, nausea, belching, bloating or excessive gas:  rest,   eat lightly and use a heating pad.  - Sore Throat: treat with throat lozenges and/or gargle with warm salt   water.  - Because air was used during the procedure, expelling large amounts of air   from your rectum or belching is normal.  - If a bowel prep was taken, you may not have a bowel movement for 1-3 days.    This is normal.  SYMPTOMS TO WATCH FOR AND REPORT TO YOUR PHYSICIAN:  1. Abdominal pain or bloating, other than gas cramps.  2. Chest pain.  3. Back pain.  4. Signs of infection such as: chills or fever occurring within 24 hours   after the procedure.  5. Rectal bleeding, which would show as bright red, maroon, or black stools.   (A tablespoon of blood from the rectum is not serious, especially if    hemorrhoids are present.)  6. Vomiting.  7. Weakness or dizziness.  GO DIRECTLY TO THE NEAREST EMERGENCY ROOM IF YOU HAVE ANY OF THE FOLLOWING:      Difficulty breathing              Chills and/or fever over 101 F   Persistent vomiting and/or vomiting blood   Severe abdominal pain   Severe chest pain   Black, tarry stools   Bleeding- more than one tablespoon   Any other symptom or condition that you feel may need urgent attention  Your doctor recommends these additional instructions:  If any biopsies were taken, your doctors clinic will contact you in 1 to 2   weeks with any results.  - Discharge patient to home.   - Patient has a contact number available for emergencies.  The signs and   symptoms of potential delayed complications were discussed with the   patient.  Return to normal activities tomorrow.  Written discharge   instructions were provided to the patient.   - Resume previous diet.   - Continue present medications.   - Await pathology results.   - Repeat colonoscopy in 1 year to assess disease activity.   - Return to GI clinic as previously scheduled.   For questions, problems or results please call your physician - Suzi Bacon MD at Work:  [phone].  [facility], EMERGENCY ROOM PHONE NUMBER: [phone]  IF A COMPLICATION OR EMERGENCY SITUATION ARISES AND YOU ARE UNABLE TO REACH   YOUR PHYSICIAN - GO DIRECTLY TO THE EMERGENCY ROOM.  Suzi Bacon MD  5/10/2022 3:30:50 PM  This report has been verified and signed electronically.  Dear patient,  As a result of recent federal legislation (The Federal Cures Act), you may   receive lab or pathology results from your procedure in your [facility]   account before your physician is able to contact you. Your physician or   their representative will relay the results to you with their   recommendations at their soonest availability.  Thank you,  [employer]ATION

## 2022-05-10 NOTE — TELEPHONE ENCOUNTER
----- Message from Boo Amado MA sent at 5/10/2022 11:21 AM CDT -----  Regarding: FW: Pt Inquiry    ----- Message -----  From: Che Mcdonald  Sent: 5/10/2022  10:52 AM CDT  To: [facility] Staff  Subject: Pt Inquiry                                       Pt returning missed call from Sujatha in regards to her labs. Requesting a call back.      [phone] (home)

## 2022-05-10 NOTE — ANESTHESIA POSTPROCEDURE EVALUATION
Anesthesia Post Evaluation    Patient: Lora Scott    Procedure(s) Performed: Procedure(s) (LRB):  COLONOSCOPY (N/A)    Final Anesthesia Type: general      Patient location during evaluation: PACU  Patient participation: Yes- Able to Participate  Level of consciousness: awake and alert  Post-procedure vital signs: reviewed and stable  Pain management: adequate  Airway patency: patent    PONV status at discharge: No PONV  Anesthetic complications: no      Cardiovascular status: blood pressure returned to baseline  Respiratory status: unassisted  Hydration status: euvolemic  Follow-up not needed.          Vitals Value Taken Time   BP 93/69 05/10/22 1558   Temp 36.6 °C (97.9 °F) 05/10/22 1536   Pulse 78 05/10/22 1558   Resp 16 05/10/22 1558   SpO2 100 % 05/10/22 1558         Event Time   Out of Recovery 16:04:40         Pain/Oral Score: Oral Score: 10 (5/10/2022  3:59 PM)

## 2022-05-10 NOTE — TELEPHONE ENCOUNTER
Called & spoke to pt  - Reviewed labs overall look okay except for low vitamin D and low vitamin B12  - Informed of plan for OTC vitamin D3 5,000 IU/ QD & vitamin B12 IM weekly x4 weeks then every other week  - Pt expressed understanding  - Will pend RX in separate encounter

## 2022-05-16 ENCOUNTER — APPOINTMENT (OUTPATIENT)
Dept: INTERNAL MEDICINE | Age: 32
End: 2022-05-16

## 2022-05-17 LAB
FINAL PATHOLOGIC DIAGNOSIS: NORMAL
Lab: NORMAL

## 2022-05-20 ENCOUNTER — OFFICE VISIT (OUTPATIENT)
Dept: INTERNAL MEDICINE | Age: 32
End: 2022-05-20

## 2022-05-20 ENCOUNTER — TELEPHONE (OUTPATIENT)
Dept: INTERNAL MEDICINE | Age: 32
End: 2022-05-20

## 2022-05-20 VITALS
BODY MASS INDEX: 43.4 KG/M2 | WEIGHT: 293 LBS | OXYGEN SATURATION: 99 % | TEMPERATURE: 97.2 F | DIASTOLIC BLOOD PRESSURE: 86 MMHG | HEIGHT: 69 IN | RESPIRATION RATE: 18 BRPM | HEART RATE: 92 BPM | SYSTOLIC BLOOD PRESSURE: 130 MMHG

## 2022-05-20 DIAGNOSIS — I10 BENIGN ESSENTIAL HTN: ICD-10-CM

## 2022-05-20 DIAGNOSIS — E66.01 MORBID OBESITY WITH BMI OF 50.0-59.9, ADULT (CMD): ICD-10-CM

## 2022-05-20 DIAGNOSIS — E11.9 TYPE 2 DIABETES MELLITUS WITHOUT COMPLICATION, WITHOUT LONG-TERM CURRENT USE OF INSULIN (CMD): ICD-10-CM

## 2022-05-20 DIAGNOSIS — Z01.818 PRE-OP EVALUATION: Primary | ICD-10-CM

## 2022-05-20 PROCEDURE — 3075F SYST BP GE 130 - 139MM HG: CPT | Performed by: INTERNAL MEDICINE

## 2022-05-20 PROCEDURE — 3079F DIAST BP 80-89 MM HG: CPT | Performed by: INTERNAL MEDICINE

## 2022-05-20 PROCEDURE — 99214 OFFICE O/P EST MOD 30 MIN: CPT | Performed by: INTERNAL MEDICINE

## 2022-05-20 ASSESSMENT — PAIN SCALES - GENERAL: PAINLEVEL: 0

## 2022-05-23 NOTE — PROGRESS NOTES
No c/o today   FHT normal   @  28 2/7 wga  1. iob reviewed; enrolled in connected moms; declines covid and flu vaccine; MT 21 negative; anatomy incomplete so repeat done today, still limited, will repeat at 32 wga; DM test normal; tdap done; 3rd trim labs done    2. Anemia: on iron   3. Obesity: did targeted US.  Early GTT normal; 32 week US set up   -Will start  testing since BMI >45. At 34 weeks PP   - on ASA    -breast feeding, discussed how to get pump and latch and holds  -peds: darvin marcus   - wm birth plan and epidural next visit          Reports pouch began to leak last night and nursing applied tape at the pouch edge to seal the leak. Day shift nurse changed the appliance this morning, but noted a small leak developing at lateral pouch edge.   Informed her we needed to change the appliance anyway to allow her time to perform return demonstration of the appliance change.   She began to cry and encouraged her to perform appliance change with [facility] nurse obnserving her technique. She removed the pouch herself with sensicare adhesive remove. Became distressed when she could not remove all of the paste from the previous pouch change. Offered her some adhesive remover to utilize , but reiterated the adhesive remover must be totally cleaned off of her skin prior to pouch application.   Stoma has shrunk to 30mm over the night. Used barrier ring instead of the paste around pouch opening. Pt cut pouch and applied the ring. Assisted her with placement and taught to hold her hand over the appliance and press around pouch opening to seal.    Has supplies in the room for discharge and have enrolled in [employer] cares program to assist with appliances upon discharge.   Denied any other questions.   Mother and family member present for lesson and will provide support while at home.     05/15/19 1400        Ileostomy 05/10/19 1502 Standard (scarlet Kimball) RLQ   Placement Date/Time: 05/10/19 1502   Present Prior to Hospital Arrival?: No  Inserted by: MD  Ileostomy Type: Standard (scarlet Kimball)  Location: RLQ   Stoma Appearance round;rosebud appearance;red;protruding above skin level   Stoma Size (in) 30   Appliance 1-piece;leakage;changed   Accessories/Skin Care cleansed w/ sterile normal saline;skin sealant;skin barrier ring   Treatment Bag change   Stoma Function flatus;stool;thin liquid;green   Peristomal Assessment Intact without breakdown   Tolerance assisted w/ stoma care;assisted w/ appliance change  (tearful throughout lesson, but performed pouch chnage)   Output  (mL) 75 mL     Allison Gaspar RN CWON  [id number]

## 2022-06-03 ENCOUNTER — OFFICE VISIT (OUTPATIENT)
Dept: BARIATRICS/WEIGHT MGMT | Age: 32
End: 2022-06-03

## 2022-06-03 VITALS
BODY MASS INDEX: 43.4 KG/M2 | HEART RATE: 99 BPM | DIASTOLIC BLOOD PRESSURE: 122 MMHG | HEIGHT: 69 IN | OXYGEN SATURATION: 98 % | WEIGHT: 293 LBS | SYSTOLIC BLOOD PRESSURE: 178 MMHG | TEMPERATURE: 98.8 F

## 2022-06-03 DIAGNOSIS — E78.00 HYPERCHOLESTEREMIA: ICD-10-CM

## 2022-06-03 DIAGNOSIS — E21.1 SECONDARY HYPERPARATHYROIDISM, NON-RENAL (CMD): ICD-10-CM

## 2022-06-03 DIAGNOSIS — R53.83 MALAISE AND FATIGUE: ICD-10-CM

## 2022-06-03 DIAGNOSIS — E11.9 TYPE 2 DIABETES MELLITUS WITHOUT COMPLICATION, WITHOUT LONG-TERM CURRENT USE OF INSULIN (CMD): ICD-10-CM

## 2022-06-03 DIAGNOSIS — I10 BENIGN ESSENTIAL HTN: ICD-10-CM

## 2022-06-03 DIAGNOSIS — E55.9 VITAMIN D DEFICIENCY: ICD-10-CM

## 2022-06-03 DIAGNOSIS — R53.81 MALAISE AND FATIGUE: ICD-10-CM

## 2022-06-03 DIAGNOSIS — R63.5 ABNORMAL WEIGHT GAIN: ICD-10-CM

## 2022-06-03 DIAGNOSIS — E66.01 MORBID OBESITY WITH BMI OF 50.0-59.9, ADULT (CMD): ICD-10-CM

## 2022-06-03 DIAGNOSIS — E51.9 THIAMINE DEFICIENCY: ICD-10-CM

## 2022-06-03 DIAGNOSIS — Z01.812 PRE-PROCEDURE LAB EXAM: Primary | ICD-10-CM

## 2022-06-03 DIAGNOSIS — Z71.89 ADVICE GIVEN ABOUT 2019 NOVEL CORONAVIRUS BY TELEPHONE: ICD-10-CM

## 2022-06-03 PROCEDURE — 3077F SYST BP >= 140 MM HG: CPT | Performed by: SURGERY

## 2022-06-03 PROCEDURE — 3080F DIAST BP >= 90 MM HG: CPT | Performed by: SURGERY

## 2022-06-03 PROCEDURE — 99215 OFFICE O/P EST HI 40 MIN: CPT | Performed by: SURGERY

## 2022-06-03 PROCEDURE — X1171 BARIATRIC INITIAL PROGRAM FEE: HCPCS | Performed by: SURGERY

## 2022-06-03 RX ORDER — CHLORHEXIDINE GLUCONATE ORAL RINSE 1.2 MG/ML
15 SOLUTION DENTAL
Status: CANCELLED | OUTPATIENT
Start: 2022-06-03

## 2022-06-03 RX ORDER — TIZANIDINE 2 MG/1
4 TABLET ORAL ONCE
Status: CANCELLED | OUTPATIENT
Start: 2022-06-03 | End: 2022-06-03

## 2022-06-03 RX ORDER — HEPARIN SODIUM 5000 [USP'U]/ML
5000 INJECTION, SOLUTION INTRAVENOUS; SUBCUTANEOUS ONCE
Status: CANCELLED | OUTPATIENT
Start: 2022-06-03 | End: 2022-06-03

## 2022-06-03 RX ORDER — ENOXAPARIN SODIUM 300 MG/3ML
40 INJECTION INTRAVENOUS; SUBCUTANEOUS EVERY 12 HOURS
Status: CANCELLED | OUTPATIENT
Start: 2022-06-03

## 2022-06-03 RX ORDER — ONDANSETRON 4 MG/1
4 TABLET, ORALLY DISINTEGRATING ORAL EVERY 12 HOURS PRN
Status: CANCELLED | OUTPATIENT
Start: 2022-06-03

## 2022-06-03 RX ORDER — GABAPENTIN 300 MG/1
300 CAPSULE ORAL
Qty: 1 CAPSULE | Refills: 0 | Status: SHIPPED | OUTPATIENT
Start: 2022-06-03 | End: 2022-06-21

## 2022-06-03 RX ORDER — SODIUM CHLORIDE, SODIUM LACTATE, POTASSIUM CHLORIDE, CALCIUM CHLORIDE 600; 310; 30; 20 MG/100ML; MG/100ML; MG/100ML; MG/100ML
INJECTION, SOLUTION INTRAVENOUS CONTINUOUS
Status: CANCELLED | OUTPATIENT
Start: 2022-06-03

## 2022-06-03 RX ORDER — POLYETHYLENE GLYCOL 3350 17 G/17G
17 POWDER, FOR SOLUTION ORAL DAILY
Status: CANCELLED | OUTPATIENT
Start: 2022-06-04

## 2022-06-03 RX ORDER — HYDROCODONE BITARTRATE AND ACETAMINOPHEN 5; 325 MG/1; MG/1
0.5 TABLET ORAL EVERY 4 HOURS PRN
Status: CANCELLED | OUTPATIENT
Start: 2022-06-03

## 2022-06-03 RX ORDER — ACETAMINOPHEN 500 MG
1000 TABLET ORAL
Status: CANCELLED | OUTPATIENT
Start: 2022-06-03

## 2022-06-03 RX ORDER — ONDANSETRON 4 MG/1
4 TABLET, FILM COATED ORAL
Qty: 15 TABLET | Refills: 0 | Status: SHIPPED | OUTPATIENT
Start: 2022-06-03 | End: 2022-06-17

## 2022-06-03 RX ORDER — ONDANSETRON 2 MG/ML
4 INJECTION INTRAMUSCULAR; INTRAVENOUS EVERY 12 HOURS PRN
Status: CANCELLED | OUTPATIENT
Start: 2022-06-03

## 2022-06-03 RX ORDER — GABAPENTIN 100 MG/1
100 CAPSULE ORAL EVERY 6 HOURS SCHEDULED
Status: CANCELLED | OUTPATIENT
Start: 2022-06-03 | End: 2022-06-03

## 2022-06-03 RX ORDER — SIMETHICONE 125 MG
125 TABLET,CHEWABLE ORAL EVERY 4 HOURS PRN
Status: CANCELLED | OUTPATIENT
Start: 2022-06-03

## 2022-06-03 RX ORDER — SCOLOPAMINE TRANSDERMAL SYSTEM 1 MG/1
1 PATCH, EXTENDED RELEASE TRANSDERMAL ONCE
Status: CANCELLED | OUTPATIENT
Start: 2022-06-03 | End: 2022-06-03

## 2022-06-03 RX ORDER — METOCLOPRAMIDE HYDROCHLORIDE 5 MG/ML
10 INJECTION INTRAMUSCULAR; INTRAVENOUS EVERY 6 HOURS PRN
Status: CANCELLED | OUTPATIENT
Start: 2022-06-03

## 2022-06-03 RX ORDER — ACETAMINOPHEN 325 MG/1
500 TABLET ORAL EVERY 6 HOURS SCHEDULED
Status: CANCELLED | OUTPATIENT
Start: 2022-06-03

## 2022-06-03 RX ORDER — ENOXAPARIN SODIUM 100 MG/ML
60 INJECTION SUBCUTANEOUS EVERY 12 HOURS
Qty: 16.8 ML | Refills: 0 | Status: SHIPPED | OUTPATIENT
Start: 2022-06-03 | End: 2022-06-17

## 2022-06-03 RX ORDER — NICOTINE POLACRILEX 4 MG
30 LOZENGE BUCCAL PRN
Status: CANCELLED | OUTPATIENT
Start: 2022-06-03

## 2022-06-03 RX ORDER — APREPITANT 40 MG/1
40 CAPSULE ORAL
Status: CANCELLED | OUTPATIENT
Start: 2022-06-03

## 2022-06-03 RX ORDER — TIZANIDINE 4 MG/1
4 TABLET ORAL 3 TIMES DAILY
Qty: 5 TABLET | Refills: 0 | Status: ON HOLD | OUTPATIENT
Start: 2022-06-03 | End: 2022-06-21 | Stop reason: HOSPADM

## 2022-06-03 RX ORDER — NICOTINE POLACRILEX 4 MG
15 LOZENGE BUCCAL PRN
Status: CANCELLED | OUTPATIENT
Start: 2022-06-03

## 2022-06-03 RX ORDER — 0.9 % SODIUM CHLORIDE 0.9 %
2 VIAL (ML) INJECTION EVERY 12 HOURS SCHEDULED
Status: CANCELLED | OUTPATIENT
Start: 2022-06-03

## 2022-06-03 RX ORDER — METOCLOPRAMIDE 5 MG/1
10 TABLET ORAL EVERY 6 HOURS PRN
Status: CANCELLED | OUTPATIENT
Start: 2022-06-03

## 2022-06-03 RX ORDER — ENALAPRILAT 1.25 MG/ML
1.25 INJECTION INTRAVENOUS EVERY 8 HOURS PRN
Status: CANCELLED | OUTPATIENT
Start: 2022-06-03

## 2022-06-03 RX ORDER — APREPITANT 40 MG/1
40 CAPSULE ORAL
Qty: 1 CAPSULE | Refills: 0 | Status: ON HOLD | OUTPATIENT
Start: 2022-06-03 | End: 2022-06-21 | Stop reason: HOSPADM

## 2022-06-03 RX ORDER — AMOXICILLIN 250 MG
2 CAPSULE ORAL 2 TIMES DAILY
Status: CANCELLED | OUTPATIENT
Start: 2022-06-03

## 2022-06-03 RX ORDER — GABAPENTIN 100 MG/1
300 CAPSULE ORAL
Status: CANCELLED | OUTPATIENT
Start: 2022-06-03

## 2022-06-03 RX ORDER — DEXTROSE MONOHYDRATE 25 G/50ML
12.5 INJECTION, SOLUTION INTRAVENOUS PRN
Status: CANCELLED | OUTPATIENT
Start: 2022-06-03

## 2022-06-03 RX ORDER — DIPHENHYDRAMINE HYDROCHLORIDE 50 MG/ML
25 INJECTION INTRAMUSCULAR; INTRAVENOUS EVERY 6 HOURS PRN
Status: CANCELLED | OUTPATIENT
Start: 2022-06-03

## 2022-06-03 RX ORDER — METOCLOPRAMIDE HYDROCHLORIDE 5 MG/ML
10 INJECTION INTRAMUSCULAR; INTRAVENOUS EVERY 6 HOURS PRN
Status: CANCELLED | OUTPATIENT
Start: 2022-06-03 | End: 2022-06-04

## 2022-06-03 RX ORDER — DEXTROSE MONOHYDRATE 25 G/50ML
25 INJECTION, SOLUTION INTRAVENOUS PRN
Status: CANCELLED | OUTPATIENT
Start: 2022-06-03

## 2022-06-03 RX ORDER — SODIUM CHLORIDE 9 MG/ML
INJECTION, SOLUTION INTRAVENOUS CONTINUOUS
Status: CANCELLED | OUTPATIENT
Start: 2022-06-03

## 2022-06-08 ENCOUNTER — TELEPHONE (OUTPATIENT)
Dept: SCHEDULING | Age: 32
End: 2022-06-08

## 2022-06-09 ENCOUNTER — TELEPHONE (OUTPATIENT)
Dept: BARIATRICS/WEIGHT MGMT | Age: 32
End: 2022-06-09

## 2022-06-10 ENCOUNTER — LAB SERVICES (OUTPATIENT)
Dept: LAB | Age: 32
End: 2022-06-10

## 2022-06-10 DIAGNOSIS — E21.1 SECONDARY HYPERPARATHYROIDISM, NON-RENAL (CMD): ICD-10-CM

## 2022-06-10 DIAGNOSIS — E66.01 MORBID OBESITY WITH BMI OF 50.0-59.9, ADULT (CMD): ICD-10-CM

## 2022-06-10 DIAGNOSIS — Z71.89 ADVICE GIVEN ABOUT 2019 NOVEL CORONAVIRUS BY TELEPHONE: ICD-10-CM

## 2022-06-10 DIAGNOSIS — E51.9 THIAMINE DEFICIENCY: ICD-10-CM

## 2022-06-10 DIAGNOSIS — R53.83 MALAISE AND FATIGUE: ICD-10-CM

## 2022-06-10 DIAGNOSIS — I10 BENIGN ESSENTIAL HTN: ICD-10-CM

## 2022-06-10 DIAGNOSIS — R53.81 MALAISE AND FATIGUE: ICD-10-CM

## 2022-06-10 DIAGNOSIS — R63.5 ABNORMAL WEIGHT GAIN: ICD-10-CM

## 2022-06-10 DIAGNOSIS — Z01.812 PRE-PROCEDURE LAB EXAM: ICD-10-CM

## 2022-06-10 DIAGNOSIS — E55.9 VITAMIN D DEFICIENCY: ICD-10-CM

## 2022-06-10 DIAGNOSIS — E78.00 HYPERCHOLESTEREMIA: ICD-10-CM

## 2022-06-10 DIAGNOSIS — E11.9 TYPE 2 DIABETES MELLITUS WITHOUT COMPLICATION, WITHOUT LONG-TERM CURRENT USE OF INSULIN (CMD): ICD-10-CM

## 2022-06-10 PROCEDURE — 80053 COMPREHEN METABOLIC PANEL: CPT | Performed by: INTERNAL MEDICINE

## 2022-06-10 PROCEDURE — 84703 CHORIONIC GONADOTROPIN ASSAY: CPT | Performed by: INTERNAL MEDICINE

## 2022-06-10 PROCEDURE — 36415 COLL VENOUS BLD VENIPUNCTURE: CPT | Performed by: SURGERY

## 2022-06-10 PROCEDURE — 83036 HEMOGLOBIN GLYCOSYLATED A1C: CPT | Performed by: INTERNAL MEDICINE

## 2022-06-11 LAB
ALBUMIN SERPL-MCNC: 3.8 G/DL (ref 3.6–5.1)
ALBUMIN/GLOB SERPL: 0.9 {RATIO} (ref 1–2.4)
ALP SERPL-CCNC: 57 UNITS/L (ref 45–117)
ALT SERPL-CCNC: 36 UNITS/L
ANION GAP SERPL CALC-SCNC: 14 MMOL/L (ref 7–19)
AST SERPL-CCNC: 22 UNITS/L
BILIRUB SERPL-MCNC: 0.4 MG/DL (ref 0.2–1)
BUN SERPL-MCNC: 12 MG/DL (ref 6–20)
BUN/CREAT SERPL: 13 (ref 7–25)
CALCIUM SERPL-MCNC: 9.6 MG/DL (ref 8.4–10.2)
CHLORIDE SERPL-SCNC: 103 MMOL/L (ref 97–110)
CO2 SERPL-SCNC: 27 MMOL/L (ref 21–32)
CREAT SERPL-MCNC: 0.92 MG/DL (ref 0.51–0.95)
FASTING DURATION TIME PATIENT: ABNORMAL H
GFR SERPLBLD BASED ON 1.73 SQ M-ARVRAT: 85 ML/MIN
GLOBULIN SER-MCNC: 4.2 G/DL (ref 2–4)
GLUCOSE SERPL-MCNC: 92 MG/DL (ref 70–99)
HBA1C MFR BLD: 6.2 % (ref 4.5–5.6)
POTASSIUM SERPL-SCNC: 4.7 MMOL/L (ref 3.4–5.1)
PROT SERPL-MCNC: 8 G/DL (ref 6.4–8.2)
SODIUM SERPL-SCNC: 139 MMOL/L (ref 135–145)

## 2022-06-13 LAB — HCG SERPL QL: NEGATIVE

## 2022-06-14 ENCOUNTER — LAB SERVICES (OUTPATIENT)
Dept: LAB | Age: 32
End: 2022-06-14

## 2022-06-14 DIAGNOSIS — Z01.818 PRE-OP EXAMINATION: ICD-10-CM

## 2022-06-14 DIAGNOSIS — R16.0 HEPATOMEGALY: ICD-10-CM

## 2022-06-14 DIAGNOSIS — E66.01 MORBID OBESITY WITH BMI OF 50.0-59.9, ADULT (CMD): Primary | ICD-10-CM

## 2022-06-14 DIAGNOSIS — E66.01 MORBID OBESITY WITH BMI OF 50.0-59.9, ADULT (CMD): ICD-10-CM

## 2022-06-14 DIAGNOSIS — K76.0 FATTY METAMORPHOSIS OF LIVER: ICD-10-CM

## 2022-06-14 LAB
BASOPHILS # BLD: 0 K/MCL (ref 0–0.3)
BASOPHILS NFR BLD: 0 %
DEPRECATED RDW RBC: 45.3 FL (ref 39–50)
EOSINOPHIL # BLD: 0.2 K/MCL (ref 0–0.5)
EOSINOPHIL NFR BLD: 2 %
ERYTHROCYTE [DISTWIDTH] IN BLOOD: 15.7 % (ref 11–15)
HCT VFR BLD CALC: 40.6 % (ref 36–46.5)
HGB BLD-MCNC: 12.6 G/DL (ref 12–15.5)
IMM GRANULOCYTES # BLD AUTO: 0 K/MCL (ref 0–0.2)
IMM GRANULOCYTES # BLD: 0 %
LYMPHOCYTES # BLD: 3.8 K/MCL (ref 1–4.8)
LYMPHOCYTES NFR BLD: 38 %
MCH RBC QN AUTO: 24.9 PG (ref 26–34)
MCHC RBC AUTO-ENTMCNC: 31 G/DL (ref 32–36.5)
MCV RBC AUTO: 80.2 FL (ref 78–100)
MONOCYTES # BLD: 0.5 K/MCL (ref 0.3–0.9)
MONOCYTES NFR BLD: 5 %
NEUTROPHILS # BLD: 5.6 K/MCL (ref 1.8–7.7)
NEUTROPHILS NFR BLD: 55 %
NRBC BLD MANUAL-RTO: 0 /100 WBC
PLATELET # BLD AUTO: 406 K/MCL (ref 140–450)
RBC # BLD: 5.06 MIL/MCL (ref 4–5.2)
WBC # BLD: 10.2 K/MCL (ref 4.2–11)

## 2022-06-14 PROCEDURE — 85025 COMPLETE CBC W/AUTO DIFF WBC: CPT | Performed by: INTERNAL MEDICINE

## 2022-06-14 PROCEDURE — 36415 COLL VENOUS BLD VENIPUNCTURE: CPT | Performed by: SURGERY

## 2022-06-17 ASSESSMENT — ACTIVITIES OF DAILY LIVING (ADL)
ADL_SCORE: 12
ADL_BEFORE_ADMISSION: INDEPENDENT

## 2022-06-18 ENCOUNTER — LAB SERVICES (OUTPATIENT)
Dept: LAB | Age: 32
End: 2022-06-18

## 2022-06-18 DIAGNOSIS — Z01.812 PRE-PROCEDURAL LABORATORY EXAMINATIONS: ICD-10-CM

## 2022-06-18 LAB
SARS-COV-2 RNA RESP QL NAA+PROBE: NOT DETECTED
SERVICE CMNT-IMP: NORMAL
SERVICE CMNT-IMP: NORMAL

## 2022-06-18 PROCEDURE — U0003 INFECTIOUS AGENT DETECTION BY NUCLEIC ACID (DNA OR RNA); SEVERE ACUTE RESPIRATORY SYNDROME CORONAVIRUS 2 (SARS-COV-2) (CORONAVIRUS DISEASE [COVID-19]), AMPLIFIED PROBE TECHNIQUE, MAKING USE OF HIGH THROUGHPUT TECHNOLOGIES AS DESCRIBED BY CMS-2020-01-R: HCPCS | Performed by: INTERNAL MEDICINE

## 2022-06-18 PROCEDURE — U0005 INFEC AGEN DETEC AMPLI PROBE: HCPCS | Performed by: INTERNAL MEDICINE

## 2022-06-19 ENCOUNTER — ANESTHESIA EVENT (OUTPATIENT)
Dept: SURGERY | Age: 32
DRG: 620 | End: 2022-06-19

## 2022-06-20 ENCOUNTER — APPOINTMENT (OUTPATIENT)
Dept: GENERAL RADIOLOGY | Age: 32
DRG: 620 | End: 2022-06-20
Attending: SURGERY

## 2022-06-20 ENCOUNTER — ANESTHESIA (OUTPATIENT)
Dept: SURGERY | Age: 32
DRG: 620 | End: 2022-06-20

## 2022-06-20 ENCOUNTER — HOSPITAL ENCOUNTER (INPATIENT)
Age: 32
LOS: 1 days | Discharge: HOME | DRG: 620 | End: 2022-06-21
Attending: SURGERY | Admitting: INTERNAL MEDICINE

## 2022-06-20 DIAGNOSIS — D64.9 ANEMIA, UNSPECIFIED TYPE: ICD-10-CM

## 2022-06-20 DIAGNOSIS — E78.00 HYPERCHOLESTEREMIA: ICD-10-CM

## 2022-06-20 DIAGNOSIS — Z01.812 PRE-PROCEDURE LAB EXAM: ICD-10-CM

## 2022-06-20 DIAGNOSIS — R53.81 MALAISE AND FATIGUE: ICD-10-CM

## 2022-06-20 DIAGNOSIS — Z71.89 ADVICE GIVEN ABOUT 2019 NOVEL CORONAVIRUS BY TELEPHONE: ICD-10-CM

## 2022-06-20 DIAGNOSIS — E21.1 SECONDARY HYPERPARATHYROIDISM, NON-RENAL (CMD): ICD-10-CM

## 2022-06-20 DIAGNOSIS — E66.01 MORBID OBESITY (CMD): ICD-10-CM

## 2022-06-20 DIAGNOSIS — A60.04 HERPES, VULVOVAGINITIS: ICD-10-CM

## 2022-06-20 DIAGNOSIS — G47.9 SLEEP DISTURBANCE: Chronic | ICD-10-CM

## 2022-06-20 DIAGNOSIS — I10 BENIGN ESSENTIAL HTN: ICD-10-CM

## 2022-06-20 DIAGNOSIS — Z01.812 PRE-PROCEDURAL LABORATORY EXAMINATIONS: Primary | ICD-10-CM

## 2022-06-20 DIAGNOSIS — L68.0 FEMALE HIRSUTISM: ICD-10-CM

## 2022-06-20 DIAGNOSIS — E51.9 THIAMINE DEFICIENCY: ICD-10-CM

## 2022-06-20 DIAGNOSIS — E11.9 TYPE 2 DIABETES MELLITUS WITHOUT COMPLICATION, WITHOUT LONG-TERM CURRENT USE OF INSULIN (CMD): ICD-10-CM

## 2022-06-20 DIAGNOSIS — E89.0 POSTABLATIVE HYPOTHYROIDISM: ICD-10-CM

## 2022-06-20 DIAGNOSIS — R53.83 MALAISE AND FATIGUE: ICD-10-CM

## 2022-06-20 DIAGNOSIS — Z11.3 ROUTINE SCREENING FOR STI (SEXUALLY TRANSMITTED INFECTION): ICD-10-CM

## 2022-06-20 DIAGNOSIS — R63.5 ABNORMAL WEIGHT GAIN: ICD-10-CM

## 2022-06-20 DIAGNOSIS — E66.01 MORBID OBESITY WITH BMI OF 50.0-59.9, ADULT (CMD): ICD-10-CM

## 2022-06-20 DIAGNOSIS — K76.0 FATTY METAMORPHOSIS OF LIVER: Chronic | ICD-10-CM

## 2022-06-20 DIAGNOSIS — E55.9 VITAMIN D DEFICIENCY: ICD-10-CM

## 2022-06-20 DIAGNOSIS — U07.1 COVID-19 VIRUS DETECTED: ICD-10-CM

## 2022-06-20 DIAGNOSIS — R16.0 HEPATOMEGALY: Chronic | ICD-10-CM

## 2022-06-20 LAB
B-HCG UR QL: NEGATIVE
GLUCOSE BLDC GLUCOMTR-MCNC: 106 MG/DL (ref 70–99)
GLUCOSE BLDC GLUCOMTR-MCNC: 141 MG/DL (ref 70–99)
INTERNAL PROCEDURAL CONTROLS ACCEPTABLE: YES
TEST LOT EXPIRATION DATE: NORMAL
TEST LOT NUMBER: NORMAL

## 2022-06-20 PROCEDURE — 13000003 HB ANESTHESIA  GENERAL EA ADD MINUTE: Performed by: SURGERY

## 2022-06-20 PROCEDURE — 10002807 HB RX 258: Performed by: ANESTHESIOLOGY

## 2022-06-20 PROCEDURE — 10002801 HB RX 250 W/O HCPCS: Performed by: SURGERY

## 2022-06-20 PROCEDURE — 10002803 HB RX 637: Performed by: SURGERY

## 2022-06-20 PROCEDURE — 10006031 HB ROOM CHARGE TELEMETRY

## 2022-06-20 PROCEDURE — 10002803 HB RX 637: Performed by: GENERAL ACUTE CARE HOSPITAL

## 2022-06-20 PROCEDURE — 82962 GLUCOSE BLOOD TEST: CPT

## 2022-06-20 PROCEDURE — 13000099 HB GENERAL ROBOTIC CASE EA ADD MINUTE: Performed by: SURGERY

## 2022-06-20 PROCEDURE — 10002016 HB COUNTER INCENTIVE SPIROMETRY

## 2022-06-20 PROCEDURE — 8E0W4CZ ROBOTIC ASSISTED PROCEDURE OF TRUNK REGION, PERCUTANEOUS ENDOSCOPIC APPROACH: ICD-10-PCS | Performed by: SURGERY

## 2022-06-20 PROCEDURE — 10002807 HB RX 258: Performed by: SURGERY

## 2022-06-20 PROCEDURE — 13000002 HB ANESTHESIA  GENERAL  S/U + 1ST 15 MIN: Performed by: SURGERY

## 2022-06-20 PROCEDURE — 10002800 HB RX 250 W HCPCS: Performed by: SURGERY

## 2022-06-20 PROCEDURE — 10002800 HB RX 250 W HCPCS: Performed by: ANESTHESIOLOGY

## 2022-06-20 PROCEDURE — 10006023 HB SUPPLY 272: Performed by: SURGERY

## 2022-06-20 PROCEDURE — 0D164ZA BYPASS STOMACH TO JEJUNUM, PERCUTANEOUS ENDOSCOPIC APPROACH: ICD-10-PCS | Performed by: SURGERY

## 2022-06-20 PROCEDURE — 10004451 HB PACU RECOVERY 1ST 30 MINUTES: Performed by: SURGERY

## 2022-06-20 PROCEDURE — 94660 CPAP INITIATION&MGMT: CPT

## 2022-06-20 PROCEDURE — 13000098 HB GENERAL ROBOTIC CASE S/U + 1ST 15 MIN: Performed by: SURGERY

## 2022-06-20 PROCEDURE — 10006027 HB SUPPLY 278: Performed by: SURGERY

## 2022-06-20 PROCEDURE — 74240 X-RAY XM UPR GI TRC 1CNTRST: CPT

## 2022-06-20 PROCEDURE — 10004180 HB COUNTER-TRANSPORT

## 2022-06-20 PROCEDURE — 10002801 HB RX 250 W/O HCPCS: Performed by: ANESTHESIOLOGY

## 2022-06-20 PROCEDURE — 43644 LAP GASTRIC BYPASS/ROUX-EN-Y: CPT | Performed by: SURGERY

## 2022-06-20 PROCEDURE — 10004452 HB PACU ADDL 30 MINUTES: Performed by: SURGERY

## 2022-06-20 PROCEDURE — 10004651 HB RX, NO CHARGE ITEM: Performed by: SURGERY

## 2022-06-20 PROCEDURE — 81025 URINE PREGNANCY TEST: CPT | Performed by: GENERAL ACUTE CARE HOSPITAL

## 2022-06-20 PROCEDURE — 10004281 HB COUNTER-STAFF TIME PER 15 MIN

## 2022-06-20 PROCEDURE — 43644 LAP GASTRIC BYPASS/ROUX-EN-Y: CPT | Performed by: SPECIALIST/TECHNOLOGIST, OTHER

## 2022-06-20 PROCEDURE — 99223 1ST HOSP IP/OBS HIGH 75: CPT | Performed by: INTERNAL MEDICINE

## 2022-06-20 DEVICE — STAPLER 60 RELOAD BLUE
Type: IMPLANTABLE DEVICE | Site: ABDOMEN | Status: FUNCTIONAL
Brand: SUREFORM

## 2022-06-20 DEVICE — FLOSEAL HEMOSTATIC MATRIX, 10ML
Type: IMPLANTABLE DEVICE | Site: ABDOMEN | Status: FUNCTIONAL
Brand: FLOSEAL HEMOSTATIC MATRIX

## 2022-06-20 DEVICE — STAPLER 60 RELOAD WHITE
Type: IMPLANTABLE DEVICE | Site: ABDOMEN | Status: FUNCTIONAL
Brand: SUREFORM

## 2022-06-20 RX ORDER — PROPOFOL 10 MG/ML
INJECTION, EMULSION INTRAVENOUS PRN
Status: DISCONTINUED | OUTPATIENT
Start: 2022-06-20 | End: 2022-06-20

## 2022-06-20 RX ORDER — SODIUM CHLORIDE 9 MG/ML
INJECTION, SOLUTION INTRAVENOUS CONTINUOUS
Status: DISCONTINUED | OUTPATIENT
Start: 2022-06-20 | End: 2022-06-20 | Stop reason: HOSPADM

## 2022-06-20 RX ORDER — SIMETHICONE 125 MG
125 TABLET,CHEWABLE ORAL EVERY 4 HOURS PRN
Status: DISCONTINUED | OUTPATIENT
Start: 2022-06-20 | End: 2022-06-21 | Stop reason: HOSPADM

## 2022-06-20 RX ORDER — EPHEDRINE SULFATE/0.9% NACL/PF 50 MG/10ML
SYRINGE (ML) INTRAVENOUS PRN
Status: DISCONTINUED | OUTPATIENT
Start: 2022-06-20 | End: 2022-06-20

## 2022-06-20 RX ORDER — METOCLOPRAMIDE HYDROCHLORIDE 5 MG/ML
10 INJECTION INTRAMUSCULAR; INTRAVENOUS EVERY 6 HOURS PRN
Status: ACTIVE | OUTPATIENT
Start: 2022-06-20 | End: 2022-06-21

## 2022-06-20 RX ORDER — NICOTINE POLACRILEX 4 MG
30 LOZENGE BUCCAL PRN
Status: DISCONTINUED | OUTPATIENT
Start: 2022-06-20 | End: 2022-06-21 | Stop reason: HOSPADM

## 2022-06-20 RX ORDER — MIDAZOLAM HYDROCHLORIDE 1 MG/ML
INJECTION, SOLUTION INTRAMUSCULAR; INTRAVENOUS PRN
Status: DISCONTINUED | OUTPATIENT
Start: 2022-06-20 | End: 2022-06-20

## 2022-06-20 RX ORDER — AMOXICILLIN 250 MG
2 CAPSULE ORAL 2 TIMES DAILY
Status: DISCONTINUED | OUTPATIENT
Start: 2022-06-20 | End: 2022-06-21 | Stop reason: HOSPADM

## 2022-06-20 RX ORDER — AMLODIPINE BESYLATE 10 MG/1
10 TABLET ORAL DAILY
Status: DISCONTINUED | OUTPATIENT
Start: 2022-06-21 | End: 2022-06-21 | Stop reason: HOSPADM

## 2022-06-20 RX ORDER — ONDANSETRON 2 MG/ML
4 INJECTION INTRAMUSCULAR; INTRAVENOUS EVERY 12 HOURS PRN
Status: DISCONTINUED | OUTPATIENT
Start: 2022-06-20 | End: 2022-06-21 | Stop reason: HOSPADM

## 2022-06-20 RX ORDER — POLYETHYLENE GLYCOL 3350 17 G/17G
17 POWDER, FOR SOLUTION ORAL DAILY
Status: DISCONTINUED | OUTPATIENT
Start: 2022-06-21 | End: 2022-06-21 | Stop reason: HOSPADM

## 2022-06-20 RX ORDER — NEOSTIGMINE METHYLSULFATE 4 MG/4 ML
SYRINGE (ML) INTRAVENOUS PRN
Status: DISCONTINUED | OUTPATIENT
Start: 2022-06-20 | End: 2022-06-20

## 2022-06-20 RX ORDER — LIDOCAINE HYDROCHLORIDE 10 MG/ML
5-10 INJECTION, SOLUTION INFILTRATION; PERINEURAL PRN
Status: DISCONTINUED | OUTPATIENT
Start: 2022-06-20 | End: 2022-06-20 | Stop reason: HOSPADM

## 2022-06-20 RX ORDER — MAGNESIUM HYDROXIDE 1200 MG/15ML
LIQUID ORAL PRN
Status: DISCONTINUED | OUTPATIENT
Start: 2022-06-20 | End: 2022-06-20 | Stop reason: HOSPADM

## 2022-06-20 RX ORDER — GLYCOPYRROLATE 0.2 MG/ML
INJECTION, SOLUTION INTRAMUSCULAR; INTRAVENOUS PRN
Status: DISCONTINUED | OUTPATIENT
Start: 2022-06-20 | End: 2022-06-20

## 2022-06-20 RX ORDER — ACETAMINOPHEN 500 MG
500 TABLET ORAL EVERY 6 HOURS SCHEDULED
Status: DISCONTINUED | OUTPATIENT
Start: 2022-06-20 | End: 2022-06-21 | Stop reason: HOSPADM

## 2022-06-20 RX ORDER — NICOTINE POLACRILEX 4 MG
15 LOZENGE BUCCAL PRN
Status: DISCONTINUED | OUTPATIENT
Start: 2022-06-20 | End: 2022-06-21 | Stop reason: HOSPADM

## 2022-06-20 RX ORDER — TIZANIDINE 2 MG/1
4 TABLET ORAL ONCE
Status: DISCONTINUED | OUTPATIENT
Start: 2022-06-20 | End: 2022-06-20 | Stop reason: HOSPADM

## 2022-06-20 RX ORDER — HEPARIN SODIUM 5000 [USP'U]/ML
5000 INJECTION, SOLUTION INTRAVENOUS; SUBCUTANEOUS ONCE
Status: COMPLETED | OUTPATIENT
Start: 2022-06-20 | End: 2022-06-20

## 2022-06-20 RX ORDER — ENALAPRILAT 1.25 MG/ML
1.25 INJECTION INTRAVENOUS EVERY 8 HOURS PRN
Status: DISCONTINUED | OUTPATIENT
Start: 2022-06-20 | End: 2022-06-21 | Stop reason: HOSPADM

## 2022-06-20 RX ORDER — HYDROCODONE BITARTRATE AND ACETAMINOPHEN 5; 325 MG/1; MG/1
0.5 TABLET ORAL EVERY 4 HOURS PRN
Status: DISCONTINUED | OUTPATIENT
Start: 2022-06-20 | End: 2022-06-21 | Stop reason: HOSPADM

## 2022-06-20 RX ORDER — METOCLOPRAMIDE HYDROCHLORIDE 5 MG/ML
10 INJECTION INTRAMUSCULAR; INTRAVENOUS EVERY 6 HOURS PRN
Status: DISCONTINUED | OUTPATIENT
Start: 2022-06-20 | End: 2022-06-21 | Stop reason: HOSPADM

## 2022-06-20 RX ORDER — FAMOTIDINE 20 MG/1
20 TABLET, FILM COATED ORAL
Status: COMPLETED | OUTPATIENT
Start: 2022-06-20 | End: 2022-06-20

## 2022-06-20 RX ORDER — SIMETHICONE 125 MG
TABLET,CHEWABLE ORAL
Status: DISPENSED
Start: 2022-06-20 | End: 2022-06-20

## 2022-06-20 RX ORDER — SODIUM CHLORIDE, SODIUM LACTATE, POTASSIUM CHLORIDE, CALCIUM CHLORIDE 600; 310; 30; 20 MG/100ML; MG/100ML; MG/100ML; MG/100ML
INJECTION, SOLUTION INTRAVENOUS CONTINUOUS
Status: DISCONTINUED | OUTPATIENT
Start: 2022-06-20 | End: 2022-06-21 | Stop reason: HOSPADM

## 2022-06-20 RX ORDER — GABAPENTIN 300 MG/1
300 CAPSULE ORAL
Status: DISCONTINUED | OUTPATIENT
Start: 2022-06-20 | End: 2022-06-20 | Stop reason: HOSPADM

## 2022-06-20 RX ORDER — LIDOCAINE HYDROCHLORIDE 20 MG/ML
INJECTION, SOLUTION INFILTRATION; PERINEURAL PRN
Status: DISCONTINUED | OUTPATIENT
Start: 2022-06-20 | End: 2022-06-20

## 2022-06-20 RX ORDER — MIDAZOLAM HYDROCHLORIDE 1 MG/ML
2 INJECTION, SOLUTION INTRAMUSCULAR; INTRAVENOUS
Status: DISCONTINUED | OUTPATIENT
Start: 2022-06-20 | End: 2022-06-20 | Stop reason: HOSPADM

## 2022-06-20 RX ORDER — ACETAMINOPHEN 500 MG
1000 TABLET ORAL
Status: DISCONTINUED | OUTPATIENT
Start: 2022-06-20 | End: 2022-06-20 | Stop reason: HOSPADM

## 2022-06-20 RX ORDER — SCOLOPAMINE TRANSDERMAL SYSTEM 1 MG/1
1 PATCH, EXTENDED RELEASE TRANSDERMAL
Status: DISCONTINUED | OUTPATIENT
Start: 2022-06-20 | End: 2022-06-20 | Stop reason: HOSPADM

## 2022-06-20 RX ORDER — SODIUM CHLORIDE, SODIUM LACTATE, POTASSIUM CHLORIDE, CALCIUM CHLORIDE 600; 310; 30; 20 MG/100ML; MG/100ML; MG/100ML; MG/100ML
INJECTION, SOLUTION INTRAVENOUS CONTINUOUS PRN
Status: DISCONTINUED | OUTPATIENT
Start: 2022-06-20 | End: 2022-06-20

## 2022-06-20 RX ORDER — DEXAMETHASONE SODIUM PHOSPHATE 4 MG/ML
INJECTION, SOLUTION INTRA-ARTICULAR; INTRALESIONAL; INTRAMUSCULAR; INTRAVENOUS; SOFT TISSUE PRN
Status: DISCONTINUED | OUTPATIENT
Start: 2022-06-20 | End: 2022-06-20

## 2022-06-20 RX ORDER — INSULIN LISPRO 100 [IU]/ML
2-4 INJECTION, SOLUTION INTRAVENOUS; SUBCUTANEOUS PRN
Status: DISCONTINUED | OUTPATIENT
Start: 2022-06-20 | End: 2022-06-20 | Stop reason: HOSPADM

## 2022-06-20 RX ORDER — ALBUTEROL SULFATE 2.5 MG/3ML
5 SOLUTION RESPIRATORY (INHALATION) ONCE
Status: DISCONTINUED | OUTPATIENT
Start: 2022-06-20 | End: 2022-06-20 | Stop reason: HOSPADM

## 2022-06-20 RX ORDER — SODIUM CHLORIDE, SODIUM LACTATE, POTASSIUM CHLORIDE, CALCIUM CHLORIDE 600; 310; 30; 20 MG/100ML; MG/100ML; MG/100ML; MG/100ML
INJECTION, SOLUTION INTRAVENOUS CONTINUOUS
Status: DISCONTINUED | OUTPATIENT
Start: 2022-06-20 | End: 2022-06-20 | Stop reason: HOSPADM

## 2022-06-20 RX ORDER — ENOXAPARIN SODIUM 100 MG/ML
40 INJECTION SUBCUTANEOUS EVERY 12 HOURS SCHEDULED
Status: DISCONTINUED | OUTPATIENT
Start: 2022-06-20 | End: 2022-06-21 | Stop reason: HOSPADM

## 2022-06-20 RX ORDER — 0.9 % SODIUM CHLORIDE 0.9 %
2 VIAL (ML) INJECTION EVERY 12 HOURS SCHEDULED
Status: DISCONTINUED | OUTPATIENT
Start: 2022-06-20 | End: 2022-06-20 | Stop reason: HOSPADM

## 2022-06-20 RX ORDER — HUMAN INSULIN 100 [IU]/ML
INJECTION, SOLUTION SUBCUTANEOUS
Status: DISCONTINUED | OUTPATIENT
Start: 2022-06-20 | End: 2022-06-20 | Stop reason: HOSPADM

## 2022-06-20 RX ORDER — DIPHENHYDRAMINE HYDROCHLORIDE 50 MG/ML
25 INJECTION INTRAMUSCULAR; INTRAVENOUS PRN
Status: DISCONTINUED | OUTPATIENT
Start: 2022-06-20 | End: 2022-06-20 | Stop reason: HOSPADM

## 2022-06-20 RX ORDER — DIPHENHYDRAMINE HYDROCHLORIDE 50 MG/ML
25 INJECTION INTRAMUSCULAR; INTRAVENOUS EVERY 6 HOURS PRN
Status: DISCONTINUED | OUTPATIENT
Start: 2022-06-20 | End: 2022-06-21 | Stop reason: HOSPADM

## 2022-06-20 RX ORDER — DEXTROSE MONOHYDRATE 25 G/50ML
25 INJECTION, SOLUTION INTRAVENOUS PRN
Status: DISCONTINUED | OUTPATIENT
Start: 2022-06-20 | End: 2022-06-20 | Stop reason: HOSPADM

## 2022-06-20 RX ORDER — ROCURONIUM BROMIDE 10 MG/ML
INJECTION, SOLUTION INTRAVENOUS PRN
Status: DISCONTINUED | OUTPATIENT
Start: 2022-06-20 | End: 2022-06-20

## 2022-06-20 RX ORDER — LEVOTHYROXINE SODIUM 0.07 MG/1
75 TABLET ORAL
Status: DISCONTINUED | OUTPATIENT
Start: 2022-06-20 | End: 2022-06-21 | Stop reason: HOSPADM

## 2022-06-20 RX ORDER — PROCHLORPERAZINE EDISYLATE 5 MG/ML
5 INJECTION INTRAMUSCULAR; INTRAVENOUS
Status: DISCONTINUED | OUTPATIENT
Start: 2022-06-20 | End: 2022-06-20 | Stop reason: HOSPADM

## 2022-06-20 RX ORDER — ONDANSETRON 2 MG/ML
4 INJECTION INTRAMUSCULAR; INTRAVENOUS PRN
Status: DISCONTINUED | OUTPATIENT
Start: 2022-06-20 | End: 2022-06-20 | Stop reason: HOSPADM

## 2022-06-20 RX ORDER — APREPITANT 40 MG/1
40 CAPSULE ORAL
Status: DISCONTINUED | OUTPATIENT
Start: 2022-06-20 | End: 2022-06-20 | Stop reason: HOSPADM

## 2022-06-20 RX ORDER — HYDRALAZINE HYDROCHLORIDE 20 MG/ML
5 INJECTION INTRAMUSCULAR; INTRAVENOUS EVERY 10 MIN PRN
Status: DISCONTINUED | OUTPATIENT
Start: 2022-06-20 | End: 2022-06-20 | Stop reason: HOSPADM

## 2022-06-20 RX ORDER — GABAPENTIN 100 MG/1
100 CAPSULE ORAL EVERY 6 HOURS SCHEDULED
Status: DISPENSED | OUTPATIENT
Start: 2022-06-20 | End: 2022-06-20

## 2022-06-20 RX ORDER — ONDANSETRON 4 MG/1
4 TABLET, ORALLY DISINTEGRATING ORAL EVERY 12 HOURS PRN
Status: DISCONTINUED | OUTPATIENT
Start: 2022-06-20 | End: 2022-06-21 | Stop reason: HOSPADM

## 2022-06-20 RX ORDER — ONDANSETRON 2 MG/ML
4 INJECTION INTRAMUSCULAR; INTRAVENOUS EVERY 12 HOURS PRN
Status: DISCONTINUED | OUTPATIENT
Start: 2022-06-20 | End: 2022-06-20 | Stop reason: HOSPADM

## 2022-06-20 RX ORDER — NALOXONE HCL 0.4 MG/ML
0.2 VIAL (ML) INJECTION EVERY 5 MIN PRN
Status: DISCONTINUED | OUTPATIENT
Start: 2022-06-20 | End: 2022-06-20 | Stop reason: HOSPADM

## 2022-06-20 RX ORDER — ONDANSETRON 4 MG/1
4 TABLET, ORALLY DISINTEGRATING ORAL EVERY 12 HOURS PRN
Status: DISCONTINUED | OUTPATIENT
Start: 2022-06-20 | End: 2022-06-20 | Stop reason: HOSPADM

## 2022-06-20 RX ORDER — DEXTROSE MONOHYDRATE 25 G/50ML
25 INJECTION, SOLUTION INTRAVENOUS PRN
Status: DISCONTINUED | OUTPATIENT
Start: 2022-06-20 | End: 2022-06-21 | Stop reason: HOSPADM

## 2022-06-20 RX ORDER — TIZANIDINE 4 MG/1
4 TABLET ORAL EVERY 8 HOURS SCHEDULED
Status: DISCONTINUED | OUTPATIENT
Start: 2022-06-20 | End: 2022-06-21

## 2022-06-20 RX ORDER — SCOLOPAMINE TRANSDERMAL SYSTEM 1 MG/1
1 PATCH, EXTENDED RELEASE TRANSDERMAL ONCE
Status: COMPLETED | OUTPATIENT
Start: 2022-06-20 | End: 2022-06-20

## 2022-06-20 RX ORDER — CHLORHEXIDINE GLUCONATE ORAL RINSE 1.2 MG/ML
15 SOLUTION DENTAL
Status: COMPLETED | OUTPATIENT
Start: 2022-06-20 | End: 2022-06-20

## 2022-06-20 RX ORDER — METOPROLOL SUCCINATE 25 MG/1
25 TABLET, EXTENDED RELEASE ORAL
Status: DISCONTINUED | OUTPATIENT
Start: 2022-06-20 | End: 2022-06-20 | Stop reason: HOSPADM

## 2022-06-20 RX ORDER — DEXTROSE MONOHYDRATE 25 G/50ML
12.5 INJECTION, SOLUTION INTRAVENOUS PRN
Status: DISCONTINUED | OUTPATIENT
Start: 2022-06-20 | End: 2022-06-21 | Stop reason: HOSPADM

## 2022-06-20 RX ORDER — METOCLOPRAMIDE 10 MG/1
10 TABLET ORAL EVERY 6 HOURS PRN
Status: DISCONTINUED | OUTPATIENT
Start: 2022-06-20 | End: 2022-06-21 | Stop reason: HOSPADM

## 2022-06-20 RX ORDER — ONDANSETRON 2 MG/ML
INJECTION INTRAMUSCULAR; INTRAVENOUS PRN
Status: DISCONTINUED | OUTPATIENT
Start: 2022-06-20 | End: 2022-06-20

## 2022-06-20 RX ADMIN — ONDANSETRON 4 MG: 2 INJECTION INTRAMUSCULAR; INTRAVENOUS at 10:18

## 2022-06-20 RX ADMIN — FENTANYL CITRATE 50 MCG: 50 INJECTION, SOLUTION INTRAMUSCULAR; INTRAVENOUS at 09:23

## 2022-06-20 RX ADMIN — TIZANIDINE 4 MG: 4 TABLET ORAL at 13:10

## 2022-06-20 RX ADMIN — FENTANYL CITRATE 50 MCG: 50 INJECTION, SOLUTION INTRAMUSCULAR; INTRAVENOUS at 07:35

## 2022-06-20 RX ADMIN — MIDAZOLAM HYDROCHLORIDE 2 MG: 1 INJECTION, SOLUTION INTRAMUSCULAR; INTRAVENOUS at 07:31

## 2022-06-20 RX ADMIN — EPHEDRINE SULFATE 10 MG: 5 INJECTION INTRAVENOUS at 08:07

## 2022-06-20 RX ADMIN — ERTAPENEM SODIUM 1 G: 1 INJECTION, POWDER, LYOPHILIZED, FOR SOLUTION INTRAMUSCULAR; INTRAVENOUS at 07:46

## 2022-06-20 RX ADMIN — DEXAMETHASONE SODIUM PHOSPHATE 8 MG: 4 INJECTION, SOLUTION INTRAMUSCULAR; INTRAVENOUS at 07:49

## 2022-06-20 RX ADMIN — SIMETHICONE 125 MG: 125 TABLET, CHEWABLE ORAL at 22:36

## 2022-06-20 RX ADMIN — ROCURONIUM BROMIDE 80 MG: 10 INJECTION INTRAVENOUS at 07:35

## 2022-06-20 RX ADMIN — LIDOCAINE HYDROCHLORIDE 5 ML: 20 INJECTION, SOLUTION INFILTRATION; PERINEURAL at 07:31

## 2022-06-20 RX ADMIN — CHLORHEXIDINE GLUCONATE 15 ML: 1.2 RINSE ORAL at 06:00

## 2022-06-20 RX ADMIN — FENTANYL CITRATE 50 MCG: 50 INJECTION, SOLUTION INTRAMUSCULAR; INTRAVENOUS at 10:06

## 2022-06-20 RX ADMIN — GABAPENTIN 100 MG: 100 CAPSULE ORAL at 18:22

## 2022-06-20 RX ADMIN — HEPARIN SODIUM 5000 UNITS: 5000 INJECTION, SOLUTION INTRAVENOUS; SUBCUTANEOUS at 07:06

## 2022-06-20 RX ADMIN — ENOXAPARIN SODIUM 40 MG: 40 INJECTION SUBCUTANEOUS at 20:17

## 2022-06-20 RX ADMIN — ROCURONIUM BROMIDE 20 MG: 10 INJECTION INTRAVENOUS at 08:47

## 2022-06-20 RX ADMIN — FENTANYL CITRATE 100 MCG: 50 INJECTION, SOLUTION INTRAMUSCULAR; INTRAVENOUS at 10:26

## 2022-06-20 RX ADMIN — ACETAMINOPHEN 500 MG: 500 TABLET ORAL at 18:22

## 2022-06-20 RX ADMIN — TIZANIDINE 4 MG: 4 TABLET ORAL at 21:44

## 2022-06-20 RX ADMIN — PROPOFOL 300 MG: 10 INJECTION, EMULSION INTRAVENOUS at 07:35

## 2022-06-20 RX ADMIN — SIMETHICONE 125 MG: 125 TABLET, CHEWABLE ORAL at 11:58

## 2022-06-20 RX ADMIN — SODIUM CHLORIDE, SODIUM LACTATE, POTASSIUM CHLORIDE, AND CALCIUM CHLORIDE 175 ML/HR: .6; .31; .03; .02 INJECTION, SOLUTION INTRAVENOUS at 12:17

## 2022-06-20 RX ADMIN — Medication 5 MG: at 10:18

## 2022-06-20 RX ADMIN — GLYCOPYRROLATE 1 MG: 0.2 INJECTION, SOLUTION INTRAMUSCULAR; INTRAVENOUS at 10:18

## 2022-06-20 RX ADMIN — FENTANYL CITRATE 50 MCG: 50 INJECTION, SOLUTION INTRAMUSCULAR; INTRAVENOUS at 09:03

## 2022-06-20 RX ADMIN — FAMOTIDINE 20 MG: 20 TABLET, FILM COATED ORAL at 06:00

## 2022-06-20 RX ADMIN — SCOPOLAMINE 1 PATCH: 1 PATCH TRANSDERMAL at 05:58

## 2022-06-20 RX ADMIN — SODIUM CHLORIDE, POTASSIUM CHLORIDE, SODIUM LACTATE AND CALCIUM CHLORIDE: 600; 310; 30; 20 INJECTION, SOLUTION INTRAVENOUS at 07:31

## 2022-06-20 RX ADMIN — SENNOSIDES AND DOCUSATE SODIUM 2 TABLET: 50; 8.6 TABLET ORAL at 20:17

## 2022-06-20 ASSESSMENT — PAIN SCALES - GENERAL
PAINLEVEL_OUTOF10: 4
PAINLEVEL_OUTOF10: 6
PAINLEVEL_OUTOF10: 0
PAINLEVEL_OUTOF10: 3

## 2022-06-20 ASSESSMENT — PAIN SCALES - WONG BAKER: WONGBAKER_NUMERICALRESPONSE: 3

## 2022-06-21 ENCOUNTER — TELEPHONE (OUTPATIENT)
Dept: HEMATOLOGY/ONCOLOGY | Age: 32
End: 2022-06-21

## 2022-06-21 VITALS
SYSTOLIC BLOOD PRESSURE: 134 MMHG | HEART RATE: 84 BPM | BODY MASS INDEX: 44.41 KG/M2 | TEMPERATURE: 98.1 F | WEIGHT: 293 LBS | DIASTOLIC BLOOD PRESSURE: 86 MMHG | RESPIRATION RATE: 16 BRPM | OXYGEN SATURATION: 99 % | HEIGHT: 68 IN

## 2022-06-21 DIAGNOSIS — Z98.84 GASTRIC BYPASS STATUS FOR OBESITY: Primary | ICD-10-CM

## 2022-06-21 PROBLEM — R63.5 ABNORMAL WEIGHT GAIN: Status: RESOLVED | Noted: 2021-09-20 | Resolved: 2022-06-21

## 2022-06-21 LAB
ALBUMIN SERPL-MCNC: 3.3 G/DL (ref 3.6–5.1)
ALBUMIN/GLOB SERPL: 0.7 {RATIO} (ref 1–2.4)
ALP SERPL-CCNC: 50 UNITS/L (ref 45–117)
ALT SERPL-CCNC: 82 UNITS/L
ANION GAP SERPL CALC-SCNC: 10 MMOL/L (ref 7–19)
AST SERPL-CCNC: 56 UNITS/L
BASOPHILS # BLD: 0 K/MCL (ref 0–0.3)
BASOPHILS NFR BLD: 0 %
BILIRUB SERPL-MCNC: 0.4 MG/DL (ref 0.2–1)
BUN SERPL-MCNC: 8 MG/DL (ref 6–20)
BUN/CREAT SERPL: 8 (ref 7–25)
CALCIUM SERPL-MCNC: 9.1 MG/DL (ref 8.4–10.2)
CHLORIDE SERPL-SCNC: 104 MMOL/L (ref 97–110)
CO2 SERPL-SCNC: 26 MMOL/L (ref 21–32)
CREAT SERPL-MCNC: 0.95 MG/DL (ref 0.51–0.95)
DEPRECATED RDW RBC: 47 FL (ref 39–50)
EOSINOPHIL # BLD: 0 K/MCL (ref 0–0.5)
EOSINOPHIL NFR BLD: 0 %
ERYTHROCYTE [DISTWIDTH] IN BLOOD: 15.9 % (ref 11–15)
FASTING DURATION TIME PATIENT: ABNORMAL H
GFR SERPLBLD BASED ON 1.73 SQ M-ARVRAT: 82 ML/MIN
GLOBULIN SER-MCNC: 4.5 G/DL (ref 2–4)
GLUCOSE BLDC GLUCOMTR-MCNC: 92 MG/DL (ref 70–99)
GLUCOSE BLDC GLUCOMTR-MCNC: 93 MG/DL (ref 70–99)
GLUCOSE BLDC GLUCOMTR-MCNC: 97 MG/DL (ref 70–99)
GLUCOSE SERPL-MCNC: 96 MG/DL (ref 70–99)
HCT VFR BLD CALC: 39.8 % (ref 36–46.5)
HGB BLD-MCNC: 11.8 G/DL (ref 12–15.5)
IMM GRANULOCYTES # BLD AUTO: 0 K/MCL (ref 0–0.2)
IMM GRANULOCYTES # BLD: 0 %
LMWH PPP CHRO-ACNC: 0.18 UNITS/ML
LYMPHOCYTES # BLD: 3.3 K/MCL (ref 1–4.8)
LYMPHOCYTES NFR BLD: 27 %
MAGNESIUM SERPL-MCNC: 2.1 MG/DL (ref 1.7–2.4)
MCH RBC QN AUTO: 24 PG (ref 26–34)
MCHC RBC AUTO-ENTMCNC: 29.6 G/DL (ref 32–36.5)
MCV RBC AUTO: 81.1 FL (ref 78–100)
MONOCYTES # BLD: 0.7 K/MCL (ref 0.3–0.9)
MONOCYTES NFR BLD: 6 %
NEUTROPHILS # BLD: 7.8 K/MCL (ref 1.8–7.7)
NEUTROPHILS NFR BLD: 67 %
NRBC BLD MANUAL-RTO: 0 /100 WBC
PHOSPHATE SERPL-MCNC: 3.2 MG/DL (ref 2.4–4.7)
PLATELET # BLD AUTO: 352 K/MCL (ref 140–450)
POTASSIUM SERPL-SCNC: 3.8 MMOL/L (ref 3.4–5.1)
PROT SERPL-MCNC: 7.8 G/DL (ref 6.4–8.2)
RBC # BLD: 4.91 MIL/MCL (ref 4–5.2)
SODIUM SERPL-SCNC: 136 MMOL/L (ref 135–145)
WBC # BLD: 11.9 K/MCL (ref 4.2–11)

## 2022-06-21 PROCEDURE — 10004651 HB RX, NO CHARGE ITEM: Performed by: SURGERY

## 2022-06-21 PROCEDURE — 84100 ASSAY OF PHOSPHORUS: CPT | Performed by: SURGERY

## 2022-06-21 PROCEDURE — 10002803 HB RX 637: Performed by: STUDENT IN AN ORGANIZED HEALTH CARE EDUCATION/TRAINING PROGRAM

## 2022-06-21 PROCEDURE — 10002803 HB RX 637: Performed by: INTERNAL MEDICINE

## 2022-06-21 PROCEDURE — 10002803 HB RX 637: Performed by: SURGERY

## 2022-06-21 PROCEDURE — 99238 HOSP IP/OBS DSCHRG MGMT 30/<: CPT | Performed by: STUDENT IN AN ORGANIZED HEALTH CARE EDUCATION/TRAINING PROGRAM

## 2022-06-21 PROCEDURE — 10002800 HB RX 250 W HCPCS: Performed by: SURGERY

## 2022-06-21 PROCEDURE — 85025 COMPLETE CBC W/AUTO DIFF WBC: CPT | Performed by: SURGERY

## 2022-06-21 PROCEDURE — 85520 HEPARIN ASSAY: CPT | Performed by: INTERNAL MEDICINE

## 2022-06-21 PROCEDURE — 83735 ASSAY OF MAGNESIUM: CPT | Performed by: SURGERY

## 2022-06-21 PROCEDURE — 94660 CPAP INITIATION&MGMT: CPT

## 2022-06-21 PROCEDURE — X1094 NO CHARGE VISIT: HCPCS | Performed by: INTERNAL MEDICINE

## 2022-06-21 PROCEDURE — 10002807 HB RX 258: Performed by: SURGERY

## 2022-06-21 PROCEDURE — 36415 COLL VENOUS BLD VENIPUNCTURE: CPT | Performed by: SURGERY

## 2022-06-21 PROCEDURE — 80053 COMPREHEN METABOLIC PANEL: CPT | Performed by: SURGERY

## 2022-06-21 PROCEDURE — 99239 HOSP IP/OBS DSCHRG MGMT >30: CPT | Performed by: INTERNAL MEDICINE

## 2022-06-21 RX ORDER — POLYETHYLENE GLYCOL 3350 17 G/17G
17 POWDER, FOR SOLUTION ORAL DAILY
Qty: 30 PACKET | Refills: 0 | Status: SHIPPED | COMMUNITY
Start: 2022-06-21

## 2022-06-21 RX ORDER — SIMETHICONE 125 MG
125 TABLET,CHEWABLE ORAL EVERY 4 HOURS PRN
Qty: 30 TABLET | Refills: 0 | Status: SHIPPED | COMMUNITY
Start: 2022-06-21 | End: 2023-05-03 | Stop reason: ALTCHOICE

## 2022-06-21 RX ORDER — TIZANIDINE 4 MG/1
4 TABLET ORAL EVERY 8 HOURS PRN
Qty: 5 TABLET | Refills: 0 | Status: ON HOLD
Start: 2022-06-21 | End: 2022-07-28

## 2022-06-21 RX ORDER — FEXOFENADINE HCL 180 MG/1
180 TABLET ORAL DAILY PRN
COMMUNITY

## 2022-06-21 RX ORDER — AMOXICILLIN 250 MG
2 CAPSULE ORAL 2 TIMES DAILY
Qty: 30 TABLET | Refills: 0 | Status: SHIPPED | COMMUNITY
Start: 2022-06-21

## 2022-06-21 RX ORDER — POTASSIUM CHLORIDE 20 MEQ/1
40 TABLET, EXTENDED RELEASE ORAL ONCE
Status: COMPLETED | OUTPATIENT
Start: 2022-06-21 | End: 2022-06-21

## 2022-06-21 RX ORDER — LEVOTHYROXINE SODIUM 0.2 MG/1
200 TABLET ORAL DAILY
COMMUNITY
End: 2023-04-20 | Stop reason: SDUPTHER

## 2022-06-21 RX ORDER — ENOXAPARIN SODIUM 100 MG/ML
40 INJECTION SUBCUTANEOUS EVERY 12 HOURS SCHEDULED
Qty: 11.2 ML | Refills: 0 | Status: SHIPPED
Start: 2022-06-20 | End: 2022-07-04

## 2022-06-21 RX ORDER — TIZANIDINE 4 MG/1
4 TABLET ORAL EVERY 8 HOURS PRN
Status: DISCONTINUED | OUTPATIENT
Start: 2022-06-21 | End: 2022-06-21 | Stop reason: HOSPADM

## 2022-06-21 RX ORDER — ACETAMINOPHEN 500 MG
500 TABLET ORAL EVERY 6 HOURS SCHEDULED
Qty: 30 TABLET | Refills: 0 | Status: SHIPPED | COMMUNITY
Start: 2022-06-21

## 2022-06-21 RX ORDER — ENOXAPARIN SODIUM 100 MG/ML
40 INJECTION SUBCUTANEOUS EVERY 12 HOURS SCHEDULED
Qty: 11.2 ML | Refills: 0 | OUTPATIENT
Start: 2022-06-21 | End: 2022-06-21

## 2022-06-21 RX ORDER — ONDANSETRON 4 MG/1
4 TABLET, ORALLY DISINTEGRATING ORAL EVERY 12 HOURS PRN
Qty: 5 TABLET | Refills: 0 | Status: SHIPPED
Start: 2022-06-21 | End: 2023-05-03 | Stop reason: ALTCHOICE

## 2022-06-21 RX ORDER — LEVOTHYROXINE SODIUM 0.07 MG/1
75 TABLET ORAL
Status: SHIPPED | COMMUNITY
Start: 2022-06-22 | End: 2022-06-21 | Stop reason: CLARIF

## 2022-06-21 RX ADMIN — SODIUM CHLORIDE, SODIUM LACTATE, POTASSIUM CHLORIDE, AND CALCIUM CHLORIDE: .6; .31; .03; .02 INJECTION, SOLUTION INTRAVENOUS at 06:21

## 2022-06-21 RX ADMIN — POTASSIUM CHLORIDE 40 MEQ: 1500 TABLET, EXTENDED RELEASE ORAL at 09:35

## 2022-06-21 RX ADMIN — ACETAMINOPHEN 500 MG: 500 TABLET ORAL at 12:57

## 2022-06-21 RX ADMIN — TIZANIDINE 4 MG: 4 TABLET ORAL at 06:02

## 2022-06-21 RX ADMIN — ENOXAPARIN SODIUM 40 MG: 40 INJECTION SUBCUTANEOUS at 09:35

## 2022-06-21 RX ADMIN — POLYETHYLENE GLYCOL 3350 17 G: 17 POWDER, FOR SOLUTION ORAL at 09:34

## 2022-06-21 RX ADMIN — SIMETHICONE 125 MG: 125 TABLET, CHEWABLE ORAL at 13:58

## 2022-06-21 RX ADMIN — SENNOSIDES AND DOCUSATE SODIUM 2 TABLET: 50; 8.6 TABLET ORAL at 09:35

## 2022-06-21 RX ADMIN — AMLODIPINE BESYLATE 10 MG: 10 TABLET ORAL at 09:35

## 2022-06-21 RX ADMIN — SODIUM CHLORIDE, SODIUM LACTATE, POTASSIUM CHLORIDE, AND CALCIUM CHLORIDE: .6; .31; .03; .02 INJECTION, SOLUTION INTRAVENOUS at 00:28

## 2022-06-21 RX ADMIN — LEVOTHYROXINE SODIUM 75 MCG: 0.07 TABLET ORAL at 06:02

## 2022-06-21 RX ADMIN — SIMETHICONE 125 MG: 125 TABLET, CHEWABLE ORAL at 06:21

## 2022-06-21 RX ADMIN — TIZANIDINE 4 MG: 4 TABLET ORAL at 17:31

## 2022-06-21 RX ADMIN — ACETAMINOPHEN 500 MG: 500 TABLET ORAL at 00:25

## 2022-06-21 RX ADMIN — ACETAMINOPHEN 500 MG: 500 TABLET ORAL at 06:02

## 2022-06-21 ASSESSMENT — COGNITIVE AND FUNCTIONAL STATUS - GENERAL
DO YOU HAVE DIFFICULTY DRESSING OR BATHING: NO
BECAUSE OF A PHYSICAL, MENTAL, OR EMOTIONAL CONDITION, DO YOU HAVE DIFFICULTY DOING ERRANDS ALONE: NO
DO YOU HAVE SERIOUS DIFFICULTY WALKING OR CLIMBING STAIRS: NO
BECAUSE OF A PHYSICAL, MENTAL, OR EMOTIONAL CONDITION, DO YOU HAVE SERIOUS DIFFICULTY CONCENTRATING, REMEMBERING OR MAKING DECISIONS: NO

## 2022-06-21 ASSESSMENT — PAIN SCALES - GENERAL: PAINLEVEL_OUTOF10: 3

## 2022-06-21 ASSESSMENT — PAIN SCALES - WONG BAKER: WONGBAKER_NUMERICALRESPONSE: 0

## 2022-06-22 ENCOUNTER — TELEPHONE (OUTPATIENT)
Dept: BARIATRICS/WEIGHT MGMT | Age: 32
End: 2022-06-22

## 2022-06-24 ENCOUNTER — TELEPHONE (OUTPATIENT)
Dept: BARIATRICS/WEIGHT MGMT | Age: 32
End: 2022-06-24

## 2022-06-25 ENCOUNTER — TELEPHONE (OUTPATIENT)
Dept: SCHEDULING | Age: 32
End: 2022-06-25

## 2022-06-27 ENCOUNTER — LAB SERVICES (OUTPATIENT)
Dept: LAB | Age: 32
End: 2022-06-27

## 2022-06-27 ENCOUNTER — V-VISIT (OUTPATIENT)
Dept: BARIATRICS/WEIGHT MGMT | Age: 32
End: 2022-06-27

## 2022-06-27 DIAGNOSIS — Z11.3 ROUTINE SCREENING FOR STI (SEXUALLY TRANSMITTED INFECTION): ICD-10-CM

## 2022-06-27 DIAGNOSIS — A60.04 HERPES, VULVOVAGINITIS: ICD-10-CM

## 2022-06-27 DIAGNOSIS — Z71.89 ADVICE GIVEN ABOUT 2019 NOVEL CORONAVIRUS BY TELEPHONE: ICD-10-CM

## 2022-06-27 DIAGNOSIS — E78.00 HYPERCHOLESTEREMIA: ICD-10-CM

## 2022-06-27 DIAGNOSIS — Z98.84 S/P GASTRIC BYPASS: Primary | ICD-10-CM

## 2022-06-27 DIAGNOSIS — G47.9 SLEEP DISTURBANCE: ICD-10-CM

## 2022-06-27 DIAGNOSIS — E55.9 VITAMIN D DEFICIENCY: ICD-10-CM

## 2022-06-27 DIAGNOSIS — U07.1 COVID-19 VIRUS DETECTED: ICD-10-CM

## 2022-06-27 DIAGNOSIS — E66.01 MORBID OBESITY (CMD): ICD-10-CM

## 2022-06-27 DIAGNOSIS — E51.9 THIAMINE DEFICIENCY: ICD-10-CM

## 2022-06-27 DIAGNOSIS — R53.81 MALAISE AND FATIGUE: ICD-10-CM

## 2022-06-27 DIAGNOSIS — E21.1 SECONDARY HYPERPARATHYROIDISM, NON-RENAL (CMD): ICD-10-CM

## 2022-06-27 DIAGNOSIS — E11.9 TYPE 2 DIABETES MELLITUS WITHOUT COMPLICATION, WITHOUT LONG-TERM CURRENT USE OF INSULIN (CMD): ICD-10-CM

## 2022-06-27 DIAGNOSIS — R16.0 HEPATOMEGALY: ICD-10-CM

## 2022-06-27 DIAGNOSIS — L68.0 FEMALE HIRSUTISM: ICD-10-CM

## 2022-06-27 DIAGNOSIS — K76.0 FATTY METAMORPHOSIS OF LIVER: ICD-10-CM

## 2022-06-27 DIAGNOSIS — I10 BENIGN ESSENTIAL HTN: ICD-10-CM

## 2022-06-27 DIAGNOSIS — E89.0 POSTABLATIVE HYPOTHYROIDISM: ICD-10-CM

## 2022-06-27 DIAGNOSIS — R53.83 MALAISE AND FATIGUE: ICD-10-CM

## 2022-06-27 DIAGNOSIS — Z01.812 PRE-PROCEDURAL LABORATORY EXAMINATIONS: ICD-10-CM

## 2022-06-27 DIAGNOSIS — Z01.812 PRE-PROCEDURE LAB EXAM: ICD-10-CM

## 2022-06-27 DIAGNOSIS — D64.9 ANEMIA, UNSPECIFIED TYPE: ICD-10-CM

## 2022-06-27 DIAGNOSIS — E66.01 MORBID OBESITY WITH BMI OF 50.0-59.9, ADULT (CMD): ICD-10-CM

## 2022-06-27 DIAGNOSIS — R63.5 ABNORMAL WEIGHT GAIN: ICD-10-CM

## 2022-06-27 PROBLEM — G47.33 OSA (OBSTRUCTIVE SLEEP APNEA): Status: ACTIVE | Noted: 2021-08-10

## 2022-06-27 LAB
ALBUMIN SERPL-MCNC: 3.7 G/DL (ref 3.6–5.1)
ALBUMIN/GLOB SERPL: 0.9 {RATIO} (ref 1–2.4)
ALP SERPL-CCNC: 59 UNITS/L (ref 45–117)
ALT SERPL-CCNC: 40 UNITS/L
ANION GAP SERPL CALC-SCNC: 12 MMOL/L (ref 7–19)
AST SERPL-CCNC: 23 UNITS/L
BASOPHILS # BLD: 0 K/MCL (ref 0–0.3)
BASOPHILS NFR BLD: 0 %
BILIRUB SERPL-MCNC: 0.5 MG/DL (ref 0.2–1)
BUN SERPL-MCNC: 9 MG/DL (ref 6–20)
BUN/CREAT SERPL: 9 (ref 7–25)
CALCIUM SERPL-MCNC: 9.4 MG/DL (ref 8.4–10.2)
CHLORIDE SERPL-SCNC: 105 MMOL/L (ref 97–110)
CO2 SERPL-SCNC: 25 MMOL/L (ref 21–32)
CREAT SERPL-MCNC: 1.02 MG/DL (ref 0.51–0.95)
DEPRECATED RDW RBC: 45.7 FL (ref 39–50)
EOSINOPHIL # BLD: 0.2 K/MCL (ref 0–0.5)
EOSINOPHIL NFR BLD: 3 %
ERYTHROCYTE [DISTWIDTH] IN BLOOD: 15.9 % (ref 11–15)
FASTING DURATION TIME PATIENT: ABNORMAL H
GFR SERPLBLD BASED ON 1.73 SQ M-ARVRAT: 75 ML/MIN
GLOBULIN SER-MCNC: 4 G/DL (ref 2–4)
GLUCOSE SERPL-MCNC: 81 MG/DL (ref 70–99)
HCT VFR BLD CALC: 40.7 % (ref 36–46.5)
HGB BLD-MCNC: 12.6 G/DL (ref 12–15.5)
IMM GRANULOCYTES # BLD AUTO: 0 K/MCL (ref 0–0.2)
IMM GRANULOCYTES # BLD: 0 %
LYMPHOCYTES # BLD: 2.1 K/MCL (ref 1–4.8)
LYMPHOCYTES NFR BLD: 23 %
MCH RBC QN AUTO: 24.7 PG (ref 26–34)
MCHC RBC AUTO-ENTMCNC: 31 G/DL (ref 32–36.5)
MCV RBC AUTO: 79.8 FL (ref 78–100)
MONOCYTES # BLD: 0.5 K/MCL (ref 0.3–0.9)
MONOCYTES NFR BLD: 6 %
NEUTROPHILS # BLD: 6 K/MCL (ref 1.8–7.7)
NEUTROPHILS NFR BLD: 68 %
NRBC BLD MANUAL-RTO: 0 /100 WBC
PLATELET # BLD AUTO: 371 K/MCL (ref 140–450)
POTASSIUM SERPL-SCNC: 4.3 MMOL/L (ref 3.4–5.1)
PROT SERPL-MCNC: 7.7 G/DL (ref 6.4–8.2)
RBC # BLD: 5.1 MIL/MCL (ref 4–5.2)
SODIUM SERPL-SCNC: 138 MMOL/L (ref 135–145)
WBC # BLD: 8.9 K/MCL (ref 4.2–11)

## 2022-06-27 PROCEDURE — 85025 COMPLETE CBC W/AUTO DIFF WBC: CPT | Performed by: INTERNAL MEDICINE

## 2022-06-27 PROCEDURE — 80053 COMPREHEN METABOLIC PANEL: CPT | Performed by: INTERNAL MEDICINE

## 2022-06-27 PROCEDURE — 99024 POSTOP FOLLOW-UP VISIT: CPT | Performed by: STUDENT IN AN ORGANIZED HEALTH CARE EDUCATION/TRAINING PROGRAM

## 2022-06-27 PROCEDURE — 36415 COLL VENOUS BLD VENIPUNCTURE: CPT | Performed by: STUDENT IN AN ORGANIZED HEALTH CARE EDUCATION/TRAINING PROGRAM

## 2022-06-27 ASSESSMENT — ENCOUNTER SYMPTOMS
CONSTITUTIONAL NEGATIVE: 1
RESPIRATORY NEGATIVE: 1
PSYCHIATRIC NEGATIVE: 1
NEUROLOGICAL NEGATIVE: 1
GASTROINTESTINAL NEGATIVE: 1

## 2022-06-27 ASSESSMENT — PATIENT HEALTH QUESTIONNAIRE - PHQ9
SUM OF ALL RESPONSES TO PHQ9 QUESTIONS 1 AND 2: 0
CLINICAL INTERPRETATION OF PHQ2 SCORE: NO FURTHER SCREENING NEEDED
1. LITTLE INTEREST OR PLEASURE IN DOING THINGS: NOT AT ALL
2. FEELING DOWN, DEPRESSED OR HOPELESS: NOT AT ALL
SUM OF ALL RESPONSES TO PHQ9 QUESTIONS 1 AND 2: 0

## 2022-06-28 ENCOUNTER — OFFICE VISIT (OUTPATIENT)
Dept: BARIATRICS/WEIGHT MGMT | Age: 32
End: 2022-06-28

## 2022-06-28 VITALS
HEIGHT: 68 IN | RESPIRATION RATE: 16 BRPM | SYSTOLIC BLOOD PRESSURE: 141 MMHG | DIASTOLIC BLOOD PRESSURE: 102 MMHG | WEIGHT: 293 LBS | TEMPERATURE: 97.1 F | OXYGEN SATURATION: 99 % | BODY MASS INDEX: 44.41 KG/M2 | HEART RATE: 99 BPM

## 2022-06-28 DIAGNOSIS — Z98.84 S/P GASTRIC BYPASS: Primary | ICD-10-CM

## 2022-06-28 PROBLEM — N18.2 CKD (CHRONIC KIDNEY DISEASE) STAGE 2, GFR 60-89 ML/MIN: Status: ACTIVE | Noted: 2022-06-28

## 2022-06-28 PROCEDURE — X1094 NO CHARGE VISIT: HCPCS | Performed by: STUDENT IN AN ORGANIZED HEALTH CARE EDUCATION/TRAINING PROGRAM

## 2022-06-28 ASSESSMENT — PAIN SCALES - GENERAL: PAINLEVEL: 0

## 2022-06-30 ENCOUNTER — PATIENT MESSAGE (OUTPATIENT)
Dept: GASTROENTEROLOGY | Facility: CLINIC | Age: 32
End: 2022-06-30
Payer: MEDICAID

## 2022-06-30 ENCOUNTER — TELEPHONE (OUTPATIENT)
Dept: GASTROENTEROLOGY | Facility: CLINIC | Age: 32
End: 2022-06-30
Payer: MEDICAID

## 2022-07-01 ENCOUNTER — PATIENT MESSAGE (OUTPATIENT)
Dept: GASTROENTEROLOGY | Facility: CLINIC | Age: 32
End: 2022-07-01
Payer: MEDICAID

## 2022-07-05 ENCOUNTER — OFFICE VISIT (OUTPATIENT)
Dept: GASTROENTEROLOGY | Facility: CLINIC | Age: 32
End: 2022-07-05
Payer: MEDICAID

## 2022-07-05 VITALS
TEMPERATURE: 98 F | BODY MASS INDEX: 24.37 KG/M2 | HEART RATE: 78 BPM | DIASTOLIC BLOOD PRESSURE: 74 MMHG | SYSTOLIC BLOOD PRESSURE: 98 MMHG | WEIGHT: 137.56 LBS | OXYGEN SATURATION: 99 %

## 2022-07-05 DIAGNOSIS — K50.80 CROHN'S DISEASE OF BOTH SMALL AND LARGE INTESTINE WITHOUT COMPLICATION: ICD-10-CM

## 2022-07-05 DIAGNOSIS — E55.9 VITAMIN D DEFICIENCY: ICD-10-CM

## 2022-07-05 DIAGNOSIS — R30.0 DYSURIA: Primary | ICD-10-CM

## 2022-07-05 PROCEDURE — 3008F BODY MASS INDEX DOCD: CPT | Mod: CPTII,S$GLB,, | Performed by: INTERNAL MEDICINE

## 2022-07-05 PROCEDURE — 1159F MED LIST DOCD IN RCRD: CPT | Mod: CPTII,S$GLB,, | Performed by: INTERNAL MEDICINE

## 2022-07-05 PROCEDURE — 3074F SYST BP LT 130 MM HG: CPT | Mod: CPTII,S$GLB,, | Performed by: INTERNAL MEDICINE

## 2022-07-05 PROCEDURE — 99215 OFFICE O/P EST HI 40 MIN: CPT | Mod: S$GLB,,, | Performed by: INTERNAL MEDICINE

## 2022-07-05 PROCEDURE — 99215 PR OFFICE/OUTPT VISIT, EST, LEVL V, 40-54 MIN: ICD-10-PCS | Mod: S$GLB,,, | Performed by: INTERNAL MEDICINE

## 2022-07-05 PROCEDURE — 3074F PR MOST RECENT SYSTOLIC BLOOD PRESSURE < 130 MM HG: ICD-10-PCS | Mod: CPTII,S$GLB,, | Performed by: INTERNAL MEDICINE

## 2022-07-05 PROCEDURE — 3008F PR BODY MASS INDEX (BMI) DOCUMENTED: ICD-10-PCS | Mod: CPTII,S$GLB,, | Performed by: INTERNAL MEDICINE

## 2022-07-05 PROCEDURE — 3078F PR MOST RECENT DIASTOLIC BLOOD PRESSURE < 80 MM HG: ICD-10-PCS | Mod: CPTII,S$GLB,, | Performed by: INTERNAL MEDICINE

## 2022-07-05 PROCEDURE — 1159F PR MEDICATION LIST DOCUMENTED IN MEDICAL RECORD: ICD-10-PCS | Mod: CPTII,S$GLB,, | Performed by: INTERNAL MEDICINE

## 2022-07-05 PROCEDURE — 3078F DIAST BP <80 MM HG: CPT | Mod: CPTII,S$GLB,, | Performed by: INTERNAL MEDICINE

## 2022-07-05 RX ORDER — ERGOCALCIFEROL 1.25 MG/1
50000 CAPSULE ORAL
Qty: 12 CAPSULE | Refills: 3 | Status: SHIPPED | OUTPATIENT
Start: 2022-07-05 | End: 2022-09-21

## 2022-07-05 NOTE — PROGRESS NOTES
"     [facility]             Inflammatory Bowel Disease        Follow-up  Note              TODAY'S VISIT DATE:  7/5/2022    Chief Complaint:   Chief Complaint   Patient presents with    Crohn's Disease     PCP: Primary Doctor No    Previous History:  Lora Scott is a 32 y.o. female with Crohn's disease (ileum, S/P ileocecal resection-42 cm of ileum/10.5 cm cecum removed with surgical path c/w stricture and fistula, 2/15/20 ileostomy takedown and mucus fistula takedown), psoriasis, GERD, anemia, asthma, ex-smoker (quit 9/2019).  Patient reports being diagnosed with IBS with alternating diarrhea and constipation and weight loss as a teenager that was treated with Nexium which helped.  In 8/2018 she again had abd pain, weight loss, and alternating BMs so she contacted her PCP for Nexium which did not help.  By 2/2019, her symptoms progressed prompting an ER visit and on 2/18/2019 she had a CT abd/pelvis which showed an "abnormal wall thickening involving the ascending colon, cecum and terminal ileum with intense mucosal enhancement and luminal narrowing resulting in distention of proximal small bowel.  There was also an ileal-ileal and ileocolic fistulization, fat hypertrophy, increased mesenteric vascularitiy, and reactive mesenteric adenitis in the RLQ with no abscess. "  Patient had an inpatient EGD on 2/20/2019 which showed minimal chronic H. Pylori negative inflammation, normal esophagus, and duodenal biopsies showed mild villous blunting and mild increased IELs.  Colonoscopy on the same day showed congested mucosa in the TI and ICV with granularity in the TI, and TI stricture 5 cm from the ICV with ileal biopsies confirmed ileitis and biopsies of the right colon confirmed focal moderate active colitis with normal left colon and rectum, sigmoid diverticulitis, and internal hemorrhoids.  It was felt that patient would eventually need surgical intervention but needed nutritional optimization " initially so she was discharged home with 12 hours of TPN daily via a PICC and bowel rest with plans for an MRE, surgical f/u, and a referral to the IBD clinic to discuss medical options.  She was last seen by me 3/2019 for initial IBD visit at which time she was on no meds and there was plan for MRE 3/13/19 with f/u with Dr. Ruiz. MRE on 3/13/19 showed multifocal active inflammation of the distal ileum with mild fibrostenotic component and entero-enteric fistula.  She then was transferred to Dr. Cope due to availability and he initially saw her in clinic and a decision to proceed with surgery was made.  On 5/10/19 she underwent an open ileocolonic resection (10 cm of cecum, 42 cm of ileum removed) with end ileostomy with mucous fistula. Surgical pathology showed TI/cecum with chronic active ileitis with stricture and features of fistulization, transmural inflammation with reactive LNs. Postoperatively she did well and gained weight and would pass mucous from below a few times only. She emptied her stoma about 4-6 times/d and was having issues getting supplies due to insurance issues.  Patient has had multiple issues with stoma appliance/bag and due to inability to afford stoma supplies and its not covered by insurance, she has been getting samples from our wound/ostomy nurse.  Patient stopped smoking in mid September 2019.  She started on methotrexate 12.5 mg p.o. weekly though due to elevated liver function test discontinued on 10/4/2019.  She started Humira on 10/24/2019 and Imuran 50 mg daily on 10/25/2019 though never started Imuran as recommended and we decided not to proceed and instead optimize her Humira.  Ileoscopy through stoma was normal on 12/20/2019.  On 1/15/2020 patient had Humira levels 3.5 and her Humira was increased to weekly starting 3/13/20.  On 2/15/2020 she had elective takedown of her ileostomy and mucous fistula. Colonoscopy 10/2020 showed normal colon and single linear ulcer at  ileocolonic anastomosis and normal neoterminal ileum with biopsies of neoterminal ileum normal. On 9/17/20 trough Humira levels and abs adequate with [id number] with no Abs on Humira 40 mg SC weekly. Patient has ongoing psoriasis being followed by [facility] dermatology and was treated for tinea pedis 7/2020.  She was also treated for bacterial vaginosis by her GYN in 2020 and pap smear HPV positive with plans for colposcopy.  In March 2021 patient was diagnosed with recurrent fungal infections- tineap capitis treated with griseofulvin.  Patient saw Dermatology 5/7/2021 for spongiotic dermitits/Psoriasis, flaring with scalp involvement and derm felt this was drug induced psoriasiform dermatitis treated with TAC cream and doxycycline. In mid 9/2021 pt had abscess near vaginal area s/p I&D in [facility] in [place].  She saw Dermatology 4/11/22 and 4/25/22 for psoriasis which significantly improved with prednisone 60mg followed by taper over two weeks with plans for otezla though pt is not on this and has not seen dermatology since then.      Interval History:  - current IBD meds: Humira 40 mg SC weekly (FD 10/24/20, 40 mg SC weekly, LD 7/1, ND 7/8)   - 2 formed BMs/d, no blood, no nocturnal  - 5/10/22 colonoscopy: normal colon, normal ileocolonic anastomosis, normal neoterminal ileum. Biopsies of neoterminal ileum with nonspecific patchy increased eosinophils  - mouth sores- fissures side of mouth  - urination - pain with urination, blood in urine   - joint pains - improved  - anxiety- ongoing   - NSAID use: No  - Narcotic use: No  - Alternative/complementary meds for IBD: No    Prior Pertinent Surgeries:   5/10/19 open ileocolonic resection (10 cm of cecum, 42 cm of ileum removed) with end ileostomy with mucous fistula. Surgical pathology showed TI/cecum with chronic active ileitis with stricture and features of fistulization, transmural inflammation with reactive LNs  2/15/20 elective takedown of her ileostomy and mucous  fistula;  (surgical path- peristoma granulation tissue with acute and chronic inflammation, ascending colon with acute and chronic inflammatory infiltrate with superficial erosion and focal active colitis, TI with focal granulomatous inflammation and superficial erosion)    Last pertinent Endoscopy/Imagin2019 EGD: normal examined duodenum (path: mild villous blunting and mild increased intraepithelial lymphocytes); normal stomach (path: minimal chronic H. pylori negative inflammation); normal esophagus  3/13/19 MRE:  multifocal active inflammation of the distal ileum with mild fibrostenotic component and entero-enteric fistula  19- ileoscopy through stoma: The 30 cm of examined through the mucous fistula is normal. The peristomal area has granular friable mucosa and the lorena-terminal ileum appeared normal. Biopsies of colon through mucus fistula- normal colon, biopsies of neoterminal ileum with minimal active inflammation  10/30/20 colonoscopy: Colon normal, single linear ulcer at the colon anastomosis. Ileum was normal as well including path.    Therapeutic Drug Monitoring Labs:  1/15/20 humira levels 3.5/neg Abs on humira 40 mg SC every 2 weeks  20 Trough Humira levels 12.0 and neg abs on Humira  40 mg SC every 2 weeks  21 Humira levels 7.5, no antibodies on Humira  40 mg SC every week.     Prior IBD Therapies:  MTX 12.5 mg po weekly- stopped after a few weeks due to elevated LFTs 10/2019    Vaccinations:  Lab Results   Component Value Date    HEPBSAB Grayzone (A) 2019     Lab Results   Component Value Date    HEPAIGG Positive (A) 2019     Lab Results   Component Value Date    VARICELLAZOS 2.58 (H) 2019    VARICELLAINT Positive (A) 2019     Immunization History   Administered Date(s) Administered    COVID-19, MRNA, LN-S, PF (Pfizer) (Purple Cap) 10/22/2021    DTP 1991, 1992, 1992, 1992, 1994    HIB 1991, 1992, 1992     Hepatitis B (recombinant) Adjuvanted, 2 dose 04/17/2019, 05/20/2019    Hepatitis B, Pediatric/Adolescent 07/24/2006    IPV 07/24/2006    MMR 01/13/1994, 07/24/2006    Meningococcal Conjugate (MCV4P) 07/24/2006    OPV 1991, 02/24/1992, 01/12/1994    Pneumococcal Conjugate - 13 Valent 04/17/2019    Pneumococcal Polysaccharide - 23 Valent 06/20/2019    Td (ADULT) 07/24/2006    Td (Adult), Unspecified Formulation 07/24/2006    Tdap 07/24/2006, 04/17/2019     Flu shot:  Recommended yearly  COVID booster:  Declined   HPV:    Not sure  Shingrix: recommended, defer due to cost    Review of Systems   Constitutional: Negative for chills, fever and weight loss.   HENT:        No oral ulcers, dysphagia, oral thrush   Eyes: Negative for blurred vision, pain and redness.   Respiratory: Negative for cough and shortness of breath.    Cardiovascular: Negative for chest pain.   Gastrointestinal: Negative for abdominal pain, heartburn, nausea and vomiting.   Genitourinary: Negative for dysuria and hematuria.   Musculoskeletal: Negative for back pain and joint pain.   Skin: Negative for rash.   Psychiatric/Behavioral: Negative for depression. The patient is not nervous/anxious and does not have insomnia.      All Medical History/Surgical History/Family History/Social History/Allergies have been reviewed and updated in EMR    Review of patient's allergies indicates:   Allergen Reactions    Horse/equine containing products Itching     Outpatient Medications Marked as Taking for the 7/5/22 encounter (Office Visit) with Suzi Bacon MD   Medication Sig Dispense Refill    HUMIRA PEN PnKt injection Inject 1 pen (40 mg total) into the skin every 7 days. 4 pen 5     Physical Exam  Vitals and nursing note reviewed.   Constitutional:       Appearance: She is well-developed.   HENT:      Mouth/Throat:      Mouth: No oral lesions.      Comments: Fissures side of mouth  Eyes:      Conjunctiva/sclera: Conjunctivae normal.       Pupils: Pupils are equal, round, and reactive to light.   Cardiovascular:      Rate and Rhythm: Normal rate and regular rhythm.   Pulmonary:      Effort: Pulmonary effort is normal.      Breath sounds: Normal breath sounds.   Abdominal:      Palpations: Abdomen is soft.      Tenderness: There is no abdominal tenderness.   Musculoskeletal:      Right lower leg: No swelling.      Left lower leg: No swelling.   Skin:     Findings: No rash.      Comments: Hyperpigmented scaly skin around feet  LLE- 3 cm nodule,hyperpigmented  Hyperpigmentation with scarring LE/UE   Neurological:      Mental Status: She is alert and oriented to person, place, and time.     Labs:  Lab Results   Component Value Date    CRP 8.5 (H) 09/27/2021     Lab Results   Component Value Date    HEPBSAG Negative 09/27/2021    HEPBCAB Negative 09/27/2021     Lab Results   Component Value Date    TBGOLDPLUS Negative 09/27/2021     Lab Results   Component Value Date    HAREEMFG49AN 16 (L) 05/09/2022    UKHDNHNY87 196 (L) 05/09/2022     Lab Results   Component Value Date    WBC 6.76 05/09/2022    HGB 12.2 05/09/2022    HCT 40.0 05/09/2022    MCV 99 (H) 05/09/2022     05/09/2022     Lab Results   Component Value Date    CREATININE 0.7 05/09/2022    ALBUMIN 3.6 05/09/2022    BILITOT 0.5 05/09/2022    ALKPHOS 69 05/09/2022    AST 16 05/09/2022    ALT 18 05/09/2022     Assessment/Plan:  Lora cSott is a 32 y.o. female with Crohn's disease (ileum, S/P ileocecal resection-42 cm of ileum/10.5 cm cecum removed with surgical path c/w stricture and fistula, 2/15/20 ileostomy takedown and mucus fistula takedown), psoriasis (failed light treatment), ex-smoker (quit 9/2019) who is on Humira 40 mg SC weekly with recent colonoscopy normal with no recurrent CD.  Her psoriasis has dramatic improvement with course of prednisone April/May 2022 with some recurrence but much better overall. Pt was supposed start otezla with dermatology but has not done so and I  encouraged her to f/u with dermatology for this and a f/u visit. She has pain on urination and saw Dr. Rico at [facility] but no urine studies ordered and given immunosuppression I will order to be sure no UTI.  In regards to other meds she is not compliant with vitamin supplementation and encouraged to restart this and also establish a PCP.      # Crohn's disease (ileum, S/P ileocecal resection-42 cm of ileum/10.5 cm cecum removed with surgical path c/w stricture and fistula, 2/15/20 ileostomy takedown and mucus fistula takedown)  - continue humira 40 mg SC weekly  - colonoscopy 5/2023   - vitamin B12 (5/2022 vit B12 196)- start vit B12 1000 mcg subligual daily, pt prefers oral over injections and we will see if this works o/w back to B12 injections, repeat vit B12 5/2023  - ex-smoker: quit smoking 9/2019, some 2nd hand smoke, MJ use   - drug monitoring labs:  CBC/CMP q 6 mos (7/22), TPMT (heterozygote 2/2019), TB quantiferon (7/22), Hep B testing (7/2022)  - TDM: trough humira levels/abs Thursday, 7/7/22    # Skin issues  - psoriasis - improved with high dose prednisone April/may 2022, not on otezla, encouraged to f/u with [facility] dermatology and f/u on status of otezla  - vulvar/groin abscess - S/P I/D spring 2022  - fissures side of mouth likely vit B12 def- will start vit B12    # Pain on urination  - UA/UCx today  - encouraged to establish PCP or f/u with Dr Rico who she saw at [facility] last week    # IBD specific health maintenance:  CRC risk: ileal disease, average risk  Skin exam yearly - sees dermatology at [facility] regularly, last exam 7/2022, next due 7/2022  Pap smear yearly: 8/2021  Vitamin D (5/2022 16)- stopped high dose vit D, told to restart high dose vit D- prescription sent, repeat vit D 5/2023   Vaccines: no live vaccines, flu shot this fall    MA to check in with pt in 2 weeks- f/u on vitamins and be sure she restarted, made f/u with dermatology at [facility] and established PCP  Follow up: 6  mos    Total time: 45 min    Suzi Bacon MD   Department of Gastroenterology  Medical Director, Inflammatory Bowel Disease

## 2022-07-05 NOTE — PROGRESS NOTES
Answers for HPI/ROS submitted by the patient on 7/5/2022  mouth sores: Yes  nervous/ anxious: Yes  Painful Urination?: Yes    IBD PATIENT INTAKE:    COVID symptoms in the last 7 days (runny nose, sore throat, congestion, cough, fever): No  PCP: Primary Doctor No  If not PCP-  number given to establish [phone]: Yes    ALLERGIES REVIEWED:  Yes    CHIEF COMPLAINT:    Chief Complaint   Patient presents with    Crohn's Disease       VITAL SIGNS:  BP 98/74   Pulse 78   Temp 97.9 °F (36.6 °C)   Wt 62.4 kg (137 lb 9.1 oz)   SpO2 99%   BMI 24.37 kg/m²      Change in medical, surgical, family or social history: No    IBD THERAPY (name, dose/frequency):  humira 40mg q weekly  Last dose:  7/1/22    Next dose:  7/8/22  Infusion/Pharmacy: Other perform specialty    NSAIDs (aspirin, ibuprofen-advil or motrin, naproxen-aleve, diclofenac-voltaren, BC powder, excedrin, goodies): No    Alternative/Complementary Medications (i.e. probiotics, turmeric, fish oil, aloe vera):      no  Name/dose:  no    Vitamins:   Vit D:  no     Vit B-12:  no   Folic Acid: no       Calcium: no     Iron:  no      MVI: no    Antibiotics (past 30 Days):  no  If yes   Indication:  Name of antibiotic:  Completion date:     REVIEWED MEDICATION LIST RECONCILED INCLUDING ABOVE MEDS:  yes

## 2022-07-05 NOTE — PATIENT INSTRUCTIONS
- labs on a Thursday day before humira injection- give pt work excuse for this  - UA and urine culture today  - continue humira 40 mg SC weekly  - f/u with [facility] dermatology- call them  - make sure you establish PCP at [facility] or here ([phone])  - start vit B12 1000 mcg sublingual (under the tongue should dissolve) daily  - start high dose vit D once weekly- I have sent this into   - MA to check in with pt in 1 week- 1) confirm vitamins (D and B12 above) 2) did she called [facility] dermatology to see status of otezla and if she established an appt 3) established PCP

## 2022-07-06 ENCOUNTER — PATIENT MESSAGE (OUTPATIENT)
Dept: GASTROENTEROLOGY | Facility: CLINIC | Age: 32
End: 2022-07-06
Payer: MEDICAID

## 2022-07-07 ENCOUNTER — PATIENT MESSAGE (OUTPATIENT)
Dept: GASTROENTEROLOGY | Facility: CLINIC | Age: 32
End: 2022-07-07
Payer: MEDICAID

## 2022-07-07 ENCOUNTER — LAB VISIT (OUTPATIENT)
Dept: LAB | Facility: HOSPITAL | Age: 32
End: 2022-07-07
Attending: INTERNAL MEDICINE
Payer: MEDICAID

## 2022-07-07 DIAGNOSIS — K50.80 CROHN'S DISEASE OF BOTH SMALL AND LARGE INTESTINE WITHOUT COMPLICATION: ICD-10-CM

## 2022-07-07 DIAGNOSIS — D84.9 IMMUNOSUPPRESSED STATUS: Primary | ICD-10-CM

## 2022-07-07 DIAGNOSIS — R31.9 URINARY TRACT INFECTION WITH HEMATURIA, SITE UNSPECIFIED: ICD-10-CM

## 2022-07-07 DIAGNOSIS — N39.0 URINARY TRACT INFECTION WITH HEMATURIA, SITE UNSPECIFIED: ICD-10-CM

## 2022-07-07 LAB
ALBUMIN SERPL BCP-MCNC: 3.7 G/DL (ref 3.5–5.2)
ALP SERPL-CCNC: 69 U/L (ref 55–135)
ALT SERPL W/O P-5'-P-CCNC: 12 U/L (ref 10–44)
ANION GAP SERPL CALC-SCNC: 10 MMOL/L (ref 8–16)
AST SERPL-CCNC: 18 U/L (ref 10–40)
BASOPHILS # BLD AUTO: 0.02 K/UL (ref 0–0.2)
BASOPHILS NFR BLD: 0.3 % (ref 0–1.9)
BILIRUB SERPL-MCNC: 0.5 MG/DL (ref 0.1–1)
BUN SERPL-MCNC: 7 MG/DL (ref 6–20)
CALCIUM SERPL-MCNC: 9.4 MG/DL (ref 8.7–10.5)
CHLORIDE SERPL-SCNC: 103 MMOL/L (ref 95–110)
CO2 SERPL-SCNC: 21 MMOL/L (ref 23–29)
CREAT SERPL-MCNC: 0.8 MG/DL (ref 0.5–1.4)
DIFFERENTIAL METHOD: ABNORMAL
EOSINOPHIL # BLD AUTO: 0.1 K/UL (ref 0–0.5)
EOSINOPHIL NFR BLD: 1.6 % (ref 0–8)
ERYTHROCYTE [DISTWIDTH] IN BLOOD BY AUTOMATED COUNT: 14.9 % (ref 11.5–14.5)
EST. GFR  (AFRICAN AMERICAN): >60 ML/MIN/1.73 M^2
EST. GFR  (NON AFRICAN AMERICAN): >60 ML/MIN/1.73 M^2
GLUCOSE SERPL-MCNC: 69 MG/DL (ref 70–110)
HBV CORE AB SERPL QL IA: NEGATIVE
HBV SURFACE AG SERPL QL IA: NEGATIVE
HCT VFR BLD AUTO: 40.4 % (ref 37–48.5)
HGB BLD-MCNC: 12.8 G/DL (ref 12–16)
IMM GRANULOCYTES # BLD AUTO: 0.01 K/UL (ref 0–0.04)
IMM GRANULOCYTES NFR BLD AUTO: 0.1 % (ref 0–0.5)
LYMPHOCYTES # BLD AUTO: 2.4 K/UL (ref 1–4.8)
LYMPHOCYTES NFR BLD: 35.7 % (ref 18–48)
MCH RBC QN AUTO: 30.8 PG (ref 27–31)
MCHC RBC AUTO-ENTMCNC: 31.7 G/DL (ref 32–36)
MCV RBC AUTO: 97 FL (ref 82–98)
MONOCYTES # BLD AUTO: 0.4 K/UL (ref 0.3–1)
MONOCYTES NFR BLD: 5.8 % (ref 4–15)
NEUTROPHILS # BLD AUTO: 3.8 K/UL (ref 1.8–7.7)
NEUTROPHILS NFR BLD: 56.5 % (ref 38–73)
NRBC BLD-RTO: 0 /100 WBC
PLATELET # BLD AUTO: 271 K/UL (ref 150–450)
PMV BLD AUTO: 11.7 FL (ref 9.2–12.9)
POTASSIUM SERPL-SCNC: 3.4 MMOL/L (ref 3.5–5.1)
PROT SERPL-MCNC: 8.4 G/DL (ref 6–8.4)
RBC # BLD AUTO: 4.15 M/UL (ref 4–5.4)
SODIUM SERPL-SCNC: 134 MMOL/L (ref 136–145)
WBC # BLD AUTO: 6.72 K/UL (ref 3.9–12.7)

## 2022-07-07 PROCEDURE — 86704 HEP B CORE ANTIBODY TOTAL: CPT | Performed by: INTERNAL MEDICINE

## 2022-07-07 PROCEDURE — 82397 CHEMILUMINESCENT ASSAY: CPT | Performed by: INTERNAL MEDICINE

## 2022-07-07 PROCEDURE — 80053 COMPREHEN METABOLIC PANEL: CPT | Performed by: INTERNAL MEDICINE

## 2022-07-07 PROCEDURE — 36415 COLL VENOUS BLD VENIPUNCTURE: CPT | Mod: PO | Performed by: INTERNAL MEDICINE

## 2022-07-07 PROCEDURE — 85025 COMPLETE CBC W/AUTO DIFF WBC: CPT | Performed by: INTERNAL MEDICINE

## 2022-07-07 PROCEDURE — 87340 HEPATITIS B SURFACE AG IA: CPT | Performed by: INTERNAL MEDICINE

## 2022-07-07 RX ORDER — LEVOFLOXACIN 750 MG/1
750 TABLET ORAL DAILY
Qty: 10 TABLET | Refills: 0 | Status: SHIPPED | OUTPATIENT
Start: 2022-07-07 | End: 2022-07-29

## 2022-07-07 NOTE — TELEPHONE ENCOUNTER
MD Sujatha Castro, RN  UTI needs to be treated- 10 day regimen with cipro or levaquin   Hold humira until resolved   Pt MUST f/u with PCP

## 2022-07-08 ENCOUNTER — TELEPHONE (OUTPATIENT)
Dept: BARIATRICS/WEIGHT MGMT | Age: 32
End: 2022-07-08

## 2022-07-10 ENCOUNTER — NURSE TRIAGE (OUTPATIENT)
Dept: SCHEDULING | Age: 32
End: 2022-07-10

## 2022-07-13 ENCOUNTER — TELEPHONE (OUTPATIENT)
Dept: BARIATRICS/WEIGHT MGMT | Age: 32
End: 2022-07-13

## 2022-07-13 ENCOUNTER — PATIENT MESSAGE (OUTPATIENT)
Dept: GASTROENTEROLOGY | Facility: CLINIC | Age: 32
End: 2022-07-13
Payer: MEDICAID

## 2022-07-14 ENCOUNTER — WALK IN (OUTPATIENT)
Dept: URGENT CARE | Age: 32
End: 2022-07-14

## 2022-07-14 VITALS
BODY MASS INDEX: 51.29 KG/M2 | DIASTOLIC BLOOD PRESSURE: 95 MMHG | RESPIRATION RATE: 18 BRPM | TEMPERATURE: 98.6 F | HEART RATE: 84 BPM | SYSTOLIC BLOOD PRESSURE: 123 MMHG | OXYGEN SATURATION: 98 % | WEIGHT: 293 LBS

## 2022-07-14 DIAGNOSIS — R53.83 FATIGUE, UNSPECIFIED TYPE: Primary | ICD-10-CM

## 2022-07-14 LAB
APPEARANCE, POC: CLEAR
BILIRUBIN, POC: ABNORMAL
COLOR, POC: ABNORMAL
GLUCOSE UR-MCNC: NEGATIVE MG/DL
KETONES, POC: ABNORMAL MG/DL
NITRITE, POC: NEGATIVE
OCCULT BLOOD, POC: NEGATIVE
PH UR: 6 [PH] (ref 5–7)
PROT UR-MCNC: ABNORMAL MG/DL
SP GR UR: 1.03 (ref 1–1.03)
UROBILINOGEN UR-MCNC: 4 MG/DL (ref 0–1)
WBC (LEUKOCYTE) ESTERASE, POC: ABNORMAL

## 2022-07-14 PROCEDURE — 3080F DIAST BP >= 90 MM HG: CPT | Performed by: NURSE PRACTITIONER

## 2022-07-14 PROCEDURE — 3074F SYST BP LT 130 MM HG: CPT | Performed by: NURSE PRACTITIONER

## 2022-07-14 PROCEDURE — 81002 URINALYSIS NONAUTO W/O SCOPE: CPT | Performed by: NURSE PRACTITIONER

## 2022-07-14 PROCEDURE — 99213 OFFICE O/P EST LOW 20 MIN: CPT | Performed by: NURSE PRACTITIONER

## 2022-07-15 ENCOUNTER — TELEPHONE (OUTPATIENT)
Dept: GASTROENTEROLOGY | Facility: CLINIC | Age: 32
End: 2022-07-15
Payer: MEDICAID

## 2022-07-16 LAB
ADALIMUMAB AB SERPL-MCNC: <25 NG/ML
ADALIMUMAB SERPL-MCNC: 8.2 UG/ML

## 2022-07-18 ENCOUNTER — PATIENT MESSAGE (OUTPATIENT)
Dept: GASTROENTEROLOGY | Facility: CLINIC | Age: 32
End: 2022-07-18
Payer: MEDICAID

## 2022-07-18 DIAGNOSIS — K50.80 CROHN'S DISEASE OF BOTH SMALL AND LARGE INTESTINE WITHOUT COMPLICATION: ICD-10-CM

## 2022-07-18 DIAGNOSIS — N39.0 URINARY TRACT INFECTION WITH HEMATURIA, SITE UNSPECIFIED: ICD-10-CM

## 2022-07-18 DIAGNOSIS — R31.9 URINARY TRACT INFECTION WITH HEMATURIA, SITE UNSPECIFIED: ICD-10-CM

## 2022-07-18 DIAGNOSIS — D84.9 IMMUNOSUPPRESSED STATUS: Primary | ICD-10-CM

## 2022-07-18 ASSESSMENT — ENCOUNTER SYMPTOMS
ALLERGIC/IMMUNOLOGIC NEGATIVE: 1
RESPIRATORY NEGATIVE: 1
FATIGUE: 1
ABDOMINAL PAIN: 0
ENDOCRINE NEGATIVE: 1
EYES NEGATIVE: 1
NEUROLOGICAL NEGATIVE: 1

## 2022-07-19 ENCOUNTER — PATIENT MESSAGE (OUTPATIENT)
Dept: GASTROENTEROLOGY | Facility: CLINIC | Age: 32
End: 2022-07-19
Payer: MEDICAID

## 2022-07-19 NOTE — TELEPHONE ENCOUNTER
Called & spoke to pt  - States she misunderstood Dr. Bacon's message regarding her pregnancy & began injecting Humira again 7/15/22; instructed to hold Humira until we can determine if UTI resolved  - Will submit UA ASAP to rule out infection & determine if antibiotic needed  - Unsure of dose of vitamin D or vitamin B12  - States she needs to contact [facility] dermatology again to schedule appt  - Has appt w/ PCP 11/14/22; requested phone # for PCP  - All questions answered

## 2022-07-21 ENCOUNTER — TELEPHONE (OUTPATIENT)
Dept: GASTROENTEROLOGY | Facility: CLINIC | Age: 32
End: 2022-07-21
Payer: MEDICAID

## 2022-07-21 DIAGNOSIS — E53.8 VITAMIN B12 DEFICIENCY: ICD-10-CM

## 2022-07-21 DIAGNOSIS — K50.80 CROHN'S DISEASE OF BOTH SMALL AND LARGE INTESTINE WITHOUT COMPLICATION: Primary | ICD-10-CM

## 2022-07-21 NOTE — TELEPHONE ENCOUNTER
----- Message from Suzi Bacon MD sent at 7/21/2022  3:20 PM CDT -----  Her repeat urine culture seems to show multiple bacteria likely contaminant but no evidence of the bacteria we previously saw since this looks like it has improved with the antibiotics  I recommend that she f/u with your PCP regarding any urine symptoms  Do we need to restart humira? How long had we held it?

## 2022-07-21 NOTE — TELEPHONE ENCOUNTER
Called & spoke to pt  - reviewed urine cultures shows multiple bacteria likely contaminant  - Instructed to f/u w/ PCP if any changes in symptoms  - Instructed to restart Humira  - Pt requesting vitamin B12 refill; will pend in separate encounter

## 2022-07-22 RX ORDER — CYANOCOBALAMIN 1000 UG/ML
1000 INJECTION, SOLUTION INTRAMUSCULAR; SUBCUTANEOUS
Qty: 3 ML | Refills: 3 | Status: SHIPPED | OUTPATIENT
Start: 2022-07-22 | End: 2022-10-20

## 2022-07-25 ENCOUNTER — V-VISIT (OUTPATIENT)
Dept: BARIATRICS/WEIGHT MGMT | Age: 32
End: 2022-07-25

## 2022-07-25 ENCOUNTER — APPOINTMENT (OUTPATIENT)
Dept: CT IMAGING | Age: 32
DRG: 392 | End: 2022-07-25
Attending: EMERGENCY MEDICINE

## 2022-07-25 ENCOUNTER — TELEPHONE (OUTPATIENT)
Dept: SCHEDULING | Age: 32
End: 2022-07-25

## 2022-07-25 ENCOUNTER — HOSPITAL ENCOUNTER (INPATIENT)
Age: 32
LOS: 1 days | Discharge: ANOTHER HOSPITAL | DRG: 392 | End: 2022-07-28
Attending: EMERGENCY MEDICINE | Admitting: FAMILY MEDICINE

## 2022-07-25 DIAGNOSIS — Z98.84 S/P GASTRIC BYPASS: ICD-10-CM

## 2022-07-25 DIAGNOSIS — R11.2 NAUSEA AND VOMITING, UNSPECIFIED VOMITING TYPE: ICD-10-CM

## 2022-07-25 DIAGNOSIS — R10.9 ABDOMINAL PAIN, UNSPECIFIED ABDOMINAL LOCATION: Primary | ICD-10-CM

## 2022-07-25 PROBLEM — E86.0 DEHYDRATION: Status: ACTIVE | Noted: 2022-07-25

## 2022-07-25 LAB
ALBUMIN SERPL-MCNC: 3.5 G/DL (ref 3.6–5.1)
ALBUMIN/GLOB SERPL: 0.8 {RATIO} (ref 1–2.4)
ALP SERPL-CCNC: 58 UNITS/L (ref 45–117)
ALT SERPL-CCNC: 40 UNITS/L
ANION GAP SERPL CALC-SCNC: 14 MMOL/L (ref 7–19)
AST SERPL-CCNC: 23 UNITS/L
ATRIAL RATE (BPM): 68
BASOPHILS # BLD: 0 K/MCL (ref 0–0.3)
BASOPHILS NFR BLD: 0 %
BILIRUB SERPL-MCNC: 0.5 MG/DL (ref 0.2–1)
BUN SERPL-MCNC: 6 MG/DL (ref 6–20)
BUN/CREAT SERPL: 8 (ref 7–25)
CALCIUM SERPL-MCNC: 9.1 MG/DL (ref 8.4–10.2)
CHLORIDE SERPL-SCNC: 103 MMOL/L (ref 97–110)
CO2 SERPL-SCNC: 25 MMOL/L (ref 21–32)
CREAT SERPL-MCNC: 0.73 MG/DL (ref 0.51–0.95)
DEPRECATED RDW RBC: 49.1 FL (ref 39–50)
EOSINOPHIL # BLD: 0.1 K/MCL (ref 0–0.5)
EOSINOPHIL NFR BLD: 1 %
ERYTHROCYTE [DISTWIDTH] IN BLOOD: 18.1 % (ref 11–15)
FASTING DURATION TIME PATIENT: ABNORMAL H
GFR SERPLBLD BASED ON 1.73 SQ M-ARVRAT: >90 ML/MIN
GLOBULIN SER-MCNC: 4.5 G/DL (ref 2–4)
GLUCOSE BLDC GLUCOMTR-MCNC: 81 MG/DL (ref 70–99)
GLUCOSE SERPL-MCNC: 104 MG/DL (ref 70–99)
HCG SERPL QL: NEGATIVE
HCT VFR BLD CALC: 41.2 % (ref 36–46.5)
HGB BLD-MCNC: 13.1 G/DL (ref 12–15.5)
IMM GRANULOCYTES # BLD AUTO: 0 K/MCL (ref 0–0.2)
IMM GRANULOCYTES # BLD: 0 %
LIPASE SERPL-CCNC: 129 UNITS/L (ref 73–393)
LYMPHOCYTES # BLD: 1.5 K/MCL (ref 1–4.8)
LYMPHOCYTES NFR BLD: 14 %
MAGNESIUM SERPL-MCNC: 1.8 MG/DL (ref 1.7–2.4)
MAGNESIUM SERPL-MCNC: 1.9 MG/DL (ref 1.7–2.4)
MCH RBC QN AUTO: 24.6 PG (ref 26–34)
MCHC RBC AUTO-ENTMCNC: 31.8 G/DL (ref 32–36.5)
MCV RBC AUTO: 77.3 FL (ref 78–100)
MONOCYTES # BLD: 0.5 K/MCL (ref 0.3–0.9)
MONOCYTES NFR BLD: 5 %
NEUTROPHILS # BLD: 8.6 K/MCL (ref 1.8–7.7)
NEUTROPHILS NFR BLD: 80 %
NRBC BLD MANUAL-RTO: 0 /100 WBC
P AXIS (DEGREES): 50
PHOSPHATE SERPL-MCNC: 3.3 MG/DL (ref 2.4–4.7)
PLATELET # BLD AUTO: 274 K/MCL (ref 140–450)
POTASSIUM SERPL-SCNC: 3.7 MMOL/L (ref 3.4–5.1)
PR-INTERVAL (MSEC): 152
PROT SERPL-MCNC: 8 G/DL (ref 6.4–8.2)
QRS-INTERVAL (MSEC): 80
QT-INTERVAL (MSEC): 400
QTC: 425
R AXIS (DEGREES): 47
RAINBOW EXTRA TUBES HOLD SPECIMEN: NORMAL
RBC # BLD: 5.33 MIL/MCL (ref 4–5.2)
REPORT TEXT: NORMAL
SARS-COV-2 RNA RESP QL NAA+PROBE: NOT DETECTED
SERVICE CMNT-IMP: NORMAL
SERVICE CMNT-IMP: NORMAL
SODIUM SERPL-SCNC: 138 MMOL/L (ref 135–145)
T AXIS (DEGREES): 29
VENTRICULAR RATE EKG/MIN (BPM): 68
WBC # BLD: 10.8 K/MCL (ref 4.2–11)

## 2022-07-25 PROCEDURE — 99285 EMERGENCY DEPT VISIT HI MDM: CPT | Performed by: EMERGENCY MEDICINE

## 2022-07-25 PROCEDURE — 10004651 HB RX, NO CHARGE ITEM: Performed by: FAMILY MEDICINE

## 2022-07-25 PROCEDURE — X1094 NO CHARGE VISIT: HCPCS | Performed by: NURSE PRACTITIONER

## 2022-07-25 PROCEDURE — G0378 HOSPITAL OBSERVATION PER HR: HCPCS

## 2022-07-25 PROCEDURE — 99285 EMERGENCY DEPT VISIT HI MDM: CPT

## 2022-07-25 PROCEDURE — 96374 THER/PROPH/DIAG INJ IV PUSH: CPT

## 2022-07-25 PROCEDURE — 93010 ELECTROCARDIOGRAM REPORT: CPT | Performed by: EMERGENCY MEDICINE

## 2022-07-25 PROCEDURE — C9803 HOPD COVID-19 SPEC COLLECT: HCPCS

## 2022-07-25 PROCEDURE — 99220 INITIAL OBSERVATION CARE,LEVL III: CPT | Performed by: FAMILY MEDICINE

## 2022-07-25 PROCEDURE — 96361 HYDRATE IV INFUSION ADD-ON: CPT

## 2022-07-25 PROCEDURE — 83690 ASSAY OF LIPASE: CPT | Performed by: EMERGENCY MEDICINE

## 2022-07-25 PROCEDURE — 96372 THER/PROPH/DIAG INJ SC/IM: CPT

## 2022-07-25 PROCEDURE — 10002805 HB CONTRAST AGENT: Performed by: EMERGENCY MEDICINE

## 2022-07-25 PROCEDURE — 10002807 HB RX 258: Performed by: FAMILY MEDICINE

## 2022-07-25 PROCEDURE — 10002807 HB RX 258: Performed by: EMERGENCY MEDICINE

## 2022-07-25 PROCEDURE — 80053 COMPREHEN METABOLIC PANEL: CPT | Performed by: EMERGENCY MEDICINE

## 2022-07-25 PROCEDURE — U0005 INFEC AGEN DETEC AMPLI PROBE: HCPCS | Performed by: EMERGENCY MEDICINE

## 2022-07-25 PROCEDURE — 74177 CT ABD & PELVIS W/CONTRAST: CPT

## 2022-07-25 PROCEDURE — 84703 CHORIONIC GONADOTROPIN ASSAY: CPT | Performed by: EMERGENCY MEDICINE

## 2022-07-25 PROCEDURE — 93010 ELECTROCARDIOGRAM REPORT: CPT | Performed by: INTERNAL MEDICINE

## 2022-07-25 PROCEDURE — 10004180 HB COUNTER-TRANSPORT

## 2022-07-25 PROCEDURE — 10002803 HB RX 637: Performed by: EMERGENCY MEDICINE

## 2022-07-25 PROCEDURE — 83735 ASSAY OF MAGNESIUM: CPT | Performed by: EMERGENCY MEDICINE

## 2022-07-25 PROCEDURE — 94660 CPAP INITIATION&MGMT: CPT

## 2022-07-25 PROCEDURE — 10002800 HB RX 250 W HCPCS: Performed by: EMERGENCY MEDICINE

## 2022-07-25 PROCEDURE — 85025 COMPLETE CBC W/AUTO DIFF WBC: CPT | Performed by: EMERGENCY MEDICINE

## 2022-07-25 PROCEDURE — G1004 CDSM NDSC: HCPCS

## 2022-07-25 PROCEDURE — 93005 ELECTROCARDIOGRAM TRACING: CPT | Performed by: EMERGENCY MEDICINE

## 2022-07-25 PROCEDURE — 10002800 HB RX 250 W HCPCS: Performed by: FAMILY MEDICINE

## 2022-07-25 PROCEDURE — 10002803 HB RX 637: Performed by: FAMILY MEDICINE

## 2022-07-25 PROCEDURE — 96376 TX/PRO/DX INJ SAME DRUG ADON: CPT

## 2022-07-25 PROCEDURE — 82962 GLUCOSE BLOOD TEST: CPT

## 2022-07-25 PROCEDURE — 84100 ASSAY OF PHOSPHORUS: CPT | Performed by: EMERGENCY MEDICINE

## 2022-07-25 RX ORDER — LEVOTHYROXINE SODIUM 0.1 MG/1
200 TABLET ORAL
Status: DISCONTINUED | OUTPATIENT
Start: 2022-07-26 | End: 2022-07-28 | Stop reason: HOSPADM

## 2022-07-25 RX ORDER — NICOTINE POLACRILEX 4 MG
15 LOZENGE BUCCAL PRN
Status: DISCONTINUED | OUTPATIENT
Start: 2022-07-25 | End: 2022-07-28 | Stop reason: HOSPADM

## 2022-07-25 RX ORDER — ACETAMINOPHEN 325 MG/1
650 TABLET ORAL EVERY 4 HOURS PRN
Status: DISCONTINUED | OUTPATIENT
Start: 2022-07-25 | End: 2022-07-28 | Stop reason: HOSPADM

## 2022-07-25 RX ORDER — TIZANIDINE 4 MG/1
4 TABLET ORAL EVERY 8 HOURS PRN
Status: DISCONTINUED | OUTPATIENT
Start: 2022-07-25 | End: 2022-07-28 | Stop reason: HOSPADM

## 2022-07-25 RX ORDER — DEXTROSE MONOHYDRATE 25 G/50ML
25 INJECTION, SOLUTION INTRAVENOUS PRN
Status: DISCONTINUED | OUTPATIENT
Start: 2022-07-25 | End: 2022-07-28 | Stop reason: HOSPADM

## 2022-07-25 RX ORDER — ONDANSETRON 2 MG/ML
4 INJECTION INTRAMUSCULAR; INTRAVENOUS EVERY 6 HOURS PRN
Status: DISCONTINUED | OUTPATIENT
Start: 2022-07-25 | End: 2022-07-28 | Stop reason: HOSPADM

## 2022-07-25 RX ORDER — SODIUM CHLORIDE, SODIUM LACTATE, POTASSIUM CHLORIDE, CALCIUM CHLORIDE 600; 310; 30; 20 MG/100ML; MG/100ML; MG/100ML; MG/100ML
INJECTION, SOLUTION INTRAVENOUS CONTINUOUS
Status: DISCONTINUED | OUTPATIENT
Start: 2022-07-25 | End: 2022-07-28 | Stop reason: HOSPADM

## 2022-07-25 RX ORDER — ENOXAPARIN SODIUM 100 MG/ML
40 INJECTION SUBCUTANEOUS 2 TIMES DAILY
Status: DISCONTINUED | OUTPATIENT
Start: 2022-07-25 | End: 2022-07-28 | Stop reason: HOSPADM

## 2022-07-25 RX ORDER — ONDANSETRON 2 MG/ML
4 INJECTION INTRAMUSCULAR; INTRAVENOUS ONCE
Status: COMPLETED | OUTPATIENT
Start: 2022-07-25 | End: 2022-07-25

## 2022-07-25 RX ORDER — NICOTINE POLACRILEX 4 MG
30 LOZENGE BUCCAL PRN
Status: DISCONTINUED | OUTPATIENT
Start: 2022-07-25 | End: 2022-07-28 | Stop reason: HOSPADM

## 2022-07-25 RX ORDER — ONDANSETRON 4 MG/1
4 TABLET, ORALLY DISINTEGRATING ORAL ONCE
Status: COMPLETED | OUTPATIENT
Start: 2022-07-25 | End: 2022-07-25

## 2022-07-25 RX ORDER — 0.9 % SODIUM CHLORIDE 0.9 %
2 VIAL (ML) INJECTION EVERY 12 HOURS SCHEDULED
Status: DISCONTINUED | OUTPATIENT
Start: 2022-07-25 | End: 2022-07-28 | Stop reason: HOSPADM

## 2022-07-25 RX ORDER — POLYETHYLENE GLYCOL 3350 17 G/17G
17 POWDER, FOR SOLUTION ORAL DAILY PRN
Status: DISCONTINUED | OUTPATIENT
Start: 2022-07-25 | End: 2022-07-28 | Stop reason: HOSPADM

## 2022-07-25 RX ORDER — AMLODIPINE BESYLATE 10 MG/1
10 TABLET ORAL DAILY
Status: DISCONTINUED | OUTPATIENT
Start: 2022-07-25 | End: 2022-07-28 | Stop reason: HOSPADM

## 2022-07-25 RX ORDER — POTASSIUM CHLORIDE 20 MEQ/1
40 TABLET, EXTENDED RELEASE ORAL ONCE
Status: DISCONTINUED | OUTPATIENT
Start: 2022-07-25 | End: 2022-07-28 | Stop reason: HOSPADM

## 2022-07-25 RX ORDER — HYDROCODONE BITARTRATE AND ACETAMINOPHEN 5; 325 MG/1; MG/1
1 TABLET ORAL EVERY 6 HOURS PRN
Status: DISCONTINUED | OUTPATIENT
Start: 2022-07-25 | End: 2022-07-28 | Stop reason: HOSPADM

## 2022-07-25 RX ORDER — DEXTROSE MONOHYDRATE 25 G/50ML
12.5 INJECTION, SOLUTION INTRAVENOUS PRN
Status: DISCONTINUED | OUTPATIENT
Start: 2022-07-25 | End: 2022-07-28 | Stop reason: HOSPADM

## 2022-07-25 RX ORDER — AMOXICILLIN 250 MG
2 CAPSULE ORAL 2 TIMES DAILY
Status: DISCONTINUED | OUTPATIENT
Start: 2022-07-25 | End: 2022-07-28 | Stop reason: HOSPADM

## 2022-07-25 RX ORDER — SIMETHICONE 125 MG
125 TABLET,CHEWABLE ORAL EVERY 4 HOURS PRN
Status: DISCONTINUED | OUTPATIENT
Start: 2022-07-25 | End: 2022-07-28 | Stop reason: HOSPADM

## 2022-07-25 RX ORDER — LORATADINE 10 MG/1
10 TABLET ORAL DAILY PRN
Status: DISCONTINUED | OUTPATIENT
Start: 2022-07-25 | End: 2022-07-28 | Stop reason: HOSPADM

## 2022-07-25 RX ADMIN — SODIUM CHLORIDE, PRESERVATIVE FREE 2 ML: 5 INJECTION INTRAVENOUS at 21:37

## 2022-07-25 RX ADMIN — ENOXAPARIN SODIUM 40 MG: 40 INJECTION SUBCUTANEOUS at 21:29

## 2022-07-25 RX ADMIN — SODIUM CHLORIDE, POTASSIUM CHLORIDE, SODIUM LACTATE AND CALCIUM CHLORIDE: 600; 310; 30; 20 INJECTION, SOLUTION INTRAVENOUS at 21:35

## 2022-07-25 RX ADMIN — SENNOSIDES AND DOCUSATE SODIUM 2 TABLET: 50; 8.6 TABLET ORAL at 21:28

## 2022-07-25 RX ADMIN — ONDANSETRON 4 MG: 2 INJECTION INTRAMUSCULAR; INTRAVENOUS at 12:02

## 2022-07-25 RX ADMIN — ACETAMINOPHEN 650 MG: 325 TABLET ORAL at 19:52

## 2022-07-25 RX ADMIN — DIATRIZOATE MEGLUMINE AND DIATRIZOATE SODIUM 7.5 ML: 660; 100 LIQUID ORAL; RECTAL at 12:20

## 2022-07-25 RX ADMIN — ONDANSETRON 4 MG: 4 TABLET, ORALLY DISINTEGRATING ORAL at 09:03

## 2022-07-25 RX ADMIN — SODIUM CHLORIDE, POTASSIUM CHLORIDE, SODIUM LACTATE AND CALCIUM CHLORIDE 1000 ML: 600; 310; 30; 20 INJECTION, SOLUTION INTRAVENOUS at 11:00

## 2022-07-25 RX ADMIN — IOHEXOL 80 ML: 350 INJECTION, SOLUTION INTRAVENOUS at 14:41

## 2022-07-25 RX ADMIN — ONDANSETRON 4 MG: 2 INJECTION INTRAMUSCULAR; INTRAVENOUS at 19:52

## 2022-07-25 ASSESSMENT — ENCOUNTER SYMPTOMS
RHINORRHEA: 0
DIZZINESS: 0
DIARRHEA: 0
CONFUSION: 0
BACK PAIN: 0
SHORTNESS OF BREATH: 0
WOUND: 0
SORE THROAT: 0
BRUISES/BLEEDS EASILY: 0
ABDOMINAL PAIN: 1
BLOOD IN STOOL: 0
ADENOPATHY: 0
NUMBNESS: 0
FEVER: 0
FATIGUE: 0
HALLUCINATIONS: 0
WEAKNESS: 0
TREMORS: 0
EYE PAIN: 0
NAUSEA: 1
HEADACHES: 0
VOMITING: 1
CHILLS: 0
SPEECH DIFFICULTY: 0
ABDOMINAL DISTENTION: 0
COUGH: 0

## 2022-07-25 ASSESSMENT — COLUMBIA-SUICIDE SEVERITY RATING SCALE - C-SSRS
2. HAVE YOU ACTUALLY HAD ANY THOUGHTS OF KILLING YOURSELF?: NO
IS THE PATIENT ABLE TO COMPLETE C-SSRS: YES
6. HAVE YOU EVER DONE ANYTHING, STARTED TO DO ANYTHING, OR PREPARED TO DO ANYTHING TO END YOUR LIFE?: NO
1. WITHIN THE PAST MONTH, HAVE YOU WISHED YOU WERE DEAD OR WISHED YOU COULD GO TO SLEEP AND NOT WAKE UP?: NO

## 2022-07-25 ASSESSMENT — COGNITIVE AND FUNCTIONAL STATUS - GENERAL
BECAUSE OF A PHYSICAL, MENTAL, OR EMOTIONAL CONDITION, DO YOU HAVE SERIOUS DIFFICULTY CONCENTRATING, REMEMBERING OR MAKING DECISIONS: NO
BECAUSE OF A PHYSICAL, MENTAL, OR EMOTIONAL CONDITION, DO YOU HAVE DIFFICULTY DOING ERRANDS ALONE: NO
ARE YOU DEAF OR DO YOU HAVE SERIOUS DIFFICULTY  HEARING: NO
DO YOU HAVE SERIOUS DIFFICULTY WALKING OR CLIMBING STAIRS: NO
DO YOU HAVE SERIOUS DIFFICULTY WALKING OR CLIMBING STAIRS: NO
ARE YOU BLIND OR DO YOU HAVE SERIOUS DIFFICULTY SEEING, EVEN WHEN WEARING GLASSES: NO
BECAUSE OF A PHYSICAL, MENTAL, OR EMOTIONAL CONDITION, DO YOU HAVE DIFFICULTY DOING ERRANDS ALONE: NO
DO YOU HAVE DIFFICULTY DRESSING OR BATHING: NO
BECAUSE OF A PHYSICAL, MENTAL, OR EMOTIONAL CONDITION, DO YOU HAVE SERIOUS DIFFICULTY CONCENTRATING, REMEMBERING OR MAKING DECISIONS: NO
DO YOU HAVE DIFFICULTY DRESSING OR BATHING: NO

## 2022-07-25 ASSESSMENT — PATIENT HEALTH QUESTIONNAIRE - PHQ9
SUM OF ALL RESPONSES TO PHQ9 QUESTIONS 1 AND 2: 0
CLINICAL INTERPRETATION OF PHQ2 SCORE: NO FURTHER SCREENING NEEDED
IS PATIENT ABLE TO COMPLETE PHQ2 OR PHQ9: YES
1. LITTLE INTEREST OR PLEASURE IN DOING THINGS: NOT AT ALL
SUM OF ALL RESPONSES TO PHQ9 QUESTIONS 1 AND 2: 0
2. FEELING DOWN, DEPRESSED OR HOPELESS: NOT AT ALL

## 2022-07-25 ASSESSMENT — LIFESTYLE VARIABLES
HOW OFTEN DO YOU HAVE A DRINK CONTAINING ALCOHOL: NEVER
ALCOHOL_USE_STATUS: NO OR LOW RISK WITH VALIDATED TOOL
HOW MANY STANDARD DRINKS CONTAINING ALCOHOL DO YOU HAVE ON A TYPICAL DAY: 0,1 OR 2
AUDIT-C TOTAL SCORE: 0
HOW OFTEN DO YOU HAVE 6 OR MORE DRINKS ON ONE OCCASION: NEVER

## 2022-07-25 ASSESSMENT — ACTIVITIES OF DAILY LIVING (ADL)
CHRONIC_PAIN_PRESENT: NO
ADL_BEFORE_ADMISSION: INDEPENDENT
ADL_SHORT_OF_BREATH: NO
ADL_SCORE: 12
RECENT_DECLINE_ADL: NO

## 2022-07-25 ASSESSMENT — PAIN SCALES - GENERAL
PAINLEVEL_OUTOF10: 6
PAINLEVEL_OUTOF10: 3

## 2022-07-26 ENCOUNTER — CASE MANAGEMENT (OUTPATIENT)
Dept: CARE COORDINATION | Age: 32
End: 2022-07-26

## 2022-07-26 PROBLEM — R10.13 EPIGASTRIC PAIN: Status: ACTIVE | Noted: 2022-07-26

## 2022-07-26 PROBLEM — R11.2 INTRACTABLE NAUSEA AND VOMITING: Status: ACTIVE | Noted: 2022-07-26

## 2022-07-26 LAB
ALBUMIN SERPL-MCNC: 3.1 G/DL (ref 3.6–5.1)
ALBUMIN/GLOB SERPL: 0.7 {RATIO} (ref 1–2.4)
ALP SERPL-CCNC: 53 UNITS/L (ref 45–117)
ALT SERPL-CCNC: 34 UNITS/L
ANION GAP SERPL CALC-SCNC: 14 MMOL/L (ref 7–19)
AST SERPL-CCNC: 18 UNITS/L
BASOPHILS # BLD: 0 K/MCL (ref 0–0.3)
BASOPHILS NFR BLD: 0 %
BILIRUB SERPL-MCNC: 0.5 MG/DL (ref 0.2–1)
BUN SERPL-MCNC: 5 MG/DL (ref 6–20)
BUN/CREAT SERPL: 6 (ref 7–25)
CALCIUM SERPL-MCNC: 8.9 MG/DL (ref 8.4–10.2)
CHLORIDE SERPL-SCNC: 103 MMOL/L (ref 97–110)
CO2 SERPL-SCNC: 26 MMOL/L (ref 21–32)
CREAT SERPL-MCNC: 0.86 MG/DL (ref 0.51–0.95)
DEPRECATED RDW RBC: 50.9 FL (ref 39–50)
EOSINOPHIL # BLD: 0.2 K/MCL (ref 0–0.5)
EOSINOPHIL NFR BLD: 2 %
ERYTHROCYTE [DISTWIDTH] IN BLOOD: 17.9 % (ref 11–15)
FASTING DURATION TIME PATIENT: ABNORMAL H
GFR SERPLBLD BASED ON 1.73 SQ M-ARVRAT: >90 ML/MIN
GLOBULIN SER-MCNC: 4.2 G/DL (ref 2–4)
GLUCOSE BLDC GLUCOMTR-MCNC: 85 MG/DL (ref 70–99)
GLUCOSE BLDC GLUCOMTR-MCNC: 90 MG/DL (ref 70–99)
GLUCOSE BLDC GLUCOMTR-MCNC: 93 MG/DL (ref 70–99)
GLUCOSE BLDC GLUCOMTR-MCNC: 95 MG/DL (ref 70–99)
GLUCOSE BLDC GLUCOMTR-MCNC: 97 MG/DL (ref 70–99)
GLUCOSE SERPL-MCNC: 90 MG/DL (ref 70–99)
HCT VFR BLD CALC: 39.7 % (ref 36–46.5)
HGB BLD-MCNC: 11.9 G/DL (ref 12–15.5)
IMM GRANULOCYTES # BLD AUTO: 0 K/MCL (ref 0–0.2)
IMM GRANULOCYTES # BLD: 0 %
LYMPHOCYTES # BLD: 1.9 K/MCL (ref 1–4.8)
LYMPHOCYTES NFR BLD: 24 %
MCH RBC QN AUTO: 23.8 PG (ref 26–34)
MCHC RBC AUTO-ENTMCNC: 30 G/DL (ref 32–36.5)
MCV RBC AUTO: 79.2 FL (ref 78–100)
MONOCYTES # BLD: 0.5 K/MCL (ref 0.3–0.9)
MONOCYTES NFR BLD: 6 %
NEUTROPHILS # BLD: 5.5 K/MCL (ref 1.8–7.7)
NEUTROPHILS NFR BLD: 68 %
NRBC BLD MANUAL-RTO: 0 /100 WBC
PLATELET # BLD AUTO: 253 K/MCL (ref 140–450)
POTASSIUM SERPL-SCNC: 3.5 MMOL/L (ref 3.4–5.1)
PROT SERPL-MCNC: 7.3 G/DL (ref 6.4–8.2)
RBC # BLD: 5.01 MIL/MCL (ref 4–5.2)
SODIUM SERPL-SCNC: 139 MMOL/L (ref 135–145)
WBC # BLD: 8.1 K/MCL (ref 4.2–11)

## 2022-07-26 PROCEDURE — 94660 CPAP INITIATION&MGMT: CPT

## 2022-07-26 PROCEDURE — 10002803 HB RX 637: Performed by: PHYSICIAN ASSISTANT

## 2022-07-26 PROCEDURE — 82962 GLUCOSE BLOOD TEST: CPT

## 2022-07-26 PROCEDURE — 10002800 HB RX 250 W HCPCS: Performed by: FAMILY MEDICINE

## 2022-07-26 PROCEDURE — C9113 INJ PANTOPRAZOLE SODIUM, VIA: HCPCS | Performed by: PHYSICIAN ASSISTANT

## 2022-07-26 PROCEDURE — 10004651 HB RX, NO CHARGE ITEM: Performed by: FAMILY MEDICINE

## 2022-07-26 PROCEDURE — 80053 COMPREHEN METABOLIC PANEL: CPT | Performed by: FAMILY MEDICINE

## 2022-07-26 PROCEDURE — 10002807 HB RX 258: Performed by: FAMILY MEDICINE

## 2022-07-26 PROCEDURE — 10002800 HB RX 250 W HCPCS: Performed by: PHYSICIAN ASSISTANT

## 2022-07-26 PROCEDURE — 10002803 HB RX 637: Performed by: FAMILY MEDICINE

## 2022-07-26 PROCEDURE — G0378 HOSPITAL OBSERVATION PER HR: HCPCS

## 2022-07-26 PROCEDURE — 96372 THER/PROPH/DIAG INJ SC/IM: CPT

## 2022-07-26 PROCEDURE — 96375 TX/PRO/DX INJ NEW DRUG ADDON: CPT

## 2022-07-26 PROCEDURE — 96376 TX/PRO/DX INJ SAME DRUG ADON: CPT

## 2022-07-26 PROCEDURE — 99226 SUBSEQUENT OBSERVATION CARE III: CPT | Performed by: INTERNAL MEDICINE

## 2022-07-26 PROCEDURE — 36415 COLL VENOUS BLD VENIPUNCTURE: CPT | Performed by: FAMILY MEDICINE

## 2022-07-26 PROCEDURE — 85025 COMPLETE CBC W/AUTO DIFF WBC: CPT | Performed by: FAMILY MEDICINE

## 2022-07-26 RX ORDER — PANTOPRAZOLE SODIUM 40 MG/10ML
40 INJECTION, POWDER, LYOPHILIZED, FOR SOLUTION INTRAVENOUS DAILY
Status: ACTIVE | OUTPATIENT
Start: 2022-07-26 | End: 2022-07-28

## 2022-07-26 RX ORDER — BISACODYL 10 MG
10 SUPPOSITORY, RECTAL RECTAL ONCE
Status: COMPLETED | OUTPATIENT
Start: 2022-07-26 | End: 2022-07-26

## 2022-07-26 RX ORDER — METOCLOPRAMIDE HYDROCHLORIDE 5 MG/ML
10 INJECTION INTRAMUSCULAR; INTRAVENOUS EVERY 6 HOURS PRN
Status: DISCONTINUED | OUTPATIENT
Start: 2022-07-26 | End: 2022-07-28 | Stop reason: HOSPADM

## 2022-07-26 RX ADMIN — METOCLOPRAMIDE 10 MG: 5 INJECTION, SOLUTION INTRAMUSCULAR; INTRAVENOUS at 08:11

## 2022-07-26 RX ADMIN — ENOXAPARIN SODIUM 40 MG: 40 INJECTION SUBCUTANEOUS at 08:11

## 2022-07-26 RX ADMIN — SODIUM CHLORIDE, PRESERVATIVE FREE 2 ML: 5 INJECTION INTRAVENOUS at 08:18

## 2022-07-26 RX ADMIN — LEVOTHYROXINE SODIUM 200 MCG: 100 TABLET ORAL at 05:29

## 2022-07-26 RX ADMIN — METOCLOPRAMIDE 10 MG: 5 INJECTION, SOLUTION INTRAMUSCULAR; INTRAVENOUS at 21:48

## 2022-07-26 RX ADMIN — PANTOPRAZOLE SODIUM 40 MG: 40 INJECTION, POWDER, FOR SOLUTION INTRAVENOUS at 12:15

## 2022-07-26 RX ADMIN — SENNOSIDES AND DOCUSATE SODIUM 2 TABLET: 50; 8.6 TABLET ORAL at 21:40

## 2022-07-26 RX ADMIN — ENOXAPARIN SODIUM 40 MG: 40 INJECTION SUBCUTANEOUS at 21:40

## 2022-07-26 RX ADMIN — SODIUM CHLORIDE, PRESERVATIVE FREE 2 ML: 5 INJECTION INTRAVENOUS at 21:39

## 2022-07-26 RX ADMIN — ONDANSETRON 4 MG: 2 INJECTION INTRAMUSCULAR; INTRAVENOUS at 15:30

## 2022-07-26 RX ADMIN — AMLODIPINE BESYLATE 10 MG: 10 TABLET ORAL at 08:11

## 2022-07-26 RX ADMIN — SENNOSIDES AND DOCUSATE SODIUM 2 TABLET: 50; 8.6 TABLET ORAL at 08:11

## 2022-07-26 RX ADMIN — BISACODYL 10 MG: 10 SUPPOSITORY RECTAL at 11:30

## 2022-07-26 RX ADMIN — ACETAMINOPHEN 650 MG: 325 TABLET ORAL at 08:12

## 2022-07-26 RX ADMIN — SODIUM CHLORIDE, POTASSIUM CHLORIDE, SODIUM LACTATE AND CALCIUM CHLORIDE: 600; 310; 30; 20 INJECTION, SOLUTION INTRAVENOUS at 21:39

## 2022-07-26 ASSESSMENT — ENCOUNTER SYMPTOMS
ENDOCRINE NEGATIVE: 1
NAUSEA: 1
VOMITING: 1
RESPIRATORY NEGATIVE: 1
HEMATOLOGIC/LYMPHATIC NEGATIVE: 1
EYES NEGATIVE: 1
ABDOMINAL PAIN: 1
PSYCHIATRIC NEGATIVE: 1
APPETITE CHANGE: 1
NEUROLOGICAL NEGATIVE: 1
CONSTIPATION: 1
ACTIVITY CHANGE: 1

## 2022-07-26 ASSESSMENT — PAIN SCALES - GENERAL
PAINLEVEL_OUTOF10: 2
PAINLEVEL_OUTOF10: 0
PAINLEVEL_OUTOF10: 3
PAINLEVEL_OUTOF10: 0
PAINLEVEL_OUTOF10: 0

## 2022-07-26 ASSESSMENT — PAIN SCALES - PAIN ASSESSMENT IN ADVANCED DEMENTIA (PAINAD)
BREATHING: NORMAL
BREATHING: NORMAL

## 2022-07-26 ASSESSMENT — PAIN SCALES - WONG BAKER: WONGBAKER_NUMERICALRESPONSE: 0

## 2022-07-27 ENCOUNTER — ANESTHESIA (OUTPATIENT)
Dept: ADMINISTRATIVE | Age: 32
DRG: 392 | End: 2022-07-27

## 2022-07-27 ENCOUNTER — ANESTHESIA EVENT (OUTPATIENT)
Dept: ADMINISTRATIVE | Age: 32
DRG: 392 | End: 2022-07-27

## 2022-07-27 ENCOUNTER — APPOINTMENT (OUTPATIENT)
Dept: ADMINISTRATIVE | Age: 32
DRG: 392 | End: 2022-07-27
Attending: INTERNAL MEDICINE

## 2022-07-27 LAB
ANION GAP SERPL CALC-SCNC: 13 MMOL/L (ref 7–19)
BUN SERPL-MCNC: 5 MG/DL (ref 6–20)
BUN/CREAT SERPL: 6 (ref 7–25)
CALCIUM SERPL-MCNC: 8.9 MG/DL (ref 8.4–10.2)
CHLORIDE SERPL-SCNC: 105 MMOL/L (ref 97–110)
CO2 SERPL-SCNC: 25 MMOL/L (ref 21–32)
CREAT SERPL-MCNC: 0.78 MG/DL (ref 0.51–0.95)
DEPRECATED RDW RBC: 50.3 FL (ref 39–50)
ERYTHROCYTE [DISTWIDTH] IN BLOOD: 17.7 % (ref 11–15)
FASTING DURATION TIME PATIENT: ABNORMAL H
GFR SERPLBLD BASED ON 1.73 SQ M-ARVRAT: >90 ML/MIN
GLUCOSE BLDC GLUCOMTR-MCNC: 81 MG/DL (ref 70–99)
GLUCOSE BLDC GLUCOMTR-MCNC: 91 MG/DL (ref 70–99)
GLUCOSE BLDC GLUCOMTR-MCNC: 93 MG/DL (ref 70–99)
GLUCOSE SERPL-MCNC: 92 MG/DL (ref 70–99)
HCT VFR BLD CALC: 37.1 % (ref 36–46.5)
HGB BLD-MCNC: 11.6 G/DL (ref 12–15.5)
MCH RBC QN AUTO: 24.4 PG (ref 26–34)
MCHC RBC AUTO-ENTMCNC: 31.3 G/DL (ref 32–36.5)
MCV RBC AUTO: 78.1 FL (ref 78–100)
NRBC BLD MANUAL-RTO: 0 /100 WBC
PLATELET # BLD AUTO: 234 K/MCL (ref 140–450)
POTASSIUM SERPL-SCNC: 3.6 MMOL/L (ref 3.4–5.1)
RBC # BLD: 4.75 MIL/MCL (ref 4–5.2)
SODIUM SERPL-SCNC: 139 MMOL/L (ref 135–145)
WBC # BLD: 7.9 K/MCL (ref 4.2–11)

## 2022-07-27 PROCEDURE — 10002800 HB RX 250 W HCPCS: Performed by: FAMILY MEDICINE

## 2022-07-27 PROCEDURE — G0378 HOSPITAL OBSERVATION PER HR: HCPCS

## 2022-07-27 PROCEDURE — 85027 COMPLETE CBC AUTOMATED: CPT | Performed by: INTERNAL MEDICINE

## 2022-07-27 PROCEDURE — 10002803 HB RX 637: Performed by: INTERNAL MEDICINE

## 2022-07-27 PROCEDURE — 13000001 HB PHASE II RECOVERY EA 30 MINUTES

## 2022-07-27 PROCEDURE — 13000024 HB GI COMPLEX CASE S/U + 1ST 15 MIN

## 2022-07-27 PROCEDURE — 82962 GLUCOSE BLOOD TEST: CPT

## 2022-07-27 PROCEDURE — 10002803 HB RX 637: Performed by: FAMILY MEDICINE

## 2022-07-27 PROCEDURE — 10004651 HB RX, NO CHARGE ITEM: Performed by: FAMILY MEDICINE

## 2022-07-27 PROCEDURE — 0DJ08ZZ INSPECTION OF UPPER INTESTINAL TRACT, VIA NATURAL OR ARTIFICIAL OPENING ENDOSCOPIC: ICD-10-PCS | Performed by: INTERNAL MEDICINE

## 2022-07-27 PROCEDURE — 10002800 HB RX 250 W HCPCS: Performed by: PHYSICIAN ASSISTANT

## 2022-07-27 PROCEDURE — 99233 SBSQ HOSP IP/OBS HIGH 50: CPT | Performed by: INTERNAL MEDICINE

## 2022-07-27 PROCEDURE — C9113 INJ PANTOPRAZOLE SODIUM, VIA: HCPCS | Performed by: PHYSICIAN ASSISTANT

## 2022-07-27 PROCEDURE — 36415 COLL VENOUS BLD VENIPUNCTURE: CPT | Performed by: INTERNAL MEDICINE

## 2022-07-27 PROCEDURE — 96376 TX/PRO/DX INJ SAME DRUG ADON: CPT

## 2022-07-27 PROCEDURE — 94660 CPAP INITIATION&MGMT: CPT

## 2022-07-27 PROCEDURE — 10000002 HB ROOM CHARGE MED SURG

## 2022-07-27 PROCEDURE — 13000008 HB ANESTHESIA MAC OUTSIDE OR

## 2022-07-27 PROCEDURE — 80048 BASIC METABOLIC PNL TOTAL CA: CPT | Performed by: INTERNAL MEDICINE

## 2022-07-27 PROCEDURE — 10002800 HB RX 250 W HCPCS: Performed by: STUDENT IN AN ORGANIZED HEALTH CARE EDUCATION/TRAINING PROGRAM

## 2022-07-27 RX ORDER — PROPOFOL 10 MG/ML
INJECTION, EMULSION INTRAVENOUS PRN
Status: DISCONTINUED | OUTPATIENT
Start: 2022-07-27 | End: 2022-07-27

## 2022-07-27 RX ORDER — SUCRALFATE ORAL 1 G/10ML
1 SUSPENSION ORAL
Status: DISCONTINUED | OUTPATIENT
Start: 2022-07-27 | End: 2022-07-28 | Stop reason: HOSPADM

## 2022-07-27 RX ADMIN — SUCRALFATE 1 G: 1 SUSPENSION ORAL at 15:01

## 2022-07-27 RX ADMIN — ONDANSETRON 4 MG: 2 INJECTION INTRAMUSCULAR; INTRAVENOUS at 21:59

## 2022-07-27 RX ADMIN — PROPOFOL 100 MCG/KG/MIN: 10 INJECTION, EMULSION INTRAVENOUS at 11:27

## 2022-07-27 RX ADMIN — ENOXAPARIN SODIUM 40 MG: 40 INJECTION SUBCUTANEOUS at 21:46

## 2022-07-27 RX ADMIN — ONDANSETRON 4 MG: 2 INJECTION INTRAMUSCULAR; INTRAVENOUS at 12:31

## 2022-07-27 RX ADMIN — SODIUM CHLORIDE, PRESERVATIVE FREE 2 ML: 5 INJECTION INTRAVENOUS at 21:47

## 2022-07-27 RX ADMIN — PROPOFOL 100 MG: 10 INJECTION, EMULSION INTRAVENOUS at 11:26

## 2022-07-27 RX ADMIN — ONDANSETRON 4 MG: 2 INJECTION INTRAMUSCULAR; INTRAVENOUS at 05:58

## 2022-07-27 RX ADMIN — SODIUM CHLORIDE, PRESERVATIVE FREE 2 ML: 5 INJECTION INTRAVENOUS at 09:34

## 2022-07-27 RX ADMIN — METOCLOPRAMIDE 10 MG: 5 INJECTION, SOLUTION INTRAMUSCULAR; INTRAVENOUS at 09:32

## 2022-07-27 RX ADMIN — SUCRALFATE 1 G: 1 SUSPENSION ORAL at 21:46

## 2022-07-27 RX ADMIN — LEVOTHYROXINE SODIUM 200 MCG: 100 TABLET ORAL at 06:06

## 2022-07-27 RX ADMIN — PROPOFOL 20 MG: 10 INJECTION, EMULSION INTRAVENOUS at 11:29

## 2022-07-27 RX ADMIN — METOCLOPRAMIDE 10 MG: 5 INJECTION, SOLUTION INTRAMUSCULAR; INTRAVENOUS at 17:32

## 2022-07-27 ASSESSMENT — PAIN SCALES - GENERAL
PAINLEVEL_OUTOF10: 0

## 2022-07-27 ASSESSMENT — ENCOUNTER SYMPTOMS: EXERCISE TOLERANCE: GOOD (>4 METS)

## 2022-07-28 ENCOUNTER — HOSPITAL ENCOUNTER (INPATIENT)
Age: 32
LOS: 2 days | Discharge: HOME | DRG: 392 | End: 2022-07-30
Attending: FAMILY MEDICINE | Admitting: INTERNAL MEDICINE

## 2022-07-28 VITALS
OXYGEN SATURATION: 99 % | DIASTOLIC BLOOD PRESSURE: 100 MMHG | WEIGHT: 293 LBS | BODY MASS INDEX: 43.4 KG/M2 | RESPIRATION RATE: 18 BRPM | HEART RATE: 64 BPM | TEMPERATURE: 97.9 F | HEIGHT: 69 IN | SYSTOLIC BLOOD PRESSURE: 153 MMHG

## 2022-07-28 PROBLEM — R11.2 NAUSEA & VOMITING: Status: ACTIVE | Noted: 2022-07-28

## 2022-07-28 PROBLEM — K59.09 OTHER CONSTIPATION: Status: ACTIVE | Noted: 2022-07-28

## 2022-07-28 PROBLEM — K20.90 ESOPHAGITIS: Status: ACTIVE | Noted: 2022-07-28

## 2022-07-28 LAB
ANION GAP SERPL CALC-SCNC: 15 MMOL/L (ref 7–19)
BUN SERPL-MCNC: 4 MG/DL (ref 6–20)
BUN/CREAT SERPL: 6 (ref 7–25)
CALCIUM SERPL-MCNC: 9 MG/DL (ref 8.4–10.2)
CHLORIDE SERPL-SCNC: 105 MMOL/L (ref 97–110)
CO2 SERPL-SCNC: 23 MMOL/L (ref 21–32)
CREAT SERPL-MCNC: 0.69 MG/DL (ref 0.51–0.95)
DEPRECATED RDW RBC: 49.5 FL (ref 39–50)
ERYTHROCYTE [DISTWIDTH] IN BLOOD: 18.2 % (ref 11–15)
FASTING DURATION TIME PATIENT: ABNORMAL H
GFR SERPLBLD BASED ON 1.73 SQ M-ARVRAT: >90 ML/MIN
GLUCOSE BLDC GLUCOMTR-MCNC: 101 MG/DL (ref 70–99)
GLUCOSE BLDC GLUCOMTR-MCNC: 92 MG/DL (ref 70–99)
GLUCOSE BLDC GLUCOMTR-MCNC: 92 MG/DL (ref 70–99)
GLUCOSE BLDC GLUCOMTR-MCNC: 95 MG/DL (ref 70–99)
GLUCOSE BLDC GLUCOMTR-MCNC: 96 MG/DL (ref 70–99)
GLUCOSE SERPL-MCNC: 95 MG/DL (ref 70–99)
HCT VFR BLD CALC: 38.6 % (ref 36–46.5)
HGB BLD-MCNC: 12.1 G/DL (ref 12–15.5)
MCH RBC QN AUTO: 24.2 PG (ref 26–34)
MCHC RBC AUTO-ENTMCNC: 31.3 G/DL (ref 32–36.5)
MCV RBC AUTO: 77.2 FL (ref 78–100)
NRBC BLD MANUAL-RTO: 0 /100 WBC
PLATELET # BLD AUTO: 251 K/MCL (ref 140–450)
POTASSIUM SERPL-SCNC: 3.4 MMOL/L (ref 3.4–5.1)
RBC # BLD: 5 MIL/MCL (ref 4–5.2)
SARS-COV-2 RNA RESP QL NAA+PROBE: NOT DETECTED
SERVICE CMNT-IMP: NORMAL
SERVICE CMNT-IMP: NORMAL
SODIUM SERPL-SCNC: 140 MMOL/L (ref 135–145)
WBC # BLD: 8.3 K/MCL (ref 4.2–11)

## 2022-07-28 PROCEDURE — 10002803 HB RX 637: Performed by: FAMILY MEDICINE

## 2022-07-28 PROCEDURE — 36415 COLL VENOUS BLD VENIPUNCTURE: CPT | Performed by: INTERNAL MEDICINE

## 2022-07-28 PROCEDURE — 10002803 HB RX 637: Performed by: SURGERY

## 2022-07-28 PROCEDURE — C9113 INJ PANTOPRAZOLE SODIUM, VIA: HCPCS | Performed by: SURGERY

## 2022-07-28 PROCEDURE — 10004651 HB RX, NO CHARGE ITEM: Performed by: FAMILY MEDICINE

## 2022-07-28 PROCEDURE — 80048 BASIC METABOLIC PNL TOTAL CA: CPT | Performed by: INTERNAL MEDICINE

## 2022-07-28 PROCEDURE — 99239 HOSP IP/OBS DSCHRG MGMT >30: CPT | Performed by: INTERNAL MEDICINE

## 2022-07-28 PROCEDURE — 10002807 HB RX 258: Performed by: FAMILY MEDICINE

## 2022-07-28 PROCEDURE — 99223 1ST HOSP IP/OBS HIGH 75: CPT | Performed by: INTERNAL MEDICINE

## 2022-07-28 PROCEDURE — 10002800 HB RX 250 W HCPCS: Performed by: SURGERY

## 2022-07-28 PROCEDURE — 10002801 HB RX 250 W/O HCPCS: Performed by: SURGERY

## 2022-07-28 PROCEDURE — 10002800 HB RX 250 W HCPCS: Performed by: FAMILY MEDICINE

## 2022-07-28 PROCEDURE — 99024 POSTOP FOLLOW-UP VISIT: CPT | Performed by: SURGERY

## 2022-07-28 PROCEDURE — 10004180 HB COUNTER-TRANSPORT

## 2022-07-28 PROCEDURE — 10006031 HB ROOM CHARGE TELEMETRY

## 2022-07-28 PROCEDURE — 10002803 HB RX 637: Performed by: INTERNAL MEDICINE

## 2022-07-28 PROCEDURE — 85027 COMPLETE CBC AUTOMATED: CPT | Performed by: INTERNAL MEDICINE

## 2022-07-28 PROCEDURE — 10002801 HB RX 250 W/O HCPCS: Performed by: INTERNAL MEDICINE

## 2022-07-28 PROCEDURE — 10002800 HB RX 250 W HCPCS: Performed by: INTERNAL MEDICINE

## 2022-07-28 PROCEDURE — U0005 INFEC AGEN DETEC AMPLI PROBE: HCPCS | Performed by: INTERNAL MEDICINE

## 2022-07-28 PROCEDURE — 94660 CPAP INITIATION&MGMT: CPT

## 2022-07-28 RX ORDER — AMLODIPINE BESYLATE 10 MG/1
10 TABLET ORAL DAILY
Status: DISCONTINUED | OUTPATIENT
Start: 2022-07-29 | End: 2022-07-30 | Stop reason: HOSPADM

## 2022-07-28 RX ORDER — 0.9 % SODIUM CHLORIDE 0.9 %
2 VIAL (ML) INJECTION EVERY 12 HOURS SCHEDULED
Status: DISCONTINUED | OUTPATIENT
Start: 2022-07-28 | End: 2022-07-30 | Stop reason: HOSPADM

## 2022-07-28 RX ORDER — CYANOCOBALAMIN 1000 UG/ML
1000 INJECTION, SOLUTION INTRAMUSCULAR; SUBCUTANEOUS
Qty: 1 ML | Refills: 0 | Status: ON HOLD | COMMUNITY
Start: 2022-07-28 | End: 2022-07-28

## 2022-07-28 RX ORDER — LEVOTHYROXINE SODIUM 0.1 MG/1
200 TABLET ORAL
Status: DISCONTINUED | OUTPATIENT
Start: 2022-07-29 | End: 2022-07-30 | Stop reason: HOSPADM

## 2022-07-28 RX ORDER — LORAZEPAM 2 MG/ML
0.5 INJECTION INTRAMUSCULAR EVERY 12 HOURS PRN
Status: DISCONTINUED | OUTPATIENT
Start: 2022-07-28 | End: 2022-07-30 | Stop reason: HOSPADM

## 2022-07-28 RX ORDER — AMOXICILLIN 250 MG
2 CAPSULE ORAL DAILY PRN
Status: DISCONTINUED | OUTPATIENT
Start: 2022-07-28 | End: 2022-07-30 | Stop reason: HOSPADM

## 2022-07-28 RX ORDER — SIMETHICONE 125 MG
125 TABLET,CHEWABLE ORAL EVERY 4 HOURS PRN
Status: DISCONTINUED | OUTPATIENT
Start: 2022-07-28 | End: 2022-07-30 | Stop reason: HOSPADM

## 2022-07-28 RX ORDER — NICOTINE POLACRILEX 4 MG
15 LOZENGE BUCCAL PRN
Status: DISCONTINUED | OUTPATIENT
Start: 2022-07-28 | End: 2022-07-30 | Stop reason: HOSPADM

## 2022-07-28 RX ORDER — CYANOCOBALAMIN 1000 UG/ML
1000 INJECTION, SOLUTION INTRAMUSCULAR; SUBCUTANEOUS DAILY
Status: DISCONTINUED | OUTPATIENT
Start: 2022-07-29 | End: 2022-07-30 | Stop reason: HOSPADM

## 2022-07-28 RX ORDER — PEG-3350, SODIUM SULFATE, SODIUM CHLORIDE, POTASSIUM CHLORIDE, SODIUM ASCORBATE AND ASCORBIC ACID 7.5-2.691G
500 KIT ORAL EVERY 12 HOURS
Status: COMPLETED | OUTPATIENT
Start: 2022-07-28 | End: 2022-07-29

## 2022-07-28 RX ORDER — DEXTROSE MONOHYDRATE 25 G/50ML
12.5 INJECTION, SOLUTION INTRAVENOUS PRN
Status: DISCONTINUED | OUTPATIENT
Start: 2022-07-28 | End: 2022-07-30 | Stop reason: HOSPADM

## 2022-07-28 RX ORDER — THIAMINE HYDROCHLORIDE 100 MG/ML
100 INJECTION, SOLUTION INTRAMUSCULAR; INTRAVENOUS DAILY
Status: DISCONTINUED | OUTPATIENT
Start: 2022-07-28 | End: 2022-07-28 | Stop reason: HOSPADM

## 2022-07-28 RX ORDER — CYANOCOBALAMIN 1000 UG/ML
1000 INJECTION, SOLUTION INTRAMUSCULAR; SUBCUTANEOUS ONCE
Status: COMPLETED | OUTPATIENT
Start: 2022-07-28 | End: 2022-07-28

## 2022-07-28 RX ORDER — SUCRALFATE ORAL 1 G/10ML
1 SUSPENSION ORAL
Status: DISCONTINUED | OUTPATIENT
Start: 2022-07-28 | End: 2022-07-30 | Stop reason: HOSPADM

## 2022-07-28 RX ORDER — DEXTROSE AND SODIUM CHLORIDE 5; .45 G/100ML; G/100ML
INJECTION, SOLUTION INTRAVENOUS CONTINUOUS
Status: DISCONTINUED | OUTPATIENT
Start: 2022-07-28 | End: 2022-07-29

## 2022-07-28 RX ORDER — ACETAMINOPHEN 325 MG/1
650 TABLET ORAL EVERY 4 HOURS PRN
Status: DISCONTINUED | OUTPATIENT
Start: 2022-07-28 | End: 2022-07-30 | Stop reason: HOSPADM

## 2022-07-28 RX ORDER — THIAMINE HYDROCHLORIDE 100 MG/ML
100 INJECTION, SOLUTION INTRAMUSCULAR; INTRAVENOUS DAILY
Status: ON HOLD | COMMUNITY
Start: 2022-07-28 | End: 2022-07-28

## 2022-07-28 RX ORDER — SUCRALFATE ORAL 1 G/10ML
1 SUSPENSION ORAL
Status: ON HOLD | COMMUNITY
Start: 2022-07-28 | End: 2022-07-30 | Stop reason: HOSPADM

## 2022-07-28 RX ORDER — ENOXAPARIN SODIUM 100 MG/ML
40 INJECTION SUBCUTANEOUS EVERY 12 HOURS SCHEDULED
Status: DISCONTINUED | OUTPATIENT
Start: 2022-07-28 | End: 2022-07-30 | Stop reason: HOSPADM

## 2022-07-28 RX ORDER — SODIUM CHLORIDE 9 MG/ML
INJECTION, SOLUTION INTRAVENOUS CONTINUOUS
Status: DISCONTINUED | OUTPATIENT
Start: 2022-07-28 | End: 2022-07-28

## 2022-07-28 RX ORDER — POTASSIUM CHLORIDE 14.9 MG/ML
20 INJECTION INTRAVENOUS ONCE
Status: COMPLETED | OUTPATIENT
Start: 2022-07-28 | End: 2022-07-29

## 2022-07-28 RX ORDER — ONDANSETRON 2 MG/ML
4 INJECTION INTRAMUSCULAR; INTRAVENOUS 2 TIMES DAILY PRN
Status: DISCONTINUED | OUTPATIENT
Start: 2022-07-28 | End: 2022-07-30 | Stop reason: HOSPADM

## 2022-07-28 RX ORDER — POTASSIUM CHLORIDE 14.9 MG/ML
20 INJECTION INTRAVENOUS ONCE
Status: COMPLETED | OUTPATIENT
Start: 2022-07-28 | End: 2022-07-28

## 2022-07-28 RX ORDER — NICOTINE POLACRILEX 4 MG
30 LOZENGE BUCCAL PRN
Status: DISCONTINUED | OUTPATIENT
Start: 2022-07-28 | End: 2022-07-30 | Stop reason: HOSPADM

## 2022-07-28 RX ORDER — THIAMINE HYDROCHLORIDE 100 MG/ML
100 INJECTION, SOLUTION INTRAMUSCULAR; INTRAVENOUS DAILY
Status: DISCONTINUED | OUTPATIENT
Start: 2022-07-29 | End: 2022-07-30 | Stop reason: HOSPADM

## 2022-07-28 RX ORDER — DEXTROSE MONOHYDRATE 25 G/50ML
25 INJECTION, SOLUTION INTRAVENOUS PRN
Status: DISCONTINUED | OUTPATIENT
Start: 2022-07-28 | End: 2022-07-30 | Stop reason: HOSPADM

## 2022-07-28 RX ORDER — POLYETHYLENE GLYCOL 3350 17 G/17G
17 POWDER, FOR SOLUTION ORAL DAILY PRN
Status: DISCONTINUED | OUTPATIENT
Start: 2022-07-28 | End: 2022-07-30 | Stop reason: HOSPADM

## 2022-07-28 RX ORDER — PANTOPRAZOLE SODIUM 40 MG/10ML
40 INJECTION, POWDER, LYOPHILIZED, FOR SOLUTION INTRAVENOUS NIGHTLY
Status: DISCONTINUED | OUTPATIENT
Start: 2022-07-28 | End: 2022-07-30 | Stop reason: HOSPADM

## 2022-07-28 RX ORDER — FOLIC ACID 5 MG/ML
1 INJECTION, SOLUTION INTRAMUSCULAR; INTRAVENOUS; SUBCUTANEOUS DAILY
Status: DISCONTINUED | OUTPATIENT
Start: 2022-07-28 | End: 2022-07-30 | Stop reason: HOSPADM

## 2022-07-28 RX ADMIN — SUCRALFATE 1 G: 1 SUSPENSION ORAL at 10:51

## 2022-07-28 RX ADMIN — POLYETHYLENE GLYCOL 3350, SODIUM SULFATE, SODIUM CHLORIDE, POTASSIUM CHLORIDE, SODIUM ASCORBATE, AND ASCORBIC ACID 500 ML: KIT at 21:41

## 2022-07-28 RX ADMIN — SODIUM CHLORIDE, POTASSIUM CHLORIDE, SODIUM LACTATE AND CALCIUM CHLORIDE: 600; 310; 30; 20 INJECTION, SOLUTION INTRAVENOUS at 10:48

## 2022-07-28 RX ADMIN — THIAMINE HYDROCHLORIDE 100 MG: 100 INJECTION, SOLUTION INTRAMUSCULAR; INTRAVENOUS at 11:03

## 2022-07-28 RX ADMIN — SODIUM CHLORIDE, PRESERVATIVE FREE 2 ML: 5 INJECTION INTRAVENOUS at 08:50

## 2022-07-28 RX ADMIN — SUCRALFATE 1 G: 1 SUSPENSION ORAL at 21:40

## 2022-07-28 RX ADMIN — ENOXAPARIN SODIUM 40 MG: 40 INJECTION SUBCUTANEOUS at 08:49

## 2022-07-28 RX ADMIN — FOLIC ACID 1 MG: 5 INJECTION, SOLUTION INTRAMUSCULAR; INTRAVENOUS; SUBCUTANEOUS at 21:51

## 2022-07-28 RX ADMIN — CYANOCOBALAMIN 1000 MCG: 1000 INJECTION, SOLUTION INTRAMUSCULAR; SUBCUTANEOUS at 11:05

## 2022-07-28 RX ADMIN — METOCLOPRAMIDE 10 MG: 5 INJECTION, SOLUTION INTRAMUSCULAR; INTRAVENOUS at 05:02

## 2022-07-28 RX ADMIN — ENOXAPARIN SODIUM 40 MG: 40 INJECTION SUBCUTANEOUS at 21:40

## 2022-07-28 RX ADMIN — POTASSIUM CHLORIDE 20 MEQ: 14.9 INJECTION, SOLUTION INTRAVENOUS at 21:49

## 2022-07-28 RX ADMIN — SODIUM CHLORIDE, POTASSIUM CHLORIDE, SODIUM LACTATE AND CALCIUM CHLORIDE: 600; 310; 30; 20 INJECTION, SOLUTION INTRAVENOUS at 01:09

## 2022-07-28 RX ADMIN — LINACLOTIDE 290 MCG: 145 CAPSULE, GELATIN COATED ORAL at 21:55

## 2022-07-28 RX ADMIN — DEXTROSE AND SODIUM CHLORIDE: 5; 450 INJECTION, SOLUTION INTRAVENOUS at 18:08

## 2022-07-28 RX ADMIN — ONDANSETRON 4 MG: 2 INJECTION INTRAMUSCULAR; INTRAVENOUS at 10:59

## 2022-07-28 RX ADMIN — PANTOPRAZOLE SODIUM 40 MG: 40 INJECTION, POWDER, FOR SOLUTION INTRAVENOUS at 21:40

## 2022-07-28 RX ADMIN — LEVOTHYROXINE SODIUM 200 MCG: 100 TABLET ORAL at 08:48

## 2022-07-28 ASSESSMENT — ENCOUNTER SYMPTOMS
ABDOMINAL PAIN: 1
DIZZINESS: 0
FEVER: 0
CONSTIPATION: 1
LIGHT-HEADEDNESS: 0
BACK PAIN: 0
FATIGUE: 1
WHEEZING: 0
SHORTNESS OF BREATH: 0
COUGH: 0
NAUSEA: 1
CHILLS: 0
VOMITING: 1
SEIZURES: 0
DIARRHEA: 0

## 2022-07-28 ASSESSMENT — PAIN SCALES - GENERAL
PAINLEVEL_OUTOF10: 0

## 2022-07-29 ENCOUNTER — INITIAL PRENATAL (OUTPATIENT)
Dept: OBSTETRICS AND GYNECOLOGY | Facility: CLINIC | Age: 32
End: 2022-07-29
Payer: MEDICAID

## 2022-07-29 VITALS
DIASTOLIC BLOOD PRESSURE: 68 MMHG | WEIGHT: 135.94 LBS | SYSTOLIC BLOOD PRESSURE: 110 MMHG | BODY MASS INDEX: 24.08 KG/M2 | HEART RATE: 70 BPM

## 2022-07-29 DIAGNOSIS — K50.90 MATERNAL CROHN'S DISEASE AFFECTING PREGNANCY, ANTEPARTUM: ICD-10-CM

## 2022-07-29 DIAGNOSIS — O99.619 MATERNAL CROHN'S DISEASE AFFECTING PREGNANCY, ANTEPARTUM: ICD-10-CM

## 2022-07-29 DIAGNOSIS — Z3A.10 10 WEEKS GESTATION OF PREGNANCY: Primary | ICD-10-CM

## 2022-07-29 PROBLEM — Z34.90 PREGNANCY WITH ONE FETUS, ANTEPARTUM: Status: ACTIVE | Noted: 2022-07-29

## 2022-07-29 LAB
ALBUMIN SERPL-MCNC: 2.8 G/DL (ref 3.6–5.1)
ALBUMIN/GLOB SERPL: 0.7 {RATIO} (ref 1–2.4)
ALP SERPL-CCNC: 51 UNITS/L (ref 45–117)
ALT SERPL-CCNC: 30 UNITS/L
ANION GAP SERPL CALC-SCNC: 8 MMOL/L (ref 7–19)
AST SERPL-CCNC: 19 UNITS/L
BASOPHILS # BLD: 0 K/MCL (ref 0–0.3)
BASOPHILS NFR BLD: 0 %
BILIRUB SERPL-MCNC: 0.5 MG/DL (ref 0.2–1)
BUN SERPL-MCNC: 4 MG/DL (ref 6–20)
BUN/CREAT SERPL: 4 (ref 7–25)
CALCIUM SERPL-MCNC: 8.6 MG/DL (ref 8.4–10.2)
CHLORIDE SERPL-SCNC: 105 MMOL/L (ref 97–110)
CO2 SERPL-SCNC: 29 MMOL/L (ref 21–32)
CREAT SERPL-MCNC: 0.89 MG/DL (ref 0.51–0.95)
DEPRECATED RDW RBC: 50.6 FL (ref 39–50)
EOSINOPHIL # BLD: 0.2 K/MCL (ref 0–0.5)
EOSINOPHIL NFR BLD: 2 %
ERYTHROCYTE [DISTWIDTH] IN BLOOD: 17.6 % (ref 11–15)
FASTING DURATION TIME PATIENT: ABNORMAL H
GFR SERPLBLD BASED ON 1.73 SQ M-ARVRAT: 89 ML/MIN
GLOBULIN SER-MCNC: 3.9 G/DL (ref 2–4)
GLUCOSE BLDC GLUCOMTR-MCNC: 105 MG/DL (ref 70–99)
GLUCOSE BLDC GLUCOMTR-MCNC: 75 MG/DL (ref 70–99)
GLUCOSE BLDC GLUCOMTR-MCNC: 84 MG/DL (ref 70–99)
GLUCOSE BLDC GLUCOMTR-MCNC: 91 MG/DL (ref 70–99)
GLUCOSE SERPL-MCNC: 103 MG/DL (ref 70–99)
HCT VFR BLD CALC: 36.1 % (ref 36–46.5)
HGB BLD-MCNC: 11.4 G/DL (ref 12–15.5)
IMM GRANULOCYTES # BLD AUTO: 0 K/MCL (ref 0–0.2)
IMM GRANULOCYTES # BLD: 0 %
LYMPHOCYTES # BLD: 2.3 K/MCL (ref 1–4.8)
LYMPHOCYTES NFR BLD: 27 %
MAGNESIUM SERPL-MCNC: 1.9 MG/DL (ref 1.7–2.4)
MCH RBC QN AUTO: 25 PG (ref 26–34)
MCHC RBC AUTO-ENTMCNC: 31.6 G/DL (ref 32–36.5)
MCV RBC AUTO: 79.2 FL (ref 78–100)
MONOCYTES # BLD: 0.5 K/MCL (ref 0.3–0.9)
MONOCYTES NFR BLD: 7 %
NEUTROPHILS # BLD: 5.2 K/MCL (ref 1.8–7.7)
NEUTROPHILS NFR BLD: 64 %
NRBC BLD MANUAL-RTO: 0 /100 WBC
PHOSPHATE SERPL-MCNC: 2.8 MG/DL (ref 2.4–4.7)
PLATELET # BLD AUTO: 234 K/MCL (ref 140–450)
POTASSIUM SERPL-SCNC: 3.4 MMOL/L (ref 3.4–5.1)
POTASSIUM SERPL-SCNC: 3.8 MMOL/L (ref 3.4–5.1)
PROT SERPL-MCNC: 6.7 G/DL (ref 6.4–8.2)
RBC # BLD: 4.56 MIL/MCL (ref 4–5.2)
SODIUM SERPL-SCNC: 139 MMOL/L (ref 135–145)
WBC # BLD: 8.2 K/MCL (ref 4.2–11)

## 2022-07-29 PROCEDURE — 10004180 HB COUNTER-TRANSPORT

## 2022-07-29 PROCEDURE — 83735 ASSAY OF MAGNESIUM: CPT | Performed by: INTERNAL MEDICINE

## 2022-07-29 PROCEDURE — 10002800 HB RX 250 W HCPCS: Performed by: SURGERY

## 2022-07-29 PROCEDURE — 94660 CPAP INITIATION&MGMT: CPT

## 2022-07-29 PROCEDURE — 99233 SBSQ HOSP IP/OBS HIGH 50: CPT | Performed by: INTERNAL MEDICINE

## 2022-07-29 PROCEDURE — 84132 ASSAY OF SERUM POTASSIUM: CPT | Performed by: SURGERY

## 2022-07-29 PROCEDURE — 87491 CHLMYD TRACH DNA AMP PROBE: CPT | Performed by: OBSTETRICS & GYNECOLOGY

## 2022-07-29 PROCEDURE — 10002803 HB RX 637: Performed by: INTERNAL MEDICINE

## 2022-07-29 PROCEDURE — 99213 OFFICE O/P EST LOW 20 MIN: CPT | Mod: PBBFAC,TH | Performed by: OBSTETRICS & GYNECOLOGY

## 2022-07-29 PROCEDURE — 87591 N.GONORRHOEAE DNA AMP PROB: CPT | Performed by: OBSTETRICS & GYNECOLOGY

## 2022-07-29 PROCEDURE — 10006031 HB ROOM CHARGE TELEMETRY

## 2022-07-29 PROCEDURE — 10002801 HB RX 250 W/O HCPCS: Performed by: SURGERY

## 2022-07-29 PROCEDURE — 10004651 HB RX, NO CHARGE ITEM: Performed by: INTERNAL MEDICINE

## 2022-07-29 PROCEDURE — 80053 COMPREHEN METABOLIC PANEL: CPT | Performed by: INTERNAL MEDICINE

## 2022-07-29 PROCEDURE — 99024 POSTOP FOLLOW-UP VISIT: CPT | Performed by: SURGERY

## 2022-07-29 PROCEDURE — 36415 COLL VENOUS BLD VENIPUNCTURE: CPT | Performed by: INTERNAL MEDICINE

## 2022-07-29 PROCEDURE — 10002803 HB RX 637: Performed by: SURGERY

## 2022-07-29 PROCEDURE — C9113 INJ PANTOPRAZOLE SODIUM, VIA: HCPCS | Performed by: SURGERY

## 2022-07-29 PROCEDURE — 84100 ASSAY OF PHOSPHORUS: CPT | Performed by: SURGERY

## 2022-07-29 PROCEDURE — 99999 PR PBB SHADOW E&M-EST. PATIENT-LVL III: CPT | Mod: PBBFAC,,, | Performed by: OBSTETRICS & GYNECOLOGY

## 2022-07-29 PROCEDURE — 99999 PR PBB SHADOW E&M-EST. PATIENT-LVL III: ICD-10-PCS | Mod: PBBFAC,,, | Performed by: OBSTETRICS & GYNECOLOGY

## 2022-07-29 PROCEDURE — 99204 PR OFFICE/OUTPT VISIT, NEW, LEVL IV, 45-59 MIN: ICD-10-PCS | Mod: TH,S$PBB,, | Performed by: OBSTETRICS & GYNECOLOGY

## 2022-07-29 PROCEDURE — 10002801 HB RX 250 W/O HCPCS: Performed by: INTERNAL MEDICINE

## 2022-07-29 PROCEDURE — 85025 COMPLETE CBC W/AUTO DIFF WBC: CPT | Performed by: SURGERY

## 2022-07-29 PROCEDURE — 10002800 HB RX 250 W HCPCS: Performed by: INTERNAL MEDICINE

## 2022-07-29 PROCEDURE — 10004281 HB COUNTER-STAFF TIME PER 15 MIN

## 2022-07-29 PROCEDURE — 87086 URINE CULTURE/COLONY COUNT: CPT | Performed by: OBSTETRICS & GYNECOLOGY

## 2022-07-29 PROCEDURE — 99204 OFFICE O/P NEW MOD 45 MIN: CPT | Mod: TH,S$PBB,, | Performed by: OBSTETRICS & GYNECOLOGY

## 2022-07-29 RX ORDER — POTASSIUM CHLORIDE 20 MEQ/1
40 TABLET, EXTENDED RELEASE ORAL ONCE
Status: DISCONTINUED | OUTPATIENT
Start: 2022-07-29 | End: 2022-07-30 | Stop reason: HOSPADM

## 2022-07-29 RX ORDER — POTASSIUM CHLORIDE 1.5 G/1.58G
40 POWDER, FOR SOLUTION ORAL ONCE
Status: DISCONTINUED | OUTPATIENT
Start: 2022-07-29 | End: 2022-07-29

## 2022-07-29 RX ORDER — POTASSIUM CHLORIDE 14.9 MG/ML
20 INJECTION INTRAVENOUS ONCE
Status: DISCONTINUED | OUTPATIENT
Start: 2022-07-29 | End: 2022-07-29

## 2022-07-29 RX ORDER — POTASSIUM CHLORIDE 20 MEQ/1
20 TABLET, EXTENDED RELEASE ORAL ONCE
Status: COMPLETED | OUTPATIENT
Start: 2022-07-29 | End: 2022-07-29

## 2022-07-29 RX ORDER — SCOLOPAMINE TRANSDERMAL SYSTEM 1 MG/1
1 PATCH, EXTENDED RELEASE TRANSDERMAL
Status: DISCONTINUED | OUTPATIENT
Start: 2022-07-29 | End: 2022-07-30 | Stop reason: HOSPADM

## 2022-07-29 RX ORDER — FOLIC ACID 1 MG/1
4 TABLET ORAL DAILY
Qty: 120 TABLET | Refills: 11 | Status: SHIPPED | OUTPATIENT
Start: 2022-07-29 | End: 2023-01-25 | Stop reason: HOSPADM

## 2022-07-29 RX ADMIN — AMLODIPINE BESYLATE 10 MG: 10 TABLET ORAL at 09:10

## 2022-07-29 RX ADMIN — POTASSIUM CHLORIDE 20 MEQ: 1500 TABLET, EXTENDED RELEASE ORAL at 11:20

## 2022-07-29 RX ADMIN — LEVOTHYROXINE SODIUM 200 MCG: 100 TABLET ORAL at 06:10

## 2022-07-29 RX ADMIN — THIAMINE HYDROCHLORIDE 100 MG: 100 INJECTION, SOLUTION INTRAMUSCULAR; INTRAVENOUS at 09:10

## 2022-07-29 RX ADMIN — SUCRALFATE 1 G: 1 SUSPENSION ORAL at 21:16

## 2022-07-29 RX ADMIN — POLYETHYLENE GLYCOL 3350, SODIUM SULFATE, SODIUM CHLORIDE, POTASSIUM CHLORIDE, SODIUM ASCORBATE, AND ASCORBIC ACID 500 ML: KIT at 09:28

## 2022-07-29 RX ADMIN — SUCRALFATE 1 G: 1 SUSPENSION ORAL at 06:10

## 2022-07-29 RX ADMIN — SODIUM CHLORIDE, PRESERVATIVE FREE 2 ML: 5 INJECTION INTRAVENOUS at 21:16

## 2022-07-29 RX ADMIN — SCOPOLAMINE 1 PATCH: 1 PATCH TRANSDERMAL at 09:09

## 2022-07-29 RX ADMIN — RIFAXIMIN 550 MG: 550 TABLET ORAL at 09:10

## 2022-07-29 RX ADMIN — DEXTROSE AND SODIUM CHLORIDE: 5; 450 INJECTION, SOLUTION INTRAVENOUS at 08:08

## 2022-07-29 RX ADMIN — FOLIC ACID 1 MG: 5 INJECTION, SOLUTION INTRAMUSCULAR; INTRAVENOUS; SUBCUTANEOUS at 11:20

## 2022-07-29 RX ADMIN — SUCRALFATE 1 G: 1 SUSPENSION ORAL at 15:26

## 2022-07-29 RX ADMIN — RIFAXIMIN 550 MG: 550 TABLET ORAL at 21:16

## 2022-07-29 RX ADMIN — SODIUM CHLORIDE, PRESERVATIVE FREE 2 ML: 5 INJECTION INTRAVENOUS at 09:21

## 2022-07-29 RX ADMIN — CYANOCOBALAMIN 1000 MCG: 1000 INJECTION, SOLUTION INTRAMUSCULAR; SUBCUTANEOUS at 09:10

## 2022-07-29 RX ADMIN — POTASSIUM CHLORIDE 20 MEQ: 14.9 INJECTION, SOLUTION INTRAVENOUS at 00:55

## 2022-07-29 RX ADMIN — PANTOPRAZOLE SODIUM 40 MG: 40 INJECTION, POWDER, FOR SOLUTION INTRAVENOUS at 21:16

## 2022-07-29 RX ADMIN — ENOXAPARIN SODIUM 40 MG: 40 INJECTION SUBCUTANEOUS at 21:16

## 2022-07-29 RX ADMIN — SUCRALFATE 1 G: 1 SUSPENSION ORAL at 11:20

## 2022-07-29 RX ADMIN — ENOXAPARIN SODIUM 40 MG: 40 INJECTION SUBCUTANEOUS at 09:21

## 2022-07-29 ASSESSMENT — PAIN SCALES - GENERAL
PAINLEVEL_OUTOF10: 0
PAINLEVEL_OUTOF10: 0

## 2022-07-29 NOTE — PROGRESS NOTES
32 y.o.  at 10w5d here for initial OB visit.    Pregnancy complicated by:  Crohn's disease dx 2019    OBH - G1  PMH - Crohn's disease dx 2019, see Dr. Michael Bacon at [facility], wkly injection of Humira on Friday, Vit D wkly oral  PSH - Partial bowel resection with ileostomy 2019 secondary to Crohn's  MEDS - PNV, Vit D oral wkly, wkly injection of Humara, wkly injection of B12  ALL - NKDA  SH - Former smoker. Denies etoh, drugs use.  FH - denies congenital defects, aneuploidy   GYN Hx - denies STI, abn pap    Pt has no major complaints today.  Denies vaginal bleeding, leakage of fluid, or contractions.    Order initial OB lab profile.  Pt declined first trimester aneuploidy screening at this time.  Refer to [facility] for dating US.    Implications of Crohn's disease on pregnancy discussed.  Pt advised to f/u closely with GI and discussed her Crohn's in light of pregnancy.  Increase folic acid.  GI precautions.    Discussed daily low dose ASA ~12 wks for prevention of preE if tolerable with Crohn's  Discussed [facility] program.  Encourage Covid and flu vaccine.    Principles of prenatal care and precautions reviewed.  Return 4 wks or sooner prn.  All questions answered, voiced understanding.      Geo Espitia MD

## 2022-07-30 VITALS
WEIGHT: 293 LBS | DIASTOLIC BLOOD PRESSURE: 85 MMHG | HEART RATE: 60 BPM | TEMPERATURE: 98.1 F | SYSTOLIC BLOOD PRESSURE: 137 MMHG | RESPIRATION RATE: 14 BRPM | OXYGEN SATURATION: 100 % | BODY MASS INDEX: 50.59 KG/M2

## 2022-07-30 LAB
ALBUMIN SERPL-MCNC: 2.9 G/DL (ref 3.6–5.1)
ALBUMIN/GLOB SERPL: 0.7 {RATIO} (ref 1–2.4)
ALP SERPL-CCNC: 54 UNITS/L (ref 45–117)
ALT SERPL-CCNC: 32 UNITS/L
ANION GAP SERPL CALC-SCNC: 10 MMOL/L (ref 7–19)
AST SERPL-CCNC: 16 UNITS/L
BASOPHILS # BLD: 0 K/MCL (ref 0–0.3)
BASOPHILS NFR BLD: 0 %
BILIRUB SERPL-MCNC: 0.6 MG/DL (ref 0.2–1)
BUN SERPL-MCNC: 4 MG/DL (ref 6–20)
BUN/CREAT SERPL: 5 (ref 7–25)
CALCIUM SERPL-MCNC: 9 MG/DL (ref 8.4–10.2)
CHLORIDE SERPL-SCNC: 104 MMOL/L (ref 97–110)
CO2 SERPL-SCNC: 29 MMOL/L (ref 21–32)
CREAT SERPL-MCNC: 0.88 MG/DL (ref 0.51–0.95)
DEPRECATED RDW RBC: 51.5 FL (ref 39–50)
EOSINOPHIL # BLD: 0.3 K/MCL (ref 0–0.5)
EOSINOPHIL NFR BLD: 4 %
ERYTHROCYTE [DISTWIDTH] IN BLOOD: 17.8 % (ref 11–15)
FASTING DURATION TIME PATIENT: ABNORMAL H
GFR SERPLBLD BASED ON 1.73 SQ M-ARVRAT: 90 ML/MIN
GLOBULIN SER-MCNC: 4.1 G/DL (ref 2–4)
GLUCOSE BLDC GLUCOMTR-MCNC: 79 MG/DL (ref 70–99)
GLUCOSE BLDC GLUCOMTR-MCNC: 82 MG/DL (ref 70–99)
GLUCOSE BLDC GLUCOMTR-MCNC: 83 MG/DL (ref 70–99)
GLUCOSE BLDC GLUCOMTR-MCNC: 86 MG/DL (ref 70–99)
GLUCOSE SERPL-MCNC: 92 MG/DL (ref 70–99)
HCT VFR BLD CALC: 37.5 % (ref 36–46.5)
HGB BLD-MCNC: 11.4 G/DL (ref 12–15.5)
IMM GRANULOCYTES # BLD AUTO: 0 K/MCL (ref 0–0.2)
IMM GRANULOCYTES # BLD: 0 %
LYMPHOCYTES # BLD: 2.1 K/MCL (ref 1–4.8)
LYMPHOCYTES NFR BLD: 27 %
MAGNESIUM SERPL-MCNC: 2 MG/DL (ref 1.7–2.4)
MCH RBC QN AUTO: 24.4 PG (ref 26–34)
MCHC RBC AUTO-ENTMCNC: 30.4 G/DL (ref 32–36.5)
MCV RBC AUTO: 80.3 FL (ref 78–100)
MONOCYTES # BLD: 0.6 K/MCL (ref 0.3–0.9)
MONOCYTES NFR BLD: 7 %
NEUTROPHILS # BLD: 4.8 K/MCL (ref 1.8–7.7)
NEUTROPHILS NFR BLD: 62 %
NRBC BLD MANUAL-RTO: 0 /100 WBC
PHOSPHATE SERPL-MCNC: 3.8 MG/DL (ref 2.4–4.7)
PLATELET # BLD AUTO: 239 K/MCL (ref 140–450)
POTASSIUM SERPL-SCNC: 3.5 MMOL/L (ref 3.4–5.1)
PROT SERPL-MCNC: 7 G/DL (ref 6.4–8.2)
RBC # BLD: 4.67 MIL/MCL (ref 4–5.2)
SODIUM SERPL-SCNC: 139 MMOL/L (ref 135–145)
WBC # BLD: 7.8 K/MCL (ref 4.2–11)

## 2022-07-30 PROCEDURE — 10002800 HB RX 250 W HCPCS: Performed by: SURGERY

## 2022-07-30 PROCEDURE — 83735 ASSAY OF MAGNESIUM: CPT | Performed by: SURGERY

## 2022-07-30 PROCEDURE — 10002803 HB RX 637: Performed by: INTERNAL MEDICINE

## 2022-07-30 PROCEDURE — 94660 CPAP INITIATION&MGMT: CPT

## 2022-07-30 PROCEDURE — 36415 COLL VENOUS BLD VENIPUNCTURE: CPT | Performed by: SURGERY

## 2022-07-30 PROCEDURE — 10002801 HB RX 250 W/O HCPCS: Performed by: SURGERY

## 2022-07-30 PROCEDURE — 85025 COMPLETE CBC W/AUTO DIFF WBC: CPT | Performed by: SURGERY

## 2022-07-30 PROCEDURE — 10002800 HB RX 250 W HCPCS: Performed by: INTERNAL MEDICINE

## 2022-07-30 PROCEDURE — 99024 POSTOP FOLLOW-UP VISIT: CPT | Performed by: SURGERY

## 2022-07-30 PROCEDURE — 80053 COMPREHEN METABOLIC PANEL: CPT | Performed by: SURGERY

## 2022-07-30 PROCEDURE — 10004281 HB COUNTER-STAFF TIME PER 15 MIN

## 2022-07-30 PROCEDURE — 10004651 HB RX, NO CHARGE ITEM: Performed by: INTERNAL MEDICINE

## 2022-07-30 PROCEDURE — 10002803 HB RX 637: Performed by: SURGERY

## 2022-07-30 PROCEDURE — 84100 ASSAY OF PHOSPHORUS: CPT | Performed by: SURGERY

## 2022-07-30 PROCEDURE — 10004180 HB COUNTER-TRANSPORT

## 2022-07-30 RX ORDER — PANTOPRAZOLE SODIUM 40 MG/1
40 TABLET, DELAYED RELEASE ORAL DAILY
Qty: 30 TABLET | Refills: 0 | Status: SHIPPED | OUTPATIENT
Start: 2022-07-30 | End: 2022-08-29

## 2022-07-30 RX ORDER — SUCRALFATE 1 G/1
1 TABLET ORAL 3 TIMES DAILY
Qty: 90 TABLET | Refills: 0 | Status: SHIPPED | OUTPATIENT
Start: 2022-07-30 | End: 2022-08-29

## 2022-07-30 RX ORDER — SCOLOPAMINE TRANSDERMAL SYSTEM 1 MG/1
1 PATCH, EXTENDED RELEASE TRANSDERMAL
Qty: 3 EACH | Refills: 0 | Status: SHIPPED | OUTPATIENT
Start: 2022-07-30 | End: 2022-08-08

## 2022-07-30 RX ADMIN — SODIUM CHLORIDE, PRESERVATIVE FREE 2 ML: 5 INJECTION INTRAVENOUS at 09:24

## 2022-07-30 RX ADMIN — SUCRALFATE 1 G: 1 SUSPENSION ORAL at 11:49

## 2022-07-30 RX ADMIN — ENOXAPARIN SODIUM 40 MG: 40 INJECTION SUBCUTANEOUS at 09:23

## 2022-07-30 RX ADMIN — FOLIC ACID 1 MG: 5 INJECTION, SOLUTION INTRAMUSCULAR; INTRAVENOUS; SUBCUTANEOUS at 09:22

## 2022-07-30 RX ADMIN — CYANOCOBALAMIN 1000 MCG: 1000 INJECTION, SOLUTION INTRAMUSCULAR; SUBCUTANEOUS at 09:21

## 2022-07-30 RX ADMIN — LEVOTHYROXINE SODIUM 200 MCG: 100 TABLET ORAL at 06:14

## 2022-07-30 RX ADMIN — LINACLOTIDE 290 MCG: 145 CAPSULE, GELATIN COATED ORAL at 06:27

## 2022-07-30 RX ADMIN — THIAMINE HYDROCHLORIDE 100 MG: 100 INJECTION, SOLUTION INTRAMUSCULAR; INTRAVENOUS at 09:23

## 2022-07-30 RX ADMIN — SUCRALFATE 1 G: 1 SUSPENSION ORAL at 06:14

## 2022-07-30 RX ADMIN — SUCRALFATE 1 G: 1 SUSPENSION ORAL at 15:45

## 2022-07-30 RX ADMIN — RIFAXIMIN 550 MG: 550 TABLET ORAL at 15:46

## 2022-07-30 RX ADMIN — AMLODIPINE BESYLATE 10 MG: 10 TABLET ORAL at 09:21

## 2022-07-30 RX ADMIN — RIFAXIMIN 550 MG: 550 TABLET ORAL at 06:14

## 2022-07-30 ASSESSMENT — PAIN SCALES - PAIN ASSESSMENT IN ADVANCED DEMENTIA (PAINAD): BREATHING: NORMAL

## 2022-07-30 ASSESSMENT — PAIN SCALES - GENERAL: PAINLEVEL_OUTOF10: 0

## 2022-07-31 LAB — BACTERIA UR CULT: NORMAL

## 2022-08-01 ENCOUNTER — CASE MANAGEMENT (OUTPATIENT)
Dept: CARE COORDINATION | Age: 32
End: 2022-08-01

## 2022-08-01 LAB
C TRACH DNA SPEC QL NAA+PROBE: NOT DETECTED
N GONORRHOEA DNA SPEC QL NAA+PROBE: NOT DETECTED

## 2022-08-01 ASSESSMENT — ACTIVITIES OF DAILY LIVING (ADL): DME_IN_USE: NO

## 2022-08-02 ENCOUNTER — TELEPHONE (OUTPATIENT)
Dept: BARIATRICS/WEIGHT MGMT | Age: 32
End: 2022-08-02

## 2022-08-04 ENCOUNTER — APPOINTMENT (OUTPATIENT)
Dept: INTERNAL MEDICINE | Age: 32
End: 2022-08-04

## 2022-08-04 ENCOUNTER — OFFICE VISIT (OUTPATIENT)
Dept: BARIATRICS/WEIGHT MGMT | Age: 32
End: 2022-08-04

## 2022-08-04 VITALS
DIASTOLIC BLOOD PRESSURE: 84 MMHG | HEART RATE: 72 BPM | OXYGEN SATURATION: 99 % | SYSTOLIC BLOOD PRESSURE: 130 MMHG | HEIGHT: 68 IN | BODY MASS INDEX: 44.41 KG/M2 | TEMPERATURE: 98.4 F | WEIGHT: 293 LBS

## 2022-08-04 DIAGNOSIS — G47.33 OSA (OBSTRUCTIVE SLEEP APNEA): ICD-10-CM

## 2022-08-04 DIAGNOSIS — Z98.84 S/P GASTRIC BYPASS: Primary | ICD-10-CM

## 2022-08-04 PROCEDURE — 99024 POSTOP FOLLOW-UP VISIT: CPT | Performed by: STUDENT IN AN ORGANIZED HEALTH CARE EDUCATION/TRAINING PROGRAM

## 2022-08-04 ASSESSMENT — ENCOUNTER SYMPTOMS
PSYCHIATRIC NEGATIVE: 1
RESPIRATORY NEGATIVE: 1
NEUROLOGICAL NEGATIVE: 1
CONSTITUTIONAL NEGATIVE: 1
GASTROINTESTINAL NEGATIVE: 1

## 2022-08-05 ENCOUNTER — PATIENT MESSAGE (OUTPATIENT)
Dept: MATERNAL FETAL MEDICINE | Facility: CLINIC | Age: 32
End: 2022-08-05
Payer: MEDICAID

## 2022-08-09 ENCOUNTER — PROCEDURE VISIT (OUTPATIENT)
Dept: MATERNAL FETAL MEDICINE | Facility: CLINIC | Age: 32
End: 2022-08-09
Payer: MEDICAID

## 2022-08-09 DIAGNOSIS — Z3A.10 10 WEEKS GESTATION OF PREGNANCY: ICD-10-CM

## 2022-08-09 DIAGNOSIS — Z3A.12 12 WEEKS GESTATION OF PREGNANCY: ICD-10-CM

## 2022-08-09 DIAGNOSIS — Z36.89 ENCOUNTER FOR FETAL ANATOMIC SURVEY: Primary | ICD-10-CM

## 2022-08-09 PROCEDURE — 76801 PR US, OB <14WKS, TRANSABD, SINGLE GESTATION: ICD-10-PCS | Mod: 26,S$PBB,, | Performed by: OBSTETRICS & GYNECOLOGY

## 2022-08-09 PROCEDURE — 76801 OB US < 14 WKS SINGLE FETUS: CPT | Mod: 26,S$PBB,, | Performed by: OBSTETRICS & GYNECOLOGY

## 2022-08-09 PROCEDURE — 76801 OB US < 14 WKS SINGLE FETUS: CPT | Mod: PBBFAC,PO | Performed by: OBSTETRICS & GYNECOLOGY

## 2022-08-10 ENCOUNTER — CASE MANAGEMENT (OUTPATIENT)
Dept: CARE COORDINATION | Age: 32
End: 2022-08-10

## 2022-08-15 ENCOUNTER — CASE MANAGEMENT (OUTPATIENT)
Dept: CARE COORDINATION | Age: 32
End: 2022-08-15

## 2022-08-17 ENCOUNTER — CASE MANAGEMENT (OUTPATIENT)
Dept: CARE COORDINATION | Age: 32
End: 2022-08-17

## 2022-08-19 ENCOUNTER — HOSPITAL ENCOUNTER (EMERGENCY)
Facility: HOSPITAL | Age: 32
Discharge: HOME | End: 2022-08-19
Attending: EMERGENCY MEDICINE
Payer: MEDICAID

## 2022-08-19 VITALS
WEIGHT: 137 LBS | BODY MASS INDEX: 24.27 KG/M2 | DIASTOLIC BLOOD PRESSURE: 50 MMHG | SYSTOLIC BLOOD PRESSURE: 91 MMHG | HEIGHT: 63 IN | OXYGEN SATURATION: 100 % | TEMPERATURE: 99 F | RESPIRATION RATE: 18 BRPM | HEART RATE: 88 BPM

## 2022-08-19 DIAGNOSIS — O46.90 VAGINAL BLEEDING IN PREGNANCY: Primary | ICD-10-CM

## 2022-08-19 DIAGNOSIS — O23.42 URINARY TRACT INFECTION IN MOTHER DURING SECOND TRIMESTER OF PREGNANCY: ICD-10-CM

## 2022-08-19 LAB
ALBUMIN SERPL BCP-MCNC: 3.2 G/DL (ref 3.5–5.2)
ALP SERPL-CCNC: 46 U/L (ref 55–135)
ALT SERPL W/O P-5'-P-CCNC: 14 U/L (ref 10–44)
ANION GAP SERPL CALC-SCNC: 9 MMOL/L (ref 8–16)
AST SERPL-CCNC: 18 U/L (ref 10–40)
BACTERIA #/AREA URNS HPF: ABNORMAL /HPF
BASOPHILS # BLD AUTO: 0.01 K/UL (ref 0–0.2)
BASOPHILS NFR BLD: 0.1 % (ref 0–1.9)
BILIRUB SERPL-MCNC: 0.9 MG/DL (ref 0.1–1)
BILIRUB UR QL STRIP: NEGATIVE
BUN SERPL-MCNC: 5 MG/DL (ref 6–20)
CALCIUM SERPL-MCNC: 9.1 MG/DL (ref 8.7–10.5)
CHLORIDE SERPL-SCNC: 107 MMOL/L (ref 95–110)
CLARITY UR: CLEAR
CO2 SERPL-SCNC: 21 MMOL/L (ref 23–29)
COLOR UR: YELLOW
CREAT SERPL-MCNC: 0.6 MG/DL (ref 0.5–1.4)
DIFFERENTIAL METHOD: ABNORMAL
EOSINOPHIL # BLD AUTO: 0.2 K/UL (ref 0–0.5)
EOSINOPHIL NFR BLD: 3.1 % (ref 0–8)
ERYTHROCYTE [DISTWIDTH] IN BLOOD BY AUTOMATED COUNT: 13.7 % (ref 11.5–14.5)
EST. GFR  (NO RACE VARIABLE): >60 ML/MIN/1.73 M^2
GLUCOSE SERPL-MCNC: 63 MG/DL (ref 70–110)
GLUCOSE UR QL STRIP: NEGATIVE
HCG INTACT+B SERPL-ACNC: NORMAL MIU/ML
HCT VFR BLD AUTO: 31.8 % (ref 37–48.5)
HGB BLD-MCNC: 10.8 G/DL (ref 12–16)
HGB UR QL STRIP: ABNORMAL
HYALINE CASTS #/AREA URNS LPF: 0 /LPF
IMM GRANULOCYTES # BLD AUTO: 0.02 K/UL (ref 0–0.04)
IMM GRANULOCYTES NFR BLD AUTO: 0.3 % (ref 0–0.5)
KETONES UR QL STRIP: ABNORMAL
LEUKOCYTE ESTERASE UR QL STRIP: ABNORMAL
LYMPHOCYTES # BLD AUTO: 1.5 K/UL (ref 1–4.8)
LYMPHOCYTES NFR BLD: 21.5 % (ref 18–48)
MCH RBC QN AUTO: 32.7 PG (ref 27–31)
MCHC RBC AUTO-ENTMCNC: 34 G/DL (ref 32–36)
MCV RBC AUTO: 96 FL (ref 82–98)
MICROSCOPIC COMMENT: ABNORMAL
MONOCYTES # BLD AUTO: 0.3 K/UL (ref 0.3–1)
MONOCYTES NFR BLD: 4.3 % (ref 4–15)
NEUTROPHILS # BLD AUTO: 5.1 K/UL (ref 1.8–7.7)
NEUTROPHILS NFR BLD: 70.7 % (ref 38–73)
NITRITE UR QL STRIP: NEGATIVE
NRBC BLD-RTO: 0 /100 WBC
PH UR STRIP: 7 [PH] (ref 5–8)
PLATELET # BLD AUTO: 165 K/UL (ref 150–450)
PMV BLD AUTO: 11 FL (ref 9.2–12.9)
POTASSIUM SERPL-SCNC: 3.4 MMOL/L (ref 3.5–5.1)
PROT SERPL-MCNC: 7.3 G/DL (ref 6–8.4)
PROT UR QL STRIP: ABNORMAL
RBC # BLD AUTO: 3.3 M/UL (ref 4–5.4)
RBC #/AREA URNS HPF: 6 /HPF (ref 0–4)
SODIUM SERPL-SCNC: 137 MMOL/L (ref 136–145)
SP GR UR STRIP: 1.03 (ref 1–1.03)
SQUAMOUS #/AREA URNS HPF: 5 /HPF
URN SPEC COLLECT METH UR: ABNORMAL
UROBILINOGEN UR STRIP-ACNC: ABNORMAL EU/DL
WBC # BLD AUTO: 7.17 K/UL (ref 3.9–12.7)
WBC #/AREA URNS HPF: 11 /HPF (ref 0–5)

## 2022-08-19 PROCEDURE — 84702 CHORIONIC GONADOTROPIN TEST: CPT | Performed by: NURSE PRACTITIONER

## 2022-08-19 PROCEDURE — 87077 CULTURE AEROBIC IDENTIFY: CPT | Performed by: NURSE PRACTITIONER

## 2022-08-19 PROCEDURE — 87186 SC STD MICRODIL/AGAR DIL: CPT | Performed by: NURSE PRACTITIONER

## 2022-08-19 PROCEDURE — 80053 COMPREHEN METABOLIC PANEL: CPT | Performed by: NURSE PRACTITIONER

## 2022-08-19 PROCEDURE — 85025 COMPLETE CBC W/AUTO DIFF WBC: CPT | Performed by: NURSE PRACTITIONER

## 2022-08-19 PROCEDURE — 87088 URINE BACTERIA CULTURE: CPT | Performed by: NURSE PRACTITIONER

## 2022-08-19 PROCEDURE — 81000 URINALYSIS NONAUTO W/SCOPE: CPT | Performed by: NURSE PRACTITIONER

## 2022-08-19 PROCEDURE — 87086 URINE CULTURE/COLONY COUNT: CPT | Performed by: NURSE PRACTITIONER

## 2022-08-19 PROCEDURE — 99283 EMERGENCY DEPT VISIT LOW MDM: CPT

## 2022-08-19 RX ORDER — CEPHALEXIN 500 MG/1
500 CAPSULE ORAL EVERY 12 HOURS
Qty: 14 CAPSULE | Refills: 0 | Status: SHIPPED | OUTPATIENT
Start: 2022-08-19 | End: 2022-08-26

## 2022-08-19 NOTE — ED PROVIDER NOTES
Encounter Date: 2022    SCRIBE #1 NOTE: ILata, am scribing for, and in the presence of, Jareth Gallardo DNP.       History     Chief Complaint   Patient presents with    Threatened Miscarriage     33 yo female to triage w/ complaints of abdominal cramping that started on yesterday and vaginal bleeding that started today. VSS, NAD, AAOx4. Pt tearful in triage. Pt denies complications w/ pregnancy. 14 weeks pregnant, BRIE 2/15/2023     CC: vaginal bleeding.     HPI: 32-year-old female, A0 14 weeks gravid, with a past medical history of asthma, crohn's disease, vulvar abscess, presents to the ED with vaginal bleeding. She reports she was using the bathroom prior to arrival today, when she noticed vaginal bleeding when she wiped. Denies bleeding through her underwear or pads. She also reports intermittent lower abdominal cramping, and white vaginal discharge. No other exacerbating or alleviating factors. No other associated symptoms.     The history is provided by the patient.     Review of patient's allergies indicates:   Allergen Reactions    Horse/equine containing products Itching     Past Medical History:   Diagnosis Date    Allergy     Anxiety     Asthma     Crohn's disease 19    Crohn's disease (ileum) 5/10/2019    S/P ileocecal resection-42 cm of ileum/10.5 cm cecum removed with surgical path c/w stricture and fistula 2/15/20 ileostomy takedown and mucus fistula takedown    Crohn's disease (ileum) 5/10/2019    S/P ileocecal resection-42 cm of ileum/10.5 cm cecum removed with surgical path c/w stricture and fistula 2/15/20 ileostomy takedown and mucus fistula takedown    Depression     Eczema     Ex-smoker for less than 1 year     Immunosuppressed status 10/24/2019    Joint pain     Perineal abscess 2021     Past Surgical History:   Procedure Laterality Date    COLONOSCOPY N/A 2019    Procedure: COLONOSCOPY;  Surgeon: Fawad Perla MD;  Location: [facility]  (2ND FLR);  Service: Endoscopy;  Laterality: N/A;    COLONOSCOPY N/A 10/30/2020    Procedure: COLONOSCOPY;  Surgeon: Suzi Bacon MD;  Location: [facility] (4TH FLR);  Service: Endoscopy;  Laterality: N/A;  covid test [facility] care 10/27  pt to try Mirilax prep with Reglan before per Dr. Bacon -   10/14-new instructions e-mailed-tb  10/16--new instructions e-mailed  10/29/20- Pt confirmed earlier arrival time, prep ins. reveiwed, Pt verbalized understanding- [id number]    COLONOSCOPY N/A 5/10/2022    Procedure: COLONOSCOPY;  Surgeon: Suzi Bacon MD;  Location: [facility] (4TH FLR);  Service: Endoscopy;  Laterality: N/A;  1/2022- same as last bowel prep (Miralax/Gatorade)  3/4 pt rescheduled to May/ COVID test on 5/3 at [facility]  instruction via portal  5/6 pt rescheduled; pt fully vaccinated; Rapid; instructions to portal-st    ESOPHAGOGASTRODUODENOSCOPY N/A 2/20/2019    Procedure: EGD (ESOPHAGOGASTRODUODENOSCOPY);  Surgeon: Fawad Perla MD;  Location: [facility] (2ND FLR);  Service: Endoscopy;  Laterality: N/A;    ILEOCOLECTOMY N/A 5/10/2019    Procedure: ILEOCOLECTOMY;  Surgeon: Uday Cope MD;  [facility];  Service: Colon and Rectal;  Laterality: N/A;  ileocecectomy    ILEOSCOPY N/A 12/20/2019    Procedure: ILEOSCOPY through stoma;  Surgeon: Suzi Bacon MD;  Location: [facility] (4TH FLR);  Service: Endoscopy;  Laterality: N/A;  Schedule as 30 minute case  Please let patient know to bring extra stoma bag/wafer, etc- entire bag and appliance will be removed at time of procedure to visualize the area well   first week 11/2019     per note-R/S to 12/20/19-GT    ILEOSTOMY N/A 5/10/2019    Procedure: CREATION, ILEOSTOMY with mucous fistula;  Surgeon: Uday Cope MD;  Location: [facility] OR 2ND FLR;  Service: Colon and Rectal;  Laterality: N/A;    ILEOSTOMY CLOSURE N/A 2/12/2020    Procedure: CLOSURE,ILEOSTOMY TAKEDOWN END ILEOSTOMY/MUCOUS FISTULA POSSIBLE LAPAROTOMY ERAS  KYLE;  Surgeon: Uday Cope MD;  Location: [facility] OR [address] [place] [address];  Service: Colon and Rectal;  Laterality: N/A;     Family History   Problem Relation Age of Onset    Hypertension Mother     No Known Problems Father     No Known Problems Sister     No Known Problems Brother     No Known Problems Sister     No Known Problems Sister     No Known Problems Sister     No Known Problems Maternal Uncle     No Known Problems Paternal Aunt     No Known Problems Paternal Uncle     No Known Problems Maternal Grandmother     Cataracts Maternal Grandfather     Irritable bowel syndrome Paternal Grandmother     No Known Problems Paternal Grandfather     No Known Problems Paternal Aunt     No Known Problems Paternal Uncle     No Known Problems Paternal Uncle     No Known Problems Paternal Uncle     No Known Problems Maternal Uncle     No Known Problems Maternal Uncle     Celiac disease Neg Hx     Cirrhosis Neg Hx     Colon cancer Neg Hx     Colon polyps Neg Hx     Crohn's disease Neg Hx     Cystic fibrosis Neg Hx     Esophageal cancer Neg Hx     Hemochromatosis Neg Hx     Inflammatory bowel disease Neg Hx     Liver cancer Neg Hx     Liver disease Neg Hx     Rectal cancer Neg Hx     Stomach cancer Neg Hx     Ulcerative colitis Neg Hx     Jerardo's disease Neg Hx     Lymphoma Neg Hx     Tuberculosis Neg Hx     Scleroderma Neg Hx     Rheum arthritis Neg Hx     Multiple sclerosis Neg Hx     Psoriasis Neg Hx     Lupus Neg Hx     Melanoma Neg Hx     Skin cancer Neg Hx     Amblyopia Neg Hx     Blindness Neg Hx     Cancer Neg Hx     Diabetes Neg Hx     Glaucoma Neg Hx     Macular degeneration Neg Hx     Retinal detachment Neg Hx     Strabismus Neg Hx     Stroke Neg Hx     Thyroid disease Neg Hx      Social History     Tobacco Use    Smoking status: Current Every Day Smoker     Packs/day: 0.25     Types: Cigarettes     Last attempt to quit: 2020     Years since quittin.6    Smokeless  tobacco: Never Used   Substance Use Topics    Alcohol use: Not Currently     Alcohol/week: 0.0 standard drinks     Comment: Socially    Drug use: Yes     Frequency: 3.0 times per week     Types: Marijuana     Comment: last today     Review of Systems   Constitutional: Negative for chills, fatigue and fever.   HENT: Negative for congestion, ear discharge, ear pain, postnasal drip, rhinorrhea, sinus pressure, sneezing, sore throat and voice change.    Eyes: Negative for discharge and itching.   Respiratory: Negative for cough, shortness of breath and wheezing.    Cardiovascular: Negative for chest pain, palpitations and leg swelling.   Gastrointestinal: Positive for abdominal pain. Negative for constipation, diarrhea, nausea and vomiting.   Endocrine: Negative for polydipsia, polyphagia and polyuria.   Genitourinary: Positive for vaginal bleeding and vaginal discharge. Negative for dysuria, frequency, hematuria, urgency and vaginal pain.   Musculoskeletal: Negative for arthralgias and myalgias.   Skin: Negative for rash and wound.   Neurological: Negative for dizziness, seizures, syncope, weakness and numbness.   Hematological: Negative for adenopathy. Does not bruise/bleed easily.   Psychiatric/Behavioral: Negative for self-injury and suicidal ideas. The patient is not nervous/anxious.        Physical Exam     Initial Vitals [08/19/22 1622]   BP Pulse Resp Temp SpO2   126/67 91 17 98.5 °F (36.9 °C) 100 %      MAP       --         Physical Exam    Nursing note and vitals reviewed.  Constitutional: She appears well-developed and well-nourished.   HENT:   Head: Normocephalic and atraumatic.   Right Ear: External ear normal.   Left Ear: External ear normal.   Nose: Nose normal.   Eyes: Conjunctivae and EOM are normal. Pupils are equal, round, and reactive to light. Right eye exhibits no discharge. Left eye exhibits no discharge.   Neck:   Normal range of motion.  Cardiovascular: Normal rate.   Pulmonary/Chest: No  respiratory distress.   Abdominal: Abdomen is soft. She exhibits no distension. There is no abdominal tenderness.   Musculoskeletal:         General: Normal range of motion.      Cervical back: Normal range of motion.     Neurological: She is alert and oriented to person, place, and time.   Skin: Skin is dry. Capillary refill takes less than 2 seconds.         ED Course   Procedures  Labs Reviewed   URINALYSIS, REFLEX TO URINE CULTURE - Abnormal; Notable for the following components:       Result Value    Protein, UA 2+ (*)     Ketones, UA 3+ (*)     Occult Blood UA Trace (*)     Urobilinogen, UA 2.0-3.0 (*)     Leukocytes, UA 2+ (*)     All other components within normal limits    Narrative:     Specimen Source->Urine   CBC W/ AUTO DIFFERENTIAL - Abnormal; Notable for the following components:    RBC 3.30 (*)     Hemoglobin 10.8 (*)     Hematocrit 31.8 (*)     MCH 32.7 (*)     All other components within normal limits    Narrative:     Release to patient->Immediate   COMPREHENSIVE METABOLIC PANEL - Abnormal; Notable for the following components:    Potassium 3.4 (*)     CO2 21 (*)     Glucose 63 (*)     BUN 5 (*)     Albumin 3.2 (*)     Alkaline Phosphatase 46 (*)     All other components within normal limits    Narrative:     Release to patient->Immediate   URINALYSIS MICROSCOPIC - Abnormal; Notable for the following components:    RBC, UA 6 (*)     WBC, UA 11 (*)     All other components within normal limits    Narrative:     Specimen Source->Urine   CULTURE, URINE   HCG, QUANTITATIVE    Narrative:     Release to patient->Immediate          Imaging Results    None          Medications   sodium chloride 0.9% bolus 1,000 mL (1,000 mLs Intravenous Not Given 22)     Medical Decision Making:   History:   Old Medical Records: I decided to obtain old medical records.  Initial Assessment:   This is an urgent evaluation of a A0 14 weeks gravid 32 y.o. female who presents with vaginal bleeding. The patient  was seen and examined. The history and physical exam was obtained. The nursing notes and vital signs were reviewed. Secondary to symptoms and examination findings, I ordered CBC, CMP, urinalysis, HCG level, fetal heart tones. Will reassesses.   Differential Diagnosis:   Differential diagnosis include but are not limited to: subchorionic hemorrhage, threatened miscarriage, UTI.   Clinical Tests:   Lab Tests: Ordered and Reviewed  ED Management:  08/19/2022 10:30 PM This is Vitor Espitia PA-C dictating.  I resumed care for the patient at shift change pending beta hCG and UA.      Beta hCG 44,147. UA shows bacteriuria with 2+ leukocytes 11 WBCs.  Given abdominal cramping, will treat patient for UTI with Keflex.    Patient received IV fluids in the ED.  Patient able tolerate p.o. without difficulty.  Bedside ultrasound was performed by myself which showed positive fetal movement.    Findings consistent with vaginal bleeding secondary to subchorionic hemorrhage seen on previous ultrasound.  Encourage patient follow-up with OB.  Return precautions given.  Patient verbalized understanding and agreed with plan.          Scribe Attestation:   Scribe #1: I performed the above scribed service and the documentation accurately describes the services I performed. I attest to the accuracy of the note.        ED Course as of 08/19/22 2231   Fri Aug 19, 2022   1703 BP: 126/67 [VC]   1703 Temp: 98.5 °F (36.9 °C) [VC]   1703 Temp src: Oral [VC]   1703 Pulse: 91 [VC]   1703 Resp: 17 [VC]   1703 SpO2: 100 % [VC]   1915 Complete report given to the patient and her visitor.  They understand that we are waiting for laboratory test results for medical decision making and disposition. [VC]   1916 CBC auto differential(!)  Mild anemia present normal CBC otherwise. [VC]   1938 Comprehensive metabolic panel(!)  Mild hypokalemia present.  Normal cmp otherwise. [VC]   1955 SBAR given to Vitor Espitia, My care ends now [VC]      ED Course User  Index  [VC] Jareth Gallardo DNP          fetal heart tones at 01:38 over the suprapubic region.  Abdomen soft and nontender.  Patient reports blood is present only when wiping.  Previous ultrasound demonstrated the presence of the subchorionic hemorrhage which is likely the cause of the bleeding.  Currently awaiting laboratory test results.  Time of dictation 19:33 on 08/19/2022.    Clinical Impression:   Final diagnoses:  [O46.90] Vaginal bleeding in pregnancy (Primary)  [O23.42] Urinary tract infection in mother during second trimester of pregnancy          ED Disposition Condition    Discharge Stable       I, Jareth Gallardo DNP ACNP-BC FNP-C ENP-C , personally performed the services described in this documentation. All medical record entries made by the scribe were at my direction and in my presence. I have reviewed the chart and agree that the record reflects my personal performance and is accurate and complete.    ED Prescriptions     Medication Sig Dispense Start Date End Date Auth. Provider    cephALEXin (KEFLEX) 500 MG capsule Take 1 capsule (500 mg total) by mouth every 12 (twelve) hours. for 7 days 14 capsule 8/19/2022 8/26/2022 Vitor Espitia PA-C        Follow-up Information     Follow up With Specialties Details Why Contact Info    Geo Espitia MD Obstetrics and Gynecology Schedule an appointment as soon as possible for a visit   [address]  [address] [place] [id number]  [phone]             Vitor Espitia PA-C  08/19/22 0063

## 2022-08-20 NOTE — DISCHARGE INSTRUCTIONS
Return to the Emergency Department for any worsening, change in condition, or any emergent concerns.

## 2022-08-21 LAB — BACTERIA UR CULT: ABNORMAL

## 2022-08-23 ENCOUNTER — LAB VISIT (OUTPATIENT)
Dept: LAB | Facility: HOSPITAL | Age: 32
End: 2022-08-23
Attending: FAMILY MEDICINE
Payer: MEDICAID

## 2022-08-23 DIAGNOSIS — Z3A.10 10 WEEKS GESTATION OF PREGNANCY: ICD-10-CM

## 2022-08-23 LAB
ABO + RH BLD: NORMAL
ALBUMIN SERPL BCP-MCNC: 3.1 G/DL (ref 3.5–5.2)
ALP SERPL-CCNC: 42 U/L (ref 55–135)
ALT SERPL W/O P-5'-P-CCNC: 27 U/L (ref 10–44)
ANION GAP SERPL CALC-SCNC: 10 MMOL/L (ref 8–16)
AST SERPL-CCNC: 20 U/L (ref 10–40)
BASOPHILS # BLD AUTO: 0.01 K/UL (ref 0–0.2)
BASOPHILS NFR BLD: 0.2 % (ref 0–1.9)
BILIRUB SERPL-MCNC: 0.6 MG/DL (ref 0.1–1)
BLD GP AB SCN CELLS X3 SERPL QL: NORMAL
BUN SERPL-MCNC: 6 MG/DL (ref 6–20)
CALCIUM SERPL-MCNC: 8.9 MG/DL (ref 8.7–10.5)
CHLORIDE SERPL-SCNC: 106 MMOL/L (ref 95–110)
CO2 SERPL-SCNC: 21 MMOL/L (ref 23–29)
CREAT SERPL-MCNC: 0.6 MG/DL (ref 0.5–1.4)
DIFFERENTIAL METHOD: ABNORMAL
EOSINOPHIL # BLD AUTO: 0.2 K/UL (ref 0–0.5)
EOSINOPHIL NFR BLD: 4 % (ref 0–8)
ERYTHROCYTE [DISTWIDTH] IN BLOOD BY AUTOMATED COUNT: 13.9 % (ref 11.5–14.5)
EST. GFR  (NO RACE VARIABLE): >60 ML/MIN/1.73 M^2
ESTIMATED AVG GLUCOSE: 68 MG/DL (ref 68–131)
GLUCOSE SERPL-MCNC: 68 MG/DL (ref 70–110)
HBA1C MFR BLD: 4 % (ref 4–5.6)
HCT VFR BLD AUTO: 32.9 % (ref 37–48.5)
HGB BLD-MCNC: 11.1 G/DL (ref 12–16)
IMM GRANULOCYTES # BLD AUTO: 0.01 K/UL (ref 0–0.04)
IMM GRANULOCYTES NFR BLD AUTO: 0.2 % (ref 0–0.5)
LYMPHOCYTES # BLD AUTO: 1.4 K/UL (ref 1–4.8)
LYMPHOCYTES NFR BLD: 24.9 % (ref 18–48)
MCH RBC QN AUTO: 32.6 PG (ref 27–31)
MCHC RBC AUTO-ENTMCNC: 33.7 G/DL (ref 32–36)
MCV RBC AUTO: 97 FL (ref 82–98)
MONOCYTES # BLD AUTO: 0.4 K/UL (ref 0.3–1)
MONOCYTES NFR BLD: 6.3 % (ref 4–15)
NEUTROPHILS # BLD AUTO: 3.7 K/UL (ref 1.8–7.7)
NEUTROPHILS NFR BLD: 64.4 % (ref 38–73)
NRBC BLD-RTO: 0 /100 WBC
PLATELET # BLD AUTO: 191 K/UL (ref 150–450)
PMV BLD AUTO: 11.2 FL (ref 9.2–12.9)
POTASSIUM SERPL-SCNC: 3.3 MMOL/L (ref 3.5–5.1)
PROT SERPL-MCNC: 7.1 G/DL (ref 6–8.4)
RBC # BLD AUTO: 3.4 M/UL (ref 4–5.4)
SODIUM SERPL-SCNC: 137 MMOL/L (ref 136–145)
WBC # BLD AUTO: 5.74 K/UL (ref 3.9–12.7)

## 2022-08-23 PROCEDURE — 83036 HEMOGLOBIN GLYCOSYLATED A1C: CPT | Performed by: OBSTETRICS & GYNECOLOGY

## 2022-08-23 PROCEDURE — 86762 RUBELLA ANTIBODY: CPT | Performed by: OBSTETRICS & GYNECOLOGY

## 2022-08-23 PROCEDURE — 86850 RBC ANTIBODY SCREEN: CPT | Performed by: OBSTETRICS & GYNECOLOGY

## 2022-08-23 PROCEDURE — 80053 COMPREHEN METABOLIC PANEL: CPT | Performed by: OBSTETRICS & GYNECOLOGY

## 2022-08-23 PROCEDURE — 86592 SYPHILIS TEST NON-TREP QUAL: CPT | Performed by: OBSTETRICS & GYNECOLOGY

## 2022-08-23 PROCEDURE — 87340 HEPATITIS B SURFACE AG IA: CPT | Performed by: OBSTETRICS & GYNECOLOGY

## 2022-08-23 PROCEDURE — 85025 COMPLETE CBC W/AUTO DIFF WBC: CPT | Performed by: OBSTETRICS & GYNECOLOGY

## 2022-08-23 PROCEDURE — 83020 HEMOGLOBIN ELECTROPHORESIS: CPT | Performed by: OBSTETRICS & GYNECOLOGY

## 2022-08-23 PROCEDURE — 87389 HIV-1 AG W/HIV-1&-2 AB AG IA: CPT | Performed by: OBSTETRICS & GYNECOLOGY

## 2022-08-23 PROCEDURE — 36415 COLL VENOUS BLD VENIPUNCTURE: CPT | Mod: PO | Performed by: OBSTETRICS & GYNECOLOGY

## 2022-08-23 PROCEDURE — 86803 HEPATITIS C AB TEST: CPT | Performed by: OBSTETRICS & GYNECOLOGY

## 2022-08-24 LAB
HBV SURFACE AG SERPL QL IA: NEGATIVE
HCV AB SERPL QL IA: NEGATIVE
HIV 1+2 AB+HIV1 P24 AG SERPL QL IA: NEGATIVE
RPR SER QL: NORMAL
RUBV IGG SER-ACNC: 12 IU/ML
RUBV IGG SER-IMP: REACTIVE

## 2022-08-26 ENCOUNTER — CASE MANAGEMENT (OUTPATIENT)
Dept: CARE COORDINATION | Age: 32
End: 2022-08-26

## 2022-08-26 LAB
HGB A2 MFR BLD HPLC: 3.1 % (ref 2.2–3.2)
HGB FRACT BLD ELPH-IMP: NORMAL
HGB FRACT BLD ELPH-IMP: NORMAL

## 2022-08-29 ENCOUNTER — CASE MANAGEMENT (OUTPATIENT)
Dept: CARE COORDINATION | Age: 32
End: 2022-08-29

## 2022-08-29 ENCOUNTER — ROUTINE PRENATAL (OUTPATIENT)
Dept: OBSTETRICS AND GYNECOLOGY | Facility: CLINIC | Age: 32
End: 2022-08-29
Payer: MEDICAID

## 2022-08-29 VITALS
BODY MASS INDEX: 24.37 KG/M2 | DIASTOLIC BLOOD PRESSURE: 64 MMHG | SYSTOLIC BLOOD PRESSURE: 102 MMHG | WEIGHT: 137.56 LBS

## 2022-08-29 DIAGNOSIS — Z3A.15 15 WEEKS GESTATION OF PREGNANCY: Primary | ICD-10-CM

## 2022-08-29 DIAGNOSIS — O99.019 ANTEPARTUM ANEMIA: ICD-10-CM

## 2022-08-29 DIAGNOSIS — O99.619 MATERNAL CROHN'S DISEASE AFFECTING PREGNANCY, ANTEPARTUM: ICD-10-CM

## 2022-08-29 DIAGNOSIS — K50.90 MATERNAL CROHN'S DISEASE AFFECTING PREGNANCY, ANTEPARTUM: ICD-10-CM

## 2022-08-29 PROCEDURE — 99999 PR PBB SHADOW E&M-EST. PATIENT-LVL III: ICD-10-PCS | Mod: PBBFAC,,, | Performed by: OBSTETRICS & GYNECOLOGY

## 2022-08-29 PROCEDURE — 87086 URINE CULTURE/COLONY COUNT: CPT | Performed by: OBSTETRICS & GYNECOLOGY

## 2022-08-29 PROCEDURE — 99213 OFFICE O/P EST LOW 20 MIN: CPT | Mod: PBBFAC,TH | Performed by: OBSTETRICS & GYNECOLOGY

## 2022-08-29 PROCEDURE — 99213 OFFICE O/P EST LOW 20 MIN: CPT | Mod: TH,S$PBB,, | Performed by: OBSTETRICS & GYNECOLOGY

## 2022-08-29 PROCEDURE — 99999 PR PBB SHADOW E&M-EST. PATIENT-LVL III: CPT | Mod: PBBFAC,,, | Performed by: OBSTETRICS & GYNECOLOGY

## 2022-08-29 PROCEDURE — 99213 PR OFFICE/OUTPT VISIT, EST, LEVL III, 20-29 MIN: ICD-10-PCS | Mod: TH,S$PBB,, | Performed by: OBSTETRICS & GYNECOLOGY

## 2022-08-29 RX ORDER — FERROUS SULFATE 325(65) MG
325 TABLET, DELAYED RELEASE (ENTERIC COATED) ORAL 2 TIMES DAILY
Qty: 60 TABLET | Refills: 3 | Status: SHIPPED | OUTPATIENT
Start: 2022-08-29 | End: 2022-09-26 | Stop reason: SDUPTHER

## 2022-08-29 RX ORDER — VITAMIN A, VITAMIN C, VITAMIN D, VITAMIN E, THIAMINE, RIBOFLAVIN, NIACIN, VITAMIN B6, FOLIC ACID, VITAMIN B12, CALCIUM, IRON, ZINC, COPPER 4000; 120; 400; 22; 1.84; 3; 20; 10; 1; 12; 200; 27; 25; 2 [IU]/1; MG/1; [IU]/1; [IU]/1; MG/1; MG/1; MG/1; MG/1; MG/1; UG/1; MG/1; MG/1; MG/1; MG/1
1 TABLET ORAL DAILY
Status: ON HOLD | COMMUNITY
Start: 2022-07-29 | End: 2022-12-08 | Stop reason: HOSPADM

## 2022-08-30 NOTE — PROGRESS NOTES
32 y.o.  at 15w5d here for OB visit.    Pregnancy complicated by:  Crohn's disease dx 2019  H/o partial bowel resection with ileostomy 2019 secondary to Crohn's  Anemia, iron deficiency      Pt has no major complaints today.  Reports active fetus.  Denies vaginal bleeding, leakage of fluid, or contractions.    Pt was treated for asymptomatic bacteriuria since last visit.  Denies UTI sxs.  Repeat urine cx today.    Crohn's currently in remission on wkly injection of Humira qFriday.  Pt advised to f/u closely with GI, Dr. Michael Bacon at [facility].  Increase folic acid.  GI precautions.    Discussed implications of anemia on pregnancy.    Start fergon.    Anemia precautions.    Dietary advice.    Initial OB lab results d/w pt.  Discussed aneuploidy screening options at this gestational age.  Pt is interested in only quad screen, declined cfDNA or invasive testing/amnio.  Limitation of quad screen discussed.  Refer to [facility] for anatomy ultrasound.    Encourage daily low dose ASA for prevention of preE if tolerable with Crohn's.  Encourage participation in Connective MOM program.  Encourage Covid and flu vaccine.    Principles of prenatal care and precautions reviewed.  Return 4 wks or sooner prn.  All questions answered, voiced understanding.      Geo Espitia MD

## 2022-08-31 ENCOUNTER — LAB VISIT (OUTPATIENT)
Dept: LAB | Facility: HOSPITAL | Age: 32
End: 2022-08-31
Attending: OBSTETRICS & GYNECOLOGY
Payer: MEDICAID

## 2022-08-31 DIAGNOSIS — Z3A.15 15 WEEKS GESTATION OF PREGNANCY: ICD-10-CM

## 2022-08-31 LAB — BACTERIA UR CULT: NORMAL

## 2022-08-31 PROCEDURE — 36415 COLL VENOUS BLD VENIPUNCTURE: CPT | Mod: PO | Performed by: OBSTETRICS & GYNECOLOGY

## 2022-08-31 PROCEDURE — 81511 FTL CGEN ABNOR FOUR ANAL: CPT | Performed by: OBSTETRICS & GYNECOLOGY

## 2022-09-06 LAB
# FETUSES US: NORMAL
2ND TRIMESTER 4 SCREEN PNL SERPL: NEGATIVE
2ND TRIMESTER 4 SCREEN SERPL-IMP: NORMAL
AFP MOM SERPL: 0.79
AFP SERPL-MCNC: 33.3 NG/ML
AGE AT DELIVERY: 31
B-HCG MOM SERPL: 0.58
B-HCG SERPL-ACNC: 28.9 IU/ML
FET TS 21 RISK FROM MAT AGE: NORMAL
GA (DAYS): 0 D
GA (WEEKS): 16 WK
GA METHOD: NORMAL
IDDM PATIENT QL: NORMAL
INHIBIN A MOM SERPL: 1.22
INHIBIN A SERPL-MCNC: 189.3 PG/ML
SMOKING STATUS FTND: NO
TS 18 RISK FETUS: NORMAL
TS 21 RISK FETUS: NORMAL
U ESTRIOL MOM SERPL: 0.59
U ESTRIOL SERPL-MCNC: 0.57 NG/ML

## 2022-09-08 ENCOUNTER — APPOINTMENT (OUTPATIENT)
Dept: INTERNAL MEDICINE | Age: 32
End: 2022-09-08

## 2022-09-13 ENCOUNTER — TELEPHONE (OUTPATIENT)
Dept: OBSTETRICS AND GYNECOLOGY | Facility: CLINIC | Age: 32
End: 2022-09-13
Payer: MEDICAID

## 2022-09-13 NOTE — TELEPHONE ENCOUNTER
Left message to assist pt with finding the sex of her baby.      ----- Message from Keyonna Marinelli sent at 9/13/2022 12:20 PM CDT -----  Type: Patient Call Back    Who called: SELF     What is the request in detail: pt is calling in for the sex of her baby it was not released Please call her asap     Can the clinic reply by [facility]?    Would the patient rather a call back or a response via [facility]?     Best call back number: [phone] (home)

## 2022-09-26 ENCOUNTER — ROUTINE PRENATAL (OUTPATIENT)
Dept: OBSTETRICS AND GYNECOLOGY | Facility: CLINIC | Age: 32
End: 2022-09-26
Payer: MEDICAID

## 2022-09-26 ENCOUNTER — PATIENT MESSAGE (OUTPATIENT)
Dept: MATERNAL FETAL MEDICINE | Facility: CLINIC | Age: 32
End: 2022-09-26
Payer: MEDICAID

## 2022-09-26 VITALS
SYSTOLIC BLOOD PRESSURE: 110 MMHG | BODY MASS INDEX: 24.92 KG/M2 | WEIGHT: 140.63 LBS | DIASTOLIC BLOOD PRESSURE: 60 MMHG

## 2022-09-26 DIAGNOSIS — Z3A.19 19 WEEKS GESTATION OF PREGNANCY: Primary | ICD-10-CM

## 2022-09-26 DIAGNOSIS — O99.019 ANTEPARTUM ANEMIA: ICD-10-CM

## 2022-09-26 PROCEDURE — 99999 PR PBB SHADOW E&M-EST. PATIENT-LVL III: ICD-10-PCS | Mod: PBBFAC,,, | Performed by: OBSTETRICS & GYNECOLOGY

## 2022-09-26 PROCEDURE — 99999 PR PBB SHADOW E&M-EST. PATIENT-LVL III: CPT | Mod: PBBFAC,,, | Performed by: OBSTETRICS & GYNECOLOGY

## 2022-09-26 PROCEDURE — 99213 OFFICE O/P EST LOW 20 MIN: CPT | Mod: PBBFAC,TH | Performed by: OBSTETRICS & GYNECOLOGY

## 2022-09-26 PROCEDURE — 99213 PR OFFICE/OUTPT VISIT, EST, LEVL III, 20-29 MIN: ICD-10-PCS | Mod: TH,S$PBB,, | Performed by: OBSTETRICS & GYNECOLOGY

## 2022-09-26 PROCEDURE — 99213 OFFICE O/P EST LOW 20 MIN: CPT | Mod: TH,S$PBB,, | Performed by: OBSTETRICS & GYNECOLOGY

## 2022-09-26 RX ORDER — FERROUS SULFATE 325(65) MG
325 TABLET, DELAYED RELEASE (ENTERIC COATED) ORAL 2 TIMES DAILY
Qty: 60 TABLET | Refills: 3 | Status: SHIPPED | OUTPATIENT
Start: 2022-09-26 | End: 2023-01-05 | Stop reason: SDUPTHER

## 2022-09-26 NOTE — PROGRESS NOTES
32 y.o.  at 19w5d here for OB visit.    Pregnancy complicated by:  Crohn's disease dx 2019  H/o partial bowel resection with ileostomy 2019 secondary to Crohn's  Anemia, iron deficiency      No major complaints today.  Reports active fetus.  Denies vaginal bleeding, leakage of fluid, or contractions.    Crohn's currently in remission on wkly injection of Humira qFriday.  Pt advised to f/u closely with GI, Dr. Michael Bacon at [facility].  Increase folic acid.  GI precautions.    Pt has apt with MFM tomorrow for anatomy US.  Quad screen negative.    Continue fergon for anemia.  Encourage daily low dose ASA for prevention of preE if tolerable with Crohn's.  Encourage participation in Connective MOM program.  Encourage Covid and flu vaccine.    Principles of prenatal care and precautions reviewed.  Return 4 wks or sooner prn.  All questions answered, voiced understanding.  Significant other present for visit.      Geo Espitia MD

## 2022-09-27 ENCOUNTER — PROCEDURE VISIT (OUTPATIENT)
Dept: MATERNAL FETAL MEDICINE | Facility: CLINIC | Age: 32
End: 2022-09-27
Payer: MEDICAID

## 2022-09-27 DIAGNOSIS — Z36.2 ENCOUNTER FOR FOLLOW-UP ULTRASOUND OF FETAL ANATOMY: Primary | ICD-10-CM

## 2022-09-27 DIAGNOSIS — Z36.89 ENCOUNTER FOR FETAL ANATOMIC SURVEY: ICD-10-CM

## 2022-09-27 PROCEDURE — 76805 OB US >/= 14 WKS SNGL FETUS: CPT | Mod: PBBFAC,PO | Performed by: OBSTETRICS & GYNECOLOGY

## 2022-09-27 PROCEDURE — 76805 US MFM PROCEDURE (VIEWPOINT): ICD-10-PCS | Mod: 26,S$PBB,, | Performed by: OBSTETRICS & GYNECOLOGY

## 2022-10-26 ENCOUNTER — ROUTINE PRENATAL (OUTPATIENT)
Dept: OBSTETRICS AND GYNECOLOGY | Facility: CLINIC | Age: 32
End: 2022-10-26
Payer: MEDICAID

## 2022-10-26 VITALS
BODY MASS INDEX: 25.29 KG/M2 | DIASTOLIC BLOOD PRESSURE: 64 MMHG | SYSTOLIC BLOOD PRESSURE: 100 MMHG | WEIGHT: 142.75 LBS

## 2022-10-26 DIAGNOSIS — O99.619 MATERNAL CROHN'S DISEASE AFFECTING PREGNANCY, ANTEPARTUM: ICD-10-CM

## 2022-10-26 DIAGNOSIS — O99.019 ANTEPARTUM ANEMIA: ICD-10-CM

## 2022-10-26 DIAGNOSIS — Z3A.24 24 WEEKS GESTATION OF PREGNANCY: Primary | ICD-10-CM

## 2022-10-26 DIAGNOSIS — K50.90 MATERNAL CROHN'S DISEASE AFFECTING PREGNANCY, ANTEPARTUM: ICD-10-CM

## 2022-10-26 PROCEDURE — 99213 PR OFFICE/OUTPT VISIT, EST, LEVL III, 20-29 MIN: ICD-10-PCS | Mod: TH,S$PBB,, | Performed by: OBSTETRICS & GYNECOLOGY

## 2022-10-26 PROCEDURE — 99213 OFFICE O/P EST LOW 20 MIN: CPT | Mod: TH,S$PBB,, | Performed by: OBSTETRICS & GYNECOLOGY

## 2022-10-26 PROCEDURE — 99999 PR PBB SHADOW E&M-EST. PATIENT-LVL III: CPT | Mod: PBBFAC,,, | Performed by: OBSTETRICS & GYNECOLOGY

## 2022-10-26 PROCEDURE — 99213 OFFICE O/P EST LOW 20 MIN: CPT | Mod: PBBFAC,TH | Performed by: OBSTETRICS & GYNECOLOGY

## 2022-10-26 PROCEDURE — 99999 PR PBB SHADOW E&M-EST. PATIENT-LVL III: ICD-10-PCS | Mod: PBBFAC,,, | Performed by: OBSTETRICS & GYNECOLOGY

## 2022-10-26 NOTE — PROGRESS NOTES
32 y.o.  at 24w0d here for OB visit.    Pregnancy complicated by:  Crohn's disease dx 2019  H/o partial bowel resection with ileostomy 2019 secondary to Crohn's  Anemia, iron deficiency      Pt has no major complaints today.  Reports active fetus.  Denies vaginal bleeding, leakage of fluid, or contractions.    Crohn's currently in remission on wkly injection of Humira qFriday.  Pt advised to f/u closely with GI, Dr. Michael Bacon at [facility].  Increase folic acid.  GI precautions.    Pt seen MFM on :    Impression   =========   A cuellar living IUP is identified.   Fetal size is appropriate for established dating ( g).   No fetal structural malformations are identified; however, the anatomic survey is incomplete as noted above.   Transabdominal cervical length is normal.   Placental location is anterior without evidence of previa.   A moderate-sized subchorionic hemorrhage is seen.     Recommendation   ==============   The patient will be scheduled for a follow up exam to complete the anatomic survey     Order 1 hr glucola, CBC.  Continue fergon for anemia.  Encourage daily low dose ASA for prevention of preE if tolerable with Crohn's.  Encourage participation in Connective MOM program.  Encourage Covid and flu vaccine.    Labor/preE precautions.  Kick counts.  Return 4 wks or sooner prn.  All questions answered, voiced understanding.  Significant other present for visit.      Geo Espitia MD

## 2022-10-28 ENCOUNTER — PATIENT MESSAGE (OUTPATIENT)
Dept: MATERNAL FETAL MEDICINE | Facility: CLINIC | Age: 32
End: 2022-10-28
Payer: MEDICAID

## 2022-10-31 ENCOUNTER — PROCEDURE VISIT (OUTPATIENT)
Dept: MATERNAL FETAL MEDICINE | Facility: CLINIC | Age: 32
End: 2022-10-31
Payer: MEDICAID

## 2022-10-31 DIAGNOSIS — Z36.2 ENCOUNTER FOR FOLLOW-UP ULTRASOUND OF FETAL ANATOMY: ICD-10-CM

## 2022-10-31 PROCEDURE — 76816 OB US FOLLOW-UP PER FETUS: CPT | Mod: PBBFAC,PO | Performed by: OBSTETRICS & GYNECOLOGY

## 2022-10-31 PROCEDURE — 76816 US MFM PROCEDURE (VIEWPOINT): ICD-10-PCS | Mod: 26,S$PBB,, | Performed by: OBSTETRICS & GYNECOLOGY

## 2022-11-02 ENCOUNTER — PATIENT MESSAGE (OUTPATIENT)
Dept: ADMINISTRATIVE | Facility: OTHER | Age: 32
End: 2022-11-02
Payer: MEDICAID

## 2022-11-09 NOTE — PROGRESS NOTES
Light tx 4 for psoriasis. NB-UVB to body at 350 mj with eyes shielded.    Complex Repair And O-T Advancement Flap Text: The defect edges were debeveled with a #15 scalpel blade.  The primary defect was closed partially with a complex linear closure.  Given the location of the remaining defect, shape of the defect and the proximity to free margins an O-T advancement flap was deemed most appropriate for complete closure of the defect.  Using a sterile surgical marker, an appropriate advancement flap was drawn incorporating the defect and placing the expected incisions within the relaxed skin tension lines where possible.    The area thus outlined was incised deep to adipose tissue with a #15 scalpel blade.  The skin margins were undermined to an appropriate distance in all directions utilizing iris scissors.

## 2022-11-29 ENCOUNTER — HOSPITAL ENCOUNTER (OUTPATIENT)
Facility: HOSPITAL | Age: 32
Discharge: HOME | End: 2022-11-30
Attending: OBSTETRICS & GYNECOLOGY | Admitting: OBSTETRICS & GYNECOLOGY
Payer: MEDICAID

## 2022-11-29 DIAGNOSIS — M54.9 BACK PAIN COMPLICATING PREGNANCY IN THIRD TRIMESTER: ICD-10-CM

## 2022-11-29 DIAGNOSIS — O99.891 BACK PAIN COMPLICATING PREGNANCY IN THIRD TRIMESTER: ICD-10-CM

## 2022-11-29 LAB
BACTERIA #/AREA URNS HPF: ABNORMAL /HPF
BASOPHILS # BLD AUTO: 0.03 K/UL (ref 0–0.2)
BASOPHILS NFR BLD: 0.2 % (ref 0–1.9)
BILIRUB UR QL STRIP: ABNORMAL
CLARITY UR: ABNORMAL
COLOR UR: ABNORMAL
DIFFERENTIAL METHOD: ABNORMAL
EOSINOPHIL # BLD AUTO: 0.1 K/UL (ref 0–0.5)
EOSINOPHIL NFR BLD: 0.4 % (ref 0–8)
ERYTHROCYTE [DISTWIDTH] IN BLOOD BY AUTOMATED COUNT: 13.5 % (ref 11.5–14.5)
GLUCOSE UR QL STRIP: NEGATIVE
HCT VFR BLD AUTO: 31.5 % (ref 37–48.5)
HGB BLD-MCNC: 11 G/DL (ref 12–16)
HGB UR QL STRIP: ABNORMAL
HYALINE CASTS #/AREA URNS LPF: ABNORMAL /LPF
IMM GRANULOCYTES # BLD AUTO: 0.12 K/UL (ref 0–0.04)
IMM GRANULOCYTES NFR BLD AUTO: 0.7 % (ref 0–0.5)
KETONES UR QL STRIP: ABNORMAL
LEUKOCYTE ESTERASE UR QL STRIP: ABNORMAL
LYMPHOCYTES # BLD AUTO: 2.2 K/UL (ref 1–4.8)
LYMPHOCYTES NFR BLD: 12.4 % (ref 18–48)
MCH RBC QN AUTO: 35 PG (ref 27–31)
MCHC RBC AUTO-ENTMCNC: 34.9 G/DL (ref 32–36)
MCV RBC AUTO: 100 FL (ref 82–98)
MICROSCOPIC COMMENT: ABNORMAL
MONOCYTES # BLD AUTO: 1 K/UL (ref 0.3–1)
MONOCYTES NFR BLD: 5.7 % (ref 4–15)
NEUTROPHILS # BLD AUTO: 14.3 K/UL (ref 1.8–7.7)
NEUTROPHILS NFR BLD: 80.6 % (ref 38–73)
NITRITE UR QL STRIP: NEGATIVE
NRBC BLD-RTO: 0 /100 WBC
PH UR STRIP: 7 [PH] (ref 5–8)
PLATELET # BLD AUTO: 192 K/UL (ref 150–450)
PMV BLD AUTO: 9.9 FL (ref 9.2–12.9)
PROT UR QL STRIP: ABNORMAL
RBC # BLD AUTO: 3.14 M/UL (ref 4–5.4)
RBC #/AREA URNS HPF: 13 /HPF (ref 0–4)
SP GR UR STRIP: 1.02 (ref 1–1.03)
SQUAMOUS #/AREA URNS HPF: 37 /HPF
URN SPEC COLLECT METH UR: ABNORMAL
UROBILINOGEN UR STRIP-ACNC: ABNORMAL EU/DL
WBC # BLD AUTO: 17.78 K/UL (ref 3.9–12.7)
WBC #/AREA URNS HPF: 49 /HPF (ref 0–5)

## 2022-11-29 PROCEDURE — 87088 URINE BACTERIA CULTURE: CPT | Performed by: OBSTETRICS & GYNECOLOGY

## 2022-11-29 PROCEDURE — 81000 URINALYSIS NONAUTO W/SCOPE: CPT | Performed by: OBSTETRICS & GYNECOLOGY

## 2022-11-29 PROCEDURE — 99211 OFF/OP EST MAY X REQ PHY/QHP: CPT | Mod: 25

## 2022-11-29 PROCEDURE — 87077 CULTURE AEROBIC IDENTIFY: CPT | Performed by: OBSTETRICS & GYNECOLOGY

## 2022-11-29 PROCEDURE — 87086 URINE CULTURE/COLONY COUNT: CPT | Performed by: OBSTETRICS & GYNECOLOGY

## 2022-11-29 PROCEDURE — 63600175 PHARM REV CODE 636 W HCPCS: Performed by: OBSTETRICS & GYNECOLOGY

## 2022-11-29 PROCEDURE — 85025 COMPLETE CBC W/AUTO DIFF WBC: CPT | Performed by: OBSTETRICS & GYNECOLOGY

## 2022-11-29 PROCEDURE — 80053 COMPREHEN METABOLIC PANEL: CPT | Performed by: OBSTETRICS & GYNECOLOGY

## 2022-11-29 PROCEDURE — 87186 SC STD MICRODIL/AGAR DIL: CPT | Performed by: OBSTETRICS & GYNECOLOGY

## 2022-11-29 PROCEDURE — 59025 FETAL NON-STRESS TEST: CPT

## 2022-11-29 PROCEDURE — 25000003 PHARM REV CODE 250: Performed by: OBSTETRICS & GYNECOLOGY

## 2022-11-29 RX ORDER — ACETAMINOPHEN 325 MG/1
650 TABLET ORAL EVERY 6 HOURS PRN
Status: DISCONTINUED | OUTPATIENT
Start: 2022-11-29 | End: 2022-11-30 | Stop reason: HOSPADM

## 2022-11-29 RX ORDER — ONDANSETRON 4 MG/1
4 TABLET, ORALLY DISINTEGRATING ORAL EVERY 6 HOURS PRN
Status: DISCONTINUED | OUTPATIENT
Start: 2022-11-29 | End: 2022-11-30 | Stop reason: HOSPADM

## 2022-11-29 RX ORDER — SODIUM CHLORIDE, SODIUM LACTATE, POTASSIUM CHLORIDE, CALCIUM CHLORIDE 600; 310; 30; 20 MG/100ML; MG/100ML; MG/100ML; MG/100ML
INJECTION, SOLUTION INTRAVENOUS CONTINUOUS
Status: DISCONTINUED | OUTPATIENT
Start: 2022-11-29 | End: 2022-11-30 | Stop reason: HOSPADM

## 2022-11-29 RX ADMIN — ONDANSETRON 4 MG: 4 TABLET, ORALLY DISINTEGRATING ORAL at 11:11

## 2022-11-29 RX ADMIN — SODIUM CHLORIDE, SODIUM LACTATE, POTASSIUM CHLORIDE, AND CALCIUM CHLORIDE: .6; .31; .03; .02 INJECTION, SOLUTION INTRAVENOUS at 11:11

## 2022-11-30 ENCOUNTER — TELEPHONE (OUTPATIENT)
Dept: OBSTETRICS AND GYNECOLOGY | Facility: CLINIC | Age: 32
End: 2022-11-30
Payer: MEDICAID

## 2022-11-30 VITALS
TEMPERATURE: 98 F | OXYGEN SATURATION: 80 % | HEART RATE: 104 BPM | DIASTOLIC BLOOD PRESSURE: 51 MMHG | RESPIRATION RATE: 16 BRPM | SYSTOLIC BLOOD PRESSURE: 86 MMHG

## 2022-11-30 LAB
ALBUMIN SERPL BCP-MCNC: 2.3 G/DL (ref 3.5–5.2)
ALP SERPL-CCNC: 98 U/L (ref 55–135)
ALT SERPL W/O P-5'-P-CCNC: 10 U/L (ref 10–44)
ANION GAP SERPL CALC-SCNC: 10 MMOL/L (ref 8–16)
AST SERPL-CCNC: 12 U/L (ref 10–40)
BILIRUB SERPL-MCNC: 0.8 MG/DL (ref 0.1–1)
BUN SERPL-MCNC: 4 MG/DL (ref 6–20)
CALCIUM SERPL-MCNC: 8.5 MG/DL (ref 8.7–10.5)
CHLORIDE SERPL-SCNC: 103 MMOL/L (ref 95–110)
CO2 SERPL-SCNC: 20 MMOL/L (ref 23–29)
CREAT SERPL-MCNC: 0.7 MG/DL (ref 0.5–1.4)
EST. GFR  (NO RACE VARIABLE): >60 ML/MIN/1.73 M^2
GLUCOSE SERPL-MCNC: 78 MG/DL (ref 70–110)
POTASSIUM SERPL-SCNC: 3.1 MMOL/L (ref 3.5–5.1)
PROT SERPL-MCNC: 7.6 G/DL (ref 6–8.4)
SODIUM SERPL-SCNC: 133 MMOL/L (ref 136–145)

## 2022-11-30 PROCEDURE — 99213 PR OFFICE/OUTPT VISIT, EST, LEVL III, 20-29 MIN: ICD-10-PCS | Mod: TH,25,, | Performed by: OBSTETRICS & GYNECOLOGY

## 2022-11-30 PROCEDURE — 99213 OFFICE O/P EST LOW 20 MIN: CPT | Mod: TH,25,, | Performed by: OBSTETRICS & GYNECOLOGY

## 2022-11-30 PROCEDURE — 59025 FETAL NON-STRESS TEST: CPT | Mod: 26,,, | Performed by: OBSTETRICS & GYNECOLOGY

## 2022-11-30 PROCEDURE — 59025 PR FETAL 2N-STRESS TEST: ICD-10-PCS | Mod: 26,,, | Performed by: OBSTETRICS & GYNECOLOGY

## 2022-11-30 RX ORDER — CEPHALEXIN 500 MG/1
500 CAPSULE ORAL EVERY 12 HOURS
Qty: 14 CAPSULE | Refills: 0 | Status: ON HOLD | OUTPATIENT
Start: 2022-11-30 | End: 2022-12-08 | Stop reason: HOSPADM

## 2022-11-30 NOTE — DISCHARGE INSTRUCTIONS
Home Undelivered Discharge Instructions    After Discharge Orders:    Future Appointments   Date Time Provider Department Center   2022  2:30 PM Geo Espitia MD [facility]   2023  1:00 PM Suzi Bacon MD [facility] ROD [facility]         Current Discharge Medication List        START taking these medications    Details   cephALEXin (KEFLEX) 500 MG capsule Take 1 capsule (500 mg total) by mouth every 12 (twelve) hours. for 7 days  Qty: 14 capsule, Refills: 0           CONTINUE these medications which have NOT CHANGED    Details   ferrous sulfate 325 (65 FE) MG EC tablet Take 1 tablet (325 mg total) by mouth 2 (two) times daily.  Qty: 60 tablet, Refills: 3    Associated Diagnoses: Antepartum anemia      folic acid (FOLVITE) 1 MG tablet Take 4 tablets (4 mg total) by mouth once daily.  Qty: 120 tablet, Refills: 11      HUMIRA PEN PnKt injection Inject 1 pen (40 mg total) into the skin every 7 days.  Qty: 12 pen, Refills: 1    Associated Diagnoses: Crohn's disease of both small and large intestine without complication      M- PLUS 27 mg iron- 1 mg Tab Take 1 tablet by mouth once daily.      prenat.vits,laurence,min-iron-folic (PRENATAL VITAMIN) Tab Take 1 tablet by mouth once daily.  Qty: 90 each, Refills: 3    Associated Diagnoses: 10 weeks gestation of pregnancy                     Diet:  regular    Rest: normal activity as tolerated    Other instructions: Do kick counts once a day on your baby. Choose the time of day your baby is most active. Get in a comfortable lying or sitting position and time how long it takes to feel 10 kicks, twists, turns, swishes, or rolls. Call your physician or midwife if there have not been 10 kicks in 2 hours    Call physician or midwife, return to Labor and Delivery, call 911, or go to the nearest Emergency Room if: increased leakage or fluid, contractions more than  6 per  30 minutes, decreased fetal movement, persistent low back pain or cramping, bleeding from  vaginal area, difficulty urinating, pain with urination, difficulty breathing, new calf pain, persistent headache, or vision change

## 2022-11-30 NOTE — TREATMENT PLAN
"Patient History  GA: 28w6d   GP:    Estimated Date of Delivery: 2/15/23   Antepartum complications:   Past Medical History:   Diagnosis Date    Allergy     Anxiety     Asthma     Crohn's disease 19    Crohn's disease (ileum) 5/10/2019    S/P ileocecal resection-42 cm of ileum/10.5 cm cecum removed with surgical path c/w stricture and fistula 2/15/20 ileostomy takedown and mucus fistula takedown    Crohn's disease (ileum) 5/10/2019    S/P ileocecal resection-42 cm of ileum/10.5 cm cecum removed with surgical path c/w stricture and fistula 2/15/20 ileostomy takedown and mucus fistula takedown    Depression     Eczema     Ex-smoker for less than 1 year     Immunosuppressed status 10/24/2019    Joint pain     Perineal abscess 2021       OB History    Para Term  AB Living   1 0 0 0 0 0   SAB IAB Ectopic Multiple Live Births   0 0 0 0 0      # Outcome Date GA Lbr Miguel/2nd Weight Sex Delivery Anes PTL Lv   1 Current               VS:     Complaints: Left flank pain and hydradenitis of vulva and and buttock area      Number of past c-sections:   History of past pregnancy complications:   Pain level on admit: 8  Pain reassessment:   Fetal movement:   Uterine Activity none  Contraction intensity:  Contraction Frequency (minutes):   Contractions per 10 minutes:  Contraction Duration (seconds):   Uterine tone: soft  FHR baseline: 150  Variability: mod  Accels: +  Decels: none  SVE  Dilation:   Effacement:  Station:   Name of second nurse verification of strip:  Yin Holliday      Pt states "sores" have been present before she was pregnant and started "popping out" a couple of weeks ago;states she has been having left flank pain since 2 days ago and no appetite;denies fever;abd soft and nontender to palp;+fm noted;denies vag bleeding and leaking of fluid;denies UC  "

## 2022-11-30 NOTE — HPI
Pt is a 31 yo  @ 29w0d who presented to L&D last night with with 3 days history of left flank pain and lower abdominal pain, as well as vulvar/buttock pain  She denies UTI symptoms, fevers/chills/n/v

## 2022-11-30 NOTE — TELEPHONE ENCOUNTER
Pt call was returned.Appointment was rescheduled per  during hospital rounding. Pt appointment was rescheduled.

## 2022-11-30 NOTE — TELEPHONE ENCOUNTER
----- Message from Pete Thornton sent at 11/30/2022  9:23 AM CST -----  Type: Patient Call Back    Who called: Self     What is the request in detail:  asked her to reschedule for next week     Can the clinic reply by [facility]? No     Would the patient rather a call back or a response via [facility]? Call     Best call back number: [phone]

## 2022-11-30 NOTE — PROGRESS NOTES
[facility]  Progress Note  Obstetrics & Gynecology    Date:  2022    Chief complaint: Lower back, pelvic cramping     Subjective:    Lora Scott is a 32 y.o.  at 29w0d gestation who presents to L&D overnight c/o lower back, pelvic cramping.  Pt was kept overnight for observation per Dr. Mcwilliams and treated for presumed UTI.  Pt resting comfortably this AM, no acute events overnight.  Pt denies any contractions, vaginal bleeding, leakage of fluid, and notes active fetal movement.   Pt reports pain is better this AM.    No CVA or suprapubic tenderness noted on exam.  Pt denies UTI sxs.  Pain is c/w round ligament pain.  Pt noted to have a small superficial skin boil in between buttocks with no drainage or cellulitis.    Pregnancy complicated by:  Crohn's disease dx 2019 in remission on wkly injection of Humira qFriday  H/o partial bowel resection with ileostomy 2019 secondary to Crohn's  Anemia, iron deficiency      Review of Systems:      Constitutional:  No fever, fatigue  HENT:  No headaches or visual disturbance  Respiratory:  No cough, shortness of breath  Cardiovascular:  No chest pain, leg swelling  Gastrointestinal:  No abdominal pain, nausea, vomiting, constipation  Genitourinary:  No dysuria, frequency  Musculoskeletal:  No back pain, arthralgias  Skin:  No rash, jaundice  Neurological:  No dizziness, weakness, headaches  Psychiatric/Behavioral:  No sleep disturbance, dysphoric mood    Objective:    Temp:  [97.9 °F (36.6 °C)-98.3 °F (36.8 °C)] 97.9 °F (36.6 °C)  Pulse:  [0-121] 104  Resp:  [16-18] 16  SpO2:  [64 %-100 %] 80 %  BP: ()/(50-78) 86/51    BP (!) 86/51 Comment: bp cuff on superior arm;pt sleeping  Pulse 104   Temp 97.9 °F (36.6 °C) (Oral)   Resp 16   LMP 05/15/2022   SpO2 (!) 80%      Physical Exam:   Constitutional: She appears well-developed. No distress.                             AOx3  HEENT:  NCAT, anicteric, moist mucus membranes.  Neck  supple.  LUNGS:  Clear to auscultation bilaterally.  HEART:  Regular rate & rhythm with physiologic heart sounds.  ABDOMEN:  Soft, non tender without any guarding, rigidity or rebound. Normoactive bowel sounds.  Size = dates.  No CVA or suprapubic tenderness   PELVIC:  Normal female external genitalia without gross lesions, rashes or excoriations.  Cervix: Cervix closed, thick, high, posterior. Small superficial skin boil in between buttocks with no drainage or cellulitis.  EXTREMITIES:  Symmetric without cramping, claudication or edema LE. +2 distal pulses.  FROM.  SKIN:  No rashes, good turgor & capillary refill.  NEUROLOGIC:  Grossly intact bilaterally.   PSYCH:  Mood & affect appropriate.       Laboratory:     Recent Labs   Lab 22  2324   WBC 17.78*   RBC 3.14*   HGB 11.0*   HCT 31.5*      *   MCH 35.0*   MCHC 34.9     Urinalysis  Recent Labs   Lab 222   COLORU Orange*   SPECGRAV 1.025   PHUR 7.0   PROTEINUA 2+*   BACTERIA Moderate*   NITRITE Negative   LEUKOCYTESUR 3+*   UROBILINOGEN 4.0-6.0*   HYALINECASTS none     Component      Latest Ref Rng & Units 2022             Urine Culture, Routine       PRESUMPTIVE E COLI (A) . . .     NST:    FHT:  Baseline 130's, moderate variability, positive accelerations, no decelerations.  Hendrix:  No contractions.  Very active fetus, audible    Hospital Course:      Pt was observed on L&D, received supportive care, and had no acute events.  Labor was ruled out.  Her cramping resolved prior to discharge.  Maternal and fetal status was overall reassuring.  Pt states she has reliable transportation and will return immediately if she here sxs worsen.  Pt was discharged in stable condition.     Assessment:    32 y.o.  at 29w0d:    Pelvic pain    Suspect secondary round ligament pain  Labor ruled out  Maternal/fetal status overall reassuring  Discharge home  Labor/preE precautions and kick counts instructions  Supportive care for round  ligament pain, tylenol prn     E. 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

## 2022-11-30 NOTE — NURSING
Discharge teaching and Hibiclens shower instructions given. Patient verbalizes understanding.Awaiting ride.

## 2022-11-30 NOTE — TELEPHONE ENCOUNTER
----- Message from Pete Thornton sent at 11/30/2022  9:23 AM CST -----  Type: Patient Call Back    Who called: Self     What is the request in detail:  asked her to reschedule for next week     Can the clinic reply by [facility]? No     Would the patient rather a call back or a response via [facility]? Call     Best call back number: [phone]

## 2022-12-01 LAB — BACTERIA UR CULT: ABNORMAL

## 2022-12-06 ENCOUNTER — HOSPITAL ENCOUNTER (INPATIENT)
Facility: OTHER | Age: 32
LOS: 2 days | Discharge: HOME | DRG: 818 | End: 2022-12-08
Attending: OBSTETRICS & GYNECOLOGY | Admitting: OBSTETRICS & GYNECOLOGY
Payer: MEDICAID

## 2022-12-06 ENCOUNTER — ANESTHESIA EVENT (OUTPATIENT)
Dept: OBSTETRICS AND GYNECOLOGY | Facility: OTHER | Age: 32
End: 2022-12-06

## 2022-12-06 ENCOUNTER — ANESTHESIA (OUTPATIENT)
Dept: OBSTETRICS AND GYNECOLOGY | Facility: OTHER | Age: 32
End: 2022-12-06

## 2022-12-06 DIAGNOSIS — Z98.890 STATUS POST INCISION AND DRAINAGE: ICD-10-CM

## 2022-12-06 DIAGNOSIS — L02.215 PERINEAL ABSCESS: Primary | ICD-10-CM

## 2022-12-06 DIAGNOSIS — L02.31 ABSCESS AND CELLULITIS OF GLUTEAL REGION: ICD-10-CM

## 2022-12-06 DIAGNOSIS — L03.317 ABSCESS AND CELLULITIS OF GLUTEAL REGION: ICD-10-CM

## 2022-12-06 PROBLEM — D64.9 ANEMIA: Status: ACTIVE | Noted: 2022-12-06

## 2022-12-06 PROBLEM — Z3A.29 29 WEEKS GESTATION OF PREGNANCY: Status: ACTIVE | Noted: 2022-12-06

## 2022-12-06 LAB
ABO + RH BLD: NORMAL
ALBUMIN SERPL BCP-MCNC: 2.1 G/DL (ref 3.5–5.2)
ALP SERPL-CCNC: 109 U/L (ref 55–135)
ALT SERPL W/O P-5'-P-CCNC: 7 U/L (ref 10–44)
ANION GAP SERPL CALC-SCNC: 11 MMOL/L (ref 8–16)
AST SERPL-CCNC: 14 U/L (ref 10–40)
BASOPHILS # BLD AUTO: 0.03 K/UL (ref 0–0.2)
BASOPHILS NFR BLD: 0.2 % (ref 0–1.9)
BILIRUB SERPL-MCNC: 0.9 MG/DL (ref 0.1–1)
BLD GP AB SCN CELLS X3 SERPL QL: NORMAL
BUN SERPL-MCNC: 5 MG/DL (ref 6–20)
CALCIUM SERPL-MCNC: 8.5 MG/DL (ref 8.7–10.5)
CHLORIDE SERPL-SCNC: 105 MMOL/L (ref 95–110)
CO2 SERPL-SCNC: 19 MMOL/L (ref 23–29)
CREAT SERPL-MCNC: 0.7 MG/DL (ref 0.5–1.4)
DIFFERENTIAL METHOD: ABNORMAL
EOSINOPHIL # BLD AUTO: 0.3 K/UL (ref 0–0.5)
EOSINOPHIL NFR BLD: 2.1 % (ref 0–8)
ERYTHROCYTE [DISTWIDTH] IN BLOOD BY AUTOMATED COUNT: 13.5 % (ref 11.5–14.5)
EST. GFR  (NO RACE VARIABLE): >60 ML/MIN/1.73 M^2
GLUCOSE SERPL-MCNC: 63 MG/DL (ref 70–110)
HCT VFR BLD AUTO: 32.6 % (ref 37–48.5)
HGB BLD-MCNC: 11 G/DL (ref 12–16)
IMM GRANULOCYTES # BLD AUTO: 0.1 K/UL (ref 0–0.04)
IMM GRANULOCYTES NFR BLD AUTO: 0.6 % (ref 0–0.5)
LYMPHOCYTES # BLD AUTO: 2.6 K/UL (ref 1–4.8)
LYMPHOCYTES NFR BLD: 16.8 % (ref 18–48)
MCH RBC QN AUTO: 34.7 PG (ref 27–31)
MCHC RBC AUTO-ENTMCNC: 33.7 G/DL (ref 32–36)
MCV RBC AUTO: 103 FL (ref 82–98)
MONOCYTES # BLD AUTO: 0.9 K/UL (ref 0.3–1)
MONOCYTES NFR BLD: 5.6 % (ref 4–15)
NEUTROPHILS # BLD AUTO: 11.7 K/UL (ref 1.8–7.7)
NEUTROPHILS NFR BLD: 74.7 % (ref 38–73)
NRBC BLD-RTO: 0 /100 WBC
PLATELET # BLD AUTO: 297 K/UL (ref 150–450)
PMV BLD AUTO: 10 FL (ref 9.2–12.9)
POTASSIUM SERPL-SCNC: 4 MMOL/L (ref 3.5–5.1)
PROT SERPL-MCNC: 7.7 G/DL (ref 6–8.4)
RBC # BLD AUTO: 3.17 M/UL (ref 4–5.4)
SODIUM SERPL-SCNC: 135 MMOL/L (ref 136–145)
WBC # BLD AUTO: 15.65 K/UL (ref 3.9–12.7)

## 2022-12-06 PROCEDURE — 86592 SYPHILIS TEST NON-TREP QUAL: CPT | Performed by: OBSTETRICS & GYNECOLOGY

## 2022-12-06 PROCEDURE — S0030 INJECTION, METRONIDAZOLE: HCPCS

## 2022-12-06 PROCEDURE — 80053 COMPREHEN METABOLIC PANEL: CPT | Performed by: OBSTETRICS & GYNECOLOGY

## 2022-12-06 PROCEDURE — 59025 PR FETAL 2N-STRESS TEST: ICD-10-PCS | Mod: 26,,, | Performed by: OBSTETRICS & GYNECOLOGY

## 2022-12-06 PROCEDURE — 87075 CULTR BACTERIA EXCEPT BLOOD: CPT | Performed by: STUDENT IN AN ORGANIZED HEALTH CARE EDUCATION/TRAINING PROGRAM

## 2022-12-06 PROCEDURE — 11000001 HC ACUTE MED/SURG PRIVATE ROOM

## 2022-12-06 PROCEDURE — 99223 1ST HOSP IP/OBS HIGH 75: CPT | Mod: 25,,, | Performed by: OBSTETRICS & GYNECOLOGY

## 2022-12-06 PROCEDURE — 99222 1ST HOSP IP/OBS MODERATE 55: CPT | Mod: ,,, | Performed by: SPECIALIST

## 2022-12-06 PROCEDURE — 85025 COMPLETE CBC W/AUTO DIFF WBC: CPT

## 2022-12-06 PROCEDURE — 25000003 PHARM REV CODE 250

## 2022-12-06 PROCEDURE — 99222 PR INITIAL HOSPITAL CARE,LEVL II: ICD-10-PCS | Mod: ,,, | Performed by: SPECIALIST

## 2022-12-06 PROCEDURE — 59025 FETAL NON-STRESS TEST: CPT | Mod: 26,,, | Performed by: OBSTETRICS & GYNECOLOGY

## 2022-12-06 PROCEDURE — 87077 CULTURE AEROBIC IDENTIFY: CPT | Performed by: STUDENT IN AN ORGANIZED HEALTH CARE EDUCATION/TRAINING PROGRAM

## 2022-12-06 PROCEDURE — 87186 SC STD MICRODIL/AGAR DIL: CPT | Performed by: STUDENT IN AN ORGANIZED HEALTH CARE EDUCATION/TRAINING PROGRAM

## 2022-12-06 PROCEDURE — 86901 BLOOD TYPING SEROLOGIC RH(D): CPT

## 2022-12-06 PROCEDURE — 87070 CULTURE OTHR SPECIMN AEROBIC: CPT | Performed by: STUDENT IN AN ORGANIZED HEALTH CARE EDUCATION/TRAINING PROGRAM

## 2022-12-06 PROCEDURE — 99223 PR INITIAL HOSPITAL CARE,LEVL III: ICD-10-PCS | Mod: 25,,, | Performed by: OBSTETRICS & GYNECOLOGY

## 2022-12-06 RX ORDER — HYDROCODONE BITARTRATE AND ACETAMINOPHEN 5; 325 MG/1; MG/1
2 TABLET ORAL EVERY 4 HOURS PRN
Status: DISCONTINUED | OUTPATIENT
Start: 2022-12-06 | End: 2022-12-07

## 2022-12-06 RX ORDER — AMOXICILLIN 250 MG
1 CAPSULE ORAL NIGHTLY PRN
Status: DISCONTINUED | OUTPATIENT
Start: 2022-12-06 | End: 2022-12-08 | Stop reason: HOSPADM

## 2022-12-06 RX ORDER — METRONIDAZOLE 500 MG/100ML
500 INJECTION, SOLUTION INTRAVENOUS
Status: DISCONTINUED | OUTPATIENT
Start: 2022-12-06 | End: 2022-12-08

## 2022-12-06 RX ORDER — PRENATAL WITH FERROUS FUM AND FOLIC ACID 3080; 920; 120; 400; 22; 1.84; 3; 20; 10; 1; 12; 200; 27; 25; 2 [IU]/1; [IU]/1; MG/1; [IU]/1; MG/1; MG/1; MG/1; MG/1; MG/1; MG/1; UG/1; MG/1; MG/1; MG/1; MG/1
1 TABLET ORAL DAILY
Status: DISCONTINUED | OUTPATIENT
Start: 2022-12-06 | End: 2022-12-08 | Stop reason: HOSPADM

## 2022-12-06 RX ORDER — ONDANSETRON 8 MG/1
8 TABLET, ORALLY DISINTEGRATING ORAL EVERY 8 HOURS PRN
Status: DISCONTINUED | OUTPATIENT
Start: 2022-12-06 | End: 2022-12-08 | Stop reason: HOSPADM

## 2022-12-06 RX ORDER — HYDROCODONE BITARTRATE AND ACETAMINOPHEN 5; 325 MG/1; MG/1
1 TABLET ORAL EVERY 4 HOURS PRN
Status: DISCONTINUED | OUTPATIENT
Start: 2022-12-06 | End: 2022-12-07

## 2022-12-06 RX ORDER — DIPHENHYDRAMINE HCL 25 MG
25 CAPSULE ORAL EVERY 4 HOURS PRN
Status: DISCONTINUED | OUTPATIENT
Start: 2022-12-06 | End: 2022-12-08 | Stop reason: HOSPADM

## 2022-12-06 RX ORDER — BUTORPHANOL TARTRATE 2 MG/ML
1 INJECTION INTRAMUSCULAR; INTRAVENOUS ONCE
Status: COMPLETED | OUTPATIENT
Start: 2022-12-06 | End: 2022-12-07

## 2022-12-06 RX ORDER — SIMETHICONE 80 MG
1 TABLET,CHEWABLE ORAL EVERY 6 HOURS PRN
Status: DISCONTINUED | OUTPATIENT
Start: 2022-12-06 | End: 2022-12-08 | Stop reason: HOSPADM

## 2022-12-06 RX ORDER — ACETAMINOPHEN 325 MG/1
650 TABLET ORAL EVERY 6 HOURS PRN
Status: DISCONTINUED | OUTPATIENT
Start: 2022-12-06 | End: 2022-12-07

## 2022-12-06 RX ORDER — DIPHENHYDRAMINE HYDROCHLORIDE 50 MG/ML
25 INJECTION INTRAMUSCULAR; INTRAVENOUS EVERY 4 HOURS PRN
Status: DISCONTINUED | OUTPATIENT
Start: 2022-12-06 | End: 2022-12-08 | Stop reason: HOSPADM

## 2022-12-06 RX ORDER — SODIUM CHLORIDE 0.9 % (FLUSH) 0.9 %
10 SYRINGE (ML) INJECTION
Status: DISCONTINUED | OUTPATIENT
Start: 2022-12-06 | End: 2022-12-08 | Stop reason: HOSPADM

## 2022-12-06 RX ORDER — PROCHLORPERAZINE EDISYLATE 5 MG/ML
5 INJECTION INTRAMUSCULAR; INTRAVENOUS EVERY 6 HOURS PRN
Status: DISCONTINUED | OUTPATIENT
Start: 2022-12-06 | End: 2022-12-08 | Stop reason: HOSPADM

## 2022-12-06 RX ADMIN — METRONIDAZOLE 500 MG: 5 INJECTION, SOLUTION INTRAVENOUS at 05:12

## 2022-12-06 NOTE — HOSPITAL COURSE
12/06/2022 HD#1. Admitted from OSH. S/p single dose of zosyn. Has been compliant with keflex. VSS and WNL. Remained afebrile at OSH, afebrile on admit. Start on IV flagyl for kyree-rectal abscesses. General surgery, GI, wound care, nutrition consulted, appreciate recs. WBC 17 at OSH, repeat CBC, CMP pending. Imaging not recommended at this time. NST appropriate for gestational age on admit>NST BID, Reg diet.    12/07/2022 HD#2. VSS and WNL. Continued on IV Flagyl. Rupture of single abscess overnight, wound cultures collected. To OR today with gen surg for EUA/I&D of kyree-rectal abscess. FHT to be confirmed pre and post op  12/08/2022 HD#3, POD#1 s/p extensive I&D of perineal and gluteal cleft abscesses (please see op note for further detail). IV>PO flagyl. Pain controlled with IV>PO meds. Tolerating diet, ambulating, voiding. Wound care and social work consulted regarding home health needs for wound care, appreciate recs. Gen surg reports wound clean and pt stable for discharge if able to follow up with wound care/HH.    Pt stable for discharge home. Wound care education and Home health with wound care has been arranged. All follow up needed has been arranged and pt aware of plan. Post acute follow up added to AVS. Will be contacted for follow up appointments. Stable from obstetric and post-op standpoint at this time.  D/C home with pain meds, PO flagyl.     All questions answered. Pt agrees with plan.

## 2022-12-06 NOTE — ASSESSMENT & PLAN NOTE
-Primary OB Dr. Geo Espitia  -Rubella immune, RPR NR, HBsAG neg, HIV neg  -NST appropriate for gest age on admit, TOCO with no ctx  -10/31 scan: cephalic, FELICIA (3.6cm), MVP 6.4 cm, EFW 41%    Plan:  -Reg diet  -NST BID  -If imaging necessitated for further evaluation of perineal abscesses, MRI with PO contrast preferred over CT scan

## 2022-12-06 NOTE — ASSESSMENT & PLAN NOTE
-Pt reports abscess noticed about 1 week ago, have been worsening>increase in pain and growing in size  -Compliant with keflex BID (also for UTI)  -Denies ever having these symptoms before  -OSH   WBC 17   S/p single dose of zosyn    Plan:  -General surgery consulted, appreciate recs   Recommended starting on flagyl, ordered   No need for imaging at this time   Will follow up Dr. Bacon (GI) recs   F/U other recommendations  -D/C keflex, s/p zosyn  -CBC on admit with WBC 17>15, H/H stable at 11/32

## 2022-12-06 NOTE — SUBJECTIVE & OBJECTIVE
Obstetric HPI:  Patient denies contractions, active fetal movement, No vaginal bleeding , No loss of fluid   Denies obstetric complaints.    Reports significant gluteal pain. Denies dysuria, hematuria, fever, chills, N/V/D.    OB History    Para Term  AB Living   1 0 0 0 0 0   SAB IAB Ectopic Multiple Live Births   0 0 0 0 0      # Outcome Date GA Lbr Miguel/2nd Weight Sex Delivery Anes PTL Lv   1 Current              Past Medical History:   Diagnosis Date    Allergy     Anxiety     Asthma     Crohn's disease 20    Crohn's disease (ileum) 5/10/2019    S/P ileocecal resection-42 cm of ileum/10.5 cm cecum removed with surgical path c/w stricture and fistula 2/15/20 ileostomy takedown and mucus fistula takedown    Crohn's disease (ileum) 5/10/2019    S/P ileocecal resection-42 cm of ileum/10.5 cm cecum removed with surgical path c/w stricture and fistula 2/15/20 ileostomy takedown and mucus fistula takedown    Depression     Eczema     Ex-smoker for less than 1 year     Immunosuppressed status 10/24/2019    Joint pain     Perineal abscess 2021     Past Surgical History:   Procedure Laterality Date    COLONOSCOPY N/A 2019    Procedure: COLONOSCOPY;  Surgeon: Fawad Perla MD;  Location: [facility] (2ND FLR);  Service: Endoscopy;  Laterality: N/A;    COLONOSCOPY N/A 10/30/2020    Procedure: COLONOSCOPY;  Surgeon: Suzi Bacon MD;  Location: [facility] (4TH FLR);  Service: Endoscopy;  Laterality: N/A;  covid test [facility] urgent care 10/27  pt to try Mirilax prep with Reglan before per Dr. Bacon - nimisha  10/14-new instructions e-mailed-tb  10/16--new instructions e-mailed  10/29/20- Pt confirmed earlier arrival time, prep ins. reveiwed, Pt verbalized understanding- [email or web address]    COLONOSCOPY N/A 5/10/2022    Procedure: COLONOSCOPY;  Surgeon: Suzi Bacon MD;  Location: [facility] (4TH FLR);  Service: Endoscopy;  Laterality: N/A;  2022- same as last bowel prep (Miralax/Gatorade)  3/4 pt  rescheduled to May/ COVID test on 5/3 at [facility]  instruction via portal   pt rescheduled; pt fully vaccinated; Rapid; instructions to portal-    ESOPHAGOGASTRODUODENOSCOPY N/A 2019    Procedure: EGD (ESOPHAGOGASTRODUODENOSCOPY);  Surgeon: Fawad Perla MD;  Location: [facility] ENDO (2ND FLR);  Service: Endoscopy;  Laterality: N/A;    ILEOCOLECTOMY N/A 5/10/2019    Procedure: ILEOCOLECTOMY;  Surgeon: Uday Cope MD;  Location: [facility] OR 2ND FLR;  Service: Colon and Rectal;  Laterality: N/A;  ileocecectomy    ILEOSCOPY N/A 2019    Procedure: ILEOSCOPY through stoma;  Surgeon: Suzi Bacon MD;  Location: [facility] ENDO (4TH FLR);  Service: Endoscopy;  Laterality: N/A;  Schedule as 30 minute case  Please let patient know to bring extra stoma bag/wafer, etc- entire bag and appliance will be removed at time of procedure to visualize the area well   first week 2019     per note-R/S to 19-GT    ILEOSTOMY N/A 5/10/2019    Procedure: CREATION, ILEOSTOMY with mucous fistula;  Surgeon: Uday Cope MD;  Location: [facility] OR 2ND FLR;  Service: Colon and Rectal;  Laterality: N/A;    ILEOSTOMY CLOSURE N/A 2020    Procedure: CLOSURE,ILEOSTOMY TAKEDOWN END ILEOSTOMY/MUCOUS FISTULA POSSIBLE LAPAROTOMY ERAS LOW;  Surgeon: Uday Cope MD;  Location: [facility] OR 2ND FLR;  Service: Colon and Rectal;  Laterality: N/A;       PTA Medications   Medication Sig    cephALEXin (KEFLEX) 500 MG capsule Take 1 capsule (500 mg total) by mouth every 12 (twelve) hours. for 7 days    ferrous sulfate 325 (65 FE) MG EC tablet Take 1 tablet (325 mg total) by mouth 2 (two) times daily.    folic acid (FOLVITE) 1 MG tablet Take 4 tablets (4 mg total) by mouth once daily.    HUMIRA PEN PnKt injection Inject 1 pen (40 mg total) into the skin every 7 days.    M- PLUS 27 mg iron- 1 mg Tab Take 1 tablet by mouth once daily.    prenat.vits,laurence,min-iron-folic (PRENATAL VITAMIN) Tab Take 1 tablet by mouth once daily. (Patient  not taking: No sig reported)       Review of patient's allergies indicates:   Allergen Reactions    Horse/equine containing products Itching        Family History       Problem Relation (Age of Onset)    Cataracts Maternal Grandfather    Hypertension Mother    Irritable bowel syndrome Paternal Grandmother    No Known Problems Father, Sister, Brother, Sister, Sister, Sister, Maternal Uncle, Paternal Aunt, Paternal Uncle, Maternal Grandmother, Paternal Grandfather, Paternal Aunt, Paternal Uncle, Paternal Uncle, Paternal Uncle, Maternal Uncle, Maternal Uncle          Tobacco Use    Smoking status: Every Day     Packs/day: 0.25     Types: Cigarettes     Last attempt to quit: 2020     Years since quittin.9    Smokeless tobacco: Never   Substance and Sexual Activity    Alcohol use: Not Currently     Alcohol/week: 0.0 standard drinks     Comment: Socially    Drug use: Yes     Frequency: 3.0 times per week     Types: Marijuana     Comment: last today    Sexual activity: Not Currently     Partners: Male     Birth control/protection: None     Review of Systems   Constitutional: Negative.  Negative for chills, diaphoresis, fatigue, fever and unexpected weight change.   HENT:  Negative for nasal congestion.    Respiratory: Negative.  Negative for shortness of breath.    Cardiovascular: Negative.  Negative for leg swelling.   Gastrointestinal: Negative.  Negative for abdominal pain, bloating, diarrhea, nausea, vomiting and fecal incontinence.        Rectal pain  Several growing boils    Genitourinary: Negative.  Negative for dysmenorrhea, dyspareunia, dysuria, menorrhagia, menstrual problem, pelvic pain, vaginal bleeding, vaginal discharge, vaginal pain, urinary incontinence, vaginal dryness and vaginal odor.   Musculoskeletal: Negative.  Negative for leg pain.   Integumentary:  Positive for rash and mole/lesion.        Callie rectal, gluteal boils    Neurological:  Negative for headaches.   All other systems reviewed and  are negative.   Objective:     Vital Signs (Most Recent):    Vital Signs (24h Range):  Temp:  [98.1 °F (36.7 °C)] 98.1 °F (36.7 °C)  Pulse:  [90] 90  Resp:  [18] 18  SpO2:  [97 %] 97 %  BP: (90)/(54) 90/54        There is no height or weight on file to calculate BMI.    FHT: 135, mod dai, + 10x10 accels, - decels, reassuring, appropriate for gest age  TOCO:  none    Physical Exam:   Constitutional: She is oriented to person, place, and time. She appears well-developed and well-nourished. No distress.    HENT:   Head: Normocephalic and atraumatic.    Eyes: Conjunctivae are normal.     Cardiovascular:  Normal rate.             Pulmonary/Chest: Effort normal. No respiratory distress.        Abdominal: Soft. She exhibits abdominal incision. She exhibits no distension. There is no abdominal tenderness. There is no rebound and no guarding.   Well healed vertical skin incision from ileocecal resection    Graivd             Musculoskeletal: Normal range of motion.       Neurological: She is alert and oriented to person, place, and time.    Skin: Skin is warm and dry. She is not diaphoretic.   Evidence of psoriatic lesions over back, all 4 extremities    Psychiatric: She has a normal mood and affect. Her behavior is normal.     Cervix: deferred  Presentation: Vertex     Significant Labs:  Lab Results   Component Value Date    GROUPTRH O POS 08/23/2022    HEPBSAG Negative 08/23/2022       CBC:   Recent Labs   Lab 12/06/22  1618   WBC 15.65*   RBC 3.17*   HGB 11.0*   HCT 32.6*      *   MCH 34.7*   MCHC 33.7     I have personallly reviewed all pertinent lab results from the last 24 hours.  Recent Lab Results         12/06/22  1618        Baso # 0.03       Basophil % 0.2       Differential Method Automated       Eos # 0.3       Eosinophil % 2.1       Gran # (ANC) 11.7       Gran % 74.7       HEMATOCRIT 32.6       HEMOGLOBIN 11.0       Immature Grans (Abs) 0.10  Comment: Mild elevation in immature granulocytes is  non specific and   can be seen in a variety of conditions including stress response,   acute inflammation, trauma and pregnancy. Correlation with other   laboratory and clinical findings is essential.         Immature Granulocytes 0.6       Lymph # 2.6       Lymph % 16.8       MCH 34.7       MCHC 33.7              Mono # 0.9       Mono % 5.6       MPV 10.0       nRBC 0       Platelets 297       RBC 3.17       RDW 13.5       WBC 15.65

## 2022-12-06 NOTE — ASSESSMENT & PLAN NOTE
- Diagnosed in 2019   s/p ileocecal resection in 2019 with ileostomy, ileostomy takedown in 2020  -Currently in remission, on weekly injection of Humira (Fridays)  -[place], [place] [place]contacted on admission regarding recommendations for kyree-rectal abscesses  -nutrition consulted, appreciate recs

## 2022-12-06 NOTE — H&P
[facility] - Antepartum  Obstetrics  History & Physical    Patient Name: Lora Scott  MRN: [id number]  Admission Date: 2022  Primary Care Provider: Geo Espitia MD    Subjective:     Principal Problem:Perineal abscess    History of Present Illness:  Lora Scott is a 32 y.o. [id number] at 29w6d presents as a transfer from [facility] complaining of kyree-gluteal and kyree-rectal abscesses. She reports that she started having swelling and redness to the buttock about a week ago. She was being evaluated by her Ob in clinic and was given a prescription of Keflex for these abscesses and for a UTI. She reports she has been taking the Keflex without improvement of these abscesses. States they have grown in size and that she can no longer sit on her bottom or lay on her back due to significant pain. Reports the pain and abscesses have extended to surround her rectum. She denies any fevers, chills, dysuria, hematuria, N/V/D.    This IUP is complicated by h/o Crohns disease (>s/p bowel resection, ileostomy;  ileostomy takedown), Subchorionic hematoma (3cm), Ecoli UTI, anemia.  Patient denies contractions, denies vaginal bleeding, denies LOF.   Fetal Movement: normal.         Obstetric HPI:  Patient denies contractions, active fetal movement, No vaginal bleeding , No loss of fluid   Denies obstetric complaints.    Reports significant gluteal pain. Denies dysuria, hematuria, fever, chills, N/V/D.    OB History    Para Term  AB Living   1 0 0 0 0 0   SAB IAB Ectopic Multiple Live Births   0 0 0 0 0      # Outcome Date GA Lbr Miguel/2nd Weight Sex Delivery Anes PTL Lv   1 Current              Past Medical History:   Diagnosis Date    Allergy     Anxiety     Asthma     Crohn's disease 19    Crohn's disease (ileum) 5/10/2019    S/P ileocecal resection-42 cm of ileum/10.5 cm cecum removed with surgical path c/w stricture and fistula 2/15/20 ileostomy takedown and mucus fistula takedown    Crohn's disease  (ileum) 5/10/2019    S/P ileocecal resection-42 cm of ileum/10.5 cm cecum removed with surgical path c/w stricture and fistula 2/15/20 ileostomy takedown and mucus fistula takedown    Depression     Eczema     Ex-smoker for less than 1 year     Immunosuppressed status 10/24/2019    Joint pain     Perineal abscess 9/28/2021     Past Surgical History:   Procedure Laterality Date    COLONOSCOPY N/A 2/20/2019    Procedure: COLONOSCOPY;  Surgeon: Fawad Perla MD;  Location: [facility] ENDO (2ND FLR);  Service: Endoscopy;  Laterality: N/A;    COLONOSCOPY N/A 10/30/2020    Procedure: COLONOSCOPY;  Surgeon: Suzi Bacon MD;  Location: [facility] (4TH FLR);  Service: Endoscopy;  Laterality: N/A;  covid test [facility] care 10/27  pt to try Mirilax prep with Reglan before per Dr. Bacon -   10/14-new instructions e-mailed-  10/16--new instructions e-mailed  10/29/20- Pt confirmed earlier arrival time, prep ins. reveiwed, Pt verbalized understanding- [id number]    COLONOSCOPY N/A 5/10/2022    Procedure: COLONOSCOPY;  Surgeon: Suzi Bacon MD;  Location: [facility] (4TH FLR);  Service: Endoscopy;  Laterality: N/A;  1/2022- same as last bowel prep (Miralax/Gatorade)  3/4 pt rescheduled to May/ COVID test on 5/3 at [facility]  instruction via portal  5/6 pt rescheduled; pt fully vaccinated; Rapid; instructions to portal-st    ESOPHAGOGASTRODUODENOSCOPY N/A 2/20/2019    Procedure: EGD (ESOPHAGOGASTRODUODENOSCOPY);  Surgeon: Fawad Perla MD;  Location: [facility] (2ND FLR);  Service: Endoscopy;  Laterality: N/A;    ILEOCOLECTOMY N/A 5/10/2019    Procedure: ILEOCOLECTOMY;  Surgeon: Uday Cope MD;  Location: [facility] OR 2ND FLR;  Service: Colon and Rectal;  Laterality: N/A;  ileocecectomy    ILEOSCOPY N/A 12/20/2019    Procedure: ILEOSCOPY through stoma;  Surgeon: Suzi Bacon MD;  Location: [facility] (4TH [facility]);  Service: Endoscopy;  Laterality: N/A;  Schedule as 30 minute case  Please let patient know  to bring extra stoma bag/wafer, etc- entire bag and appliance will be removed at time of procedure to visualize the area well   first week 2019     per note-R/S to 19-GT    ILEOSTOMY N/A 5/10/2019    Procedure: CREATION, ILEOSTOMY with mucous fistula;  Surgeon: Uday Cope MD;  Location: [facility] OR 2ND FLR;  Service: Colon and Rectal;  Laterality: N/A;    ILEOSTOMY CLOSURE N/A 2020    Procedure: CLOSURE,ILEOSTOMY TAKEDOWN END ILEOSTOMY/MUCOUS FISTULA POSSIBLE LAPAROTOMY ERAS LOW;  Surgeon: Uday Cope MD;  Location: [facility] OR 2ND FLR;  Service: Colon and Rectal;  Laterality: N/A;       PTA Medications   Medication Sig    cephALEXin (KEFLEX) 500 MG capsule Take 1 capsule (500 mg total) by mouth every 12 (twelve) hours. for 7 days    ferrous sulfate 325 (65 FE) MG EC tablet Take 1 tablet (325 mg total) by mouth 2 (two) times daily.    folic acid (FOLVITE) 1 MG tablet Take 4 tablets (4 mg total) by mouth once daily.    HUMIRA PEN PnKt injection Inject 1 pen (40 mg total) into the skin every 7 days.    M-MAXIMUS PLUS 27 mg iron- 1 mg Tab Take 1 tablet by mouth once daily.    prenat.vits,laurence,min-iron-folic (PRENATAL VITAMIN) Tab Take 1 tablet by mouth once daily. (Patient not taking: No sig reported)       Review of patient's allergies indicates:   Allergen Reactions    Horse/equine containing products Itching        Family History       Problem Relation (Age of Onset)    Cataracts Maternal Grandfather    Hypertension Mother    Irritable bowel syndrome Paternal Grandmother    No Known Problems Father, Sister, Brother, Sister, Sister, Sister, Maternal Uncle, Paternal Aunt, Paternal Uncle, Maternal Grandmother, Paternal Grandfather, Paternal Aunt, Paternal Uncle, Paternal Uncle, Paternal Uncle, Maternal Uncle, Maternal Uncle          Tobacco Use    Smoking status: Every Day     Packs/day: 0.25     Types: Cigarettes     Last attempt to quit: 2020     Years since quittin.9    Smokeless  tobacco: Never   Substance and Sexual Activity    Alcohol use: Not Currently     Alcohol/week: 0.0 standard drinks     Comment: Socially    Drug use: Yes     Frequency: 3.0 times per week     Types: Marijuana     Comment: last today    Sexual activity: Not Currently     Partners: Male     Birth control/protection: None     Review of Systems   Constitutional: Negative.  Negative for chills, diaphoresis, fatigue, fever and unexpected weight change.   HENT:  Negative for nasal congestion.    Respiratory: Negative.  Negative for shortness of breath.    Cardiovascular: Negative.  Negative for leg swelling.   Gastrointestinal: Negative.  Negative for abdominal pain, bloating, diarrhea, nausea, vomiting and fecal incontinence.        Rectal pain  Several growing boils    Genitourinary: Negative.  Negative for dysmenorrhea, dyspareunia, dysuria, menorrhagia, menstrual problem, pelvic pain, vaginal bleeding, vaginal discharge, vaginal pain, urinary incontinence, vaginal dryness and vaginal odor.   Musculoskeletal: Negative.  Negative for leg pain.   Integumentary:  Positive for rash and mole/lesion.        Callie rectal, gluteal boils    Neurological:  Negative for headaches.   All other systems reviewed and are negative.   Objective:     Vital Signs (Most Recent):    Vital Signs (24h Range):  Temp:  [98.1 °F (36.7 °C)] 98.1 °F (36.7 °C)  Pulse:  [90] 90  Resp:  [18] 18  SpO2:  [97 %] 97 %  BP: (90)/(54) 90/54        There is no height or weight on file to calculate BMI.    FHT: 135, mod dai, + 10x10 accels, - decels, reassuring, appropriate for gest age  TOCO:  none    Physical Exam:   Constitutional: She is oriented to person, place, and time. She appears well-developed and well-nourished. No distress.    HENT:   Head: Normocephalic and atraumatic.    Eyes: Conjunctivae are normal.     Cardiovascular:  Normal rate.             Pulmonary/Chest: Effort normal. No respiratory distress.        Abdominal: Soft. She exhibits  abdominal incision. She exhibits no distension. There is no abdominal tenderness. There is no rebound and no guarding.   Well healed vertical skin incision from ileocecal resection    Graivd             Musculoskeletal: Normal range of motion.       Neurological: She is alert and oriented to person, place, and time.    Skin: Skin is warm and dry. She is not diaphoretic.   Evidence of psoriatic lesions over back, all 4 extremities    Psychiatric: She has a normal mood and affect. Her behavior is normal.     Cervix: deferred  Presentation: Vertex     Significant Labs:  Lab Results   Component Value Date    GROUPTRH O POS 2022    HEPBSAG Negative 2022       CBC:   Recent Labs   Lab 22  1618   WBC 15.65*   RBC 3.17*   HGB 11.0*   HCT 32.6*      *   MCH 34.7*   MCHC 33.7     I have personallly reviewed all pertinent lab results from the last 24 hours.  Recent Lab Results         22  1618        Baso # 0.03       Basophil % 0.2       Differential Method Automated       Eos # 0.3       Eosinophil % 2.1       Gran # (ANC) 11.7       Gran % 74.7       HEMATOCRIT 32.6       HEMOGLOBIN 11.0       Immature Grans (Abs) 0.10  Comment: Mild elevation in immature granulocytes is non specific and   can be seen in a variety of conditions including stress response,   acute inflammation, trauma and pregnancy. Correlation with other   laboratory and clinical findings is essential.         Immature Granulocytes 0.6       Lymph # 2.6       Lymph % 16.8       MCH 34.7       MCHC 33.7              Mono # 0.9       Mono % 5.6       MPV 10.0       nRBC 0       Platelets 297       RBC 3.17       RDW 13.5       WBC 15.65             Assessment/Plan:     32 y.o. female  at 29w6d for:    * Perineal abscess  -Pt reports abscess noticed about 1 week ago, have been worsening>increase in pain and growing in size  -Compliant with keflex BID (also for UTI)  -Denies ever having these symptoms  before  -OSH   WBC 17   S/p single dose of zosyn    Plan:  -General surgery consulted, appreciate recs   Recommended starting on flagyl, ordered   No need for imaging at this time   Will follow up Dr. Bacon (GI) recs   F/U other recommendations  -D/C keflex, s/p zosyn  -CBC on admit with WBC 17>15, H/H stable at 11/32    29 weeks gestation of pregnancy  -Primary OB Dr. Geo Espitia  -Rubella immune, RPR NR, HBsAG neg, HIV neg  -NST appropriate for gest age on admit, TOCO with no ctx  -10/31 scan: cephalic, FELICIA (3.6cm), MVP 6.4 cm, EFW 41%    Plan:  -Reg diet  -NST BID  -If imaging necessitated for further evaluation of perineal abscesses, MRI with PO contrast preferred over CT scan         Anemia  -pt asymptomatic  -Continued on iron (ferrous gluconate)  -H/H on admit 11/32.6       Maternal Crohn's disease affecting pregnancy, antepartum  - Diagnosed in 2019   s/p ileocecal resection in 2019 with ileostomy, ileostomy takedown in 2020  -Currently in remission, on weekly injection of Humira (Fridays)  -Follows with GI, Dr. Michael Bacon ([facility]contacted on admission regarding recommendations for kyree-rectal abscesses  -nutrition consulted, appreciate recs    Psoriasis  -Wound care consulted, appreciate flako Goodman MD  Obstetrics  [facility] - Antepartum

## 2022-12-06 NOTE — LETTER
December 8, 2022         [address]  [address]  [facility] [address]  Phone: [phone]  Fax: [phone]       Patient: Lora Scott   YOB: 1990  Date of Visit: 12/08/2022    To Whom It May Concern:    Ziyad Scott  was at [facility] on 12/08/2022. The patient will have multiple weekly wound care appointments for the next 4 months. She may return to work however it is critical she is allowed time to make these appointments.     She may return to work/school on 12/9/22 with limitations. She may need accommodations to allow for better comfort and any wound care needs that may arise while at work. If you have any questions or concerns, or if I can be of further assistance, please do not hesitate to contact me.    Sincerely,    Candi Lomax MD

## 2022-12-06 NOTE — ANESTHESIA PREPROCEDURE EVALUATION
[facility]  Anesthesia Pre-Operative Evaluation         Patient Name: Lora Scott  YOB: 1990  MRN: [id number]    2022      Lora Scott is a 32 y.o. female  @ 29w6d w PMH of Crohn's dz (>s/p bowel resection, ileostomy;  ileostomy takedown), FELICIA (3cm), and anemia () who presents for general surgery consult due to gluteal abscess.     OB History    Para Term  AB Living   1 0 0 0 0 0   SAB IAB Ectopic Multiple Live Births   0 0 0 0 0      # Outcome Date GA Lbr Miguel/2nd Weight Sex Delivery Anes PTL Lv   1 Current                Review of patient's allergies indicates:   Allergen Reactions    Horse/equine containing products Itching       Wt Readings from Last 1 Encounters:   22 0909 68 kg (150 lb)       BP Readings from Last 3 Encounters:   22 (!) 90/54   22 (!) 86/51   10/26/22 100/64       Patient Active Problem List   Diagnosis    Psoriasis    Crohn's disease (ileum)    Immunosuppressed status    High myopia, bilateral    Wears contact lenses    Perineal abscess    Maternal Crohn's disease affecting pregnancy, antepartum    Pregnancy with one fetus, antepartum    Anemia       Past Surgical History:   Procedure Laterality Date    COLONOSCOPY N/A 2019    Procedure: COLONOSCOPY;  Surgeon: Fawad Perla MD;  Location: [facility] (2ND FLR);  Service: Endoscopy;  Laterality: N/A;    COLONOSCOPY N/A 10/30/2020    Procedure: COLONOSCOPY;  Surgeon: Suzi Bacon MD;  Location: [facility] (4TH FLR);  Service: Endoscopy;  Laterality: N/A;  covid test [facility] urgent care 10/27  pt to try Mirilax prep with Reglan before per Dr. Bacon - nimisha  10/14-new instructions e-mailed-tb  10/16--new instructions e-mailed  10/29/20- Pt confirmed earlier arrival time, prep ins. reveiwed, Pt verbalized understanding- [id number]    COLONOSCOPY N/A 5/10/2022    Procedure: COLONOSCOPY;  Surgeon: Suzi Bacon MD;  Location: [facility]  (4TH FLR);  Service: Endoscopy;  Laterality: N/A;  2022- same as last bowel prep (Miralax/Gatorade)  3/ pt rescheduled to May/ COVID test on 5/3 at [facility]  instruction via portal   pt rescheduled; pt fully vaccinated; Rapid; instructions to portal-    ESOPHAGOGASTRODUODENOSCOPY N/A 2019    Procedure: EGD (ESOPHAGOGASTRODUODENOSCOPY);  Surgeon: Fawad Perla MD;  Location: [facility] ENDO (2ND FLR);  Service: Endoscopy;  Laterality: N/A;    ILEOCOLECTOMY N/A 5/10/2019    Procedure: ILEOCOLECTOMY;  Surgeon: Uday Cope MD;  Location: [facility] OR 2ND FLR;  Service: Colon and Rectal;  Laterality: N/A;  ileocecectomy    ILEOSCOPY N/A 2019    Procedure: ILEOSCOPY through stoma;  Surgeon: Suzi Bacon MD;  Location: [facility] ENDO (4TH FLR);  Service: Endoscopy;  Laterality: N/A;  Schedule as 30 minute case  Please let patient know to bring extra stoma bag/wafer, etc- entire bag and appliance will be removed at time of procedure to visualize the area well   first week 2019     per note-R/S to 19-GT    ILEOSTOMY N/A 5/10/2019    Procedure: CREATION, ILEOSTOMY with mucous fistula;  Surgeon: Uday Cope MD;  [facility];  Service: Colon and Rectal;  Laterality: N/A;    ILEOSTOMY CLOSURE N/A 2020    Procedure: CLOSURE,ILEOSTOMY TAKEDOWN END ILEOSTOMY/MUCOUS FISTULA POSSIBLE LAPAROTOMY ERAS LOW;  Surgeon: Uday Cope MD;  [facility];  Service: Colon and Rectal;  Laterality: N/A;       Social History     Socioeconomic History    Marital status: Single   Tobacco Use    Smoking status: Every Day     Packs/day: 0.25     Types: Cigarettes     Last attempt to quit: 2020     Years since quittin.9    Smokeless tobacco: Never   Substance and Sexual Activity    Alcohol use: Not Currently     Alcohol/week: 0.0 standard drinks     Comment: Socially    Drug use: Yes     Frequency: 3.0 times per week     Types: Marijuana     Comment: last today    Sexual  activity: Not Currently     Partners: Male     Birth control/protection: None         Chemistry        Component Value Date/Time     (L) 11/29/2022 2324    K 3.1 (L) 11/29/2022 2324     11/29/2022 2324    CO2 20 (L) 11/29/2022 2324    BUN 4 (L) 11/29/2022 2324    CREATININE 0.7 11/29/2022 2324    GLU 78 11/29/2022 2324        Component Value Date/Time    CALCIUM 8.5 (L) 11/29/2022 2324    ALKPHOS 98 11/29/2022 2324    AST 12 11/29/2022 2324    ALT 10 11/29/2022 2324    BILITOT 0.8 11/29/2022 2324    ESTGFRAFRICA >60.0 07/07/2022 0722    EGFRNONAA >60.0 07/07/2022 0722            Lab Results   Component Value Date    WBC 17.78 (H) 11/29/2022    HGB 11.0 (L) 11/29/2022    HCT 31.5 (L) 11/29/2022     (H) 11/29/2022     11/29/2022       No results for input(s): PT, INR, PROTIME, APTT in the last 72 hours.                                                                                                                 12/06/2022  Lora Scott is a 32 y.o., female.      Pre-op Assessment    I have reviewed the Patient Summary Reports.     I have reviewed the Nursing Notes. I have reviewed the NPO Status.   I have reviewed the Medications.   Prednisone    Review of Systems  Anesthesia Hx:  No problems with previous Anesthesia  History of prior surgery of interest to airway management or planning: Previous anesthesia: General Colonosopy 10/30/20 with general anesthesia.  Procedure performed at an [facility]. Denies Family Hx of Anesthesia complications.   Denies Personal Hx of Anesthesia complications.   Social:  Smoker, No Alcohol Use    Hematology/Oncology:  Hematology Normal   Oncology Normal     EENT/Dental:EENT/Dental Normal   Cardiovascular:  Cardiovascular Normal Exercise tolerance: good  ECG has been reviewed.    Pulmonary:   Asthma    Renal/:  Renal/ Normal     Hepatic/GI:  Hepatic/GI Normal Crohn's disease   Musculoskeletal:  Musculoskeletal Normal    Neurological:  Neurology  Normal    Endocrine:  Endocrine Normal    Dermatological:  Skin Normal    Psych:   depression          Physical Exam  General: Well nourished, Cooperative and Alert    Airway:  Mallampati: II / II  Mouth Opening: Normal  TM Distance: Normal  Tongue: Normal  Neck ROM: Normal ROM    Dental:  Edentulous        Anesthesia Plan  Type of Anesthesia, risks & benefits discussed:    Anesthesia Type: Gen ETT, Epidural, Spinal, CSE  Intra-op Monitoring Plan: Standard ASA Monitors  Post Op Pain Control Plan: multimodal analgesia, IV/PO Opioids PRN and epidural analgesia  Informed Consent: Informed consent signed with the Patient and all parties understand the risks and agree with anesthesia plan.  All questions answered.   ASA Score: 3  Day of Surgery Review of History & Physical: H&P Update referred to the surgeon/provider.    Ready For Surgery From Anesthesia Perspective.     .

## 2022-12-07 ENCOUNTER — ANESTHESIA (OUTPATIENT)
Dept: SURGERY | Facility: OTHER | Age: 32
DRG: 818 | End: 2022-12-07
Payer: MEDICAID

## 2022-12-07 ENCOUNTER — ANESTHESIA EVENT (OUTPATIENT)
Dept: SURGERY | Facility: OTHER | Age: 32
DRG: 818 | End: 2022-12-07
Payer: MEDICAID

## 2022-12-07 LAB — RPR SER QL: NORMAL

## 2022-12-07 PROCEDURE — S0030 INJECTION, METRONIDAZOLE: HCPCS

## 2022-12-07 PROCEDURE — 25000003 PHARM REV CODE 250: Performed by: SPECIALIST

## 2022-12-07 PROCEDURE — 36000704 HC OR TIME LEV I 1ST 15 MIN: Performed by: SPECIALIST

## 2022-12-07 PROCEDURE — 63600175 PHARM REV CODE 636 W HCPCS: Performed by: STUDENT IN AN ORGANIZED HEALTH CARE EDUCATION/TRAINING PROGRAM

## 2022-12-07 PROCEDURE — 11045 DBRDMT SUBQ TISS EACH ADDL: CPT | Mod: ,,, | Performed by: SPECIALIST

## 2022-12-07 PROCEDURE — 87102 FUNGUS ISOLATION CULTURE: CPT | Performed by: OBSTETRICS & GYNECOLOGY

## 2022-12-07 PROCEDURE — 37000009 HC ANESTHESIA EA ADD 15 MINS: Performed by: SPECIALIST

## 2022-12-07 PROCEDURE — 88304 PR  SURG PATH,LEVEL III: ICD-10-PCS | Mod: 26,,, | Performed by: PATHOLOGY

## 2022-12-07 PROCEDURE — 25000003 PHARM REV CODE 250

## 2022-12-07 PROCEDURE — 11000001 HC ACUTE MED/SURG PRIVATE ROOM

## 2022-12-07 PROCEDURE — 71000039 HC RECOVERY, EACH ADD'L HOUR: Performed by: SPECIALIST

## 2022-12-07 PROCEDURE — 87070 CULTURE OTHR SPECIMN AEROBIC: CPT | Performed by: OBSTETRICS & GYNECOLOGY

## 2022-12-07 PROCEDURE — 11045 PR DEB SUBQ TISSUE ADD-ON: ICD-10-PCS | Mod: ,,, | Performed by: SPECIALIST

## 2022-12-07 PROCEDURE — 99232 SBSQ HOSP IP/OBS MODERATE 35: CPT | Mod: 25,,, | Performed by: OBSTETRICS & GYNECOLOGY

## 2022-12-07 PROCEDURE — 11042 DBRDMT SUBQ TIS 1ST 20SQCM/<: CPT | Mod: ,,, | Performed by: SPECIALIST

## 2022-12-07 PROCEDURE — 88305 TISSUE EXAM BY PATHOLOGIST: CPT | Performed by: PATHOLOGY

## 2022-12-07 PROCEDURE — 37000008 HC ANESTHESIA 1ST 15 MINUTES: Performed by: SPECIALIST

## 2022-12-07 PROCEDURE — 59025 PR FETAL 2N-STRESS TEST: ICD-10-PCS | Mod: 26,,, | Performed by: OBSTETRICS & GYNECOLOGY

## 2022-12-07 PROCEDURE — D9220A PRA ANESTHESIA: ICD-10-PCS | Mod: ,,, | Performed by: ANESTHESIOLOGY

## 2022-12-07 PROCEDURE — D9220A PRA ANESTHESIA: Mod: ,,, | Performed by: ANESTHESIOLOGY

## 2022-12-07 PROCEDURE — 36000705 HC OR TIME LEV I EA ADD 15 MIN: Performed by: SPECIALIST

## 2022-12-07 PROCEDURE — 87205 SMEAR GRAM STAIN: CPT | Performed by: OBSTETRICS & GYNECOLOGY

## 2022-12-07 PROCEDURE — 87206 SMEAR FLUORESCENT/ACID STAI: CPT | Performed by: OBSTETRICS & GYNECOLOGY

## 2022-12-07 PROCEDURE — 87116 MYCOBACTERIA CULTURE: CPT | Performed by: OBSTETRICS & GYNECOLOGY

## 2022-12-07 PROCEDURE — 59025 FETAL NON-STRESS TEST: CPT | Mod: 26,,, | Performed by: OBSTETRICS & GYNECOLOGY

## 2022-12-07 PROCEDURE — 87075 CULTR BACTERIA EXCEPT BLOOD: CPT | Performed by: OBSTETRICS & GYNECOLOGY

## 2022-12-07 PROCEDURE — 99232 PR SUBSEQUENT HOSPITAL CARE,LEVL II: ICD-10-PCS | Mod: 25,,, | Performed by: OBSTETRICS & GYNECOLOGY

## 2022-12-07 PROCEDURE — 88304 TISSUE EXAM BY PATHOLOGIST: CPT | Mod: 26,,, | Performed by: PATHOLOGY

## 2022-12-07 PROCEDURE — 71000033 HC RECOVERY, INTIAL HOUR: Performed by: SPECIALIST

## 2022-12-07 PROCEDURE — 25000003 PHARM REV CODE 250: Performed by: STUDENT IN AN ORGANIZED HEALTH CARE EDUCATION/TRAINING PROGRAM

## 2022-12-07 PROCEDURE — 87076 CULTURE ANAEROBE IDENT EACH: CPT | Performed by: OBSTETRICS & GYNECOLOGY

## 2022-12-07 PROCEDURE — 87015 SPECIMEN INFECT AGNT CONCNTJ: CPT | Performed by: OBSTETRICS & GYNECOLOGY

## 2022-12-07 PROCEDURE — 63600175 PHARM REV CODE 636 W HCPCS: Performed by: ANESTHESIOLOGY

## 2022-12-07 PROCEDURE — 11042 PR DEBRIDEMENT, SKIN, SUB-Q TISSUE,=<20 SQ CM: ICD-10-PCS | Mod: ,,, | Performed by: SPECIALIST

## 2022-12-07 RX ORDER — PROCHLORPERAZINE EDISYLATE 5 MG/ML
5 INJECTION INTRAMUSCULAR; INTRAVENOUS EVERY 6 HOURS PRN
Status: DISCONTINUED | OUTPATIENT
Start: 2022-12-07 | End: 2022-12-08 | Stop reason: HOSPADM

## 2022-12-07 RX ORDER — AMMONIUM LACTATE 12 G/100G
LOTION TOPICAL 2 TIMES DAILY
Status: DISCONTINUED | OUTPATIENT
Start: 2022-12-07 | End: 2022-12-08 | Stop reason: HOSPADM

## 2022-12-07 RX ORDER — FENTANYL CITRATE 50 UG/ML
25 INJECTION, SOLUTION INTRAMUSCULAR; INTRAVENOUS EVERY 5 MIN PRN
Status: DISCONTINUED | OUTPATIENT
Start: 2022-12-07 | End: 2022-12-07

## 2022-12-07 RX ORDER — HYDROMORPHONE HYDROCHLORIDE 1 MG/ML
0.5 INJECTION, SOLUTION INTRAMUSCULAR; INTRAVENOUS; SUBCUTANEOUS EVERY 4 HOURS PRN
Status: DISCONTINUED | OUTPATIENT
Start: 2022-12-07 | End: 2022-12-08 | Stop reason: HOSPADM

## 2022-12-07 RX ORDER — HYDROMORPHONE HYDROCHLORIDE 1 MG/ML
0.2 INJECTION, SOLUTION INTRAMUSCULAR; INTRAVENOUS; SUBCUTANEOUS EVERY 4 HOURS PRN
Status: DISCONTINUED | OUTPATIENT
Start: 2022-12-07 | End: 2022-12-07

## 2022-12-07 RX ORDER — MIDAZOLAM HYDROCHLORIDE 1 MG/ML
INJECTION INTRAMUSCULAR; INTRAVENOUS
Status: DISCONTINUED | OUTPATIENT
Start: 2022-12-07 | End: 2022-12-07

## 2022-12-07 RX ORDER — OXYCODONE HYDROCHLORIDE 5 MG/1
10 TABLET ORAL EVERY 6 HOURS PRN
Status: DISCONTINUED | OUTPATIENT
Start: 2022-12-07 | End: 2022-12-08 | Stop reason: HOSPADM

## 2022-12-07 RX ORDER — NALOXONE HCL 0.4 MG/ML
0.04 VIAL (ML) INJECTION
Status: DISCONTINUED | OUTPATIENT
Start: 2022-12-07 | End: 2022-12-08 | Stop reason: HOSPADM

## 2022-12-07 RX ORDER — MUPIROCIN 20 MG/G
OINTMENT TOPICAL 2 TIMES DAILY
Status: DISCONTINUED | OUTPATIENT
Start: 2022-12-07 | End: 2022-12-08 | Stop reason: HOSPADM

## 2022-12-07 RX ORDER — ACETAMINOPHEN 325 MG/1
650 TABLET ORAL EVERY 6 HOURS
Status: DISCONTINUED | OUTPATIENT
Start: 2022-12-08 | End: 2022-12-08 | Stop reason: HOSPADM

## 2022-12-07 RX ORDER — OXYCODONE HYDROCHLORIDE 5 MG/1
5 TABLET ORAL EVERY 6 HOURS PRN
Status: DISCONTINUED | OUTPATIENT
Start: 2022-12-07 | End: 2022-12-08 | Stop reason: HOSPADM

## 2022-12-07 RX ORDER — ONDANSETRON 2 MG/ML
4 INJECTION INTRAMUSCULAR; INTRAVENOUS EVERY 12 HOURS PRN
Status: DISCONTINUED | OUTPATIENT
Start: 2022-12-07 | End: 2022-12-08 | Stop reason: HOSPADM

## 2022-12-07 RX ORDER — FENTANYL CITRATE 50 UG/ML
INJECTION, SOLUTION INTRAMUSCULAR; INTRAVENOUS
Status: DISCONTINUED | OUTPATIENT
Start: 2022-12-07 | End: 2022-12-07

## 2022-12-07 RX ORDER — LIDOCAINE HYDROCHLORIDE AND EPINEPHRINE 5; 5 MG/ML; UG/ML
INJECTION, SOLUTION INFILTRATION; PERINEURAL
Status: DISCONTINUED | OUTPATIENT
Start: 2022-12-07 | End: 2022-12-07 | Stop reason: HOSPADM

## 2022-12-07 RX ADMIN — FENTANYL CITRATE 10 MCG: 50 INJECTION, SOLUTION INTRAMUSCULAR; INTRAVENOUS at 12:12

## 2022-12-07 RX ADMIN — HYDROCODONE BITARTRATE AND ACETAMINOPHEN 1 TABLET: 5; 325 TABLET ORAL at 07:12

## 2022-12-07 RX ADMIN — HYDROMORPHONE HYDROCHLORIDE 0.2 MG: 1 INJECTION, SOLUTION INTRAMUSCULAR; INTRAVENOUS; SUBCUTANEOUS at 05:12

## 2022-12-07 RX ADMIN — SODIUM CHLORIDE, SODIUM LACTATE, POTASSIUM CHLORIDE, AND CALCIUM CHLORIDE: .6; .31; .03; .02 INJECTION, SOLUTION INTRAVENOUS at 01:12

## 2022-12-07 RX ADMIN — MIDAZOLAM HYDROCHLORIDE 1 MG: 1 INJECTION, SOLUTION INTRAMUSCULAR; INTRAVENOUS at 01:12

## 2022-12-07 RX ADMIN — METRONIDAZOLE 500 MG: 5 INJECTION, SOLUTION INTRAVENOUS at 04:12

## 2022-12-07 RX ADMIN — FENTANYL CITRATE 40 MCG: 50 INJECTION, SOLUTION INTRAMUSCULAR; INTRAVENOUS at 01:12

## 2022-12-07 RX ADMIN — METRONIDAZOLE 500 MG: 5 INJECTION, SOLUTION INTRAVENOUS at 09:12

## 2022-12-07 RX ADMIN — AMMONIUM LACTATE: 120 LOTION TOPICAL at 10:12

## 2022-12-07 RX ADMIN — MEPIVACAINE HYDROCHLORIDE 3 ML: 15 INJECTION, SOLUTION EPIDURAL; INFILTRATION at 12:12

## 2022-12-07 RX ADMIN — METRONIDAZOLE 500 MG: 5 INJECTION, SOLUTION INTRAVENOUS at 01:12

## 2022-12-07 RX ADMIN — HYDROMORPHONE HYDROCHLORIDE 0.2 MG: 1 INJECTION, SOLUTION INTRAMUSCULAR; INTRAVENOUS; SUBCUTANEOUS at 07:12

## 2022-12-07 RX ADMIN — MIDAZOLAM HYDROCHLORIDE 1 MG: 1 INJECTION, SOLUTION INTRAMUSCULAR; INTRAVENOUS at 12:12

## 2022-12-07 RX ADMIN — FENTANYL CITRATE 50 MCG: 50 INJECTION, SOLUTION INTRAMUSCULAR; INTRAVENOUS at 01:12

## 2022-12-07 RX ADMIN — MUPIROCIN: 20 OINTMENT TOPICAL at 10:12

## 2022-12-07 RX ADMIN — BUTORPHANOL TARTRATE 1 MG: 2 INJECTION, SOLUTION INTRAMUSCULAR; INTRAVENOUS at 01:12

## 2022-12-07 NOTE — CONSULTS
[facility] Ante[facility]  Wound Care    Patient Name:  Lora Scott   MRN:  [id number]  Date: 2022  Diagnosis: Perineal abscess    History:     Past Medical History:   Diagnosis Date    Allergy     Anxiety     Asthma     Crohn's disease 19    Crohn's disease (ileum) 5/10/2019    S/P ileocecal resection-42 cm of ileum/10.5 cm cecum removed with surgical path c/w stricture and fistula 2/15/20 ileostomy takedown and mucus fistula takedown    Crohn's disease (ileum) 5/10/2019    S/P ileocecal resection-42 cm of ileum/10.5 cm cecum removed with surgical path c/w stricture and fistula 2/15/20 ileostomy takedown and mucus fistula takedown    Depression     Eczema     Ex-smoker for less than 1 year     Immunosuppressed status 10/24/2019    Joint pain     Perineal abscess 2021       Social History     Socioeconomic History    Marital status: Single   Tobacco Use    Smoking status: Every Day     Packs/day: 0.25     Types: Cigarettes     Last attempt to quit: 2020     Years since quittin.9    Smokeless tobacco: Never   Substance and Sexual Activity    Alcohol use: Not Currently     Alcohol/week: 0.0 standard drinks     Comment: Socially    Drug use: Yes     Frequency: 3.0 times per week     Types: Marijuana     Comment: last today    Sexual activity: Not Currently     Partners: Male     Birth control/protection: None       Precautions:     Allergies as of 2022 - Reviewed 2022   Allergen Reaction Noted    Horse/equine containing products Itching 2015       [facility] Assessment Details/Treatment     Wound care consult received for assessment of psoriasis to extremities and back as well as perirectal abscess. Per chart review, patient to OR today with Dr. Rowell for I&D. Per patient, she had to stop using triamcinolone cream due to her pregnancy. Will order lac-hydrin lotion to manage symptoms of psoriasis lesions once approved by OB team. Will follow up with Sorin after completion of I&D for wound  care/treatment needs. Nursing and MD team notified. Will continue to assist with pt prn.       12/07/2022

## 2022-12-07 NOTE — CONSULTS
[facility] - Antepartum  Adult Nutrition  Consult Note    SUMMARY     Recommendations  If patient to remain NPO, recommend Climinix 4.25/5 at 70ml/hr with lipids 3 times per week.   -Provides: 1680ml, 1071kcals, 71g pf PRO, 84g CHO GIR: .86.   -Please reconsult if alternate recommendations are needed   When medically acceptable, advance oral diet as tolerated  Monitor PPN and diet advancement with needed adjustments     Goals:   PPN to meet at least 85% EEN/EPN by RD follow up  Monitored labs to trend toward target ranges    Nutrition Goal Status: new  Communication of RD Recs:  (POC)    Assessment and Plan  Nutrition Problem  Altered GI Function    Related to (etiology):   Chronic illness (Crohn's Disease), gluteal abscess    Signs and Symptoms (as evidenced by):   NPO, 2019 s/p bowel resection, ileostomy; 2020 ileostomy takedown, anemia    Interventions:  Parenteral Nutrition   Collaboration with other providers    Nutrition Diagnosis Status:   New    Malnutrition Assessment   ARSEN r/t pt off the floor 2/2 surgery                                    Reason for Assessment  Reason For Assessment: consult (Crohns Disease)  Diagnosis: gastrointestinal disease (perineal abscess)  Relevant Medical History:   Patient Active Problem List   Diagnosis    Psoriasis    Crohn's disease (ileum)    Immunosuppressed status    High myopia, bilateral    Wears contact lenses    Perineal abscess    Maternal Crohn's disease affecting pregnancy, antepartum    Pregnancy with one fetus, antepartum    Anemia    29 weeks gestation of pregnancy     Interdisciplinary Rounds: did not attend  General Information Comments:   12/7: Patient off the unit r/t surgery for incision and drainage. PMH: 2019 s/p bowel resection, ileostomy; 2020 ileostomy takedown, anemia. No significant weight loss noted per charts. Patient is 29 weeks gestation of pregnancy. Jamar 20- skin intact. RD to follow to monitor nutrition status.    Nutrition Discharge Planning:  "dc needs to be detemined    Nutrition Risk Screen  Nutrition Risk Screen: difficulty chewing/swallowing, tube feeding or parenteral nutrition    Nutrition/Diet History  Factors Affecting Nutritional Intake: NPO, altered gastrointestinal function, difficulty/impaired swallowing    Anthropometrics  Temp: 97 °F (36.1 °C)  Height Method: Stated  Height: 5' 3" (160 cm)  Height (inches): 63 in  Weight Method: Standard Scale  Weight: 68 kg (149 lb 14.6 oz)  Weight (lb): 149.91 lb  Ideal Body Weight (IBW), Female: 115 lb  % Ideal Body Weight, Female (lb): 130.36 %  BMI (Calculated): 26.6  BMI Grade: 25 - 29.9 - overweight       Lab/Procedures/Meds  Pertinent Labs Reviewed: reviewed  CBC:  Recent Labs   Lab 12/06/22  1618   WBC 15.65*   HGB 11.0*   HCT 32.6*        CMP:  Recent Labs   Lab 12/06/22  1622   CALCIUM 8.5*   ALBUMIN 2.1*   PROT 7.7   *   K 4.0   CO2 19*      BUN 5*   CREATININE 0.7   ALKPHOS 109   ALT 7*   AST 14   BILITOT 0.9     Pertinent Medications Reviewed: reviewed  Scheduled Meds:   metronidazole  500 mg Intravenous Q8H    multivit-min-ferrous gluconate  15 mL Oral Daily    mupirocin   Nasal BID    prenatal vitamin  1 tablet Oral Daily     Continuous Infusions:  PRN Meds:.acetaminophen, diphenhydrAMINE, diphenhydrAMINE, HYDROcodone-acetaminophen, HYDROcodone-acetaminophen, LIDOcaine-EPINEPHrine 0.5%-1:200,000, ondansetron, prochlorperazine, senna-docusate 8.6-50 mg, simethicone, sodium chloride 0.9%    Estimated/Assessed Needs  Weight Used For Calorie Calculations: 68 kg (149 lb 14.6 oz)  Energy Calorie Requirements (kcal): 0984-4433  Energy Need Method: Kcal/kg (25-30)  Protein Requirements: 68-81g (1.0-1.2g/kg)  Weight Used For Protein Calculations: 68 kg (149 lb 14.6 oz)  Fluid Requirements (mL): 1mL/kcal  Estimated Fluid Requirement Method:  (or per MD)  RDA Method (mL): 1700  CHO Requirement: 213g    Nutrition Prescription Ordered  Current Diet Order: NPO    Evaluation of Received " Nutrient/Fluid Intake  I/O: -.5L since admit  Energy Calories Required: not meeting needs  Protein Required: not meeting needs  Fluid Required: not meeting needs  Comments: LBM ARSEN  Tolerance: not tolerating  % Intake of Estimated Energy Needs: 0 - 25 %  % Meal Intake: NPO  Intake/Output - Last 3 Shifts         12/05 0700 12/06 0659 12/06 0700 12/07 0659 12/07 0700 12/08 0659    IV Piggyback   350    Total Intake(mL/kg)   350 (5.1)    Urine (mL/kg/hr)  500     Total Output  500     Net  -500 +350                   Nutrition Risk  Level of Risk/Frequency of Follow-up:  (1-2x/week)       Monitor and Evaluation  Food and Nutrient Intake: parenteral nutrition intake, energy intake  Food and Nutrient Adminstration: enteral and parenteral nutrition administration, diet order  Knowledge/Beliefs/Attitudes: food and nutrition knowledge/skill  Physical Activity and Function: nutrition-related ADLs and IADLs  Anthropometric Measurements: weight, weight change, body mass index  Biochemical Data, Medical Tests and Procedures: electrolyte and renal panel, gastrointestinal profile, glucose/endocrine profile, inflammatory profile, lipid profile  Nutrition-Focused Physical Findings: overall appearance       Nutrition Follow-Up  RD Follow-up?: Yes  Nathaly Perez, Registration Eligible, Provisional LDN

## 2022-12-07 NOTE — TRANSFER OF CARE
"Anesthesia Transfer of Care Note    Patient: Lora Scott    Procedure(s) Performed: Procedure(s) (LRB):  INCISION AND DRAINAGE, BUTTOCK (Bilateral)    Patient location: PACU    Anesthesia Type: spinal    Transport from OR: Transported from OR on room air with adequate spontaneous ventilation    Post pain: adequate analgesia    Post assessment: no apparent anesthetic complications    Post vital signs: stable    Level of consciousness: awake and alert    Nausea/Vomiting: no nausea/vomiting    Complications: none    Transfer of care protocol was followed      Last vitals:   Visit Vitals  BP (!) 89/56   Pulse 79   Temp 36.3 °C (97.4 °F)   Resp 16   Ht 5' 3" (1.6 m)   Wt 68 kg (149 lb 14.6 oz)   LMP 05/15/2022   SpO2 97%   Breastfeeding No   BMI 26.56 kg/m²     "

## 2022-12-07 NOTE — OP NOTE
[facility] Antepartum  Surgery Department  Operative Note    SUMMARY     Patient: Lora Scott    Medical Record: [id number]    Date of Procedure: 12/7/2022     Surgeon: Surgeon(s) and Role:     * Regan Rowell Jr., MD - Primary    Assisting Surgeon: None    Pre-Operative Diagnosis: Abscess of buttock [L02.31]    Post-Operative Diagnosis: Post-Op Diagnosis Codes:     * Abscess of buttock [L02.31]    Procedure: Procedure(s) (LRB):  INCISION AND DRAINAGE, BUTTOCK (Bilateral), excisional debridement of skin and subcutaneous tissue 5 lesions on buttocks and perineum.  Total surface area right upper buttocks-54 sq cm-9 x 6.  Left upper buttock 45 sq cm, left lower buttocks 3 x 5 cm right middle buttocks 3 x 4 cm, right perineum 3 x 3 cm.  Digital exam under anesthesia    Procedure in Detail:  The patient was brought to the operating room and placed in the right lateral decubitus position.  Perineum and buttocks prepped and draped in a sterile fashion.  There were areas of gross purulence present along the medial aspect of the upper buttocks in the intergluteal crease.  These areas were opened with sharp dissection and grossly purulent material was expressed.  There was chronic inflammation and granulation tissue present.  There were multiple pockets of abscesses as well.  Using a 10. Blade these areas were excised back to healthy bleeding tissue.  Hemostasis obtained with electrocautery Bovie.  There were 3 additional areas which had similar chronic and acutely infected tissue.  This consisted of skin and subcutaneous tissue which was excised.  Hemostasis obtained with electrocautery Bovie.  The wounds irrigated and then sterilely dressed.  Prior to this digital exam showed no gross evidence of lesions in the anus or rectum.  The patient tolerated the procedure well and left operating room good condition.  At the end the procedure all sponge lap and instrument counts were correct.  Estimated blood loss-100 cc

## 2022-12-07 NOTE — ASSESSMENT & PLAN NOTE
- Diagnosed in 2019   s/p ileocecal resection in 2019 with ileostomy, ileostomy takedown in 2020  -Currently in remission, on weekly injection of Humira (Fridays)  -Follows with GI, Dr. Michael Bacon ([facility])>contacted on admission regarding recommendations for kyree-rectal abscesses  -nutrition consulted, appreciate recs

## 2022-12-07 NOTE — PROGRESS NOTES
[facility] - Antepartum  Obstetrics  Antepartum Progress Note    Patient Name: Lora Scott  MRN: [id number]  Admission Date: 2022  Hospital Length of Stay: 1 days  Attending Physician: Fredy Leger MD  Primary Care Provider: Geo Espitia MD    Subjective:     Principal Problem:Perineal abscess    HPI:  Lora Scott is a 32 y.o. [id number] at 29w6d presents as a transfer from [facility] complaining of kyree-gluteal and kyree-rectal abscesses. She reports that she started having swelling and redness to the buttock about a week ago. She was being evaluated by her Ob in clinic and was given a prescription of Keflex for these abscesses and for a UTI. She reports she has been taking the Keflex without improvement of these abscesses. States they have grown in size and that she can no longer sit on her bottom or lay on her back due to significant pain. Reports the pain and abscesses have extended to surround her rectum. She denies any fevers, chills, dysuria, hematuria, N/V/D.    This IUP is complicated by h/o Crohns disease (>s/p bowel resection, ileostomy;  ileostomy takedown), Subchorionic hematoma (3cm), Ecoli UTI, anemia.  Patient denies contractions, denies vaginal bleeding, denies LOF.   Fetal Movement: normal.         Hospital Course:  2022 HD#1. Admitted from OSH. S/p single dose of zosyn. Has been compliant with keflex. VSS and WNL. Remained afebrile at OSH, afebrile on admit. Start on IV flagyl for kyree-rectal abscesses. General surgery, GI, wound care, nutrition consulted, appreciate recs. WBC 17 at OSH, repeat CBC, CMP pending. Imaging not recommended at this time. NST appropriate for gestational age on admit>NST BID, Reg diet.    2022 HD#2. VSS and WNL. Continued on IV Flagyl. Rupture of single abscess overnight, wound cultures collected. To OR today with gen surg for EUA/I&D of kyree-rectal abscess. FHT to be confirmed pre and post op        Obstetric HPI:  Patient denies contractions,  active fetal movement, No vaginal bleeding , No loss of fluid   Denies obstetric complaints.    Reports significant gluteal pain. Denies dysuria, hematuria, fever, chills, N/V/D.  States one of the abscesses ruptured overnight and has been leaking fluid since/feels wet. States other abscess still remain very painful.    Had dinner last night and has been NPO since.     OB History    Para Term  AB Living   1 0 0 0 0 0   SAB IAB Ectopic Multiple Live Births   0 0 0 0 0      # Outcome Date GA Lbr Miguel/2nd Weight Sex Delivery Anes PTL Lv   1 Current              Past Medical History:   Diagnosis Date    Allergy     Anxiety     Asthma     Crohn's disease 20    Crohn's disease (ileum) 5/10/2019    S/P ileocecal resection-42 cm of ileum/10.5 cm cecum removed with surgical path c/w stricture and fistula 2/15/20 ileostomy takedown and mucus fistula takedown    Crohn's disease (ileum) 5/10/2019    S/P ileocecal resection-42 cm of ileum/10.5 cm cecum removed with surgical path c/w stricture and fistula 2/15/20 ileostomy takedown and mucus fistula takedown    Depression     Eczema     Ex-smoker for less than 1 year     Immunosuppressed status 10/24/2019    Joint pain     Perineal abscess 2021     Past Surgical History:   Procedure Laterality Date    COLONOSCOPY N/A 2019    Procedure: COLONOSCOPY;  Surgeon: Fawad Perla MD;  Location: [facility] (2ND FLR);  Service: Endoscopy;  Laterality: N/A;    COLONOSCOPY N/A 10/30/2020    Procedure: COLONOSCOPY;  Surgeon: Suzi Bacon MD;  Location: [facility] (4TH FLR);  Service: Endoscopy;  Laterality: N/A;  covid test [facility] urgent care 10/27  pt to try Mirilax prep with Reglan before per Dr. Bacon - sm  10/14-new instructions e-mailed-tb  10/16--new instructions e-mailed  10/29/20- Pt confirmed earlier arrival time, prep ins. yang, Pt verbalized understanding- [email or web address]    COLONOSCOPY N/A 5/10/2022    Procedure: COLONOSCOPY;   Surgeon: Suzi Bacon MD;  Location: [facility] ENDO (4TH FLR);  Service: Endoscopy;  Laterality: N/A;  1/2022- same as last bowel prep (Miralax/Gatorade)  3/4 pt rescheduled to May/ COVID test on 5/3 at [facility]  instruction via portal  5/6 pt rescheduled; pt fully vaccinated; Rapid; instructions to portal-    ESOPHAGOGASTRODUODENOSCOPY N/A 2/20/2019    Procedure: EGD (ESOPHAGOGASTRODUODENOSCOPY);  Surgeon: Fawad Perla MD;  Location: [facility] ENDO (2ND FLR);  Service: Endoscopy;  Laterality: N/A;    ILEOCOLECTOMY N/A 5/10/2019    Procedure: ILEOCOLECTOMY;  Surgeon: Uday Cope MD;  Location: [facility] OR 2ND FLR;  Service: Colon and Rectal;  Laterality: N/A;  ileocecectomy    ILEOSCOPY N/A 12/20/2019    Procedure: ILEOSCOPY through stoma;  Surgeon: Suzi Bacon MD;  Location: [facility] ENDO (4TH FLR);  Service: Endoscopy;  Laterality: N/A;  Schedule as 30 minute case  Please let patient know to bring extra stoma bag/wafer, etc- entire bag and appliance will be removed at time of procedure to visualize the area well   first week 11/2019     per note-R/S to 12/20/19-GT    ILEOSTOMY N/A 5/10/2019    Procedure: CREATION, ILEOSTOMY with mucous fistula;  Surgeon: Uday Cope MD;  Location: [facility] OR 2ND FLR;  Service: Colon and Rectal;  Laterality: N/A;    ILEOSTOMY CLOSURE N/A 2/12/2020    Procedure: CLOSURE,ILEOSTOMY TAKEDOWN END ILEOSTOMY/MUCOUS FISTULA POSSIBLE LAPAROTOMY ERAS LOW;  Surgeon: Uday Cope MD;  Location: [facility] OR 2ND FLR;  Service: Colon and Rectal;  Laterality: N/A;       PTA Medications   Medication Sig    cephALEXin (KEFLEX) 500 MG capsule Take 1 capsule (500 mg total) by mouth every 12 (twelve) hours. for 7 days    ferrous sulfate 325 (65 FE) MG EC tablet Take 1 tablet (325 mg total) by mouth 2 (two) times daily.    folic acid (FOLVITE) 1 MG tablet Take 4 tablets (4 mg total) by mouth once daily.    HUMIRA PEN PnKt injection Inject 1 pen (40 mg total) into the skin every 7 days.     M- PLUS 27 mg iron- 1 mg Tab Take 1 tablet by mouth once daily.    prenat.vits,laurnece,min-iron-folic (PRENATAL VITAMIN) Tab Take 1 tablet by mouth once daily. (Patient not taking: No sig reported)       Review of patient's allergies indicates:   Allergen Reactions    Horse/equine containing products Itching        Family History       Problem Relation (Age of Onset)    Cataracts Maternal Grandfather    Hypertension Mother    Irritable bowel syndrome Paternal Grandmother    No Known Problems Father, Sister, Brother, Sister, Sister, Sister, Maternal Uncle, Paternal Aunt, Paternal Uncle, Maternal Grandmother, Paternal Grandfather, Paternal Aunt, Paternal Uncle, Paternal Uncle, Paternal Uncle, Maternal Uncle, Maternal Uncle          Tobacco Use    Smoking status: Every Day     Packs/day: 0.25     Types: Cigarettes     Last attempt to quit: 2020     Years since quittin.9    Smokeless tobacco: Never   Substance and Sexual Activity    Alcohol use: Not Currently     Alcohol/week: 0.0 standard drinks     Comment: Socially    Drug use: Yes     Frequency: 3.0 times per week     Types: Marijuana     Comment: last today    Sexual activity: Not Currently     Partners: Male     Birth control/protection: None     Review of Systems   Constitutional: Negative.  Negative for chills, diaphoresis, fatigue, fever and unexpected weight change.   HENT:  Negative for nasal congestion.    Respiratory: Negative.  Negative for shortness of breath.    Cardiovascular: Negative.  Negative for leg swelling.   Gastrointestinal: Negative.  Negative for abdominal pain, bloating, diarrhea, nausea, vomiting and fecal incontinence.        Rectal pain  Several growing boils    Genitourinary: Negative.  Negative for dysmenorrhea, dyspareunia, dysuria, menorrhagia, menstrual problem, pelvic pain, vaginal bleeding, vaginal discharge, vaginal pain, urinary incontinence, vaginal dryness and vaginal odor.   Musculoskeletal: Negative.  Negative  for leg pain.   Integumentary:  Positive for rash and mole/lesion.        Callie rectal, gluteal boils    Neurological:  Negative for headaches.   All other systems reviewed and are negative.   Objective:     Vital Signs (Most Recent):  Temp: 97 °F (36.1 °C) (12/07/22 0750)  Pulse: 83 (12/07/22 0750)  Resp: 16 (12/07/22 0750)  BP: (!) 96/58 (12/07/22 0750)  SpO2: 100 % (12/07/22 0750) Vital Signs (24h Range):  Temp:  [97 °F (36.1 °C)-98.6 °F (37 °C)] 97 °F (36.1 °C)  Pulse:  [] 83  Resp:  [14-18] 16  SpO2:  [100 %] 100 %  BP: ()/(55-63) 96/58     Weight: 68 kg (150 lb)  Body mass index is 26.57 kg/m².    FHT: 135, mod dai, + 10x10 accels, - decels, reassuring, appropriate for gest age  TOCO:  none    Physical Exam:   Constitutional: She is oriented to person, place, and time. She appears well-developed and well-nourished. No distress.    HENT:   Head: Normocephalic and atraumatic.    Eyes: Conjunctivae are normal.     Cardiovascular:  Normal rate.             Pulmonary/Chest: Effort normal. No respiratory distress.        Abdominal: Soft. She exhibits abdominal incision. She exhibits no distension. There is no abdominal tenderness. There is no rebound and no guarding.   Well healed vertical skin incision from ileocecal resection    Graivd     Genitourinary:    Genitourinary Comments: Several abscesses between buttocks, on sacrum, extend to just below the labia  3-4 abscesses with fluctuance, erythematous  Skin surrounding abscesses with mild induration  Thicker/rougher skin between buttocks    Evidence of psoriasis on back/extremities    PLEASE SEE IMAGES IN MEDIA FOR FURTHER DETAIL.      12/7 AM: Rupture of single abscess with foul smelling purulent drainage, other abscesses visualized yesterday intact             Musculoskeletal: Normal range of motion.       Neurological: She is alert and oriented to person, place, and time.    Skin: Skin is warm and dry. She is not diaphoretic.   Evidence of psoriatic  lesions over back, all 4 extremities    Psychiatric: She has a normal mood and affect. Her behavior is normal.     Cervix: deferred  Presentation: Vertex     Significant Labs:  Lab Results   Component Value Date    GROUPTRH O POS 12/06/2022    HEPBSAG Negative 08/23/2022       CBC:   Recent Labs   Lab 12/06/22  1618   WBC 15.65*   RBC 3.17*   HGB 11.0*   HCT 32.6*      *   MCH 34.7*   MCHC 33.7       I have personallly reviewed all pertinent lab results from the last 24 hours.  Recent Lab Results         12/06/22  1622   12/06/22  1618        Albumin 2.1         Alkaline Phosphatase 109         ALT 7         ANION GAP 11         AST 14         Baso #   0.03       Basophil %   0.2       BILIRUBIN TOTAL 0.9  Comment: For infants and newborns, interpretation of results should be based  on gestational age, weight and in agreement with clinical  observations.    Premature Infant recommended reference ranges:  Up to 24 hours.............<8.0 mg/dL  Up to 48 hours............<12.0 mg/dL  3-5 days..................<15.0 mg/dL  6-29 days.................<15.0 mg/dL           BUN 5         Calcium 8.5         Chloride 105         CO2 19         Creatinine 0.7         Differential Method   Automated       eGFR >60         Eos #   0.3       Eosinophil %   2.1       Glucose 63         Gran # (ANC)   11.7       Gran %   74.7       Group & Rh   O POS       HEMATOCRIT   32.6       HEMOGLOBIN   11.0       Immature Grans (Abs)   0.10  Comment: Mild elevation in immature granulocytes is non specific and   can be seen in a variety of conditions including stress response,   acute inflammation, trauma and pregnancy. Correlation with other   laboratory and clinical findings is essential.         Immature Granulocytes   0.6       INDIRECT LUCY   NEG       Lymph #   2.6       Lymph %   16.8       MCH   34.7       MCHC   33.7       MCV   103       Mono #   0.9       Mono %   5.6       MPV   10.0       nRBC   0       Platelets    297       Potassium 4.0         PROTEIN TOTAL 7.7         RBC   3.17       RDW   13.5       RPR   Non-reactive       Sodium 135         WBC   15.65             Assessment/Plan:     32 y.o. female  at 30w0d for:    * Perineal abscess  -Pt reports abscess noticed about 1 week ago, have been worsening>increase in pain and growing in size  -Compliant with keflex BID (also for UTI)  -Denies ever having these symptoms before  -OSH   WBC 17   S/p single dose of zosyn    Plan:  -General surgery consulted, appreciate recs   Recommended starting on IV flagyl, ordered   No need for imaging at this time   Will follow up Dr. Bacon (GI) recs   F/U other recommendations  -D/C keflex, s/p zosyn  -CBC on admit with WBC 17>15, H/H stable at   - NPO at [facility]>To OR today for EUA/I&D with general surgery   --Will need to confirm FHT pre and post procedure  --LD charge and anesthesia aware    29 weeks gestation of pregnancy  -Primary OB Dr. Geo Espitia  -Rubella immune, RPR NR, HBsAG neg, HIV neg  -NST appropriate for gest age on admit, TOCO with no ctx  -10/31 scan: cephalic, FELICIA (3.6cm), MVP 6.4 cm, EFW 41%    Plan:  -Reg diet>NPO @ [facility] for EUA/I&D with general surgery   -Confirm FHT pre/post procedure          Anemia  -pt asymptomatic  -Continued on iron (ferrous gluconate)  -H/H on admit .6       Maternal Crohn's disease affecting pregnancy, antepartum  - Diagnosed in 2019   s/p ileocecal resection in 2019 with ileostomy, ileostomy takedown in   -Currently in remission, on weekly injection of Humira ()  -Follows with GI, Dr. Michael Bacon ([facility] [place])>contacted on admission regarding recommendations for kyree-rectal abscesses  -nutrition consulted, appreciate recs    Psoriasis  -Wound care consulted, appreciate flako Goodman MD  Obstetrics  [facility] - Antepartum

## 2022-12-07 NOTE — ASSESSMENT & PLAN NOTE
-Primary OB Dr. Geo Espitia  -Rubella immune, RPR NR, HBsAG neg, HIV neg  -NST appropriate for gest age on admit, TOCO with no ctx  -10/31 scan: cephalic, FELICIA (3.6cm), MVP 6.4 cm, EFW 41%    Plan:  -Reg diet>NPO @ [facility] for EUA/I&D with general surgery   -Confirm FHT pre/post procedure

## 2022-12-07 NOTE — ANESTHESIA PREPROCEDURE EVALUATION
[facility]  Anesthesia Pre-Operative Evaluation         Patient Name/: Lora Scott, 1990  MRN: [id number]    SUBJECTIVE:     Pre-operative evaluation for Procedure(s) (LRB):  INCISION AND DRAINAGE, BUTTOCK (Bilateral)     2022    Lora Scott is a 32 y.o. female   at 30w0d who originally presented as transfer from [facility] for perirectal and gluteal abscesses. She has a history of Chron's disease (s/p bowel resection, ileostomy, then subsequent ileostomy takedown), anemia, and small subchorionic hematoma. She is now to undergo resection with GI.     Patient now presents for the above procedure(s).    ________________________________________  ECHO: No results found for this or any previous visit.    ________________________________________    Prev airway:   Intubation:     Induction:  Intravenous    Intubated:  Postinduction    Mask Ventilation:  Easy mask    Attempts:  1    Attempted By:  Resident anesthesiologist    Blade:  Cummings 2    Laryngeal View Grade: Grade I - full view of chords      Difficult Airway Encountered?: No      Complications:  None    Airway Device:  Oral endotracheal tube    Airway Device Size:  7.0    Style/Cuff Inflation:  Cuffed (inflated to minimal occlusive pressure)    Inflation Amount (mL):  7    Tube secured:  21    Secured at:  The lips    Placement Verified By:  Capnometry and Revisualization with laryngoscopy    Complicating Factors:  None    Findings Post-Intubation:  BS equal bilateral and atraumatic/condition of teeth unchanged    LDA:        Peripheral IV - Single Lumen 22 1614 18 G Anterior;Left Forearm (Active)   Site Assessment Clean;Dry;Intact;No redness;No swelling 22   Extremity Assessment Distal to IV No abnormal discoloration;No swelling;No redness 22   Line Status Saline locked 22   Dressing Status Clean;Intact;Dry 22   Dressing Intervention Integrity maintained 22   Number  of days: 0       Drips: None documented      Patient Active Problem List   Diagnosis    Psoriasis    Crohn's disease (ileum)    Immunosuppressed status    High myopia, bilateral    Wears contact lenses    Perineal abscess    Maternal Crohn's disease affecting pregnancy, antepartum    Pregnancy with one fetus, antepartum    Anemia    29 weeks gestation of pregnancy       Review of patient's allergies indicates:   Allergen Reactions    Horse/equine containing products Itching       Current Inpatient Medications:    metronidazole  500 mg Intravenous Q8H    multivit-min-ferrous gluconate  15 mL Oral Daily    mupirocin   Nasal BID    prenatal vitamin  1 tablet Oral Daily       No current facility-administered medications on file prior to encounter.     Current Outpatient Medications on File Prior to Encounter   Medication Sig Dispense Refill    cephALEXin (KEFLEX) 500 MG capsule Take 1 capsule (500 mg total) by mouth every 12 (twelve) hours. for 7 days 14 capsule 0    ferrous sulfate 325 (65 FE) MG EC tablet Take 1 tablet (325 mg total) by mouth 2 (two) times daily. 60 tablet 3    folic acid (FOLVITE) 1 MG tablet Take 4 tablets (4 mg total) by mouth once daily. 120 tablet 11    HUMIRA PEN PnKt injection Inject 1 pen (40 mg total) into the skin every 7 days. 12 pen 1    M-MAXIMUS PLUS 27 mg iron- 1 mg Tab Take 1 tablet by mouth once daily.      prenat.vits,laurence,min-iron-folic (PRENATAL VITAMIN) Tab Take 1 tablet by mouth once daily. (Patient not taking: No sig reported) 90 each 3       Past Surgical History:   Procedure Laterality Date    COLONOSCOPY N/A 2019    Procedure: COLONOSCOPY;  Surgeon: Fawad Perla MD;  Location: [facility] (2ND FLR);  Service: Endoscopy;  Laterality: N/A;    COLONOSCOPY N/A 10/30/2020    Procedure: COLONOSCOPY;  Surgeon: Suzi Bacon MD;  Location: [facility] (4TH FLR);  Service: Endoscopy;  Laterality: N/A;  covid test [facility] urgent care 10/27  pt to try  Mirilax prep with Reglan before per Dr. Bacon -   10/14-new instructions e-mailed-tb  10/16--new instructions e-mailed  10/29/20- Pt confirmed earlier arrival time, prep ins. reveiwed, Pt verbalized understanding- [email or web address]    COLONOSCOPY N/A 5/10/2022    Procedure: COLONOSCOPY;  Surgeon: Suzi Bacon MD;  Location: [facility] ENDO (4TH FLR);  Service: Endoscopy;  Laterality: N/A;  1/2022- same as last bowel prep (Miralax/Gatorade)  3/4 pt rescheduled to May/ COVID test on 5/3 at [facility]  instruction via portal  5/6 pt rescheduled; pt fully vaccinated; Rapid; instructions to portal-    ESOPHAGOGASTRODUODENOSCOPY N/A 2/20/2019    Procedure: EGD (ESOPHAGOGASTRODUODENOSCOPY);  Surgeon: Fawad Perla MD;  Location: [facility] (2ND FLR);  Service: Endoscopy;  Laterality: N/A;    ILEOCOLECTOMY N/A 5/10/2019    Procedure: ILEOCOLECTOMY;  Surgeon: Uday Cope MD;  Location: [facility] OR 2ND FLR;  Service: Colon and Rectal;  Laterality: N/A;  ileocecectomy    ILEOSCOPY N/A 12/20/2019    Procedure: ILEOSCOPY through stoma;  Surgeon: Suzi Bacon MD;  Location: [facility] ENDO (4TH FLR);  Service: Endoscopy;  Laterality: N/A;  Schedule as 30 minute case  Please let patient know to bring extra stoma bag/wafer, etc- entire bag and appliance will be removed at time of procedure to visualize the area well   first week 11/2019     per note-R/S to 12/20/19-GT    ILEOSTOMY N/A 5/10/2019    Procedure: CREATION, ILEOSTOMY with mucous fistula;  Surgeon: dUay Cope MD;  Location: [facility] OR 2ND FLR;  Service: Colon and Rectal;  Laterality: N/A;    ILEOSTOMY CLOSURE N/A 2/12/2020    Procedure: CLOSURE,ILEOSTOMY TAKEDOWN END ILEOSTOMY/MUCOUS FISTULA POSSIBLE LAPAROTOMY ERAS LOW;  Surgeon: Uday Cope MD;  Location: [facility] OR 2ND FLR;  Service: Colon and Rectal;  Laterality: N/A;       Social History:  Tobacco Use: High Risk    Smoking Tobacco Use: Every Day    Smokeless Tobacco Use: Never    Passive Exposure: Not on file        Alcohol Use: Not on file       OBJECTIVE:     Vital Signs Range:  BMI Readings from Last 1 Encounters:   12/06/22 26.57 kg/m²       Temp:  [36.1 °C (97 °F)-36.8 °C (98.3 °F)]   Pulse:  []   Resp:  [15-16]   BP: ()/(58-60)   SpO2:  [100 %]        Significant Labs:        Component Value Date/Time    WBC 15.65 (H) 12/06/2022 1618    HGB 11.0 (L) 12/06/2022 1618    HCT 32.6 (L) 12/06/2022 1618    HCT 39 06/10/2021 1324     12/06/2022 1618     (L) 12/06/2022 1622    K 4.0 12/06/2022 1622     12/06/2022 1622    CO2 19 (L) 12/06/2022 1622    GLU 63 (L) 12/06/2022 1622    BUN 5 (L) 12/06/2022 1622    CREATININE 0.7 12/06/2022 1622    MG 1.9 05/31/2021 2207    PHOS 2.9 02/14/2020 0349    CALCIUM 8.5 (L) 12/06/2022 1622    ALBUMIN 2.1 (L) 12/06/2022 1622    PROT 7.7 12/06/2022 1622    ALKPHOS 109 12/06/2022 1622    BILITOT 0.9 12/06/2022 1622    AST 14 12/06/2022 1622    ALT 7 (L) 12/06/2022 1622    HGBA1C 4.0 08/23/2022 1024        Please see Results Review for additional labs.     Diagnostic Studies: No relevant studies.    EKG:   Results for orders placed or performed during the hospital encounter of 02/01/22   EKG 12-lead    Collection Time: 02/01/22  1:18 PM    Narrative    Test Reason : R00.0,    Vent. Rate : 126 BPM     Atrial Rate : 126 BPM     P-R Int : 134 ms          QRS Dur : 066 ms      QT Int : 324 ms       P-R-T Axes : 051 037 -06 degrees     QTc Int : 469 ms    Sinus tachycardia  Cannot rule out Anterior infarct ,age undetermined  Abnormal ECG  When compared with ECG of 31-MAY-2021 21:10,  Vent. rate has increased BY  58 BPM  Non-specific change in ST segment in Inferior leads  T wave inversion now evident in Inferior leads  Nonspecific T wave abnormality now evident in Anterior-lateral leads  Confirmed by Dave Kenyon MD ([id number]) on 2/1/2022 6:02:54 PM    Referred By:             Confirmed By:Dave Kenyon MD       ECHO:  See subjective, if  available.      ASSESSMENT/PLAN:           Pre-op Assessment    I have reviewed the Patient Summary Reports.     I have reviewed the Nursing Notes. I have reviewed the NPO Status.   I have reviewed the Medications.   Prednisone    Review of Systems  Anesthesia Hx:  No problems with previous Anesthesia  History of prior surgery of interest to airway management or planning: Previous anesthesia: General Colonosopy 10/30/20 with general anesthesia.  Procedure performed at an [facility]. Denies Family Hx of Anesthesia complications.   Denies Personal Hx of Anesthesia complications.   Social:  Smoker, No Alcohol Use    Hematology/Oncology:  Hematology Normal   Oncology Normal     EENT/Dental:EENT/Dental Normal   Cardiovascular:  Cardiovascular Normal Exercise tolerance: good  ECG has been reviewed.    Pulmonary:   Asthma    Renal/:  Renal/ Normal     Hepatic/GI:  Hepatic/GI Normal Crohn's disease   Musculoskeletal:  Musculoskeletal Normal    Neurological:  Neurology Normal    Endocrine:  Endocrine Normal    Dermatological:  Skin Normal    Psych:   depression          Physical Exam  General: Well nourished    Airway:  Mallampati: I   Mouth Opening: Normal  TM Distance: Normal  Tongue: Normal  Neck ROM: Normal ROM    Dental:  Edentulous    Chest/Lungs:  Normal Respiratory Rate    Heart:  Rate: Normal        Anesthesia Plan  Type of Anesthesia, risks & benefits discussed:    Anesthesia Type: Gen ETT, Epidural, Spinal, CSE  Intra-op Monitoring Plan: Standard ASA Monitors  Post Op Pain Control Plan: multimodal analgesia, IV/PO Opioids PRN and intrathecal opioid  Informed Consent: Informed consent signed with the Patient and all parties understand the risks and agree with anesthesia plan.  All questions answered.   ASA Score: 3  Day of Surgery Review of History & Physical: H&P Update referred to the surgeon/provider.    Ready For Surgery From Anesthesia Perspective.     .

## 2022-12-07 NOTE — ANESTHESIA PROCEDURE NOTES
Spinal    Diagnosis: perirectal abscess  Patient location during procedure: OR  Start time: 12/7/2022 12:53 PM  Timeout: 12/7/2022 12:50 PM  End time: 12/7/2022 12:56 PM    Staffing  Authorizing Provider: Cherelle Ballard MD  Performing Provider: Jimenez Lozoya MD    Preanesthetic Checklist  Completed: patient identified, IV checked, site marked, risks and benefits discussed, surgical consent, monitors and equipment checked, pre-op evaluation and timeout performed  Spinal Block  Patient position: right lateral decubitus  Prep: ChloraPrep  Patient monitoring: heart rate, continuous pulse ox and frequent blood pressure checks  Approach: midline  Location: L4-5  Injection technique: single shot  CSF Fluid: clear free-flowing CSF  Needle  Needle type: Monalisa   Needle gauge: 25 G  Needle length: 3.5 in  Additional Documentation: no paresthesia on injection  Needle localization: anatomical landmarks  Assessment  Ease of block: easy  Patient's tolerance of the procedure: comfortable throughout block  Additional Notes  Mepivacaine 45mg + fentanyl 10mcg administered intrathecally  Medications:    Medications: mepivacaine (CARBOCAINE) injection 15 mg/mL (1.5%) - Other   3 mL - 12/7/2022 12:56:00 PM

## 2022-12-07 NOTE — PLAN OF CARE
Pt aao, vss, afebrile, ctx obtained, pt aware of npo status after mn. Plan of care discussed, questions answered, and understanding verbalized.

## 2022-12-07 NOTE — PROGRESS NOTES
32-year-old female 29 weeks and 6 days gravid transfers from [facility] for evaluation of kyree gluteal abscesses.  Patient noted discomfort beginning approximately 1 week ago and was placed on oral Keflex.  Swelling and pain have increased since that time.  No fever, chills, nausea, vomiting, change in bowel habits.  Past medical history of Crohn's disease, eczema and psoriasis.  History of rectal and anal Crohn's.    PE  72-year-old female not toxic or septic appearing.  Multiple areas of fluctuance along right and left gluteus near midline.  Rectal exam deferred due to tenderness    Lab   White blood cell count-15.65    Impression/plan  Multiple gluteal abscesses in 29 weeks 6 day gravid female.  Will need surgical drainage.  Patient ate her evening meal just prior to my exam.  Will schedule for I&D and EUA in a.m.

## 2022-12-07 NOTE — ASSESSMENT & PLAN NOTE
-Pt reports abscess noticed about 1 week ago, have been worsening>increase in pain and growing in size  -Compliant with keflex BID (also for UTI)  -Denies ever having these symptoms before  -OSH   WBC 17   S/p single dose of zosyn    Plan:  -General surgery consulted, appreciate recs   Recommended starting on IV flagyl, ordered   No need for imaging at this time   Will follow up Dr. Bacon (GI) recs   F/U other recommendations  -D/C keflex, s/p zosyn  -CBC on admit with WBC 17>15, H/H stable at 11/32  - NPO at [place]>To OR today for EUA/I&D with general surgery   --Will need to confirm FHT pre and post procedure  --LD charge and anesthesia aware

## 2022-12-07 NOTE — SUBJECTIVE & OBJECTIVE
Obstetric HPI:  Patient denies contractions, active fetal movement, No vaginal bleeding , No loss of fluid   Denies obstetric complaints.    Reports significant gluteal pain. Denies dysuria, hematuria, fever, chills, N/V/D.  States one of the abscesses ruptured overnight and has been leaking fluid since/feels wet. States other abscess still remain very painful.    Had dinner last night and has been NPO since.     OB History    Para Term  AB Living   1 0 0 0 0 0   SAB IAB Ectopic Multiple Live Births   0 0 0 0 0      # Outcome Date GA Lbr Miguel/2nd Weight Sex Delivery Anes PTL Lv   1 Current              Past Medical History:   Diagnosis Date    Allergy     Anxiety     Asthma     Crohn's disease 20    Crohn's disease (ileum) 5/10/2019    S/P ileocecal resection-42 cm of ileum/10.5 cm cecum removed with surgical path c/w stricture and fistula 2/15/20 ileostomy takedown and mucus fistula takedown    Crohn's disease (ileum) 5/10/2019    S/P ileocecal resection-42 cm of ileum/10.5 cm cecum removed with surgical path c/w stricture and fistula 2/15/20 ileostomy takedown and mucus fistula takedown    Depression     Eczema     Ex-smoker for less than 1 year     Immunosuppressed status 10/24/2019    Joint pain     Perineal abscess 2021     Past Surgical History:   Procedure Laterality Date    COLONOSCOPY N/A 2019    Procedure: COLONOSCOPY;  Surgeon: Fawad Perla MD;  Location: [facility] (2ND [facility]);  Service: Endoscopy;  Laterality: N/A;    COLONOSCOPY N/A 10/30/2020    Procedure: COLONOSCOPY;  Surgeon: Suzi Bacon MD;  Location: [facility] (4TH FLR);  Service: Endoscopy;  Laterality: N/A;  covid test [facility] urgent care 10/27  pt to try Mirilax prep with Reglan before per Dr. Bacon - nimisha  10/14-new instructions e-mailed-tb  10/16--new instructions e-mailed  10/29/20- Pt confirmed earlier arrival time, prep ins. yang, Pt verbalized understanding- [email or web address]    COLONOSCOPY N/A 5/10/2022     Procedure: COLONOSCOPY;  Surgeon: Suzi Bacon MD;  Location: [facility] ENDO (4TH FLR);  Service: Endoscopy;  Laterality: N/A;  1/2022- same as last bowel prep (Miralax/Gatorade)  3/4 pt rescheduled to May/ COVID test on 5/3 at [facility]  instruction via portal  5/6 pt rescheduled; pt fully vaccinated; Rapid; instructions to portal-    ESOPHAGOGASTRODUODENOSCOPY N/A 2/20/2019    Procedure: EGD (ESOPHAGOGASTRODUODENOSCOPY);  Surgeon: Fawad Perla MD;  Location: [facility] ENDO (2ND FLR);  Service: Endoscopy;  Laterality: N/A;    ILEOCOLECTOMY N/A 5/10/2019    Procedure: ILEOCOLECTOMY;  Surgeon: Uday Cope MD;  Location: [facility] OR 2ND FLR;  Service: Colon and Rectal;  Laterality: N/A;  ileocecectomy    ILEOSCOPY N/A 12/20/2019    Procedure: ILEOSCOPY through stoma;  Surgeon: Suzi Bacon MD;  Location: [facility] ENDO (4TH FLR);  Service: Endoscopy;  Laterality: N/A;  Schedule as 30 minute case  Please let patient know to bring extra stoma bag/wafer, etc- entire bag and appliance will be removed at time of procedure to visualize the area well   first week 11/2019     per note-R/S to 12/20/19-GT    ILEOSTOMY N/A 5/10/2019    Procedure: CREATION, ILEOSTOMY with mucous fistula;  Surgeon: Uday Cope MD;  Location: [facility] OR 2ND FLR;  Service: Colon and Rectal;  Laterality: N/A;    ILEOSTOMY CLOSURE N/A 2/12/2020    Procedure: CLOSURE,ILEOSTOMY TAKEDOWN END ILEOSTOMY/MUCOUS FISTULA POSSIBLE LAPAROTOMY ERAS LOW;  Surgeon: Uday Cope MD;  Location: [facility] OR 2ND FLR;  Service: Colon and Rectal;  Laterality: N/A;       PTA Medications   Medication Sig    cephALEXin (KEFLEX) 500 MG capsule Take 1 capsule (500 mg total) by mouth every 12 (twelve) hours. for 7 days    ferrous sulfate 325 (65 FE) MG EC tablet Take 1 tablet (325 mg total) by mouth 2 (two) times daily.    folic acid (FOLVITE) 1 MG tablet Take 4 tablets (4 mg total) by mouth once daily.    HUMIRA PEN PnKt injection Inject 1 pen (40 mg total) into the skin  every 7 days.    M-MAXIMUS PLUS 27 mg iron- 1 mg Tab Take 1 tablet by mouth once daily.    prenat.vits,laurence,min-iron-folic (PRENATAL VITAMIN) Tab Take 1 tablet by mouth once daily. (Patient not taking: No sig reported)       Review of patient's allergies indicates:   Allergen Reactions    Horse/equine containing products Itching        Family History       Problem Relation (Age of Onset)    Cataracts Maternal Grandfather    Hypertension Mother    Irritable bowel syndrome Paternal Grandmother    No Known Problems Father, Sister, Brother, Sister, Sister, Sister, Maternal Uncle, Paternal Aunt, Paternal Uncle, Maternal Grandmother, Paternal Grandfather, Paternal Aunt, Paternal Uncle, Paternal Uncle, Paternal Uncle, Maternal Uncle, Maternal Uncle          Tobacco Use    Smoking status: Every Day     Packs/day: 0.25     Types: Cigarettes     Last attempt to quit: 2020     Years since quittin.9    Smokeless tobacco: Never   Substance and Sexual Activity    Alcohol use: Not Currently     Alcohol/week: 0.0 standard drinks     Comment: Socially    Drug use: Yes     Frequency: 3.0 times per week     Types: Marijuana     Comment: last today    Sexual activity: Not Currently     Partners: Male     Birth control/protection: None     Review of Systems   Constitutional: Negative.  Negative for chills, diaphoresis, fatigue, fever and unexpected weight change.   HENT:  Negative for nasal congestion.    Respiratory: Negative.  Negative for shortness of breath.    Cardiovascular: Negative.  Negative for leg swelling.   Gastrointestinal: Negative.  Negative for abdominal pain, bloating, diarrhea, nausea, vomiting and fecal incontinence.        Rectal pain  Several growing boils    Genitourinary: Negative.  Negative for dysmenorrhea, dyspareunia, dysuria, menorrhagia, menstrual problem, pelvic pain, vaginal bleeding, vaginal discharge, vaginal pain, urinary incontinence, vaginal dryness and vaginal odor.   Musculoskeletal: Negative.   Negative for leg pain.   Integumentary:  Positive for rash and mole/lesion.        Callie rectal, gluteal boils    Neurological:  Negative for headaches.   All other systems reviewed and are negative.   Objective:     Vital Signs (Most Recent):  Temp: 97 °F (36.1 °C) (12/07/22 0750)  Pulse: 83 (12/07/22 0750)  Resp: 16 (12/07/22 0750)  BP: (!) 96/58 (12/07/22 0750)  SpO2: 100 % (12/07/22 0750) Vital Signs (24h Range):  Temp:  [97 °F (36.1 °C)-98.6 °F (37 °C)] 97 °F (36.1 °C)  Pulse:  [] 83  Resp:  [14-18] 16  SpO2:  [100 %] 100 %  BP: ()/(55-63) 96/58     Weight: 68 kg (150 lb)  Body mass index is 26.57 kg/m².    FHT: 135, mod dai, + 10x10 accels, - decels, reassuring, appropriate for gest age  TOCO:  none    Physical Exam:   Constitutional: She is oriented to person, place, and time. She appears well-developed and well-nourished. No distress.    HENT:   Head: Normocephalic and atraumatic.    Eyes: Conjunctivae are normal.     Cardiovascular:  Normal rate.             Pulmonary/Chest: Effort normal. No respiratory distress.        Abdominal: Soft. She exhibits abdominal incision. She exhibits no distension. There is no abdominal tenderness. There is no rebound and no guarding.   Well healed vertical skin incision from ileocecal resection    Graivd     Genitourinary:    Genitourinary Comments: Several abscesses between buttocks, on sacrum, extend to just below the labia  3-4 abscesses with fluctuance, erythematous  Skin surrounding abscesses with mild induration  Thicker/rougher skin between buttocks    Evidence of psoriasis on back/extremities    PLEASE SEE IMAGES IN MEDIA FOR FURTHER DETAIL.      12/7 AM: Rupture of single abscess with foul smelling purulent drainage, other abscesses visualized yesterday intact             Musculoskeletal: Normal range of motion.       Neurological: She is alert and oriented to person, place, and time.    Skin: Skin is warm and dry. She is not diaphoretic.   Evidence of  psoriatic lesions over back, all 4 extremities    Psychiatric: She has a normal mood and affect. Her behavior is normal.     Cervix: deferred  Presentation: Vertex     Significant Labs:  Lab Results   Component Value Date    GROUPTRH O POS 12/06/2022    HEPBSAG Negative 08/23/2022       CBC:   Recent Labs   Lab 12/06/22  1618   WBC 15.65*   RBC 3.17*   HGB 11.0*   HCT 32.6*      *   MCH 34.7*   MCHC 33.7       I have personallly reviewed all pertinent lab results from the last 24 hours.  Recent Lab Results         12/06/22  1622   12/06/22  1618        Albumin 2.1         Alkaline Phosphatase 109         ALT 7         ANION GAP 11         AST 14         Baso #   0.03       Basophil %   0.2       BILIRUBIN TOTAL 0.9  Comment: For infants and newborns, interpretation of results should be based  on gestational age, weight and in agreement with clinical  observations.    Premature Infant recommended reference ranges:  Up to 24 hours.............<8.0 mg/dL  Up to 48 hours............<12.0 mg/dL  3-5 days..................<15.0 mg/dL  6-29 days.................<15.0 mg/dL           BUN 5         Calcium 8.5         Chloride 105         CO2 19         Creatinine 0.7         Differential Method   Automated       eGFR >60         Eos #   0.3       Eosinophil %   2.1       Glucose 63         Gran # (ANC)   11.7       Gran %   74.7       Group & Rh   O POS       HEMATOCRIT   32.6       HEMOGLOBIN   11.0       Immature Grans (Abs)   0.10  Comment: Mild elevation in immature granulocytes is non specific and   can be seen in a variety of conditions including stress response,   acute inflammation, trauma and pregnancy. Correlation with other   laboratory and clinical findings is essential.         Immature Granulocytes   0.6       INDIRECT LUCY   NEG       Lymph #   2.6       Lymph %   16.8       MCH   34.7       MCHC   33.7       MCV   103       Mono #   0.9       Mono %   5.6       MPV   10.0       nRBC   0        Platelets   297       Potassium 4.0         PROTEIN TOTAL 7.7         RBC   3.17       RDW   13.5       RPR   Non-reactive       Sodium 135         WBC   15.65

## 2022-12-07 NOTE — PLAN OF CARE
Recommendations  If patient to remain NPO, recommend Climinix 4.25/5 at 70ml/hr with lipids 3 times per week.   -Provides: 1680ml, 1071kcals, 71g pf PRO, 84g CHO GIR: .86.   -Please reconsult if alternate recommendations are needed   When medically acceptable, advance oral diet as tolerated  Monitor PPN and diet advancement with needed adjustments     Goals:   PPN to meet at least 85% EEN/EPN by RD follow up  Monitored labs to trend toward target ranges    Nutrition Goal Status: new  Communication of RD Recs:  (POC)

## 2022-12-08 VITALS
SYSTOLIC BLOOD PRESSURE: 93 MMHG | DIASTOLIC BLOOD PRESSURE: 63 MMHG | TEMPERATURE: 98 F | WEIGHT: 149.94 LBS | BODY MASS INDEX: 26.57 KG/M2 | HEIGHT: 63 IN | HEART RATE: 106 BPM | OXYGEN SATURATION: 100 % | RESPIRATION RATE: 16 BRPM

## 2022-12-08 PROBLEM — Z98.890 STATUS POST INCISION AND DRAINAGE: Status: ACTIVE | Noted: 2022-12-08

## 2022-12-08 PROBLEM — Z3A.29 29 WEEKS GESTATION OF PREGNANCY: Status: RESOLVED | Noted: 2022-12-06 | Resolved: 2022-12-08

## 2022-12-08 LAB
GRAM STN SPEC: NORMAL
GRAM STN SPEC: NORMAL

## 2022-12-08 PROCEDURE — 99232 SBSQ HOSP IP/OBS MODERATE 35: CPT | Mod: 25,,, | Performed by: OBSTETRICS & GYNECOLOGY

## 2022-12-08 PROCEDURE — 25000003 PHARM REV CODE 250

## 2022-12-08 PROCEDURE — 59025 PR FETAL 2N-STRESS TEST: ICD-10-PCS | Mod: 26,,, | Performed by: OBSTETRICS & GYNECOLOGY

## 2022-12-08 PROCEDURE — S0030 INJECTION, METRONIDAZOLE: HCPCS | Performed by: SPECIALIST

## 2022-12-08 PROCEDURE — 99232 PR SUBSEQUENT HOSPITAL CARE,LEVL II: ICD-10-PCS | Mod: 25,,, | Performed by: OBSTETRICS & GYNECOLOGY

## 2022-12-08 PROCEDURE — 25000003 PHARM REV CODE 250: Performed by: SPECIALIST

## 2022-12-08 PROCEDURE — 59025 FETAL NON-STRESS TEST: CPT | Mod: 26,,, | Performed by: OBSTETRICS & GYNECOLOGY

## 2022-12-08 PROCEDURE — 99232 SBSQ HOSP IP/OBS MODERATE 35: CPT | Mod: ,,, | Performed by: SURGERY

## 2022-12-08 PROCEDURE — 99232 PR SUBSEQUENT HOSPITAL CARE,LEVL II: ICD-10-PCS | Mod: ,,, | Performed by: SURGERY

## 2022-12-08 RX ORDER — AMOXICILLIN 250 MG
1 CAPSULE ORAL NIGHTLY PRN
Qty: 30 TABLET | Refills: 2 | Status: SHIPPED | OUTPATIENT
Start: 2022-12-08 | End: 2023-01-25

## 2022-12-08 RX ORDER — METRONIDAZOLE 500 MG/1
500 TABLET ORAL EVERY 8 HOURS
Status: DISCONTINUED | OUTPATIENT
Start: 2022-12-08 | End: 2022-12-08 | Stop reason: HOSPADM

## 2022-12-08 RX ORDER — ACETAMINOPHEN 325 MG/1
650 TABLET ORAL EVERY 6 HOURS
Qty: 30 TABLET | Refills: 2 | Status: SHIPPED | OUTPATIENT
Start: 2022-12-08 | End: 2023-05-09

## 2022-12-08 RX ORDER — METRONIDAZOLE 500 MG/1
500 TABLET ORAL EVERY 8 HOURS
Qty: 15 TABLET | Refills: 0 | Status: SHIPPED | OUTPATIENT
Start: 2022-12-08 | End: 2022-12-13

## 2022-12-08 RX ORDER — OXYCODONE HYDROCHLORIDE 5 MG/1
5 TABLET ORAL EVERY 6 HOURS PRN
Qty: 20 TABLET | Refills: 0 | Status: SHIPPED | OUTPATIENT
Start: 2022-12-08 | End: 2023-01-25 | Stop reason: HOSPADM

## 2022-12-08 RX ADMIN — METRONIDAZOLE 500 MG: 5 INJECTION, SOLUTION INTRAVENOUS at 09:12

## 2022-12-08 RX ADMIN — MUPIROCIN: 20 OINTMENT TOPICAL at 09:12

## 2022-12-08 RX ADMIN — OXYCODONE 10 MG: 5 TABLET ORAL at 11:12

## 2022-12-08 RX ADMIN — METRONIDAZOLE 500 MG: 500 TABLET ORAL at 01:12

## 2022-12-08 RX ADMIN — ACETAMINOPHEN 650 MG: 325 TABLET, FILM COATED ORAL at 12:12

## 2022-12-08 RX ADMIN — MULTIVIT AND MINERALS-FERROUS GLUCONATE 9 MG IRON/15 ML ORAL LIQUID 15 ML: at 09:12

## 2022-12-08 RX ADMIN — METRONIDAZOLE 500 MG: 5 INJECTION, SOLUTION INTRAVENOUS at 12:12

## 2022-12-08 NOTE — PROGRESS NOTES
Postop day 1. Status post extensive debridement of perineal hidradenitis   Patient is still in significant pain and awaiting to be seen by wound care.    Patient will be set up for wound care or home health as an outpatient.      Objective:  Vital stable   Wound in perineal area with viable tissue however open and draining serous sanguinous fluid.    approximately 7 x 10 cm opening    Assessment and plan  32-year-old female with extensive perineal hidradenitis status post debridement  Recommend home health wound care to go home with.    Discussed with the patient.    Will work on discharge planning today.

## 2022-12-08 NOTE — PLAN OF CARE
Problem:  Fall Injury Risk  Goal: Absence of Fall, Infant Drop and Related Injury  Outcome: Ongoing, Progressing     Problem: Fall Injury Risk  Goal: Absence of Fall and Fall-Related Injury  Outcome: Ongoing, Progressing     Problem: Pain Acute  Goal: Acceptable Pain Control and Functional Ability  Outcome: Ongoing, Progressing     Problem: Infection  Goal: Absence of Infection Signs and Symptoms  Outcome: Ongoing, Progressing     Problem: Skin Injury Risk Increased  Goal: Skin Health and Integrity  Outcome: Ongoing, Progressing

## 2022-12-08 NOTE — PLAN OF CARE
orders sent to [facility] for review ; awaiting acceptance    [facility] with SOC on 12/12/22 Okay 'd by MD Goodman if outpatient wound care can manage patient until [facility] SOC date.  Referral sent to [facility] to Fax: [phone] via [facility] portal to review for wound care gap in  services w/ HH start of care date. Pending acceptance; update bedside RN and MD

## 2022-12-08 NOTE — PROGRESS NOTES
[facility] - Antepartum  Obstetrics  Antepartum Progress Note    Patient Name: Lora Scott  MRN: [id number]  Admission Date: 2022  Hospital Length of Stay: 2 days  Attending Physician: Fredy Leger MD  Primary Care Provider: Geo Espitia MD    Subjective:     Principal Problem:Perineal abscess    HPI:  Lora Scott is a 32 y.o. [id number] at 29w6d presents as a transfer from [facility] complaining of kyree-gluteal and kyree-rectal abscesses. She reports that she started having swelling and redness to the buttock about a week ago. She was being evaluated by her Ob in clinic and was given a prescription of Keflex for these abscesses and for a UTI. She reports she has been taking the Keflex without improvement of these abscesses. States they have grown in size and that she can no longer sit on her bottom or lay on her back due to significant pain. Reports the pain and abscesses have extended to surround her rectum. She denies any fevers, chills, dysuria, hematuria, N/V/D.    This IUP is complicated by h/o Crohns disease (2019>s/p bowel resection, ileostomy;  ileostomy takedown), Subchorionic hematoma (3cm), Ecoli UTI, anemia.  Patient denies contractions, denies vaginal bleeding, denies LOF.   Fetal Movement: normal.         Hospital Course:  2022 HD#1. Admitted from OSH. S/p single dose of zosyn. Has been compliant with keflex. VSS and WNL. Remained afebrile at OSH, afebrile on admit. Start on IV flagyl for kyree-rectal abscesses. General surgery, GI, wound care, nutrition consulted, appreciate recs. WBC 17 at OSH, repeat CBC, CMP pending. Imaging not recommended at this time. NST appropriate for gestational age on admit>NST BID, Reg diet.    2022 HD#2. VSS and WNL. Continued on IV Flagyl. Rupture of single abscess overnight, wound cultures collected. To OR today with gen surg for EUA/I&D of kyree-rectal abscess. FHT to be confirmed pre and post op  2022 HD#3, POD#1 s/p extensive I&D of perineal  and gluteal cleft abscesses (please see op note for further detail). IV>PO flagyl. Pain controlled with IV>PO meds. Tolerating diet, ambulating, voiding. Wound care and social work consulted regarding home health needs for wound care, appreciate recs. Gen surg reports wound clean and pt stable for discharge if able to follow up with wound care/HH.      Obstetric HPI:  POD#1, s/p I&D.  Patient denies contractions, active fetal movement, No vaginal bleeding , No loss of fluid   Denies obstetric complaints.    Reports significant gluteal pain immediately post op. Reports pain is well controlled currently. She is worried about how her wound will heal. States she needs help taking care of it at home and does not have support close by.    Tolerating PO, voiding spontaneously, ambulating without difficulty.     OB History    Para Term  AB Living   1 0 0 0 0 0   SAB IAB Ectopic Multiple Live Births   0 0 0 0 0      # Outcome Date GA Lbr Miguel/2nd Weight Sex Delivery Anes PTL Lv   1 Current              Past Medical History:   Diagnosis Date    Allergy     Anxiety     Asthma     Crohn's disease 20    Crohn's disease (ileum) 5/10/2019    S/P ileocecal resection-42 cm of ileum/10.5 cm cecum removed with surgical path c/w stricture and fistula 2/15/20 ileostomy takedown and mucus fistula takedown    Crohn's disease (ileum) 5/10/2019    S/P ileocecal resection-42 cm of ileum/10.5 cm cecum removed with surgical path c/w stricture and fistula 2/15/20 ileostomy takedown and mucus fistula takedown    Depression     Eczema     Ex-smoker for less than 1 year     Immunosuppressed status 10/24/2019    Joint pain     Perineal abscess 2021     Past Surgical History:   Procedure Laterality Date    COLONOSCOPY N/A 2019    Procedure: COLONOSCOPY;  Surgeon: Fawad Perla MD;  Location: [facility] ([address] [place] [address]);  Service: Endoscopy;  Laterality: N/A;    COLONOSCOPY N/A 10/30/2020    Procedure:  COLONOSCOPY;  Surgeon: Suzi Bacon MD;  Location: [facility] (4TH FLR);  Service: Endoscopy;  Laterality: N/A;  covid test [facility] urgent care 10/27  pt to try Mirilax prep with Reglan before per Dr. Bacon -   10/14-new instructions e-mailed-tb  10/16--new instructions e-mailed  10/29/20- Pt confirmed earlier arrival time, prep ins. reveiwed, Pt verbalized understanding- [email or web address]    COLONOSCOPY N/A 5/10/2022    Procedure: COLONOSCOPY;  Surgeon: Suzi Bacon MD;  Location: [facility] ENDO (4TH FLR);  Service: Endoscopy;  Laterality: N/A;  1/2022- same as last bowel prep (Miralax/Gatorade)  3/4 pt rescheduled to May/ COVID test on 5/3 at [facility]  instruction via portal  5/6 pt rescheduled; pt fully vaccinated; Rapid; instructions to portal-    ESOPHAGOGASTRODUODENOSCOPY N/A 2/20/2019    Procedure: EGD (ESOPHAGOGASTRODUODENOSCOPY);  Surgeon: Fawad Perla MD;  Location: [facility] (2ND FLR);  Service: Endoscopy;  Laterality: N/A;    ILEOCOLECTOMY N/A 5/10/2019    Procedure: ILEOCOLECTOMY;  Surgeon: Uday Cope MD;  [facility];  Service: Colon and Rectal;  Laterality: N/A;  ileocecectomy    ILEOSCOPY N/A 12/20/2019    Procedure: ILEOSCOPY through stoma;  Surgeon: Suzi Bacon MD;  Location: [facility] (4TH FLR);  Service: Endoscopy;  Laterality: N/A;  Schedule as 30 minute case  Please let patient know to bring extra stoma bag/wafer, etc- entire bag and appliance will be removed at time of procedure to visualize the area well   first week 11/2019     per note-R/S to 12/20/19-GT    ILEOSTOMY N/A 5/10/2019    Procedure: CREATION, ILEOSTOMY with mucous fistula;  Surgeon: Uday Cope MD;  Location: [facility] OR 2ND FLR;  Service: Colon and Rectal;  Laterality: N/A;    ILEOSTOMY CLOSURE N/A 2/12/2020    Procedure: CLOSURE,ILEOSTOMY TAKEDOWN END ILEOSTOMY/MUCOUS FISTULA POSSIBLE LAPAROTOMY ERAS LOW;  Surgeon: Uday Cope MD;  Location: [facility] OR [address] [place] [address];  Service: Colon and Rectal;   Laterality: N/A;    INCISION AND DRAINAGE OF BUTTOCK Bilateral 2022    Procedure: INCISION AND DRAINAGE, BUTTOCK;  Surgeon: Regan Rowell Jr., MD;  [facility];  Service: General;  Laterality: Bilateral;       PTA Medications   Medication Sig    [] cephALEXin (KEFLEX) 500 MG capsule Take 1 capsule (500 mg total) by mouth every 12 (twelve) hours. for 7 days    ferrous sulfate 325 (65 FE) MG EC tablet Take 1 tablet (325 mg total) by mouth 2 (two) times daily.    folic acid (FOLVITE) 1 MG tablet Take 4 tablets (4 mg total) by mouth once daily.    HUMIRA PEN PnKt injection Inject 1 pen (40 mg total) into the skin every 7 days.    M- PLUS 27 mg iron- 1 mg Tab Take 1 tablet by mouth once daily.    prenat.vits,laurence,min-iron-folic (PRENATAL VITAMIN) Tab Take 1 tablet by mouth once daily. (Patient not taking: No sig reported)       Review of patient's allergies indicates:   Allergen Reactions    Horse/equine containing products Itching        Family History       Problem Relation (Age of Onset)    Cataracts Maternal Grandfather    Hypertension Mother    Irritable bowel syndrome Paternal Grandmother    No Known Problems Father, Sister, Brother, Sister, Sister, Sister, Maternal Uncle, Paternal Aunt, Paternal Uncle, Maternal Grandmother, Paternal Grandfather, Paternal Aunt, Paternal Uncle, Paternal Uncle, Paternal Uncle, Maternal Uncle, Maternal Uncle          Tobacco Use    Smoking status: Every Day     Packs/day: 0.25     Types: Cigarettes     Last attempt to quit: 2020     Years since quittin.9    Smokeless tobacco: Never   Substance and Sexual Activity    Alcohol use: Not Currently     Alcohol/week: 0.0 standard drinks     Comment: Socially    Drug use: Yes     Frequency: 3.0 times per week     Types: Marijuana     Comment: last today    Sexual activity: Not Currently     Partners: Male     Birth control/protection: None     Review of Systems   Constitutional: Negative.  Negative  for chills, diaphoresis, fatigue, fever and unexpected weight change.   HENT:  Negative for nasal congestion.    Respiratory: Negative.  Negative for shortness of breath.    Cardiovascular: Negative.  Negative for leg swelling.   Gastrointestinal: Negative.  Negative for abdominal pain, bloating, diarrhea, nausea, vomiting and fecal incontinence.        Rectal/Gluteal pain   Genitourinary: Negative.  Negative for dysmenorrhea, dyspareunia, dysuria, menorrhagia, menstrual problem, pelvic pain, vaginal bleeding, vaginal discharge, vaginal pain, urinary incontinence, vaginal dryness and vaginal odor.   Musculoskeletal: Negative.  Negative for leg pain.   Integumentary:         Callie rectal, gluteal boils    Neurological:  Negative for headaches.   All other systems reviewed and are negative.   Objective:     Vital Signs (Most Recent):  Temp: 97.1 °F (36.2 °C) (12/08/22 0919)  Pulse: 92 (12/08/22 0951)  Resp: 16 (12/08/22 1146)  BP: 91/62 (12/08/22 0916)  SpO2: 100 % (12/08/22 0951) Vital Signs (24h Range):  Temp:  [97.1 °F (36.2 °C)-98.4 °F (36.9 °C)] 97.1 °F (36.2 °C)  Pulse:  [] 92  Resp:  [16-19] 16  SpO2:  [97 %-100 %] 100 %  BP: (81-97)/(55-84) 91/62     Weight: 68 kg (149 lb 14.6 oz)  Body mass index is 26.56 kg/m².    FHT: reactive    Physical Exam:   Constitutional: She is oriented to person, place, and time. She appears well-developed and well-nourished. No distress.    HENT:   Head: Normocephalic and atraumatic.    Eyes: Conjunctivae are normal.     Cardiovascular:  Normal rate.             Pulmonary/Chest: Effort normal. No respiratory distress.        Abdominal: Soft. She exhibits abdominal incision. She exhibits no distension. There is no abdominal tenderness. There is no rebound and no guarding.   Well healed vertical skin incision from ileocecal resection    Gramichelle     Genitourinary:    Genitourinary Comments: Wound dressed with several pads and gauze overlying  Serosanguinous discharge covering  ~50% of dressing    Please see media for further detail    Evidence of psoriasis on back/extremities    Please see op note for further detail             Musculoskeletal: Normal range of motion.       Neurological: She is alert and oriented to person, place, and time.    Skin: Skin is warm and dry. She is not diaphoretic.   Evidence of psoriatic lesions over back, all 4 extremities    Psychiatric: She has a normal mood and affect. Her behavior is normal.     Cervix: deferred  Presentation: Vertex     Significant Labs:  Lab Results   Component Value Date    GROUPTRH O POS 2022    HEPBSAG Negative 2022       CBC:   Recent Labs   Lab 22  1618   WBC 15.65*   RBC 3.17*   HGB 11.0*   HCT 32.6*      *   MCH 34.7*   MCHC 33.7       I have personallly reviewed all pertinent lab results from the last 24 hours.  Recent Lab Results         22  1316        Fungus (Mycology) Culture Culture in progress  [P]       Gram Stain Result Few WBC's        Few small Gram negative rods                [P] - Preliminary Result             Assessment/Plan:     32 y.o. female  at 30w1d for:    * Perineal abscess  -Pt reports abscess noticed about 1 week ago, have been worsening>increase in pain and growing in size  -Compliant with keflex BID (also for UTI)  -Denies ever having these symptoms before  -OSH   WBC 17   S/p single dose of zosyn    Plan:  -POD#1 s/p I&D, wound debridement  --Wound care consult and education ordered, appreciate recs  --Home health recommended for wound care only   -IV>PO Flagyl today, will continue to take PO flagyl for 5 days post op  -Has met all post op milestones    29 weeks gestation of pregnancy  -Primary OB Dr. Geo Espitia  -Rubella immune, RPR NR, HBsAG neg, HIV neg  -NST appropriate for gest age on admit, TOCO with no ctx  -10/31 scan: cephalic, FELICIA (3.6cm), MVP 6.4 cm, EFW 41%    Plan:  -Reg diet  -FHT confirmed pre and post procedure  -NST reactive and  reassuring, appropriate for gestational age          Anemia  -pt asymptomatic  -Continued on iron (ferrous gluconate)  -H/H on admit 11/32.6       Maternal Crohn's disease affecting pregnancy, antepartum  - Diagnosed in 2019   s/p ileocecal resection in 2019 with ileostomy, ileostomy takedown in 2020  -Currently in remission, on weekly injection of Humira (Fridays)  -Follows with GI, Dr. Michael Bacon ([facility])>contacted on admission regarding recommendations for kyree-rectal abscesses  -nutrition consulted, appreciate recs    Plan:  -Will need follow up with Dr. Bacon on discharge    Psoriasis  -Wound care consulted, appreciate recs          Yamini Goodman MD  Obstetrics  [facility] - Antepartum

## 2022-12-08 NOTE — ASSESSMENT & PLAN NOTE
- Diagnosed in 2019   s/p ileocecal resection in 2019 with ileostomy, ileostomy takedown in 2020  -Currently in remission, on weekly injection of Humira (Fridays)  -Follows with GI, Dr. Michael Bacon ([facility])>contacted on admission regarding recommendations for kyree-rectal abscesses  -nutrition consulted, appreciate recs    Plan:  -Will need follow up with Dr. Bacon on discharge

## 2022-12-08 NOTE — PROGRESS NOTES
[facility] - Antepartum  Wound Care    Patient Name:  Lora Scott   MRN:  [id number]  Date: 2022  Diagnosis: Perineal abscess    History:     Past Medical History:   Diagnosis Date    Allergy     Anxiety     Asthma     Crohn's disease 20    Crohn's disease (ileum) 5/10/2019    S/P ileocecal resection-42 cm of ileum/10.5 cm cecum removed with surgical path c/w stricture and fistula 2/15/20 ileostomy takedown and mucus fistula takedown    Crohn's disease (ileum) 5/10/2019    S/P ileocecal resection-42 cm of ileum/10.5 cm cecum removed with surgical path c/w stricture and fistula 2/15/20 ileostomy takedown and mucus fistula takedown    Depression     Eczema     Ex-smoker for less than 1 year     Immunosuppressed status 10/24/2019    Joint pain     Perineal abscess 2021       Social History     Socioeconomic History    Marital status: Single   Tobacco Use    Smoking status: Every Day     Packs/day: 0.25     Types: Cigarettes     Last attempt to quit: 2020     Years since quittin.9    Smokeless tobacco: Never   Substance and Sexual Activity    Alcohol use: Not Currently     Alcohol/week: 0.0 standard drinks     Comment: Socially    Drug use: Yes     Frequency: 3.0 times per week     Types: Marijuana     Comment: last today    Sexual activity: Not Currently     Partners: Male     Birth control/protection: None       Precautions:     Allergies as of 2022 - Reviewed 2022   Allergen Reaction Noted    Horse/equine containing products Itching 2015   32 y.o. [id number] at 29w6d presents as a transfer from [facility] complaining of kyree-gluteal and kyree-rectal abscesses. She reports that she started having swelling and redness to the buttock about a week ago. She was being evaluated by her Ob in clinic and was given a prescription of Keflex for these abscesses and for a UTI. She reports she has been taking the Keflex without improvement of these abscesses. States they have grown in size and that  she can no longer sit on her bottom or lay on her back due to significant pain. Reports the pain and abscesses have extended to surround her rectum.    [facility] Assessment Details/Treatment     Wound care consulted for dressing recommendations after debridement.  Wounds cleaned with Vashe wound solution, larger sacral wound packed with Aquacel AG dressing, smaller wounds to medial buttocks dressed with Triad hydrophilic wound dressing. Covered with ABDs and secured with medipore tape               12/08/22 1200   Pain/Comfort/Sleep   Preferred Pain Scale number (Numeric Rating Pain Scale)   Pain Rating (0-10): Rest 0   Pain Rating (0-10): Activity 0   Pain Rating: Rest 0 - no pain   Pain Rating: Activity 0 - no pain   FACES Pain Rating: Rest 0-->no hurt   FACES Pain Rating: Activity 0-->no hurt        Incision/Site 12/07/22 1407 Buttocks   Date First Assessed/Time First Assessed: 12/07/22 1407   Location: Buttocks   Wound Image      Dressing Appearance Moist drainage   Drainage Amount Moderate   Drainage Characteristics/Odor No odor;Serosanguineous   Appearance Pink;Adipose;Yellow;Moist;Gray  (cautery from surgery)   Wound Edges Open   Care Cleansed with:;Wound cleanser;Applied:;Skin Barrier  (Triad to smaller wounds)   Dressing Silver;Hydrofiber;Absorptive Pad  (mesh underwear)   Safety   Safety WDL WDL   Safety Factors ID band on;upper side rails raised x 2;call light in reach;wheels locked;bed in low position   Safety Management   Safety Promotion/Fall Prevention side rails raised x 2   Patient Rounds ID band on;visualized patient;bed in low position;bed wheels locked;call light in patient/parent reach     12/08/2022

## 2022-12-08 NOTE — PLAN OF CARE
[facility] Antepartum      HOME HEALTH ORDERS  FACE TO FACE ENCOUNTER    Patient Name: Lora Scott  YOB: 1990    PCP: Geo Espitia MD   PCP Address: [address] / [place]  PCP Phone Number: [phone]  PCP Fax: [phone]    Encounter Date: 12/6/22    Admit to Home Health    Diagnoses:  Active Hospital Problems    Diagnosis  POA    *Perineal abscess [L02.215]  Yes    Anemia [D64.9]  Yes    29 weeks gestation of pregnancy [Z3A.29]  Not Applicable    Maternal Crohn's disease affecting pregnancy, antepartum [O99.619, K50.90]  Yes    Psoriasis [L40.9]  Yes      Resolved Hospital Problems   No resolved problems to display.       Follow Up Appointments:  Future Appointments   Date Time Provider Department Center   1/23/2023  1:00 PM Suzi Bacon MD [facility]       Allergies:  Review of patient's allergies indicates:   Allergen Reactions    Horse/equine containing products Itching       Medications: Review discharge medications with patient and family and provide education.    Current Facility-Administered Medications   Medication Dose Route Frequency Provider Last Rate Last Admin    acetaminophen tablet 650 mg  650 mg Oral Q6H Candi Lomax MD   650 mg at 12/08/22 0048    ammonium lactate 12 % lotion   Topical (Top) BID Regan Rowell Jr., MD   Given at 12/07/22 2208    diphenhydrAMINE capsule 25 mg  25 mg Oral Q4H PRN Yamini Goodman MD        diphenhydrAMINE injection 25 mg  25 mg Intravenous Q4H PRN Yamini Goodman MD        HYDROmorphone injection 0.5 mg  0.5 mg Intravenous Q4H PRN Candi Lomax MD        metroNIDAZOLE tablet 500 mg  500 mg Oral Q8H Yamini Goodman MD        multivit-min-ferrous gluconate 9 mg iron/15 mL oral liquid 15 mL  15 mL Oral Daily Regan Rowell Jr., MD   15 mL at 12/08/22 0907    mupirocin 2 % ointment   Nasal BID Regan Rowell Jr., MD   Given at 12/08/22 0911    naloxone 0.4 mg/mL injection 0.04 mg  0.04 mg Intravenous PRN Jimenez DAY  MD Darell        ondansetron disintegrating tablet 8 mg  8 mg Oral Q8H PRN Yamini Goodman MD        ondansetron injection 4 mg  4 mg Intravenous Q12H PRN Jimenez Lozoya MD        oxyCODONE immediate release tablet 10 mg  10 mg Oral Q6H PRN Candi Lomax MD        oxyCODONE immediate release tablet 5 mg  5 mg Oral Q6H PRN Candi Lmoax MD        prenatal vitamin oral tablet  1 tablet Oral Daily Yamini Goodman MD        prochlorperazine injection Soln 5 mg  5 mg Intravenous Q6H PRN Yamini Goodman MD        prochlorperazine injection Soln 5 mg  5 mg Intravenous Q6H PRN Jimenez Lozoya MD        senna-docusate 8.6-50 mg per tablet 1 tablet  1 tablet Oral Nightly PRN Yamini Goodman MD        simethicone chewable tablet 80 mg  1 tablet Oral Q6H PRN Yamini Goodman MD        sodium chloride 0.9% flush 10 mL  10 mL Intravenous PRN Yamini Goodman MD         Current Discharge Medication List        CONTINUE these medications which have NOT CHANGED    Details   ferrous sulfate 325 (65 FE) MG EC tablet Take 1 tablet (325 mg total) by mouth 2 (two) times daily.  Qty: 60 tablet, Refills: 3    Associated Diagnoses: Antepartum anemia      folic acid (FOLVITE) 1 MG tablet Take 4 tablets (4 mg total) by mouth once daily.  Qty: 120 tablet, Refills: 11      HUMIRA PEN PnKt injection Inject 1 pen (40 mg total) into the skin every 7 days.  Qty: 12 pen, Refills: 1    Associated Diagnoses: Crohn's disease of both small and large intestine without complication      M- PLUS 27 mg iron- 1 mg Tab Take 1 tablet by mouth once daily.      prenat.vits,laurence,min-iron-folic (PRENATAL VITAMIN) Tab Take 1 tablet by mouth once daily.  Qty: 90 each, Refills: 3    Associated Diagnoses: 10 weeks gestation of pregnancy           STOP taking these medications       cephALEXin (KEFLEX) 500 MG capsule Comments:   Reason for Stopping:                 I have seen and examined this patient within the last 30 days. My clinical findings that support the need  for the home health skilled services and home bound status are the following:no   Medical restrictions requiring assistance of another human to leave home due to  Wound care needs only.     Diet:   regular    Labs:  none    Referrals/ Consults  WOUND CARE ONLY    Activities:   activity as tolerated    Nursing:   Agency to admit patient within 24 hours of hospital discharge unless specified on physician order or at patient request    SN to complete comprehensive assessment including routine vital signs. Instruct on disease process and s/s of complications to report to MD. Review/verify medication list sent home with the patient at time of discharge  and instruct patient/caregiver as needed. Frequency may be adjusted depending on start of care date.     Skilled nurse to perform up to 3 visits PRN for symptoms related to diagnosis    Notify MD if SBP > 160 or < 90; DBP > 90 or < 50; HR > 120 or < 50; Temp > 101; O2 < 88%; Other:      Ok to schedule additional visits based on staff availability and patient request on consecutive days within the home health episode.    When multiple disciplines ordered:    Start of Care occurs on Sunday - Wednesday schedule remaining discipline evaluations as ordered on separate consecutive days following the start of care.    Thursday SOC -schedule subsequent evaluations Friday and Monday the following week.     Friday - Saturday SOC - schedule subsequent discipline evaluations on consecutive days starting Monday of the following week.    For all post-discharge communication and subsequent orders please contact patient's primary care physician. If unable to reach primary care physician or do not receive response within 30 minutes, please contact Yamini Goodman ([phone]) for clinical staff order clarification    Miscellaneous   Routine Skin for Bedridden Patients: Instruct patient/caregiver to apply moisture barrier cream to all skin folds and wet areas in perineal area daily and  after baths and all bowel movements.    Home Health Aide:  Nursing Daily, wound care    Wound Care Orders  yes:  Surgical Wound:  Location: perineum, gluteal cleft    Consult ET nurse        Apply the following to wound:   Wet to Damp dressing: daily (frequency)    I certify that this patient is confined to her home and needs    Yamini Goodman MD  OBGYN PGY-2

## 2022-12-08 NOTE — ASSESSMENT & PLAN NOTE
-Pt reports abscess noticed about 1 week ago, have been worsening>increase in pain and growing in size  -Compliant with keflex BID (also for UTI)  -Denies ever having these symptoms before  -OSH   WBC 17   S/p single dose of zosyn    Plan:  -POD#1 s/p I&D, wound debridement  --Wound care consult and education ordered, appreciate recs  --Home health recommended for wound care only   -IV>PO Flagyl today, will continue to take PO flagyl for 5 days post op  -Has met all post op milestones

## 2022-12-08 NOTE — SUBJECTIVE & OBJECTIVE
Obstetric HPI:  POD#1, s/p I&D.  Patient denies contractions, active fetal movement, No vaginal bleeding , No loss of fluid   Denies obstetric complaints.    Reports significant gluteal pain immediately post op. Reports pain is well controlled currently. She is worried about how her wound will heal. States she needs help taking care of it at home and does not have support close by.    Tolerating PO, voiding spontaneously, ambulating without difficulty.     OB History    Para Term  AB Living   1 0 0 0 0 0   SAB IAB Ectopic Multiple Live Births   0 0 0 0 0      # Outcome Date GA Lbr Miguel/2nd Weight Sex Delivery Anes PTL Lv   1 Current              Past Medical History:   Diagnosis Date    Allergy     Anxiety     Asthma     Crohn's disease 20    Crohn's disease (ileum) 5/10/2019    S/P ileocecal resection-42 cm of ileum/10.5 cm cecum removed with surgical path c/w stricture and fistula 2/15/20 ileostomy takedown and mucus fistula takedown    Crohn's disease (ileum) 5/10/2019    S/P ileocecal resection-42 cm of ileum/10.5 cm cecum removed with surgical path c/w stricture and fistula 2/15/20 ileostomy takedown and mucus fistula takedown    Depression     Eczema     Ex-smoker for less than 1 year     Immunosuppressed status 10/24/2019    Joint pain     Perineal abscess 2021     Past Surgical History:   Procedure Laterality Date    COLONOSCOPY N/A 2019    Procedure: COLONOSCOPY;  Surgeon: Fawad Perla MD;  Location: [facility] (2ND [facility]);  Service: Endoscopy;  Laterality: N/A;    COLONOSCOPY N/A 10/30/2020    Procedure: COLONOSCOPY;  Surgeon: Suzi Bacon MD;  Location: [facility] (4TH FLR);  Service: Endoscopy;  Laterality: N/A;  covid test [facility] [place] [place] urgent care 10/27  pt to try Mirilax prep with Reglan before per Dr. Bacon - sm  10/14-new instructions e-mailed-tb  10/16--new instructions e-mailed  10/29/20- Pt confirmed earlier arrival time, prep ins. reveiwed, Pt verbalized understanding-  [id number]    COLONOSCOPY N/A 5/10/2022    Procedure: COLONOSCOPY;  Surgeon: Suzi Bacon MD;  Location: [facility] ENDO (4TH FLR);  Service: Endoscopy;  Laterality: N/A;  2022- same as last bowel prep (Miralax/Gatorade)  3/ pt rescheduled to May/ COVID test on 5/3 at [facility]  instruction via portal   pt rescheduled; pt fully vaccinated; Rapid; instructions to portal-st    ESOPHAGOGASTRODUODENOSCOPY N/A 2019    Procedure: EGD (ESOPHAGOGASTRODUODENOSCOPY);  Surgeon: Fawad Perla MD;  Location: [facility] ENDO (2ND FLR);  Service: Endoscopy;  Laterality: N/A;    ILEOCOLECTOMY N/A 5/10/2019    Procedure: ILEOCOLECTOMY;  Surgeon: Uday Cope MD;  Location: [facility] OR 2ND FLR;  Service: Colon and Rectal;  Laterality: N/A;  ileocecectomy    ILEOSCOPY N/A 2019    Procedure: ILEOSCOPY through stoma;  Surgeon: Suzi Bacon MD;  Location: [facility] ENDO (4TH FLR);  Service: Endoscopy;  Laterality: N/A;  Schedule as 30 minute case  Please let patient know to bring extra stoma bag/wafer, etc- entire bag and appliance will be removed at time of procedure to visualize the area well   first week 2019     per note-R/S to 19-GT    ILEOSTOMY N/A 5/10/2019    Procedure: CREATION, ILEOSTOMY with mucous fistula;  Surgeon: Uday Cope MD;  Location: [facility] OR 2ND FLR;  Service: Colon and Rectal;  Laterality: N/A;    ILEOSTOMY CLOSURE N/A 2020    Procedure: CLOSURE,ILEOSTOMY TAKEDOWN END ILEOSTOMY/MUCOUS FISTULA POSSIBLE LAPAROTOMY ERAS LOW;  Surgeon: Uday Cope MD;  Location: [facility] OR 2ND FLR;  Service: Colon and Rectal;  Laterality: N/A;    INCISION AND DRAINAGE OF BUTTOCK Bilateral 2022    Procedure: INCISION AND DRAINAGE, BUTTOCK;  Surgeon: Regan Rowell Jr., MD;  Location: [facility];  Service: General;  Laterality: Bilateral;       PTA Medications   Medication Sig    [] cephALEXin (KEFLEX) 500 MG capsule Take 1 capsule (500 mg total) by mouth every 12 (twelve) hours. for 7 days    ferrous  sulfate 325 (65 FE) MG EC tablet Take 1 tablet (325 mg total) by mouth 2 (two) times daily.    folic acid (FOLVITE) 1 MG tablet Take 4 tablets (4 mg total) by mouth once daily.    HUMIRA PEN PnKt injection Inject 1 pen (40 mg total) into the skin every 7 days.    M-MAXIMUS PLUS 27 mg iron- 1 mg Tab Take 1 tablet by mouth once daily.    prenat.vits,laurence,min-iron-folic (PRENATAL VITAMIN) Tab Take 1 tablet by mouth once daily. (Patient not taking: No sig reported)       Review of patient's allergies indicates:   Allergen Reactions    Horse/equine containing products Itching        Family History       Problem Relation (Age of Onset)    Cataracts Maternal Grandfather    Hypertension Mother    Irritable bowel syndrome Paternal Grandmother    No Known Problems Father, Sister, Brother, Sister, Sister, Sister, Maternal Uncle, Paternal Aunt, Paternal Uncle, Maternal Grandmother, Paternal Grandfather, Paternal Aunt, Paternal Uncle, Paternal Uncle, Paternal Uncle, Maternal Uncle, Maternal Uncle          Tobacco Use    Smoking status: Every Day     Packs/day: 0.25     Types: Cigarettes     Last attempt to quit: 2020     Years since quittin.9    Smokeless tobacco: Never   Substance and Sexual Activity    Alcohol use: Not Currently     Alcohol/week: 0.0 standard drinks     Comment: Socially    Drug use: Yes     Frequency: 3.0 times per week     Types: Marijuana     Comment: last today    Sexual activity: Not Currently     Partners: Male     Birth control/protection: None     Review of Systems   Constitutional: Negative.  Negative for chills, diaphoresis, fatigue, fever and unexpected weight change.   HENT:  Negative for nasal congestion.    Respiratory: Negative.  Negative for shortness of breath.    Cardiovascular: Negative.  Negative for leg swelling.   Gastrointestinal: Negative.  Negative for abdominal pain, bloating, diarrhea, nausea, vomiting and fecal incontinence.        Rectal/Gluteal pain   Genitourinary: Negative.   Negative for dysmenorrhea, dyspareunia, dysuria, menorrhagia, menstrual problem, pelvic pain, vaginal bleeding, vaginal discharge, vaginal pain, urinary incontinence, vaginal dryness and vaginal odor.   Musculoskeletal: Negative.  Negative for leg pain.   Integumentary:         Callie rectal, gluteal boils    Neurological:  Negative for headaches.   All other systems reviewed and are negative.   Objective:     Vital Signs (Most Recent):  Temp: 97.1 °F (36.2 °C) (12/08/22 0919)  Pulse: 92 (12/08/22 0951)  Resp: 16 (12/08/22 1146)  BP: 91/62 (12/08/22 0916)  SpO2: 100 % (12/08/22 0951) Vital Signs (24h Range):  Temp:  [97.1 °F (36.2 °C)-98.4 °F (36.9 °C)] 97.1 °F (36.2 °C)  Pulse:  [] 92  Resp:  [16-19] 16  SpO2:  [97 %-100 %] 100 %  BP: (81-97)/(55-84) 91/62     Weight: 68 kg (149 lb 14.6 oz)  Body mass index is 26.56 kg/m².    FHT: reactive    Physical Exam:   Constitutional: She is oriented to person, place, and time. She appears well-developed and well-nourished. No distress.    HENT:   Head: Normocephalic and atraumatic.    Eyes: Conjunctivae are normal.     Cardiovascular:  Normal rate.             Pulmonary/Chest: Effort normal. No respiratory distress.        Abdominal: Soft. She exhibits abdominal incision. She exhibits no distension. There is no abdominal tenderness. There is no rebound and no guarding.   Well healed vertical skin incision from ileocecal resection    Graivd     Genitourinary:    Genitourinary Comments: Wound dressed with several pads and gauze overlying  Serosanguinous discharge covering ~50% of dressing    Please see media for further detail    Evidence of psoriasis on back/extremities    Please see op note for further detail             Musculoskeletal: Normal range of motion.       Neurological: She is alert and oriented to person, place, and time.    Skin: Skin is warm and dry. She is not diaphoretic.   Evidence of psoriatic lesions over back, all 4 extremities    Psychiatric: She  has a normal mood and affect. Her behavior is normal.     Cervix: deferred  Presentation: Vertex     Significant Labs:  Lab Results   Component Value Date    GROUPTRH O POS 12/06/2022    HEPBSAG Negative 08/23/2022       CBC:   Recent Labs   Lab 12/06/22  1618   WBC 15.65*   RBC 3.17*   HGB 11.0*   HCT 32.6*      *   MCH 34.7*   MCHC 33.7       I have personallly reviewed all pertinent lab results from the last 24 hours.  Recent Lab Results         12/07/22  1316        Fungus (Mycology) Culture Culture in progress  [P]       Gram Stain Result Few WBC's        Few small Gram negative rods                [P] - Preliminary Result

## 2022-12-08 NOTE — ASSESSMENT & PLAN NOTE
-Primary OB Dr. Geo Espitia  -Rubella immune, RPR NR, HBsAG neg, HIV neg  -NST appropriate for gest age on admit, TOCO with no ctx  -10/31 scan: cephalic, FELICIA (3.6cm), MVP 6.4 cm, EFW 41%    Plan:  -Reg diet  -FHT confirmed pre and post procedure  -NST reactive and reassuring, appropriate for gestational age

## 2022-12-08 NOTE — DISCHARGE SUMMARY
[facility], [place] - Antepartum  Obstetrics  Discharge Summary      Patient Name: Lora Scott  MRN: [id number]  Admission Date: 2022  Hospital Length of Stay: 2 days  Discharge Date and Time:  2022 4:37 PM  Attending Physician: Fredy Leger MD   Discharging Provider: Yamini Goodman MD   Primary Care Provider: Geo Espitia MD    HPI: Lora Scott is a 32 y.o. [id number] at 29w6d presents as a transfer from [facility] complaining of kyree-gluteal and kyree-rectal abscesses. She reports that she started having swelling and redness to the buttock about a week ago. She was being evaluated by her Ob in clinic and was given a prescription of Keflex for these abscesses and for a UTI. She reports she has been taking the Keflex without improvement of these abscesses. States they have grown in size and that she can no longer sit on her bottom or lay on her back due to significant pain. Reports the pain and abscesses have extended to surround her rectum. She denies any fevers, chills, dysuria, hematuria, N/V/D.    This IUP is complicated by h/o Crohns disease (>s/p bowel resection, ileostomy;  ileostomy takedown), Subchorionic hematoma (3cm), Ecoli UTI, anemia.  Patient denies contractions, denies vaginal bleeding, denies LOF.   Fetal Movement: normal.         FHT: reactive, appropriate for gestational age  TOCO:  quiet    Procedure(s) (LRB):  INCISION AND DRAINAGE, BUTTOCK (Bilateral)     Hospital Course:   2022 HD#1. Admitted from OSH. S/p single dose of zosyn. Has been compliant with keflex. VSS and WNL. Remained afebrile at OSH, afebrile on admit. Start on IV flagyl for kyree-rectal abscesses. General surgery, GI, wound care, nutrition consulted, appreciate recs. WBC 17 at OSH, repeat CBC, CMP pending. Imaging not recommended at this time. NST appropriate for gestational age on admit>NST BID, Reg diet.    2022 HD#2. VSS and WNL. Continued on IV Flagyl. Rupture of single abscess overnight, wound cultures  collected. To OR today with gen surg for EUA/I&D of kyree-rectal abscess. FHT to be confirmed pre and post op  12/08/2022 HD#3, POD#1 s/p extensive I&D of perineal and gluteal cleft abscesses (please see op note for further detail). IV>PO flagyl. Pain controlled with IV>PO meds. Tolerating diet, ambulating, voiding. Wound care and social work consulted regarding home health needs for wound care, appreciate recs. Gen surg reports wound clean and pt stable for discharge if able to follow up with wound care/HH.    Pt stable for discharge home. Wound care education and Home health with wound care has been arranged. All follow up needed has been arranged and pt aware of plan. Post acute follow up added to AVS. Will be contacted for follow up appointments. Stable from obstetric and post-op standpoint at this time.  D/C home with pain meds, PO flagyl.     All questions answered. Pt agrees with plan.       Consults (From admission, onward)          Status Ordering Provider     Inpatient consult to Registered Dietitian/Nutritionist  Once        Provider:  (Not yet assigned)    VICENTE Phipps     Inpatient consult to General Surgery  Once        Provider:  Maday Rowell Jr., MD    Acknowledged MADAY ROWELL JR            Final Active Diagnoses:    Diagnosis Date Noted POA    PRINCIPAL PROBLEM:  Status post incision and drainage of perineal abscesses [Z98.890] 12/08/2022 Not Applicable    Anemia [D64.9] 12/06/2022 Yes    Maternal Crohn's disease affecting pregnancy, antepartum [O99.619, K50.90] 07/29/2022 Yes    Perineal abscess [L02.215] 09/28/2021 Yes    Psoriasis [L40.9] 04/17/2019 Yes      Problems Resolved During this Admission:    Diagnosis Date Noted Date Resolved POA    29 weeks gestation of pregnancy [Z3A.29] 12/06/2022 12/08/2022 Not Applicable        Significant Diagnostic Studies: Labs: CMP No results for input(s): NA, K, CL, CO2, GLU, BUN, CREATININE, CALCIUM, PROT, ALBUMIN, BILITOT, ALKPHOS, AST, ALT,  ANIONGAP, ESTGFRAFRICA, EGFRNONAA in the last 48 hours., CBC No results for input(s): WBC, HGB, HCT, PLT in the last 48 hours., and All labs within the past 24 hours have been reviewed        Immunizations       None            This patient has no babies on file.  Pending Diagnostic Studies:       Procedure Component Value Units Date/Time    Specimen to Pathology, Surgery General Surgery [[id number]] Collected: 12/07/22 1322    Order Status: Sent Lab Status: In process Updated: 12/07/22 1429    Specimen: Tissue             Discharged Condition: good    Disposition: Home or Self Care    Follow Up:   Follow-up Information       [facility]. Call.    Specialties: Home Health Services, Home Therapy Services, Home Living Aide Services  Why: If you have not heard from your home health agency no later than Monday, December 12, 2022  Contact information:  [address] [address] [phone]             [facility] [place] [facility]. Call.    Why: If you have not heard from agency within 24 hourse post your hospital discharge  Contact information:  [address] [address]    Phone: [phone]             Regan Rowell Jr, MD Follow up in 1 week(s).    Specialties: General Surgery, Vascular Surgery  Why: F/U I&D  Contact information:  [address]  [facility] [address] [phone]               Suzi Bacon MD Follow up in 1 week(s).    Specialty: Gastroenterology  Why: F/U Crohns, s/p I&D  Contact information:  [address] [facility] [address] [phone]               Lata Brock MD Follow up in 1 week(s).    Specialties: Obstetrics and Gynecology, Maternal and Fetal Medicine  Why: Crohns in pregnanc, s/p I&D of perineal abscesses  Contact information:  [address]  [address]LO[address]  [facility] [address] [phone]                           Patient Instructions:      Diet  Adult Regular     Sponge bath only until clinic visit     Ice to affected area     Lifting restrictions     Other restrictions (specify):   Order Comments: Notify MD if bleeding 1 pad/hour for 2 consecutive hours.     No driving until:   Order Comments: Do not drive while taking narcotics.     Pelvic Rest   Order Comments: Pelvic rest until cleared by MD. Nothing in vagina till cleared by MD including tampons, douching, intercourse     Notify your health care provider if you experience any of the following:  temperature >100.4     Notify your health care provider if you experience any of the following:  persistent nausea and vomiting or diarrhea     Notify your health care provider if you experience any of the following:  severe uncontrolled pain     Notify your health care provider if you experience any of the following:  redness, tenderness, or signs of infection (pain, swelling, redness, odor or green/yellow discharge around incision site)     Notify your health care provider if you experience any of the following:  difficulty breathing or increased cough     Notify your health care provider if you experience any of the following:  severe persistent headache     Notify your health care provider if you experience any of the following:  worsening rash     Notify your health care provider if you experience any of the following:  persistent dizziness, light-headedness, or visual disturbances     Notify your health care provider if you experience any of the following:  increased confusion or weakness     Notify your health care provider if you experience any of the following:   Order Comments: Notify MD if bleeding 1 pad/hour for 2 consecutive hours.     Change dressing (specify)   Order Comments: Dressing change: daily     Activity as tolerated     Shower on day dressing removed (No bath)     Weight bearing restrictions (specify):     Medications:  Current Discharge Medication List        START taking these  medications    Details   acetaminophen (TYLENOL) 325 MG tablet Take 2 tablets (650 mg total) by mouth every 6 (six) hours.  Qty: 30 tablet, Refills: 2      metroNIDAZOLE (FLAGYL) 500 MG tablet Take 1 tablet (500 mg total) by mouth every 8 (eight) hours. for 5 days  Qty: 15 tablet, Refills: 0      multivit-min-ferrous gluconate 9 mg iron/15 mL oral liquid Take 15 mLs by mouth once daily.  Qty: 1350 mL, Refills: 0      oxyCODONE (ROXICODONE) 5 MG immediate release tablet Take 1 tablet (5 mg total) by mouth every 6 (six) hours as needed for Pain.  Qty: 20 tablet, Refills: 0    Comments: Quantity prescribed more than 7 day supply? No      senna-docusate 8.6-50 mg (PERICOLACE) 8.6-50 mg per tablet Take 1 tablet by mouth nightly as needed for Constipation.  Qty: 30 tablet, Refills: 2           CONTINUE these medications which have NOT CHANGED    Details   ferrous sulfate 325 (65 FE) MG EC tablet Take 1 tablet (325 mg total) by mouth 2 (two) times daily.  Qty: 60 tablet, Refills: 3    Associated Diagnoses: Antepartum anemia      folic acid (FOLVITE) 1 MG tablet Take 4 tablets (4 mg total) by mouth once daily.  Qty: 120 tablet, Refills: 11      HUMIRA PEN PnKt injection Inject 1 pen (40 mg total) into the skin every 7 days.  Qty: 12 pen, Refills: 1    Associated Diagnoses: Crohn's disease of both small and large intestine without complication           STOP taking these medications       cephALEXin (KEFLEX) 500 MG capsule Comments:   Reason for Stopping:         M-MAXIMUS PLUS 27 mg iron- 1 mg Tab Comments:   Reason for Stopping:         prenat.vits,laurence,min-iron-folic (PRENATAL VITAMIN) Tab Comments:   Reason for Stopping:               Yamini Goodman MD  Obstetrics  [facility] - Antepartum

## 2022-12-08 NOTE — PROGRESS NOTES
VICTORIA notified pt will need HH arranged for discharge for wound care. VICTORIA notified CM team-lead of the difficulties of finding HH for pregnant pt. Bonnie reached out to [facility], sent referral and [facility] have accepted pt. VICTORIA will notify nurse once everything has been completed.     VICTORIA contacted [employer] to determine when someone will be out to see the pt. According to Maxwell, she is currently putting pt's information into the system and will contact pt once completed. VICTORIA updated treatment team via secure chat.

## 2022-12-09 ENCOUNTER — TELEPHONE (OUTPATIENT)
Dept: OBSTETRICS AND GYNECOLOGY | Facility: CLINIC | Age: 32
End: 2022-12-09
Payer: MEDICAID

## 2022-12-09 ENCOUNTER — TELEPHONE (OUTPATIENT)
Dept: GASTROENTEROLOGY | Facility: CLINIC | Age: 32
End: 2022-12-09
Payer: MEDICAID

## 2022-12-09 ENCOUNTER — TELEPHONE (OUTPATIENT)
Dept: FAMILY MEDICINE | Age: 32
End: 2022-12-09

## 2022-12-09 NOTE — TELEPHONE ENCOUNTER
Called and spoke to pt   -had several abscesses on her bottom   -have f/u appt with wound care for treatment   - 30 weeks pregnant  -BM's none today   normally has 1 soft BM every other day   -no pain   -no problems with crohn's disease   -Humira weekly   LD 12/2/22  ND 12/9/22  -Taking flagyl 1 tablet (500 mg total) by mouth every 8 hours for 5 days   -advise will discuss with Dr Bacon and will be in touch   -pt expressed understanding   -all questions and concerns were answered

## 2022-12-09 NOTE — TELEPHONE ENCOUNTER
----- Message from Yamini Goodman MD sent at 12/8/2022  5:06 PM CST -----  Hello,    This pt is Pod#1 s/p I&D of perineal abscesses. She follows with Dr. Bacon for her Crohns disease. I was hoping to have her scheduled with follow up sometime this coming week if possible?    Thank you    Yamini Goodman MD  OBGYN PGY-2

## 2022-12-09 NOTE — TELEPHONE ENCOUNTER
----- Message from Keyonna Marinelli sent at 12/8/2022  2:27 PM CST -----  Type: Patient Call Back    Who called: self     What is the request in detail: pt missed her appt due to her being in the hospital she has been there for 4 days, please call so she can be rescheduled, she will be 31 wks next Wednesday     Can the clinic reply by [facility]?    Would the patient rather a call back or a response via My [facility]?     Best call back number: [phone] (home) in room 331 at [facility] surgical [facility]

## 2022-12-09 NOTE — TELEPHONE ENCOUNTER
Pt call was returned. Pt in need of an JUSTUS appointment. Appointment was scheduled. Pt verbalized understanding with no further questions.

## 2022-12-09 NOTE — TELEPHONE ENCOUNTER
Per Dr Bacon   -keep follow up appointment 1/23/2023  -schedule appt with Dr Cope         Called and spoke to pt   -discuss Dr Bacon message   -pt stated she has f/u with Dr Rowell on 12/15/22  -do she still need f/u with Dr Cope

## 2022-12-10 LAB
BACTERIA SPEC AEROBE CULT: ABNORMAL
BACTERIA SPEC AEROBE CULT: NORMAL

## 2022-12-12 LAB
BACTERIA SPEC ANAEROBE CULT: ABNORMAL
BACTERIA SPEC ANAEROBE CULT: NORMAL

## 2022-12-12 PROCEDURE — G0180 MD CERTIFICATION HHA PATIENT: HCPCS | Mod: ,,, | Performed by: OBSTETRICS & GYNECOLOGY

## 2022-12-12 PROCEDURE — G0180 PR HOME HEALTH MD CERTIFICATION: ICD-10-PCS | Mod: ,,, | Performed by: OBSTETRICS & GYNECOLOGY

## 2022-12-12 NOTE — ANESTHESIA POSTPROCEDURE EVALUATION
Anesthesia Post Evaluation    Patient: Lora Scott    Procedure(s) Performed: Procedure(s) (LRB):  INCISION AND DRAINAGE, BUTTOCK (Bilateral)    Final Anesthesia Type: spinal      Patient location during evaluation: PACU  Patient participation: Yes- Able to Participate  Level of consciousness: awake and alert  Post-procedure vital signs: reviewed and stable  Pain management: adequate  Airway patency: patent    PONV status at discharge: No PONV  Anesthetic complications: no      Cardiovascular status: hemodynamically stable  Respiratory status: unassisted, spontaneous ventilation and room air  Hydration status: euvolemic  Follow-up not needed.          Vitals Value Taken Time   BP 89/56 12/08/22 1626   Temp 36.7 °C (98.1 °F) 12/08/22 1249   Pulse 106 12/08/22 1655   Resp 16 12/08/22 1249   SpO2 100 % 12/08/22 1655   Vitals shown include unvalidated device data.      Event Time   Out of Recovery 12/07/2022 15:38:01         Pain/Oral Score: No data recorded

## 2022-12-12 NOTE — ANESTHESIA RELEASE NOTE
"Anesthesia Release from PACU Note    Patient: Lora Scott    Procedure(s) Performed: Procedure(s) (LRB):  INCISION AND DRAINAGE, BUTTOCK (Bilateral)    Anesthesia type: spinal    Post pain: Adequate analgesia    Post assessment: no apparent anesthetic complications and tolerated procedure well    Last Vitals:   Visit Vitals  BP 93/63   Pulse 106   Temp 36.7 °C (98.1 °F) (Oral)   Resp 16   Ht 5' 3" (1.6 m)   Wt 68 kg (149 lb 14.6 oz)   LMP 05/15/2022   SpO2 100%   Breastfeeding No   BMI 26.56 kg/m²       Post vital signs: stable    Level of consciousness: awake and alert     Nausea/Vomiting: no nausea/no vomiting    Complications: none    Airway Patency: patent    Respiratory: unassisted, spontaneous ventilation, room air    Cardiovascular: stable and blood pressure at baseline    Hydration: euvolemic  "

## 2022-12-15 ENCOUNTER — ROUTINE PRENATAL (OUTPATIENT)
Dept: OBSTETRICS AND GYNECOLOGY | Facility: CLINIC | Age: 32
End: 2022-12-15
Payer: MEDICAID

## 2022-12-15 ENCOUNTER — OFFICE VISIT (OUTPATIENT)
Dept: SURGERY | Facility: CLINIC | Age: 32
End: 2022-12-15
Attending: SPECIALIST
Payer: MEDICAID

## 2022-12-15 VITALS
WEIGHT: 139.31 LBS | SYSTOLIC BLOOD PRESSURE: 110 MMHG | DIASTOLIC BLOOD PRESSURE: 64 MMHG | BODY MASS INDEX: 24.68 KG/M2

## 2022-12-15 VITALS — SYSTOLIC BLOOD PRESSURE: 96 MMHG | HEART RATE: 100 BPM | DIASTOLIC BLOOD PRESSURE: 57 MMHG | OXYGEN SATURATION: 99 %

## 2022-12-15 DIAGNOSIS — K50.80 CROHN'S DISEASE OF BOTH SMALL AND LARGE INTESTINE WITHOUT COMPLICATION: Primary | ICD-10-CM

## 2022-12-15 DIAGNOSIS — O99.619 MATERNAL CROHN'S DISEASE AFFECTING PREGNANCY, ANTEPARTUM: ICD-10-CM

## 2022-12-15 DIAGNOSIS — Z98.890 STATUS POST INCISION AND DRAINAGE: ICD-10-CM

## 2022-12-15 DIAGNOSIS — Z3A.31 31 WEEKS GESTATION OF PREGNANCY: Primary | ICD-10-CM

## 2022-12-15 DIAGNOSIS — K50.90 MATERNAL CROHN'S DISEASE AFFECTING PREGNANCY, ANTEPARTUM: ICD-10-CM

## 2022-12-15 PROCEDURE — 99024 PR POST-OP FOLLOW-UP VISIT: ICD-10-PCS | Mod: ,,, | Performed by: SPECIALIST

## 2022-12-15 PROCEDURE — 3044F HG A1C LEVEL LT 7.0%: CPT | Mod: CPTII,,, | Performed by: SPECIALIST

## 2022-12-15 PROCEDURE — 1111F PR DISCHARGE MEDS RECONCILED W/ CURRENT OUTPATIENT MED LIST: ICD-10-PCS | Mod: CPTII,,, | Performed by: OBSTETRICS & GYNECOLOGY

## 2022-12-15 PROCEDURE — 1160F RVW MEDS BY RX/DR IN RCRD: CPT | Mod: CPTII,,, | Performed by: SPECIALIST

## 2022-12-15 PROCEDURE — 99024 POSTOP FOLLOW-UP VISIT: CPT | Mod: ,,, | Performed by: SPECIALIST

## 2022-12-15 PROCEDURE — 3044F PR MOST RECENT HEMOGLOBIN A1C LEVEL <7.0%: ICD-10-PCS | Mod: CPTII,,, | Performed by: SPECIALIST

## 2022-12-15 PROCEDURE — 3074F PR MOST RECENT SYSTOLIC BLOOD PRESSURE < 130 MM HG: ICD-10-PCS | Mod: CPTII,,, | Performed by: SPECIALIST

## 2022-12-15 PROCEDURE — 99999 PR PBB SHADOW E&M-EST. PATIENT-LVL III: ICD-10-PCS | Mod: PBBFAC,,, | Performed by: OBSTETRICS & GYNECOLOGY

## 2022-12-15 PROCEDURE — 3078F DIAST BP <80 MM HG: CPT | Mod: CPTII,,, | Performed by: SPECIALIST

## 2022-12-15 PROCEDURE — 99213 OFFICE O/P EST LOW 20 MIN: CPT | Mod: TH,S$PBB,, | Performed by: OBSTETRICS & GYNECOLOGY

## 2022-12-15 PROCEDURE — 99213 PR OFFICE/OUTPT VISIT, EST, LEVL III, 20-29 MIN: ICD-10-PCS | Mod: TH,S$PBB,, | Performed by: OBSTETRICS & GYNECOLOGY

## 2022-12-15 PROCEDURE — 99999 PR PBB SHADOW E&M-EST. PATIENT-LVL III: CPT | Mod: PBBFAC,,, | Performed by: SPECIALIST

## 2022-12-15 PROCEDURE — 3078F PR MOST RECENT DIASTOLIC BLOOD PRESSURE < 80 MM HG: ICD-10-PCS | Mod: CPTII,,, | Performed by: SPECIALIST

## 2022-12-15 PROCEDURE — 1111F DSCHRG MED/CURRENT MED MERGE: CPT | Mod: CPTII,,, | Performed by: OBSTETRICS & GYNECOLOGY

## 2022-12-15 PROCEDURE — 3074F SYST BP LT 130 MM HG: CPT | Mod: CPTII,,, | Performed by: SPECIALIST

## 2022-12-15 PROCEDURE — 99213 OFFICE O/P EST LOW 20 MIN: CPT | Mod: PBBFAC,27,TH | Performed by: OBSTETRICS & GYNECOLOGY

## 2022-12-15 PROCEDURE — 1160F PR REVIEW ALL MEDS BY PRESCRIBER/CLIN PHARMACIST DOCUMENTED: ICD-10-PCS | Mod: CPTII,,, | Performed by: SPECIALIST

## 2022-12-15 PROCEDURE — 99213 OFFICE O/P EST LOW 20 MIN: CPT | Mod: PBBFAC | Performed by: SPECIALIST

## 2022-12-15 PROCEDURE — 99999 PR PBB SHADOW E&M-EST. PATIENT-LVL III: CPT | Mod: PBBFAC,,, | Performed by: OBSTETRICS & GYNECOLOGY

## 2022-12-15 PROCEDURE — 1159F MED LIST DOCD IN RCRD: CPT | Mod: CPTII,,, | Performed by: SPECIALIST

## 2022-12-15 PROCEDURE — 1159F PR MEDICATION LIST DOCUMENTED IN MEDICAL RECORD: ICD-10-PCS | Mod: CPTII,,, | Performed by: SPECIALIST

## 2022-12-15 PROCEDURE — 99999 PR PBB SHADOW E&M-EST. PATIENT-LVL III: ICD-10-PCS | Mod: PBBFAC,,, | Performed by: SPECIALIST

## 2022-12-15 NOTE — PROGRESS NOTES
32-year-old female with history of Crohn's disease and hidradenitis suppurativa  Patient is approximately 31 weeks gravid   Status post drainage of multiple abscesses of buttocks and perineum on   12/07/2022    Subjective  No complaints    PE  Buttock/perineum-wounds granulating, no necrotic material     Impression/plan  Continue local care   RTC 3-4 weeks

## 2022-12-16 ENCOUNTER — TELEPHONE (OUTPATIENT)
Dept: OBSTETRICS AND GYNECOLOGY | Facility: CLINIC | Age: 32
End: 2022-12-16
Payer: MEDICAID

## 2022-12-16 NOTE — TELEPHONE ENCOUNTER
On call back to Debra, she advised to contact Jyothi (KHARI) w/ [facility] at [phone]    Jyothi advised pt has been seeing wound care every week. Pt has deep wounds on buttocks. Wants pt to get imaging done to be sure that wound has not reached her bones. Requesting to know what imaging, if any, can be performed on pt due to her being 31 weeks GA.   Advised pt was referred to [facility] dermatology and Dr Ruth in colorectal dept at [facility]. Message routed to provider       ----- Message from Jacey Elias sent at 12/16/2022  1:15 PM CST -----  Type: Patient Call Back    Who called: Debra/ wound care    What is the request in detail: would like to discuss patient wounds. Please call    Can the clinic reply by [facility]?  no    Would the patient rather a call back or a response via [facility]? call    Best call back number:[phone]

## 2022-12-16 NOTE — PROGRESS NOTES
32 y.o.  at 31w2d here for OB visit.    Pregnancy complicated by:  Crohn's disease dx 2019  S/p surgical drainage on abscess on buttocks on 22 by general surgery  H/o partial bowel resection with ileostomy 2019 secondary to Crohn's  Anemia, iron deficiency      Pt c/o soreness at surgical site.  Pt seen wound care this AM.  Has apt with general surgery this afternoon.  Wound appears to be healing approximately.  Continue wound care with wound clinic.  Wound precautions.  F/u general surgery.    Otherwise, pt has no major complaints today.  Reports active fetus.  Denies vaginal bleeding, leakage of fluid, or contractions.    Crohn's currently in remission on wkly injection of Humira qFriday.  Pt advised to f/u closely with GI, Dr. Michael Bacon at [facility].  Increase folic acid.  GI precautions.    Pt advised to complete 1 hr glucola.  Encourage fergon for anemia.  Encourage daily low dose ASA for prevention of preE if tolerable with Crohn's.  Encourage participation in Connective MOM program.  Encourage Covid and flu vaccine.    Labor/preE precautions.  Kick counts.  Return 2 wks or sooner prn.  All questions answered, voiced understanding.  Pt father present for visit.      Geo Espitia MD

## 2022-12-19 ENCOUNTER — TELEPHONE (OUTPATIENT)
Dept: OBSTETRICS AND GYNECOLOGY | Facility: CLINIC | Age: 32
End: 2022-12-19
Payer: MEDICAID

## 2022-12-19 ENCOUNTER — PATIENT MESSAGE (OUTPATIENT)
Dept: ADMINISTRATIVE | Facility: OTHER | Age: 32
End: 2022-12-19
Payer: MEDICAID

## 2022-12-19 ENCOUNTER — PATIENT MESSAGE (OUTPATIENT)
Dept: SURGERY | Facility: CLINIC | Age: 32
End: 2022-12-19
Payer: MEDICAID

## 2022-12-19 NOTE — TELEPHONE ENCOUNTER
Spoke with rKys and informed per Dr. Espitia that it is safe her to do the imaging. VU.    ----- Message from Belkis Moreira sent at 12/19/2022  4:00 PM CST -----  Regarding: Patient Call Back  .Type: Patient Call Back    Who called:[facility]     What is the request in detail: wounds to buttock area     Can the clinic reply by [facility]?    Would the patient rather a call back or a response via [facility]?  Call     Best call back number: [phone]

## 2022-12-20 DIAGNOSIS — L98.412: Primary | ICD-10-CM

## 2022-12-20 LAB
FINAL PATHOLOGIC DIAGNOSIS: NORMAL
GROSS: NORMAL
Lab: NORMAL

## 2022-12-22 ENCOUNTER — EXTERNAL HOME HEALTH (OUTPATIENT)
Dept: HOME HEALTH SERVICES | Facility: HOSPITAL | Age: 32
End: 2022-12-22
Payer: MEDICAID

## 2022-12-23 ENCOUNTER — PATIENT MESSAGE (OUTPATIENT)
Dept: SURGERY | Facility: CLINIC | Age: 32
End: 2022-12-23
Payer: MEDICAID

## 2022-12-27 ENCOUNTER — DOCUMENT SCAN (OUTPATIENT)
Dept: HOME HEALTH SERVICES | Facility: HOSPITAL | Age: 32
End: 2022-12-27
Payer: MEDICAID

## 2022-12-28 ENCOUNTER — TELEPHONE (OUTPATIENT)
Dept: OBSTETRICS AND GYNECOLOGY | Facility: CLINIC | Age: 32
End: 2022-12-28
Payer: MEDICAID

## 2022-12-28 DIAGNOSIS — Z98.84 S/P GASTRIC BYPASS: Primary | ICD-10-CM

## 2022-12-28 NOTE — TELEPHONE ENCOUNTER
Attempted to contact several times. Phone number provided by caller not accepting calls      ----- Message from Olamide Lubin sent at 12/28/2022  1:52 PM CST -----  Type: Patient Call Back    Who called: Jyothi Carter    What is the request in detail: would like to speak with  personally about xray that was approved that pt was not able to get. She would also like to discuss patient's care. Please call    [facility] the clinic [facility]?    Would the patient rather a call back or a response via [facility]? call    Best call back number:

## 2023-01-04 ENCOUNTER — OFFICE VISIT (OUTPATIENT)
Dept: SURGERY | Facility: CLINIC | Age: 33
End: 2023-01-04
Attending: SPECIALIST
Payer: MEDICAID

## 2023-01-04 VITALS — SYSTOLIC BLOOD PRESSURE: 96 MMHG | DIASTOLIC BLOOD PRESSURE: 59 MMHG | OXYGEN SATURATION: 100 % | HEART RATE: 94 BPM

## 2023-01-04 DIAGNOSIS — Z98.890 STATUS POST INCISION AND DRAINAGE: Primary | ICD-10-CM

## 2023-01-04 PROCEDURE — 99024 POSTOP FOLLOW-UP VISIT: CPT | Mod: ,,, | Performed by: SPECIALIST

## 2023-01-04 PROCEDURE — 1159F MED LIST DOCD IN RCRD: CPT | Mod: CPTII,,, | Performed by: SPECIALIST

## 2023-01-04 PROCEDURE — 3074F SYST BP LT 130 MM HG: CPT | Mod: CPTII,,, | Performed by: SPECIALIST

## 2023-01-04 PROCEDURE — 3078F PR MOST RECENT DIASTOLIC BLOOD PRESSURE < 80 MM HG: ICD-10-PCS | Mod: CPTII,,, | Performed by: SPECIALIST

## 2023-01-04 PROCEDURE — 1160F PR REVIEW ALL MEDS BY PRESCRIBER/CLIN PHARMACIST DOCUMENTED: ICD-10-PCS | Mod: CPTII,,, | Performed by: SPECIALIST

## 2023-01-04 PROCEDURE — 99999 PR PBB SHADOW E&M-EST. PATIENT-LVL III: CPT | Mod: PBBFAC,,, | Performed by: SPECIALIST

## 2023-01-04 PROCEDURE — 1160F RVW MEDS BY RX/DR IN RCRD: CPT | Mod: CPTII,,, | Performed by: SPECIALIST

## 2023-01-04 PROCEDURE — 3078F DIAST BP <80 MM HG: CPT | Mod: CPTII,,, | Performed by: SPECIALIST

## 2023-01-04 PROCEDURE — 3074F PR MOST RECENT SYSTOLIC BLOOD PRESSURE < 130 MM HG: ICD-10-PCS | Mod: CPTII,,, | Performed by: SPECIALIST

## 2023-01-04 PROCEDURE — 99213 OFFICE O/P EST LOW 20 MIN: CPT | Mod: PBBFAC | Performed by: SPECIALIST

## 2023-01-04 PROCEDURE — 99024 PR POST-OP FOLLOW-UP VISIT: ICD-10-PCS | Mod: ,,, | Performed by: SPECIALIST

## 2023-01-04 PROCEDURE — 1159F PR MEDICATION LIST DOCUMENTED IN MEDICAL RECORD: ICD-10-PCS | Mod: CPTII,,, | Performed by: SPECIALIST

## 2023-01-04 PROCEDURE — 99999 PR PBB SHADOW E&M-EST. PATIENT-LVL III: ICD-10-PCS | Mod: PBBFAC,,, | Performed by: SPECIALIST

## 2023-01-04 NOTE — PROGRESS NOTES
32-year-old female with history of Crohn's disease and hidradenitis suppurativa  Patient is approximately 33 weeks gravid   Status post drainage of multiple abscesses of buttocks and perineum on   12/07/2022  Patient receiving outpatient care    Subjective   No complaints    PE  Incisions healing and granulating without evidence of active infection    Impression/plan  Surgically stable   Continue outpatient wound care   RTC 1 month

## 2023-01-05 ENCOUNTER — ROUTINE PRENATAL (OUTPATIENT)
Dept: OBSTETRICS AND GYNECOLOGY | Facility: CLINIC | Age: 33
End: 2023-01-05
Payer: MEDICAID

## 2023-01-05 VITALS
WEIGHT: 142.94 LBS | DIASTOLIC BLOOD PRESSURE: 66 MMHG | SYSTOLIC BLOOD PRESSURE: 112 MMHG | BODY MASS INDEX: 25.33 KG/M2

## 2023-01-05 DIAGNOSIS — Z3A.34 34 WEEKS GESTATION OF PREGNANCY: Primary | ICD-10-CM

## 2023-01-05 DIAGNOSIS — O99.019 ANTEPARTUM ANEMIA: ICD-10-CM

## 2023-01-05 PROCEDURE — 1111F DSCHRG MED/CURRENT MED MERGE: CPT | Mod: CPTII,,, | Performed by: OBSTETRICS & GYNECOLOGY

## 2023-01-05 PROCEDURE — 99213 OFFICE O/P EST LOW 20 MIN: CPT | Mod: PBBFAC,TH | Performed by: OBSTETRICS & GYNECOLOGY

## 2023-01-05 PROCEDURE — 99213 OFFICE O/P EST LOW 20 MIN: CPT | Mod: TH,S$PBB,, | Performed by: OBSTETRICS & GYNECOLOGY

## 2023-01-05 PROCEDURE — 1111F PR DISCHARGE MEDS RECONCILED W/ CURRENT OUTPATIENT MED LIST: ICD-10-PCS | Mod: CPTII,,, | Performed by: OBSTETRICS & GYNECOLOGY

## 2023-01-05 PROCEDURE — 99999 PR PBB SHADOW E&M-EST. PATIENT-LVL III: CPT | Mod: PBBFAC,,, | Performed by: OBSTETRICS & GYNECOLOGY

## 2023-01-05 PROCEDURE — 87086 URINE CULTURE/COLONY COUNT: CPT | Performed by: OBSTETRICS & GYNECOLOGY

## 2023-01-05 PROCEDURE — 99213 PR OFFICE/OUTPT VISIT, EST, LEVL III, 20-29 MIN: ICD-10-PCS | Mod: TH,S$PBB,, | Performed by: OBSTETRICS & GYNECOLOGY

## 2023-01-05 PROCEDURE — 99999 PR PBB SHADOW E&M-EST. PATIENT-LVL III: ICD-10-PCS | Mod: PBBFAC,,, | Performed by: OBSTETRICS & GYNECOLOGY

## 2023-01-05 RX ORDER — FERROUS SULFATE 325(65) MG
325 TABLET, DELAYED RELEASE (ENTERIC COATED) ORAL 2 TIMES DAILY
Qty: 60 TABLET | Refills: 3 | Status: SHIPPED | OUTPATIENT
Start: 2023-01-05 | End: 2023-01-25

## 2023-01-05 NOTE — PROGRESS NOTES
32 y.o.  at 34w1d here for OB visit.    Pregnancy complicated by:  Crohn's disease dx 2019  S/p surgical drainage on abscess on buttocks on 22 by general surgery  H/o partial bowel resection with ileostomy 2019 secondary to Crohn's  Anemia, iron deficiency      Pt reports surgical wound buttocks is slowly healing.  Continue wound care with wound clinic.  Wound precautions.  F/u general surgery.  Infectious precautions.    Otherwise, pt has no major complaints today.  Reports active fetus.  Denies vaginal bleeding, leakage of fluid, or contractions.    Urine cx on  was abnormal.  Pt denies UTI sxs.  Repeat urine cx today.  UTI precautions.    Crohn's currently in remission on wkly injection of Humira qFriday.  Pt advised to f/u closely with GI, Dr. Michael Bacon at [facility].  Increase folic acid.  GI precautions.    Pt is requesting primary  delivery.  Pt unlikely to tolerate a long labor course due to surgical wound.  Risk of recto-vaginal laceration and fistula with Crohn's disease discussed.  Risks, benefits, and alternatives to primary  delivery discussed.    Pt advised to complete 1 hr glucola, timely follow up advised.  Encourage fergon for anemia.  Encourage daily low dose ASA for prevention of preE if tolerable with Crohn's.  Encourage participation in Connective MOM program.  Encourage Covid and flu vaccine.    Labor/preE precautions.  Kick counts.  Return 2 wks or sooner prn.  F/u with MFM on 23.  All questions answered, voiced understanding.      Geo Espitia MD

## 2023-01-06 ENCOUNTER — PATIENT MESSAGE (OUTPATIENT)
Dept: MATERNAL FETAL MEDICINE | Facility: CLINIC | Age: 33
End: 2023-01-06
Payer: MEDICAID

## 2023-01-07 LAB — BACTERIA UR CULT: NORMAL

## 2023-01-09 ENCOUNTER — PROCEDURE VISIT (OUTPATIENT)
Dept: MATERNAL FETAL MEDICINE | Facility: CLINIC | Age: 33
End: 2023-01-09
Payer: MEDICAID

## 2023-01-09 DIAGNOSIS — Z3A.34 34 WEEKS GESTATION OF PREGNANCY: ICD-10-CM

## 2023-01-09 PROCEDURE — 76816 US OB/GYN EXTENDED PROCEDURE (VIEWPOINT): ICD-10-PCS | Mod: 26,S$PBB,, | Performed by: OBSTETRICS & GYNECOLOGY

## 2023-01-09 PROCEDURE — 76816 OB US FOLLOW-UP PER FETUS: CPT | Mod: PBBFAC,PO | Performed by: OBSTETRICS & GYNECOLOGY

## 2023-01-10 ENCOUNTER — PATIENT MESSAGE (OUTPATIENT)
Dept: SURGERY | Facility: CLINIC | Age: 33
End: 2023-01-10
Payer: MEDICAID

## 2023-01-10 LAB — FUNGUS SPEC CULT: NORMAL

## 2023-01-11 ENCOUNTER — PATIENT MESSAGE (OUTPATIENT)
Dept: OTHER | Facility: OTHER | Age: 33
End: 2023-01-11
Payer: MEDICAID

## 2023-01-12 ENCOUNTER — LAB SERVICES (OUTPATIENT)
Dept: LAB | Age: 33
End: 2023-01-12

## 2023-01-12 ENCOUNTER — OFFICE VISIT (OUTPATIENT)
Dept: INTERNAL MEDICINE | Age: 33
End: 2023-01-12

## 2023-01-12 VITALS
OXYGEN SATURATION: 100 % | HEART RATE: 80 BPM | DIASTOLIC BLOOD PRESSURE: 70 MMHG | WEIGHT: 281.97 LBS | RESPIRATION RATE: 18 BRPM | HEIGHT: 68 IN | SYSTOLIC BLOOD PRESSURE: 115 MMHG | TEMPERATURE: 97.2 F | BODY MASS INDEX: 42.73 KG/M2

## 2023-01-12 DIAGNOSIS — E89.0 POSTABLATIVE HYPOTHYROIDISM: ICD-10-CM

## 2023-01-12 DIAGNOSIS — R79.89 ELEVATED TSH: ICD-10-CM

## 2023-01-12 DIAGNOSIS — Z00.00 ROUTINE HEALTH MAINTENANCE: Primary | ICD-10-CM

## 2023-01-12 DIAGNOSIS — L73.2 HIDRADENITIS SUPPURATIVA: ICD-10-CM

## 2023-01-12 DIAGNOSIS — Z80.3 FH: BREAST CANCER: ICD-10-CM

## 2023-01-12 DIAGNOSIS — E66.01 MORBID OBESITY WITH BMI OF 50.0-59.9, ADULT (CMD): ICD-10-CM

## 2023-01-12 DIAGNOSIS — Z98.84 S/P GASTRIC BYPASS: ICD-10-CM

## 2023-01-12 DIAGNOSIS — I10 BENIGN ESSENTIAL HTN: ICD-10-CM

## 2023-01-12 PROCEDURE — 82306 VITAMIN D 25 HYDROXY: CPT | Performed by: INTERNAL MEDICINE

## 2023-01-12 PROCEDURE — 82607 VITAMIN B-12: CPT | Performed by: INTERNAL MEDICINE

## 2023-01-12 PROCEDURE — 83970 ASSAY OF PARATHORMONE: CPT | Performed by: INTERNAL MEDICINE

## 2023-01-12 PROCEDURE — 84100 ASSAY OF PHOSPHORUS: CPT | Performed by: INTERNAL MEDICINE

## 2023-01-12 PROCEDURE — 99395 PREV VISIT EST AGE 18-39: CPT | Performed by: INTERNAL MEDICINE

## 2023-01-12 PROCEDURE — 3074F SYST BP LT 130 MM HG: CPT | Performed by: INTERNAL MEDICINE

## 2023-01-12 PROCEDURE — 83036 HEMOGLOBIN GLYCOSYLATED A1C: CPT | Performed by: INTERNAL MEDICINE

## 2023-01-12 PROCEDURE — 82746 ASSAY OF FOLIC ACID SERUM: CPT | Performed by: INTERNAL MEDICINE

## 2023-01-12 PROCEDURE — 3078F DIAST BP <80 MM HG: CPT | Performed by: INTERNAL MEDICINE

## 2023-01-12 PROCEDURE — 85025 COMPLETE CBC W/AUTO DIFF WBC: CPT | Performed by: INTERNAL MEDICINE

## 2023-01-12 PROCEDURE — 84425 ASSAY OF VITAMIN B-1: CPT | Performed by: INTERNAL MEDICINE

## 2023-01-12 PROCEDURE — 80053 COMPREHEN METABOLIC PANEL: CPT | Performed by: INTERNAL MEDICINE

## 2023-01-12 PROCEDURE — 80061 LIPID PANEL: CPT | Performed by: INTERNAL MEDICINE

## 2023-01-12 PROCEDURE — 83540 ASSAY OF IRON: CPT | Performed by: INTERNAL MEDICINE

## 2023-01-12 PROCEDURE — 36415 COLL VENOUS BLD VENIPUNCTURE: CPT | Performed by: STUDENT IN AN ORGANIZED HEALTH CARE EDUCATION/TRAINING PROGRAM

## 2023-01-12 PROCEDURE — 82728 ASSAY OF FERRITIN: CPT | Performed by: INTERNAL MEDICINE

## 2023-01-12 PROCEDURE — 84439 ASSAY OF FREE THYROXINE: CPT | Performed by: INTERNAL MEDICINE

## 2023-01-12 PROCEDURE — 83550 IRON BINDING TEST: CPT | Performed by: INTERNAL MEDICINE

## 2023-01-12 PROCEDURE — 83525 ASSAY OF INSULIN: CPT | Performed by: INTERNAL MEDICINE

## 2023-01-12 PROCEDURE — 84443 ASSAY THYROID STIM HORMONE: CPT | Performed by: INTERNAL MEDICINE

## 2023-01-12 RX ORDER — AMLODIPINE BESYLATE 5 MG/1
5 TABLET ORAL DAILY
Qty: 90 TABLET | Refills: 1 | Status: SHIPPED | OUTPATIENT
Start: 2023-01-12 | End: 2023-02-03 | Stop reason: ALTCHOICE

## 2023-01-12 ASSESSMENT — PAIN SCALES - GENERAL: PAINLEVEL: 0

## 2023-01-13 ENCOUNTER — PATIENT MESSAGE (OUTPATIENT)
Dept: SURGERY | Facility: CLINIC | Age: 33
End: 2023-01-13
Payer: MEDICAID

## 2023-01-13 ENCOUNTER — LAB SERVICES (OUTPATIENT)
Dept: LAB | Age: 33
End: 2023-01-13

## 2023-01-13 LAB
25(OH)D3+25(OH)D2 SERPL-MCNC: 21.6 NG/ML (ref 30–100)
ALBUMIN SERPL-MCNC: 3.5 G/DL (ref 3.6–5.1)
ALBUMIN/GLOB SERPL: 0.9 {RATIO} (ref 1–2.4)
ALP SERPL-CCNC: 65 UNITS/L (ref 45–117)
ALT SERPL-CCNC: 18 UNITS/L
ANION GAP SERPL CALC-SCNC: 10 MMOL/L (ref 7–19)
AST SERPL-CCNC: 11 UNITS/L
BASOPHILS # BLD: 0 K/MCL (ref 0–0.3)
BASOPHILS NFR BLD: 0 %
BILIRUB SERPL-MCNC: 0.4 MG/DL (ref 0.2–1)
BUN SERPL-MCNC: 9 MG/DL (ref 6–20)
BUN/CREAT SERPL: 10 (ref 7–25)
CALCIUM SERPL-MCNC: 9 MG/DL (ref 8.4–10.2)
CHLORIDE SERPL-SCNC: 106 MMOL/L (ref 97–110)
CHOLEST SERPL-MCNC: 240 MG/DL
CHOLEST/HDLC SERPL: 4.9 {RATIO}
CO2 SERPL-SCNC: 28 MMOL/L (ref 21–32)
CREAT SERPL-MCNC: 0.94 MG/DL (ref 0.51–0.95)
DEPRECATED RDW RBC: 46.6 FL (ref 39–50)
EOSINOPHIL # BLD: 0.2 K/MCL (ref 0–0.5)
EOSINOPHIL NFR BLD: 2 %
ERYTHROCYTE [DISTWIDTH] IN BLOOD: 15.4 % (ref 11–15)
FASTING DURATION TIME PATIENT: ABNORMAL H
FASTING DURATION TIME PATIENT: ABNORMAL H
FERRITIN SERPL-MCNC: 18 NG/ML (ref 8–252)
FOLATE SERPL-MCNC: 20.5 NG/ML
GFR SERPLBLD BASED ON 1.73 SQ M-ARVRAT: 83 ML/MIN
GLOBULIN SER-MCNC: 3.8 G/DL (ref 2–4)
GLUCOSE SERPL-MCNC: 93 MG/DL (ref 70–99)
HBA1C MFR BLD: 5.5 % (ref 4.5–5.6)
HCT VFR BLD CALC: 38.3 % (ref 36–46.5)
HDLC SERPL-MCNC: 49 MG/DL
HGB BLD-MCNC: 11.6 G/DL (ref 12–15.5)
IMM GRANULOCYTES # BLD AUTO: 0 K/MCL (ref 0–0.2)
IMM GRANULOCYTES # BLD: 0 %
IRON SATN MFR SERPL: 9 % (ref 15–45)
IRON SERPL-MCNC: 27 MCG/DL (ref 50–170)
LDLC SERPL CALC-MCNC: 174 MG/DL
LYMPHOCYTES # BLD: 3.2 K/MCL (ref 1–4.8)
LYMPHOCYTES NFR BLD: 32 %
MCH RBC QN AUTO: 25.3 PG (ref 26–34)
MCHC RBC AUTO-ENTMCNC: 30.3 G/DL (ref 32–36.5)
MCV RBC AUTO: 83.4 FL (ref 78–100)
MONOCYTES # BLD: 0.4 K/MCL (ref 0.3–0.9)
MONOCYTES NFR BLD: 4 %
NEUTROPHILS # BLD: 6.1 K/MCL (ref 1.8–7.7)
NEUTROPHILS NFR BLD: 62 %
NONHDLC SERPL-MCNC: 191 MG/DL
NRBC BLD MANUAL-RTO: 0 /100 WBC
PHOSPHATE SERPL-MCNC: 4.3 MG/DL (ref 2.4–4.7)
PLATELET # BLD AUTO: 395 K/MCL (ref 140–450)
POTASSIUM SERPL-SCNC: 4.3 MMOL/L (ref 3.4–5.1)
PROT SERPL-MCNC: 7.3 G/DL (ref 6.4–8.2)
RBC # BLD: 4.59 MIL/MCL (ref 4–5.2)
SODIUM SERPL-SCNC: 140 MMOL/L (ref 135–145)
TIBC SERPL-MCNC: 312 MCG/DL (ref 250–450)
TRIGL SERPL-MCNC: 87 MG/DL
TSH SERPL-ACNC: 23.01 MCUNITS/ML (ref 0.35–5)
VIT B12 SERPL-MCNC: 468 PG/ML (ref 211–911)
WBC # BLD: 10 K/MCL (ref 4.2–11)

## 2023-01-14 LAB
FASTING DURATION TIME PATIENT: NORMAL H
INSULIN P FAST SERPL-ACNC: 8 MUNITS/L (ref 3–28)
PTH-INTACT SERPL-MCNC: 41 PG/ML (ref 19–88)

## 2023-01-16 DIAGNOSIS — R79.89 ELEVATED TSH: Primary | ICD-10-CM

## 2023-01-16 LAB — T4 FREE SERPL-MCNC: 0.6 NG/DL (ref 0.8–1.5)

## 2023-01-17 ENCOUNTER — PATIENT MESSAGE (OUTPATIENT)
Dept: SURGERY | Facility: CLINIC | Age: 33
End: 2023-01-17
Payer: MEDICAID

## 2023-01-19 ENCOUNTER — ROUTINE PRENATAL (OUTPATIENT)
Dept: OBSTETRICS AND GYNECOLOGY | Facility: CLINIC | Age: 33
End: 2023-01-19
Payer: MEDICAID

## 2023-01-19 ENCOUNTER — LAB VISIT (OUTPATIENT)
Dept: LAB | Facility: HOSPITAL | Age: 33
End: 2023-01-19
Attending: OBSTETRICS & GYNECOLOGY
Payer: MEDICAID

## 2023-01-19 VITALS
DIASTOLIC BLOOD PRESSURE: 70 MMHG | WEIGHT: 142.63 LBS | BODY MASS INDEX: 25.27 KG/M2 | SYSTOLIC BLOOD PRESSURE: 114 MMHG

## 2023-01-19 DIAGNOSIS — O99.019 ANTEPARTUM ANEMIA: ICD-10-CM

## 2023-01-19 DIAGNOSIS — O99.619 MATERNAL CROHN'S DISEASE AFFECTING PREGNANCY, ANTEPARTUM: ICD-10-CM

## 2023-01-19 DIAGNOSIS — Z98.890 STATUS POST INCISION AND DRAINAGE: ICD-10-CM

## 2023-01-19 DIAGNOSIS — Z3A.36 36 WEEKS GESTATION OF PREGNANCY: Primary | ICD-10-CM

## 2023-01-19 DIAGNOSIS — K50.90 MATERNAL CROHN'S DISEASE AFFECTING PREGNANCY, ANTEPARTUM: ICD-10-CM

## 2023-01-19 DIAGNOSIS — Z3A.24 24 WEEKS GESTATION OF PREGNANCY: ICD-10-CM

## 2023-01-19 LAB
BASOPHILS # BLD AUTO: 0.03 K/UL (ref 0–0.2)
BASOPHILS NFR BLD: 0.3 % (ref 0–1.9)
DIFFERENTIAL METHOD: ABNORMAL
EOSINOPHIL # BLD AUTO: 0.2 K/UL (ref 0–0.5)
EOSINOPHIL NFR BLD: 1.9 % (ref 0–8)
ERYTHROCYTE [DISTWIDTH] IN BLOOD BY AUTOMATED COUNT: 14.2 % (ref 11.5–14.5)
GLUCOSE SERPL-MCNC: 110 MG/DL (ref 70–140)
HCT VFR BLD AUTO: 36 % (ref 37–48.5)
HGB BLD-MCNC: 11.3 G/DL (ref 12–16)
IMM GRANULOCYTES # BLD AUTO: 0.05 K/UL (ref 0–0.04)
IMM GRANULOCYTES NFR BLD AUTO: 0.5 % (ref 0–0.5)
LYMPHOCYTES # BLD AUTO: 3.3 K/UL (ref 1–4.8)
LYMPHOCYTES NFR BLD: 34.3 % (ref 18–48)
MCH RBC QN AUTO: 33.3 PG (ref 27–31)
MCHC RBC AUTO-ENTMCNC: 31.4 G/DL (ref 32–36)
MCV RBC AUTO: 106 FL (ref 82–98)
MONOCYTES # BLD AUTO: 0.4 K/UL (ref 0.3–1)
MONOCYTES NFR BLD: 4.3 % (ref 4–15)
NEUTROPHILS # BLD AUTO: 5.6 K/UL (ref 1.8–7.7)
NEUTROPHILS NFR BLD: 58.7 % (ref 38–73)
NRBC BLD-RTO: 0 /100 WBC
PLATELET # BLD AUTO: 238 K/UL (ref 150–450)
PMV BLD AUTO: 11.1 FL (ref 9.2–12.9)
RBC # BLD AUTO: 3.39 M/UL (ref 4–5.4)
WBC # BLD AUTO: 9.52 K/UL (ref 3.9–12.7)

## 2023-01-19 PROCEDURE — 99999 PR PBB SHADOW E&M-EST. PATIENT-LVL III: CPT | Mod: PBBFAC,,, | Performed by: OBSTETRICS & GYNECOLOGY

## 2023-01-19 PROCEDURE — 82950 GLUCOSE TEST: CPT | Performed by: OBSTETRICS & GYNECOLOGY

## 2023-01-19 PROCEDURE — 87081 CULTURE SCREEN ONLY: CPT | Performed by: OBSTETRICS & GYNECOLOGY

## 2023-01-19 PROCEDURE — 99213 OFFICE O/P EST LOW 20 MIN: CPT | Mod: PBBFAC,TH | Performed by: OBSTETRICS & GYNECOLOGY

## 2023-01-19 PROCEDURE — 99213 OFFICE O/P EST LOW 20 MIN: CPT | Mod: TH,S$PBB,, | Performed by: OBSTETRICS & GYNECOLOGY

## 2023-01-19 PROCEDURE — 99999 PR PBB SHADOW E&M-EST. PATIENT-LVL III: ICD-10-PCS | Mod: PBBFAC,,, | Performed by: OBSTETRICS & GYNECOLOGY

## 2023-01-19 PROCEDURE — 36415 COLL VENOUS BLD VENIPUNCTURE: CPT | Performed by: OBSTETRICS & GYNECOLOGY

## 2023-01-19 PROCEDURE — 85025 COMPLETE CBC W/AUTO DIFF WBC: CPT | Performed by: OBSTETRICS & GYNECOLOGY

## 2023-01-19 PROCEDURE — 99213 PR OFFICE/OUTPT VISIT, EST, LEVL III, 20-29 MIN: ICD-10-PCS | Mod: TH,S$PBB,, | Performed by: OBSTETRICS & GYNECOLOGY

## 2023-01-19 NOTE — PROGRESS NOTES
32 y.o.  at 36w1d here for OB visit.    Pregnancy complicated by:  Crohn's disease dx 2019  S/p surgical drainage on abscess on buttocks on 22 by general surgery  H/o partial bowel resection with ileostomy 2019 secondary to Crohn's  Anemia, iron deficiency      Pt reports surgical wound buttocks is healing.  See wound care 3x/wk.  Homehealth 2x/wk.  Wound precautions.  F/u general surgery.  Infectious precautions.    Otherwise, pt has no new major complaints today.  Reports active fetus.  Denies vaginal bleeding, leakage of fluid, or contractions.    Pt seen [facility] for follow up 23:    Impression   =========   Emanuel live IUP   Fetal size is appropriate for gestational age, with the EFW (2218 g) plotting at the 14% and the AC plotting at the 35%. The femur length is lagging 3 weeks behind at the   1%. Normal mineralization and no bowing. Profile appears normal. This is likely to be variant of normal and non-pathologic.   A limited repeat fetal anatomic survey appears normal.   The MVP is normal.     Recommendation   ==============   Repeat ultrasound study as clinically indicated per primary OB. No further ultrasounds have been scheduled by [facility].     Order GBS.    Urine cx on  was abnormal.  Pt denies UTI sxs.  Repeat urine cx last visit was nomral.  UTI precautions.    Crohn's currently in remission on wkly injection of Humira qFriday.  Pt advised to f/u closely with GI, Dr. Michael Bacon at [facility].  Increase folic acid.  GI precautions.    Pt is requesting primary  delivery, tentatively scheduled for 23.  Pt unlikely to tolerate a long labor course due to surgical wound.  Risk of recto-vaginal laceration and fistula with Crohn's disease discussed.  Risks, benefits, and alternatives to primary  delivery discussed.  Anesthesia is aware of pt, spoke with Dr. Corona and DIANA pSears.    1 hr glucola normal.  Encourage fergon for anemia.  Encourage daily low dose ASA for  prevention of preE if tolerable with Crohn's.  Encourage participation in Connective MOM program.  Encourage Covid and flu vaccine.    Labor/preE precautions.  Kick counts.  Return 1 wk or sooner prn.  All questions answered, voiced understanding.      Geo Espitia MD

## 2023-01-20 LAB — VIT B1 PYROPHOSHATE BLD-SCNC: 90 NMOL/L (ref 70–180)

## 2023-01-22 LAB — BACTERIA SPEC AEROBE CULT: NORMAL

## 2023-01-23 ENCOUNTER — TELEPHONE (OUTPATIENT)
Dept: GASTROENTEROLOGY | Facility: CLINIC | Age: 33
End: 2023-01-23
Payer: MEDICAID

## 2023-01-23 ENCOUNTER — OFFICE VISIT (OUTPATIENT)
Dept: GASTROENTEROLOGY | Facility: CLINIC | Age: 33
End: 2023-01-23
Payer: MEDICAID

## 2023-01-23 VITALS
HEART RATE: 92 BPM | OXYGEN SATURATION: 100 % | HEIGHT: 63 IN | BODY MASS INDEX: 25.47 KG/M2 | SYSTOLIC BLOOD PRESSURE: 101 MMHG | TEMPERATURE: 98 F | WEIGHT: 143.75 LBS | DIASTOLIC BLOOD PRESSURE: 76 MMHG

## 2023-01-23 DIAGNOSIS — L02.215 PERINEAL ABSCESS: ICD-10-CM

## 2023-01-23 DIAGNOSIS — K50.80 CROHN'S DISEASE OF BOTH SMALL AND LARGE INTESTINE WITHOUT COMPLICATION: Primary | ICD-10-CM

## 2023-01-23 DIAGNOSIS — D84.9 IMMUNOSUPPRESSED STATUS: ICD-10-CM

## 2023-01-23 DIAGNOSIS — Z98.890 STATUS POST INCISION AND DRAINAGE: ICD-10-CM

## 2023-01-23 DIAGNOSIS — K50.80 CROHN'S DISEASE OF BOTH SMALL AND LARGE INTESTINE WITHOUT COMPLICATION: ICD-10-CM

## 2023-01-23 DIAGNOSIS — K50.90 MATERNAL CROHN'S DISEASE AFFECTING PREGNANCY, ANTEPARTUM: ICD-10-CM

## 2023-01-23 DIAGNOSIS — O99.619 MATERNAL CROHN'S DISEASE AFFECTING PREGNANCY, ANTEPARTUM: ICD-10-CM

## 2023-01-23 DIAGNOSIS — L40.9 PSORIASIS: Primary | ICD-10-CM

## 2023-01-23 PROCEDURE — 99215 PR OFFICE/OUTPT VISIT, EST, LEVL V, 40-54 MIN: ICD-10-PCS | Mod: S$GLB,,, | Performed by: INTERNAL MEDICINE

## 2023-01-23 PROCEDURE — 3078F DIAST BP <80 MM HG: CPT | Mod: CPTII,S$GLB,, | Performed by: INTERNAL MEDICINE

## 2023-01-23 PROCEDURE — 1160F RVW MEDS BY RX/DR IN RCRD: CPT | Mod: CPTII,S$GLB,, | Performed by: INTERNAL MEDICINE

## 2023-01-23 PROCEDURE — 1159F PR MEDICATION LIST DOCUMENTED IN MEDICAL RECORD: ICD-10-PCS | Mod: CPTII,S$GLB,, | Performed by: INTERNAL MEDICINE

## 2023-01-23 PROCEDURE — 3074F SYST BP LT 130 MM HG: CPT | Mod: CPTII,S$GLB,, | Performed by: INTERNAL MEDICINE

## 2023-01-23 PROCEDURE — 3008F PR BODY MASS INDEX (BMI) DOCUMENTED: ICD-10-PCS | Mod: CPTII,S$GLB,, | Performed by: INTERNAL MEDICINE

## 2023-01-23 PROCEDURE — 99215 OFFICE O/P EST HI 40 MIN: CPT | Mod: S$GLB,,, | Performed by: INTERNAL MEDICINE

## 2023-01-23 PROCEDURE — 3074F PR MOST RECENT SYSTOLIC BLOOD PRESSURE < 130 MM HG: ICD-10-PCS | Mod: CPTII,S$GLB,, | Performed by: INTERNAL MEDICINE

## 2023-01-23 PROCEDURE — 1159F MED LIST DOCD IN RCRD: CPT | Mod: CPTII,S$GLB,, | Performed by: INTERNAL MEDICINE

## 2023-01-23 PROCEDURE — 3008F BODY MASS INDEX DOCD: CPT | Mod: CPTII,S$GLB,, | Performed by: INTERNAL MEDICINE

## 2023-01-23 PROCEDURE — 3078F PR MOST RECENT DIASTOLIC BLOOD PRESSURE < 80 MM HG: ICD-10-PCS | Mod: CPTII,S$GLB,, | Performed by: INTERNAL MEDICINE

## 2023-01-23 PROCEDURE — 1160F PR REVIEW ALL MEDS BY PRESCRIBER/CLIN PHARMACIST DOCUMENTED: ICD-10-PCS | Mod: CPTII,S$GLB,, | Performed by: INTERNAL MEDICINE

## 2023-01-23 RX ORDER — ADALIMUMAB 40MG/0.4ML
40 KIT SUBCUTANEOUS
Qty: 4 EACH | Refills: 5 | Status: SHIPPED | OUTPATIENT
Start: 2023-01-23 | End: 2024-01-19 | Stop reason: SDUPTHER

## 2023-01-23 NOTE — TELEPHONE ENCOUNTER
Called and spoke with Fransisco at [facility] [employer]: [id number]. Since pt have a new medicaid plan, [employer], its requiring a new PA. PA was submitted through [facility] on 01/23/2023.

## 2023-01-23 NOTE — PATIENT INSTRUCTIONS
Instructions  - continue humira every Friday- take the dose on , 2/3  - will check on your refill with our pharmacist and if needed will get a sample  - okay to overbook 1 pm appt on  since fellow is with me    No live vaccines for  for the first 6 months.  No rotavirus vaccine.  Patient is on a biologic.

## 2023-01-23 NOTE — LETTER
January 25, 2023        Geo Espitia MD  [address] [facility] [address]  [address]  [facility] [address]             [facility] Gastro/Inflammatory Bowel Disease  [address]  [place] [address]  Phone: [phone]  Fax: [phone]   Patient: Lora Scott   MR Number: [id number]   YOB: 1990   Date of Visit: 1/23/2023       Dear Dr. Espitia:    Thank you for referring Lora Scott to me for evaluation. Below are the relevant portions of my assessment and plan of care.            If you have questions, please do not hesitate to call me. I look forward to following Lora along with you.    Sincerely,      Suzi Bacon MD           CC    No Recipients

## 2023-01-23 NOTE — PROGRESS NOTES
"     [facility]             Inflammatory Bowel Disease        Follow-up  Note              TODAY'S VISIT DATE:  1/25/2023    Chief Complaint:   Chief Complaint   Patient presents with    Crohn's Disease     PCP: Geo Espitia    Previous History:  Lora Scott is a 32 y.o. female with Crohn's disease (ileum, S/P ileocecal resection-42 cm of ileum/10.5 cm cecum removed with surgical path c/w stricture and fistula, 2/15/20 ileostomy takedown and mucus fistula takedown), buttock/perineal abscess (drained 12/2022, likely hydradenitis suppurativa), psoriasis, GERD, asthma, ex-smoker (quit 9/2019).  Patient reports being diagnosed with IBS with alternating diarrhea and constipation and weight loss as a teenager that was treated with Nexium which helped.  In 8/2018 she again had abd pain, weight loss, and alternating BMs so she contacted her PCP for Nexium which did not help.  By 2/2019, her symptoms progressed prompting an ER visit and on 2/18/2019 she had a CT abd/pelvis which showed an "abnormal wall thickening involving the ascending colon, cecum and terminal ileum with intense mucosal enhancement and luminal narrowing resulting in distention of proximal small bowel.  There was also an ileal-ileal and ileocolic fistulization, fat hypertrophy, increased mesenteric vascularitiy, and reactive mesenteric adenitis in the RLQ with no abscess. "  Patient had an inpatient EGD on 2/20/2019 which showed minimal chronic H. Pylori negative inflammation, normal esophagus, and duodenal biopsies showed mild villous blunting and mild increased IELs.  Colonoscopy on the same day showed congested mucosa in the TI and ICV with granularity in the TI, and TI stricture 5 cm from the ICV with ileal biopsies confirmed ileitis and biopsies of the right colon confirmed focal moderate active colitis with normal left colon and rectum, sigmoid diverticulitis, and internal hemorrhoids.  It was felt that patient would eventually " need surgical intervention but needed nutritional optimization initially so she was discharged home with 12 hours of TPN daily via a PICC and bowel rest with plans for an MRE, surgical f/u, and a referral to the IBD clinic to discuss medical options.  She was last seen by me 3/2019 for initial IBD visit at which time she was on no meds and there was plan for MRE 3/13/19 with f/u with Dr. Ruiz. MRE on 3/13/19 showed multifocal active inflammation of the distal ileum with mild fibrostenotic component and entero-enteric fistula.  She then was transferred to Dr. Cope due to availability and he initially saw her in clinic and a decision to proceed with surgery was made.  On 5/10/19 she underwent an open ileocolonic resection (10 cm of cecum, 42 cm of ileum removed) with end ileostomy with mucous fistula. Surgical pathology showed TI/cecum with chronic active ileitis with stricture and features of fistulization, transmural inflammation with reactive LNs. Postoperatively she did well and gained weight and would pass mucous from below a few times only. She emptied her stoma about 4-6 times/d and was having issues getting supplies due to insurance issues.  Patient has had multiple issues with stoma appliance/bag and due to inability to afford stoma supplies and its not covered by insurance, she has been getting samples from our wound/ostomy nurse.  Patient stopped smoking in mid September 2019.  She started on methotrexate 12.5 mg p.o. weekly though due to elevated liver function test discontinued on 10/4/2019.  She started Humira on 10/24/2019 and Imuran 50 mg daily on 10/25/2019 though never started Imuran as recommended and we decided not to proceed and instead optimize her Humira.  Ileoscopy through stoma was normal on 12/20/2019.  On 1/15/2020 patient had Humira levels 3.5 and her Humira was increased to weekly starting 3/13/20.  On 2/15/2020 she had elective takedown of her ileostomy and mucous fistula. Colonoscopy  10/2020 showed normal colon and single linear ulcer at ileocolonic anastomosis and normal neoterminal ileum with biopsies of neoterminal ileum normal. On 20 trough Humira levels and abs adequate with [id number] with no Abs on Humira 40 mg SC weekly. Patient has ongoing psoriasis being followed by [facility] dermatology and was treated for tinea pedis 2020.  She was also treated for bacterial vaginosis by her GYN in  and pap smear HPV positive with plans for colposcopy.  In 2021 patient was diagnosed with recurrent fungal infections- tineap capitis treated with griseofulvin.  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 In mid 2021 pt had abscess near vaginal area s/p I&D in hospitals in [place].  She saw Dermatology 22 and 22 for psoriasis which significantly improved with prednisone 60mg followed by taper over two weeks with plans for otezla though pt is not on this and has not seen dermatology since then. In 5/10/22 colonoscopy c/w normal colon, ileocolonic anastomosis and neoterminal ileum.     Interval History:  - current IBD meds: Humira 40 mg SC weekly (FD 10/24/20, 40 mg SC weekly, LD , ND ) - Needs refill/pens due to insurance delay  - 1-2 formed BMs/d, no blood, no nocturnal  - in wheelchair today  - 22 trough humira level 8.2/Abs neg (Humira 40 mg SC weekly)    - 22 had multiple buttock/perineal abscesses drained by [facility] general surgery (Dr. Regan Rowell) and following wound care and f/u with surgery with good wound healing and granulation, unable to examine today as pt had this recently dressed and preferred not to remove dressing, this seemed more c/w hydradenitis suppurativa  - worsening psoriasis since pregnancy   - pregnancy- 36w5d pregnant.  On ASA for pre-eclampsia ppx.  Planning for elective   section 23.  - NSAID use: No  - Narcotic use: No  - Alternative/complementary meds for IBD: No    Prior Pertinent Surgeries:   5/10/19 open ileocolonic resection (10 cm of cecum, 42 cm of ileum removed) with end ileostomy with mucous fistula. Surgical pathology showed TI/cecum with chronic active ileitis with stricture and features of fistulization, transmural inflammation with reactive LNs  2/15/20 elective takedown of her ileostomy and mucous fistula;  (surgical path- peristoma granulation tissue with acute and chronic inflammation, ascending colon with acute and chronic inflammatory infiltrate with superficial erosion and focal active colitis, TI with focal granulomatous inflammation and superficial erosion)  22 Multiple abscess drainage from per-anal/gluteal cleft    Last pertinent Endoscopy/Imagin2019 EGD: normal examined duodenum (path: mild villous blunting and mild increased intraepithelial lymphocytes); normal stomach (path: minimal chronic H. pylori negative inflammation); normal esophagus  3/13/19 MRE:  multifocal active inflammation of the distal ileum with mild fibrostenotic component and entero-enteric fistula  19- ileoscopy through stoma: The 30 cm of examined through the mucous fistula is normal. The peristomal area has granular friable mucosa and the lorena-terminal ileum appeared normal. Biopsies of colon through mucus fistula- normal colon, biopsies of neoterminal ileum with minimal active inflammation  10/30/20 colonoscopy: Colon normal, single linear ulcer at the colon anastomosis. Ileum was normal as well including path.  5/10/22 colonoscopy: normal colon, normal ileocolonic anastomosis, normal neoterminal ileum. Biopsies of neoterminal ileum with nonspecific patchy increased eosinophils    Therapeutic Drug Monitoring Labs:  1/15/20 humira levels 3.5/neg Abs on humira 40 mg SC every 2 weeks  20 Trough Humira levels 12.0 and neg abs on Humira  40 mg SC every 2  weeks  2/12/21 Humira levels 7.5, no antibodies on Humira  40 mg SC every week.   7/7/22 trough humira level 8.2/Abs neg (Humira 40 mg SC weekly)      Prior IBD Therapies:  MTX 12.5 mg po weekly- stopped after a few weeks due to elevated LFTs 10/2019    Vaccinations:  Lab Results   Component Value Date    HEPBSAB Grayzone (A) 02/19/2019     Lab Results   Component Value Date    HEPAIGG Positive (A) 02/19/2019     Lab Results   Component Value Date    VARICELLAZOS 2.58 (H) 03/11/2019    VARICELLAINT Positive (A) 03/11/2019     Immunization History   Administered Date(s) Administered    COVID-19, MRNA, LN-S, PF (Pfizer) (Purple Cap) 10/22/2021    DTP 1991, 02/24/1992, 03/18/1992, 03/18/1992, 01/12/1994    HIB 1991, 02/24/1992, 03/18/1992    Hepatitis B (recombinant) Adjuvanted, 2 dose 04/17/2019, 05/20/2019    Hepatitis B, Pediatric/Adolescent 07/24/2006    IPV 07/24/2006    MMR 01/13/1994, 07/24/2006    Meningococcal Conjugate (MCV4P) 07/24/2006    OPV 1991, 02/24/1992, 01/12/1994    Pneumococcal Conjugate - 13 Valent 04/17/2019    Pneumococcal Polysaccharide - 23 Valent 06/20/2019    Td (ADULT) 07/24/2006    Td (Adult), Unspecified Formulation 07/24/2006    Tdap 07/24/2006, 04/17/2019     Flu shot:  Recommended yearly  COVID booster:  Declined   HPV:    Discuss at post-partum visit  Shingrix: recommended, defer due to cost    Review of Systems   Constitutional:  Negative for chills, fever and weight loss.   HENT:          No oral ulcers, dysphagia, oral thrush   Eyes:  Negative for blurred vision, pain and redness.   Respiratory:  Negative for cough and shortness of breath.    Cardiovascular:  Negative for chest pain.   Gastrointestinal:  Negative for abdominal pain, heartburn, nausea and vomiting.   Genitourinary:  Negative for dysuria and hematuria.   Musculoskeletal:  Negative for back pain and joint pain.   Skin:  Positive for rash.   Psychiatric/Behavioral:  Negative for depression. The  "patient is not nervous/anxious and does not have insomnia.    All Medical History/Surgical History/Family History/Social History/Allergies have been reviewed and updated in EMR    Review of patient's allergies indicates:   Allergen Reactions    Horse/equine containing products Itching     Outpatient Medications Marked as Taking for the 1/23/23 encounter (Office Visit) with Suzi Bacon MD   Medication Sig Dispense Refill    adalimumab (HUMIRA,CF,) 40 mg/0.4 mL SyKt Inject 0.4 mLs (40 mg total) into the skin every 7 days. 4 each 5    [DISCONTINUED] HUMIRA PEN PnKt injection Inject 1 pen (40 mg total) into the skin every 7 days. 12 pen 1     /76 (BP Location: Left arm, Patient Position: Sitting)   Pulse 92   Temp 97.8 °F (36.6 °C)   Ht 5' 3" (1.6 m)   Wt 65.2 kg (143 lb 11.8 oz)   LMP 05/15/2022   SpO2 100%   BMI 25.46 kg/m²   Physical Exam    Gen: mild distress  HENT: NCAT, oropharynx clear  Eyes: anicteric sclerae, EOMI grossly  Neck: supple, no visible masses/goiter  Cardiac: RRR  Lungs: non-labored breathing  Abd: soft, NT, normoactive BS, distended abdomen  Ext: no LE edema, warm, well perfused  Skin: skin intact on exposed body parts, extensive facial and back psoriasis lesions, well dressed posterior wounds  Neuro: A&Ox4, neuro exam grossly intact, moves all extremities  Psych: appropriate mood, affect      Labs:  Lab Results   Component Value Date    CRP 8.5 (H) 09/27/2021     Lab Results   Component Value Date    HEPBSAG Negative 08/23/2022    HEPBCAB Negative 07/07/2022     Lab Results   Component Value Date    TBGOLDPLUS Negative 07/07/2022     Lab Results   Component Value Date    QVRTTQFB04BN 16 (L) 05/09/2022    MJBJVQSV30 196 (L) 05/09/2022     Lab Results   Component Value Date    WBC 9.52 01/19/2023    HGB 11.3 (L) 01/19/2023    HCT 36.0 (L) 01/19/2023     (H) 01/19/2023     01/19/2023     Lab Results   Component Value Date    CREATININE 0.7 12/06/2022    ALBUMIN 2.1 (L) " 2022    BILITOT 0.9 2022    ALKPHOS 109 2022    AST 14 2022    ALT 7 (L) 2022     Assessment/Plan:  Lora Scott is a 32 y.o. female with Crohn's disease (ileum, S/P ileocecal resection-42 cm of ileum/10.5 cm cecum removed with surgical path c/w stricture and fistula, 2/15/20 ileostomy takedown and mucus fistula takedown), buttock/perineal abscess (drained 2022, likely hydradenitis suppurativa), psoriasis (failed light treatment)  GERD, asthma, ex-smoker (quit 2019) who is on Humira 40 mg SC weekly and having formed BMs with no flares during pregnancy with colonoscopy 2022 with no recurrent CD and adequate drug levels 2022.  In 2022 she had buttock/perineal abscesses thought to be related to likely hydradenitis suppurativa and pt has had good wound healing and is following with wound care and general surgery and OB closely at [facility].  She is currently 36 weeks pregnant and planned  23.   During pregnancy she has had worsening psoriasis that had been well controlled at her last visit 2022 after course of steroids April/May 2023. I will continue her humira weekly leading up to delivery due to risk of flare and worsening psoriasis.     # Crohn's disease (ileum, S/P ileocecal resection-42 cm of ileum/10.5 cm cecum removed with surgical path c/w stricture and fistula, 2/15/20 ileostomy takedown and mucus fistula takedown)  - continue humira 40 mg SC weekly, ND  and will work on making sure she has delivery for dose for 2/3 if not pt may need sample  - colonoscopy 2023   - vitamin B12- last visit had recommended pt taking vit B12 but not sure if pt is taking this  - ex-smoker: quit smoking 2019, some 2nd hand smoke, MJ use   - drug monitoring labs:  CBC/CMP q 6 mos (2023), TPMT (heterozygote 2019), TB quantiferon (2023), Hep B testing (2023)  - TDM: trough humira levels/abs check postpartum at next visit given levels may drop with hormonal  changes    # Skin issues  - psoriasis - worsened with pregnancy, not on otezla, encouraged to f/u with [facility] dermatology and f/u on status of otezla, may benefit with prednisone after delivery  - vulvar/groin abscess - S/P I/D spring 2022  - perianal/gluteal abscesses likely hydradenitis suppurativa - s/p multiple extensive I&D  with good wound healing  - fissures side of mouth likely vit B12 def- reminded to start vit B12    # Pregnancy - 36w5d  - continue humira weekly with dose  and 2/3 and then will see pt to be sure okay to resume  - patient is not planning to breastfeed but reviewed that okay to breastfeed on humira if she choses  - elective  section planned for 23  - avoid live virus vaccine for the  in the first 6 months    # IBD specific health maintenance:  CRC risk: ileal disease, average risk  Skin exam yearly - sees dermatology at [facility] regularly, last exam 2022, next due 2023  Pap smear yearly due to long term immunosuppression, last pap smear normal 2020, due now- defer to gyn who is following pt closely during pregnancy  Vitamin D-- stopped high dose vit D, told to restart high dose vit D- prescription sent last visit 2022 but pt has not started, reminded to do so  Vaccines: no live vaccines    Follow up: 23    Ishaan Menard MD  GI Fellow    I have reviewed and concur with the fellow's history, physical, assessment, and plan.  I have personally interviewed and examined the patient. The above note has been edited to reflect my recommendations.     Suzi Bacon MD   Department of Gastroenterology  Medical Director, Inflammatory Bowel Disease

## 2023-01-23 NOTE — PROGRESS NOTES
"IBD PATIENT INTAKE:    COVID symptoms in the last 7 days (runny nose, sore throat, congestion, cough, fever): No  PCP: Geo Espitia  If not PCP-  number given to establish [phone]: N/A    ALLERGIES REVIEWED:  Yes    CHIEF COMPLAINT:    Chief Complaint   Patient presents with    Crohn's Disease       VITAL SIGNS:  /76 (BP Location: Left arm, Patient Position: Sitting)   Pulse 92   Temp 97.8 °F (36.6 °C)   Ht 5' 3" (1.6 m)   Wt 64 kg (141 lb)   LMP 05/15/2022   SpO2 100%   BMI 24.98 kg/m²      Change in medical, surgical, family or social history: No    IBD THERAPY (name, dose/frequency):  Humira Q 7 days   Last dose:  1/20/2023    Next dose:  1/27/2023  Infusion[facility]Pharmacy: [facility]     NSAIDs (aspirin, ibuprofen-advil or motrin, naproxen-aleve, diclofenac-voltaren, BC powder, excedrin, goodies): No    Alternative/Complementary Medications (i.e. probiotics, turmeric, fish oil, aloe vera):      no  Name/dose:  none    Vitamins:   Vit D:  no     Vit B-12:  no   Folic Acid: no       Calcium: no     Iron:  no      MVI: no    Antibiotics (past 30 Days):  no  If yes   Indication:  Name of antibiotic:  Completion date:     REVIEWED MEDICATION LIST RECONCILED INCLUDING ABOVE MEDS:  yes    36 weeks pregnant  Due 2/8/2023                     "

## 2023-01-25 ENCOUNTER — TELEPHONE (OUTPATIENT)
Dept: GASTROENTEROLOGY | Facility: CLINIC | Age: 33
End: 2023-01-25
Payer: MEDICAID

## 2023-01-25 LAB
ACID FAST MOD KINY STN SPEC: NORMAL
MYCOBACTERIUM SPEC QL CULT: NORMAL

## 2023-01-26 ENCOUNTER — ROUTINE PRENATAL (OUTPATIENT)
Dept: OBSTETRICS AND GYNECOLOGY | Facility: CLINIC | Age: 33
End: 2023-01-26
Payer: MEDICAID

## 2023-01-26 VITALS — WEIGHT: 147.94 LBS | BODY MASS INDEX: 26.2 KG/M2 | DIASTOLIC BLOOD PRESSURE: 62 MMHG | SYSTOLIC BLOOD PRESSURE: 108 MMHG

## 2023-01-26 DIAGNOSIS — O99.019 ANTEPARTUM ANEMIA: ICD-10-CM

## 2023-01-26 DIAGNOSIS — K50.90 MATERNAL CROHN'S DISEASE AFFECTING PREGNANCY, ANTEPARTUM: ICD-10-CM

## 2023-01-26 DIAGNOSIS — Z3A.37 37 WEEKS GESTATION OF PREGNANCY: Primary | ICD-10-CM

## 2023-01-26 DIAGNOSIS — O99.619 MATERNAL CROHN'S DISEASE AFFECTING PREGNANCY, ANTEPARTUM: ICD-10-CM

## 2023-01-26 DIAGNOSIS — Z98.890 STATUS POST INCISION AND DRAINAGE: ICD-10-CM

## 2023-01-26 PROCEDURE — 99999 PR PBB SHADOW E&M-EST. PATIENT-LVL III: CPT | Mod: PBBFAC,,, | Performed by: OBSTETRICS & GYNECOLOGY

## 2023-01-26 PROCEDURE — 99213 OFFICE O/P EST LOW 20 MIN: CPT | Mod: TH,S$PBB,, | Performed by: OBSTETRICS & GYNECOLOGY

## 2023-01-26 PROCEDURE — 99213 PR OFFICE/OUTPT VISIT, EST, LEVL III, 20-29 MIN: ICD-10-PCS | Mod: TH,S$PBB,, | Performed by: OBSTETRICS & GYNECOLOGY

## 2023-01-26 PROCEDURE — 99213 OFFICE O/P EST LOW 20 MIN: CPT | Mod: PBBFAC,TH | Performed by: OBSTETRICS & GYNECOLOGY

## 2023-01-26 PROCEDURE — 99999 PR PBB SHADOW E&M-EST. PATIENT-LVL III: ICD-10-PCS | Mod: PBBFAC,,, | Performed by: OBSTETRICS & GYNECOLOGY

## 2023-01-26 NOTE — PROGRESS NOTES
32 y.o.  at 37w1d here for OB visit.    Pregnancy complicated by:  Crohn's disease dx 2019  S/p surgical drainage on abscess on buttocks on 22 by general surgery  H/o partial bowel resection with ileostomy 2019 secondary to Crohn's  Anemia, iron deficiency    Pt desires elective primary  delivery    Pt has no major complaints today.  Reports active fetus.  Denies vaginal bleeding, leakage of fluid, or contractions.    Surgical wound buttocks is healing.  See wound care 3x/wk.  Homehealth 2x/wk.  Wound precautions.  F/u general surgery.  Infectious precautions.    GBS negative.  Delivery and blood consents signed.    Crohn's currently in remission on wkly injection of Humira qFriday.  Pt advised to f/u closely with GI, Dr. Michael Bacon at [facility].  Increase folic acid.  GI precautions.    Pt is requesting primary  delivery, tentatively scheduled for 23.  Pt unlikely to tolerate a long labor course due to surgical wound.  Risk of recto-vaginal laceration and fistula with Crohn's disease discussed.  Risks, benefits, and alternatives to primary  delivery discussed.  Anesthesia is aware of pt, spoke with Dr. Corona and DIANA Spears.    Encourage fergon for anemia.  Encourage daily low dose ASA for prevention of preE if tolerable with Crohn's.  Encourage participation in Connective MOM program.  Encourage Covid and flu vaccine.    Labor/preE precautions.  Kick counts.  Return 1 wk or sooner prn.  All questions answered, voiced understanding.  Pt mother present for visit.      Geo Espitia MD

## 2023-01-31 ENCOUNTER — TELEPHONE (OUTPATIENT)
Dept: GASTROENTEROLOGY | Facility: CLINIC | Age: 33
End: 2023-01-31
Payer: MEDICAID

## 2023-01-31 NOTE — TELEPHONE ENCOUNTER
Called and spoke with patient she stated she didn't know the phone number to [facility] to get her Humira refilled. I gave her the number and told her to call today so she can have her injection by the 3rd.

## 2023-02-02 ENCOUNTER — ROUTINE PRENATAL (OUTPATIENT)
Dept: OBSTETRICS AND GYNECOLOGY | Facility: CLINIC | Age: 33
End: 2023-02-02
Payer: MEDICAID

## 2023-02-02 ENCOUNTER — TELEPHONE (OUTPATIENT)
Dept: GASTROENTEROLOGY | Facility: CLINIC | Age: 33
End: 2023-02-02
Payer: MEDICAID

## 2023-02-02 VITALS
WEIGHT: 141.13 LBS | DIASTOLIC BLOOD PRESSURE: 66 MMHG | BODY MASS INDEX: 24.99 KG/M2 | SYSTOLIC BLOOD PRESSURE: 110 MMHG

## 2023-02-02 DIAGNOSIS — K50.90 MATERNAL CROHN'S DISEASE AFFECTING PREGNANCY, ANTEPARTUM: ICD-10-CM

## 2023-02-02 DIAGNOSIS — O99.619 MATERNAL CROHN'S DISEASE AFFECTING PREGNANCY, ANTEPARTUM: ICD-10-CM

## 2023-02-02 DIAGNOSIS — O99.019 ANTEPARTUM ANEMIA: ICD-10-CM

## 2023-02-02 DIAGNOSIS — Z3A.38 38 WEEKS GESTATION OF PREGNANCY: Primary | ICD-10-CM

## 2023-02-02 DIAGNOSIS — Z98.890 STATUS POST INCISION AND DRAINAGE: ICD-10-CM

## 2023-02-02 PROCEDURE — 99212 OFFICE O/P EST SF 10 MIN: CPT | Mod: PBBFAC,TH | Performed by: OBSTETRICS & GYNECOLOGY

## 2023-02-02 PROCEDURE — 99213 OFFICE O/P EST LOW 20 MIN: CPT | Mod: TH,S$PBB,, | Performed by: OBSTETRICS & GYNECOLOGY

## 2023-02-02 PROCEDURE — 99999 PR PBB SHADOW E&M-EST. PATIENT-LVL II: CPT | Mod: PBBFAC,,, | Performed by: OBSTETRICS & GYNECOLOGY

## 2023-02-02 PROCEDURE — 99213 PR OFFICE/OUTPT VISIT, EST, LEVL III, 20-29 MIN: ICD-10-PCS | Mod: TH,S$PBB,, | Performed by: OBSTETRICS & GYNECOLOGY

## 2023-02-02 PROCEDURE — 99999 PR PBB SHADOW E&M-EST. PATIENT-LVL II: ICD-10-PCS | Mod: PBBFAC,,, | Performed by: OBSTETRICS & GYNECOLOGY

## 2023-02-02 RX ORDER — MISOPROSTOL 100 UG/1
800 TABLET ORAL
Status: CANCELLED | OUTPATIENT
Start: 2023-02-02

## 2023-02-02 RX ORDER — CARBOPROST TROMETHAMINE 250 UG/ML
250 INJECTION, SOLUTION INTRAMUSCULAR
Status: CANCELLED | OUTPATIENT
Start: 2023-02-02

## 2023-02-02 RX ORDER — SODIUM CHLORIDE, SODIUM LACTATE, POTASSIUM CHLORIDE, CALCIUM CHLORIDE 600; 310; 30; 20 MG/100ML; MG/100ML; MG/100ML; MG/100ML
INJECTION, SOLUTION INTRAVENOUS CONTINUOUS
Status: CANCELLED | OUTPATIENT
Start: 2023-02-02

## 2023-02-02 RX ORDER — FAMOTIDINE 10 MG/ML
20 INJECTION INTRAVENOUS
Status: CANCELLED | OUTPATIENT
Start: 2023-02-02

## 2023-02-02 RX ORDER — OXYTOCIN/RINGER'S LACTATE 30/500 ML
334 PLASTIC BAG, INJECTION (ML) INTRAVENOUS ONCE
Status: CANCELLED | OUTPATIENT
Start: 2023-02-02 | End: 2023-02-02

## 2023-02-02 RX ORDER — MUPIROCIN 20 MG/G
OINTMENT TOPICAL
Status: CANCELLED | OUTPATIENT
Start: 2023-02-02

## 2023-02-02 RX ORDER — METHYLERGONOVINE MALEATE 0.2 MG/ML
200 INJECTION INTRAVENOUS
Status: CANCELLED | OUTPATIENT
Start: 2023-02-02

## 2023-02-02 RX ORDER — OXYTOCIN/RINGER'S LACTATE 30/500 ML
95 PLASTIC BAG, INJECTION (ML) INTRAVENOUS ONCE
Status: CANCELLED | OUTPATIENT
Start: 2023-02-02 | End: 2023-02-02

## 2023-02-02 RX ORDER — SODIUM CITRATE AND CITRIC ACID MONOHYDRATE 334; 500 MG/5ML; MG/5ML
30 SOLUTION ORAL
Status: CANCELLED | OUTPATIENT
Start: 2023-02-02

## 2023-02-02 NOTE — PROGRESS NOTES
32 y.o.  at 38w1d here for OB visit.    Pregnancy complicated by:  Crohn's disease dx 2019  S/p surgical drainage on abscess on buttocks on 22 by general surgery  H/o partial bowel resection with ileostomy 2019 secondary to Crohn's  Anemia, iron deficiency    Pt desires elective primary  delivery    Pt has no major complaints today.  Reports active fetus.  Denies vaginal bleeding, leakage of fluid, or contractions.    Surgical wound buttocks is healing.  See wound care and homehealth.  Wound precautions.  F/u general surgery.  Infectious precautions.    Pt is requesting primary  delivery, tentatively scheduled for 23.  Pt unlikely to tolerate a long labor course due to surgical wound.  Risk of recto-vaginal laceration and fistula with Crohn's disease discussed.  Risks, benefits, and alternatives to primary  delivery discussed.  Anesthesia is aware of pt, spoke with Dr. Corona and DIANA Spears.  Pre-op instructions reviewed with pt.    Crohn's currently in remission.  Pt advised to f/u closely with GI, Dr. Michael Bacon at [facility].  Increase folic acid.  GI precautions.    Encourage fergon for anemia.  Encourage daily low dose ASA for prevention of preE if tolerable with Crohn's.  Encourage participation in Connective MOM program.  Encourage Covid and flu vaccine.    Labor/preE precautions.  Kick counts.  Go to L&D for any concerns.  Return prn.  All questions answered, voiced understanding.      Geo Espitia MD

## 2023-02-02 NOTE — TELEPHONE ENCOUNTER
Spoke to [facility] and they said patient had to call to put in medication order. Called and spoke with pt also and told her to call to get her medication and let them know to overnight it. She said she will.

## 2023-02-02 NOTE — TELEPHONE ENCOUNTER
Called and spoke with Lora she said Magdalena called her, She said it will come on Thursday. I asked her was it coming today or next Thursday she stated she didn't know and that all she know was the people told her it was a delay.

## 2023-02-03 ENCOUNTER — TELEPHONE (OUTPATIENT)
Dept: GASTROENTEROLOGY | Facility: CLINIC | Age: 33
End: 2023-02-03
Payer: MEDICAID

## 2023-02-03 ENCOUNTER — OFFICE VISIT (OUTPATIENT)
Dept: BARIATRICS/WEIGHT MGMT | Age: 33
End: 2023-02-03

## 2023-02-03 VITALS
HEART RATE: 71 BPM | BODY MASS INDEX: 42.9 KG/M2 | SYSTOLIC BLOOD PRESSURE: 132 MMHG | WEIGHT: 283.1 LBS | TEMPERATURE: 97.9 F | OXYGEN SATURATION: 100 % | DIASTOLIC BLOOD PRESSURE: 87 MMHG | HEIGHT: 68 IN

## 2023-02-03 DIAGNOSIS — E55.9 VITAMIN D DEFICIENCY: ICD-10-CM

## 2023-02-03 DIAGNOSIS — E51.9 THIAMINE DEFICIENCY: Chronic | ICD-10-CM

## 2023-02-03 DIAGNOSIS — Z98.84 S/P GASTRIC BYPASS: Primary | ICD-10-CM

## 2023-02-03 DIAGNOSIS — E66.01 MORBID OBESITY WITH BMI OF 50.0-59.9, ADULT (CMD): ICD-10-CM

## 2023-02-03 DIAGNOSIS — E78.00 HYPERCHOLESTEREMIA: ICD-10-CM

## 2023-02-03 DIAGNOSIS — E11.9 TYPE 2 DIABETES MELLITUS WITHOUT COMPLICATION, WITHOUT LONG-TERM CURRENT USE OF INSULIN (CMD): ICD-10-CM

## 2023-02-03 DIAGNOSIS — I10 BENIGN ESSENTIAL HTN: ICD-10-CM

## 2023-02-03 PROCEDURE — 3075F SYST BP GE 130 - 139MM HG: CPT | Performed by: SURGERY

## 2023-02-03 PROCEDURE — 3079F DIAST BP 80-89 MM HG: CPT | Performed by: SURGERY

## 2023-02-03 PROCEDURE — 99215 OFFICE O/P EST HI 40 MIN: CPT | Performed by: SURGERY

## 2023-02-03 RX ORDER — ERGOCALCIFEROL 1.25 MG/1
50000 CAPSULE ORAL
Qty: 12 CAPSULE | Refills: 0 | Status: SHIPPED | OUTPATIENT
Start: 2023-02-06 | End: 2023-05-01

## 2023-02-03 NOTE — TELEPHONE ENCOUNTER
Called [facility] and spoke with rep Leti crespo pharmacist Hermelindo  - Shipment was not sent yesterday because [facility] wanted to confirm provider aware patient is pregnant  - Confirmed provider is following patient closely and is aware. Requested shipment to be sent urgently today.         Called and spoke with patient  - Advised her to inject Humira today or tomorrow depending on delivery of the shipment  - Advised NOT to inject if she received the shipment on Sunday or afterwards.   - Reminded pt of appointment on 2/13 with Dr. Bacon  - Patient verbalized understanding instructions.

## 2023-02-07 ENCOUNTER — ANESTHESIA EVENT (OUTPATIENT)
Dept: OBSTETRICS AND GYNECOLOGY | Facility: HOSPITAL | Age: 33
End: 2023-02-07
Payer: MEDICAID

## 2023-02-07 NOTE — H&P
[facility]    Obstetrics & Gynecology  History & Physical      Patient Name:  Lora Scott  MRN:  [id number]  Attending Physician:  Geo Espitia MD    Date:  2023    Chief Complaint:  Repeat  section    History of Present Illness:      Lora Scott is a 32 y.o.  at 39w0d at time of anticipated admission on  here for pre-admit H&P for a scheduled elective primary  delivery upon maternal request to avoid risk of vaginal delivery associated with Crohn's Disease and inability to tolerate laying on her back due to recent surgical drainage/resection of abscess on lower back/buttocks.    Pregnancy complicated by:  Crohn's disease dx 2019  S/p surgical drainage/resection of abscess on lower buttocks 22 by general surgery  H/o partial bowel resection with ileostomy  secondary to Crohn's  Anemia, iron deficiency    Pt desires elective primary  delivery    Past Medical History:      Diagnosis Date    Allergy     Anxiety     Asthma     Crohn's disease (ileum)     S/P ileocecal resection-42 cm of ileum/10.5 cm cecum removed with surgical path c/w stricture and fistula 2/15/20 ileostomy takedown and mucus fistula takedown    Depression     Eczema     Former smoker     Hydradenitis     Immunosuppressed status     Perineal abscess 2021    Psoriasis      Past Surgical History:     Past Surgical History:   Procedure Laterality Date    COLONOSCOPY N/A 2019    Procedure: COLONOSCOPY;  Surgeon: Fawad Perla MD;  Location: [facility] ([address] [place] [address]);  Service: Endoscopy;  Laterality: N/A;    COLONOSCOPY N/A 10/30/2020    Procedure: COLONOSCOPY;  Surgeon: Suzi Bacon MD;  Location: [facility] (4TH FLR);  Service: Endoscopy;  Laterality: N/A;  covid test [facility] urgent care 10/27  pt to try Mirilax prep with Reglan before per Dr. Bacon - nimisha  10/14-new instructions e-mailed-tb  10/16--new instructions e-mailed  10/29/20- Pt confirmed earlier arrival time,  prep ins. reveiwed, Pt verbalized understanding- [id number]    COLONOSCOPY N/A 5/10/2022    Procedure: COLONOSCOPY;  Surgeon: Suzi Bacon MD;  Location: [facility] ENDO (4TH FLR);  Service: Endoscopy;  Laterality: N/A;  1/2022- same as last bowel prep (Miralax/Gatorade)  3/4 pt rescheduled to May/ COVID test on 5/3 at [place] -   instruction via portal  5/6 pt rescheduled; pt fully vaccinated; Rapid; instructions to portal-    ESOPHAGOGASTRODUODENOSCOPY N/A 2/20/2019    Procedure: EGD (ESOPHAGOGASTRODUODENOSCOPY);  Surgeon: Fawad Perla MD;  Location: [facility] ENDO (2ND FLR);  Service: Endoscopy;  Laterality: N/A;    ILEOCOLECTOMY N/A 5/10/2019    Procedure: ILEOCOLECTOMY;  Surgeon: Uday Cope MD;  [facility];  Service: Colon and Rectal;  Laterality: N/A;  ileocecectomy    ILEOSCOPY N/A 12/20/2019    Procedure: ILEOSCOPY through stoma;  Surgeon: Suzi Bacon MD;  Location: [facility] ENDO (4TH FLR);  Service: Endoscopy;  Laterality: N/A;  Schedule as 30 minute case  Please let patient know to bring extra stoma bag/wafer, etc- entire bag and appliance will be removed at time of procedure to visualize the area well   first week 11/2019     per note-R/S to 12/20/19-GT    ILEOSTOMY N/A 5/10/2019    Procedure: CREATION, ILEOSTOMY with mucous fistula;  Surgeon: Uday Cope MD;  [facility];  Service: Colon and Rectal;  Laterality: N/A;    ILEOSTOMY CLOSURE N/A 2/12/2020    Procedure: CLOSURE,ILEOSTOMY TAKEDOWN END ILEOSTOMY/MUCOUS FISTULA POSSIBLE LAPAROTOMY ERAS LOW;  Surgeon: Uday Cope MD;  [facility];  Service: Colon and Rectal;  Laterality: N/A;    INCISION AND DRAINAGE OF BUTTOCK Bilateral 12/7/2022    Procedure: INCISION AND DRAINAGE, BUTTOCK;  Surgeon: Regan Rowell Jr., MD;  Location: [facility] OR;  Service: General;  Laterality: Bilateral;     Medications:     Current Outpatient Medications on File Prior to Visit   Medication Sig Dispense Refill    adalimumab  (HUMIRA,CF,) 40 mg/0.4 mL SyKt Inject 0.4 mLs (40 mg total) into the skin every 7 days. 4 each 5    FOLIC ACID ORAL Take by mouth.      UNABLE TO FIND Take by mouth once daily. medication name: prenatal vitamin      acetaminophen (TYLENOL) 325 MG tablet Take 2 tablets (650 mg total) by mouth every 6 (six) hours. (Patient not taking: Reported on 12/15/2022) 30 tablet 2     No current facility-administered medications on file prior to visit.   Prenatal vitamins     Allergies:      Review of patient's allergies indicates:   Allergen Reactions    Horse/equine containing products Itching     Obstetric History:      G1, current    Gynecologic History:      Denies active STI  Denies abnormal Pap     Social History:      Former smoker  Denies alcohol or illicit drug use  Current partner is father of baby  Denies domestic abuse     Family History:       Noncontributory without genetic disease, birth defects, or syndromes.    Review of Systems   Constitutional: Negative.    HENT: Negative.     Eyes: Negative.    Respiratory: Negative.     Cardiovascular: Negative.    Gastrointestinal: Negative.    Genitourinary: Negative.    Musculoskeletal:  Positive for back pain.   Skin: Negative.    Neurological: Negative.    Endo/Heme/Allergies: Negative.    Psychiatric/Behavioral: Negative.       Vitals:    /66   Wt 64 kg (141 lb 1.5 oz)   LMP 05/15/2022   BMI 24.99 kg/m²     Physical Exam:     GENERAL:  No acute distress.  Alert and oriented to person, place and time.  HEENT:  Normocephalic, atraumatic, anicteric, moist mucus membranes.  Neck supple without masses.  LUNGS:  Clear to auscultation bilaterally.  HEART:  Regular rate & rhythm with physiologic heart sounds.  ABDOMEN:  Soft, non tender without any guarding, rigidity or rebound. Normoactive bowel sounds.  PELVIC:  Normal female external genitalia without gross lesions, rashes or excoriations. No gross vaginal or cervical lesions.  Cervix closed, thick, high,  posterior.  EXTREMITIES:  Symmetric without cramping, claudication. Trace edema LE. +2 distal pulses.  Full range of motion.  SKIN:  No rashes, good turgor & capillary refill.  NEUROLOGIC:  Grossly intact bilaterally. +2 DTR, symmetric.  PSYCH:  Mood & affect appropriate.       Assessment:     32 y.o.  at 39w0d   Elective primary  delivery upon maternal request to avoid associated with vaginal delivery and Crohn's Disease, and inability to tolerate laying on her back due to recent surgical drainage/resection of abscess on lower back/buttocks  Crohn's disease   S/p surgical drainage/resection of abscess on lower buttocks 22 by general surgery under epidural  H/o partial bowel resection with ileostomy 2019 secondary to Crohn's  Anemia, iron deficiency      Plan:     Admit to L&D for primary .    The patient was informed of the risks, benefits, alternatives and possible complications to  section and blood transfusion including pre-admission, admission, and post-admission procedures and expectations.  Risks of  section included, but were not limited to, death, urinary and/or fecal incontinence, injury to bladder and/or urinary tract, injury to bowel and/or intestinal obstruction, risk of infection, damage to major blood vessels, hemorrhage requiring transfusion of blood products and/or hysterectomy, painful intercourse, ovarian failure requiring hormone administration, pulmonary embolism, fistula formation, sexual dysfunction, failure of wound to heal, hernia, permanent and disfiguring scarring.  In the event of a hysterectomy +/- bilateral salpingo-oophorectomy (BS&O) if uterine/ovarian injury or uncontrollable hemorrhage were to occur, the patient was counseled extensively on the inability to become pregnant following a hysterectomy and in the event of a BS&O will result in surgical menopause.  The patient expressed understanding of the risks involved.  All questions were  answered and the patient was consented for  section, blood transfusion, and all indicated procedures.      Routine admit labs    Continuous fetal monitoring    Consult anesthesia, spinal/epidural prn    F/u with GI for Crohn's disease and continue Humira therapy, will notify pedi regarding GI recommendations regarding live virus vaccine for the     Continue Fe supplementation       Geo Espitia MD

## 2023-02-07 NOTE — H&P (VIEW-ONLY)
[facility]    Obstetrics & Gynecology  History & Physical      Patient Name:  Lora Scott  MRN:  [id number]  Attending Physician:  Geo Espitia MD    Date:  2023    Chief Complaint:  Repeat  section    History of Present Illness:      Lora Scott is a 32 y.o.  at 39w0d at time of anticipated admission on  here for pre-admit H&P for a scheduled elective primary  delivery upon maternal request to avoid risk of vaginal delivery associated with Crohn's Disease and inability to tolerate laying on her back due to recent surgical drainage/resection of abscess on lower back/buttocks.    Pregnancy complicated by:  Crohn's disease dx 2019  S/p surgical drainage/resection of abscess on lower buttocks 22 by general surgery  H/o partial bowel resection with ileostomy  secondary to Crohn's  Anemia, iron deficiency    Pt desires elective primary  delivery    Past Medical History:      Diagnosis Date    Allergy     Anxiety     Asthma     Crohn's disease (ileum)     S/P ileocecal resection-42 cm of ileum/10.5 cm cecum removed with surgical path c/w stricture and fistula 2/15/20 ileostomy takedown and mucus fistula takedown    Depression     Eczema     Former smoker     Hydradenitis     Immunosuppressed status     Perineal abscess 2021    Psoriasis      Past Surgical History:     Past Surgical History:   Procedure Laterality Date    COLONOSCOPY N/A 2019    Procedure: COLONOSCOPY;  Surgeon: Fawad Perla MD;  Location: [facility] ([address] [place] [address]);  Service: Endoscopy;  Laterality: N/A;    COLONOSCOPY N/A 10/30/2020    Procedure: COLONOSCOPY;  Surgeon: Suzi Bacon MD;  Location: [facility] (4TH FLR);  Service: Endoscopy;  Laterality: N/A;  covid test [facility] urgent care 10/27  pt to try Mirilax prep with Reglan before per Dr. Bacon - nimisha  10/14-new instructions e-mailed-tb  10/16--new instructions e-mailed  10/29/20- Pt confirmed earlier arrival time,  prep ins. reveiwed, Pt verbalized understanding- [id number]    COLONOSCOPY N/A 5/10/2022    Procedure: COLONOSCOPY;  Surgeon: Suzi Bacon MD;  Location: [facility] ENDO (4TH FLR);  Service: Endoscopy;  Laterality: N/A;  1/2022- same as last bowel prep (Miralax/Gatorade)  3/4 pt rescheduled to May/ COVID test on 5/3 at [place] -   instruction via portal  5/6 pt rescheduled; pt fully vaccinated; Rapid; instructions to portal-    ESOPHAGOGASTRODUODENOSCOPY N/A 2/20/2019    Procedure: EGD (ESOPHAGOGASTRODUODENOSCOPY);  Surgeon: Fawad Perla MD;  Location: [facility] ENDO (2ND FLR);  Service: Endoscopy;  Laterality: N/A;    ILEOCOLECTOMY N/A 5/10/2019    Procedure: ILEOCOLECTOMY;  Surgeon: Uday Cope MD;  [facility];  Service: Colon and Rectal;  Laterality: N/A;  ileocecectomy    ILEOSCOPY N/A 12/20/2019    Procedure: ILEOSCOPY through stoma;  Surgeon: Suzi Bacon MD;  Location: [facility] END[facility] (4TH FLR);  Service: Endoscopy;  Laterality: N/A;  Schedule as 30 minute case  Please let patient know to bring extra stoma bag/wafer, etc- entire bag and appliance will be removed at time of procedure to visualize the area well   first week 11/2019     per note-R/S to 12/20/19-GT    ILEOSTOMY N/A 5/10/2019    Procedure: CREATION, ILEOSTOMY with mucous fistula;  Surgeon: Uday Cope MD;  [facility];  Service: Colon and Rectal;  Laterality: N/A;    ILEOSTOMY CLOSURE N/A 2/12/2020    Procedure: CLOSURE,ILEOSTOMY TAKEDOWN END ILEOSTOMY/MUCOUS FISTULA POSSIBLE LAPAROTOMY ERAS LOW;  Surgeon: Uday Cope MD;  [facility];  Service: Colon and Rectal;  Laterality: N/A;    INCISION AND DRAINAGE OF BUTTOCK Bilateral 12/7/2022    Procedure: INCISION AND DRAINAGE, BUTTOCK;  Surgeon: Regan Rowell Jr., MD;  Location: [facility] OR;  Service: General;  Laterality: Bilateral;     Medications:     Current Outpatient Medications on File Prior to Visit   Medication Sig Dispense Refill    adalimumab  (HUMIRA,CF,) 40 mg/0.4 mL SyKt Inject 0.4 mLs (40 mg total) into the skin every 7 days. 4 each 5    FOLIC ACID ORAL Take by mouth.      UNABLE TO FIND Take by mouth once daily. medication name: prenatal vitamin      acetaminophen (TYLENOL) 325 MG tablet Take 2 tablets (650 mg total) by mouth every 6 (six) hours. (Patient not taking: Reported on 12/15/2022) 30 tablet 2     No current facility-administered medications on file prior to visit.   Prenatal vitamins     Allergies:      Review of patient's allergies indicates:   Allergen Reactions    Horse/equine containing products Itching     Obstetric History:      G1, current    Gynecologic History:      Denies active STI  Denies abnormal Pap     Social History:      Former smoker  Denies alcohol or illicit drug use  Current partner is father of baby  Denies domestic abuse     Family History:       Noncontributory without genetic disease, birth defects, or syndromes.    Review of Systems   Constitutional: Negative.    HENT: Negative.     Eyes: Negative.    Respiratory: Negative.     Cardiovascular: Negative.    Gastrointestinal: Negative.    Genitourinary: Negative.    Musculoskeletal:  Positive for back pain.   Skin: Negative.    Neurological: Negative.    Endo/Heme/Allergies: Negative.    Psychiatric/Behavioral: Negative.       Vitals:    /66   Wt 64 kg (141 lb 1.5 oz)   LMP 05/15/2022   BMI 24.99 kg/m²     Physical Exam:     GENERAL:  No acute distress.  Alert and oriented to person, place and time.  HEENT:  Normocephalic, atraumatic, anicteric, moist mucus membranes.  Neck supple without masses.  LUNGS:  Clear to auscultation bilaterally.  HEART:  Regular rate & rhythm with physiologic heart sounds.  ABDOMEN:  Soft, non tender without any guarding, rigidity or rebound. Normoactive bowel sounds.  PELVIC:  Normal female external genitalia without gross lesions, rashes or excoriations. No gross vaginal or cervical lesions.  Cervix closed, thick, high,  posterior.  EXTREMITIES:  Symmetric without cramping, claudication. Trace edema LE. +2 distal pulses.  Full range of motion.  SKIN:  No rashes, good turgor & capillary refill.  NEUROLOGIC:  Grossly intact bilaterally. +2 DTR, symmetric.  PSYCH:  Mood & affect appropriate.       Assessment:     32 y.o.  at 39w0d   Elective primary  delivery upon maternal request to avoid associated with vaginal delivery and Crohn's Disease, and inability to tolerate laying on her back due to recent surgical drainage/resection of abscess on lower back/buttocks  Crohn's disease   S/p surgical drainage/resection of abscess on lower buttocks 22 by general surgery under epidural  H/o partial bowel resection with ileostomy 2019 secondary to Crohn's  Anemia, iron deficiency      Plan:     Admit to L&D for primary .    The patient was informed of the risks, benefits, alternatives and possible complications to  section and blood transfusion including pre-admission, admission, and post-admission procedures and expectations.  Risks of  section included, but were not limited to, death, urinary and/or fecal incontinence, injury to bladder and/or urinary tract, injury to bowel and/or intestinal obstruction, risk of infection, damage to major blood vessels, hemorrhage requiring transfusion of blood products and/or hysterectomy, painful intercourse, ovarian failure requiring hormone administration, pulmonary embolism, fistula formation, sexual dysfunction, failure of wound to heal, hernia, permanent and disfiguring scarring.  In the event of a hysterectomy +/- bilateral salpingo-oophorectomy (BS&O) if uterine/ovarian injury or uncontrollable hemorrhage were to occur, the patient was counseled extensively on the inability to become pregnant following a hysterectomy and in the event of a BS&O will result in surgical menopause.  The patient expressed understanding of the risks involved.  All questions were  answered and the patient was consented for  section, blood transfusion, and all indicated procedures.      Routine admit labs    Continuous fetal monitoring    Consult anesthesia, spinal/epidural prn    F/u with GI for Crohn's disease and continue Humira therapy, will notify pedi regarding GI recommendations regarding live virus vaccine for the     Continue Fe supplementation       Geo Espitia MD

## 2023-02-08 ENCOUNTER — HOSPITAL ENCOUNTER (INPATIENT)
Facility: HOSPITAL | Age: 33
LOS: 4 days | Discharge: HOME | End: 2023-02-12
Attending: OBSTETRICS & GYNECOLOGY | Admitting: OBSTETRICS & GYNECOLOGY
Payer: MEDICAID

## 2023-02-08 ENCOUNTER — ANESTHESIA (OUTPATIENT)
Dept: OBSTETRICS AND GYNECOLOGY | Facility: HOSPITAL | Age: 33
End: 2023-02-08
Payer: MEDICAID

## 2023-02-08 DIAGNOSIS — Z3A.39 39 WEEKS GESTATION OF PREGNANCY: ICD-10-CM

## 2023-02-08 DIAGNOSIS — Z98.891 S/P CESAREAN SECTION: Primary | ICD-10-CM

## 2023-02-08 DIAGNOSIS — Z3A.38 38 WEEKS GESTATION OF PREGNANCY: ICD-10-CM

## 2023-02-08 DIAGNOSIS — K50.90 MATERNAL CROHN'S DISEASE AFFECTING PREGNANCY, ANTEPARTUM: ICD-10-CM

## 2023-02-08 DIAGNOSIS — O99.619 MATERNAL CROHN'S DISEASE AFFECTING PREGNANCY, ANTEPARTUM: ICD-10-CM

## 2023-02-08 DIAGNOSIS — O99.019 ANTEPARTUM ANEMIA: ICD-10-CM

## 2023-02-08 LAB
ABO + RH BLD: NORMAL
BASOPHILS # BLD AUTO: 0.03 K/UL (ref 0–0.2)
BASOPHILS NFR BLD: 0.2 % (ref 0–1.9)
BLD GP AB SCN CELLS X3 SERPL QL: NORMAL
DIFFERENTIAL METHOD: ABNORMAL
EOSINOPHIL # BLD AUTO: 0.1 K/UL (ref 0–0.5)
EOSINOPHIL NFR BLD: 1 % (ref 0–8)
ERYTHROCYTE [DISTWIDTH] IN BLOOD BY AUTOMATED COUNT: 14.6 % (ref 11.5–14.5)
HCT VFR BLD AUTO: 30.3 % (ref 37–48.5)
HGB BLD-MCNC: 9.9 G/DL (ref 12–16)
IMM GRANULOCYTES # BLD AUTO: 0.09 K/UL (ref 0–0.04)
IMM GRANULOCYTES NFR BLD AUTO: 0.7 % (ref 0–0.5)
LYMPHOCYTES # BLD AUTO: 2.7 K/UL (ref 1–4.8)
LYMPHOCYTES NFR BLD: 21.7 % (ref 18–48)
MCH RBC QN AUTO: 33.4 PG (ref 27–31)
MCHC RBC AUTO-ENTMCNC: 32.7 G/DL (ref 32–36)
MCV RBC AUTO: 102 FL (ref 82–98)
MONOCYTES # BLD AUTO: 0.7 K/UL (ref 0.3–1)
MONOCYTES NFR BLD: 5.9 % (ref 4–15)
NEUTROPHILS # BLD AUTO: 8.7 K/UL (ref 1.8–7.7)
NEUTROPHILS NFR BLD: 70.5 % (ref 38–73)
NRBC BLD-RTO: 0 /100 WBC
PLATELET # BLD AUTO: 182 K/UL (ref 150–450)
PMV BLD AUTO: 11.1 FL (ref 9.2–12.9)
RBC # BLD AUTO: 2.96 M/UL (ref 4–5.4)
WBC # BLD AUTO: 12.4 K/UL (ref 3.9–12.7)

## 2023-02-08 PROCEDURE — 63600175 PHARM REV CODE 636 W HCPCS: Performed by: OBSTETRICS & GYNECOLOGY

## 2023-02-08 PROCEDURE — 59514 CESAREAN DELIVERY ONLY: CPT | Mod: QY,ANES,, | Performed by: ANESTHESIOLOGY

## 2023-02-08 PROCEDURE — 51702 INSERT TEMP BLADDER CATH: CPT

## 2023-02-08 PROCEDURE — 71000033 HC RECOVERY, INTIAL HOUR: Performed by: OBSTETRICS & GYNECOLOGY

## 2023-02-08 PROCEDURE — 59514 PRA REAN DELIVERY ONLY: ICD-10-PCS | Mod: QX,CRNA,, | Performed by: NURSE ANESTHETIST, CERTIFIED REGISTERED

## 2023-02-08 PROCEDURE — 36004724 HC OB OR TIME LEV III - 1ST 15 MIN: Performed by: OBSTETRICS & GYNECOLOGY

## 2023-02-08 PROCEDURE — 86592 SYPHILIS TEST NON-TREP QUAL: CPT | Performed by: OBSTETRICS & GYNECOLOGY

## 2023-02-08 PROCEDURE — 59514 CESAREAN DELIVERY ONLY: CPT | Mod: AS,GB,, | Performed by: PHYSICIAN ASSISTANT

## 2023-02-08 PROCEDURE — 63600175 PHARM REV CODE 636 W HCPCS: Performed by: NURSE ANESTHETIST, CERTIFIED REGISTERED

## 2023-02-08 PROCEDURE — 37000009 HC ANESTHESIA EA ADD 15 MINS: Performed by: OBSTETRICS & GYNECOLOGY

## 2023-02-08 PROCEDURE — 59514 CESAREAN DELIVERY ONLY: CPT | Mod: QX,CRNA,, | Performed by: NURSE ANESTHETIST, CERTIFIED REGISTERED

## 2023-02-08 PROCEDURE — 63600175 PHARM REV CODE 636 W HCPCS: Performed by: ANESTHESIOLOGY

## 2023-02-08 PROCEDURE — 59514 PRA REAN DELIVERY ONLY: ICD-10-PCS | Mod: QY,ANES,, | Performed by: ANESTHESIOLOGY

## 2023-02-08 PROCEDURE — 25000003 PHARM REV CODE 250: Performed by: ANESTHESIOLOGY

## 2023-02-08 PROCEDURE — 25000003 PHARM REV CODE 250: Performed by: OBSTETRICS & GYNECOLOGY

## 2023-02-08 PROCEDURE — 37000008 HC ANESTHESIA 1ST 15 MINUTES: Performed by: OBSTETRICS & GYNECOLOGY

## 2023-02-08 PROCEDURE — 85025 COMPLETE CBC W/AUTO DIFF WBC: CPT | Performed by: OBSTETRICS & GYNECOLOGY

## 2023-02-08 PROCEDURE — 36004725 HC OB OR TIME LEV III - EA ADD 15 MIN: Performed by: OBSTETRICS & GYNECOLOGY

## 2023-02-08 PROCEDURE — 59514 PR CESAREAN DELIVERY ONLY: ICD-10-PCS | Mod: AS,GB,, | Performed by: PHYSICIAN ASSISTANT

## 2023-02-08 PROCEDURE — 25000003 PHARM REV CODE 250: Performed by: NURSE ANESTHETIST, CERTIFIED REGISTERED

## 2023-02-08 PROCEDURE — 36415 COLL VENOUS BLD VENIPUNCTURE: CPT | Performed by: OBSTETRICS & GYNECOLOGY

## 2023-02-08 PROCEDURE — 86900 BLOOD TYPING SEROLOGIC ABO: CPT | Performed by: OBSTETRICS & GYNECOLOGY

## 2023-02-08 PROCEDURE — 59514 CESAREAN DELIVERY ONLY: CPT | Mod: GB,,, | Performed by: OBSTETRICS & GYNECOLOGY

## 2023-02-08 PROCEDURE — 59514 PR CESAREAN DELIVERY ONLY: ICD-10-PCS | Mod: GB,,, | Performed by: OBSTETRICS & GYNECOLOGY

## 2023-02-08 PROCEDURE — 71000039 HC RECOVERY, EACH ADD'L HOUR: Performed by: OBSTETRICS & GYNECOLOGY

## 2023-02-08 PROCEDURE — 11000001 HC ACUTE MED/SURG PRIVATE ROOM

## 2023-02-08 PROCEDURE — C9113 INJ PANTOPRAZOLE SODIUM, VIA: HCPCS | Performed by: OBSTETRICS & GYNECOLOGY

## 2023-02-08 RX ORDER — MUPIROCIN 20 MG/G
OINTMENT TOPICAL
Status: DISCONTINUED | OUTPATIENT
Start: 2023-02-08 | End: 2023-02-08

## 2023-02-08 RX ORDER — CEFAZOLIN SODIUM 2 G/50ML
2 SOLUTION INTRAVENOUS
Status: COMPLETED | OUTPATIENT
Start: 2023-02-08 | End: 2023-02-08

## 2023-02-08 RX ORDER — METOCLOPRAMIDE HYDROCHLORIDE 5 MG/ML
10 INJECTION INTRAMUSCULAR; INTRAVENOUS ONCE
Status: DISCONTINUED | OUTPATIENT
Start: 2023-02-08 | End: 2023-02-08

## 2023-02-08 RX ORDER — OXYCODONE HYDROCHLORIDE 5 MG/1
5 TABLET ORAL EVERY 4 HOURS PRN
Status: ACTIVE | OUTPATIENT
Start: 2023-02-08 | End: 2023-02-09

## 2023-02-08 RX ORDER — OXYTOCIN/RINGER'S LACTATE 30/500 ML
95 PLASTIC BAG, INJECTION (ML) INTRAVENOUS ONCE
Status: DISCONTINUED | OUTPATIENT
Start: 2023-02-08 | End: 2023-02-12 | Stop reason: HOSPADM

## 2023-02-08 RX ORDER — FAMOTIDINE 10 MG/ML
20 INJECTION INTRAVENOUS
Status: DISCONTINUED | OUTPATIENT
Start: 2023-02-08 | End: 2023-02-08

## 2023-02-08 RX ORDER — TRANEXAMIC ACID 10 MG/ML
1000 INJECTION, SOLUTION INTRAVENOUS ONCE AS NEEDED
Status: CANCELLED | OUTPATIENT
Start: 2023-02-08 | End: 2034-07-07

## 2023-02-08 RX ORDER — OXYTOCIN/RINGER'S LACTATE 30/500 ML
334 PLASTIC BAG, INJECTION (ML) INTRAVENOUS ONCE AS NEEDED
Status: CANCELLED | OUTPATIENT
Start: 2023-02-08 | End: 2034-07-07

## 2023-02-08 RX ORDER — METHYLERGONOVINE MALEATE 0.2 MG/ML
200 INJECTION INTRAVENOUS
Status: DISCONTINUED | OUTPATIENT
Start: 2023-02-08 | End: 2023-02-08

## 2023-02-08 RX ORDER — DOCUSATE SODIUM 100 MG/1
200 CAPSULE, LIQUID FILLED ORAL 2 TIMES DAILY
Status: DISCONTINUED | OUTPATIENT
Start: 2023-02-08 | End: 2023-02-12 | Stop reason: HOSPADM

## 2023-02-08 RX ORDER — OXYTOCIN/RINGER'S LACTATE 30/500 ML
334 PLASTIC BAG, INJECTION (ML) INTRAVENOUS ONCE
Status: DISCONTINUED | OUTPATIENT
Start: 2023-02-08 | End: 2023-02-08

## 2023-02-08 RX ORDER — MIDAZOLAM HYDROCHLORIDE 1 MG/ML
INJECTION INTRAMUSCULAR; INTRAVENOUS
Status: DISCONTINUED | OUTPATIENT
Start: 2023-02-08 | End: 2023-02-08

## 2023-02-08 RX ORDER — MISOPROSTOL 200 UG/1
800 TABLET ORAL ONCE AS NEEDED
Status: CANCELLED | OUTPATIENT
Start: 2023-02-08 | End: 2034-07-07

## 2023-02-08 RX ORDER — OXYTOCIN 10 [USP'U]/ML
INJECTION, SOLUTION INTRAMUSCULAR; INTRAVENOUS
Status: DISCONTINUED | OUTPATIENT
Start: 2023-02-08 | End: 2023-02-08

## 2023-02-08 RX ORDER — AMOXICILLIN 250 MG
1 CAPSULE ORAL NIGHTLY PRN
Status: DISCONTINUED | OUTPATIENT
Start: 2023-02-08 | End: 2023-02-12 | Stop reason: HOSPADM

## 2023-02-08 RX ORDER — CARBOPROST TROMETHAMINE 250 UG/ML
250 INJECTION, SOLUTION INTRAMUSCULAR
Status: CANCELLED | OUTPATIENT
Start: 2023-02-08

## 2023-02-08 RX ORDER — ONDANSETRON 8 MG/1
8 TABLET, ORALLY DISINTEGRATING ORAL EVERY 8 HOURS PRN
Status: CANCELLED | OUTPATIENT
Start: 2023-02-08

## 2023-02-08 RX ORDER — KETOROLAC TROMETHAMINE 30 MG/ML
30 INJECTION, SOLUTION INTRAMUSCULAR; INTRAVENOUS EVERY 6 HOURS
Status: COMPLETED | OUTPATIENT
Start: 2023-02-08 | End: 2023-02-09

## 2023-02-08 RX ORDER — PANTOPRAZOLE SODIUM 40 MG/10ML
40 INJECTION, POWDER, LYOPHILIZED, FOR SOLUTION INTRAVENOUS DAILY
Status: DISCONTINUED | OUTPATIENT
Start: 2023-02-08 | End: 2023-02-12 | Stop reason: HOSPADM

## 2023-02-08 RX ORDER — SIMETHICONE 80 MG
1 TABLET,CHEWABLE ORAL EVERY 6 HOURS PRN
Status: DISCONTINUED | OUTPATIENT
Start: 2023-02-08 | End: 2023-02-12 | Stop reason: HOSPADM

## 2023-02-08 RX ORDER — PROCHLORPERAZINE EDISYLATE 5 MG/ML
5 INJECTION INTRAMUSCULAR; INTRAVENOUS EVERY 6 HOURS PRN
Status: CANCELLED | OUTPATIENT
Start: 2023-02-08

## 2023-02-08 RX ORDER — METHYLERGONOVINE MALEATE 0.2 MG/ML
200 INJECTION INTRAVENOUS
Status: CANCELLED | OUTPATIENT
Start: 2023-02-08

## 2023-02-08 RX ORDER — OXYTOCIN 10 [USP'U]/ML
10 INJECTION, SOLUTION INTRAMUSCULAR; INTRAVENOUS ONCE AS NEEDED
Status: CANCELLED | OUTPATIENT
Start: 2023-02-08 | End: 2034-07-07

## 2023-02-08 RX ORDER — BISACODYL 10 MG
10 SUPPOSITORY, RECTAL RECTAL ONCE AS NEEDED
Status: DISCONTINUED | OUTPATIENT
Start: 2023-02-08 | End: 2023-02-12 | Stop reason: HOSPADM

## 2023-02-08 RX ORDER — FENTANYL CITRATE 50 UG/ML
INJECTION, SOLUTION INTRAMUSCULAR; INTRAVENOUS
Status: DISCONTINUED | OUTPATIENT
Start: 2023-02-08 | End: 2023-02-08

## 2023-02-08 RX ORDER — CARBOPROST TROMETHAMINE 250 UG/ML
250 INJECTION, SOLUTION INTRAMUSCULAR
Status: DISCONTINUED | OUTPATIENT
Start: 2023-02-08 | End: 2023-02-08

## 2023-02-08 RX ORDER — OXYCODONE AND ACETAMINOPHEN 5; 325 MG/1; MG/1
1 TABLET ORAL EVERY 4 HOURS PRN
Status: DISCONTINUED | OUTPATIENT
Start: 2023-02-09 | End: 2023-02-12 | Stop reason: HOSPADM

## 2023-02-08 RX ORDER — ACETAMINOPHEN 325 MG/1
650 TABLET ORAL EVERY 6 HOURS
Status: DISPENSED | OUTPATIENT
Start: 2023-02-08 | End: 2023-02-09

## 2023-02-08 RX ORDER — MISOPROSTOL 200 UG/1
800 TABLET ORAL
Status: DISCONTINUED | OUTPATIENT
Start: 2023-02-08 | End: 2023-02-08

## 2023-02-08 RX ORDER — SODIUM CITRATE AND CITRIC ACID MONOHYDRATE 334; 500 MG/5ML; MG/5ML
30 SOLUTION ORAL ONCE
Status: DISCONTINUED | OUTPATIENT
Start: 2023-02-08 | End: 2023-02-08

## 2023-02-08 RX ORDER — SODIUM CHLORIDE 0.9 % (FLUSH) 0.9 %
10 SYRINGE (ML) INJECTION
Status: CANCELLED | OUTPATIENT
Start: 2023-02-08

## 2023-02-08 RX ORDER — KETAMINE HYDROCHLORIDE 100 MG/ML
INJECTION, SOLUTION INTRAMUSCULAR; INTRAVENOUS
Status: DISCONTINUED | OUTPATIENT
Start: 2023-02-08 | End: 2023-02-08

## 2023-02-08 RX ORDER — SODIUM CITRATE AND CITRIC ACID MONOHYDRATE 334; 500 MG/5ML; MG/5ML
30 SOLUTION ORAL
Status: DISCONTINUED | OUTPATIENT
Start: 2023-02-08 | End: 2023-02-08

## 2023-02-08 RX ORDER — FAMOTIDINE 10 MG/ML
20 INJECTION INTRAVENOUS ONCE
Status: DISCONTINUED | OUTPATIENT
Start: 2023-02-08 | End: 2023-02-12 | Stop reason: HOSPADM

## 2023-02-08 RX ORDER — ADHESIVE BANDAGE
30 BANDAGE TOPICAL 2 TIMES DAILY PRN
Status: DISCONTINUED | OUTPATIENT
Start: 2023-02-09 | End: 2023-02-12 | Stop reason: HOSPADM

## 2023-02-08 RX ORDER — ONDANSETRON 2 MG/ML
4 INJECTION INTRAMUSCULAR; INTRAVENOUS EVERY 6 HOURS PRN
Status: DISPENSED | OUTPATIENT
Start: 2023-02-08 | End: 2023-02-09

## 2023-02-08 RX ORDER — PRENATAL WITH FERROUS FUM AND FOLIC ACID 3080; 920; 120; 400; 22; 1.84; 3; 20; 10; 1; 12; 200; 27; 25; 2 [IU]/1; [IU]/1; MG/1; [IU]/1; MG/1; MG/1; MG/1; MG/1; MG/1; MG/1; UG/1; MG/1; MG/1; MG/1; MG/1
1 TABLET ORAL DAILY
Status: DISCONTINUED | OUTPATIENT
Start: 2023-02-09 | End: 2023-02-12 | Stop reason: HOSPADM

## 2023-02-08 RX ORDER — OXYCODONE AND ACETAMINOPHEN 10; 325 MG/1; MG/1
1 TABLET ORAL EVERY 4 HOURS PRN
Status: DISCONTINUED | OUTPATIENT
Start: 2023-02-09 | End: 2023-02-12 | Stop reason: HOSPADM

## 2023-02-08 RX ORDER — ACETAMINOPHEN 10 MG/ML
INJECTION, SOLUTION INTRAVENOUS
Status: DISCONTINUED | OUTPATIENT
Start: 2023-02-08 | End: 2023-02-08

## 2023-02-08 RX ORDER — MORPHINE SULFATE 0.5 MG/ML
INJECTION, SOLUTION EPIDURAL; INTRATHECAL; INTRAVENOUS
Status: DISCONTINUED | OUTPATIENT
Start: 2023-02-08 | End: 2023-02-08

## 2023-02-08 RX ORDER — OXYCODONE HYDROCHLORIDE 5 MG/1
10 TABLET ORAL EVERY 4 HOURS PRN
Status: ACTIVE | OUTPATIENT
Start: 2023-02-08 | End: 2023-02-09

## 2023-02-08 RX ORDER — PHENYLEPHRINE HYDROCHLORIDE 10 MG/ML
INJECTION INTRAVENOUS
Status: DISCONTINUED | OUTPATIENT
Start: 2023-02-08 | End: 2023-02-08

## 2023-02-08 RX ORDER — PROCHLORPERAZINE EDISYLATE 5 MG/ML
5 INJECTION INTRAMUSCULAR; INTRAVENOUS EVERY 6 HOURS PRN
Status: DISCONTINUED | OUTPATIENT
Start: 2023-02-08 | End: 2023-02-12 | Stop reason: HOSPADM

## 2023-02-08 RX ORDER — MISOPROSTOL 200 UG/1
800 TABLET ORAL ONCE AS NEEDED
Status: CANCELLED | OUTPATIENT
Start: 2023-02-08

## 2023-02-08 RX ORDER — OXYTOCIN/RINGER'S LACTATE 30/500 ML
95 PLASTIC BAG, INJECTION (ML) INTRAVENOUS ONCE
Status: DISCONTINUED | OUTPATIENT
Start: 2023-02-08 | End: 2023-02-08

## 2023-02-08 RX ORDER — SODIUM CHLORIDE, SODIUM LACTATE, POTASSIUM CHLORIDE, CALCIUM CHLORIDE 600; 310; 30; 20 MG/100ML; MG/100ML; MG/100ML; MG/100ML
INJECTION, SOLUTION INTRAVENOUS CONTINUOUS
Status: DISCONTINUED | OUTPATIENT
Start: 2023-02-08 | End: 2023-02-08

## 2023-02-08 RX ORDER — OXYTOCIN/RINGER'S LACTATE 30/500 ML
95 PLASTIC BAG, INJECTION (ML) INTRAVENOUS ONCE AS NEEDED
Status: CANCELLED | OUTPATIENT
Start: 2023-02-08 | End: 2034-07-07

## 2023-02-08 RX ORDER — DEXTROSE, SODIUM CHLORIDE, SODIUM LACTATE, POTASSIUM CHLORIDE, AND CALCIUM CHLORIDE 5; .6; .31; .03; .02 G/100ML; G/100ML; G/100ML; G/100ML; G/100ML
INJECTION, SOLUTION INTRAVENOUS CONTINUOUS
Status: DISCONTINUED | OUTPATIENT
Start: 2023-02-08 | End: 2023-02-12 | Stop reason: HOSPADM

## 2023-02-08 RX ORDER — DIPHENHYDRAMINE HCL 25 MG
25 CAPSULE ORAL EVERY 4 HOURS PRN
Status: DISCONTINUED | OUTPATIENT
Start: 2023-02-08 | End: 2023-02-12 | Stop reason: HOSPADM

## 2023-02-08 RX ADMIN — PHENYLEPHRINE HYDROCHLORIDE 200 MCG: 10 INJECTION INTRAVENOUS at 01:02

## 2023-02-08 RX ADMIN — PANTOPRAZOLE SODIUM 40 MG: 40 INJECTION, POWDER, FOR SOLUTION INTRAVENOUS at 06:02

## 2023-02-08 RX ADMIN — ACETAMINOPHEN 650 MG: 325 TABLET ORAL at 08:02

## 2023-02-08 RX ADMIN — ONDANSETRON 4 MG: 2 INJECTION INTRAMUSCULAR; INTRAVENOUS at 06:02

## 2023-02-08 RX ADMIN — MIDAZOLAM HYDROCHLORIDE 2 MG: 1 INJECTION, SOLUTION INTRAMUSCULAR; INTRAVENOUS at 01:02

## 2023-02-08 RX ADMIN — SODIUM CHLORIDE, SODIUM LACTATE, POTASSIUM CHLORIDE, CALCIUM CHLORIDE AND DEXTROSE MONOHYDRATE: 5; 600; 310; 30; 20 INJECTION, SOLUTION INTRAVENOUS at 05:02

## 2023-02-08 RX ADMIN — KETAMINE HYDROCHLORIDE 20 MG: 100 INJECTION, SOLUTION, CONCENTRATE INTRAMUSCULAR; INTRAVENOUS at 01:02

## 2023-02-08 RX ADMIN — PHENYLEPHRINE HYDROCHLORIDE 200 MCG: 10 INJECTION INTRAVENOUS at 12:02

## 2023-02-08 RX ADMIN — FENTANYL CITRATE 90 MCG: 50 INJECTION, SOLUTION INTRAMUSCULAR; INTRAVENOUS at 01:02

## 2023-02-08 RX ADMIN — OXYTOCIN 40 UNITS: 10 INJECTION, SOLUTION INTRAMUSCULAR; INTRAVENOUS at 01:02

## 2023-02-08 RX ADMIN — SODIUM CHLORIDE, SODIUM LACTATE, POTASSIUM CHLORIDE, AND CALCIUM CHLORIDE: .6; .31; .03; .02 INJECTION, SOLUTION INTRAVENOUS at 01:02

## 2023-02-08 RX ADMIN — KETOROLAC TROMETHAMINE 30 MG: 30 INJECTION, SOLUTION INTRAMUSCULAR; INTRAVENOUS at 08:02

## 2023-02-08 RX ADMIN — DOCUSATE SODIUM 200 MG: 100 CAPSULE, LIQUID FILLED ORAL at 08:02

## 2023-02-08 RX ADMIN — KETOROLAC TROMETHAMINE 30 MG: 30 INJECTION, SOLUTION INTRAMUSCULAR; INTRAVENOUS at 01:02

## 2023-02-08 RX ADMIN — ACETAMINOPHEN 1000 MG: 10 INJECTION, SOLUTION INTRAVENOUS at 01:02

## 2023-02-08 RX ADMIN — MORPHINE SULFATE 0.1 MG: 0.5 INJECTION, SOLUTION EPIDURAL; INTRATHECAL; INTRAVENOUS at 12:02

## 2023-02-08 RX ADMIN — CEFAZOLIN SODIUM 2 G: 2 SOLUTION INTRAVENOUS at 11:02

## 2023-02-08 RX ADMIN — MUPIROCIN: 20 OINTMENT TOPICAL at 11:02

## 2023-02-08 RX ADMIN — FENTANYL CITRATE 10 MCG: 0.05 INJECTION, SOLUTION INTRAMUSCULAR; INTRAVENOUS at 12:02

## 2023-02-08 NOTE — NURSING
Problem: Patient Care Overview  Goal: Plan of Care Review  Outcome: Ongoing (interventions implemented as appropriate)  Flowsheets (Taken 9/11/2020 1320)  Progress: improving  Plan of Care Reviewed With: patient  Outcome Summary: Pt agreeable to therapy. Pt performed sup B LE ther ex and t/f to sitting EOB with CGA. Pt performed seated B LE ther ex and then stood with CGA and amb 18ft with RW with CGA. Pt with no c/o voiced at completion of tx. Nsg notified of pt up in chair. Pt would cont to benefit from therapy upon DC.      Anesthesia at bedside to assess pt.

## 2023-02-08 NOTE — TRANSFER OF CARE
"Anesthesia Transfer of Care Note    Patient: Lora Scott    Procedure(s) Performed: Procedure(s) (LRB):   SECTION (N/A)    Patient location: Labor and Delivery    Transport from OR: Transported from OR on room air with adequate spontaneous ventilation    Post pain: adequate analgesia    Post assessment: no apparent anesthetic complications    Post vital signs: stable    Level of consciousness: awake, alert and oriented    Nausea/Vomiting: no nausea/vomiting    Complications: none    Transfer of care protocol was followed      Last vitals:   Visit Vitals  /81   Pulse 103   Temp 36.7 °C (98 °F) (Oral)   Resp 16   Ht 5' 2.99" (1.6 m)   Wt 64 kg (141 lb 1.5 oz)   LMP 05/15/2022   SpO2 96%   BMI 25.00 kg/m²     "

## 2023-02-08 NOTE — NURSING
Pt arrived to unit for scheduled c/s. Full assessment done. History and medications reviewed. Pt reports good fetal movement. Denies vaginal bleeding/discharge, LOF, contractions, abdominal/back pain, HA, blurry vision, spots before the eyes, swelling of face/hands/feet, RUQ pain, dysuria, any changes in urination. Abdomen soft non-tender. POC reviewed with pt, verbalizes understanding. Will continue to monitor pt.

## 2023-02-08 NOTE — PLAN OF CARE
BP, HR stable. Decreased temp noted on arrival to mother baby and bear hugger applied per physician. Last temp 97.1 oral at 1750. Bear hugger left in place.  FF @ umbilicus. Scant rubra lochia. No clots. No odor.     Serosanguinous discharge noted from ruptured vulvar abscess, pt states history of hidradenitis suppurativa to groin and buttocks. Multiple cystic abscesses noted in varying stages of healing. Callie care administered. Multiple wounds on buttocks noted, pt states surgical drainage of abscesses in December 2022. Wound 1- lower left buttocks near gluteal cleft 1in by 1.5in. Wound 2- upper left buttock near gluteal cleft, 1in by 0.75in, wound 3- sacral, both sides of gluteal cleft 2in by 1.5in. Wound 4- right buttocks near gluteal cleft 1.5in by 0.5in. Wound 5- perineum 0.5in by 0.5in. Physician ordered wound care consult.         LTV incision, aquacel dressing in place. No incisional drainage noted.   Vomitus at 1730 with bright red blood noted. New orders given by physician for fluid admin and pharmaceutical management initiated.   Diet changed to clear liquids per physician order.  Voiding with up. Waiting on flatus. Waiting on BM.  Pain controlled with PO and IV meds. Infant in NICU.  Plan of care reviewed with patient. All questions answered.

## 2023-02-08 NOTE — NURSING
Informed pt to take a shower using hibicleanse, pt stated that she could not take shower due to her wound care on her back.

## 2023-02-08 NOTE — ANESTHESIA PROCEDURE NOTES
Spinal    Diagnosis:  delivery  Patient location during procedure: OR  Start time: 2023 12:44 PM  Timeout: 2023 12:43 PM  End time: 2023 12:48 PM    Staffing  Authorizing Provider: Agata Hernandez MD  Performing Provider: Agata Hernandez MD    Preanesthetic Checklist  Completed: patient identified, IV checked, site marked, risks and benefits discussed, surgical consent, monitors and equipment checked, pre-op evaluation and timeout performed  Spinal Block  Patient position: sitting  Prep: ChloraPrep  Patient monitoring: heart rate, cardiac monitor and continuous pulse ox  Approach: midline  Location: L4-5  Injection technique: single shot  CSF Fluid: clear free-flowing CSF  Needle  Needle type: pencil-tip   Needle gauge: 25 G  Needle length: 3.5 in  Additional Documentation: incremental injection, negative aspiration for CSF, negative aspiration for heme and no paresthesia on injection  Needle localization: anatomical landmarks  Assessment  Sensory level: T4   Dermatomal levels determined by pinch or prick  Ease of block: easy  Patient's tolerance of the procedure: comfortable throughout block and no complaints

## 2023-02-09 LAB
BASOPHILS # BLD AUTO: 0.02 K/UL (ref 0–0.2)
BASOPHILS NFR BLD: 0.2 % (ref 0–1.9)
DIFFERENTIAL METHOD: ABNORMAL
EOSINOPHIL # BLD AUTO: 0.1 K/UL (ref 0–0.5)
EOSINOPHIL NFR BLD: 0.7 % (ref 0–8)
ERYTHROCYTE [DISTWIDTH] IN BLOOD BY AUTOMATED COUNT: 14.8 % (ref 11.5–14.5)
HCT VFR BLD AUTO: 27.4 % (ref 37–48.5)
HGB BLD-MCNC: 9.1 G/DL (ref 12–16)
IMM GRANULOCYTES # BLD AUTO: 0.09 K/UL (ref 0–0.04)
IMM GRANULOCYTES NFR BLD AUTO: 0.7 % (ref 0–0.5)
LYMPHOCYTES # BLD AUTO: 3.4 K/UL (ref 1–4.8)
LYMPHOCYTES NFR BLD: 28.5 % (ref 18–48)
MCH RBC QN AUTO: 34 PG (ref 27–31)
MCHC RBC AUTO-ENTMCNC: 33.2 G/DL (ref 32–36)
MCV RBC AUTO: 102 FL (ref 82–98)
MONOCYTES # BLD AUTO: 0.8 K/UL (ref 0.3–1)
MONOCYTES NFR BLD: 6.3 % (ref 4–15)
NEUTROPHILS # BLD AUTO: 7.7 K/UL (ref 1.8–7.7)
NEUTROPHILS NFR BLD: 63.6 % (ref 38–73)
NRBC BLD-RTO: 0 /100 WBC
PLATELET # BLD AUTO: 218 K/UL (ref 150–450)
PMV BLD AUTO: 11.4 FL (ref 9.2–12.9)
RBC # BLD AUTO: 2.68 M/UL (ref 4–5.4)
RPR SER QL: NORMAL
WBC # BLD AUTO: 12.04 K/UL (ref 3.9–12.7)

## 2023-02-09 PROCEDURE — 63600175 PHARM REV CODE 636 W HCPCS: Performed by: OBSTETRICS & GYNECOLOGY

## 2023-02-09 PROCEDURE — 25000003 PHARM REV CODE 250: Performed by: OBSTETRICS & GYNECOLOGY

## 2023-02-09 PROCEDURE — 99231 SBSQ HOSP IP/OBS SF/LOW 25: CPT | Mod: ,,, | Performed by: OBSTETRICS & GYNECOLOGY

## 2023-02-09 PROCEDURE — 11000001 HC ACUTE MED/SURG PRIVATE ROOM

## 2023-02-09 PROCEDURE — 99231 PR SUBSEQUENT HOSPITAL CARE,LEVL I: ICD-10-PCS | Mod: ,,, | Performed by: OBSTETRICS & GYNECOLOGY

## 2023-02-09 PROCEDURE — 36415 COLL VENOUS BLD VENIPUNCTURE: CPT | Performed by: OBSTETRICS & GYNECOLOGY

## 2023-02-09 PROCEDURE — 63600175 PHARM REV CODE 636 W HCPCS: Performed by: ANESTHESIOLOGY

## 2023-02-09 PROCEDURE — 85025 COMPLETE CBC W/AUTO DIFF WBC: CPT | Performed by: OBSTETRICS & GYNECOLOGY

## 2023-02-09 PROCEDURE — 25000003 PHARM REV CODE 250: Performed by: ANESTHESIOLOGY

## 2023-02-09 RX ORDER — IBUPROFEN 400 MG/1
800 TABLET ORAL EVERY 8 HOURS
Status: DISCONTINUED | OUTPATIENT
Start: 2023-02-10 | End: 2023-02-12 | Stop reason: HOSPADM

## 2023-02-09 RX ORDER — MUPIROCIN 20 MG/G
OINTMENT TOPICAL 2 TIMES DAILY
Status: DISCONTINUED | OUTPATIENT
Start: 2023-02-09 | End: 2023-02-12 | Stop reason: HOSPADM

## 2023-02-09 RX ORDER — KETOROLAC TROMETHAMINE 30 MG/ML
30 INJECTION, SOLUTION INTRAMUSCULAR; INTRAVENOUS EVERY 8 HOURS
Status: DISPENSED | OUTPATIENT
Start: 2023-02-09 | End: 2023-02-10

## 2023-02-09 RX ADMIN — KETOROLAC TROMETHAMINE 30 MG: 30 INJECTION, SOLUTION INTRAMUSCULAR; INTRAVENOUS at 02:02

## 2023-02-09 RX ADMIN — OXYCODONE AND ACETAMINOPHEN 1 TABLET: 5; 325 TABLET ORAL at 09:02

## 2023-02-09 RX ADMIN — DOCUSATE SODIUM 200 MG: 100 CAPSULE, LIQUID FILLED ORAL at 10:02

## 2023-02-09 RX ADMIN — PRENATAL VIT W/ FE FUMARATE-FA TAB 27-0.8 MG 1 TABLET: 27-0.8 TAB at 11:02

## 2023-02-09 RX ADMIN — MUPIROCIN: 20 OINTMENT TOPICAL at 09:02

## 2023-02-09 RX ADMIN — DOCUSATE SODIUM 200 MG: 100 CAPSULE, LIQUID FILLED ORAL at 09:02

## 2023-02-09 RX ADMIN — DIPHENHYDRAMINE HYDROCHLORIDE 25 MG: 25 CAPSULE ORAL at 02:02

## 2023-02-09 RX ADMIN — DIPHENHYDRAMINE HYDROCHLORIDE 25 MG: 25 CAPSULE ORAL at 09:02

## 2023-02-09 RX ADMIN — ACETAMINOPHEN 650 MG: 325 TABLET ORAL at 10:02

## 2023-02-09 RX ADMIN — KETOROLAC TROMETHAMINE 30 MG: 30 INJECTION, SOLUTION INTRAMUSCULAR; INTRAVENOUS at 04:02

## 2023-02-09 RX ADMIN — KETOROLAC TROMETHAMINE 30 MG: 30 INJECTION, SOLUTION INTRAMUSCULAR; INTRAVENOUS at 10:02

## 2023-02-09 RX ADMIN — DIPHENHYDRAMINE HYDROCHLORIDE 25 MG: 25 CAPSULE ORAL at 11:02

## 2023-02-09 RX ADMIN — ACETAMINOPHEN 650 MG: 325 TABLET ORAL at 02:02

## 2023-02-09 NOTE — CONSULTS
[facility]  Wound Care    Patient Name:  Lora Scott   MRN:  [id number]  Date: 2023  Diagnosis: S/P  section    History:     Past Medical History:   Diagnosis Date    Allergy     Anxiety     Asthma     Crohn's disease (ileum)     S/P ileocecal resection-42 cm of ileum/10.5 cm cecum removed with surgical path c/w stricture and fistula 2/15/20 ileostomy takedown and mucus fistula takedown    Depression     Eczema     Former smoker     Hydradenitis     Immunosuppressed status     Perineal abscess 2021    Psoriasis        Social History     Socioeconomic History    Marital status: Single   Tobacco Use    Smoking status: Every Day     Packs/day: 0.25     Types: Cigarettes     Last attempt to quit: 2020     Years since quitting: 3.1    Smokeless tobacco: Never   Substance and Sexual Activity    Alcohol use: Not Currently     Alcohol/week: 0.0 standard drinks     Comment: Socially    Drug use: Yes     Frequency: 3.0 times per week     Types: Marijuana     Comment: last today    Sexual activity: Not Currently     Partners: Male     Birth control/protection: None       Precautions:     Allergies as of 2023 - Reviewed 2023   Allergen Reaction Noted    Horse/equine containing products Itching 2015       [facility] Assessment Details/Treatment   Consulted for sacral wound  A 32 year old female admitted 23 from home to L&D for primary   History of Crohn's disease with abscess on lower back/buttocks; S/P ileostomy with ileocecal resection in  and reversal of ostomy ; treatment with Humira  22 S/P Incision and drainage bilateral buttock abscess per Dr. Rowell  Treatment recommendations post op by [facility] nurse- Aquacel Ag hydrofiber & Triad with mesh pants  23 S/P C section   Assessment:  Patient sleeping/did not sleep well last night.  States has honey dressings from home. No longer using Aquacel Ag + Triad dressings.  Discussed with nursing. If showers  again, may apply honey dressing to wounds and cover.  Plan:  Will visit patient 2/10 and assess wounds    02/09/2023

## 2023-02-09 NOTE — ANESTHESIA POSTPROCEDURE EVALUATION
Anesthesia Post Evaluation    Patient: Lora Scott    Procedure(s) Performed: Procedure(s) (LRB):   SECTION (N/A)    Final Anesthesia Type: spinal      Patient location during evaluation: labor & delivery  Patient participation: Yes- Able to Participate  Level of consciousness: awake and alert, oriented and awake  Post-procedure vital signs: reviewed and stable  Airway patency: patent    PONV status at discharge: No PONV  Anesthetic complications: no      Cardiovascular status: blood pressure returned to baseline  Respiratory status: unassisted, spontaneous ventilation and room air  Hydration status: euvolemic  Follow-up not needed.          Vitals Value Taken Time   /73 23 0752   Temp 36.8 °C (98.3 °F) 23 0752   Pulse 83 23 0752   Resp 17 23 0752   SpO2 100 % 23 075         Event Time   Out of Recovery 15:19:00         Pain/Oral Score: Pain Rating Prior to Med Admin: 0 (2023  2:13 AM)  Pain Rating Post Med Admin: 0 (2023  3:13 AM)

## 2023-02-09 NOTE — CONSULTS
VICTORIA met with patient at the bedside to discuss consult-patient stated that she has not showered in weeks because home health will not allow her to shower with her wounds.  Patient reported being on service with [facility] with the last visit being on Tuesday.  Patient also receives OP-Wound Care through [facility].  Patient reported that she had not showered because would not have been able to manage the wound dressing(s). Reported that she had taken a shower here at the hospital.    Patient reported that she lives with her father and no other family member has been trained to assist her with wound care.  She reportedly is scheduled to followup with surgeon (Dr. Rowell @ [facility]) on 2/15/2023.    VICTORIA spoke with Anjelica @ [facility] who confirmed that patient is seen by home health on Tues and Thurs then goes to OP-wound care @ [facility] on MWF. No documentation seen in home health record advising patient not to shower.  Anjelica  followed up with Patrick at [facility] and for an update.  Per Patrick at [facility], patient was advised not to take a bath so that the wound would not be submerged in water.  Patient was not prohibited from performing other methods of hygiene.

## 2023-02-09 NOTE — PLAN OF CARE
VSS. Afebrile  Ambulating in room and hallway without difficulty.   FF @ 1cm below umbilicus. Scant rubra lochia. No clots. No odor.  LTV incision, aquacel dressing in place. No drainage noted.   Tolerating regular diet without any GI symptoms voiced. Blood in emesis resolved on previous shift.  Voiding without difficulty. Passing flatus. Waiting on BM.  Pain controlled with PO meds.  Appropriate maternal bonding noted with infant in NICU.  Consult with wound care completed per physician, no new orders received.   Plan of care reviewed with patient. All questions answered.

## 2023-02-09 NOTE — L&D DELIVERY NOTE
[facility]   Operative Report  Obstetrics & Gynecology      Date of Surgery:  2023     Surgeon:  Geo Espitia MD     Assistant:  ALEXANDRIA Flor     Preoperative diagnosis:     Emanuel IUP at 39w0d   Primary  delivery upon maternal request to avoid risks associated with vaginal delivery and Crohn's Disease, and inability to tolerate laying on supine due to recent surgical drainage/resection of abscess on lower back/buttocks  Crohn's disease   Anemia, iron deficiency       Postoperative diagnosis:     Emanuel IUP at 39w0d s/p primary  section upon maternal request to avoid risks associated with vaginal delivery and Crohn's Disease, and inability to tolerate laying on supine due to recent surgical drainage/resection of abscess on lower back/buttocks  Live infant  Crohn's disease   Anemia, iron deficiency      Procedure performed:  Primary low transverse  section via pfannenstiel skin incision     Anesthesia:  Spinal      IV Fluids:  1000 mL of LR     Urine Output:  200 mL, clear at end of procedure       EBL:  700 mL with no replacements     Specimens:  Cord blood    Findings:      Live male infant, APGAR 1 min: 8, 5 min: 9, transfer to NICU for extended transition  Weight 7lb 3oz    AROM at time of uterine incision with moderate clear fluid, no odor  Grossly normal uterus, tubes and ovaries     Complications:  No immediate complications    Indications for the Procedure:      See H&P for complete details.  In brief, Lora Scott is a 32 y.o.  39w0d gestation here for a scheduled primary  delivery upon maternal request to avoid risks associated with vaginal delivery and Crohn's Disease, and inability to tolerate laying on supine due to recent surgical drainage/resection of abscess on lower back/buttocks.  Maternal and fetal status was overall reassuring.      Pt was informed of the risks, benefits, alternatives and possible complications to   and blood transfusion including pre-admission, admission, and post-admission procedures and expectations.  Risks of  section included, but were not limited to, death, urinary and/or fecal incontinence, injury to bladder and/or urinary tract, injury to bowel and/or intestinal obstruction, risk of infection, damage to major blood vessels, hemorrhage requiring transfusion of blood products and/or hysterectomy, painful intercourse, ovarian failure requiring hormone administration, pulmonary embolism, fistula formation, sexual dysfunction, hernia, failure of wound to heal, permanent and disfiguring scarring.  In the event of a hysterectomy +/- bilateral salpingo-oophorectomy (BS&O) if uterine/ovarian injury or uncontrollable hemorrhage were to occur, the patient was counseled extensively on the inability to become pregnant following a hysterectomy and in the event of a BS&O will result in surgical menopause.  All of the patients questions were answered to her satisfaction.  Informed consent was obtained for  delivery, blood transfusions and all indicated procedures.     Description of the Procedure:      The patient was taken to the operating room where a time out was performed to confirm the correct patient and correct procedure.  Spinal anesthesia was obtained without difficulty.  Pt was then prepped and draped in a normal sterile fashion in the dorsal supine position with a leftward tilt.  A Pfannenstiel skin incision was then made with the scalpel and carried through to the underlying layer of fascia with the Bovie.  The fascia was then incised in the midline and the incision extended laterally with Morris scissors.  The superior aspect of the fascia was then grasped with Kocher clamps, elevated and the underlying rectus muscles were dissected off bluntly.  Attention was then turned to the inferior aspect of the fascial incision which, in a similar fashion, was grasped, tented up with Kocher  clamps, and the rectus muscles were dissected off bluntly.  The rectus muscles were then  in the midline, and the peritoneum identified, tented up, and entered sharply with Metzenbaum scissors.  The peritoneal incision was then extended superiorly and inferiorly with good visualization of the bladder.  A low transverse uterine incision was made well above the bladder reflection with the scalpel.  The uterine incision was extended cephalo-caudad fashion and the infant's head delivered atraumatically followed by the remainder of the body without difficulty.  The mouth and nose were suctioned and the cord clamped and cut.  The infant was vigorous with a strong cry and handed to the awaiting NICU NNP.  Cord blood was obtained.  The placenta was removed spontaneously; the uterus was exteriorized and cleared of all clots and debris.  The uterine incision was repaired with 1-0 Monocryl in a running, locked fashion.  A second layer of the same suture was used to obtain excellent hemostasis.  The uterus was returned to the abdomen. The gutters were cleaned of all clots and debris.  Hemostasis was noted.  The rectus muscles were reapproximated with 2-0 chromic.  The fascia was reapproximated with 0 vicryl in a running fashion.  The skin was closed with absorbable Insorb staples.  Aquacel bandage dressing was applied to incision.  The patient tolerated the procedure well.  Sponge, lap and needle counts were correct time two.  Surgical prophylactic antibiotic was given prior to the procedure.  The patient was transferred to the L&D recovery room in stable condition.  Findings and expectations were discussed with patient.  All of her questions were answered to her satisfaction, patient voiced understanding.       Geo Espitia MD

## 2023-02-09 NOTE — PLAN OF CARE
[facility]  OB Initial Discharge Assessment       Primary Care Provider: Geo Espitia MD    Expected Discharge Date: 2023    Initial Assessment (most recent)       OB Discharge Planning Assessment - 23 1152          OB Discharge Planning Assessment    Assessment Type Discharge Planning Assessment     Source of Information patient     Verified Demographic and Insurance Information Yes     Insurance Medicaid     Medicaid [employer]     Medicaid Insurance Primary     Spiritual Affiliation Mormonism     Pastoral Care/Clergy/ Contact Status none needed     People in Home parent(s)     Name(s) of People in Home Lexx Scott     Number people in home 2     Relationship Status None     Name of Support/Comfort Primary Source Father:  Lexx Scott     Other children (include names and ages) None     Employed Full Time     Employer [employer]     Job Title      Currently Enrolled in School No     Highest Level of 's Degree     Father's Involvement None     Is Father signing the birth certificate No     Father's Address unknown ([place], [place])     Father's Employer unknown     Primary Contact Name and Number Lexx Scott.  [phone]     Other Contacts Names and Numbers Kalie Prince:  [phone]     Received Prenatal Care Yes     Transportation Anticipated family or friend will provide     Receive WIC Benefits Not certified, will apply for       Arrangements Self     Adoption Planned no     Infant Feeding Plan formula feeding     Does baby have crib or safe sleep space? Yes     Do you have a car seat? Yes     Has other essential care items? Clothing;Bottles;Diapers     Pediatrician Dr. Reddy     Resource/Environmental Concerns none     Equipment Currently Used at Home wound care supplies     Resources/Education Provided [facility] Services;[employer] [place] Insurance plan;Medicaid  transportation;Glossary of Commonly Used Terms;SSI Benefits;Preparing for Your Baby's Discharge Home;Support Resources for NICU Families;Breast Pumps through [employer] [place] insurance plan;WIC;Post Partum Depression;My Preemi Patricia     DME Needed Upon Discharge  none     DCFS No indications (Indicators for Report)   Mother advised of process/procedure if substance are detected in infant.  Mother denies any substance use.    Discharge Plan A Home Health   Resume services for wound care with home health and OP Wound Care with [facility].    Discharge Plan B Home with family     Do you have any problems affording any of your prescribed medications? No                            Healthcare Directives:

## 2023-02-10 ENCOUNTER — TELEPHONE (OUTPATIENT)
Dept: GASTROENTEROLOGY | Facility: CLINIC | Age: 33
End: 2023-02-10
Payer: MEDICAID

## 2023-02-10 PROCEDURE — 11000001 HC ACUTE MED/SURG PRIVATE ROOM

## 2023-02-10 PROCEDURE — 99231 PR SUBSEQUENT HOSPITAL CARE,LEVL I: ICD-10-PCS | Mod: ,,, | Performed by: OBSTETRICS & GYNECOLOGY

## 2023-02-10 PROCEDURE — 25000003 PHARM REV CODE 250: Performed by: OBSTETRICS & GYNECOLOGY

## 2023-02-10 PROCEDURE — 99231 SBSQ HOSP IP/OBS SF/LOW 25: CPT | Mod: ,,, | Performed by: OBSTETRICS & GYNECOLOGY

## 2023-02-10 RX ADMIN — MUPIROCIN: 20 OINTMENT TOPICAL at 08:02

## 2023-02-10 RX ADMIN — OXYCODONE AND ACETAMINOPHEN 1 TABLET: 10; 325 TABLET ORAL at 08:02

## 2023-02-10 RX ADMIN — OXYCODONE AND ACETAMINOPHEN 1 TABLET: 10; 325 TABLET ORAL at 03:02

## 2023-02-10 RX ADMIN — IBUPROFEN 800 MG: 400 TABLET ORAL at 01:02

## 2023-02-10 RX ADMIN — DOCUSATE SODIUM 200 MG: 100 CAPSULE, LIQUID FILLED ORAL at 01:02

## 2023-02-10 RX ADMIN — OXYCODONE AND ACETAMINOPHEN 1 TABLET: 10; 325 TABLET ORAL at 01:02

## 2023-02-10 RX ADMIN — DOCUSATE SODIUM 200 MG: 100 CAPSULE, LIQUID FILLED ORAL at 08:02

## 2023-02-10 RX ADMIN — IBUPROFEN 800 MG: 400 TABLET ORAL at 11:02

## 2023-02-10 RX ADMIN — MUPIROCIN: 20 OINTMENT TOPICAL at 01:02

## 2023-02-10 NOTE — NURSING
Full vision returned, pt denies eye pain, seeing flashing lights or other vision trouble. Pt states she thinks she was lying on the eye for extended period while sleeping and this resulted in the temporary change in vision. Pt states no further concerns at this time. Will continue to monitor. Encouraged pt to remove contacts while sleeping.

## 2023-02-10 NOTE — PLAN OF CARE
Problem: Adult Inpatient Plan of Care  Goal: Plan of Care Review  Outcome: Ongoing, Progressing  Goal: Patient-Specific Goal (Individualized)  Outcome: Ongoing, Progressing  Goal: Absence of Hospital-Acquired Illness or Injury  Outcome: Ongoing, Progressing  Goal: Optimal Comfort and Wellbeing  Outcome: Ongoing, Progressing  Goal: Readiness for Transition of Care  Outcome: Ongoing, Progressing     Problem:  Fall Injury Risk  Goal: Absence of Fall, Infant Drop and Related Injury  Outcome: Ongoing, Progressing     Problem: Infection  Goal: Absence of Infection Signs and Symptoms  Outcome: Ongoing, Progressing     Problem: Skin Injury Risk Increased  Goal: Skin Health and Integrity  Outcome: Ongoing, Progressing     Problem: Adjustment to Role Transition (Postpartum  Delivery)  Goal: Successful Maternal Role Transition  Outcome: Ongoing, Progressing     Problem: Bleeding (Postpartum  Delivery)  Goal: Hemostasis  Outcome: Ongoing, Progressing     Problem: Infection (Postpartum  Delivery)  Goal: Absence of Infection Signs and Symptoms  Outcome: Ongoing, Progressing     Problem: Pain (Postpartum  Delivery)  Goal: Acceptable Pain Control  Outcome: Ongoing, Progressing     Problem: Postoperative Nausea and Vomiting (Postpartum  Delivery)  Goal: Nausea and Vomiting Relief  Outcome: Ongoing, Progressing     Problem: Postoperative Urinary Retention (Postpartum  Delivery)  Goal: Effective Urinary Elimination  Outcome: Ongoing, Progressing

## 2023-02-10 NOTE — PROGRESS NOTES
"[facility]    Obstetrics & Gynecology  Progress Note      Patient Name:  Lora Scott  MRN:  [id number]  Admission Date:  2023  Hospital Length of Stay:  1  Attending Physician:  Geo Espitia MD    Subjective:     Date:  2023    Principal Problem:  S/P  section    Hospital Course:  Post-op Day # 1 - s/p primary  at 39w0d    Patient c/o crampy incisional pain   No acute events overnight  Denies dizziness, SOB, MONTES, or CP  Pain well controlled  Lochia less than menses  Bottle/breast feeding well  Breast non-tender, non-engorged  Ambulating, tolerating diet, and voiding without diffculty   Bonding well with baby    Objective:     Temp:  [97.1 °F (36.2 °C)-98.3 °F (36.8 °C)] 97.7 °F (36.5 °C)  Pulse:  [] 79  Resp:  [17-20] 17  SpO2:  [98 %-100 %] 98 %  BP: ()/(56-80) 106/62    /62 (BP Location: Right arm, Patient Position: Lying)   Pulse 79   Temp 97.7 °F (36.5 °C) (Oral)   Resp 17   Ht 5' 2.99" (1.6 m)   Wt 64 kg (141 lb 1.5 oz)   LMP 05/15/2022   SpO2 98%   Breastfeeding No   BMI 25.00 kg/m²   Clear urine    Physical Exam:   Constitutional: She appears well-developed. No distress.                             Alert and oriented x 3.   HEENT:  No scleral icterus. Neck supple. Moist mucus membranes.   LUNGS:  Clear to auscultation bilaterally.   HEART:  Regular rate and rhythm with physiologic heart sounds.   ABDOMEN:  Soft, appropriately tender, non-distended, no guarding, rigidity, or rebound with normoactive bowel sounds. Wound from previous surgical I&D on lower back/buttocks stable, healing appropriately.    FUNDUS:  Firm, nontender below the umbilicus.   INCISION: Aquacel bandage clean, dry, & intact.  EXTREMITIES:  No cramping, claudication. Trace edema LE. +2 distal pulses. Symmetric, full range of motion, negative Montelongo.  NEUROLOGIC:  Grossly intact bilaterally.  +2 DTR's.   PSYCH:  Mood and affect appropriate.   PERINEUM:  Light " youa.     Labs:      Recent Results (from the past 336 hour(s))   CBC auto differential    Collection Time: 23  6:13 AM   Result Value Ref Range    WBC 12.04 3.90 - 12.70 K/uL    Hemoglobin 9.1 (L) 12.0 - 16.0 g/dL    Hematocrit 27.4 (L) 37.0 - 48.5 %    Platelets 218 150 - 450 K/uL   CBC auto differential    Collection Time: 23  8:56 AM   Result Value Ref Range    WBC 12.40 3.90 - 12.70 K/uL    Hemoglobin 9.9 (L) 12.0 - 16.0 g/dL    Hematocrit 30.3 (L) 37.0 - 48.5 %    Platelets 182 150 - 450 K/uL     ABO:  O POS    Assessment/Plan:     Post-op day # 1 - IUP at 39w0d s/p primary low transverse      Pt progressing well from surgical standpoint  Continue post-op care  Review post-partum care instructions and precautions  Encourage ambulation, SCD's   consulted  Surgical/delivery findings, labs/studies, and expectations were discussed with pt, all questions answered, pt voiced understanding    Crohn's disease     Continue Humira   F/u with GI outpt  Dietary advice  GI precautions  Pedi notified of GI recommendations regarding  live virus vaccinations    S/p surgical drainage/resection of abscess on lower buttocks 22 by general surgery     Wound appears to be healing appropriately  Wound care consulted, appreciate recommendations  F/u with outpt wound care after discharge  F/u general surgery outpt  Hygiene advice    Anemia, iron deficiency      Fe supplementation when ready  Anemia precautions  Dietary advice    Disposition    Anticipated discharge POD#3      Geo Espitia MD

## 2023-02-10 NOTE — NURSING
"Pt complains of not being able to see out of her right eye.  Pt says she fell asleep and thought it was because she had been asleep but it "wont shake back".  Pt describes her vision as being dark when looking straight ahead.  Pt reports being able to see out of peripheral vision.  Pt reports wearing contacts.  VS stable.  /73, Pulse 96, Resp 18, Temp 98.2, Sats 99% on Room Air. Pt given saline to put in eye to rewet contacts.   Will reassess.  "

## 2023-02-11 PROCEDURE — 99231 SBSQ HOSP IP/OBS SF/LOW 25: CPT | Mod: ,,, | Performed by: OBSTETRICS & GYNECOLOGY

## 2023-02-11 PROCEDURE — 99231 PR SUBSEQUENT HOSPITAL CARE,LEVL I: ICD-10-PCS | Mod: ,,, | Performed by: OBSTETRICS & GYNECOLOGY

## 2023-02-11 PROCEDURE — 11000001 HC ACUTE MED/SURG PRIVATE ROOM

## 2023-02-11 PROCEDURE — 25000003 PHARM REV CODE 250: Performed by: OBSTETRICS & GYNECOLOGY

## 2023-02-11 RX ADMIN — MUPIROCIN: 20 OINTMENT TOPICAL at 09:02

## 2023-02-11 RX ADMIN — DOCUSATE SODIUM 200 MG: 100 CAPSULE, LIQUID FILLED ORAL at 09:02

## 2023-02-11 RX ADMIN — OXYCODONE AND ACETAMINOPHEN 1 TABLET: 10; 325 TABLET ORAL at 12:02

## 2023-02-11 RX ADMIN — IBUPROFEN 800 MG: 400 TABLET ORAL at 02:02

## 2023-02-11 RX ADMIN — IBUPROFEN 800 MG: 400 TABLET ORAL at 05:02

## 2023-02-11 RX ADMIN — IBUPROFEN 800 MG: 400 TABLET ORAL at 09:02

## 2023-02-11 NOTE — PROGRESS NOTES
"[facility]    Obstetrics & Gynecology  Progress Note      Patient Name:  Lora Scott  MRN:  [id number]  Admission Date:  2023  Hospital Length of Stay:  2  Attending Physician:  Geo Espitia MD    Subjective:     Date:  02/10/2023    Principal Problem:  S/P  section    Hospital Course:  Post-op Day # 2 - s/p primary  at 39w0d    Pt c/o crampy incisional pain   No acute events overnight  Reports a good night rest and feeling better today  Denies dizziness, SOB, MONTES, or CP  Pain well controlled  Lochia less than menses  Bottle/breast feeding well  Breast non-tender, non-engorged  Ambulating, tolerating diet, and voiding without diffculty   Bonding well with baby in NICU    Objective:     Temp:  [97.7 °F (36.5 °C)-98.3 °F (36.8 °C)] 98.2 °F (36.8 °C)  Pulse:  [] 83  Resp:  [18-20] 18  SpO2:  [97 %-100 %] 100 %  BP: ()/(56-72) 103/72    /72 (BP Location: Left arm, Patient Position: Sitting)   Pulse 83   Temp 98.2 °F (36.8 °C) (Oral)   Resp 18   Ht 5' 2.99" (1.6 m)   Wt 64 kg (141 lb 1.5 oz)   LMP 05/15/2022   SpO2 100%   Breastfeeding No   BMI 25.00 kg/m²   Clear urine    Physical Exam:   Constitutional: She appears well-developed. No distress.                             Alert and oriented x 3.   HEENT:  No scleral icterus. Neck supple. Moist mucus membranes.   LUNGS:  Clear to auscultation bilaterally.   HEART:  Regular rate and rhythm with physiologic heart sounds.   ABDOMEN:  Soft, appropriately tender, non-distended, no guarding, rigidity, or rebound with normoactive bowel sounds. Wound from previous surgical I&D on lower back/buttocks stable, healing appropriately.    FUNDUS:  Firm, nontender below the umbilicus.   INCISION: Aquacel bandage clean, dry, & intact.  EXTREMITIES:  No cramping, claudication. Trace edema LE. +2 distal pulses. Symmetric, full range of motion, negative Montelongo.  NEUROLOGIC:  Grossly intact bilaterally.  +2 DTR's. "   PSYCH:  Mood and affect appropriate.   PERINEUM:  Light lochia.     ABO:  O POS    Assessment/Plan:     Post-op day # 2 - IUP at 39w0d s/p primary low transverse      Pt progressing well from surgical standpoint  Continue post-op care  Encourage ambulation, SCD's   consulted  Plan of care d/w pt    Crohn's disease     Continue Humira   F/u with GI outpt  Dietary advice  GI precautions  Pedi notified of GI recommendations regarding  live virus vaccinations    S/p surgical drainage/resection of abscess on lower buttocks 22 by general surgery     Wound appears to be healing appropriately  Wound care consulted, appreciate recommendations  F/u with outpt wound care after discharge  F/u general surgery outpt  Hygiene advice    Anemia, iron deficiency      Fe supplementation when ready  Anemia precautions  Dietary advice    Disposition    Anticipated discharge POD#3, 23      Geo Espitia MD

## 2023-02-11 NOTE — PROGRESS NOTES
Visited patient to assist with wound management. Patient states she doesn't have dressings from home. She was asleep and thought she was at home when she talked to me yesterday. Patient reports going to Wound Clinic 3 times a week and HH performing other daily dressing changes.  Photodocumentation    Buttock wounds- Upper gluteal I&D sites continue to contract and granulate with scarring. Right buttock wound 2 cm linear opening with red base. Left buttock wound 1.5 cm linear opening almost level with epithelium. Small amount serous drainage.   Lower buttock/perineal wounds healed, but hardened areas.   Treatment:  Cleansed wounds with Vashe. Filled with Aquacel Ag and covered with gauze dressing secured with Medipore tape. Provided patient with product for future dressing changes while hospitalized and when discharged can resume treatment by Karen and [facility].   Patient showed products and left at bedside.

## 2023-02-11 NOTE — PLAN OF CARE
Problem: Adult Inpatient Plan of Care  Goal: Plan of Care Review  Outcome: Ongoing, Progressing  Goal: Patient-Specific Goal (Individualized)  Outcome: Ongoing, Progressing  Flowsheets (Taken 2023 9184)  Anxieties, Fears or Concerns: Wants to stay one more night so she can visit infant off and on in the NICU.  Individualized Care Needs: Wound to sacrum, ischial tuberosity from HS wound - cared for with Home Health prior to admission. Changed today with supplies left by RONI Deluna.  Goal: Absence of Hospital-Acquired Illness or Injury  Outcome: Ongoing, Progressing  Goal: Optimal Comfort and Wellbeing  Outcome: Ongoing, Progressing  Goal: Readiness for Transition of Care  Outcome: Ongoing, Progressing     Problem:  Fall Injury Risk  Goal: Absence of Fall, Infant Drop and Related Injury  Outcome: Ongoing, Progressing     Problem: Infection  Goal: Absence of Infection Signs and Symptoms  Outcome: Ongoing, Progressing     Problem: Skin Injury Risk Increased  Goal: Skin Health and Integrity  Outcome: Ongoing, Progressing     Problem: Adjustment to Role Transition (Postpartum  Delivery)  Goal: Successful Maternal Role Transition  Outcome: Ongoing, Progressing     Problem: Bleeding (Postpartum  Delivery)  Goal: Hemostasis  Outcome: Ongoing, Progressing     Problem: Infection (Postpartum  Delivery)  Goal: Absence of Infection Signs and Symptoms  Outcome: Ongoing, Progressing     Problem: Pain (Postpartum  Delivery)  Goal: Acceptable Pain Control  Outcome: Ongoing, Progressing     Problem: Postoperative Nausea and Vomiting (Postpartum  Delivery)  Goal: Nausea and Vomiting Relief  Outcome: Ongoing, Progressing     Problem: Postoperative Urinary Retention (Postpartum  Delivery)  Goal: Effective Urinary Elimination  Outcome: Ongoing, Progressing

## 2023-02-11 NOTE — HPI
Lora Scott is a 32 y.o.  at 39w0d at time of anticipated admission on  here for pre-admit H&P for a scheduled elective primary  delivery upon maternal request to avoid risk of vaginal delivery associated with Crohn's Disease and inability to tolerate laying on her back due to recent surgical drainage/resection of abscess on lower back/buttocks.     Pregnancy complicated by:  Crohn's disease dx 2019  S/p surgical drainage/resection of abscess on lower buttocks 22 by general surgery  H/o partial bowel resection with ileostomy 2019 secondary to Crohn's  Anemia, iron deficiency    Pt desires elective primary  delivery

## 2023-02-11 NOTE — HOSPITAL COURSE
22 - s/p uncomplicated primary low transverse  section via pfannenstiel skin incision     2/10/23 - progressing well    23 - progressing well    2023 routine post operative care.  Plan discharge tomorrow.

## 2023-02-11 NOTE — SUBJECTIVE & OBJECTIVE
Per Dr Courtney - Have patient come in for INR today.    Patient was notified and verbalized understanding.   She is doing well this morning. She is tolerating a regular diet without nausea or vomiting. She is voiding spontaneously. She is ambulating. She has passed flatus, and has not a BM. Vaginal bleeding is mild. She denies fever or chills. Abdominal pain is mild and controlled with oral medications. She Is not breastfeeding. She desires circumcision for her male baby: not applicable.    Objective:     Vital Signs (Most Recent):  Temp: 98.2 °F (36.8 °C) (02/11/23 0734)  Pulse: 77 (02/11/23 0734)  Resp: 20 (02/11/23 0734)  BP: 115/73 (02/11/23 0734)  SpO2: 98 % (02/11/23 0734) Vital Signs (24h Range):  Temp:  [97.4 °F (36.3 °C)-98.5 °F (36.9 °C)] 98.2 °F (36.8 °C)  Pulse:  [77-92] 77  Resp:  [18-20] 20  SpO2:  [97 %-100 %] 98 %  BP: ()/(57-73) 115/73     Weight: 64 kg (141 lb 1.5 oz)  Body mass index is 25 kg/m².    No intake or output data in the 24 hours ending 02/11/23 0907      Significant Labs:  Lab Results   Component Value Date    GROUPTRH O POS 02/08/2023    HEPBSAG Negative 08/23/2022    STREPBCULT No Group B Streptococcus isolated 01/19/2023     No results for input(s): HGB, HCT in the last 48 hours.    I have personallly reviewed all pertinent lab results from the last 24 hours.    Physical Exam:   Constitutional: She is oriented to person, place, and time. She appears well-developed.    HENT:   Head: Normocephalic and atraumatic.    Eyes: EOM are normal.     Cardiovascular:  Normal rate.             Pulmonary/Chest: Effort normal.        Abdominal: Soft. She exhibits no distension and no mass (fundus firm and below the umbilicus). There is no abdominal tenderness.             Musculoskeletal: Normal range of motion.       Neurological: She is oriented to person, place, and time.    Skin: Skin is warm. Lesion (Pfannenstiel incision is clean with dressing in place) noted.    Psychiatric: She has a normal mood and affect.     Review of Systems   Constitutional: Negative.    HENT: Negative.     Eyes:  Negative.    Respiratory: Negative.     Cardiovascular: Negative.    Gastrointestinal: Negative.    Endocrine: Negative.    Genitourinary: Negative.    Musculoskeletal: Negative.    Integumentary:  Negative.   Neurological: Negative.    Hematological: Negative.    Psychiatric/Behavioral: Negative.     Breast: negative.

## 2023-02-11 NOTE — PROGRESS NOTES
[facility]  Obstetrics  Postpartum Progress Note    Patient Name: Lora Scott  MRN: [id number]  Admission Date: 2023  Hospital Length of Stay: 3 days  Attending Physician: Geo Espitia MD  Primary Care Provider: Geo Espitia MD    Subjective:     Principal Problem:S/P  section    Hospital Course:  22 - s/p uncomplicated primary low transverse  section via pfannenstiel skin incision     2/10/23 - progressing well    23 - progressing well    2023 routine post operative care.  Plan discharge tomorrow.      She is doing well this morning. She is tolerating a regular diet without nausea or vomiting. She is voiding spontaneously. She is ambulating. She has passed flatus, and has not a BM. Vaginal bleeding is mild. She denies fever or chills. Abdominal pain is mild and controlled with oral medications. She Is not breastfeeding. She desires circumcision for her male baby: not applicable.    Objective:     Vital Signs (Most Recent):  Temp: 98.2 °F (36.8 °C) (23)  Pulse: 77 (23)  Resp: 20 (23)  BP: 115/73 (23)  SpO2: 98 % (23) Vital Signs (24h Range):  Temp:  [97.4 °F (36.3 °C)-98.5 °F (36.9 °C)] 98.2 °F (36.8 °C)  Pulse:  [77-92] 77  Resp:  [18-20] 20  SpO2:  [97 %-100 %] 98 %  BP: ()/(57-73) 115/73     Weight: 64 kg (141 lb 1.5 oz)  Body mass index is 25 kg/m².    No intake or output data in the 24 hours ending 23      Significant Labs:  Lab Results   Component Value Date    GROUPTRH O POS 2023    HEPBSAG Negative 2022    STREPBCULT No Group B Streptococcus isolated 2023     No results for input(s): HGB, HCT in the last 48 hours.    I have personallly reviewed all pertinent lab results from the last 24 hours.    Physical Exam:   Constitutional: She is oriented to person, place, and time. She appears well-developed.    HENT:   Head: Normocephalic and atraumatic.    Eyes: EOM are normal.      Cardiovascular:  Normal rate.             Pulmonary/Chest: Effort normal.        Abdominal: Soft. She exhibits no distension and no mass (fundus firm and below the umbilicus). There is no abdominal tenderness.             Musculoskeletal: Normal range of motion.       Neurological: She is oriented to person, place, and time.    Skin: Skin is warm. Lesion (Pfannenstiel incision is clean with dressing in place) noted.    Psychiatric: She has a normal mood and affect.     Review of Systems   Constitutional: Negative.    HENT: Negative.     Eyes: Negative.    Respiratory: Negative.     Cardiovascular: Negative.    Gastrointestinal: Negative.    Endocrine: Negative.    Genitourinary: Negative.    Musculoskeletal: Negative.    Integumentary:  Negative.   Neurological: Negative.    Hematological: Negative.    Psychiatric/Behavioral: Negative.     Breast: negative.      Assessment/Plan:     32 y.o. female  for:    * S/P  section  - routine post operative care.        Disposition: As patient meets milestones, will plan to discharge tomorrow.    Argelia Greene MD  Obstetrics  [facility]

## 2023-02-12 ENCOUNTER — HOSPITAL ENCOUNTER (OUTPATIENT)
Dept: DATA CONVERSION | Age: 33
End: 2023-02-12

## 2023-02-12 VITALS
HEART RATE: 86 BPM | DIASTOLIC BLOOD PRESSURE: 59 MMHG | OXYGEN SATURATION: 99 % | WEIGHT: 141.13 LBS | HEIGHT: 63 IN | TEMPERATURE: 98 F | BODY MASS INDEX: 25.01 KG/M2 | SYSTOLIC BLOOD PRESSURE: 107 MMHG | RESPIRATION RATE: 18 BRPM

## 2023-02-12 PROBLEM — Z3A.39 39 WEEKS GESTATION OF PREGNANCY: Status: RESOLVED | Noted: 2023-02-08 | Resolved: 2023-02-12

## 2023-02-12 PROCEDURE — 99238 HOSP IP/OBS DSCHRG MGMT 30/<: CPT | Mod: ,,, | Performed by: OBSTETRICS & GYNECOLOGY

## 2023-02-12 PROCEDURE — 25000003 PHARM REV CODE 250: Performed by: OBSTETRICS & GYNECOLOGY

## 2023-02-12 PROCEDURE — 99238 PR HOSPITAL DISCHARGE DAY,<30 MIN: ICD-10-PCS | Mod: ,,, | Performed by: OBSTETRICS & GYNECOLOGY

## 2023-02-12 RX ORDER — DOCUSATE SODIUM 100 MG/1
100 CAPSULE, LIQUID FILLED ORAL 2 TIMES DAILY
Qty: 60 CAPSULE | Refills: 1 | Status: SHIPPED | OUTPATIENT
Start: 2023-02-12 | End: 2023-05-09

## 2023-02-12 RX ORDER — IBUPROFEN 800 MG/1
800 TABLET ORAL EVERY 8 HOURS PRN
Qty: 60 TABLET | Refills: 2 | Status: SHIPPED | OUTPATIENT
Start: 2023-02-12 | End: 2023-05-09

## 2023-02-12 RX ORDER — ACETAMINOPHEN 500 MG
1000 TABLET ORAL EVERY 6 HOURS PRN
Qty: 100 TABLET | Refills: 2 | Status: SHIPPED | OUTPATIENT
Start: 2023-02-12 | End: 2023-05-09

## 2023-02-12 RX ORDER — OXYCODONE HYDROCHLORIDE 5 MG/1
5 TABLET ORAL EVERY 4 HOURS PRN
Qty: 20 TABLET | Refills: 0 | Status: SHIPPED | OUTPATIENT
Start: 2023-02-12 | End: 2023-05-09

## 2023-02-12 RX ADMIN — MUPIROCIN: 20 OINTMENT TOPICAL at 09:02

## 2023-02-12 RX ADMIN — IBUPROFEN 800 MG: 400 TABLET ORAL at 06:02

## 2023-02-12 NOTE — DISCHARGE SUMMARY
[facility]  Obstetrics  Discharge Summary      Patient Name: Lora Scott  MRN: [id number]  Admission Date: 2023  Hospital Length of Stay: 4 days  Discharge Date and Time:  2023 11:26 AM  Attending Physician: Keith Espitia MD   Discharging Provider: Argelia Greene MD   Primary Care Provider: Keith Espitia MD    HPI: Lora Scott is a 32 y.o.  at 39w0d at time of anticipated admission on  here for pre-admit H&P for a scheduled elective primary  delivery upon maternal request to avoid risk of vaginal delivery associated with Crohn's Disease and inability to tolerate laying on her back due to recent surgical drainage/resection of abscess on lower back/buttocks.     Pregnancy complicated by:  Crohn's disease dx 2019  S/p surgical drainage/resection of abscess on lower buttocks 22 by general surgery  H/o partial bowel resection with ileostomy 2019 secondary to Crohn's  Anemia, iron deficiency    Pt desires elective primary  delivery          Procedure(s) (LRB):   SECTION (N/A)     Hospital Course:   22 - s/p uncomplicated primary low transverse  section via pfannenstiel skin incision     2/10/23 - progressing well    23 - progressing well    2023 routine post operative care.  Plan discharge tomorrow.       Consults (From admission, onward)        Status Ordering Provider     Inpatient consult to Social Work  Once        Provider:  (Not yet assigned)    Completed KEITH ESPITIA          Final Active Diagnoses:    Diagnosis Date Noted POA    PRINCIPAL PROBLEM:  S/P  section [Z98.891] 2023 Not Applicable      Problems Resolved During this Admission:    Diagnosis Date Noted Date Resolved POA    39 weeks gestation of pregnancy [Z3A.39] 2023 Not Applicable        Significant Diagnostic Studies: Labs: CBC No results for input(s): WBC, HGB, HCT, PLT in the last 48 hours.      Feeding Method:  bottle    Immunizations     None          Delivery:    Episiotomy:     Lacerations:     Repair suture:     Repair # of packets:     Blood loss (ml):       Birth information:  YOB: 2022   Time of birth: 1:08 PM   Sex: male   Delivery type: , Low Transverse   Gestational Age: 39w0d    Delivery Clinician:      Other providers:       Additional  information:  Forceps:    Vacuum:    Breech:    Observed anomalies      Living?:           APGARS  One minute Five minutes Ten minutes   Skin color:         Heart rate:         Grimace:         Muscle tone:         Breathing:         Totals: 8  9        Placenta: Delivered:       appearance    Pending Diagnostic Studies:     None          Discharged Condition: good    Disposition: Home or Self Care    Follow Up:   Follow-up Information     Geo Espitia MD. Schedule an appointment as soon as possible for a visit in 1 week(s).    Specialty: Obstetrics and Gynecology  Why: For wound re-check  Contact information:  [address]  [facility] [place] [address]  [phone]                       Patient Instructions:      Diet Adult Regular     Lifting restrictions     No driving until:   Order Comments: No longer taking narcotics     Pelvic Rest     No dressing needed     Leave dressing on - Keep it clean, dry, and intact until clinic visit     Notify your health care provider if you experience any of the following:  temperature >100.4     Notify your health care provider if you experience any of the following:  persistent nausea and vomiting or diarrhea     Notify your health care provider if you experience any of the following:  severe uncontrolled pain     Notify your health care provider if you experience any of the following:  redness, tenderness, or signs of infection (pain, swelling, redness, odor or green/yellow discharge around incision site)     Notify your health care provider if you experience any of the following:  severe persistent headache      Notify your health care provider if you experience any of the following:  worsening rash     Notify your health care provider if you experience any of the following:  persistent dizziness, light-headedness, or visual disturbances     Notify your health care provider if you experience any of the following:  increased confusion or weakness     Medications:  Current Discharge Medication List      START taking these medications    Details   !! acetaminophen (TYLENOL EXTRA STRENGTH) 500 MG tablet Take 2 tablets (1,000 mg total) by mouth every 6 (six) hours as needed for Pain.  Qty: 100 tablet, Refills: 2      docusate sodium (COLACE) 100 MG capsule Take 1 capsule (100 mg total) by mouth 2 (two) times daily.  Qty: 60 capsule, Refills: 1      ibuprofen (ADVIL,MOTRIN) 800 MG tablet Take 1 tablet (800 mg total) by mouth every 8 (eight) hours as needed for Pain.  Qty: 60 tablet, Refills: 2      oxyCODONE (ROXICODONE) 5 MG immediate release tablet Take 1 tablet (5 mg total) by mouth every 4 (four) hours as needed for Pain.  Qty: 20 tablet, Refills: 0    Comments: Quantity prescribed more than 7 day supply? No       !! - Potential duplicate medications found. Please discuss with provider.      CONTINUE these medications which have NOT CHANGED    Details   !! acetaminophen (TYLENOL) 325 MG tablet Take 2 tablets (650 mg total) by mouth every 6 (six) hours.  Qty: 30 tablet, Refills: 2      adalimumab (HUMIRA,CF,) 40 mg/0.4 mL SyKt Inject 0.4 mLs (40 mg total) into the skin every 7 days.  Qty: 4 each, Refills: 5    Associated Diagnoses: Crohn's disease of both small and large intestine without complication      FOLIC ACID ORAL Take by mouth.      UNABLE TO FIND Take by mouth once daily. medication name: prenatal vitamin       !! - Potential duplicate medications found. Please discuss with provider.          Argelia Greene MD  Obstetrics  [facility]

## 2023-02-12 NOTE — DISCHARGE INSTRUCTIONS
After a Cesearean Birth    General Discharge Instructions  May follow a regular diet, unless otherwise discussed with physician.    Take showers, not baths unless otherwise discussed with physician.    Activity as tolerated.    No lifting or heavy exercise for 6 weeks, no driving for 2 weeks, no sexual   intercourse, douching or tampons for 6 weeks    May return to work/school as discussed with physician    Discuss birth control with physician    Take medications as directed    Breast care support bra worn at all times    Lactation consultant referral number ( [phone] or [phone])    Call Your Healthcare Provider Right Away If You Have:  A temperature of 100.4°F or higher.  If your blood pressure is over 155/105.  You have difficulty catching your breath or trouble breathing.  Heavy vaginal bleeding, clots, or vaginal discharge with foul odor. (heavier than menses)  Persistent nausea or vomiting.  You gain more than 3 pounds in 3 days.  Severe headaches not relieved by Tylenol (acetaminophen) or Motrin (ibuprofen)  Blurry or double vision, see spots or flashing lights.  Dizziness or fainting.  New onset swelling or worsening of existing swelling.  Burning or pain when you urinate.  No bowel movement for 5 days.  Redness, warmth, swelling, or pain in the lower leg.  Redness, discharge, or pain worse than you had in the hospital.  Burning, pain, red streaks, or lumpy areas in your breasts.  Cracks, blisters, or blood on your nipples.  Feelings of extreme sadness or anxiety, or a feeling that you dont want to be with your baby.  If you have any new or unusual symptoms or have questions or concerns    Some of these symptoms can occur up to 4 to 6 weeks after delivery. This can be a sign of pre-eclampsia, which is a serious disease that can cause stroke, seizures, organ damage, or death. Do not wait to call your doctor or seek medical attention.          Incision Care  If you have an Aquacel dressing, then it  stays in place for 1 week. It is waterproof and will be removed at your post-op visit. If it comes off before then, call your physician and keep the incision clean with warm soapy water and pat dry.  Watch your incision for signs of infection, such as increasing redness or drainage, or a foul smelling odor.  For ease of movement, hold a pillow against the incision when you get up from a lying or sitting position, and when you laugh or cough.  Avoid heavy lifting--nothing heavier than your baby until your doctor instructs you otherwise.     Follow-Up  Schedule a  follow-up exam with your healthcare provider for about 1 week after delivery. During this exam, your uterus and vaginal area will be checked. Contact your healthcare provider if you think you or your baby are having any problems.

## 2023-02-12 NOTE — PLAN OF CARE
Patient discharged per order. VS stable with no signs of distress. Discharge paperwork printed and reviewed with patient. Reviewed urgent maternal warning signs and instructed to go to ER or call physician if symptoms occur. Reviewed signs and symptoms of depression and anxiety and provided relevant handouts. Instructed on follow-up appointment with physician. Patient voiced understanding of all.      Problem: Adult Inpatient Plan of Care  Goal: Plan of Care Review  Outcome: Met  Goal: Patient-Specific Goal (Individualized)  Outcome: Met  Flowsheets (Taken 2023 1431)  Anxieties, Fears or Concerns: Wants to visit infant one more time before discharge.  Individualized Care Needs: Dressing change to buttock/sacral wound prior to discharge today. Note HS flare-up to groin with increased drainage. Remainder of supplies sent home with patient for wound care. Cleaned groin area as well and counseled about hygiene to prevent breakdown of abdomen AG dressing to LTV. She plans to call  for reconnection with dressing change services tomorrow. Updated about all upcoming MD appointments.  Goal: Absence of Hospital-Acquired Illness or Injury  Outcome: Met  Goal: Optimal Comfort and Wellbeing  Outcome: Met  Goal: Readiness for Transition of Care  Outcome: Met     Problem:  Fall Injury Risk  Goal: Absence of Fall, Infant Drop and Related Injury  Outcome: Met     Problem: Infection  Goal: Absence of Infection Signs and Symptoms  Outcome: Met     Problem: Skin Injury Risk Increased  Goal: Skin Health and Integrity  Outcome: Met     Problem: Adjustment to Role Transition (Postpartum  Delivery)  Goal: Successful Maternal Role Transition  Outcome: Met     Problem: Bleeding (Postpartum  Delivery)  Goal: Hemostasis  Outcome: Met     Problem: Infection (Postpartum  Delivery)  Goal: Absence of Infection Signs and Symptoms  Outcome: Met     Problem: Pain (Postpartum  Delivery)  Goal: Acceptable  Pain Control  Outcome: Met     Problem: Postoperative Nausea and Vomiting (Postpartum  Delivery)  Goal: Nausea and Vomiting Relief  Outcome: Met     Problem: Postoperative Urinary Retention (Postpartum  Delivery)  Goal: Effective Urinary Elimination  Outcome: Met

## 2023-02-13 ENCOUNTER — CLINICAL SUPPORT (OUTPATIENT)
Dept: GASTROENTEROLOGY | Facility: CLINIC | Age: 33
End: 2023-02-13
Payer: MEDICAID

## 2023-02-13 ENCOUNTER — OFFICE VISIT (OUTPATIENT)
Dept: GASTROENTEROLOGY | Facility: CLINIC | Age: 33
End: 2023-02-13
Payer: MEDICAID

## 2023-02-13 ENCOUNTER — PATIENT MESSAGE (OUTPATIENT)
Dept: OBSTETRICS AND GYNECOLOGY | Facility: HOSPITAL | Age: 33
End: 2023-02-13
Payer: MEDICAID

## 2023-02-13 VITALS
SYSTOLIC BLOOD PRESSURE: 122 MMHG | HEART RATE: 100 BPM | WEIGHT: 138 LBS | OXYGEN SATURATION: 97 % | BODY MASS INDEX: 24.45 KG/M2 | HEIGHT: 63 IN | TEMPERATURE: 98 F | DIASTOLIC BLOOD PRESSURE: 82 MMHG

## 2023-02-13 DIAGNOSIS — D84.9 IMMUNOSUPPRESSED STATUS: Primary | ICD-10-CM

## 2023-02-13 DIAGNOSIS — Z98.890 STATUS POST INCISION AND DRAINAGE: ICD-10-CM

## 2023-02-13 DIAGNOSIS — L40.9 PSORIASIS: ICD-10-CM

## 2023-02-13 DIAGNOSIS — K50.80 CROHN'S DISEASE OF BOTH SMALL AND LARGE INTESTINE WITHOUT COMPLICATION: Primary | ICD-10-CM

## 2023-02-13 DIAGNOSIS — K50.80 CROHN'S DISEASE OF BOTH SMALL AND LARGE INTESTINE WITHOUT COMPLICATION: ICD-10-CM

## 2023-02-13 PROCEDURE — 1111F PR DISCHARGE MEDS RECONCILED W/ CURRENT OUTPATIENT MED LIST: ICD-10-PCS | Mod: CPTII,S$GLB,, | Performed by: INTERNAL MEDICINE

## 2023-02-13 PROCEDURE — 3008F PR BODY MASS INDEX (BMI) DOCUMENTED: ICD-10-PCS | Mod: CPTII,S$GLB,, | Performed by: INTERNAL MEDICINE

## 2023-02-13 PROCEDURE — 99215 OFFICE O/P EST HI 40 MIN: CPT | Mod: S$GLB,,, | Performed by: INTERNAL MEDICINE

## 2023-02-13 PROCEDURE — 99215 PR OFFICE/OUTPT VISIT, EST, LEVL V, 40-54 MIN: ICD-10-PCS | Mod: S$GLB,,, | Performed by: INTERNAL MEDICINE

## 2023-02-13 PROCEDURE — 1160F PR REVIEW ALL MEDS BY PRESCRIBER/CLIN PHARMACIST DOCUMENTED: ICD-10-PCS | Mod: CPTII,S$GLB,, | Performed by: INTERNAL MEDICINE

## 2023-02-13 PROCEDURE — 3074F SYST BP LT 130 MM HG: CPT | Mod: CPTII,S$GLB,, | Performed by: INTERNAL MEDICINE

## 2023-02-13 PROCEDURE — 3008F BODY MASS INDEX DOCD: CPT | Mod: CPTII,S$GLB,, | Performed by: INTERNAL MEDICINE

## 2023-02-13 PROCEDURE — 1160F RVW MEDS BY RX/DR IN RCRD: CPT | Mod: CPTII,S$GLB,, | Performed by: INTERNAL MEDICINE

## 2023-02-13 PROCEDURE — 3074F PR MOST RECENT SYSTOLIC BLOOD PRESSURE < 130 MM HG: ICD-10-PCS | Mod: CPTII,S$GLB,, | Performed by: INTERNAL MEDICINE

## 2023-02-13 PROCEDURE — 1159F MED LIST DOCD IN RCRD: CPT | Mod: CPTII,S$GLB,, | Performed by: INTERNAL MEDICINE

## 2023-02-13 PROCEDURE — 1111F DSCHRG MED/CURRENT MED MERGE: CPT | Mod: CPTII,S$GLB,, | Performed by: INTERNAL MEDICINE

## 2023-02-13 PROCEDURE — 1159F PR MEDICATION LIST DOCUMENTED IN MEDICAL RECORD: ICD-10-PCS | Mod: CPTII,S$GLB,, | Performed by: INTERNAL MEDICINE

## 2023-02-13 PROCEDURE — 3079F DIAST BP 80-89 MM HG: CPT | Mod: CPTII,S$GLB,, | Performed by: INTERNAL MEDICINE

## 2023-02-13 PROCEDURE — 3079F PR MOST RECENT DIASTOLIC BLOOD PRESSURE 80-89 MM HG: ICD-10-PCS | Mod: CPTII,S$GLB,, | Performed by: INTERNAL MEDICINE

## 2023-02-13 RX ORDER — TRIAMCINOLONE ACETONIDE 1 MG/G
CREAM TOPICAL 2 TIMES DAILY
Qty: 453.6 G | Refills: 1 | Status: SHIPPED | OUTPATIENT
Start: 2023-02-13 | End: 2023-05-09

## 2023-02-13 NOTE — PROGRESS NOTES
[facility]  Inflammatory Bowel Disease  Pharmacy Note    TODAY'S VISIT DATE:  2/13/2023    Reason for visit: Medication access issues     IBD treatment to be initiated/optimized: Humira     Consent form: No    IBD MD: Arleen      Pertinent History:  IBD phenotype:   Dispensing [facility]infusion [facility]  Current therapy: Humira 40 mg SC weekly (started 10/24/20, 40 mg SC weekly, LD 1/20, ND 2/17)   Due to pharmacy issues missed doses on 1/27 and 2/3  Dose on 2/10 held due to C section on 2/8    Therapeutic Drug Monitoring Labs:  1/15/20 humira levels 3.5/neg Abs on humira 40 mg SC every 2 weeks  9/17/20 Trough Humira levels 12.0 and neg abs on Humira  40 mg SC every 2 weeks  2/12/21 Humira levels 7.5, no antibodies on Humira  40 mg SC every week.   7/7/22 trough humira level 8.2/Abs neg (Humira 40 mg SC weekly)       Prior IBD Therapies:  MTX 12.5 mg po weekly- stopped after a few weeks due to elevated LFTs 10/2019      Vaccinations:  Lab Results   Component Value Date    HEPBSAB Grayzone (A) 02/19/2019     No results found for: HEPBSURFABQU  Lab Results   Component Value Date    HEPAIGG Positive (A) 02/19/2019     Lab Results   Component Value Date    VARICELLAZOS 2.58 (H) 03/11/2019    VARICELLAINT Positive (A) 03/11/2019     Immunization History   Administered Date(s) Administered    COVID-19, MRNA, LN-S, PF (Pfizer) (Purple Cap) 10/22/2021    DTP 1991, 02/24/1992, 03/18/1992, 03/18/1992, 01/12/1994    HIB 1991, 02/24/1992, 03/18/1992    Hepatitis B (recombinant) Adjuvanted, 2 dose 04/17/2019, 05/20/2019    Hepatitis B, Pediatric/Adolescent 07/24/2006    IPV 07/24/2006    MMR 01/13/1994, 07/24/2006    Meningococcal Conjugate (MCV4P) 07/24/2006    OPV 1991, 02/24/1992, 01/12/1994    Pneumococcal Conjugate - 13 Valent 04/17/2019    Pneumococcal Polysaccharide - 23 Valent 06/20/2019    Td (ADULT) 07/24/2006    Td (Adult), Unspecified Formulation 07/24/2006    Tdap  07/24/2006, 04/17/2019     Vaccines were not discussed during visit today.    Influenza:  due for annual flu shot  PPSV 23: due to repeat PPCV23 in 2024  Tetanus (TdaP): should have received during pregnancy, but no records. Repeat booster in 10 years 2029  HPV:  no records of receiving it in th past. Discuss risk factors     Meningococcal: completed  Hepatitis B: completed in 5/2019, repeat immunity    Hepatitis A:  immune  MMR (live vaccine): check immunity        Chickenpox status/Varicella (live vaccine): immune  Shingrix: eligible for Shingrix vaccine 2 doses 2-6 months apart due to immunocompromised state  Covid:  never completed the remainder of initial series.       All Medical History/Surgical History/Family History/Social History/Allergies have been reviewed and updated in EMR    Review of patient's allergies indicates:   Allergen Reactions    Horse/equine containing products Itching         Current Medications:   No outpatient medications have been marked as taking for the 2/13/23 encounter (Appointment) with IBD PHARMACIST.       Reviewed all current medications including OTC, herbals and supplements.     Labs:   Lab Results   Component Value Date    HEPBSAG Negative 08/23/2022    HEPBCAB Negative 07/07/2022     Lab Results   Component Value Date    TBGOLDPLUS Negative 07/07/2022     Lab Results   Component Value Date    ZLHXCWRU74JO 16 (L) 05/09/2022    XLVMCSIL10 196 (L) 05/09/2022     Lab Results   Component Value Date    WBC 12.04 02/09/2023    HGB 9.1 (L) 02/09/2023    HCT 27.4 (L) 02/09/2023     (H) 02/09/2023     02/09/2023     Lab Results   Component Value Date    CREATININE 0.7 12/06/2022    ALBUMIN 2.1 (L) 12/06/2022    BILITOT 0.9 12/06/2022    ALKPHOS 109 12/06/2022    AST 14 12/06/2022    ALT 7 (L) 12/06/2022         Assessment/Plan:  Lora Scott is a 32 y.o. female that  has a past medical history of Allergy, Anxiety, Asthma, Crohn's disease (ileum), Depression, Eczema,  Former smoker, Hydradenitis, Immunosuppressed status, Perineal abscess, and Psoriasis. Patient presents to clinic for a f/u visit with Dr. Bacon post delivery through C section. She is currently on Humira 40mg weekly but has not been able to inject since 1/20. Insurance plan switched at the beginning of the year therefore she had to be set up with [facility]. Despite multiple attempts to get her shipment set (4 times), Humira has not arrived yet to her home. Referred to see IBD pharmacist to assist with medication access. Patient had C section on 2/8 and OBGYN has cleared her to restart Humira this Friday 2/17.    # Recommendations:  - Called [facility] with patient present in the room. Phone call approximately 47 minutes  - Unclear what the hold up was, but shipment set to leave [facility] on 2/14 and arrive to patient on 2/15  - Pt currently has a non working phone, updated her account with [employer] with her mother phone #  - Pt to receive a tracking number tomorrow 2/14. Advised pt to notify us via message when she receives the tracking number  - Plan is to re-induce with 80mg this Friday 2/17 then restart maintenance dose of 40mg weekly next week 2/24.  - Communicated plan with her mother via facetime with patient in the room.   - Patient verbalized understanding instructions.     Lissett Ospina, PharmD  IBD Clinical Pharmacist

## 2023-02-13 NOTE — PROGRESS NOTES
"IBD PATIENT INTAKE:    COVID symptoms in the last 7 days (runny nose, sore throat, congestion, cough, fever): No  PCP: Geo Espitia  If not PCP-  number given to establish [phone]: N/A    ALLERGIES REVIEWED:  Yes    CHIEF COMPLAINT:    Chief Complaint   Patient presents with    Crohn's Disease       VITAL SIGNS:  /82 (BP Location: Left arm, Patient Position: Sitting)   Pulse 100   Temp 98.1 °F (36.7 °C)   Ht 5' 2.99" (1.6 m)   Wt 62.6 kg (138 lb 0.1 oz)   LMP 05/15/2022   SpO2 97%   Breastfeeding No   BMI 24.45 kg/m²      Change in medical, surgical, family or social history: No    IBD THERAPY (name, dose/frequency):  Humira Q 7 days   Last dose:  1/20/2023    Next dose:  1/27/2023 (did not take)  Infusion/Pharmacy: Other      NSAIDs (aspirin, ibuprofen-advil or motrin, naproxen-aleve, diclofenac-voltaren, BC powder, excedrin, goodies): No    Alternative/Complementary Medications (i.e. probiotics, turmeric, fish oil, aloe vera):      no  Name/dose:  none    Vitamins:   Vit D:  no     Vit B-12:  no   Folic Acid: no       Calcium: no     Iron:  no      MVI: no    Antibiotics (past 30 Days):  no  If yes   Indication:  Name of antibiotic:  Completion date:     REVIEWED MEDICATION LIST RECONCILED INCLUDING ABOVE MEDS:  yes                    "

## 2023-02-13 NOTE — PROGRESS NOTES
"     [facility]             Inflammatory Bowel Disease        Follow-up  Note              TODAY'S VISIT DATE:  2/13/2023    Chief Complaint:   Chief Complaint   Patient presents with    Crohn's Disease     PCP: Geo Espitia    Previous History:  Lora Scott is a 32 y.o. female with Crohn's disease (ileum, S/P ileocecal resection-42 cm of ileum/10.5 cm cecum removed with surgical path c/w stricture and fistula, 2/15/20 ileostomy takedown and mucus fistula takedown), buttock/perineal abscess (drained 12/2022, likely hydradenitis suppurativa), psoriasis, GERD, asthma, ex-smoker (quit 9/2019).  Patient reports being diagnosed with IBS with alternating diarrhea and constipation and weight loss as a teenager that was treated with Nexium which helped.  In 8/2018 she again had abd pain, weight loss, and alternating BMs so she contacted her PCP for Nexium which did not help.  By 2/2019, her symptoms progressed prompting an ER visit and on 2/18/2019 she had a CT abd/pelvis which showed an "abnormal wall thickening involving the ascending colon, cecum and terminal ileum with intense mucosal enhancement and luminal narrowing resulting in distention of proximal small bowel.  There was also an ileal-ileal and ileocolic fistulization, fat hypertrophy, increased mesenteric vascularitiy, and reactive mesenteric adenitis in the RLQ with no abscess. "  Patient had an inpatient EGD on 2/20/2019 which showed minimal chronic H. Pylori negative inflammation, normal esophagus, and duodenal biopsies showed mild villous blunting and mild increased IELs.  Colonoscopy on the same day showed congested mucosa in the TI and ICV with granularity in the TI, and TI stricture 5 cm from the ICV with ileal biopsies confirmed ileitis and biopsies of the right colon confirmed focal moderate active colitis with normal left colon and rectum, sigmoid diverticulitis, and internal hemorrhoids.  It was felt that patient would eventually " need surgical intervention but needed nutritional optimization initially so she was discharged home with 12 hours of TPN daily via a PICC and bowel rest with plans for an MRE, surgical f/u, and a referral to the IBD clinic to discuss medical options.  She was last seen by me 3/2019 for initial IBD visit at which time she was on no meds and there was plan for MRE 3/13/19 with f/u with Dr. Ruiz. MRE on 3/13/19 showed multifocal active inflammation of the distal ileum with mild fibrostenotic component and entero-enteric fistula.  She then was transferred to Dr. Cope due to availability and he initially saw her in clinic and a decision to proceed with surgery was made.  On 5/10/19 she underwent an open ileocolonic resection (10 cm of cecum, 42 cm of ileum removed) with end ileostomy with mucous fistula. Surgical pathology showed TI/cecum with chronic active ileitis with stricture and features of fistulization, transmural inflammation with reactive LNs. Postoperatively she did well and gained weight and would pass mucous from below a few times only. She emptied her stoma about 4-6 times/d and was having issues getting supplies due to insurance issues.  Patient has had multiple issues with stoma appliance/bag and due to inability to afford stoma supplies and its not covered by insurance, she has been getting samples from our wound/ostomy nurse.  Patient stopped smoking in mid September 2019.  She started on methotrexate 12.5 mg p.o. weekly though due to elevated liver function test discontinued on 10/4/2019.  She started Humira on 10/24/2019 and Imuran 50 mg daily on 10/25/2019 though never started Imuran as recommended and we decided not to proceed and instead optimize her Humira.  Ileoscopy through stoma was normal on 12/20/2019.  On 1/15/2020 patient had Humira levels 3.5 and her Humira was increased to weekly starting 3/13/20.  On 2/15/2020 she had elective takedown of her ileostomy and mucous fistula. Colonoscopy  10/2020 showed normal colon and single linear ulcer at ileocolonic anastomosis and normal neoterminal ileum with biopsies of neoterminal ileum normal. On 20 trough Humira levels and abs adequate with [id number] with no Abs on Humira 40 mg SC weekly. Patient has ongoing psoriasis being followed by [facility] dermatology and was treated for tinea pedis 2020.  She was also treated for bacterial vaginosis by her GYN in  and pap smear HPV positive with plans for colposcopy.  In 2021 patient was diagnosed with recurrent fungal infections- tineap capitis treated with griseofulvin.  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 In mid 2021 pt had abscess near vaginal area s/p I&D in [facility] in [place].  She saw Dermatology 22 and 22 for psoriasis which significantly improved with prednisone 60mg followed by taper over two weeks with plans for otezla though pt is not on this and has not seen dermatology since then. In 5/10/22 colonoscopy c/w normal colon, ileocolonic anastomosis and neoterminal ileum.     Interval History:  - Underwent  section , discharged  without complication during hospital course.  - current IBD meds: Humira 40 mg SC weekly (FD 10/24/20, 40 mg SC weekly, LD , ND )   - 1-2 formed BMs/d, no blood, no nocturnal  - 22 trough humira level 8.2/Abs neg (Humira 40 mg SC weekly)    - 22 had multiple buttock/perineal abscesses drained by [facility] general surgery (Dr. Regan Rowell) and following wound care and f/u with surgery with good wound healing and granulation, unable to examine today as pt had this recently dressed and preferred not to remove dressing, this seemed more c/w hydradenitis suppurativa  - worsening psoriasis since pregnancy but feels it is drying up and not as bad as during pregnancy   -  pregnancy- Elective  section 23.  No major noted complication, incision looked OK  - NSAID use: No  - Narcotic use: No  - Alternative/complementary meds for IBD: No    Prior Pertinent Surgeries:   5/10/19 open ileocolonic resection (10 cm of cecum, 42 cm of ileum removed) with end ileostomy with mucous fistula. Surgical pathology showed TI/cecum with chronic active ileitis with stricture and features of fistulization, transmural inflammation with reactive LNs  2/15/20 elective takedown of her ileostomy and mucous fistula;  (surgical path- peristoma granulation tissue with acute and chronic inflammation, ascending colon with acute and chronic inflammatory infiltrate with superficial erosion and focal active colitis, TI with focal granulomatous inflammation and superficial erosion)  22 Multiple abscess drainage from per-anal/gluteal cleft    Last pertinent Endoscopy/Imagin2019 EGD: normal examined duodenum (path: mild villous blunting and mild increased intraepithelial lymphocytes); normal stomach (path: minimal chronic H. pylori negative inflammation); normal esophagus  3/13/19 MRE:  multifocal active inflammation of the distal ileum with mild fibrostenotic component and entero-enteric fistula  19- ileoscopy through stoma: The 30 cm of examined through the mucous fistula is normal. The peristomal area has granular friable mucosa and the lorena-terminal ileum appeared normal. Biopsies of colon through mucus fistula- normal colon, biopsies of neoterminal ileum with minimal active inflammation  10/30/20 colonoscopy: Colon normal, single linear ulcer at the colon anastomosis. Ileum was normal as well including path.  5/10/22 colonoscopy: normal colon, normal ileocolonic anastomosis, normal neoterminal ileum. Biopsies of neoterminal ileum with nonspecific patchy increased eosinophils    Therapeutic Drug Monitoring Labs:  1/15/20 humira levels 3.5/neg Abs on humira 40 mg SC every 2  weeks  9/17/20 Trough Humira levels 12.0 and neg abs on Humira  40 mg SC every 2 weeks  2/12/21 Humira levels 7.5, no antibodies on Humira  40 mg SC every week.   7/7/22 trough humira level 8.2/Abs neg (Humira 40 mg SC weekly)      Prior IBD Therapies:  MTX 12.5 mg po weekly- stopped after a few weeks due to elevated LFTs 10/2019    Vaccinations:  Lab Results   Component Value Date    HEPBSAB Grayzone (A) 02/19/2019     Lab Results   Component Value Date    HEPAIGG Positive (A) 02/19/2019     Lab Results   Component Value Date    VARICELLAZOS 2.58 (H) 03/11/2019    VARICELLAINT Positive (A) 03/11/2019     Immunization History   Administered Date(s) Administered    COVID-19, MRNA, LN-S, PF (Pfizer) (Purple Cap) 10/22/2021    DTP 1991, 02/24/1992, 03/18/1992, 03/18/1992, 01/12/1994    HIB 1991, 02/24/1992, 03/18/1992    Hepatitis B (recombinant) Adjuvanted, 2 dose 04/17/2019, 05/20/2019    Hepatitis B, Pediatric/Adolescent 07/24/2006    IPV 07/24/2006    MMR 01/13/1994, 07/24/2006    Meningococcal Conjugate (MCV4P) 07/24/2006    OPV 1991, 02/24/1992, 01/12/1994    Pneumococcal Conjugate - 13 Valent 04/17/2019    Pneumococcal Polysaccharide - 23 Valent 06/20/2019    Td (ADULT) 07/24/2006    Td (Adult), Unspecified Formulation 07/24/2006    Tdap 07/24/2006, 04/17/2019     Flu shot:  Recommended yearly  COVID booster:  Declined   HPV:    Discuss at post-partum visit  Shingrix: recommended, defer due to cost    Review of Systems   Constitutional:  Negative for chills, fever and weight loss.   HENT:          No oral ulcers, dysphagia, oral thrush   Eyes:  Negative for blurred vision, pain and redness.   Respiratory:  Negative for cough and shortness of breath.    Cardiovascular:  Negative for chest pain.   Gastrointestinal:  Negative for abdominal pain, heartburn, nausea and vomiting.   Genitourinary:  Negative for dysuria and hematuria.   Musculoskeletal:  Negative for back pain and joint pain.   Skin:   "Positive for rash.   Psychiatric/Behavioral:  Negative for depression. The patient is not nervous/anxious and does not have insomnia.      All Medical History/Surgical History/Family History/Social History/Allergies have been reviewed and updated in EMR    Review of patient's allergies indicates:   Allergen Reactions    Horse/equine containing products Itching     No outpatient medications have been marked as taking for the 23 encounter (Office Visit) with Suzi Bacon MD.     /82 (BP Location: Left arm, Patient Position: Sitting)   Pulse 100   Temp 98.1 °F (36.7 °C)   Ht 5' 2.99" (1.6 m)   Wt 62.6 kg (138 lb 0.1 oz)   LMP 05/15/2022   SpO2 97%   Breastfeeding No   BMI 24.45 kg/m²   Physical Exam    Gen: mild distress  HENT: NCAT, oropharynx clear  Eyes: anicteric sclerae, EOMI grossly  Neck: supple, no visible masses/goiter  Cardiac: RRR  Lungs: non-labored breathing  Abd: soft, NT, normoactive BS, distended abdomen  Ext: no LE edema, warm, well perfused  Skin: skin intact on exposed body parts, extensive facial and back psoriasis lesions, well dressed posterior wounds, well approximated  incision across lower abdomen  Neuro: A&Ox4, neuro exam grossly intact, moves all extremities  Psych: appropriate mood, affect      Labs:  Lab Results   Component Value Date    CRP 8.5 (H) 2021     Lab Results   Component Value Date    HEPBSAG Negative 2022    HEPBCAB Negative 2022     Lab Results   Component Value Date    TBGOLDPLUS Negative 2022     Lab Results   Component Value Date    ORYDGMPY63WF 16 (L) 2022    FFMWISYS55 196 (L) 2022     Lab Results   Component Value Date    WBC 12.04 2023    HGB 9.1 (L) 2023    HCT 27.4 (L) 2023     (H) 2023     2023     Lab Results   Component Value Date    CREATININE 0.7 2022    ALBUMIN 2.1 (L) 2022    BILITOT 0.9 2022    ALKPHOS 109 2022    AST 14 " 2022    ALT 7 (L) 2022     Assessment/Plan:  Lora Scott is a 32 y.o. female with Crohn's disease (ileum, S/P ileocecal resection-42 cm of ileum/10.5 cm cecum removed with surgical path c/w stricture and fistula, 2/15/20 ileostomy takedown and mucus fistula takedown), buttock/perineal abscess (drained 2022, likely hydradenitis suppurativa), psoriasis (failed light treatment)  GERD, asthma, ex-smoker (quit 2019) who is on Humira 40 mg SC weekly and having formed BMs. Recent  with healed scar and OB okay with us restarting humira.  Psoriasis improved and until pt can get back in with the derm clinic we gave her prescription for triamcinolone cream. She may benefit from short course of prednisone.  She has not had humira in 3 weeks so when she resumes we plan on 80 mg SC and then 40 mg SC weekly.  Pt met with pharmacist today to help arrange shipment given pt does not have working phone.     # Crohn's disease (ileum, S/P ileocecal resection-42 cm of ileum/10.5 cm cecum removed with surgical path c/w stricture and fistula, 2/15/20 ileostomy takedown and mucus fistula takedown)  - continue Humira (80mg on ) then humira 40 mg SC weekly, ND   - pharmacist met with pt today to review schedule and arrange for shipment  - colonoscopy 2023   - vitamin B12- last visit had recommended pt taking vit B12 but not sure if pt is taking this  - ex-smoker: quit smoking 2019, some 2nd hand smoke, MJ use   - drug monitoring labs:  CBC/CMP q 6 mos (2023), TPMT (heterozygote 2019), TB quantiferon (2023), Hep B testing (2023)  - TDM: trough humira levels/abs check postpartum at next visit given levels may drop with hormonal changes    #Anemia -  -likely post-partum, recommended iron supplementation  -recheck CBC at next visit    # Skin issues  - psoriasis - worsened with pregnancy, not on otezla, encouraged to /u on status of otezla, will start triamcinalone cream today, will f/u with [facility]  dermatology  - vulvar/groin abscess - S/P I/D spring 2022  - perianal/gluteal abscesses likely hydradenitis suppurativa - s/p multiple extensive I&D  with good wound healing  - fissures side of mouth likely vit B12 def- reminded to start vit B12    # Pregnancy - delivered   - continue humira, will clarify dosing with OB  - patient is not planning to breastfeed but reviewed that okay to breastfeed on humira if she choses  - avoid live virus vaccine for the  in the first 6 months    # IBD specific health maintenance:  CRC risk: ileal disease, average risk  Skin exam yearly - sees dermatology at [facility] regularly, last exam 2022, next due 2023  Pap smear yearly due to long term immunosuppression, last pap smear normal 2020, due now- defer to gyn who is following pt closely during pregnancy  Vitamin D-- stopped high dose vit D, told to restart high dose vit D- prescription sent last visit 2022 but pt has not started, reminded to do so  Vaccines: no live vaccines    Follow up: in 1 month    Ishaan Menard MD  GI Fellow    I have reviewed and concur with the fellow's history, physical, assessment, and plan.  I have personally interviewed and examined the patient. The above note has been edited to reflect my recommendations.     Suzi Bacon MD   Department of Gastroenterology  Medical Director, Inflammatory Bowel Disease

## 2023-02-13 NOTE — PATIENT INSTRUCTIONS
Restart Iron supplements  Restart Humira - discuss with Nicole   - 80mg (2 pens this Friday 2/17) then 40mg weekly (1 pen) starting next week  OK to discuss with parents about patient's IBD medications

## 2023-02-14 ENCOUNTER — PATIENT MESSAGE (OUTPATIENT)
Dept: GASTROENTEROLOGY | Facility: CLINIC | Age: 33
End: 2023-02-14
Payer: MEDICAID

## 2023-02-15 ENCOUNTER — TELEPHONE (OUTPATIENT)
Dept: GASTROENTEROLOGY | Facility: CLINIC | Age: 33
End: 2023-02-15
Payer: MEDICAID

## 2023-02-15 ENCOUNTER — OFFICE VISIT (OUTPATIENT)
Dept: SURGERY | Facility: CLINIC | Age: 33
End: 2023-02-15
Attending: SPECIALIST
Payer: MEDICAID

## 2023-02-15 ENCOUNTER — PATIENT MESSAGE (OUTPATIENT)
Dept: GASTROENTEROLOGY | Facility: CLINIC | Age: 33
End: 2023-02-15
Payer: MEDICAID

## 2023-02-15 VITALS
HEIGHT: 63 IN | OXYGEN SATURATION: 98 % | DIASTOLIC BLOOD PRESSURE: 58 MMHG | SYSTOLIC BLOOD PRESSURE: 109 MMHG | BODY MASS INDEX: 24.8 KG/M2 | WEIGHT: 140 LBS | HEART RATE: 100 BPM

## 2023-02-15 DIAGNOSIS — Z98.890 STATUS POST INCISION AND DRAINAGE: Primary | ICD-10-CM

## 2023-02-15 PROCEDURE — 99024 PR POST-OP FOLLOW-UP VISIT: ICD-10-PCS | Mod: ,,, | Performed by: SPECIALIST

## 2023-02-15 PROCEDURE — 3074F PR MOST RECENT SYSTOLIC BLOOD PRESSURE < 130 MM HG: ICD-10-PCS | Mod: CPTII,,, | Performed by: SPECIALIST

## 2023-02-15 PROCEDURE — 99999 PR PBB SHADOW E&M-EST. PATIENT-LVL III: ICD-10-PCS | Mod: PBBFAC,,, | Performed by: SPECIALIST

## 2023-02-15 PROCEDURE — 1160F PR REVIEW ALL MEDS BY PRESCRIBER/CLIN PHARMACIST DOCUMENTED: ICD-10-PCS | Mod: CPTII,,, | Performed by: SPECIALIST

## 2023-02-15 PROCEDURE — 1160F RVW MEDS BY RX/DR IN RCRD: CPT | Mod: CPTII,,, | Performed by: SPECIALIST

## 2023-02-15 PROCEDURE — 3074F SYST BP LT 130 MM HG: CPT | Mod: CPTII,,, | Performed by: SPECIALIST

## 2023-02-15 PROCEDURE — 1159F PR MEDICATION LIST DOCUMENTED IN MEDICAL RECORD: ICD-10-PCS | Mod: CPTII,,, | Performed by: SPECIALIST

## 2023-02-15 PROCEDURE — 3078F PR MOST RECENT DIASTOLIC BLOOD PRESSURE < 80 MM HG: ICD-10-PCS | Mod: CPTII,,, | Performed by: SPECIALIST

## 2023-02-15 PROCEDURE — 3008F PR BODY MASS INDEX (BMI) DOCUMENTED: ICD-10-PCS | Mod: CPTII,,, | Performed by: SPECIALIST

## 2023-02-15 PROCEDURE — 99999 PR PBB SHADOW E&M-EST. PATIENT-LVL III: CPT | Mod: PBBFAC,,, | Performed by: SPECIALIST

## 2023-02-15 PROCEDURE — 99024 POSTOP FOLLOW-UP VISIT: CPT | Mod: ,,, | Performed by: SPECIALIST

## 2023-02-15 PROCEDURE — 3078F DIAST BP <80 MM HG: CPT | Mod: CPTII,,, | Performed by: SPECIALIST

## 2023-02-15 PROCEDURE — 1159F MED LIST DOCD IN RCRD: CPT | Mod: CPTII,,, | Performed by: SPECIALIST

## 2023-02-15 PROCEDURE — 3008F BODY MASS INDEX DOCD: CPT | Mod: CPTII,,, | Performed by: SPECIALIST

## 2023-02-15 PROCEDURE — 99213 OFFICE O/P EST LOW 20 MIN: CPT | Mod: PBBFAC | Performed by: SPECIALIST

## 2023-02-15 NOTE — TELEPHONE ENCOUNTER
----- Message from Lissett Ospina PharmD sent at 2/15/2023  1:35 PM CST -----  Regarding: RE: pt advice  Contact: [phone]  We ordered the sample yesterday, I am hoping to get it by tomorrow or Friday but I do not have a way to know for sure. Once we receive the sample we will call her and let her know so someone can come get it. She will hear from us by Friday so tell her not to inject until she hears from us. Her [facility] shipment was delivered today at 11am per tracking #.  ----- Message -----  From: Boo Amado MA  Sent: 2/15/2023   1:27 PM CST  To: Lissett Ospina PharmD  Subject: FW: pt advice                                    Please advise   ----- Message -----  From: Soila Gray  Sent: 2/15/2023   1:24 PM CST  To: Arleen Archer Staff  Subject: pt advice                                        Lora Scott calling regarding Patient Advice (message) questions regarding sample that should be coming. Please call

## 2023-02-15 NOTE — TELEPHONE ENCOUNTER
Called and spoke with Tali from [facility]  - Shipment is currently out for delivery  - Tracking # [id number]  - Per [employer] web site is to arrive today by noon  - Called and informed mother of patient  - Mother reports patients phone is back on service.   - Called pt but unable to reach. LVM   - Patient to inject 80mg pen this Friday 2/17 then start 40mg weekly with ND 2/24

## 2023-02-15 NOTE — PROGRESS NOTES
32-year-old female with history of Crohn's disease and recent  with delivery of viable infant  Patient with hidradenitis suppurativa of the buttocks status post debridement 2022    Subjective   No fever, chills   PE   Buttocks-wounds granulating without evidence of active infection     Impression/plan  Continue local care daily   RTC 3 weeks

## 2023-02-16 ENCOUNTER — PATIENT MESSAGE (OUTPATIENT)
Dept: OBSTETRICS AND GYNECOLOGY | Facility: CLINIC | Age: 33
End: 2023-02-16

## 2023-02-16 ENCOUNTER — POSTPARTUM VISIT (OUTPATIENT)
Dept: OBSTETRICS AND GYNECOLOGY | Facility: CLINIC | Age: 33
End: 2023-02-16
Payer: MEDICAID

## 2023-02-16 VITALS
DIASTOLIC BLOOD PRESSURE: 64 MMHG | BODY MASS INDEX: 22.89 KG/M2 | HEIGHT: 63 IN | SYSTOLIC BLOOD PRESSURE: 100 MMHG | WEIGHT: 129.19 LBS

## 2023-02-16 PROCEDURE — 99999 PR PBB SHADOW E&M-EST. PATIENT-LVL III: ICD-10-PCS | Mod: PBBFAC,,, | Performed by: OBSTETRICS & GYNECOLOGY

## 2023-02-16 PROCEDURE — 99999 PR PBB SHADOW E&M-EST. PATIENT-LVL III: CPT | Mod: PBBFAC,,, | Performed by: OBSTETRICS & GYNECOLOGY

## 2023-02-16 PROCEDURE — 59430 PR CARE AFTER DELIVERY ONLY: ICD-10-PCS | Mod: ,,, | Performed by: OBSTETRICS & GYNECOLOGY

## 2023-02-16 PROCEDURE — 99213 OFFICE O/P EST LOW 20 MIN: CPT | Mod: PBBFAC | Performed by: OBSTETRICS & GYNECOLOGY

## 2023-02-16 NOTE — PROGRESS NOTES
"[facility]  Ambulatory Clinic  Obstetrics & Gynecology    Date of Visit:  2023    Chief Complaint:  Post-partum visit    Subjective:      Lora Scott is a 32 y.o.  who is s/p primary low transverse  delivery here for post-partum visit. Pt has no major complaints today. Pt had an uneventful post-partum hospital course and has been doing well since delivery.  Pt reports baby is doing well and she is breast/bottle feeding without difficulty.  Her lochia light.  Pt denies abdominal/pelvic pain, fevers, abnormal vaginal discharge, symptoms of post-partum blues, breast complaints, GI or urinary compliants.  Pt is undecided on birth control at this time.    Review of Systems:      CONSTITUTIONAL:  No fever, chills, weakness, fatigue, decreased activity  HEENT: No headaches, vision changes  LUNGS:  No shortness of breath  HEART:  No palpitations, chest pain, abnormal swelling  BREAST:  No lump, pain, redness, skin changes  GI:  No nausea, vomiting, diarrhea, constipation, abdomen pain, blood in stool  URINARY:  No dysuria, hematuria, frequency  SKIN:  No rash, bruising  NEURO:  No headache, weakness  PSYCH:  No anxiety, depression, suicidal or homicidal ideations    Objective:     /64   Ht 5' 3" (1.6 m)   Wt 58.6 kg (129 lb 3 oz)   LMP 05/15/2022   Breastfeeding Unknown   BMI 22.88 kg/m²      GENERAL:  NAD, A&Ox3, well nourished.  HEENT:  NCAT, EOMI, moist mucus membranes.  Neck supple without masses.  BREAST:  Pt deferred today.  LUNGS:  Clear normal respiratory effort  HEART:  RRR with physiologic heart sounds.  ABDOMEN:  Soft, non-tender, non-distended without any guarding, rigidity or rebound.  Normoactive bowel sounds.  Incision - clean, dry, and intact.  Healing appropriately without signs of infection.    EXT:  Symmetric. No cramping, claudication, or edema. +2 distal pulses. FROM.  SKIN:  No rashes, good turgor & capillary refill.  NEURO:  Grossly intact " bilaterally. +2 DTR.  PSYCH:  Mood & affect appropriate.     PELVIC:  Pt deferred today.      Chaperone present for exam.    Assessment:     32 y.o.  s/p  delivery at term - doing well post-partum  S/p surgical drainage on abscess on buttocks on 22 by general surgery, healing well  Crohn's disease  Anemia, iron deficiency      Plan:    Post-partum care instructions and precautions reviewed.  Wound care instructions.  Pelvic rest x 6 wks post-partum.  Continue prenatal vitamins.      F/u general surgery.  Wound care lower back.    F/u with GI for Crohn's disease.    Fe supplementation, anemia precautions.    F/u with PCP for health maintenance.     Return to clinic 4 weeks for postpartum visit, or sooner as needed.  Pt voiced understanding.       Geo Espitia MD

## 2023-02-17 ENCOUNTER — POSTPARTUM VISIT (OUTPATIENT)
Dept: OBSTETRICS AND GYNECOLOGY | Facility: CLINIC | Age: 33
End: 2023-02-17
Payer: MEDICAID

## 2023-02-17 ENCOUNTER — NURSE TRIAGE (OUTPATIENT)
Dept: ADMINISTRATIVE | Facility: CLINIC | Age: 33
End: 2023-02-17
Payer: MEDICAID

## 2023-02-17 ENCOUNTER — TELEPHONE (OUTPATIENT)
Dept: GASTROENTEROLOGY | Facility: CLINIC | Age: 33
End: 2023-02-17
Payer: MEDICAID

## 2023-02-17 VITALS
WEIGHT: 129 LBS | HEIGHT: 63 IN | BODY MASS INDEX: 22.86 KG/M2 | DIASTOLIC BLOOD PRESSURE: 64 MMHG | SYSTOLIC BLOOD PRESSURE: 102 MMHG

## 2023-02-17 PROCEDURE — 99499 UNLISTED E&M SERVICE: CPT | Mod: S$PBB,,, | Performed by: OBSTETRICS & GYNECOLOGY

## 2023-02-17 PROCEDURE — 99999 PR PBB SHADOW E&M-EST. PATIENT-LVL III: CPT | Mod: PBBFAC,,, | Performed by: OBSTETRICS & GYNECOLOGY

## 2023-02-17 PROCEDURE — 99999 PR PBB SHADOW E&M-EST. PATIENT-LVL III: ICD-10-PCS | Mod: PBBFAC,,, | Performed by: OBSTETRICS & GYNECOLOGY

## 2023-02-17 PROCEDURE — 99213 OFFICE O/P EST LOW 20 MIN: CPT | Mod: PBBFAC | Performed by: OBSTETRICS & GYNECOLOGY

## 2023-02-17 PROCEDURE — 99499 NO LOS: ICD-10-PCS | Mod: S$PBB,,, | Performed by: OBSTETRICS & GYNECOLOGY

## 2023-02-17 NOTE — TELEPHONE ENCOUNTER
Pt called for c/o incision busted open. Pt said that all of a sudden she had all this red logan-aid looking liquid coming out. Pt said that she put an ABD pad across and once she calmed down I got the mom to look at the wound and she said its not gaping open she said that the fluid looked like it was bubbling up and incision is together. Pt had her PP visit with MD yesterday. I triaged and care advice to ED or office with PCP approval and I called and spoke to Nurse Duke in the office and she said that she will talk to Dr Espitia and they will give her a call. I told the pt that either I would call her back or the office staff.       Reason for Disposition   Sounds like a serious complication to the triager    Additional Information   Negative: Sounds like a life-threatening emergency to the triager   Negative: Bright red, wide-spread, sunburn-like rash   Negative: Major abdominal surgical incision and wound gaping open with visible internal organs   Negative: Sounds like a life-threatening emergency to the triager   Negative: Bleeding from incision and won't stop after 10 minutes of direct pressure   Negative: Bleeding (more than a few drops) from incision and after blood vessel surgery (e.g., carotidendarterectomy, femoral bypass graft, kidney dialysis fistula, tracheostomy)   Negative: Bright red, wide-spread, sunburn-like rash   Negative: SEVERE pain in the incision   Negative: Incision gaping open and < 2 days (48 hours) since wound re-opened   Negative: Incision gaping open and length of opening > 2 inches (5 cm)   Negative: Patient sounds very sick or weak to the triager    Protocols used: Post-Op Symptoms and [id number], Post-Op Incision Symptoms and [id number]-O-UO

## 2023-02-17 NOTE — TELEPHONE ENCOUNTER
Called and spoke with pt  - Reports receiving shipment from [employer] (4 pens of 40mg) and sample of 80mg pen from [facility]  - Reports noticing her GI symptoms are starting to worsen, stool is getting softer and some abdominal pain (unsure if due to C section or Crohns)  - Advised to inject 80mg today 2/17 then resume Humira 40mg weekly next week 2/24.  - Patient verbalized understanding instructions.     Humira 40 mg SC weekly (started 10/24/20, 40 mg SC weekly, LD 1/20, skipped 1/27, 2/3, 2/10, restart 80mg today 2/17, ND 2/24)

## 2023-02-21 NOTE — PROGRESS NOTES
"[facility]  Ambulatory Clinic  Obstetrics & Gynecology    Date of Visit:  2023    Chief Complaint:  Drainage from incision    Subjective:      Lora Scott is a 32 y.o.  who is s/p primary low transverse  delivery here for post-partum visit here with c/o drainage from incision.   Pt was seen a few days ago for post-partum visit.  On exam, incision is healing approximately with no obvious drainage.    Otherwise, pt has no major complaints today and reports doing well since delivery.    Pt reports baby is doing well and she is breast/bottle feeding without difficulty.    Lochia is scant.    Pt denies abdominal/pelvic pain, fevers, abnormal vaginal discharge, symptoms of post-partum blues, breast complaints, GI or urinary compliants.    Pt is undecided on birth control at this time.    Review of Systems:      CONSTITUTIONAL:  No fever, chills, weakness, fatigue, decreased activity  HEENT: No headaches, vision changes  LUNGS:  No shortness of breath  HEART:  No palpitations, chest pain, abnormal swelling  BREAST:  No lump, pain, redness, skin changes  GI:  No nausea, vomiting, diarrhea, constipation, abdomen pain, blood in stool  URINARY:  No dysuria, hematuria, frequency  SKIN:  No rash, bruising  NEURO:  No headache, weakness  PSYCH:  No anxiety, depression, suicidal or homicidal ideations    Objective:     /64   Ht 5' 3" (1.6 m)   Wt 58.5 kg (129 lb)   Breastfeeding Unknown   BMI 22.85 kg/m²      GENERAL:  NAD, A&Ox3, well nourished.  HEENT:  NCAT, EOMI, moist mucus membranes.  BREAST:  Pt deferred today.  LUNGS:  Clear normal respiratory effort  HEART:  RRR with physiologic heart sounds.  ABDOMEN:  Soft, non-tender, non-distended without any guarding, rigidity or rebound.  Normoactive bowel sounds.  Incision is clean, dry, and intact, healing appropriately without signs of infection.    EXT:  Symmetric. No cramping, claudication, or edema. +2 distal pulses. " FROM.  SKIN:  No rashes, good turgor & capillary refill.  NEURO:  Grossly intact bilaterally.   PSYCH:  Mood & affect appropriate.     PELVIC:  Pt deferred today.      Chaperone present for exam.    Assessment:     32 y.o.  s/p  delivery at term - doing well post-partum  S/p surgical drainage on abscess on buttocks on 22 by general surgery, healing well  Crohn's disease  Anemia, iron deficiency      Plan:    Post-partum care instructions and precautions reviewed.    Wound care instructions.    Pelvic rest x 6 wks post-partum.    Continue prenatal vitamins.      F/u general surgery.    Home wound care lower back.    F/u with GI for Crohn's disease.    Fe supplementation, anemia precautions.    F/u with PCP for health maintenance.     Return to clinic 4 weeks for postpartum visit, or sooner as needed.    All questions answered, voiced understanding.      Geo Espitia MD

## 2023-02-27 ENCOUNTER — PATIENT MESSAGE (OUTPATIENT)
Dept: OBSTETRICS AND GYNECOLOGY | Facility: CLINIC | Age: 33
End: 2023-02-27
Payer: MEDICAID

## 2023-03-02 ASSESSMENT — ENCOUNTER SYMPTOMS
DIARRHEA: 0
WEAKNESS: 0
ABDOMINAL PAIN: 0
VOMITING: 0
ABDOMINAL DISTENTION: 0
CONSTIPATION: 0
ENDOCRINE NEGATIVE: 1
SHORTNESS OF BREATH: 0
CHILLS: 0
FEVER: 0
DIZZINESS: 0
EYES NEGATIVE: 1
COUGH: 0
NAUSEA: 0

## 2023-03-03 ENCOUNTER — LAB SERVICES (OUTPATIENT)
Dept: LAB | Age: 33
End: 2023-03-03

## 2023-03-03 ENCOUNTER — OFFICE VISIT (OUTPATIENT)
Dept: OBGYN | Age: 33
End: 2023-03-03

## 2023-03-03 VITALS
SYSTOLIC BLOOD PRESSURE: 136 MMHG | RESPIRATION RATE: 17 BRPM | HEIGHT: 68 IN | TEMPERATURE: 98.6 F | DIASTOLIC BLOOD PRESSURE: 89 MMHG | WEIGHT: 283.07 LBS | BODY MASS INDEX: 42.9 KG/M2 | HEART RATE: 78 BPM

## 2023-03-03 DIAGNOSIS — Z11.3 SCREENING FOR STDS (SEXUALLY TRANSMITTED DISEASES): ICD-10-CM

## 2023-03-03 DIAGNOSIS — L73.2 HIDRADENITIS SUPPURATIVA: ICD-10-CM

## 2023-03-03 DIAGNOSIS — Z01.419 WELL WOMAN EXAM: ICD-10-CM

## 2023-03-03 DIAGNOSIS — Z01.419 WELL WOMAN EXAM: Primary | ICD-10-CM

## 2023-03-03 LAB
B-HCG UR QL: NEGATIVE
INTERNAL PROCEDURAL CONTROLS ACCEPTABLE: YES

## 2023-03-03 PROCEDURE — 99395 PREV VISIT EST AGE 18-39: CPT | Performed by: OBSTETRICS & GYNECOLOGY

## 2023-03-03 PROCEDURE — 86592 SYPHILIS TEST NON-TREP QUAL: CPT | Performed by: INTERNAL MEDICINE

## 2023-03-03 PROCEDURE — 81025 URINE PREGNANCY TEST: CPT | Performed by: OBSTETRICS & GYNECOLOGY

## 2023-03-03 PROCEDURE — 87624 HPV HI-RISK TYP POOLED RSLT: CPT | Performed by: INTERNAL MEDICINE

## 2023-03-03 PROCEDURE — 87205 SMEAR GRAM STAIN: CPT | Performed by: INTERNAL MEDICINE

## 2023-03-03 PROCEDURE — 87801 DETECT AGNT MULT DNA AMPLI: CPT | Performed by: INTERNAL MEDICINE

## 2023-03-03 PROCEDURE — 87077 CULTURE AEROBIC IDENTIFY: CPT | Performed by: INTERNAL MEDICINE

## 2023-03-03 PROCEDURE — 87563 M. GENITALIUM AMP PROBE: CPT | Performed by: INTERNAL MEDICINE

## 2023-03-03 PROCEDURE — 87481 CANDIDA DNA AMP PROBE: CPT | Performed by: INTERNAL MEDICINE

## 2023-03-03 PROCEDURE — 87389 HIV-1 AG W/HIV-1&-2 AB AG IA: CPT | Performed by: INTERNAL MEDICINE

## 2023-03-03 PROCEDURE — 87661 TRICHOMONAS VAGINALIS AMPLIF: CPT | Performed by: INTERNAL MEDICINE

## 2023-03-03 PROCEDURE — 81513 NFCT DS BV RNA VAG FLU ALG: CPT | Performed by: INTERNAL MEDICINE

## 2023-03-03 PROCEDURE — 36415 COLL VENOUS BLD VENIPUNCTURE: CPT | Performed by: INTERNAL MEDICINE

## 2023-03-03 PROCEDURE — 88142 CYTOPATH C/V THIN LAYER: CPT | Performed by: INTERNAL MEDICINE

## 2023-03-03 PROCEDURE — 87070 CULTURE OTHR SPECIMN AEROBIC: CPT | Performed by: INTERNAL MEDICINE

## 2023-03-03 RX ORDER — CLINDAMYCIN HYDROCHLORIDE 300 MG/1
300 CAPSULE ORAL 2 TIMES DAILY
Qty: 14 CAPSULE | Refills: 0 | Status: SHIPPED | OUTPATIENT
Start: 2023-03-03 | End: 2023-03-10

## 2023-03-03 ASSESSMENT — PATIENT HEALTH QUESTIONNAIRE - PHQ9
1. LITTLE INTEREST OR PLEASURE IN DOING THINGS: NOT AT ALL
CLINICAL INTERPRETATION OF PHQ2 SCORE: NO FURTHER SCREENING NEEDED
2. FEELING DOWN, DEPRESSED OR HOPELESS: NOT AT ALL
SUM OF ALL RESPONSES TO PHQ9 QUESTIONS 1 AND 2: 0
SUM OF ALL RESPONSES TO PHQ9 QUESTIONS 1 AND 2: 0

## 2023-03-04 LAB
HIV 1+2 AB+HIV1 P24 AG SERPL QL IA: NONREACTIVE
RPR SER QL: NONREACTIVE

## 2023-03-05 ENCOUNTER — PATIENT MESSAGE (OUTPATIENT)
Dept: OBSTETRICS AND GYNECOLOGY | Facility: CLINIC | Age: 33
End: 2023-03-05
Payer: MEDICAID

## 2023-03-06 ENCOUNTER — PATIENT MESSAGE (OUTPATIENT)
Dept: OBSTETRICS AND GYNECOLOGY | Facility: CLINIC | Age: 33
End: 2023-03-06
Payer: MEDICAID

## 2023-03-06 LAB
BACTERIAL VAGINOSIS VAG-IMP: POSITIVE
C ALBICANS DNA VAG QL NAA+PROBE: NOT DETECTED
C GLABRATA DNA VAG QL NAA+PROBE: NOT DETECTED
C TRACH RRNA CVX QL NAA+PROBE: NEGATIVE
Lab: NORMAL
M GENITALIUM DNA SPEC QL NAA+PROBE: NOT DETECTED
N GONORRHOEA RRNA CVX QL NAA+PROBE: NEGATIVE
SERVICE CMNT-IMP: ABNORMAL
T VAGINALIS DNA VAG QL NAA+PROBE: NOT DETECTED

## 2023-03-08 LAB
CASE RPRT: NORMAL
CLINICAL INFO: NORMAL
CYTOLOGY CVX/VAG STUDY: NORMAL
HPV16+18+45 E6+E7MRNA CVX NAA+PROBE: NEGATIVE
Lab: NORMAL
PAP EDUCATIONAL NOTE: NORMAL
SPECIMEN ADEQUACY: NORMAL

## 2023-03-09 LAB
BACTERIA SPEC ANAEROBE+AEROBE CULT: ABNORMAL
BACTERIA SPEC ANAEROBE+AEROBE CULT: ABNORMAL
GRAM STN SPEC: ABNORMAL

## 2023-03-10 RX ORDER — CLINDAMYCIN HYDROCHLORIDE 300 MG/1
300 CAPSULE ORAL 2 TIMES DAILY
Qty: 14 CAPSULE | Refills: 0 | Status: SHIPPED | OUTPATIENT
Start: 2023-03-10 | End: 2023-03-17

## 2023-03-13 ENCOUNTER — OFFICE VISIT (OUTPATIENT)
Dept: GASTROENTEROLOGY | Facility: CLINIC | Age: 33
End: 2023-03-13
Payer: MEDICAID

## 2023-03-13 ENCOUNTER — TELEPHONE (OUTPATIENT)
Dept: ENDOSCOPY | Facility: HOSPITAL | Age: 33
End: 2023-03-13
Payer: MEDICAID

## 2023-03-13 VITALS
BODY MASS INDEX: 21.36 KG/M2 | HEART RATE: 108 BPM | DIASTOLIC BLOOD PRESSURE: 67 MMHG | SYSTOLIC BLOOD PRESSURE: 101 MMHG | OXYGEN SATURATION: 99 % | TEMPERATURE: 98 F | WEIGHT: 120.56 LBS

## 2023-03-13 DIAGNOSIS — D64.9 ANEMIA, UNSPECIFIED TYPE: ICD-10-CM

## 2023-03-13 DIAGNOSIS — K50.80 CROHN'S DISEASE OF BOTH SMALL AND LARGE INTESTINE WITHOUT COMPLICATION: Primary | ICD-10-CM

## 2023-03-13 DIAGNOSIS — L40.9 PSORIASIS: ICD-10-CM

## 2023-03-13 DIAGNOSIS — K50.90 MATERNAL CROHN'S DISEASE AFFECTING PREGNANCY, ANTEPARTUM: ICD-10-CM

## 2023-03-13 DIAGNOSIS — Z98.890 STATUS POST INCISION AND DRAINAGE: ICD-10-CM

## 2023-03-13 DIAGNOSIS — O99.619 MATERNAL CROHN'S DISEASE AFFECTING PREGNANCY, ANTEPARTUM: ICD-10-CM

## 2023-03-13 DIAGNOSIS — D84.9 IMMUNOSUPPRESSED STATUS: ICD-10-CM

## 2023-03-13 PROCEDURE — 1111F PR DISCHARGE MEDS RECONCILED W/ CURRENT OUTPATIENT MED LIST: ICD-10-PCS | Mod: CPTII,S$GLB,, | Performed by: INTERNAL MEDICINE

## 2023-03-13 PROCEDURE — 1159F MED LIST DOCD IN RCRD: CPT | Mod: CPTII,S$GLB,, | Performed by: INTERNAL MEDICINE

## 2023-03-13 PROCEDURE — 1111F DSCHRG MED/CURRENT MED MERGE: CPT | Mod: CPTII,S$GLB,, | Performed by: INTERNAL MEDICINE

## 2023-03-13 PROCEDURE — 3074F SYST BP LT 130 MM HG: CPT | Mod: CPTII,S$GLB,, | Performed by: INTERNAL MEDICINE

## 2023-03-13 PROCEDURE — 3008F PR BODY MASS INDEX (BMI) DOCUMENTED: ICD-10-PCS | Mod: CPTII,S$GLB,, | Performed by: INTERNAL MEDICINE

## 2023-03-13 PROCEDURE — 99215 PR OFFICE/OUTPT VISIT, EST, LEVL V, 40-54 MIN: ICD-10-PCS | Mod: S$GLB,,, | Performed by: INTERNAL MEDICINE

## 2023-03-13 PROCEDURE — 3078F DIAST BP <80 MM HG: CPT | Mod: CPTII,S$GLB,, | Performed by: INTERNAL MEDICINE

## 2023-03-13 PROCEDURE — 3074F PR MOST RECENT SYSTOLIC BLOOD PRESSURE < 130 MM HG: ICD-10-PCS | Mod: CPTII,S$GLB,, | Performed by: INTERNAL MEDICINE

## 2023-03-13 PROCEDURE — 3008F BODY MASS INDEX DOCD: CPT | Mod: CPTII,S$GLB,, | Performed by: INTERNAL MEDICINE

## 2023-03-13 PROCEDURE — 3078F PR MOST RECENT DIASTOLIC BLOOD PRESSURE < 80 MM HG: ICD-10-PCS | Mod: CPTII,S$GLB,, | Performed by: INTERNAL MEDICINE

## 2023-03-13 PROCEDURE — 1159F PR MEDICATION LIST DOCUMENTED IN MEDICAL RECORD: ICD-10-PCS | Mod: CPTII,S$GLB,, | Performed by: INTERNAL MEDICINE

## 2023-03-13 PROCEDURE — 99215 OFFICE O/P EST HI 40 MIN: CPT | Mod: S$GLB,,, | Performed by: INTERNAL MEDICINE

## 2023-03-13 NOTE — PROGRESS NOTES
IBD PATIENT INTAKE:    COVID symptoms in the last 7 days (runny nose, sore throat, congestion, cough, fever): No  PCP: Geo Espitia  If not PCP-  number given to establish [phone]: N/A    ALLERGIES REVIEWED:  Yes    CHIEF COMPLAINT:    Chief Complaint   Patient presents with    Crohn's Disease       VITAL SIGNS:  /67 (BP Location: Right arm, Patient Position: Sitting)   Pulse 108   Temp 97.5 °F (36.4 °C)   Wt 54.7 kg (120 lb 9.5 oz)   SpO2 99%   Breastfeeding No   BMI 21.36 kg/m²      Change in medical, surgical, family or social history: Yes c section 2/8/2023    IBD THERAPY (name, dose/frequency):  Humira q7d   Last dose:  3/10    Next dose:  3/17  Infusion/Pharmacy: [facility]    NSAIDs (aspirin, ibuprofen-advil or motrin, naproxen-aleve, diclofenac-voltaren, BC powder, excedrin, goodies): No    Alternative/Complementary Medications (i.e. probiotics, turmeric, fish oil, aloe vera):      no  Name/dose:  n/a    Vitamins:   Vit D:  no     Vit B-12:  no   Folic Acid: no       Calcium: no     Iron:  no      MVI: no    Antibiotics (past 30 Days):  no  If yes   Indication:  Name of antibiotic:  Completion date:     REVIEWED MEDICATION LIST RECONCILED INCLUDING ABOVE MEDS:  yes                Answers submitted by the patient for this visit:  Established Patient Questionnaire  (Submitted on 3/13/2023)  nervous/ anxious: Yes

## 2023-03-13 NOTE — PROGRESS NOTES
"     [facility]             Inflammatory Bowel Disease        Follow-up  Note              TODAY'S VISIT DATE:  3/13/2023    Chief Complaint:   Chief Complaint   Patient presents with    Crohn's Disease       PCP: Geo Espitia    Previous History:  Lora Scott is a 33 y.o. female with Crohn's disease (ileum, S/P ileocecal resection-42 cm of ileum/10.5 cm cecum removed with surgical path c/w stricture and fistula, 2/15/20 ileostomy takedown and mucus fistula takedown), buttock/perineal abscess (drained 12/2022, likely hydradenitis suppurativa), psoriasis, GERD, asthma, ex-smoker (quit 9/2019).  Patient reports being diagnosed with IBS with alternating diarrhea and constipation and weight loss as a teenager that was treated with Nexium which helped.  In 8/2018 she again had abd pain, weight loss, and alternating BMs so she contacted her PCP for Nexium which did not help.  By 2/2019, her symptoms progressed prompting an ER visit and on 2/18/2019 she had a CT abd/pelvis which showed an "abnormal wall thickening involving the ascending colon, cecum and terminal ileum with intense mucosal enhancement and luminal narrowing resulting in distention of proximal small bowel.  There was also an ileal-ileal and ileocolic fistulization, fat hypertrophy, increased mesenteric vascularitiy, and reactive mesenteric adenitis in the RLQ with no abscess. "  Patient had an inpatient EGD on 2/20/2019 which showed minimal chronic H. Pylori negative inflammation, normal esophagus, and duodenal biopsies showed mild villous blunting and mild increased IELs.  Colonoscopy on the same day showed congested mucosa in the TI and ICV with granularity in the TI, and TI stricture 5 cm from the ICV with ileal biopsies confirmed ileitis and biopsies of the right colon confirmed focal moderate active colitis with normal left colon and rectum, sigmoid diverticulitis, and internal hemorrhoids.  It was felt that patient would " eventually need surgical intervention but needed nutritional optimization initially so she was discharged home with 12 hours of TPN daily via a PICC and bowel rest with plans for an MRE, surgical f/u, and a referral to the IBD clinic to discuss medical options.  She was last seen by me 3/2019 for initial IBD visit at which time she was on no meds and there was plan for MRE 3/13/19 with f/u with Dr. Ruiz. MRE on 3/13/19 showed multifocal active inflammation of the distal ileum with mild fibrostenotic component and entero-enteric fistula.  She then was transferred to Dr. Cope due to availability and he initially saw her in clinic and a decision to proceed with surgery was made.  On 5/10/19 she underwent an open ileocolonic resection (10 cm of cecum, 42 cm of ileum removed) with end ileostomy with mucous fistula. Surgical pathology showed TI/cecum with chronic active ileitis with stricture and features of fistulization, transmural inflammation with reactive LNs. Postoperatively she did well and gained weight and would pass mucous from below a few times only. She emptied her stoma about 4-6 times/d and was having issues getting supplies due to insurance issues.  Patient has had multiple issues with stoma appliance/bag and due to inability to afford stoma supplies and its not covered by insurance, she has been getting samples from our wound/ostomy nurse.  Patient stopped smoking in mid September 2019.  She started on methotrexate 12.5 mg p.o. weekly though due to elevated liver function test discontinued on 10/4/2019.  She started Humira on 10/24/2019 and Imuran 50 mg daily on 10/25/2019 though never started Imuran as recommended and we decided not to proceed and instead optimize her Humira.  Ileoscopy through stoma was normal on 12/20/2019.  On 1/15/2020 patient had Humira levels 3.5 and her Humira was increased to weekly starting 3/13/20.  On 2/15/2020 she had elective takedown of her ileostomy and mucous fistula.  Colonoscopy 10/2020 showed normal colon and single linear ulcer at ileocolonic anastomosis and normal neoterminal ileum with biopsies of neoterminal ileum normal. On 20 trough Humira levels and abs adequate with [id number] with no Abs on Humira 40 mg SC weekly. Patient has ongoing psoriasis being followed by [facility] dermatology and was treated for tinea pedis 2020.  She was also treated for bacterial vaginosis by her GYN in  and pap smear HPV positive with plans for colposcopy.  In 2021 patient was diagnosed with recurrent fungal infections- tineap capitis treated with griseofulvin.  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 In mid 2021 pt had abscess near vaginal area s/p [facility] [place].  She saw Dermatology 22 and 22 for psoriasis which significantly improved with prednisone 60mg followed by taper over two weeks with plans for otezla though pt is not on this and has not seen dermatology since then. In 5/10/22 colonoscopy c/w normal colon, ileocolonic anastomosis and neoterminal ileum. 22 had multiple buttock/perineal abscesses drained by [facility] general surgery (Dr. Regan Rowell) and following wound care and f/u with surgery with good wound healing and granulation and more c/w hydradenitis suppurativa. On 22 trough humira levels 8.2/Abs neg on humira 40 mg SC weekly.  She delivered a healthy baby by  23 and during pregnancy remained on Humira 40 mg SC weekly though delayed dosing towards the end of pregnancy and was off humira from -23. Due to this we reloaded her with 80 mg dose of humria 23 and she then resumed 40 mg SC weekly.    Interval History:  - current IBD meds: Humira 40 mg SC weekly (FD 10/24/20, 80 mg 23, 40 mg SC weekly, LD 3/10, ND 3/17)   - 1-2 formed BMs/d, no  blood, no nocturnal  - worsening psoriasis since pregnancy but feels it is drying up and not as bad as during pregnancy, started on triamcinolone cream at last visit, feels as if her dermatologic symptoms are improving.  - presenting for f/u evaluation today after restarting humira, restarted humira  without issues. Now on weekly 40mg SC weekly  - NSAID use: No  - Narcotic use: No  - Alternative/complementary meds for IBD: No    Prior Pertinent Surgeries:   5/10/19 open ileocolonic resection (10 cm of cecum, 42 cm of ileum removed) with end ileostomy with mucous fistula. Surgical pathology showed TI/cecum with chronic active ileitis with stricture and features of fistulization, transmural inflammation with reactive LNs  2/15/20 elective takedown of her ileostomy and mucous fistula;  (surgical path- peristoma granulation tissue with acute and chronic inflammation, ascending colon with acute and chronic inflammatory infiltrate with superficial erosion and focal active colitis, TI with focal granulomatous inflammation and superficial erosion)  22 Multiple abscess drainage from per-anal/gluteal cleft    Last pertinent Endoscopy/Imagin2019 EGD: normal examined duodenum (path: mild villous blunting and mild increased intraepithelial lymphocytes); normal stomach (path: minimal chronic H. pylori negative inflammation); normal esophagus  3/13/19 MRE:  multifocal active inflammation of the distal ileum with mild fibrostenotic component and entero-enteric fistula  19- ileoscopy through stoma: The 30 cm of examined through the mucous fistula is normal. The peristomal area has granular friable mucosa and the lorena-terminal ileum appeared normal. Biopsies of colon through mucus fistula- normal colon, biopsies of neoterminal ileum with minimal active inflammation  10/30/20 colonoscopy: Colon normal, single linear ulcer at the colon anastomosis. Ileum was normal as well including path.  5/10/22 colonoscopy:  normal colon, normal ileocolonic anastomosis, normal neoterminal ileum. Biopsies of neoterminal ileum with nonspecific patchy increased eosinophils    Therapeutic Drug Monitoring Labs:  1/15/20 humira levels 3.5/neg Abs on humira 40 mg SC every 2 weeks  9/17/20 Trough Humira levels 12.0 and neg abs on Humira  40 mg SC every 2 weeks  2/12/21 Humira levels 7.5, no antibodies on Humira  40 mg SC every week.   7/7/22 trough humira level 8.2/Abs neg (Humira 40 mg SC weekly)      Prior IBD Therapies:  MTX 12.5 mg po weekly- stopped after a few weeks due to elevated LFTs 10/2019    Vaccinations:  Lab Results   Component Value Date    HEPBSAB Grayzone (A) 02/19/2019     Lab Results   Component Value Date    HEPAIGG Positive (A) 02/19/2019     Lab Results   Component Value Date    VARICELLAZOS 2.58 (H) 03/11/2019    VARICELLAINT Positive (A) 03/11/2019     Immunization History   Administered Date(s) Administered    COVID-19, MRNA, LN-S, PF (Pfizer) (Purple Cap) 10/22/2021    DTP 1991, 02/24/1992, 03/18/1992, 03/18/1992, 01/12/1994    HIB 1991, 02/24/1992, 03/18/1992    Hepatitis B (recombinant) Adjuvanted, 2 dose 04/17/2019, 05/20/2019    Hepatitis B, Pediatric/Adolescent 07/24/2006    IPV 07/24/2006    MMR 01/13/1994, 07/24/2006    Meningococcal Conjugate (MCV4P) 07/24/2006    OPV 1991, 02/24/1992, 01/12/1994    Pneumococcal Conjugate - 13 Valent 04/17/2019    Pneumococcal Polysaccharide - 23 Valent 06/20/2019    Td (ADULT) 07/24/2006    Td (Adult), Unspecified Formulation 07/24/2006    Tdap 07/24/2006, 04/17/2019     Flu shot:  Recommended yearly  HPV: recommended  Shingrix: recommended    Review of Systems   Constitutional:  Negative for chills, fever and weight loss.   HENT:          No oral ulcers, dysphagia, oral thrush   Eyes:  Negative for blurred vision, pain and redness.   Respiratory:  Negative for cough and shortness of breath.    Cardiovascular:  Negative for chest pain.    Gastrointestinal:  Negative for abdominal pain, heartburn, nausea and vomiting.   Genitourinary:  Negative for dysuria and hematuria.   Musculoskeletal:  Negative for back pain and joint pain.   Skin:  Positive for rash.   Psychiatric/Behavioral:  Negative for depression. The patient is not nervous/anxious and does not have insomnia.      All Medical History/Surgical History/Family History/Social History/Allergies have been reviewed and updated in EMR    Review of patient's allergies indicates:   Allergen Reactions    Horse/equine containing products Itching     No outpatient medications have been marked as taking for the 3/13/23 encounter (Appointment) with Suzi Bacon MD.     There were no vitals taken for this visit.  Physical Exam    Gen: mild distress  HENT: NCAT, oropharynx clear  Eyes: anicteric sclerae, EOMI grossly  Neck: supple, no visible masses/goiter  Cardiac: RRR  Lungs: non-labored breathing  Abd: soft, NT, normoactive BS, distended abdomen  Ext: no LE edema, warm, well perfused  Skin: skin intact on exposed body parts, extensive facial and back psoriasis lesions, well dressed posterior wounds, well healed  incision across lower abdomen  Neuro: A&Ox4, neuro exam grossly intact, moves all extremities  Psych: appropriate mood, affect      Labs:  Lab Results   Component Value Date    CRP 8.5 (H) 2021     Lab Results   Component Value Date    HEPBSAG Negative 2022    HEPBCAB Negative 2022     Lab Results   Component Value Date    TBGOLDPLUS Negative 2022     Lab Results   Component Value Date    ATGUFPDO29NW 16 (L) 2022    GXKBLMDL61 196 (L) 2022     Lab Results   Component Value Date    WBC 12.04 2023    HGB 9.1 (L) 2023    HCT 27.4 (L) 2023     (H) 2023     2023     Lab Results   Component Value Date    CREATININE 0.7 2022    ALBUMIN 2.1 (L) 2022    BILITOT 0.9 2022    ALKPHOS 109  2022    AST 14 2022    ALT 7 (L) 2022     Assessment/Plan:  Lora Scott is a 33 y.o. female with Crohn's disease (ileum, S/P ileocecal resection-42 cm of ileum/10.5 cm cecum removed with surgical path c/w stricture and fistula, 2/15/20 ileostomy takedown and mucus fistula takedown), buttock/perineal abscess (drained 2022, likely hydradenitis suppurativa), psoriasis (failed light treatment)  GERD, asthma, ex-smoker (quit 2019) who is on Humira 40 mg SC weekly after induction dosing 80 mg SC on  due to several weeks off during end of pregnancy due to pt not complying with shipment and insurance issues. Since then she is doing well and her psoriasis is still active though improved with triamcinolone cream. We prescribed this until she could get in with dermatology at [facility] but she still has not made a f/u appt with them and was encouraged to do so.  She may benefit from adding treatment for psoriasis to her current regimen. There was mention previous visits with dermatology of adding otezlaHer Crohn's has remained in remission on humira and given previous fistulizing disease I would prefer not to change her treatment to stelara.  We will restage her disease with colonoscopy 2023 as planned.     # Crohn's disease (ileum, S/P ileocecal resection-42 cm of ileum/10.5 cm cecum removed with surgical path c/w stricture and fistula, 2/15/20 ileostomy takedown and mucus fistula takedown)  - continue Humira 40 mg SC weekly  - reminded pt no live vaccines for  for first 6 mos of life  - colonoscopy 2023   - discussed with patient that given her medicaid status after colonoscopy and updating labs we may start a transition to [facility] IBD clinic so she will be able to obtain all resources needed for her care including better coordination with dermatology though we want to restage her disease and make sure is stable prior to transition  - vitamin B12- recommended pt taking vit B12 at previous visits  but still has not started   - ex-smoker: quit smoking 9/2019, some 2nd hand smoke, MJ use   - drug monitoring labs:  CBC/CMP q 6 mos (4/2023), TPMT (heterozygote 2/2019), TB quantiferon (4/2023), Hep B testing (4/2023)  - TDM: trough humira levels/abs check postpartum 4/27    #Anemia -  -likely post-partum, recommended iron supplementation  -recheck CBC 4/27    # Skin issues  - psoriasis - worsened with pregnancy, not on otezla, encouraged to f/u on status of otezla, will continue triamcinalone cream   - again emphasized importance of f/u with [facility] dermatology and encouraged pt to make appt by time of her next visit with us so we can coordinate with dermatology, sees different providers so not sure who to copy on notes at this time  - vulvar/groin abscess - S/P I/D spring 2022  - perianal/gluteal abscesses likely hydradenitis suppurativa - s/p multiple extensive I&D 12/22 with good wound healing  - fissures side of mouth likely vit B12 def- reminded to start vit B12    # IBD specific health maintenance:  CRC risk: ileal disease, average risk, ordered  Skin exam yearly - sees dermatology at [facility] regularly, last exam 7/2022, next due 7/2023  Pap smear yearly due to long term immunosuppression, last pap smear normal 9/2020, due now- defer to gyn who is following pt closely during pregnancy  Vitamin D-stopped high dose vit D, told to restart high dose vit D- prescription sent last visit 7/2022 but pt has not started, reminded to do so  Vaccines: no live vaccines    Follow up: in 1 month    Ishaan Menard MD  GI Fellow    I have reviewed and concur with the fellow's history, physical, assessment, and plan.  I have personally interviewed and examined the patient. The above note has been edited to reflect my recommendations.       Suzi Bacon MD   Department of Gastroenterology  Medical Director, Inflammatory Bowel Disease

## 2023-03-13 NOTE — PATIENT INSTRUCTIONS
- labs 23  - colonoscopy 2023- ideal first week of May 2023  - no live vaccines for  for first 6 mos of life- specifically no rotavirus  - please see dermatology at [facility] before your next visit with us

## 2023-03-13 NOTE — TELEPHONE ENCOUNTER
Went to Exam room 1 to get patient to schedule her Colonoscopy with Dr. Bacon.  She was not in there. Sticker on board said to call her.    Will reach patient by phone call to help schedule the 1st week of May 2023

## 2023-03-14 ENCOUNTER — TELEPHONE (OUTPATIENT)
Dept: ENDOSCOPY | Facility: HOSPITAL | Age: 33
End: 2023-03-14
Payer: MEDICAID

## 2023-03-14 ENCOUNTER — PATIENT MESSAGE (OUTPATIENT)
Dept: ENDOSCOPY | Facility: HOSPITAL | Age: 33
End: 2023-03-14
Payer: MEDICAID

## 2023-03-20 ENCOUNTER — POSTPARTUM VISIT (OUTPATIENT)
Dept: OBSTETRICS AND GYNECOLOGY | Facility: CLINIC | Age: 33
End: 2023-03-20
Payer: MEDICAID

## 2023-03-20 VITALS — SYSTOLIC BLOOD PRESSURE: 92 MMHG | DIASTOLIC BLOOD PRESSURE: 48 MMHG | BODY MASS INDEX: 21.95 KG/M2 | WEIGHT: 123.88 LBS

## 2023-03-20 PROCEDURE — 99499 NO LOS: ICD-10-PCS | Mod: S$PBB,,, | Performed by: OBSTETRICS & GYNECOLOGY

## 2023-03-20 PROCEDURE — 99499 UNLISTED E&M SERVICE: CPT | Mod: S$PBB,,, | Performed by: OBSTETRICS & GYNECOLOGY

## 2023-03-20 PROCEDURE — 99213 OFFICE O/P EST LOW 20 MIN: CPT | Mod: PBBFAC | Performed by: OBSTETRICS & GYNECOLOGY

## 2023-03-20 PROCEDURE — 99999 PR PBB SHADOW E&M-EST. PATIENT-LVL III: ICD-10-PCS | Mod: PBBFAC,,, | Performed by: OBSTETRICS & GYNECOLOGY

## 2023-03-20 PROCEDURE — 99999 PR PBB SHADOW E&M-EST. PATIENT-LVL III: CPT | Mod: PBBFAC,,, | Performed by: OBSTETRICS & GYNECOLOGY

## 2023-03-20 NOTE — PROGRESS NOTES
[facility]  Ambulatory Clinic  Obstetrics & Gynecology    Date of Visit:  3/20/2023    Chief Complaint:  Postpartum visit    Subjective:      Lora Scott is a 33 y.o.  who is s/p primary low transverse  delivery here for post-partum visit.  Pt has no major complaints today.  Reports doing well since her last visit.  Pt reports baby is doing well and she is bottle feeding without difficulty.    Lochia is scant.    Pt denies abdominal/pelvic pain, fevers, abnormal vaginal discharge, symptoms of post-partum blues, breast complaints, GI or urinary compliants.    Pt will practice natural family planning.  Pt father present for visit.    Review of Systems:      CONSTITUTIONAL:  No fever, chills, weakness, fatigue, decreased activity  HEENT: No headaches, vision changes  LUNGS:  No shortness of breath  HEART:  No palpitations, chest pain, abnormal swelling  BREAST:  No lump, pain, redness, skin changes  GI:  No nausea, vomiting, diarrhea, constipation, abdomen pain, blood in stool  URINARY:  No dysuria, hematuria, frequency  SKIN:  No rash, bruising  NEURO:  No headache, weakness  PSYCH:  No anxiety, depression, suicidal or homicidal ideations    Objective:     BP (!) 92/48   Wt 56.2 kg (123 lb 14.4 oz)   LMP 05/15/2022   Breastfeeding No   BMI 21.95 kg/m²   Pulse 60's, Resp rate 14     GENERAL:  NAD, A&Ox3, well nourished.  HEENT:  NCAT, EOMI, moist mucus membranes.  BREAST:  Symmetric, non-tender, no obvious masses, no skin changes, no nipple discharge.  LUNGS:  Clear normal respiratory effort  HEART:  RRR with physiologic heart sounds.  ABDOMEN:  Soft, non-tender, non-distended without any guarding, rigidity or rebound.  Normoactive bowel sounds.  Pfannenstiel incision well healed.  EXT:  Symmetric. No cramping, claudication, or edema. +2 distal pulses. FROM.  SKIN:  No rashes, good turgor & capillary refill.  NEURO:  Grossly intact bilaterally.   PSYCH:  Mood & affect  appropriate.     GENITOURINARY:  NFEG no lesion. No vaginal or cervical lesion. No bleeding or discharge. Good support. No CMT. Uterus and ovaries small, NT. Deferred rectal exam. No obvious external lesions. Lesions on lower back almost completely healed.      Chaperone present for exam.    Assessment:     33 y.o.  s/p  delivery at term - doing well post-partum  S/p surgical drainage on abscess on buttocks on 22 by general surgery, healing well  Crohn's disease  Anemia, iron deficiency      Plan:    Post-partum care instructions and precautions reviewed.    Wound care instructions.    Continue prenatal vitamins.    F/u with GI for Crohn's disease.  Fe supplementation, anemia precautions.  Encourage healthy lifestyle modifications.  F/u with PCP for health maintenance.   Return to clinic 6 months for annual GYN exam, or sooner as needed, pt advised to call and schedule.  All questions answered, voiced understanding.      Geo Espitia MD

## 2023-04-20 RX ORDER — LEVOTHYROXINE SODIUM 0.2 MG/1
200 TABLET ORAL DAILY
Qty: 30 TABLET | Refills: 1 | Status: SHIPPED | OUTPATIENT
Start: 2023-04-20 | End: 2023-07-17 | Stop reason: SDUPTHER

## 2023-04-20 RX ORDER — VALACYCLOVIR HYDROCHLORIDE 500 MG/1
500 TABLET, FILM COATED ORAL 2 TIMES DAILY
Qty: 60 TABLET | Refills: 3 | Status: SHIPPED | OUTPATIENT
Start: 2023-04-20 | End: 2023-05-20

## 2023-04-21 ENCOUNTER — PATIENT MESSAGE (OUTPATIENT)
Dept: OBSTETRICS AND GYNECOLOGY | Facility: CLINIC | Age: 33
End: 2023-04-21

## 2023-04-21 ENCOUNTER — OFFICE VISIT (OUTPATIENT)
Dept: OBSTETRICS AND GYNECOLOGY | Facility: CLINIC | Age: 33
End: 2023-04-21
Payer: MEDICAID

## 2023-04-21 DIAGNOSIS — Z30.09 FAMILY PLANNING: Primary | ICD-10-CM

## 2023-04-21 DIAGNOSIS — Z32.02 PREGNANCY TEST NEGATIVE: ICD-10-CM

## 2023-04-21 LAB
B-HCG UR QL: NEGATIVE
CTP QC/QA: YES

## 2023-04-21 PROCEDURE — 1159F MED LIST DOCD IN RCRD: CPT | Mod: CPTII,,, | Performed by: OBSTETRICS & GYNECOLOGY

## 2023-04-21 PROCEDURE — 3078F DIAST BP <80 MM HG: CPT | Mod: CPTII,,, | Performed by: OBSTETRICS & GYNECOLOGY

## 2023-04-21 PROCEDURE — 3074F SYST BP LT 130 MM HG: CPT | Mod: CPTII,,, | Performed by: OBSTETRICS & GYNECOLOGY

## 2023-04-21 PROCEDURE — 3008F PR BODY MASS INDEX (BMI) DOCUMENTED: ICD-10-PCS | Mod: CPTII,,, | Performed by: OBSTETRICS & GYNECOLOGY

## 2023-04-21 PROCEDURE — 3008F BODY MASS INDEX DOCD: CPT | Mod: CPTII,,, | Performed by: OBSTETRICS & GYNECOLOGY

## 2023-04-21 PROCEDURE — 99213 OFFICE O/P EST LOW 20 MIN: CPT | Mod: S$PBB,,, | Performed by: OBSTETRICS & GYNECOLOGY

## 2023-04-21 PROCEDURE — 1159F PR MEDICATION LIST DOCUMENTED IN MEDICAL RECORD: ICD-10-PCS | Mod: CPTII,,, | Performed by: OBSTETRICS & GYNECOLOGY

## 2023-04-21 PROCEDURE — 99213 OFFICE O/P EST LOW 20 MIN: CPT | Mod: PBBFAC | Performed by: OBSTETRICS & GYNECOLOGY

## 2023-04-21 PROCEDURE — 3078F PR MOST RECENT DIASTOLIC BLOOD PRESSURE < 80 MM HG: ICD-10-PCS | Mod: CPTII,,, | Performed by: OBSTETRICS & GYNECOLOGY

## 2023-04-21 PROCEDURE — 99213 PR OFFICE/OUTPT VISIT, EST, LEVL III, 20-29 MIN: ICD-10-PCS | Mod: S$PBB,,, | Performed by: OBSTETRICS & GYNECOLOGY

## 2023-04-21 PROCEDURE — 3074F PR MOST RECENT SYSTOLIC BLOOD PRESSURE < 130 MM HG: ICD-10-PCS | Mod: CPTII,,, | Performed by: OBSTETRICS & GYNECOLOGY

## 2023-04-21 PROCEDURE — 99999 PR PBB SHADOW E&M-EST. PATIENT-LVL III: CPT | Mod: PBBFAC,,, | Performed by: OBSTETRICS & GYNECOLOGY

## 2023-04-21 PROCEDURE — 81025 URINE PREGNANCY TEST: CPT | Mod: PBBFAC | Performed by: OBSTETRICS & GYNECOLOGY

## 2023-04-21 PROCEDURE — 99999 PR PBB SHADOW E&M-EST. PATIENT-LVL III: ICD-10-PCS | Mod: PBBFAC,,, | Performed by: OBSTETRICS & GYNECOLOGY

## 2023-04-21 NOTE — PROGRESS NOTES
"[facility]  Ambulatory Clinic  Obstetrics & Gynecology    Visit Date:  2023    Chief Complaint:  Discuss birth control    History of Present Illness:      Lora Scott is a 33 y.o.  here to discuss contraceptive options.      Prior to pregnancy, menses are regular, not heavy or painful.    Pt current method of family planning is natural family planning.    Pt is interested in Nexplanon.    Pt reports doing well since delivery.    Pt denies abnormal vaginal bleeding, vaginal discharge, dysmenorrhea, dyspareunia, pelvic pain, bloating, early satiety, unintentional weight loss, breast mass/skin changes, incontinence, GI or urinary complaints.      Review of Systems:      GENERAL:  No fever, fatigue, excessive weight gain or loss  HEENT:  No headaches, hearing changes, visual disturbance  RESPIRATORY:  No cough, shortness of breath  CARDIOVASCULAR:  No chest pain, heart palpitations, leg swelling  BREAST:  No lump, pain, nipple discharge, skin changes  GASTROINTESTINAL:  No nausea, vomiting, constipation, diarrhea, abd pain, rectal bleeding   GENITOURINARY:  See HPI  ENDOCRINE:  No heat or cold intolerance  HEMATOLOGIC:  No easy bruisability or bleeding     Physical Exam:     /70   Pulse 70   Resp 16   Ht 5' 3" (1.6 m)   Wt 55.7 kg (122 lb 11 oz)   LMP 2023 (Approximate)   Breastfeeding No   BMI 21.73 kg/m²      GENERAL:  No acute distress, well-nourished  HEENT:  Atraumatic, anicteric, moist mucus membranes  LUNGS:  Clear normal respiratory effort  HEART:  Regular rate and rhythm  ABDOMEN:  Soft, non-tender, non-distended, Pfannenstiel incision well healed.  EXT:  Symmetric w/o cramping, claudication, or edema. +2 distal pulses.  SKIN:  No rashes or bruising  PSYCH:  Mood and affect appropriate  NEURO:  Grossly intact bilaterally  PELVIC:  Patient deferred today    Chaperone present for exam.    Assessment:     33 y.o. :    Family planning - order " Nexplanon    Plan:    We discussed in detail her contraceptive options.  After an extensive discussion, pt is requesting Nexplanon.  Risks, benefits, and alternatives to Nexplanon discussed.  Will order Nexplanon pending insurance benefits verification process.  Continue natural family planning in meantime.    Encourage healthy lifestyle modifications.    F/u with PCP for health maintenance.     Will schedule pt for Nexplanon insertion once received by office.     Return sooner as needed.  All questions answered, pt voiced understanding.        Geo Espitia MD

## 2023-04-25 VITALS
HEIGHT: 63 IN | BODY MASS INDEX: 21.74 KG/M2 | RESPIRATION RATE: 16 BRPM | SYSTOLIC BLOOD PRESSURE: 118 MMHG | DIASTOLIC BLOOD PRESSURE: 70 MMHG | HEART RATE: 70 BPM | WEIGHT: 122.69 LBS

## 2023-04-27 ENCOUNTER — TELEPHONE (OUTPATIENT)
Dept: OBSTETRICS AND GYNECOLOGY | Facility: CLINIC | Age: 33
End: 2023-04-27
Payer: MEDICAID

## 2023-04-27 NOTE — TELEPHONE ENCOUNTER
Pt call was returned. Pt was rescheduled to may 9,2023 at 1040 am.      ----- Message from Altagracia Wade sent at 4/27/2023 11:08 AM CDT -----  Regarding: patient call back  Type: Patient Call Back    Who called: Self     What is the request in detail: Asking for a later time for her scheduled procedure on 5/3 because her son has an apt     Can the clinic reply by [facility]? No     Would the patient rather a call back or a response via [facility]? Call     Best call back number: [phone]

## 2023-05-01 ENCOUNTER — LAB VISIT (OUTPATIENT)
Dept: LAB | Facility: HOSPITAL | Age: 33
End: 2023-05-01
Attending: INTERNAL MEDICINE
Payer: MEDICAID

## 2023-05-01 ENCOUNTER — TELEPHONE (OUTPATIENT)
Dept: GASTROENTEROLOGY | Facility: CLINIC | Age: 33
End: 2023-05-01
Payer: MEDICAID

## 2023-05-01 DIAGNOSIS — K50.80 CROHN'S DISEASE OF BOTH SMALL AND LARGE INTESTINE WITHOUT COMPLICATION: Primary | ICD-10-CM

## 2023-05-01 DIAGNOSIS — E87.6 HYPOKALEMIA: ICD-10-CM

## 2023-05-01 DIAGNOSIS — K50.80 CROHN'S DISEASE OF BOTH SMALL AND LARGE INTESTINE WITHOUT COMPLICATION: ICD-10-CM

## 2023-05-01 LAB
ALBUMIN SERPL BCP-MCNC: 3.6 G/DL (ref 3.5–5.2)
ALP SERPL-CCNC: 106 U/L (ref 55–135)
ALT SERPL W/O P-5'-P-CCNC: 23 U/L (ref 10–44)
ANION GAP SERPL CALC-SCNC: 7 MMOL/L (ref 8–16)
AST SERPL-CCNC: 21 U/L (ref 10–40)
BASOPHILS # BLD AUTO: 0.02 K/UL (ref 0–0.2)
BASOPHILS NFR BLD: 0.3 % (ref 0–1.9)
BILIRUB SERPL-MCNC: 0.3 MG/DL (ref 0.1–1)
BUN SERPL-MCNC: 6 MG/DL (ref 6–20)
CALCIUM SERPL-MCNC: 9.5 MG/DL (ref 8.7–10.5)
CHLORIDE SERPL-SCNC: 107 MMOL/L (ref 95–110)
CO2 SERPL-SCNC: 22 MMOL/L (ref 23–29)
CREAT SERPL-MCNC: 0.8 MG/DL (ref 0.5–1.4)
DIFFERENTIAL METHOD: ABNORMAL
EOSINOPHIL # BLD AUTO: 0.2 K/UL (ref 0–0.5)
EOSINOPHIL NFR BLD: 2.7 % (ref 0–8)
ERYTHROCYTE [DISTWIDTH] IN BLOOD BY AUTOMATED COUNT: 16.3 % (ref 11.5–14.5)
EST. GFR  (NO RACE VARIABLE): >60 ML/MIN/1.73 M^2
GLUCOSE SERPL-MCNC: 82 MG/DL (ref 70–110)
HBV CORE AB SERPL QL IA: NORMAL
HBV SURFACE AB SER-ACNC: 145.77 MIU/ML
HBV SURFACE AB SER-ACNC: REACTIVE M[IU]/ML
HBV SURFACE AG SERPL QL IA: NORMAL
HCT VFR BLD AUTO: 37.4 % (ref 37–48.5)
HGB BLD-MCNC: 10.9 G/DL (ref 12–16)
IMM GRANULOCYTES # BLD AUTO: 0.01 K/UL (ref 0–0.04)
IMM GRANULOCYTES NFR BLD AUTO: 0.1 % (ref 0–0.5)
LYMPHOCYTES # BLD AUTO: 3.2 K/UL (ref 1–4.8)
LYMPHOCYTES NFR BLD: 47.7 % (ref 18–48)
MCH RBC QN AUTO: 27.3 PG (ref 27–31)
MCHC RBC AUTO-ENTMCNC: 29.1 G/DL (ref 32–36)
MCV RBC AUTO: 94 FL (ref 82–98)
MONOCYTES # BLD AUTO: 0.3 K/UL (ref 0.3–1)
MONOCYTES NFR BLD: 4 % (ref 4–15)
NEUTROPHILS # BLD AUTO: 3.1 K/UL (ref 1.8–7.7)
NEUTROPHILS NFR BLD: 45.2 % (ref 38–73)
NRBC BLD-RTO: 0 /100 WBC
PLATELET # BLD AUTO: 358 K/UL (ref 150–450)
PMV BLD AUTO: 9.6 FL (ref 9.2–12.9)
POTASSIUM SERPL-SCNC: 3.2 MMOL/L (ref 3.5–5.1)
PROT SERPL-MCNC: 9.4 G/DL (ref 6–8.4)
RBC # BLD AUTO: 3.99 M/UL (ref 4–5.4)
SODIUM SERPL-SCNC: 136 MMOL/L (ref 136–145)
WBC # BLD AUTO: 6.75 K/UL (ref 3.9–12.7)

## 2023-05-01 PROCEDURE — 36415 COLL VENOUS BLD VENIPUNCTURE: CPT | Performed by: INTERNAL MEDICINE

## 2023-05-01 PROCEDURE — 80053 COMPREHEN METABOLIC PANEL: CPT | Performed by: INTERNAL MEDICINE

## 2023-05-01 PROCEDURE — 86706 HEP B SURFACE ANTIBODY: CPT | Mod: 91 | Performed by: INTERNAL MEDICINE

## 2023-05-01 PROCEDURE — 85025 COMPLETE CBC W/AUTO DIFF WBC: CPT | Performed by: INTERNAL MEDICINE

## 2023-05-01 PROCEDURE — 86704 HEP B CORE ANTIBODY TOTAL: CPT | Performed by: INTERNAL MEDICINE

## 2023-05-01 PROCEDURE — 87340 HEPATITIS B SURFACE AG IA: CPT | Performed by: INTERNAL MEDICINE

## 2023-05-01 PROCEDURE — 80145 DRUG ASSAY ADALIMUMAB: CPT | Performed by: INTERNAL MEDICINE

## 2023-05-01 RX ORDER — POTASSIUM CHLORIDE 1500 MG/1
20 TABLET, EXTENDED RELEASE ORAL DAILY
Qty: 5 TABLET | Refills: 0 | Status: SHIPPED | OUTPATIENT
Start: 2023-05-01 | End: 2024-01-30

## 2023-05-01 NOTE — TELEPHONE ENCOUNTER
Called & spoke to pt  - Reviewed low K+  - Informed of plan for RX to be sent in to local pharmacy  - All questions answered regarding upcoming procedure w/ Dr. Bacon

## 2023-05-01 NOTE — TELEPHONE ENCOUNTER
----- Message from Suzi Bacon MD sent at 5/1/2023 12:39 PM CDT -----  Potassium is low  Please supplement 20 meq daily for 5 days

## 2023-05-03 ENCOUNTER — PATIENT MESSAGE (OUTPATIENT)
Dept: OBSTETRICS AND GYNECOLOGY | Facility: CLINIC | Age: 33
End: 2023-05-03
Payer: MEDICAID

## 2023-05-03 ENCOUNTER — V-VISIT (OUTPATIENT)
Dept: BARIATRICS/WEIGHT MGMT | Age: 33
End: 2023-05-03

## 2023-05-03 DIAGNOSIS — Z98.84 S/P GASTRIC BYPASS: ICD-10-CM

## 2023-05-03 DIAGNOSIS — E78.00 HYPERCHOLESTEREMIA: ICD-10-CM

## 2023-05-03 DIAGNOSIS — I10 BENIGN ESSENTIAL HTN: Primary | ICD-10-CM

## 2023-05-03 LAB — ADALIMUMAB SERPL IA-MCNC: 13.5 MCG/ML

## 2023-05-03 PROCEDURE — 99215 OFFICE O/P EST HI 40 MIN: CPT | Performed by: SURGERY

## 2023-05-03 RX ORDER — AMOXICILLIN AND CLAVULANATE POTASSIUM 875; 125 MG/1; MG/1
1 TABLET, FILM COATED ORAL 2 TIMES DAILY
COMMUNITY
Start: 2023-04-06

## 2023-05-04 ENCOUNTER — TELEPHONE (OUTPATIENT)
Dept: ENDOSCOPY | Facility: HOSPITAL | Age: 33
End: 2023-05-04
Payer: MEDICAID

## 2023-05-04 ENCOUNTER — HOSPITAL ENCOUNTER (OUTPATIENT)
Facility: HOSPITAL | Age: 33
Discharge: HOME | End: 2023-05-04
Attending: INTERNAL MEDICINE | Admitting: INTERNAL MEDICINE
Payer: MEDICAID

## 2023-05-04 VITALS
RESPIRATION RATE: 16 BRPM | DIASTOLIC BLOOD PRESSURE: 67 MMHG | HEART RATE: 88 BPM | TEMPERATURE: 99 F | HEIGHT: 63 IN | SYSTOLIC BLOOD PRESSURE: 107 MMHG | WEIGHT: 111 LBS | OXYGEN SATURATION: 99 % | BODY MASS INDEX: 19.67 KG/M2

## 2023-05-04 DIAGNOSIS — K50.90 CROHN DISEASE: ICD-10-CM

## 2023-05-04 DIAGNOSIS — K50.80 CROHN'S DISEASE OF BOTH SMALL AND LARGE INTESTINE WITHOUT COMPLICATION: Primary | ICD-10-CM

## 2023-05-04 PROCEDURE — 25000003 PHARM REV CODE 250: Performed by: INTERNAL MEDICINE

## 2023-05-04 RX ORDER — SODIUM CHLORIDE 9 MG/ML
INJECTION, SOLUTION INTRAVENOUS CONTINUOUS
Status: DISCONTINUED | OUTPATIENT
Start: 2023-05-04 | End: 2023-05-04 | Stop reason: HOSPADM

## 2023-05-04 RX ADMIN — SODIUM CHLORIDE: 9 INJECTION, SOLUTION INTRAVENOUS at 12:05

## 2023-05-04 NOTE — PLAN OF CARE
Plan of care reviewed with patient. Pt verbalizes understanding. All questions and concerns addressed. Pre-procedure complete.    Dr. Bacon updated on prep completion (less than 50%) and stool is brown. She will assess the pt and determine if we proceed.

## 2023-05-04 NOTE — TELEPHONE ENCOUNTER
Spoke to patient. Offered 5/15/23, patient declined. Colonoscopy rescheduled. 6/1/23 with an arrival time of 12:20 PM confirmed.  Instructions reviewed and verbalized. Instructions sent via portal.  Patient verbalized an understanding.

## 2023-05-04 NOTE — H&P
Short Stay Endoscopy History and Physical    PCP - Geo Espitia MD  Referring Physician - Suzi Bacon MD  [address] [place] [address]    Procedure - Colonoscopy  ASA - per anesthesia  Mallampati - per anesthesia  History of Anesthesia problems - no  Family history Anesthesia problems -  no   Plan of anesthesia - General    HPI  33 y.o. female  Reason for procedure:   Crohn's f/u    ROS:  Constitutional: No fevers, chills, No weight loss  CV: No chest pain  Pulm: No cough, No shortness of breath  GI: see HPI    Medical History:  has a past medical history of Allergy, Anxiety, Asthma, Crohn's disease (ileum), Depression, Eczema, Former smoker, Hydradenitis, Immunosuppressed status, Perineal abscess (2021), and Psoriasis.    Surgical History:  has a past surgical history that includes Colonoscopy (N/A, 2019); Esophagogastroduodenoscopy (N/A, 2019); Ileocolectomy (N/A, 05/10/2019); Ileostomy (N/A, 05/10/2019); Ileoscopy (N/A, 2019); Ileostomy closure (N/A, 2020); Colonoscopy (N/A, 10/30/2020); Colonoscopy (N/A, 05/10/2022); Incision and drainage of buttock (Bilateral, 2022);  section (N/A, 2023); and  section.    Family History: family history includes Cataracts in her maternal grandfather; Hypertension in her mother; Irritable bowel syndrome in her paternal grandmother; No Known Problems in her brother, father, maternal grandmother, maternal uncle, maternal uncle, maternal uncle, paternal aunt, paternal aunt, paternal grandfather, paternal uncle, paternal uncle, paternal uncle, paternal uncle, sister, sister, sister, and sister..    Social History:  reports that she has been smoking cigarettes. She has been smoking an average of .25 packs per day. She has never used smokeless tobacco. She reports that she does not currently use alcohol. She reports current drug use. Frequency: 3.00 times per week. Drug: Marijuana.    Review of patient's  allergies indicates:   Allergen Reactions    Horse/equine containing products Itching       Medications:   Medications Prior to Admission   Medication Sig Dispense Refill Last Dose    acetaminophen (TYLENOL EXTRA STRENGTH) 500 MG tablet Take 2 tablets (1,000 mg total) by mouth every 6 (six) hours as needed for Pain. (Patient not taking: Reported on 4/21/2023) 100 tablet 2     acetaminophen (TYLENOL) 325 MG tablet Take 2 tablets (650 mg total) by mouth every 6 (six) hours. (Patient not taking: Reported on 4/21/2023) 30 tablet 2     adalimumab (HUMIRA,CF,) 40 mg/0.4 mL SyKt Inject 0.4 mLs (40 mg total) into the skin every 7 days. 4 each 5     docusate sodium (COLACE) 100 MG capsule Take 1 capsule (100 mg total) by mouth 2 (two) times daily. (Patient not taking: Reported on 4/21/2023) 60 capsule 1     FOLIC ACID ORAL Take by mouth.       ibuprofen (ADVIL,MOTRIN) 800 MG tablet Take 1 tablet (800 mg total) by mouth every 8 (eight) hours as needed for Pain. (Patient not taking: Reported on 4/21/2023) 60 tablet 2     oxyCODONE (ROXICODONE) 5 MG immediate release tablet Take 1 tablet (5 mg total) by mouth every 4 (four) hours as needed for Pain. (Patient not taking: Reported on 4/21/2023) 20 tablet 0     potassium chloride (K-TAB) 20 mEq Take 1 tablet (20 mEq total) by mouth once daily. Drink water with medicine and stay upright for 30 min after taking 5 tablet 0     triamcinolone acetonide 0.1% (KENALOG) 0.1 % cream Apply topically 2 (two) times daily. 453.6 g 1        Physical Exam:    Vital Signs: There were no vitals filed for this visit.    General Appearance: Well appearing in no acute distress  Abdomen: Soft, non tender, non distended with normal bowel sounds, no masses    Labs:  Lab Results   Component Value Date    WBC 6.75 05/01/2023    HGB 10.9 (L) 05/01/2023    HCT 37.4 05/01/2023     05/01/2023    TRIG 41 05/06/2019    ALT 23 05/01/2023    AST 21 05/01/2023     05/01/2023    K 3.2 (L) 05/01/2023      05/01/2023    CREATININE 0.8 05/01/2023    BUN 6 05/01/2023    CO2 22 (L) 05/01/2023    TSH 0.713 08/19/2015    HGBA1C 4.0 08/23/2022       I have explained the risks and benefits of this endoscopic procedure to the patient including but not limited to bleeding, inflammation, infection, perforation, and death.      Suzi Bacon MD

## 2023-05-04 NOTE — PLAN OF CARE
Dr. Bacon spoke with Lora and decided she will need to reschedule. Stressed the importance of following prep instructions. Pt taken to schedule procedure.

## 2023-05-09 ENCOUNTER — TELEPHONE (OUTPATIENT)
Dept: OBGYN | Age: 33
End: 2023-05-09

## 2023-05-09 ENCOUNTER — PROCEDURE VISIT (OUTPATIENT)
Dept: OBSTETRICS AND GYNECOLOGY | Facility: CLINIC | Age: 33
End: 2023-05-09
Payer: MEDICAID

## 2023-05-09 VITALS
HEIGHT: 63 IN | BODY MASS INDEX: 22.27 KG/M2 | WEIGHT: 125.69 LBS | DIASTOLIC BLOOD PRESSURE: 64 MMHG | SYSTOLIC BLOOD PRESSURE: 110 MMHG

## 2023-05-09 DIAGNOSIS — Z30.017 NEXPLANON INSERTION: Primary | ICD-10-CM

## 2023-05-09 DIAGNOSIS — Z32.02 PREGNANCY TEST NEGATIVE: ICD-10-CM

## 2023-05-09 LAB
B-HCG UR QL: NEGATIVE
CTP QC/QA: YES

## 2023-05-09 PROCEDURE — 99499 NO LOS: ICD-10-PCS | Mod: S$PBB,,, | Performed by: OBSTETRICS & GYNECOLOGY

## 2023-05-09 PROCEDURE — 11981 INSERTION DRUG DLVR IMPLANT: CPT | Mod: PBBFAC | Performed by: OBSTETRICS & GYNECOLOGY

## 2023-05-09 PROCEDURE — 11981 PR INSERT, DRUG DELIVERY IMPLANT, BIORESORB/BIODEGR/NON-BIODEGR: ICD-10-PCS | Mod: S$PBB,,, | Performed by: OBSTETRICS & GYNECOLOGY

## 2023-05-09 PROCEDURE — 99499 UNLISTED E&M SERVICE: CPT | Mod: S$PBB,,, | Performed by: OBSTETRICS & GYNECOLOGY

## 2023-05-09 PROCEDURE — 81025 URINE PREGNANCY TEST: CPT | Mod: PBBFAC | Performed by: OBSTETRICS & GYNECOLOGY

## 2023-05-09 PROCEDURE — 11981 INSERTION DRUG DLVR IMPLANT: CPT | Mod: S$PBB,,, | Performed by: OBSTETRICS & GYNECOLOGY

## 2023-05-09 RX ORDER — ETONOGESTREL 68 MG/1
68 IMPLANT SUBCUTANEOUS ONCE
COMMUNITY

## 2023-05-09 NOTE — PROCEDURES
"[facility]  Ambulatory Clinic   Obstetrics & Gynecology    Date:  2023    Procedure:  Nexplanon insertion (CPT 17762, NDC 7337-2045-28)    LMP:  Patient's last menstrual period was 2023 (approximate).    UPT:  Negative    Indication:  Desired long-term, reversible contraception     History:      Lora Scott is a 33 y.o.  desires implant for contraception with Nexplanon.  Currently Nexplanon.  Pt has no major complaints today.       Consents:     We discussed the risks, benefits, indications, and alternatives to the Nexplanon including but not limited to risk of bleeding, infection, and scarring at insertion site and dysfunctional uterine bleeding.  All of her questions were answered to her satisfaction. Pt voiced understanding and written informed consents obtained.     Vitals:  /64   Ht 5' 3" (1.6 m)   Wt 57 kg (125 lb 10.6 oz)   LMP 2023 (Approximate)   Breastfeeding No   BMI 22.26 kg/m²     Procedure Details:      A time out was performed to confirmed the correct patient and procedure.    Insertion site:  Left arm      Lot # [id number]    Expiration Date:  25     Insertion site was selected 8 - 10 cm from medial epicondyle overlying the triceps muscle and marked along with guiding site using sterile marker.    Procedure area was prepped and draped in a sterile fashion.    2 mL of 1% lidocaine with epinephrine was injected at the insertion site.    Nexplanon trocar was inserted subcutaneously and then Nexplanon capsule delivered subcutaneously.    Trocar was removed from the insertion site.    Nexplanon capsule was palpated by provider and patient to assure satisfactory placement.    A steri-strip and pressure dressing were applied.    Pt tolerated the procedure well.  Sterile technique was maintained.  Hemostasis noted.  VSSAF, pain scale 0/10 at end of procedure.    Complications:  None    Follow-up:     Standard post-procedure care is explained and " return precautions are given.    Manage post Nexplanon placement pain with NSAIDS, Tylenol prn.    Pt was clearly reminded that the Nexplanon will need to be removed within 3 years from date of insertion.    Return 4 weeks for Nexplanon check, or sooner for any concerns.  All questions answered, pt voiced understanding.  Go to ER for any emergencies.        Geo Espitia MD

## 2023-05-10 ENCOUNTER — LAB SERVICES (OUTPATIENT)
Dept: LAB | Age: 33
End: 2023-05-10

## 2023-05-10 ENCOUNTER — OFFICE VISIT (OUTPATIENT)
Dept: OBGYN | Age: 33
End: 2023-05-10

## 2023-05-10 DIAGNOSIS — Z11.3 ROUTINE SCREENING FOR STI (SEXUALLY TRANSMITTED INFECTION): Primary | ICD-10-CM

## 2023-05-10 DIAGNOSIS — Z11.3 ROUTINE SCREENING FOR STI (SEXUALLY TRANSMITTED INFECTION): ICD-10-CM

## 2023-05-10 PROCEDURE — 87389 HIV-1 AG W/HIV-1&-2 AB AG IA: CPT | Performed by: INTERNAL MEDICINE

## 2023-05-10 PROCEDURE — 87661 TRICHOMONAS VAGINALIS AMPLIF: CPT | Performed by: INTERNAL MEDICINE

## 2023-05-10 PROCEDURE — 87591 N.GONORRHOEAE DNA AMP PROB: CPT | Performed by: INTERNAL MEDICINE

## 2023-05-10 PROCEDURE — 86592 SYPHILIS TEST NON-TREP QUAL: CPT | Performed by: INTERNAL MEDICINE

## 2023-05-10 PROCEDURE — 99213 OFFICE O/P EST LOW 20 MIN: CPT

## 2023-05-10 PROCEDURE — 87491 CHLMYD TRACH DNA AMP PROBE: CPT | Performed by: INTERNAL MEDICINE

## 2023-05-10 PROCEDURE — 87481 CANDIDA DNA AMP PROBE: CPT | Performed by: INTERNAL MEDICINE

## 2023-05-10 PROCEDURE — 87563 M. GENITALIUM AMP PROBE: CPT | Performed by: INTERNAL MEDICINE

## 2023-05-10 PROCEDURE — 36415 COLL VENOUS BLD VENIPUNCTURE: CPT

## 2023-05-10 PROCEDURE — 81513 NFCT DS BV RNA VAG FLU ALG: CPT | Performed by: INTERNAL MEDICINE

## 2023-05-10 RX ORDER — FLUCONAZOLE 150 MG/1
150 TABLET ORAL ONCE
Qty: 12 TABLET | Refills: 0 | Status: SHIPPED | OUTPATIENT
Start: 2023-05-10 | End: 2023-05-10

## 2023-05-11 LAB
BACTERIAL VAGINOSIS VAG-IMP: NOT DETECTED
C ALBICANS DNA VAG QL NAA+PROBE: NOT DETECTED
C GLABRATA DNA VAG QL NAA+PROBE: NOT DETECTED
C TRACH RRNA SPEC QL NAA+PROBE: NEGATIVE
HIV 1+2 AB+HIV1 P24 AG SERPL QL IA: NONREACTIVE
Lab: NORMAL
M GENITALIUM DNA SPEC QL NAA+PROBE: NOT DETECTED
N GONORRHOEA RRNA SPEC QL NAA+PROBE: NEGATIVE
RPR SER QL: NONREACTIVE
SERVICE CMNT-IMP: NORMAL
T VAGINALIS DNA VAG QL NAA+PROBE: NOT DETECTED

## 2023-05-12 ENCOUNTER — TELEPHONE (OUTPATIENT)
Dept: GASTROENTEROLOGY | Facility: CLINIC | Age: 33
End: 2023-05-12
Payer: MEDICAID

## 2023-05-20 ENCOUNTER — HOSPITAL ENCOUNTER (EMERGENCY)
Facility: HOSPITAL | Age: 33
Discharge: HOME | End: 2023-05-20
Attending: STUDENT IN AN ORGANIZED HEALTH CARE EDUCATION/TRAINING PROGRAM
Payer: MEDICAID

## 2023-05-20 VITALS
WEIGHT: 125 LBS | SYSTOLIC BLOOD PRESSURE: 108 MMHG | DIASTOLIC BLOOD PRESSURE: 63 MMHG | HEART RATE: 85 BPM | TEMPERATURE: 98 F | OXYGEN SATURATION: 100 % | RESPIRATION RATE: 16 BRPM | BODY MASS INDEX: 22.14 KG/M2

## 2023-05-20 DIAGNOSIS — L30.9 DERMATITIS: Primary | ICD-10-CM

## 2023-05-20 PROCEDURE — 99284 EMERGENCY DEPT VISIT MOD MDM: CPT

## 2023-05-20 PROCEDURE — 25000003 PHARM REV CODE 250: Performed by: PHYSICIAN ASSISTANT

## 2023-05-20 RX ORDER — HYDROXYZINE PAMOATE 25 MG/1
25 CAPSULE ORAL
Status: COMPLETED | OUTPATIENT
Start: 2023-05-20 | End: 2023-05-20

## 2023-05-20 RX ORDER — HYDROXYZINE HYDROCHLORIDE 25 MG/1
25 TABLET, FILM COATED ORAL 3 TIMES DAILY PRN
Qty: 12 TABLET | Refills: 0 | Status: SHIPPED | OUTPATIENT
Start: 2023-05-20 | End: 2024-01-30

## 2023-05-20 RX ORDER — METHYLPREDNISOLONE 4 MG/1
TABLET ORAL
Qty: 1 EACH | Refills: 0 | Status: SHIPPED | OUTPATIENT
Start: 2023-05-20 | End: 2023-06-10

## 2023-05-20 RX ORDER — CLINDAMYCIN HYDROCHLORIDE 150 MG/1
300 CAPSULE ORAL
Status: COMPLETED | OUTPATIENT
Start: 2023-05-20 | End: 2023-05-20

## 2023-05-20 RX ORDER — CLINDAMYCIN HYDROCHLORIDE 150 MG/1
450 CAPSULE ORAL EVERY 8 HOURS
Qty: 63 CAPSULE | Refills: 0 | Status: SHIPPED | OUTPATIENT
Start: 2023-05-20 | End: 2023-05-27

## 2023-05-20 RX ADMIN — HYDROXYZINE PAMOATE 25 MG: 25 CAPSULE ORAL at 08:05

## 2023-05-20 RX ADMIN — CLINDAMYCIN HYDROCHLORIDE 300 MG: 150 CAPSULE ORAL at 08:05

## 2023-05-21 NOTE — ED NOTES
Pt declines to have a pelvic exam by provider. Pt states she will make an jocelyne with MD but that must leave because her baby is hungry.

## 2023-05-21 NOTE — DISCHARGE INSTRUCTIONS

## 2023-05-21 NOTE — ED NOTES
"Rash around mouth and back of neck: pt reports Hx of eczema and psoriasis. Pt reports her rash starting >2 months ago but states that has gotten very "painful and itchy the last few days." Pt's skin is dry, flaky and some discoloration is noted around the affected area. Pt also complains of "I am leaking fluid out of skin down there." Pt states that there a "pimple like wound from her groin region that has been draining. Pt states it creates a foul smell. Pt denies abd pain, pelvic pain, vaginal discharge or dysuria.   "

## 2023-05-21 NOTE — ED PROVIDER NOTES
"Encounter Date: 2023       History     Chief Complaint   Patient presents with    Female  Problem     Pt presents to the ED with c/o vaginal discharge and odor for "a few weeks"    skin issue     Rash and flaking to hairline along neck     Was healthy 33-year-old female presents to the ER with multiple complaints.  Patient endorses a history of eczema.  She reports a rash and dry skin to the back of her hairline that has been very irritating for a couple of weeks.  She also reports that some of the area is starting to feel wet.  Additionally she complains a skin rash in the vaginal region.  She reports a vaginal odor that is different than normal.  Denies any concern for STD.  Denies any pelvic pain or vaginal pain.  She appears well and nontoxic.  No chest pain, no abdominal pain.  No fever no chills.    Review of patient's allergies indicates:   Allergen Reactions    Horse/equine containing products Itching     Past Medical History:   Diagnosis Date    Allergy     Anxiety     Asthma     Crohn's disease (ileum)     S/P ileocecal resection-42 cm of ileum/10.5 cm cecum removed with surgical path c/w stricture and fistula 2/15/20 ileostomy takedown and mucus fistula takedown    Depression     Eczema     Former smoker     Hydradenitis     Immunosuppressed status     Perineal abscess 2021    Psoriasis      Past Surgical History:   Procedure Laterality Date     SECTION N/A 2023    Procedure:  SECTION;  Surgeon: Geo Espitia MD;  Location: [facility] L&D OR;  Service: OB/GYN;  Laterality: N/A;     SECTION      COLONOSCOPY N/A 2019    Procedure: COLONOSCOPY;  Surgeon: Fawad Perla MD;  Location: [facility] (2ND FLR);  Service: Endoscopy;  Laterality: N/A;    COLONOSCOPY N/A 10/30/2020    Procedure: COLONOSCOPY;  Surgeon: Suzi Bacon MD;  Location: [facility] ENDO (4TH FLR);  Service: Endoscopy;  Laterality: N/A;  covid test [facility] urgent care 10/27  pt to try Mirilax prep " with Reglan before per Dr. Bacon - nimisha  10/14-new instructions e-mailed-tb  10/16--new instructions e-mailed  10/29/20- Pt confirmed earlier arrival time, prep ins. reveiwed, Pt verbalized understanding- [email or web address]    COLONOSCOPY N/A 05/10/2022    Procedure: COLONOSCOPY;  Surgeon: Suzi Bacon MD;  Location: [facility] ENDO (4TH FLR);  Service: Endoscopy;  Laterality: N/A;  1/2022- same as last bowel prep (Miralax/Gatorade)  3/4 pt rescheduled to May/ COVID test on 5/3 at [facility]  instruction via portal  5/6 pt rescheduled; pt fully vaccinated; Rapid; instructions to portal-    ESOPHAGOGASTRODUODENOSCOPY N/A 02/20/2019    Procedure: EGD (ESOPHAGOGASTRODUODENOSCOPY);  Surgeon: Fawad Perla MD;  Location: [facility] ENDO (2ND FLR);  Service: Endoscopy;  Laterality: N/A;    ILEOCOLECTOMY N/A 05/10/2019    Procedure: ILEOCOLECTOMY;  Surgeon: Uday Cope MD;  Location: [facility] OR 2ND FLR;  Service: Colon and Rectal;  Laterality: N/A;  ileocecectomy    ILEOSCOPY N/A 12/20/2019    Procedure: ILEOSCOPY through stoma;  Surgeon: Suzi Bacon MD;  Location: [facility] ENDO (4TH FLR);  Service: Endoscopy;  Laterality: N/A;  Schedule as 30 minute case  Please let patient know to bring extra stoma bag/wafer, etc- entire bag and appliance will be removed at time of procedure to visualize the area well   first week 11/2019     per note-R/S to 12/20/19-GT    ILEOSTOMY N/A 05/10/2019    Procedure: CREATION, ILEOSTOMY with mucous fistula;  Surgeon: Uday Cope MD;  Location: [facility] OR 2ND FLR;  Service: Colon and Rectal;  Laterality: N/A;    ILEOSTOMY CLOSURE N/A 02/12/2020    Procedure: CLOSURE,ILEOSTOMY TAKEDOWN END ILEOSTOMY/MUCOUS FISTULA POSSIBLE LAPAROTOMY ERAS LOW;  Surgeon: Uday Cope MD;  Location: [facility] OR 2ND FLR;  Service: Colon and Rectal;  Laterality: N/A;    INCISION AND DRAINAGE OF BUTTOCK Bilateral 12/07/2022    Procedure: INCISION AND DRAINAGE, BUTTOCK;  Surgeon: Regan Rowell Jr., MD;  Location: [facility];  Service:  General;  Laterality: Bilateral;     Family History   Problem Relation Age of Onset    Hypertension Mother     No Known Problems Father     No Known Problems Sister     No Known Problems Brother     No Known Problems Sister     No Known Problems Sister     No Known Problems Sister     No Known Problems Maternal Uncle     No Known Problems Paternal Aunt     No Known Problems Paternal Uncle     No Known Problems Maternal Grandmother     Cataracts Maternal Grandfather     Irritable bowel syndrome Paternal Grandmother     No Known Problems Paternal Grandfather     No Known Problems Paternal Aunt     No Known Problems Paternal Uncle     No Known Problems Paternal Uncle     No Known Problems Paternal Uncle     No Known Problems Maternal Uncle     No Known Problems Maternal Uncle     Celiac disease Neg Hx     Cirrhosis Neg Hx     Colon cancer Neg Hx     Colon polyps Neg Hx     Crohn's disease Neg Hx     Cystic fibrosis Neg Hx     Esophageal cancer Neg Hx     Hemochromatosis Neg Hx     Inflammatory bowel disease Neg Hx     Liver cancer Neg Hx     Liver disease Neg Hx     Rectal cancer Neg Hx     Stomach cancer Neg Hx     Ulcerative colitis Neg Hx     Jerardo's disease Neg Hx     Lymphoma Neg Hx     Tuberculosis Neg Hx     Scleroderma Neg Hx     Rheum arthritis Neg Hx     Multiple sclerosis Neg Hx     Psoriasis Neg Hx     Lupus Neg Hx     Melanoma Neg Hx     Skin cancer Neg Hx     Amblyopia Neg Hx     Blindness Neg Hx     Cancer Neg Hx     Diabetes Neg Hx     Glaucoma Neg Hx     Macular degeneration Neg Hx     Retinal detachment Neg Hx     Strabismus Neg Hx     Stroke Neg Hx     Thyroid disease Neg Hx      Social History     Tobacco Use    Smoking status: Every Day     Packs/day: 0.25     Types: Cigarettes     Last attempt to quit: 1/1/2020     Years since quitting: 3.3    Smokeless tobacco: Never   Substance Use Topics    Alcohol use: Not Currently     Alcohol/week: 0.0 standard drinks     Comment: Socially    Drug use: Yes      Frequency: 3.0 times per week     Types: Marijuana     Review of Systems   Constitutional:  Negative for fever.   HENT:  Negative for sore throat.    Respiratory:  Negative for shortness of breath.    Cardiovascular:  Negative for chest pain.   Gastrointestinal:  Negative for nausea.   Genitourinary:  Positive for vaginal discharge. Negative for dysuria.   Musculoskeletal:  Negative for back pain.   Skin:  Positive for rash.   Neurological:  Negative for weakness.   Hematological:  Does not bruise/bleed easily.     Physical Exam     Initial Vitals [05/20/23 1935]   BP Pulse Resp Temp SpO2   116/62 (!) 132 16 98.2 °F (36.8 °C) 100 %      MAP       --         Physical Exam    Constitutional: Vital signs are normal. She appears well-developed and well-nourished.   HENT:   Head: Normocephalic and atraumatic.   Right Ear: Hearing normal.   Left Ear: Hearing normal.   Eyes: Conjunctivae are normal.   Cardiovascular:  Normal rate and regular rhythm.           Abdominal: Abdomen is soft. Bowel sounds are normal.   Musculoskeletal:         General: Normal range of motion.     Neurological: She is alert and oriented to person, place, and time.   Skin: Skin is warm and intact.   Very dry skin noted to the back of the neck with excoriations.  The dry skin is flaking into the hairline and around the neck towards the left and right ear.  There appears to be weeping noted around the left ear with a little bit of erythema.  No swelling.  Excoriations are noted throughout the region.   Psychiatric: She has a normal mood and affect. Her speech is normal and behavior is normal. Cognition and memory are normal.       ED Course   Procedures  Labs Reviewed - No data to display       Imaging Results    None          Medications   clindamycin capsule 300 mg (has no administration in time range)   hydrOXYzine pamoate capsule 25 mg (has no administration in time range)     Medical Decision Making:   History:   Old Medical Records: I  decided to obtain old medical records.  Old Records Summarized: records from clinic visits.  Initial Assessment:   [age] year female presenting complaints  Differential Diagnosis:   Eczema , psoriasis, seborrheic dermatitis, atopic dermatitis  ED Management:  Plan:   Possible developing secondary bacterial infection from the excoriations.  She was started on clindamycin.  I also prescribed hydroxyzine.  No signs of abscess.  No signs of inner ear external ear infection.  Patient did not want me to perform a pelvic exam.  She told me that she was noticed concerned about that region and prefer to follow-up with her regular doctor.    She was advised to follow-up with primary care and see Dermatology regarding her skin condition and return to the ED for any new or worsening symptoms.                        Clinical Impression:   Final diagnoses:  [L30.9] Dermatitis (Primary)        ED Disposition Condition    Discharge Stable          ED Prescriptions       Medication Sig Dispense Start Date End Date Auth. Provider    clindamycin (CLEOCIN) 150 MG capsule Take 3 capsules (450 mg total) by mouth every 8 (eight) hours. for 7 days 63 capsule 5/20/2023 5/27/2023 Jere Fajardo PA-C    hydrOXYzine HCL (ATARAX) 25 MG tablet Take 1 tablet (25 mg total) by mouth 3 (three) times daily as needed for Itching. 12 tablet 5/20/2023 -- Jere Fajardo PA-C    methylPREDNISolone (MEDROL DOSEPACK) 4 mg tablet Take as directed 1 each 5/20/2023 6/10/2023 Jere Fajardo PA-C          Follow-up Information       Follow up With Specialties Details Why Contact Info Additional Information    [facility] Medicine   [address] [facility] [address]  [facility] [address] [phone] [address]             Jere Fajardo PA-C  05/20/23 2019

## 2023-06-01 ENCOUNTER — HOSPITAL ENCOUNTER (OUTPATIENT)
Facility: HOSPITAL | Age: 33
Discharge: HOME | End: 2023-06-01
Attending: INTERNAL MEDICINE | Admitting: INTERNAL MEDICINE
Payer: MEDICAID

## 2023-06-01 ENCOUNTER — ANESTHESIA (OUTPATIENT)
Dept: ENDOSCOPY | Facility: HOSPITAL | Age: 33
End: 2023-06-01
Payer: MEDICAID

## 2023-06-01 ENCOUNTER — ANESTHESIA EVENT (OUTPATIENT)
Dept: ENDOSCOPY | Facility: HOSPITAL | Age: 33
End: 2023-06-01
Payer: MEDICAID

## 2023-06-01 VITALS
SYSTOLIC BLOOD PRESSURE: 100 MMHG | RESPIRATION RATE: 18 BRPM | TEMPERATURE: 97 F | DIASTOLIC BLOOD PRESSURE: 65 MMHG | WEIGHT: 125 LBS | HEART RATE: 84 BPM | BODY MASS INDEX: 22.15 KG/M2 | HEIGHT: 63 IN | OXYGEN SATURATION: 18 %

## 2023-06-01 DIAGNOSIS — K50.80 CROHN'S DISEASE OF BOTH SMALL AND LARGE INTESTINE WITHOUT COMPLICATION: Primary | ICD-10-CM

## 2023-06-01 DIAGNOSIS — K50.00 CROHN'S DISEASE INVOLVING TERMINAL ILEUM: ICD-10-CM

## 2023-06-01 PROCEDURE — E9220 PRA ENDO ANESTHESIA: HCPCS | Mod: ,,, | Performed by: NURSE ANESTHETIST, CERTIFIED REGISTERED

## 2023-06-01 PROCEDURE — 88305 TISSUE EXAM BY PATHOLOGIST: CPT | Mod: 26,,, | Performed by: PATHOLOGY

## 2023-06-01 PROCEDURE — 88305 TISSUE EXAM BY PATHOLOGIST: CPT | Performed by: PATHOLOGY

## 2023-06-01 PROCEDURE — 25000003 PHARM REV CODE 250: Performed by: INTERNAL MEDICINE

## 2023-06-01 PROCEDURE — 00811 ANES LWR INTST NDSC NOS: CPT | Performed by: INTERNAL MEDICINE

## 2023-06-01 PROCEDURE — 88305 TISSUE EXAM BY PATHOLOGIST: ICD-10-PCS | Mod: 26,,, | Performed by: PATHOLOGY

## 2023-06-01 PROCEDURE — 27201012 HC FORCEPS, HOT/COLD, DISP: Performed by: INTERNAL MEDICINE

## 2023-06-01 PROCEDURE — 45380 COLONOSCOPY AND BIOPSY: CPT | Mod: ,,, | Performed by: INTERNAL MEDICINE

## 2023-06-01 PROCEDURE — 45380 COLONOSCOPY AND BIOPSY: CPT | Performed by: INTERNAL MEDICINE

## 2023-06-01 PROCEDURE — 45380 PR COLONOSCOPY,BIOPSY: ICD-10-PCS | Mod: ,,, | Performed by: INTERNAL MEDICINE

## 2023-06-01 PROCEDURE — E9220 PRA ENDO ANESTHESIA: ICD-10-PCS | Mod: ,,, | Performed by: NURSE ANESTHETIST, CERTIFIED REGISTERED

## 2023-06-01 PROCEDURE — 25000003 PHARM REV CODE 250: Performed by: NURSE ANESTHETIST, CERTIFIED REGISTERED

## 2023-06-01 PROCEDURE — 37000008 HC ANESTHESIA 1ST 15 MINUTES: Performed by: INTERNAL MEDICINE

## 2023-06-01 PROCEDURE — 63600175 PHARM REV CODE 636 W HCPCS: Performed by: NURSE ANESTHETIST, CERTIFIED REGISTERED

## 2023-06-01 PROCEDURE — 37000009 HC ANESTHESIA EA ADD 15 MINS: Performed by: INTERNAL MEDICINE

## 2023-06-01 RX ORDER — LIDOCAINE HYDROCHLORIDE 20 MG/ML
INJECTION INTRAVENOUS
Status: DISCONTINUED | OUTPATIENT
Start: 2023-06-01 | End: 2023-06-01

## 2023-06-01 RX ORDER — PROPOFOL 10 MG/ML
VIAL (ML) INTRAVENOUS CONTINUOUS PRN
Status: DISCONTINUED | OUTPATIENT
Start: 2023-06-01 | End: 2023-06-01

## 2023-06-01 RX ORDER — PROPOFOL 10 MG/ML
VIAL (ML) INTRAVENOUS
Status: DISCONTINUED | OUTPATIENT
Start: 2023-06-01 | End: 2023-06-01

## 2023-06-01 RX ORDER — SODIUM CHLORIDE 9 MG/ML
INJECTION, SOLUTION INTRAVENOUS CONTINUOUS
Status: DISCONTINUED | OUTPATIENT
Start: 2023-06-01 | End: 2023-06-01 | Stop reason: HOSPADM

## 2023-06-01 RX ADMIN — LIDOCAINE HYDROCHLORIDE 50 MG: 20 INJECTION INTRAVENOUS at 01:06

## 2023-06-01 RX ADMIN — SODIUM CHLORIDE: 9 INJECTION, SOLUTION INTRAVENOUS at 12:06

## 2023-06-01 RX ADMIN — SODIUM CHLORIDE: 0.9 INJECTION, SOLUTION INTRAVENOUS at 12:06

## 2023-06-01 RX ADMIN — PROPOFOL 80 MG: 10 INJECTION, EMULSION INTRAVENOUS at 01:06

## 2023-06-01 RX ADMIN — GLYCOPYRROLATE 0.2 MG: 0.2 INJECTION, SOLUTION INTRAMUSCULAR; INTRAVENOUS at 01:06

## 2023-06-01 RX ADMIN — Medication 200 MCG/KG/MIN: at 01:06

## 2023-06-01 NOTE — H&P
Short Stay Endoscopy History and Physical    PCP - Geo Espitia MD  Referring Physician - Suzi Bacon MD  [address] [place] [address]    Procedure - Colonoscopy  ASA - per anesthesia  Mallampati - per anesthesia  History of Anesthesia problems - no  Family history Anesthesia problems -  no   Plan of anesthesia - General    HPI  33 y.o. female  Reason for procedure: Crohn's f/u      ROS:  Constitutional: No fevers, chills, No weight loss  CV: No chest pain  Pulm: No cough, No shortness of breath  GI: see HPI    Medical History:  has a past medical history of Allergy, Anxiety, Asthma, Crohn's disease (ileum), Depression, Eczema, Former smoker, Hydradenitis, Immunosuppressed status, Perineal abscess (2021), and Psoriasis.    Surgical History:  has a past surgical history that includes Colonoscopy (N/A, 2019); Esophagogastroduodenoscopy (N/A, 2019); Ileocolectomy (N/A, 05/10/2019); Ileostomy (N/A, 05/10/2019); Ileoscopy (N/A, 2019); Ileostomy closure (N/A, 2020); Colonoscopy (N/A, 10/30/2020); Colonoscopy (N/A, 05/10/2022); Incision and drainage of buttock (Bilateral, 2022);  section (N/A, 2023); and  section.    Family History: family history includes Cataracts in her maternal grandfather; Hypertension in her mother; Irritable bowel syndrome in her paternal grandmother; No Known Problems in her brother, father, maternal grandmother, maternal uncle, maternal uncle, maternal uncle, paternal aunt, paternal aunt, paternal grandfather, paternal uncle, paternal uncle, paternal uncle, paternal uncle, sister, sister, sister, and sister..    Social History:  reports that she has been smoking cigarettes. She has been smoking an average of .25 packs per day. She has never used smokeless tobacco. She reports that she does not currently use alcohol. She reports current drug use. Frequency: 3.00 times per week. Drug: Marijuana.    Review of patient's  allergies indicates:   Allergen Reactions    Horse/equine containing products Itching       Medications:   Medications Prior to Admission   Medication Sig Dispense Refill Last Dose    adalimumab (HUMIRA,CF, PEN SUBQ) 1 syringe WEEKLY (route: subcutaneous)   Past Week    adalimumab (HUMIRA,CF,) 40 mg/0.4 mL SyKt Inject 0.4 mLs (40 mg total) into the skin every 7 days. 4 each 5 Past Week    etonogestreL (NEXPLANON) 68 mg Impl subdermal device 68 mg by Subdermal route once. A single NEXPLANON implant is inserted subdermally just under the skin at the inner side of the non-dominant upper arm.   6/1/2023    hydrOXYzine HCL (ATARAX) 25 MG tablet Take 1 tablet (25 mg total) by mouth 3 (three) times daily as needed for Itching. 12 tablet 0     methylPREDNISolone (MEDROL DOSEPACK) 4 mg tablet Take as directed 1 each 0     potassium chloride (K-TAB) 20 mEq Take 1 tablet (20 mEq total) by mouth once daily. Drink water with medicine and stay upright for 30 min after taking 5 tablet 0        Physical Exam:    Vital Signs:   Vitals:    06/01/23 1234   BP: 104/65   Pulse: 83   Resp: 18   Temp: 98.2 °F (36.8 °C)       General Appearance: Well appearing in no acute distress  Abdomen: Soft, non tender, non distended with normal bowel sounds, no masses    Labs:  Lab Results   Component Value Date    WBC 6.75 05/01/2023    HGB 10.9 (L) 05/01/2023    HCT 37.4 05/01/2023     05/01/2023    TRIG 41 05/06/2019    ALT 23 05/01/2023    AST 21 05/01/2023     05/01/2023    K 3.2 (L) 05/01/2023     05/01/2023    CREATININE 0.8 05/01/2023    BUN 6 05/01/2023    CO2 22 (L) 05/01/2023    TSH 0.713 08/19/2015    HGBA1C 4.0 08/23/2022       I have explained the risks and benefits of this endoscopic procedure to the patient including but not limited to bleeding, inflammation, infection, perforation, and death.      Suzi Bacon MD

## 2023-06-01 NOTE — ANESTHESIA POSTPROCEDURE EVALUATION
Anesthesia Post Evaluation    Patient: Lora Scott    Procedure(s) Performed: Procedure(s) (LRB):  COLONOSCOPY (N/A)    Final Anesthesia Type: general      Patient location during evaluation: PACU  Patient participation: Yes- Able to Participate  Level of consciousness: awake and alert  Post-procedure vital signs: reviewed and stable  Pain management: adequate  Airway patency: patent    PONV status at discharge: No PONV  Anesthetic complications: no      Cardiovascular status: blood pressure returned to baseline  Respiratory status: unassisted  Follow-up not needed.          Vitals Value Taken Time   /55 06/01/23 1356   Temp 36.2 °C (97.2 °F) 06/01/23 1356   Pulse 90 06/01/23 1356   Resp 16 06/01/23 1356   SpO2 99 % 06/01/23 1356         No case tracking events are documented in the log.      Pain/Oral Score: Oral Score: 10 (6/1/2023  1:56 PM)

## 2023-06-01 NOTE — TRANSFER OF CARE
"Anesthesia Transfer of Care Note    Patient: Lora Scott    Procedure(s) Performed: Procedure(s) (LRB):  COLONOSCOPY (N/A)    Patient location: PACU    Anesthesia Type: general    Transport from OR: Transported from OR on room air with adequate spontaneous ventilation    Post pain: adequate analgesia    Post assessment: no apparent anesthetic complications and tolerated procedure well    Post vital signs: stable    Level of consciousness: sedated and responds to stimulation    Nausea/Vomiting: no nausea/vomiting    Complications: none    Transfer of care protocol was followed      Last vitals:   Visit Vitals  /65 (BP Location: Left arm, Patient Position: Lying)   Pulse 83   Temp 36.8 °C (98.2 °F) (Temporal)   Resp 18   Ht 5' 3" (1.6 m)   Wt 56.7 kg (125 lb)   LMP 05/01/2023 (Approximate)   SpO2 100%   Breastfeeding No   BMI 22.14 kg/m²     "

## 2023-06-01 NOTE — ANESTHESIA PREPROCEDURE EVALUATION
2023  Lora Scott is a 33 y.o., female.  Past Medical History:   Diagnosis Date    Allergy     Anxiety     Asthma     Crohn's disease (ileum)     S/P ileocecal resection-42 cm of ileum/10.5 cm cecum removed with surgical path c/w stricture and fistula 2/15/20 ileostomy takedown and mucus fistula takedown    Depression     Eczema     Former smoker     Hydradenitis     Immunosuppressed status     Perineal abscess 2021    Psoriasis      Past Surgical History:   Procedure Laterality Date     SECTION N/A 2023    Procedure:  SECTION;  Surgeon: Geo Espitia MD;  Location: [facility] L&D OR;  Service: OB/GYN;  Laterality: N/A;     SECTION      COLONOSCOPY N/A 2019    Procedure: COLONOSCOPY;  Surgeon: Fawad Perla MD;  Location: [facility] (2ND FLR);  Service: Endoscopy;  Laterality: N/A;    COLONOSCOPY N/A 10/30/2020    Procedure: COLONOSCOPY;  Surgeon: Suzi Bacon MD;  Location: [facility] (4TH FLR);  Service: Endoscopy;  Laterality: N/A;  covid test [facility] 10/27  pt to try Mirilax prep with Reglan before per Dr. Bacon - nimisha  10/14-new instructions e-mailed-  10/16--new instructions e-mailed  10/29/20- Pt confirmed earlier arrival time, prep ins. reveiwed, Pt verbalized understanding- [email or web address]    COLONOSCOPY N/A 05/10/2022    Procedure: COLONOSCOPY;  Surgeon: Suzi Bacon MD;  Location: [facility] (4TH FLR);  Service: Endoscopy;  Laterality: N/A;  2022- same as last bowel prep (Miralax/Gatorade)  3/4 pt rescheduled to May/ COVID test on 5/3 at [facility]  instruction via portal  5/6 pt rescheduled; pt fully vaccinated; Rapid; instructions to portal-st    ESOPHAGOGASTRODUODENOSCOPY N/A 2019    Procedure: EGD (ESOPHAGOGASTRODUODENOSCOPY);  Surgeon: Fawad Perla MD;  Location: [facility] (2ND FLR);  Service: Endoscopy;   Laterality: N/A;    ILEOCOLECTOMY N/A 05/10/2019    Procedure: ILEOCOLECTOMY;  Surgeon: Uday Cope MD;  Location: [facility] OR 2ND FLR;  Service: Colon and Rectal;  Laterality: N/A;  ileocecectomy    ILEOSCOPY N/A 12/20/2019    Procedure: ILEOSCOPY through stoma;  Surgeon: Suzi Bacon MD;  Location: [facility] ENDO (4TH FLR);  Service: Endoscopy;  Laterality: N/A;  Schedule as 30 minute case  Please let patient know to bring extra stoma bag/wafer, etc- entire bag and appliance will be removed at time of procedure to visualize the area well   first week 11/2019     per note-R/S to 12/20/19-GT    ILEOSTOMY N/A 05/10/2019    Procedure: CREATION, ILEOSTOMY with mucous fistula;  Surgeon: Uday Cope MD;  Location: [facility] OR 2ND FLR;  Service: Colon and Rectal;  Laterality: N/A;    ILEOSTOMY CLOSURE N/A 02/12/2020    Procedure: CLOSURE,ILEOSTOMY TAKEDOWN END ILEOSTOMY/MUCOUS FISTULA POSSIBLE LAPAROTOMY ERAS LOW;  Surgeon: Uday Cope MD;  Location: [facility] OR 2ND FLR;  Service: Colon and Rectal;  Laterality: N/A;    INCISION AND DRAINAGE OF BUTTOCK Bilateral 12/07/2022    Procedure: INCISION AND DRAINAGE, BUTTOCK;  Surgeon: Regan Rowell Jr., MD;  Location: [facility];  Service: General;  Laterality: Bilateral;         Pre-op Assessment    I have reviewed the Patient Summary Reports.     I have reviewed the Nursing Notes.    I have reviewed the Medications.     Review of Systems  Hematology/Oncology:  Hematology Normal   Oncology Normal     EENT/Dental:EENT/Dental Normal   Cardiovascular:  Cardiovascular Normal     Pulmonary:   Asthma    Renal/:  Renal/ Normal     Hepatic/GI:  Hepatic/GI Normal    Musculoskeletal:  Musculoskeletal Normal    Neurological:  Neurology Normal    Endocrine:  Endocrine Normal    Dermatological:  Skin Normal    Psych:   Psychiatric History          Physical Exam  General: Well nourished, Cooperative, Alert and Oriented    Airway:  TM Distance: Normal  Tongue: Normal  Neck ROM: Normal  ROM    Chest/Lungs:  Clear to auscultation    Heart:  Rate: Normal    Abdomen:  Normal        Anesthesia Plan  Type of Anesthesia, risks & benefits discussed:    Anesthesia Type: Gen Natural Airway  Intra-op Monitoring Plan: Standard ASA Monitors  Induction:  IV  Informed Consent: Informed consent signed with the Patient and all parties understand the risks and agree with anesthesia plan.  All questions answered.   ASA Score: 3  Day of Surgery Review of History & Physical: H&P Update referred to the surgeon/provider.    Ready For Surgery From Anesthesia Perspective.     .

## 2023-06-01 NOTE — PROVATION PATIENT INSTRUCTIONS
Discharge Summary/Instructions after an Endoscopic Procedure  Patient Name: Lora Scott  Patient MRN: [id number]  Patient YOB: 1990  Thursday, June 1, 2023  Suzi Bacon MD  Dear patient,  As a result of recent federal legislation (The Federal Cures Act), you may   receive lab or pathology results from your procedure in your [facility]   account before your physician is able to contact you. Your physician or   their representative will relay the results to you with their   recommendations at their soonest availability.  Thank you,  RESTRICTIONS:  During your procedure today, you received medications for sedation.  These   medications may affect your judgment, balance and coordination.  Therefore,   for 24 hours, you have the following restrictions:   - DO NOT drive a car, operate machinery, make legal/financial decisions,   sign important papers or drink alcohol.    ACTIVITY:  Today: no heavy lifting, straining or running due to procedural   sedation/anesthesia.  The following day: return to full activity including work.  DIET:  Eat and drink normally unless instructed otherwise.     TREATMENT FOR COMMON SIDE EFFECTS:  - Mild abdominal pain, nausea, belching, bloating or excessive gas:  rest,   eat lightly and use a heating pad.  - Sore Throat: treat with throat lozenges and/or gargle with warm salt   water.  - Because air was used during the procedure, expelling large amounts of air   from your rectum or belching is normal.  - If a bowel prep was taken, you may not have a bowel movement for 1-3 days.    This is normal.  SYMPTOMS TO WATCH FOR AND REPORT TO YOUR PHYSICIAN:  1. Abdominal pain or bloating, other than gas cramps.  2. Chest pain.  3. Back pain.  4. Signs of infection such as: chills or fever occurring within 24 hours   after the procedure.  5. Rectal bleeding, which would show as bright red, maroon, or black stools.   (A tablespoon of blood from the rectum is not serious, especially if    hemorrhoids are present.)  6. Vomiting.  7. Weakness or dizziness.  GO DIRECTLY TO THE NEAREST EMERGENCY ROOM IF YOU HAVE ANY OF THE FOLLOWING:      Difficulty breathing              Chills and/or fever over 101 F   Persistent vomiting and/or vomiting blood   Severe abdominal pain   Severe chest pain   Black, tarry stools   Bleeding- more than one tablespoon   Any other symptom or condition that you feel may need urgent attention  Your doctor recommends these additional instructions:  If any biopsies were taken, your doctors clinic will contact you in 1 to 2   weeks with any results.  - Discharge patient to home (ambulatory).   - Patient has a contact number available for emergencies.  The signs and   symptoms of potential delayed complications were discussed with the   patient.  Return to normal activities tomorrow.  Written discharge   instructions were provided to the patient.   - Resume previous diet.   - Continue present medications.   - Return to primary care physician as previously scheduled.   - Repeat colonoscopy 6/2024  For questions, problems or results please call your physician - Suzi Bacon MD at Work:  [phone].  [facility], EMERGENCY ROOM PHONE NUMBER: [phone]  IF A COMPLICATION OR EMERGENCY SITUATION ARISES AND YOU ARE UNABLE TO REACH   YOUR PHYSICIAN - GO DIRECTLY TO THE EMERGENCY ROOM.  Suzi Bacon MD  6/1/2023 1:56:21 PM  This report has been verified and signed electronically.  Dear patient,  As a result of recent federal legislation (The Federal Cures Act), you may   receive lab or pathology results from your procedure in your [facility]   account before your physician is able to contact you. Your physician or   their representative will relay the results to you with their   recommendations at their soonest availability.  Thank you,  PROVATION

## 2023-06-06 LAB
FINAL PATHOLOGIC DIAGNOSIS: NORMAL
GROSS: NORMAL
Lab: NORMAL

## 2023-06-23 ENCOUNTER — PATIENT MESSAGE (OUTPATIENT)
Dept: GASTROENTEROLOGY | Facility: CLINIC | Age: 33
End: 2023-06-23
Payer: MEDICAID

## 2023-06-27 ENCOUNTER — TELEPHONE (OUTPATIENT)
Dept: GASTROENTEROLOGY | Facility: CLINIC | Age: 33
End: 2023-06-27
Payer: MEDICAID

## 2023-06-28 NOTE — TELEPHONE ENCOUNTER
Called & spoke to pt  - Received Humira in the mail today & plans to inject when she gets home  - Would like information regarding which provider at [facility] to establish care with  - Informed of plan for f/u appt on 6/30 & pt to let me know if she is unable to make this  - Will discuss w/ Dr. Bacon

## 2023-06-28 NOTE — TELEPHONE ENCOUNTER
Called & spoke to pt  - Will inject Humira 80 mg on 6/30/23  - Pt expressed understanding & requesting [facility] referral information be sent to pt portal

## 2023-07-17 ENCOUNTER — TELEPHONE (OUTPATIENT)
Dept: GASTROENTEROLOGY | Facility: CLINIC | Age: 33
End: 2023-07-17
Payer: MEDICAID

## 2023-07-17 ENCOUNTER — LAB SERVICES (OUTPATIENT)
Dept: LAB | Age: 33
End: 2023-07-17

## 2023-07-17 ENCOUNTER — OFFICE VISIT (OUTPATIENT)
Dept: INTERNAL MEDICINE | Age: 33
End: 2023-07-17

## 2023-07-17 VITALS
SYSTOLIC BLOOD PRESSURE: 109 MMHG | WEIGHT: 278.66 LBS | RESPIRATION RATE: 18 BRPM | HEART RATE: 76 BPM | BODY MASS INDEX: 42.23 KG/M2 | OXYGEN SATURATION: 100 % | HEIGHT: 68 IN | DIASTOLIC BLOOD PRESSURE: 79 MMHG

## 2023-07-17 DIAGNOSIS — E89.0 POSTABLATIVE HYPOTHYROIDISM: ICD-10-CM

## 2023-07-17 DIAGNOSIS — D64.9 ANEMIA, UNSPECIFIED TYPE: ICD-10-CM

## 2023-07-17 DIAGNOSIS — E89.0 POSTABLATIVE HYPOTHYROIDISM: Primary | ICD-10-CM

## 2023-07-17 DIAGNOSIS — E78.00 HYPERCHOLESTEREMIA: ICD-10-CM

## 2023-07-17 DIAGNOSIS — E66.01 MORBID OBESITY (CMD): ICD-10-CM

## 2023-07-17 LAB
ALBUMIN SERPL-MCNC: 3.6 G/DL (ref 3.6–5.1)
ALBUMIN/GLOB SERPL: 0.9 {RATIO} (ref 1–2.4)
ALP SERPL-CCNC: 61 UNITS/L (ref 45–117)
ALT SERPL-CCNC: 23 UNITS/L
ANION GAP SERPL CALC-SCNC: 9 MMOL/L (ref 7–19)
AST SERPL-CCNC: 8 UNITS/L
BASOPHILS # BLD: 0 K/MCL (ref 0–0.3)
BASOPHILS NFR BLD: 0 %
BILIRUB SERPL-MCNC: 0.5 MG/DL (ref 0.2–1)
BUN SERPL-MCNC: 11 MG/DL (ref 6–20)
BUN/CREAT SERPL: 11 (ref 7–25)
CALCIUM SERPL-MCNC: 8.8 MG/DL (ref 8.4–10.2)
CHLORIDE SERPL-SCNC: 104 MMOL/L (ref 97–110)
CHOLEST SERPL-MCNC: 215 MG/DL
CHOLEST/HDLC SERPL: 4 {RATIO}
CO2 SERPL-SCNC: 29 MMOL/L (ref 21–32)
CREAT SERPL-MCNC: 1.04 MG/DL (ref 0.51–0.95)
DEPRECATED RDW RBC: 46.1 FL (ref 39–50)
EOSINOPHIL # BLD: 0.2 K/MCL (ref 0–0.5)
EOSINOPHIL NFR BLD: 3 %
ERYTHROCYTE [DISTWIDTH] IN BLOOD: 14.7 % (ref 11–15)
FASTING DURATION TIME PATIENT: ABNORMAL H
GFR SERPLBLD BASED ON 1.73 SQ M-ARVRAT: 73 ML/MIN
GLOBULIN SER-MCNC: 4 G/DL (ref 2–4)
GLUCOSE SERPL-MCNC: 85 MG/DL (ref 70–99)
HCT VFR BLD CALC: 40.5 % (ref 36–46.5)
HDLC SERPL-MCNC: 54 MG/DL
HGB BLD-MCNC: 12.1 G/DL (ref 12–15.5)
IMM GRANULOCYTES # BLD AUTO: 0 K/MCL (ref 0–0.2)
IMM GRANULOCYTES # BLD: 0 %
LDLC SERPL CALC-MCNC: 146 MG/DL
LYMPHOCYTES # BLD: 2.8 K/MCL (ref 1–4.8)
LYMPHOCYTES NFR BLD: 35 %
MCH RBC QN AUTO: 25.5 PG (ref 26–34)
MCHC RBC AUTO-ENTMCNC: 29.9 G/DL (ref 32–36.5)
MCV RBC AUTO: 85.4 FL (ref 78–100)
MONOCYTES # BLD: 0.4 K/MCL (ref 0.3–0.9)
MONOCYTES NFR BLD: 5 %
NEUTROPHILS # BLD: 4.6 K/MCL (ref 1.8–7.7)
NEUTROPHILS NFR BLD: 57 %
NONHDLC SERPL-MCNC: 161 MG/DL
NRBC BLD MANUAL-RTO: 0 /100 WBC
PLATELET # BLD AUTO: 385 K/MCL (ref 140–450)
POTASSIUM SERPL-SCNC: 4.4 MMOL/L (ref 3.4–5.1)
PROT SERPL-MCNC: 7.6 G/DL (ref 6.4–8.2)
RBC # BLD: 4.74 MIL/MCL (ref 4–5.2)
SODIUM SERPL-SCNC: 138 MMOL/L (ref 135–145)
T4 FREE SERPL-MCNC: 0.6 NG/DL (ref 0.8–1.5)
TRIGL SERPL-MCNC: 77 MG/DL
TSH SERPL-ACNC: 25.73 MCUNITS/ML (ref 0.35–5)
WBC # BLD: 8 K/MCL (ref 4.2–11)

## 2023-07-17 PROCEDURE — 99215 OFFICE O/P EST HI 40 MIN: CPT | Performed by: INTERNAL MEDICINE

## 2023-07-17 PROCEDURE — 82043 UR ALBUMIN QUANTITATIVE: CPT | Performed by: INTERNAL MEDICINE

## 2023-07-17 PROCEDURE — 3074F SYST BP LT 130 MM HG: CPT | Performed by: INTERNAL MEDICINE

## 2023-07-17 PROCEDURE — 82570 ASSAY OF URINE CREATININE: CPT | Performed by: INTERNAL MEDICINE

## 2023-07-17 PROCEDURE — 36415 COLL VENOUS BLD VENIPUNCTURE: CPT | Performed by: INTERNAL MEDICINE

## 2023-07-17 PROCEDURE — 83036 HEMOGLOBIN GLYCOSYLATED A1C: CPT | Performed by: INTERNAL MEDICINE

## 2023-07-17 PROCEDURE — 80061 LIPID PANEL: CPT | Performed by: INTERNAL MEDICINE

## 2023-07-17 PROCEDURE — 85025 COMPLETE CBC W/AUTO DIFF WBC: CPT | Performed by: INTERNAL MEDICINE

## 2023-07-17 PROCEDURE — 84439 ASSAY OF FREE THYROXINE: CPT | Performed by: INTERNAL MEDICINE

## 2023-07-17 PROCEDURE — 80053 COMPREHEN METABOLIC PANEL: CPT | Performed by: INTERNAL MEDICINE

## 2023-07-17 PROCEDURE — 3078F DIAST BP <80 MM HG: CPT | Performed by: INTERNAL MEDICINE

## 2023-07-17 PROCEDURE — 84443 ASSAY THYROID STIM HORMONE: CPT | Performed by: INTERNAL MEDICINE

## 2023-07-17 RX ORDER — LEVOTHYROXINE SODIUM 0.2 MG/1
200 TABLET ORAL DAILY
Qty: 90 TABLET | Refills: 3 | Status: SHIPPED | OUTPATIENT
Start: 2023-07-17

## 2023-07-17 ASSESSMENT — ENCOUNTER SYMPTOMS
FEVER: 0
ABDOMINAL PAIN: 0
TROUBLE SWALLOWING: 0
COUGH: 0
DIARRHEA: 0
FATIGUE: 0
SORE THROAT: 0
UNEXPECTED WEIGHT CHANGE: 0
APPETITE CHANGE: 0
SHORTNESS OF BREATH: 0
CHILLS: 0
NAUSEA: 0
DIZZINESS: 0
WHEEZING: 0

## 2023-07-17 ASSESSMENT — PAIN SCALES - GENERAL: PAINLEVEL: 0

## 2023-07-17 NOTE — TELEPHONE ENCOUNTER
----- Message from Lissett Ospina PharmD sent at 7/17/2023 11:34 AM CDT -----  Regarding: FW: PA  Contact: Cathy @ [phone]  Can you please figure out what is happening ? Not sure if you have seen anything come through on CMM  ----- Message -----  From: Yusra Bella NP  Sent: 7/17/2023  11:23 AM CDT  To: Frederic Verdin, Lissett Ospina, PharmD  Subject: FW: PA                                             ----- Message -----  From: Elvia Amaya  Sent: 7/17/2023  11:18 AM CDT  To: Arleen Archer Staff  Subject: [place] Shore from [facility] is calling to get status of PA for medication adalimumab (HUMIRA,CF,) 40 mg/0.4 mL SyKt. Asking for a call back

## 2023-07-18 ENCOUNTER — TELEPHONE (OUTPATIENT)
Dept: GASTROENTEROLOGY | Facility: CLINIC | Age: 33
End: 2023-07-18
Payer: MEDICAID

## 2023-07-18 LAB
CREAT UR-MCNC: 321 MG/DL
HBA1C MFR BLD: 5.5 % (ref 4.5–5.6)
MICROALBUMIN UR-MCNC: 1.6 MG/DL
MICROALBUMIN/CREAT UR: 5 MG/G

## 2023-07-18 NOTE — TELEPHONE ENCOUNTER
----- Message from Batsheva Levy RN sent at 7/18/2023  2:49 PM CDT -----  Regarding: FW: PA  Contact: Pt @ [phone]    ----- Message -----  From: Elvia Amaya  Sent: 7/18/2023   2:45 PM CDT  To: Arleen Archer Staff  Subject: PA                                               adalimumab (HUMIRA,CF,) 40 mg/0.4 mL SyKt  adalimumab (HUMIRA,CF, PEN SUBQ)    Pt is calling because pharmacy stated that she need PA per [facility] pharmacy. Asking that office call

## 2023-07-18 NOTE — TELEPHONE ENCOUNTER
-It has been approved  -Called [facility] and checked website and it is estimated to be delivered on 08/01/2023

## 2023-07-18 NOTE — TELEPHONE ENCOUNTER
-PA approved through [employer] via [facility]  -From 07/18/2023 to 07/18/2024  -Key: [id number]  -PA Case ID: [id number]

## 2023-07-20 ENCOUNTER — PATIENT MESSAGE (OUTPATIENT)
Dept: GASTROENTEROLOGY | Facility: CLINIC | Age: 33
End: 2023-07-20
Payer: MEDICAID

## 2023-07-24 DIAGNOSIS — E89.0 POSTABLATIVE HYPOTHYROIDISM: Primary | ICD-10-CM

## 2023-07-24 DIAGNOSIS — E66.01 MORBID OBESITY WITH BMI OF 50.0-59.9, ADULT (CMD): ICD-10-CM

## 2023-09-02 ENCOUNTER — LAB SERVICES (OUTPATIENT)
Dept: LAB | Age: 33
End: 2023-09-02

## 2023-09-05 ENCOUNTER — V-VISIT (OUTPATIENT)
Dept: URGENT CARE | Age: 33
End: 2023-09-05

## 2023-09-05 VITALS
RESPIRATION RATE: 18 BRPM | HEIGHT: 69 IN | SYSTOLIC BLOOD PRESSURE: 110 MMHG | BODY MASS INDEX: 40.49 KG/M2 | HEART RATE: 78 BPM | OXYGEN SATURATION: 100 % | DIASTOLIC BLOOD PRESSURE: 80 MMHG | WEIGHT: 273.37 LBS | TEMPERATURE: 97.6 F

## 2023-09-05 DIAGNOSIS — J06.9 VIRAL URI WITH COUGH: Primary | ICD-10-CM

## 2023-09-05 LAB
INTERNAL PROCEDURAL CONTROLS ACCEPTABLE: YES
SARS-COV+SARS-COV-2 AG RESP QL IA.RAPID: NOT DETECTED
TEST LOT EXPIRATION DATE: NORMAL
TEST LOT NUMBER: NORMAL

## 2023-09-05 PROCEDURE — 3079F DIAST BP 80-89 MM HG: CPT | Performed by: NURSE PRACTITIONER

## 2023-09-05 PROCEDURE — 87426 SARSCOV CORONAVIRUS AG IA: CPT | Performed by: NURSE PRACTITIONER

## 2023-09-05 PROCEDURE — 99203 OFFICE O/P NEW LOW 30 MIN: CPT | Performed by: NURSE PRACTITIONER

## 2023-09-05 PROCEDURE — 87635 SARS-COV-2 COVID-19 AMP PRB: CPT | Performed by: INTERNAL MEDICINE

## 2023-09-05 PROCEDURE — 3074F SYST BP LT 130 MM HG: CPT | Performed by: NURSE PRACTITIONER

## 2023-09-05 ASSESSMENT — ENCOUNTER SYMPTOMS
SINUS PAIN: 0
SINUS PRESSURE: 1
COUGH: 1
RHINORRHEA: 1
CHILLS: 0
VOMITING: 0
FEVER: 0
SHORTNESS OF BREATH: 0
ABDOMINAL PAIN: 0
DIARRHEA: 0
NAUSEA: 0
HEADACHES: 1
SORE THROAT: 1

## 2023-09-06 ENCOUNTER — TELEPHONE (OUTPATIENT)
Dept: URGENT CARE | Age: 33
End: 2023-09-06

## 2023-09-06 LAB
SARS-COV-2 RNA RESP QL NAA+PROBE: NOT DETECTED
SERVICE CMNT-IMP: NORMAL
SERVICE CMNT-IMP: NORMAL

## 2023-09-12 LAB
CHLAMYDIA TRACH., AMPLIFIED: NEGATIVE
N. GONORRHEA, AMPLIFIED: NEGATIVE
TRICHOMONAS VAGINALIS: POSITIVE

## 2023-10-09 ENCOUNTER — LAB SERVICES (OUTPATIENT)
Dept: LAB | Age: 33
End: 2023-10-09

## 2023-10-09 DIAGNOSIS — L73.2 HIDRADENITIS SUPPURATIVA: ICD-10-CM

## 2023-10-09 LAB
ALBUMIN SERPL-MCNC: 3.3 G/DL (ref 3.6–5.1)
ALBUMIN/GLOB SERPL: 0.8 {RATIO} (ref 1–2.4)
ALP SERPL-CCNC: 60 UNITS/L (ref 45–117)
ALT SERPL-CCNC: 23 UNITS/L
ANION GAP SERPL CALC-SCNC: 14 MMOL/L (ref 7–19)
AST SERPL-CCNC: 11 UNITS/L
BASOPHILS # BLD: 0 K/MCL (ref 0–0.3)
BASOPHILS NFR BLD: 0 %
BILIRUB SERPL-MCNC: 0.3 MG/DL (ref 0.2–1)
BUN SERPL-MCNC: 13 MG/DL (ref 6–20)
BUN/CREAT SERPL: 15 (ref 7–25)
CALCIUM SERPL-MCNC: 8.5 MG/DL (ref 8.4–10.2)
CHLORIDE SERPL-SCNC: 104 MMOL/L (ref 97–110)
CO2 SERPL-SCNC: 26 MMOL/L (ref 21–32)
CREAT SERPL-MCNC: 0.84 MG/DL (ref 0.51–0.95)
DEPRECATED RDW RBC: 40.9 FL (ref 39–50)
EGFRCR SERPLBLD CKD-EPI 2021: >90 ML/MIN/{1.73_M2}
EOSINOPHIL # BLD: 0.2 K/MCL (ref 0–0.5)
EOSINOPHIL NFR BLD: 2 %
ERYTHROCYTE [DISTWIDTH] IN BLOOD: 13.8 % (ref 11–15)
FASTING DURATION TIME PATIENT: 16 HOURS (ref 0–999)
GLOBULIN SER-MCNC: 4.1 G/DL (ref 2–4)
GLUCOSE SERPL-MCNC: 74 MG/DL (ref 70–99)
HCT VFR BLD CALC: 39.3 % (ref 36–46.5)
HGB BLD-MCNC: 12.2 G/DL (ref 12–15.5)
HGB BLD-MCNC: 12.2 G/DL (ref 12–15.5)
IMM GRANULOCYTES # BLD AUTO: 0 K/MCL (ref 0–0.2)
IMM GRANULOCYTES # BLD: 0 %
LYMPHOCYTES # BLD: 2.5 K/MCL (ref 1–4.8)
LYMPHOCYTES NFR BLD: 28 %
MCH RBC QN AUTO: 25.3 PG (ref 26–34)
MCHC RBC AUTO-ENTMCNC: 31 G/DL (ref 32–36.5)
MCV RBC AUTO: 81.4 FL (ref 78–100)
MONOCYTES # BLD: 0.5 K/MCL (ref 0.3–0.9)
MONOCYTES NFR BLD: 6 %
NEUTROPHILS # BLD: 5.6 K/MCL (ref 1.8–7.7)
NEUTROPHILS NFR BLD: 64 %
NRBC BLD MANUAL-RTO: 0 /100 WBC
PLATELET # BLD AUTO: 406 K/MCL (ref 140–450)
POTASSIUM SERPL-SCNC: 4.5 MMOL/L (ref 3.4–5.1)
PROT SERPL-MCNC: 7.4 G/DL (ref 6.4–8.2)
RBC # BLD: 4.83 MIL/MCL (ref 4–5.2)
SHBG SERPL-SCNC: 28 NMOL/L (ref 30–135)
SODIUM SERPL-SCNC: 139 MMOL/L (ref 135–145)
WBC # BLD: 8.9 K/MCL (ref 4.2–11)

## 2023-10-09 PROCEDURE — 85018 HEMOGLOBIN: CPT | Performed by: INTERNAL MEDICINE

## 2023-10-09 PROCEDURE — 85025 COMPLETE CBC W/AUTO DIFF WBC: CPT | Performed by: INTERNAL MEDICINE

## 2023-10-09 PROCEDURE — 84270 ASSAY OF SEX HORMONE GLOBUL: CPT | Performed by: CLINICAL MEDICAL LABORATORY

## 2023-10-09 PROCEDURE — 82627 DEHYDROEPIANDROSTERONE: CPT | Performed by: CLINICAL MEDICAL LABORATORY

## 2023-10-09 PROCEDURE — 36415 COLL VENOUS BLD VENIPUNCTURE: CPT | Performed by: INTERNAL MEDICINE

## 2023-10-09 PROCEDURE — 84403 ASSAY OF TOTAL TESTOSTERONE: CPT | Performed by: CLINICAL MEDICAL LABORATORY

## 2023-10-09 PROCEDURE — 80053 COMPREHEN METABOLIC PANEL: CPT | Performed by: INTERNAL MEDICINE

## 2023-10-10 LAB — DHEA-S SERPL-MCNC: 146.6 MCG/DL (ref 8–391)

## 2023-10-12 ENCOUNTER — LAB REQUISITION (OUTPATIENT)
Dept: LAB | Age: 33
End: 2023-10-12

## 2023-10-12 ENCOUNTER — LAB SERVICES (OUTPATIENT)
Dept: LAB | Age: 33
End: 2023-10-12

## 2023-10-12 DIAGNOSIS — Z79.899 OTHER LONG TERM (CURRENT) DRUG THERAPY: ICD-10-CM

## 2023-10-12 DIAGNOSIS — L73.2 HIDRADENITIS SUPPURATIVA: ICD-10-CM

## 2023-10-12 LAB
ALBUMIN SERPL-MCNC: 3.5 G/DL (ref 3.6–5.1)
ALBUMIN/GLOB SERPL: 0.9 {RATIO} (ref 1–2.4)
ALP SERPL-CCNC: 58 UNITS/L (ref 45–117)
ALT SERPL-CCNC: 28 UNITS/L
ANION GAP SERPL CALC-SCNC: 11 MMOL/L (ref 7–19)
AST SERPL-CCNC: 17 UNITS/L
BASOPHILS # BLD: 0 K/MCL (ref 0–0.3)
BASOPHILS NFR BLD: 0 %
BILIRUB SERPL-MCNC: 0.3 MG/DL (ref 0.2–1)
BUN SERPL-MCNC: 9 MG/DL (ref 6–20)
BUN/CREAT SERPL: 9 (ref 7–25)
CALCIUM SERPL-MCNC: 8.5 MG/DL (ref 8.4–10.2)
CHLORIDE SERPL-SCNC: 103 MMOL/L (ref 97–110)
CO2 SERPL-SCNC: 30 MMOL/L (ref 21–32)
CREAT SERPL-MCNC: 0.96 MG/DL (ref 0.51–0.95)
DEPRECATED RDW RBC: 42.2 FL (ref 39–50)
EGFRCR SERPLBLD CKD-EPI 2021: 81 ML/MIN/{1.73_M2}
EOSINOPHIL # BLD: 0.2 K/MCL (ref 0–0.5)
EOSINOPHIL NFR BLD: 2 %
ERYTHROCYTE [DISTWIDTH] IN BLOOD: 14 % (ref 11–15)
FASTING DURATION TIME PATIENT: ABNORMAL H
GLOBULIN SER-MCNC: 3.8 G/DL (ref 2–4)
GLUCOSE SERPL-MCNC: 88 MG/DL (ref 70–99)
HCT VFR BLD CALC: 36.6 % (ref 36–46.5)
HGB BLD-MCNC: 11.3 G/DL (ref 12–15.5)
IMM GRANULOCYTES # BLD AUTO: 0 K/MCL (ref 0–0.2)
IMM GRANULOCYTES # BLD: 0 %
LYMPHOCYTES # BLD: 2.8 K/MCL (ref 1–4.8)
LYMPHOCYTES NFR BLD: 30 %
MCH RBC QN AUTO: 25.5 PG (ref 26–34)
MCHC RBC AUTO-ENTMCNC: 30.9 G/DL (ref 32–36.5)
MCV RBC AUTO: 82.4 FL (ref 78–100)
MONOCYTES # BLD: 0.5 K/MCL (ref 0.3–0.9)
MONOCYTES NFR BLD: 5 %
NEUTROPHILS # BLD: 6 K/MCL (ref 1.8–7.7)
NEUTROPHILS NFR BLD: 63 %
NRBC BLD MANUAL-RTO: 0 /100 WBC
PLATELET # BLD AUTO: 366 K/MCL (ref 140–450)
POTASSIUM SERPL-SCNC: 4.3 MMOL/L (ref 3.4–5.1)
PROT SERPL-MCNC: 7.3 G/DL (ref 6.4–8.2)
RBC # BLD: 4.44 MIL/MCL (ref 4–5.2)
SODIUM SERPL-SCNC: 140 MMOL/L (ref 135–145)
WBC # BLD: 9.5 K/MCL (ref 4.2–11)

## 2023-10-12 PROCEDURE — 87340 HEPATITIS B SURFACE AG IA: CPT | Performed by: CLINICAL MEDICAL LABORATORY

## 2023-10-12 PROCEDURE — 80053 COMPREHEN METABOLIC PANEL: CPT | Performed by: CLINICAL MEDICAL LABORATORY

## 2023-10-12 PROCEDURE — 87389 HIV-1 AG W/HIV-1&-2 AB AG IA: CPT | Performed by: CLINICAL MEDICAL LABORATORY

## 2023-10-12 PROCEDURE — 85025 COMPLETE CBC W/AUTO DIFF WBC: CPT | Performed by: CLINICAL MEDICAL LABORATORY

## 2023-10-12 PROCEDURE — 86803 HEPATITIS C AB TEST: CPT | Performed by: CLINICAL MEDICAL LABORATORY

## 2023-10-12 PROCEDURE — 86480 TB TEST CELL IMMUN MEASURE: CPT | Performed by: CLINICAL MEDICAL LABORATORY

## 2023-10-12 ASSESSMENT — ENCOUNTER SYMPTOMS
WEAKNESS: 0
NAUSEA: 0
FEVER: 0
ABDOMINAL DISTENTION: 0
ABDOMINAL PAIN: 0
VOMITING: 0
COUGH: 0
DIARRHEA: 0
SHORTNESS OF BREATH: 0
ENDOCRINE NEGATIVE: 1
DIZZINESS: 0
CHILLS: 0
EYES NEGATIVE: 1
CONSTIPATION: 0

## 2023-10-13 ENCOUNTER — APPOINTMENT (OUTPATIENT)
Dept: OBGYN | Age: 33
End: 2023-10-13

## 2023-10-13 LAB
HBV SURFACE AG SER QL: NEGATIVE
HCV AB SER QL: NEGATIVE
SHBG SERPL-SCNC: 33 NMOL/L (ref 25–122)
TESTOST FREE SERPL DL<=1.0 NG/DL-MCNC: 2.2 PG/ML (ref 1.3–9.2)
TESTOST SERPL-MCNC: 13 NG/DL (ref 9–55)
TESTOSTERONE.FREE+WB SERPL-MCNC: 5.7 NG/DL (ref 4.1–25.5)

## 2023-10-18 LAB
REF LAB TEST NAME: NORMAL
SERVICE CMNT-IMP: NORMAL

## 2023-10-19 LAB
GAMMA INTERFERON BACKGROUND BLD IA-ACNC: 0.03 IU/ML
M TB IFN-G BLD-IMP: NEGATIVE
M TB IFN-G CD4+ BCKGRND COR BLD-ACNC: 0 IU/ML
M TB IFN-G CD4+CD8+ BCKGRND COR BLD-ACNC: 0 IU/ML
MITOGEN IGNF BCKGRD COR BLD-ACNC: 0.65 IU/ML

## 2023-11-01 ENCOUNTER — LAB SERVICES (OUTPATIENT)
Dept: LAB | Age: 33
End: 2023-11-01

## 2023-11-01 ENCOUNTER — OFFICE VISIT (OUTPATIENT)
Dept: OBGYN | Age: 33
End: 2023-11-01

## 2023-11-01 VITALS
TEMPERATURE: 98.2 F | BODY MASS INDEX: 41.85 KG/M2 | SYSTOLIC BLOOD PRESSURE: 124 MMHG | WEIGHT: 276.13 LBS | HEIGHT: 68 IN | DIASTOLIC BLOOD PRESSURE: 89 MMHG | OXYGEN SATURATION: 98 % | RESPIRATION RATE: 18 BRPM | HEART RATE: 65 BPM

## 2023-11-01 DIAGNOSIS — E66.01 MORBID OBESITY WITH BMI OF 50.0-59.9, ADULT (CMD): ICD-10-CM

## 2023-11-01 DIAGNOSIS — Z11.3 ROUTINE SCREENING FOR STI (SEXUALLY TRANSMITTED INFECTION): Primary | ICD-10-CM

## 2023-11-01 DIAGNOSIS — N89.8 VAGINAL DISCHARGE: ICD-10-CM

## 2023-11-01 DIAGNOSIS — Z11.3 ROUTINE SCREENING FOR STI (SEXUALLY TRANSMITTED INFECTION): ICD-10-CM

## 2023-11-01 DIAGNOSIS — E89.0 POSTABLATIVE HYPOTHYROIDISM: ICD-10-CM

## 2023-11-01 PROCEDURE — 3079F DIAST BP 80-89 MM HG: CPT

## 2023-11-01 PROCEDURE — 81513 NFCT DS BV RNA VAG FLU ALG: CPT | Performed by: CLINICAL MEDICAL LABORATORY

## 2023-11-01 PROCEDURE — 3074F SYST BP LT 130 MM HG: CPT

## 2023-11-01 PROCEDURE — 87491 CHLMYD TRACH DNA AMP PROBE: CPT | Performed by: CLINICAL MEDICAL LABORATORY

## 2023-11-01 PROCEDURE — 84439 ASSAY OF FREE THYROXINE: CPT | Performed by: CLINICAL MEDICAL LABORATORY

## 2023-11-01 PROCEDURE — 87481 CANDIDA DNA AMP PROBE: CPT | Performed by: CLINICAL MEDICAL LABORATORY

## 2023-11-01 PROCEDURE — 84443 ASSAY THYROID STIM HORMONE: CPT | Performed by: CLINICAL MEDICAL LABORATORY

## 2023-11-01 PROCEDURE — 87661 TRICHOMONAS VAGINALIS AMPLIF: CPT | Performed by: CLINICAL MEDICAL LABORATORY

## 2023-11-01 PROCEDURE — 87389 HIV-1 AG W/HIV-1&-2 AB AG IA: CPT | Performed by: CLINICAL MEDICAL LABORATORY

## 2023-11-01 PROCEDURE — 86592 SYPHILIS TEST NON-TREP QUAL: CPT | Performed by: CLINICAL MEDICAL LABORATORY

## 2023-11-01 PROCEDURE — 99213 OFFICE O/P EST LOW 20 MIN: CPT

## 2023-11-01 PROCEDURE — 87591 N.GONORRHOEAE DNA AMP PROB: CPT | Performed by: CLINICAL MEDICAL LABORATORY

## 2023-11-01 PROCEDURE — 36415 COLL VENOUS BLD VENIPUNCTURE: CPT

## 2023-11-01 RX ORDER — SPIRONOLACTONE 100 MG/1
100 TABLET, FILM COATED ORAL DAILY
COMMUNITY
Start: 2023-08-15

## 2023-11-01 ASSESSMENT — PAIN SCALES - GENERAL: PAINLEVEL: 0

## 2023-11-02 LAB
BACTERIAL VAGINOSIS VAG-IMP: NOT DETECTED
C ALBICANS DNA VAG QL NAA+PROBE: NOT DETECTED
C GLABRATA DNA VAG QL NAA+PROBE: NOT DETECTED
C TRACH RRNA SPEC QL NAA+PROBE: NEGATIVE
HIV 1+2 AB+HIV1 P24 AG SERPL QL IA: NONREACTIVE
Lab: NORMAL
N GONORRHOEA RRNA SPEC QL NAA+PROBE: NEGATIVE
RPR SER QL: NONREACTIVE
SERVICE CMNT-IMP: NORMAL
T VAGINALIS DNA VAG QL NAA+PROBE: NOT DETECTED
T4 FREE SERPL-MCNC: 0.5 NG/DL (ref 0.8–1.5)
TSH SERPL-ACNC: 37.73 MCUNITS/ML (ref 0.35–5)

## 2023-11-05 ENCOUNTER — TELEPHONE (OUTPATIENT)
Dept: INTERNAL MEDICINE | Age: 33
End: 2023-11-05

## 2023-11-05 DIAGNOSIS — E89.0 POSTABLATIVE HYPOTHYROIDISM: Primary | ICD-10-CM

## 2024-01-04 ENCOUNTER — TELEPHONE (OUTPATIENT)
Dept: GASTROENTEROLOGY | Facility: CLINIC | Age: 34
End: 2024-01-04
Payer: MEDICAID

## 2024-01-04 DIAGNOSIS — K50.80 CROHN'S DISEASE OF BOTH SMALL AND LARGE INTESTINE WITHOUT COMPLICATION: Primary | ICD-10-CM

## 2024-01-04 DIAGNOSIS — K50.80 CROHN'S DISEASE OF BOTH SMALL AND LARGE INTESTINE WITHOUT COMPLICATION: ICD-10-CM

## 2024-01-04 RX ORDER — ADALIMUMAB 40MG/0.4ML
40 KIT SUBCUTANEOUS
Qty: 4 EACH | Refills: 1 | Status: CANCELLED | OUTPATIENT
Start: 2024-01-04

## 2024-01-04 NOTE — TELEPHONE ENCOUNTER
----- Message from Nicole Baldo Pagan sent at 1/4/2024  8:19 AM CST -----  Regarding: switching pharmacies  Contact: pt @ [phone]  Pt states she has to switch pharmacies because of her insurance not accepting rx injection adalimumab (HUMIRA,CF,) 40 mg/0.4 mL SyKt . Pt was advised that injections are excepted at [facility] and would like to know if she needs to do the 80mg also. Please c/b to advise.. Thanks

## 2024-01-04 NOTE — TELEPHONE ENCOUNTER
"IBD medications Humira 40 mg SC weekly,     Refill request for Humira 40 mg     Allergies reviewed Yes    Drug Monitoring labs/frequency for all IBD meds:  CBC, CMP - q 6 months  TB, HEP B - Yearly      Lab Results   Component Value Date    HEPBSAG Non-reactive 05/01/2023    HEPBCAB Non-reactive 05/01/2023     Lab Results   Component Value Date    TBGOLDPLUS Negative 05/01/2023     No results found for: "QUANTNILVALU", "QUANTIFERON", "QUANTTBGDPL"   Lab Results   Component Value Date    MXPBRXQK06KX 16 (L) 05/09/2022    DBNZZYUN80 196 (L) 05/09/2022     Lab Results   Component Value Date    WBC 6.75 05/01/2023    HGB 10.9 (L) 05/01/2023    HCT 37.4 05/01/2023    MCV 94 05/01/2023     05/01/2023     Lab Results   Component Value Date    CREATININE 0.8 05/01/2023    ALBUMIN 3.6 05/01/2023    BILITOT 0.3 05/01/2023    ALKPHOS 106 05/01/2023    AST 21 05/01/2023    ALT 23 05/01/2023       Lab due date (mo/yr):  11/2023    Labs scheduled: N/A Patient stated to get labs drawn on 01/04/2023    Next appt: 01/11/2024    RX refill sent to provider for amount until next labs: Yes      "

## 2024-01-04 NOTE — TELEPHONE ENCOUNTER
Spoke with Lora:  IBD meds:   - Humira 40mg weekly, LD: 12/29, ND: 1/5    - Insurance change requiring Humira be processed through [facility]  - pt No Showed to last appt scheduled 6/30/23  - pt supposed to transition to [facility], states she called about a month ago and was told she needs a referral, did not update this office  - overdue for lab, agrees to have done today    Reviewed with Dr. Bacon:  - if pt compliant last 8 wks Humira, get drug level today  - add Vit B12 & Vit D to labs today  - will recommend transition to [facility] after updating labs and office visit    Per Lora, she cannot say she's been 100% compliant on same day of the week injection in the last 8 wks. Will hold off on drug level at this time. Advised she contact us when she gets her next injection as she will be late injecting.

## 2024-01-05 NOTE — TELEPHONE ENCOUNTER
Patient failed to show up for labs on 1/4  Labs outdated  Refill for humira declined until labs updated

## 2024-01-07 ENCOUNTER — CLINICAL SUPPORT (OUTPATIENT)
Dept: EMERGENCY MEDICINE | Facility: HOSPITAL | Age: 34
End: 2024-01-07

## 2024-01-07 VITALS
TEMPERATURE: 98 F | OXYGEN SATURATION: 100 % | DIASTOLIC BLOOD PRESSURE: 99 MMHG | HEART RATE: 109 BPM | RESPIRATION RATE: 18 BRPM | SYSTOLIC BLOOD PRESSURE: 151 MMHG | BODY MASS INDEX: 37.76 KG/M2 | WEIGHT: 200 LBS | HEIGHT: 61 IN

## 2024-01-07 LAB
ALBUMIN SERPL BCP-MCNC: 4.1 G/DL (ref 3.4–5)
ALP SERPL-CCNC: 67 U/L (ref 33–110)
ALT SERPL W P-5'-P-CCNC: 17 U/L (ref 7–45)
ANION GAP SERPL CALC-SCNC: 13 MMOL/L (ref 10–20)
APAP SERPL-MCNC: <10 UG/ML
APTT PPP: 32 SECONDS (ref 27–38)
AST SERPL W P-5'-P-CCNC: 18 U/L (ref 9–39)
BASOPHILS # BLD AUTO: 0.06 X10*3/UL (ref 0–0.1)
BASOPHILS NFR BLD AUTO: 0.8 %
BILIRUB SERPL-MCNC: 0.3 MG/DL (ref 0–1.2)
BUN SERPL-MCNC: 12 MG/DL (ref 6–23)
CALCIUM SERPL-MCNC: 9.4 MG/DL (ref 8.6–10.6)
CHLORIDE SERPL-SCNC: 107 MMOL/L (ref 98–107)
CO2 SERPL-SCNC: 23 MMOL/L (ref 21–32)
CREAT SERPL-MCNC: 0.85 MG/DL (ref 0.5–1.05)
EOSINOPHIL # BLD AUTO: 0.15 X10*3/UL (ref 0–0.7)
EOSINOPHIL NFR BLD AUTO: 1.9 %
ERYTHROCYTE [DISTWIDTH] IN BLOOD BY AUTOMATED COUNT: 14 % (ref 11.5–14.5)
ETHANOL SERPL-MCNC: <10 MG/DL
GFR SERPL CREATININE-BSD FRML MDRD: >90 ML/MIN/1.73M*2
GLUCOSE SERPL-MCNC: 104 MG/DL (ref 74–99)
HCT VFR BLD AUTO: 39.9 % (ref 36–46)
HGB BLD-MCNC: 13.3 G/DL (ref 12–16)
IMM GRANULOCYTES # BLD AUTO: 0.02 X10*3/UL (ref 0–0.7)
IMM GRANULOCYTES NFR BLD AUTO: 0.3 % (ref 0–0.9)
INR PPP: 1 (ref 0.9–1.1)
LYMPHOCYTES # BLD AUTO: 2.63 X10*3/UL (ref 1.2–4.8)
LYMPHOCYTES NFR BLD AUTO: 33.5 %
MCH RBC QN AUTO: 29.1 PG (ref 26–34)
MCHC RBC AUTO-ENTMCNC: 33.3 G/DL (ref 32–36)
MCV RBC AUTO: 87 FL (ref 80–100)
MONOCYTES # BLD AUTO: 0.78 X10*3/UL (ref 0.1–1)
MONOCYTES NFR BLD AUTO: 9.9 %
NEUTROPHILS # BLD AUTO: 4.2 X10*3/UL (ref 1.2–7.7)
NEUTROPHILS NFR BLD AUTO: 53.6 %
NRBC BLD-RTO: 0 /100 WBCS (ref 0–0)
PLATELET # BLD AUTO: 205 X10*3/UL (ref 150–450)
POTASSIUM SERPL-SCNC: 3.8 MMOL/L (ref 3.5–5.3)
PROT SERPL-MCNC: 7.1 G/DL (ref 6.4–8.2)
PROTHROMBIN TIME: 11.8 SECONDS (ref 9.8–12.8)
RBC # BLD AUTO: 4.57 X10*6/UL (ref 4–5.2)
SALICYLATES SERPL-MCNC: <3 MG/DL
SODIUM SERPL-SCNC: 139 MMOL/L (ref 136–145)
WBC # BLD AUTO: 7.8 X10*3/UL (ref 4.4–11.3)

## 2024-01-07 PROCEDURE — 81001 URINALYSIS AUTO W/SCOPE: CPT | Performed by: EMERGENCY MEDICINE

## 2024-01-07 PROCEDURE — 85025 COMPLETE CBC W/AUTO DIFF WBC: CPT | Performed by: EMERGENCY MEDICINE

## 2024-01-07 PROCEDURE — 80320 DRUG SCREEN QUANTALCOHOLS: CPT | Performed by: EMERGENCY MEDICINE

## 2024-01-07 PROCEDURE — 99284 EMERGENCY DEPT VISIT MOD MDM: CPT | Performed by: EMERGENCY MEDICINE

## 2024-01-07 PROCEDURE — 85610 PROTHROMBIN TIME: CPT | Performed by: EMERGENCY MEDICINE

## 2024-01-07 PROCEDURE — 99285 EMERGENCY DEPT VISIT HI MDM: CPT | Performed by: EMERGENCY MEDICINE

## 2024-01-07 PROCEDURE — 84702 CHORIONIC GONADOTROPIN TEST: CPT

## 2024-01-07 PROCEDURE — 36415 COLL VENOUS BLD VENIPUNCTURE: CPT | Performed by: EMERGENCY MEDICINE

## 2024-01-07 PROCEDURE — 87086 URINE CULTURE/COLONY COUNT: CPT | Performed by: EMERGENCY MEDICINE

## 2024-01-07 PROCEDURE — 93005 ELECTROCARDIOGRAM TRACING: CPT

## 2024-01-07 PROCEDURE — 80307 DRUG TEST PRSMV CHEM ANLYZR: CPT | Performed by: EMERGENCY MEDICINE

## 2024-01-07 PROCEDURE — 80053 COMPREHEN METABOLIC PANEL: CPT | Performed by: EMERGENCY MEDICINE

## 2024-01-07 PROCEDURE — 99285 EMERGENCY DEPT VISIT HI MDM: CPT | Mod: 25

## 2024-01-07 PROCEDURE — 80143 DRUG ASSAY ACETAMINOPHEN: CPT | Performed by: EMERGENCY MEDICINE

## 2024-01-07 ASSESSMENT — COLUMBIA-SUICIDE SEVERITY RATING SCALE - C-SSRS
1. IN THE PAST MONTH, HAVE YOU WISHED YOU WERE DEAD OR WISHED YOU COULD GO TO SLEEP AND NOT WAKE UP?: NO
2. HAVE YOU ACTUALLY HAD ANY THOUGHTS OF KILLING YOURSELF?: NO
6. HAVE YOU EVER DONE ANYTHING, STARTED TO DO ANYTHING, OR PREPARED TO DO ANYTHING TO END YOUR LIFE?: NO

## 2024-01-08 ENCOUNTER — APPOINTMENT (OUTPATIENT)
Dept: RADIOLOGY | Facility: HOSPITAL | Age: 34
End: 2024-01-08

## 2024-01-08 ENCOUNTER — HOSPITAL ENCOUNTER (EMERGENCY)
Facility: HOSPITAL | Age: 34
Discharge: HOME | End: 2024-01-08
Attending: EMERGENCY MEDICINE

## 2024-01-08 DIAGNOSIS — R51.9 NONINTRACTABLE HEADACHE, UNSPECIFIED CHRONICITY PATTERN, UNSPECIFIED HEADACHE TYPE: Primary | ICD-10-CM

## 2024-01-08 LAB
AMPHETAMINES UR QL SCN: NORMAL
APPEARANCE UR: ABNORMAL
ATRIAL RATE: 102 BPM
B-HCG SERPL-ACNC: <3 MIU/ML
BACTERIA #/AREA URNS AUTO: ABNORMAL /HPF
BARBITURATES UR QL SCN: NORMAL
BENZODIAZ UR QL SCN: NORMAL
BILIRUB UR STRIP.AUTO-MCNC: NEGATIVE MG/DL
BZE UR QL SCN: NORMAL
CANNABINOIDS UR QL SCN: NORMAL
COLOR UR: YELLOW
FENTANYL+NORFENTANYL UR QL SCN: NORMAL
GLUCOSE UR STRIP.AUTO-MCNC: NEGATIVE MG/DL
HOLD SPECIMEN: NORMAL
HOLD SPECIMEN: NORMAL
KETONES UR STRIP.AUTO-MCNC: NEGATIVE MG/DL
LEUKOCYTE ESTERASE UR QL STRIP.AUTO: ABNORMAL
MUCOUS THREADS #/AREA URNS AUTO: ABNORMAL /LPF
NITRITE UR QL STRIP.AUTO: NEGATIVE
OPIATES UR QL SCN: NORMAL
OXYCODONE+OXYMORPHONE UR QL SCN: NORMAL
P AXIS: 35 DEGREES
P OFFSET: 206 MS
P ONSET: 159 MS
PCP UR QL SCN: NORMAL
PH UR STRIP.AUTO: 6 [PH]
PR INTERVAL: 130 MS
PROT UR STRIP.AUTO-MCNC: ABNORMAL MG/DL
Q ONSET: 224 MS
QRS COUNT: 17 BEATS
QRS DURATION: 66 MS
QT INTERVAL: 328 MS
QTC CALCULATION(BAZETT): 427 MS
QTC FREDERICIA: 391 MS
R AXIS: 19 DEGREES
RBC # UR STRIP.AUTO: NEGATIVE /UL
RBC #/AREA URNS AUTO: ABNORMAL /HPF
SP GR UR STRIP.AUTO: 1.02
SQUAMOUS #/AREA URNS AUTO: ABNORMAL /HPF
T AXIS: 18 DEGREES
T OFFSET: 388 MS
UROBILINOGEN UR STRIP.AUTO-MCNC: 2 MG/DL
VENTRICULAR RATE: 102 BPM
WBC #/AREA URNS AUTO: ABNORMAL /HPF

## 2024-01-08 PROCEDURE — 70450 CT HEAD/BRAIN W/O DYE: CPT | Performed by: RADIOLOGY

## 2024-01-08 PROCEDURE — 70450 CT HEAD/BRAIN W/O DYE: CPT

## 2024-01-08 RX ORDER — ACETAMINOPHEN 325 MG/1
650 TABLET ORAL ONCE
Status: COMPLETED | OUTPATIENT
Start: 2024-01-08 | End: 2024-01-08

## 2024-01-08 RX ORDER — ACETAMINOPHEN 500 MG
1000 TABLET ORAL EVERY 6 HOURS PRN
Qty: 30 TABLET | Refills: 0 | Status: SHIPPED | OUTPATIENT
Start: 2024-01-08 | End: 2024-01-18

## 2024-01-08 RX ADMIN — ACETAMINOPHEN 650 MG: 325 TABLET ORAL at 01:58

## 2024-01-08 ASSESSMENT — PAIN - FUNCTIONAL ASSESSMENT: PAIN_FUNCTIONAL_ASSESSMENT: 0-10

## 2024-01-08 ASSESSMENT — LIFESTYLE VARIABLES
REASON UNABLE TO ASSESS: NO
EVER FELT BAD OR GUILTY ABOUT YOUR DRINKING: NO
HAVE PEOPLE ANNOYED YOU BY CRITICIZING YOUR DRINKING: NO
HAVE YOU EVER FELT YOU SHOULD CUT DOWN ON YOUR DRINKING: NO
EVER HAD A DRINK FIRST THING IN THE MORNING TO STEADY YOUR NERVES TO GET RID OF A HANGOVER: NO

## 2024-01-08 ASSESSMENT — PAIN DESCRIPTION - PROGRESSION: CLINICAL_PROGRESSION: OTHER (COMMENT)

## 2024-01-08 ASSESSMENT — PAIN SCALES - GENERAL: PAINLEVEL_OUTOF10: 10 - WORST POSSIBLE PAIN

## 2024-01-08 ASSESSMENT — PAIN DESCRIPTION - LOCATION: LOCATION: HEAD

## 2024-01-08 NOTE — ED PROVIDER NOTES
CC: Headache     HPI: Patient is a 33-year-old female with history of asthma, gestational hypertension, anxiety, and depression presenting to the emergency department today for headache.  Patient reports she was sitting on her couch when she had a 10-minute long episode where she felt lightheaded, dizzy, shaking, and like the world was ending around her.  She had 10 out of 10 thunderclap headache that was the worst of her life that has since improved and is now 5 out of 10 headache here in the emergency department.  Has never had episode like this before, does have a lot of life stressors but has never had a panic attack before.  Denies chest pain, shortness of breath, abdominal pain, or changes in urination/stool for us here in the emergency department.       Records Reviewed:  Recent available ED and inpatient notes reviewed in EMR.    PMHx/PSHx:  Per HPI.   - has a past medical history of Encounter for screening for infections with a predominantly sexual mode of transmission, Encounter for screening for malignant neoplasm of cervix, Encounter for screening for malignant neoplasm of cervix, Encounter for supervision of normal pregnancy, unspecified, first trimester, Encounter for supervision of other normal pregnancy, unspecified trimester, Encounter for supervision of other normal pregnancy, unspecified trimester, Other problems related to lifestyle, Other specified noninflammatory disorders of vulva and perineum, Personal history of other diseases of the circulatory system, Personal history of other diseases of urinary system, Personal history of other infectious and parasitic diseases, and Personal history of other specified conditions.  - has a past surgical history that includes Other surgical history (05/28/2019).  - does not have a problem list on file.    Medications:  Reviewed in EMR. See EMR for complete list of medications and doses.    Allergies:  Shellfish containing products    Social History:  -  Tobacco:  has no history on file for tobacco use.   - Alcohol:  has no history on file for alcohol use.   - Illicit Drugs:  has no history on file for drug use.     ROS:  Per HPI.       ???????????????????????????????????????????????????????????????  Triage Vitals:  T 36.7 °C (98 °F)    BP (!) 151/99  RR 18  O2 100 %      Physical Exam  Vitals and nursing note reviewed.   Constitutional:       General: She is not in acute distress.     Appearance: She is well-developed.   HENT:      Head: Normocephalic and atraumatic.   Eyes:      Conjunctiva/sclera: Conjunctivae normal.   Cardiovascular:      Rate and Rhythm: Normal rate and regular rhythm.      Heart sounds: No murmur heard.  Pulmonary:      Effort: Pulmonary effort is normal. No respiratory distress.      Breath sounds: Normal breath sounds.   Abdominal:      Palpations: Abdomen is soft.      Tenderness: There is no abdominal tenderness.   Musculoskeletal:         General: No swelling.      Cervical back: Neck supple.   Skin:     General: Skin is warm and dry.      Capillary Refill: Capillary refill takes less than 2 seconds.   Neurological:      Mental Status: She is alert.      Cranial Nerves: Cranial nerves 2-12 are intact.      Sensory: Sensation is intact.      Motor: Motor function is intact.      Coordination: Coordination is intact.      Gait: Gait is intact.   Psychiatric:         Mood and Affect: Mood normal.       ???????????????????????????????????????????????????????????????  EKG:   Tachycardic to 102, regular rhythm.  NC, QRS, QTc intervals within normal limits.  No ST elevations, depressions, T wave inversions.  Sinus tachycardia.    Medical Decision Making   Patient is a 33-year-old female presenting emergency department today for headache.  On arrival, blood pressure 151/99, tachycardic to 109, rest of vitals within normal limits.  Afebrile for us.  On examination, clear heart and lung sounds bilaterally..  Given her sudden onset  dizziness and headache we will get a CT head to rule intracranial hemorrhage at this time.  Chemistries were performed in triage that showed no acute abnormalities.  CBC showed a white count of 7.8, hemoglobin of 13.3, and platelets of 205.  Urine tox screen negative.  EKG showed sinus tachycardia.  Patient has  no previous cardiac history, it is possible that she had a cardiac related episode of pre-syncope but is now back to her neurologic baseline and EKG showed no acute abnormalities.  Given her age and lack of comorbidities, I think she is at low risk for an acute cardiac event.  While patient does reports his anxiety her symptoms today are not completely representative of a panic attack. Neurologically has no acute deficits for us and no neck tenderness or signs of meningismus.  Given Tylenol in the emergency department for symptomatic management with improvement of her headache. CT head negative for acute subarachnoid hemorrhage or other intracranial process.  At this time I believe patient is safe for discharge.  Given strict return precautions if she has another one of these episodes and recommended she get further workup with neurology if she continues to have these headaches with possible MRI for further evaluation.  Will be discharged home in stable condition today.      Diagnoses as of 01/08/24 0224   Nonintractable headache, unspecified chronicity pattern, unspecified headache type       Social Determinants Limiting Care:  None identified    Disposition:   Discharge    Silvano Mckay MD  Emergency Medicine PGY2      Procedures ? [employer] last updated 1/8/2024 2:24 AM          Silvano Mckay MD  Resident  01/08/24 0224

## 2024-01-08 NOTE — ED TRIAGE NOTES
Pt to ED c/o sudden onset headache. Per mom pt went limp, shaking & unable to speak. Pt endorsing blurry vision, able to speak in triage, A&Ox3. Pt denies any history of seizures, pt denies any drug or alcohol use.

## 2024-01-09 LAB — BACTERIA UR CULT: ABNORMAL

## 2024-01-10 ENCOUNTER — TELEPHONE (OUTPATIENT)
Dept: PHARMACY | Facility: HOSPITAL | Age: 34
End: 2024-01-10

## 2024-01-10 NOTE — PROGRESS NOTES
EDPD Note: Rapid Result Review    Reviewed Ms. Renetta Parmar 's chart regarding a contaminated urine culture/result that was taken during their recent emergency room visit. The patient was not told about these results prior to leaving the emergency department. Therefore, patient was contacted and given proper education.     Patient denies any urinary symptoms and was not discharged on an antibiotic, which is appropriate given lack of symptoms. Patient asked about CAT scan results and was informed that another section of our lab will be responsible for reading those results and informing the patient.     No results found for the last 90 days.      No further follow up needed from EDPD Team.     Jaime Henry, PharmD

## 2024-01-11 ENCOUNTER — TELEPHONE (OUTPATIENT)
Dept: GASTROENTEROLOGY | Facility: CLINIC | Age: 34
End: 2024-01-11
Payer: MEDICAID

## 2024-01-11 ENCOUNTER — HOSPITAL ENCOUNTER (EMERGENCY)
Facility: HOSPITAL | Age: 34
Discharge: HOME | End: 2024-01-11

## 2024-01-11 VITALS
HEART RATE: 108 BPM | BODY MASS INDEX: 37.76 KG/M2 | HEIGHT: 61 IN | OXYGEN SATURATION: 99 % | DIASTOLIC BLOOD PRESSURE: 76 MMHG | SYSTOLIC BLOOD PRESSURE: 100 MMHG | TEMPERATURE: 97.9 F | WEIGHT: 200 LBS | RESPIRATION RATE: 18 BRPM

## 2024-01-11 DIAGNOSIS — G43.909 MIGRAINE WITHOUT STATUS MIGRAINOSUS, NOT INTRACTABLE, UNSPECIFIED MIGRAINE TYPE: Primary | ICD-10-CM

## 2024-01-11 PROCEDURE — 2500000001 HC RX 250 WO HCPCS SELF ADMINISTERED DRUGS (ALT 637 FOR MEDICARE OP): Performed by: PHYSICIAN ASSISTANT

## 2024-01-11 PROCEDURE — 99283 EMERGENCY DEPT VISIT LOW MDM: CPT | Performed by: PHYSICIAN ASSISTANT

## 2024-01-11 PROCEDURE — 99284 EMERGENCY DEPT VISIT MOD MDM: CPT | Performed by: PHYSICIAN ASSISTANT

## 2024-01-11 PROCEDURE — 99283 EMERGENCY DEPT VISIT LOW MDM: CPT

## 2024-01-11 RX ORDER — IBUPROFEN 600 MG/1
600 TABLET ORAL ONCE
Status: COMPLETED | OUTPATIENT
Start: 2024-01-11 | End: 2024-01-11

## 2024-01-11 RX ORDER — NAPROXEN 500 MG/1
500 TABLET ORAL 2 TIMES DAILY
Qty: 20 TABLET | Refills: 0 | Status: SHIPPED | OUTPATIENT
Start: 2024-01-11 | End: 2024-06-05 | Stop reason: WASHOUT

## 2024-01-11 RX ORDER — ACETAMINOPHEN 325 MG/1
650 TABLET ORAL EVERY 6 HOURS PRN
Qty: 30 TABLET | Refills: 0 | OUTPATIENT
Start: 2024-01-11 | End: 2024-04-25

## 2024-01-11 RX ORDER — ACETAMINOPHEN 325 MG/1
975 TABLET ORAL ONCE
Status: COMPLETED | OUTPATIENT
Start: 2024-01-11 | End: 2024-01-11

## 2024-01-11 RX ORDER — BUTALBITAL, ACETAMINOPHEN AND CAFFEINE 50; 325; 40 MG/1; MG/1; MG/1
1-2 TABLET ORAL EVERY 6 HOURS PRN
Qty: 20 TABLET | Refills: 0 | Status: SHIPPED | OUTPATIENT
Start: 2024-01-11 | End: 2024-01-16

## 2024-01-11 RX ORDER — PROCHLORPERAZINE MALEATE 10 MG
10 TABLET ORAL ONCE
Status: COMPLETED | OUTPATIENT
Start: 2024-01-11 | End: 2024-01-11

## 2024-01-11 RX ADMIN — ACETAMINOPHEN 975 MG: 325 TABLET ORAL at 11:11

## 2024-01-11 RX ADMIN — IBUPROFEN 600 MG: 600 TABLET, FILM COATED ORAL at 11:11

## 2024-01-11 RX ADMIN — PROCHLORPERAZINE MALEATE 10 MG: 10 TABLET ORAL at 11:11

## 2024-01-11 ASSESSMENT — COLUMBIA-SUICIDE SEVERITY RATING SCALE - C-SSRS
2. HAVE YOU ACTUALLY HAD ANY THOUGHTS OF KILLING YOURSELF?: NO
6. HAVE YOU EVER DONE ANYTHING, STARTED TO DO ANYTHING, OR PREPARED TO DO ANYTHING TO END YOUR LIFE?: NO
1. IN THE PAST MONTH, HAVE YOU WISHED YOU WERE DEAD OR WISHED YOU COULD GO TO SLEEP AND NOT WAKE UP?: NO

## 2024-01-11 NOTE — ED PROVIDER NOTES
HPI:  33-year-old female with history of asthma, anxiety, depression presenting for headache.  States she has had headaches for at least a week now which are on and off.  States she does generally get headaches however these feel little worse than normal.  Was seen here 1/4 and had a head CT performed without any acute findings.  States she did not follow-up with her PCP despite the recommendations due to financial issues.  States that is not worse today.  Denies any other symptoms including lightheadedness, dizziness, syncope, near syncope, blurry vision, nausea, vomiting, fever, chills, night sweats or rigors.      Physical Exam:   GEN: Vitals noted. NAD, well-appearing ambulating comfortably  EYES:  EOMs grossly intact, anicteric sclera  MIKAELA: Mucosa moist.  NECK: Supple.  No nuchal rigidity  CARD: RRR  PULMONARY: Moving air well. Clear all lung fields.  ABDOMEN: Soft, no guarding, no rigidity. Nontender. NABS  EXTREMITIES: Full ROM, no pitting edema,   SKIN: Intact, warm and dry  NEURO: Alert and oriented x 3, speech is clear, no obvious deficits noted.  CN II through XII intact, no nystagmus, negative Romberg, nontoxic gait, intact sensation strength upper extremities, good finger-nose-finger      ----------------------------------------------------------------------------------------------------------------------------    MDM:  33-year-old female presenting for headaches.  On exam she is well-appearing and uncomfortable.  Vital signs stable.  Neurological intact with no focal findings.  No meningeal signs.  High suspicion given her constellation of symptoms with history and physical is for migraine type headache.  With a benign exam today no indication for further advanced imaging as there is very low suspicion for acute intracranial process.  I discussed differential diagnosis including highest likelihood for migraine headache versus possible viral etiology.  Will treat her symptomatically today with Tylenol  and ibuprofen.  Will give prescription for Fioricet as well.  She is recommended to follow-up with PCP and for persistent headaches follow-up with neurology.  Return precautions reviewed.    No orders to display     Labs Reviewed - No data to display  Diagnoses as of 01/11/24 1050   Migraine without status migrainosus, not intractable, unspecified migraine type     ----------------------------------------------------------------------------------------------------------------------------    This note was dictated using a speech recognition program.  While an attempt was made at proof reading to minimize errors, minor errors in transcription may be present call for questions.     Rodrigue Boswell PA-C  01/11/24 1058

## 2024-01-11 NOTE — ED TRIAGE NOTES
Pt noting headache starting this morning. Pain 8/10. She reports she was seen for her headache this past Sunday in ED and was dc after tx. Pt neuro-muscularly intact. Speech clear. Ambulatory with steady gait in triage. Moving all extremities. Pt reports being told her BP is high but denies taking medications. BP stable in triage

## 2024-01-11 NOTE — Clinical Note
Renetta Parmar was seen and treated in our emergency department on 1/11/2024.  She may return to work on 01/14/2024.       If you have any questions or concerns, please don't hesitate to call.      Rodrigue Boswell PA-C

## 2024-01-11 NOTE — TELEPHONE ENCOUNTER
----- Message from Carlos Birmingham sent at 1/11/2024  9:24 AM CST -----  Regarding: appt reschedule request  Contact: pt [phone]  PATIENT CALL    Pt called regarding today's appt. She's unable to make it and would like to r/s both the office visit and labs for sometime next week if possible. Please call back at [phone]

## 2024-01-11 NOTE — TELEPHONE ENCOUNTER
LM on VM to return call.    Pt was scheduled for 10am appt with Dr. Bacon today. Message sent by call staff at 0934 that pt was not able to make the appt and wants to reschedule.

## 2024-01-11 NOTE — DISCHARGE INSTRUCTIONS
Take the Tylenol and naproxen for the headaches.  Take the Fioricet for severe headaches.  Follow-up with your PCP and/or neurologist, to schedule appointment neurology clinic call [phone].  Get plenty rest and stay well-hydrated.

## 2024-01-12 ENCOUNTER — PATIENT MESSAGE (OUTPATIENT)
Dept: GASTROENTEROLOGY | Facility: CLINIC | Age: 34
End: 2024-01-12
Payer: MEDICAID

## 2024-01-12 NOTE — LETTER
January 17, 2024    Lora Scott  [address] [address]             [facility]  [address]  [place] [address]  Phone: [phone]  Fax: [phone]   Dear Lora,    Our ability to deliver quality healthcare is dependent upon a cooperative relationship between patient and provider. However, my office has been informed that you have displayed noncompliance related to the care and treatment recommendations of your provider including keeping scheduled follow ups and getting recommended testing/procedures done.  Noncompliance with treatment can place your health at risk. When you do not comply with treatment recommendations, it is difficult for your physician to provide appropriate care for your medical condition. Please understand that your continued care with the providers in our IBD Clinic is contingent upon your cooperation with this request. Please contact our office immediately at [phone] if you would like to continue your care with us.     We have you scheduled for a follow up on 3/8/24 at 08:00 am with a 07:45 am arrival. Failure to make this appointment will result in dismissal from our practice. An appointment reminder has been included with this letter.     Sincerely,          Suzi Bacon MD   Department of Gastroenterology  Medical Director, Inflammatory Bowel Disease

## 2024-01-17 ENCOUNTER — LAB VISIT (OUTPATIENT)
Dept: LAB | Facility: HOSPITAL | Age: 34
End: 2024-01-17
Attending: INTERNAL MEDICINE
Payer: MEDICAID

## 2024-01-17 DIAGNOSIS — K50.80 CROHN'S DISEASE OF BOTH SMALL AND LARGE INTESTINE WITHOUT COMPLICATION: ICD-10-CM

## 2024-01-17 LAB
25(OH)D3+25(OH)D2 SERPL-MCNC: 6 NG/ML (ref 30–96)
ALBUMIN SERPL BCP-MCNC: 3.6 G/DL (ref 3.5–5.2)
ALP SERPL-CCNC: 107 U/L (ref 55–135)
ALT SERPL W/O P-5'-P-CCNC: 21 U/L (ref 10–44)
ANION GAP SERPL CALC-SCNC: 7 MMOL/L (ref 8–16)
AST SERPL-CCNC: 18 U/L (ref 10–40)
BASOPHILS # BLD AUTO: 0.02 K/UL (ref 0–0.2)
BASOPHILS NFR BLD: 0.3 % (ref 0–1.9)
BILIRUB SERPL-MCNC: 0.4 MG/DL (ref 0.1–1)
BUN SERPL-MCNC: 11 MG/DL (ref 6–20)
CALCIUM SERPL-MCNC: 9.2 MG/DL (ref 8.7–10.5)
CHLORIDE SERPL-SCNC: 108 MMOL/L (ref 95–110)
CO2 SERPL-SCNC: 22 MMOL/L (ref 23–29)
CREAT SERPL-MCNC: 0.7 MG/DL (ref 0.5–1.4)
DIFFERENTIAL METHOD BLD: ABNORMAL
EOSINOPHIL # BLD AUTO: 0.2 K/UL (ref 0–0.5)
EOSINOPHIL NFR BLD: 2.9 % (ref 0–8)
ERYTHROCYTE [DISTWIDTH] IN BLOOD BY AUTOMATED COUNT: 15.8 % (ref 11.5–14.5)
EST. GFR  (NO RACE VARIABLE): >60 ML/MIN/1.73 M^2
GLUCOSE SERPL-MCNC: 67 MG/DL (ref 70–110)
HCT VFR BLD AUTO: 38.6 % (ref 37–48.5)
HGB BLD-MCNC: 12 G/DL (ref 12–16)
IMM GRANULOCYTES # BLD AUTO: 0.01 K/UL (ref 0–0.04)
IMM GRANULOCYTES NFR BLD AUTO: 0.1 % (ref 0–0.5)
LYMPHOCYTES # BLD AUTO: 2.5 K/UL (ref 1–4.8)
LYMPHOCYTES NFR BLD: 34.6 % (ref 18–48)
MCH RBC QN AUTO: 28 PG (ref 27–31)
MCHC RBC AUTO-ENTMCNC: 31.1 G/DL (ref 32–36)
MCV RBC AUTO: 90 FL (ref 82–98)
MONOCYTES # BLD AUTO: 0.6 K/UL (ref 0.3–1)
MONOCYTES NFR BLD: 8.1 % (ref 4–15)
NEUTROPHILS # BLD AUTO: 4 K/UL (ref 1.8–7.7)
NEUTROPHILS NFR BLD: 54 % (ref 38–73)
NRBC BLD-RTO: 0 /100 WBC
PLATELET # BLD AUTO: 252 K/UL (ref 150–450)
PMV BLD AUTO: 10.7 FL (ref 9.2–12.9)
POTASSIUM SERPL-SCNC: 3.9 MMOL/L (ref 3.5–5.1)
PROT SERPL-MCNC: 8.2 G/DL (ref 6–8.4)
RBC # BLD AUTO: 4.29 M/UL (ref 4–5.4)
SODIUM SERPL-SCNC: 137 MMOL/L (ref 136–145)
VIT B12 SERPL-MCNC: 201 PG/ML (ref 210–950)
WBC # BLD AUTO: 7.32 K/UL (ref 3.9–12.7)

## 2024-01-17 PROCEDURE — 82607 VITAMIN B-12: CPT | Performed by: INTERNAL MEDICINE

## 2024-01-17 PROCEDURE — 80053 COMPREHEN METABOLIC PANEL: CPT | Performed by: INTERNAL MEDICINE

## 2024-01-17 PROCEDURE — 36415 COLL VENOUS BLD VENIPUNCTURE: CPT | Mod: PO | Performed by: INTERNAL MEDICINE

## 2024-01-17 PROCEDURE — 85025 COMPLETE CBC W/AUTO DIFF WBC: CPT | Performed by: INTERNAL MEDICINE

## 2024-01-17 PROCEDURE — 82306 VITAMIN D 25 HYDROXY: CPT | Performed by: INTERNAL MEDICINE

## 2024-01-17 NOTE — TELEPHONE ENCOUNTER
Spoke with Lora:  - NO SHOW OV 1/11/24, lab 1/4/24, OV 6/30/23  - discussed importance of compliance with appts for Dr. Bacon to safely prescribe medication  - she was given information on transfer of care on 6/28/23 via portal that wasn't read by her until 12/4/23  - discussed NO SHOW is grounds for dismissal from our clinic  - agrees to go to lab now  - pt must keep appt scheduled 3/8/24  - referral to Dr. Torrie [facility] at [facility] will be sent for transfer of care  - pt states understanding

## 2024-01-17 NOTE — TELEPHONE ENCOUNTER
Per Dr. Bacon:  - warning letter & appt slip for next appt 3/8/24  - call pt and find out last dose of Humira. Last Rx provided 1/23/23 w ref x 5 (6 mos supply). Pt must be taking differently than she reported as she would've been out of medication by the end of July 2023.    LM on , PM sent

## 2024-01-19 ENCOUNTER — TELEPHONE (OUTPATIENT)
Dept: GASTROENTEROLOGY | Facility: CLINIC | Age: 34
End: 2024-01-19
Payer: MEDICAID

## 2024-01-19 DIAGNOSIS — E55.9 VITAMIN D DEFICIENCY: ICD-10-CM

## 2024-01-19 DIAGNOSIS — K50.80 CROHN'S DISEASE OF BOTH SMALL AND LARGE INTESTINE WITHOUT COMPLICATION: Primary | ICD-10-CM

## 2024-01-19 DIAGNOSIS — K50.80 CROHN'S DISEASE OF BOTH SMALL AND LARGE INTESTINE WITHOUT COMPLICATION: ICD-10-CM

## 2024-01-19 RX ORDER — ERGOCALCIFEROL 1.25 MG/1
50000 CAPSULE ORAL
Qty: 12 CAPSULE | Refills: 1 | Status: SHIPPED | OUTPATIENT
Start: 2024-01-19

## 2024-01-19 RX ORDER — ADALIMUMAB 80MG/0.8ML
80 KIT SUBCUTANEOUS SEE ADMIN INSTRUCTIONS
Qty: 3 PEN | Refills: 0 | Status: ACTIVE | OUTPATIENT
Start: 2024-01-19 | End: 2024-05-09 | Stop reason: DRUGHIGH

## 2024-01-19 RX ORDER — ADALIMUMAB 40MG/0.4ML
40 KIT SUBCUTANEOUS
Qty: 4 EACH | Refills: 5 | Status: ACTIVE | OUTPATIENT
Start: 2024-01-19 | End: 2024-02-21 | Stop reason: SDUPTHER

## 2024-01-29 ENCOUNTER — OFFICE VISIT (OUTPATIENT)
Dept: OPTOMETRY | Facility: CLINIC | Age: 34
End: 2024-01-29
Payer: MEDICAID

## 2024-01-29 DIAGNOSIS — Z97.3 WEARS CONTACT LENSES: ICD-10-CM

## 2024-01-29 DIAGNOSIS — Z46.0 ENCOUNTER FOR FITTING OR ADJUSTMENT OF SPECTACLES OR CONTACT LENSES: Primary | ICD-10-CM

## 2024-01-29 DIAGNOSIS — H44.23 DEGENERATIVE MYOPIA OF BOTH EYES, UNSPECIFIED WHETHER COMPLICATION PRESENT: Primary | ICD-10-CM

## 2024-01-29 DIAGNOSIS — H52.13 HIGH MYOPIA, BILATERAL: ICD-10-CM

## 2024-01-29 PROCEDURE — 92310 CONTACT LENS FITTING OU: CPT | Mod: CSM,S$GLB,, | Performed by: OPTOMETRIST

## 2024-01-29 PROCEDURE — 99212 OFFICE O/P EST SF 10 MIN: CPT | Mod: PBBFAC,PO | Performed by: OPTOMETRIST

## 2024-01-29 PROCEDURE — 99214 OFFICE O/P EST MOD 30 MIN: CPT | Mod: S$PBB,,, | Performed by: OPTOMETRIST

## 2024-01-29 PROCEDURE — 99999 PR PBB SHADOW E&M-EST. PATIENT-LVL II: CPT | Mod: PBBFAC,,, | Performed by: OPTOMETRIST

## 2024-01-29 PROCEDURE — 99499 UNLISTED E&M SERVICE: CPT | Mod: ,,, | Performed by: OPTOMETRIST

## 2024-01-29 PROCEDURE — 92015 DETERMINE REFRACTIVE STATE: CPT | Mod: ,,, | Performed by: OPTOMETRIST

## 2024-01-29 NOTE — PROGRESS NOTES
Subjective:       Patient ID: Lora Scott is a 33 y.o. female      Chief Complaint   Patient presents with    Concerns About Ocular Health    Contact Lens Follow Up     History of Present Illness  Dls: 10/1/21 Dr. Holliday ; outside [facility] 2022    34 y/o female presents today with c/o blurry vision at distance and near ou.  Pt wears single vision glasses. Pt states has not worn cls x 1 yr   Pt wants a new rx for both     No tearing  + ou off/on itching   No burning  No pain  + ha's  + ou off/on floaters  No flashes    Eye meds  None    Pohx:   None    Fohx:   Glaucoma - uncles   Cat -grandmother        Assessment/Plan:     1. Degenerative myopia of both eyes, unspecified whether complication present  2. High myopia, bilateral  Educated patient on refractive error and discussed lens options. Dispensed updated spectacle Rx. Educated about adaptation period to new specs.    Eyeglass Final Rx       Eyeglass Final Rx         Sphere Cylinder Axis    Right -17.00 +0.75 115    Left -17.25 +1.50 045      Type: SVL    Expiration Date: 1/29/2025                      3. Wears contact lenses  Trial with Air Optix in office but finalize in Biofinity XR (pt's previous brand) due to high Rx. Contact lens Rx released to pt. Daily wear only advised, replacement monthly with education to risks of extended wear. Advised against sleeping, swimming, showering in lenses. Okay to order if happy with comfort and VA. Discussed lens care, compliance and solutions. RTC yearly contact lens health check.     Contact Lens Final Rx       Final Contact Lens Rx         Brand Base Curve Diameter Sphere Cylinder    Right Biofinity XR 8.6 14.0 -14.00 Sphere    Left Biofinity XR 8.6 14.0 -14.00 Sphere       Expiration Date: 1/29/2025    Replacement: Monthly    Solutions: OptiFree PureMoist    Wearing Schedule: Daily Wear                      Time spent with patient: 25 minutes  Time spent documenting encounter: 10 minutes  Total time: 35 minutes         Follow  up in about 1 year (around 1/29/2025).

## 2024-01-30 ENCOUNTER — PATIENT MESSAGE (OUTPATIENT)
Dept: OPTOMETRY | Facility: CLINIC | Age: 34
End: 2024-01-30
Payer: MEDICAID

## 2024-02-01 ENCOUNTER — OFFICE VISIT (OUTPATIENT)
Dept: SURGERY | Facility: CLINIC | Age: 34
End: 2024-02-01
Payer: MEDICAID

## 2024-02-01 ENCOUNTER — OFFICE VISIT (OUTPATIENT)
Dept: GASTROENTEROLOGY | Facility: CLINIC | Age: 34
End: 2024-02-01
Payer: MEDICAID

## 2024-02-01 VITALS
SYSTOLIC BLOOD PRESSURE: 119 MMHG | HEIGHT: 63 IN | HEART RATE: 88 BPM | DIASTOLIC BLOOD PRESSURE: 79 MMHG | WEIGHT: 130.38 LBS | BODY MASS INDEX: 23.1 KG/M2 | TEMPERATURE: 97 F

## 2024-02-01 VITALS
HEIGHT: 63 IN | HEART RATE: 92 BPM | TEMPERATURE: 97 F | SYSTOLIC BLOOD PRESSURE: 118 MMHG | OXYGEN SATURATION: 97 % | WEIGHT: 129.88 LBS | DIASTOLIC BLOOD PRESSURE: 77 MMHG | BODY MASS INDEX: 23.01 KG/M2

## 2024-02-01 DIAGNOSIS — K50.80 CROHN'S DISEASE OF BOTH SMALL AND LARGE INTESTINE WITHOUT COMPLICATION: Primary | ICD-10-CM

## 2024-02-01 DIAGNOSIS — L02.31 ABSCESS OF BUTTOCK, RIGHT: ICD-10-CM

## 2024-02-01 DIAGNOSIS — D84.9 IMMUNOSUPPRESSED STATUS: ICD-10-CM

## 2024-02-01 DIAGNOSIS — L02.31 ABSCESS OF RIGHT BUTTOCK: Primary | ICD-10-CM

## 2024-02-01 DIAGNOSIS — L40.9 PSORIASIS: ICD-10-CM

## 2024-02-01 PROCEDURE — 1159F MED LIST DOCD IN RCRD: CPT | Mod: CPTII,,, | Performed by: SURGERY

## 2024-02-01 PROCEDURE — 3008F BODY MASS INDEX DOCD: CPT | Mod: CPTII,,, | Performed by: SURGERY

## 2024-02-01 PROCEDURE — 3074F SYST BP LT 130 MM HG: CPT | Mod: CPTII,S$GLB,, | Performed by: INTERNAL MEDICINE

## 2024-02-01 PROCEDURE — 10060 I&D ABSCESS SIMPLE/SINGLE: CPT | Mod: PBBFAC | Performed by: SURGERY

## 2024-02-01 PROCEDURE — 3078F DIAST BP <80 MM HG: CPT | Mod: CPTII,,, | Performed by: SURGERY

## 2024-02-01 PROCEDURE — 99215 OFFICE O/P EST HI 40 MIN: CPT | Mod: S$GLB,,, | Performed by: INTERNAL MEDICINE

## 2024-02-01 PROCEDURE — 3008F BODY MASS INDEX DOCD: CPT | Mod: CPTII,S$GLB,, | Performed by: INTERNAL MEDICINE

## 2024-02-01 PROCEDURE — 3078F DIAST BP <80 MM HG: CPT | Mod: CPTII,S$GLB,, | Performed by: INTERNAL MEDICINE

## 2024-02-01 PROCEDURE — 3074F SYST BP LT 130 MM HG: CPT | Mod: CPTII,,, | Performed by: SURGERY

## 2024-02-01 PROCEDURE — 99499 UNLISTED E&M SERVICE: CPT | Mod: S$PBB,,, | Performed by: SURGERY

## 2024-02-01 PROCEDURE — 99999 PR PBB SHADOW E&M-EST. PATIENT-LVL III: CPT | Mod: PBBFAC,,, | Performed by: SURGERY

## 2024-02-01 PROCEDURE — 99213 OFFICE O/P EST LOW 20 MIN: CPT | Mod: PBBFAC | Performed by: SURGERY

## 2024-02-01 NOTE — PROGRESS NOTES
"     [facility]             Inflammatory Bowel Disease   Follow-up  Note              TODAY'S VISIT DATE:  2/1/2024    Chief Complaint:   Chief Complaint   Patient presents with    Crohn's Disease     PCP: Geo Espitia    Previous History:  Lora Scott is a 33 y.o. female with Crohn's disease (ileum, S/P ileocecal resection-42 cm of ileum/10.5 cm cecum removed with surgical path c/w stricture and fistula, 2/15/20 ileostomy takedown and mucus fistula takedown), buttock/perineal abscess (drained 12/2022, likely hydradenitis suppurativa), psoriasis, GERD, asthma, ex-smoker (quit 9/2019).  Patient reports being diagnosed with IBS with alternating diarrhea and constipation and weight loss as a teenager that was treated with Nexium which helped.  In 8/2018 she again had abd pain, weight loss, and alternating BMs so she contacted her PCP for Nexium which did not help.  By 2/2019, her symptoms progressed prompting an ER visit and on 2/18/2019 she had a CT abd/pelvis which showed an "abnormal wall thickening involving the ascending colon, cecum and terminal ileum with intense mucosal enhancement and luminal narrowing resulting in distention of proximal small bowel.  There was also an ileal-ileal and ileocolic fistulization, fat hypertrophy, increased mesenteric vascularitiy, and reactive mesenteric adenitis in the RLQ with no abscess. "  Patient had an inpatient EGD on 2/20/2019 which showed minimal chronic H. Pylori negative inflammation, normal esophagus, and duodenal biopsies showed mild villous blunting and mild increased IELs.  Colonoscopy on the same day showed congested mucosa in the TI and ICV with granularity in the TI, and TI stricture 5 cm from the ICV with ileal biopsies confirmed ileitis and biopsies of the right colon confirmed focal moderate active colitis with normal left colon and rectum, sigmoid diverticulitis, and internal hemorrhoids.  It was felt that patient would eventually need " surgical intervention but needed nutritional optimization initially so she was discharged home with 12 hours of TPN daily via a PICC and bowel rest with plans for an MRE, surgical f/u, and a referral to the IBD clinic to discuss medical options.  She was last seen by me 3/2019 for initial IBD visit at which time she was on no meds and there was plan for MRE 3/13/19 with f/u with Dr. Ruiz. MRE on 3/13/19 showed multifocal active inflammation of the distal ileum with mild fibrostenotic component and entero-enteric fistula.  She then was transferred to Dr. Cope due to availability and he initially saw her in clinic and a decision to proceed with surgery was made.  On 5/10/19 she underwent an open ileocolonic resection (10 cm of cecum, 42 cm of ileum removed) with end ileostomy with mucous fistula. Surgical pathology showed TI/cecum with chronic active ileitis with stricture and features of fistulization, transmural inflammation with reactive LNs. Postoperatively she did well and gained weight and would pass mucous from below a few times only. She emptied her stoma about 4-6 times/d and was having issues getting supplies due to insurance issues.  Patient has had multiple issues with stoma appliance/bag and due to inability to afford stoma supplies and its not covered by insurance, she has been getting samples from our wound/ostomy nurse.  Patient stopped smoking in mid September 2019.  She started on methotrexate 12.5 mg p.o. weekly though due to elevated liver function test discontinued on 10/4/2019.  She started Humira on 10/24/2019 and Imuran 50 mg daily on 10/25/2019 though never started Imuran as recommended and we decided not to proceed and instead optimize her Humira.  Ileoscopy through stoma was normal on 12/20/2019.  On 1/15/2020 patient had Humira levels 3.5 and her Humira was increased to weekly starting 3/13/20.  On 2/15/2020 she had elective takedown of her ileostomy and mucous fistula. Colonoscopy  10/2020 showed normal colon and single linear ulcer at ileocolonic anastomosis and normal neoterminal ileum with biopsies of neoterminal ileum normal. On 20 trough Humira levels and abs adequate with [id number] with no Abs on Humira 40 mg SC weekly. Patient has ongoing psoriasis being followed by [facility] dermatology and was treated for tinea pedis 2020.  She was also treated for bacterial vaginosis by her GYN in  and pap smear HPV positive with plans for colposcopy.  In 2021 patient was diagnosed with recurrent fungal infections- tineap capitis treated with griseofulvin.  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 In mid 2021 pt had abscess near vaginal area s/p I&D in [facility] [place] in [place].  She saw Dermatology 22 and 22 for psoriasis which significantly improved with prednisone 60mg followed by taper over two weeks with plans for otezla though pt is not on this and has not seen dermatology since then. In 5/10/22 colonoscopy c/w normal colon, ileocolonic anastomosis and neoterminal ileum. 22 had multiple buttock/perineal abscesses drained by [facility] general surgery (Dr. Regan Rowell) and following wound care and f/u with surgery with good wound healing and granulation and more c/w hydradenitis suppurativa. On 22 trough humira levels 8.2/Abs neg on humira 40 mg SC weekly.  She delivered a healthy baby by  23 and during pregnancy remained on Humira 40 mg SC weekly though delayed dosing towards the end of pregnancy and was off humira from -23. Due to this we reloaded her with 80 mg dose of humria 23 and she then resumed 40 mg SC weekly.    Interval History:  - current IBD meds: Humira 40 mg SC weekly (started 10/24/20, gap in therapy 23-23, restarted with 80 mg on 23 then 40 mg SC weekly,  gap in therapy 2023 - 2023, LD 23, re-induced 2024, ND  then resume 40mg weekly on )  - 2 loose to soft BM/day with some mucus and urgency; 2 nocturnal BM in the last two weeks.   - reports occasional abdominal pain since being off humira   - 3/2023 - last OV; first post partum visit. Lost to follow up since then. Referred to go to [facility] IBD program - but pt never called to schedule and referral .   - history of no shows and poor communication   - 2023 - colonoscopy: Abnormal perianal exam. Buttock sores but no obvious fistula or abscess. Scarring along gluteal cleft along with hypopigmentation of skin. The entire examined colon is normal. The examined portion of the ileum was normal. SB biopsies showed some microscopic inflammation, overall normal   - 2023 - had multiple abscesses in the area between buttocks. Got I&D done at a hospital in [place]. Followed up with wound care afterwards for 2 months  - 10/2023 - had recurring abscess on right buttock which drained on its own and resolved  - a week ago noticed another abscess forming on upper right buttock - has gotten bigger in size and it is painful when she puts pressure, pt feels it is warm upon touch.   - currently is having a flare of her psoriasis affecting her entire body (legs, face, back, arms). Symptoms started to worsen after 3 weeks of no humira (mid January) and have gotten worse. Currently not using anything topically.   - has derm appt at [facility] scheduled for  with Dr. LIZBETH Jackson    - NSAID use: no   - Narcotic use: no   - Alternative/complementary meds for IBD:  no    Prior Pertinent Surgeries:   5/10/19 open ileocolonic resection (10 cm of cecum, 42 cm of ileum removed) with end ileostomy with mucous fistula. Surgical pathology showed TI/cecum with chronic active ileitis with stricture and features of fistulization, transmural inflammation with reactive LNs  2/15/20 elective takedown of her ileostomy and mucous  fistula;  (surgical path- peristoma granulation tissue with acute and chronic inflammation, ascending colon with acute and chronic inflammatory infiltrate with superficial erosion and focal active colitis, TI with focal granulomatous inflammation and superficial erosion)  22 Multiple abscess drainage from per-anal/gluteal cleft     Last pertinent Endoscopy/Imagin2019 EGD: normal examined duodenum (path: mild villous blunting and mild increased intraepithelial lymphocytes); normal stomach (path: minimal chronic H. pylori negative inflammation); normal esophagus  3/13/19 MRE:  multifocal active inflammation of the distal ileum with mild fibrostenotic component and entero-enteric fistula  19- ileoscopy through stoma: The 30 cm of examined through the mucous fistula is normal. The peristomal area has granular friable mucosa and the lorena-terminal ileum appeared normal. Biopsies of colon through mucus fistula- normal colon, biopsies of neoterminal ileum with minimal active inflammation  10/30/20 colonoscopy: Colon normal, single linear ulcer at the colon anastomosis. Ileum was normal as well including path.  5/10/22 colonoscopy: normal colon, normal ileocolonic anastomosis, normal neoterminal ileum. Biopsies of neoterminal ileum with nonspecific patchy increased eosinophils.  2023  colonoscopy: The perianal exam was abnormal. Buttock sores but no obvious fistula or abscess. Scarring along gluteal cleft along with hypopigmentation of skin. The colon (entire examined portion) appeared normal including ileocolonic anastomosis. The lorena-terminal ileum appeared normal. Biopsies were taken with a cold forceps for histology. Estimated blood loss was minimal.     Therapeutic Drug Monitoring Labs:  1/15/20 humira levels 3.5/neg Abs on humira 40 mg SC every 2 weeks  20 Trough Humira levels 12.0 and neg abs on Humira  40 mg SC every 2 weeks  21 Humira levels 7.5, no antibodies on Humira  40 mg SC  "every week.   7/7/22 trough humira level 8.2/Abs neg (Humira 40 mg SC weekly)    5/1/23 ADM trough level 13.5/ Abs neg (humira 40mg SC weekly)     Prior IBD Therapies:  MTX 12.5 mg po weekly- stopped after a few weeks due to elevated LFTs 10/2019     Vaccinations:  Lab Results   Component Value Date    HEPBSAB 145.77 05/01/2023    HEPBSAB Reactive 05/01/2023     No results found for: "HEPBSURFABQU"  Lab Results   Component Value Date    HEPAIGG Positive (A) 02/19/2019     Lab Results   Component Value Date    VARICELLAZOS 2.58 (H) 03/11/2019    VARICELLAINT Positive (A) 03/11/2019     Immunization History   Administered Date(s) Administered    COVID-19, MRNA, LN-S, PF (Pfizer) (Purple Cap) 10/22/2021    DTP 1991, 02/24/1992, 03/18/1992, 03/18/1992, 01/12/1994    HIB 1991, 02/24/1992, 03/18/1992    Hepatitis B (recombinant) Adjuvanted, 2 dose 04/17/2019, 05/20/2019    Hepatitis B, Pediatric/Adolescent 07/24/2006    IPV 07/24/2006    MMR 01/13/1994, 07/24/2006    Meningococcal Conjugate (MCV4P) 07/24/2006    OPV 1991, 02/24/1992, 01/12/1994    Pneumococcal Conjugate - 13 Valent 04/17/2019    Pneumococcal Polysaccharide - 23 Valent 06/20/2019    Td (ADULT) 07/24/2006    Td (Adult), Unspecified Formulation 07/24/2006    Tdap 07/24/2006, 04/17/2019   Flu shot: recommended yearly   COVID vaccine/booster:  per CDC recommendations  RSV: after age 59 yo  Tetanus (Tdap):  due 4/2029  PPSV 20: 6/2024  HPV:  recommended     Shingrix: discussed and recommended. Consider at next visit     Review of Systems   Constitutional:  Positive for malaise/fatigue. Negative for chills, fever and weight loss.   HENT:          No oral ulcers, dysphagia, oral thrush   Eyes:  Positive for pain. Negative for blurred vision and redness.   Respiratory:  Negative for cough and shortness of breath.    Cardiovascular:  Negative for chest pain.   Gastrointestinal:  Positive for abdominal pain. Negative for heartburn, nausea and " "vomiting.   Genitourinary:  Negative for dysuria and hematuria.   Musculoskeletal:  Negative for back pain and joint pain.   Skin:  Negative for rash.   Psychiatric/Behavioral:  Negative for depression. The patient is not nervous/anxious and does not have insomnia.      All Medical History/Surgical History/Family History/Social History/Allergies have been reviewed and updated in EMR    Outpatient Medications Marked as Taking for the 2/1/24 encounter (Office Visit) with Suzi Bacon MD   Medication Sig Dispense Refill    adalimumab (HUMIRA,CF, PEN CROHNS-UC-HS) 80 mg/0.8 mL PnKt Inject 0.8 mLs (80 mg total) into the skin As instructed. 160 mg week 0, 80 mg week 2 3 pen 0    adalimumab (HUMIRA,CF,) 40 mg/0.4 mL SyKt Inject 0.4 mLs (40 mg total) into the skin every 7 days. 4 each 5    etonogestreL (NEXPLANON) 68 mg Impl subdermal device 68 mg by Subdermal route once. A single NEXPLANON implant is inserted subdermally just under the skin at the inner side of the non-dominant upper arm.       Vital Signs:  /77 (BP Location: Right arm, Patient Position: Sitting)   Pulse 92   Temp 97.3 °F (36.3 °C)   Ht 5' 3" (1.6 m)   Wt 58.9 kg (129 lb 13.6 oz)   SpO2 97%   BMI 23.00 kg/m²      Physical Exam  Constitutional:       Appearance: Normal appearance.   Cardiovascular:      Rate and Rhythm: Normal rate and regular rhythm.      Pulses: Normal pulses.      Heart sounds: Normal heart sounds. No murmur heard.  Pulmonary:      Effort: Pulmonary effort is normal. No respiratory distress.      Breath sounds: Normal breath sounds. No wheezing.   Abdominal:      General: Bowel sounds are normal. There is no distension.      Palpations: Abdomen is soft.      Tenderness: There is no abdominal tenderness.   Musculoskeletal:         General: No swelling.   Skin:     Findings: Rash (psoriasis diffuse entire body and face) present.      Comments: Right buttock abscess with fluctuance and erythema, dried scaly lesions on " buttocks   Neurological:      Mental Status: She is alert.   Labs:   Lab Results   Component Value Date    CRP 8.5 (H) 09/27/2021     Lab Results   Component Value Date    HEPBSAG Non-reactive 05/01/2023    HEPBCAB Non-reactive 05/01/2023     Lab Results   Component Value Date    TBGOLDPLUS Negative 05/01/2023     Lab Results   Component Value Date    HAUSFUJQ92YI 6 (L) 01/17/2024    JHXNJALT84 201 (L) 01/17/2024     Lab Results   Component Value Date    WBC 7.32 01/17/2024    HGB 12.0 01/17/2024    HCT 38.6 01/17/2024    MCV 90 01/17/2024     01/17/2024     Lab Results   Component Value Date    CREATININE 0.7 01/17/2024    ALBUMIN 3.6 01/17/2024    BILITOT 0.4 01/17/2024    ALKPHOS 107 01/17/2024    AST 18 01/17/2024    ALT 21 01/17/2024     Assessment/Plan:  Lora Scott is a 33 y.o. female with Crohn's disease (ileum, S/P ileocecal resection-42 cm of ileum/10.5 cm cecum removed with surgical path c/w stricture and fistula, 2/15/20 ileostomy takedown and mucus fistula takedown), buttock/perineal abscess (drained 12/2022, likely hydradenitis suppurativa), psoriasis (failed light treatment)  GERD, asthma, ex-smoker (quit 9/2019).     Patient was not adherent to follow up appts and has had gaps in her humira therapy due to this with recent mild change in bowel movements and worsening psoriasis.  She was re-induced on 1/31/24. She had right buttock abscess that will need to be drained today and for this we will refer pt to general surgery.  I will do some early drug levels due to pt being off and on humira.  Will restage disease with colonoscopy at a later date.     # Crohn's disease (ileum, S/P ileocecal resection-42 cm of ileum/10.5 cm cecum removed with surgical path c/w stricture and fistula, 2/15/20 ileostomy takedown and mucus fistula takedown)  - continue humira, inject 80mg on 2/14 then start injecting 40mg weekly on 2/28  - colonoscopy will consider 6/2024  - vitamin B12- start vit B12 1000 mcg SL  daily, repeat vit B12 7/2024  - ex-smoker: quit smoking 9/2019, h/o MJ use   - drug monitoring labs:  CBC/CMP q 6 mos (7/2024), TPMT (heterozygote 2/2019), TB quantiferon (5/2024), Hep B testing (5/2024)  - TDM: trough humira drug levels at week 4 on 2/28, go to lab before humira injection      #Anemia  -likely post-partum, recommended iron supplementation  -recheck CBC 4/27     # Skin issues:  - has [facility] dermatology appt 2/19/24  - psoriasis - worsening since gaps in treatment with humira, triamcinolone cream, previously otezla was being considered  - vulvar/groin abscess - S/P I/D spring 2022  - perianal/gluteal abscesses likely hydradenitis suppurativa - s/p multiple extensive I&D, current abscess right buttock = refer to general surgery today, hold humira until this is drained     # Immunodeficiency due to long term immunosuppressive drug therapy and IBD specific health maintenance:  CRC risk: ileal disease, average risk, ordered  Skin exam yearly - sees dermatology at [facility] regularly, last exam 7/2022, next due 7/2023  Pap smear yearly due to long term immunosuppression, last pap smear normal 9/2020, due now- defer to gyn who is following pt closely during pregnancy  Vitamin D deficiency- start vit D 50,000 IU weekly x 1 year, repeat vit D in 1 year   Vaccines: no live vaccines    Follow up in about 6 weeks (around 3/14/2024) for MD/PharmD.    Suzi Bacon MD  Department of Gastroenterology  Medical Director, Inflammatory Bowel Disease

## 2024-02-01 NOTE — PROCEDURES
"Incision & Drainage    Date/Time: 2/1/2024 11:30 AM    Performed by: Jere Collier MD  Authorized by: Jere Collier MD    Time out: Immediately prior to procedure a "time out" was called to verify the correct patient, procedure, equipment, support staff and site/side marked as required.    Consent Done?:  Yes (Written)    Type:  Abscess  Body area:  Trunk  Location details:  Back  Anesthesia:  Local infiltration  Local anesthetic: Lidocaine 1% with epinephrine  Scalpel size:  11  Incision type:  Elliptical  Incision depth: subcutaneous    Complexity:  Simple  Drainage:  Pus  Drainage amount:  Copious  Wound treatment:  Wound left open  Packing material:  1/4 in iodoform gauze  Patient tolerance:  Patient tolerated the procedure well with no immediate complications    "

## 2024-02-01 NOTE — PATIENT INSTRUCTIONS
- continue humira, inject 80mg on 2/14 then start injecting 40mg weekly on 2/28  - humira drug levels at week 4 on 2/28, go to lab before humira injection   - start vitamin B12 1000mcg daily - over the counter  - start vitamin D 50,000 IU one capsule weekly for a year   - keep us posted on derm appt on 2/19  - f/u in 6 weeks

## 2024-02-02 ENCOUNTER — TELEPHONE (OUTPATIENT)
Dept: GASTROENTEROLOGY | Facility: CLINIC | Age: 34
End: 2024-02-02
Payer: MEDICAID

## 2024-02-02 NOTE — TELEPHONE ENCOUNTER
----- Message from Suzi Bacon MD sent at 2/1/2024  2:48 PM CST -----  FYI it was drained. Thank you both for helping her today. I think she can resume the humira.

## 2024-02-21 DIAGNOSIS — K50.80 CROHN'S DISEASE OF BOTH SMALL AND LARGE INTESTINE WITHOUT COMPLICATION: Primary | ICD-10-CM

## 2024-02-28 ENCOUNTER — LAB VISIT (OUTPATIENT)
Dept: LAB | Facility: HOSPITAL | Age: 34
End: 2024-02-28
Attending: INTERNAL MEDICINE
Payer: MEDICAID

## 2024-02-28 DIAGNOSIS — K50.80 CROHN'S DISEASE OF BOTH SMALL AND LARGE INTESTINE WITHOUT COMPLICATION: ICD-10-CM

## 2024-02-28 PROCEDURE — 36415 COLL VENOUS BLD VENIPUNCTURE: CPT | Performed by: INTERNAL MEDICINE

## 2024-02-28 PROCEDURE — 80145 DRUG ASSAY ADALIMUMAB: CPT | Performed by: INTERNAL MEDICINE

## 2024-02-29 ENCOUNTER — OFFICE VISIT (OUTPATIENT)
Dept: SURGERY | Facility: CLINIC | Age: 34
End: 2024-02-29
Payer: MEDICAID

## 2024-02-29 VITALS
HEIGHT: 63 IN | HEART RATE: 77 BPM | BODY MASS INDEX: 22.86 KG/M2 | WEIGHT: 129 LBS | DIASTOLIC BLOOD PRESSURE: 81 MMHG | SYSTOLIC BLOOD PRESSURE: 110 MMHG

## 2024-02-29 DIAGNOSIS — K50.80 CROHN'S DISEASE OF BOTH SMALL AND LARGE INTESTINE WITHOUT COMPLICATION: Primary | ICD-10-CM

## 2024-02-29 PROCEDURE — 99999 PR PBB SHADOW E&M-EST. PATIENT-LVL III: CPT | Mod: PBBFAC,,, | Performed by: SURGERY

## 2024-02-29 PROCEDURE — 99212 OFFICE O/P EST SF 10 MIN: CPT | Mod: S$PBB,,, | Performed by: SURGERY

## 2024-02-29 PROCEDURE — 3079F DIAST BP 80-89 MM HG: CPT | Mod: CPTII,,, | Performed by: SURGERY

## 2024-02-29 PROCEDURE — 3008F BODY MASS INDEX DOCD: CPT | Mod: CPTII,,, | Performed by: SURGERY

## 2024-02-29 PROCEDURE — 99213 OFFICE O/P EST LOW 20 MIN: CPT | Mod: PBBFAC | Performed by: SURGERY

## 2024-02-29 PROCEDURE — 3074F SYST BP LT 130 MM HG: CPT | Mod: CPTII,,, | Performed by: SURGERY

## 2024-02-29 RX ORDER — METRONIDAZOLE 500 MG/1
500 TABLET ORAL EVERY 8 HOURS
Qty: 30 TABLET | Refills: 0 | Status: SHIPPED | OUTPATIENT
Start: 2024-02-29 | End: 2024-03-10

## 2024-02-29 NOTE — PROGRESS NOTES
34 y/o woman with history of chrons disease and perianal abscess as well as recent gluteal abscess which was I&D recently. Now with c/o recurrence of abscess    Will start flagyl, and will need to f/u with GI as well as colo-rectal surgery.

## 2024-03-04 LAB — ADALIMUMAB SERPL IA-MCNC: 9.6 MCG/ML

## 2024-03-08 ENCOUNTER — LAB SERVICES (OUTPATIENT)
Dept: LAB | Age: 34
End: 2024-03-08

## 2024-03-08 ENCOUNTER — APPOINTMENT (OUTPATIENT)
Dept: OBGYN | Age: 34
End: 2024-03-08

## 2024-03-08 ENCOUNTER — PATIENT MESSAGE (OUTPATIENT)
Dept: GASTROENTEROLOGY | Facility: CLINIC | Age: 34
End: 2024-03-08
Payer: MEDICAID

## 2024-03-08 VITALS
HEIGHT: 69 IN | SYSTOLIC BLOOD PRESSURE: 110 MMHG | WEIGHT: 270.39 LBS | RESPIRATION RATE: 18 BRPM | BODY MASS INDEX: 40.05 KG/M2 | HEART RATE: 75 BPM | DIASTOLIC BLOOD PRESSURE: 74 MMHG | OXYGEN SATURATION: 100 %

## 2024-03-08 DIAGNOSIS — Z01.419 WELL WOMAN EXAM: Primary | ICD-10-CM

## 2024-03-08 DIAGNOSIS — Z01.419 WELL WOMAN EXAM: ICD-10-CM

## 2024-03-08 DIAGNOSIS — Z31.61 PROCREATIVE COUNSELING AND ADVICE USING NATURAL FAMILY PLANNING: ICD-10-CM

## 2024-03-08 DIAGNOSIS — Z23 NEED FOR VACCINATION: ICD-10-CM

## 2024-03-08 DIAGNOSIS — Z11.3 SCREENING FOR STDS (SEXUALLY TRANSMITTED DISEASES): ICD-10-CM

## 2024-03-08 DIAGNOSIS — Z01.812 PRE-PROCEDURE LAB EXAM: ICD-10-CM

## 2024-03-08 LAB
B-HCG UR QL: NEGATIVE
INTERNAL PROCEDURAL CONTROLS ACCEPTABLE: YES
TEST LOT EXPIRATION DATE: NORMAL
TEST LOT NUMBER: NORMAL

## 2024-03-08 RX ORDER — ADALIMUMAB 80MG/0.8ML
KIT SUBCUTANEOUS
COMMUNITY
Start: 2024-02-23

## 2024-03-08 ASSESSMENT — PATIENT HEALTH QUESTIONNAIRE - PHQ9
1. LITTLE INTEREST OR PLEASURE IN DOING THINGS: SEVERAL DAYS
2. FEELING DOWN, DEPRESSED OR HOPELESS: SEVERAL DAYS
SUM OF ALL RESPONSES TO PHQ9 QUESTIONS 1 AND 2: 2
CLINICAL INTERPRETATION OF PHQ2 SCORE: NO FURTHER SCREENING NEEDED
SUM OF ALL RESPONSES TO PHQ9 QUESTIONS 1 AND 2: 2

## 2024-03-08 ASSESSMENT — ENCOUNTER SYMPTOMS
EYES NEGATIVE: 1
VOMITING: 0
CHILLS: 0
NAUSEA: 0
DIZZINESS: 0
SHORTNESS OF BREATH: 0
ENDOCRINE NEGATIVE: 1
FEVER: 0
DIARRHEA: 0
ABDOMINAL PAIN: 0
ABDOMINAL DISTENTION: 0
COUGH: 0
WEAKNESS: 0
CONSTIPATION: 0

## 2024-03-08 ASSESSMENT — PAIN SCALES - GENERAL: PAINLEVEL: 0

## 2024-03-09 LAB
DEPRECATED RDW RBC: 41.1 FL (ref 39–50)
ERYTHROCYTE [DISTWIDTH] IN BLOOD: 14.7 % (ref 11–15)
HBA1C MFR BLD: 5.6 % (ref 4.5–5.6)
HBV SURFACE AG SER QL: NEGATIVE
HCT VFR BLD CALC: 34.8 % (ref 36–46.5)
HCV AB SER QL: NEGATIVE
HGB BLD-MCNC: 11.3 G/DL (ref 12–15.5)
HIV 1+2 AB+HIV1 P24 AG SERPL QL IA: NONREACTIVE
MCH RBC QN AUTO: 25.2 PG (ref 26–34)
MCHC RBC AUTO-ENTMCNC: 32.5 G/DL (ref 32–36.5)
MCV RBC AUTO: 77.5 FL (ref 78–100)
NRBC BLD MANUAL-RTO: 0 /100 WBC
PLATELET # BLD AUTO: 413 K/MCL (ref 140–450)
PROLACTIN SERPL-MCNC: 25.4 NG/ML (ref 2.8–29.2)
RBC # BLD: 4.49 MIL/MCL (ref 4–5.2)
RPR SER QL: NONREACTIVE
RUBV IGG SERPL IA-ACNC: 21.4 UNITS/ML
TSH SERPL-ACNC: 48.34 MCUNITS/ML (ref 0.35–5)
WBC # BLD: 10.1 K/MCL (ref 4.2–11)

## 2024-03-10 ENCOUNTER — HOSPITAL ENCOUNTER (EMERGENCY)
Facility: HOSPITAL | Age: 34
Discharge: HOME | End: 2024-03-10
Payer: MEDICAID

## 2024-03-10 VITALS
HEART RATE: 97 BPM | RESPIRATION RATE: 17 BRPM | WEIGHT: 209 LBS | BODY MASS INDEX: 39.46 KG/M2 | HEIGHT: 61 IN | DIASTOLIC BLOOD PRESSURE: 96 MMHG | OXYGEN SATURATION: 98 % | SYSTOLIC BLOOD PRESSURE: 145 MMHG | TEMPERATURE: 98.1 F

## 2024-03-10 DIAGNOSIS — G43.809 OTHER MIGRAINE WITHOUT STATUS MIGRAINOSUS, NOT INTRACTABLE: Primary | ICD-10-CM

## 2024-03-10 LAB — PREGNANCY TEST URINE, POC: NEGATIVE

## 2024-03-10 PROCEDURE — 99284 EMERGENCY DEPT VISIT MOD MDM: CPT | Mod: 25

## 2024-03-10 PROCEDURE — 96375 TX/PRO/DX INJ NEW DRUG ADDON: CPT

## 2024-03-10 PROCEDURE — 96361 HYDRATE IV INFUSION ADD-ON: CPT

## 2024-03-10 PROCEDURE — 81025 URINE PREGNANCY TEST: CPT

## 2024-03-10 PROCEDURE — 99284 EMERGENCY DEPT VISIT MOD MDM: CPT | Performed by: PHYSICIAN ASSISTANT

## 2024-03-10 PROCEDURE — 2500000004 HC RX 250 GENERAL PHARMACY W/ HCPCS (ALT 636 FOR OP/ED): Performed by: PHYSICIAN ASSISTANT

## 2024-03-10 PROCEDURE — 96374 THER/PROPH/DIAG INJ IV PUSH: CPT

## 2024-03-10 RX ORDER — DIPHENHYDRAMINE HYDROCHLORIDE 50 MG/ML
25 INJECTION INTRAMUSCULAR; INTRAVENOUS ONCE
Status: COMPLETED | OUTPATIENT
Start: 2024-03-10 | End: 2024-03-10

## 2024-03-10 RX ORDER — KETOROLAC TROMETHAMINE 15 MG/ML
15 INJECTION, SOLUTION INTRAMUSCULAR; INTRAVENOUS ONCE
Status: COMPLETED | OUTPATIENT
Start: 2024-03-10 | End: 2024-03-10

## 2024-03-10 RX ORDER — METOCLOPRAMIDE HYDROCHLORIDE 5 MG/ML
10 INJECTION INTRAMUSCULAR; INTRAVENOUS ONCE
Status: COMPLETED | OUTPATIENT
Start: 2024-03-10 | End: 2024-03-10

## 2024-03-10 RX ADMIN — KETOROLAC TROMETHAMINE 15 MG: 15 INJECTION, SOLUTION INTRAMUSCULAR; INTRAVENOUS at 21:21

## 2024-03-10 RX ADMIN — SODIUM CHLORIDE 1000 ML: 9 INJECTION, SOLUTION INTRAVENOUS at 21:20

## 2024-03-10 RX ADMIN — DIPHENHYDRAMINE HYDROCHLORIDE 25 MG: 50 INJECTION INTRAMUSCULAR; INTRAVENOUS at 21:21

## 2024-03-10 RX ADMIN — METOCLOPRAMIDE 10 MG: 5 INJECTION, SOLUTION INTRAMUSCULAR; INTRAVENOUS at 21:21

## 2024-03-10 ASSESSMENT — LIFESTYLE VARIABLES
EVER FELT BAD OR GUILTY ABOUT YOUR DRINKING: NO
EVER HAD A DRINK FIRST THING IN THE MORNING TO STEADY YOUR NERVES TO GET RID OF A HANGOVER: NO
HAVE PEOPLE ANNOYED YOU BY CRITICIZING YOUR DRINKING: NO
HAVE YOU EVER FELT YOU SHOULD CUT DOWN ON YOUR DRINKING: NO

## 2024-03-10 ASSESSMENT — PAIN SCALES - GENERAL
PAINLEVEL_OUTOF10: 0 - NO PAIN
PAINLEVEL_OUTOF10: 10 - WORST POSSIBLE PAIN

## 2024-03-10 ASSESSMENT — PAIN DESCRIPTION - LOCATION: LOCATION: HEAD

## 2024-03-10 ASSESSMENT — PAIN DESCRIPTION - PAIN TYPE: TYPE: ACUTE PAIN

## 2024-03-10 ASSESSMENT — PAIN - FUNCTIONAL ASSESSMENT
PAIN_FUNCTIONAL_ASSESSMENT: 0-10
PAIN_FUNCTIONAL_ASSESSMENT: 0-10

## 2024-03-11 ENCOUNTER — TELEPHONE (OUTPATIENT)
Dept: OBGYN | Age: 34
End: 2024-03-11

## 2024-03-11 LAB
BACTERIAL VAGINOSIS VAG-IMP: POSITIVE
C ALBICANS DNA VAG QL NAA+PROBE: POSITIVE
C GLABRATA DNA VAG QL NAA+PROBE: NOT DETECTED
C TRACH RRNA CVX QL NAA+PROBE: NEGATIVE
Lab: NORMAL
N GONORRHOEA RRNA CVX QL NAA+PROBE: NEGATIVE
SERVICE CMNT-IMP: ABNORMAL
SERVICE CMNT-IMP: ABNORMAL
T VAGINALIS DNA VAG QL NAA+PROBE: NOT DETECTED
VZV IGG SER IA-ACNC: 783.1 INDEX
VZV IGG SER QL IA: NORMAL

## 2024-03-11 NOTE — ED PROVIDER NOTES
HPI   Chief Complaint   Patient presents with    Headache       HPI: Patient is a 34-year-old female with history of anxiety, depression who presents to the ED for headache.  Patient states that she has had intermittent headaches since January.  States that she has been evaluated for this several times and has had 2 negative CT heads but is unsure why they keep recurring.  States that they are the same in characteristization each time.  Localizes it to the left eye that she describes as throbbing.  She denies any neck pain, fevers, chills, vision changes, nausea, vomiting, photophobia or phonophobia. States that she did have some swelling of the eyelid two days ago that resolved on its own.  States that she has been trying over-the-counter medications including Excedrin, Tylenol, and NSAIDs but nothing seems to work.  She denies any rhinorrhea, eye tearing or irritation with these headaches but states that she does feel anxiety with increased heart rate when they occur.  Denies any history of OCP use or DVT/PE.  States that during one of her visits she was given medication which really helped alleviate her headache but she does not have insurance right now so she is unable to get any of her prescriptions filled.  ------------------------------------------------------------------------------------------------------------------------------------------  ROS: a ten point review of systems was performed and was negative except as per HPI.  ------------------------------------------------------------------------------------------------------------------------------------------  PMH / PSH: as per HPI, otherwise reviewed   MEDS: as per HPI, otherwise reviewed in EMR  ALLERGIES: as per HPI, otherwise reviewed in EMR  SocH:  as per HPI, otherwise reviewed in EMR  FH:  as per HPI, otherwise reviewed in EMR    ------------------------------------------------------------------------------------------------------------------------------------------  Physical Exam:  VS: As documented in the triage note and EMR flowsheet from this visit was reviewed  General: Well appearing. No acute distress.   Eyes:  Extraocular movements grossly intact. No scleral icterus.   Head: Atraumatic. Normocephalic.     Neck: No meningismus. No gross masses. Full movement through range of motion  ENT: Posterior oropharynx shows no erythema, exudate or edema.  Uvula is midline without edema.  No stridor or trismus  CV: Regular rhythm. No murmurs, rubs, gallops appreciated.   Resp: Clear to auscultation bilaterally. No respiratory distress.    GI: Nontender. Soft. No masses. No rebound, rigidity or guarding.   MSK: Symmetric muscle bulk. No gross step offs or deformities.  Skin: Warm, dry. No rashes  Neuro: CN II-VII intact. A&O x3. Speech fluent. Alert. Moving all extremities. Ambulates with normal gait  Psych: Appropriate mood and affect for situation  ------------------------------------------------------------------------------------------------------------------------------------------  Hospital Course / Medical Decision Making: Patient is a 34-year-old female who presents to the ED for recurrent headache.  States that she has been evaluated several times for these intermittent headaches that she has had since January.  On review the patient's past medical records, it appears that the patient has been evaluated for this 3 times since January with 2 negative CT scans.  She was noted to have improvement with a migraine cocktail during her previous visit.  Also noted to have a prescription written for Fioricet which she was unable to get filled due to insurance difficulties.  Will administer migraine cocktail today of IV fluids, Toradol, Reglan and Benadryl for treatment of migraine vs cluster headache.  On reassessment, patient feels markedly  improved.  I did write the patient a prescription for Toradol as she states that she is trying to get her insurance back.  She was advised that if she is to have these recurring headaches to follow-up with neurology for further evaluation. Patient has remained hemodynamically stable throughout the course of their ED stay.  Patient is home-going.  Patient advised to return to the ED for any worsening symptoms.  Advised to follow-up with PCP.  Patient was discharged in stable condition.                              Huan Coma Scale Score: 15                     Patient History   Past Medical History:   Diagnosis Date    Encounter for screening for infections with a predominantly sexual mode of transmission 04/08/2015    Screening for STDs (sexually transmitted diseases)    Encounter for screening for malignant neoplasm of cervix     Screening for cervical cancer    Encounter for screening for malignant neoplasm of cervix     Screening for cervical cancer    Encounter for supervision of normal pregnancy, unspecified, first trimester 05/28/2019    Prenatal care in first trimester    Encounter for supervision of other normal pregnancy, unspecified trimester 04/29/2015    Pregnancy, subsequent    Encounter for supervision of other normal pregnancy, unspecified trimester     Prenatal care, subsequent pregnancy    Other problems related to lifestyle 04/08/2015    Other problems related to lifestyle    Other specified noninflammatory disorders of vulva and perineum     Labial lesion    Personal history of other diseases of the circulatory system     History of high blood pressure    Personal history of other diseases of urinary system 04/08/2015    History of hematuria    Personal history of other infectious and parasitic diseases 04/08/2015    History of candidiasis    Personal history of other specified conditions 04/08/2015    History of urinary frequency     Past Surgical History:   Procedure Laterality Date    OTHER  SURGICAL HISTORY  05/28/2019    [place] tooth extraction     No family history on file.  Social History     Tobacco Use    Smoking status: Never    Smokeless tobacco: Never   Substance Use Topics    Alcohol use: Not on file    Drug use: Not on file       Physical Exam   ED Triage Vitals [03/10/24 2044]   Temperature Heart Rate Respirations BP   36.7 °C (98.1 °F) (!) 110 18 (!) 163/115      Pulse Ox Temp Source Heart Rate Source Patient Position   99 % Temporal Monitor Sitting      BP Location FiO2 (%)     Left arm 21 %       Physical Exam    ED Course & MDM   Diagnoses as of 03/10/24 2303   Other migraine without status migrainosus, not intractable       Medical Decision Making      Procedure  Procedures     Ayana Osborne PA-C  03/10/24 2308

## 2024-03-12 ENCOUNTER — HOSPITAL ENCOUNTER (EMERGENCY)
Facility: HOSPITAL | Age: 34
Discharge: HOME | End: 2024-03-12
Attending: EMERGENCY MEDICINE
Payer: MEDICAID

## 2024-03-12 ENCOUNTER — CLINICAL SUPPORT (OUTPATIENT)
Dept: EMERGENCY MEDICINE | Facility: HOSPITAL | Age: 34
End: 2024-03-12
Payer: MEDICAID

## 2024-03-12 VITALS
HEART RATE: 99 BPM | BODY MASS INDEX: 39.49 KG/M2 | WEIGHT: 209 LBS | RESPIRATION RATE: 18 BRPM | TEMPERATURE: 97.2 F | OXYGEN SATURATION: 100 % | SYSTOLIC BLOOD PRESSURE: 158 MMHG | DIASTOLIC BLOOD PRESSURE: 109 MMHG

## 2024-03-12 DIAGNOSIS — R51.9 NONINTRACTABLE HEADACHE, UNSPECIFIED CHRONICITY PATTERN, UNSPECIFIED HEADACHE TYPE: Primary | ICD-10-CM

## 2024-03-12 LAB
ATRIAL RATE: 97 BPM
GLUCOSE BLD MANUAL STRIP-MCNC: 99 MG/DL (ref 74–99)
P AXIS: 27 DEGREES
P OFFSET: 198 MS
P ONSET: 153 MS
PR INTERVAL: 134 MS
PREGNANCY TEST URINE, POC: NEGATIVE
Q ONSET: 220 MS
QRS COUNT: 16 BEATS
QRS DURATION: 66 MS
QT INTERVAL: 354 MS
QTC CALCULATION(BAZETT): 449 MS
QTC FREDERICIA: 415 MS
R AXIS: 3 DEGREES
T AXIS: 9 DEGREES
T OFFSET: 397 MS
VENTRICULAR RATE: 97 BPM

## 2024-03-12 PROCEDURE — 2500000001 HC RX 250 WO HCPCS SELF ADMINISTERED DRUGS (ALT 637 FOR MEDICARE OP): Performed by: STUDENT IN AN ORGANIZED HEALTH CARE EDUCATION/TRAINING PROGRAM

## 2024-03-12 PROCEDURE — 82947 ASSAY GLUCOSE BLOOD QUANT: CPT

## 2024-03-12 PROCEDURE — 81025 URINE PREGNANCY TEST: CPT | Performed by: STUDENT IN AN ORGANIZED HEALTH CARE EDUCATION/TRAINING PROGRAM

## 2024-03-12 PROCEDURE — 99284 EMERGENCY DEPT VISIT MOD MDM: CPT | Performed by: EMERGENCY MEDICINE

## 2024-03-12 PROCEDURE — 93005 ELECTROCARDIOGRAM TRACING: CPT

## 2024-03-12 PROCEDURE — 99283 EMERGENCY DEPT VISIT LOW MDM: CPT | Mod: 25

## 2024-03-12 RX ORDER — HYDROXYZINE HYDROCHLORIDE 25 MG/1
25 TABLET, FILM COATED ORAL EVERY 6 HOURS
Qty: 12 TABLET | Refills: 0 | Status: SHIPPED | OUTPATIENT
Start: 2024-03-12 | End: 2024-06-05 | Stop reason: WASHOUT

## 2024-03-12 RX ORDER — NAPROXEN 500 MG/1
500 TABLET ORAL ONCE
Status: COMPLETED | OUTPATIENT
Start: 2024-03-12 | End: 2024-03-12

## 2024-03-12 RX ORDER — HYDROXYZINE HYDROCHLORIDE 25 MG/1
25 TABLET, FILM COATED ORAL ONCE
Status: COMPLETED | OUTPATIENT
Start: 2024-03-12 | End: 2024-03-12

## 2024-03-12 RX ADMIN — HYDROXYZINE HYDROCHLORIDE 25 MG: 25 TABLET, FILM COATED ORAL at 09:44

## 2024-03-12 RX ADMIN — NAPROXEN 500 MG: 500 TABLET ORAL at 09:43

## 2024-03-12 SDOH — HEALTH STABILITY: MENTAL HEALTH: SUICIDE ASSESSMENT: ADULT (C-SSRS)

## 2024-03-12 SDOH — HEALTH STABILITY: MENTAL HEALTH: HAVE YOU EVER DONE ANYTHING, STARTED TO DO ANYTHING, OR PREPARED TO DO ANYTHING TO END YOUR LIFE?: NO

## 2024-03-12 SDOH — HEALTH STABILITY: MENTAL HEALTH: CONTENT: UNREMARKABLE

## 2024-03-12 SDOH — HEALTH STABILITY: MENTAL HEALTH: HAVE YOU ACTUALLY HAD ANY THOUGHTS OF KILLING YOURSELF?: NO

## 2024-03-12 SDOH — HEALTH STABILITY: MENTAL HEALTH

## 2024-03-12 SDOH — HEALTH STABILITY: MENTAL HEALTH: BEHAVIORS/MOOD: ANXIOUS

## 2024-03-12 SDOH — HEALTH STABILITY: MENTAL HEALTH: HAVE YOU WISHED YOU WERE DEAD OR WISHED YOU COULD GO TO SLEEP AND NOT WAKE UP?: NO

## 2024-03-12 SDOH — HEALTH STABILITY: MENTAL HEALTH: NEEDS EXPRESSED: EMOTIONAL

## 2024-03-12 ASSESSMENT — COLUMBIA-SUICIDE SEVERITY RATING SCALE - C-SSRS
2. HAVE YOU ACTUALLY HAD ANY THOUGHTS OF KILLING YOURSELF?: NO
1. IN THE PAST MONTH, HAVE YOU WISHED YOU WERE DEAD OR WISHED YOU COULD GO TO SLEEP AND NOT WAKE UP?: NO
6. HAVE YOU EVER DONE ANYTHING, STARTED TO DO ANYTHING, OR PREPARED TO DO ANYTHING TO END YOUR LIFE?: NO

## 2024-03-12 ASSESSMENT — PAIN SCALES - GENERAL: PAINLEVEL_OUTOF10: 10 - WORST POSSIBLE PAIN

## 2024-03-12 ASSESSMENT — PAIN - FUNCTIONAL ASSESSMENT: PAIN_FUNCTIONAL_ASSESSMENT: 0-10

## 2024-03-12 ASSESSMENT — PAIN DESCRIPTION - LOCATION: LOCATION: HEAD

## 2024-03-12 NOTE — ED PROVIDER NOTES
HPI   Chief Complaint   Patient presents with    Anxiety    Blurred Vision       Patient is a 34-year-old female presents the emergency department today due to concern for headache.  Patient states that since January she has had intermittent headaches.  She states she has been evaluated for this multiple times.  She states she has had CT imaging of her head twice, both of which were unremarkable.  She was here few days prior for headache which did improve however has returned.  She describes her symptoms as burning on the right side of her head that is been intermittent.  She denies ever having anything like this similar to prior to January.  She denies any recent injuries or traumas.  She denies any rashes.  She denies any fevers or chills.  She denies any neck pain.  She denies any nausea, vomiting, chest pain, shortness of breath, abdominal pain.  She did report some anxiety with this.  She has not seen her primary care physician for this yet however does have a primary care physician.  She plans to follow-up with neurology as she was instructed to during her last ER visit.      History provided by:  Patient   used: No                        Huan Coma Scale Score: 15                     Patient History   Past Medical History:   Diagnosis Date    Encounter for screening for infections with a predominantly sexual mode of transmission 04/08/2015    Screening for STDs (sexually transmitted diseases)    Encounter for screening for malignant neoplasm of cervix     Screening for cervical cancer    Encounter for screening for malignant neoplasm of cervix     Screening for cervical cancer    Encounter for supervision of normal pregnancy, unspecified, first trimester 05/28/2019    Prenatal care in first trimester    Encounter for supervision of other normal pregnancy, unspecified trimester 04/29/2015    Pregnancy, subsequent    Encounter for supervision of other normal pregnancy, unspecified  trimester     Prenatal care, subsequent pregnancy    Other problems related to lifestyle 04/08/2015    Other problems related to lifestyle    Other specified noninflammatory disorders of vulva and perineum     Labial lesion    Personal history of other diseases of the circulatory system     History of high blood pressure    Personal history of other diseases of urinary system 04/08/2015    History of hematuria    Personal history of other infectious and parasitic diseases 04/08/2015    History of candidiasis    Personal history of other specified conditions 04/08/2015    History of urinary frequency     Past Surgical History:   Procedure Laterality Date    OTHER SURGICAL HISTORY  05/28/2019    [place] tooth extraction     No family history on file.  Social History     Tobacco Use    Smoking status: Never    Smokeless tobacco: Never   Substance Use Topics    Alcohol use: Not on file    Drug use: Not on file       Physical Exam   ED Triage Vitals [03/12/24 0830]   Temperature Heart Rate Respirations BP   36.2 °C (97.2 °F) 99 18 (!) 158/109      Pulse Ox Temp Source Heart Rate Source Patient Position   100 % Temporal -- --      BP Location FiO2 (%)     -- --       Physical Exam  Vitals and nursing note reviewed.   Constitutional:       General: She is not in acute distress.     Appearance: She is well-developed.   HENT:      Head: Normocephalic and atraumatic.   Eyes:      Conjunctiva/sclera: Conjunctivae normal.   Cardiovascular:      Rate and Rhythm: Normal rate and regular rhythm.      Heart sounds: No murmur heard.  Pulmonary:      Effort: Pulmonary effort is normal. No respiratory distress.      Breath sounds: Normal breath sounds.   Abdominal:      Palpations: Abdomen is soft.      Tenderness: There is no abdominal tenderness.   Musculoskeletal:         General: No swelling.      Cervical back: Neck supple.   Skin:     General: Skin is warm and dry.      Capillary Refill: Capillary refill takes less than 2  seconds.   Neurological:      Mental Status: She is alert.      Comments: - Mental Status: Alert and oriented to person, place, and time. Language is fluent with good comprehension.  - Cranial Nerve: Pupils are equal, round, and reactive to light. Visual fields are intact to confrontation. Ocular movements are intact. Face is symmetric at rest and with smiling, closing/opening eyelids, and raising eyebrows with intact sensation throughout. Hearing intact to finger rub bilaterally. Muscles of tongue and palate activate symmetrically. No dysarthria. Strength is full in sternocleidomastoid and trapezius bilaterally.  -Motor: Muscle bulk and tone are normal. Strength is 5/5 in all four extremities both proximally and distally. Intact fine motor movements bilaterally in distal extremitites. There is no pronator drift in b/l upper extremities.  - Sensory: Sensation is intact to light touch  - Coordination: No dysmetria on finger-nose-finger or heel-knee-shin. Normal rapid alternating movements. Fast finger tapping with normal amplitude and speed.  - Gait: Narrow based with normal stride length and good arm swing bilaterally. Able to walk on heels, toes, and in tandem.     Psychiatric:      Comments: Anxious         ED Course & MDM   ED Course as of 03/12/24 1013   Tue Mar 12, 2024   1009 Patient is a 34-year-old female with no significant past medical history presents the ER due to concern for headache.  Patient has been seen multiple times for the headaches.  Patient had mild hypertension, other vital signs are stable.  She did report some anxiety with her symptoms.  On review of her chart, she has had multiple CT scans of her head which have been unremarkable.  She did have lab work in January which was unremarkable.  Do not feel lab work is warranted today.  She does not have any signs of meningitis on exam, I have low suspicion for this.  Do not feel LP is warranted.  Do not feel repeat CT imaging is warranted.   Patient was anxious on exam.  Did consider temporal arteritis for patient however she is likely too young for this so we will hold on lab work at this time.  She was supposed to follow-up with neurology and plans to follow-up with them.  She she was given hydroxyzine and naproxen for symptoms.  She did report some mild improvement in symptoms.  She did feel comfortable with discharge.  I instructed her to follow-up with neurology and schedule appointment.  She was also instructed to follow-up with her primary care provider to discuss her symptoms.  Patient was given strict return precautions and instructed to return if she develops any slurred speech, loss of vision, confusion, or if there are any other concerns.  Patient was understanding and agreeable with plan for discharge. [MJ]      ED Course User Index  [MJ] Paul Nash DO         Diagnoses as of 03/12/24 1013   Nonintractable headache, unspecified chronicity pattern, unspecified headache type       Medical Decision Making      Procedure  Procedures     Paul Nash DO  Resident  03/12/24 1015

## 2024-03-12 NOTE — ED TRIAGE NOTES
Pt arrives anxious in triage, c/o x2 days generalized HA, worse in L temporal area along with bilat blurry vision and feeling very warm. She states she was seen here x2 days ago and symptoms have not gone away.    She did not take any meds today.    She states these episodes have been happening since January intermittently.

## 2024-03-12 NOTE — DISCHARGE INSTRUCTIONS
You were seen and evaluated in the ER today for your headache. Headaches have many possible causes. Most headaches aren't a sign of a more serious problem, and they will likely get better on their own. Home treatment may help you feel better faster. You need to follow-up with your healthcare provider for further evaluation.     In the ER, you carefully evaluated, but problems can develop later. If you notice any problems or new symptoms, get medical treatment right away. Follow-up care is a key part of your treatment and safety. Be sure to make and go to all recommended and referral appointments, and call your doctor or nurse advice line if you are having problems. It's also a good idea to know any test results from your ER visit and to keep a list of the medicines you take.    Home Care Instructions:  Rest in a quiet, dark room until your headache is gone. Close your eyes and try to relax or go to sleep. Don't watch TV or read.  Put a cold, moist cloth or cold pack on the painful area for 10 to 20 minutes at a time. Put a thin cloth between the cold pack and your skin.  Use a warm, moist towel or a heating pad set on low to relax tight shoulder and neck muscles.  Have someone gently massage your neck and shoulders.  If the doctor gave you a prescription medicine, take it as prescribed.  If you are not taking a prescription pain medicine, ask your doctor if you can take an over-the-counter medicine.  Do not ignore new symptoms that occur with a headache, such as a fever, weakness or numbness, vision changes, or confusion. These may be signs of a more serious problem.    Headache prevention:  Keep a headache diary so you can figure out what triggers your headaches. Avoiding triggers may help you prevent headaches. Record when each headache began, how long it lasted, and what the pain was like (throbbing, aching, stabbing, or dull). Write down any other symptoms you had with the headache, such as nausea, flashing  lights or dark spots, or sensitivity to bright light or loud noise. Note if the headache occurred near your period. List anything that might have triggered the headache, such as certain foods (chocolate, cheese, wine) or odors, smoke, bright light, stress, or lack of sleep.  Find healthy ways to deal with stress. Headaches are most common during or right after stressful times. Take time to relax before and after you do something that has caused a headache in the past.  Try to keep your muscles relaxed by keeping good posture. Check your jaw, face, neck, and shoulder muscles for tension, and try relaxing them. When sitting at a desk, change positions often, and stretch for 30 seconds each hour.  Get plenty of sleep and exercise.  Eat regularly. Long periods without food can trigger a headache.  Limit caffeine by not drinking too much coffee, tea, or soda. But don't quit caffeine suddenly, because that can also give you headaches.  Reduce eye strain from computers by blinking frequently and looking away from the computer screen every so often. Make sure you have proper eyewear and that your monitor is set up properly, about an arm's length away.    When should you call for help?  Call 911 anytime you think you may need emergency care. For example, call if:  You have signs of a stroke. These may include:  Sudden numbness, paralysis, or weakness in your face, arm, or leg, especially on only one side of your body.  Sudden vision changes.  Sudden trouble speaking.  Sudden confusion or trouble understanding simple statements.  Sudden problems with walking or balance.  A sudden, severe headache that is different from past headaches.    Please to not hesitate to return to the ER if you have any other, new, worsening, or concerning symptoms.

## 2024-03-29 RX ORDER — LEVOTHYROXINE SODIUM 0.2 MG/1
200 TABLET ORAL DAILY
Qty: 90 TABLET | Refills: 3 | OUTPATIENT
Start: 2024-03-29

## 2024-04-01 ENCOUNTER — CLINICAL SUPPORT (OUTPATIENT)
Dept: EMERGENCY MEDICINE | Facility: HOSPITAL | Age: 34
End: 2024-04-01
Payer: MEDICAID

## 2024-04-01 ENCOUNTER — HOSPITAL ENCOUNTER (EMERGENCY)
Facility: HOSPITAL | Age: 34
Discharge: HOME | End: 2024-04-01
Attending: EMERGENCY MEDICINE
Payer: MEDICAID

## 2024-04-01 ENCOUNTER — APPOINTMENT (OUTPATIENT)
Dept: RADIOLOGY | Facility: HOSPITAL | Age: 34
End: 2024-04-01
Payer: MEDICAID

## 2024-04-01 VITALS
OXYGEN SATURATION: 99 % | RESPIRATION RATE: 18 BRPM | WEIGHT: 209 LBS | DIASTOLIC BLOOD PRESSURE: 101 MMHG | HEIGHT: 61 IN | HEART RATE: 84 BPM | SYSTOLIC BLOOD PRESSURE: 139 MMHG | TEMPERATURE: 97.9 F | BODY MASS INDEX: 39.46 KG/M2

## 2024-04-01 DIAGNOSIS — R07.9 CHEST PAIN, UNSPECIFIED TYPE: Primary | ICD-10-CM

## 2024-04-01 LAB
BASOPHILS # BLD AUTO: 0.05 X10*3/UL (ref 0–0.1)
BASOPHILS NFR BLD AUTO: 0.6 %
CARDIAC TROPONIN I PNL SERPL HS: <3 NG/L (ref 0–34)
EOSINOPHIL # BLD AUTO: 0.25 X10*3/UL (ref 0–0.7)
EOSINOPHIL NFR BLD AUTO: 2.9 %
ERYTHROCYTE [DISTWIDTH] IN BLOOD BY AUTOMATED COUNT: 15.5 % (ref 11.5–14.5)
HCT VFR BLD AUTO: 39.8 % (ref 36–46)
HGB BLD-MCNC: 13.7 G/DL (ref 12–16)
IMM GRANULOCYTES # BLD AUTO: 0.02 X10*3/UL (ref 0–0.7)
IMM GRANULOCYTES NFR BLD AUTO: 0.2 % (ref 0–0.9)
LYMPHOCYTES # BLD AUTO: 4.59 X10*3/UL (ref 1.2–4.8)
LYMPHOCYTES NFR BLD AUTO: 54 %
MCH RBC QN AUTO: 31.2 PG (ref 26–34)
MCHC RBC AUTO-ENTMCNC: 34.4 G/DL (ref 32–36)
MCV RBC AUTO: 91 FL (ref 80–100)
MONOCYTES # BLD AUTO: 0.57 X10*3/UL (ref 0.1–1)
MONOCYTES NFR BLD AUTO: 6.7 %
NEUTROPHILS # BLD AUTO: 3.02 X10*3/UL (ref 1.2–7.7)
NEUTROPHILS NFR BLD AUTO: 35.6 %
NRBC BLD-RTO: 0 /100 WBCS (ref 0–0)
PLATELET # BLD AUTO: 514 X10*3/UL (ref 150–450)
RBC # BLD AUTO: 4.39 X10*6/UL (ref 4–5.2)
WBC # BLD AUTO: 8.5 X10*3/UL (ref 4.4–11.3)

## 2024-04-01 PROCEDURE — 99283 EMERGENCY DEPT VISIT LOW MDM: CPT | Mod: 25

## 2024-04-01 PROCEDURE — 93005 ELECTROCARDIOGRAM TRACING: CPT | Mod: 59

## 2024-04-01 PROCEDURE — 99285 EMERGENCY DEPT VISIT HI MDM: CPT | Performed by: EMERGENCY MEDICINE

## 2024-04-01 PROCEDURE — 71046 X-RAY EXAM CHEST 2 VIEWS: CPT

## 2024-04-01 PROCEDURE — 36415 COLL VENOUS BLD VENIPUNCTURE: CPT | Performed by: STUDENT IN AN ORGANIZED HEALTH CARE EDUCATION/TRAINING PROGRAM

## 2024-04-01 PROCEDURE — 2500000001 HC RX 250 WO HCPCS SELF ADMINISTERED DRUGS (ALT 637 FOR MEDICARE OP): Performed by: STUDENT IN AN ORGANIZED HEALTH CARE EDUCATION/TRAINING PROGRAM

## 2024-04-01 PROCEDURE — 71046 X-RAY EXAM CHEST 2 VIEWS: CPT | Performed by: STUDENT IN AN ORGANIZED HEALTH CARE EDUCATION/TRAINING PROGRAM

## 2024-04-01 PROCEDURE — 93010 ELECTROCARDIOGRAM REPORT: CPT | Performed by: EMERGENCY MEDICINE

## 2024-04-01 PROCEDURE — 84484 ASSAY OF TROPONIN QUANT: CPT | Performed by: STUDENT IN AN ORGANIZED HEALTH CARE EDUCATION/TRAINING PROGRAM

## 2024-04-01 PROCEDURE — 85025 COMPLETE CBC W/AUTO DIFF WBC: CPT | Performed by: STUDENT IN AN ORGANIZED HEALTH CARE EDUCATION/TRAINING PROGRAM

## 2024-04-01 RX ORDER — HYDROXYZINE HYDROCHLORIDE 25 MG/1
25 TABLET, FILM COATED ORAL ONCE
Status: DISCONTINUED | OUTPATIENT
Start: 2024-04-01 | End: 2024-04-01

## 2024-04-01 RX ORDER — HYDROXYZINE HYDROCHLORIDE 25 MG/1
25 TABLET, FILM COATED ORAL ONCE
Status: COMPLETED | OUTPATIENT
Start: 2024-04-01 | End: 2024-04-01

## 2024-04-01 RX ORDER — LEVOTHYROXINE SODIUM 0.2 MG/1
200 TABLET ORAL DAILY
Qty: 90 TABLET | Refills: 3 | OUTPATIENT
Start: 2024-04-01

## 2024-04-01 RX ORDER — HYDROXYZINE HYDROCHLORIDE 25 MG/1
25 TABLET, FILM COATED ORAL EVERY 6 HOURS
Qty: 12 TABLET | Refills: 0 | Status: SHIPPED | OUTPATIENT
Start: 2024-04-01 | End: 2024-06-05 | Stop reason: WASHOUT

## 2024-04-01 RX ADMIN — HYDROXYZINE HYDROCHLORIDE 25 MG: 25 TABLET ORAL at 02:40

## 2024-04-01 SDOH — HEALTH STABILITY: MENTAL HEALTH: BEHAVIORS/MOOD: ANXIOUS;RESTLESS

## 2024-04-01 ASSESSMENT — COLUMBIA-SUICIDE SEVERITY RATING SCALE - C-SSRS
6. HAVE YOU EVER DONE ANYTHING, STARTED TO DO ANYTHING, OR PREPARED TO DO ANYTHING TO END YOUR LIFE?: NO
2. HAVE YOU ACTUALLY HAD ANY THOUGHTS OF KILLING YOURSELF?: NO
1. IN THE PAST MONTH, HAVE YOU WISHED YOU WERE DEAD OR WISHED YOU COULD GO TO SLEEP AND NOT WAKE UP?: NO

## 2024-04-01 ASSESSMENT — LIFESTYLE VARIABLES
EVER HAD A DRINK FIRST THING IN THE MORNING TO STEADY YOUR NERVES TO GET RID OF A HANGOVER: NO
EVER FELT BAD OR GUILTY ABOUT YOUR DRINKING: NO
HAVE YOU EVER FELT YOU SHOULD CUT DOWN ON YOUR DRINKING: NO
TOTAL SCORE: 0
HAVE PEOPLE ANNOYED YOU BY CRITICIZING YOUR DRINKING: NO

## 2024-04-01 ASSESSMENT — PAIN - FUNCTIONAL ASSESSMENT: PAIN_FUNCTIONAL_ASSESSMENT: 0-10

## 2024-04-01 ASSESSMENT — PAIN SCALES - GENERAL: PAINLEVEL_OUTOF10: 7

## 2024-04-01 NOTE — ED TRIAGE NOTES
Patient reports her chest hurts, she has been having migraines, feeling faint, vision getting blurry, worried she is going to pass out. Pt admits it has been going on for about 2 weeks. Patient seems very worked up and anxious.

## 2024-04-01 NOTE — DISCHARGE INSTRUCTIONS
You were seen in the emergency department for chest pain and migraines.  Your laboratory workup and imaging appear to be okay and the EKG shows that there is likely nothing wrong with your heart at this time.  Unfortunately sometimes stress can cause the symptoms, particularly if you have underlying anxiety sometimes the symptoms can manifest as headaches, chest pain, shortness of breath, and feelings of impending doom.  The medication we gave you here is called hydroxyzine that can be used for anxiety.  We will give you a prescription for that for you to take as needed.  In addition we will give you a contact number to establish yourself with a primary care provider as well as mental health resources as I think that may be speaking to a therapist would be beneficial.  If you have any other concerns please come back to the emergency department or attempt to establish via PCP.  --  For adult appointments/referrals:  [phone] ([phone])    For pediatric appointments/referrals:  [address] [place] [address]IDS ([phone])

## 2024-04-01 NOTE — ED PROVIDER NOTES
"HPI  Patient is a 34-year-old female presented to the emergency department for chest pain and migraines.  She states that she was sitting in her couch earlier today when she began having the symptoms.  She felt like she was going to \"pass out\" prompting her presentation here today.  She has been seen on multiple occasions recently for similar symptoms.  Recently had a CT PE as well as CT head and CTA done that were otherwise unremarkable.  States that she feels like the symptoms are coming back and she is not sure why.  She unfortunately did lose her baby 1 year ago close to the day and her birthday is coming up.  She additionally notes that she has been under a significant amount of stress as she recently has become unemployed and does have 4 kids to take care of with not much help.  She does state that she will occasionally feel anxious with the symptoms that does note that they are episodic.  She denies any radiation of the chest pain, subjective fevers, productive sputum, or vision changes.    Physical Exam  VITALS: Vital signs reviewed in nursing triage note, EMR flow sheets, and at patient's bedside  CONSTITUTIONAL: Well-appearing, in no apparent distress  HEAD: NCAT  EYES: PERRL, EOMI  NECK: Supple, non-tender, full ROM  CARD: RRR, no m/r/g, no JVD  RESP: LCTAB, speaking full sentences, no increased work of breathing, no use of accessory respiratory muscles  ABD: Bowel sounds present, non-distended, soft and non-tender, no palpable organomegaly, no masses, no guarding or rebound tenderness  BACK: No vertebral point or CVA tenderness, no obvious bony deformities, no spinal step-offs   EXT: Normal ROM in all four extremities, 2+ radial and DP pulses bilaterally, no edema  SKIN: Dry with appropriate turgor, no apparent rashes, suspicious lesions, or masses   NEURO: CNs II-XII grossly intact, AAOx4, sensation intact bilaterally, strength 5/5 on UEs and LEs bilaterally, speech is fluent and comprehensible  PSYCH: " Appropriate mood and affect, no HI/SI, not responding to internal stimuli    Vitals:    04/01/24 0150   BP: (!) 139/101   Pulse: 84   Resp: 18   Temp: 36.6 °C (97.9 °F)   SpO2: 99%       Assessment/Plan/MDM  Patient clinically stable with normal vital signs upon presentation to the emergency department low concern for cardiac etiology of her chest pain at this time.  She did have sinus tach appreciated on her initial EKG, however after some time of being able to calm down her heart rate did improve.  I did obtain a troponin as well as a CBC to rule out anything underlying, however given her extensive workup recently do not believe that there is anything will explain her symptoms on these exams.  CXR was obtained that did not demonstrate any significant normalities.  I did provide her with an Atarax as she unfortunately does appear to be under a decent amount of stress which may be contributing to her symptoms.  After Atarax she did not feel significantly improved.  I discussed with her the importance of getting established with outpatient psychologist.  She states that she has been referred previously, however unfortunately was uninsured previously and was unable to fill her prescriptions or make appointments.  She states that she does have insurance and will attempt to make them after being discharged today.  I will provide her with mental health resources as well as a prescription for Atarax and discharged in stable condition.    Results  *See section(s) entitled ``Lab Results´´, ``Diagnostic Imaging Results Review´´ for entirety.  Notable results listed below  - Labs: I independently interpreted as laboratory workup unremarkable  - Images: I independently interpreted as CXR shows no acute cardiopulmonary process    ED Course as of 04/01/24 0445   Mon Apr 01, 2024   0445 EKG independently interpreted by me, time 0155, normal sinus uncovered 122, normal axis, all all intervals normal length, no ST elevations, no T  wave abnormalities, no significant change compared to EKG on 3/12/2024 [JV]      ED Course User Index  [JV] Satish Rodríguez MD         Diagnoses as of 04/01/24 0445   Chest pain, unspecified type      Clinical Impression  Chest pain    Dispo  Discharge home    Home: I discussed the differential, results and discharge plan with the patient and/or family/friend/caregiver if present. I emphasized the importance of follow-up with the physician I referred them to in the timeframe recommended. I explained reasons for the patient to return to the Emergency Department. Questions were addressed. They understand return precautions and discharge instructions. The patient and/or family/friend/caregiver expressed understanding and agreement with assessment/plan.     Patient seen and discussed with attending physician Dr. Cartwright.    Satish Rodríguez MD  Emergency Medicine, PGY-3    Was dictated using Dragon dictation. Please excuse any errors found in the note.     Satish Rodríguez MD  Resident  04/01/24 8073

## 2024-04-03 LAB
ATRIAL RATE: 122 BPM
P AXIS: 57 DEGREES
P OFFSET: 208 MS
P ONSET: 154 MS
PR INTERVAL: 132 MS
Q ONSET: 220 MS
QRS COUNT: 20 BEATS
QRS DURATION: 68 MS
QT INTERVAL: 286 MS
QTC CALCULATION(BAZETT): 407 MS
QTC FREDERICIA: 362 MS
R AXIS: 26 DEGREES
T AXIS: 17 DEGREES
T OFFSET: 363 MS
VENTRICULAR RATE: 122 BPM

## 2024-04-08 ENCOUNTER — HOSPITAL ENCOUNTER (EMERGENCY)
Facility: HOSPITAL | Age: 34
Discharge: HOME | End: 2024-04-08
Attending: EMERGENCY MEDICINE
Payer: MEDICAID

## 2024-04-08 VITALS
RESPIRATION RATE: 15 BRPM | SYSTOLIC BLOOD PRESSURE: 116 MMHG | OXYGEN SATURATION: 99 % | BODY MASS INDEX: 39.46 KG/M2 | WEIGHT: 209 LBS | HEART RATE: 70 BPM | TEMPERATURE: 97.7 F | HEIGHT: 61 IN | DIASTOLIC BLOOD PRESSURE: 66 MMHG

## 2024-04-08 DIAGNOSIS — K64.9 HEMORRHOIDS, UNSPECIFIED HEMORRHOID TYPE: ICD-10-CM

## 2024-04-08 DIAGNOSIS — K21.9 GASTROESOPHAGEAL REFLUX DISEASE WITHOUT ESOPHAGITIS: Primary | ICD-10-CM

## 2024-04-08 LAB
ALBUMIN SERPL BCP-MCNC: 4.1 G/DL (ref 3.4–5)
ALP SERPL-CCNC: 61 U/L (ref 33–110)
ALT SERPL W P-5'-P-CCNC: 11 U/L (ref 7–45)
ANION GAP SERPL CALC-SCNC: 12 MMOL/L (ref 10–20)
APPEARANCE UR: CLEAR
AST SERPL W P-5'-P-CCNC: 11 U/L (ref 9–39)
BASOPHILS # BLD AUTO: 0.06 X10*3/UL (ref 0–0.1)
BASOPHILS NFR BLD AUTO: 1 %
BILIRUB SERPL-MCNC: 0.2 MG/DL (ref 0–1.2)
BILIRUB UR STRIP.AUTO-MCNC: NEGATIVE MG/DL
BUN SERPL-MCNC: 13 MG/DL (ref 6–23)
CALCIUM SERPL-MCNC: 9.3 MG/DL (ref 8.6–10.6)
CHLORIDE SERPL-SCNC: 109 MMOL/L (ref 98–107)
CO2 SERPL-SCNC: 25 MMOL/L (ref 21–32)
COLOR UR: ABNORMAL
CREAT SERPL-MCNC: 0.69 MG/DL (ref 0.5–1.05)
EGFRCR SERPLBLD CKD-EPI 2021: >90 ML/MIN/1.73M*2
EOSINOPHIL # BLD AUTO: 0.42 X10*3/UL (ref 0–0.7)
EOSINOPHIL NFR BLD AUTO: 7.1 %
ERYTHROCYTE [DISTWIDTH] IN BLOOD BY AUTOMATED COUNT: 14.6 % (ref 11.5–14.5)
GLUCOSE SERPL-MCNC: 76 MG/DL (ref 74–99)
GLUCOSE UR STRIP.AUTO-MCNC: NORMAL MG/DL
HCG UR QL IA.RAPID: NEGATIVE
HCT VFR BLD AUTO: 35.4 % (ref 36–46)
HGB BLD-MCNC: 12 G/DL (ref 12–16)
IMM GRANULOCYTES # BLD AUTO: 0.02 X10*3/UL (ref 0–0.7)
IMM GRANULOCYTES NFR BLD AUTO: 0.3 % (ref 0–0.9)
KETONES UR STRIP.AUTO-MCNC: NEGATIVE MG/DL
LEUKOCYTE ESTERASE UR QL STRIP.AUTO: ABNORMAL
LIPASE SERPL-CCNC: 44 U/L (ref 9–82)
LYMPHOCYTES # BLD AUTO: 3.28 X10*3/UL (ref 1.2–4.8)
LYMPHOCYTES NFR BLD AUTO: 55.6 %
MCH RBC QN AUTO: 29.9 PG (ref 26–34)
MCHC RBC AUTO-ENTMCNC: 33.9 G/DL (ref 32–36)
MCV RBC AUTO: 88 FL (ref 80–100)
MONOCYTES # BLD AUTO: 0.56 X10*3/UL (ref 0.1–1)
MONOCYTES NFR BLD AUTO: 9.5 %
MUCOUS THREADS #/AREA URNS AUTO: ABNORMAL /LPF
NEUTROPHILS # BLD AUTO: 1.56 X10*3/UL (ref 1.2–7.7)
NEUTROPHILS NFR BLD AUTO: 26.5 %
NITRITE UR QL STRIP.AUTO: NEGATIVE
NRBC BLD-RTO: 0 /100 WBCS (ref 0–0)
PH UR STRIP.AUTO: 7 [PH]
PLATELET # BLD AUTO: 221 X10*3/UL (ref 150–450)
POTASSIUM SERPL-SCNC: 4 MMOL/L (ref 3.5–5.3)
PROT SERPL-MCNC: 6.8 G/DL (ref 6.4–8.2)
PROT UR STRIP.AUTO-MCNC: ABNORMAL MG/DL
RBC # BLD AUTO: 4.02 X10*6/UL (ref 4–5.2)
RBC # UR STRIP.AUTO: NEGATIVE /UL
RBC #/AREA URNS AUTO: ABNORMAL /HPF
SODIUM SERPL-SCNC: 142 MMOL/L (ref 136–145)
SP GR UR STRIP.AUTO: 1.03
SQUAMOUS #/AREA URNS AUTO: ABNORMAL /HPF
UROBILINOGEN UR STRIP.AUTO-MCNC: NORMAL MG/DL
WBC # BLD AUTO: 5.9 X10*3/UL (ref 4.4–11.3)
WBC #/AREA URNS AUTO: ABNORMAL /HPF

## 2024-04-08 PROCEDURE — 87086 URINE CULTURE/COLONY COUNT: CPT | Performed by: EMERGENCY MEDICINE

## 2024-04-08 PROCEDURE — 85025 COMPLETE CBC W/AUTO DIFF WBC: CPT | Performed by: EMERGENCY MEDICINE

## 2024-04-08 PROCEDURE — 83690 ASSAY OF LIPASE: CPT | Performed by: EMERGENCY MEDICINE

## 2024-04-08 PROCEDURE — 81025 URINE PREGNANCY TEST: CPT | Performed by: EMERGENCY MEDICINE

## 2024-04-08 PROCEDURE — 99283 EMERGENCY DEPT VISIT LOW MDM: CPT

## 2024-04-08 PROCEDURE — 99284 EMERGENCY DEPT VISIT MOD MDM: CPT

## 2024-04-08 PROCEDURE — 36415 COLL VENOUS BLD VENIPUNCTURE: CPT | Performed by: EMERGENCY MEDICINE

## 2024-04-08 PROCEDURE — 80053 COMPREHEN METABOLIC PANEL: CPT | Performed by: EMERGENCY MEDICINE

## 2024-04-08 PROCEDURE — 2500000002 HC RX 250 W HCPCS SELF ADMINISTERED DRUGS (ALT 637 FOR MEDICARE OP, ALT 636 FOR OP/ED)

## 2024-04-08 PROCEDURE — 2500000001 HC RX 250 WO HCPCS SELF ADMINISTERED DRUGS (ALT 637 FOR MEDICARE OP)

## 2024-04-08 PROCEDURE — 81001 URINALYSIS AUTO W/SCOPE: CPT | Performed by: EMERGENCY MEDICINE

## 2024-04-08 RX ORDER — PANTOPRAZOLE SODIUM 20 MG/1
20 TABLET, DELAYED RELEASE ORAL DAILY
Qty: 30 TABLET | Refills: 0 | Status: SHIPPED | OUTPATIENT
Start: 2024-04-08 | End: 2024-06-05 | Stop reason: WASHOUT

## 2024-04-08 RX ORDER — PANTOPRAZOLE SODIUM 20 MG/1
20 TABLET, DELAYED RELEASE ORAL ONCE
Status: COMPLETED | OUTPATIENT
Start: 2024-04-08 | End: 2024-04-08

## 2024-04-08 RX ORDER — DICYCLOMINE HYDROCHLORIDE 10 MG/1
10 CAPSULE ORAL ONCE
Status: COMPLETED | OUTPATIENT
Start: 2024-04-08 | End: 2024-04-08

## 2024-04-08 RX ORDER — DICYCLOMINE HYDROCHLORIDE 20 MG/1
20 TABLET ORAL 2 TIMES DAILY
Qty: 20 TABLET | Refills: 0 | Status: SHIPPED | OUTPATIENT
Start: 2024-04-08 | End: 2024-04-18

## 2024-04-08 RX ADMIN — PANTOPRAZOLE SODIUM 20 MG: 20 TABLET, DELAYED RELEASE ORAL at 20:35

## 2024-04-08 RX ADMIN — DICYCLOMINE HYDROCHLORIDE 10 MG: 10 CAPSULE ORAL at 20:35

## 2024-04-08 ASSESSMENT — PAIN SCALES - GENERAL: PAINLEVEL_OUTOF10: 6

## 2024-04-08 ASSESSMENT — PAIN - FUNCTIONAL ASSESSMENT: PAIN_FUNCTIONAL_ASSESSMENT: 0-10

## 2024-04-08 ASSESSMENT — PAIN DESCRIPTION - PAIN TYPE: TYPE: ACUTE PAIN

## 2024-04-08 ASSESSMENT — PAIN DESCRIPTION - DESCRIPTORS: DESCRIPTORS: ACHING

## 2024-04-08 ASSESSMENT — PAIN DESCRIPTION - LOCATION: LOCATION: ABDOMEN

## 2024-04-08 ASSESSMENT — PAIN DESCRIPTION - ONSET: ONSET: ONGOING

## 2024-04-08 ASSESSMENT — PAIN DESCRIPTION - PROGRESSION: CLINICAL_PROGRESSION: NOT CHANGED

## 2024-04-08 ASSESSMENT — PAIN DESCRIPTION - FREQUENCY: FREQUENCY: CONSTANT/CONTINUOUS

## 2024-04-08 ASSESSMENT — PAIN DESCRIPTION - ORIENTATION: ORIENTATION: UPPER

## 2024-04-08 NOTE — ED TRIAGE NOTES
Pt states that she is having upper abdominal pain, bloating, blood in stool, and generalized pain x2 days.

## 2024-04-09 LAB
BACTERIA UR CULT: NORMAL
HOLD SPECIMEN: NORMAL

## 2024-04-09 NOTE — ED PROVIDER NOTES
HPI   Chief Complaint   Patient presents with    Abdominal Pain    Black or Bloody Stool   This is a 34-year-old female who denies any significant past medical history who presents to the ED with abdominal pain.  The patient states she has been having cramping pains in her upper abdomen for the past week, rated 8/10.  She states that the pain is worse at night when she lies down and intermittently radiate up into her chest. She also states that for the past few days she has noticed bright red blood in her stool, no bloody urine. No melena or hematemesis.     Denies any fevers, chills, chest pain, N/V/D or urinary symptoms or vaginal complaints.     Limitations to history: None  Independent Historians: None  External Records Reviewed: Prior ED notes    Patient History   Past Medical History:   Diagnosis Date    Encounter for screening for infections with a predominantly sexual mode of transmission 04/08/2015    Screening for STDs (sexually transmitted diseases)    Encounter for screening for malignant neoplasm of cervix     Screening for cervical cancer    Encounter for screening for malignant neoplasm of cervix     Screening for cervical cancer    Encounter for supervision of normal pregnancy, unspecified, first trimester 05/28/2019    Prenatal care in first trimester    Encounter for supervision of other normal pregnancy, unspecified trimester 04/29/2015    Pregnancy, subsequent    Encounter for supervision of other normal pregnancy, unspecified trimester     Prenatal care, subsequent pregnancy    Other problems related to lifestyle 04/08/2015    Other problems related to lifestyle    Other specified noninflammatory disorders of vulva and perineum     Labial lesion    Personal history of other diseases of the circulatory system     History of high blood pressure    Personal history of other diseases of urinary system 04/08/2015    History of hematuria    Personal history of other infectious and parasitic diseases  04/08/2015    History of candidiasis    Personal history of other specified conditions 04/08/2015    History of urinary frequency     Past Surgical History:   Procedure Laterality Date    OTHER SURGICAL HISTORY  05/28/2019    [place] tooth extraction     No family history on file.  Social History     Tobacco Use    Smoking status: Never    Smokeless tobacco: Never   Vaping Use    Vaping Use: Never used   Substance Use Topics    Alcohol use: Not on file    Drug use: Never       Physical Exam   ED Triage Vitals [04/08/24 1814]   Temperature Heart Rate Respirations BP   36.5 °C (97.7 °F) 92 18 (!) 137/97      Pulse Ox Temp Source Heart Rate Source Patient Position   98 % Temporal Monitor Sitting      BP Location FiO2 (%)     Right arm --       Physical Exam  Constitutional:       General: She is not in acute distress.     Appearance: She is obese. She is not ill-appearing or diaphoretic.   Cardiovascular:      Rate and Rhythm: Normal rate and regular rhythm.      Heart sounds: Normal heart sounds.   Pulmonary:      Effort: Pulmonary effort is normal. No respiratory distress.      Breath sounds: No wheezing, rhonchi or rales.   Abdominal:      General: Bowel sounds are normal. There is no distension.      Palpations: Abdomen is soft.      Tenderness: There is no abdominal tenderness. There is no guarding or rebound.   Genitourinary:     Comments: External hemorrhoid present at the 9 o'clock position, non painful, not thrombosed. Digital rectal tone is intact.  No internal hemorrhoids palpated.  Skin:     General: Skin is warm and dry.      Capillary Refill: Capillary refill takes less than 2 seconds.   Neurological:      Mental Status: She is alert and oriented to person, place, and time.         ED Course & MDM   ED Course as of 04/08/24 2053 Mon Apr 08, 2024 2038 ED Attending Documentation:  This patient was seen by the advanced practice provider.  I have personally performed a substantive portion of the encounter.   I have seen and examined the patient; agree with the workup, evaluation, MDM, management and diagnosis.  The care plan has been discussed.  I personally saw the patient and made/approved the management plan and take responsibility for the patient management.    History: 34-year-old female who presents with abdominal pain and bloating along with bloody stools for the last 2 to 3 days.  Exam: No tenderness to patient on deep palpation of her upper abdomen, please see rectal exam performed by the advanced practice provider  MDM: Patient presents emerged from with normal labs, her exam is unremarkable including low concern for gallbladder pathology given normal labs and no tenderness to palpation.  Patient was discharged to follow-up with her primary care physician which she seemed comfortable with.  Celestino Lopez MD  EM Attending Physician  [RG]      ED Course User Index  [RG] Celestino Lopez MD         Diagnoses as of 04/08/24 2053   Gastroesophageal reflux disease without esophagitis   Hemorrhoids, unspecified hemorrhoid type       Medical Decision Making  This is a 34-year-old female who denies any significant past medical history who presents to the ED with abdominal pain.      On physical exam, patient is sitting up and appears comfortable.  She is not ill-appearing and in no acute distress.  Abdomen is soft with normal bowel sounds heard throughout.  Patient is able to tolerate deep palpation in all quadrants.  On rectal exam there was an external hemorrhoid present in the 9 o'clock position, nonpainful, nonthrombosed.  Digital rectal tone is exact.      Differentials considered but not limited to: Acute abdominal process, GERD, hemorrhoid    Low suspicion for acute abdominal process as patient appears comfortable during physical exam. Abdominal exam is benign.  Vitals are stable. Lab work does not show any leukocytosis or electrolyte disturbances, lipase is normal, results are reassuring for no acute abdominal  process.   Low suspicion for an acute GI bleed as patient is not having any melena or hematemesis. No anemia on CBC. Hemorrhoid present on physical exam, most likely the cause for bright red blood in patient's stool.  Patient's description of her pain, as being worse at night when she lies down with intermittent radiation into her chest, is most likely related to GERD.  Administered Protonix and Bentyl in the ED. Had a thorough discussion with patient about lifestyle changes such as diet modification and avoiding lying flat directly after meals.    On reassessment, patient states her pain is a little better. She was given a PO challenge and tolerated it well.  Provided a prescription for Protonix and Bentyl.  Counseled her to follow-up with her PCP.  Instructed to return to the ED if she experiences any chest pain, shortness of breath or any other concerning symptoms.  Patient verbalized understanding and was agreeable with plan of care.  Discharged in stable condition.     uYe Fraser PA-C  04/08/24 2703

## 2024-04-19 ENCOUNTER — TELEPHONE (OUTPATIENT)
Dept: OBSTETRICS AND GYNECOLOGY | Facility: CLINIC | Age: 34
End: 2024-04-19
Payer: MEDICAID

## 2024-04-19 NOTE — TELEPHONE ENCOUNTER
No answer on call back. Left vm advising pt to contact pediatrician.       ----- Message from Ayanna Plummer sent at 4/19/2024 12:20 PM CDT -----  Regarding: Self [phone]  Type: Patient Call Back     Who called: Self     What is the request in detail: Pt is requesting a letter to verify your baby's gender due to it being incorrect on the birth certificate. Please contact as soon as ready. This letter is needed to correct the gender on the baby's birth certificate. Please take care of this ASAP.     Can the clinic reply by [facility]? No     Would the patient rather a call back or a response via [facility]? Call back     Best call back number: [phone]      Additional Information:

## 2024-04-22 ENCOUNTER — HOSPITAL ENCOUNTER (EMERGENCY)
Facility: HOSPITAL | Age: 34
Discharge: HOME | End: 2024-04-22
Attending: EMERGENCY MEDICINE
Payer: MEDICAID

## 2024-04-22 VITALS
TEMPERATURE: 100 F | SYSTOLIC BLOOD PRESSURE: 108 MMHG | HEART RATE: 97 BPM | HEIGHT: 63 IN | DIASTOLIC BLOOD PRESSURE: 75 MMHG | WEIGHT: 128 LBS | RESPIRATION RATE: 18 BRPM | BODY MASS INDEX: 22.68 KG/M2 | OXYGEN SATURATION: 100 %

## 2024-04-22 DIAGNOSIS — L40.9 PSORIASIS: ICD-10-CM

## 2024-04-22 DIAGNOSIS — L03.90 CELLULITIS, UNSPECIFIED CELLULITIS SITE: ICD-10-CM

## 2024-04-22 DIAGNOSIS — H10.9 CONJUNCTIVITIS, UNSPECIFIED CONJUNCTIVITIS TYPE, UNSPECIFIED LATERALITY: ICD-10-CM

## 2024-04-22 DIAGNOSIS — L30.9 ECZEMA, UNSPECIFIED TYPE: ICD-10-CM

## 2024-04-22 DIAGNOSIS — J02.0 STREP THROAT: Primary | ICD-10-CM

## 2024-04-22 LAB
ALBUMIN SERPL-MCNC: 3.3 G/DL (ref 3.3–5.5)
ALP SERPL-CCNC: 92 U/L (ref 42–141)
B-HCG UR QL: NEGATIVE
BILIRUB SERPL-MCNC: 0.9 MG/DL (ref 0.2–1.6)
BUN SERPL-MCNC: 7 MG/DL (ref 7–22)
CALCIUM SERPL-MCNC: 9.2 MG/DL (ref 8–10.3)
CHLORIDE SERPL-SCNC: 99 MMOL/L (ref 98–108)
CREAT SERPL-MCNC: 0.8 MG/DL (ref 0.6–1.2)
CTP QC/QA: YES
CTP QC/QA: YES
GLUCOSE SERPL-MCNC: 91 MG/DL (ref 73–118)
HCT, POC: NORMAL
HGB, POC: NORMAL (ref 14–18)
INFLUENZA A ANTIGEN, POC: NEGATIVE
INFLUENZA B ANTIGEN, POC: NEGATIVE
MCH, POC: NORMAL
MCHC, POC: NORMAL
MCV, POC: NORMAL
MPV, POC: NORMAL
POC ALT (SGPT): 11 U/L (ref 10–47)
POC AST (SGOT): 15 U/L (ref 11–38)
POC PLATELET COUNT: NORMAL
POC RAPID STREP A: POSITIVE
POC TCO2: 26 MMOL/L (ref 18–33)
POTASSIUM BLD-SCNC: 3 MMOL/L (ref 3.6–5.1)
PROTEIN, POC: 8.1 G/DL (ref 6.4–8.1)
RBC, POC: NORMAL
RDW, POC: NORMAL
SARS-COV-2 RDRP RESP QL NAA+PROBE: NEGATIVE
SODIUM BLD-SCNC: 135 MMOL/L (ref 128–145)
WBC, POC: NORMAL

## 2024-04-22 PROCEDURE — 25000003 PHARM REV CODE 250: Mod: ER | Performed by: PHYSICIAN ASSISTANT

## 2024-04-22 PROCEDURE — 63600175 PHARM REV CODE 636 W HCPCS: Mod: ER | Performed by: PHYSICIAN ASSISTANT

## 2024-04-22 PROCEDURE — 87635 SARS-COV-2 COVID-19 AMP PRB: CPT | Mod: ER | Performed by: EMERGENCY MEDICINE

## 2024-04-22 PROCEDURE — 99284 EMERGENCY DEPT VISIT MOD MDM: CPT | Mod: 25,ER

## 2024-04-22 PROCEDURE — 87804 INFLUENZA ASSAY W/OPTIC: CPT | Mod: 59,ER

## 2024-04-22 PROCEDURE — 87880 STREP A ASSAY W/OPTIC: CPT | Mod: ER

## 2024-04-22 PROCEDURE — 81025 URINE PREGNANCY TEST: CPT | Mod: ER | Performed by: EMERGENCY MEDICINE

## 2024-04-22 PROCEDURE — 96360 HYDRATION IV INFUSION INIT: CPT | Mod: ER

## 2024-04-22 PROCEDURE — 85025 COMPLETE CBC W/AUTO DIFF WBC: CPT | Mod: ER

## 2024-04-22 PROCEDURE — 96372 THER/PROPH/DIAG INJ SC/IM: CPT | Performed by: PHYSICIAN ASSISTANT

## 2024-04-22 PROCEDURE — 80053 COMPREHEN METABOLIC PANEL: CPT | Mod: ER

## 2024-04-22 PROCEDURE — 81025 URINE PREGNANCY TEST: CPT | Mod: ER

## 2024-04-22 RX ORDER — CLINDAMYCIN HYDROCHLORIDE 150 MG/1
450 CAPSULE ORAL EVERY 8 HOURS
Qty: 90 CAPSULE | Refills: 0 | Status: SHIPPED | OUTPATIENT
Start: 2024-04-22 | End: 2024-05-02

## 2024-04-22 RX ORDER — POTASSIUM CHLORIDE 20 MEQ/1
40 TABLET, EXTENDED RELEASE ORAL
Status: COMPLETED | OUTPATIENT
Start: 2024-04-22 | End: 2024-04-22

## 2024-04-22 RX ORDER — DEXAMETHASONE SODIUM PHOSPHATE 4 MG/ML
12 INJECTION, SOLUTION INTRA-ARTICULAR; INTRALESIONAL; INTRAMUSCULAR; INTRAVENOUS; SOFT TISSUE
Status: COMPLETED | OUTPATIENT
Start: 2024-04-22 | End: 2024-04-22

## 2024-04-22 RX ORDER — CIPROFLOXACIN HYDROCHLORIDE 3 MG/ML
SOLUTION/ DROPS OPHTHALMIC
Qty: 10 ML | Refills: 0 | Status: SHIPPED | OUTPATIENT
Start: 2024-04-22

## 2024-04-22 RX ADMIN — DEXAMETHASONE SODIUM PHOSPHATE 12 MG: 4 INJECTION INTRA-ARTICULAR; INTRALESIONAL; INTRAMUSCULAR; INTRAVENOUS; SOFT TISSUE at 12:04

## 2024-04-22 RX ADMIN — SODIUM CHLORIDE 1000 ML: 9 INJECTION, SOLUTION INTRAVENOUS at 12:04

## 2024-04-22 RX ADMIN — POTASSIUM CHLORIDE 40 MEQ: 1500 TABLET, EXTENDED RELEASE ORAL at 01:04

## 2024-04-22 NOTE — Clinical Note
"Lora Joseantoine Scott was seen and treated in our emergency department on 4/22/2024.  She may return to work on 04/26/2024.       If you have any questions or concerns, please don't hesitate to call.       RN    "

## 2024-04-22 NOTE — ED PROVIDER NOTES
Encounter Date: 2024       History     Chief Complaint   Patient presents with    Fatigue     Complains of headache, nasal congestion, sore throat, and generalized fatigue x2 days.     Patient is a 34-year-old female with history of hidradenitis suppurativa, psoriasis, eczema, Crohn's disease, asthma who presents to the emergency department with sore throat.  She reports over the last 2 days she has not been feeling well.  She reports body aches and chills.  Reports painful swallowing.  Reports decreased intake.  Reports her psoriasis and eczema has been extremely flared behind her ears and on her scalp.  Reports she has not seen a dermatologist in a long time.    The history is provided by the patient.     Review of patient's allergies indicates:   Allergen Reactions    Horse/equine containing products Itching     Past Medical History:   Diagnosis Date    Allergy     Anxiety     Asthma     Crohn's disease (ileum)     S/P ileocecal resection-42 cm of ileum/10.5 cm cecum removed with surgical path c/w stricture and fistula 2/15/20 ileostomy takedown and mucus fistula takedown    Depression     Eczema     Former smoker     Hydradenitis     Immunosuppressed status     Perineal abscess 2021    Psoriasis      Past Surgical History:   Procedure Laterality Date     SECTION N/A 2023    Procedure:  SECTION;  Surgeon: Geo Espitia MD;  Location: [facility] L&D OR;  Service: OB/GYN;  Laterality: N/A;     SECTION      COLONOSCOPY N/A 2019    Procedure: COLONOSCOPY;  Surgeon: Fawad Perla MD;  Location: [facility]);  Service: Endoscopy;  Laterality: N/A;    COLONOSCOPY N/A 10/30/2020    Procedure: COLONOSCOPY;  Surgeon: Suzi Bacon MD;  Location: [facility] FLR);  Service: Endoscopy;  Laterality: N/A;  covid test [facility] 10/27  pt to try Mirilax prep with Reglan before per Dr. Bacon - sm  10/14-new instructions e-mailed-tb  10/16--new instructions  e-mailed  10/29/20- Pt confirmed earlier arrival time, prep ins. reveiwed, Pt verbalized understanding- [email or web address]    COLONOSCOPY N/A 05/10/2022    Procedure: COLONOSCOPY;  Surgeon: Suzi Bacon MD;  Location: [facility] (4TH FLR);  Service: Endoscopy;  Laterality: N/A;  1/2022- same as last bowel prep (Miralax/Gatorade)  3/4 pt rescheduled to May/ COVID test on 5/3 at [facility] -   instruction via portal  5/6 pt rescheduled; pt fully vaccinated; Rapid; instructions to portal-st    COLONOSCOPY N/A 6/1/2023    Procedure: COLONOSCOPY;  Surgeon: Suzi Bacon MD;  Location: [facility] (4TH FLR);  Service: Endoscopy;  Laterality: N/A;  order created: poor prep, Dr. SKY Bacon, Miralax, prep instr portal -ml  pre call 5/26, pt confirmed - mf    ESOPHAGOGASTRODUODENOSCOPY N/A 02/20/2019    Procedure: EGD (ESOPHAGOGASTRODUODENOSCOPY);  Surgeon: Fawad Perla MD;  Location: [facility] (2ND FLR);  Service: Endoscopy;  Laterality: N/A;    ILEOCOLECTOMY N/A 05/10/2019    Procedure: ILEOCOLECTOMY;  Surgeon: Uday Cope MD;  Location: [facility] OR 2ND FLR;  Service: Colon and Rectal;  Laterality: N/A;  ileocecectomy    ILEOSCOPY N/A 12/20/2019    Procedure: ILEOSCOPY through stoma;  Surgeon: Suzi Bacon MD;  Location: [facility] (4TH FLR);  Service: Endoscopy;  Laterality: N/A;  Schedule as 30 minute case  Please let patient know to bring extra stoma bag/wafer, etc- entire bag and appliance will be removed at time of procedure to visualize the area well   first week 11/2019     per note-R/S to 12/20/19-GT    ILEOSTOMY N/A 05/10/2019    Procedure: CREATION, ILEOSTOMY with mucous fistula;  Surgeon: Uday Cope MD;  Location: [facility] OR 2ND FLR;  Service: Colon and Rectal;  Laterality: N/A;    ILEOSTOMY CLOSURE N/A 02/12/2020    Procedure: CLOSURE,ILEOSTOMY TAKEDOWN END ILEOSTOMY/MUCOUS FISTULA POSSIBLE LAPAROTOMY ERAS LOW;  Surgeon: Uday Cope MD;  Location: [facility] OR 2ND [facility];  Service: Colon and Rectal;  Laterality: N/A;     INCISION AND DRAINAGE OF BUTTOCK Bilateral 12/07/2022    Procedure: INCISION AND DRAINAGE, BUTTOCK;  Surgeon: Regan Rowell Jr., MD;  Location: [facility];  Service: General;  Laterality: Bilateral;     Family History   Problem Relation Name Age of Onset    Hypertension Mother Kalie     No Known Problems Father Lexx     No Known Problems Sister Alda     No Known Problems Sister Charlette     No Known Problems Sister Daisy     No Known Problems Sister Yusra     No Known Problems Brother Andrews     Glaucoma Maternal Uncle      No Known Problems Maternal Uncle      No Known Problems Maternal Uncle      No Known Problems Paternal Aunt      No Known Problems Paternal Aunt      No Known Problems Paternal Uncle      No Known Problems Paternal Uncle      No Known Problems Paternal Uncle      No Known Problems Paternal Uncle      No Known Problems Maternal Grandmother      Cataracts Maternal Grandfather      Irritable bowel syndrome Paternal Grandmother      No Known Problems Paternal Grandfather      Celiac disease Neg Hx      Cirrhosis Neg Hx      Colon cancer Neg Hx      Colon polyps Neg Hx      Crohn's disease Neg Hx      Cystic fibrosis Neg Hx      Esophageal cancer Neg Hx      Hemochromatosis Neg Hx      Inflammatory bowel disease Neg Hx      Liver cancer Neg Hx      Liver disease Neg Hx      Rectal cancer Neg Hx      Stomach cancer Neg Hx      Ulcerative colitis Neg Hx      Jerardo's disease Neg Hx      Lymphoma Neg Hx      Tuberculosis Neg Hx      Scleroderma Neg Hx      Rheum arthritis Neg Hx      Multiple sclerosis Neg Hx      Psoriasis Neg Hx      Lupus Neg Hx      Melanoma Neg Hx      Skin cancer Neg Hx      Amblyopia Neg Hx      Blindness Neg Hx      Cancer Neg Hx      Diabetes Neg Hx      Macular degeneration Neg Hx      Retinal detachment Neg Hx      Strabismus Neg Hx      Stroke Neg Hx      Thyroid disease Neg Hx       Social History     Tobacco Use    Smoking status: Every Day     Current packs/day: 0.00      Types: Cigarettes     Last attempt to quit: 2020     Years since quittin.3    Smokeless tobacco: Never   Substance Use Topics    Alcohol use: Not Currently     Alcohol/week: 0.0 standard drinks of alcohol     Comment: Socially    Drug use: Yes     Frequency: 3.0 times per week     Types: Marijuana     Review of Systems   Constitutional:  Positive for activity change, appetite change, chills, fatigue and fever.   HENT:  Positive for congestion, postnasal drip, rhinorrhea, sore throat and trouble swallowing. Negative for ear discharge and ear pain.    Respiratory:  Positive for cough. Negative for shortness of breath.    Cardiovascular:  Negative for chest pain.   Gastrointestinal:  Negative for abdominal pain, blood in stool, constipation, diarrhea, nausea and vomiting.   Genitourinary:  Negative for dysuria, flank pain and hematuria.   Musculoskeletal:  Positive for myalgias. Negative for back pain, neck pain and neck stiffness.   Skin:  Positive for rash. Negative for wound.   Neurological:  Negative for dizziness, light-headedness and headaches.       Physical Exam     Initial Vitals   BP Pulse Resp Temp SpO2   24 1050 24 1049 24 1049 24 1049 24 1049   108/75 (!) 118 18 99.9 °F (37.7 °C) 100 %      MAP       --                Physical Exam    Nursing note and vitals reviewed.  Constitutional: She appears well-developed and well-nourished. She is not diaphoretic.  Non-toxic appearance. No distress.   HENT:   Head: Normocephalic.   Right Ear: Hearing, tympanic membrane and ear canal normal.   Left Ear: Hearing, tympanic membrane and ear canal normal.   Nose: Nose normal.   Mouth/Throat: Uvula is midline and mucous membranes are normal. No trismus in the jaw. No uvula swelling. Oropharyngeal exudate and posterior oropharyngeal erythema present. No posterior oropharyngeal edema or tonsillar abscesses.   Dry, peeling, erythematous honey-crusted lesions surrounding external pinna,  tragus, extending to mastoid region into occipital scalp    LAD present   Eyes: Lids are normal. Pupils are equal, round, and reactive to light. Lids are everted and swept, no foreign bodies found. Right eye exhibits discharge. Left eye exhibits discharge. Right conjunctiva is injected. Left conjunctiva is injected.   Neck: Neck supple.   Normal range of motion.   Full passive range of motion without pain.     Cardiovascular:  Regular rhythm.           Tachycardic   Pulmonary/Chest: Breath sounds normal. No respiratory distress. She has no wheezes. She has no rhonchi. She has no rales. She exhibits no tenderness.   Abdominal: Abdomen is soft. Bowel sounds are normal. There is no abdominal tenderness.   Musculoskeletal:      Cervical back: Full passive range of motion without pain, normal range of motion and neck supple. No rigidity. Normal range of motion.     Lymphadenopathy:     She has cervical adenopathy.   Neurological: She is alert and oriented to person, place, and time. She has normal strength. No cranial nerve deficit or sensory deficit. GCS score is 15. GCS eye subscore is 4. GCS verbal subscore is 5. GCS motor subscore is 6.   Skin: Skin is warm and dry. Capillary refill takes less than 2 seconds.   Psychiatric: She has a normal mood and affect.         ED Course   Procedures  Labs Reviewed   POCT STREP A, RAPID - Abnormal; Notable for the following components:       Result Value    POC Rapid Strep A positive (*)     All other components within normal limits   POCT CMP - Abnormal; Notable for the following components:    POC Potassium 3.0 (*)     All other components within normal limits   POCT URINE PREGNANCY   SARS-COV-2 RDRP GENE    Narrative:     This test utilizes isothermal nucleic acid amplification technology to detect the SARS-CoV-2 RdRp nucleic acid segment. The analytical sensitivity (limit of detection) is 500 copies/swab.     A POSITIVE result is indicative of the presence of SARS-CoV-2 RNA;  clinical correlation with patient history and other diagnostic information is necessary to determine patient infection status.    A NEGATIVE result means that SARS-CoV-2 nucleic acids are not present above the limit of detection. A NEGATIVE result should be treated as presumptive. It does not rule out the possibility of COVID-19 and should not be the sole basis for treatment decisions. If COVID-19 is strongly suspected based on clinical and exposure history, re-testing using an alternate molecular assay should be considered.     Commercial kits are provided by [employer].   _________________________________________________________________   The authorized Fact Sheet for Healthcare Providers and the authorized Fact Sheet for Patients of the ID NOW COVID-19 are available on the FDA website:    https://www.fda.gov/media/070571/download      https://www.fda.gov/media/414785/download      POCT CBC   POCT STREP A MOLECULAR   POCT INFLUENZA A/B MOLECULAR   POCT RAPID INFLUENZA A/B   POCT CMP          Imaging Results    None          Medications   sodium chloride 0.9% bolus 1,000 mL 1,000 mL (1,000 mLs Intravenous New Bag 4/22/24 1244)   dexAMETHasone injection 12 mg (12 mg Intramuscular Given 4/22/24 1247)   potassium chloride SA CR tablet 40 mEq (40 mEq Oral Given 4/22/24 1311)     Medical Decision Making  Urgent evaluation of a 34-year-old female who presents to the emergency department with body aches and chills.  Patient is borderline febrile and tachycardic.  Nontoxic appearing.  Posterior oropharyngeal erythema.  Tonsillar exudate.  Uvula is midline.  No evidence of peritonsillar abscess or retropharyngeal abscess.  No nuchal rigidity.  Normal heart and lung sounds.  Patient is positive for strep.  Patient has dry, erythematous crusted plaques to ears, posterior neck, extending to scalp.  Flared psoriasis versus flared eczema with secondary cellulitis.  Mildly elevated WBC.  Mild hypokalemia which is replaced  orally.  Patient is given IV fluids and steroids.    Will cover with clinda for the secondary cellulitis (MSSA, MRSA, strep) and the strep throat.      Also covering her for Pseudomonas conjunctivitis with her history of wearing contacts and conjunctival findings on exam.  She is advised to dispose of contacts and case.  She is advised to start fresh.  Will give ciprofloxacin topical.    She is advised to bathe in 2 cups of Epson salt with warm water.  Wash her skin with Cetaphil and mineral oil.  Tap dry and within 2 minutes apply topical steroids to flared region.  CeraVe from head to toe.  Aquaphor to flared regions.  Will place an urgent Dermatology referral.  Advised to return to the emergency department with any worsening symptoms or concerns.    Amount and/or Complexity of Data Reviewed  Labs: ordered.    Risk  Prescription drug management.                                      Clinical Impression:  Final diagnoses:  [J02.0] Strep throat (Primary)  [L40.9] Psoriasis  [L30.9] Eczema, unspecified type  [L03.90] Cellulitis, unspecified cellulitis site  [H10.9] Conjunctivitis, unspecified conjunctivitis type, unspecified laterality          ED Disposition Condition    Discharge Stable          ED Prescriptions       Medication Sig Dispense Start Date End Date Auth. Provider    clindamycin (CLEOCIN) 150 MG capsule Take 3 capsules (450 mg total) by mouth every 8 (eight) hours. for 10 days 90 capsule 4/22/2024 5/2/2024 Nicole Navarro PA-C    ciprofloxacin HCl (CILOXAN) 0.3 % ophthalmic solution Instill 1 to 2 drops into the conjunctival sac of the affected eye(s) every 2 hours while awake for 2 days, then 1 to 2 drops every 4 hours while awake for the next 5 days. 10 mL 4/22/2024 -- Nicole Navarro PA-C          Follow-up Information    None          Nicole Navarro PA-C  04/22/24 1410

## 2024-04-22 NOTE — DISCHARGE INSTRUCTIONS
It is important that you dispose of the contacts and case you have been using.    The oral antibiotics will cover the strep throat and the infection of your skin.    Bathe in 2 cups of epsom salt mixed with warm water.  Clean skin with mineral oil and cetaphil.  Tap dry and  apply your steroid cream to the bad areas behind your ear/neck.  Then moisturize entire body from head to toe with cerave.  Over the bad flared regions, apply aquaphor.    Follow up with dermatology for better management of your skin.    Return to ED with any worsening symptoms or concerns.

## 2024-04-25 ENCOUNTER — APPOINTMENT (OUTPATIENT)
Dept: RADIOLOGY | Facility: HOSPITAL | Age: 34
End: 2024-04-25
Payer: MEDICAID

## 2024-04-25 ENCOUNTER — CLINICAL SUPPORT (OUTPATIENT)
Dept: EMERGENCY MEDICINE | Facility: HOSPITAL | Age: 34
End: 2024-04-25
Payer: MEDICAID

## 2024-04-25 ENCOUNTER — HOSPITAL ENCOUNTER (EMERGENCY)
Facility: HOSPITAL | Age: 34
Discharge: HOME | End: 2024-04-25
Payer: MEDICAID

## 2024-04-25 VITALS
SYSTOLIC BLOOD PRESSURE: 124 MMHG | WEIGHT: 210 LBS | TEMPERATURE: 97.7 F | HEIGHT: 61 IN | BODY MASS INDEX: 39.65 KG/M2 | HEART RATE: 115 BPM | DIASTOLIC BLOOD PRESSURE: 88 MMHG | RESPIRATION RATE: 18 BRPM | OXYGEN SATURATION: 97 %

## 2024-04-25 DIAGNOSIS — R10.11 RIGHT UPPER QUADRANT ABDOMINAL PAIN: Primary | ICD-10-CM

## 2024-04-25 LAB
ALBUMIN SERPL BCP-MCNC: 4.7 G/DL (ref 3.4–5)
ALP SERPL-CCNC: 50 U/L (ref 33–110)
ALT SERPL W P-5'-P-CCNC: 15 U/L (ref 7–45)
ANION GAP SERPL CALC-SCNC: 16 MMOL/L (ref 10–20)
APPEARANCE UR: ABNORMAL
AST SERPL W P-5'-P-CCNC: 27 U/L (ref 9–39)
BASOPHILS # BLD AUTO: 0.06 X10*3/UL (ref 0–0.1)
BASOPHILS NFR BLD AUTO: 0.8 %
BILIRUB SERPL-MCNC: 0.3 MG/DL (ref 0–1.2)
BILIRUB UR STRIP.AUTO-MCNC: NEGATIVE MG/DL
BUN SERPL-MCNC: 10 MG/DL (ref 6–23)
CALCIUM SERPL-MCNC: 9.4 MG/DL (ref 8.6–10.6)
CARDIAC TROPONIN I PNL SERPL HS: <3 NG/L (ref 0–34)
CHLORIDE SERPL-SCNC: 106 MMOL/L (ref 98–107)
CO2 SERPL-SCNC: 24 MMOL/L (ref 21–32)
COLOR UR: COLORLESS
CREAT SERPL-MCNC: 0.61 MG/DL (ref 0.5–1.05)
D DIMER PPP FEU-MCNC: 258 NG/ML FEU
EGFRCR SERPLBLD CKD-EPI 2021: >90 ML/MIN/1.73M*2
EOSINOPHIL # BLD AUTO: 0.3 X10*3/UL (ref 0–0.7)
EOSINOPHIL NFR BLD AUTO: 3.9 %
ERYTHROCYTE [DISTWIDTH] IN BLOOD BY AUTOMATED COUNT: 14.8 % (ref 11.5–14.5)
GLUCOSE SERPL-MCNC: 81 MG/DL (ref 74–99)
GLUCOSE UR STRIP.AUTO-MCNC: NORMAL MG/DL
HCT VFR BLD AUTO: 42.6 % (ref 36–46)
HGB BLD-MCNC: 13.9 G/DL (ref 12–16)
IMM GRANULOCYTES # BLD AUTO: 0.02 X10*3/UL (ref 0–0.7)
IMM GRANULOCYTES NFR BLD AUTO: 0.3 % (ref 0–0.9)
KETONES UR STRIP.AUTO-MCNC: NEGATIVE MG/DL
LEUKOCYTE ESTERASE UR QL STRIP.AUTO: ABNORMAL
LIPASE SERPL-CCNC: 18 U/L (ref 9–82)
LYMPHOCYTES # BLD AUTO: 3.41 X10*3/UL (ref 1.2–4.8)
LYMPHOCYTES NFR BLD AUTO: 44.6 %
MCH RBC QN AUTO: 29.3 PG (ref 26–34)
MCHC RBC AUTO-ENTMCNC: 32.6 G/DL (ref 32–36)
MCV RBC AUTO: 90 FL (ref 80–100)
MONOCYTES # BLD AUTO: 0.58 X10*3/UL (ref 0.1–1)
MONOCYTES NFR BLD AUTO: 7.6 %
NEUTROPHILS # BLD AUTO: 3.28 X10*3/UL (ref 1.2–7.7)
NEUTROPHILS NFR BLD AUTO: 42.8 %
NITRITE UR QL STRIP.AUTO: NEGATIVE
NRBC BLD-RTO: 0 /100 WBCS (ref 0–0)
PH UR STRIP.AUTO: 6.5 [PH]
PLATELET # BLD AUTO: 297 X10*3/UL (ref 150–450)
POTASSIUM SERPL-SCNC: 3.5 MMOL/L (ref 3.5–5.3)
POTASSIUM SERPL-SCNC: 5.5 MMOL/L (ref 3.5–5.3)
PREGNANCY TEST URINE, POC: NEGATIVE
PROT SERPL-MCNC: 8.7 G/DL (ref 6.4–8.2)
PROT UR STRIP.AUTO-MCNC: ABNORMAL MG/DL
RBC # BLD AUTO: 4.74 X10*6/UL (ref 4–5.2)
RBC # UR STRIP.AUTO: NEGATIVE /UL
RBC #/AREA URNS AUTO: NORMAL /HPF
SODIUM SERPL-SCNC: 140 MMOL/L (ref 136–145)
SP GR UR STRIP.AUTO: 1.02
SQUAMOUS #/AREA URNS AUTO: NORMAL /HPF
UROBILINOGEN UR STRIP.AUTO-MCNC: NORMAL MG/DL
WBC # BLD AUTO: 7.7 X10*3/UL (ref 4.4–11.3)
WBC #/AREA URNS AUTO: NORMAL /HPF

## 2024-04-25 PROCEDURE — 84484 ASSAY OF TROPONIN QUANT: CPT | Performed by: NURSE PRACTITIONER

## 2024-04-25 PROCEDURE — 84132 ASSAY OF SERUM POTASSIUM: CPT | Mod: 59 | Performed by: NURSE PRACTITIONER

## 2024-04-25 PROCEDURE — 36415 COLL VENOUS BLD VENIPUNCTURE: CPT | Performed by: NURSE PRACTITIONER

## 2024-04-25 PROCEDURE — 93005 ELECTROCARDIOGRAM TRACING: CPT

## 2024-04-25 PROCEDURE — 96374 THER/PROPH/DIAG INJ IV PUSH: CPT

## 2024-04-25 PROCEDURE — 85379 FIBRIN DEGRADATION QUANT: CPT | Performed by: NURSE PRACTITIONER

## 2024-04-25 PROCEDURE — 99284 EMERGENCY DEPT VISIT MOD MDM: CPT | Performed by: NURSE PRACTITIONER

## 2024-04-25 PROCEDURE — 71046 X-RAY EXAM CHEST 2 VIEWS: CPT

## 2024-04-25 PROCEDURE — 85025 COMPLETE CBC W/AUTO DIFF WBC: CPT | Performed by: NURSE PRACTITIONER

## 2024-04-25 PROCEDURE — 76705 ECHO EXAM OF ABDOMEN: CPT | Performed by: RADIOLOGY

## 2024-04-25 PROCEDURE — 83690 ASSAY OF LIPASE: CPT | Performed by: NURSE PRACTITIONER

## 2024-04-25 PROCEDURE — 99285 EMERGENCY DEPT VISIT HI MDM: CPT | Mod: 25

## 2024-04-25 PROCEDURE — 87086 URINE CULTURE/COLONY COUNT: CPT | Performed by: NURSE PRACTITIONER

## 2024-04-25 PROCEDURE — 2500000004 HC RX 250 GENERAL PHARMACY W/ HCPCS (ALT 636 FOR OP/ED): Mod: SE | Performed by: NURSE PRACTITIONER

## 2024-04-25 PROCEDURE — 80053 COMPREHEN METABOLIC PANEL: CPT | Performed by: NURSE PRACTITIONER

## 2024-04-25 PROCEDURE — 71046 X-RAY EXAM CHEST 2 VIEWS: CPT | Mod: FOREIGN READ | Performed by: RADIOLOGY

## 2024-04-25 PROCEDURE — 81001 URINALYSIS AUTO W/SCOPE: CPT | Performed by: NURSE PRACTITIONER

## 2024-04-25 PROCEDURE — 76705 ECHO EXAM OF ABDOMEN: CPT

## 2024-04-25 PROCEDURE — 96375 TX/PRO/DX INJ NEW DRUG ADDON: CPT

## 2024-04-25 PROCEDURE — 81025 URINE PREGNANCY TEST: CPT | Performed by: NURSE PRACTITIONER

## 2024-04-25 RX ORDER — KETOROLAC TROMETHAMINE 15 MG/ML
15 INJECTION, SOLUTION INTRAMUSCULAR; INTRAVENOUS ONCE
Status: COMPLETED | OUTPATIENT
Start: 2024-04-25 | End: 2024-04-25

## 2024-04-25 RX ORDER — ACETAMINOPHEN 325 MG/1
650 TABLET ORAL EVERY 6 HOURS PRN
Qty: 30 TABLET | Refills: 0 | Status: SHIPPED | OUTPATIENT
Start: 2024-04-25

## 2024-04-25 RX ORDER — ONDANSETRON HYDROCHLORIDE 2 MG/ML
4 INJECTION, SOLUTION INTRAVENOUS ONCE
Status: COMPLETED | OUTPATIENT
Start: 2024-04-25 | End: 2024-04-25

## 2024-04-25 RX ORDER — ONDANSETRON 4 MG/1
4 TABLET, ORALLY DISINTEGRATING ORAL EVERY 8 HOURS PRN
Qty: 15 TABLET | Refills: 0 | Status: SHIPPED | OUTPATIENT
Start: 2024-04-25 | End: 2024-06-05 | Stop reason: WASHOUT

## 2024-04-25 RX ADMIN — SODIUM CHLORIDE 1000 ML: 9 INJECTION, SOLUTION INTRAVENOUS at 10:58

## 2024-04-25 RX ADMIN — ONDANSETRON 4 MG: 2 INJECTION INTRAMUSCULAR; INTRAVENOUS at 10:58

## 2024-04-25 RX ADMIN — KETOROLAC TROMETHAMINE 15 MG: 15 INJECTION, SOLUTION INTRAMUSCULAR; INTRAVENOUS at 10:58

## 2024-04-25 ASSESSMENT — LIFESTYLE VARIABLES
HAVE YOU EVER FELT YOU SHOULD CUT DOWN ON YOUR DRINKING: NO
TOTAL SCORE: 0
HAVE PEOPLE ANNOYED YOU BY CRITICIZING YOUR DRINKING: NO
EVER HAD A DRINK FIRST THING IN THE MORNING TO STEADY YOUR NERVES TO GET RID OF A HANGOVER: NO
EVER FELT BAD OR GUILTY ABOUT YOUR DRINKING: NO

## 2024-04-25 ASSESSMENT — PAIN DESCRIPTION - LOCATION: LOCATION: CHEST

## 2024-04-25 ASSESSMENT — ENCOUNTER SYMPTOMS
NAUSEA: 1
VOMITING: 1
CHILLS: 1
ABDOMINAL PAIN: 1

## 2024-04-25 ASSESSMENT — PAIN SCALES - GENERAL: PAINLEVEL_OUTOF10: 7

## 2024-04-25 ASSESSMENT — PAIN - FUNCTIONAL ASSESSMENT: PAIN_FUNCTIONAL_ASSESSMENT: 0-10

## 2024-04-25 NOTE — ED PROVIDER NOTES
Emergency Department Encounter  [facility] EMERGENCY MEDICINE    Patient: Renetta Parmar  MRN: [id number]  : 1990  Date of Evaluation: 2024  ED Provider: PHUONG Morocho      Chief Complaint       Chief Complaint   Patient presents with    Generalized Body Aches        Limitations to History: none  Historian: patient  Records reviewed: EMR inpatient and outpatient notes, Care Everywhere    This is a 34-year-old female without any significant PMH who presents to the emergency room with bodyaches and right-sided pain.  Patient states that she developed symptoms 1 month ago.  Patient has pain underneath her right breast which has been intermittent for 1 month.  Patient endorses intermittent nausea and vomiting.  Patient states that she was able to tolerate pork chops and mac & cheese yesterday evening.  Denies any shortness of breath.  Patient has been taking ibuprofen and Tylenol at home without any relief of symptoms.  Denies any lower extremity pain, swelling or use of birth control medications.  Patient reports the pain is worse with eating.    PMH: Denies  PSH: Denies  Allergies: NKDA  Social HX: + smoker, occasional alcohol use, denies any drug use.  Family HX: No family history pertinent to current presenting problem  Medications: Reviewed per EMR    ROS:     Review of Systems   Constitutional:  Positive for chills.   Cardiovascular:  Positive for chest pain.   Gastrointestinal:  Positive for abdominal pain, nausea and vomiting.     14 systems reviewed and otherwise acutely negative except as in the [place].        Past History     Past Medical History:   Diagnosis Date    Encounter for screening for infections with a predominantly sexual mode of transmission 2015    Screening for STDs (sexually transmitted diseases)    Encounter for screening for malignant neoplasm of cervix     Screening for cervical cancer    Encounter for screening for malignant neoplasm of cervix      Screening for cervical cancer    Encounter for supervision of normal pregnancy, unspecified, first trimester ([facility]) 05/28/2019    Prenatal care in first trimester    Encounter for supervision of other normal pregnancy, unspecified trimester ([facility]) 04/29/2015    Pregnancy, subsequent    Encounter for supervision of other normal pregnancy, unspecified trimester ([facility])     Prenatal care, subsequent pregnancy    Other problems related to lifestyle 04/08/2015    Other problems related to lifestyle    Other specified noninflammatory disorders of vulva and perineum     Labial lesion    Personal history of other diseases of the circulatory system     History of high blood pressure    Personal history of other diseases of urinary system 04/08/2015    History of hematuria    Personal history of other infectious and parasitic diseases 04/08/2015    History of candidiasis    Personal history of other specified conditions 04/08/2015    History of urinary frequency     Past Surgical History:   Procedure Laterality Date    OTHER SURGICAL HISTORY  05/28/2019    [place] tooth extraction         Medications/Allergies     Previous Medications    ACETAMINOPHEN (TYLENOL) 325 MG TABLET    Take 2 tablets (650 mg) by mouth every 6 hours if needed for mild pain (1 - 3) or fever (temp greater than 38.0 C).    HYDROXYZINE HCL (ATARAX) 25 MG TABLET    Take 1 tablet (25 mg) by mouth every 6 hours for 3 days.    HYDROXYZINE HCL (ATARAX) 25 MG TABLET    Take 1 tablet (25 mg) by mouth every 6 hours for 3 days.    NAPROXEN (NAPROSYN) 500 MG TABLET    Take 1 tablet (500 mg) by mouth 2 times a day.    PANTOPRAZOLE (PROTONIX) 20 MG EC TABLET    Take 1 tablet (20 mg) by mouth once daily. Do not crush, chew, or split.     Allergies   Allergen Reactions    Shellfish Containing Products Angioedema and Swelling     Shellfish        Physical Exam       ED Triage Vitals [04/25/24 0934]   Temperature Heart Rate Respirations BP   36.5 °C (97.7 °F) (!)  115 18 124/88      Pulse Ox Temp Source Heart Rate Source Patient Position   97 % Temporal Monitor --      BP Location FiO2 (%)     -- 21 %       Physical Exam:    Appearance: Alert, oriented , cooperative,  in no acute distress. Well nourished & well hydrated.    Skin: Intact,  dry skin, no lesions, rash, petechiae or purpura.     Eyes: PERRLA, EOMs intact.    ENT: Hearing grossly intact. External auditory canals patent, tympanic membranes intact with visible landmarks. Nares patent, mucus membranes moist. Dentition without lesions. Pharynx clear, uvula midline.     Neck: Supple, without meningismus.     Pulmonary: Clear bilaterally with good chest wall excursion. No rales, rhonchi or wheezing. No accessory muscle use or stridor.    Cardiac: Tachycardic rate without murmur, rub, gallop or extrasystole. No JVD, Carotids without bruits.    Abdomen: Soft, nontender, active bowel sounds.  No palpable organomegaly.  No rebound or guarding.  No CVA tenderness.    Musculoskeletal: Full range of motion. no pain, edema, or deformity. Pulses full and equal. No cyanosis, clubbing, or edema.    Neurological:  Normal sensation, no weakness, no focal findings identified.    Psychiatric: Appropriate mood and affect.       Diagnostics   Labs:  Results for orders placed or performed during the hospital encounter of 04/25/24 (from the past 24 hour(s))   CBC and Auto Differential   Result Value Ref Range    WBC 7.7 4.4 - 11.3 x10*3/uL    nRBC 0.0 0.0 - 0.0 /100 WBCs    RBC 4.74 4.00 - 5.20 x10*6/uL    Hemoglobin 13.9 12.0 - 16.0 g/dL    Hematocrit 42.6 36.0 - 46.0 %    MCV 90 80 - 100 fL    MCH 29.3 26.0 - 34.0 pg    MCHC 32.6 32.0 - 36.0 g/dL    RDW 14.8 (H) 11.5 - 14.5 %    Platelets 297 150 - 450 x10*3/uL    Neutrophils % 42.8 40.0 - 80.0 %    Immature Granulocytes %, Automated 0.3 0.0 - 0.9 %    Lymphocytes % 44.6 13.0 - 44.0 %    Monocytes % 7.6 2.0 - 10.0 %    Eosinophils % 3.9 0.0 - 6.0 %    Basophils % 0.8 0.0 - 2.0 %     Neutrophils Absolute 3.28 1.20 - 7.70 x10*3/uL    Immature Granulocytes Absolute, Automated 0.02 0.00 - 0.70 x10*3/uL    Lymphocytes Absolute 3.41 1.20 - 4.80 x10*3/uL    Monocytes Absolute 0.58 0.10 - 1.00 x10*3/uL    Eosinophils Absolute 0.30 0.00 - 0.70 x10*3/uL    Basophils Absolute 0.06 0.00 - 0.10 x10*3/uL   Comprehensive metabolic panel   Result Value Ref Range    Glucose 81 74 - 99 mg/dL    Sodium 140 136 - 145 mmol/L    Potassium 5.5 (H) 3.5 - 5.3 mmol/L    Chloride 106 98 - 107 mmol/L    Bicarbonate 24 21 - 32 mmol/L    Anion Gap 16 10 - 20 mmol/L    Urea Nitrogen 10 6 - 23 mg/dL    Creatinine 0.61 0.50 - 1.05 mg/dL    eGFR >90 >60 mL/min/1.73m*2    Calcium 9.4 8.6 - 10.6 mg/dL    Albumin 4.7 3.4 - 5.0 g/dL    Alkaline Phosphatase 50 33 - 110 U/L    Total Protein 8.7 (H) 6.4 - 8.2 g/dL    AST 27 9 - 39 U/L    Bilirubin, Total 0.3 0.0 - 1.2 mg/dL    ALT 15 7 - 45 U/L   Lipase   Result Value Ref Range    Lipase 18 9 - 82 U/L   Troponin I, High Sensitivity   Result Value Ref Range    Troponin I, High Sensitivity <3 0 - 34 ng/L   D-dimer, VTE Exclusion   Result Value Ref Range    D-Dimer, Quantitative VTE Exclusion 258 <=500 ng/mL FEU   Urinalysis with Reflex Culture and Microscopic   Result Value Ref Range    Color, Urine Colorless (N) Light-Yellow, Yellow, Dark-Yellow    Appearance, Urine Turbid (N) Clear    Specific Gravity, Urine 1.022 1.005 - 1.035    pH, Urine 6.5 5.0, 5.5, 6.0, 6.5, 7.0, 7.5, 8.0    Protein, Urine 20 (TRACE) NEGATIVE, 10 (TRACE), 20 (TRACE) mg/dL    Glucose, Urine Normal Normal mg/dL    Blood, Urine NEGATIVE NEGATIVE    Ketones, Urine NEGATIVE NEGATIVE mg/dL    Bilirubin, Urine NEGATIVE NEGATIVE    Urobilinogen, Urine Normal Normal mg/dL    Nitrite, Urine NEGATIVE NEGATIVE    Leukocyte Esterase, Urine 250 Sanaz/µL (A) NEGATIVE   Microscopic Only, Urine   Result Value Ref Range    WBC, Urine 1-5 1-5, NONE /HPF    RBC, Urine NONE NONE, 1-2, 3-5 /HPF    Squamous Epithelial Cells, Urine 1-9  "(SPARSE) Reference range not established. /HPF   POCT pregnancy, urine   Result Value Ref Range    Preg Test, Ur Negative Negative   Potassium   Result Value Ref Range    Potassium 3.5 3.5 - 5.3 mmol/L      Radiographs:  US gallbladder   Final Result   Small echogenic focus is visualized within the mid kidney, measuring   approximately 0.3 x 0.2 x 0.2 cm, favored to represent a small   nonobstructing renal calculus or parenchymal calcification. No   etiology for right upper quadrant pain is otherwise evident.        I personally reviewed the images/study and I agree with the findings   as stated by Dr. Nikolai Benson M.D. This study was interpreted at   [place], [place].        MACRO:   None        Signed by: Alan Winter 4/25/2024 1:44 PM   Dictation workstation:   [id number]      XR chest 2 views   Final Result   No acute infiltrate or effusion.   Signed by Can Wooten MD              Assessment   In brief, Renetta Parmar is a 34 y.o. female who presented to the emergency department with sided abdominal pain.          ED Course/MDM     Diagnoses as of 04/25/24 1530   Right upper quadrant abdominal pain      Visit Vitals  /88   Pulse (!) 115   Temp 36.5 °C (97.7 °F) (Temporal)   Resp 18   Ht 1.549 m (5' 1\")   Wt 95.3 kg (210 lb)   SpO2 97%   BMI 39.68 kg/m²   OB Status Implant   Smoking Status Never   BSA 2.02 m²       Medications   sodium chloride 0.9 % bolus 1,000 mL (0 mL intravenous Stopped 4/25/24 1128)   ketorolac (Toradol) injection 15 mg (15 mg intravenous Given 4/25/24 1058)   ondansetron (Zofran) injection 4 mg (4 mg intravenous Given 4/25/24 1058)       Patient remained stable while in the emergency department. Previous outpatient and ED records were reviewed. Outside records were reviewed.  Patient remained stable in the emergency room.  Differentials include cholelithiasis, cholecystitis, gastritis, PE.  IV established and labs obtained.  CBC " was unremarkable.  Comprehensive metabolic panel with a potassium mildly elevated at 5.5, otherwise unremarkable.  Lipase was normal at 18, troponin was less than 3 and VTE exclusion D-dimer was 258.  Urinalysis showed 250 leukocytes with 1-5 white cells and no bacteria was present.  Urine pregnancy test was negative.  Potassium was 3.5.  Gallbladder ultrasound was obtained which shows a small echogenic focus is visualized within the mid kidney, measuring 0.3 x 0.2 x 0.2 cm, favored to represent a small nonobstructed renal calculus.  Chest x-ray shows no acute infiltrate or effusion.  Patient received 1 L of IV fluids, Toradol 15 mg IV once and Zofran 4 mg IV once for symptoms.  P.o. challenge was given to the patient was able to tolerate oral liquids and food without difficulty.  Patient was advised to follow-up with her primary care doctor and return the emergency room with worsening symptoms.  Final Impression      1. Right upper quadrant abdominal pain          DISPOSITION  Disposition: Discharged home    Comment: Please note this report has been produced using speech recognition software and may contain errors related to that system including errors in grammar, punctuation, and spelling, as well as words and phrases that may be inappropriate.  If there are any questions or concerns please feel free to contact the dictating provider for clarification.    PHUONG Morocho APRN-CNP  04/25/24 6632

## 2024-04-25 NOTE — ED TRIAGE NOTES
Pt states that she has been having body aches for a couple of weeks and now she is having right side chest pain that radiates to her back.

## 2024-04-26 LAB — BACTERIA UR CULT: NORMAL

## 2024-04-28 LAB
ATRIAL RATE: 121 BPM
P AXIS: 69 DEGREES
P OFFSET: 212 MS
P ONSET: 152 MS
PR INTERVAL: 142 MS
Q ONSET: 223 MS
QRS COUNT: 20 BEATS
QRS DURATION: 68 MS
QT INTERVAL: 318 MS
QTC CALCULATION(BAZETT): 451 MS
QTC FREDERICIA: 401 MS
R AXIS: 44 DEGREES
T AXIS: 2 DEGREES
T OFFSET: 382 MS
VENTRICULAR RATE: 121 BPM

## 2024-05-05 ENCOUNTER — HOSPITAL ENCOUNTER (EMERGENCY)
Facility: HOSPITAL | Age: 34
Discharge: HOME | End: 2024-05-05
Attending: EMERGENCY MEDICINE
Payer: MEDICAID

## 2024-05-05 VITALS
WEIGHT: 210 LBS | BODY MASS INDEX: 39.65 KG/M2 | HEIGHT: 61 IN | SYSTOLIC BLOOD PRESSURE: 133 MMHG | OXYGEN SATURATION: 98 % | RESPIRATION RATE: 16 BRPM | HEART RATE: 90 BPM | TEMPERATURE: 98.6 F | DIASTOLIC BLOOD PRESSURE: 81 MMHG

## 2024-05-05 DIAGNOSIS — G43.009 MIGRAINE WITHOUT AURA AND WITHOUT STATUS MIGRAINOSUS, NOT INTRACTABLE: Primary | ICD-10-CM

## 2024-05-05 LAB
ALBUMIN SERPL BCP-MCNC: 4.4 G/DL (ref 3.4–5)
ALP SERPL-CCNC: 56 U/L (ref 33–110)
ALT SERPL W P-5'-P-CCNC: 14 U/L (ref 7–45)
ANION GAP SERPL CALC-SCNC: 13 MMOL/L (ref 10–20)
AST SERPL W P-5'-P-CCNC: 16 U/L (ref 9–39)
BASOPHILS # BLD AUTO: 0.06 X10*3/UL (ref 0–0.1)
BASOPHILS NFR BLD AUTO: 0.7 %
BILIRUB SERPL-MCNC: 0.2 MG/DL (ref 0–1.2)
BUN SERPL-MCNC: 12 MG/DL (ref 6–23)
CALCIUM SERPL-MCNC: 9.5 MG/DL (ref 8.6–10.6)
CHLORIDE SERPL-SCNC: 110 MMOL/L (ref 98–107)
CO2 SERPL-SCNC: 23 MMOL/L (ref 21–32)
CREAT SERPL-MCNC: 0.55 MG/DL (ref 0.5–1.05)
EGFRCR SERPLBLD CKD-EPI 2021: >90 ML/MIN/1.73M*2
EOSINOPHIL # BLD AUTO: 0.2 X10*3/UL (ref 0–0.7)
EOSINOPHIL NFR BLD AUTO: 2.2 %
ERYTHROCYTE [DISTWIDTH] IN BLOOD BY AUTOMATED COUNT: 14 % (ref 11.5–14.5)
GLUCOSE SERPL-MCNC: 104 MG/DL (ref 74–99)
HCT VFR BLD AUTO: 38.1 % (ref 36–46)
HGB BLD-MCNC: 13.2 G/DL (ref 12–16)
IMM GRANULOCYTES # BLD AUTO: 0.03 X10*3/UL (ref 0–0.7)
IMM GRANULOCYTES NFR BLD AUTO: 0.3 % (ref 0–0.9)
LYMPHOCYTES # BLD AUTO: 3.56 X10*3/UL (ref 1.2–4.8)
LYMPHOCYTES NFR BLD AUTO: 39 %
MAGNESIUM SERPL-MCNC: 2.05 MG/DL (ref 1.6–2.4)
MCH RBC QN AUTO: 29.9 PG (ref 26–34)
MCHC RBC AUTO-ENTMCNC: 34.6 G/DL (ref 32–36)
MCV RBC AUTO: 86 FL (ref 80–100)
MONOCYTES # BLD AUTO: 0.77 X10*3/UL (ref 0.1–1)
MONOCYTES NFR BLD AUTO: 8.4 %
NEUTROPHILS # BLD AUTO: 4.5 X10*3/UL (ref 1.2–7.7)
NEUTROPHILS NFR BLD AUTO: 49.4 %
NRBC BLD-RTO: 0 /100 WBCS (ref 0–0)
PHOSPHATE SERPL-MCNC: 3.7 MG/DL (ref 2.5–4.9)
PLATELET # BLD AUTO: 282 X10*3/UL (ref 150–450)
POTASSIUM SERPL-SCNC: 3.5 MMOL/L (ref 3.5–5.3)
PROT SERPL-MCNC: 7.7 G/DL (ref 6.4–8.2)
RBC # BLD AUTO: 4.42 X10*6/UL (ref 4–5.2)
SODIUM SERPL-SCNC: 142 MMOL/L (ref 136–145)
WBC # BLD AUTO: 9.1 X10*3/UL (ref 4.4–11.3)

## 2024-05-05 PROCEDURE — 85025 COMPLETE CBC W/AUTO DIFF WBC: CPT

## 2024-05-05 PROCEDURE — 99284 EMERGENCY DEPT VISIT MOD MDM: CPT | Performed by: EMERGENCY MEDICINE

## 2024-05-05 PROCEDURE — 84100 ASSAY OF PHOSPHORUS: CPT

## 2024-05-05 PROCEDURE — 96360 HYDRATION IV INFUSION INIT: CPT

## 2024-05-05 PROCEDURE — 83735 ASSAY OF MAGNESIUM: CPT

## 2024-05-05 PROCEDURE — 80053 COMPREHEN METABOLIC PANEL: CPT

## 2024-05-05 PROCEDURE — 36415 COLL VENOUS BLD VENIPUNCTURE: CPT

## 2024-05-05 PROCEDURE — 2500000004 HC RX 250 GENERAL PHARMACY W/ HCPCS (ALT 636 FOR OP/ED): Mod: SE

## 2024-05-05 PROCEDURE — 99283 EMERGENCY DEPT VISIT LOW MDM: CPT | Mod: 25

## 2024-05-05 PROCEDURE — 2500000001 HC RX 250 WO HCPCS SELF ADMINISTERED DRUGS (ALT 637 FOR MEDICARE OP): Mod: SE

## 2024-05-05 RX ORDER — DIPHENHYDRAMINE HCL 25 MG
25 CAPSULE ORAL ONCE
Status: COMPLETED | OUTPATIENT
Start: 2024-05-05 | End: 2024-05-05

## 2024-05-05 RX ORDER — METOCLOPRAMIDE 10 MG/1
10 TABLET ORAL ONCE
Status: COMPLETED | OUTPATIENT
Start: 2024-05-05 | End: 2024-05-05

## 2024-05-05 RX ORDER — ACETAMINOPHEN 325 MG/1
650 TABLET ORAL ONCE
Status: COMPLETED | OUTPATIENT
Start: 2024-05-05 | End: 2024-05-05

## 2024-05-05 RX ADMIN — DIPHENHYDRAMINE HYDROCHLORIDE 25 MG: 25 CAPSULE ORAL at 18:22

## 2024-05-05 RX ADMIN — ACETAMINOPHEN 650 MG: 325 TABLET ORAL at 18:22

## 2024-05-05 RX ADMIN — METOCLOPRAMIDE 10 MG: 10 TABLET ORAL at 18:22

## 2024-05-05 RX ADMIN — SODIUM CHLORIDE, POTASSIUM CHLORIDE, SODIUM LACTATE AND CALCIUM CHLORIDE 1000 ML: 600; 310; 30; 20 INJECTION, SOLUTION INTRAVENOUS at 18:18

## 2024-05-05 ASSESSMENT — PAIN - FUNCTIONAL ASSESSMENT: PAIN_FUNCTIONAL_ASSESSMENT: 0-10

## 2024-05-05 ASSESSMENT — PAIN SCALES - GENERAL: PAINLEVEL_OUTOF10: 10 - WORST POSSIBLE PAIN

## 2024-05-05 ASSESSMENT — PAIN DESCRIPTION - PAIN TYPE: TYPE: ACUTE PAIN

## 2024-05-05 ASSESSMENT — PAIN DESCRIPTION - LOCATION: LOCATION: HEAD

## 2024-05-05 NOTE — DISCHARGE INSTRUCTIONS
.--   Cardiology Clinic  [facility] [place]  Phone: [phone]     .--   Neurology Clinic   [facility] [place]  Phone: [phone]     .--   [facility]  Phone: [phone]  Address: [place] [address]     .Return if you have:  Persistent nausea and vomiting  Severe uncontrolled pain  Difficulty breathing, worsening headache or visual disturbances  Persistent dizziness or light-headedness  Any other questions or concerns you may have after discharge    In an emergency, call 911 or go to an Emergency Department at a nearby hospital

## 2024-05-05 NOTE — ED PROVIDER NOTES
HPI   Chief Complaint   Patient presents with    Headache       HPI  34-year-old presents emergency department for headache.  Patient reports that headache began today, associated with emesis x 3, blurry vision, decreased p.o. intake, body aches, dizziness.  Patient reports that the pain is constant was throbbing however now more sharp, reports 1 drink yesterday, tried Tylenol at home around 1 PM.  She reports that she has also had ongoing chest pain and midepigastric abdominal pain, was worked up 2 weeks ago for similar chest pain for the past month.  Patient reports that she has tried to see a neurologist but the appointments keep getting canceled. Patient denies fever, productive cough, rhinorrhea, diarrhea, constipation, dysuria.                  Huan Coma Scale Score: 15                     Patient History   Past Medical History:   Diagnosis Date    Encounter for screening for infections with a predominantly sexual mode of transmission 04/08/2015    Screening for STDs (sexually transmitted diseases)    Encounter for screening for malignant neoplasm of cervix     Screening for cervical cancer    Encounter for screening for malignant neoplasm of cervix     Screening for cervical cancer    Encounter for supervision of normal pregnancy, unspecified, first trimester ([facility]) 05/28/2019    Prenatal care in first trimester    Encounter for supervision of other normal pregnancy, unspecified trimester ([facility]) 04/29/2015    Pregnancy, subsequent    Encounter for supervision of other normal pregnancy, unspecified trimester ([facility])     Prenatal care, subsequent pregnancy    Other problems related to lifestyle 04/08/2015    Other problems related to lifestyle    Other specified noninflammatory disorders of vulva and perineum     Labial lesion    Personal history of other diseases of the circulatory system     History of high blood pressure    Personal history of other diseases of urinary system 04/08/2015    History  of hematuria    Personal history of other infectious and parasitic diseases 04/08/2015    History of candidiasis    Personal history of other specified conditions 04/08/2015    History of urinary frequency     Past Surgical History:   Procedure Laterality Date    OTHER SURGICAL HISTORY  05/28/2019    [place] tooth extraction     No family history on file.  Social History     Tobacco Use    Smoking status: Never    Smokeless tobacco: Never   Vaping Use    Vaping status: Never Used   Substance Use Topics    Alcohol use: Not on file    Drug use: Never       Physical Exam   ED Triage Vitals [05/05/24 1642]   Temperature Heart Rate Respirations BP   37 °C (98.6 °F) 95 16 133/81      Pulse Ox Temp Source Heart Rate Source Patient Position   99 % Temporal Monitor Sitting      BP Location FiO2 (%)     Right arm --       Physical Exam  Vitals and nursing note reviewed.   Constitutional:       General: She is not in acute distress.     Appearance: She is well-developed.   HENT:      Head: Normocephalic and atraumatic.   Eyes:      Conjunctiva/sclera: Conjunctivae normal.   Cardiovascular:      Rate and Rhythm: Normal rate and regular rhythm.      Heart sounds: Normal heart sounds. No murmur heard.  Pulmonary:      Effort: Pulmonary effort is normal. No respiratory distress.      Breath sounds: Normal breath sounds.   Abdominal:      Palpations: Abdomen is soft.      Tenderness: There is abdominal tenderness.      Comments: Midepigastric tenderness   Musculoskeletal:         General: No swelling.      Cervical back: Normal range of motion and neck supple. No rigidity.   Skin:     General: Skin is warm.      Capillary Refill: Capillary refill takes less than 2 seconds.   Neurological:      Mental Status: She is alert.      Comments: Patient is awake and alert. Speech is clear. Face is symmetric without facial droop and facial sensation to light touch equal bilaterally. Uvula midline. Tongue protrusion midline. Hearing intact  bilaterally. Full and equal shoulder shrug and head turn against resistance. 5/5 motor strength of UEs and LEs. Sensation to light touch intact in all four extremities. Able to ambulate, no dizziness or lightheadedness, no gait abnormalities.    Psychiatric:         Mood and Affect: Mood normal.         ED Course & MDM   Diagnoses as of 05/05/24 1904   Migraine without aura and without status migrainosus, not intractable       Medical Decision Making  Per chart review, CT abdomen, pelvis negative in March 30.  Had a prior CTA in January 15 which also was negative/normal.    Patient was previously seen on 4/25/2024 for mid epigastric chest pain, body aches, going on for 1 month.  ACS workup negative, labs unremarkable at that time.  Gallbladder ultrasound showed small nonobstructive renal calculi.  Patient endorses similar symptoms, unchanged.    Differential includes but is not limited to SAH, ICH, meningitis/encephalitis, carbon monoxide poisoning, mass, hematoma, glaucoma, tension headache, migraine headache, cluster headache.    Given the patient's unremarkable neurological exam with no focal neurological deficits and the lack of any alarming symptoms in the history of present illness for example fever, trauma, altered mental status or neck stiffness/pain patient is unlikely suffering from meningitis, temporal arteritis, cerebral ischemia, arterial dissection, brain tumor, carbon monoxide poisoning, cavernous sinus thrombosis, Abscess,  SAH, SDH or EDH though these were all considered.     Patient given tylenol, reglan, benadryl and IVF. Labs ordered. Patient reassessed and reports headache resolved. Labs show normal CBC, no anemia.  No leukocytosis.  CMP unremarkable.  Mag and Phos normal. Urine pregnancy was negative.    Patient discharged with return precautions, given referral with number for PCP, neurology, cardiology.    Procedure  Procedures            Janiya Jensen MD  Resident  05/05/24 1914

## 2024-05-10 ENCOUNTER — HOSPITAL ENCOUNTER (EMERGENCY)
Facility: HOSPITAL | Age: 34
Discharge: HOME | End: 2024-05-10
Attending: EMERGENCY MEDICINE
Payer: MEDICAID

## 2024-05-10 VITALS
BODY MASS INDEX: 22.86 KG/M2 | HEART RATE: 101 BPM | RESPIRATION RATE: 18 BRPM | DIASTOLIC BLOOD PRESSURE: 78 MMHG | TEMPERATURE: 98 F | WEIGHT: 129 LBS | OXYGEN SATURATION: 97 % | SYSTOLIC BLOOD PRESSURE: 119 MMHG | HEIGHT: 63 IN

## 2024-05-10 DIAGNOSIS — L01.00 IMPETIGO: Primary | ICD-10-CM

## 2024-05-10 LAB
B-HCG UR QL: NEGATIVE
CTP QC/QA: YES

## 2024-05-10 PROCEDURE — 81025 URINE PREGNANCY TEST: CPT | Mod: ER | Performed by: EMERGENCY MEDICINE

## 2024-05-10 PROCEDURE — 99284 EMERGENCY DEPT VISIT MOD MDM: CPT | Mod: 25,ER

## 2024-05-10 PROCEDURE — 81025 URINE PREGNANCY TEST: CPT | Mod: ER

## 2024-05-10 RX ORDER — MUPIROCIN 20 MG/G
OINTMENT TOPICAL 3 TIMES DAILY
Qty: 60 G | Refills: 0 | Status: SHIPPED | OUTPATIENT
Start: 2024-05-10 | End: 2024-05-17

## 2024-05-10 RX ORDER — SULFAMETHOXAZOLE AND TRIMETHOPRIM 800; 160 MG/1; MG/1
1 TABLET ORAL 2 TIMES DAILY
Qty: 14 TABLET | Refills: 0 | Status: SHIPPED | OUTPATIENT
Start: 2024-05-10 | End: 2024-05-17

## 2024-05-10 NOTE — DISCHARGE INSTRUCTIONS
Please do not scratched her face as it is very easy to spread the infection.  Apply the ointment using a Q-tip or cotton ball.

## 2024-05-10 NOTE — ED PROVIDER NOTES
Encounter Date: 5/10/2024       History     Chief Complaint   Patient presents with    Rash     Pt reports rash and scalp line rash onset after taking an antibiotic for strep. Pt stopped the medication 4 days after starting it.      34-year-old female which presents to the emergency room with a rash to her face that began 4 days ago.  She states that she was recently treated for strep but stopped taking the antibiotic after she began to have the rash.  Denies fevers or any other symptoms.    The history is provided by the patient.     Review of patient's allergies indicates:   Allergen Reactions    Horse/equine containing products Itching     Past Medical History:   Diagnosis Date    Allergy     Anxiety     Asthma     Crohn's disease (ileum)     S/P ileocecal resection-42 cm of ileum/10.5 cm cecum removed with surgical path c/w stricture and fistula 2/15/20 ileostomy takedown and mucus fistula takedown    Depression     Eczema     Former smoker     Hydradenitis     Immunosuppressed status     Perineal abscess 2021    Psoriasis      Past Surgical History:   Procedure Laterality Date     SECTION N/A 2023    Procedure:  SECTION;  Surgeon: Geo Espitia MD;  Location: [facility] L&D OR;  Service: OB/GYN;  Laterality: N/A;     SECTION      COLONOSCOPY N/A 2019    Procedure: COLONOSCOPY;  Surgeon: Fawad Perla MD;  Location: [facility] (2ND FLR);  Service: Endoscopy;  Laterality: N/A;    COLONOSCOPY N/A 10/30/2020    Procedure: COLONOSCOPY;  Surgeon: Suzi Bacon MD;  Location: [facility] (4TH FLR);  Service: Endoscopy;  Laterality: N/A;  covid test [facility] urgent care 10/27  pt to try Mirilax prep with Reglan before per Dr. Bacon - nimisha  10/14-new instructions e-mailed-tb  10/16--new instructions e-mailed  10/29/20- Pt confirmed earlier arrival time, prep ins. yang, Pt verbalized understanding- [id number]    COLONOSCOPY N/A 05/10/2022    Procedure: COLONOSCOPY;  Surgeon: Suzi  BIBIANA Bacon MD;  Location: [facility] ENDO (4TH FLR);  Service: Endoscopy;  Laterality: N/A;  1/2022- same as last bowel prep (Miralax/Gatorade)  3/4 pt rescheduled to May/ COVID test on 5/3 at [place] -   instruction via portal  5/6 pt rescheduled; pt fully vaccinated; Rapid; instructions to portal-st    COLONOSCOPY N/A 6/1/2023    Procedure: COLONOSCOPY;  Surgeon: Suzi Bacon MD;  Location: [facility] ENDO (4TH FLR);  Service: Endoscopy;  Laterality: N/A;  order created: poor prep, Dr. SKY Bacon, Miralax, prep instr portal -ml  pre call 5/26, pt confirmed - mf    ESOPHAGOGASTRODUODENOSCOPY N/A 02/20/2019    Procedure: EGD (ESOPHAGOGASTRODUODENOSCOPY);  Surgeon: Fawad Perla MD;  Location: [facility] (2ND FLR);  Service: Endoscopy;  Laterality: N/A;    ILEOCOLECTOMY N/A 05/10/2019    Procedure: ILEOCOLECTOMY;  Surgeon: Uday Cope MD;  Location: [facility] OR 2ND FLR;  Service: Colon and Rectal;  Laterality: N/A;  ileocecectomy    ILEOSCOPY N/A 12/20/2019    Procedure: ILEOSCOPY through stoma;  Surgeon: Suzi Bacon MD;  Location: [facility] ENDO (4TH FLR);  Service: Endoscopy;  Laterality: N/A;  Schedule as 30 minute case  Please let patient know to bring extra stoma bag/wafer, etc- entire bag and appliance will be removed at time of procedure to visualize the area well   first week 11/2019     per note-R/S to 12/20/19-GT    ILEOSTOMY N/A 05/10/2019    Procedure: CREATION, ILEOSTOMY with mucous fistula;  Surgeon: Uday Cope MD;  Location: [facility] OR 2ND FLR;  Service: Colon and Rectal;  Laterality: N/A;    ILEOSTOMY CLOSURE N/A 02/12/2020    Procedure: CLOSURE,ILEOSTOMY TAKEDOWN END ILEOSTOMY/MUCOUS FISTULA POSSIBLE LAPAROTOMY ERAS LOW;  Surgeon: Uday Cope MD;  Location: [facility] OR 2ND FLR;  Service: Colon and Rectal;  Laterality: N/A;    INCISION AND DRAINAGE OF BUTTOCK Bilateral 12/07/2022    Procedure: INCISION AND DRAINAGE, BUTTOCK;  Surgeon: Regan Rowell Jr., MD;  Location: [facility];  Service: General;  Laterality:  Bilateral;     Family History   Problem Relation Name Age of Onset    Hypertension Mother Kalie     No Known Problems Father Lexx     No Known Problems Sister Alda     No Known Problems Sister Charlette     No Known Problems Sister Daisy     No Known Problems Sister Yusra     No Known Problems Brother Andrews     Glaucoma Maternal Uncle      No Known Problems Maternal Uncle      No Known Problems Maternal Uncle      No Known Problems Paternal Aunt      No Known Problems Paternal Aunt      No Known Problems Paternal Uncle      No Known Problems Paternal Uncle      No Known Problems Paternal Uncle      No Known Problems Paternal Uncle      No Known Problems Maternal Grandmother      Cataracts Maternal Grandfather      Irritable bowel syndrome Paternal Grandmother      No Known Problems Paternal Grandfather      Celiac disease Neg Hx      Cirrhosis Neg Hx      Colon cancer Neg Hx      Colon polyps Neg Hx      Crohn's disease Neg Hx      Cystic fibrosis Neg Hx      Esophageal cancer Neg Hx      Hemochromatosis Neg Hx      Inflammatory bowel disease Neg Hx      Liver cancer Neg Hx      Liver disease Neg Hx      Rectal cancer Neg Hx      Stomach cancer Neg Hx      Ulcerative colitis Neg Hx      Jerardo's disease Neg Hx      Lymphoma Neg Hx      Tuberculosis Neg Hx      Scleroderma Neg Hx      Rheum arthritis Neg Hx      Multiple sclerosis Neg Hx      Psoriasis Neg Hx      Lupus Neg Hx      Melanoma Neg Hx      Skin cancer Neg Hx      Amblyopia Neg Hx      Blindness Neg Hx      Cancer Neg Hx      Diabetes Neg Hx      Macular degeneration Neg Hx      Retinal detachment Neg Hx      Strabismus Neg Hx      Stroke Neg Hx      Thyroid disease Neg Hx       Social History     Tobacco Use    Smoking status: Every Day     Current packs/day: 0.00     Types: Cigarettes     Last attempt to quit: 2020     Years since quittin.3    Smokeless tobacco: Never   Substance Use Topics    Alcohol use: Not Currently     Alcohol/week: 0.0  standard drinks of alcohol     Comment: Socially    Drug use: Yes     Frequency: 3.0 times per week     Types: Marijuana     Review of Systems   Constitutional:  Negative for fever.   HENT:  Negative for sore throat.    Respiratory:  Negative for shortness of breath.    Cardiovascular:  Negative for chest pain.   Gastrointestinal:  Negative for nausea.   Genitourinary:  Negative for dysuria.   Musculoskeletal:  Negative for back pain.   Skin:  Positive for rash.   Neurological:  Negative for weakness.   Hematological:  Does not bruise/bleed easily.   All other systems reviewed and are negative.      Physical Exam     Initial Vitals [05/10/24 1536]   BP Pulse Resp Temp SpO2   110/72 108 18 98.2 °F (36.8 °C) 98 %      MAP       --         Physical Exam    Nursing note and vitals reviewed.  Constitutional: She appears well-developed and well-nourished.   HENT:   Head: Normocephalic and atraumatic.   Poor dentition, no teeth noted   Eyes: Conjunctivae and EOM are normal. Pupils are equal, round, and reactive to light.   Cardiovascular:  Normal rate, regular rhythm, normal heart sounds and intact distal pulses.     Exam reveals no gallop and no friction rub.       No murmur heard.  Pulmonary/Chest: Breath sounds normal. No respiratory distress. She has no wheezes. She has no rhonchi. She has no rales. She exhibits no tenderness.   Musculoskeletal:         General: Normal range of motion.     Neurological: She is alert and oriented to person, place, and time. She has normal strength. GCS score is 15. GCS eye subscore is 4. GCS verbal subscore is 5. GCS motor subscore is 6.   Skin:   Multiple lesions noted to facial region with honeycomb colored drainage and crust       ED Course   Procedures  Labs Reviewed   POCT URINE PREGNANCY          Imaging Results    None          Medications - No data to display  Medical Decision Making  34-year-old female which presents to the emergency room with a rash to her face shows  consistent with impetigo.  Patient has been given Bactrim, as she was recently placed on amoxicillin for strep pharyngitis, along with mupirocin.  She has been advised to return with any fevers or worsening of rash. Patient given strict return precautions and voiced understanding of all discharge instructions. Pt was stable at discharge.       Differential diagnosis:  Impetigo, abscesses, scarlet fever    Problems Addressed:  Impetigo: acute illness or injury    Amount and/or Complexity of Data Reviewed  Labs: ordered. Decision-making details documented in ED Course.    Risk  Prescription drug management.               ED Course as of 05/10/24 1622   Fri May 10, 2024   1556 hCG Qualitative, Urine: Negative [AT]   1556 BP: 110/72 [AT]   1556 Temp: 98.2 °F (36.8 °C) [AT]   1556 Temp Source: Oral [AT]   1556 Pulse: 108 [AT]   1556 Resp: 18 [AT]   1556 SpO2: 98 %  Recently on amoxicillin for strep throat [AT]      ED Course User Index  [AT] Lindsay Ardon FNP                           Clinical Impression:  Final diagnoses:  [L01.00] Impetigo (Primary)          ED Disposition Condition    Discharge Stable          ED Prescriptions       Medication Sig Dispense Start Date End Date Auth. Provider    sulfamethoxazole-trimethoprim 800-160mg (BACTRIM DS) 800-160 mg Tab Take 1 tablet by mouth 2 (two) times daily. for 7 days 14 tablet 5/10/2024 5/17/2024 Lindsay Ardon FNP    mupirocin (BACTROBAN) 2 % ointment Apply topically 3 (three) times daily. for 7 days 60 g 5/10/2024 5/17/2024 Lindsay Ardon FNP          Follow-up Information       Follow up With Specialties Details Why Contact Info    Geo Espitia MD Obstetrics and Gynecology Schedule an appointment as soon as possible for a visit  As needed [address]  [address] [place] [id number] [phone]               Lindsay Ardon FNP  05/10/24 1622

## 2024-05-16 ENCOUNTER — CLINICAL SUPPORT (OUTPATIENT)
Dept: EMERGENCY MEDICINE | Facility: HOSPITAL | Age: 34
End: 2024-05-16
Payer: MEDICAID

## 2024-05-16 ENCOUNTER — APPOINTMENT (OUTPATIENT)
Dept: RADIOLOGY | Facility: HOSPITAL | Age: 34
End: 2024-05-16
Payer: MEDICAID

## 2024-05-16 ENCOUNTER — HOSPITAL ENCOUNTER (EMERGENCY)
Facility: HOSPITAL | Age: 34
Discharge: HOME | End: 2024-05-16
Payer: MEDICAID

## 2024-05-16 VITALS
RESPIRATION RATE: 18 BRPM | OXYGEN SATURATION: 98 % | TEMPERATURE: 97.5 F | WEIGHT: 210 LBS | SYSTOLIC BLOOD PRESSURE: 139 MMHG | BODY MASS INDEX: 39.65 KG/M2 | DIASTOLIC BLOOD PRESSURE: 96 MMHG | HEIGHT: 61 IN | HEART RATE: 85 BPM

## 2024-05-16 DIAGNOSIS — G43.809 OTHER MIGRAINE WITHOUT STATUS MIGRAINOSUS, NOT INTRACTABLE: Primary | ICD-10-CM

## 2024-05-16 LAB
ALBUMIN SERPL BCP-MCNC: 4.4 G/DL (ref 3.4–5)
ALP SERPL-CCNC: 56 U/L (ref 33–110)
ALT SERPL W P-5'-P-CCNC: 15 U/L (ref 7–45)
ANION GAP SERPL CALC-SCNC: 15 MMOL/L (ref 10–20)
AST SERPL W P-5'-P-CCNC: 18 U/L (ref 9–39)
BASOPHILS # BLD AUTO: 0.06 X10*3/UL (ref 0–0.1)
BASOPHILS NFR BLD AUTO: 1 %
BILIRUB SERPL-MCNC: 0.4 MG/DL (ref 0–1.2)
BUN SERPL-MCNC: 15 MG/DL (ref 6–23)
CALCIUM SERPL-MCNC: 10.1 MG/DL (ref 8.6–10.6)
CARDIAC TROPONIN I PNL SERPL HS: <3 NG/L (ref 0–34)
CHLORIDE SERPL-SCNC: 103 MMOL/L (ref 98–107)
CO2 SERPL-SCNC: 24 MMOL/L (ref 21–32)
CREAT SERPL-MCNC: 0.64 MG/DL (ref 0.5–1.05)
EGFRCR SERPLBLD CKD-EPI 2021: >90 ML/MIN/1.73M*2
EOSINOPHIL # BLD AUTO: 0.22 X10*3/UL (ref 0–0.7)
EOSINOPHIL NFR BLD AUTO: 3.5 %
ERYTHROCYTE [DISTWIDTH] IN BLOOD BY AUTOMATED COUNT: 13.5 % (ref 11.5–14.5)
GLUCOSE SERPL-MCNC: 99 MG/DL (ref 74–99)
HCT VFR BLD AUTO: 39 % (ref 36–46)
HGB BLD-MCNC: 13.5 G/DL (ref 12–16)
IMM GRANULOCYTES # BLD AUTO: 0.01 X10*3/UL (ref 0–0.7)
IMM GRANULOCYTES NFR BLD AUTO: 0.2 % (ref 0–0.9)
LIPASE SERPL-CCNC: 20 U/L (ref 9–82)
LYMPHOCYTES # BLD AUTO: 2.82 X10*3/UL (ref 1.2–4.8)
LYMPHOCYTES NFR BLD AUTO: 45 %
MCH RBC QN AUTO: 29.7 PG (ref 26–34)
MCHC RBC AUTO-ENTMCNC: 34.6 G/DL (ref 32–36)
MCV RBC AUTO: 86 FL (ref 80–100)
MONOCYTES # BLD AUTO: 0.61 X10*3/UL (ref 0.1–1)
MONOCYTES NFR BLD AUTO: 9.7 %
NEUTROPHILS # BLD AUTO: 2.55 X10*3/UL (ref 1.2–7.7)
NEUTROPHILS NFR BLD AUTO: 40.6 %
NRBC BLD-RTO: 0 /100 WBCS (ref 0–0)
PLATELET # BLD AUTO: 286 X10*3/UL (ref 150–450)
POTASSIUM SERPL-SCNC: 3.9 MMOL/L (ref 3.5–5.3)
PREGNANCY TEST URINE, POC: NEGATIVE
PROT SERPL-MCNC: 7.9 G/DL (ref 6.4–8.2)
RBC # BLD AUTO: 4.55 X10*6/UL (ref 4–5.2)
SODIUM SERPL-SCNC: 138 MMOL/L (ref 136–145)
TSH SERPL-ACNC: 0.55 MIU/L (ref 0.44–3.98)
WBC # BLD AUTO: 6.3 X10*3/UL (ref 4.4–11.3)

## 2024-05-16 PROCEDURE — 36415 COLL VENOUS BLD VENIPUNCTURE: CPT | Performed by: PHYSICIAN ASSISTANT

## 2024-05-16 PROCEDURE — 84075 ASSAY ALKALINE PHOSPHATASE: CPT | Performed by: PHYSICIAN ASSISTANT

## 2024-05-16 PROCEDURE — 71046 X-RAY EXAM CHEST 2 VIEWS: CPT

## 2024-05-16 PROCEDURE — 70496 CT ANGIOGRAPHY HEAD: CPT | Performed by: RADIOLOGY

## 2024-05-16 PROCEDURE — 81025 URINE PREGNANCY TEST: CPT | Performed by: PHYSICIAN ASSISTANT

## 2024-05-16 PROCEDURE — 71046 X-RAY EXAM CHEST 2 VIEWS: CPT | Performed by: STUDENT IN AN ORGANIZED HEALTH CARE EDUCATION/TRAINING PROGRAM

## 2024-05-16 PROCEDURE — 96374 THER/PROPH/DIAG INJ IV PUSH: CPT

## 2024-05-16 PROCEDURE — 99285 EMERGENCY DEPT VISIT HI MDM: CPT | Mod: 25

## 2024-05-16 PROCEDURE — 99285 EMERGENCY DEPT VISIT HI MDM: CPT | Performed by: PHYSICIAN ASSISTANT

## 2024-05-16 PROCEDURE — 2550000001 HC RX 255 CONTRASTS: Mod: SE | Performed by: PHYSICIAN ASSISTANT

## 2024-05-16 PROCEDURE — 96375 TX/PRO/DX INJ NEW DRUG ADDON: CPT

## 2024-05-16 PROCEDURE — 70498 CT ANGIOGRAPHY NECK: CPT | Performed by: RADIOLOGY

## 2024-05-16 PROCEDURE — 84443 ASSAY THYROID STIM HORMONE: CPT | Performed by: PHYSICIAN ASSISTANT

## 2024-05-16 PROCEDURE — 93005 ELECTROCARDIOGRAM TRACING: CPT

## 2024-05-16 PROCEDURE — 96361 HYDRATE IV INFUSION ADD-ON: CPT

## 2024-05-16 PROCEDURE — 2500000004 HC RX 250 GENERAL PHARMACY W/ HCPCS (ALT 636 FOR OP/ED): Mod: SE | Performed by: PHYSICIAN ASSISTANT

## 2024-05-16 PROCEDURE — 85025 COMPLETE CBC W/AUTO DIFF WBC: CPT | Performed by: PHYSICIAN ASSISTANT

## 2024-05-16 PROCEDURE — 84484 ASSAY OF TROPONIN QUANT: CPT | Performed by: PHYSICIAN ASSISTANT

## 2024-05-16 PROCEDURE — 70496 CT ANGIOGRAPHY HEAD: CPT

## 2024-05-16 PROCEDURE — 83690 ASSAY OF LIPASE: CPT | Performed by: PHYSICIAN ASSISTANT

## 2024-05-16 PROCEDURE — 93010 ELECTROCARDIOGRAM REPORT: CPT | Performed by: PHYSICIAN ASSISTANT

## 2024-05-16 PROCEDURE — 2500000004 HC RX 250 GENERAL PHARMACY W/ HCPCS (ALT 636 FOR OP/ED): Mod: SE

## 2024-05-16 RX ORDER — KETOROLAC TROMETHAMINE 15 MG/ML
INJECTION, SOLUTION INTRAMUSCULAR; INTRAVENOUS
Status: COMPLETED
Start: 2024-05-16 | End: 2024-05-16

## 2024-05-16 RX ORDER — ACETAMINOPHEN 325 MG/1
975 TABLET ORAL ONCE
Status: COMPLETED | OUTPATIENT
Start: 2024-05-16 | End: 2024-05-16

## 2024-05-16 RX ORDER — DIPHENHYDRAMINE HYDROCHLORIDE 50 MG/ML
25 INJECTION INTRAMUSCULAR; INTRAVENOUS ONCE
Status: COMPLETED | OUTPATIENT
Start: 2024-05-16 | End: 2024-05-16

## 2024-05-16 RX ORDER — KETOROLAC TROMETHAMINE 15 MG/ML
15 INJECTION, SOLUTION INTRAMUSCULAR; INTRAVENOUS ONCE
Status: COMPLETED | OUTPATIENT
Start: 2024-05-16 | End: 2024-05-16

## 2024-05-16 RX ORDER — METOCLOPRAMIDE HYDROCHLORIDE 5 MG/ML
10 INJECTION INTRAMUSCULAR; INTRAVENOUS ONCE
Status: COMPLETED | OUTPATIENT
Start: 2024-05-16 | End: 2024-05-16

## 2024-05-16 RX ADMIN — SODIUM CHLORIDE, POTASSIUM CHLORIDE, SODIUM LACTATE AND CALCIUM CHLORIDE 1000 ML: 600; 310; 30; 20 INJECTION, SOLUTION INTRAVENOUS at 13:00

## 2024-05-16 RX ADMIN — ACETAMINOPHEN 975 MG: 325 TABLET ORAL at 13:00

## 2024-05-16 RX ADMIN — KETOROLAC TROMETHAMINE 15 MG: 15 INJECTION, SOLUTION INTRAMUSCULAR; INTRAVENOUS at 15:13

## 2024-05-16 RX ADMIN — IOHEXOL 90 ML: 350 INJECTION, SOLUTION INTRAVENOUS at 13:53

## 2024-05-16 RX ADMIN — METOCLOPRAMIDE 10 MG: 5 INJECTION, SOLUTION INTRAMUSCULAR; INTRAVENOUS at 13:00

## 2024-05-16 RX ADMIN — DIPHENHYDRAMINE HYDROCHLORIDE 25 MG: 50 INJECTION, SOLUTION INTRAMUSCULAR; INTRAVENOUS at 13:00

## 2024-05-16 ASSESSMENT — PAIN - FUNCTIONAL ASSESSMENT
PAIN_FUNCTIONAL_ASSESSMENT: 0-10
PAIN_FUNCTIONAL_ASSESSMENT: 0-10

## 2024-05-16 ASSESSMENT — LIFESTYLE VARIABLES
TOTAL SCORE: 0
EVER FELT BAD OR GUILTY ABOUT YOUR DRINKING: NO
EVER HAD A DRINK FIRST THING IN THE MORNING TO STEADY YOUR NERVES TO GET RID OF A HANGOVER: NO
HAVE YOU EVER FELT YOU SHOULD CUT DOWN ON YOUR DRINKING: NO
HAVE PEOPLE ANNOYED YOU BY CRITICIZING YOUR DRINKING: NO

## 2024-05-16 ASSESSMENT — PAIN SCALES - GENERAL
PAINLEVEL_OUTOF10: 10 - WORST POSSIBLE PAIN
PAINLEVEL_OUTOF10: 0 - NO PAIN

## 2024-05-16 NOTE — ED TRIAGE NOTES
Pt to ED with c/o midsternal chest pain radiating to her back, 8/10 HA, racing HR,     No sick contacts. Hx of migraines.

## 2024-05-16 NOTE — ED PROVIDER NOTES
"This is a 34-year-old female with a past medical history of recurrent headaches that began in January 2024 who presents to the ED with a headache as well as chest pain that has been intermittent for the past several days but worsened yesterday.  She describes a burning sensation to the right side of her head.  She states this is similar to headaches that she has been having, however more intense.  She also states that her chest pain started around the same time and is worse with movement and is reproducible with touch.  She endorses associated nausea but no vomiting or diarrhea.  She denies any fevers or chills but does endorse generalized malaise and bodyaches which has been also ongoing since January.  She has been seen here multiple times for the same symptoms and states that she recently got insurance and is scheduled to see a primary care provider later this month and has an appointment with neurology for October.  She denies any personal history of aneurysms with this endorse a family history of aneurysms in her father.  She denies any recent head injury or trauma.      History provided by:  Patient   used: No             Visit Vitals  BP (!) 133/92   Pulse 94   Temp 36.4 °C (97.5 °F)   Resp 16   Ht 1.549 m (5' 1\")   Wt 95.3 kg (210 lb)   SpO2 98%   BMI 39.68 kg/m²   OB Status Implant   Smoking Status Never   BSA 2.02 m²          Physical Exam     Physical exam:   General: Vitals noted, no distress. Afebrile.   EENT:  Hearing grossly intact. Normal phonation. MMM. Airway patient. PERRL. EOMI.   Neck: No midline tenderness or paraspinal tenderness. FROM.   Cardiac: Regular, rate, rhythm. Normal S1 and S2.  No murmurs, gallops, rubs.  Tender to palpation across anterior chest wall without any obvious deformity or step-off.  Pulmonary: Good air exchange. Lungs clear bilaterally. No wheezes, rhonchi, rales. No accessory muscle use.   Abdomen: Soft, nonsurgical. Nontender. No peritoneal signs. " Normoactive bowel sounds.   Back: No CVA tenderness. No midline tenderness or paraspinal tenderness. No obvious deformity.   Extremities: No peripheral edema.  Full range of motion. Moves all extremities freely. No tenderness throughout extremities.   Skin: No rash. Warm and Dry.   Neuro: No focal neurologic deficits. CN 2-12 grossly intact. Sensation equal bilaterally. No weakness.         Labs Reviewed   POCT PREGNANCY, URINE - Abnormal       Result Value    Preg Test, Ur Negative (*)    COMPREHENSIVE METABOLIC PANEL - Normal    Glucose 99      Sodium 138      Potassium 3.9      Chloride 103      Bicarbonate 24      Anion Gap 15      Urea Nitrogen 15      Creatinine 0.64      eGFR >90      Calcium 10.1      Albumin 4.4      Alkaline Phosphatase 56      Total Protein 7.9      AST 18      Bilirubin, Total 0.4      ALT 15     TROPONIN I, HIGH SENSITIVITY - Normal    Troponin I, High Sensitivity <3      Narrative:     Less than 99th percentile of normal range cutoff-  Female and children under 18 years old <35 ng/L; Male <54 ng/L: Negative  Repeat testing should be performed if clinically indicated.     Female and children under 18 years old  ng/L; Male  ng/L:  Consistent with possible cardiac damage and possible increased clinical   risk. Serial measurements may help to assess extent of myocardial damage.     >120 ng/L: Consistent with cardiac damage, increased clinical risk and  myocardial infarction. Serial measurements may help assess extent of   myocardial damage.      NOTE: Children less than 1 year old may have higher baseline troponin   levels and results should be interpreted in conjunction with the overall   clinical context.    NOTE: Troponin I testing is performed using a different   testing methodology at [facility] than at other   [facility]. Direct result comparisons should only   be made within the same method.     LIPASE - Normal    Lipase 20      Narrative:      Venipuncture immediately after or during the administration of Metamizole may lead to falsely low results. Testing should be performed immediately prior to Metamizole dosing.   TSH WITH REFLEX TO FREE T4 IF ABNORMAL - Normal    Thyroid Stimulating Hormone 0.55      Narrative:     TSH testing is performed using different testing methodology at [facility] than at other [facility] hospitals. Direct result comparisons should only be made within the same method.     CBC WITH AUTO DIFFERENTIAL    WBC 6.3      nRBC 0.0      RBC 4.55      Hemoglobin 13.5      Hematocrit 39.0      MCV 86      MCH 29.7      MCHC 34.6      RDW 13.5      Platelets 286      Neutrophils % 40.6      Immature Granulocytes %, Automated 0.2      Lymphocytes % 45.0      Monocytes % 9.7      Eosinophils % 3.5      Basophils % 1.0      Neutrophils Absolute 2.55      Immature Granulocytes Absolute, Automated 0.01      Lymphocytes Absolute 2.82      Monocytes Absolute 0.61      Eosinophils Absolute 0.22      Basophils Absolute 0.06         CT angio head and neck w and wo IV contrast   Final Result   No evidence for significant stenosis of the cervical vessels.        No evidence for significant stenosis or large branch vessel cutoffs   of the intracranial vessels. No evidence of aneurysm was identified.        MACRO:   None        Signed by: Neo Martinez 5/16/2024 2:25 PM   Dictation workstation:   [id number]      XR chest 2 views   Final Result   1.  No evidence of acute cardiopulmonary process.        I personally reviewed the images/study and I agree with Dr. Maritza Mendes findings as stated. This study was interpreted at [facility], [place], [place]        MACRO:   None        Signed by: Satya Herring 5/16/2024 1:55 PM   Dictation workstation:   [id number]            ED Course & MDM     Medical Decision Making  This is a 34-year-old female with a past medical history of recurrent headaches who presents  to the ED with a headache as well as chest pain.  Vital stable upon arrival to the ED.  On examination lungs clear to auscultation.  No audible cardiac murmurs.  She is neurologically intact without deficits.  She is PERC negative and I have low suspicion for PE based on her description of her symptoms.  Patient's previous visits were reviewed.  She had imaging of her head performed in January.  Based on the patient's recurrent symptoms and her family history of aneurysm CT angio head and neck ordered to further evaluate for this.  EKG was obtained and showed no acute ischemic changes.  Laboratory studies obtained including lipase, CMP, TSH, troponin, and CBC, all of which were within normal limits.  Pregnancy test negative.  CT angio head and neck showed no evidence of aneurysm or significant stenosis.  Based on her description of her symptoms and this not being the worst headache of her life, no thunderclap headache, her being neurologically intact and having this headache for several days I have very low suspicion for intracranial bleed at this time.  Chest x-ray without acute cardiopulmonary process.  Patient medicated initially with Tylenol, Reglan, Benadryl and 1 L of IV fluids.  On reevaluation she was feeling improved but was still endorsing a mild headache.  She was ordered Toradol.  On my reassessment again she is neurologic intact without deficits.  She was feeling improved and felt comfortable being discharged home.  I discussed all these results with the patient.  She was advised to follow-up with her primary care provider as well as her neurologist as scheduled and to call the neurology clinic to see if she can get a sooner appointment.  She was agreeable to this plan.  She was given signs and symptoms that she should return to the ED with.  She had no further questions this time and was discharged from the emergency department in stable condition.    Amount and/or Complexity of Data Reviewed  Labs:  ordered.  Radiology: ordered and independent interpretation performed.     Details: CT angio head and neck without any visualized large aneurysm or intracranial bleed  ECG/medicine tests: ordered and independent interpretation performed.     Details: EKG sinus tachycardia at 109 bpm without any acute ST elevation or depression.  No T wave inversions.  Normal axis.         Diagnoses as of 05/16/24 1539   Other migraine without status migrainosus, not intractable       Procedures    SONU Lechuga, PA-C     Amelie Salmon PA-C  05/16/24 1622

## 2024-05-16 NOTE — Clinical Note
Renetta Parmar was seen and treated in our emergency department on 5/16/2024.  She may return to work on 05/18/2024.       If you have any questions or concerns, please don't hesitate to call.      Amelie Salmon PA-C

## 2024-05-17 LAB
ATRIAL RATE: 109 BPM
P AXIS: 3 DEGREES
P OFFSET: 206 MS
P ONSET: 152 MS
PR INTERVAL: 138 MS
Q ONSET: 221 MS
QRS COUNT: 18 BEATS
QRS DURATION: 66 MS
QT INTERVAL: 332 MS
QTC CALCULATION(BAZETT): 447 MS
QTC FREDERICIA: 405 MS
R AXIS: 39 DEGREES
T AXIS: 47 DEGREES
T OFFSET: 387 MS
VENTRICULAR RATE: 109 BPM

## 2024-05-20 ENCOUNTER — APPOINTMENT (OUTPATIENT)
Dept: PRIMARY CARE | Facility: EXTERNAL LOCATION | Age: 34
End: 2024-05-20
Payer: MEDICAID

## 2024-06-04 NOTE — PROGRESS NOTES
"Renetta Parmar is a 34 y.o. here for IUD removal.  Patient was seen by me in 2023 for this same request.  At that time I was unable to visualize her strings and so referred her to get a pelvic ultrasound.  The patient has not done that pelvic ultrasound yet.    Patient reports a lot of pain due to the IUD. She reports going to the ED frequently with complaints she believes are the result of the IUD.     GynHx:  No LMP recorded. Patient has had an IUD.    HPV vaccination: No x0  Last pap: 23 WNL HPV neg  STI testing today: Yes gonorrhea, chlamydia, trichomonas    OB History          6    Para   6    Term   3       3    AB        Living   4         SAB        IAB        Ectopic        Multiple        Live Births   5                  Past Medical History:   Diagnosis Date    Hx of trichomoniasis     Hypertension     History of high blood pressure       Past Surgical History:   Procedure Laterality Date     SECTION, CLASSIC      x3    OTHER SURGICAL HISTORY  2019    [place] tooth extraction       Objective   BP (!) 143/97   Ht 1.549 m (5' 1\")   Wt 98.9 kg (218 lb)   BMI 41.19 kg/m²     Physical Exam  Constitutional:       Appearance: Normal appearance.   Genitourinary:      Vulva normal.      No lesions in the vagina.      Genitourinary Comments: IUD strings not visible, Pap brush used to try to tease them out with no success, alligator clamp and mosquito clamp both used to try to grab IUD strings and bring them out with no success      No vaginal discharge.      No cervical friability or lesion.   Pulmonary:      Effort: Pulmonary effort is normal.   Neurological:      Mental Status: She is alert.   Skin:     General: Skin is warm and dry.   Psychiatric:         Mood and Affect: Mood normal.         Behavior: Behavior normal.         Thought Content: Thought content normal.         Judgment: Judgment normal.     IUD Removal    Date/Time: 2024 9:18 AM    Performed by: " JOHNY Rodrigues  Authorized by: JOHNY Rodrigues    Consent:     Consent obtained:  Verbal and written    Consent given by:  Patient    Procedure risks and benefits discussed: yes      Patient questions answered: yes      Patient agrees, verbalizes understanding, and wants to proceed: yes    Procedure:     Removed with no complications: no        Problem List Items Addressed This Visit       Attempted IUD removal, unsuccessful    Overview     -Unable again to visualize IUD strings despite multiple efforts to tease them out  -Pelvic ultrasound reordered  -Encourage patient to follow-up with family-planning clinic for possible ultrasound-guided IUD removal          Other Visit Diagnoses       Screening examination for STI    -  Primary    Relevant Orders    C. Trachomatis / N. Gonorrhoeae, Amplified Detection    Trichomonas vaginalis, Nucleic Acid Detection    Postmenopausal bleeding        Relevant Orders    US PELVIS TRANSABDOMINAL WITH TRANSVAGINAL            JOHNY Rodrigues

## 2024-06-05 ENCOUNTER — OFFICE VISIT (OUTPATIENT)
Dept: OBSTETRICS AND GYNECOLOGY | Facility: HOSPITAL | Age: 34
End: 2024-06-05
Payer: MEDICAID

## 2024-06-05 VITALS
SYSTOLIC BLOOD PRESSURE: 143 MMHG | BODY MASS INDEX: 41.16 KG/M2 | DIASTOLIC BLOOD PRESSURE: 97 MMHG | HEIGHT: 61 IN | WEIGHT: 218 LBS

## 2024-06-05 DIAGNOSIS — N95.0 POSTMENOPAUSAL BLEEDING: ICD-10-CM

## 2024-06-05 DIAGNOSIS — Z53.8 ATTEMPTED IUD REMOVAL, UNSUCCESSFUL: Primary | ICD-10-CM

## 2024-06-05 DIAGNOSIS — Z11.3 SCREENING EXAMINATION FOR STI: ICD-10-CM

## 2024-06-05 DIAGNOSIS — Z97.5 ATTEMPTED IUD REMOVAL, UNSUCCESSFUL: Primary | ICD-10-CM

## 2024-06-05 PROBLEM — R07.9 CHEST PAIN: Status: RESOLVED | Noted: 2024-04-01 | Resolved: 2024-06-05

## 2024-06-05 PROCEDURE — 58301 REMOVE INTRAUTERINE DEVICE: CPT | Performed by: ADVANCED PRACTICE MIDWIFE

## 2024-06-05 PROCEDURE — 87661 TRICHOMONAS VAGINALIS AMPLIF: CPT | Performed by: ADVANCED PRACTICE MIDWIFE

## 2024-06-05 PROCEDURE — 87491 CHLMYD TRACH DNA AMP PROBE: CPT | Performed by: ADVANCED PRACTICE MIDWIFE

## 2024-06-05 PROCEDURE — 99213 OFFICE O/P EST LOW 20 MIN: CPT | Performed by: ADVANCED PRACTICE MIDWIFE

## 2024-06-05 PROCEDURE — 1036F TOBACCO NON-USER: CPT | Performed by: ADVANCED PRACTICE MIDWIFE

## 2024-06-05 RX ORDER — LEVOTHYROXINE SODIUM 0.2 MG/1
200 TABLET ORAL DAILY
Qty: 90 TABLET | Refills: 0 | Status: SHIPPED | OUTPATIENT
Start: 2024-06-05

## 2024-06-05 RX ORDER — LEVONORGESTREL 52 MG/1
1 INTRAUTERINE DEVICE INTRAUTERINE ONCE
COMMUNITY

## 2024-06-05 ASSESSMENT — PAIN SCALES - GENERAL: PAINLEVEL: 5

## 2024-06-06 ENCOUNTER — TELEPHONE (OUTPATIENT)
Dept: OBSTETRICS AND GYNECOLOGY | Facility: HOSPITAL | Age: 34
End: 2024-06-06
Payer: MEDICAID

## 2024-06-06 DIAGNOSIS — A59.01 TRICHOMONAS VAGINITIS: Primary | ICD-10-CM

## 2024-06-06 LAB
C TRACH RRNA SPEC QL NAA+PROBE: NEGATIVE
N GONORRHOEA DNA SPEC QL PROBE+SIG AMP: NEGATIVE
T VAGINALIS RRNA SPEC QL NAA+PROBE: POSITIVE

## 2024-06-06 RX ORDER — METRONIDAZOLE 500 MG/1
500 TABLET ORAL 2 TIMES DAILY
Qty: 14 TABLET | Refills: 0 | Status: SHIPPED | OUTPATIENT
Start: 2024-06-06 | End: 2024-06-13

## 2024-06-06 NOTE — TELEPHONE ENCOUNTER
----- Message from JOHNY Rodrigues sent at 6/6/2024 10:59 AM EDT -----  +trichomonas, metronidazole rx sent  Please see if patient would like EPT

## 2024-06-11 NOTE — TELEPHONE ENCOUNTER
----- Message from SINAI Rodrigues-CNM sent at 6/6/2024 10:59 AM EDT -----  +trichomonas, metronidazole rx sent  Please see if patient would like EPT    Three attempts made to contact patient to discuss +trich testing  Patient has not returned call to office and is not set up on MyChart  Patient scheduled for IUD removal 6/12 with Dr. Larose at [facility]  Secure chart sent to [facility] RN and states she will discuss this with patient  Will follow up to ensure patient is aware of results and need for treatment  Elenita Cabrales RN

## 2024-06-12 ENCOUNTER — PROCEDURE VISIT (OUTPATIENT)
Dept: OBSTETRICS AND GYNECOLOGY | Facility: CLINIC | Age: 34
End: 2024-06-12
Payer: MEDICAID

## 2024-06-12 VITALS
HEIGHT: 61 IN | BODY MASS INDEX: 40.01 KG/M2 | WEIGHT: 211.9 LBS | SYSTOLIC BLOOD PRESSURE: 125 MMHG | HEART RATE: 96 BPM | DIASTOLIC BLOOD PRESSURE: 92 MMHG

## 2024-06-12 DIAGNOSIS — Z30.432 ENCOUNTER FOR IUD REMOVAL: Primary | ICD-10-CM

## 2024-06-12 RX ORDER — ALBUTEROL SULFATE 0.83 MG/ML
SOLUTION RESPIRATORY (INHALATION) 3 TIMES DAILY PRN
COMMUNITY

## 2024-06-12 RX ORDER — DROSPIRENONE AND ETHINYL ESTRADIOL 0.02-3(28)
1 KIT ORAL DAILY
Qty: 11 TABLET | Refills: 11 | Status: SHIPPED | OUTPATIENT
Start: 2024-06-12 | End: 2025-06-12

## 2024-06-12 ASSESSMENT — PAIN SCALES - GENERAL: PAINLEVEL: 0-NO PAIN

## 2024-06-12 NOTE — PROGRESS NOTES
"GYN Visit   6/12/2024    HPI    Renetta Parmar is a 34 y.o. here for Liletta IUD removal that was placed in 2022. Has noticed abdominal pain and HA since IUD insertion. Interested in Nexplanon, but not prepared to receive today.      Social History     Substance and Sexual Activity   Sexual Activity Yes    Partners: Male     Objective   BP (!) 125/92   Pulse 96   Ht 1.549 m (5' 1\")   Wt 96.1 kg (211 lb 14.4 oz)   BMI 40.04 kg/m²       Assessment and Plan:    Problem List Items Addressed This Visit       Encounter for IUD removal - Primary    Overview     - Patient with Liletta IUD, desires removal. Declines replacement, would like to use OCPs. She is interested in Nexplanon, but would like to do additional research first.          Current Assessment & Plan     - IUD removed without difficulty  - Rx sent for Jaimie         Relevant Medications    drospirenone-ethinyl estradioL (Jaimie, 28,) 3-0.02 mg tablet    Other Relevant Orders    THINPREP PAP        IUD Removal    Date/Time: 6/12/2024 3:27 PM    Performed by: Emilee Gutierrez MD  Authorized by: Aurora Larose MD    Consent:     Consent obtained:  Verbal and written    Consent given by:  Patient    Procedure risks and benefits discussed: yes      Patient questions answered: yes      Patient agrees, verbalizes understanding, and wants to proceed: yes      Educational handouts given: no      Instructions and paperwork completed: yes    Procedure:     Removed with no complications: yes      Removal due to mechanical complications of IUD: no      Removal due to infection and inflammatory reaction: no    Comments:      Strings unable to be visualized at external os. Cytobrush used in attempt to tease out strings from cervical canal. Cervical block then performed given patient discomfort. IUD identified on ultrasound and removed under US guidance using Alligator forceps. Patient tolerated procedure well with no immediate complications.            Dr. Larose was " present for the entirety of the procedure.     EDUAR Gutierrez MD  PGY-1, Obstetrics & Gynecology   [facility]

## 2024-06-13 NOTE — TELEPHONE ENCOUNTER
Message received from [place] Bello 11:27 AM    MELISSA Orantes Dr and Dr Gutierrez notified pt about her trich     MD's discussed trich diagnosis with patient  Elenita Cabrales RN

## 2024-06-20 NOTE — PROGRESS NOTES
I saw and evaluated the patient. I personally obtained the key and critical portions of the history and physical exam or was physically present for key and critical portions performed by the resident/fellow. I reviewed the resident/fellow's documentation and discussed the patient with the resident/fellow. I agree with the resident/fellow's medical decision making as documented in the note.    I was present for the entirety of the procedure(s).     uArora Larose MD

## 2024-06-22 ENCOUNTER — APPOINTMENT (OUTPATIENT)
Dept: INTERNAL MEDICINE | Age: 34
End: 2024-06-22

## 2024-06-28 ENCOUNTER — APPOINTMENT (OUTPATIENT)
Dept: NEUROLOGY | Facility: HOSPITAL | Age: 34
End: 2024-06-28
Payer: MEDICAID

## 2024-07-05 ENCOUNTER — APPOINTMENT (OUTPATIENT)
Dept: NEUROLOGY | Facility: HOSPITAL | Age: 34
End: 2024-07-05

## 2024-08-14 ENCOUNTER — HOSPITAL ENCOUNTER (EMERGENCY)
Facility: HOSPITAL | Age: 34
Discharge: HOME | End: 2024-08-14
Attending: EMERGENCY MEDICINE
Payer: MEDICAID

## 2024-08-14 VITALS
HEART RATE: 84 BPM | OXYGEN SATURATION: 99 % | SYSTOLIC BLOOD PRESSURE: 102 MMHG | WEIGHT: 136 LBS | HEIGHT: 63 IN | RESPIRATION RATE: 18 BRPM | TEMPERATURE: 98 F | BODY MASS INDEX: 24.1 KG/M2 | DIASTOLIC BLOOD PRESSURE: 70 MMHG

## 2024-08-14 DIAGNOSIS — L30.9 DERMATITIS: ICD-10-CM

## 2024-08-14 DIAGNOSIS — L40.9 PSORIASIS OF SCALP: Primary | ICD-10-CM

## 2024-08-14 LAB
B-HCG UR QL: NEGATIVE
CTP QC/QA: YES

## 2024-08-14 PROCEDURE — 99283 EMERGENCY DEPT VISIT LOW MDM: CPT | Mod: 25,ER

## 2024-08-14 PROCEDURE — 81025 URINE PREGNANCY TEST: CPT | Mod: ER

## 2024-08-14 PROCEDURE — 81025 URINE PREGNANCY TEST: CPT | Mod: ER | Performed by: EMERGENCY MEDICINE

## 2024-08-14 RX ORDER — FLUOCINONIDE TOPICAL SOLUTION USP, 0.05% 0.5 MG/ML
SOLUTION TOPICAL 2 TIMES DAILY PRN
Qty: 60 ML | Refills: 1 | Status: SHIPPED | OUTPATIENT
Start: 2024-08-14

## 2024-08-15 NOTE — ED PROVIDER NOTES
Encounter Date: 2024       History     Chief Complaint   Patient presents with    Rash     C/O SORES TO SCALP X 1 WEEK     34-year-old female with a history of psoriasis, Crohn's, eczema, and others presenting for evaluation of a rash to her scalp draining clear fluid.  Reports that rash is consistent with her psoriasis.  She is hoping to get a prescription for the shampoo she uses for psoriasis.  She is uncertain of the name of the medication but states that it is normally prescribed by her dermatologist.  She has been unable to get an appointment with her dermatologist at [facility].  She denies any other complaints or concerns.    The history is provided by the patient. No  was used.     Review of patient's allergies indicates:   Allergen Reactions    Horse/equine containing products Itching     Past Medical History:   Diagnosis Date    Allergy     Anxiety     Asthma     Crohn's disease (ileum)     S/P ileocecal resection-42 cm of ileum/10.5 cm cecum removed with surgical path c/w stricture and fistula 2/15/20 ileostomy takedown and mucus fistula takedown    Depression     Eczema     Former smoker     Hydradenitis     Immunosuppressed status     Perineal abscess 2021    Psoriasis      Past Surgical History:   Procedure Laterality Date     SECTION N/A 2023    Procedure:  SECTION;  Surgeon: Geo Espitia MD;  Location: [facility] L&D OR;  Service: OB/GYN;  Laterality: N/A;     SECTION      COLONOSCOPY N/A 2019    Procedure: COLONOSCOPY;  Surgeon: Fawad Perla MD;  Location: [facility] (2ND FLR);  Service: Endoscopy;  Laterality: N/A;    COLONOSCOPY N/A 10/30/2020    Procedure: COLONOSCOPY;  Surgeon: Suzi Bacon MD;  Location: [facility] (4TH FLR);  Service: Endoscopy;  Laterality: N/A;  covid test [facility] urgent care 10/27  pt to try Mirilax prep with Reglan before per Dr. Bacon - sm  10/14-new instructions e-mailed-tb  10/16--new instructions  e-mailed  10/29/20- Pt confirmed earlier arrival time, prep ins. reveiwed, Pt verbalized understanding- [email or web address]    COLONOSCOPY N/A 05/10/2022    Procedure: COLONOSCOPY;  Surgeon: Suzi Bacon MD;  Location: [facility] (4TH FLR);  Service: Endoscopy;  Laterality: N/A;  1/2022- same as last bowel prep (Miralax/Gatorade)  3/4 pt rescheduled to May/ COVID test on 5/3 at [place] -   instruction via portal  5/6 pt rescheduled; pt fully vaccinated; Rapid; instructions to portal-st    COLONOSCOPY N/A 6/1/2023    Procedure: COLONOSCOPY;  Surgeon: Suzi Bacon MD;  Location: [facility] (4TH FLR);  Service: Endoscopy;  Laterality: N/A;  order created: poor prep, Dr. SKY Bacon, Miralax, prep instr portal -ml  pre call 5/26, pt confirmed - mf    ESOPHAGOGASTRODUODENOSCOPY N/A 02/20/2019    Procedure: EGD (ESOPHAGOGASTRODUODENOSCOPY);  Surgeon: Fawad Perla MD;  Location: [facility] (2ND FLR);  Service: Endoscopy;  Laterality: N/A;    ILEOCOLECTOMY N/A 05/10/2019    Procedure: ILEOCOLECTOMY;  Surgeon: Uday Cope MD;  Location: [facility] OR 2ND FLR;  Service: Colon and Rectal;  Laterality: N/A;  ileocecectomy    ILEOSCOPY N/A 12/20/2019    Procedure: ILEOSCOPY through stoma;  Surgeon: Suzi Bacon MD;  Location: [facility] (4TH FLR);  Service: Endoscopy;  Laterality: N/A;  Schedule as 30 minute case  Please let patient know to bring extra stoma bag/wafer, etc- entire bag and appliance will be removed at time of procedure to visualize the area well   first week 11/2019     per note-R/S to 12/20/19-GT    ILEOSTOMY N/A 05/10/2019    Procedure: CREATION, ILEOSTOMY with mucous fistula;  Surgeon: Uday Cope MD;  Location: [facility] OR 2ND FLR;  Service: Colon and Rectal;  Laterality: N/A;    ILEOSTOMY CLOSURE N/A 02/12/2020    Procedure: CLOSURE,ILEOSTOMY TAKEDOWN END ILEOSTOMY/MUCOUS FISTULA POSSIBLE LAPAROTOMY ERAS LOW;  Surgeon: Uday Cope MD;  Location: [facility] OR 2ND [facility];  Service: Colon and Rectal;  Laterality: N/A;     INCISION AND DRAINAGE OF BUTTOCK Bilateral 12/07/2022    Procedure: INCISION AND DRAINAGE, BUTTOCK;  Surgeon: Regan Rowell Jr., MD;  Location: [facility];  Service: General;  Laterality: Bilateral;     Family History   Problem Relation Name Age of Onset    Hypertension Mother Kalie     No Known Problems Father Lexx     No Known Problems Sister Alda     No Known Problems Sister Charlette     No Known Problems Sister Daisy     No Known Problems Sister Yusra     No Known Problems Brother Andrews     Glaucoma Maternal Uncle      No Known Problems Maternal Uncle      No Known Problems Maternal Uncle      No Known Problems Paternal Aunt      No Known Problems Paternal Aunt      No Known Problems Paternal Uncle      No Known Problems Paternal Uncle      No Known Problems Paternal Uncle      No Known Problems Paternal Uncle      No Known Problems Maternal Grandmother      Cataracts Maternal Grandfather      Irritable bowel syndrome Paternal Grandmother      No Known Problems Paternal Grandfather      Celiac disease Neg Hx      Cirrhosis Neg Hx      Colon cancer Neg Hx      Colon polyps Neg Hx      Crohn's disease Neg Hx      Cystic fibrosis Neg Hx      Esophageal cancer Neg Hx      Hemochromatosis Neg Hx      Inflammatory bowel disease Neg Hx      Liver cancer Neg Hx      Liver disease Neg Hx      Rectal cancer Neg Hx      Stomach cancer Neg Hx      Ulcerative colitis Neg Hx      Jerardo's disease Neg Hx      Lymphoma Neg Hx      Tuberculosis Neg Hx      Scleroderma Neg Hx      Rheum arthritis Neg Hx      Multiple sclerosis Neg Hx      Psoriasis Neg Hx      Lupus Neg Hx      Melanoma Neg Hx      Skin cancer Neg Hx      Amblyopia Neg Hx      Blindness Neg Hx      Cancer Neg Hx      Diabetes Neg Hx      Macular degeneration Neg Hx      Retinal detachment Neg Hx      Strabismus Neg Hx      Stroke Neg Hx      Thyroid disease Neg Hx       Social History     Tobacco Use    Smoking status: Every Day     Current packs/day: 0.00      Types: Cigarettes     Last attempt to quit: 2020     Years since quittin.6    Smokeless tobacco: Never   Substance Use Topics    Alcohol use: Not Currently     Alcohol/week: 0.0 standard drinks of alcohol     Comment: Socially    Drug use: Yes     Frequency: 3.0 times per week     Types: Marijuana     Review of Systems   Constitutional:  Negative for chills, fatigue and fever.   HENT:  Negative for congestion, ear pain, nosebleeds, postnasal drip, rhinorrhea, sinus pressure and sore throat.    Eyes:  Negative for photophobia and pain.   Respiratory:  Negative for apnea, cough, choking, chest tightness, shortness of breath, wheezing and stridor.    Cardiovascular:  Negative for chest pain, palpitations and leg swelling.   Gastrointestinal:  Negative for abdominal pain, constipation, diarrhea, nausea and vomiting.   Genitourinary:  Negative for dysuria, flank pain, hematuria, pelvic pain and vaginal discharge.   Musculoskeletal:  Negative for arthralgias, back pain, gait problem and neck pain.   Skin:  Positive for rash. Negative for color change, pallor and wound.   Neurological:  Negative for dizziness, seizures, syncope, facial asymmetry, light-headedness and headaches.   Hematological:  Negative for adenopathy.   Psychiatric/Behavioral:  Negative for confusion. The patient is not nervous/anxious.        Physical Exam     Initial Vitals [24]   BP Pulse Resp Temp SpO2   102/70 84 18 98.3 °F (36.8 °C) 99 %      MAP       --         Physical Exam    Nursing note and vitals reviewed.  Constitutional: She appears well-developed and well-nourished. She is not diaphoretic. No distress.   HENT:   Head: Normocephalic and atraumatic.   Right Ear: External ear normal.   Left Ear: External ear normal.   Nose: Nose normal.   Eyes: Conjunctivae and EOM are normal. Right eye exhibits no discharge. Left eye exhibits no discharge.   Neck: Neck supple. No tracheal deviation present.   Normal range of  motion.  Cardiovascular:  Normal rate.           Pulmonary/Chest: No stridor. No respiratory distress.   Abdominal: Abdomen is soft. She exhibits no distension. There is no abdominal tenderness.   Musculoskeletal:         General: No tenderness. Normal range of motion.      Cervical back: Normal range of motion and neck supple.     Neurological: She is alert and oriented to person, place, and time. She has normal strength. No cranial nerve deficit or sensory deficit.   Skin: Skin is warm and dry. Rash noted.   Psychiatric: She has a normal mood and affect. Her behavior is normal. Judgment and thought content normal.         ED Course   Procedures  Labs Reviewed   POCT URINE PREGNANCY       Result Value    POC Preg Test, Ur Negative       Acceptable Yes            Imaging Results    None          Medications - No data to display  Medical Decision Making  On chart review discovered that medication is most likely Lidex.  Will prescribe as previously written for by dermatology.  Advised patient to follow up with Dermatology as planned.  ED return precautions given.  Shared decision-making with the patient.    Amount and/or Complexity of Data Reviewed  Labs: ordered.    Risk  Prescription drug management.                                      Clinical Impression:  Final diagnoses:  [L40.9] Psoriasis of scalp (Primary)  [L30.9] Dermatitis          ED Disposition Condition    Discharge Stable          ED Prescriptions       Medication Sig Dispense Start Date End Date Auth. Provider    fluocinonide (LIDEX) 0.05 % external solution Apply topically 2 (two) times daily as needed. 60 mL 8/14/2024 -- Nishant Gamboa NP          Follow-up Information       Follow up With Specialties Details Why Contact Info    Dermatology, [facility] - [facility], Dermatology Schedule an appointment as soon as possible for a visit in 1 week For further evaluation [address] [facility]  [address]  [phone]      [facility] ED Emergency Medicine Go to  If symptoms worsen, As needed [address] [facility] [id number] [phone]             Nishant Gamboa, KHARI  08/14/24 2008

## 2024-08-15 NOTE — DISCHARGE INSTRUCTIONS

## 2024-08-27 ENCOUNTER — HOSPITAL ENCOUNTER (OUTPATIENT)
Facility: HOSPITAL | Age: 34
Discharge: HOME | End: 2024-08-29
Attending: EMERGENCY MEDICINE | Admitting: SURGERY
Payer: MEDICAID

## 2024-08-27 ENCOUNTER — ANESTHESIA EVENT (OUTPATIENT)
Dept: SURGERY | Facility: HOSPITAL | Age: 34
End: 2024-08-27
Payer: MEDICAID

## 2024-08-27 DIAGNOSIS — L02.214 ABSCESS OF GROIN, RIGHT: Primary | ICD-10-CM

## 2024-08-27 DIAGNOSIS — L02.91 ABSCESS: ICD-10-CM

## 2024-08-27 DIAGNOSIS — K50.80 CROHN'S DISEASE OF BOTH SMALL AND LARGE INTESTINE WITHOUT COMPLICATION: ICD-10-CM

## 2024-08-27 DIAGNOSIS — L02.214 GROIN ABSCESS: ICD-10-CM

## 2024-08-27 DIAGNOSIS — L02.214 ABSCESS OF RIGHT GROIN: ICD-10-CM

## 2024-08-27 DIAGNOSIS — R00.0 TACHYCARDIA: ICD-10-CM

## 2024-08-27 DIAGNOSIS — L03.90 CELLULITIS, UNSPECIFIED CELLULITIS SITE: ICD-10-CM

## 2024-08-27 LAB
ALBUMIN SERPL-MCNC: 3.2 G/DL (ref 3.3–5.5)
ALLENS TEST: ABNORMAL
ALP SERPL-CCNC: 82 U/L (ref 42–141)
ANION GAP SERPL CALC-SCNC: 6 MMOL/L (ref 8–16)
B-HCG UR QL: NEGATIVE
BILIRUB SERPL-MCNC: 0.6 MG/DL (ref 0.2–1.6)
BILIRUBIN, POC UA: NEGATIVE
BLOOD, POC UA: ABNORMAL
BUN SERPL-MCNC: 5 MG/DL (ref 6–20)
BUN SERPL-MCNC: 6 MG/DL (ref 7–22)
CALCIUM SERPL-MCNC: 8.2 MG/DL (ref 8.7–10.5)
CALCIUM SERPL-MCNC: 8.8 MG/DL (ref 8–10.3)
CHLORIDE SERPL-SCNC: 111 MMOL/L (ref 95–110)
CHLORIDE SERPL-SCNC: 99 MMOL/L (ref 98–108)
CLARITY, UA: CLEAR
CO2 SERPL-SCNC: 19 MMOL/L (ref 23–29)
COLOR, UA: YELLOW
CREAT SERPL-MCNC: 0.7 MG/DL (ref 0.5–1.4)
CREAT SERPL-MCNC: 0.8 MG/DL (ref 0.6–1.2)
CTP QC/QA: YES
EST. GFR  (NO RACE VARIABLE): >60 ML/MIN/1.73 M^2
GLUCOSE SERPL-MCNC: 74 MG/DL (ref 70–110)
GLUCOSE SERPL-MCNC: 91 MG/DL (ref 73–118)
GLUCOSE, POC UA: NEGATIVE
HCO3 UR-SCNC: 27.5 MMOL/L (ref 24–28)
HCT, POC: NORMAL
HGB, POC: NORMAL (ref 14–18)
KETONES, POC UA: NEGATIVE
LDH SERPL L TO P-CCNC: 1.01 MMOL/L (ref 0.5–2.2)
LEUKOCYTE EST, POC UA: NEGATIVE
MCH, POC: NORMAL
MCHC, POC: NORMAL
MCV, POC: NORMAL
MPV, POC: NORMAL
NITRITE, POC UA: NEGATIVE
OHS QRS DURATION: 72 MS
OHS QTC CALCULATION: 467 MS
PCO2 BLDA: 47.6 MMHG (ref 35–45)
PH SMN: 7.37 [PH] (ref 7.35–7.45)
PH UR STRIP: 6.5 [PH]
PO2 BLDA: 22 MMHG (ref 40–60)
POC ALT (SGPT): 9 U/L (ref 10–47)
POC AST (SGOT): 21 U/L (ref 11–38)
POC B-TYPE NATRIURETIC PEPTIDE: 6 PG/ML (ref 0–100)
POC BE: 1 MMOL/L
POC CARDIAC TROPONIN I: 0 NG/ML (ref 0–0.08)
POC PLATELET COUNT: NORMAL
POC PTINR: 1.2 (ref 0.9–1.2)
POC PTWBT: 14 SEC (ref 9.7–14.3)
POC SATURATED O2: 36 % (ref 95–100)
POC TCO2: 28 MMOL/L (ref 18–33)
POC TCO2: 29 MMOL/L (ref 24–29)
POCT GLUCOSE: 93 MG/DL (ref 70–110)
POTASSIUM BLD-SCNC: 3.2 MMOL/L (ref 3.6–5.1)
POTASSIUM SERPL-SCNC: 4.2 MMOL/L (ref 3.5–5.1)
PROTEIN, POC UA: ABNORMAL
PROTEIN, POC: 8.7 G/DL (ref 6.4–8.1)
RBC, POC: NORMAL
RDW, POC: NORMAL
SAMPLE: ABNORMAL
SAMPLE: NORMAL
SAMPLE: NORMAL
SITE: ABNORMAL
SODIUM BLD-SCNC: 136 MMOL/L (ref 128–145)
SODIUM SERPL-SCNC: 136 MMOL/L (ref 136–145)
SPECIFIC GRAVITY, POC UA: 1.02
UROBILINOGEN, POC UA: 0.2 E.U./DL
WBC, POC: NORMAL

## 2024-08-27 PROCEDURE — 87070 CULTURE OTHR SPECIMN AEROBIC: CPT | Performed by: EMERGENCY MEDICINE

## 2024-08-27 PROCEDURE — 96361 HYDRATE IV INFUSION ADD-ON: CPT

## 2024-08-27 PROCEDURE — 85025 COMPLETE CBC W/AUTO DIFF WBC: CPT | Mod: ER

## 2024-08-27 PROCEDURE — 99285 EMERGENCY DEPT VISIT HI MDM: CPT | Mod: 25,ER

## 2024-08-27 PROCEDURE — 83880 ASSAY OF NATRIURETIC PEPTIDE: CPT | Mod: ER

## 2024-08-27 PROCEDURE — 96361 HYDRATE IV INFUSION ADD-ON: CPT | Mod: ER

## 2024-08-27 PROCEDURE — 87186 SC STD MICRODIL/AGAR DIL: CPT | Mod: 59 | Performed by: EMERGENCY MEDICINE

## 2024-08-27 PROCEDURE — 63600175 PHARM REV CODE 636 W HCPCS: Mod: ER | Performed by: EMERGENCY MEDICINE

## 2024-08-27 PROCEDURE — 80048 BASIC METABOLIC PNL TOTAL CA: CPT | Mod: XB

## 2024-08-27 PROCEDURE — 25000003 PHARM REV CODE 250: Mod: ER

## 2024-08-27 PROCEDURE — 25500020 PHARM REV CODE 255: Mod: ER | Performed by: EMERGENCY MEDICINE

## 2024-08-27 PROCEDURE — G0378 HOSPITAL OBSERVATION PER HR: HCPCS

## 2024-08-27 PROCEDURE — 96366 THER/PROPH/DIAG IV INF ADDON: CPT

## 2024-08-27 PROCEDURE — 87040 BLOOD CULTURE FOR BACTERIA: CPT | Mod: 59 | Performed by: EMERGENCY MEDICINE

## 2024-08-27 PROCEDURE — 93010 ELECTROCARDIOGRAM REPORT: CPT | Mod: ,,, | Performed by: INTERNAL MEDICINE

## 2024-08-27 PROCEDURE — 80053 COMPREHEN METABOLIC PANEL: CPT | Mod: ER

## 2024-08-27 PROCEDURE — 63600175 PHARM REV CODE 636 W HCPCS: Mod: JZ,JG | Performed by: SURGERY

## 2024-08-27 PROCEDURE — 96365 THER/PROPH/DIAG IV INF INIT: CPT | Mod: ER

## 2024-08-27 PROCEDURE — 81003 URINALYSIS AUTO W/O SCOPE: CPT | Mod: ER

## 2024-08-27 PROCEDURE — 36415 COLL VENOUS BLD VENIPUNCTURE: CPT

## 2024-08-27 PROCEDURE — 87077 CULTURE AEROBIC IDENTIFY: CPT | Performed by: EMERGENCY MEDICINE

## 2024-08-27 PROCEDURE — 85610 PROTHROMBIN TIME: CPT | Mod: ER

## 2024-08-27 PROCEDURE — 81025 URINE PREGNANCY TEST: CPT | Mod: ER | Performed by: EMERGENCY MEDICINE

## 2024-08-27 PROCEDURE — 93005 ELECTROCARDIOGRAM TRACING: CPT | Mod: ER

## 2024-08-27 PROCEDURE — 84484 ASSAY OF TROPONIN QUANT: CPT | Mod: ER

## 2024-08-27 PROCEDURE — 63600175 PHARM REV CODE 636 W HCPCS

## 2024-08-27 PROCEDURE — 25000003 PHARM REV CODE 250: Performed by: EMERGENCY MEDICINE

## 2024-08-27 PROCEDURE — 25000003 PHARM REV CODE 250

## 2024-08-27 PROCEDURE — 96367 TX/PROPH/DG ADDL SEQ IV INF: CPT | Mod: ER

## 2024-08-27 PROCEDURE — 87086 URINE CULTURE/COLONY COUNT: CPT | Performed by: EMERGENCY MEDICINE

## 2024-08-27 PROCEDURE — G0378 HOSPITAL OBSERVATION PER HR: HCPCS | Mod: ER

## 2024-08-27 PROCEDURE — 87088 URINE BACTERIA CULTURE: CPT | Performed by: EMERGENCY MEDICINE

## 2024-08-27 PROCEDURE — 25000003 PHARM REV CODE 250: Mod: ER | Performed by: EMERGENCY MEDICINE

## 2024-08-27 PROCEDURE — 96375 TX/PRO/DX INJ NEW DRUG ADDON: CPT | Mod: ER

## 2024-08-27 RX ORDER — MORPHINE SULFATE 4 MG/ML
4 INJECTION, SOLUTION INTRAMUSCULAR; INTRAVENOUS EVERY 4 HOURS PRN
Status: DISCONTINUED | OUTPATIENT
Start: 2024-08-27 | End: 2024-08-28

## 2024-08-27 RX ORDER — POTASSIUM CHLORIDE 7.45 MG/ML
10 INJECTION INTRAVENOUS
Status: COMPLETED | OUTPATIENT
Start: 2024-08-27 | End: 2024-08-27

## 2024-08-27 RX ORDER — IBUPROFEN 200 MG
24 TABLET ORAL
Status: DISCONTINUED | OUTPATIENT
Start: 2024-08-27 | End: 2024-08-29 | Stop reason: HOSPADM

## 2024-08-27 RX ORDER — POTASSIUM CHLORIDE 20 MEQ/1
40 TABLET, EXTENDED RELEASE ORAL
Status: COMPLETED | OUTPATIENT
Start: 2024-08-27 | End: 2024-08-27

## 2024-08-27 RX ORDER — SODIUM CHLORIDE 9 MG/ML
1000 INJECTION, SOLUTION INTRAVENOUS CONTINUOUS
Status: DISCONTINUED | OUTPATIENT
Start: 2024-08-27 | End: 2024-08-27

## 2024-08-27 RX ORDER — CLINDAMYCIN PHOSPHATE 900 MG/50ML
900 INJECTION, SOLUTION INTRAVENOUS
Status: DISCONTINUED | OUTPATIENT
Start: 2024-08-27 | End: 2024-08-29 | Stop reason: HOSPADM

## 2024-08-27 RX ORDER — ONDANSETRON 4 MG/1
4 TABLET, ORALLY DISINTEGRATING ORAL EVERY 8 HOURS PRN
Status: DISCONTINUED | OUTPATIENT
Start: 2024-08-27 | End: 2024-08-29 | Stop reason: HOSPADM

## 2024-08-27 RX ORDER — CLINDAMYCIN PHOSPHATE 150 MG/ML
900 INJECTION, SOLUTION INTRAVENOUS
Status: DISCONTINUED | OUTPATIENT
Start: 2024-08-27 | End: 2024-08-27

## 2024-08-27 RX ORDER — OXYCODONE HYDROCHLORIDE 5 MG/1
5 TABLET ORAL EVERY 6 HOURS PRN
Status: DISCONTINUED | OUTPATIENT
Start: 2024-08-27 | End: 2024-08-29 | Stop reason: HOSPADM

## 2024-08-27 RX ORDER — OXYCODONE HYDROCHLORIDE 5 MG/1
10 TABLET ORAL EVERY 6 HOURS PRN
Status: DISCONTINUED | OUTPATIENT
Start: 2024-08-27 | End: 2024-08-28

## 2024-08-27 RX ORDER — ACETAMINOPHEN 325 MG/1
650 TABLET ORAL EVERY 4 HOURS PRN
Status: DISCONTINUED | OUTPATIENT
Start: 2024-08-27 | End: 2024-08-29 | Stop reason: HOSPADM

## 2024-08-27 RX ORDER — LIDOCAINE HYDROCHLORIDE 10 MG/ML
1 INJECTION, SOLUTION EPIDURAL; INFILTRATION; INTRACAUDAL; PERINEURAL ONCE AS NEEDED
Status: DISCONTINUED | OUTPATIENT
Start: 2024-08-27 | End: 2024-08-29 | Stop reason: HOSPADM

## 2024-08-27 RX ORDER — SODIUM CHLORIDE, SODIUM LACTATE, POTASSIUM CHLORIDE, CALCIUM CHLORIDE 600; 310; 30; 20 MG/100ML; MG/100ML; MG/100ML; MG/100ML
INJECTION, SOLUTION INTRAVENOUS CONTINUOUS
Status: DISCONTINUED | OUTPATIENT
Start: 2024-08-27 | End: 2024-08-29 | Stop reason: HOSPADM

## 2024-08-27 RX ORDER — SODIUM CHLORIDE 0.9 % (FLUSH) 0.9 %
10 SYRINGE (ML) INJECTION
Status: DISCONTINUED | OUTPATIENT
Start: 2024-08-27 | End: 2024-08-29 | Stop reason: HOSPADM

## 2024-08-27 RX ORDER — MORPHINE SULFATE 4 MG/ML
4 INJECTION, SOLUTION INTRAMUSCULAR; INTRAVENOUS EVERY 4 HOURS PRN
Status: DISCONTINUED | OUTPATIENT
Start: 2024-08-27 | End: 2024-08-27

## 2024-08-27 RX ORDER — IBUPROFEN 200 MG
16 TABLET ORAL
Status: DISCONTINUED | OUTPATIENT
Start: 2024-08-27 | End: 2024-08-29 | Stop reason: HOSPADM

## 2024-08-27 RX ORDER — NALOXONE HCL 0.4 MG/ML
0.4 VIAL (ML) INJECTION
Status: DISCONTINUED | OUTPATIENT
Start: 2024-08-27 | End: 2024-08-29 | Stop reason: HOSPADM

## 2024-08-27 RX ORDER — DEXTROSE MONOHYDRATE 100 MG/ML
INJECTION, SOLUTION INTRAVENOUS CONTINUOUS
Status: DISCONTINUED | OUTPATIENT
Start: 2024-08-27 | End: 2024-08-27

## 2024-08-27 RX ORDER — GLUCAGON 1 MG
1 KIT INJECTION
Status: DISCONTINUED | OUTPATIENT
Start: 2024-08-27 | End: 2024-08-29 | Stop reason: HOSPADM

## 2024-08-27 RX ORDER — CLINDAMYCIN PHOSPHATE 900 MG/50ML
900 INJECTION, SOLUTION INTRAVENOUS
Status: DISCONTINUED | OUTPATIENT
Start: 2024-08-27 | End: 2024-08-27

## 2024-08-27 RX ORDER — KETOROLAC TROMETHAMINE 30 MG/ML
15 INJECTION, SOLUTION INTRAMUSCULAR; INTRAVENOUS EVERY 6 HOURS PRN
Status: DISPENSED | OUTPATIENT
Start: 2024-08-27 | End: 2024-08-28

## 2024-08-27 RX ORDER — DIPHENHYDRAMINE HYDROCHLORIDE 50 MG/ML
25 INJECTION INTRAMUSCULAR; INTRAVENOUS
Status: COMPLETED | OUTPATIENT
Start: 2024-08-27 | End: 2024-08-27

## 2024-08-27 RX ORDER — SODIUM CHLORIDE, SODIUM LACTATE, POTASSIUM CHLORIDE, CALCIUM CHLORIDE 600; 310; 30; 20 MG/100ML; MG/100ML; MG/100ML; MG/100ML
INJECTION, SOLUTION INTRAVENOUS
Status: COMPLETED | OUTPATIENT
Start: 2024-08-27 | End: 2024-08-27

## 2024-08-27 RX ORDER — KETOROLAC TROMETHAMINE 30 MG/ML
15 INJECTION, SOLUTION INTRAMUSCULAR; INTRAVENOUS
Status: COMPLETED | OUTPATIENT
Start: 2024-08-27 | End: 2024-08-27

## 2024-08-27 RX ADMIN — SODIUM CHLORIDE 1000 ML: 9 INJECTION, SOLUTION INTRAVENOUS at 01:08

## 2024-08-27 RX ADMIN — CLINDAMYCIN IN 5 PERCENT DEXTROSE 900 MG: 18 INJECTION, SOLUTION INTRAVENOUS at 06:08

## 2024-08-27 RX ADMIN — POTASSIUM CHLORIDE 40 MEQ: 1500 TABLET, EXTENDED RELEASE ORAL at 03:08

## 2024-08-27 RX ADMIN — DIPHENHYDRAMINE HYDROCHLORIDE 25 MG: 50 INJECTION INTRAMUSCULAR; INTRAVENOUS at 12:08

## 2024-08-27 RX ADMIN — SODIUM CHLORIDE 1836 ML: 9 INJECTION, SOLUTION INTRAVENOUS at 08:08

## 2024-08-27 RX ADMIN — SODIUM CHLORIDE, POTASSIUM CHLORIDE, SODIUM LACTATE AND CALCIUM CHLORIDE: 600; 310; 30; 20 INJECTION, SOLUTION INTRAVENOUS at 05:08

## 2024-08-27 RX ADMIN — SODIUM CHLORIDE 1000 ML: 9 INJECTION, SOLUTION INTRAVENOUS at 05:08

## 2024-08-27 RX ADMIN — SODIUM CHLORIDE, POTASSIUM CHLORIDE, SODIUM LACTATE AND CALCIUM CHLORIDE: 600; 310; 30; 20 INJECTION, SOLUTION INTRAVENOUS at 08:08

## 2024-08-27 RX ADMIN — CLINDAMYCIN IN 5 PERCENT DEXTROSE 900 MG: 18 INJECTION, SOLUTION INTRAVENOUS at 11:08

## 2024-08-27 RX ADMIN — LORAZEPAM 1 MG: 2 INJECTION INTRAMUSCULAR; INTRAVENOUS at 12:08

## 2024-08-27 RX ADMIN — CLINDAMYCIN PHOSPHATE 900 MG: 900 INJECTION, SOLUTION INTRAVENOUS at 08:08

## 2024-08-27 RX ADMIN — VANCOMYCIN HYDROCHLORIDE 1000 MG: 1 INJECTION, POWDER, LYOPHILIZED, FOR SOLUTION INTRAVENOUS at 09:08

## 2024-08-27 RX ADMIN — KETOROLAC TROMETHAMINE 15 MG: 30 INJECTION, SOLUTION INTRAMUSCULAR at 08:08

## 2024-08-27 RX ADMIN — VANCOMYCIN HYDROCHLORIDE 1500 MG: 1.5 INJECTION, POWDER, LYOPHILIZED, FOR SOLUTION INTRAVENOUS at 09:08

## 2024-08-27 RX ADMIN — POTASSIUM CHLORIDE 10 MEQ: 7.46 INJECTION, SOLUTION INTRAVENOUS at 12:08

## 2024-08-27 RX ADMIN — IOHEXOL 100 ML: 350 INJECTION, SOLUTION INTRAVENOUS at 09:08

## 2024-08-27 NOTE — H&P
[facility]  History & Physical    SUBJECTIVE:     Chief Complaint/Reason for Admission: Right groin abscess    History of Present Illness:  Lora Scott is a 34 y.o. female presents with a PMHx of Crohn's disease (on Humira) eczema, perineal abscess, hidradenitis suppurativa and psoriasis, who presents with right groin pain/swelling for the past 2 weeks. Pain and swelling progressively worsened now with associated redness and warmth. Reports history of multiple abscesses and incision and drainage. Notes last abscess about 6 months ago, treated with antibiotics. Has not had excision surgery for hidradenitis. Denies fever, chills, SOB, CP, abdominal pain, nausea/vomiting, or any other abscesses/wounds.    Endorses tobacco use (cigarettes, 4-5 daily) and occasional marijuana use, no EtOH or recreational drug use. On Humira, last injection about 6 days ago. Denies chronic steroids.    Overnight, reported to have SBP in mid 70's and mild acidosis on NS. Given 1L LR with improvement SBP to 's, IVF's switched to LR.    Review of patient's allergies indicates:   Allergen Reactions    Horse/equine containing products Itching       Past Medical History:   Diagnosis Date    Allergy     Anxiety     Asthma     Crohn's disease (ileum)     S/P ileocecal resection-42 cm of ileum/10.5 cm cecum removed with surgical path c/w stricture and fistula 2/15/20 ileostomy takedown and mucus fistula takedown    Depression     Eczema     Former smoker     Hydradenitis     Immunosuppressed status     Perineal abscess 2021    Psoriasis      Past Surgical History:   Procedure Laterality Date     SECTION N/A 2023    Procedure:  SECTION;  Surgeon: Geo Espitia MD;  Location: [facility] L&D OR;  Service: OB/GYN;  Laterality: N/A;     SECTION      COLONOSCOPY N/A 2019    Procedure: COLONOSCOPY;  Surgeon: Fawad Perla MD;  Location: [facility] ENDO ([address] [place] [address]);  Service: Endoscopy;  Laterality: N/A;     COLONOSCOPY N/A 10/30/2020    Procedure: COLONOSCOPY;  Surgeon: Suzi Bacon MD;  Location: [facility] (4TH FLR);  Service: Endoscopy;  Laterality: N/A;  covid test [facility] 10/27  pt to try Mirilax prep with Reglan before per Dr. Bacon -   10/14-new instructions e-mailed-tb  10/16--new instructions e-mailed  10/29/20- Pt confirmed earlier arrival time, prep ins. reveiwed, Pt verbalized understanding- [email or web address]    COLONOSCOPY N/A 05/10/2022    Procedure: COLONOSCOPY;  Surgeon: Suzi Bacon MD;  Location: [facility] (4TH FLR);  Service: Endoscopy;  Laterality: N/A;  1/2022- same as last bowel prep (Miralax/Gatorade)  3/4 pt rescheduled to May/ COVID test on 5/3 at [facility]  instruction via portal  5/6 pt rescheduled; pt fully vaccinated; Rapid; instructions to portal-st    COLONOSCOPY N/A 6/1/2023    Procedure: COLONOSCOPY;  Surgeon: Suzi Bacon MD;  Location: [facility] (4TH FLR);  Service: Endoscopy;  Laterality: N/A;  order created: poor prep, Dr. SKY Bacon, Miralax, prep instr portal -ml  pre call 5/26, pt confirmed - mf    ESOPHAGOGASTRODUODENOSCOPY N/A 02/20/2019    Procedure: EGD (ESOPHAGOGASTRODUODENOSCOPY);  Surgeon: Fawad Perla MD;  Location: [facility] (2ND FLR);  Service: Endoscopy;  Laterality: N/A;    ILEOCOLECTOMY N/A 05/10/2019    Procedure: ILEOCOLECTOMY;  Surgeon: Uday Cope MD;  Location: [facility] OR 2ND FLR;  Service: Colon and Rectal;  Laterality: N/A;  ileocecectomy    ILEOSCOPY N/A 12/20/2019    Procedure: ILEOSCOPY through stoma;  Surgeon: Suzi Bacon MD;  Location: [facility] ENDO (4TH FLR);  Service: Endoscopy;  Laterality: N/A;  Schedule as 30 minute case  Please let patient know to bring extra stoma bag/wafer, etc- entire bag and appliance will be removed at time of procedure to visualize the area well   first week 11/2019     per note-R/S to 12/20/19-GT    ILEOSTOMY N/A 05/10/2019    Procedure: CREATION, ILEOSTOMY with mucous fistula;  Surgeon: Uday Cope,  MD;  Location: [facility] OR 2ND FLR;  Service: Colon and Rectal;  Laterality: N/A;    ILEOSTOMY CLOSURE N/A 2020    Procedure: CLOSURE,ILEOSTOMY TAKEDOWN END ILEOSTOMY/MUCOUS FISTULA POSSIBLE LAPAROTOMY MARÍA ELENAS LOW;  Surgeon: Uday Cope MD;  Location: [facility] OR 2ND FLR;  Service: Colon and Rectal;  Laterality: N/A;    INCISION AND DRAINAGE OF BUTTOCK Bilateral 2022    Procedure: INCISION AND DRAINAGE, BUTTOCK;  Surgeon: Regan Rowell Jr., MD;  Location: [facility] OR;  Service: General;  Laterality: Bilateral;     Social History     Tobacco Use    Smoking status: Every Day     Current packs/day: 0.00     Types: Cigarettes     Last attempt to quit: 2020     Years since quittin.6    Smokeless tobacco: Never   Substance Use Topics    Alcohol use: Not Currently     Alcohol/week: 0.0 standard drinks of alcohol     Comment: Socially    Drug use: Yes     Frequency: 3.0 times per week     Types: Marijuana        Review of Systems:  10 point ROS negative except noted in HPI     OBJECTIVE:     Vital Signs (Most Recent):  Temp: 98.8 °F (37.1 °C) (24)  Pulse: 68 (24)  Resp: 18 (24)  BP: (!) 92/55 (24)  SpO2: 98 % (24)    Physical Exam:  General: Awake and alert, in no acute distress, appears stated age, well-nourished  HEENT: Atraumatic, normocephalic, MMM  Cardiac: Regular rate on monitor, well-perfused  Pulm: Breathing comfortably, symmetric chest rise, no respiratory distress. Satting well on RA  Abdomen: Soft, non-tender to palpation, non-distended, no guarding or rebound  MSK: moving all extremities appropriately. Right going with area of erythema from upper thigh to right lower abdomen/crease, tender to palpation, +area of fluctuance, no active drainage. Also with scarring/discoloration noted in bilateral axilla  Neuro: Aox4, CN 2-12 grossly intact    Laboratory:  CBC:   Recent Labs   Lab 24  0502   WBC 4.85   RBC 3.06*   HGB 9.0*   HCT 28.5*   PLT SEE  COMMENT   MCV 93   MCH 29.4   MCHC 31.6*     BMP:   Recent Labs   Lab 08/28/24  0502   GLU 64*      K 4.3   *   CO2 20*   BUN 4*   CREATININE 0.6   CALCIUM 8.1*       Diagnostic Results:  CT Pelvis  Impression:     Abnormal skin thickening and cutaneous lesions in the bilateral thigh and gluteal soft tissues, right side significantly worse than left.  Probable small collection in the medial right upper thigh adjacent to the right labia measuring up to 2 cm in size.  Additional smaller collections and multifocal skin thickening in the intergluteal region and left posterior gluteal region near the skin surface.  Hidradenitis suppurativa and/or abscesses include in the differential.  Suggest correlation with physical exam findings and dermatology follow-up as clinically warranted.     No soft tissue gas.     Mildly enlarged pelvic and inguinal lymph nodes.     Other findings discussed in the body of the report.      ASSESSMENT/PLAN:     Lora Scott is a 34 y.o. female presents with a PMHx of eczema, perineal abscess, hidradenitis suppurativa and psoriasis, who presents with right groin abscess. HDS, afebrile, no leukocytosis. Exam findings consistent with hidradenitis c/b abscess and cellulitis    Plan:     - Plan for OR for incision/drainage of right groin abscess. Risks and side effects discussed with the patient. Alterative therapies discussed. Patient agreeable to procedure.  - Will obtain wound cultures  - Daily dressing changes post op   - Daily CBC/BMP  - Replete lytes prn  - MMPC  - Diet: NPO at MN  - IVF: LR @ 100  - Ppx: SCDs  - Abx: Clindamycin and vancomycin  - Outpatient referral to plastic surgery for evaluation/management of hidradenitis    Patient seen and case discussed with attending staff, Dr. Booker. Attestation to follow.    Chapis Espitia MD  General Surgery Resident  [facility]

## 2024-08-27 NOTE — ED PROVIDER NOTES
Encounter Date: 2024    SCRIBE #1 NOTE: I, Elaine Parker, am scribing for, and in the presence of,  Rebekah West DO. I have scribed the following portions of the note - the EKG reading. Other sections scribed: HPI, ROS, PE, MDM.       History     Chief Complaint   Patient presents with    Rash     Pt presents w/ a c/o of rash to the face for a 'long time.' Unable to schedule appointment for f/u w/ derm.     Leg Pain     Pt presents w/ a c/o of right thigh pain for approx 2 weeks. Denies any injuries.      Lora Scott is a 34 y.o. female with Hx of eczema, perineal abscess, hidradenitis suppurativa and psoriasis who presents to the ED for chief complaint of painful abscess to right groin and right thigh x 2 weeks. Patient reports associated redness and warmth. She reports pain is exacerbated with walking/movement. Patient did not attempt treatment PTA. Reports history of multiple abscesses in the past. Reports last time she was on abx was approximately 6 months ago. Patient reports today is first day of menstrual cycle. She admits to smoking 4-5 cigarettes daily and occasional marijuana use. Denies any EtOH consumption.       The history is provided by the patient. No  was used.     Review of patient's allergies indicates:   Allergen Reactions    Horse/equine containing products Itching     Past Medical History:   Diagnosis Date    Allergy     Anxiety     Asthma     Crohn's disease (ileum)     S/P ileocecal resection-42 cm of ileum/10.5 cm cecum removed with surgical path c/w stricture and fistula 2/15/20 ileostomy takedown and mucus fistula takedown    Depression     Eczema     Former smoker     Hydradenitis     Immunosuppressed status     Perineal abscess 2021    Psoriasis      Past Surgical History:   Procedure Laterality Date     SECTION N/A 2023    Procedure:  SECTION;  Surgeon: Geo Espitia MD;  Location: [facility];  Service: OB/GYN;  Laterality: N/A;      SECTION      COLONOSCOPY N/A 2019    Procedure: COLONOSCOPY;  Surgeon: Fawad Perla MD;  Location: [facility] (2ND FLR);  Service: Endoscopy;  Laterality: N/A;    COLONOSCOPY N/A 10/30/2020    Procedure: COLONOSCOPY;  Surgeon: Suzi Bacon MD;  Location: [facility] (4TH FLR);  Service: Endoscopy;  Laterality: N/A;  covid test [facility] 10/27  pt to try Mirilax prep with Reglan before per Dr. Bacon -   10/14-new instructions e-mailed-  10/16--new instructions e-mailed  10/29/20- Pt confirmed earlier arrival time, prep ins. reveiwed, Pt verbalized understanding- [email or web address]    COLONOSCOPY N/A 05/10/2022    Procedure: COLONOSCOPY;  Surgeon: Suzi Bacon MD;  Location: [facility] (4TH FLR);  Service: Endoscopy;  Laterality: N/A;  2022- same as last bowel prep (Miralax/Gatorade)  3/4 pt rescheduled to May/ COVID test on 5/3 at [facility]  instruction via portal   pt rescheduled; pt fully vaccinated; Rapid; instructions to portal-st    COLONOSCOPY N/A 2023    Procedure: COLONOSCOPY;  Surgeon: Suzi Bacon MD;  Location: [facility] (4TH FLR);  Service: Endoscopy;  Laterality: N/A;  order created: poor prep, Dr. SKY Bacon, Miralax, prep instr portal -ml  pre call , pt confirmed - mf    ESOPHAGOGASTRODUODENOSCOPY N/A 2019    Procedure: EGD (ESOPHAGOGASTRODUODENOSCOPY);  Surgeon: Fawad Perla MD;  Location: [facility] (2ND FLR);  Service: Endoscopy;  Laterality: N/A;    ILEOCOLECTOMY N/A 05/10/2019    Procedure: ILEOCOLECTOMY;  Surgeon: Uday Cope MD;  Location: [facility] OR 2ND FLR;  Service: Colon and Rectal;  Laterality: N/A;  ileocecectomy    ILEOSCOPY N/A 2019    Procedure: ILEOSCOPY through stoma;  Surgeon: Suzi Bacon MD;  Location: [facility] (4TH FLR);  Service: Endoscopy;  Laterality: N/A;  Schedule as 30 minute case  Please let patient know to bring extra stoma bag/wafer, etc- entire bag and appliance will be removed at time of procedure to  visualize the area well   first week 11/2019     per note-R/S to 12/20/19-GT    ILEOSTOMY N/A 05/10/2019    Procedure: CREATION, ILEOSTOMY with mucous fistula;  Surgeon: Uday Cope MD;  [facility];  Service: Colon and Rectal;  Laterality: N/A;    ILEOSTOMY CLOSURE N/A 02/12/2020    Procedure: CLOSURE,ILEOSTOMY TAKEDOWN END ILEOSTOMY/MUCOUS FISTULA POSSIBLE LAPAROTOMY ERAS LOW;  Surgeon: Uday Cope MD;  [facility];  Service: Colon and Rectal;  Laterality: N/A;    INCISION AND DRAINAGE OF BUTTOCK Bilateral 12/07/2022    Procedure: INCISION AND DRAINAGE, BUTTOCK;  Surgeon: Regan Rowell Jr., MD;  Location: [facility];  Service: General;  Laterality: Bilateral;     Family History   Problem Relation Name Age of Onset    Hypertension Mother Kalie     No Known Problems Father Lexx     No Known Problems Sister Alda     No Known Problems Sister Charlette     No Known Problems Sister Daisy     No Known Problems Sister Yusra     No Known Problems Brother Andrews     Glaucoma Maternal Uncle      No Known Problems Maternal Uncle      No Known Problems Maternal Uncle      No Known Problems Paternal Aunt      No Known Problems Paternal Aunt      No Known Problems Paternal Uncle      No Known Problems Paternal Uncle      No Known Problems Paternal Uncle      No Known Problems Paternal Uncle      No Known Problems Maternal Grandmother      Cataracts Maternal Grandfather      Irritable bowel syndrome Paternal Grandmother      No Known Problems Paternal Grandfather      Celiac disease Neg Hx      Cirrhosis Neg Hx      Colon cancer Neg Hx      Colon polyps Neg Hx      Crohn's disease Neg Hx      Cystic fibrosis Neg Hx      Esophageal cancer Neg Hx      Hemochromatosis Neg Hx      Inflammatory bowel disease Neg Hx      Liver cancer Neg Hx      Liver disease Neg Hx      Rectal cancer Neg Hx      Stomach cancer Neg Hx      Ulcerative colitis Neg Hx      Jerardo's disease Neg Hx      Lymphoma Neg Hx       Tuberculosis Neg Hx      Scleroderma Neg Hx      Rheum arthritis Neg Hx      Multiple sclerosis Neg Hx      Psoriasis Neg Hx      Lupus Neg Hx      Melanoma Neg Hx      Skin cancer Neg Hx      Amblyopia Neg Hx      Blindness Neg Hx      Cancer Neg Hx      Diabetes Neg Hx      Macular degeneration Neg Hx      Retinal detachment Neg Hx      Strabismus Neg Hx      Stroke Neg Hx      Thyroid disease Neg Hx       Social History     Tobacco Use    Smoking status: Every Day     Current packs/day: 0.00     Types: Cigarettes     Last attempt to quit: 2020     Years since quittin.6    Smokeless tobacco: Never   Substance Use Topics    Alcohol use: Not Currently     Alcohol/week: 0.0 standard drinks of alcohol     Comment: Socially    Drug use: Yes     Frequency: 3.0 times per week     Types: Marijuana     Review of Systems   Constitutional:  Negative for fever.   HENT:  Negative for rhinorrhea and sore throat.    Eyes:  Negative for redness.   Respiratory:  Negative for shortness of breath.    Cardiovascular:  Negative for chest pain and leg swelling.   Gastrointestinal:  Negative for abdominal pain, diarrhea, nausea and vomiting.   Genitourinary:  Positive for pelvic pain (R groin). Negative for dysuria.   Musculoskeletal:  Positive for myalgias (R thigh). Negative for back pain.   Skin:  Positive for color change (redness to R groin and thigh) and wound (abscess to R groin/thigh). Negative for rash.   Neurological:  Negative for syncope and headaches.   All other systems reviewed and are negative.      Physical Exam     Initial Vitals [24 0723]   BP Pulse Resp Temp SpO2   109/73 99 20 98 °F (36.7 °C) 99 %      MAP       --         Physical Exam    Nursing note and vitals reviewed.  Constitutional: She appears well-developed and well-nourished.   HENT:   Head: Normocephalic and atraumatic.   Right Ear: External ear normal.   Left Ear: External ear normal.   Eyes: Conjunctivae and EOM are normal. Pupils are  equal, round, and reactive to light. Right eye exhibits no discharge. Left eye exhibits no discharge.   Neck: Neck supple.   Normal range of motion.  Cardiovascular:  Regular rhythm, normal heart sounds and intact distal pulses.   Tachycardia present.   Exam reveals no gallop and no friction rub.       No murmur heard.  Pulmonary/Chest: Breath sounds normal. No respiratory distress. She has no wheezes. She has no rhonchi. She has no rales. She exhibits no tenderness.   Abdominal: Abdomen is soft. Bowel sounds are normal. She exhibits no distension and no mass. There is no abdominal tenderness.   No CVA tenderness. No RLQ tenderness.  There is no rebound and no guarding.   Musculoskeletal:         General: No tenderness or edema. Normal range of motion.      Cervical back: Normal range of motion and neck supple.     Neurological: She is alert and oriented to person, place, and time.   Skin: Skin is warm and dry.   Patient gave consent to have physical exam performed.  Exam chaperoned by Melody Colunga RN.     25 cm x 15 cm area of erythema that extends from right mid thigh up to right lower abdomen with fluctuance and tenderness. There is foul-smelling drainage from R groin.    Psychiatric: She has a normal mood and affect.               ED Course   Critical Care    Date/Time: 8/27/2024 8:19 AM    Performed by: Rebekah West DO  Authorized by: Rebekah West DO  Direct patient critical care time: 8 minutes  Additional history critical care time: 8 minutes  Ordering / reviewing critical care time: 8 minutes  Documentation critical care time: 8 minutes  Consulting other physicians critical care time: 8 minutes  Total critical care time (exclusive of procedural time) : 40 minutes  Critical care time was exclusive of separately billable procedures and treating other patients and teaching time.  Critical care was necessary to treat or prevent imminent or life-threatening deterioration of the following conditions:  dehydration and sepsis.  Critical care was time spent personally by me on the following activities: development of treatment plan with patient or surrogate, interpretation of cardiac output measurements, evaluation of patient's response to treatment, examination of patient, obtaining history from patient or surrogate, ordering and performing treatments and interventions, ordering and review of laboratory studies, ordering and review of radiographic studies, re-evaluation of patient's condition, review of old charts and pulse oximetry.        Labs Reviewed   POCT URINALYSIS W/O SCOPE - Abnormal       Result Value    Glucose, UA Negative      Bilirubin, UA Negative      Ketones, UA Negative      Spec Grav UA 1.025      Blood, UA 3+ (*)     PH, UA 6.5      Protein, UA 1+ (*)     Urobilinogen, UA 0.2      Nitrite, UA Negative      Leukocytes, UA Negative      Color, UA POC Yellow      Clarity, UA, POC Clear     POCT CMP - Abnormal    Albumin, POC 3.2      Alkaline Phosphatase, POC 82      ALT (SGPT), POC 9 (*)     AST (SGOT), POC 21      POC BUN 6 (*)     Calcium, POC 8.8      POC Chloride 99      POC Creatinine 0.8      POC Glucose 91      POC Potassium 3.2 (*)     POC Sodium 136      Bilirubin, POC 0.6      POC TCO2 28      Protein, POC 8.7 (*)    ISTAT PROCEDURE - Abnormal    POC PH 7.370      POC PCO2 47.6 (*)     POC PO2 22 (*)     POC HCO3 27.5      POC BE 1      POC SATURATED O2 36      POC Lactate 1.01      POC TCO2 29      Sample VENOUS      Site Other      Allens Test N/A     CULTURE, BLOOD   CULTURE, BLOOD   CULTURE, AEROBIC  (SPECIFY SOURCE)   CULTURE, URINE   TROPONIN ISTAT    POC Cardiac Troponin I 0.00      Sample unknown     POCT URINE PREGNANCY    POC Preg Test, Ur Negative       Acceptable Yes     POCT CBC    Hematocrit        Hemoglobin        RBC        WBC        MCV        MCH, POC        MCHC        RDW-CV        Platelet Count, POC        MPV       POCT URINALYSIS W/O SCOPE   POCT  CMP   POCT PROTIME-INR   POCT B-TYPE NATRIURETIC PEPTIDE (BNP)   POCT TROPONIN   ISTAT PROCEDURE    POC PTWBT 14.0      POC PTINR 1.2      Sample unknown     POCT B-TYPE NATRIURETIC PEPTIDE (BNP)    POC B-Type Natriuretic Peptide 6.0          ECG Results              EKG 12-lead (Final result)        Collection Time Result Time QRS Duration [facility] QTC Calculation    08/27/24 08:26:15 08/27/24 15:30:08 72 467                     Final result by Bebo Scott, NEVILLE (08/27/24 08:33:16)                   Narrative:    Test Reason : TACHYCARDIA    Vent. Rate : 096 BPM     Atrial Rate : 096 BPM     P-R Int : 132 ms          QRS Dur : 072 ms      QT Int : 370 ms       P-R-T Axes : 061 035 026 degrees     QTc Int : 467 ms    Normal sinus rhythm  Septal infarct (cited on or before 01-FEB-2022)  Abnormal ECG  When compared with ECG of 01-FEB-2022 13:18,  Questionable change in initial forces of Septal leads  T wave inversion no longer evident in Inferior leads  Nonspecific T wave abnormality no longer evident in Anterior-lateral leads  Confirmed by Elian Delgadillo MD ([id number]) on 8/27/2024 3:30:01 PM    Referred By: [email or web address]   SELF           Confirmed By:Elian Delgadillo MD                                  Imaging Results               CT Pelvis With IV Contrast NO Oral Contrast (Final result)  Result time 08/27/24 10:29:50      Final result by Padilla Correia MD (08/27/24 10:29:50)                   Impression:      Abnormal skin thickening and cutaneous lesions in the bilateral thigh and gluteal soft tissues, right side significantly worse than left.  Probable small collection in the medial right upper thigh adjacent to the right labia measuring up to 2 cm in size.  Additional smaller collections and multifocal skin thickening in the intergluteal region and left posterior gluteal region near the skin surface.  Hidradenitis suppurativa and/or abscesses include in the differential.  Suggest correlation with physical exam findings  and dermatology follow-up as clinically warranted.    No soft tissue gas.    Mildly enlarged pelvic and inguinal lymph nodes.    Other findings discussed in the body of the report.    This report was flagged in Epic as abnormal.      Electronically signed by: Padilla Croreia MD  Date:    08/27/2024  Time:    10:29               Narrative:    EXAMINATION:  CT PELVIS WITH IV CONTRAST    CLINICAL HISTORY:  Abdominal abscess/infection suspected;Patient with significant abscess to right groin extending down to distal right thigh.  Please extend CT evaluation to distal right thigh.  Rule out Giana's gangrene;    TECHNIQUE:  CT images of the pelvis and bilateral thighs obtained after the administration of 100 cc Omnipaque 350 IV contrast.  Axial, coronal, and sagittal reconstructions were created for both acquisitions.  No oral contrast was administered.  Thigh CT order not available to link to this study at the time of dictation.  Delayed phase images also obtained.    COMPARISON:  CT abdomen pelvis, 02/18/2019.    FINDINGS:  Uterus is retroverted and otherwise unremarkable.  Urinary bladder is decompressed and not well evaluated.  Minimal wall thickening of the rectum, potentially exaggerated by decompressed state.  Probable dominant follicle in the right adnexa measuring 2.5 cm in size.  Borderline enlarged pelvic sidewall lymph nodes measuring up to 1 cm in short axis on the right (series 4, image 57).  Prominent bilateral inguinal lymph nodes measuring up to 1.2 cm in short axis.  Trace pelvic free fluid, likely physiologic.    Abnormal skin thickening and soft tissue edema involving the anteromedial right thigh.  Small collection in this region measuring roughly 2.1 x 1.8 x 1.2 cm (coronal series 602, image 19 and axial series 8, image 86).  Skin thickening and soft tissue edema extends along the anteromedial aspect of the right thigh to the level of the lower thigh.  No soft tissue edema adjacent to either knee.   "Minimal skin thickening and cutaneous nodules in the left thigh soft tissues near the skin surface.  Small nodular density with questionable small collection in the posterior aspect of the right upper thighs/lower gluteal region (series 8, image 121).  Skin thickening and questionable small collections in the intergluteal region (series 8, images 61 and 72) and left gluteal soft tissues near the skin surface (series 8, image 74).  Additional small collection and nodule skin thickening in the left upper gluteal region measuring 1.5 cm in size (series 8, image 19).  Additional mild nodular skin thickening in the lateral aspect of the right thigh.  No soft tissue gas.  Major veins and arteries are grossly patent.    No acute fracture or dislocation.  No advanced degenerative changes.                                       X-Ray Chest AP Portable (Final result)  Result time 08/27/24 09:43:54      Final result by Padilla Correia MD (08/27/24 09:43:54)                   Impression:      No acute cardiopulmonary finding identified on this single view.      Electronically signed by: Padilla Correia MD  Date:    08/27/2024  Time:    09:43               Narrative:    EXAMINATION:  XR CHEST AP PORTABLE    CLINICAL HISTORY:  Provided history is "Sepsis;  ".    TECHNIQUE:  One view of the chest.    COMPARISON:  02/20/2019.    FINDINGS:  Cardiac silhouette is not enlarged.  No focal consolidation.  No sizable pleural effusion.  No pneumothorax.                                       Medications   0.9%  NaCl infusion (has no administration in time range)   ketorolac injection 15 mg (has no administration in time range)   clindamycin in D5W 900 mg/50 mL IVPB 900 mg (has no administration in time range)   LIDOcaine (PF) 10 mg/ml (1%) injection 10 mg (has no administration in time range)   sodium chloride 0.9% flush 10 mL (has no administration in time range)   acetaminophen tablet 650 mg (has no administration in time range) "   oxyCODONE immediate release tablet 5 mg (has no administration in time range)   oxyCODONE immediate release tablet 10 mg (has no administration in time range)   ondansetron disintegrating tablet 4 mg (has no administration in time range)   vancomycin (VANCOCIN) 1,000 mg in D5W 250 mL IVPB (Vial-Mate) (has no administration in time range)   morphine injection 4 mg (has no administration in time range)   naloxone 0.4 mg/mL injection 0.4 mg (has no administration in time range)   sodium chloride 0.9% bolus 1,836 mL 1,836 mL (0 mLs Intravenous Stopped 8/27/24 0939)   vancomycin 1,500 mg in D5W 250 mL IVPB (Vial-Mate) (0 mg Intravenous Stopped 8/27/24 1124)   ketorolac injection 15 mg (15 mg Intravenous Given 8/27/24 0838)   iohexoL (OMNIPAQUE 350) injection 100 mL (100 mLs Intravenous Given 8/27/24 0945)   LORazepam (ATIVAN) injection 1 mg (1 mg Intravenous Given 8/27/24 1226)   diphenhydrAMINE injection 25 mg (25 mg Intravenous Given 8/27/24 1226)   potassium chloride 10 mEq in 100 mL IVPB (0 mEq Intravenous Stopped 8/27/24 1329)   potassium chloride SA CR tablet 40 mEq (40 mEq Oral Given 8/27/24 1512)     Medical Decision Making  Amount and/or Complexity of Data Reviewed  Labs: ordered. Decision-making details documented in ED Course.  Radiology: ordered. Decision-making details documented in ED Course.  ECG/medicine tests: ordered and independent interpretation performed. Decision-making details documented in ED Course.     Details: EKG independently interpreted by Dr. West: See ED Course.       Risk  Prescription drug management.    Medical Decision Making:    This is an evaluation of a 34 y.o. female that presents to the Emergency Department for   Chief Complaint   Patient presents with    Rash     Pt presents w/ a c/o of rash to the face for a 'long time.' Unable to schedule appointment for f/u w/ derm.     Leg Pain     Pt presents w/ a c/o of right thigh pain for approx 2 weeks. Denies any injuries.         The patient is a non-toxic and well appearing patient. On physical exam, patient appears well hydrated with moist mucus membranes. Breath sounds are clear and equal bilaterally with no adventitious breath sounds, tachypnea or respiratory distress. Regular rate and rhythm. No murmurs. Abdomen soft and non tender. Patient is tolerating PO without difficulty. Physical exam otherwise as above.     I have reviewed vital signs and nursing notes.   Vital Signs Are Reassuring.     Based on the patient's symptoms, I am considering and evaluating for the following differential diagnoses: Pregnancy, Cellulitis, Abscess, Sepsis, Rash, Gangrene.     Consider hospitalization for:  Sepsis    Patient is agreeable to transfer and admission to [facility].    ED Course:Treatment in the ED included Physical Exam and medications given in ED  Medications   0.9%  NaCl infusion (has no administration in time range)   ketorolac injection 15 mg (has no administration in time range)   clindamycin in D5W 900 mg/50 mL IVPB 900 mg (has no administration in time range)   LIDOcaine (PF) 10 mg/ml (1%) injection 10 mg (has no administration in time range)   sodium chloride 0.9% flush 10 mL (has no administration in time range)   acetaminophen tablet 650 mg (has no administration in time range)   oxyCODONE immediate release tablet 5 mg (has no administration in time range)   oxyCODONE immediate release tablet 10 mg (has no administration in time range)   ondansetron disintegrating tablet 4 mg (has no administration in time range)   vancomycin (VANCOCIN) 1,000 mg in D5W 250 mL IVPB (Vial-Mate) (has no administration in time range)   morphine injection 4 mg (has no administration in time range)   naloxone 0.4 mg/mL injection 0.4 mg (has no administration in time range)   sodium chloride 0.9% bolus 1,836 mL 1,836 mL (0 mLs Intravenous Stopped 8/27/24 0939)   vancomycin 1,500 mg in D5W 250 mL IVPB (Vial-Mate) (0 mg Intravenous  Stopped 8/27/24 1124)   ketorolac injection 15 mg (15 mg Intravenous Given 8/27/24 0838)   iohexoL (OMNIPAQUE 350) injection 100 mL (100 mLs Intravenous Given 8/27/24 0945)   LORazepam (ATIVAN) injection 1 mg (1 mg Intravenous Given 8/27/24 1226)   diphenhydrAMINE injection 25 mg (25 mg Intravenous Given 8/27/24 1226)   potassium chloride 10 mEq in 100 mL IVPB (0 mEq Intravenous Stopped 8/27/24 1329)   potassium chloride SA CR tablet 40 mEq (40 mEq Oral Given 8/27/24 1512)   .   Patient reports feeling better after treatment in the ER.       External Data/Documents Reviewed: Previous medical records and vital signs reviewed, see HPI and Physical exam.   Labs: ordered and reviewed.  Potassium low at 3.2.  Radiology: ordered as indicated and reviewed.  No pneumothorax.  ECG/medicine tests: ordered and independent interpretation performed by Dr. Rebekah West DO. Decision-making details documented in ED Course.   Cardiac monitor placed for tachycardia. Monitor shows Sinus Tachycardia with  rate of 103. Interpreted by Dr. Rebekah West DO.    Risk  Diagnosis or treatment significantly limited by the following social determinants of health: Body mass index is 23.91 kg/m².     In shared decision making with the patient, we discussed treatment, prescriptions, labs, and imaging results.        I contacted  at time 11:53 a.m. Requesting consultation with General surgery for services not available at this facility.  Placed general surgery consult at 10:58 a.m. patient accepted by General surgery Dr. Villalta at 11:24 a.m..  Discussed patient's presentation, past medical history, physical exam, labs, radiology results, vital signs, and ED course.      Patient accepted by Dr. Collier for transfer and admission at time 12:03 pm.   At this time patient will be transferred & admitted.  Patient will be transferred via EMS to accepting facility.      At time of transfer patient is awake alert oriented x4 speaking clearly in full  sentences and moving all 4 extremities.     The following labs and imaging were reviewed:      Admission on 08/27/2024   Component Date Value Ref Range Status    POC Preg Test, Ur 08/27/2024 Negative  Negative Final     Acceptable 08/27/2024 Yes   Final    QRS Duration 08/27/2024 72  ms Final    OHS QTC Calculation 08/27/2024 467  ms Final    Albumin, POC 08/27/2024 3.2  3.3 - 5.5 g/dL Final    Alkaline Phosphatase, POC 08/27/2024 82  42 - 141 U/L Final    ALT (SGPT), POC 08/27/2024 9 (L)  10 - 47 U/L Final    AST (SGOT), POC 08/27/2024 21  11 - 38 U/L Final    POC BUN 08/27/2024 6 (L)  7 - 22 mg/dL Final    Calcium, POC 08/27/2024 8.8  8.0 - 10.3 mg/dL Final    POC Chloride 08/27/2024 99  98 - 108 mmol/L Final    POC Creatinine 08/27/2024 0.8  0.6 - 1.2 mg/dL Final    POC Glucose 08/27/2024 91  73 - 118 mg/dL Final    POC Potassium 08/27/2024 3.2 (L)  3.6 - 5.1 mmol/L Final    POC Sodium 08/27/2024 136  128 - 145 mmol/L Final    Bilirubin, POC 08/27/2024 0.6  0.2 - 1.6 mg/dL Final    POC TCO2 08/27/2024 28  18 - 33 mmol/L Final    Protein, POC 08/27/2024 8.7 (H)  6.4 - 8.1 g/dL Final    POC PH 08/27/2024 7.370  7.35 - 7.45 Final    POC PCO2 08/27/2024 47.6 (H)  35 - 45 mmHg Final    POC PO2 08/27/2024 22 (LL)  40 - 60 mmHg Final    POC HCO3 08/27/2024 27.5  24 - 28 mmol/L Final    POC BE 08/27/2024 1  -2 to 2 mmol/L Final    POC SATURATED O2 08/27/2024 36  95 - 100 % Final    POC Lactate 08/27/2024 1.01  0.5 - 2.2 mmol/L Final    POC TCO2 08/27/2024 29  24 - 29 mmol/L Final    Sample 08/27/2024 VENOUS   Final    Site 08/27/2024 Other   Final    Allens Test 08/27/2024 N/A   Final    Glucose, UA 08/27/2024 Negative   Final    Bilirubin, UA 08/27/2024 Negative   Final    Ketones, UA 08/27/2024 Negative   Final    Spec Grav UA 08/27/2024 1.025   Final    Blood, UA 08/27/2024 3+ (A)   Final    PH, UA 08/27/2024 6.5   Final    Protein, UA 08/27/2024 1+ (A)   Final    Urobilinogen, UA 08/27/2024 0.2   E.U./dL Final    Nitrite, UA 08/27/2024 Negative   Final    Leukocytes, UA 08/27/2024 Negative   Final    Color, UA POC 08/27/2024 Yellow   Final    Clarity, UA, POC 08/27/2024 Clear   Final    POC PTWBT 08/27/2024 14.0  9.7 - 14.3 sec Final    POC PTINR 08/27/2024 1.2  0.9 - 1.2 Final    Sample 08/27/2024 unknown   Final    POC Cardiac Troponin I 08/27/2024 0.00  0.00 - 0.08 ng/mL Final    Sample 08/27/2024 unknown   Final    Comment: A single negative troponin is insufficient to rule out myocardial infarction.  The use of a serial sampling protocol is recommended practice. Correlate results with reference intervals established for methodology used. Point of care and core laboratory   troponin results are not interchangeable.      POC B-Type Natriuretic Peptide 08/27/2024 6.0  0.0 - 100.0 pg/mL Final        Imaging Results               CT Pelvis With IV Contrast NO Oral Contrast (Final result)  Result time 08/27/24 10:29:50      Final result by Padilla Correia MD (08/27/24 10:29:50)                   Impression:      Abnormal skin thickening and cutaneous lesions in the bilateral thigh and gluteal soft tissues, right side significantly worse than left.  Probable small collection in the medial right upper thigh adjacent to the right labia measuring up to 2 cm in size.  Additional smaller collections and multifocal skin thickening in the intergluteal region and left posterior gluteal region near the skin surface.  Hidradenitis suppurativa and/or abscesses include in the differential.  Suggest correlation with physical exam findings and dermatology follow-up as clinically warranted.    No soft tissue gas.    Mildly enlarged pelvic and inguinal lymph nodes.    Other findings discussed in the body of the report.    This report was flagged in Epic as abnormal.      Electronically signed by: Padilla Correia MD  Date:    08/27/2024  Time:    10:29               Narrative:    EXAMINATION:  CT PELVIS WITH IV  CONTRAST    CLINICAL HISTORY:  Abdominal abscess/infection suspected;Patient with significant abscess to right groin extending down to distal right thigh.  Please extend CT evaluation to distal right thigh.  Rule out Giana's gangrene;    TECHNIQUE:  CT images of the pelvis and bilateral thighs obtained after the administration of 100 cc Omnipaque 350 IV contrast.  Axial, coronal, and sagittal reconstructions were created for both acquisitions.  No oral contrast was administered.  Thigh CT order not available to link to this study at the time of dictation.  Delayed phase images also obtained.    COMPARISON:  CT abdomen pelvis, 02/18/2019.    FINDINGS:  Uterus is retroverted and otherwise unremarkable.  Urinary bladder is decompressed and not well evaluated.  Minimal wall thickening of the rectum, potentially exaggerated by decompressed state.  Probable dominant follicle in the right adnexa measuring 2.5 cm in size.  Borderline enlarged pelvic sidewall lymph nodes measuring up to 1 cm in short axis on the right (series 4, image 57).  Prominent bilateral inguinal lymph nodes measuring up to 1.2 cm in short axis.  Trace pelvic free fluid, likely physiologic.    Abnormal skin thickening and soft tissue edema involving the anteromedial right thigh.  Small collection in this region measuring roughly 2.1 x 1.8 x 1.2 cm (coronal series 602, image 19 and axial series 8, image 86).  Skin thickening and soft tissue edema extends along the anteromedial aspect of the right thigh to the level of the lower thigh.  No soft tissue edema adjacent to either knee.  Minimal skin thickening and cutaneous nodules in the left thigh soft tissues near the skin surface.  Small nodular density with questionable small collection in the posterior aspect of the right upper thighs/lower gluteal region (series 8, image 121).  Skin thickening and questionable small collections in the intergluteal region (series 8, images 61 and 72) and left  "gluteal soft tissues near the skin surface (series 8, image 74).  Additional small collection and nodule skin thickening in the left upper gluteal region measuring 1.5 cm in size (series 8, image 19).  Additional mild nodular skin thickening in the lateral aspect of the right thigh.  No soft tissue gas.  Major veins and arteries are grossly patent.    No acute fracture or dislocation.  No advanced degenerative changes.                                       X-Ray Chest AP Portable (Final result)  Result time 08/27/24 09:43:54      Final result by Padilla Correia MD (08/27/24 09:43:54)                   Impression:      No acute cardiopulmonary finding identified on this single view.      Electronically signed by: Padilla Correia MD  Date:    08/27/2024  Time:    09:43               Narrative:    EXAMINATION:  XR CHEST AP PORTABLE    CLINICAL HISTORY:  Provided history is "Sepsis;  ".    TECHNIQUE:  One view of the chest.    COMPARISON:  02/20/2019.    FINDINGS:  Cardiac silhouette is not enlarged.  No focal consolidation.  No sizable pleural effusion.  No pneumothorax.                                            Scribe Attestation:   Scribe #1: I performed the above scribed service and the documentation accurately describes the services I performed. I attest to the accuracy of the note.        ED Course as of 08/27/24 1605   Tue Aug 27, 2024   0838 EKG independently interpreted by Dr. West reads: No STEMI. Rate of 96. Normal Sinus Rhythm. Normal Axis. Abnormal EKG. QTc normal at 467. When compared to prior EKG dated 02/01/22 rate decreased by 30 bpm.  [CF]      ED Course User Index  [CF] Elaine Parker, Dr. Rebekah West, personally performed the services described in this documentation. This document was produced by a scribe under my direction and in my presence. All medical record entries made by the scribe were at my direction and in my presence.  I have reviewed the chart and " agree that the record reflects my personal performance and is accurate and complete. Rebekah West DO.     08/27/2024 4:04 PM    Clinical Impression:  Final diagnoses:  [R00.0] Tachycardia  [L03.90] Cellulitis, unspecified cellulitis site  [L02.91] Abscess  [L02.214] Abscess of right groin  [L02.214] Groin abscess          ED Disposition Condition    Observation Stable                Rebekah West DO  08/27/24 6834

## 2024-08-27 NOTE — NURSING
[facility]-  Rapid Response Nurse Intervention/ Task    Date of Visit: 08/27/2024  Time of Visit: 1758       INTERVENTIONS/ TASK Completed:     MEWS monitoring mews score 3 with BP 98/54. Pt just arrived from [place] ED and BP upon arrival to room 434 BP 78/46. Staff spoke with MD pt receiving bolus. I will have the rapid nurse monitor tonight.

## 2024-08-27 NOTE — NURSING
Patient's BP was 78/46. Drowsy, but arousable. Notified MD. MD put order in for 1L LR bolus, CBC, and BMP.

## 2024-08-27 NOTE — LETTER
August 29, 2024         Sanjeev YANG  [facility]  [place] [address]  Phone: [phone]  Fax: [phone]       Patient: Lora Scott   YOB: 1990  Date of Visit: 08/29/2024    To Whom It May Concern:    Ziyad Scott  was at [facility] on 08/27/2024-08/29/24. The patient may return to work/school on 9/9/2024 with no restrictions. If you have any questions or concerns, or if I can be of further assistance, please do not hesitate to contact me.    Sincerely,    Otilia Saucedo RN

## 2024-08-28 ENCOUNTER — ANESTHESIA (OUTPATIENT)
Dept: SURGERY | Facility: HOSPITAL | Age: 34
End: 2024-08-28
Payer: MEDICAID

## 2024-08-28 PROBLEM — L02.214 ABSCESS OF RIGHT GROIN: Status: ACTIVE | Noted: 2024-08-28

## 2024-08-28 LAB
ANION GAP SERPL CALC-SCNC: 5 MMOL/L (ref 8–16)
BASOPHILS # BLD AUTO: 0.01 K/UL (ref 0–0.2)
BASOPHILS NFR BLD: 0.2 % (ref 0–1.9)
BUN SERPL-MCNC: 4 MG/DL (ref 6–20)
CALCIUM SERPL-MCNC: 8.1 MG/DL (ref 8.7–10.5)
CHLORIDE SERPL-SCNC: 111 MMOL/L (ref 95–110)
CO2 SERPL-SCNC: 20 MMOL/L (ref 23–29)
CREAT SERPL-MCNC: 0.6 MG/DL (ref 0.5–1.4)
DIFFERENTIAL METHOD BLD: ABNORMAL
EOSINOPHIL # BLD AUTO: 0.1 K/UL (ref 0–0.5)
EOSINOPHIL NFR BLD: 2.9 % (ref 0–8)
ERYTHROCYTE [DISTWIDTH] IN BLOOD BY AUTOMATED COUNT: 14.2 % (ref 11.5–14.5)
EST. GFR  (NO RACE VARIABLE): >60 ML/MIN/1.73 M^2
GLUCOSE SERPL-MCNC: 64 MG/DL (ref 70–110)
HCT VFR BLD AUTO: 28.5 % (ref 37–48.5)
HGB BLD-MCNC: 9 G/DL (ref 12–16)
IMM GRANULOCYTES # BLD AUTO: 0.04 K/UL (ref 0–0.04)
IMM GRANULOCYTES NFR BLD AUTO: 0.8 % (ref 0–0.5)
LYMPHOCYTES # BLD AUTO: 2.2 K/UL (ref 1–4.8)
LYMPHOCYTES NFR BLD: 44.5 % (ref 18–48)
MCH RBC QN AUTO: 29.4 PG (ref 27–31)
MCHC RBC AUTO-ENTMCNC: 31.6 G/DL (ref 32–36)
MCV RBC AUTO: 93 FL (ref 82–98)
MONOCYTES # BLD AUTO: 0.5 K/UL (ref 0.3–1)
MONOCYTES NFR BLD: 10.1 % (ref 4–15)
NEUTROPHILS # BLD AUTO: 2 K/UL (ref 1.8–7.7)
NEUTROPHILS NFR BLD: 41.5 % (ref 38–73)
NRBC BLD-RTO: 0 /100 WBC
PLATELET # BLD AUTO: ABNORMAL K/UL (ref 150–450)
PMV BLD AUTO: ABNORMAL FL (ref 9.2–12.9)
POCT GLUCOSE: 144 MG/DL (ref 70–110)
POTASSIUM SERPL-SCNC: 4.3 MMOL/L (ref 3.5–5.1)
RBC # BLD AUTO: 3.06 M/UL (ref 4–5.4)
SODIUM SERPL-SCNC: 136 MMOL/L (ref 136–145)
VANCOMYCIN TROUGH SERPL-MCNC: 12.9 UG/ML (ref 10–22)
WBC # BLD AUTO: 4.85 K/UL (ref 3.9–12.7)

## 2024-08-28 PROCEDURE — 63600175 PHARM REV CODE 636 W HCPCS: Performed by: NURSE ANESTHETIST, CERTIFIED REGISTERED

## 2024-08-28 PROCEDURE — 80048 BASIC METABOLIC PNL TOTAL CA: CPT

## 2024-08-28 PROCEDURE — 63600175 PHARM REV CODE 636 W HCPCS: Performed by: EMERGENCY MEDICINE

## 2024-08-28 PROCEDURE — 87070 CULTURE OTHR SPECIMN AEROBIC: CPT

## 2024-08-28 PROCEDURE — 36000705 HC OR TIME LEV I EA ADD 15 MIN: Performed by: SURGERY

## 2024-08-28 PROCEDURE — 36000704 HC OR TIME LEV I 1ST 15 MIN: Performed by: SURGERY

## 2024-08-28 PROCEDURE — 96361 HYDRATE IV INFUSION ADD-ON: CPT

## 2024-08-28 PROCEDURE — 36415 COLL VENOUS BLD VENIPUNCTURE: CPT | Performed by: SURGERY

## 2024-08-28 PROCEDURE — 96367 TX/PROPH/DG ADDL SEQ IV INF: CPT

## 2024-08-28 PROCEDURE — 87205 SMEAR GRAM STAIN: CPT

## 2024-08-28 PROCEDURE — 25000003 PHARM REV CODE 250

## 2024-08-28 PROCEDURE — 96376 TX/PRO/DX INJ SAME DRUG ADON: CPT

## 2024-08-28 PROCEDURE — 96366 THER/PROPH/DIAG IV INF ADDON: CPT

## 2024-08-28 PROCEDURE — 37000009 HC ANESTHESIA EA ADD 15 MINS: Performed by: SURGERY

## 2024-08-28 PROCEDURE — 96365 THER/PROPH/DIAG IV INF INIT: CPT | Mod: 59

## 2024-08-28 PROCEDURE — 36415 COLL VENOUS BLD VENIPUNCTURE: CPT

## 2024-08-28 PROCEDURE — 80202 ASSAY OF VANCOMYCIN: CPT | Performed by: SURGERY

## 2024-08-28 PROCEDURE — 63600175 PHARM REV CODE 636 W HCPCS

## 2024-08-28 PROCEDURE — 71000033 HC RECOVERY, INTIAL HOUR: Performed by: SURGERY

## 2024-08-28 PROCEDURE — 85025 COMPLETE CBC W/AUTO DIFF WBC: CPT

## 2024-08-28 PROCEDURE — 37000008 HC ANESTHESIA 1ST 15 MINUTES: Performed by: SURGERY

## 2024-08-28 PROCEDURE — 87075 CULTR BACTERIA EXCEPT BLOOD: CPT

## 2024-08-28 PROCEDURE — 63600175 PHARM REV CODE 636 W HCPCS: Mod: JZ,JG | Performed by: SURGERY

## 2024-08-28 PROCEDURE — 25000003 PHARM REV CODE 250: Performed by: NURSE ANESTHETIST, CERTIFIED REGISTERED

## 2024-08-28 PROCEDURE — 94761 N-INVAS EAR/PLS OXIMETRY MLT: CPT

## 2024-08-28 PROCEDURE — G0378 HOSPITAL OBSERVATION PER HR: HCPCS

## 2024-08-28 RX ORDER — MIDAZOLAM HYDROCHLORIDE 1 MG/ML
INJECTION INTRAMUSCULAR; INTRAVENOUS
Status: DISCONTINUED | OUTPATIENT
Start: 2024-08-28 | End: 2024-08-28

## 2024-08-28 RX ORDER — DEXAMETHASONE SODIUM PHOSPHATE 4 MG/ML
INJECTION, SOLUTION INTRA-ARTICULAR; INTRALESIONAL; INTRAMUSCULAR; INTRAVENOUS; SOFT TISSUE
Status: DISCONTINUED | OUTPATIENT
Start: 2024-08-28 | End: 2024-08-28

## 2024-08-28 RX ORDER — FENTANYL CITRATE 50 UG/ML
INJECTION, SOLUTION INTRAMUSCULAR; INTRAVENOUS
Status: DISCONTINUED | OUTPATIENT
Start: 2024-08-28 | End: 2024-08-28

## 2024-08-28 RX ORDER — ONDANSETRON HYDROCHLORIDE 2 MG/ML
INJECTION, SOLUTION INTRAVENOUS
Status: DISCONTINUED | OUTPATIENT
Start: 2024-08-28 | End: 2024-08-28

## 2024-08-28 RX ORDER — LIDOCAINE HYDROCHLORIDE 20 MG/ML
INJECTION INTRAVENOUS
Status: DISCONTINUED | OUTPATIENT
Start: 2024-08-28 | End: 2024-08-28

## 2024-08-28 RX ORDER — OXYCODONE HYDROCHLORIDE 5 MG/1
5 TABLET ORAL EVERY 6 HOURS PRN
Qty: 8 TABLET | Refills: 0 | Status: CANCELLED | OUTPATIENT
Start: 2024-08-28

## 2024-08-28 RX ORDER — PROPOFOL 10 MG/ML
VIAL (ML) INTRAVENOUS
Status: DISCONTINUED | OUTPATIENT
Start: 2024-08-28 | End: 2024-08-28

## 2024-08-28 RX ORDER — PHENYLEPHRINE HYDROCHLORIDE 10 MG/ML
INJECTION INTRAVENOUS
Status: DISCONTINUED | OUTPATIENT
Start: 2024-08-28 | End: 2024-08-28

## 2024-08-28 RX ADMIN — CLINDAMYCIN IN 5 PERCENT DEXTROSE 900 MG: 18 INJECTION, SOLUTION INTRAVENOUS at 04:08

## 2024-08-28 RX ADMIN — VANCOMYCIN HYDROCHLORIDE 1000 MG: 1 INJECTION, POWDER, LYOPHILIZED, FOR SOLUTION INTRAVENOUS at 10:08

## 2024-08-28 RX ADMIN — GLYCOPYRROLATE 0.2 MG: 0.2 INJECTION, SOLUTION INTRAMUSCULAR; INTRAVITREAL at 11:08

## 2024-08-28 RX ADMIN — VANCOMYCIN HYDROCHLORIDE 1000 MG: 1 INJECTION, POWDER, LYOPHILIZED, FOR SOLUTION INTRAVENOUS at 09:08

## 2024-08-28 RX ADMIN — SODIUM CHLORIDE, POTASSIUM CHLORIDE, SODIUM LACTATE AND CALCIUM CHLORIDE: 600; 310; 30; 20 INJECTION, SOLUTION INTRAVENOUS at 07:08

## 2024-08-28 RX ADMIN — SODIUM CHLORIDE, POTASSIUM CHLORIDE, SODIUM LACTATE AND CALCIUM CHLORIDE: 600; 310; 30; 20 INJECTION, SOLUTION INTRAVENOUS at 03:08

## 2024-08-28 RX ADMIN — FENTANYL CITRATE 50 MCG: 0.05 INJECTION, SOLUTION INTRAMUSCULAR; INTRAVENOUS at 11:08

## 2024-08-28 RX ADMIN — SODIUM CHLORIDE, POTASSIUM CHLORIDE, SODIUM LACTATE AND CALCIUM CHLORIDE 1000 ML: 600; 310; 30; 20 INJECTION, SOLUTION INTRAVENOUS at 04:08

## 2024-08-28 RX ADMIN — PHENYLEPHRINE HYDROCHLORIDE 100 MCG: 10 INJECTION INTRAVENOUS at 11:08

## 2024-08-28 RX ADMIN — DEXAMETHASONE SODIUM PHOSPHATE 4 MG: 4 INJECTION, SOLUTION INTRAMUSCULAR; INTRAVENOUS at 11:08

## 2024-08-28 RX ADMIN — PROPOFOL 130 MG: 10 INJECTION, EMULSION INTRAVENOUS at 11:08

## 2024-08-28 RX ADMIN — FENTANYL CITRATE 50 MCG: 0.05 INJECTION, SOLUTION INTRAMUSCULAR; INTRAVENOUS at 12:08

## 2024-08-28 RX ADMIN — MIDAZOLAM HYDROCHLORIDE 2 MG: 1 INJECTION INTRAMUSCULAR; INTRAVENOUS at 11:08

## 2024-08-28 RX ADMIN — ONDANSETRON 4 MG: 2 INJECTION, SOLUTION INTRAMUSCULAR; INTRAVENOUS at 11:08

## 2024-08-28 RX ADMIN — SODIUM CHLORIDE, SODIUM LACTATE, POTASSIUM CHLORIDE, AND CALCIUM CHLORIDE: .6; .31; .03; .02 INJECTION, SOLUTION INTRAVENOUS at 11:08

## 2024-08-28 RX ADMIN — LIDOCAINE HYDROCHLORIDE 100 MG: 20 INJECTION, SOLUTION INTRAVENOUS at 11:08

## 2024-08-28 RX ADMIN — CLINDAMYCIN IN 5 PERCENT DEXTROSE 900 MG: 18 INJECTION, SOLUTION INTRAVENOUS at 08:08

## 2024-08-28 RX ADMIN — KETOROLAC TROMETHAMINE 15 MG: 30 INJECTION, SOLUTION INTRAMUSCULAR at 12:08

## 2024-08-28 NOTE — NURSING
RAPID RESPONSE NURSE PROACTIVE ROUNDING NOTE       Time of Visit: 0020    Admit Date: 2024  LOS: 0  Code Status: Full Code   Date of Visit: 2024  : 1990  Age: 34 y.o.  Sex: female  Race: Black or   [facility]   MRN: [id number]  Was the patient discharged from an ICU this admission? No   Was the patient discharged from a PACU within last 24 hours? No   Did the patient receive conscious sedation/general anesthesia in last 24 hours? No   Was the patient in the ED within the past 24 hours? Yes   Was the patient on NIPPV within the past 24 hours? No   Attending Physician: Jere Collier MD  Primary Service: General Surgery,Surgery   Time spent at the bedside: < 15 min    SITUATION    Notified by charge RN during rounding  Reason for alert: BP 93/55    Diagnosis: <principal problem not specified>   has a past medical history of Allergy, Anxiety, Asthma, Crohn's disease (ileum), Depression, Eczema, Former smoker, Hydradenitis, Immunosuppressed status, Perineal abscess, and Psoriasis.    Last Vitals:  Temp: 98.1 °F (36.7 °C) (2328)  Pulse: 67 (2328)  Resp: 18 (2328)  BP: 93/55 (2328)  SpO2: 97 % (2328)    24 Hour Vitals Range:  Temp:  [97.4 °F (36.3 °C)-98.5 °F (36.9 °C)]   Pulse:  [67-99]   Resp:  [16-20]   BP: ()/(46-73)   SpO2:  [97 %-100 %]     Clinical Issues: Circulatory    ASSESSMENT/INTERVENTIONS    Pt AAO X 4; LR infusing per orders.      RECOMMENDATIONS  None at this time    Discussed plan of care with charge RNRekha    PROVIDER ESCALATION    Physician escalation: No    Orders received and case discussed with NA.    Disposition:Remain in room 434    FOLLOW UP    Call back the Rapid Response NurseChristine at [phone] for additional questions or concerns.

## 2024-08-28 NOTE — PLAN OF CARE
08/28/24 1034   Discharge Planning   Assessment Type Discharge Planning Brief Assessment   Resource/Environmental Concerns none   Support Systems Parent   Equipment Currently Used at Home none   Current Living Arrangements home   Patient/Family Anticipates Transition to home   Patient/Family Anticipated Services at Transition none   DME Needed Upon Discharge  none   Discharge Plan A Home with family  (with instructions to follow up)       [facility]  [address]  [facility] [address]  Phone: [phone] Fax: [phone]    [facility] - [place], [place] - [address]  [address]  [address]  [facility] [address]  Phone: [phone] Fax: [phone]    [facility] - [place], [place] - [address] [place] [address]  Phone: [phone] Fax: [phone]    [id number] [address] AT [facility] [address]  [address]  [facility] [address]  Phone: [phone] Fax: [phone]    [facility] - [place], [place] - [address] [place] [address] [address] AT [address] [place] [address] , [address]  [address] [place] [address] [address]  [address] [place] [address]  Phone: [phone] Fax: [phone]

## 2024-08-28 NOTE — TRANSFER OF CARE
"Anesthesia Transfer of Care Note    Patient: Lora Scott    Procedure(s) Performed: Procedure(s) (LRB):  INCISION AND DRAINAGE, ABSCESS (Right)    Patient location: PACU    Anesthesia Type: general    Transport from OR: Transported from OR on room air with adequate spontaneous ventilation    Post pain: adequate analgesia    Post assessment: no apparent anesthetic complications and tolerated procedure well    Post vital signs: stable    Level of consciousness: awake and lethargic    Nausea/Vomiting: no nausea/vomiting    Complications: none    Transfer of care protocol was followed    Last vitals: Visit Vitals  BP (!) 130/51 (BP Location: Left arm, Patient Position: Lying)   Pulse 109   Temp 36.1 °C (97 °F) (Temporal)   Resp (!) 23   Ht 5' 3" (1.6 m)   Wt 62 kg (136 lb 11 oz)   LMP 08/07/2024 (Within Days)   SpO2 99%   Breastfeeding No   BMI 24.21 kg/m²     "

## 2024-08-28 NOTE — ANESTHESIA PREPROCEDURE EVALUATION
2024    Pre-operative evaluation for Procedure(s) (LRB):  INCISION AND DRAINAGE, ABSCESS (Right)    Lora Scott is a 34 y.o. female     Patient Active Problem List   Diagnosis    Psoriasis    Crohn's disease (ileum)    Immunosuppressed status    High myopia, bilateral    Wears contact lenses    Perineal abscess    Pregnancy with one fetus, antepartum    Anemia    Status post incision and drainage of perineal abscesses    S/P  section    Abscess of buttock, right       Review of patient's allergies indicates:   Allergen Reactions    Horse/equine containing products Itching       No current facility-administered medications on file prior to encounter.     Current Outpatient Medications on File Prior to Encounter   Medication Sig Dispense Refill    adalimumab 40 mg/0.4 mL PnKt Inject 0.4 mLs (40 mg total) into the skin every 7 days. 4 pen 5    ciprofloxacin HCl (CILOXAN) 0.3 % ophthalmic solution Instill 1 to 2 drops into the conjunctival sac of the affected eye(s) every 2 hours while awake for 2 days, then 1 to 2 drops every 4 hours while awake for the next 5 days. 10 mL 0    ergocalciferol (ERGOCALCIFEROL) 50,000 unit Cap Take 1 capsule (50,000 Units total) by mouth every 7 days. (Patient not taking: Reported on 2024) 12 capsule 1    etonogestreL (NEXPLANON) 68 mg Impl subdermal device 68 mg by Subdermal route once. A single NEXPLANON implant is inserted subdermally just under the skin at the inner side of the non-dominant upper arm.      fluocinonide (LIDEX) 0.05 % external solution Apply topically 2 (two) times daily as needed. 60 mL 1         Social History     Socioeconomic History    Marital status: Single   Tobacco Use    Smoking status: Every Day     Current packs/day: 0.00     Types: Cigarettes     Last attempt to quit: 2020     Years since quittin.6    Smokeless tobacco:  Never   Substance and Sexual Activity    Alcohol use: Not Currently     Alcohol/week: 0.0 standard drinks of alcohol     Comment: Socially    Drug use: Yes     Frequency: 3.0 times per week     Types: Marijuana    Sexual activity: Not Currently     Partners: Male     Birth control/protection: Implant     Comment: nexplanon inserted 2023     Social Determinants of Health     Financial Resource Strain: Low Risk  (2024)    Received from [facility]    Overall Financial Resource Strain (CARDIA)     Difficulty of Paying Living Expenses: Not hard at all   Food Insecurity: No Food Insecurity (2024)    Received from [facility]    Hunger Vital Sign     Worried About Running Out of Food in the Last Year: Never true     Ran Out of Food in the Last Year: Never true   Transportation Needs: No Transportation Needs (2024)    Received from [facility]    PRAPARE - Transportation     Lack of Transportation (Medical): No     Lack of Transportation (Non-Medical): No   Physical Activity: Insufficiently Active (2024)    Received from [facility]    Exercise Vital Sign     Days of Exercise per Week: 4 days     Minutes of Exercise per Session: 30 min   Stress: No Stress Concern Present (2024)    Received from [facility]    [employer] - Occupational Stress Questionnaire     Feeling of Stress : Only a little   Housing Stability: Unknown (2024)    Housing Stability Vital Sign     Unable to Pay for Housing in the Last Year: No     Unstable Housing in the Last Year: No     LABS.  CBC    CMP:   Recent Labs     24  1833      K 4.2   *   CO2 19*   BUN 5*   CREATININE 0.7   GLU 74   CALCIUM 8.2*       INR  Recent Labs     24  0838   INR 1.2           Diagnostic Studies:      EKD Echo:  No results found for this or any previous visit.        Pre-op Assessment    I have reviewed the Patient Summary Reports.     I have reviewed the Nursing Notes.        Review of Systems  Anesthesia Hx:  No problems with previous Anesthesia   History of prior surgery of interest to airway management or planning:            Denies Personal Hx of Anesthesia complications.                    Social:  Smoker       Hematology/Oncology:  Hematology Normal   Oncology Normal                                   EENT/Dental:  EENT/Dental Normal           Cardiovascular:  Cardiovascular Normal                                            Pulmonary:    Asthma mild    Has not used albuterol in years  Asthma:               Renal/:  Renal/ Normal                 Hepatic/GI:        Crohn's disease   Bowel Conditions:  Inflammatory Bowel Disease, Crohns        Musculoskeletal:  Musculoskeletal Normal                Neurological:  Neurology Normal                                      Endocrine:  Endocrine Normal            Dermatological:  Groin abscess   Psych:  Psychiatric History anxiety                 Physical Exam  General: Well nourished, Cooperative, Alert and Oriented    Airway:  Mallampati: II   Mouth Opening: Normal  TM Distance: 4 - 6 cm  Tongue: Normal  Neck ROM: Normal ROM    Dental:  Edentulous    Chest/Lungs:  Normal Respiratory Rate    Heart:  Rate: Normal  Rhythm: Regular Rhythm      Wt Readings from Last 3 Encounters:   08/27/24 62 kg (136 lb 11 oz)   08/14/24 61.7 kg (136 lb)   05/10/24 58.5 kg (129 lb)     Temp Readings from Last 3 Encounters:   08/28/24 37.1 °C (98.8 °F) (Oral)   08/14/24 36.8 °C (98.3 °F) (Oral)   05/10/24 36.8 °C (98.2 °F) (Oral)     BP Readings from Last 3 Encounters:   08/28/24 (!) 92/55   08/14/24 102/70   05/10/24 119/78     Pulse Readings from Last 3 Encounters:   08/28/24 68   08/14/24 84   05/10/24 101     Lab Results   Component Value Date    WBC 4.85 08/28/2024    HGB 9.0 (L) 08/28/2024    HCT 28.5 (L) 08/28/2024    MCV 93 08/28/2024    PLT SEE COMMENT 08/28/2024       CMP  Sodium   Date Value Ref Range Status   08/28/2024 136 136 - 145 mmol/L  Final     Potassium   Date Value Ref Range Status   08/28/2024 4.3 3.5 - 5.1 mmol/L Final     Comment:     Specimen slightly hemolyzed     Chloride   Date Value Ref Range Status   08/28/2024 111 (H) 95 - 110 mmol/L Final     CO2   Date Value Ref Range Status   08/28/2024 20 (L) 23 - 29 mmol/L Final     Glucose   Date Value Ref Range Status   08/28/2024 64 (L) 70 - 110 mg/dL Final     BUN   Date Value Ref Range Status   08/28/2024 4 (L) 6 - 20 mg/dL Final     Creatinine   Date Value Ref Range Status   08/28/2024 0.6 0.5 - 1.4 mg/dL Final     Calcium   Date Value Ref Range Status   08/28/2024 8.1 (L) 8.7 - 10.5 mg/dL Final     Total Protein   Date Value Ref Range Status   01/17/2024 8.2 6.0 - 8.4 g/dL Final     Albumin   Date Value Ref Range Status   01/17/2024 3.6 3.5 - 5.2 g/dL Final     Total Bilirubin   Date Value Ref Range Status   01/17/2024 0.4 0.1 - 1.0 mg/dL Final     Comment:     For infants and newborns, interpretation of results should be based  on gestational age, weight and in agreement with clinical  observations.    Premature Infant recommended reference ranges:  Up to 24 hours.............<8.0 mg/dL  Up to 48 hours............<12.0 mg/dL  3-5 days..................<15.0 mg/dL  6-29 days.................<15.0 mg/dL       Alkaline Phosphatase   Date Value Ref Range Status   01/17/2024 107 55 - 135 U/L Final     AST   Date Value Ref Range Status   01/17/2024 18 10 - 40 U/L Final     ALT   Date Value Ref Range Status   01/17/2024 21 10 - 44 U/L Final     Anion Gap   Date Value Ref Range Status   08/28/2024 5 (L) 8 - 16 mmol/L Final     eGFR   Date Value Ref Range Status   08/28/2024 >60 >60 mL/min/1.73 m^2 Final         Anesthesia Plan  Type of Anesthesia, risks & benefits discussed:    Anesthesia Type: Gen Natural Airway, MAC, Gen ETT, Gen Supraglottic Airway  Intra-op Monitoring Plan: Standard ASA Monitors  Post Op Pain Control Plan: multimodal analgesia and IV/PO Opioids PRN  Induction:  IV  Informed  Consent: Informed consent signed with the Patient and all parties understand the risks and agree with anesthesia plan.  All questions answered.   ASA Score: 3  Day of Surgery Review of History & Physical: H&P Update referred to the surgeon/provider.  Anesthesia Plan Notes: CBC pending    Ready For Surgery From Anesthesia Perspective.     .

## 2024-08-28 NOTE — ANESTHESIA PROCEDURE NOTES
Intubation    Date/Time: 8/28/2024 11:43 AM    Performed by: Ean Gonzales CRNA  Authorized by: Ming Sanchez Chi, MD    Intubation:     Induction:  Intravenous    Intubated:  Postinduction    Mask Ventilation:  Easy mask    Attempts:  1    Attempted By:  CRNA    Difficult Airway Encountered?: No      Complications:  None    Airway Device:  Supraglottic airway/LMA    Airway Device Size:  4.0    Style/Cuff Inflation:  Cuffed    Secured at:  The lips    Placement Verified By:  Capnometry    Complicating Factors:  None    Findings Post-Intubation:  BS equal bilateral and atraumatic/condition of teeth unchanged

## 2024-08-28 NOTE — DISCHARGE INSTRUCTIONS
Remove strip packing in 48 hours and dress with clean gauze/tape bandage  Keep wound clean and dry. Okay to shower and let water/soap gently run over. Pat dry and cover with clean gauze/tape.  Follow up in 1-2 weeks in surgery clinic

## 2024-08-28 NOTE — ANESTHESIA POSTPROCEDURE EVALUATION
Anesthesia Post Evaluation    Patient: Lora Scott    Procedure(s) Performed: Procedure(s) (LRB):  INCISION AND DRAINAGE, ABSCESS (Right)    Final Anesthesia Type: general      Patient location during evaluation: PACU  Patient participation: Yes- Able to Participate  Level of consciousness: awake and alert  Post-procedure vital signs: reviewed and stable  Pain management: adequate  Airway patency: patent    PONV status at discharge: No PONV  Anesthetic complications: no      Cardiovascular status: hemodynamically stable  Respiratory status: unassisted and spontaneous ventilation  Hydration status: euvolemic  Follow-up not needed.              Vitals Value Taken Time   BP 99/55 08/28/24 1220   Temp 36.1 °C (97 °F) 08/28/24 1216   Pulse 87 08/28/24 1227   Resp 14 08/28/24 1227   SpO2 100 % 08/28/24 1227   Vitals shown include unfiled device data.      No case tracking events are documented in the log.      Pain/Oral Score: Presence of Pain: denies (8/27/2024  4:34 PM)  Pain Rating Prior to Med Admin: 8 (8/27/2024  8:38 AM)  Pain Rating Post Med Admin: 6 (8/27/2024  9:04 AM)  Oral Score: 10 (8/28/2024 12:10 PM)

## 2024-08-28 NOTE — OP NOTE
[facility] [place] - Surgery  Operative Note    SUMMARY     Surgery Date: 8/28/2024     Surgeons and Role:     * Javon Booker MD - Primary    Assisting Surgeon: Uvaldo Espitia    Pre-op Diagnosis:  Abscess of groin, right [L02.214]    Post-op Diagnosis:  Post-Op Diagnosis Codes:     * Abscess of groin, right [L02.214]    Procedure(s) (LRB):  INCISION AND DRAINAGE, ABSCESS (Right)    Anesthesia: Choice    Indication for procedure: Lora Scott is 34 y.o. female with hidradenitis. She presents with an abscess to right groin. After discussion of disease process, we discussed options and have elected for operation to perform I&D.    Description of Procedure: After consent was obtained, patient was taken to the OR. The right groin was prepped and draped in a standard sterile fashion after general anesthesia was started. Time out was performed. Antibiotics were started with ongoing broad spectrum antibiotics. We began the procedure by making na incision over the obvious fluctuant area, right groin. Pus was drained, moderate amount. It was cultured. We packed with iodoform, half inch. It did not track except to midline where pus drained near a sinus tract.  All counts were correct x2. Patient was awakened from anesthesia and taken to the recovery room in a stable condition having suffered no issues at this time.    Description of the findings of the procedure: 2x2x2 cm area of pus. No tracking. Abscess drained and packed.    Estimated Blood Loss: * No values recorded between 8/28/2024 11:52 AM and 8/28/2024 12:00 PM *         Specimens:   Specimen (24h ago, onward)      None          Drains/Implants: None

## 2024-08-28 NOTE — NURSING
[facility], [facility]  Nurses Note -- 4 Eyes      8/28/2024       Skin assessed on: Q Shift      [x] No Pressure Injuries Present    []Prevention Measures Documented    [] Yes LDA  for Pressure Injury Previously documented     [] Yes New Pressure Injury Discovered   [] LDA for New Pressure Injury Added      Attending RN:  Jessica Johnson LPN     Second RN:  KATARZYNA Mcfadden

## 2024-08-29 VITALS
HEART RATE: 63 BPM | RESPIRATION RATE: 18 BRPM | OXYGEN SATURATION: 98 % | HEIGHT: 63 IN | SYSTOLIC BLOOD PRESSURE: 91 MMHG | DIASTOLIC BLOOD PRESSURE: 60 MMHG | WEIGHT: 136.69 LBS | BODY MASS INDEX: 24.22 KG/M2 | TEMPERATURE: 98 F

## 2024-08-29 LAB
ANION GAP SERPL CALC-SCNC: 9 MMOL/L (ref 8–16)
BACTERIA UR CULT: ABNORMAL
BASOPHILS # BLD AUTO: 0.01 K/UL (ref 0–0.2)
BASOPHILS NFR BLD: 0.1 % (ref 0–1.9)
BUN SERPL-MCNC: 10 MG/DL (ref 6–20)
CALCIUM SERPL-MCNC: 8.5 MG/DL (ref 8.7–10.5)
CHLORIDE SERPL-SCNC: 109 MMOL/L (ref 95–110)
CO2 SERPL-SCNC: 18 MMOL/L (ref 23–29)
CREAT SERPL-MCNC: 0.9 MG/DL (ref 0.5–1.4)
DIFFERENTIAL METHOD BLD: ABNORMAL
EOSINOPHIL # BLD AUTO: 0 K/UL (ref 0–0.5)
EOSINOPHIL NFR BLD: 0.1 % (ref 0–8)
ERYTHROCYTE [DISTWIDTH] IN BLOOD BY AUTOMATED COUNT: 14 % (ref 11.5–14.5)
EST. GFR  (NO RACE VARIABLE): >60 ML/MIN/1.73 M^2
GLUCOSE SERPL-MCNC: 102 MG/DL (ref 70–110)
GRAM STN SPEC: NORMAL
GRAM STN SPEC: NORMAL
HCT VFR BLD AUTO: 30.6 % (ref 37–48.5)
HGB BLD-MCNC: 9.5 G/DL (ref 12–16)
IMM GRANULOCYTES # BLD AUTO: 0.02 K/UL (ref 0–0.04)
IMM GRANULOCYTES NFR BLD AUTO: 0.3 % (ref 0–0.5)
LYMPHOCYTES # BLD AUTO: 1.9 K/UL (ref 1–4.8)
LYMPHOCYTES NFR BLD: 26.4 % (ref 18–48)
MCH RBC QN AUTO: 29.1 PG (ref 27–31)
MCHC RBC AUTO-ENTMCNC: 31 G/DL (ref 32–36)
MCV RBC AUTO: 94 FL (ref 82–98)
MONOCYTES # BLD AUTO: 0.5 K/UL (ref 0.3–1)
MONOCYTES NFR BLD: 7 % (ref 4–15)
NEUTROPHILS # BLD AUTO: 4.7 K/UL (ref 1.8–7.7)
NEUTROPHILS NFR BLD: 66.1 % (ref 38–73)
NRBC BLD-RTO: 0 /100 WBC
PLATELET # BLD AUTO: 248 K/UL (ref 150–450)
PMV BLD AUTO: 11 FL (ref 9.2–12.9)
POCT GLUCOSE: 106 MG/DL (ref 70–110)
POCT GLUCOSE: 119 MG/DL (ref 70–110)
POCT GLUCOSE: 124 MG/DL (ref 70–110)
POTASSIUM SERPL-SCNC: 3.9 MMOL/L (ref 3.5–5.1)
RBC # BLD AUTO: 3.27 M/UL (ref 4–5.4)
SODIUM SERPL-SCNC: 136 MMOL/L (ref 136–145)
WBC # BLD AUTO: 7.04 K/UL (ref 3.9–12.7)

## 2024-08-29 PROCEDURE — 80048 BASIC METABOLIC PNL TOTAL CA: CPT

## 2024-08-29 PROCEDURE — 63600175 PHARM REV CODE 636 W HCPCS

## 2024-08-29 PROCEDURE — 25000003 PHARM REV CODE 250

## 2024-08-29 PROCEDURE — 96366 THER/PROPH/DIAG IV INF ADDON: CPT

## 2024-08-29 PROCEDURE — G0378 HOSPITAL OBSERVATION PER HR: HCPCS

## 2024-08-29 PROCEDURE — 85025 COMPLETE CBC W/AUTO DIFF WBC: CPT

## 2024-08-29 PROCEDURE — 94761 N-INVAS EAR/PLS OXIMETRY MLT: CPT

## 2024-08-29 PROCEDURE — 36415 COLL VENOUS BLD VENIPUNCTURE: CPT

## 2024-08-29 PROCEDURE — 63600175 PHARM REV CODE 636 W HCPCS: Mod: JZ,JG | Performed by: SURGERY

## 2024-08-29 PROCEDURE — 96361 HYDRATE IV INFUSION ADD-ON: CPT

## 2024-08-29 RX ORDER — OXYCODONE HYDROCHLORIDE 5 MG/1
5 TABLET ORAL EVERY 6 HOURS PRN
Qty: 5 TABLET | Refills: 0 | Status: SHIPPED | OUTPATIENT
Start: 2024-08-29

## 2024-08-29 RX ORDER — CLINDAMYCIN HYDROCHLORIDE 300 MG/1
300 CAPSULE ORAL EVERY 6 HOURS
Qty: 28 CAPSULE | Refills: 0 | Status: SHIPPED | OUTPATIENT
Start: 2024-08-29 | End: 2024-09-05

## 2024-08-29 RX ADMIN — CLINDAMYCIN IN 5 PERCENT DEXTROSE 900 MG: 18 INJECTION, SOLUTION INTRAVENOUS at 07:08

## 2024-08-29 RX ADMIN — SODIUM CHLORIDE, POTASSIUM CHLORIDE, SODIUM LACTATE AND CALCIUM CHLORIDE: 600; 310; 30; 20 INJECTION, SOLUTION INTRAVENOUS at 07:08

## 2024-08-29 RX ADMIN — CLINDAMYCIN IN 5 PERCENT DEXTROSE 900 MG: 18 INJECTION, SOLUTION INTRAVENOUS at 12:08

## 2024-08-29 RX ADMIN — VANCOMYCIN HYDROCHLORIDE 1000 MG: 1 INJECTION, POWDER, LYOPHILIZED, FOR SOLUTION INTRAVENOUS at 09:08

## 2024-08-29 NOTE — NURSING
Pt had a bloody and brown moderate loose stool. Pt stated she has crohns disease. Dr. Espitia notified. No new orders at this time.

## 2024-08-29 NOTE — DISCHARGE SUMMARY
[facility]  Discharge Summary     Patient ID:  Lora Scott  [id number]  34 y.o.  1990    Admit date: 8/27/2024    Discharge Date and Time:  08/29/2024 7:17 AM    Admitting Physician: Jere Collier MD     Discharge Provider: Chapis Espitia    Reason for Admission: Abscess [L02.91]  Tachycardia [R00.0]  Groin abscess [L02.214]  Abscess of right groin [L02.214]  Abscess of groin, right [L02.214]  Cellulitis, unspecified cellulitis site [L03.90]    Admission Condition: stable    Procedures Performed: Procedure(s) (LRB):  INCISION AND DRAINAGE, ABSCESS (Right)    Hospital Course:  Lora Scott is a 34 y.o. female presents with a PMHx of Crohn's disease (on Humira) eczema, perineal abscess, hidradenitis suppurativa and psoriasis, who presented with right groin pain/swelling. She underwent incision and drainage of abscess, well tolerated. She was stable on discharged and prescribed 7-day course of clindamycin, plan to follow up in 1-2 weeks in clinic.     Consults: None    Significant Diagnostic Studies: labs, microbiology: Wound Culture: pending, and radiology    Final Diagnoses:    Principal Problem: Abscess of right groin   Secondary Diagnoses:   Active Hospital Problems    Diagnosis  POA    *Abscess of right groin [L02.214]  Yes      Resolved Hospital Problems   No resolved problems to display.       Discharged Condition: stable    Discharge Exam:  General: Awake and alert, in no acute distress, appears stated age, well-nourished  HEENT: Atraumatic, normocephalic, MMM  Cardiac: Regular rate on monitor, well-perfused  Pulm: Breathing comfortably, symmetric chest rise, no respiratory distress. Satting well on RA  Abdomen: Soft, non-tender to palpation, non-distended  MSK: moving all extremities appropriately. Right groin with improvement in cellulitis, minimal tenderness to palpation, dressings c/d/I, no drainage, packing in place  Neuro: Aox4, CN 2-12 grossly intact      Disposition: Home or Self  Care    Follow Up/Patient Instructions:     Medications:  Reconciled Home Medications:      Medication List        START taking these medications      clindamycin 300 MG capsule  Commonly known as: CLEOCIN  Take 1 capsule (300 mg total) by mouth every 6 (six) hours. for 7 days     oxyCODONE 5 MG immediate release tablet  Commonly known as: ROXICODONE  Take 1 tablet (5 mg total) by mouth every 6 (six) hours as needed for Pain.            CONTINUE taking these medications      ciprofloxacin HCl 0.3 % ophthalmic solution  Commonly known as: CILOXAN  Instill 1 to 2 drops into the conjunctival sac of the affected eye(s) every 2 hours while awake for 2 days, then 1 to 2 drops every 4 hours while awake for the next 5 days.     fluocinonide 0.05 % external solution  Commonly known as: LIDEX  Apply topically 2 (two) times daily as needed.     HUMIRA(CF) PEN 40 mg/0.4 mL Pnkt  Generic drug: adalimumab  Inject 0.4 mLs (40 mg total) into the skin every 7 days.     NEXPLANON 68 mg Impl intradermal implant  Generic drug: etonogestreL  68 mg by Subdermal route once. A single NEXPLANON implant is inserted subdermally just under the skin at the inner side of the non-dominant upper arm.            ASK your doctor about these medications      ergocalciferol 50,000 unit Cap  Commonly known as: ERGOCALCIFEROL  Take 1 capsule (50,000 Units total) by mouth every 7 days.            Discharge Procedure Orders   Ambulatory referral/consult to Wound Clinic   Standing Status: Future   Referral Priority: Routine Referral Type: Consultation   Referral Reason: Specialty Services Required   Requested Specialty: Wound Care   Number of Visits Requested: 1     Ambulatory referral/consult to Plastic Surgery   Standing Status: Future   Referral Priority: Routine Referral Type: Consultation   Referral Reason: Specialty Services Required   Requested Specialty: Plastic Surgery   Number of Visits Requested: 1     Diet Adult Regular     Notify your health  care provider if you experience any of the following:  temperature >100.4     Notify your health care provider if you experience any of the following:  persistent nausea and vomiting or diarrhea     Notify your health care provider if you experience any of the following:  severe uncontrolled pain     Notify your health care provider if you experience any of the following:  redness, tenderness, or signs of infection (pain, swelling, redness, odor or green/yellow discharge around incision site)     Notify your health care provider if you experience any of the following:  difficulty breathing or increased cough     Remove dressing in 48 hours     Activity as tolerated      Follow-up Information       Javon Booker MD Follow up in 2 week(s).    Specialties: General Surgery, Surgery  Contact information:  [address]  [address] [place] [address] [id number] [phone]                           Activity: activity as tolerated  Diet: regular diet  Wound Care: keep wound clean and dry and remove packing in 1 day and dress with clean gauze/tape    Referrals sent to Wound Care and Plastic Surgery    Follow-up with Dr. Booker in 1-2 weeks.    Signed:  Chapis Espitia  8/29/2024  7:17 AM

## 2024-08-29 NOTE — PROGRESS NOTES
Wound dressing to her rt groin reinforced with gauze and medipore.  Pt tolerated the procedure well, NAD noted.  Plan of care ongoing.

## 2024-08-29 NOTE — PLAN OF CARE
08/29/24 0743   Final Note   Assessment Type Final Discharge Note   Anticipated Discharge Disposition Home   Hospital Resources/Appts/Education Provided Appointments scheduled and added to AVS   Post-Acute Status   Post-Acute Authorization Other   Other Status No Post-Acute Service Needs     Pts nurse Otilia notified that the pt can d/c from CM standpoint

## 2024-08-29 NOTE — PROGRESS NOTES
Pharmacokinetic Assessment Follow Up: IV Vancomycin    Vancomycin serum concentration assessment(s):    The trough level was drawn correctly and can be used to guide therapy at this time. The measurement is within the desired definitive target range of 10 to 20 mcg/mL.    Vancomycin Regimen Plan:    Continue regimen to Vancomycin 1000 mg IV every 12 hours with next serum trough concentration measured at 0900 prior to fourth dose on 8/30    Drug levels (last 3 results):  Recent Labs   Lab Result Units 08/28/24  2056   Vancomycin-Trough ug/mL 12.9       Pharmacy will continue to follow and monitor vancomycin.    Please contact pharmacy at extension [phone] for questions regarding this assessment.    Thank you for the consult,   Dong Espitia       Patient brief summary:  Lora Scott is a 34 y.o. female initiated on antimicrobial therapy with IV Vancomycin for treatment of skin & soft tissue infection    Drug Allergies:   Review of patient's allergies indicates:   Allergen Reactions    Horse/equine containing products Itching       Actual Body Weight:   62 kg    Renal Function:   Estimated Creatinine Clearance: 110.3 mL/min (based on SCr of 0.6 mg/dL).,     Dialysis Method (if applicable):  N/A    CBC (last 72 hours):  Recent Labs   Lab Result Units 08/28/24  0502   WBC K/uL 4.85   Hemoglobin g/dL 9.0*   Hematocrit % 28.5*   Platelets K/uL SEE COMMENT   Gran % % 41.5   Lymph % % 44.5   Mono % % 10.1   Eosinophil % % 2.9   Basophil % % 0.2   Differential Method  Automated       Metabolic Panel (last 72 hours):  Recent Labs   Lab Result Units 08/27/24  1833 08/28/24  0502   Sodium mmol/L 136 136   Potassium mmol/L 4.2 4.3   Chloride mmol/L 111* 111*   CO2 mmol/L 19* 20*   Glucose mg/dL 74 64*   BUN mg/dL 5* 4*   Creatinine mg/dL 0.7 0.6       Vancomycin Administrations:  vancomycin given in the last 96 hours                     vancomycin (VANCOCIN) 1,000 mg in D5W 250 mL IVPB (Vial-Mate) (mg) 1,000 mg New Bag  08/28/24 2216      Restarted  1422     1,000 mg New Bag  0919     1,000 mg New Bag 08/27/24 2122    vancomycin 1,500 mg in D5W 250 mL IVPB (Vial-Mate) (mg) 1,500 mg New Bag 08/27/24 0954                    Microbiologic Results:  Microbiology Results (last 7 days)       Procedure Component Value Units Date/Time    Blood culture x two cultures. Draw prior to antibiotics. [[id number]] Collected: 08/27/24 0802    Order Status: Completed Specimen: Blood from Peripheral, Antecubital, Left Updated: 08/28/24 1503     Blood Culture, Routine No Growth to date      No Growth to date    Narrative:      Aerobic and anaerobic    Blood culture x two cultures. Draw prior to antibiotics. [[id number]] Collected: 08/27/24 0827    Order Status: Completed Specimen: Blood from Peripheral, Antecubital, Right Updated: 08/28/24 1503     Blood Culture, Routine No Growth to date      No Growth to date    Narrative:      Aerobic and anaerobic    Gram stain [[id number]] Collected: 08/28/24 1150    Order Status: Sent Specimen: Abscess from Groin Updated: 08/28/24 1449    Aerobic culture [[id number]] Collected: 08/28/24 1155    Order Status: Sent Specimen: Abscess from Groin Updated: 08/28/24 1448    Culture, Anaerobe [[id number]] Collected: 08/28/24 1150    Order Status: Sent Specimen: Abscess from Groin Updated: 08/28/24 1448    Aerobic culture [[id number]]     Order Status: No result Specimen: Abscess from Groin     Culture, Anaerobe [[id number]] Collected: 08/28/24 1150    Order Status: Canceled Specimen: Abscess from Groin     Urine culture **CANNOT BE ORDERED STAT** [[id number]]  (Abnormal) Collected: 08/27/24 0921    Order Status: Completed Specimen: Urine Updated: 08/28/24 0910     Urine Culture, Routine GRAM NEGATIVE BLANCA  50,000 - 99,999 cfu/ml  Identification and susceptibility pending      Narrative:      Indicated criteria for high risk culture:->Other  Other (specify):->fsed    Aerobic culture (Specify Source) **CANNOT BE ORDERED AS  STAT** [[id number]] Collected: 08/27/24 0839    Order Status: Completed Specimen: Abscess from Groin Updated: 08/28/24 0900     Aerobic Bacterial Culture Insufficient incubation, culture in progress    Urine culture [[id number]]     Order Status: Canceled Specimen: Urine, Clean Catch

## 2024-08-29 NOTE — NURSING
Pt discharged per MD order. IV removed. Catheter tip intact. Telemetry box removed. No distress noted. AVS given to patient, discharge instructions explained per discharge nurse. Pt verbalized understanding. VSS. Afebrile. No complaints of pain, N/V, diarrhea, or SOB. Pt ambulated off unit with belongings to Main Entrance. Pt refused w/c.

## 2024-08-29 NOTE — NURSING
[facility], [facility]  Nurses Note -- 4 Eyes      8/29/2024       Skin assessed on: Q Shift      [x] No Pressure Injuries Present    []Prevention Measures Documented    [] Yes LDA  for Pressure Injury Previously documented     [] Yes New Pressure Injury Discovered   [] LDA for New Pressure Injury Added      Attending RN:  Otilia Saucedo RN     Second RN:  KATARZYNA Garcia

## 2024-08-29 NOTE — PROGRESS NOTES
Vancomycin Consult Follow-Up:    Patient reviewed, Scr increasing,no new levels, continue current therapy; Next levels due: 8/30 @ 09:00

## 2024-08-29 NOTE — PROGRESS NOTES
[facility], [facility]  Nurses Note -- 4 Eyes      8/28/2024       Skin assessed on: Q Shift      [x] No Pressure Injuries Present    []Prevention Measures Documented    [] Yes LDA  for Pressure Injury Previously documented     [] Yes New Pressure Injury Discovered   [] LDA for New Pressure Injury Added      Attending RN:  Radha Brown RN     Second RN:  KATARZYNA Mcfadden

## 2024-08-29 NOTE — PROGRESS NOTES
New LR bag changed at 0735, unable to scan pt and bag as the scanner is broken, day shift nurse notified.  Plan of care ongoing.

## 2024-08-31 ENCOUNTER — APPOINTMENT (OUTPATIENT)
Dept: RADIOLOGY | Facility: HOSPITAL | Age: 34
End: 2024-08-31
Payer: MEDICAID

## 2024-08-31 ENCOUNTER — CLINICAL SUPPORT (OUTPATIENT)
Dept: EMERGENCY MEDICINE | Facility: HOSPITAL | Age: 34
End: 2024-08-31
Payer: MEDICAID

## 2024-08-31 ENCOUNTER — HOSPITAL ENCOUNTER (EMERGENCY)
Facility: HOSPITAL | Age: 34
Discharge: HOME | End: 2024-09-01
Payer: MEDICAID

## 2024-08-31 VITALS
RESPIRATION RATE: 18 BRPM | WEIGHT: 220 LBS | OXYGEN SATURATION: 97 % | SYSTOLIC BLOOD PRESSURE: 146 MMHG | BODY MASS INDEX: 41.54 KG/M2 | TEMPERATURE: 97.9 F | HEIGHT: 61 IN | DIASTOLIC BLOOD PRESSURE: 87 MMHG | HEART RATE: 91 BPM

## 2024-08-31 DIAGNOSIS — R51.9 NONINTRACTABLE HEADACHE, UNSPECIFIED CHRONICITY PATTERN, UNSPECIFIED HEADACHE TYPE: ICD-10-CM

## 2024-08-31 DIAGNOSIS — N12 PYELONEPHRITIS: Primary | ICD-10-CM

## 2024-08-31 LAB
ALBUMIN SERPL BCP-MCNC: 4.4 G/DL (ref 3.4–5)
ALP SERPL-CCNC: 65 U/L (ref 33–110)
ALT SERPL W P-5'-P-CCNC: 19 U/L (ref 7–45)
ANION GAP SERPL CALC-SCNC: 17 MMOL/L (ref 10–20)
APPEARANCE UR: ABNORMAL
AST SERPL W P-5'-P-CCNC: 17 U/L (ref 9–39)
BACTERIA #/AREA URNS AUTO: ABNORMAL /HPF
BACTERIA BLD CULT: NORMAL
BACTERIA BLD CULT: NORMAL
BACTERIA SPEC AEROBE CULT: ABNORMAL
BACTERIA SPEC AEROBE CULT: ABNORMAL
BASOPHILS # BLD AUTO: 0.04 X10*3/UL (ref 0–0.1)
BASOPHILS NFR BLD AUTO: 0.4 %
BILIRUB SERPL-MCNC: 0.4 MG/DL (ref 0–1.2)
BILIRUB UR STRIP.AUTO-MCNC: NEGATIVE MG/DL
BUN SERPL-MCNC: 10 MG/DL (ref 6–23)
CALCIUM SERPL-MCNC: 9.6 MG/DL (ref 8.6–10.6)
CARDIAC TROPONIN I PNL SERPL HS: <3 NG/L (ref 0–34)
CHLORIDE SERPL-SCNC: 106 MMOL/L (ref 98–107)
CO2 SERPL-SCNC: 21 MMOL/L (ref 21–32)
COLOR UR: ABNORMAL
CREAT SERPL-MCNC: 0.66 MG/DL (ref 0.5–1.05)
D DIMER PPP FEU-MCNC: 432 NG/ML FEU
EGFRCR SERPLBLD CKD-EPI 2021: >90 ML/MIN/1.73M*2
EOSINOPHIL # BLD AUTO: 0.12 X10*3/UL (ref 0–0.7)
EOSINOPHIL NFR BLD AUTO: 1.3 %
ERYTHROCYTE [DISTWIDTH] IN BLOOD BY AUTOMATED COUNT: 13.5 % (ref 11.5–14.5)
GLUCOSE SERPL-MCNC: 108 MG/DL (ref 74–99)
GLUCOSE UR STRIP.AUTO-MCNC: NORMAL MG/DL
HCT VFR BLD AUTO: 39 % (ref 36–46)
HGB BLD-MCNC: 13 G/DL (ref 12–16)
IMM GRANULOCYTES # BLD AUTO: 0.03 X10*3/UL (ref 0–0.7)
IMM GRANULOCYTES NFR BLD AUTO: 0.3 % (ref 0–0.9)
KETONES UR STRIP.AUTO-MCNC: NEGATIVE MG/DL
LACTATE SERPL-SCNC: 1.6 MMOL/L (ref 0.4–2)
LEUKOCYTE ESTERASE UR QL STRIP.AUTO: ABNORMAL
LIPASE SERPL-CCNC: 21 U/L (ref 9–82)
LYMPHOCYTES # BLD AUTO: 2.54 X10*3/UL (ref 1.2–4.8)
LYMPHOCYTES NFR BLD AUTO: 27.5 %
MCH RBC QN AUTO: 28.7 PG (ref 26–34)
MCHC RBC AUTO-ENTMCNC: 33.3 G/DL (ref 32–36)
MCV RBC AUTO: 86 FL (ref 80–100)
MONOCYTES # BLD AUTO: 0.76 X10*3/UL (ref 0.1–1)
MONOCYTES NFR BLD AUTO: 8.2 %
MUCOUS THREADS #/AREA URNS AUTO: ABNORMAL /LPF
NEUTROPHILS # BLD AUTO: 5.74 X10*3/UL (ref 1.2–7.7)
NEUTROPHILS NFR BLD AUTO: 62.3 %
NITRITE UR QL STRIP.AUTO: NEGATIVE
NRBC BLD-RTO: 0 /100 WBCS (ref 0–0)
PH UR STRIP.AUTO: 8 [PH]
PLATELET # BLD AUTO: 283 X10*3/UL (ref 150–450)
POTASSIUM SERPL-SCNC: 3.5 MMOL/L (ref 3.5–5.3)
PREGNANCY TEST URINE, POC: NEGATIVE
PROT SERPL-MCNC: 8.3 G/DL (ref 6.4–8.2)
PROT UR STRIP.AUTO-MCNC: ABNORMAL MG/DL
RBC # BLD AUTO: 4.53 X10*6/UL (ref 4–5.2)
RBC # UR STRIP.AUTO: NEGATIVE /UL
RBC #/AREA URNS AUTO: ABNORMAL /HPF
SODIUM SERPL-SCNC: 140 MMOL/L (ref 136–145)
SP GR UR STRIP.AUTO: 1.02
SQUAMOUS #/AREA URNS AUTO: ABNORMAL /HPF
UROBILINOGEN UR STRIP.AUTO-MCNC: NORMAL MG/DL
WBC # BLD AUTO: 9.2 X10*3/UL (ref 4.4–11.3)
WBC #/AREA URNS AUTO: ABNORMAL /HPF

## 2024-08-31 PROCEDURE — 2500000004 HC RX 250 GENERAL PHARMACY W/ HCPCS (ALT 636 FOR OP/ED): Mod: SE | Performed by: PHYSICIAN ASSISTANT

## 2024-08-31 PROCEDURE — 2500000004 HC RX 250 GENERAL PHARMACY W/ HCPCS (ALT 636 FOR OP/ED): Mod: SE

## 2024-08-31 PROCEDURE — 2550000001 HC RX 255 CONTRASTS: Mod: SE | Performed by: PHYSICIAN ASSISTANT

## 2024-08-31 PROCEDURE — 85379 FIBRIN DEGRADATION QUANT: CPT | Performed by: PHYSICIAN ASSISTANT

## 2024-08-31 PROCEDURE — 83690 ASSAY OF LIPASE: CPT | Performed by: PHYSICIAN ASSISTANT

## 2024-08-31 PROCEDURE — 99285 EMERGENCY DEPT VISIT HI MDM: CPT | Performed by: PHYSICIAN ASSISTANT

## 2024-08-31 PROCEDURE — 81003 URINALYSIS AUTO W/O SCOPE: CPT | Performed by: PHYSICIAN ASSISTANT

## 2024-08-31 PROCEDURE — 80053 COMPREHEN METABOLIC PANEL: CPT | Performed by: PHYSICIAN ASSISTANT

## 2024-08-31 PROCEDURE — 74177 CT ABD & PELVIS W/CONTRAST: CPT | Performed by: RADIOLOGY

## 2024-08-31 PROCEDURE — 96375 TX/PRO/DX INJ NEW DRUG ADDON: CPT

## 2024-08-31 PROCEDURE — 93005 ELECTROCARDIOGRAM TRACING: CPT

## 2024-08-31 PROCEDURE — 93010 ELECTROCARDIOGRAM REPORT: CPT | Performed by: PHYSICIAN ASSISTANT

## 2024-08-31 PROCEDURE — 83605 ASSAY OF LACTIC ACID: CPT | Performed by: PHYSICIAN ASSISTANT

## 2024-08-31 PROCEDURE — 71046 X-RAY EXAM CHEST 2 VIEWS: CPT | Performed by: RADIOLOGY

## 2024-08-31 PROCEDURE — 85025 COMPLETE CBC W/AUTO DIFF WBC: CPT | Performed by: PHYSICIAN ASSISTANT

## 2024-08-31 PROCEDURE — 36415 COLL VENOUS BLD VENIPUNCTURE: CPT | Performed by: PHYSICIAN ASSISTANT

## 2024-08-31 PROCEDURE — 74177 CT ABD & PELVIS W/CONTRAST: CPT

## 2024-08-31 PROCEDURE — 81025 URINE PREGNANCY TEST: CPT

## 2024-08-31 PROCEDURE — 96361 HYDRATE IV INFUSION ADD-ON: CPT

## 2024-08-31 PROCEDURE — 87086 URINE CULTURE/COLONY COUNT: CPT | Performed by: PHYSICIAN ASSISTANT

## 2024-08-31 PROCEDURE — 71046 X-RAY EXAM CHEST 2 VIEWS: CPT

## 2024-08-31 PROCEDURE — 84484 ASSAY OF TROPONIN QUANT: CPT | Performed by: PHYSICIAN ASSISTANT

## 2024-08-31 PROCEDURE — 99285 EMERGENCY DEPT VISIT HI MDM: CPT | Mod: 25

## 2024-08-31 PROCEDURE — 96365 THER/PROPH/DIAG IV INF INIT: CPT | Mod: 59

## 2024-08-31 RX ORDER — KETOROLAC TROMETHAMINE 15 MG/ML
INJECTION, SOLUTION INTRAMUSCULAR; INTRAVENOUS
Status: COMPLETED
Start: 2024-08-31 | End: 2024-08-31

## 2024-08-31 RX ORDER — PANTOPRAZOLE SODIUM 40 MG/10ML
40 INJECTION, POWDER, LYOPHILIZED, FOR SOLUTION INTRAVENOUS DAILY
Status: DISCONTINUED | OUTPATIENT
Start: 2024-08-31 | End: 2024-09-01 | Stop reason: HOSPADM

## 2024-08-31 RX ORDER — ONDANSETRON HYDROCHLORIDE 2 MG/ML
4 INJECTION, SOLUTION INTRAVENOUS ONCE
Status: COMPLETED | OUTPATIENT
Start: 2024-08-31 | End: 2024-08-31

## 2024-08-31 RX ORDER — CEFTRIAXONE 1 G/50ML
1 INJECTION, SOLUTION INTRAVENOUS ONCE
Status: COMPLETED | OUTPATIENT
Start: 2024-08-31 | End: 2024-09-01

## 2024-08-31 RX ORDER — MORPHINE SULFATE 4 MG/ML
4 INJECTION INTRAVENOUS ONCE
Status: COMPLETED | OUTPATIENT
Start: 2024-08-31 | End: 2024-08-31

## 2024-08-31 RX ORDER — KETOROLAC TROMETHAMINE 15 MG/ML
15 INJECTION, SOLUTION INTRAMUSCULAR; INTRAVENOUS ONCE
Status: COMPLETED | OUTPATIENT
Start: 2024-09-01 | End: 2024-08-31

## 2024-08-31 RX ADMIN — CEFTRIAXONE SODIUM 1 G: 1 INJECTION, SOLUTION INTRAVENOUS at 23:49

## 2024-08-31 RX ADMIN — IOHEXOL 80 ML: 350 INJECTION, SOLUTION INTRAVENOUS at 22:50

## 2024-08-31 RX ADMIN — MORPHINE SULFATE 4 MG: 4 INJECTION INTRAVENOUS at 21:20

## 2024-08-31 RX ADMIN — SODIUM CHLORIDE, SODIUM LACTATE, POTASSIUM CHLORIDE, AND CALCIUM CHLORIDE 1000 ML: 600; 310; 30; 20 INJECTION, SOLUTION INTRAVENOUS at 21:38

## 2024-08-31 RX ADMIN — ONDANSETRON 4 MG: 2 INJECTION INTRAMUSCULAR; INTRAVENOUS at 21:20

## 2024-08-31 RX ADMIN — KETOROLAC TROMETHAMINE 15 MG: 15 INJECTION, SOLUTION INTRAMUSCULAR; INTRAVENOUS at 23:52

## 2024-08-31 RX ADMIN — PANTOPRAZOLE SODIUM 40 MG: 40 INJECTION, POWDER, FOR SOLUTION INTRAVENOUS at 21:27

## 2024-08-31 ASSESSMENT — PAIN DESCRIPTION - LOCATION: LOCATION: CHEST

## 2024-08-31 ASSESSMENT — PAIN SCALES - GENERAL
PAINLEVEL_OUTOF10: 7
PAINLEVEL_OUTOF10: 8

## 2024-08-31 ASSESSMENT — PAIN - FUNCTIONAL ASSESSMENT: PAIN_FUNCTIONAL_ASSESSMENT: 0-10

## 2024-08-31 NOTE — Clinical Note
Renetta Parmar was seen and treated in our emergency department on 8/31/2024.  She may return to work on 09/04/2024.       If you have any questions or concerns, please don't hesitate to call.      Amelie Salmon PA-C

## 2024-09-01 LAB
BACTERIA SPEC AEROBE CULT: NO GROWTH
BACTERIA SPEC ANAEROBE CULT: NORMAL
BACTERIA UR CULT: NORMAL
HOLD SPECIMEN: NORMAL

## 2024-09-01 RX ORDER — ONDANSETRON 4 MG/1
4 TABLET, ORALLY DISINTEGRATING ORAL EVERY 8 HOURS PRN
Qty: 21 TABLET | Refills: 0 | Status: SHIPPED | OUTPATIENT
Start: 2024-09-01

## 2024-09-01 RX ORDER — CIPROFLOXACIN 500 MG/1
500 TABLET ORAL EVERY 12 HOURS
Qty: 14 TABLET | Refills: 0 | Status: SHIPPED | OUTPATIENT
Start: 2024-09-01 | End: 2024-09-08

## 2024-09-01 ASSESSMENT — HEART SCORE
RISK FACTORS: 1-2 RISK FACTORS
ECG: NORMAL
HEART SCORE: 1
HISTORY: SLIGHTLY SUSPICIOUS
TROPONIN: LESS THAN OR EQUAL TO NORMAL LIMIT
AGE: <45

## 2024-09-01 NOTE — ED PROVIDER NOTES
"This is a 34-year-old female with past medical history of chronic headaches who presents to the ED with multiple medical complaints including left upper quadrant abdominal pain, vomiting, headache, as well as chest pain.  She states the headache she has a throbbing sensation across her forehead.  This is the same headaches that she has been getting on a daily basis since January 2024.  She has been seen multiple times for the headaches previously and has had negative imaging of her head.  She is scheduled to see neurology as an outpatient in October.  She states that she takes ibuprofen and Tylenol daily for these headaches.  She denies any associated visual changes, weakness, paresthesias or areas of decree sensation.  She denies any neck stiffness.  She states that she also has been having left upper quadrant abdominal pain for the past 1 month that got much worse today.  She describes a \"popping sensation\" to her abdomen with worsening pain as well as associated nausea and vomiting.  She denies any bloody or bilious emesis.  She denies any diarrhea.  Additionally she is endorsing left-sided chest pain that she describes a sharp pressure sensation that is constant in nature.  She denies any cough.  She denies any shortness of breath.  She denies any urinary symptoms, fevers, chills, or other complaints at this time.      History provided by:  Patient   used: No             Visit Vitals  /87   Pulse 91   Temp 36.6 °C (97.9 °F) (Temporal)   Resp 18   Ht 1.549 m (5' 1\")   Wt 99.8 kg (220 lb)   LMP 08/05/2024 (Approximate)   SpO2 97%   BMI 41.57 kg/m²   OB Status Implant   Smoking Status Never   BSA 2.07 m²          Physical Exam     Physical exam:   General: Vitals noted, no distress. Afebrile.   EENT:  Hearing grossly intact. Normal phonation. MMM. Airway patient. PERRL. EOMI.   Neck: No midline tenderness or paraspinal tenderness. FROM.   Cardiac: Tachycardic, regular rhythm. Normal S1 and " S2.  No murmurs, gallops, rubs.   Pulmonary: Good air exchange. Lungs clear bilaterally. No wheezes, rhonchi, rales. No accessory muscle use.   Abdomen: Soft, nonsurgical.  Tender to palpation left upper quadrant.  Voluntary guarding of the area.  No rebound tenderness.  Normoactive bowel sounds.   Back: +L CVA tenderness. No midline tenderness or paraspinal tenderness. No obvious deformity.   Extremities: No peripheral edema.  Full range of motion. Moves all extremities freely. No tenderness throughout extremities.   Skin: No rash. Warm and Dry.   Neuro: No focal neurologic deficits. CN 2-12 grossly intact. Sensation equal bilaterally. No weakness.  Ambulatory with steady gait.        Labs Reviewed   COMPREHENSIVE METABOLIC PANEL - Abnormal       Result Value    Glucose 108 (*)     Sodium 140      Potassium 3.5      Chloride 106      Bicarbonate 21      Anion Gap 17      Urea Nitrogen 10      Creatinine 0.66      eGFR >90      Calcium 9.6      Albumin 4.4      Alkaline Phosphatase 65      Total Protein 8.3 (*)     AST 17      Bilirubin, Total 0.4      ALT 19     URINALYSIS WITH REFLEX CULTURE AND MICROSCOPIC - Abnormal    Color, Urine Light-Yellow      Appearance, Urine Turbid (*)     Specific Gravity, Urine 1.021      pH, Urine 8.0      Protein, Urine 20 (TRACE)      Glucose, Urine Normal      Blood, Urine NEGATIVE      Ketones, Urine NEGATIVE      Bilirubin, Urine NEGATIVE      Urobilinogen, Urine Normal      Nitrite, Urine NEGATIVE      Leukocyte Esterase, Urine 75 Sanaz/µL (*)    MICROSCOPIC ONLY, URINE - Abnormal    WBC, Urine 6-10 (*)     RBC, Urine 11-20 (*)     Squamous Epithelial Cells, Urine 10-25 (FEW)      Bacteria, Urine 3+ (*)     Mucus, Urine FEW     LACTATE - Normal    Lactate 1.6      Narrative:     Venipuncture immediately after or during the administration of Metamizole may lead to falsely low results. Testing should be performed immediately  prior to Metamizole dosing.   LIPASE - Normal    Lipase  21      Narrative:     Venipuncture immediately after or during the administration of Metamizole may lead to falsely low results. Testing should be performed immediately prior to Metamizole dosing.   TROPONIN I, HIGH SENSITIVITY - Normal    Troponin I, High Sensitivity (CMC) <3      Narrative:     Less than 99th percentile of normal range cutoff-  Female and children under 18 years old <35 ng/L; Male <54 ng/L: Negative  Repeat testing should be performed if clinically indicated.     Female and children under 18 years old  ng/L; Male  ng/L:  Consistent with possible cardiac damage and possible increased clinical   risk. Serial measurements may help to assess extent of myocardial damage.     >120 ng/L: Consistent with cardiac damage, increased clinical risk and  myocardial infarction. Serial measurements may help assess extent of   myocardial damage.      NOTE: Children less than 1 year old may have higher baseline troponin   levels and results should be interpreted in conjunction with the overall   clinical context.    NOTE: Troponin I testing is performed using a different   testing methodology at [facility] than at other   [facility]. Direct result comparisons should only   be made within the same method.     D-DIMER, VTE EXCLUSION - Normal    D-Dimer, Quantitative VTE Exclusion 432      Narrative:     The VTE Exclusion D-Dimer assay is reported in ng/mL Fibrinogen Equivalent Units (FEU).    Per 's instructions for use, a value of less than 500 ng/mL (FEU) may help to exclude DVT or PE in outpatients when the assay is used with a clinical pretest probability assessment.([facility] must utilize and document eCalc 'Wells Score Deep Vein Thrombosis Risk' for DVT exclusion only. Emergency Department should utilize  Guidelines for Emergency Department Use of the VTE Exclusion D-Dimer and Clinical Pretest probability assessment model for DVT or PE exclusion.)   POCT PREGNANCY,  URINE - Normal    Preg Test, Ur Negative     URINE CULTURE   CBC WITH AUTO DIFFERENTIAL    WBC 9.2      nRBC 0.0      RBC 4.53      Hemoglobin 13.0      Hematocrit 39.0      MCV 86      MCH 28.7      MCHC 33.3      RDW 13.5      Platelets 283      Neutrophils % 62.3      Immature Granulocytes %, Automated 0.3      Lymphocytes % 27.5      Monocytes % 8.2      Eosinophils % 1.3      Basophils % 0.4      Neutrophils Absolute 5.74      Immature Granulocytes Absolute, Automated 0.03      Lymphocytes Absolute 2.54      Monocytes Absolute 0.76      Eosinophils Absolute 0.12      Basophils Absolute 0.04     URINALYSIS WITH REFLEX CULTURE AND MICROSCOPIC    Narrative:     The following orders were created for panel order Urinalysis with Reflex Culture and Microscopic.  Procedure                               Abnormality         Status                     ---------                               -----------         ------                     Urinalysis with Reflex C...[[id number]]  Abnormal            Final result               Extra Urine Gray Tube[[id number]]                            In process                   Please view results for these tests on the individual orders.   EXTRA URINE GRAY TUBE       CT abdomen pelvis w IV contrast   Final Result   1.  No acute process of the abdomen/pelvis.        I personally reviewed the images/study and I agree with the findings   as stated by Dr. Nikolai Nguyen. This study was interpreted at   [place], [place].        MACRO:   None        Signed by: Chaz Escobar 8/31/2024 11:20 PM   Dictation workstation:   [id number]      XR chest 2 views   Final Result   No acute cardiopulmonary process.        MACRO:   None        Signed by: Chaz Escobar 8/31/2024 10:08 PM   Dictation workstation:   [id number]              ED Course & MDM     Medical Decision Making  This is a 34-year-old female with past medical history of chronic headaches who  presents to the ED with multiple medical complaints including a chronic headache, worsening of her abdominal pain that she has been having for the past 1 month, as well as chest pain and L flank pain.  Vitals that show she was tachycardic 116 bpm.  Vitals otherwise grossly unremarkable.  On physical examination she was tachycardic.  Lungs clear to auscultation.  Abdomen tender to palpation to the left upper quadrant with voluntary guarding of the area.  No rebound tenderness.  +L CVA tenderness.  She is neurologically intact without deficits.  IV established laboratory studies obtained.  CT abdomen pelvis ordered as well as chest x-ray.  Patient medicated with morphine, Protonix, Zofran, and 1 L of IV fluids for her symptoms today.  On my reassessment she was still endorsing pain.  Repeat vitals within normal limits.  She was ordered Toradol for her pain at this time.  CT abdomen pelvis showed no acute abdominal pelvic process.  Urinalysis positive for white cells, red cells, leukocyte Estrace and bacteria.  Troponin normal.  D-dimer negative.  CMP grossly unremarkable.  Lipase normal.  CBC grossly unremarkable.  Based on patient's negative D-dimer I have low suspicion for PE.  Based on patient's normal troponin as well as unremarkable EKG low suspicion for ACS being the source of her chest pain today.  Heart score today is 1.  Chest x-ray showed no acute cardiopulmonary process.  With the patient's right flank pain and urinalysis results will be treated for pyelonephritis today.  She is given a dose of Rocephin here in the ED and was written a prescription for Cipro to go home with.  She was agreeable to this plan.  She was feeling improved at this time.  She was also written a prescription for Zofran to take at home as needed.  She was advised to follow-up close with her primary care provider.  She was given sinus symptoms return to the ED with and was discharged from ED in stable condition.    Amount and/or  Complexity of Data Reviewed  Labs: ordered.  Radiology: ordered and independent interpretation performed.     Details: Chest x-ray without visualized pneumonia pneumothorax    ECG/medicine tests: ordered and independent interpretation performed.     Details: EKG sinus tachycardia at 119 bpm without any acute ST elevation or depression.  No T wave inversions.  Normal axis.    Risk  Prescription drug management.         Diagnoses as of 09/01/24 0108   Pyelonephritis   Nonintractable headache, unspecified chronicity pattern, unspecified headache type       Patient was seen independently    Procedures    SONU Lechuga, PA-C     Amelie Salmon PA-C  09/01/24 0110

## 2024-09-01 NOTE — ED TRIAGE NOTES
Pt states that she has been having sharp left side chest pain on and off for a few days, she is also complaining of abd pain, nausea,  and a headache. Pt states that she normally takes Ibuprofen for this but it is not helping.

## 2024-09-03 LAB
ATRIAL RATE: 119 BPM
P AXIS: 75 DEGREES
P OFFSET: 208 MS
P ONSET: 148 MS
PR INTERVAL: 156 MS
Q ONSET: 226 MS
QRS COUNT: 20 BEATS
QRS DURATION: 64 MS
QT INTERVAL: 328 MS
QTC CALCULATION(BAZETT): 461 MS
QTC FREDERICIA: 412 MS
R AXIS: 56 DEGREES
T AXIS: 30 DEGREES
T OFFSET: 390 MS
VENTRICULAR RATE: 119 BPM

## 2024-09-10 ENCOUNTER — HOSPITAL ENCOUNTER (OUTPATIENT)
Dept: WOUND CARE | Facility: HOSPITAL | Age: 34
Discharge: HOME | End: 2024-09-10
Attending: FAMILY MEDICINE
Payer: MEDICAID

## 2024-09-10 VITALS
SYSTOLIC BLOOD PRESSURE: 120 MMHG | RESPIRATION RATE: 16 BRPM | HEART RATE: 101 BPM | TEMPERATURE: 97 F | DIASTOLIC BLOOD PRESSURE: 71 MMHG

## 2024-09-10 DIAGNOSIS — L02.214 ABSCESS OF GROIN, RIGHT: ICD-10-CM

## 2024-09-10 PROCEDURE — 99213 OFFICE O/P EST LOW 20 MIN: CPT

## 2024-09-10 PROCEDURE — 99204 OFFICE O/P NEW MOD 45 MIN: CPT | Mod: ,,, | Performed by: FAMILY MEDICINE

## 2024-09-10 NOTE — PROGRESS NOTES
[facility]                Progress Note    Subjective:       Patient ID: Lora Scott is a 34 y.o. female.    Chief Complaint: Dressing Change, Wound Care, and Wound Check    New patient wound care appointment. Pt arrived to [facility] ambulating without any issues accompanied by family at side. Pt denies any fever, chills, nausea/vomiting or flu-like symptoms; c/o pain/discomfort rating 3/10 in right groin area. Pt reports she had an abscess on right groin that was I&D' ed on 8/28/24. Pt reports applying gauze and tape to area to cover wound. Dressing appears intact with moist serosanguinous drainage. Drsg removed & wound cleaned by nurse. Verbalized dressing order; applied per MD orders. Pt given supplies to change dressing on Friday; educated on dressing application and to keep intact and dry at all times. Pt requested Monday appointment due to work schedule. Pt denied AVS, pt has MyChart.    MD note:  patient presenting today for evaluation of wound to right groin.  She had an abscess I&D'd and states she continues to have excess drainage.  She initially said she was not on antibiotics and then said she was on them.  No fevers, chills, or sweats.  No new wounds that she has seen.      Dressing Change    Wound Check        Review of Systems   Constitutional: Negative.    HENT: Negative.     Eyes: Negative.    Respiratory: Negative.     Cardiovascular: Negative.    Gastrointestinal: Negative.    Skin:  Positive for wound.   Psychiatric/Behavioral: Negative.           Objective:        Physical Exam  Constitutional:       Appearance: Normal appearance.   HENT:      Head: Normocephalic and atraumatic.      Right Ear: External ear normal.      Left Ear: External ear normal.      Mouth/Throat:      Mouth: Mucous membranes are moist.      Pharynx: Oropharynx is clear.   Eyes:      Extraocular Movements: Extraocular movements intact.      Conjunctiva/sclera: Conjunctivae normal.       Pupils: Pupils are equal, round, and reactive to light.   Pulmonary:      Effort: Pulmonary effort is normal. No respiratory distress.   Abdominal:      General: There is no distension.      Palpations: Abdomen is soft.   Skin:     General: Skin is warm.      Comments: +right groin incision site with minimal slough; excess serosanguinous drainage present; the periwound is tender, but no erythema and no increase in warmth to periwound   Neurological:      Mental Status: She is alert and oriented to person, place, and time. Mental status is at baseline.         Vitals:    09/10/24 1353   BP: 120/71   Pulse: 101   Resp: 16   Temp: 97.1 °F (36.2 °C)       Assessment:           ICD-10-CM ICD-9-CM   1. Abscess of groin, right  L02.214 682.2            Wound 08/28/24 1152 Other (comment) Right Groin (Active)   08/28/24 1152   Present on Original Admission:    Primary Wound Type: Other   Side: Right   Orientation:    Location: Groin   Wound Approximate Age at First Assessment (Weeks):    Wound Number:    Is this injury device related?:    Incision Type:    Closure Method:    Wound Description (Comments):    Type:    Additional Comments: Iodophom packing/ 4x4's/Medipore   Ankle-Brachial Index:    Pulses:    Removal Indication and Assessment:    Wound Outcome:    Wound Image   09/10/24 1355   Dressing Appearance Intact;Moist drainage 09/10/24 1355   Drainage Amount Large 09/10/24 1355   Drainage Characteristics/Odor Serosanguineous;Malodorous 09/10/24 1355   Appearance Pink;Red;Yellow;Moist 09/10/24 1355   Red (%), Wound Tissue Color 70 % 09/10/24 1355   Yellow (%), Wound Tissue Color 30 % 09/10/24 1355   Periwound Area Intact;Dry;Swelling 09/10/24 1355   Wound Edges Open;Defined 09/10/24 1355   Wound Length (cm) 2.4 cm 09/10/24 1355   Wound Width (cm) 0.5 cm 09/10/24 1355   Wound Depth (cm) 0.5 cm 09/10/24 1355   Wound Volume (cm^3) 0.6 cm^3 09/10/24 1355   Wound Surface Area (cm^2) 1.2 cm^2 09/10/24 1355   Care  "Cleansed with:;Soap and water;Sterile normal saline;Wound cleanser 09/10/24 1355   Dressing Applied;Calcium alginate;Absorptive Pad 09/10/24 1355   Periwound Care Absorptive dressing applied;Dry periwound area maintained 09/10/24 1355   Dressing Change Due 09/13/24 09/10/24 1355           Plan:              Tissue pathology and/or culture taken     [] Yes      [x] No  Sharp debridement performed                   [] Yes       [x] No  Labs ordered     [] Yes       [x] No  Imaging ordered    [] Yes      [x] No    Orders Placed This Encounter   Procedures    Ambulatory referral/consult to Wound Clinic     Standing Status:   Standing     Number of Occurrences:   1     Referral Priority:   Routine     Referral Type:   Consultation     Referral Reason:   Specialty Services Required     Requested Specialty:   Wound Care     Number of Visits Requested:   1    Change dressing     Wound Dressing Orders    Dressing change frequency twice a week and prn Nurse Visits  Remove old dressing  Cleanse or irrigate with: Normal Saline and vashe soak x 10 minutes  Protect periwound with:  periwound: Cavilon   Primary dressing: WOUND BASE: Aquacel AG ribbon  Secondary dressing: Mextra: size used: 5"x5" secured with medipore tape      Pt to return to clinic in one week.        Follow up in about 6 days (around 9/16/2024) for Wound Care.     Rakesh Armas MD      "

## 2024-09-12 ENCOUNTER — TELEPHONE (OUTPATIENT)
Dept: SURGERY | Facility: CLINIC | Age: 34
End: 2024-09-12
Payer: MEDICAID

## 2024-09-16 ENCOUNTER — HOSPITAL ENCOUNTER (OUTPATIENT)
Dept: WOUND CARE | Facility: HOSPITAL | Age: 34
Discharge: HOME | End: 2024-09-16
Attending: INTERNAL MEDICINE
Payer: MEDICAID

## 2024-09-16 VITALS
DIASTOLIC BLOOD PRESSURE: 73 MMHG | SYSTOLIC BLOOD PRESSURE: 138 MMHG | TEMPERATURE: 98 F | RESPIRATION RATE: 12 BRPM | HEART RATE: 91 BPM

## 2024-09-16 DIAGNOSIS — L73.2 HIDRADENITIS SUPPURATIVA OF MULTIPLE SITES: ICD-10-CM

## 2024-09-16 DIAGNOSIS — L02.214 ABSCESS OF GROIN, RIGHT: Primary | ICD-10-CM

## 2024-09-16 PROCEDURE — 99214 OFFICE O/P EST MOD 30 MIN: CPT | Mod: ,,, | Performed by: INTERNAL MEDICINE

## 2024-09-16 PROCEDURE — 99213 OFFICE O/P EST LOW 20 MIN: CPT | Performed by: INTERNAL MEDICINE

## 2024-09-16 NOTE — PROGRESS NOTES
"[facility]                Progress Note    Subjective:       Patient ID: Lora Scott is a 34 y.o. female.    Chief Complaint: Wound Care and Dressing Change    Follow Up wound care visit. Patient ambulated unaided to [facility]. Patient denies fever, chills, nausea, vomiting or diarrhea at present. Patient denies pain or discomfort to wound bed, but does c/o "soreness" near pelvis. Patient reports not having any issues with dressing changes since last visit. She reports last dressing change was Sunday, "it was brown with a stinky odor". Dressing appears intact without strikethrough drainage. Dressing removed & wound cleaned by nurse. Right Groin wound appears pink, yellow and moist.     No change to dressing order; applied per MD order. Patient will return to [facility] in 2 weeks unless otherwise ordered by surgeon. Patient has a follow up with general surgeon on 09/24/24. Patient declined AVS, has MyChart.       No fevers/chills she completed clindamycin no change in drainage last changed dressing two days ago has been using alginate packing with dressing changes QOD        Review of Systems      Objective:        Physical Exam  Constitutional:       General: She is not in acute distress.  Abdominal:      General: There is no distension.      Palpations: Abdomen is soft.   Skin:     Comments: Right inguinal wound smaller in width depth and length. No expressible discharge no periwound induration or fluctuance   Neurological:      Mental Status: She is alert.         Vitals:    09/16/24 0805   BP: 138/73   Pulse: 91   Resp: 12   Temp: 97.7 °F (36.5 °C)       Assessment:           ICD-10-CM ICD-9-CM   1. Abscess of groin, right  L02.214 682.2   2. Hidradenitis suppurativa of multiple sites  L73.2 705.83            Wound 08/28/24 1152 Other (comment) Right Groin (Active)   08/28/24 1152   Present on Original Admission:    Primary Wound Type: Other   Side: Right   Orientation:  "   Location: Groin   Wound Approximate Age at First Assessment (Weeks):    Wound Number:    Is this injury device related?:    Incision Type:    Closure Method:    Wound Description (Comments):    Type:    Additional Comments: Iodophom packing/ 4x4's/Medipore   Ankle-Brachial Index:    Pulses:    Removal Indication and Assessment:    Wound Outcome:    Wound Image    09/16/24 0810   Dressing Appearance Intact;Moist drainage 09/16/24 0810   Drainage Amount Moderate 09/16/24 0810   Drainage Characteristics/Odor Serosanguineous;Tan 09/16/24 0810   Appearance Pink;Yellow;Moist 09/16/24 0810   Tissue loss description Full thickness 09/16/24 0810   Black (%), Wound Tissue Color 0 % 09/16/24 0810   Red (%), Wound Tissue Color 80 % 09/16/24 0810   Yellow (%), Wound Tissue Color 20 % 09/16/24 0810   Periwound Area Hemosiderin Staining;Swelling 09/16/24 0810   Wound Edges Defined;Open 09/16/24 0810   Wound Length (cm) 2.4 cm 09/16/24 0810   Wound Width (cm) 0.4 cm 09/16/24 0810   Wound Depth (cm) 0.5 cm 09/16/24 0810   Wound Volume (cm^3) 0.48 cm^3 09/16/24 0810   Wound Surface Area (cm^2) 0.96 cm^2 09/16/24 0810   Tunneling (depth (cm)/location) 0.7 cm @ 3 o'clock 09/16/24 0810   Care Cleansed with:;Antimicrobial agent;Sterile normal saline;Wound cleanser 09/16/24 0810   Dressing Changed 09/16/24 0810   Packing packed with;hydrofiber/alginate 09/16/24 0810   Packing Inserted  1 09/16/24 0810   Periwound Care Skin barrier film applied;Absorptive dressing applied 09/16/24 0810   Dressing Change Due 09/30/24 09/16/24 0810           Plan:          Wound improving in size off antibiotics continue daily dressing hcanges and packing to folow up with general surgery clinic next week will see back in wound clinic in two weeks no debridement required today    Tissue pathology and/or culture taken     [] Yes      [x] No  Sharp debridement performed                   [] Yes       [x] No  Labs ordered     [] Yes       [x] No  Imaging  "ordered    [] Yes      [x] No    Orders Placed This Encounter   Procedures    Change dressing     Wound Dressing Orders     Dressing change frequency twice a week and prn Nurse Visits   Remove old dressing   Cleanse or irrigate with: Normal Saline and vashe soak x 10 minutes   Protect periwound with:  periwound: Cavilon   Primary dressing: WOUND BASE: Aquacel AG ribbon, (thin strip packed into wound bed, remainder given to patient)   Secondary dressing: Mextra: size used: 5"x5" secured with medipore tape     Pt to return to clinic in one week.        Follow up in about 2 weeks (around 9/30/2024) for Wound Care.       "

## 2024-09-24 ENCOUNTER — OFFICE VISIT (OUTPATIENT)
Dept: SURGERY | Facility: CLINIC | Age: 34
End: 2024-09-24
Payer: MEDICAID

## 2024-09-24 VITALS
HEART RATE: 81 BPM | WEIGHT: 136.69 LBS | BODY MASS INDEX: 24.22 KG/M2 | HEIGHT: 63 IN | SYSTOLIC BLOOD PRESSURE: 101 MMHG | DIASTOLIC BLOOD PRESSURE: 66 MMHG

## 2024-09-24 DIAGNOSIS — Z98.890 STATUS POST INCISION AND DRAINAGE: Primary | ICD-10-CM

## 2024-09-24 PROCEDURE — 99212 OFFICE O/P EST SF 10 MIN: CPT | Mod: S$PBB,,, | Performed by: SURGERY

## 2024-09-24 PROCEDURE — 99213 OFFICE O/P EST LOW 20 MIN: CPT | Mod: PBBFAC | Performed by: SURGERY

## 2024-09-24 PROCEDURE — 1159F MED LIST DOCD IN RCRD: CPT | Mod: CPTII,,, | Performed by: SURGERY

## 2024-09-24 PROCEDURE — 99999 PR PBB SHADOW E&M-EST. PATIENT-LVL III: CPT | Mod: PBBFAC,,, | Performed by: SURGERY

## 2024-09-24 PROCEDURE — 3074F SYST BP LT 130 MM HG: CPT | Mod: CPTII,,, | Performed by: SURGERY

## 2024-09-24 PROCEDURE — 3078F DIAST BP <80 MM HG: CPT | Mod: CPTII,,, | Performed by: SURGERY

## 2024-09-24 PROCEDURE — 3008F BODY MASS INDEX DOCD: CPT | Mod: CPTII,,, | Performed by: SURGERY

## 2024-09-24 NOTE — PROGRESS NOTES
Here for wound check.    PE: wound with good granulation tissue, no evidence of infection    Plan: f/u in 2-3 weeks for repeat wound check.

## 2024-09-30 ENCOUNTER — TELEPHONE (OUTPATIENT)
Dept: WOUND CARE | Facility: HOSPITAL | Age: 34
End: 2024-09-30
Payer: MEDICAID

## 2024-09-30 NOTE — TELEPHONE ENCOUNTER
Called patient, no answer, voicemail left to call the wound care clinic to reschedule. MD notified.

## 2024-10-10 ENCOUNTER — APPOINTMENT (OUTPATIENT)
Dept: NEUROLOGY | Facility: CLINIC | Age: 34
End: 2024-10-10
Payer: MEDICAID

## 2024-10-10 VITALS
HEART RATE: 116 BPM | SYSTOLIC BLOOD PRESSURE: 125 MMHG | DIASTOLIC BLOOD PRESSURE: 87 MMHG | HEIGHT: 61 IN | WEIGHT: 218 LBS | BODY MASS INDEX: 41.16 KG/M2

## 2024-10-10 DIAGNOSIS — G44.52 NEW DAILY PERSISTENT HEADACHE: Primary | ICD-10-CM

## 2024-10-10 PROCEDURE — 3008F BODY MASS INDEX DOCD: CPT | Performed by: PSYCHIATRY & NEUROLOGY

## 2024-10-10 PROCEDURE — 99204 OFFICE O/P NEW MOD 45 MIN: CPT | Performed by: PSYCHIATRY & NEUROLOGY

## 2024-10-10 NOTE — PROGRESS NOTES
Subjective     Renetta Parmar is a 34 y.o. year old right handed female presenting as a new patient for headache.    HPI    She has past medical history significant for hypertension, smoking, childbirth x 4, wisdom tooth extraction.     She is evaluated in the office today as a new patient, seeing neurology at the recommendation of a [facility] ED where she was seen on 7/14/2024. She is accompanied by her boyfriend and her young daughter.    Her ED presentation in July was for multiple complaints and among these she endorsed headaches starting in January.    She tells me today that the headache onset in January was abrupt and without any obvious precipitating factor such as trauma, constitutional illness or particular stressors.  She was just sitting watching TV when she noted sudden severe (thunderclap) pain initially in the left temple and subsequently more diffuse. This was sharp pain quantified as 11/10 and it persisted at high intensity for days. No associated photophonophobia. Some nausea without significant vomiting. She felt dizzy (presyncopal, not vertiginous). Her vision got very blurry.    Although the pain level has since moderated, she endorses daily and virtually 24/7 headache pain ever since January. There is limited and transient relief from acetaminophen or ibuprofen.  The pain lately can involve and head region, without side preference, and has a burning character.     Although she endorses intermittent blurry vision she denies transient visual obscurations. She endorses intermittent tinnitus which is not pulsatile.     Of note, she adamantly denies any migraine history or other significant headache history prior to January.    She has tried no prescription headache medication thus far.    I reviewed a CT angiogram of head and neck from 5/16/2024 (had been done prior in January but those images are not available for direct review at this time). Unremarkable head CT. No evidence of aneurysm or large vessel  occlusion. No high grade stenosis. Unremarkable cervical spine on source images.     She does mention a family history of cerebral aneurysm requiring surgery in her father.    Of note, she indicates that she just recently learned she is pregnant, not sure how far along, is pending first appointment with OB for this pregnancy.     Review of Systems    Review of Systems:  Neurologic:  As per the history of present illness.  Constitutional: Positive for subjective fever, fatigue, negative for weight loss.  Musculoskeletal:  Positive for back pain. Cardiovascular: Positive for chest pain, negative for palpitations.  Respiratory:  Negative for dyspnea.  Eyes: As of for blurry vision, positive for diplopia, negative for vision loss ENT:  Negative for hearing loss, positive for tinnitus.  GI:  Negative for bowel incontinence.  :  Negative for bladder incontinence.  Dermatologic:  Negative for rash.        Patient Active Problem List   Diagnosis    Attempted IUD removal, unsuccessful    Encounter for IUD removal     Past Medical History:   Diagnosis Date    Hx of trichomoniasis     Hypertension     History of high blood pressure     Past Surgical History:   Procedure Laterality Date     SECTION, CLASSIC      x3    OTHER SURGICAL HISTORY  2019    [place] tooth extraction     Social History     Tobacco Use    Smoking status: Never     Passive exposure: Never    Smokeless tobacco: Never   Substance Use Topics    Alcohol use: Yes     Family history is unknown by patient.    Current Outpatient Medications:     acetaminophen (TylenoL) 325 mg tablet, Take 2 tablets (650 mg) by mouth every 6 hours if needed for mild pain (1 - 3)., Disp: 30 tablet, Rfl: 0    albuterol 2.5 mg /3 mL (0.083 %) nebulizer solution, 3 times a day as needed., Disp: , Rfl:     budesonide/formoterol fumarate (BUDESONIDE-FORMOTEROL INHL), Inhale., Disp: , Rfl:     drospirenone-ethinyl estradioL (Jaimie, 28,) 3-0.02 mg tablet, Take 1 tablet by  mouth once daily., Disp: 11 tablet, Rfl: 11    levonorgestreL (Liletta) 20.4 mcg/24 hr (8 yrs) 52 mg intrauterine device, 1 mg by intrauterine route 1 time., Disp: , Rfl:     ondansetron ODT (Zofran-ODT) 4 mg disintegrating tablet, Take 1 tablet (4 mg) by mouth every 8 hours if needed for nausea or vomiting., Disp: 21 tablet, Rfl: 0  Allergies   Allergen Reactions    Shellfish Containing Products Angioedema and Swelling     Shellfish       Objective   Neurological Exam  Physical Exam    Physical Examination:    General: Alert woman who was ambulatory without assistive devices.      Cardiovascular: Carotid auscultation revealed no bruits.     Mental Status: Clear sensorium without fluctuation.  Appropriate and oriented in conversation.  Recent and remote recall intact for details of medical history.  Attention and concentration intact during interview.  Fund of knowledge fair for medical information.  Language intact and fluent without paraphasic errors.    Cranial Nerves:  Funduscopic exam was not well visualized bilaterally on nondilated exam.  Pupils were equal, round and reactive to light with no relative afferent pupillary defect.  Extraocular movements were intact and conjugate without nystagmus.  No ptosis.  Visual fields were full to confrontation tested binocularly.  Facial sensation was asymmetric to pinover the forehead, sharper left, and symmetric over V2.  Facial motor function was symmetrically intact.  Hearing was grossly intact.  No dysarthria.  Shoulder shrug was symmetric.  Tongue protrusion was midline.    Motor: Muscle bulk and tone were normal throughout. There was no pronator drift or asymmetry of finger taps. Confrontation strength was symmetrically 5/5 throughout the upper and lower extremities.     Coordination: Finger to finger and alternating hand movements were rapid and accurate without dysmetria. There was no tremor.     Tendon Reflexes: Symmetrically trace biceps and brachioradialis,  absent triceps, 1+ patellar, absent ankles, neutral plantars.      Sensation: Pin was symmetric over the hands. Vibration thresholds were normal at the index fingers. Romberg sign was absent.     Station: Intact and stable.    Gait: Stable and unremarkable. Intact tandem.      Assessment/Plan     She endorses new daily persistent headache with onset in January. Mode of onset was thunderclap but fortunately no aneurysm was found on two CTAs.     I discussed that ordinarily given a high headache frequency I would at this point advise starting a daily preventive strategy. However, I discussed that this decision is complicated given that she just learned she is pregnant. For the time being I did not advise headache treatment other than modest use of acetaminophen, until she is seen by OB.    I asked her to have her OB contact my office after the first appointment so that we can confer regarding headache preventive and acute agents that would be considered she during her pregnancy.     I do not get a strong sense of IIH but I also do not visualize the ocular fundi well today on non-dilated exam. Accordingly I advised a dilated eye exam and I am referring her to ophthalmology for this purpose.     We will determine next headache management steps contingent on recommendations from her OB.

## 2024-10-10 NOTE — PATIENT INSTRUCTIONS
We discussed that your history is consistent with new daily persistent headache. This is a condition for which we do not always find an explanation.     I reviewed your CT angiogram from May which shows NO aneurysm and no other concerning findings. I don't think you need additional studies at this time.    I am referring you to ophthalmology for a dilated eye exam. Be sure to inform the ophthalmologist of your pregnancy.     We discussed that since you recently learned you are pregnant, we have very limited medication options available for headache treatment.    For the time being, limit your treatment to Tylenol. Make an appointment with OB in the near future and discuss with your OB what medications they would consider safe for you to take for headache during pregnancy.     Please have your OB communicate back to me about headache treatment options. Then they or I can start you on the appropriate medication.

## 2024-10-16 ENCOUNTER — HOSPITAL ENCOUNTER (EMERGENCY)
Facility: HOSPITAL | Age: 34
Discharge: HOME | End: 2024-10-16
Attending: EMERGENCY MEDICINE
Payer: MEDICAID

## 2024-10-16 VITALS
SYSTOLIC BLOOD PRESSURE: 100 MMHG | RESPIRATION RATE: 20 BRPM | TEMPERATURE: 98 F | BODY MASS INDEX: 23.03 KG/M2 | OXYGEN SATURATION: 100 % | WEIGHT: 130 LBS | DIASTOLIC BLOOD PRESSURE: 67 MMHG | HEART RATE: 89 BPM

## 2024-10-16 DIAGNOSIS — L73.2 HIDRADENITIS SUPPURATIVA: ICD-10-CM

## 2024-10-16 DIAGNOSIS — L02.31 ABSCESS OF LEFT BUTTOCK: Primary | ICD-10-CM

## 2024-10-16 PROBLEM — F41.9 ANXIETY DISORDER: Status: ACTIVE | Noted: 2021-05-21

## 2024-10-16 PROBLEM — K50.90: Status: ACTIVE | Noted: 2021-10-22

## 2024-10-16 PROBLEM — K50.90 CROHN'S DISEASE: Status: ACTIVE | Noted: 2021-10-22

## 2024-10-16 PROBLEM — L98.8: Status: ACTIVE | Noted: 2021-10-22

## 2024-10-16 LAB
B-HCG UR QL: NEGATIVE
CTP QC/QA: YES

## 2024-10-16 PROCEDURE — 63600175 PHARM REV CODE 636 W HCPCS: Mod: ER | Performed by: NURSE PRACTITIONER

## 2024-10-16 PROCEDURE — 81025 URINE PREGNANCY TEST: CPT | Mod: ER | Performed by: EMERGENCY MEDICINE

## 2024-10-16 PROCEDURE — 99284 EMERGENCY DEPT VISIT MOD MDM: CPT | Mod: 25,ER

## 2024-10-16 PROCEDURE — 10060 I&D ABSCESS SIMPLE/SINGLE: CPT | Mod: ER

## 2024-10-16 PROCEDURE — 25000003 PHARM REV CODE 250: Mod: ER | Performed by: NURSE PRACTITIONER

## 2024-10-16 RX ORDER — LIDOCAINE HYDROCHLORIDE 10 MG/ML
10 INJECTION, SOLUTION INFILTRATION; PERINEURAL
Status: COMPLETED | OUTPATIENT
Start: 2024-10-16 | End: 2024-10-16

## 2024-10-16 RX ORDER — OXYCODONE AND ACETAMINOPHEN 5; 325 MG/1; MG/1
1 TABLET ORAL
Status: DISCONTINUED | OUTPATIENT
Start: 2024-10-16 | End: 2024-10-16 | Stop reason: HOSPADM

## 2024-10-16 RX ORDER — IBUPROFEN 600 MG/1
600 TABLET ORAL EVERY 6 HOURS PRN
Qty: 20 TABLET | Refills: 0 | Status: SHIPPED | OUTPATIENT
Start: 2024-10-16

## 2024-10-16 RX ORDER — MUPIROCIN 20 MG/G
OINTMENT TOPICAL 3 TIMES DAILY
Qty: 22 G | Refills: 0 | Status: SHIPPED | OUTPATIENT
Start: 2024-10-16

## 2024-10-16 RX ORDER — CLINDAMYCIN HYDROCHLORIDE 150 MG/1
300 CAPSULE ORAL
Status: COMPLETED | OUTPATIENT
Start: 2024-10-16 | End: 2024-10-16

## 2024-10-16 RX ORDER — IBUPROFEN 600 MG/1
600 TABLET ORAL
Status: COMPLETED | OUTPATIENT
Start: 2024-10-16 | End: 2024-10-16

## 2024-10-16 RX ORDER — CLINDAMYCIN HYDROCHLORIDE 300 MG/1
300 CAPSULE ORAL 4 TIMES DAILY
Qty: 40 CAPSULE | Refills: 0 | Status: SHIPPED | OUTPATIENT
Start: 2024-10-16 | End: 2024-10-26

## 2024-10-16 RX ORDER — HYDROCODONE BITARTRATE AND ACETAMINOPHEN 5; 325 MG/1; MG/1
1 TABLET ORAL EVERY 6 HOURS PRN
Qty: 12 TABLET | Refills: 0 | Status: SHIPPED | OUTPATIENT
Start: 2024-10-16

## 2024-10-16 RX ADMIN — LIDOCAINE HYDROCHLORIDE 10 ML: 10 INJECTION, SOLUTION INFILTRATION; PERINEURAL at 12:10

## 2024-10-16 RX ADMIN — CLINDAMYCIN HYDROCHLORIDE 300 MG: 150 CAPSULE ORAL at 12:10

## 2024-10-16 RX ADMIN — IBUPROFEN 600 MG: 600 TABLET ORAL at 12:10

## 2024-10-16 NOTE — DISCHARGE INSTRUCTIONS
You have been prescribed NORCO for pain. Please do not take this medication while working, drinking alcohol, swimming, or while driving/operating heavy machinery. This medication may cause drowsiness, impair judgment, and reduce physical capabilities.    Please make sure to maintain good hygiene, if the abscess is in an area you shave then change your razor and do not shave the area until the wound is healed, do not share towels or other personal items, and use warm compresses 10-15 minutes, 4-5 times a day to help increase wound drainage and decrease swelling, and take your antibiotic medication as prescribed.     Please return to the Emergency Department for any new or worsening problems including: worsening of your abscess, increasing redness or redness extending further up your body, or temperature of greater than 100.4F.    Please follow up with your Primary Care Doctor or Return to the ED in 2-3 days for packing removal and a wound check, or sooner if you wound gets worse. Your packing needs to be removed in 2 - 3 days.

## 2024-10-16 NOTE — ED PROVIDER NOTES
Encounter Date: 10/16/2024    SCRIBE #1 NOTE: I, Olga Martinez, am scribing for, and in the presence of,  Kim Prescott NP. I have scribed the following portions of the note - Other sections scribed: HPI,ROS,.       History     Chief Complaint   Patient presents with    Abscess     Cluster of Abscesses to left buttock x 2 days. No fever. No drainage      CC: Abscess     HPI: 35 yo F, with a PMHx of Perineal abscess, Chron's disease, Hydradenitis, eczema, presents to ED with complaint of abscess to left buttock that began 2 days ago. Patient reports abscess has increased in size and is painful. Deneis any drainage currently. No other aggravating/alleviating factors. Patient reports her most recent surgery for a past abscess(24) she was put on antibiotics. States she finished the course of antibiotics. Denies fever, chills, nausea, vomiting, urinary/bowel concerns, or other associated symptoms.       Per chart review, patient had incision and drainage procedure of abscess location on right groin done on 24 with Dr. Booker. Patient tolerated procedure well and was prescribed a 7- day course of clindamycin. Patient then was advised to follow up with wound care.     The history is provided by the patient. No  was used.     Review of patient's allergies indicates:   Allergen Reactions    Horse/equine containing products Itching     Past Medical History:   Diagnosis Date    Allergy     Anxiety     Asthma     Crohn's disease (ileum)     S/P ileocecal resection-42 cm of ileum/10.5 cm cecum removed with surgical path c/w stricture and fistula 2/15/20 ileostomy takedown and mucus fistula takedown    Depression     Eczema     Former smoker     Hydradenitis     Immunosuppressed status     Perineal abscess 2021    Psoriasis      Past Surgical History:   Procedure Laterality Date     SECTION N/A 2023    Procedure:  SECTION;  Surgeon: Geo Espitia MD;  Location: [facility] L&D OR;   Service: OB/GYN;  Laterality: N/A;     SECTION      COLONOSCOPY N/A 2019    Procedure: COLONOSCOPY;  Surgeon: Fawad Perla MD;  Location: [facility] (2ND FLR);  Service: Endoscopy;  Laterality: N/A;    COLONOSCOPY N/A 10/30/2020    Procedure: COLONOSCOPY;  Surgeon: Suzi Bacon MD;  Location: [facility] (4TH FLR);  Service: Endoscopy;  Laterality: N/A;  covid test [facility] 10/27  pt to try Mirilax prep with Reglan before per Dr. Bacon - nimisha  10/14-new instructions e-mailed-tb  10/16--new instructions e-mailed  10/29/20- Pt confirmed earlier arrival time, prep ins. reveiwed, Pt verbalized understanding- [email or web address]    COLONOSCOPY N/A 05/10/2022    Procedure: COLONOSCOPY;  Surgeon: Suzi Bacon MD;  Location: [facility] (4TH FLR);  Service: Endoscopy;  Laterality: N/A;  2022- same as last bowel prep (Miralax/Gatorade)  3/ pt rescheduled to May/ COVID test on 5/3 at [facility]  instruction via portal   pt rescheduled; pt fully vaccinated; Rapid; instructions to portal-st    COLONOSCOPY N/A 2023    Procedure: COLONOSCOPY;  Surgeon: Suzi Bacon MD;  Location: [facility] (4TH FLR);  Service: Endoscopy;  Laterality: N/A;  order created: poor prep, Dr. SKY Bacon, Miralax, prep instr portal -  pre call , pt confirmed - mf    ESOPHAGOGASTRODUODENOSCOPY N/A 2019    Procedure: EGD (ESOPHAGOGASTRODUODENOSCOPY);  Surgeon: Fawad Perla MD;  Location: [facility] (2ND FLR);  Service: Endoscopy;  Laterality: N/A;    ILEOCOLECTOMY N/A 05/10/2019    Procedure: ILEOCOLECTOMY;  Surgeon: Uday Cope MD;  Location: [facility] OR 2ND FLR;  Service: Colon and Rectal;  Laterality: N/A;  ileocecectomy    ILEOSCOPY N/A 2019    Procedure: ILEOSCOPY through stoma;  Surgeon: Suzi Bacon MD;  Location: [facility] ([address] [place] [address]);  Service: Endoscopy;  Laterality: N/A;  Schedule as 30 minute case  Please let patient know to bring extra stoma bag/wafer, etc- entire bag and appliance will  be removed at time of procedure to visualize the area well   first week 11/2019     per note-R/S to 12/20/19-GT    ILEOSTOMY N/A 05/10/2019    Procedure: CREATION, ILEOSTOMY with mucous fistula;  Surgeon: Uday Cope MD;  Location: [facility] OR 2ND FLR;  Service: Colon and Rectal;  Laterality: N/A;    ILEOSTOMY CLOSURE N/A 02/12/2020    Procedure: CLOSURE,ILEOSTOMY TAKEDOWN END ILEOSTOMY/MUCOUS FISTULA POSSIBLE LAPAROTOMY ERAS LOW;  Surgeon: Uday Cope MD;  Location: [facility] OR 2ND FLR;  Service: Colon and Rectal;  Laterality: N/A;    INCISION AND DRAINAGE OF ABSCESS Right 8/28/2024    Procedure: INCISION AND DRAINAGE, ABSCESS;  Surgeon: Javon Booker MD;  Location: [facility] OR;  Service: General;  Laterality: Right;  Right groin    INCISION AND DRAINAGE OF BUTTOCK Bilateral 12/07/2022    Procedure: INCISION AND DRAINAGE, BUTTOCK;  Surgeon: Regan Rowell Jr., MD;  Location: [facility];  Service: General;  Laterality: Bilateral;     Family History   Problem Relation Name Age of Onset    Hypertension Mother Kalie     No Known Problems Father Lexx     No Known Problems Sister Alda     No Known Problems Sister Charlette     No Known Problems Sister Daisy     No Known Problems Sister Yusra     No Known Problems Brother Andrews     Glaucoma Maternal Uncle      No Known Problems Maternal Uncle      No Known Problems Maternal Uncle      No Known Problems Paternal Aunt      No Known Problems Paternal Aunt      No Known Problems Paternal Uncle      No Known Problems Paternal Uncle      No Known Problems Paternal Uncle      No Known Problems Paternal Uncle      No Known Problems Maternal Grandmother      Cataracts Maternal Grandfather      Irritable bowel syndrome Paternal Grandmother      No Known Problems Paternal Grandfather      Celiac disease Neg Hx      Cirrhosis Neg Hx      Colon cancer Neg Hx      Colon polyps Neg Hx      Crohn's disease Neg Hx      Cystic fibrosis Neg Hx      Esophageal cancer Neg Hx      Hemochromatosis Neg Hx       Inflammatory bowel disease Neg Hx      Liver cancer Neg Hx      Liver disease Neg Hx      Rectal cancer Neg Hx      Stomach cancer Neg Hx      Ulcerative colitis Neg Hx      Jerardo's disease Neg Hx      Lymphoma Neg Hx      Tuberculosis Neg Hx      Scleroderma Neg Hx      Rheum arthritis Neg Hx      Multiple sclerosis Neg Hx      Psoriasis Neg Hx      Lupus Neg Hx      Melanoma Neg Hx      Skin cancer Neg Hx      Amblyopia Neg Hx      Blindness Neg Hx      Cancer Neg Hx      Diabetes Neg Hx      Macular degeneration Neg Hx      Retinal detachment Neg Hx      Strabismus Neg Hx      Stroke Neg Hx      Thyroid disease Neg Hx       Social History     Tobacco Use    Smoking status: Every Day     Current packs/day: 0.00     Types: Cigarettes     Last attempt to quit: 2020     Years since quittin.7    Smokeless tobacco: Never   Substance Use Topics    Alcohol use: Not Currently     Alcohol/week: 0.0 standard drinks of alcohol     Comment: Socially    Drug use: Yes     Frequency: 3.0 times per week     Types: Marijuana     Review of Systems   Constitutional:  Negative for chills and fever.   HENT:  Negative for congestion and sore throat.    Eyes:  Negative for pain.   Respiratory:  Negative for shortness of breath and wheezing.    Cardiovascular:  Negative for chest pain.   Gastrointestinal:  Negative for abdominal pain.   Genitourinary:  Negative for flank pain.   Musculoskeletal:  Negative for myalgias.   Skin:  Negative for color change.        + abscess on buttock    Neurological:  Negative for weakness and headaches.   Hematological:  Does not bruise/bleed easily.   Psychiatric/Behavioral:  Negative for suicidal ideas.        Physical Exam     Initial Vitals [10/16/24 1059]   BP Pulse Resp Temp SpO2   100/66 104 18 98.1 °F (36.7 °C) 100 %      MAP       --         Physical Exam    Constitutional: She appears well-developed and well-nourished. She is not diaphoretic. No distress.   HENT:   Head:  Normocephalic and atraumatic.   Neck:   Normal range of motion.  Cardiovascular:  Normal rate, regular rhythm, normal heart sounds and intact distal pulses.           Pulmonary/Chest: No respiratory distress.   Genitourinary:    Rectum normal.   Rectum:      No rectal mass, anal fissure or tenderness.      Genitourinary Comments: No rectal tenderness or mass palpated.  COLLEEN performed with Melody Colunga RN at bedside.      Musculoskeletal:         General: Normal range of motion.      Cervical back: Normal range of motion.     Neurological: She is alert and oriented to person, place, and time.   Skin: Skin is warm and dry. Abscess noted. There is erythema.   Abnormal skin thickening and scarring with cutaneous lesions to the gluteal soft tissue.  3cm x 3cm fluctuance to left buttock/intergluteal cleft with mild surrounding erythema.  No kyree rectal involvement.   Psychiatric: She has a normal mood and affect. Her behavior is normal.         ED Course   I & D - Incision and Drainage    Date/Time: 10/16/2024 2:49 PM  Location procedure was performed: [facility] EMERGENCY DEPARTMENT    Performed by: Kim Prescott NP  Authorized by: Ilana Lee MD  Type: abscess  Anesthesia: local infiltration    Anesthesia:  Local Anesthetic: lidocaine 1% without epinephrine  Anesthetic total: 6 mL  Scalpel size: 10  Incision type: single straight  Complexity: simple  Drainage: pus, purulent and serosanguinous  Drainage amount: moderate  Wound treatment: incision, drainage, expression of material, deloculation and wound packed  Packing material: 1/4 in gauze  Patient tolerance: Patient tolerated the procedure well with no immediate complications        Labs Reviewed   POCT URINE PREGNANCY       Result Value    POC Preg Test, Ur Negative       Acceptable Yes            Imaging Results    None          Medications   oxyCODONE-acetaminophen 5-325 mg per tablet 1 tablet (1 tablet Oral Not Given 10/16/24 1229)   LIDOcaine HCL 10  mg/ml (1%) injection 10 mL (10 mLs Infiltration Given 10/16/24 1229)   clindamycin capsule 300 mg (300 mg Oral Given 10/16/24 1229)   ibuprofen tablet 600 mg (600 mg Oral Given 10/16/24 1229)     Medical Decision Making  This is an evaluation of a 34 y.o. female that presents to the Emergency Department for an abscess. Physical Exam shows a non-toxic, afebrile, and well appearing female. There is a 3 cm x 3 cm abscess to the left buttock with a central area of fluctuance with mild surrounding induration and mild erythema.  There is no perirectal involvement    Vital Signs Are Reassuring. Procedure: Abscess was drained per the procedure note. The wound was packed.   Patient has work today and declined pain medication, further workup/intervention at this time.  Thoroughly discussed need for follow up with General surgery as well as return precautions with the patient who verbalized understanding.      My overall impression is Abscess. I considered, but at this time, do not suspect an extensive cellulitis, sepsis, or bacteremia to warrant admission.    D/C Information: warm compresses 10-15 minutes, 4-5 times a day to help increase wound drainage and decrease swelling. The diagnosis, treatment plan, instructions for follow-up and reevaluation with her PCP or the ED in 2 - 3 days for wound recheck as well as ED return precautions were discussed and understanding was verbalized. All questions or concerns have been addressed.     Amount and/or Complexity of Data Reviewed  Labs: ordered. Decision-making details documented in ED Course.     Details: See HPI     Risk  Prescription drug management.            Scribe Attestation:   Scribe #1: I performed the above scribed service and the documentation accurately describes the services I performed. I attest to the accuracy of the note.                   Medical Decision Making:   History:   Old Medical Records: I decided to obtain old medical records.           ROSANA ANDINO  personally performed the services described in this documentation. All medical record entries made by the scribe were at my direction and in my presence. I have reviewed the chart and agree that the record reflects my personal performance and is accurate and complete.      DISCLAIMER: This note was prepared with [employer] voice recognition transcription software. Garbled syntax, mangled pronouns, and other bizarre constructions may be attributed to that software system.    Clinical Impression:  Final diagnoses:  [L02.31] Abscess of left buttock (Primary)  [L73.2] Hidradenitis suppurativa          ED Disposition Condition    Discharge Stable          ED Prescriptions       Medication Sig Dispense Start Date End Date Auth. Provider    clindamycin (CLEOCIN) 300 MG capsule Take 1 capsule (300 mg total) by mouth 4 (four) times daily. for 10 days 40 capsule 10/16/2024 10/26/2024 Kim Prescott NP    mupirocin (BACTROBAN) 2 % ointment Apply topically 3 (three) times daily. 22 g 10/16/2024 -- Kim Prescott NP    HYDROcodone-acetaminophen (NORCO) 5-325 mg per tablet Take 1 tablet by mouth every 6 (six) hours as needed for Pain. 12 tablet 10/16/2024 -- Kim Prescott NP    ibuprofen (ADVIL,MOTRIN) 600 MG tablet Take 1 tablet (600 mg total) by mouth every 6 (six) hours as needed for Pain. 20 tablet 10/16/2024 -- Kim Prescott NP          Follow-up Information       Follow up With Specialties Details Why Contact Info    Jere Collier MD General Surgery, Surgery Schedule an appointment as soon as possible for a visit  For follow-up [address]  [address]  [facility] [address]  [phone]      [facility] ED Emergency Medicine Go to  If symptoms worsen [address] [facility] [address] [phone]             Kim Prescott NP  10/16/24 1551

## 2024-11-01 PROBLEM — Z53.8 ATTEMPTED IUD REMOVAL, UNSUCCESSFUL: Status: RESOLVED | Noted: 2024-06-05 | Resolved: 2024-11-01

## 2024-11-01 PROBLEM — O09.299 HISTORY OF PRE-ECLAMPSIA IN PRIOR PREGNANCY, CURRENTLY PREGNANT (HHS-HCC): Status: ACTIVE | Noted: 2024-11-01

## 2024-11-01 PROBLEM — R55 FAINTING EPISODES: Status: ACTIVE | Noted: 2024-11-01

## 2024-11-01 PROBLEM — Z98.891 HISTORY OF C-SECTION: Status: ACTIVE | Noted: 2024-11-01

## 2024-11-01 PROBLEM — Z30.432 ENCOUNTER FOR IUD REMOVAL: Status: RESOLVED | Noted: 2024-06-12 | Resolved: 2024-11-01

## 2024-11-01 PROBLEM — R10.9 ABDOMINAL PAIN: Status: ACTIVE | Noted: 2024-04-01

## 2024-11-01 PROBLEM — Z97.5 ATTEMPTED IUD REMOVAL, UNSUCCESSFUL: Status: RESOLVED | Noted: 2024-06-05 | Resolved: 2024-11-01

## 2024-11-01 PROBLEM — E66.9 OBESITY: Status: ACTIVE | Noted: 2024-11-01

## 2024-11-01 PROBLEM — A60.00 GENITAL HSV: Status: ACTIVE | Noted: 2024-11-01

## 2024-11-12 ENCOUNTER — E-ADVICE (OUTPATIENT)
Dept: BARIATRICS/WEIGHT MGMT | Age: 34
End: 2024-11-12

## 2024-11-15 ENCOUNTER — WALK IN (OUTPATIENT)
Dept: URGENT CARE | Age: 34
End: 2024-11-15

## 2024-11-15 VITALS
TEMPERATURE: 97 F | DIASTOLIC BLOOD PRESSURE: 90 MMHG | SYSTOLIC BLOOD PRESSURE: 132 MMHG | WEIGHT: 288.8 LBS | RESPIRATION RATE: 18 BRPM | HEART RATE: 71 BPM | BODY MASS INDEX: 42.65 KG/M2 | OXYGEN SATURATION: 100 %

## 2024-11-15 DIAGNOSIS — Z11.3 SCREENING EXAMINATION FOR STD (SEXUALLY TRANSMITTED DISEASE): ICD-10-CM

## 2024-11-15 DIAGNOSIS — N89.8 VAGINAL DISCHARGE: Primary | ICD-10-CM

## 2024-11-15 LAB
APPEARANCE, POC: ABNORMAL
B-HCG UR QL: NEGATIVE
BILIRUB UR QL STRIP: NEGATIVE
COLOR UR: YELLOW
GLUCOSE UR-MCNC: NEGATIVE MG/DL
HGB UR QL STRIP: NEGATIVE
INTERNAL PROCEDURAL CONTROLS ACCEPTABLE: YES
KETONES UR STRIP-MCNC: ABNORMAL MG/DL
LEUKOCYTE ESTERASE UR QL STRIP: NEGATIVE
NITRITE UR QL STRIP: NEGATIVE
PH UR: 5 [PH] (ref 5–7)
PROT UR-MCNC: NEGATIVE MG/DL
SP GR UR: >= 1.03 (ref 1–1.03)
TEST LOT EXPIRATION DATE: 0
TEST LOT NUMBER: 0
UROBILINOGEN UR-MCNC: 0.2 MG/DL (ref 0–1)

## 2024-11-15 RX ORDER — FLUCONAZOLE 150 MG/1
150 TABLET ORAL DAILY
Qty: 1 TABLET | Refills: 0 | Status: SHIPPED | OUTPATIENT
Start: 2024-11-15 | End: 2024-11-16

## 2024-11-15 RX ORDER — CLINDAMYCIN HYDROCHLORIDE 300 MG/1
300 CAPSULE ORAL 2 TIMES DAILY
Qty: 14 CAPSULE | Refills: 0 | Status: SHIPPED | OUTPATIENT
Start: 2024-11-15 | End: 2024-11-22

## 2024-11-16 ENCOUNTER — APPOINTMENT (OUTPATIENT)
Dept: LAB | Age: 34
End: 2024-11-16

## 2024-11-17 DIAGNOSIS — N76.0 BV (BACTERIAL VAGINOSIS): Primary | ICD-10-CM

## 2024-11-17 DIAGNOSIS — B96.89 BV (BACTERIAL VAGINOSIS): Primary | ICD-10-CM

## 2024-11-17 LAB
BACTERIAL VAGINOSIS VAG-IMP: POSITIVE
C ALBICANS DNA VAG QL NAA+PROBE: NOT DETECTED
C GLABRATA DNA VAG QL NAA+PROBE: NOT DETECTED
M GENITALIUM DNA SPEC QL NAA+PROBE: NOT DETECTED
SERVICE CMNT-IMP: ABNORMAL
SERVICE CMNT-IMP: NORMAL
SERVICE CMNT-IMP: NORMAL
T VAGINALIS DNA VAG QL NAA+PROBE: NOT DETECTED

## 2024-11-17 RX ORDER — CLINDAMYCIN PHOSPHATE 20 MG/G
1 CREAM VAGINAL NIGHTLY
Qty: 40 G | Refills: 0 | Status: SHIPPED | OUTPATIENT
Start: 2024-11-17 | End: 2024-11-24

## 2024-11-18 ENCOUNTER — TELEPHONE (OUTPATIENT)
Dept: URGENT CARE | Age: 34
End: 2024-11-18

## 2024-11-18 LAB
C TRACH RRNA SPEC QL NAA+PROBE: NEGATIVE
Lab: NORMAL
N GONORRHOEA RRNA SPEC QL NAA+PROBE: NEGATIVE

## 2024-11-18 RX ORDER — LEVOTHYROXINE SODIUM 200 UG/1
200 TABLET ORAL DAILY
Qty: 30 TABLET | Refills: 0 | Status: SHIPPED | OUTPATIENT
Start: 2024-11-18

## 2024-11-21 ENCOUNTER — TELEPHONE (OUTPATIENT)
Dept: URGENT CARE | Age: 34
End: 2024-11-21

## 2024-12-04 ENCOUNTER — OFFICE VISIT (OUTPATIENT)
Dept: GASTROENTEROLOGY | Facility: CLINIC | Age: 34
End: 2024-12-04
Payer: MEDICAID

## 2024-12-04 VITALS
WEIGHT: 125 LBS | HEIGHT: 63 IN | OXYGEN SATURATION: 99 % | SYSTOLIC BLOOD PRESSURE: 115 MMHG | HEART RATE: 116 BPM | DIASTOLIC BLOOD PRESSURE: 78 MMHG | BODY MASS INDEX: 22.15 KG/M2

## 2024-12-04 DIAGNOSIS — K50.80 CROHN'S DISEASE OF BOTH SMALL AND LARGE INTESTINE WITHOUT COMPLICATION: Primary | ICD-10-CM

## 2024-12-04 DIAGNOSIS — L40.9 PSORIASIS: ICD-10-CM

## 2024-12-04 DIAGNOSIS — L02.31 ABSCESS OF BUTTOCK, RIGHT: ICD-10-CM

## 2024-12-04 PROCEDURE — 99214 OFFICE O/P EST MOD 30 MIN: CPT | Mod: PBBFAC,PN | Performed by: INTERNAL MEDICINE

## 2024-12-04 PROCEDURE — 3074F SYST BP LT 130 MM HG: CPT | Mod: CPTII,,, | Performed by: INTERNAL MEDICINE

## 2024-12-04 PROCEDURE — 99215 OFFICE O/P EST HI 40 MIN: CPT | Mod: S$PBB,,, | Performed by: INTERNAL MEDICINE

## 2024-12-04 PROCEDURE — 3008F BODY MASS INDEX DOCD: CPT | Mod: CPTII,,, | Performed by: INTERNAL MEDICINE

## 2024-12-04 PROCEDURE — 1159F MED LIST DOCD IN RCRD: CPT | Mod: CPTII,,, | Performed by: INTERNAL MEDICINE

## 2024-12-04 PROCEDURE — 99999 PR PBB SHADOW E&M-EST. PATIENT-LVL IV: CPT | Mod: PBBFAC,,, | Performed by: INTERNAL MEDICINE

## 2024-12-04 PROCEDURE — 3078F DIAST BP <80 MM HG: CPT | Mod: CPTII,,, | Performed by: INTERNAL MEDICINE

## 2024-12-04 RX ORDER — TRIAMCINOLONE ACETONIDE 1 MG/G
CREAM TOPICAL
COMMUNITY
Start: 2024-06-18

## 2024-12-04 RX ORDER — FLUOCINOLONE ACETONIDE 0.11 MG/ML
1 OIL AURICULAR (OTIC) 2 TIMES DAILY PRN
COMMUNITY
Start: 2024-06-18

## 2024-12-04 RX ORDER — ADALIMUMAB 40MG/0.8ML
KIT SUBCUTANEOUS
Qty: 6 PEN | Refills: 0 | Status: SHIPPED | OUTPATIENT
Start: 2024-12-04 | End: 2024-12-05

## 2024-12-04 RX ORDER — KETOCONAZOLE 20 MG/ML
SHAMPOO, SUSPENSION TOPICAL
COMMUNITY
Start: 2024-08-22

## 2024-12-04 NOTE — PROGRESS NOTES
GI OFFICE VISIT                 Chief Complaint:   Chief Complaint   Patient presents with    Abdominal Pain    Diarrhea    Constipation    Crohn's Disease     PCP: Geo Espitia (Inactive)    Interval    Interval History:  - current IBD meds: OFF meds (ran out of humira around 5/2024)  Humira 40 mg SC weekly (started 10/24/20, gap in therapy 1/20/23-2/17/23, restarted with 80 mg on 2/17/23 then 40 mg SC weekly, gap in therapy 12/2023 - 2/2023, LD 12/22/23, re-induced 1/31/2024, ND 2/14 then resume 40mg weekly on 2/28)  - slimy loose stools multiple times a day.  First visit with me  She has been followed by IBD clinic but now making a follow up with me in our office.  She is visibly upset  Apparently has been fired by her IBD team  She is OFF humira for the last about 6 months   She is having changes in stool like before. Slimy stool, abd pain, indigestion, worsening of her skin disease  Had to get abscess surgery and thinks she needs to go back to see the surgeon again  She desperatley wants to get back on humira.  She felt MUCH better while on humira    - 6/1/2023 - colonoscopy: Abnormal perianal exam. Buttock sores but no obvious fistula or abscess. Scarring along gluteal cleft along with hypopigmentation of skin. The entire examined colon is normal. The examined portion of the ileum was normal. SB biopsies showed some microscopic inflammation, overall normal   - 7/2023 - had multiple abscesses in the area between buttocks. Got I&D done at a hospital in [place]. Followed up with wound care afterwards for 2 months  - 10/2023 - had recurring abscess on right buttock which drained on its own and resolved  - a week ago noticed another abscess forming on upper right buttock - has gotten bigger in size and it is painful when she puts pressure, pt feels it is warm upon touch.   - currently is having a flare of her psoriasis affecting her entire body (legs, face, back, arms). Symptoms started to worsen after 3 weeks of  "no humira (mid January) and have gotten worse. Currently not using anything topically.         ==============================================  Reviewed IBD clinic extensive history as copied below  Previous History:  Lora Scott is a 33 y.o. female with Crohn's disease (ileum, S/P ileocecal resection-42 cm of ileum/10.5 cm cecum removed with surgical path c/w stricture and fistula, 2/15/20 ileostomy takedown and mucus fistula takedown), buttock/perineal abscess (drained 12/2022, likely hydradenitis suppurativa), psoriasis, GERD, asthma, ex-smoker (quit 9/2019).  Patient reports being diagnosed with IBS with alternating diarrhea and constipation and weight loss as a teenager that was treated with Nexium which helped.  In 8/2018 she again had abd pain, weight loss, and alternating BMs so she contacted her PCP for Nexium which did not help.  By 2/2019, her symptoms progressed prompting an ER visit and on 2/18/2019 she had a CT abd/pelvis which showed an "abnormal wall thickening involving the ascending colon, cecum and terminal ileum with intense mucosal enhancement and luminal narrowing resulting in distention of proximal small bowel.  There was also an ileal-ileal and ileocolic fistulization, fat hypertrophy, increased mesenteric vascularitiy, and reactive mesenteric adenitis in the RLQ with no abscess. "  Patient had an inpatient EGD on 2/20/2019 which showed minimal chronic H. Pylori negative inflammation, normal esophagus, and duodenal biopsies showed mild villous blunting and mild increased IELs.  Colonoscopy on the same day showed congested mucosa in the TI and ICV with granularity in the TI, and TI stricture 5 cm from the ICV with ileal biopsies confirmed ileitis and biopsies of the right colon confirmed focal moderate active colitis with normal left colon and rectum, sigmoid diverticulitis, and internal hemorrhoids.  It was felt that patient would eventually need surgical intervention but needed nutritional " optimization initially so she was discharged home with 12 hours of TPN daily via a PICC and bowel rest with plans for an MRE, surgical f/u, and a referral to the IBD clinic to discuss medical options.  She was last seen by me 3/2019 for initial IBD visit at which time she was on no meds and there was plan for MRE 3/13/19 with f/u with Dr. Ruiz. MRE on 3/13/19 showed multifocal active inflammation of the distal ileum with mild fibrostenotic component and entero-enteric fistula.  She then was transferred to Dr. Cope due to availability and he initially saw her in clinic and a decision to proceed with surgery was made.  On 5/10/19 she underwent an open ileocolonic resection (10 cm of cecum, 42 cm of ileum removed) with end ileostomy with mucous fistula. Surgical pathology showed TI/cecum with chronic active ileitis with stricture and features of fistulization, transmural inflammation with reactive LNs. Postoperatively she did well and gained weight and would pass mucous from below a few times only. She emptied her stoma about 4-6 times/d and was having issues getting supplies due to insurance issues.  Patient has had multiple issues with stoma appliance/bag and due to inability to afford stoma supplies and its not covered by insurance, she has been getting samples from our wound/ostomy nurse.  Patient stopped smoking in mid September 2019.  She started on methotrexate 12.5 mg p.o. weekly though due to elevated liver function test discontinued on 10/4/2019.  She started Humira on 10/24/2019 and Imuran 50 mg daily on 10/25/2019 though never started Imuran as recommended and we decided not to proceed and instead optimize her Humira.  Ileoscopy through stoma was normal on 12/20/2019.  On 1/15/2020 patient had Humira levels 3.5 and her Humira was increased to weekly starting 3/13/20.  On 2/15/2020 she had elective takedown of her ileostomy and mucous fistula. Colonoscopy 10/2020 showed normal colon and single linear  ulcer at ileocolonic anastomosis and normal neoterminal ileum with biopsies of neoterminal ileum normal. On 20 trough Humira levels and abs adequate with [id number] with no Abs on Humira 40 mg SC weekly. Patient has ongoing psoriasis being followed by [facility] dermatology and was treated for tinea pedis 2020.  She was also treated for bacterial vaginosis by her GYN in  and pap smear HPV positive with plans for colposcopy.  In 2021 patient was diagnosed with recurrent fungal infections- tineap capitis treated with griseofulvin.  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 In mid 2021 pt had abscess near vaginal area s/p I&D in [facility] in [place].  She saw Dermatology 22 and 22 for psoriasis which significantly improved with prednisone 60mg followed by taper over two weeks with plans for otezla though pt is not on this and has not seen dermatology since then. In 5/10/22 colonoscopy c/w normal colon, ileocolonic anastomosis and neoterminal ileum. 22 had multiple buttock/perineal abscesses drained by [facility] (Dr. Regan Rowell) and following wound care and f/u with surgery with good wound healing and granulation and more c/w hydradenitis suppurativa. On 22 trough humira levels 8.2/Abs neg on humira 40 mg SC weekly.  She delivered a healthy baby by  23 and during pregnancy remained on Humira 40 mg SC weekly though delayed dosing towards the end of pregnancy and was off humira from -23. Due to this we reloaded her with 80 mg dose of humria 23 and she then resumed 40 mg SC weekly.        Prior Pertinent Surgeries:   5/10/19 open ileocolonic resection (10 cm of cecum, 42 cm of ileum removed) with end ileostomy with mucous fistula. Surgical pathology showed TI/cecum with chronic active ileitis  with stricture and features of fistulization, transmural inflammation with reactive LNs  2/15/20 elective takedown of her ileostomy and mucous fistula;  (surgical path- peristoma granulation tissue with acute and chronic inflammation, ascending colon with acute and chronic inflammatory infiltrate with superficial erosion and focal active colitis, TI with focal granulomatous inflammation and superficial erosion)  22 Multiple abscess drainage from per-anal/gluteal cleft     Last pertinent Endoscopy/Imagin2019 EGD: normal examined duodenum (path: mild villous blunting and mild increased intraepithelial lymphocytes); normal stomach (path: minimal chronic H. pylori negative inflammation); normal esophagus  3/13/19 MRE:  multifocal active inflammation of the distal ileum with mild fibrostenotic component and entero-enteric fistula  19- ileoscopy through stoma: The 30 cm of examined through the mucous fistula is normal. The peristomal area has granular friable mucosa and the lorena-terminal ileum appeared normal. Biopsies of colon through mucus fistula- normal colon, biopsies of neoterminal ileum with minimal active inflammation  10/30/20 colonoscopy: Colon normal, single linear ulcer at the colon anastomosis. Ileum was normal as well including path.  5/10/22 colonoscopy: normal colon, normal ileocolonic anastomosis, normal neoterminal ileum. Biopsies of neoterminal ileum with nonspecific patchy increased eosinophils.  2023  colonoscopy: The perianal exam was abnormal. Buttock sores but no obvious fistula or abscess. Scarring along gluteal cleft along with hypopigmentation of skin. The colon (entire examined portion) appeared normal including ileocolonic anastomosis. The lorena-terminal ileum appeared normal. Biopsies were taken with a cold forceps for histology. Estimated blood loss was minimal.     Therapeutic Drug Monitoring Labs:  1/15/20 humira levels 3.5/neg Abs on humira 40 mg SC every 2  "weeks  9/17/20 Trough Humira levels 12.0 and neg abs on Humira  40 mg SC every 2 weeks  2/12/21 Humira levels 7.5, no antibodies on Humira  40 mg SC every week.   7/7/22 trough humira level 8.2/Abs neg (Humira 40 mg SC weekly)    5/1/23 ADM trough level 13.5/ Abs neg (humira 40mg SC weekly)     Prior IBD Therapies:  MTX 12.5 mg po weekly- stopped after a few weeks due to elevated LFTs 10/2019     Vaccinations:  Lab Results   Component Value Date    HEPBSAB 145.77 05/01/2023    HEPBSAB Reactive 05/01/2023     No results found for: "HEPBSURFABQU"  Lab Results   Component Value Date    HEPAIGG Positive (A) 02/19/2019     Lab Results   Component Value Date    VARICELLAZOS 2.58 (H) 03/11/2019    VARICELLAINT Positive (A) 03/11/2019     Immunization History   Administered Date(s) Administered    COVID-19, MRNA, LN-S, PF (Pfizer) (Purple Cap) 10/22/2021    DTP 1991, 02/24/1992, 03/18/1992, 03/18/1992, 01/12/1994    HIB 1991, 02/24/1992, 03/18/1992    Hepatitis B (recombinant) Adjuvanted, 2 dose 04/17/2019, 05/20/2019    Hepatitis B, Pediatric/Adolescent 07/24/2006    IPV 07/24/2006    MMR 01/13/1994, 07/24/2006    Meningococcal Conjugate (MCV4P) 07/24/2006    OPV 1991, 02/24/1992, 01/12/1994    Pneumococcal Conjugate - 13 Valent 04/17/2019    Pneumococcal Polysaccharide - 23 Valent 06/20/2019    Td (ADULT) 07/24/2006    Td (Adult), Unspecified Formulation 07/24/2006    Tdap 07/24/2006, 04/17/2019   Flu shot: recommended yearly   COVID vaccine/booster:  per CDC recommendations  RSV: after age 61 yo  Tetanus (Tdap):  due 4/2029  PPSV 20: 6/2024  HPV:  recommended     Shingrix: discussed and recommended. Consider at next visit     Review of Systems   Constitutional:  Positive for malaise/fatigue. Negative for chills, fever and weight loss.   HENT:          No oral ulcers, dysphagia, oral thrush   Eyes:  Positive for pain. Negative for blurred vision and redness.   Cardiovascular:  Negative for chest pain. " "  Gastrointestinal:  Positive for abdominal pain and diarrhea. Negative for heartburn, nausea and vomiting.   Genitourinary:  Negative for dysuria and hematuria.   Skin:  Negative for rash.   Psychiatric/Behavioral:  Negative for depression. The patient is nervous/anxious. The patient does not have insomnia.      All Medical History/Surgical History/Family History/Social History/Allergies have been reviewed and updated in EMR      Vital Signs:  /78 (BP Location: Left arm, Patient Position: Sitting)   Pulse (!) 116   Ht 5' 3" (1.6 m)   Wt 56.7 kg (125 lb)   SpO2 99%   Breastfeeding No   BMI 22.14 kg/m²      Physical Exam  Constitutional:       Appearance: Normal appearance.   Cardiovascular:      Rate and Rhythm: Normal rate and regular rhythm.      Pulses: Normal pulses.      Heart sounds: Normal heart sounds. No murmur heard.  Pulmonary:      Effort: Pulmonary effort is normal. No respiratory distress.      Breath sounds: Normal breath sounds. No wheezing.   Abdominal:      General: Bowel sounds are normal. There is no distension.      Palpations: Abdomen is soft.      Tenderness: There is no abdominal tenderness.   Musculoskeletal:         General: No swelling.   Skin:     Findings: Rash (psoriasis over face and extensors arms) present.      Comments: Deferred rectal / buttock exam, she asked for me to refer to surgeon   Neurological:      Mental Status: She is alert.     Labs:   Lab Results   Component Value Date    .1 (H) 12/04/2024     Lab Results   Component Value Date    HEPBSAG Non-reactive 05/01/2023    HEPBCAB Non-reactive 05/01/2023     Lab Results   Component Value Date    TBGOLDPLUS Negative 05/01/2023     Lab Results   Component Value Date    AWBMXDTE67CH 6 (L) 01/17/2024    SXVKLHUI38 201 (L) 01/17/2024     Lab Results   Component Value Date    WBC 10.13 12/04/2024    HGB 10.8 (L) 12/04/2024    HCT 34.6 (L) 12/04/2024    MCV 96 12/04/2024     12/04/2024     Lab Results "   Component Value Date    CREATININE 0.8 12/04/2024    ALBUMIN 3.0 (L) 12/04/2024    BILITOT 0.8 12/04/2024    ALKPHOS 80 12/04/2024    AST 13 12/04/2024    ALT 13 12/04/2024       Assessment/Plan:  Lora Scott is a 34 y.o. female  with hx of complex crohn disease , with prior ileocecal resection with stricturing and fistulizing disease, hx of skin abscess concern for hydradenitis   Previously followed by IBD clinic  Here for follow up , she is new to me.    She has been off her humira therapy for months  We discussed this this is not ideal and this can result in future non response to the medication because of antibody formation and I explained this in detail.  She is very tearful today and she wishes to get back on the medication and she wishes to have stability in her appointments.    I think the best course of action for now is to restart her Humira as soon as possible.   (Prior drug levels ok without Abs)  We will refer her back to her surgeons for further evaluation of these abscesses.      She will need to be followed by an IBD specialist and I will work on getting her plugged back into an IBD Clinic as soon as possible.    Crohn  Start humira , loading with 80mg then 40mg weekly   - hep B / quant gold up to date (as of 6/2024)  Will file for approval  Basic labs  Will request urgent IBD center appt   If she is truly fired from [facility], I will refer her to [facility]   Refer back to surgeon  Follow up with derm    RTC to be determined / referral to IBD specialist as she requires higher level of care and has been followed by IBD specialist in the past.      Abraham Mesa MD  [facility]    A total of 66 minutes were spent during this encounter including reviewing records, interviewing, examining patient, and counseling / coordination of care.

## 2024-12-04 NOTE — Clinical Note
Hi, This lady arrived in my office. She is extremely tearful, lost to follow up . Apparently off humira for months...  Can she be seen in the IBD clinic? Is she technically fired? I dont see any documentation of that.  Either way, she needs an IBD team, she is not appropriate for my clinic in [facility]. Please let me know, if she cannot be seen, I will call Dr. Ontiveros at [facility].  I am trying to get her back on humira to see if she will again respond in the meantime. Any held in getting that approval again would be appreciated (these approvals can take weeks on my end without any true )

## 2024-12-05 ENCOUNTER — HOSPITAL ENCOUNTER (EMERGENCY)
Facility: HOSPITAL | Age: 34
Discharge: HOME | End: 2024-12-05
Attending: EMERGENCY MEDICINE
Payer: MEDICAID

## 2024-12-05 VITALS
BODY MASS INDEX: 21.97 KG/M2 | SYSTOLIC BLOOD PRESSURE: 118 MMHG | WEIGHT: 124 LBS | DIASTOLIC BLOOD PRESSURE: 70 MMHG | TEMPERATURE: 98 F | HEIGHT: 63 IN | HEART RATE: 120 BPM | RESPIRATION RATE: 18 BRPM | OXYGEN SATURATION: 99 %

## 2024-12-05 DIAGNOSIS — L03.317 ABSCESS AND CELLULITIS OF GLUTEAL REGION: ICD-10-CM

## 2024-12-05 DIAGNOSIS — L05.01 PILONIDAL ABSCESS: Primary | ICD-10-CM

## 2024-12-05 DIAGNOSIS — L02.31 ABSCESS AND CELLULITIS OF GLUTEAL REGION: ICD-10-CM

## 2024-12-05 LAB
B-HCG UR QL: NEGATIVE
CTP QC/QA: YES

## 2024-12-05 PROCEDURE — 25000003 PHARM REV CODE 250

## 2024-12-05 PROCEDURE — 63600175 PHARM REV CODE 636 W HCPCS: Performed by: PHYSICIAN ASSISTANT

## 2024-12-05 PROCEDURE — 99284 EMERGENCY DEPT VISIT MOD MDM: CPT | Mod: 25

## 2024-12-05 PROCEDURE — 81025 URINE PREGNANCY TEST: CPT | Performed by: PHYSICIAN ASSISTANT

## 2024-12-05 PROCEDURE — 10080 I&D PILONIDAL CYST SIMPLE: CPT

## 2024-12-05 RX ORDER — LIDOCAINE HYDROCHLORIDE 10 MG/ML
10 INJECTION, SOLUTION INFILTRATION; PERINEURAL
Status: COMPLETED | OUTPATIENT
Start: 2024-12-05 | End: 2024-12-05

## 2024-12-05 RX ORDER — SULFAMETHOXAZOLE AND TRIMETHOPRIM 800; 160 MG/1; MG/1
1 TABLET ORAL 2 TIMES DAILY
Qty: 14 TABLET | Refills: 0 | Status: SHIPPED | OUTPATIENT
Start: 2024-12-05 | End: 2024-12-12

## 2024-12-05 RX ORDER — IBUPROFEN 600 MG/1
600 TABLET ORAL EVERY 6 HOURS PRN
Qty: 20 TABLET | Refills: 0 | Status: SHIPPED | OUTPATIENT
Start: 2024-12-05

## 2024-12-05 RX ORDER — IBUPROFEN 400 MG/1
800 TABLET ORAL
Status: COMPLETED | OUTPATIENT
Start: 2024-12-05 | End: 2024-12-05

## 2024-12-05 RX ADMIN — LIDOCAINE HYDROCHLORIDE 10 ML: 10 INJECTION, SOLUTION INFILTRATION; PERINEURAL at 06:12

## 2024-12-05 RX ADMIN — IBUPROFEN 800 MG: 400 TABLET ORAL at 07:12

## 2024-12-06 NOTE — DISCHARGE INSTRUCTIONS
Complete antibiotics as prescribed.  Do intermittent warm compresses and warm soaks to help drainage. Wash gently with soap and water and apply antibiotic ointment (bacitracin, neosporin, etc.) over the wound after washing. Please watch for signs of infection including: increased\spreading redness, swelling, pus-like discharge, or a fever greater than 100.4F. If you experience any of these, please contact your Primary Care Doctor or Return to the Emergency Department for a wound check.     May rotate between Tylenol and Motrin as needed for pain.  Try to avoid any tight clothing or rubbing the area to provide further irritation.    Please follow up with your Primary Care Doctor/dermatology in 5-7 days for wound recheck.  You may return to the Emergency Department if you are unable to see your Primary Care Doctor.  Please return to the ER for any new or worsening symptoms.

## 2024-12-06 NOTE — ED TRIAGE NOTES
Pt to ED c/o large abscess to left buttock x's 3 days. Reports chronic abscess and has had surgery to remove some of them in the past.

## 2024-12-06 NOTE — ED PROVIDER NOTES
Encounter Date: 12/5/2024    SCRIBE #1 NOTE: I, Betty Ramos, am scribing for, and in the presence of,  Hannah Mon PA-C. I have scribed the following portions of the note - Other sections scribed: HPI, ROS, PE.       History     Chief Complaint   Patient presents with    Abscess     Pt reports an abscess to her L buttock x 3 days.      34 y.o. female with PMHx of asthma, crohn's, and hydradenitis presents for emergent evaluation of abscesses to her right gluteal region that began 2 days ago. Patient reports increased to the area.  She was note that when one of the the abscesses began draining small amounts last night giving her some relief.  Reports recurrent history of abscesses to her body and was  on humira in the past for this as well as crohns with some improvement but has not seen GI or General surgery for follow up for medication refills.  Reports history surgical removal of several abscesses in the past.  Reports having abscess to same area drained about a month ago with resolution up until 2 days ago. Patient has not taken any medications at this time.  She is tolerating PO without difficulty.  Patient denies fever, chills, nausea, vomiting, diarrhea, urinary complaints, abdominal pain, myalgias, or any other complaints at this time.       The history is provided by the patient. No  was used.     Review of patient's allergies indicates:   Allergen Reactions    Shellfish containing products Anaphylaxis     Allergic to all seafood.    Horse/equine containing products Itching     Past Medical History:   Diagnosis Date    Allergy     Anxiety     Asthma     Crohn's disease (ileum)     S/P ileocecal resection-42 cm of ileum/10.5 cm cecum removed with surgical path c/w stricture and fistula 2/15/20 ileostomy takedown and mucus fistula takedown    Depression     Eczema     Former smoker     Hydradenitis     Immunosuppressed status     Perineal abscess 09/28/2021    Psoriasis       Past Surgical History:   Procedure Laterality Date     SECTION N/A 2023    Procedure:  SECTION;  Surgeon: Geo Espitia MD;  Location: [facility] L&D OR;  Service: OB/GYN;  Laterality: N/A;     SECTION      COLONOSCOPY N/A 2019    Procedure: COLONOSCOPY;  Surgeon: Fawad Perla MD;  Location: [facility] (2ND FLR);  Service: Endoscopy;  Laterality: N/A;    COLONOSCOPY N/A 10/30/2020    Procedure: COLONOSCOPY;  Surgeon: Suzi Bacon MD;  Location: [facility] (4TH FLR);  Service: Endoscopy;  Laterality: N/A;  covid test [facility] 10/27  pt to try Mirilax prep with Reglan before per Dr. Bacon - nimisha  10/14-new instructions e-mailed-  10/16--new instructions e-mailed  10/29/20- Pt confirmed earlier arrival time, prep ins. reveiwed, Pt verbalized understanding- [email or web address]    COLONOSCOPY N/A 05/10/2022    Procedure: COLONOSCOPY;  Surgeon: Suzi Bacon MD;  Location: [facility] (4TH FLR);  Service: Endoscopy;  Laterality: N/A;  2022- same as last bowel prep (Miralax/Gatorade)  3/4 pt rescheduled to May/ COVID test on 5/3 at [facility]  instruction via portal   pt rescheduled; pt fully vaccinated; Rapid; instructions to portal-st    COLONOSCOPY N/A 2023    Procedure: COLONOSCOPY;  Surgeon: Suzi Bacon MD;  Location: [facility] (4TH FLR);  Service: Endoscopy;  Laterality: N/A;  order created: poor prep, Dr. SKY Bacon, Miralax, prep instr portal -  pre call , pt confirmed - mf    ESOPHAGOGASTRODUODENOSCOPY N/A 2019    Procedure: EGD (ESOPHAGOGASTRODUODENOSCOPY);  Surgeon: Fawad Perla MD;  Location: [facility] (2ND FLR);  Service: Endoscopy;  Laterality: N/A;    ILEOCOLECTOMY N/A 05/10/2019    Procedure: ILEOCOLECTOMY;  Surgeon: Uday Cope MD;  Location: [facility] OR 2ND FLR;  Service: Colon and Rectal;  Laterality: N/A;  ileocecectomy    ILEOSCOPY N/A 2019    Procedure: ILEOSCOPY through stoma;  Surgeon: Suzi Bacon MD;  Location: [facility]  ENDO (4TH FLR);  Service: Endoscopy;  Laterality: N/A;  Schedule as 30 minute case  Please let patient know to bring extra stoma bag/wafer, etc- entire bag and appliance will be removed at time of procedure to visualize the area well   first week 11/2019     per note-R/S to 12/20/19-GT    ILEOSTOMY N/A 05/10/2019    Procedure: CREATION, ILEOSTOMY with mucous fistula;  Surgeon: Uday Cope MD;  Location: [facility] OR 2ND FLR;  Service: Colon and Rectal;  Laterality: N/A;    ILEOSTOMY CLOSURE N/A 02/12/2020    Procedure: CLOSURE,ILEOSTOMY TAKEDOWN END ILEOSTOMY/MUCOUS FISTULA POSSIBLE LAPAROTOMY ERAS LOW;  Surgeon: Uday Cope MD;  Location: [facility] OR 2ND FLR;  Service: Colon and Rectal;  Laterality: N/A;    INCISION AND DRAINAGE OF ABSCESS Right 8/28/2024    Procedure: INCISION AND DRAINAGE, ABSCESS;  Surgeon: Javno Booker MD;  Location: [facility] OR;  Service: General;  Laterality: Right;  Right groin    INCISION AND DRAINAGE OF BUTTOCK Bilateral 12/07/2022    Procedure: INCISION AND DRAINAGE, BUTTOCK;  Surgeon: Regan Rowell Jr., MD;  Location: [facility] OR;  Service: General;  Laterality: Bilateral;     Family History   Problem Relation Name Age of Onset    Hypertension Mother Kalie     No Known Problems Father Lexx     No Known Problems Sister Alda     No Known Problems Sister Charlette     No Known Problems Sister Daisy     No Known Problems Sister Yusra     No Known Problems Brother Andrews     Glaucoma Maternal Uncle      No Known Problems Maternal Uncle      No Known Problems Maternal Uncle      No Known Problems Paternal Aunt      No Known Problems Paternal Aunt      No Known Problems Paternal Uncle      No Known Problems Paternal Uncle      No Known Problems Paternal Uncle      No Known Problems Paternal Uncle      No Known Problems Maternal Grandmother      Cataracts Maternal Grandfather      Irritable bowel syndrome Paternal Grandmother      No Known Problems Paternal Grandfather      Celiac disease Neg Hx       Cirrhosis Neg Hx      Colon cancer Neg Hx      Colon polyps Neg Hx      Crohn's disease Neg Hx      Cystic fibrosis Neg Hx      Esophageal cancer Neg Hx      Hemochromatosis Neg Hx      Inflammatory bowel disease Neg Hx      Liver cancer Neg Hx      Liver disease Neg Hx      Rectal cancer Neg Hx      Stomach cancer Neg Hx      Ulcerative colitis Neg Hx      Jerardo's disease Neg Hx      Lymphoma Neg Hx      Tuberculosis Neg Hx      Scleroderma Neg Hx      Rheum arthritis Neg Hx      Multiple sclerosis Neg Hx      Psoriasis Neg Hx      Lupus Neg Hx      Melanoma Neg Hx      Skin cancer Neg Hx      Amblyopia Neg Hx      Blindness Neg Hx      Cancer Neg Hx      Diabetes Neg Hx      Macular degeneration Neg Hx      Retinal detachment Neg Hx      Strabismus Neg Hx      Stroke Neg Hx      Thyroid disease Neg Hx       Social History     Tobacco Use    Smoking status: Every Day     Current packs/day: 0.00     Types: Cigarettes     Last attempt to quit: 2020     Years since quittin.9    Smokeless tobacco: Never   Substance Use Topics    Alcohol use: Yes     Comment: Socially    Drug use: Yes     Frequency: 3.0 times per week     Types: Marijuana     Review of Systems   Constitutional:  Negative for chills and fever.   HENT:  Negative for sore throat.    Respiratory:  Negative for shortness of breath.    Cardiovascular:  Negative for chest pain.   Gastrointestinal:  Negative for abdominal pain, diarrhea, nausea and vomiting.   Genitourinary:  Negative for decreased urine volume, dysuria and hematuria.   Musculoskeletal:  Negative for myalgias.   Skin:  Positive for wound (abscess right buttuck). Negative for rash.   Neurological:  Negative for weakness and headaches.   Psychiatric/Behavioral: Negative.         Physical Exam     Initial Vitals [24 1731]   BP Pulse Resp Temp SpO2   118/70 (!) 120 18 98.3 °F (36.8 °C) 99 %      MAP       --         Physical Exam    Nursing note and vitals reviewed.  Constitutional:  She appears well-developed and well-nourished. She is not diaphoretic. No distress.   HENT:   Head: Normocephalic and atraumatic.   Right Ear: External ear normal.   Left Ear: External ear normal.   Eyes: Conjunctivae and EOM are normal.   Neck: Neck supple.   Normal range of motion.  Pulmonary/Chest: No stridor. No respiratory distress.   Abdominal: Abdomen is soft. She exhibits no distension. There is no abdominal tenderness.   No suprapubic tenderness, no flank pain, no CVAT  There is no rebound and no guarding.   Genitourinary:    Genitourinary Comments: Many non tender abscesses noted to  region.  Above numbered regions tender, fluctuant abscesses with surrounding erythema and skin induration. #3 with small amount of purulent drainage upon palpation.         Musculoskeletal:         General: Normal range of motion.      Cervical back: Normal range of motion and neck supple.     Neurological: She is alert and oriented to person, place, and time.   Skin: Abscess noted. No rash noted.   2 fluctuant, non draining abscesses to right gluteal cleft with surrounding erythema and skin induration. Tender to palpation.   Psychiatric: She has a normal mood and affect. Thought content normal.         ED Course   I & D - Incision and Drainage    Date/Time: 12/5/2024 7:45 PM  Location procedure was performed: [facility] EMERGENCY DEPARTMENT    Performed by: Hannah Mon PA-C  Authorized by: Davonte Balderrama MD  Assisting provider: Hannah Mon PA-C  Pre-operative diagnosis: Abscess  Consent Done: Yes  Consent: Verbal consent obtained.  Risks and benefits: risks, benefits and alternatives were discussed  Consent given by: patient  Type: abscess  Body area: anogenital  Anesthesia: local infiltration    Anesthesia:  Local Anesthetic: lidocaine 1% without epinephrine  Scalpel size: 11  Incision type: single straight  Complexity: simple  Drainage: pus, serosanguinous, bloody and purulent  Drainage amount:  moderate  Wound treatment: incision, drainage, expression of material and wound left open  Patient tolerance: Patient tolerated the procedure well with no immediate complications        Labs Reviewed   POCT URINE PREGNANCY - Abnormal       Result Value    POC Preg Test, Ur Negative       Acceptable Yes            Imaging Results    None          Medications   LIDOcaine HCL 10 mg/ml (1%) injection 10 mL (10 mLs Infiltration Given 12/5/24 1854)   ibuprofen tablet 800 mg (800 mg Oral Given 12/5/24 1927)     Medical Decision Making  This is an emergent evaluation of a 34 y.o. female presenting to the ED for multiple abscess to  region. Hx of hidradenitis suppurativa. Afebrile. Patient is non-toxic appearing and in no acute distress. Presentation consistent with soft tissue abscess associated with surrounding cellulitis. No symptoms for systemic infection or evidence of necrotizing fasciitis.      Fluid collections drained.Tetanus UTD. Discharged with antibiotics.  Advised on appropriate wound care.  Emphasized the importance of follow up with PCP and dermatology.  Advised on supportive care.  Return precautions discussed.    I discussed with the patient the diagnosis, treatment plan, indications for return to the emergency department, and for expected follow-up. The patient verbalized an understanding. The patient is asked if there are any questions or concerns. We discuss the case, until all issues are addressed to the patient's satisfaction. Patient understands and is agreeable to the plan.     Amount and/or Complexity of Data Reviewed  External Data Reviewed: labs.  Labs: ordered. Decision-making details documented in ED Course.    Risk  OTC drugs.  Prescription drug management.  Diagnosis or treatment significantly limited by social determinants of health.            Scribe Attestation:   Scribe #1: I performed the above scribed service and the documentation accurately describes the services I  performed. I attest to the accuracy of the note.                             I, Hannah Mon PA-C, personally performed the services described in this documentation. All medical record entries made by the scribe were at my direction and in my presence. I have reviewed the chart and agree that the record reflects my personal performance and is accurate and complete.      DISCLAIMER: This note was prepared with [employer] voice recognition transcription software. Garbled syntax, mangled pronouns, and other bizarre constructions may be attributed to that software system.    Clinical Impression:  Final diagnoses:  [L05.01] Pilonidal abscess (Primary)  [L02.31, L03.317] Abscess and cellulitis of gluteal region          ED Disposition Condition    Discharge Stable          ED Prescriptions       Medication Sig Dispense Start Date End Date Auth. Provider    sulfamethoxazole-trimethoprim 800-160mg (BACTRIM DS) 800-160 mg Tab Take 1 tablet by mouth 2 (two) times daily. for 7 days 14 tablet 12/5/2024 12/12/2024 Hannah Mon PA-C    ibuprofen (ADVIL,MOTRIN) 600 MG tablet Take 1 tablet (600 mg total) by mouth every 6 (six) hours as needed for Pain. 20 tablet 12/5/2024 -- Hannah Mon PA-C          Follow-up Information       Follow up With Specialties Details Why Contact [facility] - Emergency Dept Emergency Medicine Go to  For new or worsening symptoms [address] [facility] [place] [id number] [phone]    Geo Espitia MD Obstetrics and Gynecology   [address]  [address]  [place] [address]  [phone]               Hannah Mon PA-C  12/06/24 4020

## 2024-12-10 ENCOUNTER — TELEPHONE (OUTPATIENT)
Dept: GASTROENTEROLOGY | Facility: HOSPITAL | Age: 34
End: 2024-12-10
Payer: MEDICAID

## 2024-12-10 NOTE — TELEPHONE ENCOUNTER
Torrie  Pt we discussed  Complex crohn disease  (Please see my and prior detailed IBD notes)    Request for referral to your IBD clinic for further care.    Thank you

## 2024-12-12 ENCOUNTER — WALK IN (OUTPATIENT)
Dept: URGENT CARE | Age: 34
End: 2024-12-12

## 2024-12-12 VITALS
BODY MASS INDEX: 42.65 KG/M2 | RESPIRATION RATE: 18 BRPM | SYSTOLIC BLOOD PRESSURE: 123 MMHG | HEART RATE: 68 BPM | OXYGEN SATURATION: 98 % | TEMPERATURE: 97.4 F | DIASTOLIC BLOOD PRESSURE: 80 MMHG | WEIGHT: 288 LBS | HEIGHT: 69 IN

## 2024-12-12 DIAGNOSIS — N89.8 VAGINAL ODOR: ICD-10-CM

## 2024-12-12 DIAGNOSIS — N89.8 VAGINAL ITCHING: ICD-10-CM

## 2024-12-12 DIAGNOSIS — N76.0 BACTERIAL VAGINOSIS: Primary | ICD-10-CM

## 2024-12-12 DIAGNOSIS — B96.89 BACTERIAL VAGINOSIS: Primary | ICD-10-CM

## 2024-12-12 LAB
APPEARANCE, POC: CLEAR
B-HCG UR QL: NEGATIVE
BILIRUB UR QL STRIP: NEGATIVE
COLOR UR: YELLOW
GLUCOSE UR-MCNC: NEGATIVE MG/DL
HGB UR QL STRIP: NEGATIVE
INTERNAL PROCEDURAL CONTROLS ACCEPTABLE: YES
INTERNAL PROCEDURAL CONTROLS ACCEPTABLE: YES
KETONES UR STRIP-MCNC: NEGATIVE MG/DL
LEUKOCYTE ESTERASE UR QL STRIP: NEGATIVE
NITRITE UR QL STRIP: NEGATIVE
PH UR: 6.5 [PH] (ref 5–7)
PROT UR-MCNC: NEGATIVE MG/DL
SP GR UR: 1.02 (ref 1–1.03)
TEST LOT EXPIRATION DATE: ABNORMAL
TEST LOT EXPIRATION DATE: NORMAL
TEST LOT NUMBER: ABNORMAL
TEST LOT NUMBER: NORMAL
UROBILINOGEN UR-MCNC: 0.2 MG/DL (ref 0–1)

## 2024-12-12 RX ORDER — CLINDAMYCIN HYDROCHLORIDE 300 MG/1
300 CAPSULE ORAL 2 TIMES DAILY
Qty: 14 CAPSULE | Refills: 0 | Status: SHIPPED | OUTPATIENT
Start: 2024-12-12 | End: 2024-12-19

## 2024-12-12 ASSESSMENT — ENCOUNTER SYMPTOMS
PSYCHIATRIC NEGATIVE: 1
NEUROLOGICAL NEGATIVE: 1
CONSTITUTIONAL NEGATIVE: 1
ALLERGIC/IMMUNOLOGIC NEGATIVE: 1
RESPIRATORY NEGATIVE: 1

## 2024-12-13 LAB
BACTERIAL VAGINOSIS VAG-IMP: POSITIVE
C ALBICANS DNA VAG QL NAA+PROBE: NOT DETECTED
C GLABRATA DNA VAG QL NAA+PROBE: NOT DETECTED
C TRACH RRNA UR QL NAA+PROBE: NEGATIVE
Lab: NORMAL
N GONORRHOEA RRNA UR QL NAA+PROBE: NEGATIVE
SERVICE CMNT-IMP: ABNORMAL
SERVICE CMNT-IMP: NORMAL
T VAGINALIS DNA VAG QL NAA+PROBE: NOT DETECTED
T VAGINALIS RRNA UR QL NAA+PROBE: NEGATIVE

## 2024-12-18 ENCOUNTER — TELEPHONE (OUTPATIENT)
Dept: INTERNAL MEDICINE | Age: 34
End: 2024-12-18

## 2024-12-18 DIAGNOSIS — E89.0 POSTABLATIVE HYPOTHYROIDISM: Primary | ICD-10-CM

## 2024-12-18 RX ORDER — LEVOTHYROXINE SODIUM 200 UG/1
200 TABLET ORAL DAILY
Qty: 30 TABLET | Refills: 0 | Status: SHIPPED | OUTPATIENT
Start: 2024-12-18

## 2024-12-19 ENCOUNTER — TELEPHONE (OUTPATIENT)
Dept: GASTROENTEROLOGY | Facility: CLINIC | Age: 34
End: 2024-12-19
Payer: MEDICAID

## 2025-01-03 ENCOUNTER — LAB REQUISITION (OUTPATIENT)
Dept: LAB | Age: 35
End: 2025-01-03

## 2025-01-03 ENCOUNTER — HOSPITAL ENCOUNTER (OUTPATIENT)
Dept: LAB | Age: 35
Discharge: HOME | End: 2025-01-03

## 2025-01-03 DIAGNOSIS — L73.2 HIDRADENITIS SUPPURATIVA: ICD-10-CM

## 2025-01-03 LAB
ALBUMIN SERPL-MCNC: 3.9 G/DL (ref 3.4–5)
ALBUMIN/GLOB SERPL: 1.1 {RATIO} (ref 1–2.4)
ALP SERPL-CCNC: 53 UNITS/L (ref 45–117)
ALT SERPL-CCNC: 32 UNITS/L
ANION GAP SERPL CALC-SCNC: 13 MMOL/L (ref 7–19)
AST SERPL-CCNC: 12 UNITS/L
BASOPHILS # BLD: 0 K/MCL (ref 0–0.3)
BASOPHILS NFR BLD: 0 %
BILIRUB SERPL-MCNC: 0.3 MG/DL (ref 0.2–1)
BUN SERPL-MCNC: 12 MG/DL (ref 6–20)
BUN/CREAT SERPL: 14 (ref 7–25)
CALCIUM SERPL-MCNC: 9.2 MG/DL (ref 8.4–10.2)
CHLORIDE SERPL-SCNC: 107 MMOL/L (ref 97–110)
CO2 SERPL-SCNC: 27 MMOL/L (ref 21–32)
CREAT SERPL-MCNC: 0.87 MG/DL (ref 0.51–0.95)
DEPRECATED RDW RBC: 45.1 FL (ref 39–50)
EGFRCR SERPLBLD CKD-EPI 2021: 90 ML/MIN/{1.73_M2}
EOSINOPHIL # BLD: 0.2 K/MCL (ref 0–0.5)
EOSINOPHIL NFR BLD: 2 %
ERYTHROCYTE [DISTWIDTH] IN BLOOD: 16.6 % (ref 11–15)
FASTING DURATION TIME PATIENT: ABNORMAL H
GLOBULIN SER-MCNC: 3.7 G/DL (ref 2–4)
GLUCOSE SERPL-MCNC: 59 MG/DL (ref 70–99)
HCT VFR BLD CALC: 32.2 % (ref 36–46.5)
HGB BLD-MCNC: 9.4 G/DL (ref 12–15.5)
IMM GRANULOCYTES # BLD AUTO: 0 K/MCL (ref 0–0.2)
IMM GRANULOCYTES # BLD: 0 %
LYMPHOCYTES # BLD: 2.9 K/MCL (ref 1–4.8)
LYMPHOCYTES NFR BLD: 36 %
MCH RBC QN AUTO: 22 PG (ref 26–34)
MCHC RBC AUTO-ENTMCNC: 29.2 G/DL (ref 32–36.5)
MCV RBC AUTO: 75.4 FL (ref 78–100)
MONOCYTES # BLD: 0.5 K/MCL (ref 0.3–0.9)
MONOCYTES NFR BLD: 7 %
NEUTROPHILS # BLD: 4.5 K/MCL (ref 1.8–7.7)
NEUTROPHILS NFR BLD: 55 %
NRBC BLD MANUAL-RTO: 0 /100 WBC
PLATELET # BLD AUTO: 477 K/MCL (ref 140–450)
POTASSIUM SERPL-SCNC: 4.3 MMOL/L (ref 3.4–5.1)
PROT SERPL-MCNC: 7.6 G/DL (ref 6.4–8.2)
RBC # BLD: 4.27 MIL/MCL (ref 4–5.2)
SODIUM SERPL-SCNC: 143 MMOL/L (ref 135–145)
WBC # BLD: 8.1 K/MCL (ref 4.2–11)

## 2025-01-03 PROCEDURE — 85025 COMPLETE CBC W/AUTO DIFF WBC: CPT | Performed by: CLINICAL MEDICAL LABORATORY

## 2025-01-03 PROCEDURE — 80053 COMPREHEN METABOLIC PANEL: CPT | Performed by: CLINICAL MEDICAL LABORATORY

## 2025-01-03 PROCEDURE — 87389 HIV-1 AG W/HIV-1&-2 AB AG IA: CPT | Performed by: CLINICAL MEDICAL LABORATORY

## 2025-01-03 PROCEDURE — 86803 HEPATITIS C AB TEST: CPT | Performed by: CLINICAL MEDICAL LABORATORY

## 2025-01-03 PROCEDURE — 87340 HEPATITIS B SURFACE AG IA: CPT | Performed by: CLINICAL MEDICAL LABORATORY

## 2025-01-03 PROCEDURE — 86592 SYPHILIS TEST NON-TREP QUAL: CPT | Performed by: CLINICAL MEDICAL LABORATORY

## 2025-01-03 PROCEDURE — 86480 TB TEST CELL IMMUN MEASURE: CPT | Performed by: CLINICAL MEDICAL LABORATORY

## 2025-01-04 LAB
HBV SURFACE AG SER QL: NEGATIVE
HCV AB SER QL: NEGATIVE
HIV 1+2 AB+HIV1 P24 AG SERPL QL IA: NONREACTIVE
RPR SER QL: NONREACTIVE

## 2025-01-05 LAB
GAMMA INTERFERON BACKGROUND BLD IA-ACNC: 0.03 IU/ML
M TB IFN-G BLD-IMP: NEGATIVE
M TB IFN-G CD4+ BCKGRND COR BLD-ACNC: 0 IU/ML
M TB IFN-G CD4+CD8+ BCKGRND COR BLD-ACNC: 0 IU/ML
MITOGEN IGNF BCKGRD COR BLD-ACNC: 5.02 IU/ML

## 2025-01-08 ENCOUNTER — HOSPITAL ENCOUNTER (EMERGENCY)
Facility: HOSPITAL | Age: 35
Discharge: HOME | End: 2025-01-09
Attending: EMERGENCY MEDICINE
Payer: MEDICAID

## 2025-01-08 ENCOUNTER — APPOINTMENT (OUTPATIENT)
Dept: RADIOLOGY | Facility: HOSPITAL | Age: 35
End: 2025-01-08
Payer: MEDICAID

## 2025-01-08 VITALS
SYSTOLIC BLOOD PRESSURE: 131 MMHG | RESPIRATION RATE: 16 BRPM | DIASTOLIC BLOOD PRESSURE: 91 MMHG | TEMPERATURE: 98.4 F | OXYGEN SATURATION: 99 % | HEART RATE: 92 BPM

## 2025-01-08 DIAGNOSIS — B96.89 BACTERIAL VAGINOSIS IN PREGNANCY (HHS-HCC): ICD-10-CM

## 2025-01-08 DIAGNOSIS — R10.2 PELVIC PAIN AFFECTING PREGNANCY IN FIRST TRIMESTER, ANTEPARTUM (HHS-HCC): Primary | ICD-10-CM

## 2025-01-08 DIAGNOSIS — O26.891 PELVIC PAIN AFFECTING PREGNANCY IN FIRST TRIMESTER, ANTEPARTUM (HHS-HCC): Primary | ICD-10-CM

## 2025-01-08 DIAGNOSIS — O23.599 BACTERIAL VAGINOSIS IN PREGNANCY (HHS-HCC): ICD-10-CM

## 2025-01-08 DIAGNOSIS — O23.40 URINARY TRACT INFECTION AFFECTING PREGNANCY (HHS-HCC): ICD-10-CM

## 2025-01-08 LAB
ABO GROUP (TYPE) IN BLOOD: NORMAL
ALBUMIN SERPL BCP-MCNC: 4.2 G/DL (ref 3.4–5)
ALP SERPL-CCNC: 50 U/L (ref 33–110)
ALT SERPL W P-5'-P-CCNC: 16 U/L (ref 7–45)
ANION GAP SERPL CALC-SCNC: 13 MMOL/L (ref 10–20)
ANTIBODY SCREEN: NORMAL
APPEARANCE UR: ABNORMAL
AST SERPL W P-5'-P-CCNC: 12 U/L (ref 9–39)
B-HCG SERPL-ACNC: ABNORMAL MIU/ML
BACTERIA #/AREA URNS AUTO: ABNORMAL /HPF
BASOPHILS # BLD AUTO: 0.03 X10*3/UL (ref 0–0.1)
BASOPHILS NFR BLD AUTO: 0.4 %
BILIRUB SERPL-MCNC: 0.3 MG/DL (ref 0–1.2)
BILIRUB UR STRIP.AUTO-MCNC: NEGATIVE MG/DL
BUN SERPL-MCNC: 11 MG/DL (ref 6–23)
CALCIUM SERPL-MCNC: 9.7 MG/DL (ref 8.6–10.6)
CHLORIDE SERPL-SCNC: 104 MMOL/L (ref 98–107)
CLUE CELLS SPEC QL WET PREP: PRESENT
CO2 SERPL-SCNC: 23 MMOL/L (ref 21–32)
COLOR UR: ABNORMAL
CREAT SERPL-MCNC: 0.54 MG/DL (ref 0.5–1.05)
EGFRCR SERPLBLD CKD-EPI 2021: >90 ML/MIN/1.73M*2
EOSINOPHIL # BLD AUTO: 0.28 X10*3/UL (ref 0–0.7)
EOSINOPHIL NFR BLD AUTO: 3.6 %
ERYTHROCYTE [DISTWIDTH] IN BLOOD BY AUTOMATED COUNT: 13.6 % (ref 11.5–14.5)
GLUCOSE SERPL-MCNC: 87 MG/DL (ref 74–99)
GLUCOSE UR STRIP.AUTO-MCNC: NORMAL MG/DL
HCT VFR BLD AUTO: 36.9 % (ref 36–46)
HGB BLD-MCNC: 12.6 G/DL (ref 12–16)
IMM GRANULOCYTES # BLD AUTO: 0.03 X10*3/UL (ref 0–0.7)
IMM GRANULOCYTES NFR BLD AUTO: 0.4 % (ref 0–0.9)
KETONES UR STRIP.AUTO-MCNC: NEGATIVE MG/DL
LEUKOCYTE ESTERASE UR QL STRIP.AUTO: ABNORMAL
LYMPHOCYTES # BLD AUTO: 2.53 X10*3/UL (ref 1.2–4.8)
LYMPHOCYTES NFR BLD AUTO: 32.4 %
MCH RBC QN AUTO: 29.2 PG (ref 26–34)
MCHC RBC AUTO-ENTMCNC: 34.1 G/DL (ref 32–36)
MCV RBC AUTO: 86 FL (ref 80–100)
MONOCYTES # BLD AUTO: 0.78 X10*3/UL (ref 0.1–1)
MONOCYTES NFR BLD AUTO: 10 %
MUCOUS THREADS #/AREA URNS AUTO: ABNORMAL /LPF
NEUTROPHILS # BLD AUTO: 4.17 X10*3/UL (ref 1.2–7.7)
NEUTROPHILS NFR BLD AUTO: 53.2 %
NITRITE UR QL STRIP.AUTO: NEGATIVE
NRBC BLD-RTO: 0 /100 WBCS (ref 0–0)
PH UR STRIP.AUTO: 6.5 [PH]
PLATELET # BLD AUTO: 260 X10*3/UL (ref 150–450)
POTASSIUM SERPL-SCNC: 3.6 MMOL/L (ref 3.5–5.3)
PREGNANCY TEST URINE, POC: NEGATIVE
PROT SERPL-MCNC: 7.4 G/DL (ref 6.4–8.2)
PROT UR STRIP.AUTO-MCNC: ABNORMAL MG/DL
RBC # BLD AUTO: 4.31 X10*6/UL (ref 4–5.2)
RBC # UR STRIP.AUTO: NEGATIVE /UL
RBC #/AREA URNS AUTO: ABNORMAL /HPF
RH FACTOR (ANTIGEN D): NORMAL
SODIUM SERPL-SCNC: 136 MMOL/L (ref 136–145)
SP GR UR STRIP.AUTO: 1.03
SQUAMOUS #/AREA URNS AUTO: ABNORMAL /HPF
T VAGINALIS SPEC QL WET PREP: ABNORMAL
TRICHOMONAS REFLEX COMMENT: ABNORMAL
UROBILINOGEN UR STRIP.AUTO-MCNC: NORMAL MG/DL
WBC # BLD AUTO: 7.8 X10*3/UL (ref 4.4–11.3)
WBC #/AREA URNS AUTO: ABNORMAL /HPF
WBC VAG QL WET PREP: ABNORMAL
YEAST VAG QL WET PREP: ABNORMAL

## 2025-01-08 PROCEDURE — 84702 CHORIONIC GONADOTROPIN TEST: CPT

## 2025-01-08 PROCEDURE — 85025 COMPLETE CBC W/AUTO DIFF WBC: CPT

## 2025-01-08 PROCEDURE — 36415 COLL VENOUS BLD VENIPUNCTURE: CPT

## 2025-01-08 PROCEDURE — 87210 SMEAR WET MOUNT SALINE/INK: CPT

## 2025-01-08 PROCEDURE — 87661 TRICHOMONAS VAGINALIS AMPLIF: CPT

## 2025-01-08 PROCEDURE — 87086 URINE CULTURE/COLONY COUNT: CPT

## 2025-01-08 PROCEDURE — 76815 OB US LIMITED FETUS(S): CPT

## 2025-01-08 PROCEDURE — 81001 URINALYSIS AUTO W/SCOPE: CPT

## 2025-01-08 PROCEDURE — 80053 COMPREHEN METABOLIC PANEL: CPT

## 2025-01-08 PROCEDURE — 99284 EMERGENCY DEPT VISIT MOD MDM: CPT | Performed by: EMERGENCY MEDICINE

## 2025-01-08 PROCEDURE — 81025 URINE PREGNANCY TEST: CPT

## 2025-01-08 PROCEDURE — 87186 SC STD MICRODIL/AGAR DIL: CPT

## 2025-01-08 PROCEDURE — 99284 EMERGENCY DEPT VISIT MOD MDM: CPT

## 2025-01-08 PROCEDURE — 76817 TRANSVAGINAL US OBSTETRIC: CPT

## 2025-01-08 PROCEDURE — 76817 TRANSVAGINAL US OBSTETRIC: CPT | Performed by: RADIOLOGY

## 2025-01-08 PROCEDURE — 2500000001 HC RX 250 WO HCPCS SELF ADMINISTERED DRUGS (ALT 637 FOR MEDICARE OP): Mod: SE

## 2025-01-08 PROCEDURE — 2500000004 HC RX 250 GENERAL PHARMACY W/ HCPCS (ALT 636 FOR OP/ED): Mod: SE

## 2025-01-08 PROCEDURE — 87491 CHLMYD TRACH DNA AMP PROBE: CPT

## 2025-01-08 PROCEDURE — 86901 BLOOD TYPING SEROLOGIC RH(D): CPT

## 2025-01-08 RX ORDER — CEPHALEXIN 250 MG/1
500 CAPSULE ORAL ONCE
Status: COMPLETED | OUTPATIENT
Start: 2025-01-08 | End: 2025-01-08

## 2025-01-08 RX ORDER — ONDANSETRON 4 MG/1
TABLET, ORALLY DISINTEGRATING ORAL
Status: COMPLETED
Start: 2025-01-08 | End: 2025-01-08

## 2025-01-08 RX ORDER — ONDANSETRON 4 MG/1
4 TABLET, ORALLY DISINTEGRATING ORAL ONCE
Status: COMPLETED | OUTPATIENT
Start: 2025-01-08 | End: 2025-01-08

## 2025-01-08 RX ORDER — METRONIDAZOLE 500 MG/1
500 TABLET ORAL ONCE
Status: COMPLETED | OUTPATIENT
Start: 2025-01-08 | End: 2025-01-08

## 2025-01-08 RX ADMIN — ONDANSETRON 4 MG: 4 TABLET, ORALLY DISINTEGRATING ORAL at 20:02

## 2025-01-08 RX ADMIN — METRONIDAZOLE 500 MG: 500 TABLET ORAL at 23:12

## 2025-01-08 RX ADMIN — CEPHALEXIN 500 MG: 250 CAPSULE ORAL at 23:12

## 2025-01-09 LAB
C TRACH RRNA SPEC QL NAA+PROBE: NEGATIVE
HOLD SPECIMEN: NORMAL
N GONORRHOEA DNA SPEC QL PROBE+SIG AMP: NEGATIVE
T VAGINALIS RRNA SPEC QL NAA+PROBE: NEGATIVE

## 2025-01-09 RX ORDER — METRONIDAZOLE 500 MG/1
500 TABLET ORAL 2 TIMES DAILY
Qty: 14 TABLET | Refills: 0 | Status: SHIPPED | OUTPATIENT
Start: 2025-01-09 | End: 2025-01-16

## 2025-01-09 RX ORDER — CEPHALEXIN 500 MG/1
500 CAPSULE ORAL 2 TIMES DAILY
Qty: 10 CAPSULE | Refills: 0 | Status: SHIPPED | OUTPATIENT
Start: 2025-01-09 | End: 2025-01-14

## 2025-01-09 NOTE — PROGRESS NOTES
Emergency Medicine Transition of Care Note.    I received Renetta Parmar in signout from previous team.  Please see the previous ED provider note for all HPI, PE and MDM up to the time of signout at 10 PM. This is in addition to the primary record.    In brief Renetta Parmar is a  34 y.o. female presenting for pelvic pain.  LMP  and had a positive pregnancy test 2 weeks ago.  Endorses nausea without emesis and increased urinary frequency.  Has history of ectopic pregnancy.  Has not seen OB/GYN or had a formal ultrasound to confirm this pregnancy yet.  Another concern she mention was swelling to her left middle finger.    Workup in the ED consisted of labs and imaging.  CBC and CMP both normal.  Type and screen O+.  Wet prep positive for clue cells and patient was given a dose of Flagyl here in the ED.  hCG elevated at 30,246.  Urinalysis positive for leukocytes and bacteria.  Patient treated for bacteria in pregnancy with a dose of Keflex.  At the time of signout we were awaiting: TVUS result.    ED Course as of 25 Wet Prep with Trichomonas Reflex If Neg(!)  Clue cells present. [MA]      ED Course User Index  [MA] Radha Pastor PA-C         Diagnoses as of 25   Bacterial vaginosis in pregnancy ([facility])   Urinary tract infection affecting pregnancy ([facility])   Pelvic pain affecting pregnancy in first trimester, antepartum ([facility])       Labs Reviewed   TRICHOMONAS WET PREP REFLEX TO PCR - Abnormal       Result Value    Trichomonas None Seen      Clue Cells Present (*)     Yeast None Seen      WBC 3-8      Trichomonas reflex comment        Value: Trichomonas was not seen by wet prep. Reflex Trichomonas vaginalis by Amplified Detection.   HUMAN CHORIONIC GONADOTROPIN, SERUM QUANTITATIVE - Abnormal    HCG, Beta-Quantitative 30,246 (*)     Narrative:     Total HCG measurement is performed using the Siemens AtellYottaa immunoassay which detects intact HCG  and free beta HCG subunit.  This test is not indicated for use as a tumor marker.  HCG testing is performed using a different test methodology at [facility] than other [facility]. Direct result comparison  should only be made within the same method.          URINALYSIS WITH REFLEX CULTURE AND MICROSCOPIC - Abnormal    Color, Urine Light-Orange (*)     Appearance, Urine Ex.Turbid (*)     Specific Gravity, Urine 1.026      pH, Urine 6.5      Protein, Urine 20 (TRACE)      Glucose, Urine Normal      Blood, Urine NEGATIVE      Ketones, Urine NEGATIVE      Bilirubin, Urine NEGATIVE      Urobilinogen, Urine Normal      Nitrite, Urine NEGATIVE      Leukocyte Esterase, Urine 250 Sanaz/uL (*)    MICROSCOPIC ONLY, URINE - Abnormal    WBC, Urine 11-20 (*)     RBC, Urine 3-5      Squamous Epithelial Cells, Urine  (3+)      Bacteria, Urine 1+ (*)     Mucus, Urine 2+     COMPREHENSIVE METABOLIC PANEL - Normal    Glucose 87      Sodium 136      Potassium 3.6      Chloride 104      Bicarbonate 23      Anion Gap 13      Urea Nitrogen 11      Creatinine 0.54      eGFR >90      Calcium 9.7      Albumin 4.2      Alkaline Phosphatase 50      Total Protein 7.4      AST 12      Bilirubin, Total 0.3      ALT 16     POCT PREGNANCY, URINE - Normal    Preg Test, Ur Negative     URINE CULTURE   TYPE AND SCREEN    ABO TYPE O      Rh TYPE POS      ANTIBODY SCREEN NEG     CBC WITH AUTO DIFFERENTIAL    WBC 7.8      nRBC 0.0      RBC 4.31      Hemoglobin 12.6      Hematocrit 36.9      MCV 86      MCH 29.2      MCHC 34.1      RDW 13.6      Platelets 260      Neutrophils % 53.2      Immature Granulocytes %, Automated 0.4      Lymphocytes % 32.4      Monocytes % 10.0      Eosinophils % 3.6      Basophils % 0.4      Neutrophils Absolute 4.17      Immature Granulocytes Absolute, Automated 0.03      Lymphocytes Absolute 2.53      Monocytes Absolute 0.78      Eosinophils Absolute 0.28      Basophils Absolute 0.03     URINALYSIS  WITH REFLEX CULTURE AND MICROSCOPIC    Narrative:     The following orders were created for panel order Urinalysis with Reflex Culture and Microscopic.  Procedure                               Abnormality         Status                     ---------                               -----------         ------                     Urinalysis with Reflex C...[[id number]]  Abnormal            Final result               Extra Urine Gray Tube[[id number]]                            In process                   Please view results for these tests on the individual orders.   C. TRACHOMATIS + N. GONORRHOEAE, AMPLIFIED   EXTRA URINE GRAY TUBE   TRICH VAGINALIS, AMPLIFIED       US OB transvaginal   Final Result   Single live intrauterine gestation corresponding to 6 weeks, 2 days   by crown-rump length. Fetal heart rate of 103 beats per minute.   Follow up examination is recommended at 18-20 weeks gestation for   evaluation of fetal anatomy or earlier as clinically indicated.        I personally reviewed the images/study and I agree with Alondra Israel DO's (radiology resident) findings as stated.        MACRO:   None        Signed by: Ángel Lowry 1/8/2025 11:57 PM   Dictation workstation:   [id number]      Point of Care Ultrasound    (Results Pending)         Medical Decision Making  Ultrasound revealed a single live IUP corresponding to 6 weeks and 2 days with a fetal heart rate of 103 bpm.  Recommend follow-up exam at 18 to 20 weeks gestation.  Patient remained stable and ready for discharge.  Prescriptions for Flagyl and cephalexin to her pharmacy for treatment of BV and UTI.  Also sent a prescription for prenatal vitamins which I would like her to begin taking daily.  Review of her chart reveals she has an appointment scheduled with OB/GYN later this month in January 29.  Emphasized the importance she present to that appointment for further pregnancy management.  Patient is agreeable to this plan and all of her  questions were answered to satisfaction.  She was discharged from the ED in stable condition.        Final diagnoses:   [O23.599, B96.89] Bacterial vaginosis in pregnancy ([facility])   [O23.40] Urinary tract infection affecting pregnancy ([facility])   [O26.891, R10.2] Pelvic pain affecting pregnancy in first trimester, antepartum ([facility])           Procedure  Procedures    Indira Canada PA-C

## 2025-01-09 NOTE — ED PROCEDURE NOTE
Procedure    Performed by: Krzysztof Slaughter DO  Authorized by: Radha Pastor PA-C        Genitourinary Indications: pelvic pain and positive pregnancy test          Procedure: Pelvic Ultrasound    Exam: transabdominal exam  Findings:  IUP: The pelvis was visualized and there was an INTRAUTERINE PREGNANCY visualized (a yolk sac, fetal pole or fetus was seen).    Pelvic Free Fluid: The pelvis was visualized and was NEGATIVE for free fluid.    Impression:  Pelvis: The focused pelvic ultrasound was INDETERMINATE given inadequate views.            Krzysztof Slaughter DO  Emergency Medicine PGY-1

## 2025-01-09 NOTE — DISCHARGE INSTRUCTIONS
Your labs were positive for a UTI which we treated with an antibiotic called Keflex.  I also sent this to your pharmacy.  Please take it twice daily for the next 5 days.  In addition, you do have bacterial vaginosis.  You received a medication for this called Flagyl here in the ED.  I also sent this to your pharmacy.  It should be taken twice daily for the next 5 days.  In addition, I did send you some prenatal vitamins which I would like you to begin taking daily.  Otherwise, your ultrasound showed evidence of an intrauterine pregnancy with a fetal heart rate detected.  It is very important to follow-up at your upcoming OB/GYN appointments as indicated.  Please keep in mind the signs/symptoms you are educated on that would warrant returning to the ED.

## 2025-01-09 NOTE — ED PROVIDER NOTES
History of Present Illness       History provided by: Patient  Limitations to History: None    HPI:  HPI   Patient is a 34-year-old  female with no significant past medical history that presents to the ED for pelvic pain and positive pregnancy test.  Last menstrual period was .like she states that the pain is in her lower pelvis and radiates into her right lower quadrant.  She noticed the pain about 2 weeks ago which is what prompted her to take the pregnancy test.  She also endorses nausea without vomiting.  She has had urinary frequency without pain or hesitation.  She denies abnormal vaginal discharge and bleeding.  No fevers.  She has had an ectopic pregnancy in the past and this feels similar to that which is why she came to the ED.  She was treated for sexually transmitted infections in the past as well as pyelonephritis.  She states that she took her antibiotics until full completion.  This does not feel similar to the time she had pyelonephritis.  Additionally, she reports swelling in her left middle finger that she noticed about a month ago.  She denies pain, numbness, tingling, injury or trauma.  She has full range of motion of the finger.      Physical Exam   Physical Exam     VS: As documented in the triage note and EMR flowsheet from this visit were reviewed.    Appearance: Alert, oriented, cooperative, in no acute distress. Well nourished & well hydrated.    Skin: Atraumatic. Warm, intact and dry. No lesions, rash, or petechiae.    Eyes: PERRLA. Bilateral conjunctivae pink without injection or exudates. No hyphema or subconjunctival hemorrhage.    ENT: Hearing grossly intact. No drooling, dysphagia or trismus. Voice non-muffled.    Neck: Supple, without meningismus. No lymphadenopathy. No midline or paraspinal tenderness.    Pulmonary: Clear bilaterally with good chest wall excursion. No rales, rhonchi or wheezing. No accessory muscle use or stridor. Nonlabored breathing, no  supplemental oxygen. No pain with deep breathing.    Cardiac: Normal S1, S2 without murmur, rub, gallop or extrasystole.     Abdomen: Soft, nontender. Negative Dotson's sign. No point tenderness at McBurney's point, negative Rovsing. No palpable organomegaly. No rebound or guarding. No CVA tenderness.    Genitourinary: No lesions, rashes, deformity noted. No cervical motion tenderness or adnexal tenderness. Mild amount of thin white discharge present in vagina.    Musculoskeletal: Spontaneously moving all extremities without limitation. Extremities warm and well-perfused, capillary refill less than 2 seconds. Pulses full and equal. Full ROM bilaterally upper extremities. Moderate swelling of left middle finger. No increased warmth, ecchymosis, overlying erythema of the middle finger. Full ROM of finger. Calves nontender.  No TTP, cyanosis, clubbing or deformity noted.    Neurological:  Cranial nerves II through XII are grossly intact, normal sensation, no weakness, no focal findings identified. Ambulating without assistance with steady gait, non-ataxic.    Psychiatric: Appropriate mood and affect. Kempt appearance. Does not appear internally stimulated.             Medical Decision Making & ED Course     ED Course & MDM       Medical Decision Making:  Medical Decision Making     ----  Patient is a 34-year-old  female that presents to the ED for pelvic pain and positive pregnancy test. History obtained from patient. History and physical exam are as documented above.  Blood pressure slightly elevated 131/91.  All other vital signs are stable and within normal limits.  Patient was seen and discussed with ED attending Dr. Weber. Differential diagnoses considered include but are not limited to: Ectopic pregnancy, urinary tract infection, pyelonephritis, sexually transmitted infection, PID, tubo-ovarian, appendicitis.      Patient is resting comfortably in the exam chair and does not appear to be in any acute  distress.  She states that the pelvic pain is located midline in her lower abdomen and radiates into her right lower abdomen.  It has been going on for about 2 weeks.  This feels similar to when she had an ectopic pregnancy in the past which is what prompted her to take the pregnancy test.  Her last menstrual period was November 17.  Urine pregnancy test is positive in the ED.  She admits to nausea without vomiting.  Denies vaginal discharge and bleeding.  She admits to urinary frequency without pain.  Urinalysis was ordered.  Further workup included pelvic exam with swabs obtained for wet prep and gonorrhea/chlamydia testing.  Mild amount of thin white discharge present in vagina.  No cervical motion tenderness or adnexal tenderness.  She was treated in the ED with Zofran for nausea.  Point-of-care ultrasound was indeterminate therefore formal pelvic ultrasound was ordered.  Additional labs included CBC, CMP, type and screen, beta quant.  CBC and CMP did not reveal any acute blood or electrolyte abnormalities.  Wet prep positive for clue cells indicative of bacterial vaginosis.  Urinalysis showed elevated leukocyte esterase, microscopic WBC, positive for bacteria suggestive of urinary tract infection.  Low suspicion for pyelonephritis as she is afebrile and exam was negative for CVA tenderness and abdominal tenderness to palpation. Patient was signed out to Indira Canada PA-C pending pelvic US.             Visit Vitals  BP (!) 131/91   Pulse 92   Temp 36.9 °C (98.4 °F) (Temporal)   Resp 16   LMP 08/05/2024 (Approximate)   SpO2 99%   OB Status Pregnant   Smoking Status Some Days        Labs Reviewed   POCT PREGNANCY, URINE - Normal       Result Value    Preg Test, Ur Negative     URINALYSIS WITH REFLEX CULTURE AND MICROSCOPIC    Narrative:     The following orders were created for panel order Urinalysis with Reflex Culture and Microscopic.  Procedure                               Abnormality         Status                      ---------                               -----------         ------                     Urinalysis with Reflex C...[[id number]]                                                 Extra Urine Gray Tube[[id number]]                                                         Please view results for these tests on the individual orders.   C. TRACHOMATIS + N. GONORRHOEAE, AMPLIFIED   TRICHOMONAS WET PREP REFLEX TO PCR   HUMAN CHORIONIC GONADOTROPIN, SERUM QUANTITATIVE   TYPE AND SCREEN   CBC WITH AUTO DIFFERENTIAL   COMPREHENSIVE METABOLIC PANEL   URINALYSIS WITH REFLEX CULTURE AND MICROSCOPIC   EXTRA URINE GRAY TUBE       US PELVIS OB TRANSABDOMINAL W TRANSVAGINAL UP TO 1ST TRIMESTER    (Results Pending)       ED Course:  ED Course as of 01/08/25 2135 Wed Jan 08, 2025 2121 Wet Prep with Trichomonas Reflex If Neg(!)  Clue cells present. [MA]      ED Course User Index  [MA] Radha Pastor PA-C         Diagnoses as of 01/08/25 2135   Bacterial vaginosis in pregnancy ([facility])   Urinary tract infection affecting pregnancy ([facility])     Disposition   Patient was signed out to Indira Canada PA-C pending pelvic US.    Procedures   Procedures      Radha Pastor PA-C  Emergency Medicine      Radha Pastor PA-C  01/08/25 7611

## 2025-01-11 LAB — BACTERIA UR CULT: ABNORMAL

## 2025-01-12 ENCOUNTER — WALK IN (OUTPATIENT)
Dept: URGENT CARE | Age: 35
End: 2025-01-12

## 2025-01-12 VITALS
SYSTOLIC BLOOD PRESSURE: 122 MMHG | BODY MASS INDEX: 41.98 KG/M2 | HEART RATE: 83 BPM | WEIGHT: 284.28 LBS | DIASTOLIC BLOOD PRESSURE: 86 MMHG | RESPIRATION RATE: 18 BRPM | TEMPERATURE: 97.1 F | OXYGEN SATURATION: 100 %

## 2025-01-12 DIAGNOSIS — N89.8 VAGINAL DISCHARGE: Primary | ICD-10-CM

## 2025-01-12 DIAGNOSIS — Z11.3 ROUTINE SCREENING FOR STI (SEXUALLY TRANSMITTED INFECTION): ICD-10-CM

## 2025-01-12 LAB
APPEARANCE, POC: ABNORMAL
B-HCG UR QL: NEGATIVE
BILIRUB UR QL STRIP: NEGATIVE
COLOR UR: YELLOW
GLUCOSE UR-MCNC: NEGATIVE MG/DL
HGB UR QL STRIP: NEGATIVE
INTERNAL PROCEDURAL CONTROLS ACCEPTABLE: YES
KETONES UR STRIP-MCNC: NEGATIVE MG/DL
LEUKOCYTE ESTERASE UR QL STRIP: NEGATIVE
NITRITE UR QL STRIP: NEGATIVE
PH UR: 5.5 [PH] (ref 5–7)
PROT UR-MCNC: NEGATIVE MG/DL
SP GR UR: >= 1.03 (ref 1–1.03)
TEST LOT EXPIRATION DATE: 0
TEST LOT NUMBER: 0
UROBILINOGEN UR-MCNC: 0.2 MG/DL (ref 0–1)

## 2025-01-13 ENCOUNTER — PATIENT MESSAGE (OUTPATIENT)
Dept: RESEARCH | Facility: OTHER | Age: 35
End: 2025-01-13
Payer: MEDICAID

## 2025-01-14 ENCOUNTER — TELEPHONE (OUTPATIENT)
Dept: URGENT CARE | Age: 35
End: 2025-01-14

## 2025-01-14 ENCOUNTER — TELEPHONE (OUTPATIENT)
Dept: INTERNAL MEDICINE | Age: 35
End: 2025-01-14

## 2025-01-14 ENCOUNTER — E-ADVICE (OUTPATIENT)
Dept: OBGYN | Age: 35
End: 2025-01-14

## 2025-01-14 DIAGNOSIS — N76.0 BV (BACTERIAL VAGINOSIS): Primary | ICD-10-CM

## 2025-01-14 DIAGNOSIS — B96.89 BV (BACTERIAL VAGINOSIS): Primary | ICD-10-CM

## 2025-01-14 LAB
BACTERIAL VAGINOSIS VAG-IMP: POSITIVE
C ALBICANS DNA VAG QL NAA+PROBE: NOT DETECTED
C GLABRATA DNA VAG QL NAA+PROBE: NOT DETECTED
C TRACH RRNA SPEC QL NAA+PROBE: NEGATIVE
Lab: NORMAL
M GENITALIUM DNA SPEC QL NAA+PROBE: NOT DETECTED
N GONORRHOEA RRNA SPEC QL NAA+PROBE: NEGATIVE
SERVICE CMNT-IMP: ABNORMAL
SERVICE CMNT-IMP: NORMAL
SERVICE CMNT-IMP: NORMAL
T VAGINALIS DNA VAG QL NAA+PROBE: NOT DETECTED

## 2025-01-14 RX ORDER — CLINDAMYCIN HYDROCHLORIDE 300 MG/1
300 CAPSULE ORAL 2 TIMES DAILY
Qty: 14 CAPSULE | Refills: 0 | Status: SHIPPED | OUTPATIENT
Start: 2025-01-14 | End: 2025-01-21

## 2025-01-14 RX ORDER — CLINDAMYCIN PHOSPHATE 20 MG/G
1 CREAM VAGINAL NIGHTLY
Qty: 40 G | Refills: 0 | Status: SHIPPED | OUTPATIENT
Start: 2025-01-14 | End: 2025-01-17

## 2025-01-15 ENCOUNTER — TELEPHONE (OUTPATIENT)
Dept: URGENT CARE | Age: 35
End: 2025-01-15

## 2025-01-20 RX ORDER — LEVOTHYROXINE SODIUM 200 UG/1
200 TABLET ORAL DAILY
Qty: 15 TABLET | Refills: 0 | Status: SHIPPED | OUTPATIENT
Start: 2025-01-20

## 2025-01-20 SDOH — ECONOMIC STABILITY: GENERAL: WOULD YOU LIKE HELP WITH ANY OF THE FOLLOWING NEEDS?: I DON'T WANT HELP WITH ANY OF THESE

## 2025-01-20 SDOH — ECONOMIC STABILITY: FOOD INSECURITY: WITHIN THE PAST 12 MONTHS, THE FOOD YOU BOUGHT JUST DIDN'T LAST AND YOU DIDN'T HAVE MONEY TO GET MORE.: NEVER TRUE

## 2025-01-20 SDOH — ECONOMIC STABILITY: HOUSING INSECURITY: WHAT IS YOUR LIVING SITUATION TODAY?: I HAVE A STEADY PLACE TO LIVE

## 2025-01-20 SDOH — ECONOMIC STABILITY: TRANSPORTATION INSECURITY
IN THE PAST 12 MONTHS, HAS LACK OF RELIABLE TRANSPORTATION KEPT YOU FROM MEDICAL APPOINTMENTS, MEETINGS, WORK OR FROM GETTING THINGS NEEDED FOR DAILY LIVING?: NO

## 2025-01-20 SDOH — ECONOMIC STABILITY: HOUSING INSECURITY: DO YOU HAVE PROBLEMS WITH ANY OF THE FOLLOWING?: NONE OF THE ABOVE

## 2025-01-20 ASSESSMENT — SOCIAL DETERMINANTS OF HEALTH (SDOH): IN THE PAST 12 MONTHS, HAS THE ELECTRIC, GAS, OIL, OR WATER COMPANY THREATENED TO SHUT OFF SERVICE IN YOUR HOME?: NO

## 2025-01-22 ENCOUNTER — LAB SERVICES (OUTPATIENT)
Dept: LAB | Age: 35
End: 2025-01-22

## 2025-01-22 ENCOUNTER — TELEPHONE (OUTPATIENT)
Dept: PHARMACY | Facility: HOSPITAL | Age: 35
End: 2025-01-22
Payer: MEDICAID

## 2025-01-22 ENCOUNTER — APPOINTMENT (OUTPATIENT)
Dept: INTERNAL MEDICINE | Age: 35
End: 2025-01-22

## 2025-01-22 ENCOUNTER — E-ADVICE (OUTPATIENT)
Dept: INTERNAL MEDICINE | Age: 35
End: 2025-01-22

## 2025-01-22 VITALS
TEMPERATURE: 97.9 F | HEART RATE: 78 BPM | BODY MASS INDEX: 42.37 KG/M2 | HEIGHT: 69 IN | SYSTOLIC BLOOD PRESSURE: 117 MMHG | WEIGHT: 286.05 LBS | DIASTOLIC BLOOD PRESSURE: 71 MMHG | OXYGEN SATURATION: 100 % | RESPIRATION RATE: 18 BRPM

## 2025-01-22 DIAGNOSIS — E89.0 POSTABLATIVE HYPOTHYROIDISM: Primary | ICD-10-CM

## 2025-01-22 DIAGNOSIS — N18.2 CKD (CHRONIC KIDNEY DISEASE) STAGE 2, GFR 60-89 ML/MIN: ICD-10-CM

## 2025-01-22 DIAGNOSIS — E89.0 POSTABLATIVE HYPOTHYROIDISM: ICD-10-CM

## 2025-01-22 DIAGNOSIS — E11.9 TYPE 2 DIABETES MELLITUS WITHOUT COMPLICATION, WITHOUT LONG-TERM CURRENT USE OF INSULIN  (CMD): Primary | ICD-10-CM

## 2025-01-22 DIAGNOSIS — E11.9 TYPE 2 DIABETES MELLITUS WITHOUT COMPLICATION, WITHOUT LONG-TERM CURRENT USE OF INSULIN  (CMD): ICD-10-CM

## 2025-01-22 PROCEDURE — 84439 ASSAY OF FREE THYROXINE: CPT | Performed by: CLINICAL MEDICAL LABORATORY

## 2025-01-22 PROCEDURE — 82570 ASSAY OF URINE CREATININE: CPT | Performed by: CLINICAL MEDICAL LABORATORY

## 2025-01-22 PROCEDURE — 83036 HEMOGLOBIN GLYCOSYLATED A1C: CPT | Performed by: CLINICAL MEDICAL LABORATORY

## 2025-01-22 PROCEDURE — 99213 OFFICE O/P EST LOW 20 MIN: CPT | Performed by: INTERNAL MEDICINE

## 2025-01-22 PROCEDURE — 84443 ASSAY THYROID STIM HORMONE: CPT | Performed by: CLINICAL MEDICAL LABORATORY

## 2025-01-22 PROCEDURE — 3078F DIAST BP <80 MM HG: CPT | Performed by: INTERNAL MEDICINE

## 2025-01-22 PROCEDURE — 82043 UR ALBUMIN QUANTITATIVE: CPT | Performed by: CLINICAL MEDICAL LABORATORY

## 2025-01-22 PROCEDURE — 36415 COLL VENOUS BLD VENIPUNCTURE: CPT | Performed by: INTERNAL MEDICINE

## 2025-01-22 PROCEDURE — 3074F SYST BP LT 130 MM HG: CPT | Performed by: INTERNAL MEDICINE

## 2025-01-22 ASSESSMENT — ENCOUNTER SYMPTOMS
WHEEZING: 0
SORE THROAT: 0
SHORTNESS OF BREATH: 0
UNEXPECTED WEIGHT CHANGE: 0
CHILLS: 0
APPETITE CHANGE: 0
FATIGUE: 0
FEVER: 0
NAUSEA: 0
ABDOMINAL PAIN: 0
DIARRHEA: 0
COUGH: 0
DIZZINESS: 0
TROUBLE SWALLOWING: 0

## 2025-01-22 ASSESSMENT — PAIN SCALES - GENERAL: PAINLEVEL: 0

## 2025-01-22 NOTE — PROGRESS NOTES
EDPD Note: Rapid Result Review    I reviewed Renetta Parmar 's chart regarding her positive urine culture/result that was taken during their recent emergency room visit. The patient was not told about these results prior to leaving the emergency department. Therefore, patient was contacted and given appropriate education.     Patient presented to ED on 1/8 and was tested for STI's and uti. Patient was given Keflex 500 mg bid for 7 days. Patient is still having sx and was counseled to follow up with her pcp.     Susceptibility data from last 90 days.  Collected Specimen Info Organism Amoxicillin/Clavulanate Ampicillin Ampicillin/Sulbactam Cefazolin Cefazolin (uncomplicated UTIs only) Ciprofloxacin Gentamicin Nitrofurantoin Piperacillin/Tazobactam Trimethoprim/Sulfamethoxazole   01/08/25 Urine from Clean Catch/Voided Klebsiella pneumoniae/variicola  S  R  S  S  S  S  S  I  S  S     No visits with results within 1 Week(s) from this visit.   Latest known visit with results is:   Admission on 01/08/2025, Discharged on 01/09/2025   Component Date Value Ref Range Status    Preg Test, Ur 01/08/2025 Negative  Negative Final    Neisseria gonorrhea,Amplified 01/08/2025 Negative  Negative Final    Chlamydia trachomatis, Amplified 01/08/2025 Negative  Negative Final    Trichomonas 01/08/2025 None Seen  None Seen Final    Clue Cells 01/08/2025 Present (A)  None Seen Final    Yeast 01/08/2025 None Seen  None Seen Final    WBC 01/08/2025 3-8   Final    Trichomonas reflex comment 01/08/2025 Trichomonas was not seen by wet prep. Reflex Trichomonas vaginalis by Amplified Detection.   Final    HCG, Beta-Quantitative 01/08/2025 30,246 (H)  <5 mIU/mL Final    Low-level positive HCG results can be seen in early pregnancy, in feliciano- or post-menopausal females due to normal pituitary HCG production, or with analytic interference. Repeat testing in 48-72 hours can aid in assessing for pregnancy as results should double in this time period.  FSH measurement is recommended in feliciano- or post-menopausal females as concurrent elevation of FSH can support pituitary production as the source of the HCG elevation.       ABO TYPE 01/08/2025 O   Final    Rh TYPE 01/08/2025 POS   Final    ANTIBODY SCREEN 01/08/2025 NEG   Final    WBC 01/08/2025 7.8  4.4 - 11.3 x10*3/uL Final    nRBC 01/08/2025 0.0  0.0 - 0.0 /100 WBCs Final    RBC 01/08/2025 4.31  4.00 - 5.20 x10*6/uL Final    Hemoglobin 01/08/2025 12.6  12.0 - 16.0 g/dL Final    Hematocrit 01/08/2025 36.9  36.0 - 46.0 % Final    MCV 01/08/2025 86  80 - 100 fL Final    MCH 01/08/2025 29.2  26.0 - 34.0 pg Final    MCHC 01/08/2025 34.1  32.0 - 36.0 g/dL Final    RDW 01/08/2025 13.6  11.5 - 14.5 % Final    Platelets 01/08/2025 260  150 - 450 x10*3/uL Final    Neutrophils % 01/08/2025 53.2  40.0 - 80.0 % Final    Immature Granulocytes %, Automated 01/08/2025 0.4  0.0 - 0.9 % Final    Immature Granulocyte Count (IG) includes promyelocytes, myelocytes and metamyelocytes but does not include bands. Percent differential counts (%) should be interpreted in the context of the absolute cell counts (cells/UL).    Lymphocytes % 01/08/2025 32.4  13.0 - 44.0 % Final    Monocytes % 01/08/2025 10.0  2.0 - 10.0 % Final    Eosinophils % 01/08/2025 3.6  0.0 - 6.0 % Final    Basophils % 01/08/2025 0.4  0.0 - 2.0 % Final    Neutrophils Absolute 01/08/2025 4.17  1.20 - 7.70 x10*3/uL Final    Percent differential counts (%) should be interpreted in the context of the absolute cell counts (cells/uL).    Immature Granulocytes Absolute, Au* 01/08/2025 0.03  0.00 - 0.70 x10*3/uL Final    Lymphocytes Absolute 01/08/2025 2.53  1.20 - 4.80 x10*3/uL Final    Monocytes Absolute 01/08/2025 0.78  0.10 - 1.00 x10*3/uL Final    Eosinophils Absolute 01/08/2025 0.28  0.00 - 0.70 x10*3/uL Final    Basophils Absolute 01/08/2025 0.03  0.00 - 0.10 x10*3/uL Final    Glucose 01/08/2025 87  74 - 99 mg/dL Final    Sodium 01/08/2025 136  136 - 145 mmol/L Final     Potassium 01/08/2025 3.6  3.5 - 5.3 mmol/L Final    Chloride 01/08/2025 104  98 - 107 mmol/L Final    Bicarbonate 01/08/2025 23  21 - 32 mmol/L Final    Anion Gap 01/08/2025 13  10 - 20 mmol/L Final    Urea Nitrogen 01/08/2025 11  6 - 23 mg/dL Final    Creatinine 01/08/2025 0.54  0.50 - 1.05 mg/dL Final    eGFR 01/08/2025 >90  >60 mL/min/1.73m*2 Final    Calculations of estimated GFR are performed using the 2021 CKD-EPI Study Refit equation without the race variable for the IDMS-Traceable creatinine methods.  https://jasn.asnjournals.org/content/early/2021/09/22/ASN.2437189377    Calcium 01/08/2025 9.7  8.6 - 10.6 mg/dL Final    Albumin 01/08/2025 4.2  3.4 - 5.0 g/dL Final    Alkaline Phosphatase 01/08/2025 50  33 - 110 U/L Final    Total Protein 01/08/2025 7.4  6.4 - 8.2 g/dL Final    AST 01/08/2025 12  9 - 39 U/L Final    Bilirubin, Total 01/08/2025 0.3  0.0 - 1.2 mg/dL Final    ALT 01/08/2025 16  7 - 45 U/L Final    Patients treated with Sulfasalazine may generate falsely decreased results for ALT.    Color, Urine 01/08/2025 Light-Orange (N)  Light-Yellow, Yellow, Dark-Yellow Final    Appearance, Urine 01/08/2025 Ex.Turbid (N)  Clear Final    Specific Gravity, Urine 01/08/2025 1.026  1.005 - 1.035 Final    pH, Urine 01/08/2025 6.5  5.0, 5.5, 6.0, 6.5, 7.0, 7.5, 8.0 Final    Protein, Urine 01/08/2025 20 (TRACE)  NEGATIVE, 10 (TRACE), 20 (TRACE) mg/dL Final    Glucose, Urine 01/08/2025 Normal  Normal mg/dL Final    Blood, Urine 01/08/2025 NEGATIVE  NEGATIVE Final    Ketones, Urine 01/08/2025 NEGATIVE  NEGATIVE mg/dL Final    Bilirubin, Urine 01/08/2025 NEGATIVE  NEGATIVE Final    Urobilinogen, Urine 01/08/2025 Normal  Normal mg/dL Final    Nitrite, Urine 01/08/2025 NEGATIVE  NEGATIVE Final    Leukocyte Esterase, Urine 01/08/2025 250 Sanaz/uL (A)  NEGATIVE Final    Extra Tube 01/08/2025 Hold for add-ons.   Final    Auto resulted.    Trichomonas Vaginalis 01/08/2025 Negative  Negative, Invalid, TRICH neg Final     WBC, Urine 01/08/2025 11-20 (A)  1-5, NONE /HPF Final    RBC, Urine 01/08/2025 3-5  NONE, 1-2, 3-5 /HPF Final    Squamous Epithelial Cells, Urine 01/08/2025  (3+)  Reference range not established. /HPF Final    Bacteria, Urine 01/08/2025 1+ (A)  NONE SEEN /HPF Final    Mucus, Urine 01/08/2025 2+  Reference range not established. /LPF Final    Urine Culture 01/08/2025 >=100,000 CFU/mL Klebsiella pneumoniae/variicola (A)   Final       No further follow up needed from EDPD Team.     If there are any other questions for the ED Post-Discharge Culture Follow Up Team, please contact [phone]. Fax: [phone].    Abe CalixtoD

## 2025-01-22 NOTE — PROGRESS NOTES
EDPD Note: Negative Results    The EDPD Post-Discharge Team was called regarding Renetta Parmar  gonorrhea, chlamydia and trichomonas culture/result that was taken during their recent emergency room visit. I gave patient their results. The culture/result were negative and there were no other questions at this time.  Patient presented to ED on 1/8 and was tested for STI's. She was positive for BV and was treated appropriately and was negative for chlamydia, gonorrhea and trichomonas. She received a certified letter and called to follow up. Discussed results today. Patient had no further questions.   If there are any other questions for the ED Post-Discharge Culture Follow Up Team, please contact [phone]. Fax: [phone].    Abe CalixtoD

## 2025-01-23 LAB
CREAT UR-MCNC: 250 MG/DL
HBA1C MFR BLD: 5.7 % (ref 4.5–5.6)
MICROALBUMIN UR-MCNC: 1.24 MG/DL
MICROALBUMIN/CREAT UR: 5 MG/G
T4 FREE SERPL-MCNC: 1.7 NG/DL (ref 0.8–1.5)
TSH SERPL-ACNC: 0.1 MCUNITS/ML (ref 0.35–5)

## 2025-01-26 ENCOUNTER — TELEPHONE (OUTPATIENT)
Dept: INTERNAL MEDICINE | Age: 35
End: 2025-01-26

## 2025-01-26 DIAGNOSIS — E89.0 POSTABLATIVE HYPOTHYROIDISM: Primary | ICD-10-CM

## 2025-01-29 ENCOUNTER — APPOINTMENT (OUTPATIENT)
Dept: OBGYN | Age: 35
End: 2025-01-29

## 2025-02-05 ASSESSMENT — ENCOUNTER SYMPTOMS
CHILLS: 0
SHORTNESS OF BREATH: 0
COUGH: 0
WEAKNESS: 0
ABDOMINAL PAIN: 0
NAUSEA: 0
DIARRHEA: 0
DIZZINESS: 0
VOMITING: 0
CONSTIPATION: 0
EYES NEGATIVE: 1
ENDOCRINE NEGATIVE: 1
FEVER: 0
ABDOMINAL DISTENTION: 0

## 2025-02-06 ENCOUNTER — APPOINTMENT (OUTPATIENT)
Dept: FAMILY MEDICINE | Age: 35
End: 2025-02-06

## 2025-02-06 ENCOUNTER — APPOINTMENT (OUTPATIENT)
Dept: OBGYN | Age: 35
End: 2025-02-06

## 2025-02-06 VITALS
RESPIRATION RATE: 18 BRPM | BODY MASS INDEX: 42.96 KG/M2 | HEIGHT: 69 IN | TEMPERATURE: 97.5 F | OXYGEN SATURATION: 100 % | HEART RATE: 81 BPM | WEIGHT: 290.01 LBS | SYSTOLIC BLOOD PRESSURE: 103 MMHG | DIASTOLIC BLOOD PRESSURE: 65 MMHG

## 2025-02-06 VITALS
RESPIRATION RATE: 18 BRPM | WEIGHT: 290 LBS | DIASTOLIC BLOOD PRESSURE: 78 MMHG | HEART RATE: 76 BPM | OXYGEN SATURATION: 99 % | HEIGHT: 69 IN | BODY MASS INDEX: 42.95 KG/M2 | SYSTOLIC BLOOD PRESSURE: 109 MMHG

## 2025-02-06 DIAGNOSIS — R73.03 PREDIABETES: ICD-10-CM

## 2025-02-06 DIAGNOSIS — E66.01 MORBID OBESITY  (CMD): ICD-10-CM

## 2025-02-06 DIAGNOSIS — E66.01 OBESITY, MORBID, BMI 40.0-49.9  (CMD): ICD-10-CM

## 2025-02-06 DIAGNOSIS — N76.1 CHRONIC VAGINITIS: Primary | ICD-10-CM

## 2025-02-06 DIAGNOSIS — J30.2 SEASONAL ALLERGIES: ICD-10-CM

## 2025-02-06 DIAGNOSIS — Z23 NEED FOR VACCINATION: ICD-10-CM

## 2025-02-06 DIAGNOSIS — Z80.3 FAMILY HISTORY OF BREAST CANCER IN MOTHER: ICD-10-CM

## 2025-02-06 DIAGNOSIS — E78.2 MIXED HYPERLIPIDEMIA: ICD-10-CM

## 2025-02-06 DIAGNOSIS — D50.9 IRON DEFICIENCY ANEMIA, UNSPECIFIED IRON DEFICIENCY ANEMIA TYPE: ICD-10-CM

## 2025-02-06 DIAGNOSIS — Z98.84 S/P GASTRIC BYPASS: ICD-10-CM

## 2025-02-06 DIAGNOSIS — E89.0 POSTABLATIVE HYPOTHYROIDISM: ICD-10-CM

## 2025-02-06 DIAGNOSIS — Z00.00 ANNUAL PHYSICAL EXAM: Primary | ICD-10-CM

## 2025-02-06 DIAGNOSIS — E55.9 VITAMIN D DEFICIENCY: ICD-10-CM

## 2025-02-06 DIAGNOSIS — L73.2 HIDRADENITIS SUPPURATIVA: ICD-10-CM

## 2025-02-06 DIAGNOSIS — A60.04 HERPES SIMPLEX VULVOVAGINITIS: ICD-10-CM

## 2025-02-06 DIAGNOSIS — G47.33 OSA (OBSTRUCTIVE SLEEP APNEA): ICD-10-CM

## 2025-02-06 PROBLEM — E21.1 SECONDARY HYPERPARATHYROIDISM, NON-RENAL  (CMD): Chronic | Status: RESOLVED | Noted: 2021-09-02 | Resolved: 2025-02-06

## 2025-02-06 PROBLEM — R16.0 HEPATOMEGALY: Chronic | Status: RESOLVED | Noted: 2021-09-27 | Resolved: 2025-02-06

## 2025-02-06 PROBLEM — G89.29 CHRONIC LOW BACK PAIN: Chronic | Status: RESOLVED | Noted: 2021-08-10 | Resolved: 2025-02-06

## 2025-02-06 PROBLEM — E51.9 THIAMINE DEFICIENCY: Chronic | Status: RESOLVED | Noted: 2021-09-02 | Resolved: 2025-02-06

## 2025-02-06 PROBLEM — M54.50 CHRONIC LOW BACK PAIN: Chronic | Status: RESOLVED | Noted: 2021-08-10 | Resolved: 2025-02-06

## 2025-02-06 PROBLEM — N18.2 CKD (CHRONIC KIDNEY DISEASE) STAGE 2, GFR 60-89 ML/MIN: Status: RESOLVED | Noted: 2022-06-28 | Resolved: 2025-02-06

## 2025-02-06 PROBLEM — K76.0 FATTY METAMORPHOSIS OF LIVER: Chronic | Status: RESOLVED | Noted: 2021-09-27 | Resolved: 2025-02-06

## 2025-02-06 PROBLEM — D64.9 ANEMIA: Status: RESOLVED | Noted: 2020-12-13 | Resolved: 2025-02-06

## 2025-02-06 PROCEDURE — 99213 OFFICE O/P EST LOW 20 MIN: CPT | Performed by: OBSTETRICS & GYNECOLOGY

## 2025-02-06 ASSESSMENT — PATIENT HEALTH QUESTIONNAIRE - PHQ9
SUM OF ALL RESPONSES TO PHQ9 QUESTIONS 1 AND 2: 0
1. LITTLE INTEREST OR PLEASURE IN DOING THINGS: NOT AT ALL
SUM OF ALL RESPONSES TO PHQ9 QUESTIONS 1 AND 2: 0
SUM OF ALL RESPONSES TO PHQ9 QUESTIONS 1 AND 2: 0
2. FEELING DOWN, DEPRESSED OR HOPELESS: NOT AT ALL
1. LITTLE INTEREST OR PLEASURE IN DOING THINGS: NOT AT ALL
CLINICAL INTERPRETATION OF PHQ2 SCORE: NO FURTHER SCREENING NEEDED
2. FEELING DOWN, DEPRESSED OR HOPELESS: NOT AT ALL
SUM OF ALL RESPONSES TO PHQ9 QUESTIONS 1 AND 2: 0
CLINICAL INTERPRETATION OF PHQ2 SCORE: NO FURTHER SCREENING NEEDED

## 2025-02-06 ASSESSMENT — PAIN SCALES - GENERAL
PAINLEVEL: 0
PAINLEVEL: 0

## 2025-02-07 LAB
BACTERIAL VAGINOSIS VAG-IMP: POSITIVE
C ALBICANS DNA VAG QL NAA+PROBE: NOT DETECTED
C GLABRATA DNA VAG QL NAA+PROBE: NOT DETECTED
SERVICE CMNT-IMP: ABNORMAL
SERVICE CMNT-IMP: NORMAL
T VAGINALIS DNA VAG QL NAA+PROBE: NOT DETECTED

## 2025-02-07 RX ORDER — LEVOTHYROXINE SODIUM 200 UG/1
200 TABLET ORAL DAILY
Qty: 30 TABLET | Refills: 1 | Status: SHIPPED | OUTPATIENT
Start: 2025-02-07

## 2025-02-11 ENCOUNTER — TELEPHONE (OUTPATIENT)
Dept: OBGYN | Age: 35
End: 2025-02-11

## 2025-02-11 LAB
M GENITALIUM DNA SPEC QL NAA+PROBE: NOT DETECTED
M HOMINIS DNA SPEC QL NAA+PROBE: NOT DETECTED
SPECIMEN SOURCE: ABNORMAL
U PARVUM DNA SPEC QL NAA+PROBE: NOT DETECTED
U UREALYTICUM DNA SPEC QL NAA+PROBE: DETECTED

## 2025-02-12 ENCOUNTER — E-ADVICE (OUTPATIENT)
Dept: OBGYN | Age: 35
End: 2025-02-12

## 2025-02-12 ENCOUNTER — TELEPHONE (OUTPATIENT)
Dept: OBGYN | Age: 35
End: 2025-02-12

## 2025-02-13 ENCOUNTER — APPOINTMENT (OUTPATIENT)
Dept: INTERNAL MEDICINE | Age: 35
End: 2025-02-13

## 2025-02-14 ENCOUNTER — NURSE TRIAGE (OUTPATIENT)
Dept: TELEHEALTH | Age: 35
End: 2025-02-14

## 2025-03-13 ENCOUNTER — TELEPHONE (OUTPATIENT)
Dept: ULTRASOUND IMAGING | Age: 35
End: 2025-03-13

## 2025-03-21 ENCOUNTER — INITIAL PRENATAL (OUTPATIENT)
Dept: OBSTETRICS AND GYNECOLOGY | Facility: HOSPITAL | Age: 35
End: 2025-03-21
Payer: MEDICAID

## 2025-03-21 ENCOUNTER — LAB (OUTPATIENT)
Dept: LAB | Facility: HOSPITAL | Age: 35
End: 2025-03-21
Payer: MEDICAID

## 2025-03-21 VITALS — DIASTOLIC BLOOD PRESSURE: 79 MMHG | BODY MASS INDEX: 41.95 KG/M2 | SYSTOLIC BLOOD PRESSURE: 124 MMHG | WEIGHT: 222 LBS

## 2025-03-21 DIAGNOSIS — Z98.891 HISTORY OF C-SECTION: ICD-10-CM

## 2025-03-21 DIAGNOSIS — Z34.02 ENCOUNTER FOR SUPERVISION OF NORMAL FIRST PREGNANCY, SECOND TRIMESTER: Primary | ICD-10-CM

## 2025-03-21 DIAGNOSIS — O09.299 PREGNANCY WITH POOR OBSTETRIC HISTORY (HHS-HCC): ICD-10-CM

## 2025-03-21 DIAGNOSIS — Z34.02 ENCOUNTER FOR SUPERVISION OF NORMAL FIRST PREGNANCY IN SECOND TRIMESTER: Primary | ICD-10-CM

## 2025-03-21 PROBLEM — Z3A.16 16 WEEKS GESTATION OF PREGNANCY (HHS-HCC): Status: ACTIVE | Noted: 2025-03-21

## 2025-03-21 LAB
ABO GROUP (TYPE) IN BLOOD: NORMAL
ANTIBODY SCREEN: NORMAL
ERYTHROCYTE [DISTWIDTH] IN BLOOD BY AUTOMATED COUNT: 13.9 % (ref 11.5–14.5)
HCT VFR BLD AUTO: 36.8 % (ref 36–46)
HGB BLD-MCNC: 11.7 G/DL (ref 12–16)
HOLD SPECIMEN: NORMAL
MCH RBC QN AUTO: 29.5 PG (ref 26–34)
MCHC RBC AUTO-ENTMCNC: 31.8 G/DL (ref 32–36)
MCV RBC AUTO: 93 FL (ref 80–100)
NRBC BLD-RTO: 0 /100 WBCS (ref 0–0)
PLATELET # BLD AUTO: 261 X10*3/UL (ref 150–450)
RBC # BLD AUTO: 3.96 X10*6/UL (ref 4–5.2)
REFLEX ADDED, ANEMIA PANEL: NORMAL
RH FACTOR (ANTIGEN D): NORMAL
WBC # BLD AUTO: 8.6 X10*3/UL (ref 4.4–11.3)

## 2025-03-21 PROCEDURE — 86850 RBC ANTIBODY SCREEN: CPT

## 2025-03-21 PROCEDURE — 85027 COMPLETE CBC AUTOMATED: CPT

## 2025-03-21 PROCEDURE — 86901 BLOOD TYPING SEROLOGIC RH(D): CPT

## 2025-03-21 PROCEDURE — 86900 BLOOD TYPING SEROLOGIC ABO: CPT

## 2025-03-21 PROCEDURE — 99214 OFFICE O/P EST MOD 30 MIN: CPT | Performed by: NURSE PRACTITIONER

## 2025-03-21 RX ORDER — NAPROXEN SODIUM 220 MG/1
81 TABLET, FILM COATED ORAL DAILY
Qty: 30 TABLET | Refills: 11 | Status: SHIPPED | OUTPATIENT
Start: 2025-03-21 | End: 2026-03-21

## 2025-03-21 SDOH — ECONOMIC STABILITY: FOOD INSECURITY: WITHIN THE PAST 12 MONTHS, THE FOOD YOU BOUGHT JUST DIDN'T LAST AND YOU DIDN'T HAVE MONEY TO GET MORE.: NEVER TRUE

## 2025-03-21 SDOH — ECONOMIC STABILITY: FOOD INSECURITY: WITHIN THE PAST 12 MONTHS, YOU WORRIED THAT YOUR FOOD WOULD RUN OUT BEFORE YOU GOT MONEY TO BUY MORE.: NEVER TRUE

## 2025-03-21 SDOH — ECONOMIC STABILITY: TRANSPORTATION INSECURITY
IN THE PAST 12 MONTHS, HAS THE LACK OF TRANSPORTATION KEPT YOU FROM MEDICAL APPOINTMENTS OR FROM GETTING MEDICATIONS?: NO

## 2025-03-21 SDOH — ECONOMIC STABILITY: TRANSPORTATION INSECURITY
IN THE PAST 12 MONTHS, HAS LACK OF TRANSPORTATION KEPT YOU FROM MEETINGS, WORK, OR FROM GETTING THINGS NEEDED FOR DAILY LIVING?: NO

## 2025-03-21 ASSESSMENT — EDINBURGH POSTNATAL DEPRESSION SCALE (EPDS)
I HAVE BEEN ANXIOUS OR WORRIED FOR NO GOOD REASON: NO, NOT AT ALL
I HAVE BEEN ABLE TO LAUGH AND SEE THE FUNNY SIDE OF THINGS: AS MUCH AS I ALWAYS COULD
I HAVE FELT SCARED OR PANICKY FOR NO GOOD REASON: NO, NOT AT ALL
I HAVE FELT SAD OR MISERABLE: NO, NOT AT ALL
THE THOUGHT OF HARMING MYSELF HAS OCCURRED TO ME: NEVER
TOTAL SCORE: 1
I HAVE BLAMED MYSELF UNNECESSARILY WHEN THINGS WENT WRONG: NO, NEVER
I HAVE BEEN SO UNHAPPY THAT I HAVE BEEN CRYING: ONLY OCCASIONALLY
I HAVE BEEN SO UNHAPPY THAT I HAVE HAD DIFFICULTY SLEEPING: NOT AT ALL
THINGS HAVE BEEN GETTING ON TOP OF ME: NO, I HAVE BEEN COPING AS WELL AS EVER
I HAVE LOOKED FORWARD WITH ENJOYMENT TO THINGS: AS MUCH AS I EVER DID

## 2025-03-21 ASSESSMENT — ENCOUNTER SYMPTOMS
HEMATOLOGIC/LYMPHATIC NEGATIVE: 0
CARDIOVASCULAR NEGATIVE: 0
GASTROINTESTINAL NEGATIVE: 0
PSYCHIATRIC NEGATIVE: 0
MUSCULOSKELETAL NEGATIVE: 0
ENDOCRINE NEGATIVE: 0
RESPIRATORY NEGATIVE: 0
NEUROLOGICAL NEGATIVE: 0
CONSTITUTIONAL NEGATIVE: 0
ALLERGIC/IMMUNOLOGIC NEGATIVE: 0
EYES NEGATIVE: 0

## 2025-03-21 ASSESSMENT — LIFESTYLE VARIABLES
HOW MANY STANDARD DRINKS CONTAINING ALCOHOL DO YOU HAVE ON A TYPICAL DAY: PATIENT DOES NOT DRINK
HOW OFTEN DO YOU HAVE A DRINK CONTAINING ALCOHOL: NEVER
AUDIT-C TOTAL SCORE: 0
HOW OFTEN DO YOU HAVE SIX OR MORE DRINKS ON ONE OCCASION: NEVER
SKIP TO QUESTIONS 9-10: 1

## 2025-03-21 ASSESSMENT — PATIENT HEALTH QUESTIONNAIRE - PHQ9
SUM OF ALL RESPONSES TO PHQ9 QUESTIONS 1 & 2: 0
1. LITTLE INTEREST OR PLEASURE IN DOING THINGS: NOT AT ALL
2. FEELING DOWN, DEPRESSED OR HOPELESS: NOT AT ALL

## 2025-03-21 NOTE — LETTER
3/21/2025    To Whom It May Concern:    This is to certify that my patient,Renetta Parmar is under my care for her pregnancy.    The following guidelines may be used for any dental work:  You may use a local anesthetic with or without Epinephrine.  You may prescribe any Penicillin or Cephalosporin if an antibiotic is necessary. (Dependent on allergy history)  You may take x-rays with a lead apron if necessary.  You may prescribe Tylenol and/or narcotics for pain.  You may extract teeth if necessary.    If you need further information or if you have any concerns, please contact our office.    Sincerely,        SINAI Venegas-CNP  [facility]

## 2025-03-21 NOTE — LETTER
3/21/2025    To Whom It May Concern:    Renetta Parmar is being followed for her pregnancy at [facility].  Estimated Date of Delivery: 9/1/25    Sincerely,        PHUONG Venegas  [facility]
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
## 2025-03-22 LAB
EST. AVERAGE GLUCOSE BLD GHB EST-MCNC: 103 MG/DL
EST. AVERAGE GLUCOSE BLD GHB EST-SCNC: 5.7 MMOL/L
HBA1C MFR BLD: 5.2 % OF TOTAL HGB
HBV CORE AB SERPL QL IA: NORMAL
HBV SURFACE AB SERPL IA-ACNC: <5 MIU/ML
HBV SURFACE AG SERPL QL IA: NORMAL
HCV AB SERPL QL IA: NORMAL
HIV 1+2 AB+HIV1 P24 AG SERPL QL IA: NORMAL
T PALLIDUM AB SER QL IA: NORMAL

## 2025-03-23 LAB
BACTERIA UR CULT: ABNORMAL
C TRACH RRNA SPEC QL NAA+PROBE: NOT DETECTED
N GONORRHOEA RRNA SPEC QL NAA+PROBE: NOT DETECTED
QUEST GC CT AMPLIFIED (ALWAYS MESSAGE): NORMAL
RUBV IGG SERPL IA-ACNC: 1.04 INDEX

## 2025-03-24 ENCOUNTER — HOSPITAL ENCOUNTER (EMERGENCY)
Facility: HOSPITAL | Age: 35
Discharge: HOME | End: 2025-03-24
Attending: INTERNAL MEDICINE
Payer: MEDICAID

## 2025-03-24 ENCOUNTER — TELEPHONE (OUTPATIENT)
Dept: OBSTETRICS AND GYNECOLOGY | Facility: HOSPITAL | Age: 35
End: 2025-03-24
Payer: MEDICAID

## 2025-03-24 VITALS
TEMPERATURE: 98 F | HEART RATE: 92 BPM | BODY MASS INDEX: 22.68 KG/M2 | OXYGEN SATURATION: 98 % | SYSTOLIC BLOOD PRESSURE: 111 MMHG | RESPIRATION RATE: 20 BRPM | HEIGHT: 63 IN | WEIGHT: 128 LBS | DIASTOLIC BLOOD PRESSURE: 73 MMHG

## 2025-03-24 DIAGNOSIS — L02.416 ABSCESS OF LEFT LOWER LEG: Primary | ICD-10-CM

## 2025-03-24 PROBLEM — O23.42 URINARY TRACT INFECTION IN MOTHER DURING SECOND TRIMESTER OF PREGNANCY (HHS-HCC): Status: ACTIVE | Noted: 2025-03-24

## 2025-03-24 LAB
ALBUMIN SERPL-MCNC: 2.7 G/DL (ref 3.3–5.5)
ALP SERPL-CCNC: 65 U/L (ref 42–141)
B-HCG UR QL: NEGATIVE
BACTERIA UR CULT: ABNORMAL
BILIRUB SERPL-MCNC: 0.7 MG/DL (ref 0.2–1.6)
BUN SERPL-MCNC: 7 MG/DL (ref 7–22)
C TRACH RRNA SPEC QL NAA+PROBE: NOT DETECTED
CALCIUM SERPL-MCNC: 8.7 MG/DL (ref 8–10.3)
CHLORIDE SERPL-SCNC: 108 MMOL/L (ref 98–108)
CREAT SERPL-MCNC: 0.8 MG/DL (ref 0.6–1.2)
CTP QC/QA: YES
GLUCOSE SERPL-MCNC: 70 MG/DL (ref 73–118)
HCT, POC: NORMAL
HGB, POC: NORMAL (ref 14–18)
MCH, POC: NORMAL
MCHC, POC: NORMAL
MCV, POC: NORMAL
MPV, POC: NORMAL
N GONORRHOEA RRNA SPEC QL NAA+PROBE: NOT DETECTED
POC ALT (SGPT): 12 U/L (ref 10–47)
POC AST (SGOT): 22 U/L (ref 11–38)
POC PLATELET COUNT: NORMAL
POC TCO2: 27 MMOL/L (ref 18–33)
POTASSIUM BLD-SCNC: 4.2 MMOL/L (ref 3.6–5.1)
PROTEIN, POC: 9.3 G/DL (ref 6.4–8.1)
QUEST GC CT AMPLIFIED (ALWAYS MESSAGE): NORMAL
RBC, POC: NORMAL
RDW, POC: NORMAL
SODIUM BLD-SCNC: 137 MMOL/L (ref 128–145)
WBC, POC: NORMAL

## 2025-03-24 PROCEDURE — 85025 COMPLETE CBC W/AUTO DIFF WBC: CPT | Mod: ER

## 2025-03-24 PROCEDURE — 25000003 PHARM REV CODE 250: Mod: ER

## 2025-03-24 PROCEDURE — 99284 EMERGENCY DEPT VISIT MOD MDM: CPT | Mod: ER,25

## 2025-03-24 PROCEDURE — 81025 URINE PREGNANCY TEST: CPT | Mod: ER | Performed by: EMERGENCY MEDICINE

## 2025-03-24 PROCEDURE — 63600175 PHARM REV CODE 636 W HCPCS: Mod: ER

## 2025-03-24 PROCEDURE — 80053 COMPREHEN METABOLIC PANEL: CPT | Mod: ER

## 2025-03-24 PROCEDURE — 10061 I&D ABSCESS COMP/MULTIPLE: CPT | Mod: ER

## 2025-03-24 PROCEDURE — 96374 THER/PROPH/DIAG INJ IV PUSH: CPT | Mod: ER

## 2025-03-24 RX ORDER — LIDOCAINE HYDROCHLORIDE 10 MG/ML
5 INJECTION, SOLUTION INFILTRATION; PERINEURAL
Status: COMPLETED | OUTPATIENT
Start: 2025-03-24 | End: 2025-03-24

## 2025-03-24 RX ORDER — SULFAMETHOXAZOLE AND TRIMETHOPRIM 800; 160 MG/1; MG/1
1 TABLET ORAL 2 TIMES DAILY
Qty: 28 TABLET | Refills: 0 | Status: SHIPPED | OUTPATIENT
Start: 2025-03-24 | End: 2025-04-07

## 2025-03-24 RX ORDER — KETOROLAC TROMETHAMINE 30 MG/ML
15 INJECTION, SOLUTION INTRAMUSCULAR; INTRAVENOUS
Status: COMPLETED | OUTPATIENT
Start: 2025-03-24 | End: 2025-03-24

## 2025-03-24 RX ORDER — SULFAMETHOXAZOLE AND TRIMETHOPRIM 800; 160 MG/1; MG/1
1 TABLET ORAL
Status: COMPLETED | OUTPATIENT
Start: 2025-03-24 | End: 2025-03-24

## 2025-03-24 RX ADMIN — LIDOCAINE HYDROCHLORIDE 5 ML: 10 INJECTION, SOLUTION INFILTRATION; PERINEURAL at 08:03

## 2025-03-24 RX ADMIN — SULFAMETHOXAZOLE AND TRIMETHOPRIM 1 TABLET: 800; 160 TABLET ORAL at 08:03

## 2025-03-24 RX ADMIN — KETOROLAC TROMETHAMINE 15 MG: 30 INJECTION, SOLUTION INTRAMUSCULAR at 08:03

## 2025-03-24 NOTE — Clinical Note
"Lora"Ladarius Scott was seen and treated in our emergency department on 3/24/2025.  She may return to work on 03/26/2025.       If you have any questions or concerns, please don't hesitate to call.      Fransisco COLÓN    "

## 2025-03-24 NOTE — TELEPHONE ENCOUNTER
RN called pt to discuss results. Left message to call the office. Also sent message.  MELISSA Robledo    Patient read [employer] message.  MELISSA Robledo

## 2025-03-24 NOTE — Clinical Note
"Lora"Ladarius Scott was seen and treated in our emergency department on 3/24/2025.  She may return to work on 03/25/2025.       If you have any questions or concerns, please don't hesitate to call.      Fransisco COLÓN    "

## 2025-03-24 NOTE — TELEPHONE ENCOUNTER
----- Message from Hui Madison sent at 3/24/2025  8:27 AM EDT -----  Please notify patient she has a urinary tract infection and I have sent antibiotic to the pharmacy.  Please complete as directed

## 2025-03-24 NOTE — Clinical Note
"Lora"Ladarius Scott was seen and treated in our emergency department on 3/24/2025.  She may return to work on 03/26/2025.       If you have any questions or concerns, please don't hesitate to call.      Katina Mccall PA-C"

## 2025-03-25 LAB
EST. AVERAGE GLUCOSE BLD GHB EST-MCNC: 103 MG/DL
EST. AVERAGE GLUCOSE BLD GHB EST-SCNC: 5.7 MMOL/L
HBA1C MFR BLD: 5.2 % OF TOTAL HGB
HBV CORE AB SERPL QL IA: NORMAL
HBV SURFACE AB SERPL IA-ACNC: <5 MIU/ML
HBV SURFACE AG SERPL QL IA: NORMAL
HCV AB SERPL QL IA: NORMAL
HEMOGLOBIN A2: 2.8 % (ref 2–3.5)
HEMOGLOBIN A: 96.9 % (ref 95.8–98)
HEMOGLOBIN F: 0.3 % (ref 0–2)
HEMOGLOBIN IDENTIFICATION INTERPRETATION: NORMAL
HIV 1+2 AB+HIV1 P24 AG SERPL QL IA: NORMAL
PATH REVIEW-HGB IDENTIFICATION: NORMAL
T PALLIDUM AB SER QL IA: NEGATIVE

## 2025-03-25 NOTE — ED PROVIDER NOTES
Encounter Date: 3/24/2025    SCRIBE #1 NOTE: IJagdeep, am scribing for, and in the presence of,  Katina Mccall PA-C. I have scribed the following portions of the note - Other sections scribed: HPI,ROS.       History     Chief Complaint   Patient presents with    Abscess     A 36 y/o female presents to the ER c/o abscess to lower left leg x a few days. + Area warm to touch, +Pain,. +Swelling.      Lora Scott is a 35 y.o. female, with a PMHx of crohn's, hydradenitis suppurativa and psoriasis, who presents to the ED with area of pain and swelling to left lower leg onset 3 days ago. Patient reports that she noticed pain to her lower leg Saturday and states that her symptoms worsened quickly. She reports pain with ambulation and denies any drainage from the area. Patient denies taking any medications to alleviate her symptoms. Patient notes that her GI provider prescribed her ciprofloxacin on 3/18/25 for HS, she reports that the other areas are not as bad as the one to her lower leg and are resolving. No other exacerbating or alleviating factors. Denies fever or other associated symptoms.      The history is provided by the patient. No  was used.     Review of patient's allergies indicates:   Allergen Reactions    Shellfish containing products Anaphylaxis     Allergic to all seafood.    Horse/equine containing products Itching     Past Medical History:   Diagnosis Date    Allergy     Anxiety     Asthma     Crohn's disease (ileum)     S/P ileocecal resection-42 cm of ileum/10.5 cm cecum removed with surgical path c/w stricture and fistula 2/15/20 ileostomy takedown and mucus fistula takedown    Depression     Eczema     Former smoker     Hydradenitis     Immunosuppressed status     Perineal abscess 2021    Psoriasis      Past Surgical History:   Procedure Laterality Date     SECTION N/A 2023    Procedure:  SECTION;  Surgeon: Geo Espitia MD;  Location:  [facility] L&D OR;  Service: OB/GYN;  Laterality: N/A;     SECTION      COLONOSCOPY N/A 2019    Procedure: COLONOSCOPY;  Surgeon: Fawad Perla MD;  Location: [facility] ENDO (2ND FLR);  Service: Endoscopy;  Laterality: N/A;    COLONOSCOPY N/A 10/30/2020    Procedure: COLONOSCOPY;  Surgeon: Suzi Bacon MD;  Location: [facility] (4TH FLR);  Service: Endoscopy;  Laterality: N/A;  covid test [facility] 10/27  pt to try Mirilax prep with Reglan before per Dr. Bacon -   10/14-new instructions e-mailed-tb  10/16--new instructions e-mailed  10/29/20- Pt confirmed earlier arrival time, prep ins. reveiwed, Pt verbalized understanding- [email or web address]    COLONOSCOPY N/A 05/10/2022    Procedure: COLONOSCOPY;  Surgeon: Suzi Bacon MD;  Location: [facility] (4TH FLR);  Service: Endoscopy;  Laterality: N/A;  2022- same as last bowel prep (Miralax/Gatorade)  3/ pt rescheduled to May/ COVID test on 5/3 at [facility]  instruction via portal   pt rescheduled; pt fully vaccinated; Rapid; instructions to portal-st    COLONOSCOPY N/A 2023    Procedure: COLONOSCOPY;  Surgeon: Suzi Bacon MD;  Location: [facility] (4TH FLR);  Service: Endoscopy;  Laterality: N/A;  order created: poor prep, Dr. SKY Bacon, Miralax, prep instr portal -ml  pre call , pt confirmed - mf    ESOPHAGOGASTRODUODENOSCOPY N/A 2019    Procedure: EGD (ESOPHAGOGASTRODUODENOSCOPY);  Surgeon: Fawad Perla MD;  Location: [facility] ENDO (2ND FLR);  Service: Endoscopy;  Laterality: N/A;    ILEOCOLECTOMY N/A 05/10/2019    Procedure: ILEOCOLECTOMY;  Surgeon: Uday Cope MD;  Location: [facility] OR 2ND FLR;  Service: Colon and Rectal;  Laterality: N/A;  ileocecectomy    ILEOSCOPY N/A 2019    Procedure: ILEOSCOPY through stoma;  Surgeon: Suzi Bacon MD;  Location: [facility] ([address] [place] [address]);  Service: Endoscopy;  Laterality: N/A;  Schedule as 30 minute case  Please let patient know to bring extra stoma bag/wafer, etc- entire bag and  appliance will be removed at time of procedure to visualize the area well   first week 11/2019     per note-R/S to 12/20/19-GT    ILEOSTOMY N/A 05/10/2019    Procedure: CREATION, ILEOSTOMY with mucous fistula;  Surgeon: Uday Cope MD;  Location: [facility] OR 2ND FLR;  Service: Colon and Rectal;  Laterality: N/A;    ILEOSTOMY CLOSURE N/A 02/12/2020    Procedure: CLOSURE,ILEOSTOMY TAKEDOWN END ILEOSTOMY/MUCOUS FISTULA POSSIBLE LAPAROTOMY ERAS LOW;  Surgeon: Uday Cope MD;  Location: [facility] OR 2ND FLR;  Service: Colon and Rectal;  Laterality: N/A;    INCISION AND DRAINAGE OF ABSCESS Right 8/28/2024    Procedure: INCISION AND DRAINAGE, ABSCESS;  Surgeon: Javon Booker MD;  Location: [facility] OR;  Service: General;  Laterality: Right;  Right groin    INCISION AND DRAINAGE OF BUTTOCK Bilateral 12/07/2022    Procedure: INCISION AND DRAINAGE, BUTTOCK;  Surgeon: Regan Rowell Jr., MD;  Location: [facility] OR;  Service: General;  Laterality: Bilateral;     Family History   Problem Relation Name Age of Onset    Hypertension Mother Kalie     No Known Problems Father Lexx     No Known Problems Sister Alda     No Known Problems Sister Charlette     No Known Problems Sister Daisy     No Known Problems Sister Yusra     No Known Problems Brother Andrews     Glaucoma Maternal Uncle      No Known Problems Maternal Uncle      No Known Problems Maternal Uncle      No Known Problems Paternal Aunt      No Known Problems Paternal Aunt      No Known Problems Paternal Uncle      No Known Problems Paternal Uncle      No Known Problems Paternal Uncle      No Known Problems Paternal Uncle      No Known Problems Maternal Grandmother      Cataracts Maternal Grandfather      Irritable bowel syndrome Paternal Grandmother      No Known Problems Paternal Grandfather      Celiac disease Neg Hx      Cirrhosis Neg Hx      Colon cancer Neg Hx      Colon polyps Neg Hx      Crohn's disease Neg Hx      Cystic fibrosis Neg Hx      Esophageal cancer Neg Hx       Hemochromatosis Neg Hx      Inflammatory bowel disease Neg Hx      Liver cancer Neg Hx      Liver disease Neg Hx      Rectal cancer Neg Hx      Stomach cancer Neg Hx      Ulcerative colitis Neg Hx      Jerrado's disease Neg Hx      Lymphoma Neg Hx      Tuberculosis Neg Hx      Scleroderma Neg Hx      Rheum arthritis Neg Hx      Multiple sclerosis Neg Hx      Psoriasis Neg Hx      Lupus Neg Hx      Melanoma Neg Hx      Skin cancer Neg Hx      Amblyopia Neg Hx      Blindness Neg Hx      Cancer Neg Hx      Diabetes Neg Hx      Macular degeneration Neg Hx      Retinal detachment Neg Hx      Strabismus Neg Hx      Stroke Neg Hx      Thyroid disease Neg Hx       Social History[1]  Review of Systems   Constitutional:  Negative for fever.   HENT:  Negative for sore throat.    Eyes:  Negative for visual disturbance.   Respiratory:  Negative for shortness of breath.    Cardiovascular:  Negative for chest pain.   Gastrointestinal:  Negative for abdominal pain, diarrhea and vomiting.   Genitourinary:  Negative for dysuria.   Skin:  Negative for rash.        (+)Area of pain and swelling to left lower leg   Neurological:  Negative for syncope, weakness and headaches.       Physical Exam     Initial Vitals [03/24/25 1723]   BP Pulse Resp Temp SpO2   104/67 109 20 98.4 °F (36.9 °C) 99 %      MAP       --         Physical Exam    Nursing note and vitals reviewed.  Constitutional: She appears well-developed and well-nourished. No distress.   HENT:   Head: Normocephalic.   Right Ear: External ear normal.   Left Ear: External ear normal.   Eyes: Conjunctivae are normal. Right eye exhibits no discharge. Left eye exhibits no discharge. No scleral icterus.   Neck:   Normal range of motion.  Pulmonary/Chest: No respiratory distress.   Musculoskeletal:         General: Normal range of motion.      Cervical back: Normal range of motion.     Neurological: She is alert.   Skin: Skin is warm and dry. Abscess noted. No rash noted. There is  "erythema.   Approximately 5 cm across area of erythema and swelling that is tender to palpation.  Fluctuance noted.  No active drainage.  No streaking up leg noted.  No tenderness to palpation outside of erythematous area.   Psychiatric: She has a normal mood and affect. Her behavior is normal. Judgment and thought content normal.         ED Course   I & D - Incision and Drainage    Date/Time: 3/24/2025 8:41 PM  Location procedure was performed: [facility] EMERGENCY DEPARTMENT    Performed by: Katina Mccall PA-C  Authorized by: Mckay Small MD  Comments: Procedure/indications/benefits/risks/alternatives discussed with patient/guardian prior to the procedure.   Risks may include pain, bleeding, infection, reaction to injection components, damage to surrounding structures including nerve and vascular damage, failure to achieve intended goal, need for further treatment as well as other less likely risks.    Verbal consent obtained.      Time-Out completed:   - CORRECT PATIENT ID  - CORRECT SITE MARKED                        - PROCEDURE VERIFIED  - POSITIONING VERIFIED  - EQUIPMENT AVAILABLE  - RADIOGRAPHS AVAILABLE (If Needed)    Fluctuant mass suspected to be an abscess was noted to the left lower leg. This area was cleaned. 27G needle was used to inject 4mL of Lidocaine 1% without epinephrine. The wound was then opened, revealing a large amount of purulent material. The wound was dissected bluntly in a 360 degree direction with sterile instruments. It was then expressed, irrigated and packed with 1/4" sterile packing. The wound was then cleaned again and dressed. Wound care, packing removal and return precautions were discussed with the patient. This was a complex I&D.          Labs Reviewed   POCT CMP - Abnormal       Result Value    Albumin, POC 2.7      Alkaline Phosphatase, POC 65      ALT (SGPT), POC 12      AST (SGOT), POC 22      POC BUN 7      Calcium, POC 8.7      POC Chloride 108      POC Creatinine " 0.8      POC Glucose 70 (*)     POC Potassium 4.2      POC Sodium 137      Bilirubin, POC 0.7      POC TCO2 27      Protein, POC 9.3 (*)    POCT URINE PREGNANCY    POC Preg Test, Ur Negative       Acceptable Yes     POCT CBC    Hematocrit        Hemoglobin        RBC        WBC        MCV        MCH, POC        MCHC        RDW-CV        Platelet Count, POC        MPV       POCT CMP          Imaging Results    None          Medications   LIDOcaine HCL 10 mg/ml (1%) injection 5 mL (5 mLs Infiltration Given 3/24/25 2000)   ketorolac injection 15 mg (15 mg Intravenous Given 3/24/25 2000)     Medical Decision Making  This is an evaluation of a 35 y.o. female that presents to the Emergency Department for an abscess. Physical Exam shows a non-toxic, afebrile, and well appearing female. There is an abscess to the left lower leg with fluctuance and erythema. There is no active drainage. Area is tender to light palpation. No pain/tenderness outside of erythematous region. No areas of bruising, bullae, or crepitus.    Vital Signs Are Reassuring. Procedure: Abscess was drained per the procedure note.    My overall impression is Abscess of the left lower leg. I considered, but at this time, do not suspect an extensive cellulitis, sepsis, necrotizing fasciitis, or bacteremia.     ED Course:  Labs are unremarkable.  Patient given Toradol for pain. D/C Meds:  Patient currently on Cipro, will change to Bactrim. D/C Information: Wound care, Warm compresses. The diagnosis, treatment plan, instructions for follow-up and reevaluation with her PCP or the ED in 2 - 3 days for wound recheck as well as ED return precautions were discussed and understanding was verbalized.  Stressed the importance of follow up with Dermatology/PCP for wound rechecked, patient was understanding.  All questions or concerns have been addressed.       Amount and/or Complexity of Data Reviewed  Labs: ordered. Decision-making details documented in  ED Course.    Risk  Prescription drug management.            Scribe Attestation:   Scribe #1: I performed the above scribed service and the documentation accurately describes the services I performed. I attest to the accuracy of the note.        ED Course as of 03/24/25 2043   Mon Mar 24, 2025   2013 POCT CBC  No leukocytosis mild anemia noted [MB]    POCT CMP(!)  No transaminitis, ARNOLDO, electrolyte abnormality [MB]      ED Course User Index  [MB] Katina Mccall PA-C          I, Katina Mccall PA-C, personally performed the services described in this documentation. All medical record entries made by the scribe were at my direction and in my presence. I have reviewed the chart and agree that the record reflects my personal performance and is accurate and complete.                  Clinical Impression:  Final diagnoses:  [L02.416] Abscess of left lower leg (Primary)          ED Disposition Condition    Discharge Stable          ED Prescriptions       Medication Sig Dispense Start Date End Date Auth. Provider    sulfamethoxazole-trimethoprim 800-160mg (BACTRIM DS) 800-160 mg Tab Take 1 tablet by mouth 2 (two) times daily. for 14 days 28 tablet 3/24/2025 2025 Katina Mccall PA-C          Follow-up Information       Follow up With Specialties Details Why Contact Info    Geo Espitia MD Obstetrics and Gynecology Schedule an appointment as soon as possible for a visit in 3 days for follow up [address]  [address]  [facility] [address]  [phone]      [facility] ED Emergency Medicine Go to  If symptoms worsen, As needed, shortness of breath, chest pain, fever, worsening cough, nausea, vomiting, abdominal pain [address] [facility] [address] [phone]                 [1]   Social History  Tobacco Use    Smoking status: Every Day     Current packs/day: 0.00     Types: Cigarettes     Last attempt to quit: 2020     Years since quittin.2    Smokeless tobacco: Never    Substance Use Topics    Alcohol use: Yes     Comment: Socially    Drug use: Yes     Frequency: 3.0 times per week     Types: Marijuana        Katina Mccall PA-C  03/24/25 7345

## 2025-04-03 ENCOUNTER — TELEPHONE (OUTPATIENT)
Dept: BARIATRICS/WEIGHT MGMT | Age: 35
End: 2025-04-03

## 2025-04-04 LAB
COMMENTS - MP RESULT TYPE: NORMAL
SCAN RESULT: NORMAL

## 2025-04-08 ENCOUNTER — APPOINTMENT (OUTPATIENT)
Dept: INTERNAL MEDICINE | Age: 35
End: 2025-04-08

## 2025-04-09 ENCOUNTER — DOCUMENTATION (OUTPATIENT)
Dept: OBSTETRICS AND GYNECOLOGY | Facility: HOSPITAL | Age: 35
End: 2025-04-09
Payer: MEDICAID

## 2025-04-09 ENCOUNTER — HOSPITAL ENCOUNTER (OUTPATIENT)
Dept: RADIOLOGY | Facility: HOSPITAL | Age: 35
Discharge: HOME | End: 2025-04-09
Payer: MEDICAID

## 2025-04-09 DIAGNOSIS — Z34.02 ENCOUNTER FOR SUPERVISION OF NORMAL FIRST PREGNANCY IN SECOND TRIMESTER: ICD-10-CM

## 2025-04-09 PROCEDURE — 76811 OB US DETAILED SNGL FETUS: CPT

## 2025-04-09 NOTE — PROGRESS NOTES
Title: A Randomized Trial of Continuous Positive Airway Pressure (CPAP) for Sleep Apnea in Pregnancy. SLEEP SCREENING Consent  IRB # [id number]  PI: Dr. Herson Ortiz  SPONSOR: Jane Sergey Chay [employer] [place] [employer] and Human   Development (NICHD) and the National Heart, Lung and [employer] [place] (NHLBI) of the   National Institutes of Health (NIH).    Key Information  This screening study is being done to find out if you pause breathing while you sleep during pregnancy which may make you eligible for a research study. Women with pauses in their breathing while they sleep may have an increased risk of high blood pressure during pregnancy.If you consent, you will wear a simple sleep monitor on one night during your pregnancy, between 14 and 21 weeks of pregnancy. You will be asked to return the sleep monitor within 3 days after you use it so that the information from the monitor may be reviewed. You will be told the results of your sleep test. If you are eligible, research staff may contact you to talk about taking part in a research study. This screening may not benefit you directly. In the rare cases where an assessment shows that you have a severe breathing or heart rate problem while you sleep, you and your health care provider will   be notified. The possible risks to this screening include minor discomfort or skin irritation from the different parts of the sleep monitor. Participation in this research study is voluntary and if you do not take part, you will receive the routine care usually provided to pregnant women.    Patient was identified by name, date of birth.      All study related procedures, potential risks, benefits and alternative treatments were reviewed, discussed and explained. No study related activities or procedures were done prior to the patient signing the Informed Consent Form. The Informed Consent process allowed the patient an opportunity to ask questions, receive  substantial answers and understand the content of the ICF. All questions were answered by Sima Robert RN, Clinical Research Nurse.The patient verbalized an understanding of the research study (study related procedures, potential risks, benefits and visit procedures) and agrees to participate. The patient was informed that study participation is voluntary and they can withdraw consent at any time.      Patient has signed and dated the informed consent and HIPAA, a signed and dated copy of the Informed Consent and HIPAA were provided for her records.      Date of informed consent  04/09/2025.    All procedures done according to protocol.     Patient assigned HSAT and instructed to wear for one night. Once completed to return HSAT back to [facility] for data download.     Upon return patient will be paid $20.00 for bringing the device back in and $30.00 for successfully completing the sleep assessment (usable data of at least 4 hours and 15 minutes) and returning it by 21 weeks and 6 days. If returned it on time, but it is not a successful recording patient will be   offered to take it home again for a retest. If you return the device after 21 weeks and 6 days, patient will be paid $10 for the return of the device but  sleep results will not be downloaded for analysis. Patient will not be responsible for loss, theft, or damage to the monitor.     Compensation will be in the form of a gift card. Total compensation will be determined by what was completed.      Patient will be scheduled for follow-up if eligible for CPAP Randomization.     Patient verbalized understanding of all of the instructions given.    Patient was advised to call if they has any questions or concerns.

## 2025-04-10 ENCOUNTER — APPOINTMENT (OUTPATIENT)
Dept: INTERNAL MEDICINE | Age: 35
End: 2025-04-10

## 2025-04-10 ENCOUNTER — E-ADVICE (OUTPATIENT)
Dept: BARIATRICS/WEIGHT MGMT | Age: 35
End: 2025-04-10

## 2025-04-15 ENCOUNTER — OFFICE VISIT (OUTPATIENT)
Dept: URGENT CARE | Age: 35
End: 2025-04-15

## 2025-04-15 ENCOUNTER — IMAGING SERVICES (OUTPATIENT)
Dept: GENERAL RADIOLOGY | Age: 35
End: 2025-04-15

## 2025-04-15 ENCOUNTER — TELEPHONE (OUTPATIENT)
Dept: OBSTETRICS AND GYNECOLOGY | Facility: HOSPITAL | Age: 35
End: 2025-04-15
Payer: MEDICAID

## 2025-04-15 VITALS
HEART RATE: 79 BPM | TEMPERATURE: 98 F | OXYGEN SATURATION: 97 % | RESPIRATION RATE: 20 BRPM | DIASTOLIC BLOOD PRESSURE: 83 MMHG | BODY MASS INDEX: 42.05 KG/M2 | SYSTOLIC BLOOD PRESSURE: 126 MMHG | WEIGHT: 284.72 LBS
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
## 2025-04-15 PROCEDURE — 96372 THER/PROPH/DIAG INJ SC/IM: CPT | Performed by: NURSE PRACTITIONER

## 2025-04-15 PROCEDURE — 81025 URINE PREGNANCY TEST: CPT | Performed by: NURSE PRACTITIONER

## 2025-04-15 PROCEDURE — 99214 OFFICE O/P EST MOD 30 MIN: CPT | Performed by: NURSE PRACTITIONER

## 2025-04-15 PROCEDURE — 72040 X-RAY EXAM NECK SPINE 2-3 VW: CPT | Performed by: RADIOLOGY

## 2025-04-15 PROCEDURE — 72100 X-RAY EXAM L-S SPINE 2/3 VWS: CPT | Performed by: RADIOLOGY

## 2025-04-15 RX ORDER — KETOROLAC TROMETHAMINE 30 MG/ML
30 INJECTION, SOLUTION INTRAMUSCULAR; INTRAVENOUS ONCE
Status: COMPLETED | OUTPATIENT
Start: 2025-04-15 | End: 2025-04-15

## 2025-04-15 RX ADMIN — KETOROLAC TROMETHAMINE 30 MG: 30 INJECTION, SOLUTION INTRAMUSCULAR; INTRAVENOUS at 18:34

## 2025-04-15 ASSESSMENT — ENCOUNTER SYMPTOMS
RESPIRATORY NEGATIVE: 1
BACK PAIN: 1
PSYCHIATRIC NEGATIVE: 1
NEUROLOGICAL NEGATIVE: 1
CONSTITUTIONAL NEGATIVE: 1

## 2025-04-15 ASSESSMENT — PAIN SCALES - GENERAL: PAINLEVEL_OUTOF10: 5

## 2025-04-15 NOTE — TELEPHONE ENCOUNTER
Attempting to reach patient regarding home sleep apnea test. Left voicemail with call back number.

## 2025-04-17 ENCOUNTER — E-ADVICE (OUTPATIENT)
Dept: URGENT CARE | Age: 35
End: 2025-04-17

## 2025-04-17 ENCOUNTER — TELEPHONE (OUTPATIENT)
Dept: BARIATRICS/WEIGHT MGMT | Age: 35
End: 2025-04-17

## 2025-04-17 ENCOUNTER — E-ADVICE (OUTPATIENT)
Dept: BARIATRICS/WEIGHT MGMT | Age: 35
End: 2025-04-17

## 2025-04-22 ENCOUNTER — HOSPITAL ENCOUNTER (OUTPATIENT)
Dept: RADIOLOGY | Facility: HOSPITAL | Age: 35
Discharge: HOME | End: 2025-04-22
Payer: MEDICAID

## 2025-04-22 DIAGNOSIS — Z34.02 ENCOUNTER FOR SUPERVISION OF NORMAL FIRST PREGNANCY IN SECOND TRIMESTER: ICD-10-CM

## 2025-04-22 PROCEDURE — 76816 OB US FOLLOW-UP PER FETUS: CPT

## 2025-04-24 ENCOUNTER — APPOINTMENT (OUTPATIENT)
Dept: BARIATRICS/WEIGHT MGMT | Age: 35
End: 2025-04-24

## 2025-04-25 ENCOUNTER — ROUTINE PRENATAL (OUTPATIENT)
Dept: OBSTETRICS AND GYNECOLOGY | Facility: HOSPITAL | Age: 35
End: 2025-04-25
Payer: MEDICAID

## 2025-04-25 VITALS — BODY MASS INDEX: 42.17 KG/M2 | WEIGHT: 223.2 LBS | SYSTOLIC BLOOD PRESSURE: 122 MMHG | DIASTOLIC BLOOD PRESSURE: 86 MMHG

## 2025-04-25 DIAGNOSIS — Z3A.21 21 WEEKS GESTATION OF PREGNANCY (HHS-HCC): ICD-10-CM

## 2025-04-25 DIAGNOSIS — Z98.891 HISTORY OF C-SECTION: Primary | ICD-10-CM

## 2025-04-25 DIAGNOSIS — O09.299 PREGNANCY WITH POOR OBSTETRIC HISTORY (HHS-HCC): ICD-10-CM

## 2025-04-25 DIAGNOSIS — Z3A.16 16 WEEKS GESTATION OF PREGNANCY (HHS-HCC): ICD-10-CM

## 2025-04-25 PROCEDURE — 99214 OFFICE O/P EST MOD 30 MIN: CPT | Mod: TH | Performed by: NURSE PRACTITIONER
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
## 2025-04-30 ENCOUNTER — APPOINTMENT (OUTPATIENT)
Dept: OBGYN | Age: 35
End: 2025-04-30

## 2025-05-01 ENCOUNTER — APPOINTMENT (OUTPATIENT)
Dept: FAMILY MEDICINE | Age: 35
End: 2025-05-01

## 2025-05-01 ENCOUNTER — TELEPHONE (OUTPATIENT)
Dept: OBSTETRICS AND GYNECOLOGY | Facility: HOSPITAL | Age: 35
End: 2025-05-01
Payer: MEDICAID

## 2025-05-01 NOTE — TELEPHONE ENCOUNTER
Attempting to reach patient regarding study follow-up and return of home sleep apnea test. Left voicemail with call back number.

## 2025-05-02 ENCOUNTER — APPOINTMENT (OUTPATIENT)
Dept: BARIATRICS/WEIGHT MGMT | Age: 35
End: 2025-05-02
Attending: FAMILY MEDICINE

## 2025-05-05 RX ORDER — LEVOTHYROXINE SODIUM 200 UG/1
200 TABLET ORAL DAILY
Qty: 30 TABLET | Refills: 3 | Status: SHIPPED | OUTPATIENT
Start: 2025-05-05

## 2025-05-15 ENCOUNTER — TELEPHONE (OUTPATIENT)
Dept: OBGYN | Age: 35
End: 2025-05-15

## 2025-05-16 ENCOUNTER — APPOINTMENT (OUTPATIENT)
Dept: FAMILY MEDICINE | Age: 35
End: 2025-05-16

## 2025-05-28 ENCOUNTER — LAB (OUTPATIENT)
Dept: LAB | Facility: HOSPITAL | Age: 35
End: 2025-05-28
Payer: MEDICAID

## 2025-05-28 ENCOUNTER — APPOINTMENT (OUTPATIENT)
Dept: OBSTETRICS AND GYNECOLOGY | Facility: HOSPITAL | Age: 35
End: 2025-05-28
Payer: MEDICAID

## 2025-05-28 VITALS — WEIGHT: 221 LBS | SYSTOLIC BLOOD PRESSURE: 118 MMHG | BODY MASS INDEX: 41.76 KG/M2 | DIASTOLIC BLOOD PRESSURE: 83 MMHG

## 2025-05-28 DIAGNOSIS — Z98.891 HISTORY OF C-SECTION: ICD-10-CM

## 2025-05-28 DIAGNOSIS — J45.909 ASTHMA, UNSPECIFIED ASTHMA SEVERITY, UNSPECIFIED WHETHER COMPLICATED, UNSPECIFIED WHETHER PERSISTENT (HHS-HCC): ICD-10-CM

## 2025-05-28 DIAGNOSIS — Z3A.26 26 WEEKS GESTATION OF PREGNANCY (HHS-HCC): Primary | ICD-10-CM

## 2025-05-28 DIAGNOSIS — Z3A.21 21 WEEKS GESTATION OF PREGNANCY (HHS-HCC): ICD-10-CM

## 2025-05-28 LAB
ERYTHROCYTE [DISTWIDTH] IN BLOOD BY AUTOMATED COUNT: 13.8 % (ref 11.5–14.5)
HCT VFR BLD AUTO: 35.7 % (ref 36–46)
HGB BLD-MCNC: 11 G/DL (ref 12–16)
MCH RBC QN AUTO: 29.4 PG (ref 26–34)
MCHC RBC AUTO-ENTMCNC: 30.8 G/DL (ref 32–36)
MCV RBC AUTO: 96 FL (ref 80–100)
NRBC BLD-RTO: 0 /100 WBCS (ref 0–0)
PLATELET # BLD AUTO: 222 X10*3/UL (ref 150–450)
RBC # BLD AUTO: 3.74 X10*6/UL (ref 4–5.2)
REFLEX ADDED, ANEMIA PANEL: NORMAL
WBC # BLD AUTO: 8.6 X10*3/UL (ref 4.4–11.3)

## 2025-05-28 PROCEDURE — 36415 COLL VENOUS BLD VENIPUNCTURE: CPT

## 2025-05-28 PROCEDURE — 99214 OFFICE O/P EST MOD 30 MIN: CPT | Mod: TH | Performed by: NURSE PRACTITIONER

## 2025-05-28 PROCEDURE — 99214 OFFICE O/P EST MOD 30 MIN: CPT | Performed by: NURSE PRACTITIONER

## 2025-05-28 PROCEDURE — 85027 COMPLETE CBC AUTOMATED: CPT

## 2025-05-28 RX ORDER — ALBUTEROL SULFATE 90 UG/1
2 INHALANT RESPIRATORY (INHALATION) EVERY 6 HOURS PRN
Qty: 18 G | Refills: 11 | Status: SHIPPED | OUTPATIENT
Start: 2025-05-28 | End: 2026-05-28

## 2025-05-28 NOTE — PROGRESS NOTES
Ob Visit  25     SUBJECTIVE      HPI: Renetta Parmar is a 35 y.o.  at 26w2d here for RPNV.  Patient here today reporting she is feeling very tired and very thirsty and that she has no energy.  Patient is due for GTT testing today.  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  Encouraged patient to look ahead to being able to rest prior to delivery of this baby.    OBJECTIVE  Visit Vitals  /83   Wt 100 kg (221 lb)   LMP 2024 (Approximate)   BMI 41.76 kg/m²   OB Status Pregnant   Smoking Status Former   BSA 2.07 m²            ASSESSMENT & PLAN    Renetta Parmar is a 35 y.o.  at 26w2d here for the following concerns we addressed today:    Problem List Items Addressed This Visit          Pregnancy    26 weeks gestation of pregnancy ([facility]) - Primary    Overview   Desired provider in labor: [] CNM  [x] Physician   [] Either Acceptable  [] Blood Products: [x] Yes, accepts [] No, needs counseling  [x] Initial BMI: 41.40   [x] Prenatal Labs:   [] Cervical Cancer Screening up to date  [] Rh status: Rh+  [] Screen for IPV and Substance Use Risk:  [x] Genetic Screening (cfDNA): Normal female  [] First Trimester Anatomy Screen (11-13.6 wks): Missed opportunity  [x] Baby ASA (initiated): Ordered  [] Pregnancy dated by:     [x] Anatomy US: (19-20 wks)   [] Federal Sterilization consent signed (if indicated):  [] 1hr GCT at 24-28wks:   [] Rhogam (if indicated): n/a  [] Fetal Surveillance (if indicated):  [] Tdap (27-32 wks, may be given up to 36 wks if initial window missed):   [] RSV (32-36 wks) (Sept. to end ):     [] Feeding Intentions:  [] Postpartum Birth control method: Had considered the ligation but at 26 weeks is now reconsidering  [] GBS at 36 - 37 wks:  [] 39 weeks discussion of IOL vs. Expectant management:  [] Mode of delivery ( anticipated ):  repeat c/s         Relevant Orders    CBC Anemia Panel With Reflex,Pregnancy     Glucose, 1 Hour Screen, Pregnancy    Syphilis Screen with Reflex       Other    History of     Overview   C-sections x 3.  This pregnancy to be delivered by  section.    Also interested in tubal ligation  May 28 question about tubal ligation and states that she is reconsidering.  Might want placement of an IUD possibly at 6 weeks postpartum.          Other Visit Diagnoses         Asthma, unspecified asthma severity, unspecified whether complicated, unspecified whether persistent ([facility])        Relevant Medications    albuterol 90 mcg/actuation inhaler              RTC in 2 weeks      Hui Madison, APRN-CNP

## 2025-05-29 LAB
GLUCOSE 1H P 50 G GLC PO SERPL-MCNC: 106 MG/DL
T PALLIDUM AB SER QL IA: NORMAL

## 2025-06-02 LAB
GLUCOSE 1H P 50 G GLC PO SERPL-MCNC: 106 MG/DL
T PALLIDUM AB SER QL IA: NEGATIVE

## 2025-06-03 ASSESSMENT — ENCOUNTER SYMPTOMS
COUGH: 0
FEVER: 0
CHILLS: 0
CONSTIPATION: 0
VOMITING: 0
DIARRHEA: 0
ABDOMINAL DISTENTION: 0
ENDOCRINE NEGATIVE: 1
SHORTNESS OF BREATH: 0
NAUSEA: 0
EYES NEGATIVE: 1
ABDOMINAL PAIN: 0
DIZZINESS: 0
WEAKNESS: 0

## 2025-06-05 ENCOUNTER — APPOINTMENT (OUTPATIENT)
Dept: OBGYN | Age: 35
End: 2025-06-05

## 2025-06-05 ENCOUNTER — TELEPHONE (OUTPATIENT)
Dept: BARIATRICS/WEIGHT MGMT | Age: 35
End: 2025-06-05

## 2025-06-05 VITALS
WEIGHT: 273.15 LBS | DIASTOLIC BLOOD PRESSURE: 86 MMHG | BODY MASS INDEX: 40.46 KG/M2 | SYSTOLIC BLOOD PRESSURE: 118 MMHG | OXYGEN SATURATION: 100 % | HEART RATE: 84 BPM | RESPIRATION RATE: 18 BRPM | HEIGHT: 69 IN

## 2025-06-05 DIAGNOSIS — N76.1 SUBACUTE VAGINITIS: Primary | ICD-10-CM

## 2025-06-05 PROCEDURE — 99213 OFFICE O/P EST LOW 20 MIN: CPT | Performed by: OBSTETRICS & GYNECOLOGY

## 2025-06-05 ASSESSMENT — PAIN SCALES - GENERAL: PAINLEVEL_OUTOF10: 0

## 2025-06-06 ENCOUNTER — E-ADVICE (OUTPATIENT)
Dept: BARIATRICS/WEIGHT MGMT | Age: 35
End: 2025-06-06

## 2025-06-06 ENCOUNTER — TELEPHONE (OUTPATIENT)
Dept: FAMILY MEDICINE | Age: 35
End: 2025-06-06

## 2025-06-06 DIAGNOSIS — L73.2 HIDRADENITIS SUPPURATIVA: Primary | ICD-10-CM

## 2025-06-08 ENCOUNTER — RESULTS FOLLOW-UP (OUTPATIENT)
Dept: OBGYN | Age: 35
End: 2025-06-08

## 2025-06-08 RX ORDER — DIPHENHYDRAMINE HCL 25 MG
25 CAPSULE ORAL EVERY 6 HOURS PRN
Qty: 30 CAPSULE | Refills: 1 | Status: SHIPPED | OUTPATIENT
Start: 2025-06-08

## 2025-06-08 RX ORDER — METRONIDAZOLE 500 MG/1
500 TABLET ORAL 2 TIMES DAILY
Qty: 14 TABLET | Refills: 1 | Status: CANCELLED | OUTPATIENT
Start: 2025-06-08 | End: 2025-06-22

## 2025-06-08 RX ORDER — METRONIDAZOLE 500 MG/1
500 TABLET ORAL 2 TIMES DAILY
Qty: 14 TABLET | Refills: 0 | Status: SHIPPED | OUTPATIENT
Start: 2025-06-08 | End: 2025-06-18 | Stop reason: SDUPTHER

## 2025-06-09 LAB
M GENITALIUM DNA SPEC QL NAA+PROBE: NOT DETECTED
M HOMINIS DNA SPEC QL NAA+PROBE: NOT DETECTED
SPECIMEN SOURCE: NORMAL
U PARVUM DNA SPEC QL NAA+PROBE: NOT DETECTED
U UREALYTICUM DNA SPEC QL NAA+PROBE: NOT DETECTED

## 2025-06-10 ASSESSMENT — ENCOUNTER SYMPTOMS
DIARRHEA: 0
CONSTIPATION: 0
ABDOMINAL DISTENTION: 0
FEVER: 0
COUGH: 0
ENDOCRINE NEGATIVE: 1
VOMITING: 0
SHORTNESS OF BREATH: 0
DIZZINESS: 0
CHILLS: 0
NAUSEA: 0
EYES NEGATIVE: 1
WEAKNESS: 0
ABDOMINAL PAIN: 0

## 2025-06-11 ENCOUNTER — HOSPITAL ENCOUNTER (OUTPATIENT)
Facility: HOSPITAL | Age: 35
Setting detail: OBSERVATION
Discharge: HOME | End: 2025-06-12
Attending: STUDENT IN AN ORGANIZED HEALTH CARE EDUCATION/TRAINING PROGRAM | Admitting: HOSPITALIST
Payer: MEDICAID

## 2025-06-11 ENCOUNTER — APPOINTMENT (OUTPATIENT)
Dept: OBGYN | Age: 35
End: 2025-06-11

## 2025-06-11 ENCOUNTER — LAB SERVICES (OUTPATIENT)
Dept: LAB | Age: 35
End: 2025-06-11

## 2025-06-11 ENCOUNTER — APPOINTMENT (OUTPATIENT)
Dept: RADIOLOGY | Facility: HOSPITAL | Age: 35
End: 2025-06-11
Payer: MEDICAID

## 2025-06-11 ENCOUNTER — ROUTINE PRENATAL (OUTPATIENT)
Dept: OBSTETRICS AND GYNECOLOGY | Facility: HOSPITAL | Age: 35
End: 2025-06-11
Payer: MEDICAID

## 2025-06-11 VITALS — WEIGHT: 225 LBS | SYSTOLIC BLOOD PRESSURE: 136 MMHG | DIASTOLIC BLOOD PRESSURE: 98 MMHG | BODY MASS INDEX: 42.51 KG/M2

## 2025-06-11 VITALS
DIASTOLIC BLOOD PRESSURE: 74 MMHG | WEIGHT: 274.58 LBS | SYSTOLIC BLOOD PRESSURE: 111 MMHG | BODY MASS INDEX: 40.67 KG/M2 | HEART RATE: 84 BPM | RESPIRATION RATE: 18 BRPM | OXYGEN SATURATION: 100 % | HEIGHT: 69 IN

## 2025-06-11 DIAGNOSIS — Z3A.28 28 WEEKS GESTATION OF PREGNANCY (HHS-HCC): Primary | ICD-10-CM

## 2025-06-11 DIAGNOSIS — Z31.41 FERTILITY TESTING: Primary | ICD-10-CM

## 2025-06-11 DIAGNOSIS — K21.9 GASTROESOPHAGEAL REFLUX DISEASE WITHOUT ESOPHAGITIS: ICD-10-CM

## 2025-06-11 DIAGNOSIS — O10.919 CHRONIC HYPERTENSION DURING PREGNANCY (HHS-HCC): Primary | ICD-10-CM

## 2025-06-11 DIAGNOSIS — R00.2 PALPITATIONS: ICD-10-CM

## 2025-06-11 DIAGNOSIS — Z31.41 FERTILITY TESTING: ICD-10-CM

## 2025-06-11 PROBLEM — O13.3 GESTATIONAL HYPERTENSION, THIRD TRIMESTER (HHS-HCC): Status: ACTIVE | Noted: 2025-06-11

## 2025-06-11 LAB
25(OH)D3+25(OH)D2 SERPL-MCNC: 17.1 NG/ML (ref 30–100)
ABO GROUP (TYPE) IN BLOOD: NORMAL
ALBUMIN SERPL BCP-MCNC: 3.2 G/DL (ref 3.4–5)
ALBUMIN SERPL BCP-MCNC: 3.5 G/DL (ref 3.4–5)
ALP SERPL-CCNC: 61 U/L (ref 33–110)
ALP SERPL-CCNC: 65 U/L (ref 33–110)
ALT SERPL W P-5'-P-CCNC: 7 U/L (ref 7–45)
ALT SERPL W P-5'-P-CCNC: 7 U/L (ref 7–45)
ANION GAP SERPL CALC-SCNC: 13 MMOL/L (ref 10–20)
ANION GAP SERPL CALC-SCNC: 15 MMOL/L (ref 10–20)
ANTIBODY SCREEN: NORMAL
AST SERPL W P-5'-P-CCNC: 10 U/L (ref 9–39)
AST SERPL W P-5'-P-CCNC: 9 U/L (ref 9–39)
BILIRUB SERPL-MCNC: 0.3 MG/DL (ref 0–1.2)
BILIRUB SERPL-MCNC: 0.3 MG/DL (ref 0–1.2)
BILIRUBIN, POC: NEGATIVE
BLOOD URINE, POC: NEGATIVE
BNP SERPL-MCNC: 6 PG/ML (ref 0–99)
BUN SERPL-MCNC: 6 MG/DL (ref 6–23)
BUN SERPL-MCNC: 7 MG/DL (ref 6–23)
CALCIUM SERPL-MCNC: 8.5 MG/DL (ref 8.6–10.6)
CALCIUM SERPL-MCNC: 8.9 MG/DL (ref 8.6–10.6)
CARDIAC TROPONIN I PNL SERPL HS: <3 NG/L (ref 0–34)
CHLORIDE SERPL-SCNC: 107 MMOL/L (ref 98–107)
CHLORIDE SERPL-SCNC: 108 MMOL/L (ref 98–107)
CLARITY, POC: CLEAR
CO2 SERPL-SCNC: 19 MMOL/L (ref 21–32)
CO2 SERPL-SCNC: 21 MMOL/L (ref 21–32)
COLOR, POC: YELLOW
CREAT SERPL-MCNC: 0.42 MG/DL (ref 0.5–1.05)
CREAT SERPL-MCNC: 0.53 MG/DL (ref 0.5–1.05)
CREAT UR-MCNC: 107 MG/DL (ref 20–320)
CREAT UR-MCNC: 125.4 MG/DL (ref 20–320)
DEPRECATED RDW RBC: 49.1 FL (ref 39–50)
EGFRCR SERPLBLD CKD-EPI 2021: >90 ML/MIN/1.73M*2
EGFRCR SERPLBLD CKD-EPI 2021: >90 ML/MIN/1.73M*2
ERYTHROCYTE [DISTWIDTH] IN BLOOD BY AUTOMATED COUNT: 13.8 % (ref 11.5–14.5)
ERYTHROCYTE [DISTWIDTH] IN BLOOD BY AUTOMATED COUNT: 13.8 % (ref 11.5–14.5)
ERYTHROCYTE [DISTWIDTH] IN BLOOD: 18.7 % (ref 11–15)
GLUCOSE SERPL-MCNC: 109 MG/DL (ref 74–99)
GLUCOSE SERPL-MCNC: 85 MG/DL (ref 74–99)
GLUCOSE URINE, POC: NEGATIVE
HBA1C MFR BLD: 5.4 % (ref 4.5–5.6)
HBV SURFACE AG SER QL: NEGATIVE
HCT VFR BLD AUTO: 33.5 % (ref 36–46)
HCT VFR BLD AUTO: 37 % (ref 36–46)
HCT VFR BLD CALC: 33.4 % (ref 36–46.5)
HCV AB SER QL: NEGATIVE
HGB BLD-MCNC: 10.2 G/DL (ref 12–16)
HGB BLD-MCNC: 10.8 G/DL (ref 12–16)
HGB BLD-MCNC: 9.3 G/DL (ref 12–15.5)
HIV 1+2 AB+HIV1 P24 AG SERPL QL IA: NONREACTIVE
KETONES, POC: NEGATIVE
LEUKOCYTE EST, POC: NORMAL
MCH RBC QN AUTO: 20.4 PG (ref 26–34)
MCH RBC QN AUTO: 28.9 PG (ref 26–34)
MCH RBC QN AUTO: 29.4 PG (ref 26–34)
MCHC RBC AUTO-ENTMCNC: 27.8 G/DL (ref 32–36.5)
MCHC RBC AUTO-ENTMCNC: 29.2 G/DL (ref 32–36)
MCHC RBC AUTO-ENTMCNC: 30.4 G/DL (ref 32–36)
MCV RBC AUTO: 73.1 FL (ref 78–100)
MCV RBC AUTO: 97 FL (ref 80–100)
MCV RBC AUTO: 99 FL (ref 80–100)
NITRITE, POC: NEGATIVE
NRBC BLD MANUAL-RTO: 0 /100 WBC
NRBC BLD-RTO: 0 /100 WBCS (ref 0–0)
NRBC BLD-RTO: 0 /100 WBCS (ref 0–0)
PH, POC: 7.5
PLATELET # BLD AUTO: 216 X10*3/UL (ref 150–450)
PLATELET # BLD AUTO: 222 X10*3/UL (ref 150–450)
PLATELET # BLD AUTO: 522 K/MCL (ref 140–450)
POC APPEARANCE OF BODY FLUID: NORMAL
POTASSIUM SERPL-SCNC: 3.6 MMOL/L (ref 3.5–5.3)
POTASSIUM SERPL-SCNC: 3.9 MMOL/L (ref 3.5–5.3)
PROLACTIN SERPL-MCNC: 10.2 NG/ML (ref 2.8–29.2)
PROT SERPL-MCNC: 6.2 G/DL (ref 6.4–8.2)
PROT SERPL-MCNC: 6.5 G/DL (ref 6.4–8.2)
PROT UR-ACNC: 25 MG/DL (ref 5–24)
PROT UR-ACNC: <4 MG/DL (ref 5–24)
PROT/CREAT UR: 0.2 MG/MG CREAT (ref 0–0.17)
PROT/CREAT UR: ABNORMAL MG/G{CREAT}
RBC # BLD AUTO: 3.47 X10*6/UL (ref 4–5.2)
RBC # BLD AUTO: 3.74 X10*6/UL (ref 4–5.2)
RBC # BLD: 4.57 MIL/MCL (ref 4–5.2)
RH FACTOR (ANTIGEN D): NORMAL
RUBV IGG SERPL IA-ACNC: 47.1 UNITS/ML
SODIUM SERPL-SCNC: 137 MMOL/L (ref 136–145)
SODIUM SERPL-SCNC: 138 MMOL/L (ref 136–145)
SPECIFIC GRAVITY, POC: 1.02
TSH SERPL-ACNC: 14.55 MCUNITS/ML (ref 0.35–5)
URINE PROTEIN, POC: NORMAL
UROBILINOGEN, POC: 1
VZV IGG SER QL IA: 5.89 S/CO
VZV IGG SER QL IA: NORMAL
WBC # BLD AUTO: 6.7 X10*3/UL (ref 4.4–11.3)
WBC # BLD AUTO: 7.1 X10*3/UL (ref 4.4–11.3)
WBC # BLD: 9.7 K/MCL (ref 4.2–11)

## 2025-06-11 PROCEDURE — 59025 FETAL NON-STRESS TEST: CPT | Performed by: HOSPITALIST

## 2025-06-11 PROCEDURE — 83880 ASSAY OF NATRIURETIC PEPTIDE: CPT | Performed by: FAMILY MEDICINE

## 2025-06-11 PROCEDURE — 99214 OFFICE O/P EST MOD 30 MIN: CPT | Performed by: OBSTETRICS & GYNECOLOGY

## 2025-06-11 PROCEDURE — 36415 COLL VENOUS BLD VENIPUNCTURE: CPT | Performed by: HOSPITALIST

## 2025-06-11 PROCEDURE — 2500000001 HC RX 250 WO HCPCS SELF ADMINISTERED DRUGS (ALT 637 FOR MEDICARE OP): Mod: SE | Performed by: HOSPITALIST

## 2025-06-11 PROCEDURE — 96374 THER/PROPH/DIAG INJ IV PUSH: CPT

## 2025-06-11 PROCEDURE — 2500000004 HC RX 250 GENERAL PHARMACY W/ HCPCS (ALT 636 FOR OP/ED): Mod: SE | Performed by: HOSPITALIST

## 2025-06-11 PROCEDURE — 99214 OFFICE O/P EST MOD 30 MIN: CPT | Performed by: NURSE PRACTITIONER

## 2025-06-11 PROCEDURE — 96361 HYDRATE IV INFUSION ADD-ON: CPT | Mod: 59

## 2025-06-11 PROCEDURE — 1120000001 HC OB PRIVATE ROOM DAILY

## 2025-06-11 PROCEDURE — 99223 1ST HOSP IP/OBS HIGH 75: CPT | Performed by: HOSPITALIST

## 2025-06-11 PROCEDURE — 85027 COMPLETE CBC AUTOMATED: CPT | Performed by: HOSPITALIST

## 2025-06-11 PROCEDURE — 99214 OFFICE O/P EST MOD 30 MIN: CPT | Mod: TH | Performed by: NURSE PRACTITIONER

## 2025-06-11 PROCEDURE — 86901 BLOOD TYPING SEROLOGIC RH(D): CPT | Performed by: HOSPITALIST

## 2025-06-11 PROCEDURE — 86900 BLOOD TYPING SEROLOGIC ABO: CPT | Performed by: INTERNAL MEDICINE

## 2025-06-11 PROCEDURE — 71275 CT ANGIOGRAPHY CHEST: CPT

## 2025-06-11 PROCEDURE — 86850 RBC ANTIBODY SCREEN: CPT | Performed by: INTERNAL MEDICINE

## 2025-06-11 PROCEDURE — 82570 ASSAY OF URINE CREATININE: CPT | Performed by: FAMILY MEDICINE

## 2025-06-11 PROCEDURE — 84484 ASSAY OF TROPONIN QUANT: CPT | Performed by: FAMILY MEDICINE

## 2025-06-11 PROCEDURE — 99215 OFFICE O/P EST HI 40 MIN: CPT

## 2025-06-11 PROCEDURE — G0378 HOSPITAL OBSERVATION PER HR: HCPCS

## 2025-06-11 PROCEDURE — 80053 COMPREHEN METABOLIC PANEL: CPT | Performed by: HOSPITALIST

## 2025-06-11 PROCEDURE — 36415 COLL VENOUS BLD VENIPUNCTURE: CPT

## 2025-06-11 PROCEDURE — 86901 BLOOD TYPING SEROLOGIC RH(D): CPT | Performed by: INTERNAL MEDICINE

## 2025-06-11 PROCEDURE — 84156 ASSAY OF PROTEIN URINE: CPT | Performed by: HOSPITALIST

## 2025-06-11 RX ORDER — SODIUM CHLORIDE, SODIUM LACTATE, POTASSIUM CHLORIDE, CALCIUM CHLORIDE 600; 310; 30; 20 MG/100ML; MG/100ML; MG/100ML; MG/100ML
75 INJECTION, SOLUTION INTRAVENOUS CONTINUOUS
Status: DISCONTINUED | OUTPATIENT
Start: 2025-06-11 | End: 2025-06-12 | Stop reason: HOSPADM

## 2025-06-11 RX ORDER — CALCIUM CARBONATE 200(500)MG
1 TABLET,CHEWABLE ORAL EVERY 6 HOURS PRN
Status: DISCONTINUED | OUTPATIENT
Start: 2025-06-11 | End: 2025-06-12 | Stop reason: HOSPADM

## 2025-06-11 RX ORDER — FAMOTIDINE 10 MG/ML
40 INJECTION, SOLUTION INTRAVENOUS ONCE
Status: COMPLETED | OUTPATIENT
Start: 2025-06-11 | End: 2025-06-11

## 2025-06-11 RX ORDER — ONDANSETRON 4 MG/1
4 TABLET, FILM COATED ORAL EVERY 6 HOURS PRN
Status: DISCONTINUED | OUTPATIENT
Start: 2025-06-11 | End: 2025-06-12 | Stop reason: HOSPADM

## 2025-06-11 RX ORDER — HYDRALAZINE HYDROCHLORIDE 20 MG/ML
5 INJECTION INTRAMUSCULAR; INTRAVENOUS ONCE AS NEEDED
Status: DISCONTINUED | OUTPATIENT
Start: 2025-06-11 | End: 2025-06-12 | Stop reason: HOSPADM

## 2025-06-11 RX ORDER — LABETALOL HYDROCHLORIDE 5 MG/ML
20 INJECTION, SOLUTION INTRAVENOUS ONCE AS NEEDED
Status: DISCONTINUED | OUTPATIENT
Start: 2025-06-11 | End: 2025-06-12 | Stop reason: HOSPADM

## 2025-06-11 RX ORDER — METOCLOPRAMIDE 10 MG/1
10 TABLET ORAL ONCE
Status: COMPLETED | OUTPATIENT
Start: 2025-06-11 | End: 2025-06-11

## 2025-06-11 RX ORDER — ONDANSETRON HYDROCHLORIDE 2 MG/ML
4 INJECTION, SOLUTION INTRAVENOUS EVERY 6 HOURS PRN
Status: DISCONTINUED | OUTPATIENT
Start: 2025-06-11 | End: 2025-06-12 | Stop reason: HOSPADM

## 2025-06-11 RX ORDER — POLYETHYLENE GLYCOL 3350 17 G/17G
17 POWDER, FOR SOLUTION ORAL 2 TIMES DAILY PRN
Status: DISCONTINUED | OUTPATIENT
Start: 2025-06-11 | End: 2025-06-12 | Stop reason: HOSPADM

## 2025-06-11 RX ORDER — ADHESIVE BANDAGE
10 BANDAGE TOPICAL
Status: DISCONTINUED | OUTPATIENT
Start: 2025-06-11 | End: 2025-06-12 | Stop reason: HOSPADM

## 2025-06-11 RX ORDER — DIPHENHYDRAMINE HCL 25 MG
25 CAPSULE ORAL ONCE
Status: COMPLETED | OUTPATIENT
Start: 2025-06-11 | End: 2025-06-11

## 2025-06-11 RX ORDER — NIFEDIPINE 10 MG/1
10 CAPSULE ORAL ONCE AS NEEDED
Status: DISCONTINUED | OUTPATIENT
Start: 2025-06-11 | End: 2025-06-12 | Stop reason: HOSPADM

## 2025-06-11 RX ORDER — SIMETHICONE 80 MG
80 TABLET,CHEWABLE ORAL 4 TIMES DAILY PRN
Status: DISCONTINUED | OUTPATIENT
Start: 2025-06-11 | End: 2025-06-12 | Stop reason: HOSPADM

## 2025-06-11 RX ORDER — ACETAMINOPHEN 325 MG/1
975 TABLET ORAL ONCE
Status: COMPLETED | OUTPATIENT
Start: 2025-06-11 | End: 2025-06-11

## 2025-06-11 RX ORDER — LIDOCAINE HYDROCHLORIDE 10 MG/ML
0.5 INJECTION, SOLUTION INFILTRATION; PERINEURAL ONCE AS NEEDED
Status: DISCONTINUED | OUTPATIENT
Start: 2025-06-11 | End: 2025-06-12 | Stop reason: HOSPADM

## 2025-06-11 RX ADMIN — METOCLOPRAMIDE 10 MG: 10 TABLET ORAL at 14:48

## 2025-06-11 RX ADMIN — SODIUM CHLORIDE, POTASSIUM CHLORIDE, SODIUM LACTATE AND CALCIUM CHLORIDE 75 ML/HR: 600; 310; 30; 20 INJECTION, SOLUTION INTRAVENOUS at 17:32

## 2025-06-11 RX ADMIN — SODIUM CHLORIDE, SODIUM LACTATE, POTASSIUM CHLORIDE, AND CALCIUM CHLORIDE 1000 ML: .6; .31; .03; .02 INJECTION, SOLUTION INTRAVENOUS at 16:47

## 2025-06-11 RX ADMIN — ACETAMINOPHEN 975 MG: 325 TABLET ORAL at 14:48

## 2025-06-11 RX ADMIN — FAMOTIDINE 40 MG: 10 INJECTION INTRAVENOUS at 17:27

## 2025-06-11 RX ADMIN — DIPHENHYDRAMINE HYDROCHLORIDE 25 MG: 25 CAPSULE ORAL at 14:48

## 2025-06-11 SDOH — HEALTH STABILITY: MENTAL HEALTH: NON-SPECIFIC ACTIVE SUICIDAL THOUGHTS (PAST 1 MONTH): NO

## 2025-06-11 SDOH — HEALTH STABILITY: MENTAL HEALTH: SUICIDAL BEHAVIOR (LIFETIME): NO

## 2025-06-11 SDOH — SOCIAL STABILITY: SOCIAL INSECURITY: WITHIN THE LAST YEAR, HAVE YOU BEEN AFRAID OF YOUR PARTNER OR EX-PARTNER?: NO

## 2025-06-11 SDOH — ECONOMIC STABILITY: FOOD INSECURITY: WITHIN THE PAST 12 MONTHS, THE FOOD YOU BOUGHT JUST DIDN'T LAST AND YOU DIDN'T HAVE MONEY TO GET MORE.: NEVER TRUE

## 2025-06-11 SDOH — HEALTH STABILITY: MENTAL HEALTH: HAVE YOU USED ANY PRESCRIPTION DRUGS OTHER THAN PRESCRIBED IN THE PAST 12 MONTHS?: NO

## 2025-06-11 SDOH — SOCIAL STABILITY: SOCIAL INSECURITY: WITHIN THE LAST YEAR, HAVE YOU BEEN HUMILIATED OR EMOTIONALLY ABUSED IN OTHER WAYS BY YOUR PARTNER OR EX-PARTNER?: NO

## 2025-06-11 SDOH — SOCIAL STABILITY: SOCIAL INSECURITY: PHYSICAL ABUSE: DENIES

## 2025-06-11 SDOH — HEALTH STABILITY: MENTAL HEALTH: HAVE YOU USED ANY SUBSTANCES (CANABIS, COCAINE, HEROIN, HALLUCINOGENS, INHALANTS, ETC.) IN THE PAST 12 MONTHS?: NO

## 2025-06-11 SDOH — SOCIAL STABILITY: SOCIAL INSECURITY
WITHIN THE LAST YEAR, HAVE YOU BEEN RAPED OR FORCED TO HAVE ANY KIND OF SEXUAL ACTIVITY BY YOUR PARTNER OR EX-PARTNER?: NO

## 2025-06-11 SDOH — HEALTH STABILITY: MENTAL HEALTH: WERE YOU ABLE TO COMPLETE ALL THE BEHAVIORAL HEALTH SCREENINGS?: YES

## 2025-06-11 SDOH — HEALTH STABILITY: MENTAL HEALTH: WISH TO BE DEAD (PAST 1 MONTH): NO

## 2025-06-11 SDOH — SOCIAL STABILITY: SOCIAL INSECURITY: VERBAL ABUSE: DENIES

## 2025-06-11 SDOH — ECONOMIC STABILITY: FOOD INSECURITY: WITHIN THE PAST 12 MONTHS, YOU WORRIED THAT YOUR FOOD WOULD RUN OUT BEFORE YOU GOT THE MONEY TO BUY MORE.: NEVER TRUE

## 2025-06-11 SDOH — SOCIAL STABILITY: SOCIAL INSECURITY
WITHIN THE LAST YEAR, HAVE YOU BEEN KICKED, HIT, SLAPPED, OR OTHERWISE PHYSICALLY HURT BY YOUR PARTNER OR EX-PARTNER?: NO

## 2025-06-11 SDOH — SOCIAL STABILITY: SOCIAL INSECURITY: ARE THERE ANY APPARENT SIGNS OF INJURIES/BEHAVIORS THAT COULD BE RELATED TO ABUSE/NEGLECT?: NO

## 2025-06-11 SDOH — SOCIAL STABILITY: SOCIAL INSECURITY: HAS ANYONE EVER THREATENED TO HURT YOUR FAMILY OR YOUR PETS?: NO

## 2025-06-11 SDOH — SOCIAL STABILITY: SOCIAL INSECURITY: DO YOU FEEL ANYONE HAS EXPLOITED OR TAKEN ADVANTAGE OF YOU FINANCIALLY OR OF YOUR PERSONAL PROPERTY?: NO

## 2025-06-11 SDOH — ECONOMIC STABILITY: HOUSING INSECURITY: DO YOU FEEL UNSAFE GOING BACK TO THE PLACE WHERE YOU ARE LIVING?: NO

## 2025-06-11 SDOH — SOCIAL STABILITY: SOCIAL INSECURITY: ARE YOU OR HAVE YOU BEEN THREATENED OR ABUSED PHYSICALLY, EMOTIONALLY, OR SEXUALLY BY ANYONE?: NO

## 2025-06-11 SDOH — SOCIAL STABILITY: SOCIAL INSECURITY: HAVE YOU HAD THOUGHTS OF HARMING ANYONE ELSE?: NO

## 2025-06-11 SDOH — SOCIAL STABILITY: SOCIAL INSECURITY: DOES ANYONE TRY TO KEEP YOU FROM HAVING/CONTACTING OTHER FRIENDS OR DOING THINGS OUTSIDE YOUR HOME?: NO

## 2025-06-11 SDOH — SOCIAL STABILITY: SOCIAL INSECURITY: ABUSE SCREEN: ADULT

## 2025-06-11 SDOH — SOCIAL STABILITY: SOCIAL INSECURITY: HAVE YOU HAD ANY THOUGHTS OF HARMING ANYONE ELSE?: NO

## 2025-06-11 ASSESSMENT — PAIN SCALES - GENERAL
PAINLEVEL_OUTOF10: 0 - NO PAIN
PAINLEVEL_OUTOF10: 0
PAINLEVEL_OUTOF10: 0 - NO PAIN
PAINLEVEL_OUTOF10: 6
PAINLEVEL_OUTOF10: 0 - NO PAIN

## 2025-06-11 ASSESSMENT — PATIENT HEALTH QUESTIONNAIRE - PHQ9
1. LITTLE INTEREST OR PLEASURE IN DOING THINGS: NOT AT ALL
2. FEELING DOWN, DEPRESSED OR HOPELESS: NOT AT ALL
SUM OF ALL RESPONSES TO PHQ9 QUESTIONS 1 & 2: 0

## 2025-06-11 ASSESSMENT — ACTIVITIES OF DAILY LIVING (ADL): LACK_OF_TRANSPORTATION: NO

## 2025-06-11 ASSESSMENT — LIFESTYLE VARIABLES
AUDIT-C TOTAL SCORE: 0
HOW OFTEN DO YOU HAVE 6 OR MORE DRINKS ON ONE OCCASION: NEVER
SKIP TO QUESTIONS 9-10: 1
HOW MANY STANDARD DRINKS CONTAINING ALCOHOL DO YOU HAVE ON A TYPICAL DAY: PATIENT DOES NOT DRINK
AUDIT-C TOTAL SCORE: 0
HOW OFTEN DO YOU HAVE A DRINK CONTAINING ALCOHOL: NEVER

## 2025-06-11 ASSESSMENT — PAIN DESCRIPTION - DESCRIPTORS: DESCRIPTORS: HEADACHE

## 2025-06-11 ASSESSMENT — PAIN SCALES - WONG BAKER: WONGBAKER_NUMERICALRESPONSE: NO HURT

## 2025-06-11 NOTE — PROGRESS NOTES
ANTEPARTUM PROGRESS NOTE   6/11/2025, 10:11 PM     SUBJECTIVE: Patient reports that her headache has now resolved. Also denies scotomas, blurry vision, CP, SOB and RUQ pain. Patient reports intermittently experiencing an elevated heart rate followed by SOB and dizziness prior to pregnancy. States that it is the reason she had her IUD removed. Also reports never formally being diagnosed with cHTN.     OBJECTIVE:   /76   Pulse 91   Temp 37.2 °C (99 °F) (Temporal)   Resp 18   LMP 11/17/2024 (Approximate)   SpO2 99%    Temp  Min: 36 °C (96.8 °F)  Max: 37.2 °C (99 °F)  Pulse  Min: 85  Max: 156  BP  Min: 117/85  Max: 149/84    General:  AAOx3, No acute distress  Cardiovascular: Warm and well perfused  Respiratory: Normal respiratory effort on RA   Abdominal: Soft, gravid    FHT: 150/mod/+accel/-decel   [place]: no contractions      Labs:   Results for orders placed or performed during the hospital encounter of 06/11/25 (from the past 24 hours)   Comprehensive metabolic panel   Result Value Ref Range    Glucose 85 74 - 99 mg/dL    Sodium 137 136 - 145 mmol/L    Potassium 3.9 3.5 - 5.3 mmol/L    Chloride 107 98 - 107 mmol/L    Bicarbonate 19 (L) 21 - 32 mmol/L    Anion Gap 15 10 - 20 mmol/L    Urea Nitrogen 6 6 - 23 mg/dL    Creatinine 0.42 (L) 0.50 - 1.05 mg/dL    eGFR >90 >60 mL/min/1.73m*2    Calcium 8.9 8.6 - 10.6 mg/dL    Albumin 3.5 3.4 - 5.0 g/dL    Alkaline Phosphatase 65 33 - 110 U/L    Total Protein 6.5 6.4 - 8.2 g/dL    AST 10 9 - 39 U/L    Bilirubin, Total 0.3 0.0 - 1.2 mg/dL    ALT 7 7 - 45 U/L   CBC   Result Value Ref Range    WBC 6.7 4.4 - 11.3 x10*3/uL    nRBC 0.0 0.0 - 0.0 /100 WBCs    RBC 3.74 (L) 4.00 - 5.20 x10*6/uL    Hemoglobin 10.8 (L) 12.0 - 16.0 g/dL    Hematocrit 37.0 36.0 - 46.0 %    MCV 99 80 - 100 fL    MCH 28.9 26.0 - 34.0 pg    MCHC 29.2 (L) 32.0 - 36.0 g/dL    RDW 13.8 11.5 - 14.5 %    Platelets 222 150 - 450 x10*3/uL   Protein, Urine Random   Result Value Ref Range    Total Protein,  Urine Random <4 (L) 5 - 24 mg/dL    Creatinine, Urine Random 107.0 20.0 - 320.0 mg/dL    T. Protein/Creatinine Ratio     POCT urinalysis dipstick manually resulted   Result Value Ref Range    Color, UA Yellow     Clarity, UA Clear     Glucose, UA NEGATIVE     Bilirubin, UA NEGATIVE     Ketones, UA Negative     Spec Grav, UA 1.020     Blood, UA Negative     pH, UA 7.5     Protein, UA TRACE     Urobilinogen, UA 1.0     Leukocytes, UA (1+) Rare     Nitrite, UA NEGATIVE     Appearance, Fluid     Type And Screen   Result Value Ref Range    ABO TYPE O     Rh TYPE POS     ANTIBODY SCREEN NEG    CBC   Result Value Ref Range    WBC 7.1 4.4 - 11.3 x10*3/uL    nRBC 0.0 0.0 - 0.0 /100 WBCs    RBC 3.47 (L) 4.00 - 5.20 x10*6/uL    Hemoglobin 10.2 (L) 12.0 - 16.0 g/dL    Hematocrit 33.5 (L) 36.0 - 46.0 %    MCV 97 80 - 100 fL    MCH 29.4 26.0 - 34.0 pg    MCHC 30.4 (L) 32.0 - 36.0 g/dL    RDW 13.8 11.5 - 14.5 %    Platelets 216 150 - 450 x10*3/uL   Comprehensive metabolic panel   Result Value Ref Range    Glucose 109 (H) 74 - 99 mg/dL    Sodium 138 136 - 145 mmol/L    Potassium 3.6 3.5 - 5.3 mmol/L    Chloride 108 (H) 98 - 107 mmol/L    Bicarbonate 21 21 - 32 mmol/L    Anion Gap 13 10 - 20 mmol/L    Urea Nitrogen 7 6 - 23 mg/dL    Creatinine 0.53 0.50 - 1.05 mg/dL    eGFR >90 >60 mL/min/1.73m*2    Calcium 8.5 (L) 8.6 - 10.6 mg/dL    Albumin 3.2 (L) 3.4 - 5.0 g/dL    Alkaline Phosphatase 61 33 - 110 U/L    Total Protein 6.2 (L) 6.4 - 8.2 g/dL    AST 9 9 - 39 U/L    Bilirubin, Total 0.3 0.0 - 1.2 mg/dL    ALT 7 7 - 45 U/L   B-type Natriuretic Peptide   Result Value Ref Range    BNP 6 0 - 99 pg/mL   Troponin I, High Sensitivity   Result Value Ref Range    Troponin I, High Sensitivity (CMC) <3 0 - 34 ng/L   Protein, urine, random   Result Value Ref Range    Total Protein, Urine Random 25 (H) 5 - 24 mg/dL    Creatinine, Urine Random 125.4 20.0 - 320.0 mg/dL    T. Protein/Creatinine Ratio 0.20 (H) 0.00 - 0.17 mg/mg Creat         ASSESSMENT AND PLAN:     35 y.o.  at 28w2d by 6 week US with presenting from clinic for blood pressure monitoring after having a mild range blood pressure in the office.     cHTN vs gHTN   Patient presenting from office with first mild range blood pressure this pregnancy. Reports being told in the past that she has had elevated blood pressures but never formally diagnosed with cHTN. Upon chart review, multiple mild range blood pressures found during ED visit after presenting with pain but also during routine clinic visits, therefore likely to be most c/w cHTN   - HELLPs labs negative x2. P:C too low to calculate initially > 0.20 on repeat  - Reported HA on admission, now resolved   - Admit for workup as below and for BP monitoring   - Growth US in the morning   - MFM consult in AM     Tachycardia   Hypoxemia   - Tachycardia to 156 in setting of decrease of SpO2 to 89% > spontaneously resolved. Patient reporting history of racing heart, SOB and dizziness prior to pregnancy, however never evaluated for this   - EKG revealed sinus tachycardia, however obtained when heart rate decreased.    - S/p LR bolus > tachycardia resolved  - BNP and Troponin wnl   - CT chest angio ordered and pending to rule out cardiopulmonary pathology   - Given history of similar episodes, will consider cardiology consult and holter monitor at discharge if CT chest angio unremarkable.    Fetal status   - cEFM reassuring, will continue while pending workup as above   - Last growth at anatomy scan, EFW 32%, AC 43%    Routine:  - TDAP to be offered this admission   - 1hr wnl  - BCM: pending discussion     Dispo: cont inpt management     D/w and to be seen by Dr. Martir Rizvi MD, PGY-3  L&D
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
## 2025-06-11 NOTE — PROGRESS NOTES
Ob Visit  25     SUBJECTIVE      HPI: Renetta Parmar is a 35 y.o.  at 28w2d here for RPNV.  Patient with 2 mildly elevated blood pressures today at 28 weeks gestational age.  Reports prior history of elevated blood pressure with prior pregnancy.  States she has been feeling extremely tired and not feeling well.  Does not endorse headache or upper right quadrant pain.  No edema noted.    OBJECTIVE  Visit Vitals  BP (!) 136/98   Wt 102 kg (225 lb)   LMP 2024 (Approximate)   BMI 42.51 kg/m²   OB Status Pregnant   Smoking Status Former   BSA 2.09 m²            ASSESSMENT & PLAN    Renetta Parmar is a 35 y.o.  at 28w2d here for the following concerns we addressed today:    Problem List Items Addressed This Visit          Pregnancy    28 weeks gestation of pregnancy ([facility]) - Primary    Overview   Desired provider in labor: [] CNM  [x] Physician   [] Either Acceptable  [] Blood Products: [x] Yes, accepts [] No, needs counseling  [x] Initial BMI: 41.40   [x] Prenatal Labs:   [] Cervical Cancer Screening up to date  [] Rh status: Rh+  [] Screen for IPV and Substance Use Risk:  [x] Genetic Screening (cfDNA): Normal female  [] First Trimester Anatomy Screen (11-13.6 wks): Missed opportunity  [x] Baby ASA (initiated): Ordered  [] Pregnancy dated by:     [x] Anatomy US: (19-20 wks)   [] Federal Sterilization consent signed (if indicated):  [x] 1hr GCT at 24-28wks:   [] Rhogam (if indicated): n/a  [] Fetal Surveillance (if indicated):  [] Tdap (27-32 wks, may be given up to 36 wks if initial window missed):   [] RSV (32-36 wks) (Sept. to end ):     [] Feeding Intentions:  [] Postpartum Birth control method: Had considered the ligation but at 26 weeks is now reconsidering  [] GBS at 36 - 37 wks:  [] 39 weeks discussion of IOL vs. Expectant management:  [] Mode of delivery ( anticipated ):  repeat c/s              To labor and delivery for triage    Hui Madison, APRN-CNP

## 2025-06-12 ENCOUNTER — HOSPITAL ENCOUNTER (INPATIENT)
Facility: HOSPITAL | Age: 35
End: 2025-06-12
Admitting: HOSPITALIST
Payer: MEDICAID

## 2025-06-12 ENCOUNTER — RESULTS FOLLOW-UP (OUTPATIENT)
Dept: OBGYN | Age: 35
End: 2025-06-12

## 2025-06-12 ENCOUNTER — APPOINTMENT (OUTPATIENT)
Dept: CARDIOLOGY | Facility: HOSPITAL | Age: 35
End: 2025-06-12
Payer: MEDICAID

## 2025-06-12 VITALS
RESPIRATION RATE: 18 BRPM | TEMPERATURE: 97 F | OXYGEN SATURATION: 98 % | DIASTOLIC BLOOD PRESSURE: 79 MMHG | SYSTOLIC BLOOD PRESSURE: 134 MMHG | HEART RATE: 102 BPM

## 2025-06-12 LAB
ABO + RH BLD: NORMAL
BLD GP AB SCN SERPL QL GEL: NEGATIVE
RPR SER QL: NONREACTIVE

## 2025-06-12 PROCEDURE — 93246 EXT ECG>7D<15D RECORDING: CPT

## 2025-06-12 PROCEDURE — 2550000001 HC RX 255 CONTRASTS: Mod: SE | Performed by: STUDENT IN AN ORGANIZED HEALTH CARE EDUCATION/TRAINING PROGRAM

## 2025-06-12 PROCEDURE — G0378 HOSPITAL OBSERVATION PER HR: HCPCS

## 2025-06-12 PROCEDURE — 99238 HOSP IP/OBS DSCHRG MGMT 30/<: CPT

## 2025-06-12 PROCEDURE — 71275 CT ANGIOGRAPHY CHEST: CPT | Performed by: STUDENT IN AN ORGANIZED HEALTH CARE EDUCATION/TRAINING PROGRAM

## 2025-06-12 RX ORDER — FAMOTIDINE 20 MG/1
20 TABLET, FILM COATED ORAL 2 TIMES DAILY PRN
Qty: 60 TABLET | Refills: 0 | Status: SHIPPED | OUTPATIENT
Start: 2025-06-12

## 2025-06-12 RX ORDER — NEBULIZER AND COMPRESSOR
1 EACH MISCELLANEOUS 2 TIMES DAILY
Qty: 1 EACH | Refills: 0 | Status: SHIPPED | OUTPATIENT
Start: 2025-06-12

## 2025-06-12 RX ADMIN — IOHEXOL 77 ML: 350 INJECTION, SOLUTION INTRAVENOUS at 00:05

## 2025-06-12 ASSESSMENT — PAIN SCALES - GENERAL
PAINLEVEL_OUTOF10: 0 - NO PAIN
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
## 2025-06-12 NOTE — SIGNIFICANT EVENT
"Plan of Care Update    S: To bedside to check in on patient. Discussed findings of CT PE reassuring overall. Currently asymptomatic and resting at bedside without CP SOB. Denies HA, RUQ or blurry vision. BP currently normotensive. On further history taking regarding patient's intermittent runs of SOB and chest pain/tightness, patient describes these instances occurring approximately 4x a month at rest when she feels \"like my heart is racing out of the blue\" with the associated symptoms mentioned and it spontaneously resolved. Denies any cardiac specific history. Discussed with patient that given additional workup negative for PE and HELLP labs wnl, suspect that likely diagnosis cHTN at this time based on chart review showing elevated blood pressures outside of pregnancy. Would recommend homegoing holter monitor and outpatient cardiology follow up to rule out intermittent SVT based on timeline of symptoms above. Recommending staying overnight with holter coordination in the AM prior to DC home and patient amenable to this plan.    O:    Visit Vitals  /78   Pulse 79   Temp 36.3 °C (97.3 °F) (Temporal)   Resp 18   LMP 11/17/2024 (Approximate)   SpO2 97%   OB Status Pregnant   Smoking Status Former        Physical Examination  General: no acute distress  HEENT: normocephalic, atraumatic  Heart: warm and well perfused  Lungs: breathing comfortably on room air  Abdomen: soft, gravid, nontender  Extremities: moving all extremities  Neuro: awake and conversant  Psych: appropriate mood and affect     A/P    Chronic Hypertension  - Pt with mild range BPs >4 hours apart, however on chart review, notable for elevated BP outside of pregnancy.  - HELLP labs negative x2, P:C  0.20  - HA improved with tylenol, benadryl, and reglan  - Currently normotensive the last several hours  - Not currently on PO antihypertensives  - hx of PEC in first gestation  - BP cuff for home at discharge, to monitor BID     Maternal Tachycardia, " Hypoxia, Lightheadedness (Resolved)  - Tachycardia to 156, range  initially, now with HR wnl for several hours  - SpO2 decline to 89 at the lowest, now satting >95% without increased WOB  - BMX and IV pepcid given for complaints of epigastric pain in triage, now resolved  - EKG revealed sinus tach  - s/p LR Bolus  - BNP and Troponin wnl   - CT PE negative for PE, one area of lower right lobe consolidation with possible basilar atelectasis  - Plan for observation overnight with homegoing holter monitor and outpatient cardiology follow up     IUP at 28w3d   -CEFM Cat 1, currently, will plan to DC FHT at this time given overall clinical stability  -Continue antepartum observation    Plan of care d/w Dr. Martir Paulson MD PGY-4

## 2025-06-13 ENCOUNTER — E-ADVICE (OUTPATIENT)
Dept: BARIATRICS/WEIGHT MGMT | Age: 35
End: 2025-06-13

## 2025-06-13 ENCOUNTER — TELEPHONE (OUTPATIENT)
Dept: OBGYN | Age: 35
End: 2025-06-13

## 2025-06-14 LAB — MIS SERPL-MCNC: 4.8 NG/ML (ref 0.18–11.71)

## 2025-06-19 ENCOUNTER — TELEPHONE (OUTPATIENT)
Dept: BARIATRICS/WEIGHT MGMT | Age: 35
End: 2025-06-19

## 2025-06-19 ENCOUNTER — E-ADVICE (OUTPATIENT)
Dept: FAMILY MEDICINE | Age: 35
End: 2025-06-19

## 2025-06-20 ENCOUNTER — APPOINTMENT (OUTPATIENT)
Dept: BARIATRICS/WEIGHT MGMT | Age: 35
End: 2025-06-20
Attending: FAMILY MEDICINE

## 2025-06-23 ENCOUNTER — TELEPHONE (OUTPATIENT)
Dept: BARIATRICS/WEIGHT MGMT | Age: 35
End: 2025-06-23

## 2025-06-23 ENCOUNTER — HOSPITAL ENCOUNTER (OUTPATIENT)
Dept: RADIOLOGY | Facility: HOSPITAL | Age: 35
Discharge: HOME | End: 2025-06-23
Payer: MEDICAID

## 2025-06-23 DIAGNOSIS — Z34.02 ENCOUNTER FOR SUPERVISION OF NORMAL FIRST PREGNANCY IN SECOND TRIMESTER: ICD-10-CM

## 2025-06-23 PROCEDURE — 76816 OB US FOLLOW-UP PER FETUS: CPT

## 2025-06-23 PROCEDURE — 76819 FETAL BIOPHYS PROFIL W/O NST: CPT

## 2025-06-23 PROCEDURE — 76819 FETAL BIOPHYS PROFIL W/O NST: CPT | Performed by: STUDENT IN AN ORGANIZED HEALTH CARE EDUCATION/TRAINING PROGRAM

## 2025-06-23 PROCEDURE — 76816 OB US FOLLOW-UP PER FETUS: CPT | Performed by: STUDENT IN AN ORGANIZED HEALTH CARE EDUCATION/TRAINING PROGRAM

## 2025-06-23 RX ORDER — METRONIDAZOLE 500 MG/1
500 TABLET ORAL 2 TIMES DAILY
Qty: 14 TABLET | Refills: 0 | Status: SHIPPED | OUTPATIENT
Start: 2025-06-23 | End: 2025-06-30

## 2025-06-26 ENCOUNTER — APPOINTMENT (OUTPATIENT)
Dept: BARIATRICS/WEIGHT MGMT | Age: 35
End: 2025-06-26
Attending: FAMILY MEDICINE

## 2025-07-09 ENCOUNTER — APPOINTMENT (OUTPATIENT)
Dept: ULTRASOUND IMAGING | Age: 35
End: 2025-07-09
Attending: OBSTETRICS & GYNECOLOGY

## 2025-07-09 ENCOUNTER — LAB SERVICES (OUTPATIENT)
Dept: LAB | Age: 35
End: 2025-07-09

## 2025-07-09 DIAGNOSIS — Z31.41 FERTILITY TESTING: ICD-10-CM

## 2025-07-09 DIAGNOSIS — D50.9 IRON DEFICIENCY ANEMIA, UNSPECIFIED IRON DEFICIENCY ANEMIA TYPE: ICD-10-CM

## 2025-07-09 DIAGNOSIS — E66.01 MORBID OBESITY  (CMD): ICD-10-CM

## 2025-07-09 PROCEDURE — 76856 US EXAM PELVIC COMPLETE: CPT | Performed by: OBSTETRICS & GYNECOLOGY

## 2025-07-09 PROCEDURE — 80061 LIPID PANEL: CPT | Performed by: CLINICAL MEDICAL LABORATORY

## 2025-07-09 PROCEDURE — 76830 TRANSVAGINAL US NON-OB: CPT | Performed by: OBSTETRICS & GYNECOLOGY

## 2025-07-09 PROCEDURE — 82728 ASSAY OF FERRITIN: CPT | Performed by: CLINICAL MEDICAL LABORATORY

## 2025-07-09 PROCEDURE — 83540 ASSAY OF IRON: CPT | Performed by: CLINICAL MEDICAL LABORATORY

## 2025-07-09 PROCEDURE — 84466 ASSAY OF TRANSFERRIN: CPT | Performed by: CLINICAL MEDICAL LABORATORY

## 2025-07-09 PROCEDURE — 85025 COMPLETE CBC W/AUTO DIFF WBC: CPT | Performed by: CLINICAL MEDICAL LABORATORY

## 2025-07-10 LAB
BASOPHILS # BLD: 0 K/MCL (ref 0–0.3)
BASOPHILS NFR BLD: 0 %
CHOLEST SERPL-MCNC: 217 MG/DL
CHOLEST/HDLC SERPL: 4.3 {RATIO}
DEPRECATED RDW RBC: 46.5 FL (ref 39–50)
EOSINOPHIL # BLD: 0.2 K/MCL (ref 0–0.5)
EOSINOPHIL NFR BLD: 2 %
ERYTHROCYTE [DISTWIDTH] IN BLOOD: 18.6 % (ref 11–15)
FERRITIN SERPL-MCNC: 3 NG/ML (ref 8–252)
HCT VFR BLD CALC: 31.1 % (ref 36–46.5)
HDLC SERPL-MCNC: 51 MG/DL
HGB BLD-MCNC: 9.1 G/DL (ref 12–15.5)
IMM GRANULOCYTES # BLD AUTO: 0 K/MCL (ref 0–0.2)
IMM GRANULOCYTES # BLD: 0 %
IRON SATN MFR SERPL: 5 % (ref 15–45)
IRON SERPL-MCNC: 20 MCG/DL (ref 50–170)
LDLC SERPL CALC-MCNC: 151 MG/DL
LYMPHOCYTES # BLD: 2.9 K/MCL (ref 1–4.8)
LYMPHOCYTES NFR BLD: 33 %
MCH RBC QN AUTO: 20.7 PG (ref 26–34)
MCHC RBC AUTO-ENTMCNC: 29.3 G/DL (ref 32–36.5)
MCV RBC AUTO: 70.8 FL (ref 78–100)
MONOCYTES # BLD: 0.5 K/MCL (ref 0.3–0.9)
MONOCYTES NFR BLD: 6 %
NEUTROPHILS # BLD: 5.4 K/MCL (ref 1.8–7.7)
NEUTROPHILS NFR BLD: 59 %
NONHDLC SERPL-MCNC: 166 MG/DL
NRBC BLD MANUAL-RTO: 0 /100 WBC
PLATELET # BLD AUTO: 523 K/MCL (ref 140–450)
RBC # BLD: 4.39 MIL/MCL (ref 4–5.2)
TIBC SERPL-MCNC: 439 MCG/DL (ref 250–450)
TRANSFERRIN SERPL-MCNC: 338 MG/DL (ref 200–360)
TRIGL SERPL-MCNC: 76 MG/DL
WBC # BLD: 9 K/MCL (ref 4.2–11)

## 2025-07-11 ENCOUNTER — APPOINTMENT (OUTPATIENT)
Dept: OBSTETRICS AND GYNECOLOGY | Facility: HOSPITAL | Age: 35
End: 2025-07-11
Payer: MEDICAID

## 2025-07-11 VITALS — DIASTOLIC BLOOD PRESSURE: 92 MMHG | SYSTOLIC BLOOD PRESSURE: 137 MMHG | WEIGHT: 227 LBS | BODY MASS INDEX: 42.89 KG/M2

## 2025-07-11 DIAGNOSIS — Z3A.32 32 WEEKS GESTATION OF PREGNANCY (HHS-HCC): Primary | ICD-10-CM

## 2025-07-11 DIAGNOSIS — Z3A.28 28 WEEKS GESTATION OF PREGNANCY (HHS-HCC): ICD-10-CM

## 2025-07-11 DIAGNOSIS — O10.919 CHRONIC HYPERTENSION DURING PREGNANCY (HHS-HCC): ICD-10-CM

## 2025-07-11 PROCEDURE — 99214 OFFICE O/P EST MOD 30 MIN: CPT | Mod: TH | Performed by: NURSE PRACTITIONER

## 2025-07-11 PROCEDURE — 99214 OFFICE O/P EST MOD 30 MIN: CPT | Performed by: NURSE PRACTITIONER

## 2025-07-11 NOTE — PROGRESS NOTES
"Ob Visit  25     SUBJECTIVE      HPI: Renetta Parmar is a 35 y.o.  at 32w4d here for RPNV.     \"I am so over this.  I might go to a different hospital.  I don't know what is going on.  They just want me to make all these apts.\"    Patient here today at 32 weeks with above complaints.  At last visit she was sent to labor and delivery triage due to elevated blood pressure.  By that note she was discharged with a Holter monitor which she reported was very uncomfortable.  She was to have a cardiology follow-up appointment.  She also was to have her remaining OB care through [facility] but that appointment was not set up.  Again patient reports frustration over what she feels is a lack of communication and excessive expectations for her in this pregnancy.  Reassured our goal is for the health of her and her baby.  OBJECTIVE  Visit Vitals  BP (!) 137/92   Wt 103 kg (227 lb)   LMP 2024 (Approximate)   BMI 42.89 kg/m²   OB Status Pregnant   Smoking Status Former   BSA 2.11 m²            ASSESSMENT & PLAN    Renetta Parmar is a 35 y.o.  at 32w4d here for the following concerns we addressed today:    Problem List Items Addressed This Visit          Pregnancy    32 weeks gestation of pregnancy ([facility]) - Primary    Overview   Desired provider in labor: [] CNM  [x] Physician   [] Either Acceptable  [] Blood Products: [x] Yes, accepts [] No, needs counseling  [x] Initial BMI: 41.40   [x] Prenatal Labs:   [] Cervical Cancer Screening up to date  [] Rh status: Rh+  [] Screen for IPV and Substance Use Risk:  [x] Genetic Screening (cfDNA): Normal female  [] First Trimester Anatomy Screen (11-13.6 wks): Missed opportunity  [x] Baby ASA (initiated): Ordered  [] Pregnancy dated by:     [x] Anatomy US: (19-20 wks)   [] Federal Sterilization consent signed (if indicated):  [x] 1hr GCT at 24-28wks:   [] Rhogam (if indicated): n/a  [] Fetal Surveillance (if indicated):  [] Tdap (27-32 wks, may be given up to 36 " wks if initial window missed):   [] RSV (32-36 wks) (Sept. to end of Jan):     [] Feeding Intentions:  [] Postpartum Birth control method: Had considered the ligation but at 26 weeks is now reconsidering  [] GBS at 36 - 37 wks:  [] 39 weeks discussion of IOL vs. Expectant management:  [] Mode of delivery ( anticipated ):  repeat c/s         Chronic hypertension during pregnancy ([facility])    Overview   Was seen on L&D triage with elevated blood pressure.  Was to have follow up care at [place] but apt did not get made as such.              RTC in 2 weeks to [place]        Hui Madison, APRN-CNP

## 2025-07-25 ENCOUNTER — ROUTINE PRENATAL (OUTPATIENT)
Dept: OBSTETRICS AND GYNECOLOGY | Facility: CLINIC | Age: 35
End: 2025-07-25
Payer: MEDICAID

## 2025-07-25 VITALS — BODY MASS INDEX: 42.31 KG/M2 | DIASTOLIC BLOOD PRESSURE: 84 MMHG | WEIGHT: 223.9 LBS | SYSTOLIC BLOOD PRESSURE: 119 MMHG

## 2025-07-25 DIAGNOSIS — Z98.891 HISTORY OF C-SECTION: ICD-10-CM

## 2025-07-25 DIAGNOSIS — Z3A.34 34 WEEKS GESTATION OF PREGNANCY (HHS-HCC): Primary | ICD-10-CM

## 2025-07-25 DIAGNOSIS — O10.919 CHRONIC HYPERTENSION DURING PREGNANCY (HHS-HCC): ICD-10-CM

## 2025-07-25 DIAGNOSIS — K21.9 GASTROESOPHAGEAL REFLUX DISEASE, UNSPECIFIED WHETHER ESOPHAGITIS PRESENT: ICD-10-CM

## 2025-07-25 DIAGNOSIS — O23.42 URINARY TRACT INFECTION IN MOTHER DURING SECOND TRIMESTER OF PREGNANCY (HHS-HCC): ICD-10-CM

## 2025-07-25 PROBLEM — J45.909 ASTHMA AFFECTING PREGNANCY, ANTEPARTUM (HHS-HCC): Status: ACTIVE | Noted: 2025-07-25

## 2025-07-25 PROBLEM — O99.519 ASTHMA AFFECTING PREGNANCY, ANTEPARTUM (HHS-HCC): Status: ACTIVE | Noted: 2025-07-25

## 2025-07-25 PROBLEM — A74.9 CHLAMYDIA INFECTION, CURRENT PREGNANCY (HHS-HCC): Status: ACTIVE | Noted: 2025-07-25

## 2025-07-25 PROBLEM — O35.BXX0 ABNORMAL FETAL ECHOCARDIOGRAM AFFECTING ANTEPARTUM CARE OF MOTHER (HHS-HCC): Status: ACTIVE | Noted: 2025-07-25

## 2025-07-25 PROBLEM — O98.819 CHLAMYDIA INFECTION, CURRENT PREGNANCY (HHS-HCC): Status: ACTIVE | Noted: 2025-07-25

## 2025-07-25 PROCEDURE — 99215 OFFICE O/P EST HI 40 MIN: CPT | Mod: GC,25,TH

## 2025-07-25 PROCEDURE — 90471 IMMUNIZATION ADMIN: CPT | Mod: GC

## 2025-07-25 RX ORDER — OMEPRAZOLE 20 MG/1
20 CAPSULE, DELAYED RELEASE ORAL DAILY
Qty: 90 CAPSULE | Refills: 3 | Status: SHIPPED | OUTPATIENT
Start: 2025-07-25 | End: 2026-07-25

## 2025-07-25 ASSESSMENT — ENCOUNTER SYMPTOMS
NEUROLOGICAL NEGATIVE: 0
RESPIRATORY NEGATIVE: 0
CONSTITUTIONAL NEGATIVE: 0
GASTROINTESTINAL NEGATIVE: 0
EYES NEGATIVE: 0
HEMATOLOGIC/LYMPHATIC NEGATIVE: 0
ALLERGIC/IMMUNOLOGIC NEGATIVE: 0
CARDIOVASCULAR NEGATIVE: 0
ENDOCRINE NEGATIVE: 0
MUSCULOSKELETAL NEGATIVE: 0
PSYCHIATRIC NEGATIVE: 0
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
## 2025-07-25 NOTE — ASSESSMENT & PLAN NOTE
- UTI earlier in pregnancy   - Unclear if patient got antibiotics   - Still have burning with urination   - UA with reflex to Ucx ordered   Orders:    Urinalysis with Reflex Culture and Microscopic; Future

## 2025-07-25 NOTE — ASSESSMENT & PLAN NOTE
- Signed consents for BTL during rCS   - Tdap given   Orders:    Group B Streptococcus (GBS) Prenatal Screen, Culture

## 2025-07-25 NOTE — PROGRESS NOTES
I saw and evaluated the patient. I personally obtained the key and critical portions of the history and physical exam or was physically present for key and critical portions performed by the resident/fellow. I reviewed the resident/fellow's documentation and discussed the patient with the resident/fellow. I agree with the resident/fellow's medical decision making as documented in the note.    Phuong Pleitez MD

## 2025-07-28 ENCOUNTER — TELEPHONE (OUTPATIENT)
Dept: OBSTETRICS AND GYNECOLOGY | Facility: CLINIC | Age: 35
End: 2025-07-28
Payer: MEDICAID

## 2025-07-28 DIAGNOSIS — Z98.891 HISTORY OF CESAREAN SECTION, CLASSICAL: ICD-10-CM

## 2025-07-28 LAB — GP B STREP SPEC QL CULT: NORMAL

## 2025-07-28 NOTE — TELEPHONE ENCOUNTER
C/Section per Dr Paulson, Dr Pleitez for history of classical c/section and 3 previous c/sections  Scheduled for 8/12/2025 1200, at 37.1 weeks GA - okay'd by Dr Pleitez  Arrive 1000  Patient notified  Discussed labs, NPO after midnight, visitor policy

## 2025-07-28 NOTE — TELEPHONE ENCOUNTER
----- Message from Xenia Paulson sent at 2025  3:35 PM EDT -----  Regarding: Schedule for CS at 37 weeks  Hello,     I was wondering if we could schedule this patient for a CS at 37 weeks for history of classical .     Thank you,   Xenia

## 2025-07-31 PROBLEM — Z3A.35 35 WEEKS GESTATION OF PREGNANCY (HHS-HCC): Status: ACTIVE | Noted: 2025-03-21
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
## 2025-07-31 NOTE — ASSESSMENT & PLAN NOTE
- Confirmed plan for tubal, consents signed 7/25/25   - Confirmed plan for breastfeeding    Orders:    CBC Anemia Panel With Reflex,Pregnancy; Future

## 2025-08-01 ENCOUNTER — ROUTINE PRENATAL (OUTPATIENT)
Dept: OBSTETRICS AND GYNECOLOGY | Facility: CLINIC | Age: 35
End: 2025-08-01
Payer: MEDICAID

## 2025-08-01 ENCOUNTER — PREP FOR PROCEDURE (OUTPATIENT)
Dept: OBSTETRICS AND GYNECOLOGY | Facility: CLINIC | Age: 35
End: 2025-08-01

## 2025-08-01 VITALS
DIASTOLIC BLOOD PRESSURE: 89 MMHG | SYSTOLIC BLOOD PRESSURE: 133 MMHG | WEIGHT: 227 LBS | HEART RATE: 103 BPM | BODY MASS INDEX: 42.89 KG/M2

## 2025-08-01 DIAGNOSIS — Z98.891 HISTORY OF C-SECTION: ICD-10-CM

## 2025-08-01 DIAGNOSIS — Z3A.35 35 WEEKS GESTATION OF PREGNANCY (HHS-HCC): Primary | ICD-10-CM

## 2025-08-01 DIAGNOSIS — O10.919 CHRONIC HYPERTENSION DURING PREGNANCY (HHS-HCC): ICD-10-CM

## 2025-08-01 PROCEDURE — 99214 OFFICE O/P EST MOD 30 MIN: CPT

## 2025-08-01 PROCEDURE — 99214 OFFICE O/P EST MOD 30 MIN: CPT | Mod: GC,TH

## 2025-08-01 ASSESSMENT — PAIN SCALES - GENERAL: PAINLEVEL_OUTOF10: 0 - NO PAIN

## 2025-08-01 ASSESSMENT — PAIN - FUNCTIONAL ASSESSMENT: PAIN_FUNCTIONAL_ASSESSMENT: 0-10

## 2025-08-01 NOTE — ASSESSMENT & PLAN NOTE
- CBC, T&S ordered   - Scheduled for rCS on 8/12  Orders:    Type And Screen Is this order related to pregnancy or an upcoming surgery? Yes; Where will this surgery/delivery be performed? [facility]; What is the date of the surgery? 8/12/2025; Has this patient ever had a transfusion? No; Has t...; Future

## 2025-08-01 NOTE — PROGRESS NOTES
I saw and evaluated the patient. I personally obtained the key and critical portions of the history and physical exam or was physically present for key and critical portions performed by the resident/fellow. I reviewed the resident/fellow's documentation and discussed the patient with the resident/fellow. I agree with the resident/fellow's medical decision making as documented in the note.    Kenneth Dobbs MD

## 2025-08-04 ENCOUNTER — HOSPITAL ENCOUNTER (OUTPATIENT)
Dept: RADIOLOGY | Facility: HOSPITAL | Age: 35
Discharge: HOME | End: 2025-08-04
Payer: MEDICAID

## 2025-08-04 DIAGNOSIS — Z34.02 ENCOUNTER FOR SUPERVISION OF NORMAL FIRST PREGNANCY IN SECOND TRIMESTER: ICD-10-CM

## 2025-08-04 PROCEDURE — 76816 OB US FOLLOW-UP PER FETUS: CPT

## 2025-08-04 PROCEDURE — 76816 OB US FOLLOW-UP PER FETUS: CPT | Performed by: MEDICAL GENETICS

## 2025-08-04 PROCEDURE — 76819 FETAL BIOPHYS PROFIL W/O NST: CPT | Performed by: MEDICAL GENETICS

## 2025-08-04 PROCEDURE — 76819 FETAL BIOPHYS PROFIL W/O NST: CPT

## 2025-08-07 ENCOUNTER — NURSE TRIAGE (OUTPATIENT)
Dept: TELEHEALTH | Age: 35
End: 2025-08-07

## 2025-08-08 ENCOUNTER — OFFICE VISIT (OUTPATIENT)
Dept: FAMILY MEDICINE | Age: 35
End: 2025-08-08

## 2025-08-08 VITALS
RESPIRATION RATE: 16 BRPM | TEMPERATURE: 98.1 F | BODY MASS INDEX: 41.47 KG/M2 | SYSTOLIC BLOOD PRESSURE: 112 MMHG | HEIGHT: 69 IN | HEART RATE: 84 BPM | OXYGEN SATURATION: 99 % | DIASTOLIC BLOOD PRESSURE: 77 MMHG | WEIGHT: 279.98 LBS

## 2025-08-08 DIAGNOSIS — T78.40XA ALLERGIC REACTION, INITIAL ENCOUNTER: Primary | ICD-10-CM

## 2025-08-08 RX ORDER — TRIAMCINOLONE ACETONIDE 40 MG/ML
40 INJECTION, SUSPENSION INTRA-ARTICULAR; INTRAMUSCULAR ONCE
Status: COMPLETED | OUTPATIENT
Start: 2025-08-08 | End: 2025-08-08

## 2025-08-08 RX ADMIN — TRIAMCINOLONE ACETONIDE 40 MG: 40 INJECTION, SUSPENSION INTRA-ARTICULAR; INTRAMUSCULAR at 15:26

## 2025-08-08 ASSESSMENT — PATIENT HEALTH QUESTIONNAIRE - PHQ9
2. FEELING DOWN, DEPRESSED OR HOPELESS: NOT AT ALL
SUM OF ALL RESPONSES TO PHQ9 QUESTIONS 1 AND 2: 0
SUM OF ALL RESPONSES TO PHQ9 QUESTIONS 1 AND 2: 0
1. LITTLE INTEREST OR PLEASURE IN DOING THINGS: NOT AT ALL
CLINICAL INTERPRETATION OF PHQ2 SCORE: NO FURTHER SCREENING NEEDED

## 2025-08-11 ENCOUNTER — LAB (OUTPATIENT)
Dept: LAB | Facility: HOSPITAL | Age: 35
End: 2025-08-11
Payer: MEDICAID

## 2025-08-11 LAB
ERYTHROCYTE [DISTWIDTH] IN BLOOD BY AUTOMATED COUNT: 14.3 % (ref 11.5–14.5)
FERRITIN SERPL-MCNC: 16 NG/ML
FOLATE SERPL-MCNC: 10.4 NG/ML
HCT VFR BLD AUTO: 34.6 % (ref 36–46)
HGB BLD-MCNC: 10.6 G/DL (ref 12–16)
IRON SATN MFR SERPL: NORMAL %
IRON SERPL-MCNC: 62 UG/DL
MCH RBC QN AUTO: 27.9 PG (ref 26–34)
MCHC RBC AUTO-ENTMCNC: 30.6 G/DL (ref 32–36)
MCV RBC AUTO: 91 FL (ref 80–100)
NRBC BLD-RTO: 0 /100 WBCS (ref 0–0)
PLATELET # BLD AUTO: 238 X10*3/UL (ref 150–450)
RBC # BLD AUTO: 3.8 X10*6/UL (ref 4–5.2)
REFLEX ADDED, ANEMIA PANEL: NORMAL
TIBC SERPL-MCNC: NORMAL UG/DL
UIBC SERPL-MCNC: >450 UG/DL
VIT B12 SERPL-MCNC: 492 PG/ML
WBC # BLD AUTO: 7.1 X10*3/UL (ref 4.4–11.3)

## 2025-08-11 PROCEDURE — 86850 RBC ANTIBODY SCREEN: CPT

## 2025-08-11 PROCEDURE — 82607 VITAMIN B-12: CPT

## 2025-08-11 PROCEDURE — 86923 COMPATIBILITY TEST ELECTRIC: CPT

## 2025-08-11 PROCEDURE — 82746 ASSAY OF FOLIC ACID SERUM: CPT

## 2025-08-11 PROCEDURE — 83550 IRON BINDING TEST: CPT

## 2025-08-11 PROCEDURE — 85027 COMPLETE CBC AUTOMATED: CPT

## 2025-08-11 PROCEDURE — 86900 BLOOD TYPING SEROLOGIC ABO: CPT

## 2025-08-11 PROCEDURE — 86901 BLOOD TYPING SEROLOGIC RH(D): CPT

## 2025-08-11 PROCEDURE — 82728 ASSAY OF FERRITIN: CPT

## 2025-08-12 ENCOUNTER — ANESTHESIA (OUTPATIENT)
Dept: OBSTETRICS AND GYNECOLOGY | Facility: HOSPITAL | Age: 35
End: 2025-08-12
Payer: MEDICAID

## 2025-08-12 ENCOUNTER — HOSPITAL ENCOUNTER (INPATIENT)
Facility: HOSPITAL | Age: 35
LOS: 3 days | Discharge: HOME | End: 2025-08-15
Attending: STUDENT IN AN ORGANIZED HEALTH CARE EDUCATION/TRAINING PROGRAM | Admitting: STUDENT IN AN ORGANIZED HEALTH CARE EDUCATION/TRAINING PROGRAM
Payer: MEDICAID

## 2025-08-12 ENCOUNTER — ANESTHESIA EVENT (OUTPATIENT)
Dept: OBSTETRICS AND GYNECOLOGY | Facility: HOSPITAL | Age: 35
End: 2025-08-12
Payer: MEDICAID

## 2025-08-12 DIAGNOSIS — Z98.891 HISTORY OF C-SECTION: ICD-10-CM

## 2025-08-12 DIAGNOSIS — O10.919 CHRONIC HYPERTENSION DURING PREGNANCY (HHS-HCC): ICD-10-CM

## 2025-08-12 DIAGNOSIS — Z98.891 HISTORY OF CESAREAN SECTION, CLASSICAL: Primary | ICD-10-CM

## 2025-08-12 DIAGNOSIS — D62 ACUTE ON CHRONIC BLOOD LOSS ANEMIA: ICD-10-CM

## 2025-08-12 DIAGNOSIS — R60.0 LOCALIZED EDEMA: ICD-10-CM

## 2025-08-12 LAB
ABO GROUP (TYPE) IN BLOOD: NORMAL
ABO GROUP (TYPE) IN BLOOD: NORMAL
ANTIBODY SCREEN: NORMAL
ANTIBODY SCREEN: NORMAL
RH FACTOR (ANTIGEN D): NORMAL
RH FACTOR (ANTIGEN D): NORMAL

## 2025-08-12 PROCEDURE — 7100000016 HC LABOR RECOVERY PER HOUR: Performed by: STUDENT IN AN ORGANIZED HEALTH CARE EDUCATION/TRAINING PROGRAM

## 2025-08-12 PROCEDURE — 2500000004 HC RX 250 GENERAL PHARMACY W/ HCPCS (ALT 636 FOR OP/ED): Mod: JZ,SE

## 2025-08-12 PROCEDURE — 86900 BLOOD TYPING SEROLOGIC ABO: CPT

## 2025-08-12 PROCEDURE — 2500000004 HC RX 250 GENERAL PHARMACY W/ HCPCS (ALT 636 FOR OP/ED): Mod: JZ,SE | Performed by: ANESTHESIOLOGIST ASSISTANT

## 2025-08-12 PROCEDURE — 36415 COLL VENOUS BLD VENIPUNCTURE: CPT

## 2025-08-12 PROCEDURE — 59514 CESAREAN DELIVERY ONLY: CPT | Performed by: STUDENT IN AN ORGANIZED HEALTH CARE EDUCATION/TRAINING PROGRAM

## 2025-08-12 PROCEDURE — 3700000014 HC AN EPIDURAL BLOCK CHARGE: Performed by: STUDENT IN AN ORGANIZED HEALTH CARE EDUCATION/TRAINING PROGRAM

## 2025-08-12 PROCEDURE — 2500000004 HC RX 250 GENERAL PHARMACY W/ HCPCS (ALT 636 FOR OP/ED): Mod: SE

## 2025-08-12 PROCEDURE — 2720000007 HC OR 272 NO HCPCS: Performed by: STUDENT IN AN ORGANIZED HEALTH CARE EDUCATION/TRAINING PROGRAM

## 2025-08-12 PROCEDURE — 59514 CESAREAN DELIVERY ONLY: CPT

## 2025-08-12 PROCEDURE — 1100000001 HC PRIVATE ROOM DAILY

## 2025-08-12 PROCEDURE — 2500000001 HC RX 250 WO HCPCS SELF ADMINISTERED DRUGS (ALT 637 FOR MEDICARE OP): Mod: SE | Performed by: ANESTHESIOLOGIST ASSISTANT

## 2025-08-12 RX ORDER — TRANEXAMIC ACID 1 G/10ML
1000 INJECTION, SOLUTION INTRAVENOUS ONCE AS NEEDED
Status: DISCONTINUED | OUTPATIENT
Start: 2025-08-12 | End: 2025-08-12 | Stop reason: HOSPADM

## 2025-08-12 RX ORDER — SODIUM CHLORIDE, SODIUM LACTATE, POTASSIUM CHLORIDE, CALCIUM CHLORIDE 600; 310; 30; 20 MG/100ML; MG/100ML; MG/100ML; MG/100ML
75 INJECTION, SOLUTION INTRAVENOUS CONTINUOUS
Status: DISCONTINUED | OUTPATIENT
Start: 2025-08-12 | End: 2025-08-13

## 2025-08-12 RX ORDER — PHENYLEPHRINE 10 MG/250 ML(40 MCG/ML)IN 0.9 % SOD.CHLORIDE INTRAVENOUS
CONTINUOUS PRN
Status: DISCONTINUED | OUTPATIENT
Start: 2025-08-12 | End: 2025-08-12

## 2025-08-12 RX ORDER — TRANEXAMIC ACID 1 G/10ML
1000 INJECTION, SOLUTION INTRAVENOUS ONCE AS NEEDED
Status: DISCONTINUED | OUTPATIENT
Start: 2025-08-12 | End: 2025-08-15 | Stop reason: HOSPADM

## 2025-08-12 RX ORDER — DIPHENHYDRAMINE HCL 25 MG
25 CAPSULE ORAL EVERY 4 HOURS PRN
Status: DISCONTINUED | OUTPATIENT
Start: 2025-08-12 | End: 2025-08-15 | Stop reason: HOSPADM

## 2025-08-12 RX ORDER — POLYETHYLENE GLYCOL 3350 17 G/17G
17 POWDER, FOR SOLUTION ORAL 2 TIMES DAILY PRN
Status: DISCONTINUED | OUTPATIENT
Start: 2025-08-12 | End: 2025-08-15 | Stop reason: HOSPADM

## 2025-08-12 RX ORDER — FAMOTIDINE 20 MG/1
20 TABLET, FILM COATED ORAL 2 TIMES DAILY PRN
Status: DISCONTINUED | OUTPATIENT
Start: 2025-08-12 | End: 2025-08-12

## 2025-08-12 RX ORDER — ENOXAPARIN SODIUM 100 MG/ML
60 INJECTION SUBCUTANEOUS EVERY 24 HOURS
Status: DISCONTINUED | OUTPATIENT
Start: 2025-08-14 | End: 2025-08-15 | Stop reason: HOSPADM

## 2025-08-12 RX ORDER — CARBOPROST TROMETHAMINE 250 UG/ML
250 INJECTION, SOLUTION INTRAMUSCULAR ONCE AS NEEDED
Status: DISCONTINUED | OUTPATIENT
Start: 2025-08-12 | End: 2025-08-12 | Stop reason: HOSPADM

## 2025-08-12 RX ORDER — NALOXONE HYDROCHLORIDE 0.4 MG/ML
0.1 INJECTION, SOLUTION INTRAMUSCULAR; INTRAVENOUS; SUBCUTANEOUS EVERY 5 MIN PRN
Status: DISCONTINUED | OUTPATIENT
Start: 2025-08-12 | End: 2025-08-15 | Stop reason: HOSPADM

## 2025-08-12 RX ORDER — ADHESIVE BANDAGE
10 BANDAGE TOPICAL
Status: DISCONTINUED | OUTPATIENT
Start: 2025-08-12 | End: 2025-08-15 | Stop reason: HOSPADM

## 2025-08-12 RX ORDER — LOPERAMIDE HYDROCHLORIDE 2 MG/1
4 CAPSULE ORAL EVERY 2 HOUR PRN
Status: DISCONTINUED | OUTPATIENT
Start: 2025-08-12 | End: 2025-08-15 | Stop reason: HOSPADM

## 2025-08-12 RX ORDER — HYDRALAZINE HYDROCHLORIDE 20 MG/ML
5 INJECTION INTRAMUSCULAR; INTRAVENOUS ONCE AS NEEDED
Status: DISCONTINUED | OUTPATIENT
Start: 2025-08-12 | End: 2025-08-12 | Stop reason: HOSPADM

## 2025-08-12 RX ORDER — OXYTOCIN 10 [USP'U]/ML
10 INJECTION, SOLUTION INTRAMUSCULAR; INTRAVENOUS ONCE AS NEEDED
Status: DISCONTINUED | OUTPATIENT
Start: 2025-08-12 | End: 2025-08-12 | Stop reason: HOSPADM

## 2025-08-12 RX ORDER — OXYCODONE HYDROCHLORIDE 5 MG/1
5 TABLET ORAL EVERY 4 HOURS PRN
Refills: 0 | Status: DISCONTINUED | OUTPATIENT
Start: 2025-08-13 | End: 2025-08-15 | Stop reason: HOSPADM

## 2025-08-12 RX ORDER — MISOPROSTOL 200 UG/1
800 TABLET ORAL ONCE AS NEEDED
Status: DISCONTINUED | OUTPATIENT
Start: 2025-08-12 | End: 2025-08-15 | Stop reason: HOSPADM

## 2025-08-12 RX ORDER — LIDOCAINE HYDROCHLORIDE 10 MG/ML
20 INJECTION, SOLUTION INFILTRATION; PERINEURAL ONCE AS NEEDED
Status: DISCONTINUED | OUTPATIENT
Start: 2025-08-12 | End: 2025-08-12 | Stop reason: HOSPADM

## 2025-08-12 RX ORDER — SIMETHICONE 80 MG
80 TABLET,CHEWABLE ORAL 4 TIMES DAILY PRN
Status: DISCONTINUED | OUTPATIENT
Start: 2025-08-12 | End: 2025-08-15 | Stop reason: HOSPADM

## 2025-08-12 RX ORDER — ALBUTEROL SULFATE 90 UG/1
2 INHALANT RESPIRATORY (INHALATION) EVERY 6 HOURS PRN
Status: DISCONTINUED | OUTPATIENT
Start: 2025-08-12 | End: 2025-08-15 | Stop reason: HOSPADM

## 2025-08-12 RX ORDER — LABETALOL HYDROCHLORIDE 5 MG/ML
20 INJECTION, SOLUTION INTRAVENOUS ONCE AS NEEDED
Status: DISCONTINUED | OUTPATIENT
Start: 2025-08-12 | End: 2025-08-12 | Stop reason: HOSPADM

## 2025-08-12 RX ORDER — HYDROMORPHONE HYDROCHLORIDE 0.2 MG/ML
0.2 INJECTION INTRAMUSCULAR; INTRAVENOUS; SUBCUTANEOUS EVERY 5 MIN PRN
Status: DISCONTINUED | OUTPATIENT
Start: 2025-08-12 | End: 2025-08-15 | Stop reason: HOSPADM

## 2025-08-12 RX ORDER — TERBUTALINE SULFATE 1 MG/ML
0.25 INJECTION SUBCUTANEOUS ONCE AS NEEDED
Status: DISCONTINUED | OUTPATIENT
Start: 2025-08-12 | End: 2025-08-12 | Stop reason: HOSPADM

## 2025-08-12 RX ORDER — OXYTOCIN/0.9 % SODIUM CHLORIDE 30/500 ML
60 PLASTIC BAG, INJECTION (ML) INTRAVENOUS ONCE AS NEEDED
Status: DISCONTINUED | OUTPATIENT
Start: 2025-08-12 | End: 2025-08-12 | Stop reason: HOSPADM

## 2025-08-12 RX ORDER — LIDOCAINE 560 MG/1
1 PATCH PERCUTANEOUS; TOPICAL; TRANSDERMAL
Status: DISCONTINUED | OUTPATIENT
Start: 2025-08-12 | End: 2025-08-15 | Stop reason: HOSPADM

## 2025-08-12 RX ORDER — FENTANYL CITRATE 50 UG/ML
INJECTION, SOLUTION INTRAMUSCULAR; INTRAVENOUS AS NEEDED
Status: DISCONTINUED | OUTPATIENT
Start: 2025-08-12 | End: 2025-08-12

## 2025-08-12 RX ORDER — ONDANSETRON 4 MG/1
4 TABLET, FILM COATED ORAL EVERY 6 HOURS PRN
Status: DISCONTINUED | OUTPATIENT
Start: 2025-08-12 | End: 2025-08-15 | Stop reason: HOSPADM

## 2025-08-12 RX ORDER — CARBOPROST TROMETHAMINE 250 UG/ML
250 INJECTION, SOLUTION INTRAMUSCULAR ONCE AS NEEDED
Status: DISCONTINUED | OUTPATIENT
Start: 2025-08-12 | End: 2025-08-15 | Stop reason: HOSPADM

## 2025-08-12 RX ORDER — FAMOTIDINE 10 MG/ML
INJECTION INTRAVENOUS AS NEEDED
Status: DISCONTINUED | OUTPATIENT
Start: 2025-08-12 | End: 2025-08-12

## 2025-08-12 RX ORDER — ACETAMINOPHEN 325 MG/1
975 TABLET ORAL EVERY 6 HOURS
Status: DISCONTINUED | OUTPATIENT
Start: 2025-08-13 | End: 2025-08-15 | Stop reason: HOSPADM

## 2025-08-12 RX ORDER — OXYCODONE HYDROCHLORIDE 10 MG/1
10 TABLET ORAL EVERY 4 HOURS PRN
Refills: 0 | Status: DISCONTINUED | OUTPATIENT
Start: 2025-08-13 | End: 2025-08-15 | Stop reason: HOSPADM

## 2025-08-12 RX ORDER — PHENYLEPHRINE HCL IN 0.9% NACL 1 MG/10 ML
SYRINGE (ML) INTRAVENOUS AS NEEDED
Status: DISCONTINUED | OUTPATIENT
Start: 2025-08-12 | End: 2025-08-12

## 2025-08-12 RX ORDER — LABETALOL HYDROCHLORIDE 5 MG/ML
20 INJECTION, SOLUTION INTRAVENOUS ONCE AS NEEDED
Status: DISCONTINUED | OUTPATIENT
Start: 2025-08-12 | End: 2025-08-15 | Stop reason: HOSPADM

## 2025-08-12 RX ORDER — ONDANSETRON HYDROCHLORIDE 2 MG/ML
4 INJECTION, SOLUTION INTRAVENOUS EVERY 6 HOURS PRN
Status: DISCONTINUED | OUTPATIENT
Start: 2025-08-12 | End: 2025-08-12

## 2025-08-12 RX ORDER — METHYLERGONOVINE MALEATE 0.2 MG/ML
0.2 INJECTION INTRAVENOUS ONCE AS NEEDED
Status: DISCONTINUED | OUTPATIENT
Start: 2025-08-12 | End: 2025-08-12 | Stop reason: HOSPADM

## 2025-08-12 RX ORDER — BUPIVACAINE HYDROCHLORIDE 7.5 MG/ML
INJECTION INTRAVENOUS AS NEEDED
Status: DISCONTINUED | OUTPATIENT
Start: 2025-08-12 | End: 2025-08-12

## 2025-08-12 RX ORDER — ONDANSETRON 4 MG/1
4 TABLET, FILM COATED ORAL EVERY 6 HOURS PRN
Status: DISCONTINUED | OUTPATIENT
Start: 2025-08-12 | End: 2025-08-12

## 2025-08-12 RX ORDER — OXYTOCIN 10 [USP'U]/ML
10 INJECTION, SOLUTION INTRAMUSCULAR; INTRAVENOUS ONCE AS NEEDED
Status: DISCONTINUED | OUTPATIENT
Start: 2025-08-12 | End: 2025-08-15 | Stop reason: HOSPADM

## 2025-08-12 RX ORDER — CEFAZOLIN 1 G/1
INJECTION, POWDER, FOR SOLUTION INTRAVENOUS AS NEEDED
Status: DISCONTINUED | OUTPATIENT
Start: 2025-08-12 | End: 2025-08-12

## 2025-08-12 RX ORDER — LOPERAMIDE HYDROCHLORIDE 2 MG/1
4 CAPSULE ORAL EVERY 2 HOUR PRN
Status: DISCONTINUED | OUTPATIENT
Start: 2025-08-12 | End: 2025-08-12 | Stop reason: HOSPADM

## 2025-08-12 RX ORDER — KETOROLAC TROMETHAMINE 30 MG/ML
30 INJECTION, SOLUTION INTRAMUSCULAR; INTRAVENOUS EVERY 6 HOURS
Status: COMPLETED | OUTPATIENT
Start: 2025-08-13 | End: 2025-08-13

## 2025-08-12 RX ORDER — IBUPROFEN 600 MG/1
600 TABLET, FILM COATED ORAL EVERY 6 HOURS
Status: DISCONTINUED | OUTPATIENT
Start: 2025-08-13 | End: 2025-08-15 | Stop reason: HOSPADM

## 2025-08-12 RX ORDER — HYDRALAZINE HYDROCHLORIDE 20 MG/ML
5 INJECTION INTRAMUSCULAR; INTRAVENOUS ONCE AS NEEDED
Status: DISCONTINUED | OUTPATIENT
Start: 2025-08-12 | End: 2025-08-15 | Stop reason: HOSPADM

## 2025-08-12 RX ORDER — METHYLERGONOVINE MALEATE 0.2 MG/ML
0.2 INJECTION INTRAVENOUS ONCE AS NEEDED
Status: DISCONTINUED | OUTPATIENT
Start: 2025-08-12 | End: 2025-08-15 | Stop reason: HOSPADM

## 2025-08-12 RX ORDER — KETOROLAC TROMETHAMINE 30 MG/ML
INJECTION, SOLUTION INTRAMUSCULAR; INTRAVENOUS AS NEEDED
Status: DISCONTINUED | OUTPATIENT
Start: 2025-08-12 | End: 2025-08-12

## 2025-08-12 RX ORDER — CALCIUM CARBONATE 200(500)MG
1 TABLET,CHEWABLE ORAL EVERY 6 HOURS PRN
Status: DISCONTINUED | OUTPATIENT
Start: 2025-08-12 | End: 2025-08-12

## 2025-08-12 RX ORDER — OXYTOCIN/0.9 % SODIUM CHLORIDE 30/500 ML
60 PLASTIC BAG, INJECTION (ML) INTRAVENOUS ONCE AS NEEDED
Status: DISCONTINUED | OUTPATIENT
Start: 2025-08-12 | End: 2025-08-15 | Stop reason: HOSPADM

## 2025-08-12 RX ORDER — ACETAMINOPHEN 120 MG/1
SUPPOSITORY RECTAL AS NEEDED
Status: DISCONTINUED | OUTPATIENT
Start: 2025-08-12 | End: 2025-08-12

## 2025-08-12 RX ORDER — MORPHINE SULFATE 0.5 MG/ML
INJECTION, SOLUTION EPIDURAL; INTRATHECAL; INTRAVENOUS AS NEEDED
Status: DISCONTINUED | OUTPATIENT
Start: 2025-08-12 | End: 2025-08-12

## 2025-08-12 RX ORDER — ONDANSETRON HYDROCHLORIDE 2 MG/ML
4 INJECTION, SOLUTION INTRAVENOUS EVERY 6 HOURS PRN
Status: DISCONTINUED | OUTPATIENT
Start: 2025-08-12 | End: 2025-08-15 | Stop reason: HOSPADM

## 2025-08-12 RX ORDER — DIPHENHYDRAMINE HYDROCHLORIDE 50 MG/ML
25 INJECTION, SOLUTION INTRAMUSCULAR; INTRAVENOUS EVERY 4 HOURS PRN
Status: DISCONTINUED | OUTPATIENT
Start: 2025-08-12 | End: 2025-08-15 | Stop reason: HOSPADM

## 2025-08-12 RX ORDER — FAMOTIDINE 20 MG/1
20 TABLET, FILM COATED ORAL 2 TIMES DAILY
Status: DISCONTINUED | OUTPATIENT
Start: 2025-08-13 | End: 2025-08-15 | Stop reason: HOSPADM

## 2025-08-12 RX ORDER — MISOPROSTOL 200 UG/1
800 TABLET ORAL ONCE AS NEEDED
Status: DISCONTINUED | OUTPATIENT
Start: 2025-08-12 | End: 2025-08-12 | Stop reason: HOSPADM

## 2025-08-12 RX ADMIN — FENTANYL CITRATE 15 MCG: 50 INJECTION, SOLUTION INTRAMUSCULAR; INTRAVENOUS at 19:16

## 2025-08-12 RX ADMIN — SODIUM CHLORIDE, SODIUM LACTATE, POTASSIUM CHLORIDE, AND CALCIUM CHLORIDE: 600; 310; 30; 20 INJECTION, SOLUTION INTRAVENOUS at 18:50

## 2025-08-12 RX ADMIN — HYDROMORPHONE HYDROCHLORIDE 0.2 MG: 0.2 INJECTION, SOLUTION INTRAMUSCULAR; INTRAVENOUS; SUBCUTANEOUS at 23:49

## 2025-08-12 RX ADMIN — MORPHINE SULFATE 0.15 MG: 0.5 INJECTION EPIDURAL; INTRATHECAL; INTRAVENOUS at 19:16

## 2025-08-12 RX ADMIN — ACETAMINOPHEN 650 MG: 120 SUPPOSITORY RECTAL at 20:28

## 2025-08-12 RX ADMIN — OXYTOCIN 600 MILLI-UNITS/MIN: 10 INJECTION, SOLUTION INTRAMUSCULAR; INTRAVENOUS at 19:51

## 2025-08-12 RX ADMIN — CEFAZOLIN 2 G: 1 INJECTION, POWDER, FOR SOLUTION INTRAMUSCULAR; INTRAVENOUS at 19:05

## 2025-08-12 RX ADMIN — KETOROLAC TROMETHAMINE 30 MG: 30 INJECTION, SOLUTION INTRAMUSCULAR; INTRAVENOUS at 20:28

## 2025-08-12 RX ADMIN — SODIUM CHLORIDE, SODIUM LACTATE, POTASSIUM CHLORIDE, AND CALCIUM CHLORIDE: 600; 310; 30; 20 INJECTION, SOLUTION INTRAVENOUS at 19:55

## 2025-08-12 RX ADMIN — SODIUM CHLORIDE, SODIUM LACTATE, POTASSIUM CHLORIDE, AND CALCIUM CHLORIDE 75 ML/HR: .6; .31; .03; .02 INJECTION, SOLUTION INTRAVENOUS at 12:00

## 2025-08-12 RX ADMIN — Medication 80 MCG: at 19:34

## 2025-08-12 RX ADMIN — PHENYLEPHRINE-NACL IV SOLUTION 10 MG/250ML-0.9% 0.49 MCG/KG/MIN: 10-0.9/25 SOLUTION at 19:16

## 2025-08-12 RX ADMIN — ONDANSETRON 4 MG: 2 INJECTION, SOLUTION INTRAMUSCULAR; INTRAVENOUS at 20:25

## 2025-08-12 RX ADMIN — FAMOTIDINE 20 MG: 10 INJECTION, SOLUTION INTRAVENOUS at 18:50

## 2025-08-12 RX ADMIN — BUPIVACAINE HYDROCHLORIDE IN DEXTROSE 1.5 ML: 7.5 INJECTION, SOLUTION SUBARACHNOID at 19:16

## 2025-08-12 RX ADMIN — PHENYLEPHRINE-NACL IV SOLUTION 10 MG/250ML-0.9% 0.56 MCG/KG/MIN: 10-0.9/25 SOLUTION at 19:26

## 2025-08-12 RX ADMIN — ONDANSETRON 4 MG: 2 INJECTION, SOLUTION INTRAMUSCULAR; INTRAVENOUS at 18:50

## 2025-08-12 SDOH — HEALTH STABILITY: MENTAL HEALTH: WISH TO BE DEAD (PAST 1 MONTH): NO

## 2025-08-12 SDOH — SOCIAL STABILITY: SOCIAL INSECURITY: WITHIN THE LAST YEAR, HAVE YOU BEEN AFRAID OF YOUR PARTNER OR EX-PARTNER?: NO

## 2025-08-12 SDOH — SOCIAL STABILITY: SOCIAL INSECURITY: PHYSICAL ABUSE: DENIES

## 2025-08-12 SDOH — SOCIAL STABILITY: SOCIAL INSECURITY: DOES ANYONE TRY TO KEEP YOU FROM HAVING/CONTACTING OTHER FRIENDS OR DOING THINGS OUTSIDE YOUR HOME?: NO

## 2025-08-12 SDOH — SOCIAL STABILITY: SOCIAL INSECURITY: WITHIN THE LAST YEAR, HAVE YOU BEEN HUMILIATED OR EMOTIONALLY ABUSED IN OTHER WAYS BY YOUR PARTNER OR EX-PARTNER?: NO

## 2025-08-12 SDOH — HEALTH STABILITY: MENTAL HEALTH: SUICIDAL BEHAVIOR (LIFETIME): NO

## 2025-08-12 SDOH — SOCIAL STABILITY: SOCIAL INSECURITY: HAVE YOU HAD ANY THOUGHTS OF HARMING ANYONE ELSE?: NO

## 2025-08-12 SDOH — HEALTH STABILITY: MENTAL HEALTH: NON-SPECIFIC ACTIVE SUICIDAL THOUGHTS (PAST 1 MONTH): NO

## 2025-08-12 SDOH — HEALTH STABILITY: MENTAL HEALTH: WERE YOU ABLE TO COMPLETE ALL THE BEHAVIORAL HEALTH SCREENINGS?: YES

## 2025-08-12 SDOH — SOCIAL STABILITY: SOCIAL INSECURITY: ARE YOU OR HAVE YOU BEEN THREATENED OR ABUSED PHYSICALLY, EMOTIONALLY, OR SEXUALLY BY ANYONE?: NO

## 2025-08-12 SDOH — SOCIAL STABILITY: SOCIAL INSECURITY: ARE THERE ANY APPARENT SIGNS OF INJURIES/BEHAVIORS THAT COULD BE RELATED TO ABUSE/NEGLECT?: NO

## 2025-08-12 SDOH — SOCIAL STABILITY: SOCIAL INSECURITY: VERBAL ABUSE: DENIES

## 2025-08-12 SDOH — SOCIAL STABILITY: SOCIAL INSECURITY: HAVE YOU HAD THOUGHTS OF HARMING ANYONE ELSE?: NO

## 2025-08-12 SDOH — SOCIAL STABILITY: SOCIAL INSECURITY: ABUSE SCREEN: ADULT

## 2025-08-12 SDOH — HEALTH STABILITY: MENTAL HEALTH: HAVE YOU USED ANY PRESCRIPTION DRUGS OTHER THAN PRESCRIBED IN THE PAST 12 MONTHS?: NO

## 2025-08-12 SDOH — SOCIAL STABILITY: SOCIAL INSECURITY: HAS ANYONE EVER THREATENED TO HURT YOUR FAMILY OR YOUR PETS?: NO

## 2025-08-12 SDOH — SOCIAL STABILITY: SOCIAL INSECURITY: DO YOU FEEL ANYONE HAS EXPLOITED OR TAKEN ADVANTAGE OF YOU FINANCIALLY OR OF YOUR PERSONAL PROPERTY?: NO

## 2025-08-12 SDOH — HEALTH STABILITY: MENTAL HEALTH: HAVE YOU USED ANY SUBSTANCES (CANABIS, COCAINE, HEROIN, HALLUCINOGENS, INHALANTS, ETC.) IN THE PAST 12 MONTHS?: NO

## 2025-08-12 SDOH — ECONOMIC STABILITY: HOUSING INSECURITY: DO YOU FEEL UNSAFE GOING BACK TO THE PLACE WHERE YOU ARE LIVING?: NO

## 2025-08-12 ASSESSMENT — LIFESTYLE VARIABLES
HOW OFTEN DO YOU HAVE 6 OR MORE DRINKS ON ONE OCCASION: NEVER
HOW MANY STANDARD DRINKS CONTAINING ALCOHOL DO YOU HAVE ON A TYPICAL DAY: PATIENT DOES NOT DRINK
AUDIT-C TOTAL SCORE: 0
AUDIT-C TOTAL SCORE: 0
SKIP TO QUESTIONS 9-10: 1
HOW OFTEN DO YOU HAVE A DRINK CONTAINING ALCOHOL: NEVER

## 2025-08-12 ASSESSMENT — ACTIVITIES OF DAILY LIVING (ADL): LACK_OF_TRANSPORTATION: NO

## 2025-08-12 ASSESSMENT — PATIENT HEALTH QUESTIONNAIRE - PHQ9
1. LITTLE INTEREST OR PLEASURE IN DOING THINGS: NOT AT ALL
SUM OF ALL RESPONSES TO PHQ9 QUESTIONS 1 & 2: 0
2. FEELING DOWN, DEPRESSED OR HOPELESS: NOT AT ALL

## 2025-08-12 ASSESSMENT — PAIN SCALES - GENERAL
PAINLEVEL_OUTOF10: 0 - NO PAIN
PAINLEVEL_OUTOF10: 8
PAINLEVEL_OUTOF10: 0 - NO PAIN

## 2025-08-12 ASSESSMENT — PAIN DESCRIPTION - LOCATION: LOCATION: INCISION

## 2025-08-12 ASSESSMENT — PAIN DESCRIPTION - DESCRIPTORS: DESCRIPTORS: BURNING;DISCOMFORT

## 2025-08-12 ASSESSMENT — PAIN - FUNCTIONAL ASSESSMENT: PAIN_FUNCTIONAL_ASSESSMENT: 0-10

## 2025-08-12 ASSESSMENT — PAIN DESCRIPTION - ORIENTATION: ORIENTATION: LOWER

## 2025-08-13 LAB
ERYTHROCYTE [DISTWIDTH] IN BLOOD BY AUTOMATED COUNT: 14.2 % (ref 11.5–14.5)
HCT VFR BLD AUTO: 30 % (ref 36–46)
HGB BLD-MCNC: 9.2 G/DL (ref 12–16)
MCH RBC QN AUTO: 28.2 PG (ref 26–34)
MCHC RBC AUTO-ENTMCNC: 30.7 G/DL (ref 32–36)
MCV RBC AUTO: 92 FL (ref 80–100)
NRBC BLD-RTO: 0 /100 WBCS (ref 0–0)
PLATELET # BLD AUTO: 192 X10*3/UL (ref 150–450)
RBC # BLD AUTO: 3.26 X10*6/UL (ref 4–5.2)
TREPONEMA PALLIDUM IGG+IGM AB [PRESENCE] IN SERUM OR PLASMA BY IMMUNOASSAY: NONREACTIVE
WBC # BLD AUTO: 8.8 X10*3/UL (ref 4.4–11.3)

## 2025-08-13 PROCEDURE — 86780 TREPONEMA PALLIDUM: CPT

## 2025-08-13 PROCEDURE — 1100000001 HC PRIVATE ROOM DAILY

## 2025-08-13 PROCEDURE — 2500000001 HC RX 250 WO HCPCS SELF ADMINISTERED DRUGS (ALT 637 FOR MEDICARE OP): Mod: SE

## 2025-08-13 PROCEDURE — 36415 COLL VENOUS BLD VENIPUNCTURE: CPT

## 2025-08-13 PROCEDURE — 85027 COMPLETE CBC AUTOMATED: CPT

## 2025-08-13 PROCEDURE — 2500000004 HC RX 250 GENERAL PHARMACY W/ HCPCS (ALT 636 FOR OP/ED): Mod: JZ,SE

## 2025-08-13 RX ADMIN — DIPHENHYDRAMINE HYDROCHLORIDE 25 MG: 25 CAPSULE ORAL at 01:37

## 2025-08-13 RX ADMIN — ACETAMINOPHEN 975 MG: 325 TABLET ORAL at 02:30

## 2025-08-13 RX ADMIN — KETOROLAC TROMETHAMINE 30 MG: 30 INJECTION, SOLUTION INTRAMUSCULAR; INTRAVENOUS at 02:30

## 2025-08-13 RX ADMIN — ACETAMINOPHEN 975 MG: 325 TABLET ORAL at 08:24

## 2025-08-13 RX ADMIN — FAMOTIDINE 20 MG: 20 TABLET, FILM COATED ORAL at 08:24

## 2025-08-13 RX ADMIN — IBUPROFEN 600 MG: 600 TABLET ORAL at 20:29

## 2025-08-13 RX ADMIN — KETOROLAC TROMETHAMINE 30 MG: 30 INJECTION, SOLUTION INTRAMUSCULAR; INTRAVENOUS at 08:24

## 2025-08-13 RX ADMIN — KETOROLAC TROMETHAMINE 30 MG: 30 INJECTION, SOLUTION INTRAMUSCULAR; INTRAVENOUS at 14:28

## 2025-08-13 RX ADMIN — ACETAMINOPHEN 975 MG: 325 TABLET ORAL at 14:27

## 2025-08-13 RX ADMIN — FAMOTIDINE 20 MG: 20 TABLET, FILM COATED ORAL at 20:29

## 2025-08-13 RX ADMIN — DIPHENHYDRAMINE HYDROCHLORIDE 25 MG: 25 CAPSULE ORAL at 05:54

## 2025-08-13 RX ADMIN — HYDROMORPHONE HYDROCHLORIDE 0.2 MG: 0.2 INJECTION, SOLUTION INTRAMUSCULAR; INTRAVENOUS; SUBCUTANEOUS at 09:23

## 2025-08-13 RX ADMIN — ACETAMINOPHEN 975 MG: 325 TABLET ORAL at 20:29

## 2025-08-13 ASSESSMENT — PAIN SCALES - GENERAL
PAINLEVEL_OUTOF10: 6
PAINLEVEL_OUTOF10: 2
PAINLEVEL_OUTOF10: 0 - NO PAIN
PAINLEVEL_OUTOF10: 7
PAINLEVEL_OUTOF10: 0 - NO PAIN
PAINLEVEL_OUTOF10: 5 - MODERATE PAIN

## 2025-08-13 ASSESSMENT — PAIN DESCRIPTION - LOCATION: LOCATION: INCISION

## 2025-08-13 ASSESSMENT — PAIN DESCRIPTION - DESCRIPTORS
DESCRIPTORS: BURNING;SORE
DESCRIPTORS: SORE
DESCRIPTORS: BURNING;DISCOMFORT

## 2025-08-13 ASSESSMENT — PAIN - FUNCTIONAL ASSESSMENT: PAIN_FUNCTIONAL_ASSESSMENT: 0-10

## 2025-08-14 PROCEDURE — 1100000001 HC PRIVATE ROOM DAILY

## 2025-08-14 PROCEDURE — 2500000004 HC RX 250 GENERAL PHARMACY W/ HCPCS (ALT 636 FOR OP/ED): Mod: SE

## 2025-08-14 PROCEDURE — 2500000002 HC RX 250 W HCPCS SELF ADMINISTERED DRUGS (ALT 637 FOR MEDICARE OP, ALT 636 FOR OP/ED): Mod: SE | Performed by: NURSE PRACTITIONER

## 2025-08-14 PROCEDURE — 2500000001 HC RX 250 WO HCPCS SELF ADMINISTERED DRUGS (ALT 637 FOR MEDICARE OP): Mod: SE

## 2025-08-14 RX ORDER — NIFEDIPINE 30 MG/1
30 TABLET, FILM COATED, EXTENDED RELEASE ORAL
Status: DISCONTINUED | OUTPATIENT
Start: 2025-08-14 | End: 2025-08-15 | Stop reason: HOSPADM

## 2025-08-14 RX ADMIN — FAMOTIDINE 20 MG: 20 TABLET, FILM COATED ORAL at 09:26

## 2025-08-14 RX ADMIN — OXYCODONE HYDROCHLORIDE 10 MG: 10 TABLET ORAL at 14:08

## 2025-08-14 RX ADMIN — ACETAMINOPHEN 975 MG: 325 TABLET ORAL at 20:49

## 2025-08-14 RX ADMIN — ACETAMINOPHEN 975 MG: 325 TABLET ORAL at 09:26

## 2025-08-14 RX ADMIN — ACETAMINOPHEN 975 MG: 325 TABLET ORAL at 15:08

## 2025-08-14 RX ADMIN — IBUPROFEN 600 MG: 600 TABLET ORAL at 20:49

## 2025-08-14 RX ADMIN — IBUPROFEN 600 MG: 600 TABLET ORAL at 03:17

## 2025-08-14 RX ADMIN — OXYCODONE HYDROCHLORIDE 10 MG: 10 TABLET ORAL at 20:49

## 2025-08-14 RX ADMIN — POLYETHYLENE GLYCOL 3350 17 G: 17 POWDER, FOR SOLUTION ORAL at 00:19

## 2025-08-14 RX ADMIN — SIMETHICONE 80 MG: 80 TABLET, CHEWABLE ORAL at 00:19

## 2025-08-14 RX ADMIN — IBUPROFEN 600 MG: 600 TABLET ORAL at 15:08

## 2025-08-14 RX ADMIN — OXYCODONE HYDROCHLORIDE 5 MG: 5 TABLET ORAL at 09:55

## 2025-08-14 RX ADMIN — ACETAMINOPHEN 975 MG: 325 TABLET ORAL at 03:17

## 2025-08-14 RX ADMIN — IBUPROFEN 600 MG: 600 TABLET ORAL at 09:26

## 2025-08-14 RX ADMIN — OXYCODONE HYDROCHLORIDE 5 MG: 5 TABLET ORAL at 03:31

## 2025-08-14 RX ADMIN — NIFEDIPINE 30 MG: 30 TABLET, FILM COATED, EXTENDED RELEASE ORAL at 14:05

## 2025-08-14 RX ADMIN — FAMOTIDINE 20 MG: 20 TABLET, FILM COATED ORAL at 20:49

## 2025-08-14 RX ADMIN — ENOXAPARIN SODIUM 60 MG: 100 INJECTION SUBCUTANEOUS at 03:18

## 2025-08-14 ASSESSMENT — PAIN DESCRIPTION - DESCRIPTORS
DESCRIPTORS: CRAMPING
DESCRIPTORS: CRAMPING;ACHING
DESCRIPTORS: BURNING;DISCOMFORT;CRAMPING
DESCRIPTORS: BURNING;DISCOMFORT;SORE
DESCRIPTORS: CRAMPING
DESCRIPTORS: CRAMPING

## 2025-08-14 ASSESSMENT — PAIN SCALES - GENERAL
PAINLEVEL_OUTOF10: 9
PAINLEVEL_OUTOF10: 6
PAINLEVEL_OUTOF10: 0 - NO PAIN
PAINLEVEL_OUTOF10: 8
PAINLEVEL_OUTOF10: 7
PAINLEVEL_OUTOF10: 4
PAINLEVEL_OUTOF10: 6
PAINLEVEL_OUTOF10: 10 - WORST POSSIBLE PAIN

## 2025-08-14 ASSESSMENT — PAIN - FUNCTIONAL ASSESSMENT
PAIN_FUNCTIONAL_ASSESSMENT: 0-10
PAIN_FUNCTIONAL_ASSESSMENT: 0-10

## 2025-08-14 ASSESSMENT — PAIN DESCRIPTION - ORIENTATION: ORIENTATION: MID;LOWER

## 2025-08-14 ASSESSMENT — PAIN DESCRIPTION - LOCATION
LOCATION: INCISION
LOCATION: INCISION

## 2025-08-15 ENCOUNTER — APPOINTMENT (OUTPATIENT)
Dept: VASCULAR MEDICINE | Facility: CLINIC | Age: 35
End: 2025-08-15
Payer: MEDICAID

## 2025-08-15 VITALS
SYSTOLIC BLOOD PRESSURE: 125 MMHG | TEMPERATURE: 97 F | WEIGHT: 225.09 LBS | OXYGEN SATURATION: 98 % | HEIGHT: 61 IN | BODY MASS INDEX: 42.5 KG/M2 | DIASTOLIC BLOOD PRESSURE: 88 MMHG | HEART RATE: 100 BPM | RESPIRATION RATE: 16 BRPM

## 2025-08-15 LAB
BLOOD EXPIRATION DATE: NORMAL
BLOOD EXPIRATION DATE: NORMAL
DISPENSE STATUS: NORMAL
DISPENSE STATUS: NORMAL
PRODUCT BLOOD TYPE: 5100
PRODUCT BLOOD TYPE: 5100
PRODUCT CODE: NORMAL
PRODUCT CODE: NORMAL
UNIT ABO: NORMAL
UNIT ABO: NORMAL
UNIT NUMBER: NORMAL
UNIT NUMBER: NORMAL
UNIT RH: NORMAL
UNIT RH: NORMAL
UNIT VOLUME: 314
UNIT VOLUME: 350
XM INTEP: NORMAL
XM INTEP: NORMAL

## 2025-08-15 PROCEDURE — 2500000005 HC RX 250 GENERAL PHARMACY W/O HCPCS: Mod: SE

## 2025-08-15 PROCEDURE — 93970 EXTREMITY STUDY: CPT | Performed by: INTERNAL MEDICINE

## 2025-08-15 PROCEDURE — 93970 EXTREMITY STUDY: CPT

## 2025-08-15 PROCEDURE — 2500000004 HC RX 250 GENERAL PHARMACY W/ HCPCS (ALT 636 FOR OP/ED): Mod: SE

## 2025-08-15 PROCEDURE — 2500000002 HC RX 250 W HCPCS SELF ADMINISTERED DRUGS (ALT 637 FOR MEDICARE OP, ALT 636 FOR OP/ED): Mod: SE | Performed by: NURSE PRACTITIONER

## 2025-08-15 PROCEDURE — 2500000001 HC RX 250 WO HCPCS SELF ADMINISTERED DRUGS (ALT 637 FOR MEDICARE OP): Mod: SE

## 2025-08-15 RX ORDER — OXYCODONE HYDROCHLORIDE 5 MG/1
5 TABLET ORAL EVERY 4 HOURS PRN
Qty: 15 TABLET | Refills: 0 | Status: CANCELLED | OUTPATIENT
Start: 2025-08-15

## 2025-08-15 RX ORDER — POLYETHYLENE GLYCOL 3350 17 G/17G
17 POWDER, FOR SOLUTION ORAL 2 TIMES DAILY PRN
Qty: 14 PACKET | Refills: 0 | Status: SHIPPED | OUTPATIENT
Start: 2025-08-15

## 2025-08-15 RX ORDER — FERROUS SULFATE 325(65) MG
325 TABLET ORAL EVERY OTHER DAY
Qty: 30 TABLET | Refills: 0 | Status: SHIPPED | OUTPATIENT
Start: 2025-08-15 | End: 2025-10-13

## 2025-08-15 RX ORDER — NIFEDIPINE 30 MG/1
30 TABLET, FILM COATED, EXTENDED RELEASE ORAL
Qty: 30 TABLET | Refills: 0 | Status: SHIPPED | OUTPATIENT
Start: 2025-08-16

## 2025-08-15 RX ORDER — ACETAMINOPHEN 325 MG/1
975 TABLET ORAL EVERY 6 HOURS
Qty: 360 TABLET | Refills: 0 | Status: SHIPPED | OUTPATIENT
Start: 2025-08-15

## 2025-08-15 RX ORDER — IBUPROFEN 600 MG/1
600 TABLET, FILM COATED ORAL EVERY 6 HOURS
Qty: 120 TABLET | Refills: 0 | Status: SHIPPED | OUTPATIENT
Start: 2025-08-15 | End: 2025-09-14

## 2025-08-15 RX ORDER — OXYCODONE HYDROCHLORIDE 5 MG/1
5 TABLET ORAL EVERY 4 HOURS PRN
Qty: 15 TABLET | Refills: 0 | Status: SHIPPED | OUTPATIENT
Start: 2025-08-15

## 2025-08-15 RX ORDER — NALOXONE HYDROCHLORIDE 0.4 MG/ML
0.1 INJECTION, SOLUTION INTRAMUSCULAR; INTRAVENOUS; SUBCUTANEOUS EVERY 5 MIN PRN
Qty: 1 ML | Status: SHIPPED | OUTPATIENT
Start: 2025-08-15

## 2025-08-15 RX ADMIN — IBUPROFEN 600 MG: 600 TABLET ORAL at 03:36

## 2025-08-15 RX ADMIN — ENOXAPARIN SODIUM 60 MG: 100 INJECTION SUBCUTANEOUS at 03:37

## 2025-08-15 RX ADMIN — ACETAMINOPHEN 975 MG: 325 TABLET ORAL at 03:35

## 2025-08-15 RX ADMIN — NIFEDIPINE 30 MG: 30 TABLET, FILM COATED, EXTENDED RELEASE ORAL at 06:20

## 2025-08-15 RX ADMIN — FAMOTIDINE 20 MG: 20 TABLET, FILM COATED ORAL at 09:53

## 2025-08-15 RX ADMIN — OXYCODONE HYDROCHLORIDE 10 MG: 10 TABLET ORAL at 09:56

## 2025-08-15 RX ADMIN — OXYCODONE HYDROCHLORIDE 5 MG: 5 TABLET ORAL at 03:44

## 2025-08-15 RX ADMIN — ACETAMINOPHEN 975 MG: 325 TABLET ORAL at 09:53

## 2025-08-15 RX ADMIN — WITCH HAZEL 1 EACH: 500 SOLUTION RECTAL; TOPICAL at 03:45

## 2025-08-15 RX ADMIN — IBUPROFEN 600 MG: 600 TABLET ORAL at 09:53

## 2025-08-15 ASSESSMENT — PAIN DESCRIPTION - DESCRIPTORS
DESCRIPTORS: CRAMPING;DISCOMFORT
DESCRIPTORS: CRAMPING;BURNING

## 2025-08-15 ASSESSMENT — PAIN SCALES - GENERAL
PAINLEVEL_OUTOF10: 10 - WORST POSSIBLE PAIN
PAINLEVEL_OUTOF10: 8
PAINLEVEL_OUTOF10: 0 - NO PAIN

## 2025-08-15 ASSESSMENT — PAIN - FUNCTIONAL ASSESSMENT
PAIN_FUNCTIONAL_ASSESSMENT: 0-10
PAIN_FUNCTIONAL_ASSESSMENT: 0-10

## 2025-08-15 ASSESSMENT — PAIN DESCRIPTION - LOCATION
LOCATION: INCISION
LOCATION: ABDOMEN

## 2025-09-03 ENCOUNTER — APPOINTMENT (OUTPATIENT)
Dept: INTERNAL MEDICINE | Age: 35
End: 2025-09-03

## 2025-09-11 ENCOUNTER — APPOINTMENT (OUTPATIENT)
Dept: OBGYN | Age: 35
End: 2025-09-11

## 2025-10-08 ENCOUNTER — APPOINTMENT (OUTPATIENT)
Dept: OBGYN | Age: 35
End: 2025-10-08

## (undated) DEVICE — GLOVE SURG 6 DRMPRN ULTRA SRFT TECHNOLOGY LF DARK GRN PF

## (undated) DEVICE — ADHESIVE PREMIERPRO EXOFIN 1ML HVISC TUBE SOFT FLXB APL

## (undated) DEVICE — SET DECANTER MEDICHOICE

## (undated) DEVICE — GLOVE, SURGICAL, PROTEXIS PI BLUE W/NEUTHERA, 6.0, PF, LF

## (undated) DEVICE — DRAPE ABDOMINAL TIBURON 14X11

## (undated) DEVICE — SYR 10CC LUER LOCK

## (undated) DEVICE — SYRINGE 50ML GRAD N-PYRG DEHP-FR PVC FREE STRL MED LF DISP

## (undated) DEVICE — COVER FLXB SEMIRIGID STRL LF DEVON LITEGLOVE LIGHT HNDL

## (undated) DEVICE — SUTURE VICRYL MTPS 4-0 PS2 27IN BRAID COAT ABS UNDYED J426H

## (undated) DEVICE — SUTURE VICRYL 2-0 SH 27IN BRAID COAT ABS UNDYED J417H

## (undated) DEVICE — BINDER ABD LG 12IN 63-74IN MULTIPANEL HOOK LOOP CLSR NS LF

## (undated) DEVICE — CLIPPER BLADE MOD 4406 (CAREF)

## (undated) DEVICE — ELECTRODE REM PLYHSV RETURN 9

## (undated) DEVICE — SEE L#120831

## (undated) DEVICE — TRAY FOLEY 16FR INFECTION CONT

## (undated) DEVICE — SOL IRR SOD CHL .9% POUR

## (undated) DEVICE — SEE MEDLINE ITEM 157117

## (undated) DEVICE — RELOAD PROXIMATE CUT BLUE 75MM

## (undated) DEVICE — DEVICE ENSEAL X1 LARGE JAW

## (undated) DEVICE — SEE MEDLINE ITEM 152622

## (undated) DEVICE — NDL 22GA X1 1/2 REG BEVEL

## (undated) DEVICE — COVER OVERHEAD SURG LT BLUE

## (undated) DEVICE — SEE MEDLINE ITEM 154981

## (undated) DEVICE — APPLICATOR SS 41CM FLOSEAL ENDO 5MM NRFL CANNULA CANNULATED

## (undated) DEVICE — SEAL CANNULA 12MM EWRST STPLR DA VINCI XI

## (undated) DEVICE — POUCH OSTOMY 1PC 5/8 - 1 1/4

## (undated) DEVICE — LUBRICANT SURGILUBE 2 OZ

## (undated) DEVICE — SUT MCRYL PLUS 4-0 PS2 27IN

## (undated) DEVICE — PACK ENDOSCOPY GENERAL

## (undated) DEVICE — DRAPE CLMN EQUIPMENT DA VINCI XI

## (undated) DEVICE — SYSTEM IMG CLEARIFY 8X6IN WARM HUB TROCAR WIPE MRFBR DISP

## (undated) DEVICE — SYRINGE 50ML ECNTRC TIP STRL MED LF DISP BD

## (undated) DEVICE — GLOVE SURG 7 PREMIERPRO LF NATURAL PF TXTR SMTH STRL

## (undated) DEVICE — GOWN SURG XL L3 NONREINFORCE SET IN SLV STRL LF DISP BLUE

## (undated) DEVICE — SUTURE ETHIBOND EXCEL 2-0 SH 36IN 2 ARM BRAID NABSB X523H

## (undated) DEVICE — BRIEF MESH LARGE

## (undated) DEVICE — SEE MEDLINE ITEM 152487

## (undated) DEVICE — PACKING STRIP IDOFORM 1X5YD

## (undated) DEVICE — GLOVE BIOGEL SKINSENSE PI 6.5

## (undated) DEVICE — TUBING HF INSUFFLATION W/ FLTR

## (undated) DEVICE — CUTTER PROXIMATE BLUE 75MM

## (undated) DEVICE — GOWN SMART IMP BREATHABLE XXLG

## (undated) DEVICE — SUT 1 36IN PDS II VIO MONO

## (undated) DEVICE — Device

## (undated) DEVICE — BELLOW CANN HEMOBLAST 1.65GR

## (undated) DEVICE — SPONGE DERMACEA GAUZE 4X4

## (undated) DEVICE — SEE MEDLINE ITEM 157144

## (undated) DEVICE — SEE MEDLINE ITEM 146347

## (undated) DEVICE — LEGGING CLEAR POLY 2/PACK

## (undated) DEVICE — STAPLER INT PROX TX 60X3.5MM

## (undated) DEVICE — APPLICATOR CHLORAPREP ORN 26ML

## (undated) DEVICE — SUT VICRYL PLUS 3-0 SH 18IN

## (undated) DEVICE — DEVICE V-LOC 180 6IN 3-0 CV-23 ABS GRN CLSR

## (undated) DEVICE — DEVICE V-LOC 90 6IN 3-0 5-20 NABSB BLUE PLYBTSTR CLSR

## (undated) DEVICE — OBTURATOR LAPSCP 8MM WECK VISTA DISP BLDLS STRL LF

## (undated) DEVICE — EVACUATOR SMOKE ELIMINATION PSHBTN QUIET PNEUVIEW XE STRL

## (undated) DEVICE — DRAPE ARM 21X19X10.5IN EQUIPMENT DA VINCI XI 21LB

## (undated) DEVICE — NEEDLE INSFL 120MM EPTH PNMPRTN

## (undated) DEVICE — GLOVE, SURGICAL, PROTEXIS PI MICRO, 6.0, PF, LF

## (undated) DEVICE — DRAPE PACK, CESAREAN SECTION, CUSTOM, UHC

## (undated) DEVICE — SUTURE, MONOCRYL, 0, 36 IN, CT, VIOLET

## (undated) DEVICE — GLOVE BIOGEL SKINSENSE PI 6.0

## (undated) DEVICE — GLOVE BIOGEL SKINSENSE PI 8.0

## (undated) DEVICE — BLANKET UPPER BODY 78.7X29.9IN

## (undated) DEVICE — NDL BOX COUNTER

## (undated) DEVICE — PAD ABD 8X10 STERILE

## (undated) DEVICE — KIT ENDO CLN 200ML 1STP KRINKLE SIMPLE2 LF

## (undated) DEVICE — STAPLER INTERNAL ANVIL CENTRIC CANNULA STPL SUREFORM 60 DA

## (undated) DEVICE — SPONGE COTTON TRAY 4X4IN

## (undated) DEVICE — NDL 18GA X1 1/2 REG BEVEL

## (undated) DEVICE — SYR 30CC LUER LOCK

## (undated) DEVICE — DRAPE LAP T SHT W/ INSTR PAD

## (undated) DEVICE — SEALER TISS DA VINCI SLIM JAW XTD DISP

## (undated) DEVICE — DEVICE V-LOC 18IN 0 GS-22 NABSB BLUE CLSR

## (undated) DEVICE — SUT 3-0 VICRYL SH CR/8 18

## (undated) DEVICE — SEE MEDLINE ITEM 146417

## (undated) DEVICE — DRAPE .75 SHT FNFLD 76X52IN SURG CNVRT STRL LF DISP TIBURON

## (undated) DEVICE — SUTURE ETHILON MTPS 2-0 PS 18IN MONO NABSB BLK 585H

## (undated) DEVICE — REDUCER LAPSCP 8-12MM DA VINCI XI EWRST CANNULA

## (undated) DEVICE — GLOVE SURG 7.5 PROTEXIS LF BLUE PF SMTH BEAD CUFF INTLK STRL

## (undated) DEVICE — SEAL ENDO INST 5-8MM DA VINCI XI UNV

## (undated) DEVICE — NDL HYPO REG 25G X 1 1/2

## (undated) DEVICE — ADHESIVE DERMABOND ADVANCED

## (undated) DEVICE — SUTURE, VICRYL, 0, 36 IN, CT, UNDYED

## (undated) DEVICE — RETRACTOR, PANNICULUS, TRAXI

## (undated) DEVICE — SEE MEDLINE ITEM 156902

## (undated) DEVICE — BOVIE SUCTION

## (undated) DEVICE — GLOVE SENSICARE PI GRN 7.5

## (undated) DEVICE — SPONGE DERMACEA 4X4IN 12PLY

## (undated) DEVICE — SPONGE IV DRAIN 4X4 STERILE

## (undated) DEVICE — IRRIGATOR SCT STRKFL 2 STRL LF DISP

## (undated) DEVICE — BULB 100CC SIL DRN LTWT LOW LVL SCT WND DRN STRL LF DISP

## (undated) DEVICE — TOWEL PACK, STERILE, 4/PACK, BLUE

## (undated) DEVICE — DRESSING TRANS 4X4 TEGADERM

## (undated) DEVICE — VALVE BTN DEFENDO AIRH20 KIT SCT STRL DISP BIOPSY

## (undated) DEVICE — SUTURE, MONOCRYL PLUS, 4-0, PS-2 UD 27IN

## (undated) DEVICE — GLOVE SURG 7.5 PREMIERPRO LF NATURAL PF TXTR SMTH STRL

## (undated) DEVICE — SUTURE, PDS II, 0, 60 IN, CTX, VIOLET

## (undated) DEVICE — SOL PVP-I SCRUB 7.5% 4OZ

## (undated) DEVICE — GOWN NONREINF SET-IN SLV XL

## (undated) DEVICE — SPONGE LAP 18X18 PREWASHED

## (undated) DEVICE — EZ PREP

## (undated) DEVICE — DRAPE INCISE IOBAN 2 23X17IN

## (undated) DEVICE — SUT 3/0 27IN PDS II VIO MO

## (undated) DEVICE — NEEDLE HPO 18GA 1.5IN REG WALL REG BVL LL HUB CLR CD DEHP-FR

## (undated) DEVICE — TUBING INSFL PNEUMOCLEAR SET HFL

## (undated) DEVICE — SUTURE, MONOCRYL, 2-0, 36 IN, CTX, VIOLET

## (undated) DEVICE — COVER LIGHT HANDLE 80/CA

## (undated) DEVICE — KIT ANTIFOG

## (undated) DEVICE — DRESSING AQUACEL AG 3.5X10IN

## (undated) DEVICE — TRAY MINOR GEN SURG

## (undated) DEVICE — GLOVE SIGNATURE ESSNTL LTX 7

## (undated) DEVICE — SUT CTD VICRYL 0 UND BR CT

## (undated) DEVICE — SYR ONLY LUER LOCK 20CC

## (undated) DEVICE — SUT VICRYL 3-0 27 SH

## (undated) DEVICE — SEALER LIGASURE LAP 37CM 5MM

## (undated) DEVICE — HEMOSTAT ABS 9X6IN SURGICEL NU-KNIT STRL